# Patient Record
Sex: FEMALE | Race: WHITE | NOT HISPANIC OR LATINO | Employment: OTHER | URBAN - METROPOLITAN AREA
[De-identification: names, ages, dates, MRNs, and addresses within clinical notes are randomized per-mention and may not be internally consistent; named-entity substitution may affect disease eponyms.]

---

## 2017-01-02 ENCOUNTER — TELEPHONE (OUTPATIENT)
Dept: UROLOGY | Facility: CLINIC | Age: 67
End: 2017-01-02

## 2017-01-02 ENCOUNTER — PRE VISIT (OUTPATIENT)
Dept: CARDIOLOGY | Facility: CLINIC | Age: 67
End: 2017-01-02

## 2017-01-02 DIAGNOSIS — I50.33 ACUTE ON CHRONIC DIASTOLIC HEART FAILURE (H): Primary | ICD-10-CM

## 2017-01-02 PROBLEM — I43 CARDIAC AMYLOIDOSIS (H): Status: ACTIVE | Noted: 2017-01-02

## 2017-01-02 PROBLEM — E85.4 CARDIAC AMYLOIDOSIS (H): Status: ACTIVE | Noted: 2017-01-02

## 2017-01-02 PROBLEM — J90 RECURRENT RIGHT PLEURAL EFFUSION: Status: ACTIVE | Noted: 2017-01-02

## 2017-01-02 ASSESSMENT — ENCOUNTER SYMPTOMS
WHEEZING: 0
HYPERTENSION: 0
LIGHT-HEADEDNESS: 1
ABDOMINAL PAIN: 0
SLEEP DISTURBANCES DUE TO BREATHING: 1
POOR WOUND HEALING: 0
SPUTUM PRODUCTION: 0
POSTURAL DYSPNEA: 1
LEG PAIN: 0
BOWEL INCONTINENCE: 0
HEMOPTYSIS: 0
RESPIRATORY PAIN: 0
DYSPNEA ON EXERTION: 1
BLOOD IN STOOL: 0
PALPITATIONS: 1
VOMITING: 0
TACHYCARDIA: 0
COUGH: 0
SWOLLEN GLANDS: 0
LEG SWELLING: 1
HEARTBURN: 0
CONSTIPATION: 1
DIARRHEA: 1
NAUSEA: 1
HYPOTENSION: 0
BLOATING: 1
RECTAL PAIN: 0
RECTAL BLEEDING: 0
SYNCOPE: 0
SHORTNESS OF BREATH: 1
CLAUDICATION: 0
SNORES LOUDLY: 0
COUGH DISTURBING SLEEP: 0
ORTHOPNEA: 1
BRUISES/BLEEDS EASILY: 1
NAIL CHANGES: 1
SKIN CHANGES: 0
EXERCISE INTOLERANCE: 0
JAUNDICE: 0

## 2017-01-02 NOTE — TELEPHONE ENCOUNTER
Left message with request for patient to return call back to clinic to discuss results.    Patient returned call shortly after and was informed of 12/21/16 bladder biopsy results. Patient verbalized understanding and will follow up with planned cystoscopy on 3/22/17.    Irene Zambrano  General Surgery - Urology RN

## 2017-01-03 ENCOUNTER — TRANSFERRED RECORDS (OUTPATIENT)
Dept: HEALTH INFORMATION MANAGEMENT | Facility: CLINIC | Age: 67
End: 2017-01-03

## 2017-01-04 ENCOUNTER — TRANSFERRED RECORDS (OUTPATIENT)
Dept: HEALTH INFORMATION MANAGEMENT | Facility: CLINIC | Age: 67
End: 2017-01-04

## 2017-01-05 ENCOUNTER — OFFICE VISIT (OUTPATIENT)
Dept: CARDIOLOGY | Facility: CLINIC | Age: 67
End: 2017-01-05
Attending: INTERNAL MEDICINE
Payer: COMMERCIAL

## 2017-01-05 ENCOUNTER — RADIANT APPOINTMENT (OUTPATIENT)
Dept: CARDIOLOGY | Facility: CLINIC | Age: 67
End: 2017-01-05

## 2017-01-05 VITALS
WEIGHT: 163.1 LBS | OXYGEN SATURATION: 98 % | SYSTOLIC BLOOD PRESSURE: 117 MMHG | HEART RATE: 98 BPM | BODY MASS INDEX: 28.9 KG/M2 | HEIGHT: 63 IN | DIASTOLIC BLOOD PRESSURE: 77 MMHG

## 2017-01-05 DIAGNOSIS — I43 AMYLOID HEART DISEASE (H): ICD-10-CM

## 2017-01-05 DIAGNOSIS — E85.4 AMYLOID HEART DISEASE (H): ICD-10-CM

## 2017-01-05 DIAGNOSIS — E87.6 HYPOKALEMIA: Primary | ICD-10-CM

## 2017-01-05 DIAGNOSIS — I50.33 ACUTE ON CHRONIC DIASTOLIC HEART FAILURE (H): ICD-10-CM

## 2017-01-05 DIAGNOSIS — R60.0 BILATERAL EDEMA OF LOWER EXTREMITY: ICD-10-CM

## 2017-01-05 LAB
ANION GAP SERPL CALCULATED.3IONS-SCNC: 7 MMOL/L (ref 3–14)
BUN SERPL-MCNC: 11 MG/DL (ref 7–30)
CALCIUM SERPL-MCNC: 9 MG/DL (ref 8.5–10.1)
CHLORIDE SERPL-SCNC: 100 MMOL/L (ref 94–109)
CO2 SERPL-SCNC: 32 MMOL/L (ref 20–32)
CREAT SERPL-MCNC: 0.87 MG/DL (ref 0.52–1.04)
GFR SERPL CREATININE-BSD FRML MDRD: 65 ML/MIN/1.7M2
GLUCOSE SERPL-MCNC: 92 MG/DL (ref 70–99)
NT-PROBNP SERPL-MCNC: 2883 PG/ML (ref 0–125)
POTASSIUM SERPL-SCNC: 3.8 MMOL/L (ref 3.4–5.3)
SODIUM SERPL-SCNC: 140 MMOL/L (ref 133–144)
TROPONIN I SERPL-MCNC: 0.04 UG/L (ref 0–0.04)

## 2017-01-05 PROCEDURE — 36415 COLL VENOUS BLD VENIPUNCTURE: CPT | Performed by: INTERNAL MEDICINE

## 2017-01-05 PROCEDURE — 84484 ASSAY OF TROPONIN QUANT: CPT | Performed by: INTERNAL MEDICINE

## 2017-01-05 PROCEDURE — 99213 OFFICE O/P EST LOW 20 MIN: CPT

## 2017-01-05 PROCEDURE — 83880 ASSAY OF NATRIURETIC PEPTIDE: CPT | Performed by: INTERNAL MEDICINE

## 2017-01-05 PROCEDURE — 99215 OFFICE O/P EST HI 40 MIN: CPT | Mod: 25 | Performed by: INTERNAL MEDICINE

## 2017-01-05 PROCEDURE — 80048 BASIC METABOLIC PNL TOTAL CA: CPT | Performed by: INTERNAL MEDICINE

## 2017-01-05 PROCEDURE — 99213 OFFICE O/P EST LOW 20 MIN: CPT | Mod: 25,ZF

## 2017-01-05 RX ORDER — TORSEMIDE 20 MG/1
40 TABLET ORAL 2 TIMES DAILY
Qty: 120 TABLET | Refills: 3 | Status: ON HOLD | OUTPATIENT
Start: 2017-01-05 | End: 2017-01-25

## 2017-01-05 RX ORDER — POTASSIUM CHLORIDE 750 MG/1
TABLET, EXTENDED RELEASE ORAL
Qty: 180 TABLET | Refills: 11 | Status: ON HOLD | OUTPATIENT
Start: 2017-01-05 | End: 2017-01-25

## 2017-01-05 RX ORDER — POTASSIUM CHLORIDE 1500 MG/1
TABLET, EXTENDED RELEASE ORAL
Qty: 90 TABLET | Refills: 3 | Status: SHIPPED | OUTPATIENT
Start: 2017-01-05 | End: 2017-01-05

## 2017-01-05 RX ORDER — SPIRONOLACTONE 25 MG/1
25 TABLET ORAL DAILY
Qty: 30 TABLET | Refills: 3 | Status: ON HOLD | OUTPATIENT
Start: 2017-01-05 | End: 2017-01-25

## 2017-01-05 ASSESSMENT — PAIN SCALES - GENERAL: PAINLEVEL: NO PAIN (0)

## 2017-01-05 NOTE — PATIENT INSTRUCTIONS
You were seen today in the Cardiovascular Clinic at the [facility].     Cardiology Providers you saw during your visit:  Dr. Cohen    Recommendations:  Change Lasix to Torsemide 40 mg (2 pills) twice per day  Increase Potassium to 40 meq in the AM and 20 meq in the PM, we've prescribed new 20 meq tablets  Increase Aldactone to 25 mg once daily (1 pill)  Have labs repeated next week  Weigh daily and write these down. On the day you have labs drawn send Dr. Cohen a MobileAdst message with what your weights have been and how you're feeling and we'll determine you're further plan of care.  Eat a heart healthy, low sodium diet.  Get 20 to 30 minutes of aerobic exercise 4 to 5 times per week as tolerated. (Examples of aerobic exercise include: walking, bicycling, swimming, running).    Follow-up:  With Dr. Cohen in 4 weeks and CORE NP in 2 weeks    Results:      Orders Only on 01/05/2017   Component Date Value Ref Range Status     Sodium 01/05/2017 140  133 - 144 mmol/L Final     Potassium 01/05/2017 3.8  3.4 - 5.3 mmol/L Final     Chloride 01/05/2017 100  94 - 109 mmol/L Final     Carbon Dioxide 01/05/2017 32  20 - 32 mmol/L Final     Anion Gap 01/05/2017 7  3 - 14 mmol/L Final     Glucose 01/05/2017 92  70 - 99 mg/dL Final     Urea Nitrogen 01/05/2017 11  7 - 30 mg/dL Final     Creatinine 01/05/2017 0.87  0.52 - 1.04 mg/dL Final     GFR Estimate 01/05/2017 65  >60 mL/min/1.7m2 Final    Non  GFR Calc     GFR Estimate If Black 01/05/2017 79  >60 mL/min/1.7m2 Final    African American GFR Calc     Calcium 01/05/2017 9.0  8.5 - 10.1 mg/dL Final     Troponin I ES 01/05/2017 0.035  0.000 - 0.045 ug/L Final    Comment: The 99th percentile for upper reference range is 0.045 ug/L.  Troponin values   in   the range of 0.045 - 0.120 ug/L may be associated with risks of adverse   clinical events.       N-Terminal Pro Bnp 01/05/2017 2883* 0 - 125 pg/mL Final    Comment: Reference range shown and  results flagged as abnormal are for the outpatient,   non acute settings. Establishing a baseline value for each individual patient   is useful for follow-up.  Suggested inpatient cut points for confirming diagnosis of CHF in an acute   setting are:   >450 pg/mL (age 18 to less than 50)   >900 pg/mL (age 50 to less than 75)   >1800 pg/mL (75 yrs and older)  An inpatient or emergency department NT-proPBNP <300 pg/mL effectively rules   out   acute CHF, with 99% negative predictive value.         For emergencies call 911.    For any scheduling needs, please call [phone], option #1 then option # 1.    Thank you for entrusting us with your care!     Please call if you have any questions or concerns.    Faby Dorsey RN  Cardiology Care Coordinator  [phone], press option # 1 to be routed to the [place] then option # 3 for medical questions to speak with a nurse    If you have an urgent need after business hours or over the weekend please call [phone] and ask for the cardiology fellow on call.     If you have a hard time paying for your medications visit http://www.needymeds.org/ to see where you might be able to get your medications the cheapest along with patient assistance programs available.    Heart failure patients and family members are welcome you to come to one of our heart failure support group meetings on the following dates from 1-2 pm :12/5/16. These all take place on the 8th floor of the [facility] hospital building in the [facility] Cafeteria Conference Room. Let us know if you have any questions.

## 2017-01-05 NOTE — NURSING NOTE
"Chief Complaint   Patient presents with     Follow Up For     66 yr old female with h/o cardiac amyloidosis presenting for follow up with labs and echo   NOTE- Patient here for 3 month return visit; Patient has concerns of \"fluid build-up\" she would like to discuss with  Dr. Cohen today.  "

## 2017-01-05 NOTE — NURSING NOTE
Labs: Patient was given results of the laboratory testing obtained today. Patient was instructed to return for the next laboratory testing in 1 week. Patient demonstrated understanding of this information and agreed to call with further questions or concerns.   Return Appointment: Patient given instructions regarding scheduling next clinic visit. Patient demonstrated understanding of this information and agreed to call with further questions or concerns.  Medication Change: Patient was educated regarding prescribed medication change, including discussion of the indication, administration, side effects, and when to report to MD or RN. Patient demonstrated understanding of this information and agreed to call with further questions or concerns.  Patient stated she understood all health information given and agreed to call with further questions or concerns.

## 2017-01-05 NOTE — MR AVS SNAPSHOT
After Visit Summary   1/5/2017    Leandra Guerrero    MRN: [id number]           Patient Information     Date Of Birth          1950        Visit Information        Provider Department      1/5/2017 12:00 PM Domenica Cohen MD [facility]        Today's Diagnoses     Hypokalemia    -  1     Bilateral edema of lower extremity         Amyloid heart disease (H)         Acute on chronic diastolic heart failure (H)           Care Instructions    You were seen today in the Cardiovascular Clinic at the [facility].     Cardiology Providers you saw during your visit:  Dr. Cohen    Recommendations:  Change Lasix to Torsemide 40 mg (2 pills) twice per day  Increase Potassium to 40 meq in the AM and 20 meq in the PM, we've prescribed new 20 meq tablets  Increase Aldactone to 25 mg once daily (1 pill)  Have labs repeated next week  Weigh daily and write these down. On the day you have labs drawn send Dr. Cohen a Quofore message with what your weights have been and how you're feeling and we'll determine you're further plan of care.  Eat a heart healthy, low sodium diet.  Get 20 to 30 minutes of aerobic exercise 4 to 5 times per week as tolerated. (Examples of aerobic exercise include: walking, bicycling, swimming, running).    Follow-up:  With Dr. Cohen in 4 weeks and CORE NP in 2 weeks    Results:      Orders Only on 01/05/2017   Component Date Value Ref Range Status     Sodium 01/05/2017 140  133 - 144 mmol/L Final     Potassium 01/05/2017 3.8  3.4 - 5.3 mmol/L Final     Chloride 01/05/2017 100  94 - 109 mmol/L Final     Carbon Dioxide 01/05/2017 32  20 - 32 mmol/L Final     Anion Gap 01/05/2017 7  3 - 14 mmol/L Final     Glucose 01/05/2017 92  70 - 99 mg/dL Final     Urea Nitrogen 01/05/2017 11  7 - 30 mg/dL Final     Creatinine 01/05/2017 0.87  0.52 - 1.04 mg/dL Final     GFR Estimate 01/05/2017 65  >60 mL/min/1.7m2 Final    Non  GFR Calc     GFR  Estimate If Black 01/05/2017 79  >60 mL/min/1.7m2 Final    African American GFR Calc     Calcium 01/05/2017 9.0  8.5 - 10.1 mg/dL Final     Troponin I ES 01/05/2017 0.035  0.000 - 0.045 ug/L Final    Comment: The 99th percentile for upper reference range is 0.045 ug/L.  Troponin values   in   the range of 0.045 - 0.120 ug/L may be associated with risks of adverse   clinical events.       N-Terminal Pro Bnp 01/05/2017 2883* 0 - 125 pg/mL Final    Comment: Reference range shown and results flagged as abnormal are for the outpatient,   non acute settings. Establishing a baseline value for each individual patient   is useful for follow-up.  Suggested inpatient cut points for confirming diagnosis of CHF in an acute   setting are:   >450 pg/mL (age 18 to less than 50)   >900 pg/mL (age 50 to less than 75)   >1800 pg/mL (75 yrs and older)  An inpatient or emergency department NT-proPBNP <300 pg/mL effectively rules   out   acute CHF, with 99% negative predictive value.         For emergencies call 911.    For any scheduling needs, please call [phone], option #1 then option # 1.    Thank you for entrusting us with your care!     Please call if you have any questions or concerns.    Faby Dorsey RN  Cardiology Care Coordinator  [phone], press option # 1 to be routed to the [place] then option # 3 for medical questions to speak with a nurse    If you have an urgent need after business hours or over the weekend please call [phone] and ask for the cardiology fellow on call.     If you have a hard time paying for your medications visit http://www.needymeds.org/ to see where you might be able to get your medications the cheapest along with patient assistance programs available.    Heart failure patients and family members are welcome you to come to one of our heart failure support group meetings on the following dates from 1-2 pm :12/5/16. These all take place on the 8th floor of the [facility] building  in the [facility] Cafeteria Conference Room. Let us know if you have any questions.          Follow-ups after your visit        Your next 10 appointments already scheduled     Jan 12, 2017  9:15 AM   LAB with LAB ONC [facility] ([facility])    [address] [place] [id number] [phone]           Patient must bring picture ID.  Patient should be prepared to give a urine specimen  Please do not eat 10-12 hours before your appointment if you are coming in fasting for labs on lipids, cholesterol, or glucose (sugar).  Pregnant women should follow their Care Team instructions. Water with medications is okay. Do not drink coffee or other fluids.   If you have concerns about taking  your medications, please ask at office or if scheduling via Spottly, send a message by clicking on Secure Messaging, Message Your Care Team.            Jan 12, 2017 10:00 AM   Return Visit with Mirela Moore MD   [facility])    [id number] [place] [address] [phone]            Jan 12, 2017 11:00 AM   Level 1 with BAY 2 INFUSION   [facility]    [address] [place] [address] [phone]            Jan 31, 2017  4:00 PM   Lab with UC LAB   [facility])    [address] [place] [address] [phone]            Jan 31, 2017  4:30 PM   (Arrive by 4:15 PM)   CORE NEW with SE Tinsley CNP   [facility] ([facility])    [address] [place] [address]  3rd Floor  [facility] [address] [phone]            Feb 02, 2017  7:30 AM   Lab with UC LAB   [facility] ([facility])    [address] [place] [address]  [address] [facility] [address]   [phone]            Feb 02, 2017  8:00 AM   (Arrive by 7:45 AM)   RETURN HEART FAILURE  "with Domenica Cohen MD   [facility] ([facility])    [address] [facility]  3rd Floor  [facility] [address] [phone]            Mar 22, 2017  9:30 AM   CYSTO with Magen Kowalski MD   [facility] ([facility])    [address] [place] [address] [phone]              Future tests that were ordered for you today     Open Future Orders        Priority Expected Expires Ordered    Basic metabolic panel STAT 1/7/2017 1/19/2017 1/5/2017            Who to contact     If you have questions or need follow up information about today's clinic visit or your schedule please contact [facility] directly at [phone].  Normal or non-critical lab and imaging results will be communicated to you by eduPadhart, letter or phone within 4 business days after the clinic has received the results. If you do not hear from us within 7 days, please contact the clinic through eduPadhart or phone. If you have a critical or abnormal lab result, we will notify you by phone as soon as possible.  Submit refill requests through Heap or call your pharmacy and they will forward the refill request to us. Please allow 3 business days for your refill to be completed.          Additional Information About Your Visit        Heap Information     Heap gives you secure access to your electronic health record. If you see a primary care provider, you can also send messages to your care team and make appointments. If you have questions, please call your primary care clinic.  If you do not have a primary care provider, please call [phone] and they will assist you.        Care EveryWhere ID     This is your Care EveryWhere ID. This could be used by other organizations to access your [facility] medical records  [id number]        Your Vitals Were     Pulse Height BMI (Body Mass Index) Pulse Oximetry          98 1.6 m (5' 3\") 28.90 kg/m2 98%   "       Blood Pressure from Last 3 Encounters:   01/05/17 117/77   12/28/16 124/67   12/27/16 118/65    Weight from Last 3 Encounters:   01/05/17 73.982 kg (163 lb 1.6 oz)   12/27/16 76.204 kg (168 lb)   12/23/16 74.39 kg (164 lb)                 Today's Medication Changes          These changes are accurate as of: 1/5/17  1:11 PM.  If you have any questions, ask your nurse or doctor.               Start taking these medicines.        Dose/Directions    potassium chloride SA 10 MEQ CR tablet   Commonly known as:  K-DUR/KLOR-CON M   Used for:  Hypokalemia   Started by:  Domenica Cohen MD        Take 40meq (4 pills) in the morning, and 20meq (2 pills) in the afternoon   Quantity:  180 tablet   Refills:  11       torsemide 20 MG tablet   Commonly known as:  DEMADEX   Used for:  Acute on chronic diastolic heart failure (H), Bilateral edema of lower extremity   Started by:  Domenica Cohen MD        Dose:  40 mg   Take 2 tablets (40 mg) by mouth 2 times daily   Quantity:  120 tablet   Refills:  3         These medicines have changed or have updated prescriptions.        Dose/Directions    spironolactone 25 MG tablet   Commonly known as:  ALDACTONE   This may have changed:  how much to take   Used for:  Amyloid heart disease (H)   Changed by:  Domenica Cohen MD        Dose:  25 mg   Take 1 tablet (25 mg) by mouth daily   Quantity:  30 tablet   Refills:  3         Stop taking these medicines if you haven't already. Please contact your care team if you have questions.     furosemide 40 MG tablet   Commonly known as:  LASIX   Stopped by:  Domenica Cohen MD           potassium chloride 10 MEQ tablet   Commonly known as:  K-TAB,KLOR-CON   Stopped by:  Domenica Cohen MD                Where to get your medicines      These medications were sent to [place] [address] [place] [address] [facility] [address]     Phone:  [phone] - potassium  chloride SA 10 MEQ CR tablet  - spironolactone 25 MG tablet  - torsemide 20 MG tablet             Primary Care Provider Office Phone # Fax #    Magy Obrien [phone]       [facility] [address]  [facility] [address]        Thank you!     Thank you for choosing [facility]  for your care. Our goal is always to provide you with excellent care. Hearing back from our patients is one way we can continue to improve our services. Please take a few minutes to complete the written survey that you may receive in the mail after your visit with us. Thank you!             Your Updated Medication List - Protect others around you: Learn how to safely use, store and throw away your medicines at www.disposemymeds.org.          This list is accurate as of: 1/5/17  1:11 PM.  Always use your most recent med list.                   Brand Name Dispense Instructions for use    acyclovir 400 MG tablet    ZOVIRAX    60 tablet    Take 1 tablet (400 mg) by mouth 2 times daily Viral Prophylaxis.       dexamethasone 4 MG tablet    DECADRON    40 tablet    Take 10 tablets (40 mg) by mouth every 7 days for 4 doses Take daily on Days 1, 8, 15, and 22. Cycles 3 and 4 ONLY.       HYDROcodone-acetaminophen 5-325 MG per tablet    NORCO         LORazepam 0.5 MG tablet    ATIVAN     TAKE ONE-HALF TO ONE TABLET BY MOUTH ONCE DAILY AT BEDTIME AS NEEDED       ondansetron 4 MG ODT tab    ZOFRAN-ODT         potassium chloride SA 10 MEQ CR tablet    K-DUR/KLOR-CON M    180 tablet    Take 40meq (4 pills) in the morning, and 20meq (2 pills) in the afternoon       spironolactone 25 MG tablet    ALDACTONE    30 tablet    Take 1 tablet (25 mg) by mouth daily       torsemide 20 MG tablet    DEMADEX    120 tablet    Take 2 tablets (40 mg) by mouth 2 times daily       TYLENOL PO      Take 325 mg by mouth

## 2017-01-05 NOTE — PROGRESS NOTES
Quick Note:    Results discussed directly with patient while patient was present. Any further details documented in the note.     Vidhi Dorsey RN  ______

## 2017-01-05 NOTE — Clinical Note
1/5/2017      RE: Leandra Guerrero  [address] [place] [address]       Dear Colleague,    Thank you for the opportunity to participate in the care of your patient, Leandra Guerrero, at the [facility] at [facility]. Please see a copy of my visit note below.    January 5, 2017    HPI:  Mrs. Guerrero is a 66 year old female with no past medical history until the Spring of 2016 when she was diagnosed with stage IV AL amyloid; she presents to clinic for follow up of cardiac amyloid.     Her cardiac and oncologic history are as follows:   Fatigue started in January 2016. She eventually had a coronary angiogram which was reportedly normal and was diagnosed with CHF and started on a bblocker and diuretics. Her symptoms progressed to the point that she was evaluated at [facility] in August/September (Dr. Barron in oncology/hematology, Dr. Nettles is cardiology). She was diagnosed with metastatic stage IV AL amyloid (+GI, +bone marrow involvement, not thought to have involvement of kidney or liver):    Her work up is detailed in my past note however she has lambda AL amyloidosis and has been undergoing CyBorD chemotherapy and excellent light chain response by serum. She has been turned down at [facility] for a stem cell transplant due to her cardiac involvement.    When I first met her about 2 months ago, she had definite evidence of cardiac amyloid, although she did not have volume overload at that time, her shortness of breath was minimal and she had several reassuring cardiac features including normal filling pressures on echo and again no elevation in her neck veins.  Things have unfortunately changed dramatically in the last 2 months. She has now had several more taps of pleural effusions on the right,  several within the last month that have been negative for infection.  She has also had worsening leg edema and is actually having weeping of her lower extremities.  She  has increased abdominal girth.  She has had significantly more shortness of breath (sometimes at rest) and presently just walking even half a block.  She endorses PND and orthopnea.  She also endorses some chest heaviness when her pleural effusion increases.      She denies syncope or lightheadedness.       PAST MEDICAL HISTORY:  - Metastatic AL amyloid   - GI (stomach and duodenal) involvement  - Cardiac amyloidosis      FAMILY HISTORY:  Family History   Problem Relation Age of Onset     Other - See Comments Sister      Amyloidosis   - Sister passed from multiple myeloma, patient was also told her sister had amyloid as well  - Mom with CAD      SOCIAL HISTORY:  Social History     Social History     Marital Status:      Spouse Name: N/A     Number of Children: N/A     Years of Education: N/A     Social History Main Topics     Smoking status: Never Smoker      Smokeless tobacco: None     Alcohol Use: No     Drug Use: No     Sexual Activity: Not Asked     Other Topics Concern     None     Social History Narrative   Lives in [place] with her , has 2 kids  Previously did office work, hasn't worked for several months now  Never smoker  Social etoh    CURRENT MEDICATIONS:  Current Outpatient Prescriptions   Medication Sig Dispense Refill     furosemide (LASIX) 40 MG tablet Take 1 tablet (40 mg) by mouth 2 times daily Please take until you follow-up with the [facility] ([facility]) clinic. They will change the dose, if needed, at your follow-up. 60 tablet 0     acyclovir (ZOVIRAX) 400 MG tablet Take 1 tablet (400 mg) by mouth 2 times daily Viral Prophylaxis. 60 tablet 0     potassium chloride (K-TAB,KLOR-CON) 10 MEQ tablet Take 2 tablets (20 mEq) by mouth 2 times daily 90 tablet 0     spironolactone (ALDACTONE) 25 MG tablet Take 0.5 tablets (12.5 mg) by mouth daily 45 tablet 3     Acetaminophen (TYLENOL PO) Take 325 mg by mouth       LORazepam (ATIVAN) 0.5 MG tablet TAKE ONE-HALF TO ONE TABLET BY MOUTH ONCE  "DAILY AT BEDTIME AS NEEDED  0     ondansetron (ZOFRAN-ODT) 4 MG disintegrating tablet   0     dexamethasone (DECADRON) 4 MG tablet Take 10 tablets (40 mg) by mouth every 7 days for 4 doses Take daily on Days 1, 8, 15, and 22. Cycles 3 and 4 ONLY. 40 tablet 0     HYDROcodone-acetaminophen (NORCO) 5-325 MG per tablet   0       ROS:   Constitutional: No fever, chills, or sweats. 35lb weight loss over 6 months  ENT: No visual disturbance, ear ache, epistaxis, sore throat.   Allergies/Immunologic: Negative.   Respiratory: No cough, hemoptysis.   Cardiovascular: As per HPI.   GI: + Nausea and severe constipation. Early satiety   : No urinary frequency, dysuria, or hematuria.   Integument: Negative.   Psychiatric: feeling down and anxious since her diagnosis  Neuro: + tingling in legs bilaterally  Endocrinology: Negative.   Musculoskeletal: Negative.    EXAM:  /77 mmHg  Pulse 98  Ht 1.6 m (5' 3\")  Wt 73.982 kg (163 lb 1.6 oz)  BMI 28.90 kg/m2  SpO2 98%  General: appears comfortable, alert and articulate  Head: normocephalic, atraumatic  Eyes: anicteric sclera, EOMI  Neck: no adenopathy  Orophyarynx: moist mucosa, no lesions, dentition intact  Heart: tachycardic, regular. Unable to appreciate murmurs. +S4. estimated JVP 18 cm   Lungs: diminished at R base with dullness to percussion, otherwise clear.  Abdomen: soft, non-tender, bowel sounds present, no hepatosplenomegaly + distension   Extremities: tense, 3 + LE edema up to the level of the mid thigh bilaterally (anasarca)   Neurological: normal speech and affect, no gross motor deficits    Labs:  CBC RESULTS:  Lab Results   Component Value Date    WBC 7.4 12/28/2016    RBC 3.94 12/28/2016    HGB 12.9 12/28/2016    HCT 39.9 12/28/2016    * 12/28/2016    MCH 32.7 12/28/2016    MCHC 32.3 12/28/2016    RDW 18.3* 12/28/2016    * 12/28/2016       CMP RESULTS:  Lab Results   Component Value Date     01/05/2017    POTASSIUM 3.8 01/05/2017    " CHLORIDE 100 01/05/2017    CO2 32 01/05/2017    ANIONGAP 7 01/05/2017    GLC 92 01/05/2017    BUN 11 01/05/2017    CR 0.87 01/05/2017    GFRESTIMATED 65 01/05/2017    GFRESTBLACK 79 01/05/2017    JERROD 9.0 01/05/2017    BILITOTAL 2.0* 12/28/2016    ALBUMIN 3.1* 12/28/2016    ALKPHOS 108 12/28/2016    ALT 15 12/28/2016    AST 16 12/28/2016        INR RESULTS:  Lab Results   Component Value Date    INR 1.12 12/28/2016       Lab Results   Component Value Date    MAG 2.4* 12/06/2016     Lab Results   Component Value Date    NTBNPI 2929* 12/28/2016     Lab Results   Component Value Date    NTBNP 2883* 01/05/2017       Echo from today:   Left Ventricle  Biplane LVEF 55%. Global and regional left ventricular function is normal  with an EF of 55-60%. Left ventricular size is normal. Moderate to severe  concentric wall thickening consistent with left ventricular hypertrophy is  present. Consistent with amyloid cardiomyopathy. Grade III or advanced  diastolic dysfunction. Global peak LV longitudinal strain is averaged at -  12.5%. This suggests abnormal strain (normal <-18%). No regional wall motion  abnormalities are seen.     Right Ventricle  The right ventricle is normal size. Global right ventricular function is  mildly reduced.  Atria  The right atria appears normal. Mild left atrial enlargement is present.     Mitral Valve  The mitral valve is normal. Mild to moderate mitral insufficiency is present.     Aortic Valve  Aortic valve is normal in structure and function. Mild aortic insufficiency  is present.     Tricuspid Valve  The tricuspid valve is normal. Mild to moderate tricuspid insufficiency is  present. The right ventricular systolic pressure is approximated at 23.7 mmHg  plus the right atrial pressure.     Pulmonic Valve  The pulmonic valve is normal. Mild pulmonic insufficiency is present.     Vessels  The inferior vena cava is normal. The aorta root is normal. The pulmonary  artery cannot be  assessed.  Pericardium  Small circumferential pericardial effusion is present without any hemodynamic  significance.     Compared to Previous Study  This study was compared with the study from 10/6/2016 .     ______________________________________________________________________________  MMode/2D Measurements & Calculations  IVSd: 1.6 cm  LVIDd: 3.6 cm  LVIDs: 2.6 cm  LVPWd: 1.4 cm  FS: 28.0 %  EDV(Teich): 56.1 ml  ESV(Teich): 25.2 ml  LV mass(C)d: 202.4 grams       Doppler Measurements & Calculations  MV E max ella: 82.3 cm/sec  MV A max ella: 40.1 cm/sec  MV E/A: 2.1  MV dec time: 0.14 sec  TR max ella: 243.4 cm/sec  TR max P.7 mmHg  Lateral E/e': 17.2  Medial E/e': 18.3     ______________________________________________________________________________    Previous cardiac monitor; no a fib, no VT, no pauses       Assessment and Plan:   Mrs. 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     Patient has cardiac amyloidosis as evidenced by her echocardiogram (severe concentric hypertrophy and biatrial enlargement) and abnormal EKG (near-low voltage, poor R wave progression, biatrial enlargement and no evidence of LVH despite very thickened LV on echo) in the setting of biopsy proven (BMB, EGD) AL amyloid. Her positive biomarkers are also suggestive of cardiac involvement; angiogram without obstructive CAD. She has been undergoing chemotherapy with CyBorD.     While her serum light chains have shown a response to treatment, she is much more symptomatic from her underlying cardiac involvement than when I last saw her.  She has elevated neck veins on exam today, she has tense lower extremity edema, increased abdominal girth and severe dyspnea on exertion.  All of these are new.  She is also having recurrent pleural taps, which I have to believe are in some part due to her  underlying disease but also due to cardiac dysfunction and high biventricular filling pressures.  Her natriuretic peptides are elevated today, and while her troponin elevation is only minimal, her overall functional status has declined.  She is presently New [place] Heart Association class IIIB with severe volume overload on exam today.      I am increasing her diuretics substantially today.  We are starting torsemide 40 b.i.d., with an increase in potassium as listed below.  We will have her see our nurse practitioner next Tuesday, and I have rechecked Oncology to tell them about her progressive cardiac symptoms. I have had many patient's whose cardiac disease has actually improve dramatically with control of serum light chains and she is not falling into that category at present. I am very worried about her trajectory. She is not, at present, a candidate for SCT and she is not a candidate for a combined SCT with heart due to her extracardiac involvement. She is really running out of options. I think that her restrictive physiology is going to continue to be an issue.     We still recommend holding any/all AV thor agents given the risk of progressive AV block/conduction disease in these patients.     We are going to see her in 4- 5 days and I hope I an stabilize her with diuretics, otherwise she is going to need an admission for diuresis.     1. Cardiac Amyloidosis with preserved EF  Stage D  NYHA Class IIIB   ACEi/ARB None  BB None, relatively contraindicated due to risk of progressive conduction disease/AV block in these patients  Aldosterone antagonist: aldactone 12.5  SCD prophylaxis: not indicated  % BiV pacing: NA  Fluid status: hypervolemic as above       Other:  Arrhythmia monitoring -- will discuss next visit about more extensive event monitoring (benign holter monitor reassuring). If afib is found, she will need to be anticoagulated for thromboembolism prophylaxis        Domenica Cohen MD  Assistant  Professor of Medicine   [facility] Division of Cardiology       CC  Patient Care Team:  Magy Obrien as PCP - General (Family Practice)  Mirela Moore MD as MD (Hematology & Oncology)  Vidhi Dorsey RN as Nurse Coordinator (Cardiology)

## 2017-01-05 NOTE — NURSING NOTE
Chief Complaint   Patient presents with     Follow Up For     66 yr old female with h/o cardiac amyloidosis presenting for follow up with labs and echo

## 2017-01-05 NOTE — PROGRESS NOTES
January 5, 2017    HPI:  Mrs. Guerrero is a 66 year old female with no past medical history until the Spring of 2016 when she was diagnosed with stage IV AL amyloid; she presents to clinic for follow up of cardiac amyloid.     Her cardiac and oncologic history are as follows:   Fatigue started in January 2016. She eventually had a coronary angiogram which was reportedly normal and was diagnosed with CHF and started on a bblocker and diuretics. Her symptoms progressed to the point that she was evaluated at [facility] in August/September (Dr. Barron in oncology/hematology, Dr. Nettles is cardiology). She was diagnosed with metastatic stage IV AL amyloid (+GI, +bone marrow involvement, not thought to have involvement of kidney or liver):    Her work up is detailed in my past note however she has lambda AL amyloidosis and has been undergoing CyBorD chemotherapy and excellent light chain response by serum. She has been turned down at [facility] for [facility] stem cell transplant due to her cardiac involvement.    When I first met her about 2 months ago, she had definite evidence of cardiac amyloid, although she did not have volume overload at that time, her shortness of breath was minimal and she had several reassuring cardiac features including normal filling pressures on echo and again no elevation in her neck veins.  Things have unfortunately changed dramatically in the last 2 months. She has now had several more taps of pleural effusions on the right,  several within the last month that have been negative for infection.  She has also had worsening leg edema and is actually having weeping of her lower extremities.  She has increased abdominal girth.  She has had significantly more shortness of breath (sometimes at rest) and presently just walking even half a block.  She endorses PND and orthopnea.  She also endorses some chest heaviness when her pleural effusion increases.      She denies syncope or lightheadedness.       PAST MEDICAL  HISTORY:  - Metastatic AL amyloid   - GI (stomach and duodenal) involvement  - Cardiac amyloidosis      FAMILY HISTORY:  Family History   Problem Relation Age of Onset     Other - See Comments Sister      Amyloidosis   - Sister passed from multiple myeloma, patient was also told her sister had amyloid as well  - Mom with CAD      SOCIAL HISTORY:  Social History     Social History     Marital Status:      Spouse Name: N/A     Number of Children: N/A     Years of Education: N/A     Social History Main Topics     Smoking status: Never Smoker      Smokeless tobacco: None     Alcohol Use: No     Drug Use: No     Sexual Activity: Not Asked     Other Topics Concern     None     Social History Narrative   Lives in [place] with her , has 2 kids  Previously did office work, hasn't worked for several months now  Never smoker  Social etoh    CURRENT MEDICATIONS:  Current Outpatient Prescriptions   Medication Sig Dispense Refill     furosemide (LASIX) 40 MG tablet Take 1 tablet (40 mg) by mouth 2 times daily Please take until you follow-up with the [facility] ([facility]) clinic. They will change the dose, if needed, at your follow-up. 60 tablet 0     acyclovir (ZOVIRAX) 400 MG tablet Take 1 tablet (400 mg) by mouth 2 times daily Viral Prophylaxis. 60 tablet 0     potassium chloride (K-TAB,KLOR-CON) 10 MEQ tablet Take 2 tablets (20 mEq) by mouth 2 times daily 90 tablet 0     spironolactone (ALDACTONE) 25 MG tablet Take 0.5 tablets (12.5 mg) by mouth daily 45 tablet 3     Acetaminophen (TYLENOL PO) Take 325 mg by mouth       LORazepam (ATIVAN) 0.5 MG tablet TAKE ONE-HALF TO ONE TABLET BY MOUTH ONCE DAILY AT BEDTIME AS NEEDED  0     ondansetron (ZOFRAN-ODT) 4 MG disintegrating tablet   0     dexamethasone (DECADRON) 4 MG tablet Take 10 tablets (40 mg) by mouth every 7 days for 4 doses Take daily on Days 1, 8, 15, and 22. Cycles 3 and 4 ONLY. 40 tablet 0     HYDROcodone-acetaminophen (NORCO) 5-325 MG per tablet   0  "      ROS:   Constitutional: No fever, chills, or sweats. 35lb weight loss over 6 months  ENT: No visual disturbance, ear ache, epistaxis, sore throat.   Allergies/Immunologic: Negative.   Respiratory: No cough, hemoptysis.   Cardiovascular: As per HPI.   GI: + Nausea and severe constipation. Early satiety   : No urinary frequency, dysuria, or hematuria.   Integument: Negative.   Psychiatric: feeling down and anxious since her diagnosis  Neuro: + tingling in legs bilaterally  Endocrinology: Negative.   Musculoskeletal: Negative.    EXAM:  /77 mmHg  Pulse 98  Ht 1.6 m (5' 3\")  Wt 73.982 kg (163 lb 1.6 oz)  BMI 28.90 kg/m2  SpO2 98%  General: appears comfortable, alert and articulate  Head: normocephalic, atraumatic  Eyes: anicteric sclera, EOMI  Neck: no adenopathy  Orophyarynx: moist mucosa, no lesions, dentition intact  Heart: tachycardic, regular. Unable to appreciate murmurs. +S4. estimated JVP 18 cm   Lungs: diminished at R base with dullness to percussion, otherwise clear.  Abdomen: soft, non-tender, bowel sounds present, no hepatosplenomegaly + distension   Extremities: tense, 3 + LE edema up to the level of the mid thigh bilaterally (anasarca)   Neurological: normal speech and affect, no gross motor deficits    Labs:  CBC RESULTS:  Lab Results   Component Value Date    WBC 7.4 12/28/2016    RBC 3.94 12/28/2016    HGB 12.9 12/28/2016    HCT 39.9 12/28/2016    * 12/28/2016    MCH 32.7 12/28/2016    MCHC 32.3 12/28/2016    RDW 18.3* 12/28/2016    * 12/28/2016       CMP RESULTS:  Lab Results   Component Value Date     01/05/2017    POTASSIUM 3.8 01/05/2017    CHLORIDE 100 01/05/2017    CO2 32 01/05/2017    ANIONGAP 7 01/05/2017    GLC 92 01/05/2017    BUN 11 01/05/2017    CR 0.87 01/05/2017    GFRESTIMATED 65 01/05/2017    GFRESTBLACK 79 01/05/2017    JERROD 9.0 01/05/2017    BILITOTAL 2.0* 12/28/2016    ALBUMIN 3.1* 12/28/2016    ALKPHOS 108 12/28/2016    ALT 15 12/28/2016    AST 16 " 12/28/2016        INR RESULTS:  Lab Results   Component Value Date    INR 1.12 12/28/2016       Lab Results   Component Value Date    MAG 2.4* 12/06/2016     Lab Results   Component Value Date    NTBNPI 2929* 12/28/2016     Lab Results   Component Value Date    NTBNP 2883* 01/05/2017       Echo from today:   Left Ventricle  Biplane LVEF 55%. Global and regional left ventricular function is normal  with an EF of 55-60%. Left ventricular size is normal. Moderate to severe  concentric wall thickening consistent with left ventricular hypertrophy is  present. Consistent with amyloid cardiomyopathy. Grade III or advanced  diastolic dysfunction. Global peak LV longitudinal strain is averaged at -  12.5%. This suggests abnormal strain (normal <-18%). No regional wall motion  abnormalities are seen.     Right Ventricle  The right ventricle is normal size. Global right ventricular function is  mildly reduced.  Atria  The right atria appears normal. Mild left atrial enlargement is present.     Mitral Valve  The mitral valve is normal. Mild to moderate mitral insufficiency is present.     Aortic Valve  Aortic valve is normal in structure and function. Mild aortic insufficiency  is present.     Tricuspid Valve  The tricuspid valve is normal. Mild to moderate tricuspid insufficiency is  present. The right ventricular systolic pressure is approximated at 23.7 mmHg  plus the right atrial pressure.     Pulmonic Valve  The pulmonic valve is normal. Mild pulmonic insufficiency is present.     Vessels  The inferior vena cava is normal. The aorta root is normal. The pulmonary  artery cannot be assessed.  Pericardium  Small circumferential pericardial effusion is present without any hemodynamic  significance.     Compared to Previous Study  This study was compared with the study from 10/6/2016 .     ______________________________________________________________________________  MMode/2D Measurements & Calculations  IVSd: 1.6 cm  LVIDd:  3.6 cm  LVIDs: 2.6 cm  LVPWd: 1.4 cm  FS: 28.0 %  EDV(Teich): 56.1 ml  ESV(Teich): 25.2 ml  LV mass(C)d: 202.4 grams       Doppler Measurements & Calculations  MV E max ella: 82.3 cm/sec  MV A max ella: 40.1 cm/sec  MV E/A: 2.1  MV dec time: 0.14 sec  TR max ella: 243.4 cm/sec  TR max P.7 mmHg  Lateral E/e': 17.2  Medial E/e': 18.3     ______________________________________________________________________________    Previous cardiac monitor; no a fib, no VT, no pauses       Assessment and Plan:   Mrs. 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     Patient has cardiac amyloidosis as evidenced by her echocardiogram (severe concentric hypertrophy and biatrial enlargement) and abnormal EKG (near-low voltage, poor R wave progression, biatrial enlargement and no evidence of LVH despite very thickened LV on echo) in the setting of biopsy proven (BMB, EGD) AL amyloid. Her positive biomarkers are also suggestive of cardiac involvement; angiogram without obstructive CAD. She has been undergoing chemotherapy with CyBorD.     While her serum light chains have shown a response to treatment, she is much more symptomatic from her underlying cardiac involvement than when I last saw her.  She has elevated neck veins on exam today, she has tense lower extremity edema, increased abdominal girth and severe dyspnea on exertion.  All of these are new.  She is also having recurrent pleural taps, which I have to believe are in some part due to her underlying disease but also due to cardiac dysfunction and high biventricular filling pressures.  Her natriuretic peptides are elevated today, and while her troponin elevation is only minimal, her overall functional status has declined.  She is presently New [place] Heart Association class IIIB with severe volume overload on exam today.      I am  increasing her diuretics substantially today.  We are starting torsemide 40 b.i.d., with an increase in potassium as listed below.  We will have her see our nurse practitioner next Tuesday, and I have rechecked Oncology to tell them about her progressive cardiac symptoms. I have had many patient's whose cardiac disease has actually improve dramatically with control of serum light chains and she is not falling into that category at present. I am very worried about her trajectory. She is not, at present, a candidate for SCT and she is not a candidate for a combined SCT with heart due to her extracardiac involvement. She is really running out of options. I think that her restrictive physiology is going to continue to be an issue.     We still recommend holding any/all AV thor agents given the risk of progressive AV block/conduction disease in these patients.     We are going to see her in 4- 5 days and I hope I an stabilize her with diuretics, otherwise she is going to need an admission for diuresis.     1. Cardiac Amyloidosis with preserved EF  Stage D  NYHA Class IIIB   ACEi/ARB None  BB None, relatively contraindicated due to risk of progressive conduction disease/AV block in these patients  Aldosterone antagonist: aldactone 12.5  SCD prophylaxis: not indicated  % BiV pacing: NA  Fluid status: hypervolemic as above       Other:  Arrhythmia monitoring -- will discuss next visit about more extensive event monitoring (benign holter monitor reassuring). If afib is found, she will need to be anticoagulated for thromboembolism prophylaxis        Domenica Cohen MD   of Medicine   [facility] Division of Cardiology       CC  Patient Care Team:  Magy Obrien as PCP - General (Family Practice)  Mirela Daley MD as MD (Hematology & Oncology)  Domenica Cohen MD as MD (Cardiology)  Vidhi Dorsey RN as Nurse Coordinator (Cardiology)  MIRELA DALEY  Answers for  HPI/ROS submitted by the patient on 1/2/2017   General Symptoms: No  Skin Symptoms: Yes  HENT Symptoms: No  EYE SYMPTOMS: No  HEART SYMPTOMS: Yes  LUNG SYMPTOMS: Yes  INTESTINAL SYMPTOMS: Yes  URINARY SYMPTOMS: No  GYNECOLOGIC SYMPTOMS: No  BREAST SYMPTOMS: No  SKELETAL SYMPTOMS: No  BLOOD SYMPTOMS: Yes  NERVOUS SYSTEM SYMPTOMS: No  MENTAL HEALTH SYMPTOMS: No  Changes in hair: No  Changes in moles/birth marks: No  Itching: Yes  Rashes: No  Changes in nails: Yes  Acne: No  Hair in places you don't want it: No  Change in facial hair: No  Warts: No  Non-healing sores: No  Scarring: No  Flaking of skin: Yes  Color changes of hands/feet in cold : No  Sun sensitivity: No  Skin thickening: No  Cough: No  Sputum or phlegm: No  Coughing up blood: No  Difficulty breating or shortness of breath: Yes  Snoring: No  Wheezing: No  Difficulty breathing on exertion: Yes  Respiratory pain: No  Nighttime Cough: No  Difficulty breathing when lying flat: Yes  Chest pain or pressure: Yes  Fast or irregular heartbeat: Yes  Pain in legs with walking: No  Swelling in feet or ankles: Yes  Trouble breathing while lying down: Yes  Fingers or Toes appear blue: No  High blood pressure: No  Low blood pressure: No  Fainting: No  Murmurs: No  Chest pain on exertion: No  Chest pain at rest: No  Cramping pain in leg during exercise: No  Pacemaker: No  Varicose veins: No  Edema or swelling: Yes  Fast heart beat: No  Wake up at night with shortness of breath: Yes  Heart flutters: Yes  Light-headedness: Yes  Exercise intolerance: No  Heart burn or indigestion: No  Nausea: Yes  Vomiting: No  Abdominal pain: No  Bloating: Yes  Constipation: Yes  Diarrhea: Yes  Blood in stool: No  Black stools: No  Rectal or Anal pain: No  Fecal incontinence: No  Rectal bleeding: No  Yellowing of skin or eyes: No  Vomit with blood: No  Change in stools: No  Hemorrhoids: No  Anemia: No  Swollen glands: No  Easy bleeding or bruising: Yes

## 2017-01-05 NOTE — PROGRESS NOTES
October 7th, 2016    HPI:  Mrs. Guerrero is a 66 year old female with no past medical history until the Spring of 2016 when she was diagnosed with metastatic stage IV AL amyloid; she presents to clinic for evaluation due to concern for cardiac amyloid.     Patient reports her symptoms of fatigue and shortness of breath started in January 2016.  By April 2016 she was evaluated by her PCP after having 5 visits to the ER due to acute shortness of breath. She had a stress test (no report) and was only able to walk 4 minutes. She eventually had a coronary angiogram which was reportedly normal, again we do not have these records. She was diagnosed with CHF and started on a bblocker and diuretics. Her symptoms progressed to the point that she was evaluated at [facility] in August/September (Dr. Barron in oncology/hematology, Dr. Nettles is cardiology). She was diagnosed with metastatic stage IV AL amyloid (+GI, +bone marrow involvement, not thought to have involvement of kidney or liver) with the following work up:    [facility] [place] work up:  Her TSH was normal. Serum protein ELP showed a small abnormality in the gamma fraction.  Serum immunofixation showed small monoclonal IgA lambda in the gamma fraction and a small lambda in the beta fraction. Serum IgA level was normal at 329. Serum IgG level was mildly low at 755 (normal range 767-1590) and IgM level was normal. B2 microglobulin was mildly elevated at 2.94 (ULN 2.7).  Total bilirubin was elevated at 3.9 and direct at 0.8. Uric acid was mildly elevated at 6.9 (ULN 6.1) Creat was 1.0. Troponin was 0.1 (ULN <0.01) and NT-pro BNP was 4747 pg/ml (ULN <=183). Holter monitor was done that did not show any tachyarrhythmias. Echo was done which reported an EF of 59%. She also had a R thoracentesis for a large pleural effusion 8/16, repeat CXR 9/16 with near resolution of effusion per report. Total cholesterol was 190 and LDL was 139. Random urine protein was mildly elevated at 33 mg/dl (normal  range <22). INR was 1.2 and PTT 27 (within normal limits). Fibrinogen was elevated at 441 and factor X was mildly decreased at 64% (normal range %). D-dimer was up at 1700 (). Free lambda light chains were 69.5 and free kappa light chains were 1.11 with FLC kappa/lambda ratio of 0.016. Flow cytometry on bone marrow biopsy showed detectable monotypic plasma cells. There were 12% of monotypic plasma cells on bone marrow biopsy. Bone marrow biopsy (performed on 9/14/2016) showed normocellular BM, 30-40% cellularity, with involvement by 10-20% of lambda light chain restricted plasma cells.  Congo red stain was positive on the bone marrow biopsy. Liquid chromatography tandem mass spectroscopy showed a peptide profile c/w AL (lambda type) amyloid. BM karyotype was normal 46 XX. FISH on BM showed plasma cell clone with 1q duplication, and monosomy of 13 and 14. EGD 9/14/2016 -- gastric mucosal atrophy and multiple mucosal papules (nodules) seen in the stomach. The duodenum appeared normal. The biopsies of stomach mucosa, of stomach (antral) nodules and of duodenum, all showed the presence of amyloid in submucosal blood vessel walls in the stomach and duodenum and in deep mucosa in the stomach antrum and body, confirmed by Congo Red stain.     Since her work up at [facility], she has returned to home to [place]. She has established care with Dr. Moore in heme/onc who quoted a median survival of 5 months in patients with stage IV metastatic AL amyloid not undergoing BMT, 5 year survival of 15%. Patients undergoing BMT, medial survival is 22 months and 5 year survival is 46%. She was not thought to be a candidate for high dose therapy with autologous HSCT due to her cardiac involvement and elevated troponin. She was started on CyBorD and has had no side effects as of yet. Additional work up done here includes: Serum immunofixation ELP: Monoclonal IgA immunoglobulin of lambda light chain type. Monoclonal free  immunoglobulin light chain of lambda chain type. EKG in heme/onc clinic 2 days ago shows sinus tachycardia with rate of 106, biatrial enlargement, poor R wave progression, no LVH, ST depression in V4-V6.    Clinically, patient is overall doing fair. Since her work up at [facility] she is now off bblocker and CCB, she is aldactone 12.5 and lasix 40 daily. Her shortness of breath and orthopnea have improved significantly since having her thoracentesis. She still feels profoundly fatigued and is very limited functionally. She can walk down the driveway of her house but gets fatigued and needs to stop much after that. She gets very winded with stairs. 1 year ago she would several miles without issues. She denies syncope/pre-syncope, chest pain/pressure. She has palpitations without associated symptoms several times per week, unchanged. Her leg swelling has improved with diuretics. She still has significant GI symptoms include nausea, early satiety and severe constipation. She occasionally has tingling in her feet. Her mood is down; she feels overwhelmed and deflated with her new diagnosis. Her weight is 150lb today, she was in the 180-190 range in January of this year, giving her a 30-40lb weight loss over the last 6-9 months.      PAST MEDICAL HISTORY:  - Metastatic AL amyloid   - GI (stomach and duodenal) involvement  - Cardiac amyloidosis      FAMILY HISTORY:  Family History   Problem Relation Age of Onset     Other - See Comments Sister      Amyloidosis   - Sister passed from multiple myeloma, patient was also told her sister had amyloid as well  - Mom with CAD      SOCIAL HISTORY:  Social History     Social History     Marital Status:      Spouse Name: N/A     Number of Children: N/A     Years of Education: N/A     Social History Main Topics     Smoking status: Never Smoker      Smokeless tobacco: None     Alcohol Use: No     Drug Use: No     Sexual Activity: Not Asked     Other Topics Concern     None     Social  History Narrative   Lives in [place] with her , has 2 kids  Previously did office work, hasn't worked for several months now  Never smoker  Social etoh    CURRENT MEDICATIONS:  Current Outpatient Prescriptions   Medication Sig Dispense Refill     furosemide (LASIX) 40 MG tablet Take 1 tablet (40 mg) by mouth 2 times daily Please take until you follow-up with the [facility] (cardiology) clinic. They will change the dose, if needed, at your follow-up. 60 tablet 0     dexamethasone (DECADRON) 4 MG tablet Take 10 tablets (40 mg) by mouth every 7 days for 4 doses Take daily on Days 1, 8, 15, and 22. Cycles 3 and 4 ONLY. 40 tablet 0     acyclovir (ZOVIRAX) 400 MG tablet Take 1 tablet (400 mg) by mouth 2 times daily Viral Prophylaxis. 60 tablet 0     potassium chloride (K-TAB,KLOR-CON) 10 MEQ tablet Take 2 tablets (20 mEq) by mouth 2 times daily 90 tablet 0     mesna (MESNEX) 400 MG TABS Take 1 tab 2 hours before cytoxan and 1 tab 4 hours after cytoxan on days 1,8,15 and 22 of cycle. 8 tablet 0     spironolactone (ALDACTONE) 25 MG tablet Take 0.5 tablets (12.5 mg) by mouth daily 45 tablet 3     Acetaminophen (TYLENOL PO) Take 325 mg by mouth       HYDROcodone-acetaminophen (NORCO) 5-325 MG per tablet   0     LORazepam (ATIVAN) 0.5 MG tablet TAKE ONE-HALF TO ONE TABLET BY MOUTH ONCE DAILY AT BEDTIME AS NEEDED  0     ondansetron (ZOFRAN-ODT) 4 MG disintegrating tablet   0       ROS:   Constitutional: No fever, chills, or sweats. 35lb weight loss over 6 months  ENT: No visual disturbance, ear ache, epistaxis, sore throat.   Allergies/Immunologic: Negative.   Respiratory: No cough, hemoptysis.   Cardiovascular: As per HPI.   GI: + Nausea and severe constipation. Early satiety   : No urinary frequency, dysuria, or hematuria.   Integument: Negative.   Psychiatric: feeling down and anxious since her diagnosis  Neuro: + tingling in legs bilaterally  Endocrinology: Negative.   Musculoskeletal: Negative.    EXAM:  BP   SpO2    General: appears comfortable, alert and articulate  Head: normocephalic, atraumatic  Eyes: anicteric sclera, EOMI  Neck: no adenopathy  Orophyarynx: moist mucosa, no lesions, dentition intact  Heart: tachycardic, regular. Unable to appreciate murmurs. +S4. estimated JVP 10  Lungs: diminished at bases, otherwise clear.  Abdomen: soft, non-tender, bowel sounds present, no hepatosplenomegaly  Extremities: soft non pitting lower extremity edema. 2+ DP pulses bilaterally  Neurological: normal speech and affect, no gross motor deficits    Labs:  CBC RESULTS:  Lab Results   Component Value Date    WBC 7.4 12/28/2016    RBC 3.94 12/28/2016    HGB 12.9 12/28/2016    HCT 39.9 12/28/2016    * 12/28/2016    MCH 32.7 12/28/2016    MCHC 32.3 12/28/2016    RDW 18.3* 12/28/2016    * 12/28/2016       CMP RESULTS:  Lab Results   Component Value Date     01/05/2017    POTASSIUM 3.8 01/05/2017    CHLORIDE 100 01/05/2017    CO2 32 01/05/2017    ANIONGAP 7 01/05/2017    GLC 92 01/05/2017    BUN 11 01/05/2017    CR 0.87 01/05/2017    GFRESTIMATED 65 01/05/2017    GFRESTBLACK 79 01/05/2017    JERROD 9.0 01/05/2017    BILITOTAL 2.0* 12/28/2016    ALBUMIN 3.1* 12/28/2016    ALKPHOS 108 12/28/2016    ALT 15 12/28/2016    AST 16 12/28/2016        INR RESULTS:  Lab Results   Component Value Date    INR 1.12 12/28/2016       Lab Results   Component Value Date    MAG 2.4* 12/06/2016     Lab Results   Component Value Date    NTBNPI 2929* 12/28/2016     Lab Results   Component Value Date    NTBNP 2883* 01/05/2017       Troponin 0.095, NT-BNP 4K today  Echocardiogram 10/6  Interpretation Summary  Global and regional left ventricular function is normal with an EF of 60-65%.  The LV mass index is 131 g/m2 (severely increased.) The LV geometry is c/w  concentric hypertrophy measured by relative wall thickness. Consider an  infiltrative cardiomyopathy such as amyloid.  Global right ventricular function is normal.  Severe biatrial  enlargement is present.  Right ventricular systolic pressure is 26mmHg above the right atrial  pressure.  The inferior vena cava is dilated at 2.1 cm without respiratory variability,  consistent with increased right atrial pressure. The estimated mean right  atrial pressure is 3-8 mmHg.  Trivial pericardial effusion is present.  Previous study not available for comparison.     Assessment and Plan:   Mrs. Guerrero is a 66 year old female with recent diagnosis of stage IV metastatic AL amyloid with GI, bone marrow and cardiac involvement who presented today to establish care in HF clinic.     Patient has cardiac amyloidosis as evidenced by her echocardiogram (severe concentric hypertrophy and biatrial enlargement) and abnormal EKG (near-low voltage, poor R wave progression, biatrial enlargement and no evidence of LVH despite very thickened LV on echo) in the setting of biopsy proven (BMB, EGD) AL amyloid. Her positive biomarkers are also suggestive of cardiac involvement; angiogram without obstructive CAD. She is currently undergoing chemotherapy with CyBorD as she was not thought to be a candidate for stem cell transplant and aggressive chemo given her cardiac involvement with positive troponin and NT-BNP. Although her shortness of breath and overall fatigue is likely in part due to her cardiac involvement, all things considered, the cardiac component of her disease appears stable at this time. Her filling pressure by echocardiogram are not elevated, her EF is preserved, she is euvolemic on exam today and has no evidence of tachyarrhythmias or heart block on prior holter monitor, all of which are reassuring. Her non-pitting lower extremity edema does not seem to be cardiac in etiology, may be lymphedema vs chronic venous stasis vs direct effect of amyloid deposition. No evidence of nephrotic syndrome or cirrhosis on prior work up.    We would recommend in the short term to continue her current medications of aldactone  12.5 daily, lasix 40 daily, Kcl 20 BID and hold any/all AV thor agents given the risk of progressive AV block/conduction disease in these patients. The CyBorD will also help to prevent further deposition. Will consider more extensive event monitoring for tachyarrhythmias down the line and discuss role of anticoagulation (AF) as well should that become an issue. Will plan to see patient back in 2 months with repeat NT-BNP, troponin and echocardiogram to monitor for potential improvement on chemotherapy.     At present, oncology notes state that she is not a candidate for stem cell transplant or aggressive chemotherapy now, although there is varied practice in this patient population around the country. Her degree of cardiac involvement would not be prohibitive for stem cell transplant at [facility] in [place], for example. This may be an option in the future and will need careful consideration and multidisciplinary collaboration/discussion.      1. Cardiac Amyloidosis with preserved EF  Stage C  NYHA Class III  ACEi/ARB None  BB None, relatively contraindicated due to risk of progressive conduction disease/AV block in these patients  Aldosterone antagonist: aldactone 12.5  SCD prophylaxis: not indicated  % BiV pacing: NA  Fluid status: euvolemic, continue lasix 40 daily with Kcl 20 BID  Sleep Apnea Evaluation: will defer until next visit    Other:  Arrhythmia monitoring -- will discuss next visit about more extensive event monitoring (benign holter monitor reassuring). If afib is found, she will need to be anticoagulated for thromboembolism prophylaxis    GI symptoms -- will defer to oncology regarding systemic amyloid symptoms. Continue with daily senna/docusate with miralax prn as needed for now    Follow-up: 2 months with Dr. Cohen with repeat NT-BNP, troponin and echocardiogram.    Patient was seen and staffed with Dr. Cohen who agrees with the plan as outlined above.    Eleonora Guerrero MD  Cardiology  Fellow  Pager [phone]       I have seen and examined the patient with the house staff on October 6, 2016 and agree with the outlined assessment and plan.      Domenica Cohen MD   of Medicine   [facility] Division of Cardiology       CC  Patient Care Team:  Magy Obrien as PCP - General (Family Practice)  Mirela Daley MD as MD (Hematology & Oncology)  Domenica Cohen MD as MD (Cardiology)  Vidhi Dorsey, RN as Nurse Coordinator (Cardiology)  MIRELA DALEY

## 2017-01-06 ENCOUNTER — MYC MEDICAL ADVICE (OUTPATIENT)
Dept: CARDIOLOGY | Facility: CLINIC | Age: 67
End: 2017-01-06

## 2017-01-06 NOTE — TELEPHONE ENCOUNTER
Called patient in response to Stand Int message. She denies hitting her head but does state she hit her hand and her back hurts. Advised her to ice and use tylenol and to follow up with PCP or urgent care if remains painful after a few days. She denies feeling dizzy/lightheaded throughout the day today. She thinks she just got up too quickly as she was sound asleep and had urgency upon waking. She was not able to weigh this AM as her  needed to leave for work and did not want her on the stairs during the day so she's been downstairs and the scale is upstairs. She does feel the leg swelling has improved but definitely not gone. Encouraged her to elevate her legs when not standing. Reviewed with Dr. gustafson who recommends continuing on current dose of Torsemide but to decrease to 20 mg BID if she becomes dizzy or lightheaded. She should also let us know if she's having dizzy or lightheaded episodes as we'd want to get a rhythm monitor. Reviewed this with her, she verbalized understanding and agrees with plan. Instructed her to call in the future for urgent needs rather than sending a message.     Faby Dorsey RN BSN  Cardiology Care Coordinator  [phone]   (press option #1 for the [place], then option #3 to speak with a nurse)

## 2017-01-09 ENCOUNTER — PRE VISIT (OUTPATIENT)
Dept: CARDIOLOGY | Facility: CLINIC | Age: 67
End: 2017-01-09

## 2017-01-09 DIAGNOSIS — I50.31 ACUTE DIASTOLIC HEART FAILURE (H): Primary | ICD-10-CM

## 2017-01-11 ENCOUNTER — CARE COORDINATION (OUTPATIENT)
Dept: ONCOLOGY | Facility: CLINIC | Age: 67
End: 2017-01-11

## 2017-01-11 NOTE — PROGRESS NOTES
"Contacted and spoke with [place] who saw Dr. Barron recently at [facility].  She is doing fairly well and her breathing is better since she had thoracentesis while at [facility] and she states her cardiologist put her on a new \"sister\" medication to Lasix- Torsemide.  She states Dr. Barron recommended that since her labs are stable, he would recommend holding off on continuing chemotherapy.  Patient is not sure she will make her appointment here tomorrow due to the possible weather conditions.    "

## 2017-01-12 ENCOUNTER — RADIANT APPOINTMENT (OUTPATIENT)
Dept: CT IMAGING | Facility: CLINIC | Age: 67
End: 2017-01-12
Attending: INTERNAL MEDICINE
Payer: COMMERCIAL

## 2017-01-12 ENCOUNTER — ONCOLOGY VISIT (OUTPATIENT)
Dept: ONCOLOGY | Facility: CLINIC | Age: 67
End: 2017-01-12
Payer: COMMERCIAL

## 2017-01-12 ENCOUNTER — CARE COORDINATION (OUTPATIENT)
Dept: CARDIOLOGY | Facility: CLINIC | Age: 67
End: 2017-01-12

## 2017-01-12 VITALS
DIASTOLIC BLOOD PRESSURE: 70 MMHG | BODY MASS INDEX: 27.85 KG/M2 | RESPIRATION RATE: 15 BRPM | SYSTOLIC BLOOD PRESSURE: 131 MMHG | OXYGEN SATURATION: 93 % | HEIGHT: 63 IN | WEIGHT: 157.2 LBS | TEMPERATURE: 98.4 F | HEART RATE: 106 BPM

## 2017-01-12 DIAGNOSIS — I50.33 ACUTE ON CHRONIC DIASTOLIC HEART FAILURE (H): ICD-10-CM

## 2017-01-12 DIAGNOSIS — G44.89 OTHER HEADACHE SYNDROME: ICD-10-CM

## 2017-01-12 DIAGNOSIS — E85.9 AMYLOIDOSIS, UNSPECIFIED (H): ICD-10-CM

## 2017-01-12 DIAGNOSIS — G44.89 OTHER HEADACHE SYNDROME: Primary | ICD-10-CM

## 2017-01-12 DIAGNOSIS — I43 AMYLOID HEART DISEASE (H): ICD-10-CM

## 2017-01-12 DIAGNOSIS — N30.91 HEMORRHAGIC CYSTITIS: Primary | ICD-10-CM

## 2017-01-12 DIAGNOSIS — E85.4 AMYLOID HEART DISEASE (H): ICD-10-CM

## 2017-01-12 DIAGNOSIS — I50.31 ACUTE DIASTOLIC HEART FAILURE (H): ICD-10-CM

## 2017-01-12 DIAGNOSIS — E85.81 AL AMYLOIDOSIS (H): ICD-10-CM

## 2017-01-12 LAB
ALBUMIN SERPL-MCNC: 3.1 G/DL (ref 3.4–5)
ALP SERPL-CCNC: 137 U/L (ref 40–150)
ALT SERPL W P-5'-P-CCNC: 16 U/L (ref 0–50)
ANION GAP SERPL CALCULATED.3IONS-SCNC: 8 MMOL/L (ref 3–14)
AST SERPL W P-5'-P-CCNC: 15 U/L (ref 0–45)
BASOPHILS # BLD AUTO: 0 10E9/L (ref 0–0.2)
BASOPHILS NFR BLD AUTO: 0.4 %
BILIRUB DIRECT SERPL-MCNC: 0.7 MG/DL (ref 0–0.2)
BILIRUB SERPL-MCNC: 3.3 MG/DL (ref 0.2–1.3)
BUN SERPL-MCNC: 13 MG/DL (ref 7–30)
CALCIUM SERPL-MCNC: 8.7 MG/DL (ref 8.5–10.1)
CHLORIDE SERPL-SCNC: 93 MMOL/L (ref 94–109)
CO2 SERPL-SCNC: 35 MMOL/L (ref 20–32)
CREAT SERPL-MCNC: 0.94 MG/DL (ref 0.52–1.04)
DIFFERENTIAL METHOD BLD: ABNORMAL
EOSINOPHIL # BLD AUTO: 0 10E9/L (ref 0–0.7)
EOSINOPHIL NFR BLD AUTO: 0.3 %
ERYTHROCYTE [DISTWIDTH] IN BLOOD BY AUTOMATED COUNT: 16 % (ref 10–15)
GFR SERPL CREATININE-BSD FRML MDRD: 60 ML/MIN/1.7M2
GLUCOSE SERPL-MCNC: 99 MG/DL (ref 70–99)
HCT VFR BLD AUTO: 40.2 % (ref 35–47)
HGB BLD-MCNC: 13.3 G/DL (ref 11.7–15.7)
LYMPHOCYTES # BLD AUTO: 0.6 10E9/L (ref 0.8–5.3)
LYMPHOCYTES NFR BLD AUTO: 6.4 %
MCH RBC QN AUTO: 32.6 PG (ref 26.5–33)
MCHC RBC AUTO-ENTMCNC: 33.1 G/DL (ref 31.5–36.5)
MCV RBC AUTO: 99 FL (ref 78–100)
MONOCYTES # BLD AUTO: 0.3 10E9/L (ref 0–1.3)
MONOCYTES NFR BLD AUTO: 3.3 %
NEUTROPHILS # BLD AUTO: 8.4 10E9/L (ref 1.6–8.3)
NEUTROPHILS NFR BLD AUTO: 89.6 %
PLATELET # BLD AUTO: 257 10E9/L (ref 150–450)
POTASSIUM SERPL-SCNC: 3.2 MMOL/L (ref 3.4–5.3)
PROT SERPL-MCNC: 5.9 G/DL (ref 6.8–8.8)
RBC # BLD AUTO: 4.08 10E12/L (ref 3.8–5.2)
SODIUM SERPL-SCNC: 136 MMOL/L (ref 133–144)
WBC # BLD AUTO: 9.3 10E9/L (ref 4–11)

## 2017-01-12 PROCEDURE — 70450 CT HEAD/BRAIN W/O DYE: CPT | Performed by: RADIOLOGY

## 2017-01-12 PROCEDURE — 36415 COLL VENOUS BLD VENIPUNCTURE: CPT | Performed by: INTERNAL MEDICINE

## 2017-01-12 PROCEDURE — 85025 COMPLETE CBC W/AUTO DIFF WBC: CPT | Performed by: INTERNAL MEDICINE

## 2017-01-12 PROCEDURE — 99215 OFFICE O/P EST HI 40 MIN: CPT | Performed by: INTERNAL MEDICINE

## 2017-01-12 PROCEDURE — 80053 COMPREHEN METABOLIC PANEL: CPT | Performed by: FAMILY MEDICINE

## 2017-01-12 ASSESSMENT — PAIN SCALES - GENERAL: PAINLEVEL: NO PAIN (0)

## 2017-01-12 NOTE — PROGRESS NOTES
Quick Note:    I spoke with the Patient via telephone call and communicated these results. Plan for admission    Vidhi Dorsey  ______

## 2017-01-12 NOTE — PROGRESS NOTES
Called patient to check-in after the following changes were made in clinic last week:    Change Lasix to Torsemide 40 mg (2 pills) twice per day  Increase Potassium to 40 meq in the AM and 20 meq in the PM, we've prescribed new 20 meq tablets  Increase Aldactone to 25 mg once daily (1 pill)  Have labs repeated next week    She states she is feeling ok but when asked she's feeling dizzy/lightheaded, nauseated and has a flat affect over the phone. Also complains of having a headache pretty constantly after her recent fall for which she's had a CT head which was negative. Taking tylenol does relieve pain for a short time. States weight is 153.5 lbs today, was 157.5 lbs last week at home. Creatinine stable, hypokalemic and bilirubin is climbing. Dr. Cohen spoke to her about being worried about being able to diruese her at home given the way she's feeling. She also need a pleurex catheter placed for recurrent pleural effusion for which she doesn't believe she'd be able to wait until next week for. Dr. cohen recommended admission and patient is agreeable. Will arrange for admission and have admitting call patient when bed is available.     Report given to 6C RN, no additional questions at this time.

## 2017-01-12 NOTE — PROGRESS NOTES
[facility]  Transfer Triage Note    Date of call: January 12, 2017  Time of call: 5:56 PM    Reason for Transfer: Patient has established care here  Diagnosis: Stage IV AL amyloid c/b cardiac and GI involvement    Outside Records: Available  Additional records requested to be faxed to [phone].    Stability of Patient: Patient is vitally stable, with no critical labs, and will likely remain stable throughout the transfer process    Expected Time of Arrival for Transfer: 0-8 hours    Recommendations for Management and Stabilization: Not needed    Additional Comments:  Ms. Guerrero is a 66-year-old woman with a history of stage IV amyloid with cardiac, GI, and bone marrow involvement. She is being admitted from home in the setting of recurrent right-sided pleural effusion, failed outpatient diuresis. I discussed the case with Dr. Cohen of Cardiology, who saw the patient in clinic last week and has been assisting with the outpt mgmt of her cardiac amyloid. When she saw the patient in clinic last week, she was grossly volume overloaded with distended neck veins, tense LE edema, increased abd girth, and severe PELAEZ. Since that time, the patient's symptoms have reportedly not improved and the patient is feeling lightheaded. Dr. Cohen feels the patient will require IV diuresis, management of her recurrent right sided effusion (likely pleur-x catheter for palliative purposes), as well as a Palliative Care consult. Her recent clinic note underscores the importance of avoiding beta blockers given the progressive conduction disease in pts with cardiac amyloid. I also discussed the case with Dr. Cassidy of Hem/Onc, who indicated that the patient would not receive chemotherapy on this admission. Given the patient's medical complexity the decision was made to admit the patient to the Medicine service.     Dr. Cohen planned to alert the Cardiology Consult team of the patient's arrival --  they will assist in volume management/diuresis.     Zak Kerr MD

## 2017-01-12 NOTE — MR AVS SNAPSHOT
After Visit Summary   1/12/2017    Leandra Guerrero    MRN: [id number]           Patient Information     Date Of Birth          1950        Visit Information        Provider Department      1/12/2017 10:00 AM Mirela Moore MD [facility]        Today's Diagnoses     Other headache syndrome    -  1     AL amyloidosis (H)            Follow-ups after your visit        Your next 10 appointments already scheduled     Feb 01, 2017 10:00 AM   LAB with LAB ONC [facility] ([facility])    [address] [place] [id number] [phone]           Patient must bring picture ID.  Patient should be prepared to give a urine specimen  Please do not eat 10-12 hours before your appointment if you are coming in fasting for labs on lipids, cholesterol, or glucose (sugar).  Pregnant women should follow their Care Team instructions. Water with medications is okay. Do not drink coffee or other fluids.   If you have concerns about taking  your medications, please ask at office or if scheduling via [employer], send a message by clicking on Secure Messaging, Message Your Care Team.            Feb 02, 2017  7:30 AM   Lab with  LAB   [facility])    [address]  [address] [facility] [id number] [phone]            Feb 02, 2017  8:00 AM   (Arrive by 7:45 AM)   RETURN HEART FAILURE with Domenica Cohen MD   [facility] ([facility])    [address]  [address] [facility] [id number] [phone]            Feb 09, 2017 12:00 PM   Return Visit with Mirela Moore MD   [facility] ([facility])    [address] [place] [id number] [phone]            Mar 22, 2017  9:30 AM   CYSTO with Magen Kowalski MD   [facility] ([facility])    [address] [place] [address]  "ELISE  [facility] [id number] [phone]              Who to contact     If you have questions or need follow up information about today's clinic visit or your schedule please contact [facility] directly at [phone].  Normal or non-critical lab and imaging results will be communicated to you by [employer], letter or phone within 4 business days after the clinic has received the results. If you do not hear from us within 7 days, please contact the clinic through [employer] or phone. If you have a critical or abnormal lab result, we will notify you by phone as soon as possible.  Submit refill requests through [employer] or call your pharmacy and they will forward the refill request to us. Please allow 3 business days for your refill to be completed.          Additional Information About Your Visit        [employer] Information     [employer] gives you secure access to your electronic health record. If you see a primary care provider, you can also send messages to your care team and make appointments. If you have questions, please call your primary care clinic.  If you do not have a primary care provider, please call [phone] and they will assist you.      [employer] is an electronic gateway that provides easy, online access to your medical records. With [employer], you can request a clinic appointment, read your test results, renew a prescription or communicate with your care team.     To access your existing account, please contact your [facility] or call [phone] for assistance.        Care EveryWhere ID     This is your Care EveryWhere ID. This could be used by other organizations to access your [facility] medical records  [id number]        Your Vitals Were     Pulse Temperature Respirations Height BMI (Body Mass Index) Pulse Oximetry    106 98.4  F (36.9  C) 15 1.6 m (5' 2.99\") 27.85 kg/m2 93%       Blood Pressure from Last 3 Encounters:   01/17/17 95/57   01/12/17 131/70 "   01/05/17 117/77    Weight from Last 3 Encounters:   01/17/17 72.439 kg (159 lb 11.2 oz)   01/12/17 71.305 kg (157 lb 3.2 oz)   01/05/17 73.982 kg (163 lb 1.6 oz)               Primary Care Provider Office Phone # Fax #    Magy Obrien [phone]       [facility] [address]  [facility] [address]        Thank you!     Thank you for choosing [facility]  for your care. Our goal is always to provide you with excellent care. Hearing back from our patients is one way we can continue to improve our services. Please take a few minutes to complete the written survey that you may receive in the mail after your visit with us. Thank you!             Your Updated Medication List - Protect others around you: Learn how to safely use, store and throw away your medicines at www.disposemymeds.org.          This list is accurate as of: 1/12/17 11:59 PM.  Always use your most recent med list.                   Brand Name Dispense Instructions for use    acyclovir 400 MG tablet    ZOVIRAX    60 tablet    Take 1 tablet (400 mg) by mouth 2 times daily Viral Prophylaxis.       dexamethasone 4 MG tablet    DECADRON    40 tablet    Take 10 tablets (40 mg) by mouth every 7 days for 4 doses Take daily on Days 1, 8, 15, and 22. Cycles 3 and 4 ONLY.       HYDROcodone-acetaminophen 5-325 MG per tablet    NORCO         LORazepam 0.5 MG tablet    ATIVAN     TAKE ONE-HALF TO ONE TABLET BY MOUTH ONCE DAILY AT BEDTIME AS NEEDED       ondansetron 4 MG ODT tab    ZOFRAN-ODT         potassium chloride SA 10 MEQ CR tablet    K-DUR/KLOR-CON M    180 tablet    Take 40meq (4 pills) in the morning, and 20meq (2 pills) in the afternoon       spironolactone 25 MG tablet    ALDACTONE    30 tablet    Take 1 tablet (25 mg) by mouth daily       torsemide 20 MG tablet    DEMADEX    120 tablet    Take 2 tablets (40 mg) by mouth 2 times daily       TYLENOL PO      Take 325 mg by mouth

## 2017-01-12 NOTE — NURSING NOTE
"Leandra Guerrero is a 66 year old female who presents for:  Chief Complaint   Patient presents with     Oncology Clinic Visit     follow up before chemo        Initial Vitals:  /70 mmHg  Pulse 106  Temp(Src) 98.4  F (36.9  C)  Resp 15  Ht 1.6 m (5' 2.99\")  Wt 71.305 kg (157 lb 3.2 oz)  BMI 27.85 kg/m2  SpO2 93% Estimated body mass index is 27.85 kg/(m^2) as calculated from the following:    Height as of this encounter: 1.6 m (5' 2.99\").    Weight as of this encounter: 71.305 kg (157 lb 3.2 oz).. Body surface area is 1.78 meters squared. BP completed using cuff size: regular  No Pain (0) No LMP recorded. Patient is postmenopausal. Allergies and medications reviewed.     Medications: Medication refills not needed today.  Pharmacy name entered into [employer]: [id number] [place] [address]    Comments:     5 minutes for nursing intake (face to face time)   Silke Leblanc LPN          "

## 2017-01-12 NOTE — PROGRESS NOTES
Hematology/Oncology Follow-up visit:  Date on this visit: 1/12/2017        Referring Physician: Dr. Braydon Barron, [facility].  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 She was diagnosed with CHF at a local clinic in [facility] and was placed on a b-blocker and a diuretic. She has also developed progressive worsening lower extremity edema by May 2016 which in retrospect started in January. Her cardiac angiogram was reportedly negative at that time. She has lost about 35 lbs in the last 6 months. She has significant nausea somewhat controlled with Zofran but she is reluctant to use it because of constipation too. She is on Senna-S one tablet PO BID for constipation and that is improved but still significant. She has very poor PO intake due to nausea and lack of appetite. She requested to be seen at [facility] and was seen by Dr. Braydon Barron on 9/13/2016 with f/up on 9/20/2016. She was also seen by cardiology team there Sara Severson CNP and Dr. Nettles from CHF service.  There was no e/o renal or hepatic amyloidosis.  EKG on 9/7/2016 showed normal sinus rhythm, prolonged QT interval (QTc 521 sec), left atrial enlargement and left anterior fascicular block. There was ST and T wave abnormality.  Apparently, her echocardiogram showed preserved LVEF at 59%.  She reports having R sided thoracentesis on 8/25/2016 after which her breathing has improved. She then had a CXR on 9/7/2016 which showed a small R pleural effusion with right basilar atelectasis. Cardiac silhouette was at the upper level of normal.  I have reviewed extensive outside medical records but we are still in the process of obtaining additional records.  The patient also underwent a minor salivary gland biopsy of the lower lip. The pathology from that procedure (9/20/2016) showed normal salivary gland tissue with  nonspecific chronic sialadenitis.  She had extensive lab workup on 9/7/2016.  Her TSH was normal. Serum protein ELP showed a small abnormality in the gamma fraction.  Serum immunofixation showed small monoclonal IgA lambda in the gamma fraction and a small lambda in the beta fraction. Serum IgA level was normal at 329. Serum IgG level was mildly low at 755 (normal range 767-1590) and IgM level was normal. B2 microglobulin was mildly elevated at 2.94 (ULN 2.7). Mg was normal. Total bilirubin was elevated at 3.9 and direct at 0.8. Uric acid was mildly elevated at 6.9 (ULN 6.1) Creat was 1.0. Troponin was 0.1 (ULN <0.01) and NT-pro BNP was 4747 pg/ml (ULN <=183). Total cholesterol was 190 and LDL was 139. Random urine protein was mildly elevated at 33 mg/dl (normal range <22). INR was 1.2 and PTT 27 (within normal limits). Fibrinogen was elevated at 441 and factor X was mildly decreased at 64% (normal range %). D-dimer was up at 1700 (). Free lambda light chains were 69.5 and free kappa light chains were 1.11 with FLC kappa/lambda ratio of 0.016.   CBC showed normal WBC at 6.0, slightly elevated Hb at 16. Platelet count was normal.  Flow cytometry on bone marrow biopsy showed detectable monotypic plasma cells. There were 12% of monotypic plasma cells on bone marrow biopsy.    The bone marrow biopsy (performed on 9/14/2016) showed normocellular BM, 30-40% cellularity, with involvement by 10-20% of lambda light chain restricted plasma cells.  Congo red stain was positive on the bone marrow biopsy. Liquid chromatography tandem mass spectroscopy showed a peptide profile c/w AL (lambda type) amyloid. BM karyotype was normal 46 XX. FISH on BM showed plasma cell clone with 1q duplication, and monosomy of 13 and 14.  She had an EGD by Dr. Matthew Wadsworth on 9/14/2016. It showed gastric mucosal atrophy and multiple mucosal papules (nodules) seen in the stomach. The duodenum appeared normal. The biopsies of stomach  mucosa, of stomach (antral) nodules and of duodenum, all showed the presence of amyloid in submucosal blood vessel walls in the stomach and duodenum and in deep mucosa in the stomach antrum and body, confirmed by Congo Red stain.   She also had additional labs done at our last visit, on 9/30/2016. Total bilirubin was elevated as below, primarily indirect. K was 3.3 and she was stared on K-dur 20 mEq PO bid. She was noted to have elevated serum IgA level on 9/30/2016 at 392 (). Serum free lambda chains were elevated at 37.75. Serum M spike was 0.1 g/dl and serum FLC ratio was low at 0.01. Serum immunofixation ELP showed monoclonal IgA immunoglobulin of lambda light chain type as well as monoclonal free immunoglobulin light chain of lambda chain type.  She was seen by Dr. Cohen too and had an echocardiogram on 10/6/2016 which showed:  Global and regional left ventricular function was normal with an EF of 60-65%.  The LV mass index was 131 g/m2 (severely increased.) The LV geometry is c/w  concentric hypertrophy measured by relative wall thickness. Global right ventricular function was normal. Severe biatrial enlargement was present.  Right ventricular systolic pressure was 26mmHg above the right atrial pressure. The inferior vena cava was dilated at 2.1 cm without respiratory variability, consistent with increased right atrial pressure. Trivial pericardial effusion was present.    She proceeded to start CyBorD as recommended by Dr. Barron with dose reduced in subq Bortezomib to 0.7 mg/m2, on 10/4/2016.     History Of Present Illness:  Ms. Guerrero is a 66 year old female, non-smoker, who presents for f/up of AL amyloidosis.  She proceeded to start CyBorD as recommended by Dr. Barron with dose reduced in subq Bortezomib to 0.7 mg/m2, on 10/4/2016. After one cycle, her M spike, IgA level and free lambda light chains have all improved, with M spike of 0 g/dl, IgA down into the normal range and free serum lambda  light chains improving from 37.75 down to 2.09.  She returns today in cycle 3 day 11. She had amyloid labs again on day 1 of cycle 3 (12/8/2016) which showed continued response to therapy and her IgA level was now low and serum FLC ratio was normal and serum free lambda chains were normal too.   She has developed hematuria with cycle 2 and Cytoxan is on hold from day 15 cycle 2 since hematuria persisted despite Mesnex. She had a CT abdomen/pelvis on 11/29/2016 which showed no abnormalities in the kidneys. There was a moderate right and a trace left sided pleural effusions. Small volume ascites and anasarca was noted as well. The liver had heterogeneous appearance, which can be seen in hepatic venous congestion. Cytoxan remains on hold since 11/22 and since and she has had no s/o gross hematuria since. She did have a cystoscopy with  findings of mild petechia and erythema in her bladder and no specific lesions or tumor to account for hematuria. Dr. Kowalski did feel that the findings would be c/w hemorrhagic cystitis.  Pinch biopsies were taken from the bladder and showed no e/o amyloid or malignancy. She underwent thoracentesis of R pleural effusion on 12/5/2016 when she was hospitalized at [facility] for evaluation of chills and SOB after undergoing CT scan, . At that time about 900mL of transudative fluid was removed. Flow cytometry on the pleural fluid showed  no immunophenotypic evidence of a clonal B-cell or plasma cell process. Gram stain and cultures were negative. She also had home O2 evaluation done while in the hospital, for which she qualified for home oxygen. She felt much better after thoracentesis. Unfortunately, since then she has required essentially weekly thoracentesis for recurrent pleural effusions. She has been using home O2 at night at home but is not sure if that is helping. She has continued on Velcade and dexamethasone until 12/27/2016.  She was doing well with improved LFts and improved nausea,  however, by the end of December 2016 her LE started to worsen and as above, she has required approximately weekly thoracentesis. On 1/3/2017 she was seen at [facility] by Dr. reyes and I have discussed patient's situation with him. I have also reviewe [facility] records which will be scanned in. Hb was 12.9 g/dl on 1/3/2017. Platelet count was 194, WBC was 6.0 with normal anc. Creat was 0.9 and total bilirubin was 1.8. Troponin was 0.02 and NT-pro BNP was 2637. She did not hve proteinuria on 24 hour urine collection. Serum free light chains were normal at 0.74 and FLC ratio was normal as well at 0.72. She was recommended to be on observation because free lambda chains normalized.  She did undergo thoracentesis on 1/4/2017 at [facility] where 1000 c of fluid was removed.  This was her last thoracentesis session.  She was then seen by Dr. Cohen on 1/5/2017, and Dr. Cohen discussed her situation with me. Unfortunately, she was felt to have progressive cardiac amyloid, with progressive volume overload, with recurrent pleural effusions, worsening LE edema, weeping edema, neck vein elevation, symptomatic orthopnea and PND, increased abdominal girth.   Her echocardiogram on 1/5/2017 was abnormal (severe concentric hypertrophy and biatrial enlargement) and she also had abnormal EKG (near-low voltage, poor R wave progression, biatrial enlargement and no evidence of LVH despite very thickened LV on echo). She was felt to have  New York Heart Association class IIIB heart failure with severe volume overload on exam today.   Her aldactone dose was increased to 25 mg PO daily and she was started on Demadex 40 mg PO BID. She has been lightheaded on this regimen. She feels that she has significant swelling in her abdomen. She has some SOB but does not think she needs to have thoracentesis today. She has occasional tingling in her feet,mild, unchanged.  She is pain free.  Lightheaded, hit her head one week ago when she fell  "2016. Lightheaded and nauseated since. She has been taking 1/2 tabelt of Zofran prn for nausea. She is constipated and is on stool softener. We did obtain CT head today stat to r/o subdural hematoma, and I have discussed the findings with Dr. Khan and there is no e/o intracranial bleed.    In addition, a complete 12 point  review of systems is negative.      Past Medical/Surgical History:  AL Amyloid  Amyloid cardiomyopathy/CHF    Family History:  Sister  of multiple myeloma    Social History:  Lives in [place], with . Nonsmoker        Allergies:     Allergies   Allergen Reactions     Contrast Dye Shortness Of Breath     Shaking,chills and dypsnea     Cytoxan [Cyclophosphamide]      Hemorrhagic cystitis     Levofloxacin      Severe shaking and abdominal pain     Amoxicillin Nausea and Vomiting and Cramps     Current Medications:  Current Outpatient Prescriptions   Medication     spironolactone (ALDACTONE) 25 MG tablet     torsemide (DEMADEX) 20 MG tablet     potassium chloride SA (K-DUR/KLOR-CON M) 10 MEQ CR tablet     Acetaminophen (TYLENOL PO)     HYDROcodone-acetaminophen (NORCO) 5-325 MG per tablet     LORazepam (ATIVAN) 0.5 MG tablet     ondansetron (ZOFRAN-ODT) 4 MG disintegrating tablet     dexamethasone (DECADRON) 4 MG tablet     acyclovir (ZOVIRAX) 400 MG tablet     No current facility-administered medications for this visit.      Physical Exam:  /70 mmHg  Pulse 106  Temp(Src) 98.4  F (36.9  C)  Resp 15  Ht 1.6 m (5' 2.99\")  Wt 71.305 kg (157 lb 3.2 oz)  BMI 27.85 kg/m2  SpO2 93%      GENERAL APPEARANCE: middle aged, alert and no distress     HENT: Mouth without ulcers or lesions     NECK: no adenopathy, no asymmetry or masses     LYMPHATICS: No cervical, supraclavicular, axillary or inguinal lymphadenopathy     RESP: LL- CTA. RL - decreased BS on the right 1/2 way up - no rales, rhonchi or wheezes     CARDIOVASCULAR: regular rates and rhythm, normal S1 S2, no S3 or S4 and no " murmur.     ABDOMEN:  soft, nontender, no HSM or masses and bowel sounds normal. + mild to moderate ascites.     MUSCULOSKELETAL: extremities normal- no gross deformities noted, no evidence of inflammation in joints, FROM in all extremities. +3 edema b/l LE, increased compared to last visit.  Wearing compression stockings to b/l LE.     SKIN: no suspicious lesions or rashes     PSYCHIATRIC: mentation appears normal and affect normal    Laboratory/Imaging Studies  Component      Latest Ref Rng 1/12/2017   WBC      4.0 - 11.0 10e9/L 9.3   RBC Count      3.8 - 5.2 10e12/L 4.08   Hemoglobin      11.7 - 15.7 g/dL 13.3   Hematocrit      35.0 - 47.0 % 40.2   MCV      78 - 100 fl 99   MCH      26.5 - 33.0 pg 32.6   MCHC      31.5 - 36.5 g/dL 33.1   RDW      10.0 - 15.0 % 16.0 (H)   Platelet Count      150 - 450 10e9/L 257   Diff Method       Automated Method   % Neutrophils       89.6   % Lymphocytes       6.4   % Monocytes       3.3   % Eosinophils       0.3   % Basophils       0.4   Absolute Neutrophil      1.6 - 8.3 10e9/L 8.4 (H)   Absolute Lymphocytes      0.8 - 5.3 10e9/L 0.6 (L)   Absolute Monocytes      0.0 - 1.3 10e9/L 0.3   Absolute Eosinophils      0.0 - 0.7 10e9/L 0.0   Absolute Basophils      0.0 - 0.2 10e9/L 0.0   Sodium      133 - 144 mmol/L 136   Potassium      3.4 - 5.3 mmol/L 3.2 (L)   Chloride      94 - 109 mmol/L 93 (L)   Carbon Dioxide      20 - 32 mmol/L 35 (H)   Anion Gap      3 - 14 mmol/L 8   Glucose      70 - 99 mg/dL 99   Urea Nitrogen      7 - 30 mg/dL 13   Creatinine      0.52 - 1.04 mg/dL 0.94   GFR Estimate      >60 mL/min/1.7m2 60 (L)   GFR Estimate If Black      >60 mL/min/1.7m2 73   Calcium      8.5 - 10.1 mg/dL 8.7   Bilirubin Direct      0.0 - 0.2 mg/dL 0.7 (H)   Bilirubin Total      0.2 - 1.3 mg/dL 3.3 (H)   Albumin      3.4 - 5.0 g/dL 3.1 (L)   Protein Total      6.8 - 8.8 g/dL 5.9 (L)   Alkaline Phosphatase      40 - 150 U/L 137   ALT      0 - 50 U/L 16   AST      0 - 45 U/L 15     CT  head 1/12/2017:  1. No intracranial hemorrhage.  2. Focal hypodensity adjacent to left caudate head, perhaps suggestive  of prior postischemic sequelae.    ASSESSMENT/PLAN:  Leandra Guerrero is a 66 year old woman with new diagnosis of AL amyloidosis involving the heart and GI tract. I would like to review ALL records from [facility] as there are a few pieces of information still missing like mass spectroscopy and I was also not able to review biopsies from EGD, either. Unfortunately, she has stage IV AL amyloidosis according to revised Rios Criteria (NT-proBNP >1800 ng/l, troponin >0.025 and difference between free lambda and kappa light chain >18). Stage IV amyloidosis is associated with median survival of 5 months in patients not undergoing BMT, and 5 year survival of 15%, and in patients who are able to undergo BMT, median survival is 22 months and 5 year survival of 46% (Dejan A, Anderson BAR, Toñito MCARTHUR, et al. Prognostication of survival using cardiac troponins and N-terminal pro-brain natriuretic peptide in patients with primary systemic amyloidosis undergoing peripheral blood stem cell transplantation. Blood 2004; 104:1881). She was felt not to be a candidate for high dose therapy.        1.  AL amyloidosis - The patient was on Cybor-D from 10/4/2016-12/27/2016, with Cytoxan omitted after cycle 2 day 1 due to her developing hemorrhagic cystitis. Her disease has responded  biochemically, with serum M spike of zero, and serum IgA level that is low now and free lambda light chains which are normal. However, clinically, unfortunately her disease has been progressing, specifically cardiac amyloid, with increased SOB, LE edema, anasarca, recurrent need for pleural effusions, and her prognosis is poor. I have discussed her situation with Dr. Barron today, and let him know of progressive cardiac amyoidosis. He still felt that with normalization of serum free lambda light chains, no further chemotherapy is indicated.  She maybe eligible for an antibody clinical trial in the future open at [facility] but she would need to remain off chemotherapy for 6 months to qualify, and that is not until May or June 2017. He agreed with me that patient's prognosis is very poor, an average survival of 5 months, as above, and she has outlived that already. I did talk to the patient about a possibility of enrolling in home hospice today but she was not ready at all to consider that. For now, I will plan to observe her closely and focus on symptom palliation as below. I will plan to see her back in 4 weeks, with amyloid labs including cbcd, cmp, Serum FLC, serum protein electrophoresis and serum immunofixation. We'll arrange for PleurX catheter placement as below    2. Recurrent pleural effusions- I have discussed the placement of PleurX catheter with the patient and her  today since after my discussion with Dr. Barron they have had failures at [facility] with using talc pleurodesis trying to control pleural effusions in patients with amyloidosis but PleurX catheters do seem to work. I have communicated with Luis Jean and Dr. León and we'll try to arrange that to be placed for next week. Patient does not feel she needs thoracentesis at this time or will need it in the next couple of days. She was instructed to call us right away with worsening SOB.   3. Amyloid cardiomyopathy-   This is progressic=ve, as above, and her aldactone dose was increased to 25 mg PO daily and she was started on Demadex 40 mg PO BID on 1/5/2017. The plan is to f/up with Dr. Cohen on 1/17/2017. If there is any worsening in her fluid status, she will need to be admitted for IV diuresis.   4. Nausea- continue Zofran prn.  The patient and her  had multiple questions during today's visit all of which were answered.  I again reiterated poor prognosis of Virginia's disease, its terminal nature, her progressive amyloid cardiomyopathy, and the need to  focus on symptom palliation. [place] would like to think things over.  At the end of our visit patient and  verbalized understanding and concurred with the plan.    Addendum:  She was admitted from cardiology clinic on 1/13/2017 for IV diuresis. Her situation was discussed with me by Dr. Maria E Rodriguez and Dr. Rosa Cárdenas on 1/17/2019. She has been responding well to inpatient IV diuresis but apparently did not recall our discussion about PleurX catheter placement and was not ready to have the procedure done. I have related the poor prognosis and terminal nature of Virginia's diease to her inpatient treatment team. She was seen by Khai Almaraz NP from palliative care inpatient and I will cc my note to him. We'll continue to follow patient's inpatient progress closely.

## 2017-01-13 ENCOUNTER — HOSPITAL ENCOUNTER (INPATIENT)
Facility: CLINIC | Age: 67
LOS: 12 days | Discharge: HOME HEALTH | DRG: 291 | End: 2017-01-25
Attending: HOSPITALIST | Admitting: INTERNAL MEDICINE
Payer: COMMERCIAL

## 2017-01-13 ENCOUNTER — APPOINTMENT (OUTPATIENT)
Dept: GENERAL RADIOLOGY | Facility: CLINIC | Age: 67
DRG: 291 | End: 2017-01-13
Attending: HOSPITALIST
Payer: COMMERCIAL

## 2017-01-13 DIAGNOSIS — B00.9 HSV (HERPES SIMPLEX VIRUS) INFECTION: ICD-10-CM

## 2017-01-13 DIAGNOSIS — R60.0 BILATERAL EDEMA OF LOWER EXTREMITY: ICD-10-CM

## 2017-01-13 DIAGNOSIS — E85.4 AMYLOID HEART DISEASE (H): Primary | ICD-10-CM

## 2017-01-13 DIAGNOSIS — R63.4 WEIGHT LOSS: ICD-10-CM

## 2017-01-13 DIAGNOSIS — I43 AMYLOID HEART DISEASE (H): Primary | ICD-10-CM

## 2017-01-13 DIAGNOSIS — I50.33 ACUTE ON CHRONIC DIASTOLIC HEART FAILURE (H): ICD-10-CM

## 2017-01-13 DIAGNOSIS — E87.6 HYPOKALEMIA: ICD-10-CM

## 2017-01-13 PROBLEM — I50.9 CHF (CONGESTIVE HEART FAILURE) (H): Status: ACTIVE | Noted: 2017-01-13

## 2017-01-13 LAB
ALBUMIN SERPL-MCNC: 3 G/DL (ref 3.4–5)
ALBUMIN UR-MCNC: 10 MG/DL
ALP SERPL-CCNC: 162 U/L (ref 40–150)
ALT SERPL W P-5'-P-CCNC: 15 U/L (ref 0–50)
ANION GAP SERPL CALCULATED.3IONS-SCNC: 7 MMOL/L (ref 3–14)
APPEARANCE UR: ABNORMAL
AST SERPL W P-5'-P-CCNC: 17 U/L (ref 0–45)
BACTERIA #/AREA URNS HPF: ABNORMAL /HPF
BASOPHILS # BLD AUTO: 0 10E9/L (ref 0–0.2)
BASOPHILS # BLD AUTO: 0 10E9/L (ref 0–0.2)
BASOPHILS NFR BLD AUTO: 0.1 %
BASOPHILS NFR BLD AUTO: 0.3 %
BILIRUB SERPL-MCNC: 5.7 MG/DL (ref 0.2–1.3)
BILIRUB UR QL STRIP: NEGATIVE
BUN SERPL-MCNC: 14 MG/DL (ref 7–30)
CALCIUM SERPL-MCNC: 9.2 MG/DL (ref 8.5–10.1)
CHLORIDE SERPL-SCNC: 91 MMOL/L (ref 94–109)
CO2 SERPL-SCNC: 32 MMOL/L (ref 20–32)
COLOR UR AUTO: YELLOW
CREAT SERPL-MCNC: 0.94 MG/DL (ref 0.52–1.04)
DIFFERENTIAL METHOD BLD: ABNORMAL
DIFFERENTIAL METHOD BLD: ABNORMAL
EOSINOPHIL # BLD AUTO: 0 10E9/L (ref 0–0.7)
EOSINOPHIL # BLD AUTO: 0 10E9/L (ref 0–0.7)
EOSINOPHIL NFR BLD AUTO: 0 %
EOSINOPHIL NFR BLD AUTO: 0 %
ERYTHROCYTE [DISTWIDTH] IN BLOOD BY AUTOMATED COUNT: 15.6 % (ref 10–15)
ERYTHROCYTE [DISTWIDTH] IN BLOOD BY AUTOMATED COUNT: 15.9 % (ref 10–15)
GFR SERPL CREATININE-BSD FRML MDRD: 60 ML/MIN/1.7M2
GLUCOSE SERPL-MCNC: 109 MG/DL (ref 70–99)
GLUCOSE UR STRIP-MCNC: NEGATIVE MG/DL
HCT VFR BLD AUTO: 39.7 % (ref 35–47)
HCT VFR BLD AUTO: 42.4 % (ref 35–47)
HGB BLD-MCNC: 13.1 G/DL (ref 11.7–15.7)
HGB BLD-MCNC: 13.8 G/DL (ref 11.7–15.7)
HGB UR QL STRIP: ABNORMAL
IMM GRANULOCYTES # BLD: 0 10E9/L (ref 0–0.4)
IMM GRANULOCYTES # BLD: 0 10E9/L (ref 0–0.4)
IMM GRANULOCYTES NFR BLD: 0.2 %
IMM GRANULOCYTES NFR BLD: 0.3 %
KETONES UR STRIP-MCNC: NEGATIVE MG/DL
LACTATE BLD-SCNC: 1.1 MMOL/L (ref 0.7–2.1)
LEUKOCYTE ESTERASE UR QL STRIP: ABNORMAL
LYMPHOCYTES # BLD AUTO: 0.3 10E9/L (ref 0.8–5.3)
LYMPHOCYTES # BLD AUTO: 0.5 10E9/L (ref 0.8–5.3)
LYMPHOCYTES NFR BLD AUTO: 2.3 %
LYMPHOCYTES NFR BLD AUTO: 4.9 %
MAGNESIUM SERPL-MCNC: 2.4 MG/DL (ref 1.6–2.3)
MCH RBC QN AUTO: 32.3 PG (ref 26.5–33)
MCH RBC QN AUTO: 32.4 PG (ref 26.5–33)
MCHC RBC AUTO-ENTMCNC: 32.5 G/DL (ref 31.5–36.5)
MCHC RBC AUTO-ENTMCNC: 33 G/DL (ref 31.5–36.5)
MCV RBC AUTO: 98 FL (ref 78–100)
MCV RBC AUTO: 99 FL (ref 78–100)
MONOCYTES # BLD AUTO: 0.1 10E9/L (ref 0–1.3)
MONOCYTES # BLD AUTO: 0.5 10E9/L (ref 0–1.3)
MONOCYTES NFR BLD AUTO: 1.4 %
MONOCYTES NFR BLD AUTO: 4.5 %
MUCOUS THREADS #/AREA URNS LPF: PRESENT /LPF
NEUTROPHILS # BLD AUTO: 11 10E9/L (ref 1.6–8.3)
NEUTROPHILS # BLD AUTO: 9.1 10E9/L (ref 1.6–8.3)
NEUTROPHILS NFR BLD AUTO: 92.6 %
NEUTROPHILS NFR BLD AUTO: 93.4 %
NITRATE UR QL: POSITIVE
NRBC # BLD AUTO: 0 10*3/UL
NRBC # BLD AUTO: 0 10*3/UL
NRBC BLD AUTO-RTO: 0 /100
NRBC BLD AUTO-RTO: 0 /100
NT-PROBNP SERPL-MCNC: 9009 PG/ML (ref 0–900)
PH UR STRIP: 5.5 PH (ref 5–7)
PLATELET # BLD AUTO: 165 10E9/L (ref 150–450)
PLATELET # BLD AUTO: 176 10E9/L (ref 150–450)
POTASSIUM SERPL-SCNC: 3 MMOL/L (ref 3.4–5.3)
POTASSIUM SERPL-SCNC: 3.1 MMOL/L (ref 3.4–5.3)
PROT SERPL-MCNC: 6.2 G/DL (ref 6.8–8.8)
RBC # BLD AUTO: 4.04 10E12/L (ref 3.8–5.2)
RBC # BLD AUTO: 4.27 10E12/L (ref 3.8–5.2)
RBC #/AREA URNS AUTO: 14 /HPF (ref 0–2)
SODIUM SERPL-SCNC: 130 MMOL/L (ref 133–144)
SP GR UR STRIP: 1.01 (ref 1–1.03)
SQUAMOUS #/AREA URNS AUTO: <1 /HPF (ref 0–1)
TROPONIN I SERPL-MCNC: 0.29 UG/L (ref 0–0.04)
TROPONIN I SERPL-MCNC: 0.31 UG/L (ref 0–0.04)
TROPONIN I SERPL-MCNC: 0.4 UG/L (ref 0–0.04)
TROPONIN I SERPL-MCNC: 0.41 UG/L (ref 0–0.04)
URN SPEC COLLECT METH UR: ABNORMAL
UROBILINOGEN UR STRIP-MCNC: 2 MG/DL (ref 0–2)
WBC # BLD AUTO: 11.9 10E9/L (ref 4–11)
WBC # BLD AUTO: 9.7 10E9/L (ref 4–11)
WBC #/AREA URNS AUTO: >182 /HPF (ref 0–2)
WBC CLUMPS #/AREA URNS HPF: PRESENT /HPF

## 2017-01-13 PROCEDURE — 25000132 ZZH RX MED GY IP 250 OP 250 PS 637: Performed by: INTERNAL MEDICINE

## 2017-01-13 PROCEDURE — 25000125 ZZHC RX 250

## 2017-01-13 PROCEDURE — 71010 XR CHEST PORT 1 VW: CPT

## 2017-01-13 PROCEDURE — 25000128 H RX IP 250 OP 636

## 2017-01-13 PROCEDURE — 80053 COMPREHEN METABOLIC PANEL: CPT | Performed by: INTERNAL MEDICINE

## 2017-01-13 PROCEDURE — 81001 URINALYSIS AUTO W/SCOPE: CPT | Performed by: HOSPITALIST

## 2017-01-13 PROCEDURE — 87077 CULTURE AEROBIC IDENTIFY: CPT | Performed by: HOSPITALIST

## 2017-01-13 PROCEDURE — 87086 URINE CULTURE/COLONY COUNT: CPT | Performed by: INTERNAL MEDICINE

## 2017-01-13 PROCEDURE — 87040 BLOOD CULTURE FOR BACTERIA: CPT | Performed by: HOSPITALIST

## 2017-01-13 PROCEDURE — 36415 COLL VENOUS BLD VENIPUNCTURE: CPT | Performed by: INTERNAL MEDICINE

## 2017-01-13 PROCEDURE — 87186 SC STD MICRODIL/AGAR DIL: CPT | Performed by: INTERNAL MEDICINE

## 2017-01-13 PROCEDURE — 93005 ELECTROCARDIOGRAM TRACING: CPT

## 2017-01-13 PROCEDURE — 85025 COMPLETE CBC W/AUTO DIFF WBC: CPT | Performed by: INTERNAL MEDICINE

## 2017-01-13 PROCEDURE — 25000132 ZZH RX MED GY IP 250 OP 250 PS 637: Performed by: HOSPITALIST

## 2017-01-13 PROCEDURE — 99223 1ST HOSP IP/OBS HIGH 75: CPT | Mod: AI | Performed by: INTERNAL MEDICINE

## 2017-01-13 PROCEDURE — 21400006 ZZH R&B CCU INTERMEDIATE UMMC

## 2017-01-13 PROCEDURE — 83880 ASSAY OF NATRIURETIC PEPTIDE: CPT | Performed by: INTERNAL MEDICINE

## 2017-01-13 PROCEDURE — 85025 COMPLETE CBC W/AUTO DIFF WBC: CPT | Performed by: HOSPITALIST

## 2017-01-13 PROCEDURE — 87800 DETECT AGNT MULT DNA DIREC: CPT | Performed by: HOSPITALIST

## 2017-01-13 PROCEDURE — 84484 ASSAY OF TROPONIN QUANT: CPT | Performed by: HOSPITALIST

## 2017-01-13 PROCEDURE — 25000128 H RX IP 250 OP 636: Performed by: HOSPITALIST

## 2017-01-13 PROCEDURE — 87088 URINE BACTERIA CULTURE: CPT | Performed by: INTERNAL MEDICINE

## 2017-01-13 PROCEDURE — 83735 ASSAY OF MAGNESIUM: CPT | Performed by: INTERNAL MEDICINE

## 2017-01-13 PROCEDURE — 87186 SC STD MICRODIL/AGAR DIL: CPT | Performed by: HOSPITALIST

## 2017-01-13 PROCEDURE — 84484 ASSAY OF TROPONIN QUANT: CPT | Performed by: INTERNAL MEDICINE

## 2017-01-13 PROCEDURE — 25000125 ZZHC RX 250: Performed by: HOSPITALIST

## 2017-01-13 PROCEDURE — 93010 ELECTROCARDIOGRAM REPORT: CPT | Performed by: INTERNAL MEDICINE

## 2017-01-13 PROCEDURE — 84132 ASSAY OF SERUM POTASSIUM: CPT | Performed by: INTERNAL MEDICINE

## 2017-01-13 PROCEDURE — 40000802 ZZH SITE CHECK

## 2017-01-13 PROCEDURE — 40000141 ZZH STATISTIC PERIPHERAL IV START W/O US GUIDANCE

## 2017-01-13 PROCEDURE — 83605 ASSAY OF LACTIC ACID: CPT | Performed by: INTERNAL MEDICINE

## 2017-01-13 RX ORDER — ONDANSETRON 4 MG/1
4 TABLET, ORALLY DISINTEGRATING ORAL EVERY 8 HOURS PRN
Status: DISCONTINUED | OUTPATIENT
Start: 2017-01-13 | End: 2017-01-25 | Stop reason: HOSPADM

## 2017-01-13 RX ORDER — SENNOSIDES 8.6 MG
8.6 TABLET ORAL 2 TIMES DAILY PRN
Status: DISCONTINUED | OUTPATIENT
Start: 2017-01-13 | End: 2017-01-25 | Stop reason: HOSPADM

## 2017-01-13 RX ORDER — LORAZEPAM 0.5 MG/1
0.5 TABLET ORAL
Status: DISCONTINUED | OUTPATIENT
Start: 2017-01-13 | End: 2017-01-25 | Stop reason: HOSPADM

## 2017-01-13 RX ORDER — POTASSIUM CHLORIDE 7.45 MG/ML
10 INJECTION INTRAVENOUS
Status: DISCONTINUED | OUTPATIENT
Start: 2017-01-13 | End: 2017-01-25 | Stop reason: HOSPADM

## 2017-01-13 RX ORDER — POTASSIUM CHLORIDE 1.5 G/1.58G
20-40 POWDER, FOR SOLUTION ORAL
Status: DISCONTINUED | OUTPATIENT
Start: 2017-01-13 | End: 2017-01-25 | Stop reason: HOSPADM

## 2017-01-13 RX ORDER — POTASSIUM CHLORIDE 29.8 MG/ML
20 INJECTION INTRAVENOUS
Status: DISCONTINUED | OUTPATIENT
Start: 2017-01-13 | End: 2017-01-25 | Stop reason: HOSPADM

## 2017-01-13 RX ORDER — ACETAMINOPHEN 325 MG/1
325 TABLET ORAL EVERY 4 HOURS PRN
Status: DISCONTINUED | OUTPATIENT
Start: 2017-01-13 | End: 2017-01-25 | Stop reason: HOSPADM

## 2017-01-13 RX ORDER — POTASSIUM CHLORIDE 750 MG/1
40 TABLET, EXTENDED RELEASE ORAL ONCE
Status: COMPLETED | OUTPATIENT
Start: 2017-01-13 | End: 2017-01-13

## 2017-01-13 RX ORDER — ACYCLOVIR 400 MG/1
400 TABLET ORAL 2 TIMES DAILY
Status: DISCONTINUED | OUTPATIENT
Start: 2017-01-13 | End: 2017-01-25 | Stop reason: HOSPADM

## 2017-01-13 RX ORDER — PROCHLORPERAZINE MALEATE 5 MG
5-10 TABLET ORAL EVERY 6 HOURS PRN
Status: DISCONTINUED | OUTPATIENT
Start: 2017-01-13 | End: 2017-01-25 | Stop reason: HOSPADM

## 2017-01-13 RX ORDER — POTASSIUM CHLORIDE 750 MG/1
20-40 TABLET, EXTENDED RELEASE ORAL
Status: DISCONTINUED | OUTPATIENT
Start: 2017-01-13 | End: 2017-01-25 | Stop reason: HOSPADM

## 2017-01-13 RX ORDER — PIPERACILLIN SODIUM, TAZOBACTAM SODIUM 4; .5 G/20ML; G/20ML
4.5 INJECTION, POWDER, LYOPHILIZED, FOR SOLUTION INTRAVENOUS EVERY 6 HOURS
Status: DISCONTINUED | OUTPATIENT
Start: 2017-01-13 | End: 2017-01-15

## 2017-01-13 RX ORDER — FUROSEMIDE 10 MG/ML
40 INJECTION INTRAMUSCULAR; INTRAVENOUS ONCE
Status: COMPLETED | OUTPATIENT
Start: 2017-01-13 | End: 2017-01-13

## 2017-01-13 RX ORDER — POLYETHYLENE GLYCOL 3350 17 G/17G
17 POWDER, FOR SOLUTION ORAL DAILY
Status: DISCONTINUED | OUTPATIENT
Start: 2017-01-13 | End: 2017-01-25 | Stop reason: HOSPADM

## 2017-01-13 RX ORDER — ACETAMINOPHEN 325 MG/1
325 TABLET ORAL EVERY 6 HOURS PRN
Status: DISCONTINUED | OUTPATIENT
Start: 2017-01-13 | End: 2017-01-13

## 2017-01-13 RX ADMIN — ACYCLOVIR 400 MG: 400 TABLET ORAL at 22:08

## 2017-01-13 RX ADMIN — POTASSIUM CHLORIDE 40 MEQ: 750 TABLET, EXTENDED RELEASE ORAL at 12:08

## 2017-01-13 RX ADMIN — FUROSEMIDE 40 MG: 10 INJECTION, SOLUTION INTRAVENOUS at 13:38

## 2017-01-13 RX ADMIN — ACETAMINOPHEN 325 MG: 325 TABLET, FILM COATED ORAL at 20:57

## 2017-01-13 RX ADMIN — PIPERACILLIN AND TAZOBACTAM 4.5 G: 4; .5 INJECTION, POWDER, FOR SOLUTION INTRAVENOUS at 21:38

## 2017-01-13 RX ADMIN — POTASSIUM CHLORIDE 40 MEQ: 750 TABLET, EXTENDED RELEASE ORAL at 19:02

## 2017-01-13 RX ADMIN — ACETAMINOPHEN 325 MG: 325 TABLET, FILM COATED ORAL at 11:19

## 2017-01-13 RX ADMIN — VANCOMYCIN HYDROCHLORIDE 1250 MG: 10 INJECTION, POWDER, LYOPHILIZED, FOR SOLUTION INTRAVENOUS at 23:08

## 2017-01-13 RX ADMIN — POLYETHYLENE GLYCOL 3350 17 G: 17 POWDER, FOR SOLUTION ORAL at 13:48

## 2017-01-13 RX ADMIN — FUROSEMIDE 5 MG/HR: 10 INJECTION, SOLUTION INTRAVENOUS at 14:49

## 2017-01-13 RX ADMIN — ACETAMINOPHEN 325 MG: 325 TABLET, FILM COATED ORAL at 16:35

## 2017-01-13 ASSESSMENT — PAIN DESCRIPTION - DESCRIPTORS
DESCRIPTORS: ACHING

## 2017-01-13 ASSESSMENT — ACTIVITIES OF DAILY LIVING (ADL)
COGNITION: 0 - NO COGNITION ISSUES REPORTED
AMBULATION: 0-->INDEPENDENT
FALL_HISTORY_WITHIN_LAST_SIX_MONTHS: YES
TRANSFERRING: 0-->INDEPENDENT
BATHING: 0-->INDEPENDENT
RETIRED_COMMUNICATION: 0-->UNDERSTANDS/COMMUNICATES WITHOUT DIFFICULTY
TOILETING: 0-->INDEPENDENT
NUMBER_OF_TIMES_PATIENT_HAS_FALLEN_WITHIN_LAST_SIX_MONTHS: 1
RETIRED_EATING: 0-->INDEPENDENT
SWALLOWING: 0-->SWALLOWS FOODS/LIQUIDS WITHOUT DIFFICULTY
DRESS: 0-->INDEPENDENT

## 2017-01-13 NOTE — PLAN OF CARE
"Problem: Goal Outcome Summary  Goal: Goal Outcome Summary  Pt admitted directly from home for further management of fluid volume overload and recurrent R pleural effusions. Pt had a fall a week ago where she passed out on her way to the bathroom in the night. CT scan done w/no head injury noted. Bruising and scabs on L hand. Hx/o AL amyloidosis with cardiac and GI tract involvement. Upon arrival, pt reports headache pain and generalized discomfort (\"pressure\") especially in R chest area. Sats 91% on RA but requested O2 and now 97% on 2L NC. Tylenol for pain. +3/+4 LE edema up into abdomen as well. Trop 0.291. EKG & CXR done. Plan for more in-depth management of fluid overload. Possible palliative consult w/possible pleurex catheter placement for effusions. K 3.1-awaiting replacement orders. WBC up to 11.9.     Addendum: Pt given 40mg IV lasix x1. Plan to start lasix gtt @5mg/hr when med available. Replaced K with 40mEq KCl. Tolerated light lunch meal. Voided twice before lasix given-urine very concentrated.   "

## 2017-01-13 NOTE — H&P
History and Physical Examination         Leandra Guerrero MRN: [id number]  1950  Date of Admission:1/13/2017  Primary care provider: Magy Obrien  ___________________________________  CC:  edema, SOB, chest pressure    HPI:  Leandra Guerrero is a 66 year old female with PMHx of stage IV lambda AK amyloidosis diagnosed in september 2016 with cardiac, GI and bone marrow inolvement.  She has been undergoing CyBorD complicated by hematuria and BorD chemotherapy (started 10/4/16) with good response as measured by light chains in serum.  She has been struggling for the last two months with volume overload secondary to her cardiac involvement and has a chronic right sided pleural effusion that has required repeated thoracentesis.  Patient was admitted from clinic per her cardiologist Dr. Cohen for IV diuresis.      Patient notes increased chest pressure and SOB.  Last thoracentesis was 1/4.  Prior to that she has been getting thoracentesis weekly.  She denies any chest pain or palpitations.  She notes at headache predominately on the top of her head associated with some nausea.  She reports some intermittent blurry vision that has been ongoing for her.  She denies any abdominal pain or diarrhea.  She does note that she has been constipated for the last several days.  No urinary symptoms, joint pain, or skin changes.      Patient reports that she fell on 1/6 after standing up quickly to go to the bathroom.  CT head completed 1/12 was negative.      Patient reports that her lowest weight in the last several months is ~ 153.  Per review of recorded weights in our system, her lowest weight was 148.  Patient is on 2L NC at night at baseline.  This was started after a recent discharge on 12/6.  Diuretic regime was recently increased to torsemide 40 mg BID.  She admits that she has missed a few doses of torsemide to avoid having to go to the bathroom all the time when she is out.      PMH/PSH:  Past Medical History    Diagnosis Date     AL amyloidosis (H)      Cardiac amyloidosis (H)      Lymphedema      Recurrent right pleural effusion 1/2/2017     No past surgical history on file.    SHx:  Social History     Social History     Marital Status:      Spouse Name: N/A     Number of Children: N/A     Years of Education: N/A     Occupational History     Not on file.     Social History Main Topics     Smoking status: Never Smoker      Smokeless tobacco: Not on file     Alcohol Use: No     Drug Use: No     Sexual Activity: Not on file     Other Topics Concern     Not on file     Social History Narrative       FHx:  Family History   Problem Relation Age of Onset     Other - See Comments Sister      Amyloidosis   Sister - Multiple Myeloma  Mother - CAD    Allergies:  Allergies   Allergen Reactions     Contrast Dye Shortness Of Breath     Shaking,chills and dypsnea     Cytoxan [Cyclophosphamide]      Hemorrhagic cystitis     Levofloxacin      Severe shaking and abdominal pain     Amoxicillin Nausea and Vomiting and Cramps       Medications:  Prescriptions prior to admission   Medication Sig Dispense Refill Last Dose     spironolactone (ALDACTONE) 25 MG tablet Take 1 tablet (25 mg) by mouth daily 30 tablet 3 Taking     torsemide (DEMADEX) 20 MG tablet Take 2 tablets (40 mg) by mouth 2 times daily 120 tablet 3 Taking     potassium chloride SA (K-DUR/KLOR-CON M) 10 MEQ CR tablet Take 40meq (4 pills) in the morning, and 20meq (2 pills) in the afternoon 180 tablet 11 Taking     dexamethasone (DECADRON) 4 MG tablet Take 10 tablets (40 mg) by mouth every 7 days for 4 doses Take daily on Days 1, 8, 15, and 22. Cycles 3 and 4 ONLY. 40 tablet 0 Unknown at Unknown time     acyclovir (ZOVIRAX) 400 MG tablet Take 1 tablet (400 mg) by mouth 2 times daily Viral Prophylaxis. 60 tablet 0 Taking     Acetaminophen (TYLENOL PO) Take 325 mg by mouth   Taking     HYDROcodone-acetaminophen (NORCO) 5-325 MG per tablet   0 Taking     LORazepam (ATIVAN)  0.5 MG tablet TAKE ONE-HALF TO ONE TABLET BY MOUTH ONCE DAILY AT BEDTIME AS NEEDED  0 Taking     ondansetron (ZOFRAN-ODT) 4 MG disintegrating tablet   0 Taking       No current facility-administered medications on file prior to encounter.  Current Outpatient Prescriptions on File Prior to Encounter:  spironolactone (ALDACTONE) 25 MG tablet Take 1 tablet (25 mg) by mouth daily   torsemide (DEMADEX) 20 MG tablet Take 2 tablets (40 mg) by mouth 2 times daily   potassium chloride SA (K-DUR/KLOR-CON M) 10 MEQ CR tablet Take 40meq (4 pills) in the morning, and 20meq (2 pills) in the afternoon   dexamethasone (DECADRON) 4 MG tablet Take 10 tablets (40 mg) by mouth every 7 days for 4 doses Take daily on Days 1, 8, 15, and 22. Cycles 3 and 4 ONLY.   acyclovir (ZOVIRAX) 400 MG tablet Take 1 tablet (400 mg) by mouth 2 times daily Viral Prophylaxis.   Acetaminophen (TYLENOL PO) Take 325 mg by mouth   HYDROcodone-acetaminophen (NORCO) 5-325 MG per tablet    LORazepam (ATIVAN) 0.5 MG tablet TAKE ONE-HALF TO ONE TABLET BY MOUTH ONCE DAILY AT BEDTIME AS NEEDED   ondansetron (ZOFRAN-ODT) 4 MG disintegrating tablet        ROS:   CONSTITUTIONAL:  negative for  fevers, chills and weight loss  EYES: blurred vision   HEENT:  negative for difficulty swallowing  RESPIRATORY:  negative for dyspnea, cough, increased sputum  CARDIOVASCULAR:  negative for chest pain, palpitations  GASTROINTESTINAL: constipation, nausea, negative for vomiting, diarrhea,  abdominal pain  GENITOURINARY:  negative for frequency and dysuria  HEMATOLOGIC/LYMPHATIC:  negative for bleeding  MUSCULOSKELETAL:  negative for  muscle weakness  NEUROLOGICAL:  headaches, negative for dizziness, weakness and numbness  BEHAVIOR/PSYCH:  negative for depressed mood     PHYSICAL EXAM:  Vitals:  Filed Vitals:    01/13/17 0938   BP: 108/69   Temp: 98.3  F (36.8  C)   TempSrc: Oral   Resp: 18   Weight: 70.262 kg (154 lb 14.4 oz)   SpO2: 92%     No intake or output data in the 24  hours ending 01/13/17 1042  Filed Vitals:    01/13/17 0938   Weight: 70.262 kg (154 lb 14.4 oz)     Constitutional: lying in bed, alert, oriented x 3, NAD  HEENT: EOMI, oral mucous moist, no scleral icterus, JVD ~ 12 cm H2O  Cardiovascular: RRR, normal S1 and S2, no murmurs, rubs or gallops  Respiratory: non-labored breathing on 2L NC, absent breath sounds at right base and crackles at left base  Abdominal: distended, no tenderness, no guarding, no rigidity, no rebound tenderness  Extremities: 3+ lower extremity edema to mid thigh bilaterally  Neurologic: cranial nerve II-XII grossly intact, no focal deficits  Skin: no visible rashes or lesions.    Labs and Imaging:    CMP    Recent Labs  Lab 01/13/17  1027 01/12/17  0903   * 136   POTASSIUM 3.1* 3.2*   CHLORIDE 91* 93*   CO2 32 35*   ANIONGAP 7 8   * 99   BUN 14 13   CR 0.94 0.94   GFRESTIMATED 60* 60*   GFRESTBLACK 73 73   JERROD 9.2 8.7   MAG 2.4*  --    PROTTOTAL 6.2* 5.9*   ALBUMIN 3.0* 3.1*   BILITOTAL 5.7* 3.3*   ALKPHOS 162* 137   AST 17 15   ALT 15 16     CBC    Recent Labs  Lab 01/13/17  1027 01/12/17  0903   WBC 11.9* 9.3   RBC 4.27 4.08   HGB 13.8 13.3   HCT 42.4 40.2   MCV 99 99   MCH 32.3 32.6   MCHC 32.5 33.1   RDW 15.9* 16.0*    257     BNP - 9009  Troponin - 0.291    EKG - unchanged from prior    Assessment and Plan:  Leandra Guerrero is a 66 year old female with PMHx of stage IV lambda AK amyloidosis diagnosed in september 2016 with cardiac, GI and bone marrow involvement admitted from clinic per her cardiologist Dr. Cohen for IV diuresis due to decompensated heart failure.       # decompensated congestive heart failure, NYHA class IIIB  # cardiac amyloidosis  Most recent ECHO on 1/5 with LFEF of 55% and grade III (advanced) diastolic dysfunction with concentric hypertorphy.  Previous coronary angiogram not suggestive of obstructive CAD.  Evidence of fluid overload on exam with elevated JVD and significant lower extremity  edema.  Reason for the decompensation is unclear.  No symptoms to suggest infectious process, except a leukocytosis that could be attributed to stress response.  Troponin elevation, but no EKG changes to suggest myocardial infarction.  BNP elevated at 9009, which is a increased from previous.  Home diuretics were recently increased to torsemide 40 mg BID.  Patient has missed a few doses when she has been at appointments.  Will start lasix drip for more gradual diuresis as patient has had difficulty in the past with a fall that may be related to orthostatics and a rapid fluid shift.  Will also keep patient on telemetry given that conduction abnormalities can occur in amyloid (no beta blockers due to this).    - CXR  - lasix 40 mg IV x 1 -> lasix IV 5-10 mg/hr for goal UOP of 100 cc/hr  - electrolyte replacement protocol  - telemetry  - cardiac diet, 2 L fluid restriction  - strict I/O's  - daily weights    # right sided pleural effusion  Attributed to CHF.  Pleural fluid has been evaluated in the past and was transudate.  Patient received therapeutic thoracentesis every week.  Last thoracentesis was on 1/4.   Will obtain CXR for evaluation of the effusion and will consider therapeutic thoracentesis based on this.  Will also discuss pleurodesis with patient.  - CXR  - diuresis as above  - possible thoracentesis    # troponin elevation  Elevated to 0.291.  No EKG changes on admission.   Likely demand ischemia in the setting of fluid overload.    - trend troponin Q3H    # stage IV lambda AK amyloidosis   Diagnosed in 9/2016 with cardiac, GI and bone marrow involvement.  Patient has received CyBorD complication by hematuria and BorD.  Case has been discussed with Dr. Cassidy with hematology/oncology on admission and the patient will not receive chemotherapy at this time.  Goal is to stabilize her cardiac involvement to allow for further consideration of possible stem cell transplant in the future.      # constipation  -  polyethylene glycol daily  - sennosides BID PRN    FEN: cardiac diet, 2L fluid restriction, electrolyte replacement protocol  PPX:  - DVT: SCDs and ambulation  - PPI: not needed  Code status: DNR/DNI  Dispo: Pending adequate diuresis.      Patient seen and discussed with Dr. Cárdenas who agrees with this plan.     Maria E Rodriguez MD, MS  Internal Medicine, PGY-1  [phone]

## 2017-01-13 NOTE — LETTER
Transition Communication Hand-off for Care Transitions to Next Level of Care Provider    Name: Leandra Guerrero  MRN #: [id number]  Primary Care Provider: CARLYN ALVA     Primary Clinic: [facility] [address]  [facility] [id number]     Reason for Hospitalization:  plural effusion  CHF (congestive heart failure) (H)  End Stage Lung Cancer, Recurrent Pleural Effusion   Right Pleural Effusion   Admit Date/Time: 1/13/2017  8:59 AM  Discharge Date: 1/25/17  Payor Source: Payor: BCBS / Plan: [employer]BS [place] / Product Type: Indemnity /                Reason for Communication Hand-off Referral: Difficulty understanding plan of care    Discharge Plan: home with [facility] (RN, PT, OT) to have pleurex cath placed outpatient       Concern for non-adherence with plan of care:   Y/N n  Discharge Needs Assessment:  Needs       Most Recent Value    Equipment Currently Used at Home none    Home Care [facility] [phone], Fax: [phone]          Already enrolled in Tele-monitoring program and name of program:    Follow-up specialty is recommended: Yes    Follow-up plan:  Future Appointments  Date Time Provider Department Center   2/1/2017 10:00 AM LAB ONC [facility]   2/2/2017 7:30 AM  LAB [facility]   2/2/2017 8:00 AM Domenica Cohen MD [facility]   2/9/2017 12:00 PM Mirlea Moore MD [facility]   3/22/2017 9:30 AM Magen Kowalski MD [facility]       Any outstanding tests or procedures:    Procedures     Future Labs/Procedures    Oxygen Adult     Comments:    Renew Home Oxygen Order  Renew previous prescription.  Expected treatment length is indefinite (99 months).    [facility]   [phone]    Attending Provider: Dr. Rosa Cárdenas  Physician signature: See electronic signature associated with these discharge orders  Date of Order: January 17, 2017          Referrals     Future Labs/Procedures    Home care nursing referral      Comments:    [facility] [phone]  Fax [phone]    RN skilled nursing visit. RN to assess vital signs and weight, respiratory and cardiac status, pain level and activity tolerance, hydration, nutrition and bowel status and home safety.  RN to teach medication management.    Your provider has ordered home care nursing services. If you have not been contacted within 2 days of your discharge please call the inpatient department phone number at [phone] .    Home Care OT Referral for Hospital Discharge     Comments:    [facility] [phone]  Fax [phone]    OT to eval and treat    Your provider has ordered home care - occupational therapy. If you have not been contacted within 2 days of your discharge please call the department phone number listed on the top of this document.    Home Care PT Referral for Hospital Discharge     Comments:    [facility] [phone]  Fax [phone]    PT to eval and treat    Your provider has ordered home care - physical therapy. If you have not been contacted within 2 days of your discharge please call the department phone number listed on the top of this document.    Medication Therapy Management Referral     Comments:    Reason for referral:  on more than 5 medications and managing chronic disease    This service is designed to help you get the most from your medications.  A specially trained pharmacist will work closely with you and your doctors  to solve any problems related to your medications and to help you get the   best results from taking them.      The Medication Therapy Management staff will call you to schedule an appointment.            Key Recommendations:      Brittany Hager    AVS/Discharge Summary is the source of truth; this is a helpful guide for improved communication of patient story

## 2017-01-13 NOTE — IP AVS SNAPSHOT
MRN:[id number]                      After Visit Summary   1/13/2017    Leandra Guerrero    MRN: [id number]           Thank you!     Thank you for choosing [facility] for your care. Our goal is always to provide you with excellent care. Hearing back from our patients is one way we can continue to improve our services. Please take a few minutes to complete the written survey that you may receive in the mail after you visit with us. Thank you!        Patient Information     Date Of Birth          1950        About your hospital stay     You were admitted on:  January 13, 2017 You last received care in the:  [address] [facility]    You were discharged on:  January 25, 2017        Reason for your hospital stay       You were admitted for diuresis due to volume overload from heart failure.                  Who to Call     For medical emergencies, please call 911.  For non-urgent questions about your medical care, please call your primary care provider or clinic, [phone]  For questions related to your surgery, please call your surgery clinic        Attending Provider     Provider    Zak Kerr MD Khoury, Loren, DO       Primary Care Provider Office Phone # Fax #    Magy Obrien [phone]       [facility] [address]  [facility] [address]        After Care Instructions     Activity       Your activity upon discharge: activity as tolerated            Diet       Follow this diet upon discharge: Orders Placed This Encounter  Fluid restriction 2000 ML FLUID  2 Gram Sodium Diet            Monitor and record       weight every day            Oxygen Adult       Renew Home Oxygen Order  Renew previous prescription.  Expected treatment length is indefinite (99 months).    [facility]   [phone]    Attending Provider: Dr. Rosa Cárdenas  Physician signature: See electronic signature associated with these discharge orders  Date of Order: January 17, 2017                   Follow-up Appointments     Adult [facility] Follow-up and recommended labs and tests       1. Follow up with Dr. Domenica Cohen on Thursday 2/2/17. You will need to get labs drawn on Friday 1/27/17 (basic metabolic panel). After discharge, please check your weights daily. Dr. Cohen's nurse will be calling you to check up on how things are going.     2. Follow up with your PCP within 7 days for a hospital follow-up.    Appointments on [place] and/or [facility] (with [facility] or [facility] provider or service). Call [phone] if you haven't heard regarding these appointments within 7 days of discharge.                  Your next 10 appointments already scheduled     Feb 01, 2017 10:00 AM   LAB with LAB ONC [facility] ([facility])    [address] [place] [address] [phone]           Patient must bring picture ID.  Patient should be prepared to give a urine specimen  Please do not eat 10-12 hours before your appointment if you are coming in fasting for labs on lipids, cholesterol, or glucose (sugar).  Pregnant women should follow their Care Team instructions. Water with medications is okay. Do not drink coffee or other fluids.   If you have concerns about taking  your medications, please ask at office or if scheduling via [facility], send a message by clicking on Secure Messaging, Message Your Care Team.            Feb 02, 2017  7:30 AM   Lab with  LAB   [facility])    [address]  [address]st [facility] [id number] [phone]            Feb 02, 2017  8:00 AM   (Arrive by 7:45 AM)   RETURN HEART FAILURE with Domenica Cohen MD   [facility])    [address]  3rd Floor  [facility] [id number] [phone]            Feb 09, 2017 12:00 PM   Return Visit with Mirela Moore MD   [facility] ([facility])    [address] [facility] [id number] [phone]            Mar 22, 2017  9:30 AM   CYSTO with Magen Kowalski MD   [facility])    [address] [facility] [id number] [phone]              Additional Services     Home Care OT Referral for Hospital Discharge       [facility] [phone]  Fax [phone]    OT to eval and treat    Your provider has ordered home care - occupational therapy. If you have not been contacted within 2 days of your discharge please call the department phone number listed on the top of this document.            Home Care PT Referral for Hospital Discharge       [facility] [phone]  Fax [phone]    PT to eval and treat    Your provider has ordered home care - physical therapy. If you have not been contacted within 2 days of your discharge please call the department phone number listed on the top of this document.            Home care nursing referral       [facility] [phone]  Fax [phone]    RN skilled nursing visit. RN to assess vital signs and weight, respiratory and cardiac status, pain level and activity tolerance, hydration, nutrition and bowel status and home safety.  RN to teach medication management.    Your provider has ordered home care nursing services. If you have not been contacted within 2 days of your discharge please call the inpatient department phone number at [phone] .            Medication Therapy Management Referral       Reason for referral:  on more than 5 medications and managing chronic disease    This service is designed to help you get the most from your medications.  A specially trained pharmacist will work closely with you and your doctors  to solve any problems related to your medications and to help you get the   best results from taking them.      The Medication Therapy Management staff will call you to  "schedule an appointment.                  General Recommendations To Control Heart Failure When You Get Home     Instructions To Patients and Families: Please read and check off each of these important instructions as you do them when you get home.           Weight and symptoms      ___ Put a scale in your bathroom  ___ Post a weight chart or calendar next to the scale  ___Weigh yourself every day as soon as you you get up in the morning. You should only be wearing your pajamas. Write your weight on the chart/calendar.  ___ Bring your weight chart/calendar with you to all appointments    ___Call your doctor if you gain 2 pounds in 1 day or 5 pounds in 1 week from your \"dry\" weight (baseline weight). Also call your doctor if you have shortness of breath that gets worse over time, leg swelling or fatigue.         Medicines and diet     ___ Make sure to take your medicines as prescribed.    ___Bring a current list of your medicines and all of your medicine bottles with you to all appointments.    ___ Limit fluids if you still have swelling or shortness of breath, or if your doctor tells you to do so.  ___ Eat less than 2000 mg of sodium (salt) every day. Read food labels, and do not add salt to meals.   ___ Heart healthy diet with low fat and low cholesterol          Activity and suggested lifestyle changes    ___ Stay active. Talk to your doctor about an exercise program that is safe for your heart.    ___ Stop smoking. Reduce alcohol use.      ___ Lose weight if you are overweight. Extra weight puts a lot of stress on the heart.          Control for Leg Swelling   ___ Keep your legs elevated (raised) as needed for swelling. If swelling is uncomfortable or elevation doesn t help, ask your doctor about using ACE wrap or Jobst stockings.          Follow-up appointments   ___ Make a [facility] Clinic appointment with a basic metabolic panel lab draw 3 to 5 days after you leave the hospital. Call one of the following " locations:   [facility]  [phone],  [facility] [phone],  [facility]  [phone].     ___ Make sure to take your medications as prescribed and bring an accurate list of your medications and your weight chart/calendar to your follow up appointment at the [facility] Clinic for continued education and adjustments          What is the CORE clinic?    The C.O.R.E (Cardiomyopathy, Optimization, Rehabilitation, Education) Clinic is a heart failure specialty clinic within the [facility]. At C.O.R.E., you will work with nurse practitioners to carefully adjust medicines, get education and learn who and when to call if symptoms appear. C.O.R.E nurses specialize in helping you:    better understand your disease.    slow the progress of your disease.    improve the length and quality of your life.    detect future heart problems before they become life threatening.    avoid hospital stays.            Pending Results     No orders found from 1/12/2017 to 1/14/2017.            Statement of Approval     Ordered          01/25/17 1207  I have reviewed and agree with all the recommendations and orders detailed in this document.   EFFECTIVE NOW     Approved and electronically signed by:  Rodrigo Ritchie MD             Admission Information        Provider Department Dept Phone    1/13/2017 Rosa Cárdenas DO [facility] [phone]      Your Vitals Were     Blood Pressure Pulse Temperature Respirations Weight Pulse Oximetry    108/61 mmHg 87 97.7  F (36.5  C) (Oral) 18 60.192 kg (132 lb 11.2 oz) 98%      MyChart Information     [employer] gives you secure access to your electronic health record. If you see a primary care provider, you can also send messages to your care team and make appointments. If you have questions, please call your primary care clinic.  If you do not have a primary care provider, please  call [phone] and they will assist you.        Care EveryWhere ID     This is your Care EveryWhere ID. This could be used by other organizations to access your [facility] medical records  [id number]           Review of your medicines      START taking        Dose / Directions    thiamine 100 MG tablet   Used for:  Weight loss        Dose:  100 mg   Take 1 tablet (100 mg) by mouth daily   Quantity:  30 tablet   Refills:  3         CONTINUE these medicines which may have CHANGED, or have new prescriptions. If we are uncertain of the size of tablets/capsules you have at home, strength may be listed as something that might have changed.        Dose / Directions    * acyclovir 400 MG tablet   Commonly known as:  ZOVIRAX   This may have changed:  Another medication with the same name was added. Make sure you understand how and when to take each.   Used for:  Amyloid heart disease (H), Hypokalemia, Bilateral edema of lower extremity        Dose:  400 mg   Take 1 tablet (400 mg) by mouth 2 times daily Viral Prophylaxis.   Quantity:  60 tablet   Refills:  0       * acyclovir 400 MG tablet   Commonly known as:  ZOVIRAX   Indication:  Prophylaxis of Herpes Simplex   This may have changed:  You were already taking a medication with the same name, and this prescription was added. Make sure you understand how and when to take each.   Used for:  HSV (herpes simplex virus) infection        Dose:  400 mg   Take 1 tablet (400 mg) by mouth 2 times daily   Quantity:  30 tablet   Refills:  3       potassium chloride SA 10 MEQ CR tablet   Commonly known as:  K-DUR/KLOR-CON M   This may have changed:  additional instructions   Used for:  Hypokalemia        Take 40meq (4 pills) in the morning, and 20meq (2 pills) in the afternoon.   Quantity:  180 tablet   Refills:  11       spironolactone 25 MG tablet   Commonly known as:  ALDACTONE   This may have changed:  when to take this   Used for:  Acute on chronic diastolic heart failure (H)         Dose:  25 mg   Take 1 tablet (25 mg) by mouth 2 times daily   Quantity:  60 tablet   Refills:  1       torsemide 20 MG tablet   Commonly known as:  DEMADEX   This may have changed:  how much to take   Used for:  Acute on chronic diastolic heart failure (H)        Dose:  80 mg   Take 4 tablets (80 mg) by mouth 2 times daily   Quantity:  240 tablet   Refills:  1       * Notice:  This list has 2 medication(s) that are the same as other medications prescribed for you. Read the directions carefully, and ask your doctor or other care provider to review them with you.      CONTINUE these medicines which have NOT CHANGED        Dose / Directions    HYDROcodone-acetaminophen 5-325 MG per tablet   Commonly known as:  NORCO   Used for:  Amyloid heart disease (H), Weight loss, Hypokalemia        Refills:  0       LORazepam 0.5 MG tablet   Commonly known as:  ATIVAN   Used for:  Amyloid heart disease (H), Weight loss, Hypokalemia        TAKE ONE-HALF TO ONE TABLET BY MOUTH ONCE DAILY AT BEDTIME AS NEEDED   Refills:  0       ondansetron 4 MG ODT tab   Commonly known as:  ZOFRAN-ODT   Used for:  Amyloid heart disease (H), Weight loss, Hypokalemia        Refills:  0       TYLENOL PO   Used for:  Amyloid heart disease (H)        Dose:  325 mg   Take 325 mg by mouth   Refills:  0         STOP taking     dexamethasone 4 MG tablet   Commonly known as:  DECADRON                Where to get your medicines      These medications were sent to [facility] - [place], [place] - [address] [address]     Phone:  [phone]    - acyclovir 400 MG tablet  - potassium chloride SA 10 MEQ CR tablet  - spironolactone 25 MG tablet  - thiamine 100 MG tablet  - torsemide 20 MG tablet             Protect others around you: Learn how to safely use, store and throw away your medicines at www.disposemymeds.org.             Medication List: This is a list of all your medications and when to  take them. Check marks below indicate your daily home schedule. Keep this list as a reference.      Medications           Morning Afternoon Evening Bedtime As Needed    * acyclovir 400 MG tablet   Commonly known as:  ZOVIRAX   Take 1 tablet (400 mg) by mouth 2 times daily Viral Prophylaxis.   Last time this was given:  400 mg on 1/25/2017  8:34 AM                                      * acyclovir 400 MG tablet   Commonly known as:  ZOVIRAX   Take 1 tablet (400 mg) by mouth 2 times daily   Last time this was given:  400 mg on 1/25/2017  8:34 AM                                HYDROcodone-acetaminophen 5-325 MG per tablet   Commonly known as:  NORCO                                LORazepam 0.5 MG tablet   Commonly known as:  ATIVAN   TAKE ONE-HALF TO ONE TABLET BY MOUTH ONCE DAILY AT BEDTIME AS NEEDED   Last time this was given:  0.5 mg on 1/16/2017  3:05 AM                                   ondansetron 4 MG ODT tab   Commonly known as:  ZOFRAN-ODT   Last time this was given:  4 mg on 1/22/2017  4:45 PM                                potassium chloride SA 10 MEQ CR tablet   Commonly known as:  K-DUR/KLOR-CON M   Take 40meq (4 pills) in the morning, and 20meq (2 pills) in the afternoon.   Last time this was given:  20 mEq on 1/25/2017 10:46 AM                                      spironolactone 25 MG tablet   Commonly known as:  ALDACTONE   Take 1 tablet (25 mg) by mouth 2 times daily   Last time this was given:  50 mg on 1/25/2017  8:34 AM                                      thiamine 100 MG tablet   Take 1 tablet (100 mg) by mouth daily   Last time this was given:  100 mg on 1/25/2017  8:34 AM                                   torsemide 20 MG tablet   Commonly known as:  DEMADEX   Take 4 tablets (80 mg) by mouth 2 times daily   Last time this was given:  80 mg on 1/25/2017  8:34 AM                                      TYLENOL PO   Take 325 mg by mouth   Last time this was given:  325 mg on 1/24/2017 11:39 AM                                 * Notice:  This list has 2 medication(s) that are the same as other medications prescribed for you. Read the directions carefully, and ask your doctor or other care provider to review them with you.

## 2017-01-13 NOTE — IP AVS SNAPSHOT
[address] [address]    Phone:  [phone]                                       After Visit Summary   1/13/2017    Leandra Guerrero    MRN: [id number]           After Visit Summary Signature Page     I have received my discharge instructions, and my questions have been answered. I have discussed any challenges I see with this plan with the nurse or doctor.    ..........................................................................................................................................  Patient/Patient Representative Signature      ..........................................................................................................................................  Patient Representative Print Name and Relationship to Patient    ..................................................               ................................................  Date                                            Time    ..........................................................................................................................................  Reviewed by Signature/Title    ...................................................              ..............................................  Date                                                            Time

## 2017-01-13 NOTE — PROGRESS NOTES
CLINICAL NUTRITION SERVICES - ASSESSMENT NOTE     Nutrition Prescription    RECOMMENDATIONS FOR MDs/PROVIDERS TO ORDER:  1. Recommend order regular diet if appropriate given poor appetite to liberalize meal options  2. Recommend order K+/Mg++/Phos replacement protocols    Malnutrition Status:    Patient does not meet two of the above criteria necessary for diagnosing malnutrition but is at risk for malnutrition    Recommendations already ordered by Registered Dietitian (RD):  PRN supplement order    Future/Additional Recommendations:  If patient consuming <75% of meals TID, consider ordering calorie counts to assess PO intake adequacy     REASON FOR ASSESSMENT  Leandra Guerrero is a/an 66 year old female assessed by the dietitian for Admission Nutrition Risk Screen for reduced oral intake over the last month and Provider Order - Congestive Heart Failure (CHF) - Dietitian to instruct patient on 2 gram sodium diet    NUTRITION HISTORY  - Patient reports following a low sodium diet for a long time and denies any need for further education.  Reports eating 3 meals per day and cooks for her and her .  Reports she has people who are able to grocery shop for her and is able to find low sodium options at [employer] and [employer].  Occasionally drinks homemade shakes at home made with Ensure, bananas, and frozen peaches.  Pt does not like Ensure mixed with ice cream.  Reports she has been nauseous for a few days which has limited PO intakes.  Reports eating is going better than it was when she met with outpatient RD in September (see details from that visit below).  Pt reports RD at that gave her a goal of eating 1700 kcals/day and pt reports today she estimates that she intermittently meets this goal.   - Seen by outpatient RD on 9/30/16, at this time pt reported poor intake for 3 months 2/2 nausea, decreased appetite, early satiety, taste aversions, and texture aversions.  Pt reported losing 30# in that time.   "Pt reported main barrier to eating at that time was nausea.  Reported gagging on most meats and vegetables w/ skins.  Pt reported doing well with soups and smoothies and that she focuses on a low sodium fluid restricted diet (1500 mg Na+ and 1.5 L fluid restriction) due to her heart condition. Pt estimated at that time that she was eating <1000 kcals per day and seldom drank 1.5 L fluids per day.  RD educated patient on increasing protein/kcal intake and suggested soft meats such as low sodium tuna or chicken, soft baked fish, eggs, egg salad, etc.  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  Pt undergoing CyBorD and BorD chemotherapy (started 10/4/16).  Has been struggling w/ volume overload x 2 months.    CURRENT NUTRITION ORDERS  Diet: 2 g Na+ and  2 L fluid restriction  Intake/Tolerance: ate 75% of 1 meal documented so far today.    LABS  Labs reviewed  - Na+ 130 (L)  - K+ 3.1 (L); Mg++ 2.4 (H)  - GFR 60 (L)    MEDICATIONS  Medications reviewed  - No K+/Mg++/Phos replacement protocols ordered    ANTHROPOMETRICS  Height: 160 cm (5' 3\")  Most Recent Weight: 70.262 kg (154 lb 14.4 oz)    IBW: 52.3 kg   BMI: Overweight BMI 25-29.9  Weight History: Wt fluctuating over the past 3+ months.  Pt reports weight difficult to estimated 2/2 fluid status  Wt Readings from Last 30 Encounters:   01/13/17 70.262 kg (154 lb 14.4 oz)   01/12/17 71.305 kg (157 lb 3.2 oz)   01/05/17 73.982 kg (163 lb 1.6 oz)   12/27/16 76.204 kg (168 lb)   12/23/16 74.39 kg (164 lb)   12/20/16 74.844 kg (165 lb)   12/08/16 72.349 kg (159 lb 8 oz)   12/06/16 71.351 kg (157 lb 4.8 oz)   11/29/16 72.848 kg (160 lb 9.6 oz)   11/22/16 68.947 kg (152 lb)   11/15/16 69.31 kg (152 lb 12.8 oz)   11/15/16 69.31 kg (152 lb 12.8 oz)   11/08/16 68.947 kg (152 lb)   11/01/16 69.31 kg (152 lb 12.8 oz)   10/25/16 67.586 kg (149 lb)   10/18/16 67.132 kg (148 lb)   10/11/16 68.493 kg (151 lb)   10/06/16 68.357 " kg (150 lb 11.2 oz)   10/04/16 68.04 kg (150 lb)   09/30/16 68.13 kg (150 lb 3.2 oz)     Dosing Weight: 57 kg (adjusted based on lowest recent wt of 70.2 kg and IBW 52.3 kg)    ASSESSED NUTRITION NEEDS  Estimated Energy Needs: 9894-6199 kcals/day (25 - 30 kcals/kg)  Justification: Maintenance  Estimated Protein Needs: 68-86 grams protein/day (1.2 - 1.5 grams of pro/kg)  Justification: Increased needs w/ acute illness  Estimated Fluid Needs: Pt on 1.5 L fluid restriction per provider    PHYSICAL FINDINGS  See malnutrition section below.    MALNUTRITION  % Intake: Decreased intake does not meet criteria  % Weight Loss: Difficult to assess given fluid status  Subcutaneous Fat Loss: None observed per brief visual, pt covered in blankets  Muscle Loss: None observed per brief visual, pt covered in blankets  Fluid Accumulation/Edema: None noted per chart review  Malnutrition Diagnosis: Patient does not meet two of the above criteria necessary for diagnosing malnutrition but is at risk for malnutrition    NUTRITION DIAGNOSIS  Inadequate oral intake related to variable appetite as evidenced by pt reports of variable appetite (eating meals TID or various amounts) for the past few months    INTERVENTIONS  Implementation  Nutrition Education: Provided education on role of RD and nutrition POC.  Went over supplement options; pt would not like a scheduled supplement order at this time but is fine with a PRN supplement order.  Offered low sodium diet education, pt declined as she is very familiar with low sodium diet.  Provided sodium content list of menu items.    Medical food supplement therapy    Goals  Patient to consume % of nutritionally adequate meal trays TID, or the equivalent with supplements/snacks.    Monitoring/Evaluation  Progress toward goals will be monitored and evaluated per protocol.     Stephie Hercules, RD, LD  6C RD Pager: [phone]

## 2017-01-14 ENCOUNTER — APPOINTMENT (OUTPATIENT)
Dept: OCCUPATIONAL THERAPY | Facility: CLINIC | Age: 67
DRG: 291 | End: 2017-01-14
Attending: HOSPITALIST
Payer: COMMERCIAL

## 2017-01-14 LAB
ANION GAP SERPL CALCULATED.3IONS-SCNC: 9 MMOL/L (ref 3–14)
BUN SERPL-MCNC: 18 MG/DL (ref 7–30)
CALCIUM SERPL-MCNC: 8.6 MG/DL (ref 8.5–10.1)
CHLORIDE SERPL-SCNC: 93 MMOL/L (ref 94–109)
CHOLEST SERPL-MCNC: 143 MG/DL
CO2 SERPL-SCNC: 30 MMOL/L (ref 20–32)
CREAT SERPL-MCNC: 0.96 MG/DL (ref 0.52–1.04)
ERYTHROCYTE [DISTWIDTH] IN BLOOD BY AUTOMATED COUNT: 15.8 % (ref 10–15)
GFR SERPL CREATININE-BSD FRML MDRD: 58 ML/MIN/1.7M2
GLUCOSE SERPL-MCNC: 115 MG/DL (ref 70–99)
HCT VFR BLD AUTO: 37.5 % (ref 35–47)
HDLC SERPL-MCNC: 24 MG/DL
HGB BLD-MCNC: 12.1 G/DL (ref 11.7–15.7)
INR PPP: 1.3 (ref 0.86–1.14)
LDLC SERPL CALC-MCNC: 103 MG/DL
MAGNESIUM SERPL-MCNC: 2.3 MG/DL (ref 1.6–2.3)
MCH RBC QN AUTO: 31.6 PG (ref 26.5–33)
MCHC RBC AUTO-ENTMCNC: 32.3 G/DL (ref 31.5–36.5)
MCV RBC AUTO: 98 FL (ref 78–100)
NONHDLC SERPL-MCNC: 119 MG/DL
PLATELET # BLD AUTO: 134 10E9/L (ref 150–450)
POTASSIUM SERPL-SCNC: 3.4 MMOL/L (ref 3.4–5.3)
POTASSIUM SERPL-SCNC: 3.8 MMOL/L (ref 3.4–5.3)
PROCALCITONIN SERPL-MCNC: 2.05 NG/ML
PROCALCITONIN SERPL-MCNC: 2.11 NG/ML
RBC # BLD AUTO: 3.83 10E12/L (ref 3.8–5.2)
SODIUM SERPL-SCNC: 131 MMOL/L (ref 133–144)
TRIGL SERPL-MCNC: 79 MG/DL
WBC # BLD AUTO: 7.7 10E9/L (ref 4–11)

## 2017-01-14 PROCEDURE — 84145 PROCALCITONIN (PCT): CPT | Performed by: INTERNAL MEDICINE

## 2017-01-14 PROCEDURE — 21400006 ZZH R&B CCU INTERMEDIATE UMMC

## 2017-01-14 PROCEDURE — 36415 COLL VENOUS BLD VENIPUNCTURE: CPT | Performed by: INTERNAL MEDICINE

## 2017-01-14 PROCEDURE — 40000133 ZZH STATISTIC OT WARD VISIT: Performed by: OCCUPATIONAL THERAPIST

## 2017-01-14 PROCEDURE — 25000125 ZZHC RX 250: Performed by: HOSPITALIST

## 2017-01-14 PROCEDURE — 40000141 ZZH STATISTIC PERIPHERAL IV START W/O US GUIDANCE

## 2017-01-14 PROCEDURE — 84132 ASSAY OF SERUM POTASSIUM: CPT | Performed by: INTERNAL MEDICINE

## 2017-01-14 PROCEDURE — 25000132 ZZH RX MED GY IP 250 OP 250 PS 637: Performed by: HOSPITALIST

## 2017-01-14 PROCEDURE — 25000128 H RX IP 250 OP 636: Performed by: INTERNAL MEDICINE

## 2017-01-14 PROCEDURE — 25000132 ZZH RX MED GY IP 250 OP 250 PS 637: Performed by: INTERNAL MEDICINE

## 2017-01-14 PROCEDURE — 85027 COMPLETE CBC AUTOMATED: CPT | Performed by: INTERNAL MEDICINE

## 2017-01-14 PROCEDURE — 80048 BASIC METABOLIC PNL TOTAL CA: CPT | Performed by: INTERNAL MEDICINE

## 2017-01-14 PROCEDURE — 83735 ASSAY OF MAGNESIUM: CPT | Performed by: INTERNAL MEDICINE

## 2017-01-14 PROCEDURE — 80061 LIPID PANEL: CPT | Performed by: INTERNAL MEDICINE

## 2017-01-14 PROCEDURE — 25000128 H RX IP 250 OP 636

## 2017-01-14 PROCEDURE — 25000125 ZZHC RX 250

## 2017-01-14 PROCEDURE — 97165 OT EVAL LOW COMPLEX 30 MIN: CPT | Mod: GO | Performed by: OCCUPATIONAL THERAPIST

## 2017-01-14 PROCEDURE — 85610 PROTHROMBIN TIME: CPT | Performed by: INTERNAL MEDICINE

## 2017-01-14 PROCEDURE — 99233 SBSQ HOSP IP/OBS HIGH 50: CPT | Mod: GC | Performed by: INTERNAL MEDICINE

## 2017-01-14 PROCEDURE — 25000125 ZZHC RX 250: Performed by: INTERNAL MEDICINE

## 2017-01-14 PROCEDURE — 97535 SELF CARE MNGMENT TRAINING: CPT | Mod: GO | Performed by: OCCUPATIONAL THERAPIST

## 2017-01-14 RX ORDER — SPIRONOLACTONE 25 MG/1
25 TABLET ORAL DAILY
Status: DISCONTINUED | OUTPATIENT
Start: 2017-01-14 | End: 2017-01-14

## 2017-01-14 RX ADMIN — PIPERACILLIN AND TAZOBACTAM 4.5 G: 4; .5 INJECTION, POWDER, FOR SOLUTION INTRAVENOUS at 03:01

## 2017-01-14 RX ADMIN — PIPERACILLIN AND TAZOBACTAM 4.5 G: 4; .5 INJECTION, POWDER, FOR SOLUTION INTRAVENOUS at 08:17

## 2017-01-14 RX ADMIN — FUROSEMIDE 5 MG/HR: 10 INJECTION, SOLUTION INTRAMUSCULAR; INTRAVENOUS at 10:24

## 2017-01-14 RX ADMIN — ACETAMINOPHEN 325 MG: 325 TABLET, FILM COATED ORAL at 23:19

## 2017-01-14 RX ADMIN — SPIRONOLACTONE 25 MG: 25 TABLET ORAL at 10:30

## 2017-01-14 RX ADMIN — ONDANSETRON 4 MG: 4 TABLET, ORALLY DISINTEGRATING ORAL at 03:00

## 2017-01-14 RX ADMIN — PIPERACILLIN AND TAZOBACTAM 4.5 G: 4; .5 INJECTION, POWDER, FOR SOLUTION INTRAVENOUS at 15:51

## 2017-01-14 RX ADMIN — ACYCLOVIR 400 MG: 400 TABLET ORAL at 08:18

## 2017-01-14 RX ADMIN — ACYCLOVIR 400 MG: 400 TABLET ORAL at 21:03

## 2017-01-14 RX ADMIN — ENOXAPARIN SODIUM 40 MG: 40 INJECTION SUBCUTANEOUS at 11:44

## 2017-01-14 RX ADMIN — PIPERACILLIN AND TAZOBACTAM 4.5 G: 4; .5 INJECTION, POWDER, FOR SOLUTION INTRAVENOUS at 21:03

## 2017-01-14 RX ADMIN — ACETAMINOPHEN 325 MG: 325 TABLET, FILM COATED ORAL at 08:26

## 2017-01-14 RX ADMIN — POTASSIUM CHLORIDE 20 MEQ: 750 TABLET, EXTENDED RELEASE ORAL at 10:30

## 2017-01-14 RX ADMIN — POLYETHYLENE GLYCOL 3350 17 G: 17 POWDER, FOR SOLUTION ORAL at 08:17

## 2017-01-14 RX ADMIN — VANCOMYCIN HYDROCHLORIDE 1250 MG: 10 INJECTION, POWDER, LYOPHILIZED, FOR SOLUTION INTRAVENOUS at 09:41

## 2017-01-14 RX ADMIN — VANCOMYCIN HYDROCHLORIDE 1250 MG: 10 INJECTION, POWDER, LYOPHILIZED, FOR SOLUTION INTRAVENOUS at 22:20

## 2017-01-14 ASSESSMENT — ACTIVITIES OF DAILY LIVING (ADL)
PREVIOUS_RESPONSIBILITIES: MEAL PREP
IADL_COMMENTS: LAUNDRY

## 2017-01-14 ASSESSMENT — PAIN DESCRIPTION - DESCRIPTORS: DESCRIPTORS: ACHING

## 2017-01-14 NOTE — PLAN OF CARE
Problem: Goal Outcome Summary  Goal: Goal Outcome Summary  6C OT: Pt completing functional mobility with SBA; Veronique-supervision to complete ADLs. Pt limited by fatigue, generalized weakness, and poor activity tolerance. VSS during functional activity; will continue to progress within Pt tolerance.      D/C: Anticipate with medical intervention Pt will be able to return home with Home Health Care PT/OT. Will continue to monitor.

## 2017-01-14 NOTE — PROGRESS NOTES
KATHIA: Pt with h/y of IV lambda AK amyloidosis, GI, cardiac involvement. R PE, here due to volume overload, SOB, CHF EF 55%.  T 102 per nursing assistant at  at approximately at 8 p.m, MD cross cover notified, Lab ordered, UA sent to the lab, Lasix gtt stopped per MD, Vancomycin and zosyn IV initiated. T went down 99.6 . Reported headache,Tylenol given with effect.     Potassium  (3.0) replaced per replacement protocol. Troponin 0.406 at 10:45 , MD notified. LA 1.1. Reported nausea, Zofran prn given.  Plan: Continue to monitor,notify MD as needed.

## 2017-01-14 NOTE — PHARMACY-VANCOMYCIN DOSING SERVICE
Pharmacy Vancomycin Initial Note  Date of Service 2017  Patient's  1950  66 year old, female    Indication: Sepsis    Current estimated CrCl = Estimated Creatinine Clearance: 56.9 mL/min (based on Cr of 0.94).    Creatinine for last 3 days  2017:  9:03 AM Creatinine 0.94 mg/dL  2017: 10:27 AM Creatinine 0.94 mg/dL    Recent Vancomycin Level(s) for last 3 days  No results found for requested labs within last 3 days.      Vancomycin IV Administrations (past 72 hours)      No vancomycin orders with administrations in past 72 hours.                Nephrotoxins and other renal medications (Future)    Start     Dose/Rate Route Frequency Ordered Stop    17  vancomycin (VANCOCIN) 1,250 mg in NaCl 0.9 % 250 mL intermittent infusion      1,250 mg Intravenous EVERY 12 HOURS 17 2100  piperacillin-tazobactam (ZOSYN) 4.5 g vial to attach to  mL bag      4.5 g  over 1 Hours Intravenous EVERY 6 HOURS 17            Contrast Orders - past 72 hours     None                Plan:  1.  Start vancomycin 1250 mg IV q12h.   2.  Goal Trough Level: 15-20 mg/L   3.  Pharmacy will check trough levels as appropriate in 1-3 Days.      Anahi Pina, PharmD, pager [phone]

## 2017-01-14 NOTE — PROGRESS NOTES
Internal Medicine Progress Note - [facility] Service    S:  Feels fatigued. No appetite.  Breathing is ok and patient would like to defer therapeutic thoracentesis at this time. Headache improved.  No abdominal pain or flank tenderness.    O:  Vitals:  Temp: 98.2  F (36.8  C) Temp src: Oral BP: 98/60 mmHg Pulse: 110 Heart Rate: 93 Resp: 18 SpO2: 94 % O2 Device: Nasal cannula Oxygen Delivery: 2 LPM    Physical Exam:  Constitutional: lying in bed, alert, oriented x 3, NAD  HEENT: EOMI, oral mucous moist, no scleral icterus, JVD ~ 12 cm H2O  Cardiovascular: RRR, normal S1 and S2, no murmurs, rubs or gallops  Respiratory: non-labored breathing on 2L NC, absent breath sounds at right base and crackles at left base  Abdominal: distended, no tenderness, no guarding, no rigidity, no rebound tenderness  Extremities: 3+ lower extremity edema to mid thigh bilaterally  Neurologic: cranial nerve II-XII grossly intact, no focal deficits  Skin: no visible rashes or lesions    Labs/Imaging:  Reviewed in EPIC.  Pertinent results below.     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 Cohen for IV diuresis due to decompensated heart failure.      # sepsis  # gram negative bacteremia  # UTI  Patient noted to be febrile to 102 on 1/13.  Blood cultures growing gram negative rods.  Lactate within normal limits.  Procal 2.05.  UA positive for nitrite and large leukocyte esterase.  UC pending.  Currently on vanco and zosyn.   - follow urine culture  - continue vancomycin, pharmacy to dose  - continue zosyn 4.5 g Q6H  - MRSA nasal swab    # decompensated congestive heart failure, NYHA class IIIB  # cardiac amyloidosis  Most recent ECHO on 1/5 with LFEF of 55% and grade III (advanced) diastolic dysfunction with concentric  hypertorphy.  Previous coronary angiogram not suggestive of obstructive CAD.  Evidence of fluid overload on exam with elevated JVD and significant lower extremity edema.  Reason for the decompensation is unclear.  No symptoms to suggest infectious process, except a leukocytosis that could be attributed to stress response.  Troponin elevation, but no EKG changes to suggest myocardial infarction.  BNP elevated at 9009, which is a increased from previous.  Home diuretics were recently increased to torsemide 40 mg BID.  Patient has missed a few doses when she has been at appointments.  Will start lasix drip for more gradual diuresis as patient has had difficulty in the past with a fall that may be related to orthostatics and a rapid fluid shift (currently holding in the setting of sepsis above).  Will also keep patient on telemetry given that conduction abnormalities can occur in amyloid (no beta blockers due to this).     - holding lasix  - electrolyte replacement protocol  - telemetry  - cardiac diet, 2 L fluid restriction  - strict I/O's  - daily weights    # right sided pleural effusion  Attributed to CHF.  Pleural fluid has been evaluated in the past and was transudate.  Patient received therapeutic thoracentesis every week.  Last thoracentesis was on 1/4.   Patient is not interested in pleurodesis at this time.   - monitor respiratory status (baseline on 2L NC)  - assess for thoracentesis daily    # troponin elevation  Elevated to 0.291 on admission.  Trended upwards but stabilized aroud ~ 0.4.  No EKG changes on admission.   Likely demand ischemia in the setting of fluid overload.       # stage IV lambda AK amyloidosis   Diagnosed in 9/2016 with cardiac, GI and bone marrow involvement.  Patient has received CyBorD complication by clare and Carissa.  Case has been discussed with Dr. Cassidy with hematology/oncology on admission and the patient will not receive chemotherapy at this time.  Goal is to stabilize her  cardiac involvement to allow for further consideration of possible stem cell transplant in the future.    - acyclovir 400 mg BID for prophylaxis    # constipation  - polyethylene glycol daily  - sennosides BID PRN    FEN: cardiac diet, 2L fluid restriction, electrolyte replacement protocol  PPX:  - DVT: lovenox 40 mg daily  - PPI: not needed  Code status: DNR/DNI  Dispo: pending treatment of bacteremia and adequate diuresis     Patient seen and discussed with Dr. Cárdenas who agrees with this plan.      Maria E Rodriguez MD, MS  Internal Medicine, PGY-1  [phone]

## 2017-01-14 NOTE — PROGRESS NOTES
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 Now meeting SIRs criteria with suspected source of sepsis is Ecoli bacteremia from UTI.   Assessment:   -ID: Ecoli Bacteremia w/ suspected source of UTI (vs undrained effusion). She developed a fever (Tmax 102), mild leukocytosis of 11.9, tachycardia (-113), elevate procal of 2.11 (repeat at 8hr still 2.05) all concerning for sepsis.  She now has a positive blood culture identified by [facility] as Ecoli with no resistance markers.  The suspected source of her bacteremia is UTI.  She had no presenting urinary s/s or complaints, her Scr appears to be at baseline and spontaneously voiding (no urinary catheter in place.  Her UA/UCx and blood cx were obtained given the SIRs criteria and antibiotics were administered (first dose pip/tazo at 2138 after blood and UA/UCX obtained during infusion of pip/tazo).    The Ecoli (1 of 2) positive is considered pathogenic and will require treatment, however, no resistance markers were detected by [facility].  The only other suspected source could be her undrained effusion, however she has historically had transudative effusions and in the  midst of a significant HF exacerbation an empyema seems less likely.  She is currently day 2 of pip/tazo and vancomycin for sepsis.  Given the new cultures findings her empiric therapy can be narrowed  with the lack of resistance markers detected and based on [facility] antibiogram and Vergiene treatment algorithm we can target the positive blood culture.     Recommendations:  1. Recommend to d/c vancomycin as we have a source of sepsis identified with a  Positive blood culture and no other sites of infection (SOI) concerning for gram positive pagthones and she has no MRSA history  2. REcommend to de-escalate pip/tazo to ceftriaxone 1 gm IV q24hr for the Ecoli (see treatment algorithm below) and [facility] antibiogram demonstrates 98% Ecoli sensitivity to ceftriaxone.  If the urinary tract is considered the source of bacteremia and that appears to be controlled (no indwelling sanchez catheter) she may be able to be narrowed to fluoroquinolone oral therapy but that would be dependent on sensitivities of the Ecoli.    3. Given the bacteremia, she will require a 14d course of antibiotics.      Patient will be followed by Rebeca Romo PharmD., BCPS AQ-ID ([id number]) (please contact if further questions)  AMT on-call pager: [phone]          Current antibiotics  Anti-infectives (Future)    Start     Dose/Rate Route Frequency Ordered Stop    01/13/17 2115  vancomycin (VANCOCIN) 1,250 mg in NaCl 0.9 % 250 mL intermittent infusion      1,250 mg Intravenous EVERY 12 HOURS 01/13/17 2103 01/13/17 2115  acyclovir (ZOVIRAX) tablet 400 mg      400 mg Oral 2 TIMES DAILY 01/13/17 2112 01/13/17 2100  piperacillin-tazobactam (ZOSYN) 4.5 g vial to attach to  mL bag      4.5 g  over 1 Hours Intravenous EVERY 6 HOURS 01/13/17 2058

## 2017-01-14 NOTE — CONSULTS
Elie Alejandro M.D.  Cardiovascular Medicine    I personally saw and examined this patient, discussed care with housestaff and other consultants, reviewed current laboratories and imaging studies, and conveyed impression and diagnostic/therapeutic plan to patient.    Problem List  1. AL amyloid with cardiac, GI, bone marrow  2. Recurrent pleural effusions requiring thoracentesis  3. Dependent edema  4. UTI  5. Depression  6. + blood culture, ecoli    History    Unfortunate, 66 year old, white female with rapidly progressive AL amyloid admitted with increasing shortness of breath, dependent edema, modest orthopnea, recurrent thoracentesis presented for augmented volume management strategy,       Objective  /54 mmHg  Pulse 110  Temp(Src) 98.1  F (36.7  C) (Oral)  Resp 18  Wt 70.67 kg (155 lb 12.8 oz)  SpO2 94%   Constitutional: alert, oriented, normal gait and station, normal mentation.  Oral: moist mucous membranes  Lymph: without pathologic adenopathy  Chest: dullness bothbases rales above  Cor: No evidence of left or right ventricular activity.  Rhythm is regular.  S1 normal, S2 split physiologically. Murmurs are not present, S4, increased JVP  Abdomen: without tenderness, rebound, guarding, masses, ascites  Extremities: Edema not present  Neuro: no focal defects, normal mentation  Skin: without open lesions  Psych: oriented, verbal, mental status in tact    Intake/Output Summary (Last 24 hours) at 01/14/17 1545  Last data filed at 01/14/17 1531   Gross per 24 hour   Intake    380 ml   Output   1150 ml   Net   -770 ml     Wt Readings from Last 5 Encounters:   01/14/17 70.67 kg (155 lb 12.8 oz)   01/12/17 71.305 kg (157 lb 3.2 oz)   01/05/17 73.982 kg (163 lb 1.6 oz)   12/27/16 76.204 kg (168 lb)   12/23/16 74.39 kg (164 lb)       Meds    enoxaparin  40 mg Subcutaneous Q24H     polyethylene glycol  17 g Oral Daily     piperacillin-tazobactam  4.5 g Intravenous Q6H     vancomycin (VANCOCIN) IV  1,250 mg  Intravenous Q12H     acyclovir  400 mg Oral BID       Labs  CMP  Recent Labs  Lab 01/14/17  0909 01/14/17  0102 01/13/17  1752 01/13/17  1027 01/12/17  0903   *  --   --  130* 136   POTASSIUM 3.8 3.4 3.0* 3.1* 3.2*   CHLORIDE 93*  --   --  91* 93*   CO2 30  --   --  32 35*   ANIONGAP 9  --   --  7 8   *  --   --  109* 99   BUN 18  --   --  14 13   CR 0.96  --   --  0.94 0.94   GFRESTIMATED 58*  --   --  60* 60*   GFRESTBLACK 71  --   --  73 73   JERROD 8.6  --   --  9.2 8.7   MAG 2.3  --   --  2.4*  --    PROTTOTAL  --   --   --  6.2* 5.9*   ALBUMIN  --   --   --  3.0* 3.1*   BILITOTAL  --   --   --  5.7* 3.3*   ALKPHOS  --   --   --  162* 137   AST  --   --   --  17 15   ALT  --   --   --  15 16     CBC  Recent Labs  Lab 01/14/17  0909 01/13/17  2100 01/13/17  1027 01/12/17  0903   WBC 7.7 9.7 11.9* 9.3   RBC 3.83 4.04 4.27 4.08   HGB 12.1 13.1 13.8 13.3   HCT 37.5 39.7 42.4 40.2   MCV 98 98 99 99   MCH 31.6 32.4 32.3 32.6   MCHC 32.3 33.0 32.5 33.1   RDW 15.8* 15.6* 15.9* 16.0*   * 165 176 257     INR  Recent Labs  Lab 01/14/17  0909   INR 1.30*     Arterial Blood GasNo lab results found in last 7 days.    Imaging   Exam:  XR CHEST PORT 1 VW, 1/13/2017 3:44 PM     History: SOB     Comparison:  Chest radiograph 12/28/2016     Findings:  Single portable AP view of the chest was obtained. The  cardiomediastinal silhouette is partially obscured on the right.  Trachea is midline. Pulmonary vasculature is indistinct. Bilateral  pleural effusions, right greater than left, which have increased since  prior. Bibasilar hazy and interstitial opacities greater on the right.  No appreciable pneumothorax.     The visualized upper abdomen is unremarkable. No acute osseous  abnormality.                                                                       Impression:     1. Increasing bilateral pleural effusions, greater on the right, with  overlying atelectasis/consolidation with underlying infection unable  to  be excluded, particularly on the right.  2. Mild pulmonary edema.    Name: JESSICA GARCIA  MRN: [id number]  : 1950  Study Date: 2017 11:10 AM  Age: 66 yrs  Gender: Female  [place] [facility] [place]  Reason For Study: Cardiomyopathy  Ordering Physician: ALEJANDRO KEYES  Referring Physician: ALEJANDRO KEYES  Performed By: Codie De Jesus RDCS     BSA: 1.8 m2  Height: 63 in  Weight: 163 lb  BP: 107/61 mmHg  ______________________________________________________________________________        Procedure  Echocardiogram with two-dimensional, color and spectral Doppler performed.  ______________________________________________________________________________     Interpretation Summary  Global and regional left ventricular function is normal with moderate  concentric LVH and biplane LVEF 55%.  Moderate to severe concentric wall thickening consistent with left  ventricular hypertrophy is present.  Grade III or advanced diastolic dysfunction is present.  Global peak LV longitudinal strain is abnormal when averaged at -12.5%  (normal <-18%).  Global right ventricular function is mildly reduced.  The inferior vena cava is normal.  Small circumferential pericardial effusion is present without any hemodynamic  significance.     ______________________________________________________________________________           Left Ventricle  Biplane LVEF 55%. Global and regional left ventricular function is normal  with an EF of 55-60%. Left ventricular size is normal. Moderate to severe  concentric wall thickening consistent with left ventricular hypertrophy is  present. Consistent with amyloid cardiomyopathy. Grade III or advanced  diastolic dysfunction. Global peak LV longitudinal strain is averaged at -  12.5%. This suggests abnormal strain (normal <-18%). No regional wall motion  abnormalities are seen.     Right Ventricle  The right ventricle is normal size. Global right ventricular function is  mildly  reduced.  Atria  The right atria appears normal. Mild left atrial enlargement is present.     Mitral Valve  The mitral valve is normal. Mild to moderate mitral insufficiency is present.     Aortic Valve  Aortic valve is normal in structure and function. Mild aortic insufficiency  is present.     Tricuspid Valve  The tricuspid valve is normal. Mild to moderate tricuspid insufficiency is  present. The right ventricular systolic pressure is approximated at 23.7 mmHg  plus the right atrial pressure.     Pulmonic Valve  The pulmonic valve is normal. Mild pulmonic insufficiency is present.     Vessels  The inferior vena cava is normal. The aorta root is normal. The pulmonary  artery cannot be assessed.  Pericardium  Small circumferential pericardial effusion is present without any hemodynamic  significance.     Compared to Previous Study  This study was compared with the study from 10/6/2016 .     ______________________________________________________________________________  MMode/2D Measurements & Calculations  IVSd: 1.6 cm  LVIDd: 3.6 cm  LVIDs: 2.6 cm  LVPWd: 1.4 cm  FS: 28.0 %  EDV(Teich): 56.1 ml  ESV(Teich): 25.2 ml  LV mass(C)d: 202.4 grams           Doppler Measurements & Calculations  MV E max ella: 82.3 cm/sec  MV A max ella: 40.1 cm/sec  MV E/A: 2.1  MV dec time: 0.14 sec  TR max ella: 243.4 cm/sec  TR max P.7 mmHg  Lateral E/e': 17.2  Medial E/e': 18.3     Assessment/Plan     1. Continue cautious diuresis  2. She will undoubtedly need fluid removal this admission and consideration for pleurodesis or spigot.  3, As previously noted there are no good treatment for HFpEF

## 2017-01-15 ENCOUNTER — APPOINTMENT (OUTPATIENT)
Dept: OCCUPATIONAL THERAPY | Facility: CLINIC | Age: 67
DRG: 291 | End: 2017-01-15
Attending: HOSPITALIST
Payer: COMMERCIAL

## 2017-01-15 LAB
ANION GAP SERPL CALCULATED.3IONS-SCNC: 5 MMOL/L (ref 3–14)
BUN SERPL-MCNC: 18 MG/DL (ref 7–30)
CALCIUM SERPL-MCNC: 9 MG/DL (ref 8.5–10.1)
CHLORIDE SERPL-SCNC: 94 MMOL/L (ref 94–109)
CO2 SERPL-SCNC: 33 MMOL/L (ref 20–32)
CREAT SERPL-MCNC: 0.95 MG/DL (ref 0.52–1.04)
ERYTHROCYTE [DISTWIDTH] IN BLOOD BY AUTOMATED COUNT: 15.7 % (ref 10–15)
GFR SERPL CREATININE-BSD FRML MDRD: 59 ML/MIN/1.7M2
GLUCOSE SERPL-MCNC: 118 MG/DL (ref 70–99)
HCT VFR BLD AUTO: 38.4 % (ref 35–47)
HGB BLD-MCNC: 12.3 G/DL (ref 11.7–15.7)
MAGNESIUM SERPL-MCNC: 2.4 MG/DL (ref 1.6–2.3)
MCH RBC QN AUTO: 31.8 PG (ref 26.5–33)
MCHC RBC AUTO-ENTMCNC: 32 G/DL (ref 31.5–36.5)
MCV RBC AUTO: 99 FL (ref 78–100)
MRSA DNA SPEC QL NAA+PROBE: NORMAL
PLATELET # BLD AUTO: 122 10E9/L (ref 150–450)
POTASSIUM SERPL-SCNC: 3.8 MMOL/L (ref 3.4–5.3)
RBC # BLD AUTO: 3.87 10E12/L (ref 3.8–5.2)
SODIUM SERPL-SCNC: 133 MMOL/L (ref 133–144)
SPECIMEN SOURCE: NORMAL
WBC # BLD AUTO: 6.4 10E9/L (ref 4–11)

## 2017-01-15 PROCEDURE — 87640 STAPH A DNA AMP PROBE: CPT | Performed by: INTERNAL MEDICINE

## 2017-01-15 PROCEDURE — 80048 BASIC METABOLIC PNL TOTAL CA: CPT | Performed by: INTERNAL MEDICINE

## 2017-01-15 PROCEDURE — 83735 ASSAY OF MAGNESIUM: CPT | Performed by: INTERNAL MEDICINE

## 2017-01-15 PROCEDURE — 87641 MR-STAPH DNA AMP PROBE: CPT | Performed by: INTERNAL MEDICINE

## 2017-01-15 PROCEDURE — 40000133 ZZH STATISTIC OT WARD VISIT

## 2017-01-15 PROCEDURE — 97535 SELF CARE MNGMENT TRAINING: CPT | Mod: GO

## 2017-01-15 PROCEDURE — 25000125 ZZHC RX 250: Performed by: INTERNAL MEDICINE

## 2017-01-15 PROCEDURE — 21400006 ZZH R&B CCU INTERMEDIATE UMMC

## 2017-01-15 PROCEDURE — 99233 SBSQ HOSP IP/OBS HIGH 50: CPT | Mod: GC | Performed by: INTERNAL MEDICINE

## 2017-01-15 PROCEDURE — 25000132 ZZH RX MED GY IP 250 OP 250 PS 637: Performed by: HOSPITALIST

## 2017-01-15 PROCEDURE — 85027 COMPLETE CBC AUTOMATED: CPT | Performed by: INTERNAL MEDICINE

## 2017-01-15 PROCEDURE — 87040 BLOOD CULTURE FOR BACTERIA: CPT | Performed by: HOSPITALIST

## 2017-01-15 PROCEDURE — 97140 MANUAL THERAPY 1/> REGIONS: CPT | Mod: GO

## 2017-01-15 PROCEDURE — 25000125 ZZHC RX 250: Performed by: HOSPITALIST

## 2017-01-15 PROCEDURE — 36415 COLL VENOUS BLD VENIPUNCTURE: CPT | Performed by: INTERNAL MEDICINE

## 2017-01-15 PROCEDURE — 97110 THERAPEUTIC EXERCISES: CPT | Mod: GO

## 2017-01-15 RX ORDER — CEFTRIAXONE 2 G/1
2 INJECTION, POWDER, FOR SOLUTION INTRAMUSCULAR; INTRAVENOUS EVERY 24 HOURS
Status: DISCONTINUED | OUTPATIENT
Start: 2017-01-16 | End: 2017-01-24

## 2017-01-15 RX ORDER — CEFTRIAXONE 1 G/1
1 INJECTION, POWDER, FOR SOLUTION INTRAMUSCULAR; INTRAVENOUS EVERY 24 HOURS
Status: DISCONTINUED | OUTPATIENT
Start: 2017-01-15 | End: 2017-01-15

## 2017-01-15 RX ADMIN — ENOXAPARIN SODIUM 40 MG: 40 INJECTION SUBCUTANEOUS at 12:21

## 2017-01-15 RX ADMIN — ACETAMINOPHEN 325 MG: 325 TABLET, FILM COATED ORAL at 19:23

## 2017-01-15 RX ADMIN — ACYCLOVIR 400 MG: 400 TABLET ORAL at 08:11

## 2017-01-15 RX ADMIN — POTASSIUM CHLORIDE 20 MEQ: 750 TABLET, EXTENDED RELEASE ORAL at 12:21

## 2017-01-15 RX ADMIN — ACYCLOVIR 400 MG: 400 TABLET ORAL at 20:10

## 2017-01-15 RX ADMIN — PIPERACILLIN AND TAZOBACTAM 4.5 G: 4; .5 INJECTION, POWDER, FOR SOLUTION INTRAVENOUS at 02:38

## 2017-01-15 RX ADMIN — CEFTRIAXONE 1 G: 1 INJECTION, POWDER, FOR SOLUTION INTRAMUSCULAR; INTRAVENOUS at 08:49

## 2017-01-15 ASSESSMENT — PAIN DESCRIPTION - DESCRIPTORS: DESCRIPTORS: ACHING

## 2017-01-15 NOTE — PROGRESS NOTES
"   01/15/17 1150   General Information   Discipline OT   Onset of Edema (acute on chronic)   Affected Body Part(s) Left LE;Right LE  (abdomen)   Etiology Comments rapidly progressive AL amyloid,fluid overload, hypoalbuminemia (3.0)   Pertinent history of current problem (PT: include personal factors and/or comorbidities that impact the POC; OT: include additional occupational profile info) \"66 year old, white female with rapidly progressive AL amyloid admitted with increasing shortness of breath, dependent edema, modest orthopnea, recurrent thoracentesis presented for augmented volume management strategy,\" Pt had previously gone to OP lymphedema therapy where she was wrapped and d/c'd when she was fit into thigh high Jobst compression garments. She states that she wore the garments 24/7 and that she was unable to take them off. Since seein OP lymphedema, her swelling has gotten worse and she has been unable to wear any compression.    Edema Precautions Cardiac Edema/CHF   Pain   Patient currently in pain No   Edema Examination / Assessment   Skin Condition Pitting;Dryness   Skin Condition Comments Skin pink and shiny. Columnar swelling visible to groin, edema soft but appearing more protein based. Pt states she has had \"big legs and ankles\" since she was young, possibly indicative of primary lymphedema. Fluid accumulation noted at ankle creases, hanging over feet. 1+ pitting noted in feet and ankles. Pt with varicosities visible in ankles and below knee crease. Abdomen distended. Pt reports she is having a hard time bending over and moving with fluid accumulation.    Capillary Refill Symmetrical   Dorsal Pedal Pulse Symmetrical   Stemmer Sign Positive   Girth Measurements   Girth Measurements Refer to separate Girth Measurement flowsheet   Sensation   Sensation (WFL) no deficits were identified   Assessment/Plan   Patient presents with Stage 2 Lymphedema   Assessment Pt to benefit from skilled OT to manage B LE " lymphedema to improve comfort with LB dressing, transfers, and for skin integrity. Refer to daily flowsheet for details regarding treatment.   Assessment of Occupational Performance (refer to initial OT eval.)   Planned Edema Interventions Gradient compression bandaging;Fit for compression garment;Exercises;Precautions to prevent infection/exacerbation;Education;ADL training   Treatment Frequency 5 times/wk  (BID with OT)   Treatment Duration 1/22   Patient, Family and/or Staff in agreement with plan of care. Yes   Risks and benefits of treatment have been explained. Yes   Total Evaluation Time   Total Evaluation Time (Minutes) 4

## 2017-01-15 NOTE — PLAN OF CARE
Problem: Goal Outcome Summary  Goal: Goal Outcome Summary  ST, 2L O2 via NC PRN. VSS. Febrile this am, tmax 99.6. + blood cultures for gram negative rods and ecoli. + UTI. On IV Vanco and Zosyn. Tylenol for headache. + void, boarderline output averaging 43 mls/hr this shift. K+ 3.8, replaced per protocol. Independent to commode.   P- Continue to monitor and notify team of changes.

## 2017-01-15 NOTE — PLAN OF CARE
Problem: Goal Outcome Summary  Goal: Goal Outcome Summary  Outcome: No Change  Pt A/Ox4, VSS on 2L O2. Pt voiding adequately, Tylenol given for HA. Pt up independently to commode. +4 edema to bilateral lower extremities. Pt's legs elevated overnight, Lymph consult. Antibiotics given. Continue to monitor and notify MD of questions or concerns.

## 2017-01-15 NOTE — PLAN OF CARE
Problem: Goal Outcome Summary  Goal: Goal Outcome Summary  OT 6C: Patient seen for endurance and ADLs this day. Patient ambulates ~100 ft x 2 trials with one standing break, SPO2 97% on 2LNC. Patient performs ADLs standing at sink. Patient demos fatigue and SOB throughout session. Recommend: home with HHPT/OT for increased endurance and ADLs.

## 2017-01-15 NOTE — PROGRESS NOTES
Internal Medicine Progress Note - [facility] Service    S:  Feels better today.  Breathing may be slightly heavier, but tolerable.  Nausea resolved.  Nursing notes reviewed.      O:  Vitals:  Temp: 98.2  F (36.8  C) Temp src: Oral BP: 107/62 mmHg   Heart Rate: 108 Resp: 18 SpO2: 96 % O2 Device: None (Room air) Oxygen Delivery: 2 LPM    Physical Exam:  Constitutional: lying in bed, alert, oriented x 3, NAD  HEENT: EOMI, oral mucous moist, no scleral icterus, JVD ~ 12 cm H2O  Cardiovascular: RRR, normal S1 and S2, no murmurs, rubs or gallops  Respiratory: non-labored breathing on 2L NC, absent breath sounds at right base and crackles at left base  Abdominal: distended, no tenderness, no guarding, no rigidity, no rebound tenderness  Extremities: 3+ lower extremity edema to mid thigh bilaterally  Neurologic: cranial nerve II-XII grossly intact, no focal deficits  Skin: no visible rashes or lesions    Labs/Imaging:  Reviewed in EPIC.      Blood Cx - E. Coli (pansensitive)  Urine Cx - E. 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 Cohen for IV diuresis due to decompensated heart failure.      # sepsis  # gram negative bacteremia  # UTI  Patient noted to be febrile to 102 on 1/13.  Blood cultures and urine cultures with E. Coli (pansensitive).  Lactate within normal limits.  Procal 2.05.   - follow urine culture sensitivities  - d/c vancomycin, pharmacy to dose (1/13 - 1/15)  - d/c zosyn 4.5 g Q6H (1/13 - 1/15)   - ceftriaxone 2 g Q24H (1/15 - present)    # decompensated congestive heart failure, NYHA class IIIB  # cardiac amyloidosis  Most recent ECHO on 1/5 with LFEF of 55% and grade III (advanced) diastolic dysfunction with concentric hypertorphy.  Previous coronary angiogram not suggestive of obstructive CAD.  Evidence of fluid overload on exam  with elevated JVD and significant lower extremity edema.  Reason for the decompensation is unclear.  No symptoms to suggest infectious process, except a leukocytosis that could be attributed to stress response.  Troponin elevation, but no EKG changes to suggest myocardial infarction.  BNP elevated at 9009, which is a increased from previous.  Home diuretics were recently increased to torsemide 40 mg BID.  Patient has missed a few doses when she has been at appointments.  Will start lasix drip for more gradual diuresis as patient has had difficulty in the past with a fall that may be related to orthostatics and a rapid fluid shift (currently holding in the setting of sepsis above).  Will also keep patient on telemetry given that conduction abnormalities can occur in amyloid (no beta blockers due to this).     - holding lasix  - electrolyte replacement protocol  - telemetry  - cardiac diet, 2 L fluid restriction  - strict I/O's  - daily weights    # right sided pleural effusion  Attributed to CHF.  Pleural fluid has been evaluated in the past and was transudate.  Patient received therapeutic thoracentesis every week.  Last thoracentesis was on 1/4.   Patient is not interested in pleurodesis at this time.   - monitor respiratory status (baseline on 2L NC)  - assess for thoracentesis daily    # troponin elevation  Elevated to 0.291 on admission.  Trended upwards but stabilized aroud ~ 0.4.  No EKG changes on admission.   Likely demand ischemia in the setting of fluid overload.       # stage IV lambda AK amyloidosis   Diagnosed in 9/2016 with cardiac, GI and bone marrow involvement.  Patient has received CyBorD complication by hematuria and BorD.  Case has been discussed with Dr. Cassidy with hematology/oncology on admission and the patient will not receive chemotherapy at this time.  Goal is to stabilize her cardiac involvement to allow for further consideration of possible stem cell transplant in the future.    -  acyclovir 400 mg BID for prophylaxis    # constipation  - polyethylene glycol daily  - sennosides BID PRN    FEN: cardiac diet, 2L fluid restriction, electrolyte replacement protocol  PPX:  - DVT: lovenox 40 mg daily  - PPI: not needed  Code status: DNR/DNI  Dispo: pending treatment of bacteremia and adequate diuresis     Patient seen and discussed with Dr. Cárdenas who agrees with this plan.      Maria E Rodriguez MD, MS  Internal Medicine, PGY-1  [phone]

## 2017-01-15 NOTE — PLAN OF CARE
Problem: Goal Outcome Summary  Goal: Goal Outcome Summary  Edema 6C: Pt with 1+ pitting in B LEs, skin quite distended with bulbous accumulation near ankles. Columnar swelling present to groin and into abdomen. GCB initiated from MTPs to knee creases with light stretch 2/2 pt's tolerance. Pt to wear x 24 hours, but please remove if they cause numbness, tingling, pain, or become soiled. Anticipate that pt will benefit from custom compression garment after following up in OP lymphedema therapy. Went to [facility] in [place] previously.

## 2017-01-16 ENCOUNTER — APPOINTMENT (OUTPATIENT)
Dept: OCCUPATIONAL THERAPY | Facility: CLINIC | Age: 67
DRG: 291 | End: 2017-01-16
Attending: HOSPITALIST
Payer: COMMERCIAL

## 2017-01-16 ENCOUNTER — ANESTHESIA EVENT (OUTPATIENT)
Dept: SURGERY | Facility: CLINIC | Age: 67
End: 2017-01-16

## 2017-01-16 LAB
ANION GAP SERPL CALCULATED.3IONS-SCNC: 6 MMOL/L (ref 3–14)
BACTERIA SPEC CULT: ABNORMAL
BACTERIA SPEC CULT: ABNORMAL
BUN SERPL-MCNC: 14 MG/DL (ref 7–30)
CALCIUM SERPL-MCNC: 9 MG/DL (ref 8.5–10.1)
CHLORIDE SERPL-SCNC: 96 MMOL/L (ref 94–109)
CO2 SERPL-SCNC: 32 MMOL/L (ref 20–32)
CREAT SERPL-MCNC: 0.79 MG/DL (ref 0.52–1.04)
GFR SERPL CREATININE-BSD FRML MDRD: 73 ML/MIN/1.7M2
GLUCOSE SERPL-MCNC: 96 MG/DL (ref 70–99)
MAGNESIUM SERPL-MCNC: 2.6 MG/DL (ref 1.6–2.3)
MICRO REPORT STATUS: ABNORMAL
MICRO REPORT STATUS: ABNORMAL
MICROORGANISM SPEC CULT: ABNORMAL
MICROORGANISM SPEC CULT: ABNORMAL
POTASSIUM SERPL-SCNC: 3.3 MMOL/L (ref 3.4–5.3)
POTASSIUM SERPL-SCNC: 3.7 MMOL/L (ref 3.4–5.3)
SODIUM SERPL-SCNC: 134 MMOL/L (ref 133–144)
SPECIMEN SOURCE: ABNORMAL
SPECIMEN SOURCE: ABNORMAL

## 2017-01-16 PROCEDURE — 25000132 ZZH RX MED GY IP 250 OP 250 PS 637: Performed by: HOSPITALIST

## 2017-01-16 PROCEDURE — 84132 ASSAY OF SERUM POTASSIUM: CPT | Performed by: INTERNAL MEDICINE

## 2017-01-16 PROCEDURE — 36415 COLL VENOUS BLD VENIPUNCTURE: CPT | Performed by: INTERNAL MEDICINE

## 2017-01-16 PROCEDURE — 25000132 ZZH RX MED GY IP 250 OP 250 PS 637: Performed by: INTERNAL MEDICINE

## 2017-01-16 PROCEDURE — 40000133 ZZH STATISTIC OT WARD VISIT

## 2017-01-16 PROCEDURE — 21400006 ZZH R&B CCU INTERMEDIATE UMMC

## 2017-01-16 PROCEDURE — 83735 ASSAY OF MAGNESIUM: CPT | Performed by: INTERNAL MEDICINE

## 2017-01-16 PROCEDURE — 99233 SBSQ HOSP IP/OBS HIGH 50: CPT | Mod: GC | Performed by: INTERNAL MEDICINE

## 2017-01-16 PROCEDURE — 97110 THERAPEUTIC EXERCISES: CPT | Mod: GO

## 2017-01-16 PROCEDURE — 40000133 ZZH STATISTIC OT WARD VISIT: Performed by: OCCUPATIONAL THERAPIST

## 2017-01-16 PROCEDURE — 97535 SELF CARE MNGMENT TRAINING: CPT | Mod: GO

## 2017-01-16 PROCEDURE — 25000125 ZZHC RX 250: Performed by: INTERNAL MEDICINE

## 2017-01-16 PROCEDURE — 97140 MANUAL THERAPY 1/> REGIONS: CPT | Mod: GO | Performed by: OCCUPATIONAL THERAPIST

## 2017-01-16 PROCEDURE — 25000125 ZZHC RX 250: Performed by: HOSPITALIST

## 2017-01-16 PROCEDURE — 25000128 H RX IP 250 OP 636: Performed by: INTERNAL MEDICINE

## 2017-01-16 PROCEDURE — 80048 BASIC METABOLIC PNL TOTAL CA: CPT | Performed by: INTERNAL MEDICINE

## 2017-01-16 PROCEDURE — 97530 THERAPEUTIC ACTIVITIES: CPT | Mod: GO

## 2017-01-16 RX ORDER — SPIRONOLACTONE 25 MG/1
25 TABLET ORAL DAILY
Status: DISCONTINUED | OUTPATIENT
Start: 2017-01-16 | End: 2017-01-24

## 2017-01-16 RX ADMIN — CEFTRIAXONE 2 G: 2 INJECTION, POWDER, FOR SOLUTION INTRAMUSCULAR; INTRAVENOUS at 08:42

## 2017-01-16 RX ADMIN — FUROSEMIDE 5 MG/HR: 10 INJECTION, SOLUTION INTRAVENOUS at 11:40

## 2017-01-16 RX ADMIN — POTASSIUM CHLORIDE 20 MEQ: 750 TABLET, EXTENDED RELEASE ORAL at 18:51

## 2017-01-16 RX ADMIN — LORAZEPAM 0.5 MG: 0.5 TABLET ORAL at 03:05

## 2017-01-16 RX ADMIN — SPIRONOLACTONE 25 MG: 25 TABLET ORAL at 13:23

## 2017-01-16 RX ADMIN — ONDANSETRON 4 MG: 4 TABLET, ORALLY DISINTEGRATING ORAL at 03:05

## 2017-01-16 RX ADMIN — ENOXAPARIN SODIUM 40 MG: 40 INJECTION SUBCUTANEOUS at 11:40

## 2017-01-16 RX ADMIN — ACYCLOVIR 400 MG: 400 TABLET ORAL at 19:51

## 2017-01-16 RX ADMIN — ACYCLOVIR 400 MG: 400 TABLET ORAL at 08:42

## 2017-01-16 RX ADMIN — POTASSIUM CHLORIDE 20 MEQ: 750 TABLET, EXTENDED RELEASE ORAL at 11:41

## 2017-01-16 NOTE — PLAN OF CARE
Problem: Goal Outcome Summary  Goal: Goal Outcome Summary  OT/6C:  Pt ambulates ~130' X2 pushing w/c for stability.  Participates in 15 continuous minutes on NuStep, level 2-4.  VSS on 2L O2, tolerates well.      REC: Home with assist PRN and HH OT/PT to address deficits in strength and endurance.

## 2017-01-16 NOTE — PROGRESS NOTES
Care Coordinator Progress Note     Admission Date/Time:  1/13/2017  Attending MD:  Rosa Cárdenas DO     Data  Chart reviewed, discussed with interdisciplinary team.   Patient was admitted for: Data Unavailable.    Concerns with insurance coverage for discharge needs: None.  Current Living Situation: Patient lives with spouse.  Support System: Supportive and Involved  Services Involved: DME- [facility]  Transportation: Family or Friend will provide  Barriers to Discharge: Lasix Gtt started, medical clearance pending, Possible HHC RN PT OT needed     Coordination of Care and Referrals: Provided patient/family with options for DME.        Assessment  Per chart review pt currently has O2 with [facility].  Per PT OT notes pt would benefit from HHC Pt OT.  Will discuss with pt tomorrow.   RNCC to continue to follow for needs.    [facility]  [facility] [address]   [address]  Phone # [phone]     _______________________  [facility]  Phone  [phone]  Fax  [phone]  ______________________       Plan  Anticipated Discharge Date:  1/16/2017   Anticipated Discharge Plan:  Home with HHC vs TCU      Marly Barros, DILIPCC, BSN    [facility]    Medicine Group  [address] [place] [address]    [email or web address]    Office: [phone] Pager: [phone]

## 2017-01-16 NOTE — PROGRESS NOTES
Internal Medicine Progress Note - [facility] Service    S:  Afebrile.  Breathing stable.    Denies abdominal pain.  Difficulty sleeping overnight.  Trailed lymphedema wraps, but taken off overnight due to being too hot.  Nursing notes reviewed.    O:  Vitals:  Temp: 97.5  F (36.4  C) Temp src: Oral BP: 111/66 mmHg   Heart Rate: 87 Resp: 16 SpO2: 100 % O2 Device: Nasal cannula Oxygen Delivery: 2 LPM    Physical Exam:  Constitutional: lying in bed, alert, oriented x 3, NAD  HEENT: EOMI, oral mucous moist, no scleral icterus  Cardiovascular: RRR, normal S1 and S2, no murmurs, rubs or gallops  Respiratory: non-labored breathing on 2L NC, absent breath sounds at right base and crackles at left base  Abdominal: distended, no tenderness, no guarding, no rigidity, no rebound tenderness  Extremities: 3+ lower extremity edema to mid thigh bilaterally  Neurologic: cranial nerve II-XII grossly intact, no focal deficits  Skin: no visible rashes or lesions    Labs/Imaging:  Reviewed in EPIC.      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 Cohen for IV diuresis due to decompensated heart failure.      # sepsis  # gram negative bacteremia  # UTI  Patient noted to be febrile to 102 on 1/13.  Blood cultures and urine cultures with E. Coli (pansensitive).  Lactate within normal limits.  Procal 2.05.   - d/c vancomycin, pharmacy to dose (1/13 - 1/15)  - d/c zosyn 4.5 g Q6H (1/13 - 1/15)   - ceftriaxone 2 g Q24H (1/15 - present)    # decompensated congestive heart failure, NYHA class IIIB  # cardiac amyloidosis  Most recent ECHO on 1/5 with LFEF of 55% and grade III (advanced) diastolic dysfunction with concentric hypertorphy.  Previous coronary angiogram not suggestive of obstructive CAD.  Evidence of fluid overload on exam with elevated JVD and significant lower  extremity edema.  Reason for the decompensation is unclear.  No symptoms to suggest infectious process, except a leukocytosis that could be attributed to stress response.  Troponin elevation, but no EKG changes to suggest myocardial infarction.  BNP elevated at 9009, which is a increased from previous.  Home diuretics were recently increased to torsemide 40 mg BID.  Patient has missed a few doses when she has been at appointments.  Will start lasix drip for more gradual diuresis as patient has had difficulty in the past with a fall that may be related to orthostatics and a rapid fluid shift.  Will also keep patient on telemetry given that conduction abnormalities can occur in amyloid (no beta blockers due to this).     - lasix 5 mg/hr  - spironolactone 25 mg daily  - electrolyte replacement protocol  - telemetry  - cardiac diet, 2 L fluid restriction  - strict I/O's  - daily weights    # right sided pleural effusion  Attributed to CHF.  Pleural fluid has been evaluated in the past and was transudate.  Patient received therapeutic thoracentesis every week.  Last thoracentesis was on 1/4.   Patient is not interested in pleurodesis at this time.   - monitor respiratory status (baseline on 2L NC)  - assess for thoracentesis daily    # troponin elevation  Elevated to 0.291 on admission.  Trended upwards but stabilized aroud ~ 0.4.  No EKG changes on admission.   Likely demand ischemia in the setting of fluid overload.       # stage IV lambda AK amyloidosis   Diagnosed in 9/2016 with cardiac, GI and bone marrow involvement.  Patient has received CyBorD complication by hematuria and BorD.  Case has been discussed with Dr. Cassidy with hematology/oncology on admission and the patient will not receive chemotherapy at this time.  Goal is to stabilize her cardiac involvement to allow for further consideration of possible stem cell transplant in the future.    - acyclovir 400 mg BID for prophylaxis    # constipation  -  polyethylene glycol daily  - sennosides BID PRN    FEN: cardiac diet, 2L fluid restriction, electrolyte replacement protocol  PPX:  - DVT: lovenox 40 mg daily  - PPI: not needed  Code status: DNR/DNI  Dispo: pending treatment of bacteremia and diuresis     Patient seen and discussed with Dr. Cárdenas who agrees with this plan.      Maria E Rodriguez MD, MS  Internal Medicine, PGY-1  [phone]

## 2017-01-16 NOTE — PLAN OF CARE
Problem: Goal Outcome Summary  Goal: Goal Outcome Summary  Edema 6C: Wraps removed upon arrival - reports removing over the night. Skin intact with 2+ pitting distally. Skin cares performed prior to reapplication of GCB from MTPs to knee creases with foam at ankles for continued edema management.   Anticipate patient will benefit from OP lymphedema services for continued use of compression wraps and fitting of compression garment for long term management of chronic LE edema.

## 2017-01-16 NOTE — PLAN OF CARE
Problem: Goal Outcome Summary  Goal: Goal Outcome Summary  AVSS, O2 sats upper 90s-100% on 2L NC. Denies pain, no nausea today. K+ level 3.3 this morning, replaced with KCl 20mEq. K+ recheck ordered for 1530. Urine output adequate. BM x2, small/loose/brown. Lasix gtt started at 1140, 5mg/hr. Appetite good.

## 2017-01-16 NOTE — ANESTHESIA PREPROCEDURE EVALUATION
Anesthesia Evaluation     . Pt has had prior anesthetic.     No history of anesthetic complications     ROS/MED HX    ENT/Pulmonary:     (+)COPD, , . Other pulmonary disease persistent pulmonary effusion.    Neurologic:  - neg neurologic ROS     Cardiovascular:     (+) hypertension----. : . CHF etiology: amyloidosis, decompensated . . :. .       METS/Exercise Tolerance:     Hematologic:  - neg hematologic  ROS       Musculoskeletal:  - neg musculoskeletal ROS       GI/Hepatic:     (+) GERD       Renal/Genitourinary:  - ROS Renal section negative       Endo:  - neg endo ROS       Psychiatric:  - neg psychiatric ROS       Infectious Disease:  - neg infectious disease ROS       Malignancy:      - no malignancy   Other:    - neg other ROS                          ANESTHESIA PREOP EVALUATION    Procedure: Procedure(s):  Pleurx Catheter Placement  - Wound Class:     HPI: Leandra Guerrero is a 66 year old female with decompensated heart failure with persistent pleural effusions who is presenting for above stated procedure.    PMHx/PSHx/ROS:  Past Medical History   Diagnosis Date     AL amyloidosis (H)      Cardiac amyloidosis (H)      Lymphedema      Recurrent right pleural effusion 1/2/2017       No past surgical history on file.    ROS as stated above    Soc Hx:   Social History   Substance Use Topics     Smoking status: Never Smoker      Smokeless tobacco: Not on file     Alcohol Use: No       Allergies:   Allergies   Allergen Reactions     Contrast Dye Shortness Of Breath     Shaking,chills and dypsnea     Cytoxan [Cyclophosphamide]      Hemorrhagic cystitis     Levofloxacin      Severe shaking and abdominal pain     Amoxicillin Nausea and Vomiting and Cramps       Meds:   Prescriptions prior to admission   Medication Sig Dispense Refill Last Dose     spironolactone (ALDACTONE) 25 MG tablet Take 1 tablet (25 mg) by mouth daily 30 tablet 3 Taking     torsemide (DEMADEX) 20 MG tablet Take 2 tablets (40 mg) by mouth 2  times daily 120 tablet 3 Taking     potassium chloride SA (K-DUR/KLOR-CON M) 10 MEQ CR tablet Take 40meq (4 pills) in the morning, and 20meq (2 pills) in the afternoon 180 tablet 11 Taking     dexamethasone (DECADRON) 4 MG tablet Take 10 tablets (40 mg) by mouth every 7 days for 4 doses Take daily on Days 1, 8, 15, and 22. Cycles 3 and 4 ONLY. 40 tablet 0 Unknown at Unknown time     acyclovir (ZOVIRAX) 400 MG tablet Take 1 tablet (400 mg) by mouth 2 times daily Viral Prophylaxis. 60 tablet 0 Taking     Acetaminophen (TYLENOL PO) Take 325 mg by mouth   Taking     HYDROcodone-acetaminophen (NORCO) 5-325 MG per tablet   0 Taking     LORazepam (ATIVAN) 0.5 MG tablet TAKE ONE-HALF TO ONE TABLET BY MOUTH ONCE DAILY AT BEDTIME AS NEEDED  0 Taking     ondansetron (ZOFRAN-ODT) 4 MG disintegrating tablet   0 Taking       No current outpatient prescriptions on file.       Physical Exam:  VS: Temp:  [36.3  C (97.4  F)-36.6  C (97.9  F)] 36.5  C (97.7  F)  Pulse:  [94] 94  Heart Rate:  [] 94  Resp:  [18] 18  BP: ()/(59-68) 107/60 mmHg  SpO2:  [94 %-96 %] 95 %   95%, Weight   Wt Readings from Last 2 Encounters:   01/22/17 61.508 kg (135 lb 9.6 oz)   01/12/17 71.305 kg (157 lb 3.2 oz)       Labs:    BMP:  Recent Labs   Lab Test  01/22/17   0609   NA  134   POTASSIUM  3.6   CHLORIDE  91*   CO2  36*   BUN  15   CR  0.86   GLC  88   JERROD  9.9     LFTs:   Recent Labs   Lab Test  01/13/17   1027   PROTTOTAL  6.2*   ALBUMIN  3.0*   BILITOTAL  5.7*   ALKPHOS  162*   AST  17   ALT  15     CBC:   Recent Labs   Lab Test  01/21/17   0617   01/15/17   0758   WBC   --    --   6.4   RBC   --    --   3.87   HGB   --    --   12.3   HCT   --    --   38.4   MCV   --    --   99   MCH   --    --   31.8   MCHC   --    --   32.0   RDW   --    --   15.7*   PLT  342   < >  122*    < > = values in this interval not displayed.     Coags:  Recent Labs   Lab Test  01/14/17   0909   12/05/16   0550  11/29/16   1109   INR  1.30*   < >  1.25*  1.01    PTT   --    --    --   23   FIBR   --    --   583*   --     < > = values in this interval not displayed.           Patient discussed with Staff Anesthesiologist.    Hank Gomez  Anesthesia Resident CA-1  Pager [phone]  January 22, 2017, 4:44 PM    Anesthesia Plan      History & Physical Review      ASA Status:  4 .        Plan for General and ETT with Intravenous induction. Maintenance will be Balanced.    PONV prophylaxis:  Ondansetron (or other 5HT-3) and Dexamethasone or Solumedrol  Additional equipment: 2nd IV   Leandra Guerrero is a 66 year old female with end stage lung cancer scheduled for chest tube placement with Dr. Smith on 1/23/2017. PMH significant for stage IV AK amyloidosis and admitted in hospital due to IV diuresis.               Postoperative Care      Consents

## 2017-01-16 NOTE — PLAN OF CARE
Problem: Goal Outcome Summary  Goal: Goal Outcome Summary  Outcome: No Change  Pt A/Ox4, VSS on 2L O2. Pt voiding adequately, Tylenol given for HA. Zofran given with relief of nausea. Ativan given, pt couldn't fall asleep. Pt up independently to commode. +4 edema to bilateral lower extremities. Pt's legs elevated overnight, Lymph wraps on overnight (pt couldn't tolerate). Started Rocephin yesterday. Continue to monitor and notify MD of questions or concerns.

## 2017-01-16 NOTE — PLAN OF CARE
Problem: Goal Outcome Summary  Goal: Goal Outcome Summary  ST, 2L O2 via NC PRN. VSS. Afebrile this shift. + blood cultures for gram negative rods and ecoli. + UTI. PRN Tylenol for headache. + void. K+ 3.8, replaced per protocol. Independent to commode.    P- Continue to monitor and notify team of changes.

## 2017-01-17 ENCOUNTER — ANESTHESIA (OUTPATIENT)
Dept: SURGERY | Facility: CLINIC | Age: 67
End: 2017-01-17

## 2017-01-17 LAB
ANION GAP SERPL CALCULATED.3IONS-SCNC: 10 MMOL/L (ref 3–14)
ANION GAP SERPL CALCULATED.3IONS-SCNC: 6 MMOL/L (ref 3–14)
BUN SERPL-MCNC: 13 MG/DL (ref 7–30)
BUN SERPL-MCNC: 14 MG/DL (ref 7–30)
CALCIUM SERPL-MCNC: 8.6 MG/DL (ref 8.5–10.1)
CALCIUM SERPL-MCNC: 8.6 MG/DL (ref 8.5–10.1)
CHLORIDE SERPL-SCNC: 94 MMOL/L (ref 94–109)
CHLORIDE SERPL-SCNC: 96 MMOL/L (ref 94–109)
CO2 SERPL-SCNC: 33 MMOL/L (ref 20–32)
CO2 SERPL-SCNC: 34 MMOL/L (ref 20–32)
CREAT SERPL-MCNC: 0.76 MG/DL (ref 0.52–1.04)
CREAT SERPL-MCNC: 0.83 MG/DL (ref 0.52–1.04)
GFR SERPL CREATININE-BSD FRML MDRD: 70 ML/MIN/1.7M2
GFR SERPL CREATININE-BSD FRML MDRD: 77 ML/MIN/1.7M2
GLUCOSE SERPL-MCNC: 107 MG/DL (ref 70–99)
GLUCOSE SERPL-MCNC: 95 MG/DL (ref 70–99)
INTERPRETATION ECG - MUSE: NORMAL
MAGNESIUM SERPL-MCNC: 2.6 MG/DL (ref 1.6–2.3)
POTASSIUM SERPL-SCNC: 3.1 MMOL/L (ref 3.4–5.3)
POTASSIUM SERPL-SCNC: 3.7 MMOL/L (ref 3.4–5.3)
POTASSIUM SERPL-SCNC: 4 MMOL/L (ref 3.4–5.3)
SODIUM SERPL-SCNC: 136 MMOL/L (ref 133–144)
SODIUM SERPL-SCNC: 136 MMOL/L (ref 133–144)

## 2017-01-17 PROCEDURE — 25000128 H RX IP 250 OP 636: Performed by: INTERNAL MEDICINE

## 2017-01-17 PROCEDURE — 36415 COLL VENOUS BLD VENIPUNCTURE: CPT | Performed by: INTERNAL MEDICINE

## 2017-01-17 PROCEDURE — 80048 BASIC METABOLIC PNL TOTAL CA: CPT | Performed by: INTERNAL MEDICINE

## 2017-01-17 PROCEDURE — 83735 ASSAY OF MAGNESIUM: CPT | Performed by: INTERNAL MEDICINE

## 2017-01-17 PROCEDURE — 25000125 ZZHC RX 250: Performed by: INTERNAL MEDICINE

## 2017-01-17 PROCEDURE — 99233 SBSQ HOSP IP/OBS HIGH 50: CPT | Mod: GC | Performed by: INTERNAL MEDICINE

## 2017-01-17 PROCEDURE — 84132 ASSAY OF SERUM POTASSIUM: CPT | Performed by: INTERNAL MEDICINE

## 2017-01-17 PROCEDURE — 99222 1ST HOSP IP/OBS MODERATE 55: CPT | Performed by: NURSE PRACTITIONER

## 2017-01-17 PROCEDURE — 25000132 ZZH RX MED GY IP 250 OP 250 PS 637: Performed by: HOSPITALIST

## 2017-01-17 PROCEDURE — 21400006 ZZH R&B CCU INTERMEDIATE UMMC

## 2017-01-17 PROCEDURE — 40000879 ZZH CANCELLED SURGERY UP TO 16-30 MINS: Performed by: INTERNAL MEDICINE

## 2017-01-17 PROCEDURE — 99207 ZZC CDG-CORRECTLY CODED, REVIEWED AND AGREE: CPT | Performed by: NURSE PRACTITIONER

## 2017-01-17 PROCEDURE — 25000125 ZZHC RX 250: Performed by: HOSPITALIST

## 2017-01-17 PROCEDURE — 40000115 ZZH STATISTIC NURSE TLC VISIT: Performed by: NURSE PRACTITIONER

## 2017-01-17 RX ORDER — FUROSEMIDE 10 MG/ML
80 INJECTION INTRAMUSCULAR; INTRAVENOUS ONCE
Status: COMPLETED | OUTPATIENT
Start: 2017-01-17 | End: 2017-01-17

## 2017-01-17 RX ADMIN — CEFTRIAXONE 2 G: 2 INJECTION, POWDER, FOR SOLUTION INTRAMUSCULAR; INTRAVENOUS at 08:56

## 2017-01-17 RX ADMIN — POTASSIUM CHLORIDE 20 MEQ: 750 TABLET, EXTENDED RELEASE ORAL at 22:54

## 2017-01-17 RX ADMIN — ACETAMINOPHEN 325 MG: 325 TABLET, FILM COATED ORAL at 08:24

## 2017-01-17 RX ADMIN — POTASSIUM CHLORIDE 20 MEQ: 750 TABLET, EXTENDED RELEASE ORAL at 12:14

## 2017-01-17 RX ADMIN — ACYCLOVIR 400 MG: 400 TABLET ORAL at 19:28

## 2017-01-17 RX ADMIN — ACYCLOVIR 400 MG: 400 TABLET ORAL at 08:24

## 2017-01-17 RX ADMIN — POTASSIUM CHLORIDE 40 MEQ: 750 TABLET, EXTENDED RELEASE ORAL at 10:18

## 2017-01-17 RX ADMIN — SPIRONOLACTONE 25 MG: 25 TABLET ORAL at 08:24

## 2017-01-17 RX ADMIN — Medication 1 MG: at 00:01

## 2017-01-17 RX ADMIN — POTASSIUM CHLORIDE 20 MEQ: 750 TABLET, EXTENDED RELEASE ORAL at 18:55

## 2017-01-17 RX ADMIN — FUROSEMIDE 80 MG: 10 INJECTION, SOLUTION INTRAVENOUS at 19:28

## 2017-01-17 RX ADMIN — ENOXAPARIN SODIUM 40 MG: 40 INJECTION SUBCUTANEOUS at 10:21

## 2017-01-17 RX ADMIN — FUROSEMIDE 15 MG/HR: 10 INJECTION, SOLUTION INTRAVENOUS at 21:44

## 2017-01-17 ASSESSMENT — PAIN DESCRIPTION - DESCRIPTORS: DESCRIPTORS: ACHING

## 2017-01-17 NOTE — OR NURSING
Patient states she was just seen by a doctor overseeing her care up on 6C.  Patient states that the doctor told her that the surgical procedure was not going to be done today.  Patient's assigned nurse from -[facility] Villegas RN at bedside preparing patient to be transported back to her room on 6C.  Khai BORGES RN gave further explanation to the patient in regards to what he was told by the [facility] team and that he was here to transport patient back to [address] [place] [address] on 6C.  Patient in agreement and is accepting of the decision.  Patient discharged back to  per stretcher with unit nurse Khai Villegas RN in stable condition.  DESIREE Torres RN

## 2017-01-17 NOTE — CONSULTS
"[facility]  Palliative Care Consultation         Leandra Guerrero MRN# [id number]   Age: 66 year old YOB: 1950   Date of Admission: 1/13/2017    Reason for consult: Goals of care  Patient and family support       Requesting physician/service: Hospital Medicine service       Recommendations        Pt was seen and examined with spouse Osacr present for interview and exam. Provided overview of Palliative Care.  Goals of Care:  Virginia \"Amy\" verbalizes hearing mixed messages about her current status- noting recent meeting with [facility] oncology and hearing \"her counts are fine- she doesn't need chemotherapy,\" to hearing from Oncologist at [facility] that she should consider hospice care going forward. She indicates confidence in Dr Cohen as her cardiologist but is not sure what her opinion is about Virginia's prognosis or treatment future. At this time she has restorative goals,wants to have PT and OT and return home.   - Reviewed OP follow up options- Palliative home care vs traditional home care vs hospice.   - Will ask [facility] Palliative counselor to see for HCD review and completion  - Palliative OP Clinic follow up   - Reinforced dnr/dni status  - Would recommend Dr Cohen to see patient given her trusting relationship with her to clarify prognosis information and possible treatment options  - Palliative care would be happy to participate in IDT care conference if primary team desires.     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  Cardiac Amyloidosis (ECHO on 1/5 with LFEF of 55% and grade III (advanced) diastolic dysfunction with concentric hypertrophy)  Sepsis, UTI  Deconditioning, debility.         Support/Coping   (We typically ask each of our patients the following questions:)    How do you make sense of this? N/A  Are you Restoration? " "Spiritual? Not so much? - Sikhism, active member of [facility] [employer]  Are you at peace? \" conflicting messages from \"counts are fine\", to \"consider hospice.\"  What are your concerns, medical and non-medical, that are not being addressed? Discharge plan and prognosis concerns  Who are your \"go-to\" people when you need support? Family    Plan for psychosocial/spiritual support/follow-up from palliative team: Will have LICSW/ Palliative  see for support.      Decision-Making & Goals of Care     Discussion/counseling today about prognosis/goals of care/decisions:   See above  Patient has a completed health care directive available in the chart (N): is interested in review.  Physician orders for life-sustaining treatment (POLST) form is not completed  Code Status: Do not resusitate / Do not intubate   Patient has decision-making capacity (Y)    Coordination of Care     Findings & plan of care discussed with: pt., family, primary team  Follow-up plan from palliative team: Yes  Thank you for involving us in the patient's care.   60 minutes spent in F2F time with patient and spouse, > 50 % of time in care coordination and counseling.     Khai Almaraz APRN NP  Nurse Practitioner- Lead Advanced Practice Provider  [facility] Palliative Medicine Consult Service   [phone]          Chief Complaint     Weakness and recent sepsis event         History of Present Illness     History obtained from: Family and EPIC review.  66 year old female with PMHx of stage IV lambda AK amyloidosis- (diagnosed  09/2016) with cardiac, GI and bone marrow involvement who was admitted from clinic per her cardiologist Dr. Cohen for IV diuresis.     Was undergoing chemotherapy (started 10/4/16) with good response as measured by light chains in serum. Ongoing struggle of late with volume overload secondary to cardiac involvement, developed chronic right sided pleural effusion that has required repeated thoracentesis (total of 5 " "procedures thus far- 3-4 of them in 12/2016.)   Patient noted increased chest pressure and SOB at the time of admission. Last thoracentesis was 1/4/17. Notes overal weight loss of ~ 40 lbs from pre diagnosis and ongoing problems with constipation, recent falls and progressive activity limitations.   Enjoyed family time and cooking and baking around the holidays but worsening functional status since.             Past Medical History:   Past Medical History   Diagnosis Date     AL amyloidosis (H)      Cardiac amyloidosis (H)      Lymphedema      Recurrent right pleural effusion 1/2/2017              Past Surgical History:   No past surgical history on file.            Social/Spiritual History:     Living situation: Lives with spouse Rich in split level home in [place] [facility] [place] [facility].   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.\"   Use of community resources: none   Activities/interests: used to like entertaining, enjoys cooking and baking- time with family  History of substance use/abuse: NA            Family History:   Family History   Problem Relation Age of Onset     Other - See Comments Sister      Amyloidosis     Family history- reviewed in EPIC           Allergies:   Allergies   Allergen Reactions     Contrast Dye Shortness Of Breath     Shaking,chills and dypsnea     Cytoxan [Cyclophosphamide]      Hemorrhagic cystitis     Levofloxacin      Severe shaking and abdominal pain     Amoxicillin Nausea and Vomiting and Cramps              Medications:   I have reviewed this patient's medication profile and medications during this hospitalization             Review of Systems:   The comprehensive review of systems is negative other than noted here and in the HPI.    Palliative Symptom Review (0=no symptom/no concern, 1=mild, " 2=moderate, 3=severe):      Pain: 0-1      Fatigue: 0-1      Nausea: 1      Constipation: 1-2      Diarrhea: 0      Depressive Symptoms: 1      Anxiety: 0-1      Drowsiness: 0      Poor Appetite: 0      Shortness of Breath: 0-1      Insomnia: 0-1           Physical exam: BP 95/57 mmHg  Pulse 110  Temp(Src) 97.5  F (36.4  C) (Oral)  Resp 18  Wt 72.439 kg (159 lb 11.2 oz)  SpO2 98%   Constitutional: Pleasant woman,  present- she is lying in bed, alert, oriented x 3, NAD, good historian  HEENT: EOMI, oral mucous moist, no scleral icterus.  Cardiovascular: RRR, sinus rhythm on monitor, good peripheral perfusion. No murmurs, rubs or gallops  Respiratory: non-labored breathing on room air, using 02 prn via NC. No cyanosis.   Abdominal: distended, no reported tenderness.  Extremities: 2-3+ lower extremity edema to mid thigh bilaterally  Neurologic: no tremor, no focal deficits. Not observed walking.   Skin: no visible rashes or lesions. Scattered ecchymosis.          Data Reviewed:     ROUTINE LABS (Last four results)  BMP  Recent Labs  Lab 01/17/17  0822 01/16/17  1553 01/16/17  0756 01/15/17  0758 01/14/17  0909     --  134 133 131*   POTASSIUM 3.1* 3.7 3.3* 3.8 3.8   CHLORIDE 94  --  96 94 93*   JERROD 8.6  --  9.0 9.0 8.6   CO2 33*  --  32 33* 30   BUN 14  --  14 18 18   CR 0.76  --  0.79 0.95 0.96   GLC 95  --  96 118* 115*     CBC  Recent Labs  Lab 01/15/17  0758 01/14/17  0909 01/13/17  2100 01/13/17  1027   WBC 6.4 7.7 9.7 11.9*   RBC 3.87 3.83 4.04 4.27   HGB 12.3 12.1 13.1 13.8   HCT 38.4 37.5 39.7 42.4   MCV 99 98 98 99   MCH 31.8 31.6 32.4 32.3   MCHC 32.0 32.3 33.0 32.5   RDW 15.7* 15.8* 15.6* 15.9*   * 134* 165 176     INR  Recent Labs  Lab 01/14/17  0909   INR 1.30*

## 2017-01-17 NOTE — PLAN OF CARE
Problem: Goal Outcome Summary  Goal: Goal Outcome Summary  Outcome: No Change  VSS overnight, no CO pain, SR 80's. BLE are still +3/4 edema with lasix drip @ 5mg/hr. Patient voiding well throughout shift. Spoke with patient about  time for chest tube which patient was not aware of. Spoke with overnight kori Cristina float. Pt ok with heading down to 3C for prep but needs more education before procedure. Will continue to monitor and address as needed.

## 2017-01-17 NOTE — PLAN OF CARE
Problem: Goal Outcome Summary  Goal: Goal Outcome Summary    Pt alert and oriented.  VSS on 2L NC.  No complaints of pain, nausea or SOB.  K+ 3.1, replaced per protocol, recheck at 1600.  Good UOP.  Lasix gtt continues at 5 mg/hr.  Drain to be discussed w/ Cards and Maroon this afternoon.  Continue with plan of care and notify team of any changes/concerns.

## 2017-01-17 NOTE — PROGRESS NOTES
Internal Medicine Progress Note - [facility] Service    S:  Confusion over pleurx placement this morning.  Patient's breathing stable.  Denies abdominal pain.  Working with PT and biked yesterday.  Nursing notes reviewed.      O:  Vitals:  Temp: 97.5  F (36.4  C) Temp src: Oral BP: 95/57 mmHg   Heart Rate: 79 Resp: 18 SpO2: 98 % O2 Device: None (Room air) Oxygen Delivery: 2 LPM    Physical Exam:  Constitutional: lying in bed, alert, oriented x 3, NAD  HEENT: EOMI, oral mucous moist, no scleral icterus  Cardiovascular: RRR, normal S1 and S2, no murmurs, rubs or gallops  Respiratory: non-labored breathing on 2L NC, absent breath sounds at right base and crackles at left base  Abdominal: distended, no tenderness, no guarding, no rigidity, no rebound tenderness  Extremities: 3+ lower extremity edema to mid thigh bilaterally  Neurologic: cranial nerve II-XII grossly intact, no focal deficits  Skin: no visible rashes or lesions    Labs/Imaging:  Reviewed in EPIC.      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 Cohen for IV diuresis due to decompensated heart failure.      # sepsis  # gram negative bacteremia  # UTI  Patient noted to be febrile to 102 on 1/13.  Blood cultures and urine cultures with E. Coli (pansensitive).  Lactate within normal limits.  Procal 2.05.   - d/c vancomycin, pharmacy to dose (1/13 - 1/15)  - d/c zosyn 4.5 g Q6H (1/13 - 1/15)   - ceftriaxone 2 g Q24H (1/15 - present)     # decompensated congestive heart failure, NYHA class IIIB  # cardiac amyloidosis  Most recent ECHO on 1/5 with LFEF of 55% and grade III (advanced) diastolic dysfunction with concentric hypertorphy.  Previous coronary angiogram not suggestive of obstructive CAD.  Evidence of fluid overload on exam with elevated JVD and significant lower extremity edema.  Reason  for the decompensation is unclear.  No symptoms to suggest infectious process, except a leukocytosis that could be attributed to stress response.  Troponin elevation, but no EKG changes to suggest myocardial infarction.  BNP elevated at 9009, which is a increased from previous.  Home diuretics were recently increased to torsemide 40 mg BID.  Patient has missed a few doses when she has been at appointments.  Will start lasix drip for more gradual diuresis as patient has had difficulty in the past with a fall that may be related to orthostatics and a rapid fluid shift.  Will also keep patient on telemetry given that conduction abnormalities can occur in amyloid (no beta blockers due to this).     - lasix 5 mg/hr  - spironolactone 25 mg daily  - electrolyte replacement protocol  - telemetry  - cardiac diet, 2 L fluid restriction  - strict I/O's  - daily weights    # right sided pleural effusion  Attributed to CHF.  Pleural fluid has been evaluated in the past and was transudate.  Patient received therapeutic thoracentesis every week.  Last thoracentesis was on 1/4.   Discussing with cardiology, oncology and patient about timing of pleurx placement.    - monitor respiratory status (baseline on 2L NC)    # troponin elevation  Elevated to 0.291 on admission.  Trended upwards but stabilized aroud ~ 0.4.  No EKG changes on admission.   Likely demand ischemia in the setting of fluid overload.       # stage IV lambda AK amyloidosis   Diagnosed in 9/2016 with cardiac, GI and bone marrow involvement.  Patient has received CyBorD complication by clare and Carissa.  Case has been discussed with Dr. Cassidy with hematology/oncology on admission and the patient will not receive chemotherapy at this time.   - acyclovir 400 mg BID for prophylaxis    # constipation  - polyethylene glycol daily  - sennosides BID PRN    FEN: cardiac diet, 2L fluid restriction, electrolyte replacement protocol  PPX:  - DVT: lovenox 40 mg daily  - PPI:  not needed  Code status: DNR/DNI  Dispo: pending treatment of bacteremia and diuresis     Patient seen and discussed with Dr. Cárdenas who agrees with this plan.      Maria E Rodriguez MD, MS  Internal Medicine, PGY-1  [phone]

## 2017-01-17 NOTE — PROGRESS NOTES
Care Coordinator- Discharge Planning     Admission Date/Time:  1/13/2017  Attending MD:  Rosa Cárdenas DO     Data  Date of initial CC assessment:  Chart reviewed, discussed with interdisciplinary team.   Patient was admitted for:   1. Amyloid heart disease (H)           Assessment  Per therapy recommendations, patient would benefit from having home PT and OT. Met with patient to discuss discharge planning. Patient agreeable to home care. After given choice, referral placed to [facility] for RN, PT, and OT services. Discharge orders updated. No further needs identified. Has portable oxygen tank for ride home.    Coordination of Care and Referrals: Provided patient/family with options for home care     Plan  Anticipated Discharge Date: 2-3 days  Anticipated Discharge Plan: home with home care    Brittany Hager

## 2017-01-18 ENCOUNTER — APPOINTMENT (OUTPATIENT)
Dept: OCCUPATIONAL THERAPY | Facility: CLINIC | Age: 67
DRG: 291 | End: 2017-01-18
Attending: HOSPITALIST
Payer: COMMERCIAL

## 2017-01-18 LAB
ANION GAP SERPL CALCULATED.3IONS-SCNC: 9 MMOL/L (ref 3–14)
BUN SERPL-MCNC: 11 MG/DL (ref 7–30)
CALCIUM SERPL-MCNC: 8.8 MG/DL (ref 8.5–10.1)
CHLORIDE SERPL-SCNC: 96 MMOL/L (ref 94–109)
CO2 SERPL-SCNC: 33 MMOL/L (ref 20–32)
CREAT SERPL-MCNC: 0.74 MG/DL (ref 0.52–1.04)
GFR SERPL CREATININE-BSD FRML MDRD: 79 ML/MIN/1.7M2
GLUCOSE SERPL-MCNC: 91 MG/DL (ref 70–99)
MAGNESIUM SERPL-MCNC: 2.6 MG/DL (ref 1.6–2.3)
PLATELET # BLD AUTO: 208 10E9/L (ref 150–450)
POTASSIUM SERPL-SCNC: 3.2 MMOL/L (ref 3.4–5.3)
POTASSIUM SERPL-SCNC: 3.6 MMOL/L (ref 3.4–5.3)
SODIUM SERPL-SCNC: 138 MMOL/L (ref 133–144)

## 2017-01-18 PROCEDURE — 99207 ZZC CDG-CORRECTLY CODED, REVIEWED AND AGREE: CPT | Performed by: NURSE PRACTITIONER

## 2017-01-18 PROCEDURE — 25000125 ZZHC RX 250: Performed by: INTERNAL MEDICINE

## 2017-01-18 PROCEDURE — 21400006 ZZH R&B CCU INTERMEDIATE UMMC

## 2017-01-18 PROCEDURE — 36415 COLL VENOUS BLD VENIPUNCTURE: CPT | Performed by: INTERNAL MEDICINE

## 2017-01-18 PROCEDURE — 25000128 H RX IP 250 OP 636: Performed by: INTERNAL MEDICINE

## 2017-01-18 PROCEDURE — 80048 BASIC METABOLIC PNL TOTAL CA: CPT | Performed by: INTERNAL MEDICINE

## 2017-01-18 PROCEDURE — 40000133 ZZH STATISTIC OT WARD VISIT: Performed by: OCCUPATIONAL THERAPIST

## 2017-01-18 PROCEDURE — 25000132 ZZH RX MED GY IP 250 OP 250 PS 637: Performed by: HOSPITALIST

## 2017-01-18 PROCEDURE — 85049 AUTOMATED PLATELET COUNT: CPT | Performed by: INTERNAL MEDICINE

## 2017-01-18 PROCEDURE — 25000125 ZZHC RX 250: Performed by: HOSPITALIST

## 2017-01-18 PROCEDURE — 99233 SBSQ HOSP IP/OBS HIGH 50: CPT | Mod: GC | Performed by: INTERNAL MEDICINE

## 2017-01-18 PROCEDURE — 84132 ASSAY OF SERUM POTASSIUM: CPT | Performed by: INTERNAL MEDICINE

## 2017-01-18 PROCEDURE — 97140 MANUAL THERAPY 1/> REGIONS: CPT | Mod: GO | Performed by: OCCUPATIONAL THERAPIST

## 2017-01-18 PROCEDURE — 99232 SBSQ HOSP IP/OBS MODERATE 35: CPT | Performed by: NURSE PRACTITIONER

## 2017-01-18 PROCEDURE — 83735 ASSAY OF MAGNESIUM: CPT | Performed by: INTERNAL MEDICINE

## 2017-01-18 RX ADMIN — POTASSIUM CHLORIDE 40 MEQ: 750 TABLET, EXTENDED RELEASE ORAL at 08:45

## 2017-01-18 RX ADMIN — CEFTRIAXONE 2 G: 2 INJECTION, POWDER, FOR SOLUTION INTRAMUSCULAR; INTRAVENOUS at 09:43

## 2017-01-18 RX ADMIN — POTASSIUM CHLORIDE 20 MEQ: 750 TABLET, EXTENDED RELEASE ORAL at 20:36

## 2017-01-18 RX ADMIN — ONDANSETRON 4 MG: 4 TABLET, ORALLY DISINTEGRATING ORAL at 23:39

## 2017-01-18 RX ADMIN — ENOXAPARIN SODIUM 40 MG: 40 INJECTION SUBCUTANEOUS at 13:24

## 2017-01-18 RX ADMIN — FUROSEMIDE 15 MG/HR: 10 INJECTION, SOLUTION INTRAVENOUS at 03:57

## 2017-01-18 RX ADMIN — ACYCLOVIR 400 MG: 400 TABLET ORAL at 08:43

## 2017-01-18 RX ADMIN — SPIRONOLACTONE 25 MG: 25 TABLET ORAL at 08:43

## 2017-01-18 RX ADMIN — FUROSEMIDE 10 MG/HR: 10 INJECTION, SOLUTION INTRAVENOUS at 21:13

## 2017-01-18 RX ADMIN — FUROSEMIDE 10 MG/HR: 10 INJECTION, SOLUTION INTRAVENOUS at 11:19

## 2017-01-18 RX ADMIN — ACYCLOVIR 400 MG: 400 TABLET ORAL at 19:42

## 2017-01-18 RX ADMIN — ACETAMINOPHEN 325 MG: 325 TABLET, FILM COATED ORAL at 23:38

## 2017-01-18 RX ADMIN — POTASSIUM CHLORIDE 20 MEQ: 750 TABLET, EXTENDED RELEASE ORAL at 10:11

## 2017-01-18 NOTE — PROGRESS NOTES
[facility], [place]   Palliative Care Daily Progress Note          Recommendations, Patient/Family Counseling & Coordination     Reviewed OP follow up options- Palliative home care vs traditional home care vs hospice.    - [facility] Palliative counselor to see for HCD review and completion  - Palliative OP Clinic follow up as desired    - Reinforced dnr/dni status  -  Dr Cohen has seen patient to assist with plans for aggressive diuresis and further HF therapies and possible treatment options  - Palliative care would be happy to participate in IDT care conference if primary team desires.     POLST: Needs to be completed     Thank you for the opportunity to continue to participate in the care of this patient and family.  Please feel free to contact on-call palliative provider with any emergent needs.  We can be reached via team pager [phone] (answered 8-4:30 Monday-Friday); after-hours answering service ([phone]); Main Palliative Clinic - [facility] 1[facility]: [phone].       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  Cardiac Amyloidosis (ECHO on 1/5 with LFEF of 55% and grade III (advanced) diastolic dysfunction with concentric hypertrophy)  Sepsis, UTI  Deconditioning, debility     2)  Psychosocial/Spiritual Needs:   Ongoing: Palliative Counselor to see for HCD planning  New: No       Is new assessment/intervention required by palliative team?:  No         Interval History:   Overall feeling better. Up with PT today. Wondering about home vs OP Pt and OT.   Met with Dr Cohen to clarify status. Was on IV lasix at increased dosing last night with significant diuresis per her report.            Review of Systems:   Palliative Symptom Review (0=no symptom/no concern, 1=mild, 2=moderate, 3=severe):      Pain: 0-1      Fatigue: 0-1      Nausea: 0-1       Constipation: 0-1      Diarrhea: 0      Depressive Symptoms: 0-1      Anxiety: 0-1      Drowsiness: 0      Poor Appetite: 0-1      Shortness of Breath: 1-2      Insomnia: 0-1                Medications:   I have reviewed this patient's medication profile and medications given in past 24 hours.        {   Physical exam: Vitals: /63 mmHg  Pulse 110  Temp(Src) 96.9  F (36.1  C) (Oral)  Resp 18  Wt 69.718 kg (153 lb 11.2 oz)  SpO2 99%  BMI= Body mass index is 27.23 kg/(m^2).  Constitutional: Pleasant woman, conversant- lying in bed, alert, oriented x 3, NAD, good historian. Off supplemental 02 at present  HEENT: EOMI, oral mucous moist, no scleral icterus.  Cardiovascular: RRR, sinus rhythm on monitor, good peripheral perfusion. No murmurs, rubs or gallops  Respiratory: L side clear, R with markedly decreased bs R base. Non-labored breathing on room air, using 02 prn via NC. No cyanosis.    Abdominal: mildly distended, no reported tenderness.  Extremities: 2+ lower extremity edema to mid thigh bilaterally- ace wraps in place, distal CMS intact  Neurologic: no tremor, no focal deficits. Not observed walking.    Skin: no visible rashes or lesions. Scattered ecchymosis.            Data Reviewed:   ROUTINE LABS (Last four results)  BMP  Recent Labs  Lab 01/18/17  0710 01/17/17  2203 01/17/17  1555 01/17/17  0822  01/16/17  0756    136  --  136  --  134   POTASSIUM 3.2* 4.0 3.7 3.1*  < > 3.3*   CHLORIDE 96 96  --  94  --  96   JERROD 8.8 8.6  --  8.6  --  9.0   CO2 33* 34*  --  33*  --  32   BUN 11 13  --  14  --  14   CR 0.74 0.83  --  0.76  --  0.79   GLC 91 107*  --  95  --  96   < > = values in this interval not displayed.  CBC  Recent Labs  Lab 01/18/17  0710 01/15/17  0758 01/14/17  0909 01/13/17  2100 01/13/17  1027   WBC  --  6.4 7.7 9.7 11.9*   RBC  --  3.87 3.83 4.04 4.27   HGB  --  12.3 12.1 13.1 13.8   HCT  --  38.4 37.5 39.7 42.4   MCV  --  99 98 98 99   MCH  --  31.8 31.6 32.4 32.3   MCHC  --   32.0 32.3 33.0 32.5   RDW  --  15.7* 15.8* 15.6* 15.9*    122* 134* 165 176     INR  Recent Labs  Lab 01/14/17  0909   INR 1.30*

## 2017-01-18 NOTE — PLAN OF CARE
Problem: Goal Outcome Summary  Goal: Goal Outcome Summary  Edema 6C: Patient with wraps removed upon arrival. Measurements taken with slight reductions in 7/12 locations - greatest reductions at feet. Skin remains intact with 2+ pitting below knees. Reapplication of GCB from MTPs to knee creases using foam around the ankles to provide consistent compression. Remove if causing pain/numbness.

## 2017-01-18 NOTE — PROGRESS NOTES
Patient seen and I had a family meeting with she and her  last night .    She has cardiac amyloidosis (AL) and has had progressive heart failure with restrictive physiology. I would like to diurese her pretty aggressively to see if we can improve her functional status. I will also talk to my colleagues at [facility] in [place] to see if she may be a candidate for any therapies there as well.     We will continue to see her daily to assist with diuresis.     Lasix drip was increased last night and my goal is at least 2 L negative per day.     Domenica Cohen

## 2017-01-18 NOTE — PLAN OF CARE
Problem: Goal Outcome Summary  Goal: Goal Outcome Summary  Outcome: No Change  Pt A/Ox4, VSS on 2L O2. Pt up with SBA, independent to commode. Pt denies pain. Lasix increased to 15mg/hr from 5mg/hr with excellent urine output. Possible pleurx catheter placement in future. K replaced. Continue to monitor and notify MD of questions or concerns.

## 2017-01-18 NOTE — PLAN OF CARE
Problem: Fluid Volume Excess (Adult,Obstetrics,Pediatric)  Goal: Identify Related Risk Factors and Signs and Symptoms  Related risk factors and signs and symptoms are identified upon initiation of Human Response Clinical Practice Guideline (CPG)   Outcome: Improving  D/I: Monitor shows SR 90s. Lasix drip decreased from 15 mg/hour to 10mg/hour. Good UO. Weight down 6lb from yesterday. Wears O2 at 2L/NC, but RA sats 97%. O2 on for activity. Decreased lungs RLL with faint crax bases. Up to sink with assist for IV pumps. Tolerated well. Lymphedema wraps reapplied by therapist. LE edema +4 feet and LEs with +2 at ankles. +2 to thighs. K+ replaced per protocol. See flowsheets for assessments and additional data.  A: Improving weight, activity level. Continued LE edema.   P: Continue diuresis per orders. Monitor K+ and replace per protocol. Encourage increased activity and continued participation in cares. Continue current cares and notify providers with issues and concerns.     01/18/17 1600   Fluid Volume Excess   Fluid Volume Excess: Related Risk Factors cardiac changes;disease process   Signs and Symptoms (Fluid Volume Excess) acute weight gain;activity intolerance;edema;lab value changes;respiratory pattern changes

## 2017-01-18 NOTE — PROGRESS NOTES
Brief Interventional Pulmonary Note    Discussed indwelling pleural catheter (pleur-x) with patient. She will cont to think about this and will discuss with her  today.   Please call us if she would want this scheduled during this hospitalization.    Tono Darling MD  Pulmonary, Allergy and Critical Care Medicine  [facility]  [phone]

## 2017-01-18 NOTE — PLAN OF CARE
DI: Monitored vitals and assessed pt status.   Running:Lasix 5mg (inreased to 15mg/hr)    A: A0x4. VSS, on 2L NC. NSR. Afebrile. Denies any pain. K=3.7 (+20mEq)  BM, small tan. Adequate UO. Team dicussed with patient about staying longer than originally anticipated.     I/O this shift:  In: 556.92 [P.O.:360; I.V.:196.92]  Out: 500 [Urine:500]    Temp:  [97.5  F (36.4  C)-98.3  F (36.8  C)] 97.5  F (36.4  C)  Heart Rate:  [79-91] 87  Resp:  [16-18] 16  BP: ()/(57-73) 109/62 mmHg  SpO2:  [94 %-99 %] 95 %      P: Continue to monitor Pt status and report changes to treatment team.

## 2017-01-18 NOTE — PROGRESS NOTES
[facility] Internal Medicine Progress Note    Leandra Guerrero   MRN: [id number]    :1950  Date of Admission:2017  DOS : 2017     Interval History:      No acute events overnight, visited by Dr. Cohen and had a good visit with discussions regarding placement of a pleurX catheter in the next week or two but before discharge. Tolerating diet, minimal change in LE edema despite lots of UOP.     Medications: reviewed in Epic    Objective:  Vitals : /63 mmHg  Pulse 110  Temp(Src) 96.9  F (36.1  C) (Oral)  Resp 18  Wt 69.718 kg (153 lb 11.2 oz)  SpO2 99%  Gen: alert, standing at bedside, pleasant and in NAD   CV: tachycardic but regular, no murmurs noted,   Resp: easy WOB on 2 L via NC, absent breath sounds anteriorly at right base and mild crackles at left   Abd: distended, soft, non-tender   Neuro: AAOx4, MAEE, non-focal   Ext: significant lower extremity edema, wrapped today      Personally reviewed labs and imaging in [facility]     Assessment:  Leandra Guerrero is a 66 year old female with PMHx of 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 Cohen for IV diuresis due to decompensated heart failure.  Have been managing sepsis secondary to UTI and have been able to resume diuresis, which she is tolerating well.     # sepsis  # gram negative bacteremia  # UTI  Patient noted to be febrile to 102 on .  Blood cultures and urine cultures with E. Coli (pansensitive).  Lactate within normal limits.  Procal 2.05.    - d/c vancomycin, pharmacy to dose ( - 1/15)  - d/c zosyn 4.5 g Q6H ( - 1/15)    - ceftriaxone 2 g Q24H (1/15 - present)      # decompensated congestive heart failure, NYHA class IIIB  # cardiac amyloidosis  Most recent ECHO on  with LFEF of 55% and grade III (advanced) diastolic dysfunction with concentric hypertorphy.  Previous coronary angiogram not suggestive of  obstructive CAD.  Evidence of fluid overload on exam with elevated JVD and significant lower extremity edema.  Reason for the decompensation is unclear.  No symptoms to suggest infectious process, except a leukocytosis that could be attributed to stress response.  Troponin elevation, but no EKG changes to suggest myocardial infarction.  BNP elevated at 9009, which is a increased from previous.  Home diuretics were recently increased to torsemide 40 mg BID.  Patient has missed a few doses when she has been at appointments.  Will start lasix drip for more gradual diuresis as patient has had difficulty in the past with a fall that may be related to orthostatics and a rapid fluid shift.  Will also keep patient on telemetry given that conduction abnormalities can occur in amyloid (no beta blockers due to this).     - appreciate Dr. Cohen/cardiology involvement   - lasix decreased to 10 mg/hr; goal of -2L per day net negative  - spironolactone 25 mg daily  - electrolyte replacement protocol  - telemetry  - cardiac diet, 2 L fluid restriction  - strict I/O's  - daily weights    # right sided pleural effusion  Attributed to CHF.  Pleural fluid has been evaluated in the past and was transudate.  Patient received therapeutic thoracentesis every week.  Last thoracentesis was on 1/4.   Discussing with cardiology, oncology and patient about timing of pleurx placement.    - monitor respiratory status (baseline on 2L NC)  - at present, would aim for aggressive diuresis before tentatively planning for pleurX placement on Monday (1/23)     # troponin elevation  Elevated to 0.291 on admission.  Trended upwards but stabilized aroud ~ 0.4.  No EKG changes on admission.   Likely demand ischemia in the setting of fluid overload.       # stage IV lambda AK amyloidosis   Diagnosed in 9/2016 with cardiac, GI and bone marrow involvement.  Patient has received CyBorD complication by clare and Carissa.  Case has been discussed with   Khanh with hematology/oncology on admission and the patient will not receive chemotherapy at this time.   - Palliative care team involved, appreciate their input   - acyclovir 400 mg BID for prophylaxis    # constipation  - polyethylene glycol daily  - sennosides BID PRN    FEN: cardiac diet, 2L fluid restriction, electrolyte replacement protocol  PPX:  - DVT: lovenox 40 mg daily  - PPI: not needed  Code status: DNR/DNI  Dispo: pending treatment of bacteremia and diuresis, >2 days      Patient seen and discussed with Dr. Cárdenas.  Mary Casey MD   Med-Peds PGY-3

## 2017-01-19 ENCOUNTER — APPOINTMENT (OUTPATIENT)
Dept: OCCUPATIONAL THERAPY | Facility: CLINIC | Age: 67
DRG: 291 | End: 2017-01-19
Attending: HOSPITALIST
Payer: COMMERCIAL

## 2017-01-19 LAB
ANION GAP SERPL CALCULATED.3IONS-SCNC: 8 MMOL/L (ref 3–14)
ANION GAP SERPL CALCULATED.3IONS-SCNC: 8 MMOL/L (ref 3–14)
BACTERIA SPEC CULT: NO GROWTH
BUN SERPL-MCNC: 11 MG/DL (ref 7–30)
BUN SERPL-MCNC: 12 MG/DL (ref 7–30)
CALCIUM SERPL-MCNC: 8.2 MG/DL (ref 8.5–10.1)
CALCIUM SERPL-MCNC: 9 MG/DL (ref 8.5–10.1)
CHLORIDE SERPL-SCNC: 95 MMOL/L (ref 94–109)
CHLORIDE SERPL-SCNC: 97 MMOL/L (ref 94–109)
CO2 SERPL-SCNC: 33 MMOL/L (ref 20–32)
CO2 SERPL-SCNC: 34 MMOL/L (ref 20–32)
CREAT SERPL-MCNC: 0.76 MG/DL (ref 0.52–1.04)
CREAT SERPL-MCNC: 0.83 MG/DL (ref 0.52–1.04)
GFR SERPL CREATININE-BSD FRML MDRD: 70 ML/MIN/1.7M2
GFR SERPL CREATININE-BSD FRML MDRD: 76 ML/MIN/1.7M2
GLUCOSE SERPL-MCNC: 102 MG/DL (ref 70–99)
GLUCOSE SERPL-MCNC: 98 MG/DL (ref 70–99)
MAGNESIUM SERPL-MCNC: 2.3 MG/DL (ref 1.6–2.3)
MAGNESIUM SERPL-MCNC: 2.4 MG/DL (ref 1.6–2.3)
MICRO REPORT STATUS: NORMAL
POTASSIUM SERPL-SCNC: 3 MMOL/L (ref 3.4–5.3)
POTASSIUM SERPL-SCNC: 3.3 MMOL/L (ref 3.4–5.3)
SODIUM SERPL-SCNC: 136 MMOL/L (ref 133–144)
SODIUM SERPL-SCNC: 139 MMOL/L (ref 133–144)
SPECIMEN SOURCE: NORMAL

## 2017-01-19 PROCEDURE — 99207 ZZC CDG-CORRECTLY CODED, REVIEWED AND AGREE: CPT | Performed by: NURSE PRACTITIONER

## 2017-01-19 PROCEDURE — 80048 BASIC METABOLIC PNL TOTAL CA: CPT | Performed by: INTERNAL MEDICINE

## 2017-01-19 PROCEDURE — 25000132 ZZH RX MED GY IP 250 OP 250 PS 637: Performed by: HOSPITALIST

## 2017-01-19 PROCEDURE — 25000125 ZZHC RX 250: Performed by: INTERNAL MEDICINE

## 2017-01-19 PROCEDURE — 99233 SBSQ HOSP IP/OBS HIGH 50: CPT | Mod: GC | Performed by: INTERNAL MEDICINE

## 2017-01-19 PROCEDURE — 83735 ASSAY OF MAGNESIUM: CPT | Performed by: INTERNAL MEDICINE

## 2017-01-19 PROCEDURE — 97140 MANUAL THERAPY 1/> REGIONS: CPT | Mod: GO | Performed by: OCCUPATIONAL THERAPIST

## 2017-01-19 PROCEDURE — 80048 BASIC METABOLIC PNL TOTAL CA: CPT | Performed by: HOSPITALIST

## 2017-01-19 PROCEDURE — 40000133 ZZH STATISTIC OT WARD VISIT: Performed by: OCCUPATIONAL THERAPIST

## 2017-01-19 PROCEDURE — 21400006 ZZH R&B CCU INTERMEDIATE UMMC

## 2017-01-19 PROCEDURE — 83735 ASSAY OF MAGNESIUM: CPT | Performed by: HOSPITALIST

## 2017-01-19 PROCEDURE — 25000128 H RX IP 250 OP 636: Performed by: HOSPITALIST

## 2017-01-19 PROCEDURE — 36415 COLL VENOUS BLD VENIPUNCTURE: CPT | Performed by: INTERNAL MEDICINE

## 2017-01-19 PROCEDURE — 25000125 ZZHC RX 250: Performed by: HOSPITALIST

## 2017-01-19 PROCEDURE — 25000128 H RX IP 250 OP 636: Performed by: INTERNAL MEDICINE

## 2017-01-19 PROCEDURE — 36415 COLL VENOUS BLD VENIPUNCTURE: CPT | Performed by: HOSPITALIST

## 2017-01-19 PROCEDURE — 99232 SBSQ HOSP IP/OBS MODERATE 35: CPT | Performed by: NURSE PRACTITIONER

## 2017-01-19 RX ORDER — FUROSEMIDE 10 MG/ML
40 INJECTION INTRAMUSCULAR; INTRAVENOUS ONCE
Status: COMPLETED | OUTPATIENT
Start: 2017-01-19 | End: 2017-01-19

## 2017-01-19 RX ADMIN — FUROSEMIDE 10 MG/HR: 10 INJECTION, SOLUTION INTRAVENOUS at 06:52

## 2017-01-19 RX ADMIN — ENOXAPARIN SODIUM 40 MG: 40 INJECTION SUBCUTANEOUS at 11:39

## 2017-01-19 RX ADMIN — POTASSIUM CHLORIDE 20 MEQ: 750 TABLET, EXTENDED RELEASE ORAL at 23:02

## 2017-01-19 RX ADMIN — POTASSIUM CHLORIDE 40 MEQ: 750 TABLET, EXTENDED RELEASE ORAL at 10:40

## 2017-01-19 RX ADMIN — ACYCLOVIR 400 MG: 400 TABLET ORAL at 19:54

## 2017-01-19 RX ADMIN — CEFTRIAXONE 2 G: 2 INJECTION, POWDER, FOR SOLUTION INTRAMUSCULAR; INTRAVENOUS at 07:42

## 2017-01-19 RX ADMIN — ACETAMINOPHEN 325 MG: 325 TABLET, FILM COATED ORAL at 19:54

## 2017-01-19 RX ADMIN — ACYCLOVIR 400 MG: 400 TABLET ORAL at 07:41

## 2017-01-19 RX ADMIN — POTASSIUM CHLORIDE 20 MEQ: 750 TABLET, EXTENDED RELEASE ORAL at 13:23

## 2017-01-19 RX ADMIN — FUROSEMIDE 40 MG: 10 INJECTION, SOLUTION INTRAVENOUS at 16:54

## 2017-01-19 RX ADMIN — POTASSIUM CHLORIDE 40 MEQ: 750 TABLET, EXTENDED RELEASE ORAL at 20:49

## 2017-01-19 RX ADMIN — SPIRONOLACTONE 25 MG: 25 TABLET ORAL at 07:41

## 2017-01-19 RX ADMIN — FUROSEMIDE 15 MG/HR: 10 INJECTION, SOLUTION INTRAVENOUS at 17:49

## 2017-01-19 NOTE — PROGRESS NOTES
Internal Medicine Progress Note - [facility]    Leandra Guerrero   MRN: [id number]   : 1950  Date of Admission:2017  DOS : 2017     S:  No acute events overnight.  Nausea and headache this AM that was relieved with PRNs.  Breathing stable. Minimal change in lower extremity edema and abdominal distension.  Denies abdominal pain.  Discussed with patient about scheduling pleurx catherter placement on Monday.       Medications reviewed in Epic    O:  Vitals:   BP 93/52 mmHg  Pulse 110  Temp(Src) 97.5  F (36.4  C) (Oral)  Resp 16  Wt 68.72 kg (151 lb 8 oz)  SpO2 97%     Physical Exam:  General: alert, sitting up in bed, pleasant and in NAD   Cardiovascular: RRR, gallop present  Pulmonary: easy work of breathing on room air, absent breath sounds anteriorly at right base and mild crackles at left   Abdomen: distended, soft, non-tender   Neuro: alert and oriented, moving all extremities  Extremity: significant lower extremity edema 3+ bilaterally to level of thigh    Labs/Imaging:  Personally reviewed labs and imaging in [facility]     A/P:  Leandra Guerrero is a 66 year old female with PMHx of 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 Cohen for IV diuresis due to decompensated heart failure.  Have been managing sepsis secondary to UTI and have been able to resume diuresis, which she is tolerating well.     # sepsis  # gram negative bacteremia  # UTI  Patient noted to be febrile to 102 on .  Blood cultures and urine cultures with E. Coli (pansensitive).  Lactate within normal limits.  Procal 2.05.    - d/c vancomycin, pharmacy to dose ( - 1/15)  - d/c zosyn 4.5 g Q6H ( - 1/15)    - ceftriaxone 2 g Q24H (1/15 - ) to complete 10 day course    # decompensated congestive heart failure, NYHA class IIIB  # cardiac amyloidosis  Most recent ECHO on  with LFEF of 55% and grade III  (advanced) diastolic dysfunction with concentric hypertorphy.  Previous coronary angiogram not suggestive of obstructive CAD.  Evidence of fluid overload on exam with elevated JVD and significant lower extremity edema.  Reason for the decompensation is unclear.  No symptoms to suggest infectious process, except a leukocytosis that could be attributed to stress response.  Troponin elevation, but no EKG changes to suggest myocardial infarction.  BNP elevated at 9009, which is a increased from previous.  Home diuretics were recently increased to torsemide 40 mg BID.  Patient has missed a few doses when she has been at appointments.  Will start lasix drip for more gradual diuresis as patient has had difficulty in the past with a fall that may be related to orthostatics and a rapid fluid shift.  Will also keep patient on telemetry given that conduction abnormalities can occur in amyloid (no beta blockers due to this).     - appreciate Dr. Cohen/cardiology involvement   - lasix 10 mg/hr  - spironolactone 25 mg daily  - electrolyte replacement protocol  - BID BMP   - telemetry  - regular diet, 2 L fluid restriction  - strict I/O's  - daily weights  - goal of 3L net negative    # right sided pleural effusion  Attributed to CHF.  Pleural fluid has been evaluated in the past and was transudate.  Patient received therapeutic thoracentesis every week.  Last thoracentesis was on 1/4.   Discussing with cardiology, oncology and patient about timing of pleurx placement.    - monitor respiratory status (baseline on 2L NC)  - planning for pleurX placement on Monday (1/23)     # troponin elevation  Elevated to 0.291 on admission.  Trended upwards but stabilized aroud ~ 0.4.  No EKG changes on admission.   Likely demand ischemia in the setting of fluid overload.       # stage IV lambda AK amyloidosis   Diagnosed in 9/2016 with cardiac, GI and bone marrow involvement.  Patient has received CyBorD complication by hematuria and BorD.   Case has been discussed with Dr. Cassidy with hematology/oncology on admission and the patient will not receive chemotherapy at this time.   - palliative care consulted, appreciate recommendations  - acyclovir 400 mg BID for prophylaxis    # constipation  - polyethylene glycol daily  - sennosides BID PRN    FEN: regular diet, 2L fluid restriction, electrolyte replacement protocol  PPX:  - DVT: lovenox 40 mg daily  - PPI: not needed  Code status: DNR/DNI  Dispo: pending treatment of bacteremia and diuresis, >2 days      Patient seen and discussed with Dr. Cárdenas.    Maria E Rodriguez MD, MS  Internal Medicine, PGY-1  [phone]

## 2017-01-19 NOTE — PROGRESS NOTES
[facility], [place]   Palliative Care Daily Progress Note          Recommendations, Patient/Family Counseling & Coordination   Was seen and examined and discussed with primary team.   - Pt aware of plan for Pleural catheter now sched for Monday 1/23. Wants Pt education before discharge.   OP follow up options- Palliative home care vs traditional home care vs hospice.    - [facility] Palliative counselor to see for HCD review and completion  - Palliative OP Clinic follow up as desired    - Reinforced dnr/dni status  -  Dr Cohen following patient to assist with plans for aggressive diuresis, further HF therapies and possible treatment options  - Palliative care would be happy to participate in IDT care conference if primary team desires.     POLST: Needs to be completed     Thank you for the opportunity to continue to participate in the care of this patient and family.  Please feel free to contact on-call palliative provider with any emergent needs.  We can be reached via team pager [phone] (answered 8-4:30 Monday-Friday); after-hours answering service ([phone]); Main Palliative Clinic - [facility]: [phone].       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  Cardiac Amyloidosis (ECHO on 1/5 with LFEF of 55% and grade III (advanced) diastolic dysfunction with concentric hypertrophy)  Sepsis, UTI- completing abx  Deconditioning, debility     2)  Psychosocial/Spiritual Needs:   Ongoing: Palliative Counselor to see for HCD planning  New: No       Is new assessment/intervention required by palliative team?:  No         Interval History:   Overall feeling better. Up independently in room. Wants to increase activity to help with LE edema mgt.   Verbalized understanding of ongoing diuresis efforts by Dr Cohen and guidance by primary medicine team.  Continues to have restorative goals and continues on IV lasix, plan to have increased dose rates again tonight as long as renal function permits to facilitate ongoing diuresis. Monitoring bid labs            Review of Systems:   Palliative Symptom Review (0=no symptom/no concern, 1=mild, 2=moderate, 3=severe):      Pain: 0-1      Fatigue: 0-1      Nausea: 0-1      Constipation: 0-1      Diarrhea: 0      Depressive Symptoms: 0-1      Anxiety: 0-1      Drowsiness: 0      Poor Appetite: 0-1      Shortness of Breath: 1-2      Insomnia: 0-1                Medications:   I have reviewed this patient's medication profile and medications given in past 24 hours.        {   Physical exam: Vitals: BP 99/58 mmHg  Pulse 110  Temp(Src) 98  F (36.7  C) (Oral)  Resp 16  Wt 68.72 kg (151 lb 8 oz)  SpO2 93%  BMI= Body mass index is 26.84 kg/(m^2).  Constitutional: Pleasant woman, conversant- up in room independently with IV pole as assistive device. No LOB. Alert, oriented x 3, NAD, conversant- remains off supplemental 02 at present  HEENT: EOMI, oral mucous moist, no scleral icterus.  Cardiovascular: RRR, sinus rhythm, good peripheral perfusion. No murmurs, rubs or gallops  Respiratory: L side clear, R again with decreased bs R base. Non-labored breathing on room air, using 02 prn via NC. No cyanosis.    Abdominal: less distended, no reported tenderness.  Extremities: 2+ lower extremity edema to mid thigh bilaterally- ace/;ymphedema  wraps in place, distal CMS intact  Neurologic: no tremor, no focal deficits.    Skin: no visible rashes or lesions. Scattered ecchymosis across upper chest            Data Reviewed:   ROUTINE LABS (Last four results)  BMP  Recent Labs  Lab 01/19/17  0839 01/18/17  1959 01/18/17  0710 01/17/17  2203  01/17/17  0822     --  138 136  --  136   POTASSIUM 3.0* 3.6 3.2* 4.0  < > 3.1*   CHLORIDE 95  --  96 96  --  94   JERROD 9.0  --  8.8 8.6  --  8.6   CO2 33*  --  33* 34*  --  33*   BUN 11  --  11 13   --  14   CR 0.76  --  0.74 0.83  --  0.76   *  --  91 107*  --  95   < > = values in this interval not displayed.  CBC    Recent Labs  Lab 01/18/17  0710 01/15/17  0758 01/14/17  0909 01/13/17  2100 01/13/17  1027   WBC  --  6.4 7.7 9.7 11.9*   RBC  --  3.87 3.83 4.04 4.27   HGB  --  12.3 12.1 13.1 13.8   HCT  --  38.4 37.5 39.7 42.4   MCV  --  99 98 98 99   MCH  --  31.8 31.6 32.4 32.3   MCHC  --  32.0 32.3 33.0 32.5   RDW  --  15.7* 15.8* 15.6* 15.9*    122* 134* 165 176     INR    Recent Labs  Lab 01/14/17  0909   INR 1.30*

## 2017-01-19 NOTE — PROGRESS NOTES
Patient seen and examined today.     She is making excellent progress with diuresis but she has a long ways to go. As long as her renal function remains stable we can push the diuretics to a goal of 3 + L per day. She needs BID labs to ensure her renal function and potassium are monitored with this level of diuresis.     I would recommend that we place a PICC for lasix drip administration and for frequent labs to improve her comfort as she is a difficult stick.     Recommend:   likey increase lasix drip to 15 again tomorrow if renal function remains stable, would bolus lasix 80 mg if we increase the rate   PICC placement   Continue PT/OT   I will make an effort to corby her a private room     Please call me with any questions,     Domenica Cohen

## 2017-01-19 NOTE — PLAN OF CARE
Problem: Goal Outcome Summary  Goal: Goal Outcome Summary  Edema 6C: Patient with compression wraps removed upon arrival - measurements taken with 7/12 reductions bilaterally. Skin intact with 2+ pitting distally. Skin cares performed prior to reapplication of GCB from MTPs to knee creases for continued edema management.     Recommend patient follow up with outpatient lymphedema therapy for fitting of custom compression garments for chronic LE edema following hospital discharge.

## 2017-01-19 NOTE — PLAN OF CARE
Problem: Goal Outcome Summary  Goal: Goal Outcome Summary  Outcome: No Change  Pt A/Ox4, VSS on 2L O2. Pt up with SBA, independent to commode. Pt denies pain. Lasix gtt running at 10 mg/hr with good urine output. Possible pleurx catheter placement next week. K replaced. Lymph wraps off overnight. Continue to monitor and notify MD of questions or concerns.

## 2017-01-19 NOTE — PLAN OF CARE
Problem: Goal Outcome Summary  Goal: Goal Outcome Summary  Outcome: No Change  D/I/A: Patient's VSS. SR. Denies pain. Lasix gtt infusing at 10 mg/hr, good UO. +3/4 edema to LEs, lymph wraps placed back on patient this morning per edema. Potassium replaced per protocol, recheck due at 1730, okay to check with 1800 BMP per MD. Regular diet with 2L FR.      P: Continue to monitor and notify MDs as needed. BMP and Mg checks ordered Q12H. Notify MDs if frequent lab draws become more difficult, could place midline. Plan to place pleurx catheter on Monday.

## 2017-01-20 ENCOUNTER — APPOINTMENT (OUTPATIENT)
Dept: OCCUPATIONAL THERAPY | Facility: CLINIC | Age: 67
DRG: 291 | End: 2017-01-20
Attending: HOSPITALIST
Payer: COMMERCIAL

## 2017-01-20 DIAGNOSIS — J90 RECURRENT RIGHT PLEURAL EFFUSION: Primary | ICD-10-CM

## 2017-01-20 PROBLEM — Z71.89 ADVANCE CARE PLANNING: Status: ACTIVE | Noted: 2017-01-20

## 2017-01-20 LAB
ANION GAP SERPL CALCULATED.3IONS-SCNC: 8 MMOL/L (ref 3–14)
ANION GAP SERPL CALCULATED.3IONS-SCNC: 9 MMOL/L (ref 3–14)
BUN SERPL-MCNC: 11 MG/DL (ref 7–30)
BUN SERPL-MCNC: 14 MG/DL (ref 7–30)
CALCIUM SERPL-MCNC: 8.8 MG/DL (ref 8.5–10.1)
CALCIUM SERPL-MCNC: 9.3 MG/DL (ref 8.5–10.1)
CHLORIDE SERPL-SCNC: 92 MMOL/L (ref 94–109)
CHLORIDE SERPL-SCNC: 97 MMOL/L (ref 94–109)
CO2 SERPL-SCNC: 32 MMOL/L (ref 20–32)
CO2 SERPL-SCNC: 34 MMOL/L (ref 20–32)
CREAT SERPL-MCNC: 0.71 MG/DL (ref 0.52–1.04)
CREAT SERPL-MCNC: 0.82 MG/DL (ref 0.52–1.04)
GFR SERPL CREATININE-BSD FRML MDRD: 70 ML/MIN/1.7M2
GFR SERPL CREATININE-BSD FRML MDRD: 83 ML/MIN/1.7M2
GLUCOSE SERPL-MCNC: 109 MG/DL (ref 70–99)
GLUCOSE SERPL-MCNC: 92 MG/DL (ref 70–99)
MAGNESIUM SERPL-MCNC: 2.5 MG/DL (ref 1.6–2.3)
MAGNESIUM SERPL-MCNC: 2.6 MG/DL (ref 1.6–2.3)
POTASSIUM SERPL-SCNC: 3.3 MMOL/L (ref 3.4–5.3)
POTASSIUM SERPL-SCNC: 3.4 MMOL/L (ref 3.4–5.3)
POTASSIUM SERPL-SCNC: 3.4 MMOL/L (ref 3.4–5.3)
POTASSIUM SERPL-SCNC: 3.8 MMOL/L (ref 3.4–5.3)
SODIUM SERPL-SCNC: 134 MMOL/L (ref 133–144)
SODIUM SERPL-SCNC: 138 MMOL/L (ref 133–144)

## 2017-01-20 PROCEDURE — 99233 SBSQ HOSP IP/OBS HIGH 50: CPT | Mod: GC | Performed by: INTERNAL MEDICINE

## 2017-01-20 PROCEDURE — 80048 BASIC METABOLIC PNL TOTAL CA: CPT | Performed by: HOSPITALIST

## 2017-01-20 PROCEDURE — 36415 COLL VENOUS BLD VENIPUNCTURE: CPT | Performed by: HOSPITALIST

## 2017-01-20 PROCEDURE — 25000132 ZZH RX MED GY IP 250 OP 250 PS 637: Performed by: HOSPITALIST

## 2017-01-20 PROCEDURE — 25000128 H RX IP 250 OP 636: Performed by: HOSPITALIST

## 2017-01-20 PROCEDURE — 97140 MANUAL THERAPY 1/> REGIONS: CPT | Mod: GO | Performed by: OCCUPATIONAL THERAPIST

## 2017-01-20 PROCEDURE — 83735 ASSAY OF MAGNESIUM: CPT | Performed by: HOSPITALIST

## 2017-01-20 PROCEDURE — 36415 COLL VENOUS BLD VENIPUNCTURE: CPT | Performed by: INTERNAL MEDICINE

## 2017-01-20 PROCEDURE — 21400006 ZZH R&B CCU INTERMEDIATE UMMC

## 2017-01-20 PROCEDURE — 25000125 ZZHC RX 250: Performed by: HOSPITALIST

## 2017-01-20 PROCEDURE — 25000125 ZZHC RX 250: Performed by: INTERNAL MEDICINE

## 2017-01-20 PROCEDURE — 84132 ASSAY OF SERUM POTASSIUM: CPT | Performed by: INTERNAL MEDICINE

## 2017-01-20 PROCEDURE — 40000133 ZZH STATISTIC OT WARD VISIT: Performed by: OCCUPATIONAL THERAPIST

## 2017-01-20 RX ORDER — LANOLIN ALCOHOL/MO/W.PET/CERES
100 CREAM (GRAM) TOPICAL DAILY
Status: DISCONTINUED | OUTPATIENT
Start: 2017-01-21 | End: 2017-01-25 | Stop reason: HOSPADM

## 2017-01-20 RX ORDER — POTASSIUM CHLORIDE 750 MG/1
40 TABLET, EXTENDED RELEASE ORAL DAILY
Status: DISCONTINUED | OUTPATIENT
Start: 2017-01-20 | End: 2017-01-25 | Stop reason: HOSPADM

## 2017-01-20 RX ORDER — METOLAZONE 5 MG/1
5 TABLET ORAL ONCE
Status: COMPLETED | OUTPATIENT
Start: 2017-01-20 | End: 2017-01-20

## 2017-01-20 RX ADMIN — FUROSEMIDE 20 MG/HR: 10 INJECTION, SOLUTION INTRAVENOUS at 12:38

## 2017-01-20 RX ADMIN — METOLAZONE 5 MG: 5 TABLET ORAL at 11:02

## 2017-01-20 RX ADMIN — FUROSEMIDE 20 MG/HR: 10 INJECTION, SOLUTION INTRAVENOUS at 18:11

## 2017-01-20 RX ADMIN — SPIRONOLACTONE 25 MG: 25 TABLET ORAL at 09:09

## 2017-01-20 RX ADMIN — FUROSEMIDE 15 MG/HR: 10 INJECTION, SOLUTION INTRAVENOUS at 00:21

## 2017-01-20 RX ADMIN — CEFTRIAXONE 2 G: 2 INJECTION, POWDER, FOR SOLUTION INTRAMUSCULAR; INTRAVENOUS at 09:10

## 2017-01-20 RX ADMIN — POTASSIUM CHLORIDE 20 MEQ: 750 TABLET, EXTENDED RELEASE ORAL at 09:09

## 2017-01-20 RX ADMIN — POTASSIUM CHLORIDE 40 MEQ: 750 TABLET, EXTENDED RELEASE ORAL at 11:01

## 2017-01-20 RX ADMIN — ENOXAPARIN SODIUM 40 MG: 40 INJECTION SUBCUTANEOUS at 12:59

## 2017-01-20 RX ADMIN — ACETAMINOPHEN 325 MG: 325 TABLET, FILM COATED ORAL at 19:59

## 2017-01-20 RX ADMIN — ACYCLOVIR 400 MG: 400 TABLET ORAL at 09:09

## 2017-01-20 RX ADMIN — ACYCLOVIR 400 MG: 400 TABLET ORAL at 19:49

## 2017-01-20 RX ADMIN — POTASSIUM CHLORIDE 40 MEQ: 750 TABLET, EXTENDED RELEASE ORAL at 06:36

## 2017-01-20 RX ADMIN — POTASSIUM CHLORIDE 20 MEQ: 750 TABLET, EXTENDED RELEASE ORAL at 16:29

## 2017-01-20 RX ADMIN — FUROSEMIDE 15 MG/HR: 10 INJECTION, SOLUTION INTRAVENOUS at 08:00

## 2017-01-20 RX ADMIN — FUROSEMIDE 15 MG/HR: 10 INJECTION, SOLUTION INTRAVENOUS at 06:46

## 2017-01-20 NOTE — PROGRESS NOTES
Internal Medicine Progress Note - [facility]    Leandra Guerrero   MRN: [id number]   : 1950  Date of Admission:2017  DOS : 2017     S:  No acute events overnight.  Breathing stable.  Improvement in lower extremity swelling.  Pleurx placement scheduled for .  Nursing notes reviewed.       Medications reviewed in Epic    O:  Vitals:   /70 mmHg  Pulse 110  Temp(Src) 97.4  F (36.3  C) (Oral)  Resp 16  Wt 68.085 kg (150 lb 1.6 oz)  SpO2 96%     Physical Exam:  General: alert, sitting by window, pleasant and in NAD   Cardiovascular: RRR, gallop present  Pulmonary: easy work of breathing on room air, absent breath sounds anteriorly at right base and few crackles at left   Abdomen: distended, soft, non-tender   Neuro: alert and oriented, moving all extremities  Extremity: significant lower extremity edema 2+ bilaterally     Labs/Imaging:  Personally reviewed labs and imaging in [facility]     A/P:  Leandra Guerrero is a 66 year old female with PMHx of 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 Cohen for IV diuresis due to decompensated heart failure.  Have been managing sepsis secondary to UTI and have been able to resume diuresis, which she is tolerating well.     # sepsis  # gram negative bacteremia  # UTI  Patient noted to be febrile to 102 on .  Blood cultures and urine cultures with E. Coli (pansensitive).  Lactate within normal limits.  Procal 2.05.    - d/c vancomycin, pharmacy to dose ( - 1/15)  - d/c zosyn 4.5 g Q6H ( - 1/15)    - ceftriaxone 2 g Q24H (1/15 - ) to complete 10 day course    # decompensated congestive heart failure, NYHA class IIIB  # cardiac amyloidosis  Most recent ECHO on  with LFEF of 55% and grade III (advanced) diastolic dysfunction with concentric hypertorphy.  Previous coronary angiogram not suggestive of obstructive CAD.   Evidence of fluid overload on exam with elevated JVD and significant lower extremity edema.  Reason for the decompensation is unclear.  No symptoms to suggest infectious process, except a leukocytosis that could be attributed to stress response.  Troponin elevation, but no EKG changes to suggest myocardial infarction.  BNP elevated at 9009, which is a increased from previous.  Home diuretics were recently increased to torsemide 40 mg BID.  Patient has missed a few doses when she has been at appointments.  Will start lasix drip for more gradual diuresis as patient has had difficulty in the past with a fall that may be related to orthostatics and a rapid fluid shift.  Will also keep patient on telemetry given that conduction abnormalities can occur in amyloid (no beta blockers due to this).     - appreciate Dr. Cohen/cardiology involvement   - lasix 20 mg/hr  - metolazone 5 mg x 1  - spironolactone 25 mg daily  - potassium chloride 40 mEq daily   - electrolyte replacement protocol  - BID BMP   - telemetry  - regular diet, 2 L fluid restriction  - strict I/O's  - daily weights  - goal of 3L net negative    # right sided pleural effusion  Attributed to CHF.  Pleural fluid has been evaluated in the past and was transudate.  Patient received therapeutic thoracentesis every week.  Last thoracentesis was on 1/4.   Discussing with cardiology, oncology and patient about timing of pleurx placement.    - monitor respiratory status (baseline on 2L NC)  - pleurX placement on Monday (1/23)     # troponin elevation  Elevated to 0.291 on admission.  Trended upwards but stabilized aroud ~ 0.4.  No EKG changes on admission.   Likely demand ischemia in the setting of fluid overload.       # stage IV lambda AK amyloidosis   Diagnosed in 9/2016 with cardiac, GI and bone marrow involvement.  Patient has received CyBorD complication by hematuria and BorD.  Case has been discussed with Dr. Cassidy with hematology/oncology on admission  and the patient will not receive chemotherapy at this time.   - palliative care consulted, appreciate recommendations  - acyclovir 400 mg BID for prophylaxis    # constipation  - polyethylene glycol daily  - sennosides BID PRN    FEN: regular diet, 2L fluid restriction, electrolyte replacement protocol  PPX:  - DVT: lovenox 40 mg daily (hold after dose 1/21 for pleurx placement)  - PPI: not needed  Code status: DNR/DNI  Dispo: pending treatment of bacteremia and diuresis, >2 days      Patient seen and discussed with Dr. Cárdenas.    Maria E Rodriguez MD, MS  Internal Medicine, PGY-1  [phone]

## 2017-01-20 NOTE — PLAN OF CARE
Problem: Individualization  Goal: Patient Preferences  A&Ox4. AVSS. Denied pain. Lasix drip dose increased to 15 mg/hr from 10 mg/hr. Up to bed side commode to void( pls see I/O). K was 3.0 this morning, replaced and recheck is done, result is pending. Oncoming RN is aware to watch for K level.

## 2017-01-20 NOTE — PLAN OF CARE
Problem: Goal Outcome Summary  Goal: Goal Outcome Summary  Outcome: No Change  Pt A/Ox4, VSS on 2L O2. Pt up with SBA, independent to commode. Pt denies pain. Lasix gtt running at 15 mg/hr with good urine output. Possible pleurx catheter placement Monday. K replaced. Lymph wraps off overnight. Continue to monitor and notify MD of questions or concerns.    0300 K lost in lab for 3 hours, resulted around 0630,K replaced at 0636 for K 3.3, 20 Meq due at 0836, recheck K 4 hours after.

## 2017-01-20 NOTE — PROGRESS NOTES
CLINICAL NUTRITION SERVICES - REASSESSMENT NOTE     Nutrition Prescription    RECOMMENDATIONS FOR MDs/PROVIDERS TO ORDER:  Consider thiamine (100 mg/day) with her NYHA Class III heart failure.       Malnutrition Status:    Non-severe malnutrition in the context of chronic illness    Recommendations already ordered by Registered Dietitian (RD):  Changed diet to 2 g sodium    One-time Magic Cup this evening    Future/Additional Recommendations:  1. Continue fluid restriction as ordered.  2. Monitor BG control. Hgb A1c of 5.8 on 9/7/16 (although suspect may be influenced by the dexamethasone she received).  3. If oral intake decreases, consider ordering a multivitamin with minerals.      EVALUATION OF THE PROGRESS TOWARD GOALS   Diet: Cardiac diet order since 1/20 (previously on 2 g sodium, cardiac, and regular diet orders). On a 2 L fluid restriction. Has a prn supplement order.   Intake: Flowsheets indicate pt with good diet tolerance. Flowsheets indicate pt she consumed 75% of meals with a fair to good appetite 1/13, % of meals with a good appetite 1/14, 50-75% of meals with a good appetite 1/15, 75% of meals with a good appetite 1/16, 100% of meals with a  Good appetite 1/17-1/19, and % of meals with a good appetite 1/20. Per discussion with pt, appetite is fair. Has nausea at times. Finds that zofran and small amount of crackers help her nausea improve. Did state that she avoids some foods because of texture such as kidney beans and pineapple. Last chemo was in December 2016 per pt. No abdominal pain. Other factors affecting nutrition intake are food choices on the hospital menu. 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 Diligently follows her low-sodium diet to a fault, per pt.      NEW FINDINGS   Wt hx: Per pt report, her weight was in the 180's about a year ago (~81.8-85.9 kg).    ASSESSED NUTRITION NEEDS (updated):  Dosing Weight: 56 kg (adjusted  based on lowest wt this admission of 68.1 kg on 1/20)  Estimated Energy Needs: 0373-8122 kcals/day (25 - 30 kcals/kg)  Justification: Maintenance needs  Estimated Protein Needs: 67-84 grams protein/day (1.2 - 1.5 grams of pro/kg)  Justification: Increased needs with cardiac status, goal for repletion  Estimated Fluid Needs: Pt on 1.5 L fluid restriction per provider    MALNUTRITION  % Intake: Decreased intake does not meet criteria  % Weight Loss: Up to 20% in 1 year (non-severe), per pt report    Subcutaneous Fat Loss: Upper arm:  mild, Lower arm:  mild and Thoracic/intercostal:  mild  Muscle Loss: Generalized per pt. Mild minimally  Fluid Accumulation/Edema: Moderate  Malnutrition Diagnosis: Non-severe malnutrition in the context of chronic illness    Previous Goals   Patient to consume % of nutritionally adequate meal trays TID, or the equivalent with supplements/snacks.  Evaluation: Not meeting consistently.    Previous Nutrition Diagnosis  Inadequate oral intake related to variable appetite as evidenced by pt reports of variable appetite (eating meals TID or various amounts) for the past few months  Evaluation: Unresolved. Updated below.    CURRENT NUTRITION DIAGNOSIS  Inadequate oral intake related to nausea at times, decreased intake with hospital food and choices as evidenced by pt consuming 50% of meals at times.      INTERVENTIONS  Implementation  1. Collaboration with other providers: Paged team. Changed diet order from cardiac to 2 g sodium diet.    2. Medical food supplement therapy: Discussed oral supplements. Would not like to receive Ensure Plus while in hospital as she usually adds fruit and makes her supplement into a smoothie at home. Agreed to try Magic Cup, berry or orange. Ordered orange Magic Cup for this evening once to trial. Provided her with an oral supplement menu. She may request oral supplements as desired.    Goals  Patient to consume 75% of nutritionally adequate meal trays TID,  or the equivalent with supplements/snacks.    Monitoring/Evaluation  Progress toward goals will be monitored and evaluated per protocol.     Nutrition will continue to follow.    Aurelia Mcduffie MS, RD, LD, [facility]   6C Pgr:  [phone]

## 2017-01-20 NOTE — PLAN OF CARE
Problem: Fluid Volume Excess (Adult,Obstetrics,Pediatric)  Goal: Identify Related Risk Factors and Signs and Symptoms  Related risk factors and signs and symptoms are identified upon initiation of Human Response Clinical Practice Guideline (CPG)   Outcome: No Change  Monitor shows SR 70-95 with rare PAC/PVC. Continues on lasix drip, increased from 15 mg to 20 mg/hour. Weight decreased by 0.5 pounds from yesterday. Received 1 dose metolazone with good UO. Patient compliant with fluid intake. Continues with LE edema +3-4 thighs to ankles. Lymphedema wraps applied after shower this AM. Swelling decreased per lymphedema therapist. K+ replaced per protocol plus extra dose. Continues with diminished RML and RLL. Denies pain or shortness of breath. See flowsheets for assessments and additional data.  A: Continued LE edema with no apparent weight loss.   P: Continue diuretics as ordered. Replace K+ per protocol. Encourage increased activity. Continue current cares and notify providers with issues or concerns.     01/20/17 1505   Fluid Volume Excess   Fluid Volume Excess: Related Risk Factors cardiac changes;disease process;sodium intake excessive;venous return impaired   Signs and Symptoms (Fluid Volume Excess) acute weight gain;edema;heart sound changes;lab value changes;pulmonary congestion/pleural effusion
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
## 2017-01-21 ENCOUNTER — APPOINTMENT (OUTPATIENT)
Dept: OCCUPATIONAL THERAPY | Facility: CLINIC | Age: 67
DRG: 291 | End: 2017-01-21
Attending: HOSPITALIST
Payer: COMMERCIAL

## 2017-01-21 ENCOUNTER — APPOINTMENT (OUTPATIENT)
Dept: GENERAL RADIOLOGY | Facility: CLINIC | Age: 67
DRG: 291 | End: 2017-01-21
Attending: HOSPITALIST
Payer: COMMERCIAL

## 2017-01-21 LAB
ANION GAP SERPL CALCULATED.3IONS-SCNC: 11 MMOL/L (ref 3–14)
ANION GAP SERPL CALCULATED.3IONS-SCNC: 7 MMOL/L (ref 3–14)
BACTERIA SPEC CULT: NO GROWTH
BACTERIA SPEC CULT: NO GROWTH
BUN SERPL-MCNC: 14 MG/DL (ref 7–30)
BUN SERPL-MCNC: 17 MG/DL (ref 7–30)
CALCIUM SERPL-MCNC: 9.4 MG/DL (ref 8.5–10.1)
CALCIUM SERPL-MCNC: 9.7 MG/DL (ref 8.5–10.1)
CHLORIDE SERPL-SCNC: 88 MMOL/L (ref 94–109)
CHLORIDE SERPL-SCNC: 89 MMOL/L (ref 94–109)
CO2 SERPL-SCNC: 35 MMOL/L (ref 20–32)
CO2 SERPL-SCNC: 38 MMOL/L (ref 20–32)
CREAT SERPL-MCNC: 0.74 MG/DL (ref 0.52–1.04)
CREAT SERPL-MCNC: 0.84 MG/DL (ref 0.52–1.04)
GFR SERPL CREATININE-BSD FRML MDRD: 69 ML/MIN/1.7M2
GFR SERPL CREATININE-BSD FRML MDRD: 78 ML/MIN/1.7M2
GLUCOSE SERPL-MCNC: 151 MG/DL (ref 70–99)
GLUCOSE SERPL-MCNC: 95 MG/DL (ref 70–99)
MAGNESIUM SERPL-MCNC: 2.5 MG/DL (ref 1.6–2.3)
MAGNESIUM SERPL-MCNC: 2.6 MG/DL (ref 1.6–2.3)
MICRO REPORT STATUS: NORMAL
MICRO REPORT STATUS: NORMAL
PLATELET # BLD AUTO: 342 10E9/L (ref 150–450)
POTASSIUM SERPL-SCNC: 3 MMOL/L (ref 3.4–5.3)
POTASSIUM SERPL-SCNC: 3 MMOL/L (ref 3.4–5.3)
POTASSIUM SERPL-SCNC: 3.1 MMOL/L (ref 3.4–5.3)
SODIUM SERPL-SCNC: 133 MMOL/L (ref 133–144)
SODIUM SERPL-SCNC: 135 MMOL/L (ref 133–144)
SPECIMEN SOURCE: NORMAL
SPECIMEN SOURCE: NORMAL
TROPONIN I SERPL-MCNC: 0.06 UG/L (ref 0–0.04)

## 2017-01-21 PROCEDURE — 25000125 ZZHC RX 250: Performed by: HOSPITALIST

## 2017-01-21 PROCEDURE — 99233 SBSQ HOSP IP/OBS HIGH 50: CPT | Mod: GC | Performed by: INTERNAL MEDICINE

## 2017-01-21 PROCEDURE — 93010 ELECTROCARDIOGRAM REPORT: CPT | Performed by: INTERNAL MEDICINE

## 2017-01-21 PROCEDURE — 25000125 ZZHC RX 250: Performed by: INTERNAL MEDICINE

## 2017-01-21 PROCEDURE — 84484 ASSAY OF TROPONIN QUANT: CPT | Performed by: HOSPITALIST

## 2017-01-21 PROCEDURE — 40000133 ZZH STATISTIC OT WARD VISIT: Performed by: OCCUPATIONAL THERAPIST

## 2017-01-21 PROCEDURE — 36415 COLL VENOUS BLD VENIPUNCTURE: CPT | Performed by: HOSPITALIST

## 2017-01-21 PROCEDURE — 25000132 ZZH RX MED GY IP 250 OP 250 PS 637: Performed by: HOSPITALIST

## 2017-01-21 PROCEDURE — 83735 ASSAY OF MAGNESIUM: CPT | Performed by: HOSPITALIST

## 2017-01-21 PROCEDURE — 71010 XR CHEST PORT 1 VW: CPT

## 2017-01-21 PROCEDURE — 80048 BASIC METABOLIC PNL TOTAL CA: CPT | Performed by: HOSPITALIST

## 2017-01-21 PROCEDURE — 85049 AUTOMATED PLATELET COUNT: CPT | Performed by: HOSPITALIST

## 2017-01-21 PROCEDURE — 97140 MANUAL THERAPY 1/> REGIONS: CPT | Mod: GO | Performed by: OCCUPATIONAL THERAPIST

## 2017-01-21 PROCEDURE — 25000128 H RX IP 250 OP 636: Performed by: INTERNAL MEDICINE

## 2017-01-21 PROCEDURE — 84132 ASSAY OF SERUM POTASSIUM: CPT | Performed by: INTERNAL MEDICINE

## 2017-01-21 PROCEDURE — 21400006 ZZH R&B CCU INTERMEDIATE UMMC

## 2017-01-21 PROCEDURE — 93005 ELECTROCARDIOGRAM TRACING: CPT

## 2017-01-21 RX ADMIN — SPIRONOLACTONE 25 MG: 25 TABLET ORAL at 09:15

## 2017-01-21 RX ADMIN — ACETAMINOPHEN 325 MG: 325 TABLET, FILM COATED ORAL at 20:38

## 2017-01-21 RX ADMIN — POTASSIUM CHLORIDE 40 MEQ: 750 TABLET, EXTENDED RELEASE ORAL at 21:33

## 2017-01-21 RX ADMIN — ACETAMINOPHEN 325 MG: 325 TABLET, FILM COATED ORAL at 13:21

## 2017-01-21 RX ADMIN — ACETAMINOPHEN 325 MG: 325 TABLET, FILM COATED ORAL at 09:19

## 2017-01-21 RX ADMIN — ACYCLOVIR 400 MG: 400 TABLET ORAL at 09:15

## 2017-01-21 RX ADMIN — ENOXAPARIN SODIUM 40 MG: 40 INJECTION SUBCUTANEOUS at 13:21

## 2017-01-21 RX ADMIN — Medication 100 MG: at 09:15

## 2017-01-21 RX ADMIN — POTASSIUM CHLORIDE 20 MEQ: 750 TABLET, EXTENDED RELEASE ORAL at 09:42

## 2017-01-21 RX ADMIN — POTASSIUM CHLORIDE 40 MEQ: 750 TABLET, EXTENDED RELEASE ORAL at 13:21

## 2017-01-21 RX ADMIN — FUROSEMIDE 10 MG/HR: 10 INJECTION, SOLUTION INTRAVENOUS at 18:34

## 2017-01-21 RX ADMIN — CEFTRIAXONE 2 G: 2 INJECTION, POWDER, FOR SOLUTION INTRAMUSCULAR; INTRAVENOUS at 07:55

## 2017-01-21 RX ADMIN — POTASSIUM CHLORIDE 40 MEQ: 750 TABLET, EXTENDED RELEASE ORAL at 09:15

## 2017-01-21 RX ADMIN — FUROSEMIDE 10 MG/HR: 10 INJECTION, SOLUTION INTRAVENOUS at 08:25

## 2017-01-21 RX ADMIN — POTASSIUM CHLORIDE 20 MEQ: 750 TABLET, EXTENDED RELEASE ORAL at 00:40

## 2017-01-21 NOTE — PROGRESS NOTES
Internal Medicine Progress Note - [facility]    Leandra Guerrero   MRN: [id number]   : 1950  Date of Admission: 2017  DOS : 2017     S:  No acute events overnight.  Breathing stable.  Lower extremity swelling continues to improve.  Patient notes some chest discomfort/pressure this morning that is new.  Weight down ~ 10 lbs from yesterday.      Medications reviewed in Epic    O:  Vitals:   BP 95/68 mmHg  Pulse 92  Temp(Src) 97.7  F (36.5  C) (Oral)  Resp 20  Wt 63.776 kg (140 lb 9.6 oz)  SpO2 94%     Physical Exam:  General: alert, sitting in bed, pleasant and in NAD   Cardiovascular: RRR, gallop present  Pulmonary: easy work of breathing on room air, absent breath sounds anteriorly at right base and few crackles at left   Abdomen: distended, soft, non-tender   Neuro: alert and oriented, moving all extremities  Extremity: lower extremity edema 2+ bilaterally     Labs/Imaging:  Personally reviewed labs and imaging in [facility]     A/P:  Leandra Guerrero is a 66 year old female with PMHx of 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 Cohen for IV diuresis due to decompensated heart failure.  Have been managing sepsis secondary to UTI and have been able to resume diuresis, which she is tolerating well.     # sepsis  # gram negative bacteremia  # UTI  Patient noted to be febrile to 102 on .  Blood cultures and urine cultures with E. Coli (pansensitive).  Lactate within normal limits.  Procal 2.05.    - d/c vancomycin, pharmacy to dose ( - 1/15)  - d/c zosyn 4.5 g Q6H ( - 1/15)    - ceftriaxone 2 g Q24H (1/15 - ) to complete 10 day course    # decompensated congestive heart failure, NYHA class IIIB  # cardiac amyloidosis  Most recent ECHO on  with LFEF of 55% and grade III (advanced) diastolic dysfunction with concentric hypertorphy.  Previous coronary angiogram not  suggestive of obstructive CAD.  Evidence of fluid overload on exam with elevated JVD and significant lower extremity edema.  Reason for the decompensation is unclear.  No symptoms to suggest infectious process, except a leukocytosis that could be attributed to stress response.  Troponin elevation, but no EKG changes to suggest myocardial infarction.  BNP elevated at 9009, which is a increased from previous.  Home diuretics were recently increased to torsemide 40 mg BID.  Patient has missed a few doses when she has been at appointments.  Will start lasix drip for more gradual diuresis as patient has had difficulty in the past with a fall that may be related to orthostatics and a rapid fluid shift.  Will also keep patient on telemetry given that conduction abnormalities can occur in amyloid (no beta blockers due to this).     - appreciate Dr. Cohen/cardiology involvement   - lasix 10 mg/hr  - spironolactone 25 mg daily  - potassium chloride 40 mEq daily   - electrolyte replacement protocol  - BID BMP   - telemetry  - regular diet, 2 L fluid restriction  - strict I/O's  - daily weights  - goal of 3L net negative    # right sided pleural effusion  Attributed to CHF.  Pleural fluid has been evaluated in the past and was transudate.  Patient received therapeutic thoracentesis every week.  Last thoracentesis was on 1/4.   Discussing with cardiology, oncology and patient about timing of pleurx placement.    - monitor respiratory status (baseline on 2L NC)  - pleurX placement on Monday (1/23)     # troponin elevation  Elevated to 0.291 on admission.  Trended upwards but stabilized aroud ~ 0.4.  No EKG changes on admission.   Likely demand ischemia in the setting of fluid overload.       # stage IV lambda AK amyloidosis   Diagnosed in 9/2016 with cardiac, GI and bone marrow involvement.  Patient has received CyBorD complication by hematuria and Carissa.  Case has been discussed with Dr. Cassidy with hematology/oncology on  admission and the patient will not receive chemotherapy at this time.   - palliative care consulted, appreciate recommendations  - acyclovir 400 mg BID for prophylaxis    # constipation  - polyethylene glycol daily  - sennosides BID PRN    FEN: regular diet, 2L fluid restriction, electrolyte replacement protocol  PPX:  - DVT: lovenox 40 mg daily (hold after dose 1/21 for pleurx placement)  - PPI: not needed  Code status: DNR/DNI  Dispo: pending treatment of bacteremia, diuresis and pleurx placement 1/23      Patient seen and discussed with Dr. Cárdenas.    Maria E Rodriguez MD, MS  Internal Medicine, PGY-1  [phone]

## 2017-01-21 NOTE — PLAN OF CARE
Problem: Goal Outcome Summary  Goal: Goal Outcome Summary  Edema 6C: Wraps removed by patient over the night. Measurements taken with 5/12 noted reductions bilaterally. Skin remains intact with 2+ pitting below the knees. Redness reducing on distal portion of legs. Skin cares performed prior to reapplication of GCB from MTPs to knee creases for continued edema management. Remove if causing pain/numbness.     Recommend patient follow up with OP lymphedema clinic for fitting of custom compression garments for long term management of chronic LE edema.

## 2017-01-21 NOTE — PLAN OF CARE
Problem: Goal Outcome Summary  Goal: Goal Outcome Summary  Outcome: No Change  D: Admitted 1/13 from clinic for IV diuresis d/t decompensated HF. Additionally hospitalization has required management of sepsis secondary to a UTI.   I/A: A&Ox4. VSS on 2L NC overnight. SR 80s-90s. Denies pain. Lasix gtt decreased to 10 mg/hr. K+ replaced. Excellent uop. Up to commode independently. +3 BLE edema. Lymph wraps off overnight.    P: Continues to diurese. Strict I&O. Replace electrolytes. Possible pleural drain placement 1/23. Continue to monitor and notify [facility] with questions/concerns.

## 2017-01-21 NOTE — PROGRESS NOTES
Patient seen and examined.     Would continue to diurese her as her creatinine has not elevated, she continues to be negative every day,  and her neck veins remain quite elevated.     She may not need the the plural catheter if she continues to put out at this rate, but would recommend that we get a chest x ray to see how much fluid she has at this point.     I will plan to see her in my clinic shortly after discharge in my clinic to see how she is doing clinically and to weigh her next options.     Do not discharge until she is sufficiently diuresed and I can help you determine when that is.     Domenica Cohen

## 2017-01-21 NOTE — PLAN OF CARE
Problem: Goal Outcome Summary  Goal: Goal Outcome Summary  Edema 6C: Wraps removed by patient over the night. Measurements taken with 10/12 noted reductions bilaterally. Skin remains intact with soft 2+ pitting distally. Skin cares performed prior to reapplication of GCB from MTPs to knee creases for continued edema management. Remove if causing pain/numbness.

## 2017-01-21 NOTE — PLAN OF CARE
Problem: Goal Outcome Summary  Goal: Goal Outcome Summary  Outcome: Improving  Continues on lasix drip at 20 mg/hr. Has had >3500cc urine out in 24 hrs; observing fluid intake restriction. Lungs clear, but right mid and lower lobes diminished. Pt has removed lymphedema wraps for the night, for comfort. Ate well at supper. Up in room.

## 2017-01-22 ENCOUNTER — APPOINTMENT (OUTPATIENT)
Dept: OCCUPATIONAL THERAPY | Facility: CLINIC | Age: 67
DRG: 291 | End: 2017-01-22
Attending: HOSPITALIST
Payer: COMMERCIAL

## 2017-01-22 LAB
ANION GAP SERPL CALCULATED.3IONS-SCNC: 5 MMOL/L (ref 3–14)
ANION GAP SERPL CALCULATED.3IONS-SCNC: 8 MMOL/L (ref 3–14)
BUN SERPL-MCNC: 15 MG/DL (ref 7–30)
BUN SERPL-MCNC: 18 MG/DL (ref 7–30)
CALCIUM SERPL-MCNC: 8.9 MG/DL (ref 8.5–10.1)
CALCIUM SERPL-MCNC: 9.9 MG/DL (ref 8.5–10.1)
CHLORIDE SERPL-SCNC: 91 MMOL/L (ref 94–109)
CHLORIDE SERPL-SCNC: 93 MMOL/L (ref 94–109)
CO2 SERPL-SCNC: 36 MMOL/L (ref 20–32)
CO2 SERPL-SCNC: 38 MMOL/L (ref 20–32)
CREAT SERPL-MCNC: 0.86 MG/DL (ref 0.52–1.04)
CREAT SERPL-MCNC: 1.04 MG/DL (ref 0.52–1.04)
GFR SERPL CREATININE-BSD FRML MDRD: 53 ML/MIN/1.7M2
GFR SERPL CREATININE-BSD FRML MDRD: 67 ML/MIN/1.7M2
GLUCOSE SERPL-MCNC: 120 MG/DL (ref 70–99)
GLUCOSE SERPL-MCNC: 88 MG/DL (ref 70–99)
MAGNESIUM SERPL-MCNC: 2.5 MG/DL (ref 1.6–2.3)
MAGNESIUM SERPL-MCNC: 2.6 MG/DL (ref 1.6–2.3)
POTASSIUM SERPL-SCNC: 3.6 MMOL/L (ref 3.4–5.3)
POTASSIUM SERPL-SCNC: 4 MMOL/L (ref 3.4–5.3)
SODIUM SERPL-SCNC: 134 MMOL/L (ref 133–144)
SODIUM SERPL-SCNC: 136 MMOL/L (ref 133–144)

## 2017-01-22 PROCEDURE — 99233 SBSQ HOSP IP/OBS HIGH 50: CPT | Mod: GC | Performed by: INTERNAL MEDICINE

## 2017-01-22 PROCEDURE — 25000125 ZZHC RX 250: Performed by: INTERNAL MEDICINE

## 2017-01-22 PROCEDURE — 97140 MANUAL THERAPY 1/> REGIONS: CPT | Mod: GO | Performed by: OCCUPATIONAL THERAPIST

## 2017-01-22 PROCEDURE — 40000133 ZZH STATISTIC OT WARD VISIT: Performed by: OCCUPATIONAL THERAPIST

## 2017-01-22 PROCEDURE — 25000128 H RX IP 250 OP 636: Performed by: INTERNAL MEDICINE

## 2017-01-22 PROCEDURE — 25000132 ZZH RX MED GY IP 250 OP 250 PS 637: Performed by: HOSPITALIST

## 2017-01-22 PROCEDURE — 80048 BASIC METABOLIC PNL TOTAL CA: CPT | Performed by: HOSPITALIST

## 2017-01-22 PROCEDURE — 83735 ASSAY OF MAGNESIUM: CPT | Performed by: HOSPITALIST

## 2017-01-22 PROCEDURE — 36415 COLL VENOUS BLD VENIPUNCTURE: CPT | Performed by: HOSPITALIST

## 2017-01-22 PROCEDURE — 21400006 ZZH R&B CCU INTERMEDIATE UMMC

## 2017-01-22 RX ADMIN — FUROSEMIDE 10 MG/HR: 10 INJECTION, SOLUTION INTRAVENOUS at 05:28

## 2017-01-22 RX ADMIN — PROCHLORPERAZINE MALEATE 5 MG: 5 TABLET, FILM COATED ORAL at 18:47

## 2017-01-22 RX ADMIN — ACYCLOVIR 400 MG: 400 TABLET ORAL at 21:07

## 2017-01-22 RX ADMIN — ACYCLOVIR 400 MG: 400 TABLET ORAL at 09:04

## 2017-01-22 RX ADMIN — ACETAMINOPHEN 325 MG: 325 TABLET, FILM COATED ORAL at 13:48

## 2017-01-22 RX ADMIN — ACYCLOVIR 400 MG: 400 TABLET ORAL at 00:00

## 2017-01-22 RX ADMIN — POTASSIUM CHLORIDE 20 MEQ: 750 TABLET, EXTENDED RELEASE ORAL at 09:09

## 2017-01-22 RX ADMIN — SPIRONOLACTONE 25 MG: 25 TABLET ORAL at 09:04

## 2017-01-22 RX ADMIN — POTASSIUM CHLORIDE 20 MEQ: 750 TABLET, EXTENDED RELEASE ORAL at 21:08

## 2017-01-22 RX ADMIN — POTASSIUM CHLORIDE 40 MEQ: 750 TABLET, EXTENDED RELEASE ORAL at 09:04

## 2017-01-22 RX ADMIN — POTASSIUM CHLORIDE 20 MEQ: 750 TABLET, EXTENDED RELEASE ORAL at 00:00

## 2017-01-22 RX ADMIN — ONDANSETRON 4 MG: 4 TABLET, ORALLY DISINTEGRATING ORAL at 16:45

## 2017-01-22 RX ADMIN — FUROSEMIDE 10 MG/HR: 10 INJECTION, SOLUTION INTRAVENOUS at 16:46

## 2017-01-22 RX ADMIN — CEFTRIAXONE 2 G: 2 INJECTION, POWDER, FOR SOLUTION INTRAMUSCULAR; INTRAVENOUS at 10:30

## 2017-01-22 RX ADMIN — Medication 100 MG: at 09:04

## 2017-01-22 ASSESSMENT — COPD QUESTIONNAIRES: COPD: 1

## 2017-01-22 NOTE — PROGRESS NOTES
Internal Medicine Progress Note - [facility]    Leandra Guerrero   MRN: [id number]   : 1950  Date of Admission: 2017  DOS : 2017     S:  No acute events overnight.  Off oxygen all day yesterday and overnight.  Lower extremity swelling continues to improve.  Weight down approximately 5 lbs.  Discussed postponing pleurx placement tomorrow with Dr. Cohen.  Nursing notes reviewed      Medications reviewed in Epic    O:  Vitals:   BP 95/63 mmHg  Pulse 92  Temp(Src) 97.4  F (36.3  C) (Oral)  Resp 18  Wt 61.508 kg (135 lb 9.6 oz)  SpO2 96%     Physical Exam:  General: alert, sitting in chair at bedside, pleasant and in NAD   Cardiovascular: RRR, gallop present  Pulmonary: easy work of breathing on room air, left base is clear, increased breath sounds appreciated at right base   Abdomen: distended, soft, non-tender   Neuro: alert and oriented, moving all extremities  Extremity: lower extremity edema 1-2+ bilaterally (improving)    Labs/Imaging:  Reviewed in [facility].     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 Cohen for IV diuresis due to decompensated heart failure.  Have been managing sepsis secondary to UTI and have been able to resume diuresis, which she is tolerating well.     # sepsis  # gram negative bacteremia  # UTI  Patient noted to be febrile to 102 on .  Blood cultures and urine cultures with E. Coli (pansensitive).  Lactate within normal limits.  Procal 2.05.    - d/c vancomycin, pharmacy to dose ( - 1/15)  - d/c zosyn 4.5 g Q6H ( - 1/15)    - ceftriaxone 2 g Q24H (1/15 - ) to complete 10 day course    # decompensated congestive heart failure, NYHA class IIIB  # cardiac amyloidosis  Most recent ECHO on  with LFEF of 55% and grade III (advanced) diastolic dysfunction with concentric  hypertorphy.  Previous coronary angiogram not suggestive of obstructive CAD.  Evidence of fluid overload on exam with elevated JVD and significant lower extremity edema.  Reason for the decompensation is unclear.  No symptoms to suggest infectious process, except a leukocytosis that could be attributed to stress response.  Troponin elevation, but no EKG changes to suggest myocardial infarction.  BNP elevated at 9009, which is a increased from previous.  Home diuretics were recently increased to torsemide 40 mg BID.  Patient has missed a few doses when she has been at appointments.  Will start lasix drip for more gradual diuresis as patient has had difficulty in the past with a fall that may be related to orthostatics and a rapid fluid shift.  Will also keep patient on telemetry given that conduction abnormalities can occur in amyloid (no beta blockers due to this).     - appreciate Dr. Cohen/cardiology involvement   - lasix 10 mg/hr  - spironolactone 25 mg daily  - potassium chloride 40 mEq daily   - electrolyte replacement protocol  - BID BMP   - telemetry  - regular diet, 2 L fluid restriction  - strict I/O's  - daily weights  - goal of 3L net negative    # right sided pleural effusion  Attributed to CHF.  Pleural fluid has been evaluated in the past and was transudate.  Patient received therapeutic thoracentesis every week.  Last thoracentesis was on 1/4.   Discussing with cardiology, oncology and patient about timing of pleurx placement.    - monitor respiratory status (baseline on 2L NC)  - possible pleurX placement on Monday (1/23)     # troponin elevation  Elevated to 0.291 on admission.  Trended upwards but stabilized aroud ~ 0.4.  No EKG changes on admission.   Likely demand ischemia in the setting of fluid overload.       # stage IV lambda AK amyloidosis   Diagnosed in 9/2016 with cardiac, GI and bone marrow involvement.  Patient has received CyBorD complication by hematuria and Carissa.  Case has been  discussed with Dr. Cassidy with hematology/oncology on admission and the patient will not receive chemotherapy at this time.   - palliative care consulted, appreciate recommendations  - acyclovir 400 mg BID for prophylaxis    # constipation  - polyethylene glycol daily  - sennosides BID PRN    FEN: regular diet, 2L fluid restriction, electrolyte replacement protocol  PPX:  - DVT: lovenox 40 mg daily (hold after dose 1/21 for possible pleurx placement)  - PPI: not needed  Code status: DNR/DNI  Dispo: pending treatment of bacteremia and diuresis    Patient seen and discussed with Dr. Cárdenas.    Maria E Rodriguez MD, MS  Internal Medicine, PGY-1  [phone]

## 2017-01-22 NOTE — PLAN OF CARE
Problem: Fluid Volume Excess (Adult,Obstetrics,Pediatric)  Goal: Identify Related Risk Factors and Signs and Symptoms  Related risk factors and signs and symptoms are identified upon initiation of Human Response Clinical Practice Guideline (CPG)   Outcome: Improving  D/I: Monitor shows SR/ST 90-110s with rare ectopy. Continues on lasix drip at 10mg/hour. UO consistent with yesterday's. Potassium replaced per protocol.  RA sats 97%; diminished lung sounds in right middle and lower lobes. Weight down 6 pounds from yesterday. Patient states down 35 pounds since admit. LEs continue with +2-3 edema. Wraps applied this AM. States she feels better than yesterday. Family and friends present this afternoon. See flowsheets for assessments and additional data.  A: Continued fluid and weight loss on lasix drip. Requiring potassium replacement.  P: Monitor UO and edema. Replace potassium per protocol. Allow verbalization of feelings about health issues and prognosis. Continue current cares and notify providers with issues and concerns.     01/22/17 1549   Fluid Volume Excess   Fluid Volume Excess: Related Risk Factors cardiac changes;disease process;venous return impaired   Signs and Symptoms (Fluid Volume Excess) acute weight gain;ascites;edema;lab value changes

## 2017-01-22 NOTE — PLAN OF CARE
Problem: Fluid Volume Excess (Adult,Obstetrics,Pediatric)  Goal: Identify Related Risk Factors and Signs and Symptoms  Related risk factors and signs and symptoms are identified upon initiation of Human Response Clinical Practice Guideline (CPG)   Outcome: Improving  D/I: Monitor shows SR 80s. Continues on lasix drip at 10mg/hour. UO consistent with yesterday's. Potassium replaced per protocol. Waiting for F/U recheck. RA sats 97%; diminished lung sounds in right middle and lower lobes. Weight down 10 pounds from yesterday. LEs continue with +2-3 edema. Wraps applied this AM. C/O slight chest pressure this AM during provider rounds. EKG and troponin results negative. C/O lightheadedness after that. BPs 110/70 and no orthostatic changes. Patient stated was more tired today than yesterday although was looking forward to visits from family and friends this afternoon. See flowsheets for assessments and additional data.  A: Continued fluid and weight loss on lasix drip. Requiring potassium replacement.  P: Monitor UO and edema. Replace potassium per protocol. Allow verbalization of feelings about health issues and prognosis. Continue current cares and notify providers with issues and concerns.     01/21/17 1930   Fluid Volume Excess   Fluid Volume Excess: Related Risk Factors cardiac changes;disease process;venous return impaired   Signs and Symptoms (Fluid Volume Excess) acute weight gain;edema;heart sound changes;pulmonary congestion/pleural effusion

## 2017-01-22 NOTE — PLAN OF CARE
Problem: Goal Outcome Summary  Goal: Goal Outcome Summary  Outcome: No Change    VSSA, SR with rates in 90s, RA. A&Ox4. No c/o pain/sob overnight. Lasix gtt continues at 10mg/hr. Up to commode/bathroom independently with good UO. Potassium replaced per protocol, recheck with AM labs. BLE ace-wrapped overnight. Plan is for possible R pleural CT for recurring pleural effusions. Continue to monitor and notify [facility] 2 with pertinent changes.

## 2017-01-22 NOTE — PLAN OF CARE
Problem: Goal Outcome Summary  Goal: Goal Outcome Summary  Edema 6C: Wraps removed and measurements taken with 9/12 significant reductions noted bilaterally. Skin remains intact with 2+ pitting and venous discoloration distally. Skin cares performed prior to reapplication of GCB from MTPs to knee creases for continued edema management. Remove if causing pain/numbness.     Recommend patient follow up with OP lymphedema clinic for sizing of custom compression stockings for long term management of chronic LE edema.

## 2017-01-23 ENCOUNTER — APPOINTMENT (OUTPATIENT)
Dept: OCCUPATIONAL THERAPY | Facility: CLINIC | Age: 67
DRG: 291 | End: 2017-01-23
Attending: HOSPITALIST
Payer: COMMERCIAL

## 2017-01-23 LAB
ANION GAP SERPL CALCULATED.3IONS-SCNC: 10 MMOL/L (ref 3–14)
ANION GAP SERPL CALCULATED.3IONS-SCNC: 9 MMOL/L (ref 3–14)
BUN SERPL-MCNC: 15 MG/DL (ref 7–30)
BUN SERPL-MCNC: 20 MG/DL (ref 7–30)
CALCIUM SERPL-MCNC: 9.4 MG/DL (ref 8.5–10.1)
CALCIUM SERPL-MCNC: 9.8 MG/DL (ref 8.5–10.1)
CHLORIDE SERPL-SCNC: 93 MMOL/L (ref 94–109)
CHLORIDE SERPL-SCNC: 93 MMOL/L (ref 94–109)
CO2 SERPL-SCNC: 32 MMOL/L (ref 20–32)
CO2 SERPL-SCNC: 35 MMOL/L (ref 20–32)
CREAT SERPL-MCNC: 0.98 MG/DL (ref 0.52–1.04)
CREAT SERPL-MCNC: 1.12 MG/DL (ref 0.52–1.04)
GFR SERPL CREATININE-BSD FRML MDRD: 49 ML/MIN/1.7M2
GFR SERPL CREATININE-BSD FRML MDRD: 57 ML/MIN/1.7M2
GLUCOSE SERPL-MCNC: 109 MG/DL (ref 70–99)
GLUCOSE SERPL-MCNC: 125 MG/DL (ref 70–99)
INTERPRETATION ECG - MUSE: NORMAL
MAGNESIUM SERPL-MCNC: 2.4 MG/DL (ref 1.6–2.3)
MAGNESIUM SERPL-MCNC: 2.5 MG/DL (ref 1.6–2.3)
POTASSIUM SERPL-SCNC: 3.1 MMOL/L (ref 3.4–5.3)
POTASSIUM SERPL-SCNC: 3.6 MMOL/L (ref 3.4–5.3)
POTASSIUM SERPL-SCNC: 3.6 MMOL/L (ref 3.4–5.3)
SODIUM SERPL-SCNC: 135 MMOL/L (ref 133–144)
SODIUM SERPL-SCNC: 138 MMOL/L (ref 133–144)

## 2017-01-23 PROCEDURE — 25000132 ZZH RX MED GY IP 250 OP 250 PS 637: Performed by: HOSPITALIST

## 2017-01-23 PROCEDURE — 99233 SBSQ HOSP IP/OBS HIGH 50: CPT | Mod: GC | Performed by: PEDIATRICS

## 2017-01-23 PROCEDURE — 36415 COLL VENOUS BLD VENIPUNCTURE: CPT | Performed by: INTERNAL MEDICINE

## 2017-01-23 PROCEDURE — 83735 ASSAY OF MAGNESIUM: CPT | Performed by: HOSPITALIST

## 2017-01-23 PROCEDURE — 36415 COLL VENOUS BLD VENIPUNCTURE: CPT | Performed by: HOSPITALIST

## 2017-01-23 PROCEDURE — 21400006 ZZH R&B CCU INTERMEDIATE UMMC

## 2017-01-23 PROCEDURE — 80048 BASIC METABOLIC PNL TOTAL CA: CPT | Performed by: HOSPITALIST

## 2017-01-23 PROCEDURE — 25000125 ZZHC RX 250: Performed by: INTERNAL MEDICINE

## 2017-01-23 PROCEDURE — 25000125 ZZHC RX 250: Performed by: HOSPITALIST

## 2017-01-23 PROCEDURE — 84132 ASSAY OF SERUM POTASSIUM: CPT | Performed by: INTERNAL MEDICINE

## 2017-01-23 PROCEDURE — 97535 SELF CARE MNGMENT TRAINING: CPT | Mod: GO | Performed by: OCCUPATIONAL THERAPIST

## 2017-01-23 PROCEDURE — 83735 ASSAY OF MAGNESIUM: CPT | Performed by: INTERNAL MEDICINE

## 2017-01-23 PROCEDURE — 40000133 ZZH STATISTIC OT WARD VISIT: Performed by: OCCUPATIONAL THERAPIST

## 2017-01-23 PROCEDURE — 25000128 H RX IP 250 OP 636: Performed by: INTERNAL MEDICINE

## 2017-01-23 RX ADMIN — POTASSIUM CHLORIDE 40 MEQ: 750 TABLET, EXTENDED RELEASE ORAL at 09:12

## 2017-01-23 RX ADMIN — FUROSEMIDE 10 MG/HR: 10 INJECTION, SOLUTION INTRAVENOUS at 13:30

## 2017-01-23 RX ADMIN — ACYCLOVIR 400 MG: 400 TABLET ORAL at 19:46

## 2017-01-23 RX ADMIN — FUROSEMIDE 10 MG/HR: 10 INJECTION, SOLUTION INTRAVENOUS at 02:57

## 2017-01-23 RX ADMIN — ACYCLOVIR 400 MG: 400 TABLET ORAL at 08:36

## 2017-01-23 RX ADMIN — ENOXAPARIN SODIUM 40 MG: 40 INJECTION SUBCUTANEOUS at 11:13

## 2017-01-23 RX ADMIN — SPIRONOLACTONE 25 MG: 25 TABLET ORAL at 08:36

## 2017-01-23 RX ADMIN — POTASSIUM CHLORIDE 20 MEQ: 750 TABLET, EXTENDED RELEASE ORAL at 11:13

## 2017-01-23 RX ADMIN — POTASSIUM CHLORIDE 20 MEQ: 750 TABLET, EXTENDED RELEASE ORAL at 18:07

## 2017-01-23 RX ADMIN — POTASSIUM CHLORIDE 40 MEQ: 750 TABLET, EXTENDED RELEASE ORAL at 08:35

## 2017-01-23 RX ADMIN — CEFTRIAXONE 2 G: 2 INJECTION, POWDER, FOR SOLUTION INTRAMUSCULAR; INTRAVENOUS at 08:37

## 2017-01-23 RX ADMIN — Medication 100 MG: at 08:36

## 2017-01-23 RX ADMIN — FUROSEMIDE 10 MG/HR: 10 INJECTION, SOLUTION INTRAVENOUS at 23:06

## 2017-01-23 NOTE — PROGRESS NOTES
Brief Social Work Note    SW was asked by palliative SW to make copies of pt's HCD.  SW made 5 copies and put a copy in pt's chart to be scanned in.  SW will follow as needed.    MARY Cuellar, APSW  6C Unit   Pager: [phone]  Phone: [phone]

## 2017-01-23 NOTE — PLAN OF CARE
Problem: Goal Outcome Summary  Goal: Goal Outcome Summary  Edema 6C: Wraps removed and measurements taken with 6/12 noted reductions bilaterally. Skin intact with 2+ pitting distally. Patient fit with size G comperm compression stockings from MTPs to proximal knees. Stockings can be worn 23/24 hours per day and should be removed daily for skin cares - remove if causing pain/numbness.

## 2017-01-23 NOTE — PROGRESS NOTES
Internal Medicine Progress Note - [facility]    Leandra Guerrero   MRN: [id number]   : 1950  Date of Admission: 2017  DOS : 2017     S:  No acute events overnight.  Continues to remain off oxygen.  Pleurx canceled for today due to improvement in breathing and diuresis.    Ambulating in halls, but feels a little unstable.  Nursing notes reviewed.      Medications reviewed in Epic    O:  Vitals:   BP 95/55 mmHg  Pulse 90  Temp(Src) 96.3  F (35.7  C) (Axillary)  Resp 16  Wt 60.283 kg (132 lb 14.4 oz)  SpO2 94%     Physical Exam:  General: alert, sitting up in bed, pleasant and in NAD   Cardiovascular: RRR, gallop present  Pulmonary: easy work of breathing on room air, left base is clear, increased breath sounds appreciated at right base   Abdomen: mildly distended, soft, non-tender   Neuro: alert and oriented, moving all extremities  Extremity: lower extremity edema 1-2+ bilaterally (improving)    Labs/Imaging:  Reviewed in [facility].     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 Cohen for IV diuresis due to decompensated heart failure.  Have been managing sepsis secondary to UTI and have been able to resume diuresis, which she is tolerating well.     # sepsis  # gram negative bacteremia  # UTI  Patient noted to be febrile to 102 on .  Blood cultures and urine cultures with E. Coli (pansensitive).  Lactate within normal limits.  Procal 2.05.  Completed treatment course.   - d/c vancomycin, pharmacy to dose ( - 1/15)  - d/c zosyn 4.5 g Q6H ( - 1/15)    - ceftriaxone 2 g Q24H (1/15 - )     # decompensated congestive heart failure, NYHA class IIIB  # cardiac amyloidosis  Most recent ECHO on  with LFEF of 55% and grade III (advanced) diastolic dysfunction with concentric hypertorphy.  Previous coronary  angiogram not suggestive of obstructive CAD.  Evidence of fluid overload on exam with elevated JVD and significant lower extremity edema.  Reason for the decompensation is unclear.  No symptoms to suggest infectious process, except a leukocytosis that could be attributed to stress response.  Troponin elevation, but no EKG changes to suggest myocardial infarction.  BNP elevated at 9009, which is a increased from previous.  Home diuretics were recently increased to torsemide 40 mg BID.  Patient has missed a few doses when she has been at appointments.  Will start lasix drip for more gradual diuresis as patient has had difficulty in the past with a fall that may be related to orthostatics and a rapid fluid shift.  Will also keep patient on telemetry given that conduction abnormalities can occur in amyloid (no beta blockers due to this).     - appreciate Dr. Cohen/cardiology involvement   - lasix 10 mg/hr  - spironolactone 25 mg daily  - potassium chloride 40 mEq daily   - electrolyte replacement protocol  - BID BMP   - telemetry  - cardiac diet, 2 L fluid restriction  - strict I/O's  - daily weights  - goal of 3L net negative    # right sided pleural effusion  Attributed to CHF.  Pleural fluid has been evaluated in the past and was transudate.  Patient received therapeutic thoracentesis every week.  Last thoracentesis was on 1/4.   Discussing with cardiology, oncology and patient about timing of pleurx placement.  At this time deferring placement to outpatient.      - monitor respiratory status (baseline on 2L NC)    # troponin elevation  Elevated to 0.291 on admission.  Trended upwards but stabilized aroud ~ 0.4.  No EKG changes on admission.   Likely demand ischemia in the setting of fluid overload.       # stage IV lambda AK amyloidosis   Diagnosed in 9/2016 with cardiac, GI and bone marrow involvement.  Patient has received CyBorD complication by clare and Carissa.  Case has been discussed with Dr. Cassidy with  hematology/oncology on admission and the patient will not receive chemotherapy at this time.   - palliative care consulted, appreciate recommendations  - acyclovir 400 mg BID for prophylaxis    # constipation  - polyethylene glycol daily  - sennosides BID PRN    FEN: regular diet, 2L fluid restriction, electrolyte replacement protocol  PPX:  - DVT: lovenox 40 mg daily   - PPI: not needed  Code status: DNR/DNI  Dispo: pending treatment of bacteremia and diuresis    Patient seen and discussed with Dr. Magallanes.    Maria E Rodriguez MD, MS  Internal Medicine, PGY-1  [phone]    I have seen this patient with the resident teaching team,  examined patient independently, and agree with above note and exam.    Zak Magallanes MD  Med-Peds Hospitalist, pager [phone]

## 2017-01-23 NOTE — PROGRESS NOTES
After discussions with patient and her primary cardiologist elected to postpone pleurx placement originally scheduled for tomorrow 1/23.  Patient's primary cardiologist will arrange for placement as an outpatient if needed in the future.      MD Radha Ayala 2     I have seen this patient with the resident teaching team,  examined patient independently, and agree with above note and exam.    Zak Magallanes MD  [facility] Hospitalist, pager [phone]

## 2017-01-23 NOTE — PLAN OF CARE
Problem: Goal Outcome Summary  Goal: Goal Outcome Summary  Outcome: No Change  /60 mmHg  Pulse 94  Temp(Src) 97.7  F (36.5  C) (Oral)  Resp 18  Wt 61.508 kg (135 lb 9.6 oz)  SpO2 95%    7232-1628. AVSS. SR. RA. Denies pain. C/O nausea, medicated with Zofran. Had 2 loose brown bm's. Lasix drip at 10mg/hr with good uop. Up to bathroom independently. BLE in lymph wraps.

## 2017-01-23 NOTE — PLAN OF CARE
Problem: Goal Outcome Summary  Goal: Goal Outcome Summary  Outcome: No Change  D/I/A: Patient's VSS. SR/ST. Denies pain. Lasix gtt infusing at 10 mg/hr, good UO. Switched from lymph wraps to compression stockings. Potassium replaced per protocol in addition to scheduled dose, recheck ordered for 1500. Up ad philly, taking several walks around this shift.    P: Continue to monitor and notify MDs as needed.

## 2017-01-23 NOTE — PLAN OF CARE
Problem: Goal Outcome Summary  Goal: Goal Outcome Summary  Outcome: No Change  D: Admitted 1/13 for IV diuresis. Found to have sepsis due to UTI.     I: Lasix gtt at 10mg/hr. K+ 4.0 @2000, replaced with 20mEq per protocol, recheck with AM labs. 2L FR. 2g Na diet. Compression wraps on overnight.    A: A/Ox4. Sinus rhythm 90s. Lungs diminished on RUL/RLL otherwise CTA on RA. Denies pain and nausea. Active bowel sounds. Good urine output. Up independently.   Temp:  [97.4  F (36.3  C)-98.3  F (36.8  C)] 97.8  F (36.6  C)  Pulse:  [90-94] 90  Heart Rate:  [] 92  Resp:  [16-18] 18  BP: ()/(57-68) 108/60 mmHg  SpO2:  [92 %-96 %] 95 %    P: Monitor urine output and edema. Replace K+ as needed per protocol. Continue to assess and monitor with reports of concerns to [facility] 2 team.     Dinora Choudhury RN 01/23/2017 6:04 AM    Hours of Care [phone]

## 2017-01-24 LAB
ANION GAP SERPL CALCULATED.3IONS-SCNC: 10 MMOL/L (ref 3–14)
ANION GAP SERPL CALCULATED.3IONS-SCNC: 8 MMOL/L (ref 3–14)
BUN SERPL-MCNC: 16 MG/DL (ref 7–30)
BUN SERPL-MCNC: 22 MG/DL (ref 7–30)
CALCIUM SERPL-MCNC: 9.2 MG/DL (ref 8.5–10.1)
CALCIUM SERPL-MCNC: 9.8 MG/DL (ref 8.5–10.1)
CHLORIDE SERPL-SCNC: 93 MMOL/L (ref 94–109)
CHLORIDE SERPL-SCNC: 93 MMOL/L (ref 94–109)
CO2 SERPL-SCNC: 32 MMOL/L (ref 20–32)
CO2 SERPL-SCNC: 33 MMOL/L (ref 20–32)
CREAT SERPL-MCNC: 0.87 MG/DL (ref 0.52–1.04)
CREAT SERPL-MCNC: 1.06 MG/DL (ref 0.52–1.04)
ERYTHROCYTE [DISTWIDTH] IN BLOOD BY AUTOMATED COUNT: 15.2 % (ref 10–15)
GFR SERPL CREATININE-BSD FRML MDRD: 52 ML/MIN/1.7M2
GFR SERPL CREATININE-BSD FRML MDRD: 65 ML/MIN/1.7M2
GLUCOSE SERPL-MCNC: 119 MG/DL (ref 70–99)
GLUCOSE SERPL-MCNC: 93 MG/DL (ref 70–99)
HCT VFR BLD AUTO: 44 % (ref 35–47)
HGB BLD-MCNC: 14.3 G/DL (ref 11.7–15.7)
MAGNESIUM SERPL-MCNC: 2.4 MG/DL (ref 1.6–2.3)
MAGNESIUM SERPL-MCNC: 2.6 MG/DL (ref 1.6–2.3)
MCH RBC QN AUTO: 32.1 PG (ref 26.5–33)
MCHC RBC AUTO-ENTMCNC: 32.5 G/DL (ref 31.5–36.5)
MCV RBC AUTO: 99 FL (ref 78–100)
PLATELET # BLD AUTO: 324 10E9/L (ref 150–450)
POTASSIUM SERPL-SCNC: 2.8 MMOL/L (ref 3.4–5.3)
POTASSIUM SERPL-SCNC: 3.8 MMOL/L (ref 3.4–5.3)
RBC # BLD AUTO: 4.45 10E12/L (ref 3.8–5.2)
SODIUM SERPL-SCNC: 133 MMOL/L (ref 133–144)
SODIUM SERPL-SCNC: 136 MMOL/L (ref 133–144)
WBC # BLD AUTO: 5.3 10E9/L (ref 4–11)

## 2017-01-24 PROCEDURE — 25000132 ZZH RX MED GY IP 250 OP 250 PS 637: Performed by: INTERNAL MEDICINE

## 2017-01-24 PROCEDURE — 25000125 ZZHC RX 250: Performed by: INTERNAL MEDICINE

## 2017-01-24 PROCEDURE — 99233 SBSQ HOSP IP/OBS HIGH 50: CPT | Mod: GC | Performed by: PEDIATRICS

## 2017-01-24 PROCEDURE — 85027 COMPLETE CBC AUTOMATED: CPT | Performed by: HOSPITALIST

## 2017-01-24 PROCEDURE — 36415 COLL VENOUS BLD VENIPUNCTURE: CPT | Performed by: HOSPITALIST

## 2017-01-24 PROCEDURE — 99207 ZZC CDG-CORRECTLY CODED, REVIEWED AND AGREE: CPT | Performed by: NURSE PRACTITIONER

## 2017-01-24 PROCEDURE — 80048 BASIC METABOLIC PNL TOTAL CA: CPT | Performed by: HOSPITALIST

## 2017-01-24 PROCEDURE — 99232 SBSQ HOSP IP/OBS MODERATE 35: CPT | Performed by: NURSE PRACTITIONER

## 2017-01-24 PROCEDURE — 83735 ASSAY OF MAGNESIUM: CPT | Performed by: HOSPITALIST

## 2017-01-24 PROCEDURE — 25000132 ZZH RX MED GY IP 250 OP 250 PS 637: Performed by: HOSPITALIST

## 2017-01-24 PROCEDURE — 25000128 H RX IP 250 OP 636: Performed by: INTERNAL MEDICINE

## 2017-01-24 PROCEDURE — 21400006 ZZH R&B CCU INTERMEDIATE UMMC

## 2017-01-24 PROCEDURE — 25000125 ZZHC RX 250: Performed by: HOSPITALIST

## 2017-01-24 RX ORDER — TORSEMIDE 20 MG/1
80 TABLET ORAL 2 TIMES DAILY
Status: DISCONTINUED | OUTPATIENT
Start: 2017-01-24 | End: 2017-01-25 | Stop reason: HOSPADM

## 2017-01-24 RX ORDER — SPIRONOLACTONE 25 MG/1
50 TABLET ORAL DAILY
Status: DISCONTINUED | OUTPATIENT
Start: 2017-01-25 | End: 2017-01-25 | Stop reason: HOSPADM

## 2017-01-24 RX ORDER — SPIRONOLACTONE 25 MG/1
25 TABLET ORAL ONCE
Status: COMPLETED | OUTPATIENT
Start: 2017-01-24 | End: 2017-01-24

## 2017-01-24 RX ADMIN — TORSEMIDE 80 MG: 20 TABLET ORAL at 20:24

## 2017-01-24 RX ADMIN — POTASSIUM CHLORIDE 40 MEQ: 750 TABLET, EXTENDED RELEASE ORAL at 13:45

## 2017-01-24 RX ADMIN — Medication 100 MG: at 10:26

## 2017-01-24 RX ADMIN — POTASSIUM CHLORIDE 20 MEQ: 750 TABLET, EXTENDED RELEASE ORAL at 20:31

## 2017-01-24 RX ADMIN — ACETAMINOPHEN 325 MG: 325 TABLET, FILM COATED ORAL at 11:39

## 2017-01-24 RX ADMIN — TORSEMIDE 80 MG: 20 TABLET ORAL at 12:50

## 2017-01-24 RX ADMIN — SPIRONOLACTONE 25 MG: 25 TABLET ORAL at 10:27

## 2017-01-24 RX ADMIN — ACYCLOVIR 400 MG: 400 TABLET ORAL at 20:24

## 2017-01-24 RX ADMIN — ACYCLOVIR 400 MG: 400 TABLET ORAL at 10:27

## 2017-01-24 RX ADMIN — SPIRONOLACTONE 25 MG: 25 TABLET ORAL at 12:34

## 2017-01-24 RX ADMIN — FUROSEMIDE 10 MG/HR: 10 INJECTION, SOLUTION INTRAVENOUS at 09:36

## 2017-01-24 RX ADMIN — ENOXAPARIN SODIUM 40 MG: 40 INJECTION SUBCUTANEOUS at 11:39

## 2017-01-24 RX ADMIN — POTASSIUM CHLORIDE 40 MEQ: 750 TABLET, EXTENDED RELEASE ORAL at 10:27

## 2017-01-24 RX ADMIN — POTASSIUM CHLORIDE 40 MEQ: 750 TABLET, EXTENDED RELEASE ORAL at 11:39

## 2017-01-24 ASSESSMENT — PAIN DESCRIPTION - DESCRIPTORS: DESCRIPTORS: ACHING

## 2017-01-24 NOTE — PROGRESS NOTES
[facility], [place]   Palliative Care Daily Progress Note          Recommendations, Patient/Family Counseling & Coordination   Was seen and examined and discussed with primary team. She continues to diurese with good success- now down over 40 lbs since admission. Improved functional status/ endurance.   Remains on Lasix infusion at 10 mg per hour.   - Previous plan for Pleural catheter now on hold given diuresis success.   OP follow up options- Palliative home care vs traditional home care.    - [facility] Palliative counselor has seen for HCD completion  - Palliative OP Clinic follow up as desired    - Reinforced dnr/dni status  -  Dr Cohen will continue to follow closely, monitoring aggressive diuresis, further HF therapies  - Palliative care would be happy to participate in IDT care conference if primary team desires.     POLST: Needs to be completed with discharge     Thank you for the opportunity to continue to participate in the care of this patient and family.  Please feel free to contact on-call palliative provider with any emergent needs.  We can be reached via team pager [phone] (answered 8-4:30 Monday-Friday); after-hours answering service ([phone]); Main Palliative Clinic - [facility] 1C: [phone].       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  Cardiac Amyloidosis (ECHO on 1/5 with LFEF of 55% and grade III (advanced) diastolic dysfunction with concentric hypertrophy)  Sepsis, UTI- completing abx  Deconditioning, debility- improved with diuresis     2)  Psychosocial/Spiritual Needs:   Ongoing: Palliative Counselor to see for HCD planning  New: No       Is new assessment/intervention required by palliative team?:  No         Interval History:   Overall feeling better. Up independently in room. Increased activity seems to help with LE  edema mgt.   Verbalized understanding of ongoing diuresis efforts by Cardiology/ Dr Cohen and guidance by primary medicine team. Continues to have restorative goals and continues on IV lasix, - renal function remains stable to facilitate ongoing diuresis. Monitoring bid labs           Review of Systems:   Palliative Symptom Review (0=no symptom/no concern, 1=mild, 2=moderate, 3=severe):      Pain: 0-1      Fatigue: 0      Nausea: 0-1      Constipation: 0-1      Diarrhea: 0      Depressive Symptoms: 0      Anxiety: 0-1      Drowsiness: 0      Poor Appetite: 0-1      Shortness of Breath: 1-2      Insomnia: 0-1                Medications:   I have reviewed this patient's medication profile and medications given in past 24 hours.        {   Physical exam: Vitals: BP 97/63 mmHg  Pulse 90  Temp(Src) 98  F (36.7  C) (Oral)  Resp 18  Wt 60.147 kg (132 lb 9.6 oz)  SpO2 97%  BMI= Body mass index is 23.49 kg/(m^2).  Constitutional: Pleasant woman, conversant- sitting in bed.  Alert, oriented x 3, NAD, conversant- remains off supplemental 02  HEENT: EOMI, oral mucous moist, no scleral icterus.  Cardiovascular: RRR, sinus rhythm, good peripheral perfusion. No murmurs, rubs or gallops  Respiratory: L side clear, R again with decreased bs R mid lung to base- some air movement now whereas there was little to none before. Non-labored breathing on room air. No cyanosis.    Abdominal: less distended, no reported tenderness.  Extremities: 2+ lower extremity edema to mid thigh bilaterally- ace/;ymphedema  wraps off. Distal CMS intact  Neurologic: no tremor, no focal deficits.    Skin: no visible rashes or lesions. Scattered ecchymosis across upper chest            Data Reviewed:   ROUTINE LABS (Last four results)  BMP    Recent Labs  Lab 01/24/17  0650 01/23/17  1657 01/23/17  0700 01/22/17 2009    135 138 136   POTASSIUM 2.8* 3.6  3.6 3.1* 4.0   CHLORIDE 93* 93* 93* 93*   JERROD 9.8 9.4 9.8 8.9   CO2 33* 32 35* 38*   BUN  16 20 15 18   CR 0.87 1.12* 0.98 1.04   * 109* 125* 120*     CBC    Recent Labs  Lab 01/24/17  0650 01/21/17  0617 01/18/17  0710   WBC 5.3  --   --    RBC 4.45  --   --    HGB 14.3  --   --    HCT 44.0  --   --    MCV 99  --   --    MCH 32.1  --   --    MCHC 32.5  --   --    RDW 15.2*  --   --     342 208     INR  No lab results found in last 7 days.

## 2017-01-24 NOTE — PLAN OF CARE
Problem: Goal Outcome Summary  Goal: Goal Outcome Summary  Outcome: No Change  D: Admitted 1/13 for IV diuresis. Found to have sepsis due to UTI.     I: Lasix gtt at 10mg/hr. 2L FR. 2g Na diet. Compression stockings on overnight, pt took stockings off at 0500.     A: A/Ox4. Sinus rhythm 80s-90s. Lungs diminished on RLL otherwise CTA on RA. Denies pain and nausea. Active bowel sounds. Good urine output. Up independently.   Temp:  [96.3  F (35.7  C)-98  F (36.7  C)] 97.6  F (36.4  C)  Heart Rate:  [89-93] 89  Resp:  [16-18] 16  BP: ()/(54-64) 105/64 mmHg  SpO2:  [92 %-95 %] 92 %    P: Monitor urine output and edema. Replace K+ as needed per protocol. Continue to assess and monitor with reports of concerns to [facility] 2 team.     Dinora Choudhury RN 01/24/2017 6:54 AM    Hours of Care [phone]

## 2017-01-25 ENCOUNTER — CARE COORDINATION (OUTPATIENT)
Dept: CARDIOLOGY | Facility: CLINIC | Age: 67
End: 2017-01-25

## 2017-01-25 VITALS
OXYGEN SATURATION: 98 % | WEIGHT: 132.7 LBS | RESPIRATION RATE: 18 BRPM | HEART RATE: 87 BPM | DIASTOLIC BLOOD PRESSURE: 61 MMHG | BODY MASS INDEX: 23.51 KG/M2 | SYSTOLIC BLOOD PRESSURE: 108 MMHG | TEMPERATURE: 97.7 F

## 2017-01-25 LAB
ANION GAP SERPL CALCULATED.3IONS-SCNC: 8 MMOL/L (ref 3–14)
BUN SERPL-MCNC: 20 MG/DL (ref 7–30)
CALCIUM SERPL-MCNC: 9.5 MG/DL (ref 8.5–10.1)
CHLORIDE SERPL-SCNC: 93 MMOL/L (ref 94–109)
CO2 SERPL-SCNC: 34 MMOL/L (ref 20–32)
CREAT SERPL-MCNC: 0.86 MG/DL (ref 0.52–1.04)
GFR SERPL CREATININE-BSD FRML MDRD: 66 ML/MIN/1.7M2
GLUCOSE SERPL-MCNC: 105 MG/DL (ref 70–99)
MAGNESIUM SERPL-MCNC: 2.5 MG/DL (ref 1.6–2.3)
POTASSIUM SERPL-SCNC: 3.1 MMOL/L (ref 3.4–5.3)
SODIUM SERPL-SCNC: 135 MMOL/L (ref 133–144)

## 2017-01-25 PROCEDURE — 25000132 ZZH RX MED GY IP 250 OP 250 PS 637: Performed by: INTERNAL MEDICINE

## 2017-01-25 PROCEDURE — 97535 SELF CARE MNGMENT TRAINING: CPT | Mod: GO | Performed by: OCCUPATIONAL THERAPIST

## 2017-01-25 PROCEDURE — 80048 BASIC METABOLIC PNL TOTAL CA: CPT | Performed by: HOSPITALIST

## 2017-01-25 PROCEDURE — 36415 COLL VENOUS BLD VENIPUNCTURE: CPT | Performed by: HOSPITALIST

## 2017-01-25 PROCEDURE — 40000133 ZZH STATISTIC OT WARD VISIT: Performed by: OCCUPATIONAL THERAPIST

## 2017-01-25 PROCEDURE — 25000132 ZZH RX MED GY IP 250 OP 250 PS 637: Performed by: HOSPITALIST

## 2017-01-25 PROCEDURE — 99239 HOSP IP/OBS DSCHRG MGMT >30: CPT | Mod: GC | Performed by: PEDIATRICS

## 2017-01-25 PROCEDURE — 83735 ASSAY OF MAGNESIUM: CPT | Performed by: HOSPITALIST

## 2017-01-25 PROCEDURE — 25000125 ZZHC RX 250: Performed by: HOSPITALIST

## 2017-01-25 RX ORDER — ACYCLOVIR 400 MG/1
400 TABLET ORAL 2 TIMES DAILY
Qty: 30 TABLET | Refills: 3 | Status: SHIPPED
Start: 2017-01-25 | End: 2017-03-13

## 2017-01-25 RX ORDER — LANOLIN ALCOHOL/MO/W.PET/CERES
100 CREAM (GRAM) TOPICAL DAILY
Qty: 30 TABLET | Refills: 3 | Status: SHIPPED
Start: 2017-01-25 | End: 2017-02-24

## 2017-01-25 RX ORDER — SPIRONOLACTONE 25 MG/1
25 TABLET ORAL 2 TIMES DAILY
Qty: 60 TABLET | Refills: 1 | Status: SHIPPED
Start: 2017-01-25 | End: 2017-03-13

## 2017-01-25 RX ORDER — SPIRONOLACTONE 25 MG/1
25 TABLET ORAL 2 TIMES DAILY
Qty: 60 TABLET | Refills: 1 | Status: SHIPPED
Start: 2017-01-25 | End: 2017-01-25

## 2017-01-25 RX ORDER — TORSEMIDE 20 MG/1
80 TABLET ORAL 2 TIMES DAILY
Qty: 240 TABLET | Refills: 1 | Status: SHIPPED
Start: 2017-01-25 | End: 2017-02-02

## 2017-01-25 RX ORDER — POTASSIUM CHLORIDE 750 MG/1
TABLET, EXTENDED RELEASE ORAL
Qty: 180 TABLET | Refills: 11 | Status: SHIPPED
Start: 2017-01-25 | End: 2017-01-25

## 2017-01-25 RX ORDER — TORSEMIDE 20 MG/1
80 TABLET ORAL 2 TIMES DAILY
Qty: 240 TABLET | Refills: 1 | Status: SHIPPED
Start: 2017-01-25 | End: 2017-01-25

## 2017-01-25 RX ORDER — POTASSIUM CHLORIDE 750 MG/1
TABLET, EXTENDED RELEASE ORAL
Qty: 180 TABLET | Refills: 11 | Status: SHIPPED
Start: 2017-01-25 | End: 2017-02-02

## 2017-01-25 RX ADMIN — TORSEMIDE 80 MG: 20 TABLET ORAL at 08:34

## 2017-01-25 RX ADMIN — Medication 100 MG: at 08:34

## 2017-01-25 RX ADMIN — ACYCLOVIR 400 MG: 400 TABLET ORAL at 08:34

## 2017-01-25 RX ADMIN — ENOXAPARIN SODIUM 40 MG: 40 INJECTION SUBCUTANEOUS at 13:20

## 2017-01-25 RX ADMIN — POTASSIUM CHLORIDE 40 MEQ: 750 TABLET, EXTENDED RELEASE ORAL at 08:34

## 2017-01-25 RX ADMIN — POTASSIUM CHLORIDE 20 MEQ: 750 TABLET, EXTENDED RELEASE ORAL at 10:46

## 2017-01-25 RX ADMIN — POTASSIUM CHLORIDE 40 MEQ: 750 TABLET, EXTENDED RELEASE ORAL at 08:41

## 2017-01-25 RX ADMIN — SPIRONOLACTONE 50 MG: 25 TABLET ORAL at 08:34

## 2017-01-25 NOTE — PLAN OF CARE
Problem: Goal Outcome Summary  Goal: Goal Outcome Summary  Edema 6C: Stockings removed with continued reductions noted. Skin remains intact with 2+ pitting distally. Recommend continued use of size G comperm compression stockings from proximal knee to MTPs for continued edema management. Stockings can be worn 23/24 hours per day however should be removed if causing pain/numbness. Second pair of stockings issued to allow for washing schedule.

## 2017-01-25 NOTE — PROGRESS NOTES
[facility] Internal Medicine Progress Note    eLandra Guerrero   MRN: [id number]    :1950  Date of Admission:2017  DOS : 2017     Interval History:      No acute events overnight, ambulating several times around the marie and noting significant improvement in edema. Tolerating diet, off oxygen, no new concerns.     Medications: reviewed in Epic    Objective:  Vitals : /61 mmHg  Pulse 90  Temp(Src) 97.4  F (36.3  C) (Oral)  Resp 18  Wt 60.147 kg (132 lb 9.6 oz)  SpO2 96%  General: alert, sitting up in bed, pleasant and in NAD    Cardiovascular: RRR, gallop present  Pulmonary: easy work of breathing on room air, left base is clear, increased breath sounds appreciated at right base compared to prior   Abdomen: mildly distended, soft, non-tender    Neuro: alert and oriented, moving all extremities  Extremity: lower extremity edema 1+ bilaterally (primarily in feet, much improved)    Personally reviewed labs and imaging in Epic     Assessment:  Leandra Guerrero is a 66 year old female with PMHx of 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 Cohen for IV diuresis due to decompensated heart failure.  Have been managing sepsis secondary to UTI and have been able to resume diuresis, which she is tolerating well.     # sepsis  # gram negative bacteremia  # UTI  Patient noted to be febrile to 102 on .  Blood cultures and urine cultures with E. Coli (pansensitive).  Lactate within normal limits.  Procal 2.05.  Completed treatment course with ceftriaxone .      # decompensated congestive heart failure, NYHA class IIIB  # cardiac amyloidosis  Most recent ECHO on  with LFEF of 55% and grade III (advanced) diastolic dysfunction with concentric hypertorphy.  Previous coronary angiogram not suggestive of obstructive CAD.  Evidence of fluid overload on exam with elevated JVD and significant  lower extremity edema.  Reason for the decompensation is unclear.  No symptoms to suggest infectious process, except a leukocytosis that could be attributed to stress response.  Troponin elevation, but no EKG changes to suggest myocardial infarction.  BNP elevated at 9009, which is a increased from previous.  Home diuretics were recently increased to torsemide 40 mg BID.  Patient has missed a few doses when she has been at appointments.  Started on lasix drip for more gradual diuresis as patient has had difficulty in the past with a fall that may be related to orthostatics and a rapid fluid shift.  Will also keep patient on telemetry given that conduction abnormalities can occur in amyloid (no beta blockers due to this).     - appreciate Dr. Cohen/cardiology involvement    - discontinue lasix 10 mg/hr  - start torsemide 80 md BID  - spironolactone increased to 50 mg daily  - potassium chloride 40 mEq daily    - electrolyte replacement protocol  - BID BMP    - telemetry  - cardiac diet, 2 L fluid restriction  - strict I/O's  - daily weights  - goal of 3L net negative    # right sided pleural effusion  Attributed to CHF.  Pleural fluid has been evaluated in the past and was transudate.  Patient received therapeutic thoracentesis every week.  Last thoracentesis was on 1/4.   Discussing with cardiology, oncology and patient about timing of pleurx placement.  At this time deferring placement to outpatient.       - monitor respiratory status (baseline on 2L NC at home, now on RA)    # troponin elevation  Elevated to 0.291 on admission.  Trended upwards but stabilized aroud ~ 0.4.  No EKG changes on admission.   Likely demand ischemia in the setting of fluid overload.       # stage IV lambda AK amyloidosis   Diagnosed in 9/2016 with cardiac, GI and bone marrow involvement.  Patient has received CyBorD complication by Ramy.  Case has been discussed with Dr. Cassidy with hematology/oncology on admission and  the patient will not receive chemotherapy at this time.   - palliative care consulted, appreciate recommendations  - acyclovir 400 mg BID for prophylaxis    # constipation  - polyethylene glycol daily  - sennosides BID PRN    FEN: regular diet, 2L fluid restriction, electrolyte replacement protocol  PPX:  - DVT: lovenox 40 mg daily    - PPI: not needed  Code status: DNR/DNI  Dispo: pending treatment of bacteremia and diuresis    Patient seen and discussed with Dr. Magallanes.  Mary Casey MD   Med-Peds PGY-3    I have seen this patient with the resident teaching team,  examined patient independently, and agree with above note and exam.    Zak Magallanes MD  Med-Peds Hospitalist, pager [phone]

## 2017-01-25 NOTE — PROGRESS NOTES
KATHIA: lambda amylodosis stage IV, (09/16) with cardiac ,GI and bone marrow involvement, (LFEF 55%), admited due to decompensated HF per MD note.  VSS, afebrile,SR with HR 90, Sat 96% on RA. BLE +2, stocking on. Lungs diminished on R side on mid to base. Pt noticed that BLE edema increased, compression stockings applied, please ask OT to apply LW today.Potassium (3.8) replaced.Denies any pain,nausea,palpitation,  Plan : Continue to monitor,notify MD as needed.

## 2017-01-25 NOTE — PROGRESS NOTES
DISCHARGE                         1/25/2017  2:56 PM  ----------------------------------------------------------------------------  Discharged to: Home  Via: Automobile  Accompanied by:   Discharge Instructions: diet, activity, medications, follow up appointments, when to call the MD, aftercare instructions, and what to watchout for (i.e. s/s of infection, increasing SOB, palpitations, chest pain,)  Prescriptions: To be filled by [place] discharge pharmacy per pt's request; medication list reviewed & sent with pt  Follow Up Appointments: arranged; information given  Belongings: All sent with pt  IV: out  Telemetry: off  Pt exhibits understanding of above discharge instructions; all questions answered.    Discharge Paperwork: Signed, copied, and sent home with patient.

## 2017-01-25 NOTE — PLAN OF CARE
Problem: Goal Outcome Summary  Goal: Goal Outcome Summary  Outcome: No Change  VSS, SR.  Pt complained of a headache made better by tylenol.  Lasix gtt stopped at 1100.  Potassium (2.8) replaced.  Pending recheck potassium.  Pt up independent, ambulating in hallway.  Plan to possibly discharge tomorrow.  Will continue to monitor and notify physician with any concerns or questions.

## 2017-01-25 NOTE — PROGRESS NOTES
"[facility]  \"Hello, my name is Sommer Hanley , and I am calling from the [facility].  I want to check in and see how you are doing, after leaving the hospital.  You may also receive a call from your Care Coordinator (care team), but I want to make sure you don t have any urgent needs.  I have a couple questions to review with you:     Post-Discharge Outreach                                                    Leandra Guerrero is a 66 year old female     Follow-up Appointments      [facility]ult [facility] Follow-up and recommended labs and tests        1. Follow up with Dr. Domenica Cohen on Thursday 2/2/17. You will need to get labs drawn on Friday 1/27/17 (basic metabolic panel). After discharge, please check your weights daily. Dr. Cohen's nurse will be calling you to check up on how things are going.       2. Follow up with your PCP within 7 days for a hospital follow-up.     Appointments on [place] and/or [facility] (with [facility] provider or service). Call [phone] if you haven't heard regarding these appointments within 7 days of discharge.                       Your next 10 appointments already scheduled      Feb 01, 2017 10:00 AM   LAB with LAB ONC [facility]      [address] [place] [id number] [phone]               Patient must bring picture ID.  Patient should be prepared to give a urine specimen  Please do not eat 10-12 hours before your appointment if you are coming in fasting for labs on lipids, cholesterol, or glucose (sugar).  Pregnant women should follow their Care Team instructions. Water with medications is okay. Do not drink coffee or other fluids.   If you have concerns about taking  your medications, please ask at office or if scheduling via [employer], send a message by clicking on Secure Messaging, Message Your Care Team.               Feb 02, 2017  7:30 AM   Lab " with [facility]   [facility] ([facility])      [address]  1st Floor  [facility] [id number] [phone]               Feb 02, 2017  8:00 AM   (Arrive by 7:45 AM)   RETURN HEART FAILURE with Domenica Cohen MD   [facility] ([facility])      [address]  3rd Floor  [facility] [id number] [phone]               Feb 09, 2017 12:00 PM   Return Visit with Mirela Moore MD   [facility]      [address]  [facility] [id number] [phone]               Mar 22, 2017  9:30 AM   CYSTO with Magen Kowalski MD   [facility])                  Care Team:    Patient Care Team       Relationship Specialty Notifications Start End    Magy Obrien PCP - General Family Practice  9/30/16     Phone: [phone] Fax: [phone]         [facility] [address] [facility] [address]    Mirela Moore MD MD Hematology & Oncology Admissions 9/30/16     Phone: [phone] Fax: [phone]         [facility] [address] [facility] [address]    Domenica Cohen MD MD Cardiology  10/3/16     Phone: [phone] Fax: [phone]         [address] [facility] [address]    Vidhi Dorsey, RN Nurse Coordinator Cardiology Admissions 10/3/16     Phone: [phone] Fax: [phone]        Magy Gar, RN Nurse Coordinator Cardiology Admissions 1/9/17     Phone: [phone] Pager: [phone] Fax: [phone]               Transition of Care Review                                                      Did you have a surgery or procedure during your hospital visit? No   If yes, do you have any of the following:     Signs of infection:  No    Pain:  No     Pain Scale (0-10) 0/10     Location: NA    Wound/incision concerns? NA    Do you have all of your  medications/refills?  Yes    Are you having any side effects or questions about your medication(s)? No    Do you have any new or worsening symptoms?  Yes- patient notes she lost 2 1/2 pounds overnight    Do you have any future appointments scheduled?   Yes       Next 5 appointments (look out 90 days)     Feb 09, 2017 12:00 PM   Return Visit with Mirela Moore MD   [facility])    [address] [place] [id number] [phone]                      Plan                                                      Thanks for your time.  Your Care Coordinator will follow-up within the next couple days.  In the meantime if you have questions, concerns or problems call your care team.        Sommer Hanley

## 2017-01-26 ENCOUNTER — TELEPHONE (OUTPATIENT)
Dept: PHARMACY | Facility: OTHER | Age: 67
End: 2017-01-26

## 2017-01-26 NOTE — TELEPHONE ENCOUNTER
MTM referral from: Transitions of Care (recent hospital discharge or ED visit)    MTM referral outreach attempt #1 on January 26, 2017 at 11:21 AM      Outcome: Patient is not interested at this time because she states her medications have not changed too much and feels it is unnecessary at this time, will route to MTM Pharmacist/Provider as an FYI. Thank you for the referral.     Mini Belle, MTM Coordinator Intern

## 2017-01-27 PROCEDURE — 80048 BASIC METABOLIC PNL TOTAL CA: CPT | Performed by: INTERNAL MEDICINE

## 2017-01-27 NOTE — DISCHARGE SUMMARY
[facility] Discharge Summary    Leandra Guerrero MRN# [id number]   Age: 66 year old YOB: 1950     Date of Admission:  1/13/2017  Date of Discharge::  1/25/2017  2:56 PM  Admitting Physician:  Rosa Cárdenas DO  Discharge Physician:  Bhaskar Magallanes MD, Briseida Ritchie MD, Mary Casey MD     PCP: Magy Obrien          Admission Diagnoses:   CHF (congestive heart failure) (H)  Right Pleural Effusion           Discharge Diagnosis:   Decompensated congestive heart failure, NYHA class IIIB  Acute on chronic diastolic heart failure   Cardiac amyloidosis  Right Pleural Effusion   stage IV lambda AK amyloidosis  Troponin Elevation   Sepsis secondary to urinary tract infection   E. Coli bacteremia           Procedures:   None           Medications Prior to Admission:        Prescriptions prior to admission    Medication  Sig  Dispense  Refill  Last Dose       spironolactone (ALDACTONE) 25 MG tablet  Take 1 tablet (25 mg) by mouth daily  30 tablet  3  Taking       torsemide (DEMADEX) 20 MG tablet  Take 2 tablets (40 mg) by mouth 2 times daily  120 tablet  3  Taking       potassium chloride SA (K-DUR/KLOR-CON M) 10 MEQ CR tablet  Take 40meq (4 pills) in the morning, and 20meq (2 pills) in the afternoon  180 tablet  11  Taking       dexamethasone (DECADRON) 4 MG tablet  Take 10 tablets (40 mg) by mouth every 7 days for 4 doses Take daily on Days 1, 8, 15, and 22. Cycles 3 and 4 ONLY.  40 tablet  0  Unknown at Unknown time       acyclovir (ZOVIRAX) 400 MG tablet  Take 1 tablet (400 mg) by mouth 2 times daily Viral Prophylaxis.  60 tablet  0  Taking       Acetaminophen (TYLENOL PO)  Take 325 mg by mouth      Taking       HYDROcodone-acetaminophen (NORCO) 5-325 MG per tablet      0  Taking       LORazepam (ATIVAN) 0.5 MG tablet  TAKE ONE-HALF TO ONE TABLET BY MOUTH ONCE DAILY AT BEDTIME AS NEEDED    0  Taking       ondansetron (ZOFRAN-ODT) 4 MG disintegrating tablet      0  Taking                          Discharge Medications:     Discharge Medication List as of 1/25/2017  2:08 PM      START taking these medications    Details   thiamine 100 MG tablet Take 1 tablet (100 mg) by mouth daily, Disp-30 tablet, R-3, Fax         CONTINUE these medications which have CHANGED    Details   acyclovir (ZOVIRAX) 400 MG tablet Take 1 tablet (400 mg) by mouth 2 times daily, Disp-30 tablet, R-3, Fax      spironolactone (ALDACTONE) 25 MG tablet Take 1 tablet (25 mg) by mouth 2 times daily, Disp-60 tablet, R-1, Fax      potassium chloride SA (K-DUR/KLOR-CON M) 10 MEQ CR tablet Take 40meq (4 pills) in the morning, and 20meq (2 pills) in the afternoon., Disp-180 tablet, R-11, Fax      torsemide (DEMADEX) 20 MG tablet Take 4 tablets (80 mg) by mouth 2 times daily, Disp-240 tablet, R-1, Fax         CONTINUE these medications which have NOT CHANGED    Details   Acetaminophen (TYLENOL PO) Take 325 mg by mouth, Historical      HYDROcodone-acetaminophen (NORCO) 5-325 MG per tablet R-0, Historical      LORazepam (ATIVAN) 0.5 MG tablet TAKE ONE-HALF TO ONE TABLET BY MOUTH ONCE DAILY AT BEDTIME AS NEEDED, R-0, Historical      ondansetron (ZOFRAN-ODT) 4 MG disintegrating tablet R-0, Historical         STOP taking these medications       dexamethasone (DECADRON) 4 MG tablet Comments:   Reason for Stopping:                     Consultations:   Consultation during this admission received from cardiology, palliative care, and pulmonary medicine          Brief History of Illness:   Leandra Guerrero is a 66 year old female with PMHx of stage IV lambda AK amyloidosis diagnosed in september 2016 with cardiac, GI and bone marrow inolvement.  She has been undergoing CyBorD complicated by hematuria and BorD chemotherapy (started 10/4/16) with good response as measured by light chains in serum.  She has been struggling for the last two months with volume overload secondary to her cardiac involvement and has a chronic right sided pleural effusion that has  required repeated thoracentesis.  Patient notes increased chest pressure and SOB.  Last thoracentesis was 1/4.  Prior to that she has been getting thoracentesis weekly.  She denies any chest pain or palpitations.  She notes at headache predominately on the top of her head associated with some nausea. Patient was admitted from clinic per her cardiologist Dr. Cohen for IV diuresis.            Hospital Course:   # sepsis  # gram negative bacteremia  # UTI  After admission, Ms. Guerrero was noted to be febrile to 102 on 1/13.  Blood cultures and urine cultures grew E. Coli (pansensitive), and she completed a 10-day treatment course of ceftriaxone 1/23.      # decompensated congestive heart failure, NYHA class IIIB  # cardiac amyloidosis  Most recent ECHO on 1/5 with LFEF of 55% and grade III (advanced) diastolic dysfunction with concentric hypertorphy.  Previous coronary angiogram not suggestive of obstructive CAD.  Evidence of fluid overload on exam with elevated JVD, significant lower extremity edema, and elevated BNP.  Reason for the decompensation was most likely related to underlying UTI vs missing a few doses of diuretics. She did have a troponin elevation, but no EKG changes to suggest myocardial infarction. She was started on a lasix drip for more gradual diuresis ( as she had had trouble with dizziness/orthostasis with bolus dosing). Cardiology (her primary cardiologist Dr. Cohen was consulted during the admission to aid in diuresis). Ms. Guerrero spent 10 days on IV diuretics (lasix drip) in addition to spironolactone, and lost a total of 10 kg this hospitalization. She was transitioned to Torsemide and spironolactone at discharge (please see d/c meds for details). No  conduction abnormalities were noted while on tele (monitored closely due to their incidence in cardiac amyloid disease). She is not to take beta blockers due to this reason.     # right sided pleural effusion  Ms Guerrero has a recurrent  right-sided pleural effusion attributed to CHF, which has been drained on several occasions. Last thoracentesis was on 1/4 before admission, and she did not have a thoracentesis during this hospitalization. Several discussions were had with cardiology, Ms. Fawad primary oncologist, and the family about timing of pleurX catheter placement.  At this time deferring placement to outpatient, and she is discharging on room air.         # troponin elevation  Elevated to 0.291 on admission.  Trended upwards but stabilized aroud ~ 0.4.  No EKG changes on admission, and attributed to likely demand ischemia in the setting of fluid overload.       # stage IV lambda AK amyloidosis   Diagnosed in 9/2016 with cardiac, GI and bone marrow involvement.  Patient has received CyBorD complication by clare and Carissa.  Case has been discussed with Dr. Cassidy with hematology/oncology on admission and the patient will not receive chemotherapy at this time. Palliative care was consulted and met with the patient to discuss her goals of care; at this time she is DNR/DNI .    Exam on the day of discharge:   /61 mmHg  Pulse 87  Temp(Src) 97.7  F (36.5  C) (Oral)  Resp 18  Wt 60.192 kg (132 lb 11.2 oz)  SpO2 98%  General: alert, sitting up in bed, pleasant and in NAD    Cardiovascular: RRR, gallop present  Pulmonary: easy work of breathing on room air, left base is clear, increased breath sounds appreciated at right base compared to prior    Abdomen: mildly distended, soft, non-tender    Neuro: alert and oriented, moving all extremities  Extremity: lower extremity edema 1+ bilaterally (primarily in feet, much improved)         Discharge Instructions and Follow-Up:   Discharge diet: Fluid restriction 2000 ML FLUID   2 Gram Sodium Diet    Discharge activity: Activity as tolerated   Discharge follow-up: 1. Follow up with Dr. Domenica Cohen on Thursday 2/2/17. You will need to get labs drawn on Friday 1/27/17 (basic metabolic  panel). After discharge, please check your weights daily. Dr. Cohen's nurse will be calling you to check up on how things are going.     2. Follow up with your PCP within 7 days for a hospital follow-up.              Discharge Disposition:   Discharged to home with home care       Mary Casey MD     I have seen this patient with the resident teaching team,  examined patient independently, and agree with above note and exam.  > 35 minutes spent on care of patient day of discharge, including 15 face to face and 15 in coordination of care    Zak Magallanes MD  Med-Peds Hospitalist, pager [phone]

## 2017-01-29 LAB
ANION GAP SERPL CALCULATED.3IONS-SCNC: 6 MMOL/L (ref 3–14)
ANION GAP SERPL CALCULATED.3IONS-SCNC: 9 MMOL/L (ref 3–14)
BUN SERPL-MCNC: 24 MG/DL (ref 7–30)
BUN SERPL-MCNC: 29 MG/DL (ref 7–30)
CALCIUM SERPL-MCNC: 10.2 MG/DL (ref 8.5–10.1)
CALCIUM SERPL-MCNC: 9.8 MG/DL (ref 8.5–10.1)
CHLORIDE SERPL-SCNC: 95 MMOL/L (ref 94–109)
CHLORIDE SERPL-SCNC: 95 MMOL/L (ref 94–109)
CO2 SERPL-SCNC: 32 MMOL/L (ref 20–32)
CO2 SERPL-SCNC: 36 MMOL/L (ref 20–32)
CREAT SERPL-MCNC: 1.13 MG/DL (ref 0.52–1.04)
CREAT SERPL-MCNC: 1.33 MG/DL (ref 0.52–1.04)
GFR SERPL CREATININE-BSD FRML MDRD: 40 ML/MIN/1.7M2
GFR SERPL CREATININE-BSD FRML MDRD: 48 ML/MIN/1.7M2
GLUCOSE SERPL-MCNC: 89 MG/DL (ref 70–99)
GLUCOSE SERPL-MCNC: 90 MG/DL (ref 70–99)
POTASSIUM SERPL-SCNC: 3.7 MMOL/L (ref 3.4–5.3)
POTASSIUM SERPL-SCNC: 4.2 MMOL/L (ref 3.4–5.3)
SODIUM SERPL-SCNC: 136 MMOL/L (ref 133–144)
SODIUM SERPL-SCNC: 137 MMOL/L (ref 133–144)

## 2017-01-29 PROCEDURE — 80048 BASIC METABOLIC PNL TOTAL CA: CPT | Performed by: INTERNAL MEDICINE

## 2017-01-30 ENCOUNTER — CARE COORDINATION (OUTPATIENT)
Dept: CARDIOLOGY | Facility: CLINIC | Age: 67
End: 2017-01-30

## 2017-01-30 DIAGNOSIS — E85.4 CARDIAC AMYLOIDOSIS (H): Primary | ICD-10-CM

## 2017-01-30 DIAGNOSIS — I50.33 ACUTE ON CHRONIC DIASTOLIC HEART FAILURE (H): ICD-10-CM

## 2017-01-30 DIAGNOSIS — I43 CARDIAC AMYLOIDOSIS (H): Primary | ICD-10-CM

## 2017-01-30 NOTE — PROGRESS NOTES
Patient called to give weights and symptoms update. Patient states her weight today was 128.5 (1/30).  She is feeling ok, no swelling in her ankles.  She states she is having some lightheadedness when standing so she has not really done anything today.  Her BP check yesterday was 105/60.  She has a nurse coming to her house Wednesday and wants her Noel labs drawn that day. Told patient she needed a CMP that day and she could get it drawn Wednesday.  Message routed to Faby DOMINGO.    Norah Llamas, RN, BSN  Cardiology Care Coordinator  [facility]  [email or web address]  [phone]

## 2017-01-30 NOTE — PROGRESS NOTES
Called patient to check-in after recent discharge. Labs on Friday showed increased creatinine. Will want to review weights and symptoms.    Notes for return call-discharged 1/25 with wt 132, wt on admission 157 on torsemide 80 BID and aldactone 25 BID (want to confirm she's taking this in the afternoon to avoid having to get up frequently during the night to urinate). Labs are scheduled 2/1 at [facility], she can have Dr Cohen labs drawn then as well rather than prior to clinic on 2/2.

## 2017-01-31 NOTE — PROGRESS NOTES
See previous note. Patient is down about 4 lbs since discharge. Reviewed labs and weights with Dr. Cohen. Plan to continue with current plan at this time. Repeat labs tomorrow as planned with serum and urine immunofixation, urine will likely have to be collected at the [facility] on Thursday.     Called patient to inform her of plan. She has been more lightheaded today which is preventing her from getting out of the house to run errands. Encouraged her to drink more fluids today to see if this helps but would like her to call back tomorrow if there is no improvement. Left message for homecare RN informing of need for labs tomorrow.

## 2017-01-31 NOTE — PROGRESS NOTES
Quick Note:    I spoke with the Patient via telephone call and communicated these results.     Vidhi Dorsey  ______

## 2017-02-01 ENCOUNTER — PRE VISIT (OUTPATIENT)
Dept: CARDIOLOGY | Facility: CLINIC | Age: 67
End: 2017-02-01

## 2017-02-01 LAB
ALBUMIN SERPL-MCNC: 3.6 G/DL (ref 3.4–5)
ALP SERPL-CCNC: 163 U/L (ref 40–150)
ALT SERPL W P-5'-P-CCNC: 24 U/L (ref 0–50)
ANION GAP SERPL CALCULATED.3IONS-SCNC: 10 MMOL/L (ref 3–14)
AST SERPL W P-5'-P-CCNC: 27 U/L (ref 0–45)
BILIRUB SERPL-MCNC: 1.6 MG/DL (ref 0.2–1.3)
BUN SERPL-MCNC: 28 MG/DL (ref 7–30)
CALCIUM SERPL-MCNC: 9.3 MG/DL (ref 8.5–10.1)
CHLORIDE SERPL-SCNC: 93 MMOL/L (ref 94–109)
CO2 SERPL-SCNC: 32 MMOL/L (ref 20–32)
CREAT SERPL-MCNC: 1.1 MG/DL (ref 0.52–1.04)
GFR SERPL CREATININE-BSD FRML MDRD: 50 ML/MIN/1.7M2
GLUCOSE SERPL-MCNC: 103 MG/DL (ref 70–99)
POTASSIUM SERPL-SCNC: 3.7 MMOL/L (ref 3.4–5.3)
PROT SERPL-MCNC: 6.8 G/DL (ref 6.8–8.8)
SODIUM SERPL-SCNC: 135 MMOL/L (ref 133–144)

## 2017-02-01 PROCEDURE — 82784 ASSAY IGA/IGD/IGG/IGM EACH: CPT | Performed by: INTERNAL MEDICINE

## 2017-02-01 PROCEDURE — 80053 COMPREHEN METABOLIC PANEL: CPT | Performed by: INTERNAL MEDICINE

## 2017-02-01 PROCEDURE — 86334 IMMUNOFIX E-PHORESIS SERUM: CPT | Performed by: INTERNAL MEDICINE

## 2017-02-01 PROCEDURE — 86335 IMMUNFIX E-PHORSIS/URINE/CSF: CPT | Performed by: INTERNAL MEDICINE

## 2017-02-02 ENCOUNTER — OFFICE VISIT (OUTPATIENT)
Dept: CARDIOLOGY | Facility: CLINIC | Age: 67
End: 2017-02-02
Attending: INTERNAL MEDICINE
Payer: COMMERCIAL

## 2017-02-02 VITALS
WEIGHT: 132.8 LBS | SYSTOLIC BLOOD PRESSURE: 108 MMHG | BODY MASS INDEX: 23.53 KG/M2 | OXYGEN SATURATION: 97 % | HEART RATE: 83 BPM | DIASTOLIC BLOOD PRESSURE: 67 MMHG | HEIGHT: 63 IN

## 2017-02-02 DIAGNOSIS — I50.33 ACUTE ON CHRONIC DIASTOLIC HEART FAILURE (H): Primary | ICD-10-CM

## 2017-02-02 DIAGNOSIS — I43 CARDIAC AMYLOIDOSIS (H): ICD-10-CM

## 2017-02-02 DIAGNOSIS — E85.4 CARDIAC AMYLOIDOSIS (H): ICD-10-CM

## 2017-02-02 DIAGNOSIS — E87.6 HYPOKALEMIA: ICD-10-CM

## 2017-02-02 LAB
IGA SERPL-MCNC: 65 MG/DL (ref 70–380)
IGG SERPL-MCNC: 685 MG/DL (ref 695–1620)
IGM SERPL-MCNC: 91 MG/DL (ref 60–265)
IMMUNOFIXATION ELP: ABNORMAL
PROT ELPH PNL UR ELPH: NORMAL

## 2017-02-02 PROCEDURE — 99213 OFFICE O/P EST LOW 20 MIN: CPT | Mod: 25,ZF

## 2017-02-02 PROCEDURE — 99214 OFFICE O/P EST MOD 30 MIN: CPT | Mod: GC | Performed by: INTERNAL MEDICINE

## 2017-02-02 PROCEDURE — 99213 OFFICE O/P EST LOW 20 MIN: CPT

## 2017-02-02 RX ORDER — POTASSIUM CHLORIDE 750 MG/1
20 TABLET, EXTENDED RELEASE ORAL 2 TIMES DAILY
Qty: 120 TABLET | Refills: 11 | Status: SHIPPED | OUTPATIENT
Start: 2017-02-02 | End: 2018-02-25

## 2017-02-02 RX ORDER — TORSEMIDE 20 MG/1
60 TABLET ORAL 2 TIMES DAILY
Qty: 180 TABLET | Refills: 3 | Status: SHIPPED | OUTPATIENT
Start: 2017-02-02 | End: 2017-02-20

## 2017-02-02 ASSESSMENT — PAIN SCALES - GENERAL: PAINLEVEL: NO PAIN (0)

## 2017-02-02 NOTE — MR AVS SNAPSHOT
After Visit Summary   2/2/2017    Leandra Guerrero    MRN: [id number]           Patient Information     Date Of Birth          1950        Visit Information        Provider Department      2/2/2017 8:00 AM Domenica Cohen MD [facility]        Today's Diagnoses     Acute on chronic diastolic heart failure (H)    -  1     Hypokalemia           Care Instructions    You were seen today in the Cardiovascular Clinic at the [facility].     Cardiology Providers you saw during your visit:  Dr. Cohen    Recommendations:  Do not take Torsemide tonight  Starting tomorrow decrease Torsemide to 60 mg twice per day  Decrease Potassium to 20 meq (2 pills) in the AM and PM  If your dizziness does not improve in the next couple days or your weight increases more than 2 lbs please call or write [employer] message  We will start the referral process to [facility] to see Dr. Fischer  Eat a heart healthy, low sodium diet.  Get 20 to 30 minutes of aerobic exercise 4 to 5 times per week as tolerated. (Examples of aerobic exercise include: walking, bicycling, swimming, running).    Follow-up:  With Dr. Cohen in 4-5 weeks with labs    With GREY Mackay in 2 weeks with labs    Results:      Orders Only on 02/01/2017   Component Date Value Ref Range Status     Sodium 02/01/2017 135  133 - 144 mmol/L Final     Potassium 02/01/2017 3.7  3.4 - 5.3 mmol/L Final     Chloride 02/01/2017 93* 94 - 109 mmol/L Final     Carbon Dioxide 02/01/2017 32  20 - 32 mmol/L Final     Anion Gap 02/01/2017 10  3 - 14 mmol/L Final     Glucose 02/01/2017 103* 70 - 99 mg/dL Final     Urea Nitrogen 02/01/2017 28  7 - 30 mg/dL Final     Creatinine 02/01/2017 1.10* 0.52 - 1.04 mg/dL Final     GFR Estimate 02/01/2017 50* >60 mL/min/1.7m2 Final    Non  GFR Calc     GFR Estimate If Black 02/01/2017 60* >60 mL/min/1.7m2 Final    African American GFR Calc     Calcium 02/01/2017 9.3  8.5 - 10.1 mg/dL Final      Bilirubin Total 02/01/2017 1.6* 0.2 - 1.3 mg/dL Final     Albumin 02/01/2017 3.6  3.4 - 5.0 g/dL Final     Protein Total 02/01/2017 6.8  6.8 - 8.8 g/dL Final     Alkaline Phosphatase 02/01/2017 163* 40 - 150 U/L Final     ALT 02/01/2017 24  0 - 50 U/L Final     AST 02/01/2017 27  0 - 45 U/L Final       For emergencies call 911.    For any scheduling needs, please call [phone], option #1 then option # 1.    Thank you for entrusting us with your care!     Please call if you have any questions or concerns.    Faby Dorsey RN  Cardiology Care Coordinator  [phone], press option # 1 to be routed to the [place] then option # 3 for medical questions to speak with a nurse    If you have an urgent need after business hours or over the weekend please call [phone] and ask for the cardiology fellow on call.     If you have a hard time paying for your medications visit http://www.needymeds.org/ to see where you might be able to get your medications the cheapest along with patient assistance programs available.    Heart failure patients and family members are welcome you to come to one of our heart failure support group meetings on the following dates from 1-2 pm :12/5/16. These all take place on the 8th floor of the [facility] hospital building in the [facility] Conference Room. Let us know if you have any questions.          Follow-ups after your visit        Your next 10 appointments already scheduled     Feb 09, 2017 12:00 PM   Return Visit with Mirela Moore MD   [facility] ([facility])    [address] [address] [place] [address] [phone]            Feb 20, 2017  8:30 AM   LAB with  LAB   [facility] ([facility])    [address] [address] [phone]           Patient must bring picture ID.  Patient should be prepared to give a urine specimen  Please do not eat 10-12 hours before your  appointment if you are coming in fasting for labs on lipids, cholesterol, or glucose (sugar).  Pregnant women should follow their Care Team instructions. Water with medications is okay. Do not drink coffee or other fluids.   If you have concerns about taking  your medications, please ask at office or if scheduling via [employer], send a message by clicking on Secure Messaging, Message Your Care Team.            Feb 20, 2017  9:00 AM   (Arrive by 8:45 AM)   Return Visit with Codie Nelson NP   [facility] ([facility])    [address] [place] [id number] [phone]            Mar 02, 2017 11:30 AM   LAB with  LAB   [facility] ([facility])    [address] [place] [id number] [phone]           Patient must bring picture ID.  Patient should be prepared to give a urine specimen  Please do not eat 10-12 hours before your appointment if you are coming in fasting for labs on lipids, cholesterol, or glucose (sugar).  Pregnant women should follow their Care Team instructions. Water with medications is okay. Do not drink coffee or other fluids.   If you have concerns about taking  your medications, please ask at office or if scheduling via [employer], send a message by clicking on Secure Messaging, Message Your Care Team.            Mar 02, 2017 12:00 PM   (Arrive by 11:45 AM)   RETURN HEART FAILURE with Domenica Cohen MD   [facility] ([facility])    [address] [place] [id number] [phone]            Mar 22, 2017  9:30 AM   CYSTO with Magen Kowalski MD   [facility] ([facility])    [id number] [place] [address] [phone]              Who to contact     If you have questions or need follow up information about today's clinic visit or your schedule please contact [facility] directly at [phone].  Normal or non-critical lab and imaging results will be communicated to you by [employer], letter or phone within 4 business days after the clinic has received the results. If you do not hear from us within 7 days, please contact the clinic through [employer] or phone. If you have a critical or abnormal lab result, we will notify you by phone as soon as possible.  Submit refill requests through [employer] or call your pharmacy and they will forward the refill request to us. Please allow 3 business days for your refill to be completed.          Additional Information About Your Visit        [employer] Information     [employer] gives you secure access to your electronic health record. If you see a primary care provider, you can also send messages to your care team and make appointments. If you have questions, please call your primary care clinic.  If you do not have a primary care provider, please call [phone] and they will assist you.        Care EveryWhere ID     This is your Care EveryWhere ID. This could be used by other organizations to access your [facility] medical records  [id number]        Your Vitals Were     Pulse Height BMI (Body Mass Index) Pulse Oximetry          83 1.6 m (5' 3\") 23.53 kg/m2 97%         Blood Pressure from Last 3 Encounters:   02/02/17 108/67   01/25/17 108/61   01/12/17 131/70    Weight from Last 3 Encounters:   02/02/17 60.238 kg (132 lb 12.8 oz)   01/25/17 60.192 kg (132 lb 11.2 oz)   01/12/17 71.305 kg (157 lb 3.2 oz)              Today, you had the following     No orders found for display         Today's Medication Changes          These changes are accurate as of: 2/2/17  8:53 AM.  If you have any questions, ask your nurse or doctor.               These medicines have changed or have updated prescriptions.        Dose/Directions    potassium chloride SA 10 MEQ CR tablet   Commonly known as:  K-DUR/KLOR-CON M   This may have changed:    - how much to " take  - how to take this  - when to take this  - additional instructions   Used for:  Hypokalemia   Changed by:  Domenica Cohen MD        Dose:  20 mEq   Take 2 tablets (20 mEq) by mouth 2 times daily Do not fill until requested   Quantity:  120 tablet   Refills:  11       torsemide 20 MG tablet   Commonly known as:  DEMADEX   This may have changed:  how much to take   Used for:  Acute on chronic diastolic heart failure (H)   Changed by:  Domenica Cohen MD        Dose:  60 mg   Take 3 tablets (60 mg) by mouth 2 times daily   Quantity:  180 tablet   Refills:  3            Where to get your medicines      These medications were sent to [place] [address] [place] [facility] [address]   [address] [facility] [address]     Phone:  [phone]    - potassium chloride SA 10 MEQ CR tablet  - torsemide 20 MG tablet             Primary Care Provider Office Phone # Fax #    Magy MclaughlinCleanifyze [phone]       [facility] [address]  [facility] [address]        Thank you!     Thank you for choosing [facility]  for your care. Our goal is always to provide you with excellent care. Hearing back from our patients is one way we can continue to improve our services. Please take a few minutes to complete the written survey that you may receive in the mail after your visit with us. Thank you!             Your Updated Medication List - Protect others around you: Learn how to safely use, store and throw away your medicines at www.disposemymeds.org.          This list is accurate as of: 2/2/17  8:53 AM.  Always use your most recent med list.                   Brand Name Dispense Instructions for use    acyclovir 400 MG tablet    ZOVIRAX    30 tablet    Take 1 tablet (400 mg) by mouth 2 times daily       HYDROcodone-acetaminophen 5-325 MG per tablet    NORCO         LORazepam 0.5 MG tablet    ATIVAN     TAKE ONE-HALF TO ONE TABLET BY MOUTH ONCE DAILY AT BEDTIME AS NEEDED        ondansetron 4 MG ODT tab    ZOFRAN-ODT         potassium chloride SA 10 MEQ CR tablet    K-DUR/KLOR-CON M    120 tablet    Take 2 tablets (20 mEq) by mouth 2 times daily Do not fill until requested       spironolactone 25 MG tablet    ALDACTONE    60 tablet    Take 1 tablet (25 mg) by mouth 2 times daily       thiamine 100 MG tablet     30 tablet    Take 1 tablet (100 mg) by mouth daily       torsemide 20 MG tablet    DEMADEX    180 tablet    Take 3 tablets (60 mg) by mouth 2 times daily       TYLENOL PO      Take 325 mg by mouth

## 2017-02-02 NOTE — PATIENT INSTRUCTIONS
You were seen today in the Cardiovascular Clinic at the [facility].     Cardiology Providers you saw during your visit:  Dr. Cohen    Recommendations:  Do not take Torsemide tonight  Starting tomorrow decrease Torsemide to 60 mg twice per day  Decrease Potassium to 20 meq (2 pills) in the AM and PM  If your dizziness does not improve in the next couple days or your weight increases more than 2 lbs please call or write [employer] message  We will start the referral process to [facility] to see Dr. Fischer  Eat a heart healthy, low sodium diet.  Get 20 to 30 minutes of aerobic exercise 4 to 5 times per week as tolerated. (Examples of aerobic exercise include: walking, bicycling, swimming, running).    Follow-up:  With Dr. Cohen in 4-5 weeks with labs    With GREY Mackay in 2 weeks with labs    Results:      Orders Only on 02/01/2017   Component Date Value Ref Range Status     Sodium 02/01/2017 135  133 - 144 mmol/L Final     Potassium 02/01/2017 3.7  3.4 - 5.3 mmol/L Final     Chloride 02/01/2017 93* 94 - 109 mmol/L Final     Carbon Dioxide 02/01/2017 32  20 - 32 mmol/L Final     Anion Gap 02/01/2017 10  3 - 14 mmol/L Final     Glucose 02/01/2017 103* 70 - 99 mg/dL Final     Urea Nitrogen 02/01/2017 28  7 - 30 mg/dL Final     Creatinine 02/01/2017 1.10* 0.52 - 1.04 mg/dL Final     GFR Estimate 02/01/2017 50* >60 mL/min/1.7m2 Final    Non  GFR Calc     GFR Estimate If Black 02/01/2017 60* >60 mL/min/1.7m2 Final    African American GFR Calc     Calcium 02/01/2017 9.3  8.5 - 10.1 mg/dL Final     Bilirubin Total 02/01/2017 1.6* 0.2 - 1.3 mg/dL Final     Albumin 02/01/2017 3.6  3.4 - 5.0 g/dL Final     Protein Total 02/01/2017 6.8  6.8 - 8.8 g/dL Final     Alkaline Phosphatase 02/01/2017 163* 40 - 150 U/L Final     ALT 02/01/2017 24  0 - 50 U/L Final     AST 02/01/2017 27  0 - 45 U/L Final       For emergencies call 911.    For any scheduling needs, please call [phone], option #1 then  option # 1.    Thank you for entrusting us with your care!     Please call if you have any questions or concerns.    Faby Dorsey RN  Cardiology Care Coordinator  [phone], press option # 1 to be routed to the [place] then option # 3 for medical questions to speak with a nurse    If you have an urgent need after business hours or over the weekend please call [phone] and ask for the cardiology fellow on call.     If you have a hard time paying for your medications visit http://www.needymeds.org/ to see where you might be able to get your medications the cheapest along with patient assistance programs available.    Heart failure patients and family members are welcome you to come to one of our heart failure support group meetings on the following dates from 1-2 pm :12/5/16. These all take place on the 8th floor of the our hospital building in the [facility] Cafeteria Conference Room. Let us know if you have any questions.

## 2017-02-02 NOTE — NURSING NOTE
Chief Complaint   Patient presents with     Follow Up For     66 yr old female with h/o cardiac amyloidosis presenting for follow up

## 2017-02-02 NOTE — PROGRESS NOTES
February 2, 2017    HPI:  Mrs. Guerrero is a 66 year old female with no past medical history until the Spring of 2016 when she was diagnosed with stage IV AL amyloid; she presents to clinic for follow up of cardiac amyloid.     Her cardiac and oncologic history are as follows:   Fatigue started in January 2016. She eventually had a coronary angiogram which was reportedly normal and was diagnosed with CHF and started on a bblocker and diuretics. Her symptoms progressed to the point that she was evaluated at [place] in August/September (Dr. Barron in oncology/hematology, Dr. Nettles is cardiology). She was diagnosed with metastatic stage IV AL amyloid (+GI, +bone marrow involvement, not thought to have involvement of kidney or liver):    Her work up is detailed in my past note however she has lambda AL amyloidosis and has been undergoing CyBorD chemotherapy and excellent light chain response by serum. She has been turned down at [facility] for [facility] stem cell transplant due to her cardiac involvement.    When I first met her about 2 months ago, she had definite evidence of cardiac amyloid, although she did not have volume overload at that time, her shortness of breath was minimal and she had several reassuring cardiac features including normal filling pressures on echo and again no elevation in her neck veins. She was seen earlier in January and was grossly volume overloaded, was admitted from 1/13 to 1/25 for IV diuresis and lost ~10-12 kg of fluid, she had been discharged on a stable dose of 80 BID torsemide and has been doing well maintaining her weight as an outpatient. There was brief discussion of pleurx on discharge given her pleural effusion, but this seems to have improved with diuresis. She is working with PT, and is able to negotiate a flight of 8 steps without dyspnea. Her only complaint is lightheadedness which has persisted since her discharge. No syncope.      PAST MEDICAL HISTORY:  - Metastatic AL amyloid   - GI  (stomach and duodenal) involvement  - Cardiac amyloidosis      FAMILY HISTORY:  Family History   Problem Relation Age of Onset     Other - See Comments Sister      Amyloidosis   - Sister passed from multiple myeloma, patient was also told her sister had amyloid as well  - Mom with CAD      SOCIAL HISTORY:  Social History     Social History     Marital Status:      Spouse Name: N/A     Number of Children: N/A     Years of Education: N/A     Social History Main Topics     Smoking status: Never Smoker      Smokeless tobacco: None     Alcohol Use: No     Drug Use: No     Sexual Activity: Not Asked     Other Topics Concern     None     Social History Narrative   Lives in [place] with her , has 2 kids  Previously did office work, hasn't worked for several months now  Never smoker  Social etoh    CURRENT MEDICATIONS:  Current Outpatient Prescriptions   Medication Sig Dispense Refill     torsemide (DEMADEX) 20 MG tablet Take 3 tablets (60 mg) by mouth 2 times daily 180 tablet 3     acyclovir (ZOVIRAX) 400 MG tablet Take 1 tablet (400 mg) by mouth 2 times daily 30 tablet 3     spironolactone (ALDACTONE) 25 MG tablet Take 1 tablet (25 mg) by mouth 2 times daily 60 tablet 1     potassium chloride SA (K-DUR/KLOR-CON M) 10 MEQ CR tablet Take 40meq (4 pills) in the morning, and 20meq (2 pills) in the afternoon. 180 tablet 11     thiamine 100 MG tablet Take 1 tablet (100 mg) by mouth daily 30 tablet 3     Acetaminophen (TYLENOL PO) Take 325 mg by mouth       HYDROcodone-acetaminophen (NORCO) 5-325 MG per tablet   0     LORazepam (ATIVAN) 0.5 MG tablet TAKE ONE-HALF TO ONE TABLET BY MOUTH ONCE DAILY AT BEDTIME AS NEEDED  0     ondansetron (ZOFRAN-ODT) 4 MG disintegrating tablet   0     [DISCONTINUED] torsemide (DEMADEX) 20 MG tablet Take 4 tablets (80 mg) by mouth 2 times daily 240 tablet 1       ROS:   Constitutional: No fever, chills, or sweats. 35lb weight loss over 6 months  ENT: No visual disturbance, ear  "ache, epistaxis, sore throat.   Allergies/Immunologic: Negative.   Respiratory: No cough, hemoptysis.   Cardiovascular: As per HPI.   GI: + Nausea and severe constipation. Early satiety   : No urinary frequency, dysuria, or hematuria.   Integument: Negative.   Psychiatric: feeling down and anxious since her diagnosis  Neuro: + tingling in legs bilaterally  Endocrinology: Negative.   Musculoskeletal: Negative.    EXAM:  /67 mmHg  Pulse 83  Ht 1.6 m (5' 3\")  Wt 60.238 kg (132 lb 12.8 oz)  BMI 23.53 kg/m2  SpO2 97%  General: appears comfortable, alert and articulate  Head: normocephalic, atraumatic  Eyes: anicteric sclera, EOMI  Neck: no adenopathy  Orophyarynx: moist mucosa, no lesions, dentition intact  Heart: regular rate and rhythm. III/VI holosystolic murmur at the apex, no visible JVD  Lungs: clear bilaterally  Abdomen: soft, non-tender, bowel sounds present, no hepatosplenomegaly  Extremities: mild edema non pitting at the ankles   Neurological: normal speech and affect, no gross motor deficits    Labs:  CBC RESULTS:  Lab Results   Component Value Date    WBC 5.3 01/24/2017    RBC 4.45 01/24/2017    HGB 14.3 01/24/2017    HCT 44.0 01/24/2017    MCV 99 01/24/2017    MCH 32.1 01/24/2017    MCHC 32.5 01/24/2017    RDW 15.2* 01/24/2017     01/24/2017       CMP RESULTS:  Lab Results   Component Value Date     02/01/2017    POTASSIUM 3.7 02/01/2017    CHLORIDE 93* 02/01/2017    CO2 32 02/01/2017    ANIONGAP 10 02/01/2017    * 02/01/2017    BUN 28 02/01/2017    CR 1.10* 02/01/2017    GFRESTIMATED 50* 02/01/2017    GFRESTBLACK 60* 02/01/2017    JERROD 9.3 02/01/2017    BILITOTAL 1.6* 02/01/2017    ALBUMIN 3.6 02/01/2017    ALKPHOS 163* 02/01/2017    ALT 24 02/01/2017    AST 27 02/01/2017        INR RESULTS:  Lab Results   Component Value Date    INR 1.30* 01/14/2017       Lab Results   Component Value Date    MAG 2.5* 01/25/2017     Lab Results   Component Value Date    NTBNPI 9009* " 01/13/2017     Lab Results   Component Value Date    NTBNP 2883* 01/05/2017       Echo from today:   Left Ventricle  Biplane LVEF 55%. Global and regional left ventricular function is normal  with an EF of 55-60%. Left ventricular size is normal. Moderate to severe  concentric wall thickening consistent with left ventricular hypertrophy is  present. Consistent with amyloid cardiomyopathy. Grade III or advanced  diastolic dysfunction. Global peak LV longitudinal strain is averaged at -  12.5%. This suggests abnormal strain (normal <-18%). No regional wall motion  abnormalities are seen.     Right Ventricle  The right ventricle is normal size. Global right ventricular function is  mildly reduced.  Atria  The right atria appears normal. Mild left atrial enlargement is present.     Mitral Valve  The mitral valve is normal. Mild to moderate mitral insufficiency is present.     Aortic Valve  Aortic valve is normal in structure and function. Mild aortic insufficiency  is present.     Tricuspid Valve  The tricuspid valve is normal. Mild to moderate tricuspid insufficiency is  present. The right ventricular systolic pressure is approximated at 23.7 mmHg  plus the right atrial pressure.     Pulmonic Valve  The pulmonic valve is normal. Mild pulmonic insufficiency is present.     Vessels  The inferior vena cava is normal. The aorta root is normal. The pulmonary  artery cannot be assessed.  Pericardium  Small circumferential pericardial effusion is present without any hemodynamic  significance.     Compared to Previous Study  This study was compared with the study from 10/6/2016 .     ______________________________________________________________________________  MMode/2D Measurements & Calculations  IVSd: 1.6 cm  LVIDd: 3.6 cm  LVIDs: 2.6 cm  LVPWd: 1.4 cm  FS: 28.0 %  EDV(Teich): 56.1 ml  ESV(Teich): 25.2 ml  LV mass(C)d: 202.4 grams       Doppler Measurements & Calculations  MV E max ella: 82.3 cm/sec  MV A max ella: 40.1  cm/sec  MV E/A: 2.1  MV dec time: 0.14 sec  TR max ella: 243.4 cm/sec  TR max P.7 mmHg  Lateral E/e': 17.2  Medial E/e': 18.3     ______________________________________________________________________________    Previous cardiac monitor; no a fib, no VT, no pauses       Assessment and Plan:   Mrs. 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     Patient has cardiac amyloidosis as evidenced by her echocardiogram (severe concentric hypertrophy and biatrial enlargement) and abnormal EKG (near-low voltage, poor R wave progression, biatrial enlargement and no evidence of LVH despite very thickened LV on echo) in the setting of biopsy proven (BMB, EGD) AL amyloid. Her positive biomarkers are also suggestive of cardiac involvement; angiogram without obstructive CAD. She had been undergoing chemotherapy with CyBorD with control of her light chains.     Overall I am somewhat encouraged by how good she looks with all of the fluid removed in her and she has  been successful at keeping the weight off since her discharge on 80 mg BID of torsemide. In fact based on her exam today she appears dry. Her NYHA class is III today, Stage C, she is dry. We will skip her dose of diuretic tonight and decrease to 60 mg BID tomorrow, if her LH does not improve or she regains weight, we will resume prior dose and add midodrine for orthostasis.     We still recommend holding any/all AV thor agents given the risk of progressive AV block/conduction disease in these patients.     We are still awaiting her serum and urine immunofixation results today to see if she could potentially benefit from more amyloid treatment. We are also going to refer her to my colleague Harjit Fischer at [facility] in [place] for another opinion.     1. Cardiac Amyloidosis with preserved EF  Stage C  NYHA Class  III  ACEi/ARB None  BB None, relatively contraindicated due to risk of progressive conduction disease/AV block in these patients  Aldosterone antagonist: aldactone 25  SCD prophylaxis: not indicated  % BiV pacing: NA  Fluid status: somewhat dry  -- decrease torsemide to 60 BID tomorrow, skip dose tonight    Other:  Arrhythmia monitoring -- no arrhythmia noted on ~2 weeks of telemetry during her last hospitalization.    Jaspreet Cr  Cardiovascular fellow  [phone]    I have seen and examined the patient with the house staff on February 2, 2017 and agree with the outlined assessment and plan.      Domenica Cohen MD   of Medicine   [facility] Division of Cardiology         CC  Patient Care Team:  Magy Obrien as PCP - General (Family Practice)  Mirela Daley MD as MD (Hematology & Oncology)  Domenica Cohen MD as MD (Cardiology)  Vidhi Dorsey, RN as Nurse Coordinator (Cardiology)  MIRELA DALEY  Answers for HPI/ROS subm

## 2017-02-02 NOTE — NURSING NOTE
Labs: Patient was given results of the laboratory testing obtained today.  Patient demonstrated understanding of this information and agreed to call with further questions or concerns.   Return Appointment: Patient given instructions regarding scheduling next clinic visit. Patient demonstrated understanding of this information and agreed to call with further questions or concerns.  Medication Change: Patient was educated regarding prescribed medication change, including discussion of the indication, administration, side effects, and when to report to MD or RN. Patient demonstrated understanding of this information and agreed to call with further questions or concerns.  Patient stated she understood all health information given and agreed to call with further questions or concerns.

## 2017-02-09 ENCOUNTER — RADIANT APPOINTMENT (OUTPATIENT)
Dept: GENERAL RADIOLOGY | Facility: CLINIC | Age: 67
End: 2017-02-09
Attending: INTERNAL MEDICINE
Payer: COMMERCIAL

## 2017-02-09 ENCOUNTER — ONCOLOGY VISIT (OUTPATIENT)
Dept: ONCOLOGY | Facility: CLINIC | Age: 67
End: 2017-02-09
Payer: COMMERCIAL

## 2017-02-09 VITALS
DIASTOLIC BLOOD PRESSURE: 62 MMHG | HEART RATE: 92 BPM | RESPIRATION RATE: 18 BRPM | SYSTOLIC BLOOD PRESSURE: 115 MMHG | WEIGHT: 137 LBS | TEMPERATURE: 97 F | OXYGEN SATURATION: 98 % | BODY MASS INDEX: 24.27 KG/M2 | HEIGHT: 63 IN

## 2017-02-09 DIAGNOSIS — E85.4 CARDIAC AMYLOIDOSIS (H): ICD-10-CM

## 2017-02-09 DIAGNOSIS — J90 PLEURAL EFFUSION: Primary | ICD-10-CM

## 2017-02-09 DIAGNOSIS — I43 CARDIAC AMYLOIDOSIS (H): ICD-10-CM

## 2017-02-09 DIAGNOSIS — J90 PLEURAL EFFUSION: ICD-10-CM

## 2017-02-09 PROCEDURE — 36415 COLL VENOUS BLD VENIPUNCTURE: CPT | Performed by: INTERNAL MEDICINE

## 2017-02-09 PROCEDURE — 86335 IMMUNFIX E-PHORSIS/URINE/CSF: CPT | Performed by: INTERNAL MEDICINE

## 2017-02-09 PROCEDURE — 99215 OFFICE O/P EST HI 40 MIN: CPT | Performed by: INTERNAL MEDICINE

## 2017-02-09 PROCEDURE — 71020 XR CHEST 2 VW: CPT | Performed by: RADIOLOGY

## 2017-02-09 PROCEDURE — 82784 ASSAY IGA/IGD/IGG/IGM EACH: CPT | Performed by: INTERNAL MEDICINE

## 2017-02-09 PROCEDURE — 86334 IMMUNOFIX E-PHORESIS SERUM: CPT | Performed by: INTERNAL MEDICINE

## 2017-02-09 ASSESSMENT — PAIN SCALES - GENERAL: PAINLEVEL: NO PAIN (0)

## 2017-02-09 NOTE — NURSING NOTE
"Leandra Guerrero is a 66 year old female who presents for:  Chief Complaint   Patient presents with     Oncology Clinic Visit     1 mo f/u, labs done on 2/1        Initial Vitals:  /62 mmHg  Pulse 92  Temp(Src) 97  F (36.1  C) (Oral)  Resp 18  Ht 1.6 m (5' 2.99\")  Wt 62.143 kg (137 lb)  BMI 24.27 kg/m2  SpO2 98% Estimated body mass index is 24.27 kg/(m^2) as calculated from the following:    Height as of this encounter: 1.6 m (5' 2.99\").    Weight as of this encounter: 62.143 kg (137 lb).. Body surface area is 1.66 meters squared. BP completed using cuff size: regular  No Pain (0) No LMP recorded. Patient is postmenopausal. Allergies and medications reviewed.     Medications: Medication refills not needed today.  Pharmacy name entered into [facility]: [id number] [place] [id number]    Comments:     8 minutes for nursing intake (face to face time)   SANJU ORNELAS LPN          "

## 2017-02-09 NOTE — PROGRESS NOTES
Hematology/Oncology Follow-up visit:  Date on this visit: 2/9/2017      Referring Physician: Dr. Braydon Barron, [facility].  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 She was diagnosed with CHF at a local clinic in [facility] and was placed on a b-blocker and a diuretic. She has also developed progressive worsening lower extremity edema by May 2016 which in retrospect started in January. Her cardiac angiogram was reportedly negative at that time. She has lost about 35 lbs in the last 6 months. She has significant nausea somewhat controlled with Zofran but she is reluctant to use it because of constipation too. She is on Senna-S one tablet PO BID for constipation and that is improved but still significant. She has very poor PO intake due to nausea and lack of appetite. She requested to be seen at [facility] and was seen by Dr. Braydon Barron on 9/13/2016 with f/up on 9/20/2016. She was also seen by cardiology team there Sara Severson CNP and Dr. Nettles from CHF service.  There was no e/o renal or hepatic amyloidosis.  EKG on 9/7/2016 showed normal sinus rhythm, prolonged QT interval (QTc 521 sec), left atrial enlargement and left anterior fascicular block. There was ST and T wave abnormality.  Apparently, her echocardiogram showed preserved LVEF at 59%.  She reports having R sided thoracentesis on 8/25/2016 after which her breathing has improved. She then had a CXR on 9/7/2016 which showed a small R pleural effusion with right basilar atelectasis. Cardiac silhouette was at the upper level of normal.  I have reviewed extensive outside medical records but we are still in the process of obtaining additional records.  The patient also underwent a minor salivary gland biopsy of the lower lip. The pathology from that procedure (9/20/2016) showed normal salivary gland tissue with nonspecific  chronic sialadenitis.  She had extensive lab workup on 9/7/2016.  Her TSH was normal. Serum protein ELP showed a small abnormality in the gamma fraction.  Serum immunofixation showed small monoclonal IgA lambda in the gamma fraction and a small lambda in the beta fraction. Serum IgA level was normal at 329. Serum IgG level was mildly low at 755 (normal range 767-1590) and IgM level was normal. B2 microglobulin was mildly elevated at 2.94 (ULN 2.7). Mg was normal. Total bilirubin was elevated at 3.9 and direct at 0.8. Uric acid was mildly elevated at 6.9 (ULN 6.1) Creat was 1.0. Troponin was 0.1 (ULN <0.01) and NT-pro BNP was 4747 pg/ml (ULN <=183). Total cholesterol was 190 and LDL was 139. Random urine protein was mildly elevated at 33 mg/dl (normal range <22). INR was 1.2 and PTT 27 (within normal limits). Fibrinogen was elevated at 441 and factor X was mildly decreased at 64% (normal range %). D-dimer was up at 1700 (). Free lambda light chains were 69.5 and free kappa light chains were 1.11 with FLC kappa/lambda ratio of 0.016.   CBC showed normal WBC at 6.0, slightly elevated Hb at 16. Platelet count was normal.  Flow cytometry on bone marrow biopsy showed detectable monotypic plasma cells. There were 12% of monotypic plasma cells on bone marrow biopsy.    The bone marrow biopsy (performed on 9/14/2016) showed normocellular BM, 30-40% cellularity, with involvement by 10-20% of lambda light chain restricted plasma cells.  Congo red stain was positive on the bone marrow biopsy. Liquid chromatography tandem mass spectroscopy showed a peptide profile c/w AL (lambda type) amyloid. BM karyotype was normal 46 XX. FISH on BM showed plasma cell clone with 1q duplication, and monosomy of 13 and 14.  She had an EGD by Dr. Matthew Wadsworth on 9/14/2016. It showed gastric mucosal atrophy and multiple mucosal papules (nodules) seen in the stomach. The duodenum appeared normal. The biopsies of stomach mucosa, of  stomach (antral) nodules and of duodenum, all showed the presence of amyloid in submucosal blood vessel walls in the stomach and duodenum and in deep mucosa in the stomach antrum and body, confirmed by Congo Red stain.   She also had additional labs done at our last visit, on 9/30/2016. Total bilirubin was elevated as below, primarily indirect. K was 3.3 and she was stared on K-dur 20 mEq PO bid. She was noted to have elevated serum IgA level on 9/30/2016 at 392 (). Serum free lambda chains were elevated at 37.75. Serum M spike was 0.1 g/dl and serum FLC ratio was low at 0.01. Serum immunofixation ELP showed monoclonal IgA immunoglobulin of lambda light chain type as well as monoclonal free immunoglobulin light chain of lambda chain type.  She was seen by Dr. Cohen too and had an echocardiogram on 10/6/2016 which showed:  Global and regional left ventricular function was normal with an EF of 60-65%.  The LV mass index was 131 g/m2 (severely increased.) The LV geometry is c/w  concentric hypertrophy measured by relative wall thickness. Global right ventricular function was normal. Severe biatrial enlargement was present.  Right ventricular systolic pressure was 26mmHg above the right atrial pressure. The inferior vena cava was dilated at 2.1 cm without respiratory variability, consistent with increased right atrial pressure. Trivial pericardial effusion was present.    She proceeded to start CyBorD as recommended by Dr. Barron with dose reduced in subq Bortezomib to 0.7 mg/m2, on 10/4/2016.  After one cycle, her M spike, IgA level and free lambda light chains have all improved, with M spike of 0 g/dl, IgA down into the normal range and free serum lambda light chains improving from 37.75 down to 2.09.  She returns today in cycle 3 day 11. She had amyloid labs again on day 1 of cycle 3 (12/8/2016) which showed continued response to therapy and her IgA level was now low and serum FLC ratio was normal and serum  free lambda chains were normal too.   She has developed hematuria with cycle 2 and Cytoxan is on hold from day 15 cycle 2 since hematuria persisted despite Mesnex. She had a CT abdomen/pelvis on 11/29/2016 which showed no abnormalities in the kidneys. There was a moderate right and a trace left sided pleural effusions. Small volume ascites and anasarca was noted as well. The liver had heterogeneous appearance, which can be seen in hepatic venous congestion.   Cytoxan was d/cd  on  11/22/2016 since her cystoscopy showed findings of mild petechia and erythema in her bladder that would be c/w hemorrhagic cystitis.  Pinch biopsies were taken from the bladder and showed no e/o amyloid or malignancy. She has continued on Velcade and dexamethasone until 12/27/2016.  On 1/3/2017 she was seen at [facility] by Dr. Barron and at that time serum free light chains were normal at 0.74 and FLC ratio was normal as well at 0.72. She was recommended to be on observation because free lambda chains normalized. However, her cardiac amyloidosis has progressed by 1/5/2017 and she has required frequent thoracentesis, and had weeping LE edema and elevated neck veins.  Her echocardiogram on 1/5/2017 was abnormal (severe concentric hypertrophy and biatrial enlargement) and she also had abnormal EKG (near-low voltage, poor R wave progression, biatrial enlargement and no evidence of LVH despite very thickened LV on echo). She was felt to have  New York Heart Association class IIIB heart failure with clinically severe volume overload.     History Of Present Illness:  Ms. Guerrero is a 66 year old female, non-smoker, who presents for f/up of AL amyloidosis.  Since our last visit on 1/12/2017, she had a prolonged hospitalization at [facility] from 1/13/2017-1/25/2017 and I have been in communication with her inpatient team on multiple occasions.  On admission she had a positive E.Coli UTI and bacteremia and was treated with Ceftriaxone for 10 days.  She was diuresed aggressively during the hospital admission and was on IV Lasix drip for 10 days in addition to aldactone. She lost about 10 kg. She was d/cd on torsemide and aldactone.  She did not require thoracentesis during her admission. Last thoracentesis for recurrent R pleural effusion was on 2017. Palliative care was consulted while she was inpatient. Per d/c summary she agreed to be DNR/DNI but she would like to be observed closely and was not and is not ready to sign up for home hospice. Her CXR was on 2017 and showed unchanged moderate right sided pleural effusion on the right. She is home and has been doing PT exercises. She was seen by Dr. Cohen on 2017 and was on 80 mg of Torsemide BID at discharge which was decreased to 60 mg PO BID on 2017. She is also on aldactone 25 mg PO BID. She is still feeling somewhat lightheaded. Nausea has resolved. She is pain free. She has gained a couple lbs since discharge.   She had an echocardiogram in 2017 which showed LVEF of 55-60%. LVH and moderate to severe concentric wall thickening was present, c/w amyloid cardiomyopathy. Grade III diastolic dysfunction was present. Right ventricular function was mildly reduced.   She also had home O2 evaluation done while in the hospital during prior admission, for which she qualified for home oxygen. She tried to use supplemental O2 at night but it did not help. Serum immunofixation on 2017 showed free light chain lambda. Urine immunofixation was negative. Serum IgA level was low.   [place] is feeling overall much better since diuresis.   In addition, a complete 12 point  review of systems is negative.      Past Medical/Surgical History:  AL Amyloid  Amyloid cardiomyopathy/CHF    Family History:  Sister  of multiple myeloma    Social History:  Lives in [place], with . Nonsmoker        Allergies:     Allergies   Allergen Reactions     Contrast Dye Shortness Of Breath     Shaking,chills  "and dypsnea     Cytoxan [Cyclophosphamide]      Hemorrhagic cystitis     Levofloxacin      Severe shaking and abdominal pain     Amoxicillin Nausea and Vomiting and Cramps     Current Medications:  Current Outpatient Prescriptions   Medication     potassium chloride SA (K-DUR/KLOR-CON M) 10 MEQ CR tablet     acyclovir (ZOVIRAX) 400 MG tablet     spironolactone (ALDACTONE) 25 MG tablet     thiamine 100 MG tablet     Acetaminophen (TYLENOL PO)     HYDROcodone-acetaminophen (NORCO) 5-325 MG per tablet     LORazepam (ATIVAN) 0.5 MG tablet     ondansetron (ZOFRAN-ODT) 4 MG disintegrating tablet     torsemide (DEMADEX) 20 MG tablet     No current facility-administered medications for this visit.      Physical Exam:  /62 mmHg  Pulse 92  Temp(Src) 97  F (36.1  C) (Oral)  Resp 18  Ht 1.6 m (5' 2.99\")  Wt 62.143 kg (137 lb)  BMI 24.27 kg/m2  SpO2 98%      GENERAL APPEARANCE: middle aged, alert and no distress     HENT: Mouth without ulcers or lesions     NECK: no adenopathy, no asymmetry or masses     LYMPHATICS: No cervical, supraclavicular, axillary or inguinal lymphadenopathy     RESP: LL- CTA. RL - decreased BS on the right 1/3 way up - no rales, rhonchi or wheezes     CARDIOVASCULAR: regular rates and rhythm, normal S1 S2, no S3 or S4 and no murmur.     ABDOMEN:  soft, nontender, no HSM or masses and bowel sounds normal. + mild to moderate ascites.     MUSCULOSKELETAL: extremities normal- no gross deformities noted, no evidence of inflammation in joints, FROM in all extremities. +3 edema b/l LE,improved  compared to last visit.  Wearing compression stockings to b/l LE.     SKIN: no suspicious lesions or rashes     PSYCHIATRIC: mentation appears normal and affect normal    Laboratory/Imaging Studies  Labs reviewed and documented in the EMR.      ASSESSMENT/PLAN:  Leandra Guerrero is a 66 year old woman with  of AL amyloidosis involving the heart and GI tract. Unfortunately, she had stage IV AL amyloidosis at " diagnosis in September 2016, according to revised Rios Criteria (NT-proBNP >1800 ng/l, troponin >0.025 and difference between free lambda and kappa light chain >18). Stage IV amyloidosis is associated with median survival of 5 months in patients not undergoing BMT, and 5 year survival of 15%, and in patients who are able to undergo BMT, median survival is 22 months and 5 year survival of 46% (Dejan APODACA, Anderson BAR, Toñito RA, et al. Prognostication of survival using cardiac troponins and N-terminal pro-brain natriuretic peptide in patients with primary systemic amyloidosis undergoing peripheral blood stem cell transplantation. Blood 2004; 104:1881). She was felt not to be a candidate for high dose therapy.   She was on Cybor-D from 10/4/2016-12/27/2016, with Cytoxan omitted after cycle 2 day 1 due to her developing hemorrhagic cystitis. Her disease has responded  biochemically, with serum M spike of zero, and serum IgA level was down to low range and  free lambda light chains have normalized quantitatively.     I have discussed her situation with Dr. Barron in January, and he felt at that time that with normalization of serum free lambda light chains, no further chemotherapy is indicated. She maybe eligible for an anti-amyloid antibody clinical trial in the future open at [facility] but she would need to remain off chemotherapy for 6 months to qualify, and that is not until May or June 2017.        1.  AL amyloidosis - Lambda light chain detectable on serum immunofixation on 2/2/2017 but not in the urine. Serum IgA level is still low. Obtain  Serum FLC, serum protein electrophoresis today. If stable, f/up in 2 months with repeat amyloid labs. No cytoreductive chemotherapy for now, unless there is significant rise in serum free lambda light chains and IgA level, since she may become eligible for  Phase 2b, randomized, double-blind, placebo-controlled, two-arm, parallel-group efficacy and safety study of [id number] (a  monoclonal antibody that targets the circulating soluble and deposited aggregated amyloid) as a single agent administered intravenously in adults with AL amyloidosis who had a hematologic response to previous treatment for their amyloidosis.  She is under close followup with cardiology team as below.  2. Recurrent pleural effusions- She has not needed a thoracentesis since 1/4/2017 and she has had no SOB since her aggressive inpatient diuresis. Obtain CXR today to f/up on right pleural effusion. PleurX catheter is an option in the future.  3. Amyloid cardiomyopathy-   Continue current diuretic regimen with Torsemide 60 mg PO BID and aldactone 25 mg PO BID. She will be followed by cardiology team every 2 weeks.      At the end of our visit patient and  verbalized understanding and concurred with the plan.

## 2017-02-10 ENCOUNTER — CARE COORDINATION (OUTPATIENT)
Dept: ONCOLOGY | Facility: CLINIC | Age: 67
End: 2017-02-10

## 2017-02-10 LAB
IGA SERPL-MCNC: 60 MG/DL (ref 70–380)
IGG SERPL-MCNC: 721 MG/DL (ref 695–1620)
IGM SERPL-MCNC: 79 MG/DL (ref 60–265)
IMMUNOFIXATION ELP: ABNORMAL
PROT ELPH PNL UR ELPH: NORMAL

## 2017-02-10 NOTE — PROGRESS NOTES
Communicated  to patient that chest x-ray showed that fluid on her right lung has decreased and she should follow-up in a couple of months as discussed.  Patient has ? About whether she should follow-up with Dr. Kowalski in March since she is not on chemotherapy any more and she states testing has been negative. Advised that writer will discuss with provider and let her know plan.  She states she is feeling fairly well.

## 2017-02-15 ENCOUNTER — PRE VISIT (OUTPATIENT)
Dept: CARDIOLOGY | Facility: CLINIC | Age: 67
End: 2017-02-15

## 2017-02-15 DIAGNOSIS — E85.4 CARDIAC AMYLOIDOSIS (H): Primary | ICD-10-CM

## 2017-02-15 DIAGNOSIS — I43 CARDIAC AMYLOIDOSIS (H): Primary | ICD-10-CM

## 2017-02-20 ENCOUNTER — OFFICE VISIT (OUTPATIENT)
Dept: CARDIOLOGY | Facility: CLINIC | Age: 67
End: 2017-02-20
Attending: NURSE PRACTITIONER
Payer: COMMERCIAL

## 2017-02-20 VITALS
SYSTOLIC BLOOD PRESSURE: 100 MMHG | OXYGEN SATURATION: 96 % | HEIGHT: 63 IN | HEART RATE: 83 BPM | BODY MASS INDEX: 24.31 KG/M2 | DIASTOLIC BLOOD PRESSURE: 62 MMHG | WEIGHT: 137.2 LBS

## 2017-02-20 DIAGNOSIS — E85.4 AMYLOID HEART DISEASE (H): Primary | ICD-10-CM

## 2017-02-20 DIAGNOSIS — E85.4 CARDIAC AMYLOIDOSIS (H): ICD-10-CM

## 2017-02-20 DIAGNOSIS — I50.33 ACUTE ON CHRONIC DIASTOLIC HEART FAILURE (H): ICD-10-CM

## 2017-02-20 DIAGNOSIS — I43 AMYLOID HEART DISEASE (H): Primary | ICD-10-CM

## 2017-02-20 DIAGNOSIS — I43 CARDIAC AMYLOIDOSIS (H): ICD-10-CM

## 2017-02-20 LAB
ANION GAP SERPL CALCULATED.3IONS-SCNC: 8 MMOL/L (ref 3–14)
BUN SERPL-MCNC: 19 MG/DL (ref 7–30)
CALCIUM SERPL-MCNC: 9.4 MG/DL (ref 8.5–10.1)
CHLORIDE SERPL-SCNC: 93 MMOL/L (ref 94–109)
CO2 SERPL-SCNC: 36 MMOL/L (ref 20–32)
CREAT SERPL-MCNC: 1 MG/DL (ref 0.52–1.04)
GFR SERPL CREATININE-BSD FRML MDRD: 56 ML/MIN/1.7M2
GLUCOSE SERPL-MCNC: 105 MG/DL (ref 70–99)
POTASSIUM SERPL-SCNC: 3.5 MMOL/L (ref 3.4–5.3)
SODIUM SERPL-SCNC: 136 MMOL/L (ref 133–144)

## 2017-02-20 PROCEDURE — 80048 BASIC METABOLIC PNL TOTAL CA: CPT | Performed by: NURSE PRACTITIONER

## 2017-02-20 PROCEDURE — 0296T ZIO PATCH HOLTER: CPT | Mod: ZF | Performed by: NURSE PRACTITIONER

## 2017-02-20 PROCEDURE — 99213 OFFICE O/P EST LOW 20 MIN: CPT | Mod: ZF

## 2017-02-20 PROCEDURE — 93005 ELECTROCARDIOGRAM TRACING: CPT | Mod: ZF

## 2017-02-20 PROCEDURE — 99214 OFFICE O/P EST MOD 30 MIN: CPT | Mod: 25 | Performed by: NURSE PRACTITIONER

## 2017-02-20 PROCEDURE — 0296T ZZHC  EXT ECG > 48HR TO 21 DAY RCRD W/CONECT INTL RCRD: CPT | Mod: ZF

## 2017-02-20 PROCEDURE — 0298T ZZC EXT ECG > 48HR TO 21 DAY REVIEW AND INTERPRETATN: CPT | Performed by: INTERNAL MEDICINE

## 2017-02-20 PROCEDURE — 99212 OFFICE O/P EST SF 10 MIN: CPT | Mod: ZF

## 2017-02-20 PROCEDURE — 36415 COLL VENOUS BLD VENIPUNCTURE: CPT | Performed by: NURSE PRACTITIONER

## 2017-02-20 PROCEDURE — 93010 ELECTROCARDIOGRAM REPORT: CPT | Mod: ZP | Performed by: INTERNAL MEDICINE

## 2017-02-20 RX ORDER — TORSEMIDE 20 MG/1
TABLET ORAL
Qty: 180 TABLET | Refills: 3 | Status: SHIPPED | OUTPATIENT
Start: 2017-02-20 | End: 2017-04-14

## 2017-02-20 ASSESSMENT — PAIN SCALES - GENERAL: PAINLEVEL: NO PAIN (0)

## 2017-02-20 NOTE — LETTER
2/20/2017      RE: Leandra Guerrero  [address] [place] [address]       Dear Colleague,    Thank you for the opportunity to participate in the care of your patient, Leandra Guerrero, at the [facility]. Please see a copy of my visit note below.    HPI:  Mrs. Guerrero is a 66 year old female with no past medical history until the Spring of 2016 when she was diagnosed with stage IV AL amyloid; she presents to clinic for follow up of cardiac amyloid.     Ms. Guerrero reports generally feeling better since her last visit in cardiology with no shortness of breath, chest pain, palpitations, or edema. However, she developed a vague uneasy sensation in the chest yesterday that seems to have coincided with more lightheadedness. She has also noted more nausea, particularly in the afternoons.    PAST MEDICAL HISTORY:  - Metastatic AL amyloid   - GI (stomach and duodenal) involvement  - Cardiac amyloidosis      FAMILY HISTORY:  Family History   Problem Relation Age of Onset     Other - See Comments Sister      Amyloidosis   - Sister passed from multiple myeloma, patient was also told her sister had amyloid as well  - Mom with CAD      SOCIAL HISTORY:  Social History     Social History     Marital Status:      Spouse Name: N/A     Number of Children: N/A     Years of Education: N/A     Social History Main Topics     Smoking status: Never Smoker      Smokeless tobacco: None     Alcohol Use: No     Drug Use: No     Sexual Activity: Not Asked     Other Topics Concern     None     Social History Narrative   Lives in [place] with her , has 2 kids  Previously did office work, hasn't worked for several months now  Never smoker  Social etoh    CURRENT MEDICATIONS:  Current Outpatient Prescriptions   Medication Sig Dispense Refill     torsemide (DEMADEX) 20 MG tablet Take 60mg (3 tabs) in the AM and 40mg (2 tabs) in the PM. 180 tablet 3      "potassium chloride SA (K-DUR/KLOR-CON M) 10 MEQ CR tablet Take 2 tablets (20 mEq) by mouth 2 times daily Do not fill until requested 120 tablet 11     Acetaminophen (TYLENOL PO) Take 325 mg by mouth       HYDROcodone-acetaminophen (NORCO) 5-325 MG per tablet   0     LORazepam (ATIVAN) 0.5 MG tablet TAKE ONE-HALF TO ONE TABLET BY MOUTH ONCE DAILY AT BEDTIME AS NEEDED  0     ondansetron (ZOFRAN-ODT) 4 MG disintegrating tablet   0     spironolactone (ALDACTONE) 25 MG tablet Take 1 tablet (25 mg) by mouth 2 times daily 180 tablet 3     thiamine 100 MG tablet Take 1 tablet (100 mg) by mouth daily 60 tablet 3     acyclovir (ZOVIRAX) 400 MG tablet Take 1 tablet (400 mg) by mouth 2 times daily 60 tablet 3       ROS:   Constitutional: No fever, chills, or sweats. 35lb weight loss over 6 months  ENT: No visual disturbance, ear ache, epistaxis, sore throat.   Allergies/Immunologic: Negative.   Respiratory: No cough, hemoptysis.   Cardiovascular: As per HPI.   GI: + Nausea per HPI. Early satiety   : No urinary frequency, dysuria, or hematuria.   Integument: Negative.   Psychiatric: negative  Neuro: + tingling in legs bilaterally  Endocrinology: Negative.   Musculoskeletal: Negative.    EXAM:  /62 (BP Location: Right arm, Patient Position: Chair, Cuff Size: Adult Regular)  Pulse 83  Ht 1.6 m (5' 3\")  Wt 62.2 kg (137 lb 3.2 oz)  SpO2 96%  BMI 24.3 kg/m2  General: appears comfortable, alert and articulate  Head: normocephalic, atraumatic  Eyes: anicteric sclera, EOMI  Neck: no adenopathy  Orophyarynx: moist mucosa, no lesions, dentition intact  Heart: regular rate and rhythm with occasional premature beats. II/VI holosystolic murmur at the apex, no visible JVD  Lungs: clear bilaterally  Abdomen: soft, non-tender, bowel sounds present, no hepatomegaly  Extremities: mild edema non pitting at the ankles   Neurological: normal speech and affect, no gross motor deficits    Labs:  Last Basic Metabolic Panel:  Lab Results "   Component Value Date     02/20/2017      Lab Results   Component Value Date    POTASSIUM 3.5 02/20/2017     Lab Results   Component Value Date    CHLORIDE 93 02/20/2017     Lab Results   Component Value Date    JERROD 9.4 02/20/2017     Lab Results   Component Value Date    CO2 36 02/20/2017     Lab Results   Component Value Date    BUN 19 02/20/2017     Lab Results   Component Value Date    CR 1.00 02/20/2017     Lab Results   Component Value Date     02/20/2017       EKG, personally reviewed: sinus with PACs    Previous cardiac monitor; no a fib, no VT, no pauses       Assessment and Plan:   Mrs. 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 She appears mildly dry which could be contributing to her recent lightheadedness and nausea. She will reduce her torsemide to 60 mg in the mornings and 40 mg in the afternoons. She'll continue to monitor daily weights and may increase to 60 mg BID if she picks up weight or dyspnea precipitously. Still, her uneasy sensation, together with the lightheadedness and nausea with underlying amyloid makes me nervous she's developing arrhythmias, though EKG was fine today. We'll place a 7 day Zio monitor and she'll return to see Dr. Cohen in 10 days and to [facility] in 1 month or as needed.    1. 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  Stage C  NYHA Class III  ACEi/ARB None  BB None, relatively contraindicated due to risk of progressive conduction disease/AV block in these patients  Aldosterone antagonist: aldactone 25  SCD prophylaxis: not indicated  % BiV pacing: NA  Fluid status: dry-reducing diuretics today    30 minutes spent in direct care, >50% in counseling      Please do not hesitate to contact me if you have any questions/concerns.     Sincerely,     Codie Nelson NP    CC  Patient Care Team:  Magy Obrien  as PCP - General (Family Practice)  Mirela Moore MD as MD (Hematology & Oncology)  Domenica Cohen MD as MD (Cardiology)  Vidhi Dorsey RN as Nurse Coordinator (Cardiology)  Eileen Arevalo, RN as Nurse Coordinator (Cardiology)

## 2017-02-20 NOTE — PROGRESS NOTES
HPI:  Mrs. Guerrero is a 66 year old female with no past medical history until the Spring of 2016 when she was diagnosed with stage IV AL amyloid; she presents to clinic for follow up of cardiac amyloid.     Ms. Guerrero reports generally feeling better since her last visit in cardiology with no shortness of breath, chest pain, palpitations, or edema. However, she developed a vague uneasy sensation in the chest yesterday that seems to have coincided with more lightheadedness. She has also noted more nausea, particularly in the afternoons.    PAST MEDICAL HISTORY:  - Metastatic AL amyloid   - GI (stomach and duodenal) involvement  - Cardiac amyloidosis      FAMILY HISTORY:  Family History   Problem Relation Age of Onset     Other - See Comments Sister      Amyloidosis   - Sister passed from multiple myeloma, patient was also told her sister had amyloid as well  - Mom with CAD      SOCIAL HISTORY:  Social History     Social History     Marital Status:      Spouse Name: N/A     Number of Children: N/A     Years of Education: N/A     Social History Main Topics     Smoking status: Never Smoker      Smokeless tobacco: None     Alcohol Use: No     Drug Use: No     Sexual Activity: Not Asked     Other Topics Concern     None     Social History Narrative   Lives in [place] with her , has 2 kids  Previously did office work, hasn't worked for several months now  Never smoker  Social etoh    CURRENT MEDICATIONS:  Current Outpatient Prescriptions   Medication Sig Dispense Refill     torsemide (DEMADEX) 20 MG tablet Take 60mg (3 tabs) in the AM and 40mg (2 tabs) in the PM. 180 tablet 3     potassium chloride SA (K-DUR/KLOR-CON M) 10 MEQ CR tablet Take 2 tablets (20 mEq) by mouth 2 times daily Do not fill until requested 120 tablet 11     Acetaminophen (TYLENOL PO) Take 325 mg by mouth       HYDROcodone-acetaminophen (NORCO) 5-325 MG per tablet   0     LORazepam (ATIVAN) 0.5 MG tablet TAKE ONE-HALF TO ONE TABLET BY  "MOUTH ONCE DAILY AT BEDTIME AS NEEDED  0     ondansetron (ZOFRAN-ODT) 4 MG disintegrating tablet   0     spironolactone (ALDACTONE) 25 MG tablet Take 1 tablet (25 mg) by mouth 2 times daily 180 tablet 3     thiamine 100 MG tablet Take 1 tablet (100 mg) by mouth daily 60 tablet 3     acyclovir (ZOVIRAX) 400 MG tablet Take 1 tablet (400 mg) by mouth 2 times daily 60 tablet 3       ROS:   Constitutional: No fever, chills, or sweats. 35lb weight loss over 6 months  ENT: No visual disturbance, ear ache, epistaxis, sore throat.   Allergies/Immunologic: Negative.   Respiratory: No cough, hemoptysis.   Cardiovascular: As per HPI.   GI: + Nausea per HPI. Early satiety   : No urinary frequency, dysuria, or hematuria.   Integument: Negative.   Psychiatric: negative  Neuro: + tingling in legs bilaterally  Endocrinology: Negative.   Musculoskeletal: Negative.    EXAM:  /62 (BP Location: Right arm, Patient Position: Chair, Cuff Size: Adult Regular)  Pulse 83  Ht 1.6 m (5' 3\")  Wt 62.2 kg (137 lb 3.2 oz)  SpO2 96%  BMI 24.3 kg/m2  General: appears comfortable, alert and articulate  Head: normocephalic, atraumatic  Eyes: anicteric sclera, EOMI  Neck: no adenopathy  Orophyarynx: moist mucosa, no lesions, dentition intact  Heart: regular rate and rhythm with occasional premature beats. II/VI holosystolic murmur at the apex, no visible JVD  Lungs: clear bilaterally  Abdomen: soft, non-tender, bowel sounds present, no hepatomegaly  Extremities: mild edema non pitting at the ankles   Neurological: normal speech and affect, no gross motor deficits    Labs:  Last Basic Metabolic Panel:  Lab Results   Component Value Date     02/20/2017      Lab Results   Component Value Date    POTASSIUM 3.5 02/20/2017     Lab Results   Component Value Date    CHLORIDE 93 02/20/2017     Lab Results   Component Value Date    JERROD 9.4 02/20/2017     Lab Results   Component Value Date    CO2 36 02/20/2017     Lab Results   Component Value Date "    BUN 19 02/20/2017     Lab Results   Component Value Date    CR 1.00 02/20/2017     Lab Results   Component Value Date     02/20/2017       EKG, personally reviewed: sinus with PACs    Previous cardiac monitor; no a fib, no VT, no pauses       Assessment and Plan:   Mrs. 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 She appears mildly dry which could be contributing to her recent lightheadedness and nausea. She will reduce her torsemide to 60 mg in the mornings and 40 mg in the afternoons. She'll continue to monitor daily weights and may increase to 60 mg BID if she picks up weight or dyspnea precipitously. Still, her uneasy sensation, together with the lightheadedness and nausea with underlying amyloid makes me nervous she's developing arrhythmias, though EKG was fine today. We'll place a 7 day Zio monitor and she'll return to see Dr. Cohen in 10 days and to CORE in 1 month or as needed.    1. 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  Stage C  NYHA Class III  ACEi/ARB None  BB None, relatively contraindicated due to risk of progressive conduction disease/AV block in these patients  Aldosterone antagonist: aldactone 25  SCD prophylaxis: not indicated  % BiV pacing: NA  Fluid status: dry-reducing diuretics today    30 minutes spent in direct care, >50% in counseling        CC  Patient Care Team:  Magy Obrien as PCP - General (Family Practice)  Mirela Daley MD as MD (Hematology & Oncology)  Domenica Coehn MD as MD (Cardiology)  Vidhi Dorsey RN as Nurse Coordinator (Cardiology)  Eileen Arevalo RN as Nurse Coordinator (Cardiology)  Codie Nelson NP as Nurse Practitioner (Cardiology)  MIRELA DALEY

## 2017-02-20 NOTE — PATIENT INSTRUCTIONS
"You were seen today in the Cardiovascular Clinic at the [facility].     Cardiology Providers you saw during your visit: Codie Nelson NP       1.  Please reduce your torsemide to 60mg in the morning and 40mg in the afternoon.  If you begin gaining weight, you can return to 60mg twice daily, but please let us know.  2.  Zio patch for 7 days  3. Follow-up CORE/heart failure appt with Codie on .     Results for JESSICA GARCIA (MRN [id number]) as of 2017 09:19   Ref. Range 2017 08:25   Sodium Latest Ref Range: 133 - 144 mmol/L 136   Potassium Latest Ref Range: 3.4 - 5.3 mmol/L 3.5   Chloride Latest Ref Range: 94 - 109 mmol/L 93 (L)   Carbon Dioxide Latest Ref Range: 20 - 32 mmol/L 36 (H)   Urea Nitrogen Latest Ref Range: 7 - 30 mg/dL 19   Creatinine Latest Ref Range: 0.52 - 1.04 mg/dL 1.00   GFR Estimate Latest Ref Range: >60 mL/min/1.7m2 56 (L)   GFR Estimate If Black Latest Ref Range: >60 mL/min/1.7m2 68   Calcium Latest Ref Range: 8.5 - 10.1 mg/dL 9.4   Anion Gap Latest Ref Range: 3 - 14 mmol/L 8   Glucose Latest Ref Range: 70 - 99 mg/dL 105 (H)       Please limit your fluid intake to 2 L (64 ounces) daily.  2 Liters a day = 8.5 cups, or 72 ounces.  Please limit your salt intake to 2 grams a day or less.    If you gain 2# in 24 hours or 5# in one week call Eileen Arevalo RN so we can adjust your medications as needed over the phone.    Please feel free to call me with any questions or concerns.      Eileen Arevalo RN BSN CHFN  [facility]  Cardiology Care Coordinator-Heart Failure Clinic    Questions and [phone].   First press #1 for the University and then press #3 for \"Medical Questions\" to reach us Cardiology Nurses.     On Call Cardiologist for after hours or on weekends: [phone]   option #4 and ask to speak to the on-call Cardiologist. Inform them you are a CORE/heart failure patient at the [facility].        If you need a " medication refill please contact your pharmacy.  Please allow 3 business days for your refill to be completed.  _______________________________________________________  C.O.R.E. CLINIC Cardiomyopathy, Optimization, Rehabilitation, Education   The C.O.R.E. CLINIC is a heart failure specialty clinic within the [facility] where you will work with specialized nurse practioners dedicated to helping patients with heart failure carefully adjust medications, receive education, and learn who and when to call if symptoms develop. They specialize in helping you better understand your condition, slow the progression of your disease, improve the length and quality of your life, help you detect future heart problems before they become life threatening, and avoid hospitalizations.  As always, thank you for trusting us with your health care needs!

## 2017-02-20 NOTE — NURSING NOTE
Diet: Patient instructed regarding a heart failure healthy diet, including discussion of reduced fat and 2000 mg daily sodium restriction, daily weights, medication purpose and compliance, fluid restrictions and resources for patient and family to access for assistance with heart failure management.       Labs: Patient was given results of the laboratory testing obtained today and patient was instructed about when to return for the next laboratory testing.    Med Reconcile: Reviewed and verified all current medications with the patient. The updated medication list was printed and given to the patient.    Return Appointment: Patient given instructions regarding scheduling next clinic visit.     Patient stated she understood all health information given and agreed to call with further questions or concerns.

## 2017-02-20 NOTE — NURSING NOTE
Per Codie Nelson NP, patient to have 7 day Ziopatch monitor placed.  Diagnosis: Amyloid Heart disease  Monitor placed: Yes  Patient Instructed: Yes  Patient verbalized understanding: Yes  Holter # [id number]    Placed by Ines Bonilla CMA

## 2017-02-20 NOTE — NURSING NOTE
Chief Complaint   Patient presents with     Follow Up For     Return CORE appt; 66 yr old female with h/o cardiac amyloidosis with preserved EF presenting for follow up with labs prior

## 2017-02-20 NOTE — MR AVS SNAPSHOT
After Visit Summary   2/20/2017    Jessica Garcia    MRN: [id number]           Patient Information     Date Of Birth          1950        Visit Information        Provider Department      2/20/2017 9:00 AM Codie Nelson NP  Health Heart Care        Today's Diagnoses     Amyloid heart disease (H)    -  1    Acute on chronic diastolic heart failure (H)          Care Instructions    You were seen today in the Cardiovascular Clinic at the [facility].     Cardiology Providers you saw during your visit: Codie Nelson NP       1.  Please reduce your torsemide to 60mg in the morning and 40mg in the afternoon.  If you begin gaining weight, you can return to 60mg twice daily, but please let us know.  2.  Zio patch for 7 days  3. Follow-up CORE/heart failure appt with Codie on March 16th.     Results for JESSICA GARCIA (MRN [id number]) as of 2/20/2017 09:19   Ref. Range 2/20/2017 08:25   Sodium Latest Ref Range: 133 - 144 mmol/L 136   Potassium Latest Ref Range: 3.4 - 5.3 mmol/L 3.5   Chloride Latest Ref Range: 94 - 109 mmol/L 93 (L)   Carbon Dioxide Latest Ref Range: 20 - 32 mmol/L 36 (H)   Urea Nitrogen Latest Ref Range: 7 - 30 mg/dL 19   Creatinine Latest Ref Range: 0.52 - 1.04 mg/dL 1.00   GFR Estimate Latest Ref Range: >60 mL/min/1.7m2 56 (L)   GFR Estimate If Black Latest Ref Range: >60 mL/min/1.7m2 68   Calcium Latest Ref Range: 8.5 - 10.1 mg/dL 9.4   Anion Gap Latest Ref Range: 3 - 14 mmol/L 8   Glucose Latest Ref Range: 70 - 99 mg/dL 105 (H)       Please limit your fluid intake to 2 L (64 ounces) daily.  2 Liters a day = 8.5 cups, or 72 ounces.  Please limit your salt intake to 2 grams a day or less.    If you gain 2# in 24 hours or 5# in one week call Eileen Arevalo RN so we can adjust your medications as needed over the phone.    Please feel free to call me with any questions or concerns.      Eileen Arevalo RN BSN CHFN  [facility]  "[facility]  Cardiology Care Coordinator-Heart Failure Clinic    Questions and [phone].   First press #1 for the University and then press #3 for \"Medical Questions\" to reach us Cardiology Nurses.     On Call Cardiologist for after hours or on weekends: [phone]   option #4 and ask to speak to the on-call Cardiologist. Inform them you are a CORE/heart failure patient at the [facility].        If you need a medication refill please contact your pharmacy.  Please allow 3 business days for your refill to be completed.  _______________________________________________________  C.O.R.E. CLINIC Cardiomyopathy, Optimization, Rehabilitation, Education   The C.O.R.E. CLINIC is a heart failure specialty clinic within the [facility] where you will work with specialized nurse practioners dedicated to helping patients with heart failure carefully adjust medications, receive education, and learn who and when to call if symptoms develop. They specialize in helping you better understand your condition, slow the progression of your disease, improve the length and quality of your life, help you detect future heart problems before they become life threatening, and avoid hospitalizations.  As always, thank you for trusting us with your health care needs!             Follow-ups after your visit        Your next 10 appointments already scheduled     Mar 16, 2017  9:30 AM CDT   LAB with [facility] ([facility])    [address] [place] [id number] [phone]           Patient must bring picture ID.  Patient should be prepared to give a urine specimen  Please do not eat 10-12 hours before your appointment if you are coming in fasting for labs on lipids, cholesterol, or glucose (sugar).  Pregnant women should follow their Care Team instructions. Water with medications is okay. Do not drink coffee or other fluids.   If you have " concerns about taking  your medications, please ask at office or if scheduling via [employer], send a message by clicking on Secure Messaging, Message Your Care Team.            Mar 16, 2017 10:00 AM CDT   (Arrive by 9:45 AM)   Return Visit with Codie Nelson NP   [facility] ([facility])    [address]  3rd Floor  [facility] [id number] [phone]            Mar 24, 2017 11:30 AM CDT   Return Visit with Mirela Moore MD   [facility]    [address] [place] [id number] [phone]              Who to contact     If you have questions or need follow up information about today's clinic visit or your schedule please contact [facility] directly at [phone].  Normal or non-critical lab and imaging results will be communicated to you by [employer], letter or phone within 4 business days after the clinic has received the results. If you do not hear from us within 7 days, please contact the clinic through [employer] or phone. If you have a critical or abnormal lab result, we will notify you by phone as soon as possible.  Submit refill requests through [employer] or call your pharmacy and they will forward the refill request to us. Please allow 3 business days for your refill to be completed.          Additional Information About Your Visit        [employer] Information     [employer] gives you secure access to your electronic health record. If you see a primary care provider, you can also send messages to your care team and make appointments. If you have questions, please call your primary care clinic.  If you do not have a primary care provider, please call [phone] and they will assist you.        Care EveryWhere ID     This is your Care EveryWhere ID. This could be used by other organizations to access your [facility] medical records  [id number]        Your Vitals Were     Pulse Height Pulse  "Oximetry BMI (Body Mass Index)          83 1.6 m (5' 3\") 96% 24.3 kg/m2         Blood Pressure from Last 3 Encounters:   03/02/17 115/69   02/20/17 100/62   02/09/17 115/62    Weight from Last 3 Encounters:   02/20/17 62.2 kg (137 lb 3.2 oz)   02/09/17 62.1 kg (137 lb)   02/02/17 60.2 kg (132 lb 12.8 oz)              We Performed the Following     EKG 12-lead, tracing only (Future)     Ziopatch Holter Monitor - Adult          Today's Medication Changes          These changes are accurate as of: 2/20/17 11:59 PM.  If you have any questions, ask your nurse or doctor.               These medicines have changed or have updated prescriptions.        Dose/Directions    torsemide 20 MG tablet   Commonly known as:  DEMADEX   This may have changed:    - how much to take  - how to take this  - when to take this  - additional instructions   Used for:  Acute on chronic diastolic heart failure (H)   Changed by:  Codie Nelson NP        Take 60mg (3 tabs) in the AM and 40mg (2 tabs) in the PM.   Quantity:  180 tablet   Refills:  3            Where to get your medicines      These medications were sent to [facility] [address] [place] [address] [address] [place] [address] [address] [place] [id number]     Phone:  [phone]     torsemide 20 MG tablet                Primary Care Provider Office Phone # Fax #    Magy Obrien [phone]       [facility] [address]  [facility] [address]        Thank you!     Thank you for choosing [facility]  for your care. Our goal is always to provide you with excellent care. Hearing back from our patients is one way we can continue to improve our services. Please take a few minutes to complete the written survey that you may receive in the mail after your visit with us. Thank you!             Your Updated Medication List - Protect others around you: Learn how to safely use, store and throw away your medicines at www.disposemymeds.org.          This list is " accurate as of: 2/20/17 11:59 PM.  Always use your most recent med list.                   Brand Name Dispense Instructions for use    HYDROcodone-acetaminophen 5-325 MG per tablet    NORCO         LORazepam 0.5 MG tablet    ATIVAN     TAKE ONE-HALF TO ONE TABLET BY MOUTH ONCE DAILY AT BEDTIME AS NEEDED       ondansetron 4 MG ODT tab    ZOFRAN-ODT         potassium chloride SA 10 MEQ CR tablet    K-DUR/KLOR-CON M    120 tablet    Take 2 tablets (20 mEq) by mouth 2 times daily Do not fill until requested       thiamine 100 MG tablet     30 tablet    Take 1 tablet (100 mg) by mouth daily       torsemide 20 MG tablet    DEMADEX    180 tablet    Take 60mg (3 tabs) in the AM and 40mg (2 tabs) in the PM.       TYLENOL PO      Take 325 mg by mouth

## 2017-02-21 LAB — INTERPRETATION ECG - MUSE: NORMAL

## 2017-02-28 ENCOUNTER — PRE VISIT (OUTPATIENT)
Dept: CARDIOLOGY | Facility: CLINIC | Age: 67
End: 2017-02-28

## 2017-03-02 ENCOUNTER — OFFICE VISIT (OUTPATIENT)
Dept: CARDIOLOGY | Facility: CLINIC | Age: 67
End: 2017-03-02
Attending: INTERNAL MEDICINE
Payer: COMMERCIAL

## 2017-03-02 VITALS
HEART RATE: 90 BPM | DIASTOLIC BLOOD PRESSURE: 69 MMHG | SYSTOLIC BLOOD PRESSURE: 115 MMHG | OXYGEN SATURATION: 98 % | HEIGHT: 63 IN

## 2017-03-02 DIAGNOSIS — I43 CARDIAC AMYLOIDOSIS (H): ICD-10-CM

## 2017-03-02 DIAGNOSIS — E85.4 CARDIAC AMYLOIDOSIS (H): Primary | ICD-10-CM

## 2017-03-02 DIAGNOSIS — I43 CARDIAC AMYLOIDOSIS (H): Primary | ICD-10-CM

## 2017-03-02 DIAGNOSIS — E85.4 CARDIAC AMYLOIDOSIS (H): ICD-10-CM

## 2017-03-02 LAB
ANION GAP SERPL CALCULATED.3IONS-SCNC: 8 MMOL/L (ref 3–14)
BUN SERPL-MCNC: 19 MG/DL (ref 7–30)
CALCIUM SERPL-MCNC: 9.3 MG/DL (ref 8.5–10.1)
CHLORIDE SERPL-SCNC: 94 MMOL/L (ref 94–109)
CO2 SERPL-SCNC: 33 MMOL/L (ref 20–32)
CREAT SERPL-MCNC: 1.01 MG/DL (ref 0.52–1.04)
GFR SERPL CREATININE-BSD FRML MDRD: 55 ML/MIN/1.7M2
GLUCOSE SERPL-MCNC: 105 MG/DL (ref 70–99)
POTASSIUM SERPL-SCNC: 4.6 MMOL/L (ref 3.4–5.3)
SODIUM SERPL-SCNC: 136 MMOL/L (ref 133–144)

## 2017-03-02 PROCEDURE — 36415 COLL VENOUS BLD VENIPUNCTURE: CPT | Performed by: INTERNAL MEDICINE

## 2017-03-02 PROCEDURE — 99215 OFFICE O/P EST HI 40 MIN: CPT | Mod: ZP | Performed by: INTERNAL MEDICINE

## 2017-03-02 PROCEDURE — 80048 BASIC METABOLIC PNL TOTAL CA: CPT | Performed by: INTERNAL MEDICINE

## 2017-03-02 PROCEDURE — 99212 OFFICE O/P EST SF 10 MIN: CPT | Mod: ZF

## 2017-03-02 ASSESSMENT — PAIN SCALES - GENERAL: PAINLEVEL: NO PAIN (0)

## 2017-03-02 NOTE — NURSING NOTE
Chief Complaint   Patient presents with     Follow Up For     One mo. follow up for Metastatic AL amyloid with cardiac involvment

## 2017-03-02 NOTE — PROGRESS NOTES
March 2, 2017    HPI:  Mrs. Guerrero is a 66 year old female with no past medical history until the Spring of 2016 when she was diagnosed with stage IV AL amyloid; she presents to clinic for follow up of cardiac amyloid.     Her cardiac and oncologic history are as follows:   Fatigue started in January 2016. She eventually had a coronary angiogram which was reportedly normal and was diagnosed with CHF and started on a bblocker and diuretics. Her symptoms progressed to the point that she was evaluated at [facility] in August/September (Dr. Barron in oncology/hematology, Dr. Nettles is cardiology). She was diagnosed with stage IV AL amyloid (+GI, +bone marrow involvement, not thought to have involvement of kidney or liver):    Her work up is detailed in my past note however she has lambda AL amyloidosis and has been undergoing CyBorD chemotherapy and excellent light chain response by serum. She has been turned down at [facility] for [facility] cell transplant due to her cardiac involvement.    When I first met her about 5 months ago, she had definite evidence of cardiac amyloid, although she did not have volume overload at that time, her shortness of breath was minimal and she had several reassuring cardiac features including normal filling pressures on echo and again no elevation in her neck veins.     Unfortunately over the next 3 months, she had 2-3 more pleural taps on the R and came back to my clinic with gross anasarca.  I admitted her to the hospital where close to 20-30 pounds of fluid overload of volume was removed.  She has been out of the hospital for now 5 weeks on just  60 mg/40 mg of torsemide with very stable symptoms.  She did have to increase her diuretics once about a week and a half ago and now is back to her baseline weight.  She feels fatigued but she is able to walk long distances on flat ground.  She can walk 2 flights of stairs.  She denies PND or orthopnea or nighttime cough.  She denies any chest heaviness or  pressure.  She has not had syncope.      I have referred her to the [facility] program for another opinion about her candidacy for stem cell transplant or other clinical trials.  She is starting to have detectable lambda light chains in serum once again, although at a low level.       PAST MEDICAL HISTORY:  - Stage IV AL amyloid   - GI (stomach and duodenal) involvement  - Cardiac amyloidosis      FAMILY HISTORY:  Family History   Problem Relation Age of Onset     Other - See Comments Sister      Amyloidosis   - Sister passed from multiple myeloma, patient was also told her sister had amyloid as well  - Mom with CAD      SOCIAL HISTORY:  Social History     Social History     Marital Status:      Spouse Name: N/A     Number of Children: N/A     Years of Education: N/A     Social History Main Topics     Smoking status: Never Smoker      Smokeless tobacco: None     Alcohol Use: No     Drug Use: No     Sexual Activity: Not Asked     Social History Narrative   Lives in [place] with her , has 2 kids  Previously did office work, hasn't worked for several months now  Never smoker  Social ETOH    CURRENT MEDICATIONS:  Current Outpatient Prescriptions   Medication Sig Dispense Refill     torsemide (DEMADEX) 20 MG tablet Take 60mg (3 tabs) in the AM and 40mg (2 tabs) in the PM. 180 tablet 3     potassium chloride SA (K-DUR/KLOR-CON M) 10 MEQ CR tablet Take 2 tablets (20 mEq) by mouth 2 times daily Do not fill until requested 120 tablet 11     acyclovir (ZOVIRAX) 400 MG tablet Take 1 tablet (400 mg) by mouth 2 times daily 30 tablet 3     spironolactone (ALDACTONE) 25 MG tablet Take 1 tablet (25 mg) by mouth 2 times daily 60 tablet 1     Acetaminophen (TYLENOL PO) Take 325 mg by mouth       HYDROcodone-acetaminophen (NORCO) 5-325 MG per tablet   0     LORazepam (ATIVAN) 0.5 MG tablet TAKE ONE-HALF TO ONE TABLET BY MOUTH ONCE DAILY AT BEDTIME AS NEEDED  0     ondansetron (ZOFRAN-ODT) 4 MG disintegrating tablet   " 0       ROS:   Constitutional: No fever, chills, or sweats. Weight is stable from our last visit   ENT: No visual disturbance, ear ache, epistaxis, sore throat.   Allergies/Immunologic: Negative.   Respiratory: No cough, hemoptysis.   Cardiovascular: As per HPI.   GI: no nausea and severe constipation. Early satiety   : No urinary frequency, dysuria, or hematuria.   Integument: Negative.   Psychiatric: feeling down and anxious since her diagnosis  Neuro: + tingling in legs bilaterally  Endocrinology: Negative.   Musculoskeletal: Negative.    EXAM:  /69 (BP Location: Right arm, Patient Position: Chair, Cuff Size: Adult Small)  Pulse 90  Ht 1.6 m (5' 3\")  SpO2 98%  General: appears comfortable, alert and articulate  Head: normocephalic, atraumatic  Eyes: anicteric sclera, EOMI  Neck: no adenopathy  Orophyarynx: moist mucosa, no lesions, dentition intact  Heart: regular rate and rhythm. III/VI holosystolic murmur at the apex, no visible JVD  Lungs: clear bilaterally  Abdomen: soft, non-tender, bowel sounds present, no hepatosplenomegaly  Extremities: mild edema non pitting at the ankles   Neurological: normal speech and affect, no gross motor deficits    Labs:  CBC RESULTS:  Lab Results   Component Value Date    WBC 5.3 01/24/2017    RBC 4.45 01/24/2017    HGB 14.3 01/24/2017    HCT 44.0 01/24/2017    MCV 99 01/24/2017    MCH 32.1 01/24/2017    MCHC 32.5 01/24/2017    RDW 15.2 (H) 01/24/2017     01/24/2017       CMP RESULTS:  Lab Results   Component Value Date     02/20/2017    POTASSIUM 3.5 02/20/2017    CHLORIDE 93 (L) 02/20/2017    CO2 36 (H) 02/20/2017    ANIONGAP 8 02/20/2017     (H) 02/20/2017    BUN 19 02/20/2017    CR 1.00 02/20/2017    GFRESTIMATED 56 (L) 02/20/2017    GFRESTBLACK 68 02/20/2017    JERROD 9.4 02/20/2017    BILITOTAL 1.6 (H) 02/01/2017    ALBUMIN 3.6 02/01/2017    ALKPHOS 163 (H) 02/01/2017    ALT 24 02/01/2017    AST 27 02/01/2017        INR RESULTS:  Lab Results "   Component Value Date    INR 1.30 (H) 01/14/2017       Lab Results   Component Value Date    MAG 2.5 (H) 01/25/2017     Lab Results   Component Value Date    NTBNPI 9009 (H) 01/13/2017     Lab Results   Component Value Date    NTBNP 2883 (H) 01/05/2017       Echo from today:   Left Ventricle  Biplane LVEF 55%. Global and regional left ventricular function is normal  with an EF of 55-60%. Left ventricular size is normal. Moderate to severe  concentric wall thickening consistent with left ventricular hypertrophy is  present. Consistent with amyloid cardiomyopathy. Grade III or advanced  diastolic dysfunction. Global peak LV longitudinal strain is averaged at -  12.5%. This suggests abnormal strain (normal <-18%). No regional wall motion  abnormalities are seen.     Right Ventricle  The right ventricle is normal size. Global right ventricular function is  mildly reduced.  Atria  The right atria appears normal. Mild left atrial enlargement is present.     Mitral Valve  The mitral valve is normal. Mild to moderate mitral insufficiency is present.     Aortic Valve  Aortic valve is normal in structure and function. Mild aortic insufficiency  is present.     Tricuspid Valve  The tricuspid valve is normal. Mild to moderate tricuspid insufficiency is  present. The right ventricular systolic pressure is approximated at 23.7 mmHg  plus the right atrial pressure.     Pulmonic Valve  The pulmonic valve is normal. Mild pulmonic insufficiency is present.     Vessels  The inferior vena cava is normal. The aorta root is normal. The pulmonary  artery cannot be assessed.  Pericardium  Small circumferential pericardial effusion is present without any hemodynamic  significance.     Compared to Previous Study  This study was compared with the study from 10/6/2016 .       MMode/2D Measurements & Calculations  IVSd: 1.6 cm  LVIDd: 3.6 cm  LVIDs: 2.6 cm  LVPWd: 1.4 cm  FS: 28.0 %  EDV(Teich): 56.1 ml  ESV(Teich): 25.2 ml  LV mass(C)d: 202.4  grams       Doppler Measurements & Calculations  MV E max ella: 82.3 cm/sec  MV A max ella: 40.1 cm/sec  MV E/A: 2.1  MV dec time: 0.14 sec  TR max ella: 243.4 cm/sec  TR max P.7 mmHg  Lateral E/e': 17.2  Medial E/e': 18.3     Previous cardiac monitor; no a fib, no VT, no pauses     Last CXR: R pleural effusion is now small           Assessment and Plan:   Mrs. 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     Patient has cardiac amyloidosis as evidenced by her echocardiogram (severe concentric hypertrophy and biatrial enlargement) and abnormal EKG (near-low voltage, poor R wave progression, biatrial enlargement and no evidence of LVH despite very thickened LV on echo) in the setting of biopsy proven (BMB, EGD) AL amyloid. Her positive biomarkers are also suggestive of cardiac involvement; angiogram without obstructive CAD. She is now s/p several cycles of chemotherapy with CyBorD with control of her light chains in the serum and urine although they are now once again detectable by serum.     Overall I am somewhat encouraged by how good she looks with all of the fluid removed in her and she has  been successful at keeping the weight off since her discharge on 60/40 of torsemide. Her NYHA class is II today, Stage C, she is euvolemic on exam today and her serum troponin has down trended to near normal with treatment.     I am referring her to my colleague Harjit Fischer at [facility] in [place] for another opinion about whether not not she may be a candidate for stem cell transplant or whether or not they have any other open trials for her. She is not a candidate for combined stem cell and heart transplant based on diffuse amyloid involvement.     I still recommend holding any/all AV thor agents given the risk of progressive AV block/conduction disease in these patients.      Cardiac Amyloidosis with preserved EF  Stage C  NYHA Class II  ACEi/ARB None  BB None, relatively contraindicated due to risk of progressive conduction disease/AV block in these patients  Aldosterone antagonist: aldactone 25  SCD prophylaxis: not indicated  % BiV pacing: NA  Fluid status: euvolemic       Follow up: 2 weeks with our NP and then to [place]. I will get her in sooner if I need to.     Other:  Arrhythmia monitoring -- no arrhythmia noted on 2 weeks of telemetry during her last hospitalization.    Domenica Cohen MD   of Medicine   [facility] Division of Cardiology       CC  Patient Care Team:  Magy Obrien as PCP - General (Family Practice)  Mirela Daley MD as MD (Hematology & Oncology)  oDmenica Cohen MD as MD (Cardiology)  Vidhi Dorsey, RN as Nurse Coordinator (Cardiology)  Eileen Arevalo, RN as Nurse Coordinator (Cardiology)  Codie Nelson NP as Nurse Practitioner (Cardiology)  MIRELA DALEY

## 2017-03-02 NOTE — LETTER
3/2/2017      RE: Leandra Guerrero  [address] [place] [address]       Dear Colleague,    Thank you for the opportunity to participate in the care of your patient, Leandra Guerrero, at the [facility]. Please see a copy of my visit note below.    March 2, 2017    HPI:  Mrs. Guerrero is a 66 year old female with no past medical history until the Spring of 2016 when she was diagnosed with stage IV AL amyloid; she presents to clinic for follow up of cardiac amyloid.     Her cardiac and oncologic history are as follows:   Fatigue started in January 2016. She eventually had a coronary angiogram which was reportedly normal and was diagnosed with CHF and started on a bblocker and diuretics. Her symptoms progressed to the point that she was evaluated at [facility] in August/September (Dr. Barron in oncology/hematology, Dr. Nettles is cardiology). She was diagnosed with stage IV AL amyloid (+GI, +bone marrow involvement, not thought to have involvement of kidney or liver):    Her work up is detailed in my past note however she has lambda AL amyloidosis and has been undergoing CyBorD chemotherapy and excellent light chain response by serum. She [facility] been turned down [facility] for [facility] cell transplant due to her cardiac involvement.    When I first met her about 5 months ago, she had definite evidence of cardiac amyloid, although she did not have volume overload at that time, her shortness of breath was minimal and she had several reassuring cardiac features including normal filling pressures on echo and again no elevation in her neck veins.     Unfortunately over the next 3 months, she had 2-3 more pleural taps on the R and came back to my clinic with gross anasarca.  I admitted her to the hospital where close to 20-30 pounds of fluid overload of volume was removed.  She has been out of the hospital for now 5 weeks on just  60 mg/40 mg of torsemide with  very stable symptoms.  She did have to increase her diuretics once about a week and a half ago and now is back to her baseline weight.  She feels fatigued but she is able to walk long distances on flat ground.  She can walk 2 flights of stairs.  She denies PND or orthopnea or nighttime cough.  She denies any chest heaviness or pressure.  She has not had syncope.      I have referred her to the [facility] program for another opinion about her candidacy for stem cell transplant or other clinical trials.  She is starting to have detectable lambda light chains in serum once again, although at a low level.       PAST MEDICAL HISTORY:  - Stage IV AL amyloid   - GI (stomach and duodenal) involvement  - Cardiac amyloidosis      FAMILY HISTORY:  Family History   Problem Relation Age of Onset     Other - See Comments Sister      Amyloidosis   - Sister passed from multiple myeloma, patient was also told her sister had amyloid as well  - Mom with CAD      SOCIAL HISTORY:  Social History     Social History     Marital Status:      Spouse Name: N/A     Number of Children: N/A     Years of Education: N/A     Social History Main Topics     Smoking status: Never Smoker      Smokeless tobacco: None     Alcohol Use: No     Drug Use: No     Sexual Activity: Not Asked     Social History Narrative   Lives in [place] with her , has 2 kids  Previously did office work, hasn't worked for several months now  Never smoker  Social ETOH    CURRENT MEDICATIONS:  Current Outpatient Prescriptions   Medication Sig Dispense Refill     torsemide (DEMADEX) 20 MG tablet Take 60mg (3 tabs) in the AM and 40mg (2 tabs) in the PM. 180 tablet 3     potassium chloride SA (K-DUR/KLOR-CON M) 10 MEQ CR tablet Take 2 tablets (20 mEq) by mouth 2 times daily Do not fill until requested 120 tablet 11     acyclovir (ZOVIRAX) 400 MG tablet Take 1 tablet (400 mg) by mouth 2 times daily 30 tablet 3     spironolactone (ALDACTONE) 25 MG tablet Take 1  "tablet (25 mg) by mouth 2 times daily 60 tablet 1     Acetaminophen (TYLENOL PO) Take 325 mg by mouth       HYDROcodone-acetaminophen (NORCO) 5-325 MG per tablet   0     LORazepam (ATIVAN) 0.5 MG tablet TAKE ONE-HALF TO ONE TABLET BY MOUTH ONCE DAILY AT BEDTIME AS NEEDED  0     ondansetron (ZOFRAN-ODT) 4 MG disintegrating tablet   0       ROS:   Constitutional: No fever, chills, or sweats. Weight is stable from our last visit   ENT: No visual disturbance, ear ache, epistaxis, sore throat.   Allergies/Immunologic: Negative.   Respiratory: No cough, hemoptysis.   Cardiovascular: As per HPI.   GI: no nausea and severe constipation. Early satiety   : No urinary frequency, dysuria, or hematuria.   Integument: Negative.   Psychiatric: feeling down and anxious since her diagnosis  Neuro: + tingling in legs bilaterally  Endocrinology: Negative.   Musculoskeletal: Negative.    EXAM:  /69 (BP Location: Right arm, Patient Position: Chair, Cuff Size: Adult Small)  Pulse 90  Ht 1.6 m (5' 3\")  SpO2 98%  General: appears comfortable, alert and articulate  Head: normocephalic, atraumatic  Eyes: anicteric sclera, EOMI  Neck: no adenopathy  Orophyarynx: moist mucosa, no lesions, dentition intact  Heart: regular rate and rhythm. III/VI holosystolic murmur at the apex, no visible JVD  Lungs: clear bilaterally  Abdomen: soft, non-tender, bowel sounds present, no hepatosplenomegaly  Extremities: mild edema non pitting at the ankles   Neurological: normal speech and affect, no gross motor deficits    Labs:  CBC RESULTS:  Lab Results   Component Value Date    WBC 5.3 01/24/2017    RBC 4.45 01/24/2017    HGB 14.3 01/24/2017    HCT 44.0 01/24/2017    MCV 99 01/24/2017    MCH 32.1 01/24/2017    MCHC 32.5 01/24/2017    RDW 15.2 (H) 01/24/2017     01/24/2017       CMP RESULTS:  Lab Results   Component Value Date     02/20/2017    POTASSIUM 3.5 02/20/2017    CHLORIDE 93 (L) 02/20/2017    CO2 36 (H) 02/20/2017    ANIONGAP 8 " 02/20/2017     (H) 02/20/2017    BUN 19 02/20/2017    CR 1.00 02/20/2017    GFRESTIMATED 56 (L) 02/20/2017    GFRESTBLACK 68 02/20/2017    JERROD 9.4 02/20/2017    BILITOTAL 1.6 (H) 02/01/2017    ALBUMIN 3.6 02/01/2017    ALKPHOS 163 (H) 02/01/2017    ALT 24 02/01/2017    AST 27 02/01/2017        INR RESULTS:  Lab Results   Component Value Date    INR 1.30 (H) 01/14/2017       Lab Results   Component Value Date    MAG 2.5 (H) 01/25/2017     Lab Results   Component Value Date    NTBNPI 9009 (H) 01/13/2017     Lab Results   Component Value Date    NTBNP 2883 (H) 01/05/2017       Echo from today:   Left Ventricle  Biplane LVEF 55%. Global and regional left ventricular function is normal  with an EF of 55-60%. Left ventricular size is normal. Moderate to severe  concentric wall thickening consistent with left ventricular hypertrophy is  present. Consistent with amyloid cardiomyopathy. Grade III or advanced  diastolic dysfunction. Global peak LV longitudinal strain is averaged at -  12.5%. This suggests abnormal strain (normal <-18%). No regional wall motion  abnormalities are seen.     Right Ventricle  The right ventricle is normal size. Global right ventricular function is  mildly reduced.  Atria  The right atria appears normal. Mild left atrial enlargement is present.     Mitral Valve  The mitral valve is normal. Mild to moderate mitral insufficiency is present.     Aortic Valve  Aortic valve is normal in structure and function. Mild aortic insufficiency  is present.     Tricuspid Valve  The tricuspid valve is normal. Mild to moderate tricuspid insufficiency is  present. The right ventricular systolic pressure is approximated at 23.7 mmHg  plus the right atrial pressure.     Pulmonic Valve  The pulmonic valve is normal. Mild pulmonic insufficiency is present.     Vessels  The inferior vena cava is normal. The aorta root is normal. The pulmonary  artery cannot be assessed.  Pericardium  Small circumferential  pericardial effusion is present without any hemodynamic  significance.     Compared to Previous Study  This study was compared with the study from 10/6/2016 .       MMode/2D Measurements & Calculations  IVSd: 1.6 cm  LVIDd: 3.6 cm  LVIDs: 2.6 cm  LVPWd: 1.4 cm  FS: 28.0 %  EDV(Teich): 56.1 ml  ESV(Teich): 25.2 ml  LV mass(C)d: 202.4 grams       Doppler Measurements & Calculations  MV E max ella: 82.3 cm/sec  MV A max ella: 40.1 cm/sec  MV E/A: 2.1  MV dec time: 0.14 sec  TR max ella: 243.4 cm/sec  TR max P.7 mmHg  Lateral E/e': 17.2  Medial E/e': 18.3     Previous cardiac monitor; no a fib, no VT, no pauses     Last CXR: R pleural effusion is now small           Assessment and Plan:   Mrs. 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     Patient has cardiac amyloidosis as evidenced by her echocardiogram (severe concentric hypertrophy and biatrial enlargement) and abnormal EKG (near-low voltage, poor R wave progression, biatrial enlargement and no evidence of LVH despite very thickened LV on echo) in the setting of biopsy proven (BMB, EGD) AL amyloid. Her positive biomarkers are also suggestive of cardiac involvement; angiogram without obstructive CAD. She is now s/p several cycles of chemotherapy with CyBorD with control of her light chains in the serum and urine although they are now once again detectable by serum.     Overall I am somewhat encouraged by how good she looks with all of the fluid removed in her and she has  been successful at keeping the weight off since her discharge on 60/40 of torsemide. Her NYHA class is II today, Stage C, she is euvolemic on exam today and her serum troponin has down trended to near normal with treatment.     I am referring her to my colleague Harjit Fischer at [facility] in [place] for another opinion about whether not not she may be  a candidate for stem cell transplant or whether or not they have any other open trials for her. She is not a candidate for combined stem cell and heart transplant based on diffuse amyloid involvement.     I still recommend holding any/all AV thor agents given the risk of progressive AV block/conduction disease in these patients.     Cardiac Amyloidosis with preserved EF  Stage C  NYHA Class II  ACEi/ARB None  BB None, relatively contraindicated due to risk of progressive conduction disease/AV block in these patients  Aldosterone antagonist: aldactone 25  SCD prophylaxis: not indicated  % BiV pacing: NA  Fluid status: euvolemic       Follow up: 2 weeks with our NP and then to [place]. I will get her in sooner if I need to.     Other:  Arrhythmia monitoring -- no arrhythmia noted on 2 weeks of telemetry during her last hospitalization.    Domenica Cohen MD   of Medicine   [facility] Division of Cardiology       CC  Patient Care Team:  Magy Obrien as PCP - General (Family Practice)  Mirela Moore MD as MD (Hematology & Oncology)  Vidhi Dorsey RN as Nurse Coordinator (Cardiology)  Eileen Arevalo RN as Nurse Coordinator (Cardiology)  Codie Nelson NP as Nurse Practitioner (Cardiology)

## 2017-03-02 NOTE — PATIENT INSTRUCTIONS
You were seen today in the Cardiovascular Clinic at the [facility].     Cardiology Providers you saw during your visit:  Dr. Cohen    Recommendations:  Continue Torsemide 60 mg /40 mg daily, may increase to 60/60 if needed for wt. Gain.    Eat a heart healthy, low sodium diet.  Get 20 to 30 minutes of aerobic exercise 4 to 5 times per week as tolerated. (Examples of aerobic exercise include: walking, bicycling, swimming, running).    Follow-up:  With Dr. Cohen     Orders Only on 03/02/2017   Component Date Value Ref Range Status     Sodium 03/02/2017 136  133 - 144 mmol/L Final     Potassium 03/02/2017 4.6  3.4 - 5.3 mmol/L Final     Chloride 03/02/2017 94  94 - 109 mmol/L Final     Carbon Dioxide 03/02/2017 33* 20 - 32 mmol/L Final     Anion Gap 03/02/2017 8  3 - 14 mmol/L Final     Glucose 03/02/2017 105* 70 - 99 mg/dL Final     Urea Nitrogen 03/02/2017 19  7 - 30 mg/dL Final     Creatinine 03/02/2017 1.01  0.52 - 1.04 mg/dL Final     GFR Estimate 03/02/2017 55* >60 mL/min/1.7m2 Final    Non  GFR Calc     GFR Estimate If Black 03/02/2017 67  >60 mL/min/1.7m2 Final    African American GFR Calc     Calcium 03/02/2017 9.3  8.5 - 10.1 mg/dL Final       For emergencies call 911.    For any scheduling needs, please call [phone], option #1 then option # 1.    Thank you for entrusting us with your care!     Please call if you have any questions or concerns.    Riya Streeter RN or Dayana Francois RN   Cardiology Care Coordinator  [phone], press option # 1 to be routed to the [place] then option # 3 for medical questions to speak with a nurse    If you have an urgent need after business hours or over the weekend please call [phone] and ask for the cardiology fellow on call.     If you have a hard time paying for your medications visit http://www.needymeds.org/ to see where you might be able to get your medications the cheapest along with patient assistance programs  available.

## 2017-03-09 ENCOUNTER — PRE VISIT (OUTPATIENT)
Dept: CARDIOLOGY | Facility: CLINIC | Age: 67
End: 2017-03-09

## 2017-03-09 DIAGNOSIS — I43 CARDIAC AMYLOIDOSIS (H): Primary | ICD-10-CM

## 2017-03-09 DIAGNOSIS — E85.4 CARDIAC AMYLOIDOSIS (H): Primary | ICD-10-CM

## 2017-03-13 DIAGNOSIS — E85.4 CARDIAC AMYLOIDOSIS (H): Primary | ICD-10-CM

## 2017-03-13 DIAGNOSIS — I50.33 ACUTE ON CHRONIC DIASTOLIC HEART FAILURE (H): ICD-10-CM

## 2017-03-13 DIAGNOSIS — B00.9 HSV (HERPES SIMPLEX VIRUS) INFECTION: ICD-10-CM

## 2017-03-13 DIAGNOSIS — I43 CARDIAC AMYLOIDOSIS (H): Primary | ICD-10-CM

## 2017-03-13 RX ORDER — LANOLIN ALCOHOL/MO/W.PET/CERES
100 CREAM (GRAM) TOPICAL DAILY
Qty: 60 TABLET | Refills: 3 | Status: SHIPPED | OUTPATIENT
Start: 2017-03-13 | End: 2017-05-26

## 2017-03-13 RX ORDER — SPIRONOLACTONE 25 MG/1
25 TABLET ORAL 2 TIMES DAILY
Qty: 180 TABLET | Refills: 3 | Status: SHIPPED | OUTPATIENT
Start: 2017-03-13 | End: 2018-05-09

## 2017-03-13 RX ORDER — ACYCLOVIR 400 MG/1
400 TABLET ORAL 2 TIMES DAILY
Qty: 60 TABLET | Refills: 3 | Status: SHIPPED | OUTPATIENT
Start: 2017-03-13 | End: 2017-06-01

## 2017-03-16 ENCOUNTER — OFFICE VISIT (OUTPATIENT)
Dept: CARDIOLOGY | Facility: CLINIC | Age: 67
End: 2017-03-16
Attending: NURSE PRACTITIONER
Payer: COMMERCIAL

## 2017-03-16 VITALS
HEIGHT: 63 IN | HEART RATE: 87 BPM | SYSTOLIC BLOOD PRESSURE: 123 MMHG | BODY MASS INDEX: 23.83 KG/M2 | OXYGEN SATURATION: 97 % | WEIGHT: 134.5 LBS | DIASTOLIC BLOOD PRESSURE: 70 MMHG

## 2017-03-16 DIAGNOSIS — E85.4 CARDIAC AMYLOIDOSIS (H): Primary | ICD-10-CM

## 2017-03-16 DIAGNOSIS — J90 PLEURAL EFFUSION: ICD-10-CM

## 2017-03-16 DIAGNOSIS — I43 CARDIAC AMYLOIDOSIS (H): Primary | ICD-10-CM

## 2017-03-16 DIAGNOSIS — I43 CARDIAC AMYLOIDOSIS (H): ICD-10-CM

## 2017-03-16 DIAGNOSIS — G44.89 OTHER HEADACHE SYNDROME: ICD-10-CM

## 2017-03-16 DIAGNOSIS — E85.4 CARDIAC AMYLOIDOSIS (H): ICD-10-CM

## 2017-03-16 DIAGNOSIS — E85.81 AL AMYLOIDOSIS (H): ICD-10-CM

## 2017-03-16 LAB
ALBUMIN SERPL-MCNC: 3.6 G/DL (ref 3.4–5)
ALP SERPL-CCNC: 202 U/L (ref 40–150)
ALT SERPL W P-5'-P-CCNC: 17 U/L (ref 0–50)
ANION GAP SERPL CALCULATED.3IONS-SCNC: 10 MMOL/L (ref 3–14)
AST SERPL W P-5'-P-CCNC: 17 U/L (ref 0–45)
BASOPHILS # BLD AUTO: 0.1 10E9/L (ref 0–0.2)
BASOPHILS NFR BLD AUTO: 1.1 %
BILIRUB SERPL-MCNC: 1.6 MG/DL (ref 0.2–1.3)
BUN SERPL-MCNC: 24 MG/DL (ref 7–30)
CALCIUM SERPL-MCNC: 9.4 MG/DL (ref 8.5–10.1)
CHLORIDE SERPL-SCNC: 95 MMOL/L (ref 94–109)
CO2 SERPL-SCNC: 33 MMOL/L (ref 20–32)
CREAT SERPL-MCNC: 0.96 MG/DL (ref 0.52–1.04)
DIFFERENTIAL METHOD BLD: ABNORMAL
EOSINOPHIL # BLD AUTO: 0.2 10E9/L (ref 0–0.7)
EOSINOPHIL NFR BLD AUTO: 3.3 %
ERYTHROCYTE [DISTWIDTH] IN BLOOD BY AUTOMATED COUNT: 13.6 % (ref 10–15)
GFR SERPL CREATININE-BSD FRML MDRD: 58 ML/MIN/1.7M2
GLUCOSE SERPL-MCNC: 111 MG/DL (ref 70–99)
HCT VFR BLD AUTO: 45.7 % (ref 35–47)
HGB BLD-MCNC: 15.2 G/DL (ref 11.7–15.7)
IMM GRANULOCYTES # BLD: 0 10E9/L (ref 0–0.4)
IMM GRANULOCYTES NFR BLD: 0.2 %
KAPPA LC UR-MCNC: 0.84 MG/DL (ref 0.33–1.94)
KAPPA LC/LAMBDA SER: 0.33 {RATIO} (ref 0.26–1.65)
LAMBDA LC SERPL-MCNC: 2.53 MG/DL (ref 0.57–2.63)
LYMPHOCYTES # BLD AUTO: 0.4 10E9/L (ref 0.8–5.3)
LYMPHOCYTES NFR BLD AUTO: 9.5 %
MCH RBC QN AUTO: 31.7 PG (ref 26.5–33)
MCHC RBC AUTO-ENTMCNC: 33.3 G/DL (ref 31.5–36.5)
MCV RBC AUTO: 95 FL (ref 78–100)
MONOCYTES # BLD AUTO: 0.3 10E9/L (ref 0–1.3)
MONOCYTES NFR BLD AUTO: 7.3 %
NEUTROPHILS # BLD AUTO: 3.6 10E9/L (ref 1.6–8.3)
NEUTROPHILS NFR BLD AUTO: 78.6 %
NRBC # BLD AUTO: 0 10*3/UL
NRBC BLD AUTO-RTO: 0 /100
PLATELET # BLD AUTO: 232 10E9/L (ref 150–450)
POTASSIUM SERPL-SCNC: 3.8 MMOL/L (ref 3.4–5.3)
PROT SERPL-MCNC: 7.2 G/DL (ref 6.8–8.8)
RBC # BLD AUTO: 4.79 10E12/L (ref 3.8–5.2)
SODIUM SERPL-SCNC: 138 MMOL/L (ref 133–144)
WBC # BLD AUTO: 4.5 10E9/L (ref 4–11)

## 2017-03-16 PROCEDURE — 99213 OFFICE O/P EST LOW 20 MIN: CPT | Mod: ZF

## 2017-03-16 PROCEDURE — 84165 PROTEIN E-PHORESIS SERUM: CPT | Performed by: INTERNAL MEDICINE

## 2017-03-16 PROCEDURE — 82784 ASSAY IGA/IGD/IGG/IGM EACH: CPT | Performed by: INTERNAL MEDICINE

## 2017-03-16 PROCEDURE — 83883 ASSAY NEPHELOMETRY NOT SPEC: CPT | Performed by: INTERNAL MEDICINE

## 2017-03-16 PROCEDURE — 86334 IMMUNOFIX E-PHORESIS SERUM: CPT | Performed by: INTERNAL MEDICINE

## 2017-03-16 PROCEDURE — 80053 COMPREHEN METABOLIC PANEL: CPT | Performed by: INTERNAL MEDICINE

## 2017-03-16 PROCEDURE — 99213 OFFICE O/P EST LOW 20 MIN: CPT | Performed by: NURSE PRACTITIONER

## 2017-03-16 PROCEDURE — 36415 COLL VENOUS BLD VENIPUNCTURE: CPT | Performed by: INTERNAL MEDICINE

## 2017-03-16 PROCEDURE — 85025 COMPLETE CBC W/AUTO DIFF WBC: CPT | Performed by: INTERNAL MEDICINE

## 2017-03-16 PROCEDURE — 00000402 ZZHCL STATISTIC TOTAL PROTEIN: Performed by: INTERNAL MEDICINE

## 2017-03-16 ASSESSMENT — PAIN SCALES - GENERAL: PAINLEVEL: NO PAIN (0)

## 2017-03-16 NOTE — PATIENT INSTRUCTIONS
You were seen today in the Cardiovascular Clinic at the [facility].     Cardiology Providers you saw during your visit: Codie Nelson NP       1. No medication changes today.  2. Dr. Cohen has communicated to Dr. Fischer at [facility].  3. Please be really cautious with position changes  4. Please make a follow-up CORE/heart failure appt with Codie in 2-3 weeks with labs prior.     Results for JESSICA GARCIA (MRN [id number]) as of 3/16/2017 10:24   Ref. Range 3/16/2017 09:46   Sodium Latest Ref Range: 133 - 144 mmol/L 138   Potassium Latest Ref Range: 3.4 - 5.3 mmol/L 3.8   Chloride Latest Ref Range: 94 - 109 mmol/L 95   Carbon Dioxide Latest Ref Range: 20 - 32 mmol/L 33 (H)   Urea Nitrogen Latest Ref Range: 7 - 30 mg/dL 24   Creatinine Latest Ref Range: 0.52 - 1.04 mg/dL 0.96   GFR Estimate Latest Ref Range: >60 mL/min/1.7m2 58 (L)   GFR Estimate If Black Latest Ref Range: >60 mL/min/1.7m2 71   Calcium Latest Ref Range: 8.5 - 10.1 mg/dL 9.4   Anion Gap Latest Ref Range: 3 - 14 mmol/L 10   Albumin Latest Ref Range: 3.4 - 5.0 g/dL 3.6   Protein Total Latest Ref Range: 6.8 - 8.8 g/dL 7.2   Bilirubin Total Latest Ref Range: 0.2 - 1.3 mg/dL 1.6 (H)   Alkaline Phosphatase Latest Ref Range: 40 - 150 U/L 202 (H)   ALT Latest Ref Range: 0 - 50 U/L 17   AST Latest Ref Range: 0 - 45 U/L 17   Glucose Latest Ref Range: 70 - 99 mg/dL 111 (H)   WBC Latest Ref Range: 4.0 - 11.0 10e9/L 4.5   Hemoglobin Latest Ref Range: 11.7 - 15.7 g/dL 15.2   Hematocrit Latest Ref Range: 35.0 - 47.0 % 45.7   Platelet Count Latest Ref Range: 150 - 450 10e9/L 232   RBC Count Latest Ref Range: 3.8 - 5.2 10e12/L 4.79   MCV Latest Ref Range: 78 - 100 fl 95   MCH Latest Ref Range: 26.5 - 33.0 pg 31.7   MCHC Latest Ref Range: 31.5 - 36.5 g/dL 33.3   RDW Latest Ref Range: 10.0 - 15.0 % 13.6   Diff Method Unknown Automated Method   % Neutrophils Latest Units: % 78.6   % Lymphocytes Latest Units: % 9.5   % Monocytes Latest Units: % 7.3  "  % Eosinophils Latest Units: % 3.3   % Basophils Latest Units: % 1.1   % Immature Granulocytes Latest Units: % 0.2   Nucleated RBCs Latest Ref Range: 0 /100 0   Absolute Neutrophil Latest Ref Range: 1.6 - 8.3 10e9/L 3.6   Absolute Lymphocytes Latest Ref Range: 0.8 - 5.3 10e9/L 0.4 (L)   Absolute Monocytes Latest Ref Range: 0.0 - 1.3 10e9/L 0.3   Absolute Eosinophils Latest Ref Range: 0.0 - 0.7 10e9/L 0.2   Absolute Basophils Latest Ref Range: 0.0 - 0.2 10e9/L 0.1   Abs Immature Granulocytes Latest Ref Range: 0 - 0.4 10e9/L 0.0   Absolute Nucleated RBC Unknown 0.0       Please limit your fluid intake to 2 L (64 ounces) daily.  2 Liters a day = 8.5 cups, or 72 ounces.  Please limit your salt intake to 2 grams a day or less.    If you gain 2# in 24 hours or 5# in one week call Eileen Arevalo RN so we can adjust your medications as needed over the phone.    Please feel free to call me with any questions or concerns.      Eileen Arevalo RN BSN CHFN  [facility]  Cardiology Care Coordinator-Heart Failure Clinic    Questions and [phone].   First press #1 for the [place] and then press #3 for \"Medical Questions\" to reach us Cardiology Nurses.     On Call Cardiologist for after hours or on weekends: [phone]   option #4 and ask to speak to the on-call Cardiologist. Inform them you are a CORE/heart failure patient at the [place].        If you need a medication refill please contact your pharmacy.  Please allow 3 business days for your refill to be completed.  _______________________________________________________  C.O.R.E. CLINIC Cardiomyopathy, Optimization, Rehabilitation, Education   The C.O.R.E. CLINIC is a heart failure specialty clinic within the [facility] where you will work with specialized nurse practitioners dedicated to helping patients with heart failure carefully adjust medications, receive education, and learn who and " when to call if symptoms develop. They specialize in helping you better understand your condition, slow the progression of your disease, improve the length and quality of your life, help you detect future heart problems before they become life threatening, and avoid hospitalizations.  As always, thank you for trusting us with your health care needs!

## 2017-03-16 NOTE — MR AVS SNAPSHOT
After Visit Summary   3/16/2017    Jessica Guerrero    MRN: [id number]           Patient Information     Date Of Birth          1950        Visit Information        Provider Department      3/16/2017 10:00 AM Codie Nelson NP  Health Heart Care        Care Instructions    You were seen today in the Cardiovascular Clinic at the [facility].     Cardiology Providers you saw during your visit: Codie Nelson NP       1. No medication changes today.  2. Dr. Cohen has communicated to Dr. Fischer at [facility].  3. Please be really cautious with position changes  4. Please make a follow-up CORE/heart failure appt with Codie in 2-3 weeks with labs prior.     Results for JESSICA GUERRERO (MRN [id number]) as of 3/16/2017 10:24   Ref. Range 3/16/2017 09:46   Sodium Latest Ref Range: 133 - 144 mmol/L 138   Potassium Latest Ref Range: 3.4 - 5.3 mmol/L 3.8   Chloride Latest Ref Range: 94 - 109 mmol/L 95   Carbon Dioxide Latest Ref Range: 20 - 32 mmol/L 33 (H)   Urea Nitrogen Latest Ref Range: 7 - 30 mg/dL 24   Creatinine Latest Ref Range: 0.52 - 1.04 mg/dL 0.96   GFR Estimate Latest Ref Range: >60 mL/min/1.7m2 58 (L)   GFR Estimate If Black Latest Ref Range: >60 mL/min/1.7m2 71   Calcium Latest Ref Range: 8.5 - 10.1 mg/dL 9.4   Anion Gap Latest Ref Range: 3 - 14 mmol/L 10   Albumin Latest Ref Range: 3.4 - 5.0 g/dL 3.6   Protein Total Latest Ref Range: 6.8 - 8.8 g/dL 7.2   Bilirubin Total Latest Ref Range: 0.2 - 1.3 mg/dL 1.6 (H)   Alkaline Phosphatase Latest Ref Range: 40 - 150 U/L 202 (H)   ALT Latest Ref Range: 0 - 50 U/L 17   AST Latest Ref Range: 0 - 45 U/L 17   Glucose Latest Ref Range: 70 - 99 mg/dL 111 (H)   WBC Latest Ref Range: 4.0 - 11.0 10e9/L 4.5   Hemoglobin Latest Ref Range: 11.7 - 15.7 g/dL 15.2   Hematocrit Latest Ref Range: 35.0 - 47.0 % 45.7   Platelet Count Latest Ref Range: 150 - 450 10e9/L 232   RBC Count Latest Ref Range: 3.8 - 5.2 10e12/L 4.79   MCV Latest Ref  "Range: 78 - 100 fl 95   MCH Latest Ref Range: 26.5 - 33.0 pg 31.7   MCHC Latest Ref Range: 31.5 - 36.5 g/dL 33.3   RDW Latest Ref Range: 10.0 - 15.0 % 13.6   Diff Method Unknown Automated Method   % Neutrophils Latest Units: % 78.6   % Lymphocytes Latest Units: % 9.5   % Monocytes Latest Units: % 7.3   % Eosinophils Latest Units: % 3.3   % Basophils Latest Units: % 1.1   % Immature Granulocytes Latest Units: % 0.2   Nucleated RBCs Latest Ref Range: 0 /100 0   Absolute Neutrophil Latest Ref Range: 1.6 - 8.3 10e9/L 3.6   Absolute Lymphocytes Latest Ref Range: 0.8 - 5.3 10e9/L 0.4 (L)   Absolute Monocytes Latest Ref Range: 0.0 - 1.3 10e9/L 0.3   Absolute Eosinophils Latest Ref Range: 0.0 - 0.7 10e9/L 0.2   Absolute Basophils Latest Ref Range: 0.0 - 0.2 10e9/L 0.1   Abs Immature Granulocytes Latest Ref Range: 0 - 0.4 10e9/L 0.0   Absolute Nucleated RBC Unknown 0.0       Please limit your fluid intake to 2 L (64 ounces) daily.  2 Liters a day = 8.5 cups, or 72 ounces.  Please limit your salt intake to 2 grams a day or less.    If you gain 2# in 24 hours or 5# in one week call Eileen Arevalo RN so we can adjust your medications as needed over the phone.    Please feel free to call me with any questions or concerns.      Eileen Arevalo RN BSN CHFN  [facility]  Cardiology Care Coordinator-Heart Failure Clinic    Questions and [phone].   First press #1 for the [facility] and then press #3 for \"Medical Questions\" to reach us Cardiology Nurses.     On Call Cardiologist for after hours or on weekends: [phone]   option #4 and ask to speak to the on-call Cardiologist. Inform them you are a CORE/heart failure patient at the [place].        If you need a medication refill please contact your pharmacy.  Please allow 3 business days for your refill to be completed.  _______________________________________________________  C.O.R.E. CLINIC Cardiomyopathy, Optimization, " Rehabilitation, Education   The C.O.R.E. CLINIC is a heart failure specialty clinic within the [facility] where you will work with specialized nurse practitioners dedicated to helping patients with heart failure carefully adjust medications, receive education, and learn who and when to call if symptoms develop. They specialize in helping you better understand your condition, slow the progression of your disease, improve the length and quality of your life, help you detect future heart problems before they become life threatening, and avoid hospitalizations.  As always, thank you for trusting us with your health care needs!             Follow-ups after your visit        Your next 10 appointments already scheduled     Mar 24, 2017 11:30 AM CDT   [facility]la [facility] MD   [facility]    [address] [place] [address] [phone]            Apr 10, 2017  9:00 AM CDT   Lab with [facility]   [facility] ([facility] [place])    [address]  [address] [facility] [address] [phone]            Apr 10, 2017  9:30 AM CDT   (Arrive by 9:15 AM)   Return Visit with Codie Nelson NP   [facility] ([facility] [place])    [address]  3rd Floor  [facility] [facility] [address] [phone]              Who to contact     If you have questions or need follow up information about today's clinic visit or your schedule please contact [facility] directly at [phone].  Normal or non-critical lab and imaging results will be communicated to you by MyChart, letter or phone within 4 business days after the clinic has received the results. If you do not hear from us within 7 days, please contact the clinic through MyChart or phone. If you have a critical or abnormal lab result, we will notify you by phone as soon as possible.  Submit refill  "requests through [employer] or call your pharmacy and they will forward the refill request to us. Please allow 3 business days for your refill to be completed.          Additional Information About Your Visit        [facility] Information     [employer] gives you secure access to your electronic health record. If you see a primary care provider, you can also send messages to your care team and make appointments. If you have questions, please call your primary care clinic.  If you do not have a primary care provider, please call [phone] and they will assist you.        Care EveryWhere ID     This is your Care EveryWhere ID. This could be used by other organizations to access your [place] medical records  [id number]        Your Vitals Were     Pulse Height Pulse Oximetry BMI (Body Mass Index)          87 1.6 m (5' 3\") 97% 23.83 kg/m2         Blood Pressure from Last 3 Encounters:   03/16/17 123/70   03/02/17 115/69   02/20/17 100/62    Weight from Last 3 Encounters:   03/16/17 61 kg (134 lb 8 oz)   02/20/17 62.2 kg (137 lb 3.2 oz)   02/09/17 62.1 kg (137 lb)              Today, you had the following     No orders found for display       Primary Care Provider Office Phone # Fax #    Magy Obrien [phone]       [facility] [address]  [facility] [address]        Thank you!     Thank you for choosing [facility]  for your care. Our goal is always to provide you with excellent care. Hearing back from our patients is one way we can continue to improve our services. Please take a few minutes to complete the written survey that you may receive in the mail after your visit with us. Thank you!             Your Updated Medication List - Protect others around you: Learn how to safely use, store and throw away your medicines at www.disposemymeds.org.          This list is accurate as of: 3/16/17 10:46 AM.  Always use your most recent med list.                   Brand Name Dispense Instructions for " use    acyclovir 400 MG tablet    ZOVIRAX    60 tablet    Take 1 tablet (400 mg) by mouth 2 times daily       HYDROcodone-acetaminophen 5-325 MG per tablet    NORCO         LORazepam 0.5 MG tablet    ATIVAN     TAKE ONE-HALF TO ONE TABLET BY MOUTH ONCE DAILY AT BEDTIME AS NEEDED       ondansetron 4 MG ODT tab    ZOFRAN-ODT         potassium chloride SA 10 MEQ CR tablet    K-DUR/KLOR-CON M    120 tablet    Take 2 tablets (20 mEq) by mouth 2 times daily Do not fill until requested       spironolactone 25 MG tablet    ALDACTONE    180 tablet    Take 1 tablet (25 mg) by mouth 2 times daily       thiamine 100 MG tablet     60 tablet    Take 1 tablet (100 mg) by mouth daily       torsemide 20 MG tablet    DEMADEX    180 tablet    Take 60mg (3 tabs) in the AM and 40mg (2 tabs) in the PM.       TYLENOL PO      Take 325 mg by mouth

## 2017-03-16 NOTE — PROGRESS NOTES
HPI:  Mrs. Guerrero is a 66 year old female with no past medical history until the Spring of 2016 when she was diagnosed with stage IV AL amyloid; she presents to clinic for follow up of cardiac amyloid. Since her last visit, she's completed a 2 week Zio monitor showing some SVT, rare bigeminy and no ventricular arrhythmias.    Ms. Guerrero notes some nausea last night and feels a little more lightheaded today, particularly with position changes. No shortness of breath, chest pain, or edema. Rare palpitations. Appetite good. Sleeping on 2 pillows. No PND. No falls or syncope.    More notable foot burning and tingling pain. Also some distal fingertip pain.     PAST MEDICAL HISTORY:  - Metastatic AL amyloid   - GI (stomach and duodenal) involvement  - Cardiac amyloidosis      FAMILY HISTORY:  Family History   Problem Relation Age of Onset     Other - See Comments Sister      Amyloidosis   - Sister passed from multiple myeloma, patient was also told her sister had amyloid as well  - Mom with CAD      SOCIAL HISTORY:  Social History     Social History     Marital Status:      Spouse Name: N/A     Number of Children: N/A     Years of Education: N/A     Social History Main Topics     Smoking status: Never Smoker      Smokeless tobacco: None     Alcohol Use: No     Drug Use: No     Sexual Activity: Not Asked     Other Topics Concern     None     Social History Narrative   Lives in [place] with her , has 2 kids  Previously did office work, hasn't worked for several months now  Never smoker  Social etoh    CURRENT MEDICATIONS:    Current Outpatient Prescriptions on File Prior to Visit:  spironolactone (ALDACTONE) 25 MG tablet Take 1 tablet (25 mg) by mouth 2 times daily   thiamine 100 MG tablet Take 1 tablet (100 mg) by mouth daily   acyclovir (ZOVIRAX) 400 MG tablet Take 1 tablet (400 mg) by mouth 2 times daily   torsemide (DEMADEX) 20 MG tablet Take 60mg (3 tabs) in the AM and 40mg (2 tabs) in the PM.  "  potassium chloride SA (K-DUR/KLOR-CON M) 10 MEQ CR tablet Take 2 tablets (20 mEq) by mouth 2 times daily Do not fill until requested   Acetaminophen (TYLENOL PO) Take 325 mg by mouth   HYDROcodone-acetaminophen (NORCO) 5-325 MG per tablet    LORazepam (ATIVAN) 0.5 MG tablet TAKE ONE-HALF TO ONE TABLET BY MOUTH ONCE DAILY AT BEDTIME AS NEEDED   ondansetron (ZOFRAN-ODT) 4 MG disintegrating tablet      No current facility-administered medications on file prior to visit.       ROS:   Constitutional: No fever, chills, or sweats. 35 lb weight loss over 6 months, stable since last visit  ENT: No visual disturbance, ear ache, epistaxis, sore throat.   Allergies/Immunologic: Negative.   Respiratory: No cough, hemoptysis.   Cardiovascular: As per HPI.   GI: + Nausea per HPI. Early satiety   : No urinary frequency, dysuria, or hematuria.   Integument: Negative.   Psychiatric: negative  Neuro: + tingling in distal hands and legs  Endocrinology: Negative.   Musculoskeletal: Negative.    EXAM:  /70  Pulse 87  Ht 1.6 m (5' 3\")  Wt 61 kg (134 lb 8 oz)  SpO2 97%  BMI 23.83 kg/m2  General: appears comfortable, alert and articulate  Head: normocephalic, atraumatic  Eyes: anicteric sclera, EOMI  Neck: no adenopathy  Orophyarynx: moist mucosa, no lesions, dentition intact  Heart: regular rate and rhythm with occasional premature beats. II/VI holosystolic murmur at the apex, no visible JVD  Lungs: clear bilaterally  Abdomen: soft, non-tender, bowel sounds present, no hepatomegaly  Extremities: mild edema non pitting at the ankles   Neurological: normal speech and affect, no gross motor deficits    Labs:  Last Basic Metabolic Panel:  Lab Results   Component Value Date     03/16/2017      Lab Results   Component Value Date    POTASSIUM 3.8 03/16/2017     Lab Results   Component Value Date    CHLORIDE 95 03/16/2017     Lab Results   Component Value Date    JERROD 9.4 03/16/2017     Lab Results   Component Value Date    CO2 33 " 03/16/2017     Lab Results   Component Value Date    BUN 24 03/16/2017     Lab Results   Component Value Date    CR 0.96 03/16/2017     Lab Results   Component Value Date     03/16/2017       Assessment and Plan:   Mrs. 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 Encouraged to be more cautious with position changes. Otherwise, no changes today. Plans to follow up at [facility] in April 26 and will return here in 2-3 weeks or as needed.    1. 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  Stage C  NYHA Class II  ACEi/ARB None  BB None, relatively contraindicated due to risk of progressive conduction disease/AV block in these patients  Aldosterone antagonist: aldactone 25 mg daily  SCD prophylaxis: not indicated  % BiV pacing: NA  Fluid status: euvolemic    20 minutes spent in direct care, >50% in counseling        CC  Patient Care Team:  Magy Obrien as PCP - General (Family Practice)  Mirela Daley MD as MD (Hematology & Oncology)  Domenica Cohen MD as MD (Cardiology)  Vidhi Dorsey RN as Nurse Coordinator (Cardiology)  Eileen Arevalo, RN as Nurse Coordinator (Cardiology)  Codie Nelson NP as Nurse Practitioner (Cardiology)  MIRELA DALEY

## 2017-03-16 NOTE — LETTER
3/16/2017      RE: Leandra Guerrero  [address] [place] [address]       Dear Colleague,    Thank you for the opportunity to participate in the care of your patient, Leandra Guerrero, at the [facility] HEART [facility]. Please see a copy of my visit note below.    HPI:  Mrs. Guerrero is a 66 year old female with no past medical history until the Spring of 2016 when she was diagnosed with stage IV AL amyloid; she presents to clinic for follow up of cardiac amyloid. Since her last visit, she's completed a 2 week Zio monitor showing some SVT, rare bigeminy and no ventricular arrhythmias.    Ms. Guerrero notes some nausea last night and feels a little more lightheaded today, particularly with position changes. No shortness of breath, chest pain, or edema. Rare palpitations. Appetite good. Sleeping on 2 pillows. No PND. No falls or syncope.    More notable foot burning and tingling pain. Also some distal fingertip pain.     PAST MEDICAL HISTORY:  - Metastatic AL amyloid   - GI (stomach and duodenal) involvement  - Cardiac amyloidosis      FAMILY HISTORY:  Family History   Problem Relation Age of Onset     Other - See Comments Sister      Amyloidosis   - Sister passed from multiple myeloma, patient was also told her sister had amyloid as well  - Mom with CAD      SOCIAL HISTORY:  Social History     Social History     Marital Status:      Spouse Name: N/A     Number of Children: N/A     Years of Education: N/A     Social History Main Topics     Smoking status: Never Smoker      Smokeless tobacco: None     Alcohol Use: No     Drug Use: No     Sexual Activity: Not Asked     Other Topics Concern     None     Social History Narrative   Lives in [place] with her , has 2 kids  Previously did office work, hasn't worked for several months now  Never smoker  Social etoh    CURRENT MEDICATIONS:    Current Outpatient Prescriptions on File Prior to  "Visit:  spironolactone (ALDACTONE) 25 MG tablet Take 1 tablet (25 mg) by mouth 2 times daily   thiamine 100 MG tablet Take 1 tablet (100 mg) by mouth daily   acyclovir (ZOVIRAX) 400 MG tablet Take 1 tablet (400 mg) by mouth 2 times daily   torsemide (DEMADEX) 20 MG tablet Take 60mg (3 tabs) in the AM and 40mg (2 tabs) in the PM.   potassium chloride SA (K-DUR/KLOR-CON M) 10 MEQ CR tablet Take 2 tablets (20 mEq) by mouth 2 times daily Do not fill until requested   Acetaminophen (TYLENOL PO) Take 325 mg by mouth   HYDROcodone-acetaminophen (NORCO) 5-325 MG per tablet    LORazepam (ATIVAN) 0.5 MG tablet TAKE ONE-HALF TO ONE TABLET BY MOUTH ONCE DAILY AT BEDTIME AS NEEDED   ondansetron (ZOFRAN-ODT) 4 MG disintegrating tablet      No current facility-administered medications on file prior to visit.       ROS:   Constitutional: No fever, chills, or sweats. 35 lb weight loss over 6 months, stable since last visit  ENT: No visual disturbance, ear ache, epistaxis, sore throat.   Allergies/Immunologic: Negative.   Respiratory: No cough, hemoptysis.   Cardiovascular: As per HPI.   GI: + Nausea per HPI. Early satiety   : No urinary frequency, dysuria, or hematuria.   Integument: Negative.   Psychiatric: negative  Neuro: + tingling in distal hands and legs  Endocrinology: Negative.   Musculoskeletal: Negative.    EXAM:  /70  Pulse 87  Ht 1.6 m (5' 3\")  Wt 61 kg (134 lb 8 oz)  SpO2 97%  BMI 23.83 kg/m2  General: appears comfortable, alert and articulate  Head: normocephalic, atraumatic  Eyes: anicteric sclera, EOMI  Neck: no adenopathy  Orophyarynx: moist mucosa, no lesions, dentition intact  Heart: regular rate and rhythm with occasional premature beats. II/VI holosystolic murmur at the apex, no visible JVD  Lungs: clear bilaterally  Abdomen: soft, non-tender, bowel sounds present, no hepatomegaly  Extremities: mild edema non pitting at the ankles   Neurological: normal speech and affect, no gross motor " deficits    Labs:  Last Basic Metabolic Panel:  Lab Results   Component Value Date     03/16/2017      Lab Results   Component Value Date    POTASSIUM 3.8 03/16/2017     Lab Results   Component Value Date    CHLORIDE 95 03/16/2017     Lab Results   Component Value Date    JERROD 9.4 03/16/2017     Lab Results   Component Value Date    CO2 33 03/16/2017     Lab Results   Component Value Date    BUN 24 03/16/2017     Lab Results   Component Value Date    CR 0.96 03/16/2017     Lab Results   Component Value Date     03/16/2017       Assessment and Plan:   Mrs. 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 Encouraged to be more cautious with position changes. Otherwise, no changes today. Plans to follow up at [facility] in April 26 and will return here in 2-3 weeks or as needed.    1. 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  Stage C  NYHA Class II  ACEi/ARB None  BB None, relatively contraindicated due to risk of progressive conduction disease/AV block in these patients  Aldosterone antagonist: aldactone 25 mg daily  SCD prophylaxis: not indicated  % BiV pacing: NA  Fluid status: euvolemic    20 minutes spent in direct care, >50% in counseling      CC  Patient Care Team:  Magy Obrien as PCP - General (Family Practice)  Mirela Moore MD as MD (Hematology & Oncology)  Domenica Cohen MD as MD (Cardiology)  Vidhi Dorsey RN as Nurse Coordinator (Cardiology)  Eileen Arevalo, RN as Nurse Coordinator (Cardiology)                  Please do not hesitate to contact me if you have any questions/concerns.     Sincerely,     Codie Nelson NP

## 2017-03-17 LAB
ALBUMIN SERPL ELPH-MCNC: 4 G/DL (ref 3.7–5.1)
ALPHA1 GLOB SERPL ELPH-MCNC: 0.4 G/DL (ref 0.2–0.4)
ALPHA2 GLOB SERPL ELPH-MCNC: 0.8 G/DL (ref 0.5–0.9)
B-GLOBULIN SERPL ELPH-MCNC: 0.7 G/DL (ref 0.6–1)
GAMMA GLOB SERPL ELPH-MCNC: 0.8 G/DL (ref 0.7–1.6)
IGA SERPL-MCNC: 67 MG/DL (ref 70–380)
IGG SERPL-MCNC: 812 MG/DL (ref 695–1620)
IGM SERPL-MCNC: 71 MG/DL (ref 60–265)
IMMUNOFIXATION ELP: ABNORMAL
M PROTEIN SERPL ELPH-MCNC: 0 G/DL
PROT PATTERN SERPL ELPH-IMP: NORMAL

## 2017-03-24 ENCOUNTER — ONCOLOGY VISIT (OUTPATIENT)
Dept: ONCOLOGY | Facility: CLINIC | Age: 67
End: 2017-03-24
Payer: COMMERCIAL

## 2017-03-24 VITALS
BODY MASS INDEX: 24.1 KG/M2 | SYSTOLIC BLOOD PRESSURE: 106 MMHG | RESPIRATION RATE: 15 BRPM | HEART RATE: 84 BPM | DIASTOLIC BLOOD PRESSURE: 60 MMHG | TEMPERATURE: 97.2 F | OXYGEN SATURATION: 99 % | HEIGHT: 63 IN | WEIGHT: 136 LBS

## 2017-03-24 DIAGNOSIS — E85.81 AL AMYLOIDOSIS (H): Primary | ICD-10-CM

## 2017-03-24 PROCEDURE — 99214 OFFICE O/P EST MOD 30 MIN: CPT | Performed by: INTERNAL MEDICINE

## 2017-03-24 ASSESSMENT — PAIN SCALES - GENERAL: PAINLEVEL: NO PAIN (0)

## 2017-03-24 NOTE — NURSING NOTE
"Leandra Guerrero is a 66 year old female who presents for:  Chief Complaint   Patient presents with     Oncology Clinic Visit     follow up        Initial Vitals:  /60  Pulse 84  Temp 97.2  F (36.2  C)  Resp 15  Ht 1.6 m (5' 2.99\")  Wt 61.7 kg (136 lb)  SpO2 99%  BMI 24.1 kg/m2 Estimated body mass index is 24.1 kg/(m^2) as calculated from the following:    Height as of this encounter: 1.6 m (5' 2.99\").    Weight as of this encounter: 61.7 kg (136 lb).. Body surface area is 1.66 meters squared. BP completed using cuff size: regular  No Pain (0) No LMP recorded. Patient is postmenopausal. Allergies and medications reviewed.     Medications: Medication refills not needed today.  Pharmacy name entered into [facility]: [id number] [place] [id number]    Comments:     5 minutes for nursing intake (face to face time)   Silke Leblanc LPN        "

## 2017-03-24 NOTE — PROGRESS NOTES
Hematology/Oncology Follow-up visit:  Date on this visit: 3/24/2017        Referring Physician: Dr. Braydon Barron, [facility].  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 She was diagnosed with CHF at a local clinic in [facility] and was placed on a b-blocker and a diuretic. She has also developed progressive worsening lower extremity edema by May 2016 which in retrospect started in January. Her cardiac angiogram was reportedly negative at that time. She has lost about 35 lbs in the last 6 months. She has significant nausea somewhat controlled with Zofran but she is reluctant to use it because of constipation too. She is on Senna-S one tablet PO BID for constipation and that is improved but still significant. She has very poor PO intake due to nausea and lack of appetite. She requested to be seen at [facility] and was seen by Dr. Braydon Barron on 9/13/2016 with f/up on 9/20/2016. She was also seen by cardiology team there Sara Severson CNP and Dr. Nettles from CHF service.  There was no e/o renal or hepatic amyloidosis.  EKG on 9/7/2016 showed normal sinus rhythm, prolonged QT interval (QTc 521 sec), left atrial enlargement and left anterior fascicular block. There was ST and T wave abnormality.  Apparently, her echocardiogram showed preserved LVEF at 59%.  She reports having R sided thoracentesis on 8/25/2016 after which her breathing has improved. She then had a CXR on 9/7/2016 which showed a small R pleural effusion with right basilar atelectasis. Cardiac silhouette was at the upper level of normal.  I have reviewed extensive outside medical records but we are still in the process of obtaining additional records.  The patient also underwent a minor salivary gland biopsy of the lower lip. The pathology from that procedure (9/20/2016) showed normal salivary gland tissue with  nonspecific chronic sialadenitis.  She had extensive lab workup on 9/7/2016.  Her TSH was normal. Serum protein ELP showed a small abnormality in the gamma fraction.  Serum immunofixation showed small monoclonal IgA lambda in the gamma fraction and a small lambda in the beta fraction. Serum IgA level was normal at 329. Serum IgG level was mildly low at 755 (normal range 767-1590) and IgM level was normal. B2 microglobulin was mildly elevated at 2.94 (ULN 2.7). Mg was normal. Total bilirubin was elevated at 3.9 and direct at 0.8. Uric acid was mildly elevated at 6.9 (ULN 6.1) Creat was 1.0. Troponin was 0.1 (ULN <0.01) and NT-pro BNP was 4747 pg/ml (ULN <=183). Total cholesterol was 190 and LDL was 139. Random urine protein was mildly elevated at 33 mg/dl (normal range <22). INR was 1.2 and PTT 27 (within normal limits). Fibrinogen was elevated at 441 and factor X was mildly decreased at 64% (normal range %). D-dimer was up at 1700 (). Free lambda light chains were 69.5 and free kappa light chains were 1.11 with FLC kappa/lambda ratio of 0.016.   CBC showed normal WBC at 6.0, slightly elevated Hb at 16. Platelet count was normal.  Flow cytometry on bone marrow biopsy showed detectable monotypic plasma cells. There were 12% of monotypic plasma cells on bone marrow biopsy.    The bone marrow biopsy (performed on 9/14/2016) showed normocellular BM, 30-40% cellularity, with involvement by 10-20% of lambda light chain restricted plasma cells.  Congo red stain was positive on the bone marrow biopsy. Liquid chromatography tandem mass spectroscopy showed a peptide profile c/w AL (lambda type) amyloid. BM karyotype was normal 46 XX. FISH on BM showed plasma cell clone with 1q duplication, and monosomy of 13 and 14.  She had an EGD by Dr. Matthew Wadsworth on 9/14/2016. It showed gastric mucosal atrophy and multiple mucosal papules (nodules) seen in the stomach. The duodenum appeared normal. The biopsies of stomach  mucosa, of stomach (antral) nodules and of duodenum, all showed the presence of amyloid in submucosal blood vessel walls in the stomach and duodenum and in deep mucosa in the stomach antrum and body, confirmed by Congo Red stain.   She also had additional labs done at our last visit, on 9/30/2016. Total bilirubin was elevated as below, primarily indirect. K was 3.3 and she was stared on K-dur 20 mEq PO bid. She was noted to have elevated serum IgA level on 9/30/2016 at 392 (). Serum free lambda chains were elevated at 37.75. Serum M spike was 0.1 g/dl and serum FLC ratio was low at 0.01. Serum immunofixation ELP showed monoclonal IgA immunoglobulin of lambda light chain type as well as monoclonal free immunoglobulin light chain of lambda chain type.  She was seen by Dr. Cohen too and had an echocardiogram on 10/6/2016 which showed:  Global and regional left ventricular function was normal with an EF of 60-65%.  The LV mass index was 131 g/m2 (severely increased.) The LV geometry is c/w  concentric hypertrophy measured by relative wall thickness. Global right ventricular function was normal. Severe biatrial enlargement was present.  Right ventricular systolic pressure was 26mmHg above the right atrial pressure. The inferior vena cava was dilated at 2.1 cm without respiratory variability, consistent with increased right atrial pressure. Trivial pericardial effusion was present.    She proceeded to start CyBorD as recommended by Dr. Barron with dose reduced in subq Bortezomib to 0.7 mg/m2, on 10/4/2016.  After one cycle, her M spike, IgA level and free lambda light chains have all improved, with M spike of 0 g/dl, IgA down into the normal range and free serum lambda light chains improving from 37.75 down to 2.09.  She returns today in cycle 3 day 11. She had amyloid labs again on day 1 of cycle 3 (12/8/2016) which showed continued response to therapy and her IgA level was now low and serum FLC ratio was normal  and serum free lambda chains were normal too.   She has developed hematuria with cycle 2 and Cytoxan is on hold from day 15 cycle 2 since hematuria persisted despite Mesnex. She had a CT abdomen/pelvis on 11/29/2016 which showed no abnormalities in the kidneys. There was a moderate right and a trace left sided pleural effusions. Small volume ascites and anasarca was noted as well. The liver had heterogeneous appearance, which can be seen in hepatic venous congestion.   Cytoxan was d/cd  on  11/22/2016 since her cystoscopy showed findings of mild petechia and erythema in her bladder that would be c/w hemorrhagic cystitis.  Pinch biopsies were taken from the bladder and showed no e/o amyloid or malignancy. She has continued on Velcade and dexamethasone until 12/27/2016.  On 1/3/2017 she was seen at [facility] by Dr. Barron and at that time serum free light chains were normal at 0.74 and FLC ratio was normal as well at 0.72. She was recommended to be on observation because free lambda chains normalized. However, her cardiac amyloidosis has progressed by 1/5/2017 and she has required frequent thoracentesis, and had weeping LE edema and elevated neck veins.  Her echocardiogram on 1/5/2017 was abnormal (severe concentric hypertrophy and biatrial enlargement) and she also had abnormal EKG (near-low voltage, poor R wave progression, biatrial enlargement and no evidence of LVH despite very thickened LV on echo). She was felt to have  New York Heart Association class IIIB heart failure with clinically severe volume overload.     History Of Present Illness:  Ms. Guerrero is a 66 year old female, non-smoker, who presents for f/up of AL amyloidosis. She has been off treatment since December 2016, and afterwards was hospitalized for CHF due to cardiac amyloid and was aggressively diuresed. She has been following up closely with Dr. Cohen and cardiology team. Nausea has resolved. She had her last  echocardiogram in 01/2017 which  showed LVEF of 55-60%. LVH and moderate to severe concentric wall thickening was present, c/w amyloid cardiomyopathy. Grade III diastolic dysfunction was present. Right ventricular function was mildly reduced.   She also had home O2 evaluation done while in the hospital during prior admission, for which she qualified for home oxygen but she has not been using it. She tried to use supplemental O2 at night but it did not help. Her weight has been stable for 2 months at 132 lbs. She does have some burning in her feet. Edema in LE is significantly improved. She is pain free today. She was last seen by Dr. Cohen on 3/2/107. She was referred to cardiac oncology at  in [place]. She is on Torsemide 60 mg PO qam and 40 mg PO qHs and is on aldactone.  [place] is feeling overall much better since diuresis. She denies SOB.  CXR on 2017 showed:  Interval improvement in a now small right pleural  effusion and adjacent basilar atelectasis and/or consolidation   In addition, a complete 12 point  review of systems is negative.      Past Medical/Surgical History:  AL Amyloid  Amyloid cardiomyopathy/CHF    Family History:  Sister  of multiple myeloma    Social History:  Lives in [place], with . Nonsmoker        Allergies:     Allergies   Allergen Reactions     Contrast Dye Shortness Of Breath     Shaking,chills and dypsnea     Cytoxan [Cyclophosphamide]      Hemorrhagic cystitis     Levofloxacin      Severe shaking and abdominal pain     Amoxicillin Nausea and Vomiting and Cramps     Current Medications:  Current Outpatient Prescriptions   Medication     spironolactone (ALDACTONE) 25 MG tablet     thiamine 100 MG tablet     torsemide (DEMADEX) 20 MG tablet     potassium chloride SA (K-DUR/KLOR-CON M) 10 MEQ CR tablet     Acetaminophen (TYLENOL PO)     ondansetron (ZOFRAN-ODT) 4 MG disintegrating tablet     acyclovir (ZOVIRAX) 400 MG tablet     HYDROcodone-acetaminophen (NORCO) 5-325 MG per tablet     LORazepam  "(ATIVAN) 0.5 MG tablet     No current facility-administered medications for this visit.       Physical Exam:  /60  Pulse 84  Temp 97.2  F (36.2  C)  Resp 15  Ht 1.6 m (5' 2.99\")  Wt 61.7 kg (136 lb)  SpO2 99%  BMI 24.1 kg/m2      GENERAL APPEARANCE: middle aged, alert and no distress     HENT: Mouth without ulcers or lesions     NECK: no adenopathy, no asymmetry or masses     LYMPHATICS: No cervical, supraclavicular, axillary or inguinal lymphadenopathy     RESP: LL- CTA. RL - decreased BS on the right 1/3 way up - no rales, rhonchi or wheezes     CARDIOVASCULAR: regular rates and rhythm, normal S1 S2, no S3 or S4 and no murmur.     ABDOMEN:  soft, nontender, no HSM or masses and bowel sounds normal. + mild to moderate ascites.     MUSCULOSKELETAL: extremities normal- no gross deformities noted, no evidence of inflammation in joints, FROM in all extremities. +2 edema b/l LE,improved  compared to last visit.       SKIN: no suspicious lesions or rashes     PSYCHIATRIC: mentation appears normal and affect normal    Laboratory/Imaging Studies  Results for JESSICA GACRIA (MRN [id number]) as of 3/24/2017 11:47   Ref. Range 2/1/2017 10:30 2/20/2017 08:25 3/2/2017 11:39 3/16/2017 09:46   Sodium Latest Ref Range: 133 - 144 mmol/L 135 136 136 138   Potassium Latest Ref Range: 3.4 - 5.3 mmol/L 3.7 3.5 4.6 3.8   Chloride Latest Ref Range: 94 - 109 mmol/L 93 (L) 93 (L) 94 95   Carbon Dioxide Latest Ref Range: 20 - 32 mmol/L 32 36 (H) 33 (H) 33 (H)   Urea Nitrogen Latest Ref Range: 7 - 30 mg/dL 28 19 19 24   Creatinine Latest Ref Range: 0.52 - 1.04 mg/dL 1.10 (H) 1.00 1.01 0.96   GFR Estimate Latest Ref Range: >60 mL/min/1.7m2 50 (L) 56 (L) 55 (L) 58 (L)   GFR Estimate If Black Latest Ref Range: >60 mL/min/1.7m2 60 (L) 68 67 71   Calcium Latest Ref Range: 8.5 - 10.1 mg/dL 9.3 9.4 9.3 9.4   Anion Gap Latest Ref Range: 3 - 14 mmol/L 10 8 8 10   Albumin Latest Ref Range: 3.4 - 5.0 g/dL 3.6   3.6   Protein Total " Latest Ref Range: 6.8 - 8.8 g/dL 6.8   7.2   Bilirubin Total Latest Ref Range: 0.2 - 1.3 mg/dL 1.6 (H)   1.6 (H)   Alkaline Phosphatase Latest Ref Range: 40 - 150 U/L 163 (H)   202 (H)   ALT Latest Ref Range: 0 - 50 U/L 24   17   AST Latest Ref Range: 0 - 45 U/L 27   17     Results for JESSICA GARCIA (MRN [id number]) as of 3/24/2017 11:47   Ref. Range 1/24/2017 06:50 3/16/2017 09:46   WBC Latest Ref Range: 4.0 - 11.0 10e9/L 5.3 4.5   Hemoglobin Latest Ref Range: 11.7 - 15.7 g/dL 14.3 15.2   Hematocrit Latest Ref Range: 35.0 - 47.0 % 44.0 45.7   Platelet Count Latest Ref Range: 150 - 450 10e9/L 324 232   RBC Count Latest Ref Range: 3.8 - 5.2 10e12/L 4.45 4.79   MCV Latest Ref Range: 78 - 100 fl 99 95   MCH Latest Ref Range: 26.5 - 33.0 pg 32.1 31.7   MCHC Latest Ref Range: 31.5 - 36.5 g/dL 32.5 33.3   RDW Latest Ref Range: 10.0 - 15.0 % 15.2 (H) 13.6   Diff Method Unknown  Automated Method   % Neutrophils Latest Units: %  78.6   % Lymphocytes Latest Units: %  9.5   % Monocytes Latest Units: %  7.3   % Eosinophils Latest Units: %  3.3   % Basophils Latest Units: %  1.1   % Immature Granulocytes Latest Units: %  0.2   Nucleated RBCs Latest Ref Range: 0 /100  0   Absolute Neutrophil Latest Ref Range: 1.6 - 8.3 10e9/L  3.6   Absolute Lymphocytes Latest Ref Range: 0.8 - 5.3 10e9/L  0.4 (L)   Absolute Monocytes Latest Ref Range: 0.0 - 1.3 10e9/L  0.3   Absolute Eosinophils Latest Ref Range: 0.0 - 0.7 10e9/L  0.2   Absolute Basophils Latest Ref Range: 0.0 - 0.2 10e9/L  0.1   Abs Immature Granulocytes Latest Ref Range: 0 - 0.4 10e9/L  0.0   Absolute Nucleated RBC Unknown  0.0     Results for JESSICA GARCIA (MRN [id number]) as of 3/24/2017 11:47   Ref. Range 2/9/2017 13:03 2/9/2017 13:12 3/16/2017 09:46   Albumin Fraction Latest Ref Range: 3.7 - 5.1 g/dL   4.0   Alpha 1 Fraction Latest Ref Range: 0.2 - 0.4 g/dL   0.4   Alpha 2 Fraction Latest Ref Range: 0.5 - 0.9 g/dL   0.8   Beta Fraction Latest Ref Range: 0.6 -  1.0 g/dL   0.7   ELP Interpretation: Unknown   No monoclonal pro...   Gamma Fraction Latest Ref Range: 0.7 - 1.6 g/dL   0.8   IGA Latest Ref Range: 70 - 380 mg/dL 60 (L)  67 (L)   IGG Latest Ref Range: 695 - 1620 mg/dL 721  812   IGM Latest Ref Range: 60 - 265 mg/dL 79  71   Immunofix ELP Urine Unknown  No monoclonal pro...    Immunofixation ELP Unknown (Note)...  (Note)...   Kappa Free Lt Chain Latest Ref Range: 0.33 - 1.94 mg/dL   0.84   Kappa Lambda Ratio Latest Ref Range: 0.26 - 1.65    0.33   Lambda Free Lt Chain Latest Ref Range: 0.57 - 2.63 mg/dL   2.53   Monoclonal Peak Latest Ref Range: 0.0 g/dL   0.0   serum protein electrophoresis  No monoclonal protein seen by capillary electrophoresis, however a possible very    small monoclonal IgG immunoglobulin of lambda light chain type was seen in this    sample by immunofixation which is a more sensitive method for monoclonal    dectection.   urine protein electrophoresis  Possible very small monoclonal IgG immunoglobulin of lambda light   chain type. Pathological significance requires clinical correlation.     ASSESSMENT/PLAN:  Leandra Guerrero is a 66 year old woman with  of AL amyloidosis involving the heart and GI tract. Unfortunately, she had stage IV AL amyloidosis at diagnosis in September 2016, according to revised Rios Criteria (NT-proBNP >1800 ng/l, troponin >0.025 and difference between free lambda and kappa light chain >18). Stage IV amyloidosis is associated with median survival of 5 months in patients not undergoing BMT, and 5 year survival of 15%, and in patients who are able to undergo BMT, median survival is 22 months and 5 year survival of 46% (Keithi A, Anderson MA, Toñito RA, et al. Prognostication of survival using cardiac troponins and N-terminal pro-brain natriuretic peptide in patients with primary systemic amyloidosis undergoing peripheral blood stem cell transplantation. Blood 2004; 104:1881). She was felt not to be a candidate for high  dose therapy.   She was on Cybor-D from 10/4/2016-12/27/2016, with Cytoxan omitted after cycle 2 day 1 due to her developing hemorrhagic cystitis. Her disease has responded  biochemically, with serum M spike of zero, and serum IgA level was down to low range and  free lambda light chains have normalized quantitatively.     I have discussed her situation with Dr. Barron in January, and he felt at that time that with normalization of serum free lambda light chains, no further chemotherapy is indicated. She maybe eligible for an anti-amyloid antibody clinical trial in the future open at [facility] but she would need to remain off chemotherapy for 6 months to qualify, and that is not until May or June 2017.        1.  AL amyloidosis - IgG of lambda light chain type detectable on serum immunofixation and in the urine. Serum IgA level is still low.  She is doing much better clinically, and has had no nausea or weight loss and anassarca/edema is controlled with present diuretic regimen as below. She will be seeing Dr. Harjit Fischer from cardiooncology at  in [place] per Dr. Cohen's recommendations and tells me she will also be seeing a medical oncologist there. I would like her to participate in the Phase 2b, randomized, double-blind, placebo-controlled, two-arm, parallel-group efficacy and safety study of [id number] (a monoclonal antibody that targets the circulating soluble and deposited aggregated amyloid) as a single agent administered intravenously in adults with AL amyloidosis who had a hematologic response to previous treatment for their amyloidosis.  I do not see that this study is open at  but it is open at [facility] and [facility]. I have asked her to reconnect with Dr. Barron and his team since by May she will become eligible for enrollment and Dr. Barron is a PI on this study in [facility] in [place]. We'll follow-up on  medical oncology and cardiooncology recommendations. She will continue  close followup with cardiology team as below.  2. Recurrent pleural effusions- She has not needed a thoracentesis since 1/4/2017 but she denies SOB. Last CXR on 2/9/2017 shoed a small pleural effusion. PleurX catheter is an option in the future.  3. Amyloid cardiomyopathy-   Continue current diuretic regimen with Torsemide 60 mg PO QAM and 40 mg PO QHS and aldactone 25 mg PO BID. She will be followed by cardiology team closely.     She will call us for f/up after she is seen at  in [place] and also after she touches base with [facility] for clinical trial enrollment.  At the end of our visit patient and  verbalized understanding and concurred with the plan.

## 2017-03-24 NOTE — MR AVS SNAPSHOT
After Visit Summary   3/24/2017    Leandra Guerrero    MRN: [id number]           Patient Information     Date Of Birth          1950        Visit Information        Provider Department      3/24/2017 11:30 AM Mirela Moore MD [facility]         Follow-ups after your visit        Your next 10 appointments already scheduled     Apr 10, 2017  9:00 AM CDT   Lab with  LAB   [facility] ([facility])    [address]  1st Floor  [facility] [address] [phone]            Apr 10, 2017  9:30 AM CDT   (Arrive by 9:15 AM)   Return Visit with Codie Nelson NP   [facility] ([facility])    [address]  3rd Floor  [facility] [address] [phone]            Jun 01, 2017  3:15 PM CDT   LAB with LAB ONC [facility] ([facility])    [id number] [place] [address] [phone]           Patient must bring picture ID.  Patient should be prepared to give a urine specimen  Please do not eat 10-12 hours before your appointment if you are coming in fasting for labs on lipids, cholesterol, or glucose (sugar).  Pregnant women should follow their Care Team instructions. Water with medications is okay. Do not drink coffee or other fluids.   If you have concerns about taking  your medications, please ask at office or if scheduling via [employer], send a message by clicking on Secure Messaging, Message Your Care Team.            Jun 01, 2017  4:00 PM CDT   Return Visit with Mirela Moore MD   [facility] ([facility])    [address] [facility] [address] [phone]              Who to contact     If you have questions or need follow up information about today's clinic visit or your schedule please contact [facility] directly at [phone].  Normal or non-critical lab and  "imaging results will be communicated to you by MyChart, letter or phone within 4 business days after the clinic has received the results. If you do not hear from us within 7 days, please contact the clinic through [employer] or phone. If you have a critical or abnormal lab result, we will notify you by phone as soon as possible.  Submit refill requests through [employer] or call your pharmacy and they will forward the refill request to us. Please allow 3 business days for your refill to be completed.          Additional Information About Your Visit        [employer] Information     [employer] gives you secure access to your electronic health record. If you see a primary care provider, you can also send messages to your care team and make appointments. If you have questions, please call your primary care clinic.  If you do not have a primary care provider, please call [phone] and they will assist you.      [employer] is an electronic gateway that provides easy, online access to your medical records. With [employer], you can request a clinic appointment, read your test results, renew a prescription or communicate with your care team.     To access your existing account, please contact your [facility] or call [phone] for assistance.        Care EveryWhere ID     This is your Care EveryWhere ID. This could be used by other organizations to access your [place] medical records  [id number]        Your Vitals Were     Pulse Temperature Respirations Height Pulse Oximetry BMI (Body Mass Index)    84 97.2  F (36.2  C) 15 1.6 m (5' 2.99\") 99% 24.1 kg/m2       Blood Pressure from Last 3 Encounters:   03/24/17 106/60   03/16/17 123/70   03/02/17 115/69    Weight from Last 3 Encounters:   03/24/17 61.7 kg (136 lb)   03/16/17 61 kg (134 lb 8 oz)   02/20/17 62.2 kg (137 lb 3.2 oz)              Today, you had the following     No orders found for display       Primary Care Provider Office Phone # Fax #    " Magy Micah [phone]       [facility] [address]  [facility] [address]        Thank you!     Thank you for choosing [facility] [[email or web address]]  for your care. Our goal is always to provide you with excellent care. Hearing back from our patients is one way we can continue to improve our services. Please take a few minutes to complete the written survey that you may receive in the mail after your visit with us. Thank you!             Your Updated Medication List - Protect others around you: Learn how to safely use, store and throw away your medicines at www.disposemymeds.org.          This list is accurate as of: 3/24/17 11:59 PM.  Always use your most recent med list.                   Brand Name Dispense Instructions for use    acyclovir 400 MG tablet    ZOVIRAX    60 tablet    Take 1 tablet (400 mg) by mouth 2 times daily       HYDROcodone-acetaminophen 5-325 MG per tablet    NORCO     Reported on 3/24/2017       LORazepam 0.5 MG tablet    ATIVAN     Reported on 3/24/2017       ondansetron 4 MG ODT tab    ZOFRAN-ODT         potassium chloride SA 10 MEQ CR tablet    K-DUR/KLOR-CON M    120 tablet    Take 2 tablets (20 mEq) by mouth 2 times daily Do not fill until requested       spironolactone 25 MG tablet    ALDACTONE    180 tablet    Take 1 tablet (25 mg) by mouth 2 times daily       thiamine 100 MG tablet     60 tablet    Take 1 tablet (100 mg) by mouth daily       torsemide 20 MG tablet    DEMADEX    180 tablet    Take 60mg (3 tabs) in the AM and 40mg (2 tabs) in the PM.       TYLENOL PO      Take 325 mg by mouth

## 2017-04-01 ENCOUNTER — PRE VISIT (OUTPATIENT)
Dept: CARDIOLOGY | Facility: CLINIC | Age: 67
End: 2017-04-01

## 2017-04-01 DIAGNOSIS — I43 CARDIAC AMYLOIDOSIS (H): Primary | ICD-10-CM

## 2017-04-01 DIAGNOSIS — E85.4 CARDIAC AMYLOIDOSIS (H): Primary | ICD-10-CM

## 2017-04-04 ENCOUNTER — TELEPHONE (OUTPATIENT)
Dept: ONCOLOGY | Facility: CLINIC | Age: 67
End: 2017-04-04

## 2017-04-04 NOTE — TELEPHONE ENCOUNTER
"Patient calling to report she has had a \"horrible cough\" for a week. She thinks she has the flu. Cough is productive with green/yellow phlegm. Dyspnea with coughing. Cough keeps patient awake at night. Denies fevers. Unable to eat due to metallic taste and nausea. Patient reports feeling \"really knocked down\".  Advised per Dr Moore to come to clinic for labs,chest xray and provider visit.  Patient has already made an appointment with her PCP today, as advised by cardiology. Instructed per Dr MADDOX to be sure to have an antibiotic prescribed. Patient expressed understanding and will call if she has further questions or concerns.  Janine Steve  RN, BSN, OCN    "

## 2017-04-05 ENCOUNTER — CARE COORDINATION (OUTPATIENT)
Dept: CARDIOLOGY | Facility: CLINIC | Age: 67
End: 2017-04-05

## 2017-04-05 NOTE — PROGRESS NOTES
"Amy sent [employer] message yesterday stating that she thought she had the flu with c/o headache, nausea, achiness, and productive cough. She had no appetite and due to nausea, could not take some of her meds without food.  She reported that she had these symptoms and felt bad since last Wednesday. Instructed her to see her primary or go to urgent care as her cough was concerning for pneumonia following the flu.  She went to the [facility] yesterday and an xray and labs were done.  She reports the xray showed \"fluid build up\" and the bloodwork was \"elevated for CHF\" (BNP?).  She was given a z-deena.  It is unknown whether he felt she had pneumonia or not, although she feels she had a temp yesterday as well.  Instructed her to continue her meds, especially the diuretic,  if she can keep them down.  She has a f/u appt in CORE on Monday, but I will check on her tomorrow to see if she is improving on the antibiotic.   "

## 2017-04-10 ENCOUNTER — OFFICE VISIT (OUTPATIENT)
Dept: CARDIOLOGY | Facility: CLINIC | Age: 67
End: 2017-04-10
Attending: NURSE PRACTITIONER
Payer: COMMERCIAL

## 2017-04-10 VITALS
HEIGHT: 63 IN | BODY MASS INDEX: 23.05 KG/M2 | HEART RATE: 85 BPM | SYSTOLIC BLOOD PRESSURE: 118 MMHG | OXYGEN SATURATION: 98 % | DIASTOLIC BLOOD PRESSURE: 73 MMHG | WEIGHT: 130.1 LBS

## 2017-04-10 DIAGNOSIS — R05.9 COUGH: Primary | ICD-10-CM

## 2017-04-10 DIAGNOSIS — I43 CARDIAC AMYLOIDOSIS (H): ICD-10-CM

## 2017-04-10 DIAGNOSIS — E85.4 CARDIAC AMYLOIDOSIS (H): ICD-10-CM

## 2017-04-10 LAB
ANION GAP SERPL CALCULATED.3IONS-SCNC: 6 MMOL/L (ref 3–14)
BUN SERPL-MCNC: 20 MG/DL (ref 7–30)
CALCIUM SERPL-MCNC: 9.3 MG/DL (ref 8.5–10.1)
CHLORIDE SERPL-SCNC: 96 MMOL/L (ref 94–109)
CO2 SERPL-SCNC: 34 MMOL/L (ref 20–32)
CREAT SERPL-MCNC: 1.18 MG/DL (ref 0.52–1.04)
GFR SERPL CREATININE-BSD FRML MDRD: 46 ML/MIN/1.7M2
GLUCOSE SERPL-MCNC: 98 MG/DL (ref 70–99)
NT-PROBNP SERPL-MCNC: 2631 PG/ML (ref 0–125)
POTASSIUM SERPL-SCNC: 4.1 MMOL/L (ref 3.4–5.3)
SODIUM SERPL-SCNC: 136 MMOL/L (ref 133–144)
TROPONIN I SERPL-MCNC: 0.03 UG/L (ref 0–0.04)

## 2017-04-10 PROCEDURE — 99214 OFFICE O/P EST MOD 30 MIN: CPT | Mod: ZP | Performed by: NURSE PRACTITIONER

## 2017-04-10 PROCEDURE — 80048 BASIC METABOLIC PNL TOTAL CA: CPT | Performed by: NURSE PRACTITIONER

## 2017-04-10 PROCEDURE — 83880 ASSAY OF NATRIURETIC PEPTIDE: CPT | Performed by: NURSE PRACTITIONER

## 2017-04-10 PROCEDURE — 99212 OFFICE O/P EST SF 10 MIN: CPT | Mod: ZF

## 2017-04-10 PROCEDURE — 84484 ASSAY OF TROPONIN QUANT: CPT | Performed by: NURSE PRACTITIONER

## 2017-04-10 PROCEDURE — 36415 COLL VENOUS BLD VENIPUNCTURE: CPT | Performed by: NURSE PRACTITIONER

## 2017-04-10 ASSESSMENT — PAIN SCALES - GENERAL: PAINLEVEL: NO PAIN (0)

## 2017-04-10 NOTE — PATIENT INSTRUCTIONS
"You were seen today in the Cardiovascular Clinic at the [facility].     Cardiology Providers you saw during your visit: Codie Nelson NP       1. We will call with instructions after seeing your CXR results.     Results for JESSICA GARCIA (MRN [id number]) as of 4/10/2017 10:09   Ref. Range 4/10/2017 09:01   Sodium Latest Ref Range: 133 - 144 mmol/L 136   Potassium Latest Ref Range: 3.4 - 5.3 mmol/L 4.1   Chloride Latest Ref Range: 94 - 109 mmol/L 96   Carbon Dioxide Latest Ref Range: 20 - 32 mmol/L 34 (H)   Urea Nitrogen Latest Ref Range: 7 - 30 mg/dL 20   Creatinine Latest Ref Range: 0.52 - 1.04 mg/dL 1.18 (H)   GFR Estimate Latest Ref Range: >60 mL/min/1.7m2 46 (L)   GFR Estimate If Black Latest Ref Range: >60 mL/min/1.7m2 56 (L)   Calcium Latest Ref Range: 8.5 - 10.1 mg/dL 9.3   Anion Gap Latest Ref Range: 3 - 14 mmol/L 6   N-Terminal Pro Bnp Latest Ref Range: 0 - 125 pg/mL 2631 (H)   Troponin I ES Latest Ref Range: 0.000 - 0.045 ug/L 0.026   Glucose Latest Ref Range: 70 - 99 mg/dL 98       Please limit your fluid intake to 2 L (64 ounces) daily.  2 Liters a day = 8.5 cups, or 72 ounces.  Please limit your salt intake to 2 grams a day or less.    If you gain 2# in 24 hours or 5# in one week call Eileen Arevalo RN so we can adjust your medications as needed over the phone.    Please feel free to call me with any questions or concerns.      Eileen Arevalo RN BSN CHFN  [facility]  Cardiology Care Coordinator-Heart Failure Clinic    Questions and [phone].   First press #1 for the [employer] and then press #3 for \"Medical Questions\" to reach us Cardiology Nurses.     On Call Cardiologist for after hours or on weekends: [phone]   option #4 and ask to speak to the on-call Cardiologist. Inform them you are a CORE/heart failure patient at the [facility].        If you need a medication refill please contact your pharmacy.  Please allow 3 business days " for your refill to be completed.  _______________________________________________________  C.O.R.E. CLINIC Cardiomyopathy, Optimization, Rehabilitation, Education   The C.O.R.E. CLINIC is a heart failure specialty clinic within the [facility] where you will work with specialized nurse practitioners dedicated to helping patients with heart failure carefully adjust medications, receive education, and learn who and when to call if symptoms develop. They specialize in helping you better understand your condition, slow the progression of your disease, improve the length and quality of your life, help you detect future heart problems before they become life threatening, and avoid hospitalizations.  As always, thank you for trusting us with your health care needs!

## 2017-04-10 NOTE — MR AVS SNAPSHOT
After Visit Summary   4/10/2017    Jessica Garcia    MRN: [id number]           Patient Information     Date Of Birth          1950        Visit Information        Provider Department      4/10/2017 9:30 AM Codie Nelson NP [facility]        Today's Diagnoses     Cough    -  1      Care Instructions    You were seen today in the Cardiovascular Clinic at the [facility].     Cardiology Providers you saw during your visit: Codie Nelson NP       1.  Please make a follow-up CORE/heart failure appt with Codie in ___.     Results for JESSICA GARCIA (MRN [id number]) as of 4/10/2017 10:09   Ref. Range 4/10/2017 09:01   Sodium Latest Ref Range: 133 - 144 mmol/L 136   Potassium Latest Ref Range: 3.4 - 5.3 mmol/L 4.1   Chloride Latest Ref Range: 94 - 109 mmol/L 96   Carbon Dioxide Latest Ref Range: 20 - 32 mmol/L 34 (H)   Urea Nitrogen Latest Ref Range: 7 - 30 mg/dL 20   Creatinine Latest Ref Range: 0.52 - 1.04 mg/dL 1.18 (H)   GFR Estimate Latest Ref Range: >60 mL/min/1.7m2 46 (L)   GFR Estimate If Black Latest Ref Range: >60 mL/min/1.7m2 56 (L)   Calcium Latest Ref Range: 8.5 - 10.1 mg/dL 9.3   Anion Gap Latest Ref Range: 3 - 14 mmol/L 6   N-Terminal Pro Bnp Latest Ref Range: 0 - 125 pg/mL 2631 (H)   Troponin I ES Latest Ref Range: 0.000 - 0.045 ug/L 0.026   Glucose Latest Ref Range: 70 - 99 mg/dL 98       Please limit your fluid intake to 2 L (64 ounces) daily.  2 Liters a day = 8.5 cups, or 72 ounces.  Please limit your salt intake to 2 grams a day or less.    If you gain 2# in 24 hours or 5# in one week call Eileen Arevalo RN so we can adjust your medications as needed over the phone.    Please feel free to call me with any questions or concerns.      Eileen Arevalo RN BSN CHFN  [facility]  Cardiology Care Coordinator-Heart Failure Clinic    Questions and [phone].   First press #1 for the [employer] and then press #3 for  "\"Medical Questions\" to reach us Cardiology Nurses.     On Call Cardiologist for after hours or on weekends: [phone]   option #4 and ask to speak to the on-call Cardiologist. Inform them you are a CORE/heart failure patient at the [facility].        If you need a medication refill please contact your pharmacy.  Please allow 3 business days for your refill to be completed.  _______________________________________________________  C.O.R.E. CLINIC Cardiomyopathy, Optimization, Rehabilitation, Education   The C.O.R.E. CLINIC is a heart failure specialty clinic within the [facility] where you will work with specialized nurse practitioners dedicated to helping patients with heart failure carefully adjust medications, receive education, and learn who and when to call if symptoms develop. They specialize in helping you better understand your condition, slow the progression of your disease, improve the length and quality of your life, help you detect future heart problems before they become life threatening, and avoid hospitalizations.  As always, thank you for trusting us with your health care needs!             Follow-ups after your visit        Your next 10 appointments already scheduled     Apr 10, 2017 12:30 PM CDT   (Arrive by 12:15 PM)   XR CHEST 2 VIEWS with UCXR1   [facility] ([facility])    [address] [place] [address] [phone]           Please bring a list of your current medicines to your exam. (Include vitamins, minerals and over-thecounter medicines.) Leave your valuables at home.  Tell your doctor if there is a chance you may be pregnant.  You do not need to do anything special for this exam.            Apr 17, 2017 10:30 AM CDT   Lab with Q-go LAB   [facility] ([facility])    [address] [address] [phone]            Apr 17, " 2017 11:00 AM CDT   (Arrive by 10:45 AM)   CORE RETURN with SE Contreras CNP   [facility] ([facility])    [address] [facility] [address] [phone]            Jun 01, 2017  3:15 PM CDT   LAB with LAB ONC [facility] ([facility])    [address] [place] [address] [phone]           Patient must bring picture ID.  Patient should be prepared to give a urine specimen  Please do not eat 10-12 hours before your appointment if you are coming in fasting for labs on lipids, cholesterol, or glucose (sugar).  Pregnant women should follow their Care Team instructions. Water with medications is okay. Do not drink coffee or other fluids.   If you have concerns about taking  your medications, please ask at office or if scheduling via [employer], send a message by clicking on Secure Messaging, Message Your Care Team.            Jun 01, 2017  4:00 PM CDT   Return Visit with Mirela Moore MD   [facility] ([facility])    [address] [place] [address] [phone]              Future tests that were ordered for you today     Open Future Orders        Priority Expected Expires Ordered    X-ray Chest 2 vws* Routine 4/10/2017 4/10/2018 4/10/2017            Who to contact     If you have questions or need follow up information about today's clinic visit or your schedule please contact [facility] directly at [phone].  Normal or non-critical lab and imaging results will be communicated to you by MyChart, letter or phone within 4 business days after the clinic has received the results. If you do not hear from us within 7 days, please contact the clinic through MyChart or phone. If you have a critical or abnormal lab result, we will notify you by phone as soon as possible.  Submit refill requests through [employer] or call your pharmacy  "and they will forward the refill request to us. Please allow 3 business days for your refill to be completed.          Additional Information About Your Visit        [employer] Information     [employer] gives you secure access to your electronic health record. If you see a primary care provider, you can also send messages to your care team and make appointments. If you have questions, please call your primary care clinic.  If you do not have a primary care provider, please call [phone] and they will assist you.        Care EveryWhere ID     This is your Care EveryWhere ID. This could be used by other organizations to access your [place] medical records  [id number]        Your Vitals Were     Pulse Height Pulse Oximetry BMI (Body Mass Index)          85 1.6 m (5' 3\") 98% 23.05 kg/m2         Blood Pressure from Last 3 Encounters:   04/10/17 118/73   03/24/17 106/60   03/16/17 123/70    Weight from Last 3 Encounters:   04/10/17 59 kg (130 lb 1.6 oz)   03/24/17 61.7 kg (136 lb)   03/16/17 61 kg (134 lb 8 oz)               Primary Care Provider Office Phone # Fax #    Magy Obrien [phone]       [facility] [address]  [facility] [address]        Thank you!     Thank you for choosing [facility]  for your care. Our goal is always to provide you with excellent care. Hearing back from our patients is one way we can continue to improve our services. Please take a few minutes to complete the written survey that you may receive in the mail after your visit with us. Thank you!             Your Updated Medication List - Protect others around you: Learn how to safely use, store and throw away your medicines at www.disposemymeds.org.          This list is accurate as of: 4/10/17 10:32 AM.  Always use your most recent med list.                   Brand Name Dispense Instructions for use    acyclovir 400 MG tablet    ZOVIRAX    60 tablet    Take 1 tablet (400 mg) by mouth 2 times daily       " HYDROcodone-acetaminophen 5-325 MG per tablet    NORCO     Reported on 3/24/2017       LORazepam 0.5 MG tablet    ATIVAN     Reported on 3/24/2017       ondansetron 4 MG ODT tab    ZOFRAN-ODT         potassium chloride SA 10 MEQ CR tablet    K-DUR/KLOR-CON M    120 tablet    Take 2 tablets (20 mEq) by mouth 2 times daily Do not fill until requested       spironolactone 25 MG tablet    ALDACTONE    180 tablet    Take 1 tablet (25 mg) by mouth 2 times daily       thiamine 100 MG tablet     60 tablet    Take 1 tablet (100 mg) by mouth daily       torsemide 20 MG tablet    DEMADEX    180 tablet    Take 60mg (3 tabs) in the AM and 40mg (2 tabs) in the PM.       TYLENOL PO      Take 325 mg by mouth

## 2017-04-10 NOTE — LETTER
4/10/2017      RE: Leandra Guerrero  [address] [place] [address]       Dear Colleague,    Thank you for the opportunity to participate in the care of your patient, Leandra Guerrero, at the [facility]. Please see a copy of my visit note below.    duplicate    HPI  Ms. Guerrero is a 66 year old woman with cardiac amyloid who returns to clinic for follow up. She was last seen less than a month ago when she appeared well. Since then, she's called following a urgent care visit for nausea and cough. She was given a Z-pack but a chest Xray was suggestive of fluid. I suggested her diuretics be increased though the message was not transmitted to Amy. She presents to clinic for follow up.    Amy is struggling with ongoing nonproductive cough (x3 weeks). No fevers. She completed Zpack yesterday and continues cough suppressant with codeine though the codeine upsets her stomach. She is fairly nauseated continuously though has been able to keep down foods and medications. She has lost several pounds since her last visit. She denies shortness of breath, chest pain, or edema. She is sleeping on 3 pillows and denies PND. She has noted more palpitations and is occasionally lightheaded.     Past Medical History:   Diagnosis Date     AL amyloidosis (H)      Cardiac amyloidosis (H)      Lymphedema      Recurrent right pleural effusion 1/2/2017     Social History     Social History     Marital status:      Spouse name: N/A     Number of children: N/A     Years of education: N/A     Occupational History     Not on file.     Social History Main Topics     Smoking status: Never Smoker     Smokeless tobacco: Not on file     Alcohol use No     Drug use: No     Sexual activity: Not on file     Other Topics Concern     Not on file     Social History Narrative     Family History   Problem Relation Age of Onset     Other - See Comments Sister       "Amyloidosis       Current Outpatient Prescriptions on File Prior to Visit:  spironolactone (ALDACTONE) 25 MG tablet Take 1 tablet (25 mg) by mouth 2 times daily   thiamine 100 MG tablet Take 1 tablet (100 mg) by mouth daily   acyclovir (ZOVIRAX) 400 MG tablet Take 1 tablet (400 mg) by mouth 2 times daily   torsemide (DEMADEX) 20 MG tablet Take 60mg (3 tabs) in the AM and 40mg (2 tabs) in the PM.   potassium chloride SA (K-DUR/KLOR-CON M) 10 MEQ CR tablet Take 2 tablets (20 mEq) by mouth 2 times daily Do not fill until requested   Acetaminophen (TYLENOL PO) Take 325 mg by mouth   HYDROcodone-acetaminophen (NORCO) 5-325 MG per tablet Reported on 3/24/2017   LORazepam (ATIVAN) 0.5 MG tablet Reported on 3/24/2017   ondansetron (ZOFRAN-ODT) 4 MG disintegrating tablet      No current facility-administered medications on file prior to visit.     Physical examination:  /73 (BP Location: Right arm, Patient Position: Chair, Cuff Size: Adult Regular)  Pulse 85  Ht 1.6 m (5' 3\")  Wt 59 kg (130 lb 1.6 oz)  SpO2 98%  BMI 23.05 kg/m2  GENERAL APPEARANCE: healthy, alert and no distress  HEENT: no icterus, no xanthelasmas, normal pupil size and reaction, normal palate, mucosa moist, no cyanosis.  NECK: no adenopathy, no asymmetry, masses, or scars  CHEST: Crackles in left lower lobe, diminished breath sounds over right lower and middle lobes  CARDIOVASCULAR: regular rhythm, distant heart tones. Cannot appreciate JVP today  ABDOMEN: soft, non tender, without hepatomegaly  NEURO: alert and oriented to person/place/time, normal speech, gait and affect  SKIN: no ecchymoses, no rashes    Last Basic Metabolic Panel:  Lab Results   Component Value Date     04/10/2017      Lab Results   Component Value Date    POTASSIUM 4.1 04/10/2017     Lab Results   Component Value Date    CHLORIDE 96 04/10/2017     Lab Results   Component Value Date    JERROD 9.3 04/10/2017     Lab Results   Component Value Date    CO2 34 04/10/2017 "     Lab Results   Component Value Date    BUN 20 04/10/2017     Lab Results   Component Value Date    CR 1.18 04/10/2017     Lab Results   Component Value Date    GLC 98 04/10/2017         Assessment and Plan  Ms. Guerrero is a delightful 66 year old woman with cardiac amyloid who has not felt well for several weeks. I suspect she has a viral URI, but worry that she is also congested. We'll get a chest XRay today and tentatively plan to increase her diuretics to 60 mg BID. Once we have the chest Xray results, we'll coordinate with Heme/Onc re any antibiotics, steroids, or simply increasing diuretics. Amy will return to clinic in 1 week with my colleague Di Sanchez and call with any concerns in the meantime.     She can be reached at [phone]    25 minutes spent in direct care, >50% in counseling    Please do not hesitate to contact me if you have any questions/concerns.     Sincerely,     Codie Nelson NP

## 2017-04-10 NOTE — PROGRESS NOTES
HPI  Ms. Guerrero is a 66 year old woman with cardiac amyloid who returns to clinic for follow up. She was last seen less than a month ago when she appeared well. Since then, she's called following a urgent care visit for nausea and cough. She was given a Z-pack but a chest Xray was suggestive of fluid. I suggested her diuretics be increased though the message was not transmitted to Amy. She presents to clinic for follow up.    Amy is struggling with ongoing nonproductive cough (x3 weeks). No fevers. She completed Zpack yesterday and continues cough suppressant with codeine though the codeine upsets her stomach. She is fairly nauseated continuously though has been able to keep down foods and medications. She has lost several pounds since her last visit. She denies shortness of breath, chest pain, or edema. She is sleeping on 3 pillows and denies PND. She has noted more palpitations and is occasionally lightheaded.     Past Medical History:   Diagnosis Date     AL amyloidosis (H)      Cardiac amyloidosis (H)      Lymphedema      Recurrent right pleural effusion 1/2/2017     Social History     Social History     Marital status:      Spouse name: N/A     Number of children: N/A     Years of education: N/A     Occupational History     Not on file.     Social History Main Topics     Smoking status: Never Smoker     Smokeless tobacco: Not on file     Alcohol use No     Drug use: No     Sexual activity: Not on file     Other Topics Concern     Not on file     Social History Narrative     Family History   Problem Relation Age of Onset     Other - See Comments Sister      Amyloidosis       Current Outpatient Prescriptions on File Prior to Visit:  spironolactone (ALDACTONE) 25 MG tablet Take 1 tablet (25 mg) by mouth 2 times daily   thiamine 100 MG tablet Take 1 tablet (100 mg) by mouth daily   acyclovir (ZOVIRAX) 400 MG tablet Take 1 tablet (400 mg) by mouth 2 times daily   torsemide (DEMADEX) 20 MG tablet Take  "60mg (3 tabs) in the AM and 40mg (2 tabs) in the PM.   potassium chloride SA (K-DUR/KLOR-CON M) 10 MEQ CR tablet Take 2 tablets (20 mEq) by mouth 2 times daily Do not fill until requested   Acetaminophen (TYLENOL PO) Take 325 mg by mouth   HYDROcodone-acetaminophen (NORCO) 5-325 MG per tablet Reported on 3/24/2017   LORazepam (ATIVAN) 0.5 MG tablet Reported on 3/24/2017   ondansetron (ZOFRAN-ODT) 4 MG disintegrating tablet      No current facility-administered medications on file prior to visit.     Physical examination:  /73 (BP Location: Right arm, Patient Position: Chair, Cuff Size: Adult Regular)  Pulse 85  Ht 1.6 m (5' 3\")  Wt 59 kg (130 lb 1.6 oz)  SpO2 98%  BMI 23.05 kg/m2  GENERAL APPEARANCE: healthy, alert and no distress  HEENT: no icterus, no xanthelasmas, normal pupil size and reaction, normal palate, mucosa moist, no cyanosis.  NECK: no adenopathy, no asymmetry, masses, or scars  CHEST: Crackles in left lower lobe, diminished breath sounds over right lower and middle lobes  CARDIOVASCULAR: regular rhythm, distant heart tones. Cannot appreciate JVP today  ABDOMEN: soft, non tender, without hepatomegaly  NEURO: alert and oriented to person/place/time, normal speech, gait and affect  SKIN: no ecchymoses, no rashes    Last Basic Metabolic Panel:  Lab Results   Component Value Date     04/10/2017      Lab Results   Component Value Date    POTASSIUM 4.1 04/10/2017     Lab Results   Component Value Date    CHLORIDE 96 04/10/2017     Lab Results   Component Value Date    JERROD 9.3 04/10/2017     Lab Results   Component Value Date    CO2 34 04/10/2017     Lab Results   Component Value Date    BUN 20 04/10/2017     Lab Results   Component Value Date    CR 1.18 04/10/2017     Lab Results   Component Value Date    GLC 98 04/10/2017         Assessment and Plan  Ms. Guerrero is a delightful 66 year old woman with cardiac amyloid who has not felt well for several weeks. I suspect she has a viral URI, but " worry that she is also congested. We'll get a chest XRay today and tentatively plan to increase her diuretics to 60 mg BID. Once we have the chest Xray results, we'll coordinate with Heme/Onc re any antibiotics, steroids, or simply increasing diuretics. Amy will return to clinic in 1 week with my colleague Di Sanchez and call with any concerns in the meantime.     She can be reached at [phone]    25 minutes spent in direct care, >50% in counseling

## 2017-04-13 ENCOUNTER — PRE VISIT (OUTPATIENT)
Dept: CARDIOLOGY | Facility: CLINIC | Age: 67
End: 2017-04-13

## 2017-04-13 DIAGNOSIS — E85.4 CARDIAC AMYLOIDOSIS (H): Primary | ICD-10-CM

## 2017-04-13 DIAGNOSIS — I43 CARDIAC AMYLOIDOSIS (H): Primary | ICD-10-CM

## 2017-04-14 ENCOUNTER — CARE COORDINATION (OUTPATIENT)
Dept: CARDIOLOGY | Facility: CLINIC | Age: 67
End: 2017-04-14

## 2017-04-14 DIAGNOSIS — I50.33 ACUTE ON CHRONIC DIASTOLIC HEART FAILURE (H): ICD-10-CM

## 2017-04-14 RX ORDER — TORSEMIDE 20 MG/1
60 TABLET ORAL 2 TIMES DAILY
Qty: 180 TABLET | Refills: 3 | Status: SHIPPED | OUTPATIENT
Start: 2017-04-14 | End: 2017-04-17

## 2017-04-14 NOTE — PROGRESS NOTES
Codie Nelson NP and Dr. Cohen discussed Amy's CXR results and today I called Amy with their recommendations. Amy states she thinks the cough syrup has been helping, she's feeling a little better, chest still sore from coughing.  States she's been taking torsemide 60/40 until she heard back from us.  Weight is don slightly, 127lbs.  We confirmed her Return CORE appt with Di this Monday 4/17/17. Discussed medication increase. Amy verbalizes understanding and agrees with plan of care. Eileen Arevalo RN      Date: 4/14/2017  Time of Call: 10:46 AM  Diagnosis:  Heart failure/ amyloid  VORB - Ordering provider: Codie Nelson NP   Order: Increase torsemide to 60 BID  Order received by: Eileen Arevalo RN   Follow-up/additional notes:

## 2017-04-17 ENCOUNTER — OFFICE VISIT (OUTPATIENT)
Dept: CARDIOLOGY | Facility: CLINIC | Age: 67
End: 2017-04-17
Attending: NURSE PRACTITIONER
Payer: COMMERCIAL

## 2017-04-17 VITALS
HEIGHT: 63 IN | WEIGHT: 128.6 LBS | SYSTOLIC BLOOD PRESSURE: 116 MMHG | BODY MASS INDEX: 22.79 KG/M2 | OXYGEN SATURATION: 98 % | DIASTOLIC BLOOD PRESSURE: 66 MMHG | HEART RATE: 77 BPM

## 2017-04-17 DIAGNOSIS — E85.4 CARDIAC AMYLOIDOSIS (H): ICD-10-CM

## 2017-04-17 DIAGNOSIS — I50.33 ACUTE ON CHRONIC DIASTOLIC HEART FAILURE (H): ICD-10-CM

## 2017-04-17 DIAGNOSIS — I43 CARDIAC AMYLOIDOSIS (H): ICD-10-CM

## 2017-04-17 LAB
ANION GAP SERPL CALCULATED.3IONS-SCNC: 6 MMOL/L (ref 3–14)
BUN SERPL-MCNC: 27 MG/DL (ref 7–30)
CALCIUM SERPL-MCNC: 9 MG/DL (ref 8.5–10.1)
CHLORIDE SERPL-SCNC: 98 MMOL/L (ref 94–109)
CO2 SERPL-SCNC: 34 MMOL/L (ref 20–32)
CREAT SERPL-MCNC: 1.22 MG/DL (ref 0.52–1.04)
GFR SERPL CREATININE-BSD FRML MDRD: 44 ML/MIN/1.7M2
GLUCOSE SERPL-MCNC: 95 MG/DL (ref 70–99)
POTASSIUM SERPL-SCNC: 4 MMOL/L (ref 3.4–5.3)
SODIUM SERPL-SCNC: 138 MMOL/L (ref 133–144)

## 2017-04-17 PROCEDURE — 80048 BASIC METABOLIC PNL TOTAL CA: CPT | Performed by: NURSE PRACTITIONER

## 2017-04-17 PROCEDURE — 99212 OFFICE O/P EST SF 10 MIN: CPT | Mod: ZF

## 2017-04-17 PROCEDURE — 36415 COLL VENOUS BLD VENIPUNCTURE: CPT | Performed by: NURSE PRACTITIONER

## 2017-04-17 PROCEDURE — 99214 OFFICE O/P EST MOD 30 MIN: CPT | Mod: ZP | Performed by: NURSE PRACTITIONER

## 2017-04-17 RX ORDER — TORSEMIDE 20 MG/1
TABLET ORAL
Qty: 180 TABLET | Refills: 3 | COMMUNITY
Start: 2017-04-17 | End: 2017-05-26

## 2017-04-17 ASSESSMENT — PAIN SCALES - GENERAL: PAINLEVEL: NO PAIN (0)

## 2017-04-17 NOTE — LETTER
4/17/2017      RE: Leandra Guerrero  [address] [place] [address]       Dear Colleague,    Thank you for the opportunity to participate in the care of your patient, Leandra Guerrero, at the [facility]. Please see a copy of my visit note below.    HPI:   Ms. 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 She presents to CORE clinic for routine follow up.     She notes mild PELAEZ and persistent LE edema, but feels these are improving. She was able to walk up from her car today without needing rest. She denies fever, chills, lightheadedness, dizziness, chest pain, palpitations, SOB, PND, nausea, vomiting, diarrhea, hematochezia, melena, or LE edema. His weight remains stable at 128 lbs She continues to follow a low sodium diet.      PAST MEDICAL HISTORY:  Past Medical History:   Diagnosis Date     AL amyloidosis (H)      Cardiac amyloidosis (H)      Lymphedema      Recurrent right pleural effusion 1/2/2017       FAMILY HISTORY:  Family History   Problem Relation Age of Onset     Other - See Comments Sister      Amyloidosis       SOCIAL HISTORY:  Social History     Social History     Marital status:      Spouse name: N/A     Number of children: N/A     Years of education: N/A     Social History Main Topics     Smoking status: Never Smoker     Smokeless tobacco: Not on file     Alcohol use No     Drug use: No     Sexual activity: Not on file     Other Topics Concern     Not on file     Social History Narrative       CURRENT MEDICATIONS:  Outpatient Medications Prior to Visit   Medication Sig Dispense Refill     torsemide (DEMADEX) 20 MG tablet Take 3 tablets (60 mg) by mouth 2 times daily 180 tablet 3     spironolactone (ALDACTONE) 25 MG tablet Take 1 tablet (25 mg) by  "mouth 2 times daily 180 tablet 3     thiamine 100 MG tablet Take 1 tablet (100 mg) by mouth daily 60 tablet 3     acyclovir (ZOVIRAX) 400 MG tablet Take 1 tablet (400 mg) by mouth 2 times daily 60 tablet 3     potassium chloride SA (K-DUR/KLOR-CON M) 10 MEQ CR tablet Take 2 tablets (20 mEq) by mouth 2 times daily Do not fill until requested 120 tablet 11     Acetaminophen (TYLENOL PO) Take 325 mg by mouth       HYDROcodone-acetaminophen (NORCO) 5-325 MG per tablet Reported on 3/24/2017  0     LORazepam (ATIVAN) 0.5 MG tablet Reported on 3/24/2017  0     ondansetron (ZOFRAN-ODT) 4 MG disintegrating tablet   0     No facility-administered medications prior to visit.        ROS:   CONSTITUTIONAL: Denies fever, chills, fatigue, or weight fluctuations.   HEENT: Denies headache, vision changes, and changes in speech.   CV: Refer to HPI.   PULMONARY: Refer to HPI.   GI:Denies nausea, vomiting, diarrhea, and abdominal pain. Bowel movements are regular.   :Denies urinary alterations, dysuria, urinary frequency, hematuria, and abnormal drainage.   EXT: Complains of LE edema.   SKIN:Denies abnormal rashes or lesions.   MUSCULOSKELETAL:Denies upper or lower extremity weakness and pain.   NEUROLOGIC:Denies lightheadedness, dizziness, seizures, or upper or lower extremity paresthesia.     EXAM:  Ht 1.6 m (5' 3\")  Wt 58.3 kg (128 lb 9.6 oz)  BMI 22.78 kg/m2  GENERAL: Appears alert and oriented times three.   HEENT: Eye symmetrical and free of discharge bilaterally. Mucous membranes moist and without lesions.  NECK: Supple and without lymphadenopathy. JVD absent.   CV: RRR, S1S2 present without murmur, rub, or gallop.   RESPIRATORY: Respirations regular, even, and unlabored. Faint crackles to left base. Diminished right base.   GI: Soft and non distended with normoactive bowel sounds present in all quadrants. No tenderness, rebound, guarding. No organomegaly.   EXTREMITIES: +2-3 bilateral LE edema. 2+ bilateral pedal pulses. "   NEUROLOGIC: Alert and orientated x 3. CN II-XII grossly intact. No focal deficits.   MUSCULOSKELETAL: No joint swelling or tenderness.   SKIN: No jaundice. No rashes or lesions.     Labs:  CBC RESULTS:  Lab Results   Component Value Date    WBC 4.5 03/16/2017    RBC 4.79 03/16/2017    HGB 15.2 03/16/2017    HCT 45.7 03/16/2017    MCV 95 03/16/2017    MCH 31.7 03/16/2017    MCHC 33.3 03/16/2017    RDW 13.6 03/16/2017     03/16/2017       CMP RESULTS:  Lab Results   Component Value Date     04/10/2017    POTASSIUM 4.1 04/10/2017    CHLORIDE 96 04/10/2017    CO2 34 (H) 04/10/2017    ANIONGAP 6 04/10/2017    GLC 98 04/10/2017    BUN 20 04/10/2017    CR 1.18 (H) 04/10/2017    GFRESTIMATED 46 (L) 04/10/2017    GFRESTBLACK 56 (L) 04/10/2017    JERROD 9.3 04/10/2017    BILITOTAL 1.6 (H) 03/16/2017    ALBUMIN 3.6 03/16/2017    ALKPHOS 202 (H) 03/16/2017    ALT 17 03/16/2017    AST 17 03/16/2017        INR RESULTS:  Lab Results   Component Value Date    INR 1.30 (H) 01/14/2017       Lab Results   Component Value Date    MAG 2.5 (H) 01/25/2017     Lab Results   Component Value Date    NTBNPI 9009 (H) 01/13/2017     Lab Results   Component Value Date    NTBNP 2631 (H) 04/10/2017     Echocardiogram 1/5/17:  Interpretation Summary  Global and regional left ventricular function is normal with moderate  concentric LVH and biplane LVEF 55%.  Moderate to severe concentric wall thickening consistent with left  ventricular hypertrophy is present.  Grade III or advanced diastolic dysfunction is present.  Global peak LV longitudinal strain is abnormal when averaged at -12.5%  (normal <-18%).  Global right ventricular function is mildly reduced.  The inferior vena cava is normal.  Small circumferential pericardial effusion is present without any hemodynamic  Significance.    Assessment and Plan:   Ms. 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 She presents to CORE clinic for routine follow up.     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 Not currently a candidate for bone marrow transplant at [place]. Currently undergoing CyBorD per oncology. She is planning to undergo evaluation at [facility] in 1 week.   ACE/ARB: None  BB: None  Aldosterone antagonist: Aldactone 25 mg po daily   SCD prophylaxis: Not indicated.   Fluid status: Euvolemic.   Stage C, NYHA Class II    Follow up pending evaluation at [facility].     Di Sanchez  4/17/2017    CC  ALEJANDRO KEYES

## 2017-04-17 NOTE — NURSING NOTE
Chief Complaint   Patient presents with     Follow Up For     Return CORE -- 66 yr old female with PMH significant for cardiac amyloidosis with preserved EF presenting for follow up with labs prior.

## 2017-04-17 NOTE — MR AVS SNAPSHOT
"              After Visit Summary   2017    Leandra Guerrero    MRN: [id number]           Patient Information     Date Of Birth          1950        Visit Information        Provider Department      2017 11:00 AM Di Sanchez APRN [facility] Heart Care        Today's Diagnoses     Acute on chronic diastolic heart failure (H)          Care Instructions    You were seen today in the Cardiovascular Clinic at the [facility].     Cardiology Providers you saw during your visit: Di Sanchez NP   1.    Please make a follow-up CORE/heart failure appt with    Please limit your fluid intake to 2 L (64 ounces) daily.  2 Liters a day = 8.5 cups, or 72 ounces.  Please limit your salt intake to 2 grams a day or less.    If you gain 2# in 24 hours or 5# in one week call Eileen Arevalo RN so we can adjust your medications as needed over the phone.    Please feel free to call me with any questions or concerns.      Eileen Arevalo RN BSN [facility]  [facility]  Cardiology Care Coordinator-Heart Failure Clinic    Questions and [phone].   First press #1 for the [employer] and then press #3 for \"Medical Questions\" to reach us Cardiology Nurses.     On Call Cardiologist for after hours or on weekends: [phone]   option #4 and ask to speak to the on-call Cardiologist. Inform them you are a CORE/heart failure patient at the [place].        If you need a medication refill please contact your pharmacy.  Please allow 3 business days for your refill to be completed.  _______________________________________________________  C.O.R.E. CLINIC Cardiomyopathy, Optimization, Rehabilitation, Education   The C.O.R.E. CLINIC is a heart failure specialty clinic within the [facility] where you will work with specialized nurse practitioners dedicated to helping patients with heart failure carefully adjust medications, receive " education, and learn who and when to call if symptoms develop. They specialize in helping you better understand your condition, slow the progression of your disease, improve the length and quality of your life, help you detect future heart problems before they become life threatening, and avoid hospitalizations.  As always, thank you for trusting us with your health care needs!              Follow-ups after your visit        Follow-up notes from your care team     Return for Heart failure as needed. .      Your next 10 appointments already scheduled     Jun 01, 2017  3:15 PM CDT   LAB with LAB ONC [facility] ([facility])    [address] [place] [id number] [phone]           Patient must bring picture ID.  Patient should be prepared to give a urine specimen  Please do not eat 10-12 hours before your appointment if you are coming in fasting for labs on lipids, cholesterol, or glucose (sugar).  Pregnant women should follow their Care Team instructions. Water with medications is okay. Do not drink coffee or other fluids.   If you have concerns about taking  your medications, please ask at office or if scheduling via [employer], send a message by clicking on Secure Messaging, Message Your Care Team.            Jun 01, 2017  4:00 PM CDT   Return Visit with Mirela Moore MD   [facility]    [address] [place] [id number] [phone]              Who to contact     If you have questions or need follow up information about today's clinic visit or your schedule please contact [facility] directly at [phone].  Normal or non-critical lab and imaging results will be communicated to you by MyChart, letter or phone within 4 business days after the clinic has received the results. If you do not hear from us within 7 days, please contact the clinic through MyChart or phone. If you have a  "critical or abnormal lab result, we will notify you by phone as soon as possible.  Submit refill requests through [employer] or call your pharmacy and they will forward the refill request to us. Please allow 3 business days for your refill to be completed.          Additional Information About Your Visit        [employer] Information     [employer] gives you secure access to your electronic health record. If you see a primary care provider, you can also send messages to your care team and make appointments. If you have questions, please call your primary care clinic.  If you do not have a primary care provider, please call [phone] and they will assist you.        Care EveryWhere ID     This is your Care EveryWhere ID. This could be used by other organizations to access your [place] medical records  [id number]        Your Vitals Were     Pulse Height Pulse Oximetry BMI (Body Mass Index)          77 1.6 m (5' 3\") 98% 22.78 kg/m2         Blood Pressure from Last 3 Encounters:   04/17/17 116/66   04/10/17 118/73   03/24/17 106/60    Weight from Last 3 Encounters:   04/17/17 58.3 kg (128 lb 9.6 oz)   04/10/17 59 kg (130 lb 1.6 oz)   03/24/17 61.7 kg (136 lb)              Today, you had the following     No orders found for display         Today's Medication Changes          These changes are accurate as of: 4/17/17 11:32 AM.  If you have any questions, ask your nurse or doctor.               These medicines have changed or have updated prescriptions.        Dose/Directions    torsemide 20 MG tablet   Commonly known as:  DEMADEX   This may have changed:    - how much to take  - how to take this  - when to take this  - additional instructions   Used for:  Acute on chronic diastolic heart failure (H)   Changed by:  Di Sanchez APRN CNP        60 mg in AM and 40 mg in the afternoon.   Quantity:  180 tablet   Refills:  3                Primary Care Provider Office Phone # Fax #    Magy Obrien [phone] " [phone]       [facility] [address]  [facility] [address]        Thank you!     Thank you for choosing [facility]  for your care. Our goal is always to provide you with excellent care. Hearing back from our patients is one way we can continue to improve our services. Please take a few minutes to complete the written survey that you may receive in the mail after your visit with us. Thank you!             Your Updated Medication List - Protect others around you: Learn how to safely use, store and throw away your medicines at www.disposemymeds.org.          This list is accurate as of: 4/17/17 11:32 AM.  Always use your most recent med list.                   Brand Name Dispense Instructions for use    acyclovir 400 MG tablet    ZOVIRAX    60 tablet    Take 1 tablet (400 mg) by mouth 2 times daily       HYDROcodone-acetaminophen 5-325 MG per tablet    NORCO     Reported on 3/24/2017       LORazepam 0.5 MG tablet    ATIVAN     Reported on 3/24/2017       ondansetron 4 MG ODT tab    ZOFRAN-ODT         potassium chloride SA 10 MEQ CR tablet    K-DUR/KLOR-CON M    120 tablet    Take 2 tablets (20 mEq) by mouth 2 times daily Do not fill until requested       spironolactone 25 MG tablet    ALDACTONE    180 tablet    Take 1 tablet (25 mg) by mouth 2 times daily       thiamine 100 MG tablet     60 tablet    Take 1 tablet (100 mg) by mouth daily       torsemide 20 MG tablet    DEMADEX    180 tablet    60 mg in AM and 40 mg in the afternoon.       TYLENOL PO      Take 325 mg by mouth

## 2017-04-17 NOTE — PATIENT INSTRUCTIONS
"You were seen today in the Cardiovascular Clinic at the [facility].     Cardiology Providers you saw during your visit: Di Sanchez NP   1.    Please make a follow-up CORE/heart failure appt with    Please limit your fluid intake to 2 L (64 ounces) daily.  2 Liters a day = 8.5 cups, or 72 ounces.  Please limit your salt intake to 2 grams a day or less.    If you gain 2# in 24 hours or 5# in one week call Eileen Arevalo RN so we can adjust your medications as needed over the phone.    Please feel free to call me with any questions or concerns.      Eileen Arevalo RN BSN [place]  [facility]  Cardiology Care Coordinator-Heart Failure Clinic    Questions and [phone].   First press #1 for the University and then press #3 for \"Medical Questions\" to reach us Cardiology Nurses.     On Call Cardiologist for after hours or on weekends: [phone]   option #4 and ask to speak to the on-call Cardiologist. Inform them you are a CORE/heart failure patient at the [place].        If you need a medication refill please contact your pharmacy.  Please allow 3 business days for your refill to be completed.  _______________________________________________________  C.O.R.E. CLINIC Cardiomyopathy, Optimization, Rehabilitation, Education   The C.O.R.E. CLINIC is a heart failure specialty clinic within the [facility] where you will work with specialized nurse practitioners dedicated to helping patients with heart failure carefully adjust medications, receive education, and learn who and when to call if symptoms develop. They specialize in helping you better understand your condition, slow the progression of your disease, improve the length and quality of your life, help you detect future heart problems before they become life threatening, and avoid hospitalizations.  As always, thank you for trusting us with your health care needs!        "

## 2017-04-17 NOTE — PROGRESS NOTES
HPI:   Ms. Guerrero is a 66 year old female with a past medical history including *** who was admitted to [facility] with ***. Hospital course was notable for ***. She Presents to clinic for follow up. Since her discharge she reports doing { :1023319}.    2 pillow orthopnea  Weight 127 lbs.     Ms. denies fever, chills, lightheadedness, dizziness, chest pain, palpitations, SOB, PELAEZ, PND, orthopnea, nausea, vomiting, diarrhea, hematochezia, melena, or LE edema. His weight remains stable at ***. He continues to follow a low sodium diet.        PAST MEDICAL HISTORY:  Past Medical History:   Diagnosis Date     AL amyloidosis (H)      Cardiac amyloidosis (H)      Lymphedema      Recurrent right pleural effusion 1/2/2017       FAMILY HISTORY:  Family History   Problem Relation Age of Onset     Other - See Comments Sister      Amyloidosis       SOCIAL HISTORY:  Social History     Social History     Marital status:      Spouse name: N/A     Number of children: N/A     Years of education: N/A     Social History Main Topics     Smoking status: Never Smoker     Smokeless tobacco: None     Alcohol use No     Drug use: No     Sexual activity: Not Asked     Other Topics Concern     None     Social History Narrative       CURRENT MEDICATIONS:  Outpatient Medications Prior to Visit   Medication Sig Dispense Refill     torsemide (DEMADEX) 20 MG tablet Take 3 tablets (60 mg) by mouth 2 times daily 180 tablet 3     spironolactone (ALDACTONE) 25 MG tablet Take 1 tablet (25 mg) by mouth 2 times daily 180 tablet 3     thiamine 100 MG tablet Take 1 tablet (100 mg) by mouth daily 60 tablet 3     acyclovir (ZOVIRAX) 400 MG tablet Take 1 tablet (400 mg) by mouth 2 times daily 60 tablet 3     potassium chloride SA (K-DUR/KLOR-CON M) 10 MEQ CR tablet Take 2 tablets (20 mEq) by mouth 2 times daily Do not fill until requested 120 tablet 11     Acetaminophen (TYLENOL PO) Take 325 mg by mouth       HYDROcodone-acetaminophen (NORCO) 5-325 MG per  "tablet Reported on 3/24/2017  0     LORazepam (ATIVAN) 0.5 MG tablet Reported on 3/24/2017  0     ondansetron (ZOFRAN-ODT) 4 MG disintegrating tablet   0     No facility-administered medications prior to visit.        ROS:   CONSTITUTIONAL: Denies fever, chills, fatigue, or weight fluctuations.   HEENT: Denies headache, vision changes, and changes in speech.   CV: Refer to HPI.   PULMONARY:Denies shortness of breath, cough, or previous TB exposure.   GI:Denies nausea, vomiting, diarrhea, and abdominal pain. Bowel movements are regular.   :Denies urinary alterations, dysuria, urinary frequency, hematuria, and abnormal drainage.   EXT:Denies lower extremity edema.   SKIN:Denies abnormal rashes or lesions.   MUSCULOSKELETAL:Denies upper or lower extremity weakness and pain.   NEUROLOGIC:Denies lightheadedness, dizziness, seizures, or upper or lower extremity paresthesia.     EXAM:  /66  Pulse 77  Ht 1.6 m (5' 3\")  Wt 58.3 kg (128 lb 9.6 oz)  SpO2 98%  BMI 22.78 kg/m2  GENERAL: Appears alert and oriented times three.   HEENT: Eye symmetrical and free of discharge bilaterally. Mucous membranes moist and without lesions.  NECK: Supple and without lymphadenopathy. JVD ***.   CV: RRR, S1S2 present without murmur, rub, or gallop.   RESPIRATORY: Respirations regular, even, and unlabored. Lungs CTA throughout.   GI: Soft and non distended with normoactive bowel sounds present in all quadrants. No tenderness, rebound, guarding. No organomegaly.   EXTREMITIES: No peripheral edema. 2+ bilateral pedal pulses.   NEUROLOGIC: Alert and orientated x 3. CN II-XII grossly intact. No focal deficits.   MUSCULOSKELETAL: No joint swelling or tenderness.   SKIN: No jaundice. No rashes or lesions.     Labs:  CBC RESULTS:  Lab Results   Component Value Date    WBC 4.5 03/16/2017    RBC 4.79 03/16/2017    HGB 15.2 03/16/2017    HCT 45.7 03/16/2017    MCV 95 03/16/2017    MCH 31.7 03/16/2017    MCHC 33.3 03/16/2017    RDW 13.6 " "03/16/2017     03/16/2017       CMP RESULTS:  Lab Results   Component Value Date     04/17/2017    POTASSIUM 4.0 04/17/2017    CHLORIDE 98 04/17/2017    CO2 34 (H) 04/17/2017    ANIONGAP 6 04/17/2017    GLC 95 04/17/2017    BUN 27 04/17/2017    CR 1.22 (H) 04/17/2017    GFRESTIMATED 44 (L) 04/17/2017    GFRESTBLACK 54 (L) 04/17/2017    JERROD 9.0 04/17/2017    BILITOTAL 1.6 (H) 03/16/2017    ALBUMIN 3.6 03/16/2017    ALKPHOS 202 (H) 03/16/2017    ALT 17 03/16/2017    AST 17 03/16/2017        INR RESULTS:  Lab Results   Component Value Date    INR 1.30 (H) 01/14/2017       Lab Results   Component Value Date    MAG 2.5 (H) 01/25/2017     Lab Results   Component Value Date    NTBNPI 9009 (H) 01/13/2017     Lab Results   Component Value Date    NTBNP 2631 (H) 04/10/2017       Assessment and Plan:   Ms. Guerrero is a pleasant 66 year old {SEXES:915417} with a past medical history including *** who was recently admitted with ***. She {does:144325::\"does not\"} appear well today. ***     1. Chronic systolic heart failure secondary to ***.    Stage {UMPCARDSTAGE:918206521}  NYHA Class {UMPCARDSYMP:667335597}  ACEi/ARB {UMPCARDACEI/ARB:125465571}  BB {UMPCARDACEI/ARB:063983823}  Aldosterone antagonist {UMPCARDBB:655833538}  SCD prophylaxis {UMPCARDSCD:146946956}  % BiV pacing: {Not Applicable or free text:571036::\"N/A\"}  Fluid status {UMPCARDFLUID:592706301}  NSAID use: ***  Sleep Apnea Evaluation: {SLEEP APNEA SIGN AND SYMPTOMS:240665::\"snoring\",\"excessive daytime somnolence\",\"fatigue\",\"periodic leg movement\"}  Follow-up ***    1. Heart failure with preserved ejection fraction.   NYHA Class   AHA/ACC Stage  Rate control:   volume status:   Blood pressure control:   Aldosterone antagonist:   Evaluation of coronary arteries:  Sleep apnea screening:    The mainstays of therapy for HFpEF include volume management, blood pressure control, treating underlying sleep apnea if present, treatment of underlying CAD if within " the goals of care, weight management, exercise training, rate control for atrial fibrillation as well as consideration for a rhythm control strategy, and consideration for clinical trials. Consideration of spironolactone in low risk patients should be taken given the positive CHF results in the TOPCAT trial.  Follow up ***.       Di Sanchez  4/17/2017          ALEJANDRO FORD

## 2017-04-25 ENCOUNTER — CARE COORDINATION (OUTPATIENT)
Dept: CARDIOLOGY | Facility: CLINIC | Age: 67
End: 2017-04-25

## 2017-04-25 NOTE — PROGRESS NOTES
Pt has an appt tomorrow in [place] with Dr. Jim.  Per her previous request, the last 2 EKG's and echos were fax'd today to [phone].  Confirmation that the fax went through was received.  Pt notified.

## 2017-05-03 DIAGNOSIS — E85.4 CARDIAC AMYLOIDOSIS (H): Primary | ICD-10-CM

## 2017-05-03 DIAGNOSIS — I50.33 ACUTE ON CHRONIC DIASTOLIC HEART FAILURE (H): ICD-10-CM

## 2017-05-03 DIAGNOSIS — I43 CARDIAC AMYLOIDOSIS (H): Primary | ICD-10-CM

## 2017-05-04 ENCOUNTER — DOCUMENTATION ONLY (OUTPATIENT)
Dept: OTHER | Facility: CLINIC | Age: 67
End: 2017-05-04

## 2017-05-04 DIAGNOSIS — Z71.89 ADVANCE CARE PLANNING: Chronic | ICD-10-CM

## 2017-05-09 ENCOUNTER — CARE COORDINATION (OUTPATIENT)
Dept: CARDIOLOGY | Facility: CLINIC | Age: 67
End: 2017-05-09

## 2017-05-09 NOTE — PROGRESS NOTES
Received [place] message below from patient today:    Good morning.   Just a note to let you know I'm up three pounds.  I have noticed some pressure in my chest the last couple days and last night when I climbed the stairs for bed. Tomorrow we leave for [facility] with appointments early Thursday.   I'm wondering if I should increase the Toresemide.  I will be collecting urine for a lab all day Wednesday until six Thursday morning.   Please advise.  Brook.   Amy    Per previous instructions, if pt's wt went up by 2 lbs 24 hrs/5 lbs in week, she should increase to torsemide 60 mg bid, but notify us first.  Instructed pt to increase torsemide to 60 bid for next 2 days until wt back down, then return to torsemide 60 am/40 pm.

## 2017-05-11 ENCOUNTER — TRANSFERRED RECORDS (OUTPATIENT)
Dept: HEALTH INFORMATION MANAGEMENT | Facility: CLINIC | Age: 67
End: 2017-05-11

## 2017-05-23 ASSESSMENT — ENCOUNTER SYMPTOMS
STIFFNESS: 0
LIGHT-HEADEDNESS: 1
HEMOPTYSIS: 0
SNORES LOUDLY: 0
SYNCOPE: 0
ORTHOPNEA: 1
SHORTNESS OF BREATH: 1
MUSCLE CRAMPS: 1
COUGH DISTURBING SLEEP: 0
CLAUDICATION: 1
TACHYCARDIA: 0
LEG PAIN: 1
COUGH: 1
JOINT SWELLING: 0
DYSPNEA ON EXERTION: 1
HYPERTENSION: 0
ARTHRALGIAS: 0
MUSCLE WEAKNESS: 0
LEG SWELLING: 1
EXERCISE INTOLERANCE: 0
HYPOTENSION: 0
SPUTUM PRODUCTION: 0
NECK PAIN: 0
MYALGIAS: 1
RESPIRATORY PAIN: 0
POSTURAL DYSPNEA: 1
BACK PAIN: 0
PALPITATIONS: 0
WHEEZING: 0
SLEEP DISTURBANCES DUE TO BREATHING: 0

## 2017-05-24 ENCOUNTER — PRE VISIT (OUTPATIENT)
Dept: CARDIOLOGY | Facility: CLINIC | Age: 67
End: 2017-05-24

## 2017-05-26 ENCOUNTER — OFFICE VISIT (OUTPATIENT)
Dept: CARDIOLOGY | Facility: CLINIC | Age: 67
End: 2017-05-26
Attending: INTERNAL MEDICINE
Payer: COMMERCIAL

## 2017-05-26 VITALS
OXYGEN SATURATION: 98 % | WEIGHT: 134.8 LBS | BODY MASS INDEX: 23.88 KG/M2 | HEIGHT: 63 IN | SYSTOLIC BLOOD PRESSURE: 111 MMHG | HEART RATE: 83 BPM | DIASTOLIC BLOOD PRESSURE: 67 MMHG

## 2017-05-26 DIAGNOSIS — I50.33 ACUTE ON CHRONIC DIASTOLIC HEART FAILURE (H): ICD-10-CM

## 2017-05-26 DIAGNOSIS — E85.4 CARDIAC AMYLOIDOSIS (H): ICD-10-CM

## 2017-05-26 DIAGNOSIS — I43 CARDIAC AMYLOIDOSIS (H): ICD-10-CM

## 2017-05-26 DIAGNOSIS — I50.30 (HFPEF) HEART FAILURE WITH PRESERVED EJECTION FRACTION (H): Primary | ICD-10-CM

## 2017-05-26 LAB
ANION GAP SERPL CALCULATED.3IONS-SCNC: 10 MMOL/L (ref 3–14)
BUN SERPL-MCNC: 25 MG/DL (ref 7–30)
CALCIUM SERPL-MCNC: 9 MG/DL (ref 8.5–10.1)
CHLORIDE SERPL-SCNC: 96 MMOL/L (ref 94–109)
CO2 SERPL-SCNC: 30 MMOL/L (ref 20–32)
CREAT SERPL-MCNC: 1.06 MG/DL (ref 0.52–1.04)
GFR SERPL CREATININE-BSD FRML MDRD: 52 ML/MIN/1.7M2
GLUCOSE SERPL-MCNC: 98 MG/DL (ref 70–99)
POTASSIUM SERPL-SCNC: 3.6 MMOL/L (ref 3.4–5.3)
SODIUM SERPL-SCNC: 136 MMOL/L (ref 133–144)

## 2017-05-26 PROCEDURE — 36415 COLL VENOUS BLD VENIPUNCTURE: CPT | Performed by: INTERNAL MEDICINE

## 2017-05-26 PROCEDURE — 99214 OFFICE O/P EST MOD 30 MIN: CPT | Mod: ZP | Performed by: INTERNAL MEDICINE

## 2017-05-26 PROCEDURE — 99212 OFFICE O/P EST SF 10 MIN: CPT

## 2017-05-26 PROCEDURE — 80048 BASIC METABOLIC PNL TOTAL CA: CPT | Performed by: INTERNAL MEDICINE

## 2017-05-26 RX ORDER — DOXYCYCLINE HYCLATE 100 MG
100 TABLET ORAL DAILY
COMMUNITY
End: 2018-02-22

## 2017-05-26 RX ORDER — TORSEMIDE 20 MG/1
TABLET ORAL
Qty: 180 TABLET | Refills: 3 | Status: SHIPPED | OUTPATIENT
Start: 2017-05-26 | End: 2017-10-31

## 2017-05-26 ASSESSMENT — PAIN SCALES - GENERAL: PAINLEVEL: NO PAIN (0)

## 2017-05-26 NOTE — MR AVS SNAPSHOT
After Visit Summary   5/26/2017    Leandra Guerrero    MRN: [id number]           Patient Information     Date Of Birth          1950        Visit Information        Provider Department      5/26/2017 2:30 PM Domenica Cohen MD [facility]        Today's Diagnoses     (HFpEF) heart failure with preserved ejection fraction (H)    -  1    Acute on chronic diastolic heart failure (H)          Care Instructions    Increase torsemide to 60 mg twice a day.     You can take one dose at 8 am and one dose 2:00 pm.     Okay to stop thiamine.     I will see you back in 3 months.     You are okay to travel.                       Follow-ups after your visit        Follow-up notes from your care team     Return in about 3 months (around 8/26/2017).      Your next 10 appointments already scheduled     Jun 01, 2017  4:00 PM CDT   Return Visit with Mirela Moore MD   [facility] ([facility])    [address] [place] [address] [phone]              Who to contact     If you have questions or need follow up information about today's clinic visit or your schedule please contact [facility] directly at [phone].  Normal or non-critical lab and imaging results will be communicated to you by MyChart, letter or phone within 4 business days after the clinic has received the results. If you do not hear from us within 7 days, please contact the clinic through MyChart or phone. If you have a critical or abnormal lab result, we will notify you by phone as soon as possible.  Submit refill requests through [employer] or call your pharmacy and they will forward the refill request to us. Please allow 3 business days for your refill to be completed.          Additional Information About Your Visit        MyChart Information     [employer] gives you secure access to your electronic health record. If you see a primary care provider, you can also send  "messages to your care team and make appointments. If you have questions, please call your primary care clinic.  If you do not have a primary care provider, please call [phone] and they will assist you.        Care EveryWhere ID     This is your Care EveryWhere ID. This could be used by other organizations to access your [place] medical records  [id number]        Your Vitals Were     Pulse Height Pulse Oximetry BMI (Body Mass Index)          83 1.6 m (5' 3\") 98% 23.88 kg/m2         Blood Pressure from Last 3 Encounters:   05/26/17 111/67   04/17/17 116/66   04/10/17 118/73    Weight from Last 3 Encounters:   05/26/17 61.1 kg (134 lb 12.8 oz)   04/17/17 58.3 kg (128 lb 9.6 oz)   04/10/17 59 kg (130 lb 1.6 oz)              Today, you had the following     No orders found for display         Today's Medication Changes          These changes are accurate as of: 5/26/17  3:27 PM.  If you have any questions, ask your nurse or doctor.               These medicines have changed or have updated prescriptions.        Dose/Directions    torsemide 20 MG tablet   Commonly known as:  DEMADEX   This may have changed:  additional instructions   Used for:  Acute on chronic diastolic heart failure (H)   Changed by:  Domenica Cohen MD        60 mg in AM and 60 mg in the afternoon.   Quantity:  180 tablet   Refills:  3         Stop taking these medicines if you haven't already. Please contact your care team if you have questions.     thiamine 100 MG tablet   Stopped by:  Domenica Cohen MD                Where to get your medicines      These medications were sent to [place] [address] [place] [facility] [address]  [address] [facility] [address]     Phone:  [phone]     torsemide 20 MG tablet                Primary Care Provider Office Phone # Fax #    Magy Obrien [phone]       [facility] [address]  [facility] [address]        Thank you!     Thank you for choosing " [facility]  for your care. Our goal is always to provide you with excellent care. Hearing back from our patients is one way we can continue to improve our services. Please take a few minutes to complete the written survey that you may receive in the mail after your visit with us. Thank you!             Your Updated Medication List - Protect others around you: Learn how to safely use, store and throw away your medicines at www.disposemymeds.org.          This list is accurate as of: 5/26/17  3:27 PM.  Always use your most recent med list.                   Brand Name Dispense Instructions for use    acyclovir 400 MG tablet    ZOVIRAX    60 tablet    Take 1 tablet (400 mg) by mouth 2 times daily       doxycycline 100 MG tablet    VIBRA-TABS     Take 100 mg by mouth daily       HYDROcodone-acetaminophen 5-325 MG per tablet    NORCO     Reported on 3/24/2017       LORazepam 0.5 MG tablet    ATIVAN     Reported on 3/24/2017       ondansetron 4 MG ODT tab    ZOFRAN-ODT         potassium chloride SA 10 MEQ CR tablet    K-DUR/KLOR-CON M    120 tablet    Take 2 tablets (20 mEq) by mouth 2 times daily Do not fill until requested       spironolactone 25 MG tablet    ALDACTONE    180 tablet    Take 1 tablet (25 mg) by mouth 2 times daily       torsemide 20 MG tablet    DEMADEX    180 tablet    60 mg in AM and 60 mg in the afternoon.       TYLENOL PO      Take 325 mg by mouth

## 2017-05-26 NOTE — PROGRESS NOTES
May 26, 2017    HPI:  Mrs. Guerrero is a 66 year old female with no past medical history until the Spring of 2016 when she was diagnosed with stage IV AL amyloid; she presents to clinic for follow up of cardiac amyloid.     Her cardiac and oncologic history are as follows:     Fatigue started in January 2016. She eventually had a coronary angiogram which was reportedly normal and was diagnosed with CHF and started on a bblocker and diuretics. Her symptoms progressed to the point that she was evaluated at [place] in August/September (Dr. Barron in oncology/hematology, Dr. Nettles is cardiology). She was diagnosed with stage IV AL amyloid (+GI, +bone marrow involvement, not thought to have involvement of kidney or liver):    Her work up is detailed in my past note however she has lambda AL amyloidosis and has been undergoing CyBorD chemotherapy and excellent light chain response by serum. She has been turned down at [facility] for a stem cell transplant due to her cardiac involvement.    When I first met her about 7 months ago, she had definite evidence of cardiac amyloid, although she did not have volume overload at that time, her shortness of breath was minimal and she had several reassuring cardiac features including normal filling pressures on echo and again no elevation in her neck veins.     Unfortunately over the next 3 months, she had 2-3 more pleural taps on the R and came back to my clinic with gross anasarca.  I admitted her to the hospital where close to 20-30 pounds of fluid overload of volume was removed.  She has been on 60 mg/40 mg of torsemide with relatively stable symptoms.  She feels fatigued but she is able to walk long distances on flat ground at her own pace though.  She denies PND or orthopnea or nighttime cough.  She denies any chest heaviness or pressure.  She has not had syncope. She reported some short lived palpitation but infrequent.     She saw Dr. Fischer and Dr. Lei at [facility] in [place] on  27th of April per my referral.  No documents were available on [facility] care everywhere.  She indicated that she was given prescriptions for doxycycline which she is taking and acyclovir which she is not taking.        PAST MEDICAL HISTORY:  - Stage IV AL amyloid   - GI (stomach and duodenal) involvement  - Cardiac amyloidosis    FAMILY HISTORY:  Family History   Problem Relation Age of Onset     Other - See Comments Sister      Amyloidosis   - Sister passed from multiple myeloma, patient was also told her sister had amyloid as well  - Mom with CAD    SOCIAL HISTORY:  Social History     Social History     Marital Status:      Spouse Name: N/A     Number of Children: N/A     Years of Education: N/A     Social History Main Topics     Smoking status: Never Smoker      Smokeless tobacco: None     Alcohol Use: No     Drug Use: No     Sexual Activity: Not Asked     Social History Narrative   Lives in [place] with her , has 2 kids  Previously did office work, hasn't worked for several months now  Never smoker  Social ETOH    CURRENT MEDICATIONS:  Current Outpatient Prescriptions   Medication Sig Dispense Refill     doxycycline (VIBRA-TABS) 100 MG tablet Take 100 mg by mouth daily       torsemide (DEMADEX) 20 MG tablet 60 mg in AM and 40 mg in the afternoon. 180 tablet 3     spironolactone (ALDACTONE) 25 MG tablet Take 1 tablet (25 mg) by mouth 2 times daily 180 tablet 3     thiamine 100 MG tablet Take 1 tablet (100 mg) by mouth daily 60 tablet 3     acyclovir (ZOVIRAX) 400 MG tablet Take 1 tablet (400 mg) by mouth 2 times daily 60 tablet 3     potassium chloride SA (K-DUR/KLOR-CON M) 10 MEQ CR tablet Take 2 tablets (20 mEq) by mouth 2 times daily Do not fill until requested 120 tablet 11     Acetaminophen (TYLENOL PO) Take 325 mg by mouth       HYDROcodone-acetaminophen (NORCO) 5-325 MG per tablet Reported on 3/24/2017  0     LORazepam (ATIVAN) 0.5 MG tablet Reported on 3/24/2017  0     ondansetron  "(ZOFRAN-ODT) 4 MG disintegrating tablet   0       ROS:   Constitutional: No fever, chills, or sweats. Weight is stable from our last visit   ENT: No visual disturbance, ear ache, epistaxis, sore throat.   Allergies/Immunologic: Negative.   Respiratory: No cough, hemoptysis.   Cardiovascular: As per HPI.   GI: no nausea and severe constipation. Early satiety   : No urinary frequency, dysuria, or hematuria.   Integument: Negative.   Psychiatric: feeling down and anxious since her diagnosis  Neuro: + tingling in legs bilaterally  Endocrinology: Negative.   Musculoskeletal: Negative.    EXAM:  /67 (BP Location: Left arm, Patient Position: Chair, Cuff Size: Adult Regular)  Pulse 83  Ht 1.6 m (5' 3\")  Wt 61.1 kg (134 lb 12.8 oz)  SpO2 98%  BMI 23.88 kg/m2  General: appears comfortable, alert and articulate  Head: normocephalic, atraumatic  Eyes: anicteric sclera, EOMI  Neck: no adenopathy  Orophyarynx: moist mucosa, no lesions, dentition intact  Heart: regular rate and rhythm. III/VI holosystolic murmur at the apex, JVD around 9-10 cm  Lungs: clear bilaterally  Abdomen: soft, non-tender, bowel sounds present, no hepatosplenomegaly  Extremities: mild edema non pitting at the ankles   Neurological: normal speech and affect, no gross motor deficits    Labs:  CBC RESULTS:  Lab Results   Component Value Date    WBC 4.5 03/16/2017    RBC 4.79 03/16/2017    HGB 15.2 03/16/2017    HCT 45.7 03/16/2017    MCV 95 03/16/2017    MCH 31.7 03/16/2017    MCHC 33.3 03/16/2017    RDW 13.6 03/16/2017     03/16/2017       CMP RESULTS:  Lab Results   Component Value Date     04/17/2017    POTASSIUM 4.0 04/17/2017    CHLORIDE 98 04/17/2017    CO2 34 (H) 04/17/2017    ANIONGAP 6 04/17/2017    GLC 95 04/17/2017    BUN 27 04/17/2017    CR 1.22 (H) 04/17/2017    GFRESTIMATED 44 (L) 04/17/2017    GFRESTBLACK 54 (L) 04/17/2017    JERROD 9.0 04/17/2017    BILITOTAL 1.6 (H) 03/16/2017    ALBUMIN 3.6 03/16/2017    ALKPHOS 202 (H) " 03/16/2017    ALT 17 03/16/2017    AST 17 03/16/2017     INR RESULTS:  Lab Results   Component Value Date    INR 1.30 (H) 01/14/2017       Lab Results   Component Value Date    MAG 2.5 (H) 01/25/2017     Lab Results   Component Value Date    NTBNPI 9009 (H) 01/13/2017     Lab Results   Component Value Date    NTBNP 2631 (H) 04/10/2017             Assessment and Plan:   Mrs. 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     Patient has cardiac amyloidosis as evidenced by her echocardiogram (severe concentric hypertrophy and biatrial enlargement) and abnormal EKG (near-low voltage, poor R wave progression, biatrial enlargement and no evidence of LVH despite very thickened LV on echo) in the setting of biopsy proven (BMB, EGD) AL amyloid. Her positive biomarkers are also suggestive of cardiac involvement; angiogram without obstructive CAD. She has completed chemotherapy with CyBorD .    Her NYHA class is II today, Stage C, she is mildly hypervolemic on exam today, so will increase her torsemide to 60 mg BID .     She saw Dr. Harjit Fischer at [facility] in [place] as well as Dr. Lei but unfortunately I could not obtain any documents from Care Everywhere to know the details of the encounter.  I will also contact Dr. Braydon Barron in [facility] with her progress.    Cardiac Amyloidosis with preserved EF  Stage C  NYHA Class II  ACEi/ARB None  BB None, relatively contraindicated due to risk of progressive conduction disease/AV block in these patients  Aldosterone antagonist: aldactone 25  SCD prophylaxis: not indicated  % BiV pacing: NA  Fluid status: mildly hypervolemic     Follow up: 3 months with labs.    Other:  Arrhythmia monitoring -- Her Zio patch in 2/17 showed sinus rhythm with non sustained SVT.    Seen and discussed with Dr. Noel Beckett, DO  Heart  failure fellow  [id number]    I have seen and examined the patient with the house staff on May 26, 2017 and agree with the outlined assessment and plan.      Domenica Cohen MD   of Medicine   [facility] Division of Cardiology             CC      Dr. Braydon Barron MD  [place] [address] [place] [address]      Patient Care Team:  Magy Obrien as PCP - General (Family Practice)  Mirela Daley MD as MD (Hematology & Oncology)  Domenica Cohen MD as MD (Cardiology)  Vidhi Dorsey, RN as Nurse Coordinator (Cardiology)  Eileen Arevalo, RN as Nurse Coordinator (Cardiology)  Codie Nelson, GREY as Nurse Practitioner (Cardiology)  MIRELA DALEY MD    [facility]

## 2017-05-26 NOTE — PATIENT INSTRUCTIONS
Increase torsemide to 60 mg twice a day.     You can take one dose at 8 am and one dose 2:00 pm.     Okay to stop thiamine.     I will see you back in 3 months.     You are okay to travel.

## 2017-05-26 NOTE — NURSING NOTE
Chief Complaint   Patient presents with     Follow Up For     follow up for cardiac amyloid, recent visit to [facility] for opinion/ labs

## 2017-05-26 NOTE — LETTER
5/26/2017      RE: Leandra Guerrero  [address] [place] [address]       Dear Colleague,    Thank you for the opportunity to participate in the care of your patient, Leandra Guerrero, at the [facility]. Please see a copy of my visit note below.    May 26, 2017    HPI:  Mrs. Guerrero is a 66 year old female with no past medical history until the Spring of 2016 when she was diagnosed with stage IV AL amyloid; she presents to clinic for follow up of cardiac amyloid.     Her cardiac and oncologic history are as follows:     Fatigue started in January 2016. She eventually had a coronary angiogram which was reportedly normal and was diagnosed with CHF and started on a bblocker and diuretics. Her symptoms progressed to the point that she was evaluated at [facility] in August/September (Dr. Barron in oncology/hematology, Dr. Nettles is cardiology). She was diagnosed with stage IV AL amyloid (+GI, +bone marrow involvement, not thought to have involvement of kidney or liver):    Her work up is detailed in my past note however she has lambda AL amyloidosis and has been undergoing CyBorD chemotherapy and excellent light chain response by serum. She has been turned down at [facility] for a stem cell transplant due to her cardiac involvement.    When I first met her about 7 months ago, she had definite evidence of cardiac amyloid, although she did not have volume overload at that time, her shortness of breath was minimal and she had several reassuring cardiac features including normal filling pressures on echo and again no elevation in her neck veins.     Unfortunately over the next 3 months, she had 2-3 more pleural taps on the R and came back to my clinic with gross anasarca.  I admitted her to the hospital where close to 20-30 pounds of fluid overload of volume was removed.  She has been on 60 mg/40 mg of torsemide with relatively stable symptoms.  She feels  fatigued but she is able to walk long distances on flat ground at her own pace though.  She denies PND or orthopnea or nighttime cough.  She denies any chest heaviness or pressure.  She has not had syncope. She reported some short lived palpitation but infrequent.     She saw Dr. Fischer and Dr. Lei at [facility] in [place] on 27th of April per my referral.  No documents were available on [facility]'s care everywhere.  She indicated that she was given prescriptions for doxycycline which she is taking and acyclovir which she is not taking.        PAST MEDICAL HISTORY:  - Stage IV AL amyloid   - GI (stomach and duodenal) involvement  - Cardiac amyloidosis    FAMILY HISTORY:  Family History   Problem Relation Age of Onset     Other - See Comments Sister      Amyloidosis   - Sister passed from multiple myeloma, patient was also told her sister had amyloid as well  - Mom with CAD    SOCIAL HISTORY:  Social History     Social History     Marital Status:      Spouse Name: N/A     Number of Children: N/A     Years of Education: N/A     Social History Main Topics     Smoking status: Never Smoker      Smokeless tobacco: None     Alcohol Use: No     Drug Use: No     Sexual Activity: Not Asked     Social History Narrative   Lives in [place] with her , has 2 kids  Previously did office work, hasn't worked for several months now  Never smoker  Social ETOH    CURRENT MEDICATIONS:  Current Outpatient Prescriptions   Medication Sig Dispense Refill     doxycycline (VIBRA-TABS) 100 MG tablet Take 100 mg by mouth daily       torsemide (DEMADEX) 20 MG tablet 60 mg in AM and 40 mg in the afternoon. 180 tablet 3     spironolactone (ALDACTONE) 25 MG tablet Take 1 tablet (25 mg) by mouth 2 times daily 180 tablet 3     thiamine 100 MG tablet Take 1 tablet (100 mg) by mouth daily 60 tablet 3     acyclovir (ZOVIRAX) 400 MG tablet Take 1 tablet (400 mg) by mouth 2 times daily 60 tablet 3     potassium chloride SA (K-DUR/KLOR-CON M)  "10 MEQ CR tablet Take 2 tablets (20 mEq) by mouth 2 times daily Do not fill until requested 120 tablet 11     Acetaminophen (TYLENOL PO) Take 325 mg by mouth       HYDROcodone-acetaminophen (NORCO) 5-325 MG per tablet Reported on 3/24/2017  0     LORazepam (ATIVAN) 0.5 MG tablet Reported on 3/24/2017  0     ondansetron (ZOFRAN-ODT) 4 MG disintegrating tablet   0       ROS:   Constitutional: No fever, chills, or sweats. Weight is stable from our last visit   ENT: No visual disturbance, ear ache, epistaxis, sore throat.   Allergies/Immunologic: Negative.   Respiratory: No cough, hemoptysis.   Cardiovascular: As per HPI.   GI: no nausea and severe constipation. Early satiety   : No urinary frequency, dysuria, or hematuria.   Integument: Negative.   Psychiatric: feeling down and anxious since her diagnosis  Neuro: + tingling in legs bilaterally  Endocrinology: Negative.   Musculoskeletal: Negative.    EXAM:  /67 (BP Location: Left arm, Patient Position: Chair, Cuff Size: Adult Regular)  Pulse 83  Ht 1.6 m (5' 3\")  Wt 61.1 kg (134 lb 12.8 oz)  SpO2 98%  BMI 23.88 kg/m2  General: appears comfortable, alert and articulate  Head: normocephalic, atraumatic  Eyes: anicteric sclera, EOMI  Neck: no adenopathy  Orophyarynx: moist mucosa, no lesions, dentition intact  Heart: regular rate and rhythm. III/VI holosystolic murmur at the apex, JVD around 9-10 cm  Lungs: clear bilaterally  Abdomen: soft, non-tender, bowel sounds present, no hepatosplenomegaly  Extremities: mild edema non pitting at the ankles   Neurological: normal speech and affect, no gross motor deficits    Labs:  CBC RESULTS:  Lab Results   Component Value Date    WBC 4.5 03/16/2017    RBC 4.79 03/16/2017    HGB 15.2 03/16/2017    HCT 45.7 03/16/2017    MCV 95 03/16/2017    MCH 31.7 03/16/2017    MCHC 33.3 03/16/2017    RDW 13.6 03/16/2017     03/16/2017       CMP RESULTS:  Lab Results   Component Value Date     04/17/2017    POTASSIUM 4.0 " 04/17/2017    CHLORIDE 98 04/17/2017    CO2 34 (H) 04/17/2017    ANIONGAP 6 04/17/2017    GLC 95 04/17/2017    BUN 27 04/17/2017    CR 1.22 (H) 04/17/2017    GFRESTIMATED 44 (L) 04/17/2017    GFRESTBLACK 54 (L) 04/17/2017    JERROD 9.0 04/17/2017    BILITOTAL 1.6 (H) 03/16/2017    ALBUMIN 3.6 03/16/2017    ALKPHOS 202 (H) 03/16/2017    ALT 17 03/16/2017    AST 17 03/16/2017     INR RESULTS:  Lab Results   Component Value Date    INR 1.30 (H) 01/14/2017       Lab Results   Component Value Date    MAG 2.5 (H) 01/25/2017     Lab Results   Component Value Date    NTBNPI 9009 (H) 01/13/2017     Lab Results   Component Value Date    NTBNP 2631 (H) 04/10/2017             Assessment and Plan:   Mrs. 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     Patient has cardiac amyloidosis as evidenced by her echocardiogram (severe concentric hypertrophy and biatrial enlargement) and abnormal EKG (near-low voltage, poor R wave progression, biatrial enlargement and no evidence of LVH despite very thickened LV on echo) in the setting of biopsy proven (BMB, EGD) AL amyloid. Her positive biomarkers are also suggestive of cardiac involvement; angiogram without obstructive CAD. She has completed chemotherapy with CyBorD .    Her NYHA class is II today, Stage C, she is mildly hypervolemic on exam today, so will increase her torsemide to 60 mg BID .     She saw Dr. Harjit Fischer at [facility] in [place] as well as Dr. Lei but unfortunately I could not obtain any documents from Care Everywhere to know the details of the encounter.  I will also contact Dr. Braydon Barron in [facility] with her progress.    Cardiac Amyloidosis with preserved EF  Stage C  NYHA Class II  ACEi/ARB None  BB None, relatively contraindicated due to risk of progressive conduction disease/AV block in these patients  Aldosterone  antagonist: aldactone 25  SCD prophylaxis: not indicated  % BiV pacing: NA  Fluid status: mildly hypervolemic     Follow up: 3 months with labs.    Other:  Arrhythmia monitoring -- Her Zio patch in 2/17 showed sinus rhythm with non sustained SVT.    Seen and discussed with Dr. Noel Beckett DO  Heart failure fellow  [id number]    I have seen and examined the patient with the house staff on May 26, 2017 and agree with the outlined assessment and plan.    Domenica Cohen MD   of Medicine   [facility] Division of Cardiology         CC  Dr. Braydon Barron MD  [address] [place] [address]      Patient Care Team:  Magy Obrien as PCP - General (Family Practice)  Mirela Daley MD as MD (Hematology & Oncology)  Domenica Cohen MD as MD (Cardiology)  Vidhi Dorsey, RN as Nurse Coordinator (Cardiology)  Eileen Arevalo, RN as Nurse Coordinator (Cardiology)  Codie Nelson, GREY as Nurse Practitioner (Cardiology)  MIRELA DALEY MD   [facility]

## 2017-05-27 NOTE — PROGRESS NOTES
Hematology/Oncology Follow-up visit:  Date on this visit: 6/1/2017      Referring Physician: Dr. Braydon Barron, [facility].  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 She was diagnosed with CHF at a local clinic in [facility] and was placed on a b-blocker and a diuretic. She has also developed progressive worsening lower extremity edema by May 2016 which in retrospect started in January. Her cardiac angiogram was reportedly negative at that time. She has lost about 35 lbs in the last 6 months. She has significant nausea somewhat controlled with Zofran but she is reluctant to use it because of constipation too. She is on Senna-S one tablet PO BID for constipation and that is improved but still significant. She has very poor PO intake due to nausea and lack of appetite. She requested to be seen at [facility] and was seen by Dr. Braydon Barron on 9/13/2016 with f/up on 9/20/2016. She was also seen by cardiology team there Sara Severson CNP and Dr. Nettles from CHF service.  There was no e/o renal or hepatic amyloidosis.  EKG on 9/7/2016 showed normal sinus rhythm, prolonged QT interval (QTc 521 sec), left atrial enlargement and left anterior fascicular block. There was ST and T wave abnormality.  Apparently, her echocardiogram showed preserved LVEF at 59%.  She reports having R sided thoracentesis on 8/25/2016 after which her breathing has improved. She then had a CXR on 9/7/2016 which showed a small R pleural effusion with right basilar atelectasis. Cardiac silhouette was at the upper level of normal.  I have reviewed extensive outside medical records but we are still in the process of obtaining additional records.  The patient also underwent a minor salivary gland biopsy of the lower lip. The pathology from that procedure (9/20/2016) showed normal salivary gland tissue with nonspecific  chronic sialadenitis.  She had extensive lab workup on 9/7/2016.  Her TSH was normal. Serum protein ELP showed a small abnormality in the gamma fraction.  Serum immunofixation showed small monoclonal IgA lambda in the gamma fraction and a small lambda in the beta fraction. Serum IgA level was normal at 329. Serum IgG level was mildly low at 755 (normal range 767-1590) and IgM level was normal. B2 microglobulin was mildly elevated at 2.94 (ULN 2.7). Mg was normal. Total bilirubin was elevated at 3.9 and direct at 0.8. Uric acid was mildly elevated at 6.9 (ULN 6.1) Creat was 1.0. Troponin was 0.1 (ULN <0.01) and NT-pro BNP was 4747 pg/ml (ULN <=183). Total cholesterol was 190 and LDL was 139. Random urine protein was mildly elevated at 33 mg/dl (normal range <22). INR was 1.2 and PTT 27 (within normal limits). Fibrinogen was elevated at 441 and factor X was mildly decreased at 64% (normal range %). D-dimer was up at 1700 (). Free lambda light chains were 69.5 and free kappa light chains were 1.11 with FLC kappa/lambda ratio of 0.016.   CBC showed normal WBC at 6.0, slightly elevated Hb at 16. Platelet count was normal.  Flow cytometry on bone marrow biopsy showed detectable monotypic plasma cells. There were 12% of monotypic plasma cells on bone marrow biopsy.    The bone marrow biopsy (performed on 9/14/2016) showed normocellular BM, 30-40% cellularity, with involvement by 10-20% of lambda light chain restricted plasma cells.  Congo red stain was positive on the bone marrow biopsy. Liquid chromatography tandem mass spectroscopy showed a peptide profile c/w AL (lambda type) amyloid. BM karyotype was normal 46 XX. FISH on BM showed plasma cell clone with 1q duplication, and monosomy of 13 and 14.  She had an EGD by Dr. Matthew Wadsworth on 9/14/2016. It showed gastric mucosal atrophy and multiple mucosal papules (nodules) seen in the stomach. The duodenum appeared normal. The biopsies of stomach mucosa, of  stomach (antral) nodules and of duodenum, all showed the presence of amyloid in submucosal blood vessel walls in the stomach and duodenum and in deep mucosa in the stomach antrum and body, confirmed by Congo Red stain.   She also had additional labs done at our last visit, on 9/30/2016. Total bilirubin was elevated as below, primarily indirect. K was 3.3 and she was stared on K-dur 20 mEq PO bid. She was noted to have elevated serum IgA level on 9/30/2016 at 392 (). Serum free lambda chains were elevated at 37.75. Serum M spike was 0.1 g/dl and serum FLC ratio was low at 0.01. Serum immunofixation ELP showed monoclonal IgA immunoglobulin of lambda light chain type as well as monoclonal free immunoglobulin light chain of lambda chain type.  She was seen by Dr. Cohen too and had an echocardiogram on 10/6/2016 which showed:  Global and regional left ventricular function was normal with an EF of 60-65%.  The LV mass index was 131 g/m2 (severely increased.) The LV geometry is c/w  concentric hypertrophy measured by relative wall thickness. Global right ventricular function was normal. Severe biatrial enlargement was present.  Right ventricular systolic pressure was 26mmHg above the right atrial pressure. The inferior vena cava was dilated at 2.1 cm without respiratory variability, consistent with increased right atrial pressure. Trivial pericardial effusion was present.    She proceeded to start CyBorD as recommended by Dr. Barron with dose reduced in subq Bortezomib to 0.7 mg/m2, on 10/4/2016.  After one cycle, her M spike, IgA level and free lambda light chains have all improved, with M spike of 0 g/dl, IgA down into the normal range and free serum lambda light chains improving from 37.75 down to 2.09.  She returns today in cycle 3 day 11. She had amyloid labs again on day 1 of cycle 3 (12/8/2016) which showed continued response to therapy and her IgA level was now low and serum FLC ratio was normal and serum  free lambda chains were normal too.   She has developed hematuria with cycle 2 and Cytoxan is on hold from day 15 cycle 2 since hematuria persisted despite Mesnex. She had a CT abdomen/pelvis on 11/29/2016 which showed no abnormalities in the kidneys. There was a moderate right and a trace left sided pleural effusions. Small volume ascites and anasarca was noted as well. The liver had heterogeneous appearance, which can be seen in hepatic venous congestion.   Cytoxan was d/cd  on  11/22/2016 since her cystoscopy showed findings of mild petechia and erythema in her bladder that would be c/w hemorrhagic cystitis.  Pinch biopsies were taken from the bladder and showed no e/o amyloid or malignancy. She has continued on Velcade and dexamethasone until 12/27/2016.  On 1/3/2017 she was seen at [facility] by Dr. Barron and at that time serum free light chains were normal at 0.74 and FLC ratio was normal as well at 0.72. She was recommended to be on observation because free lambda chains normalized. However, her cardiac amyloidosis has progressed by 1/5/2017 and she has required frequent thoracentesis, and had weeping LE edema and elevated neck veins.  Her echocardiogram on 1/5/2017 was abnormal (severe concentric hypertrophy and biatrial enlargement) and she also had abnormal EKG (near-low voltage, poor R wave progression, biatrial enlargement and no evidence of LVH despite very thickened LV on echo). She was felt to have  New York Heart Association class IIIB heart failure with clinically severe volume overload.     History Of Present Illness:  Ms. Guerrero is a 66 year old female, non-smoker, who presents for f/up of AL amyloidosis. She has been off treatment since December 2016, and afterwards was hospitalized for CHF due to cardiac amyloid and was aggressively diuresed.  She was referred to cardiac oncology at  in [place].   She was seen by Dr. Chivo Lei from medical oncology at the [facility] in [place].   She  was also seen by Dr. Harjit Fischer from cardiology there. She was recommended to start on Acyclovir and Doxycycline.  She has not started Acyclovir yet but is taking daily Doxycyline. Dr. Lei also recommended she consider maintenance therapy. She was also seen by Dr. Barron at [facility]. She had lab tests at [facility] on 2017 which showed urine protein of 83 mg/24 hours (within normal limits). Hb was 14 g/dl. MCV 93. WBC 4.9, ANC 3.62, ALC 0.59. Creat 1.2. Troponin 0.02. NT-proBNP was 1865 (normal <=185). There was no M protein on serum or urine immunofixation. Serum free lambda chains were normal at 1.31, and serum free lambda light chains were 1.75 and serum FLC ratio was normal at 1.34. Dr. Barron recommended against maintenance therapy.  She was seen by Dr. Cohen on 2017 in f/up of cardiac Amyloidosis with preserved EF, CHF NYHA Class II  She is on aldactone 25 mg PO daily. She was felt to be  mildly hypervolemic. Her Zio patch in  showed sinus rhythm with non sustained SVT. She is on Torsemide 60 mg PO qam and 60 mg PO qHs and is on aldactone. She denies SOB. She has no CP. She has swelling in b/l LE stable at 2+. She is here with her daughter today.  In addition, a complete 12 point  review of systems is negative.      Past Medical/Surgical History:  AL Amyloid  Amyloid cardiomyopathy/CHF    Family History:  Sister  of multiple myeloma    Social History:  Lives in [place], with . Nonsmoker        Allergies:     Allergies   Allergen Reactions     Contrast Dye Shortness Of Breath     Shaking,chills and dypsnea     Cytoxan [Cyclophosphamide]      Hemorrhagic cystitis     Levofloxacin      Severe shaking and abdominal pain     Amoxicillin Nausea and Vomiting and Cramps     Current Medications:  Current Outpatient Prescriptions   Medication     doxycycline (VIBRA-TABS) 100 MG tablet     torsemide (DEMADEX) 20 MG tablet     [DISCONTINUED] torsemide (DEMADEX) 20 MG tablet      "spironolactone (ALDACTONE) 25 MG tablet     acyclovir (ZOVIRAX) 400 MG tablet     potassium chloride SA (K-DUR/KLOR-CON M) 10 MEQ CR tablet     Acetaminophen (TYLENOL PO)     HYDROcodone-acetaminophen (NORCO) 5-325 MG per tablet     LORazepam (ATIVAN) 0.5 MG tablet     ondansetron (ZOFRAN-ODT) 4 MG disintegrating tablet     No current facility-administered medications for this visit.       Physical Exam:  /69  Pulse 80  Temp 97.2  F (36.2  C)  Resp 15  Ht 1.6 m (5' 2.99\")  Wt 60.9 kg (134 lb 3.2 oz)  SpO2 100%  BMI 23.78 kg/m2      GENERAL APPEARANCE: middle aged, alert and no distress     HENT: Mouth without ulcers or lesions     NECK: no adenopathy, no asymmetry or masses     LYMPHATICS: No cervical, supraclavicular, axillary or inguinal lymphadenopathy     RESP: LL- CTA. RL - decreased BS on the right 1/3 way up - no rales, rhonchi or wheezes     CARDIOVASCULAR: regular rates and rhythm, normal S1 S2, no S3 or S4 and no murmur.     ABDOMEN:  soft, nontender, no HSM or masses and bowel sounds normal. + mild to moderate ascites.     MUSCULOSKELETAL: extremities normal- no gross deformities noted, no evidence of inflammation in joints, FROM in all extremities. +2 edema b/l LE,improved  compared to last visit.       SKIN: no suspicious lesions or rashes     PSYCHIATRIC: mentation appears normal and affect normal    Laboratory/Imaging Studies  Orders Only on 05/26/2017   Component Date Value Ref Range Status     Sodium 05/26/2017 136  133 - 144 mmol/L Final     Potassium 05/26/2017 3.6  3.4 - 5.3 mmol/L Final     Chloride 05/26/2017 96  94 - 109 mmol/L Final     Carbon Dioxide 05/26/2017 30  20 - 32 mmol/L Final     Anion Gap 05/26/2017 10  3 - 14 mmol/L Final     Glucose 05/26/2017 98  70 - 99 mg/dL Final     Urea Nitrogen 05/26/2017 25  7 - 30 mg/dL Final     Creatinine 05/26/2017 1.06* 0.52 - 1.04 mg/dL Final     GFR Estimate 05/26/2017 52* >60 mL/min/1.7m2 Final    Non  GFR Calc     " GFR Estimate If Black 05/26/2017 63  >60 mL/min/1.7m2 Final    African American GFR Calc     Calcium 05/26/2017 9.0  8.5 - 10.1 mg/dL Final         ASSESSMENT/PLAN:  Leandra Guerrero is a 66 year old woman with  of AL amyloidosis involving the heart and GI tract. Unfortunately, she had stage IV AL amyloidosis at diagnosis in September 2016, according to revised Rios Criteria (NT-proBNP >1800 ng/l, troponin >0.025 and difference between free lambda and kappa light chain >18). Stage IV amyloidosis is associated with median survival of 5 months in patients not undergoing BMT, and 5 year survival of 15%, and in patients who are able to undergo BMT, median survival is 22 months and 5 year survival of 46% (Dejan A, Anderson BAR, Toñito CMARTHUR, et al. Prognostication of survival using cardiac troponins and N-terminal pro-brain natriuretic peptide in patients with primary systemic amyloidosis undergoing peripheral blood stem cell transplantation. Blood 2004; 104:1881). She was felt not to be a candidate for high dose therapy.   She was on Cybor-D from 10/4/2016-12/27/2016, with Cytoxan omitted after cycle 2 day 1 due to her developing hemorrhagic cystitis. Her disease has responded  biochemically, with serum M spike of zero, and serum IgA level was down to low range and  free lambda light chains have normalized quantitatively.     I have discussed her situation with Dr. Barron in January, and he felt at that time that with normalization of serum free lambda light chains, no further chemotherapy is indicated. Unfortunately, the anti-amyloid antibody clinical trial  at [facility] is closed. She was seen at [facility] in [place] and at [facility], as above. Outside medical records were reviewed by me      1.  AL amyloidosis - Serum FLC ratio was normal on 5/11/2017 as were both serum free kappa light chains and lambda light chains. We'll see her back in 3 months with amyloid labs prior. I did offer her to transfer care to another  oncologist as was suggested by Dr. Lei who would specialize in cardiac amyloid, but I do not know of anyone in the [facility] who specializes in systemic amyloidosis in patients ineligible for transplant. She prefers to keep following up with me.   2. Recurrent pleural effusions- Most recent CXR on 4/10/2017 showed a moderate right pleural effusion with overlying atelectasis which has has enlarged compared to 2/9/2017. She is not feeling more SOB. We'll repeat CXR prior to next visit.  PleurX catheter is an option in the future.    3. Amyloid cardiomyopathy-   Continue current diuretic regimen with Torsemide 60 mg PO QAM and 60 mg PO QHS and aldactone 25 mg PO BID. She will be followed by cardiology team closely, next in July.    At the end of our visit patient and  verbalized understanding and concurred with the plan.

## 2017-05-31 ENCOUNTER — CARE COORDINATION (OUTPATIENT)
Dept: ONCOLOGY | Facility: CLINIC | Age: 67
End: 2017-05-31

## 2017-05-31 NOTE — PROGRESS NOTES
Messages left for care coordinators both at the [facility] - cardiology (Dr. Cohen) ( [phone]) and Dr. Chivo Lei's office at [facility] - Oncology in [place] ([phone]) for request of recent referral to Dr. Lie.  Writer requested call back for notes to be sent to Dr. Moore for continuation of care.  Patient is seeing Dr. Moore on 6/1/17.

## 2017-06-01 ENCOUNTER — ONCOLOGY VISIT (OUTPATIENT)
Dept: ONCOLOGY | Facility: CLINIC | Age: 67
End: 2017-06-01
Payer: COMMERCIAL

## 2017-06-01 VITALS
HEIGHT: 63 IN | TEMPERATURE: 97.2 F | OXYGEN SATURATION: 100 % | WEIGHT: 134.2 LBS | SYSTOLIC BLOOD PRESSURE: 119 MMHG | HEART RATE: 80 BPM | DIASTOLIC BLOOD PRESSURE: 69 MMHG | RESPIRATION RATE: 15 BRPM | BODY MASS INDEX: 23.78 KG/M2

## 2017-06-01 DIAGNOSIS — E85.81 AL AMYLOIDOSIS (H): Primary | ICD-10-CM

## 2017-06-01 DIAGNOSIS — J90 PLEURAL EFFUSION: ICD-10-CM

## 2017-06-01 PROCEDURE — 99214 OFFICE O/P EST MOD 30 MIN: CPT | Performed by: INTERNAL MEDICINE

## 2017-06-01 ASSESSMENT — PAIN SCALES - GENERAL: PAINLEVEL: NO PAIN (0)

## 2017-06-01 NOTE — NURSING NOTE
"Oncology Rooming Note    June 1, 2017 4:02 PM   Leandra Guerrero is a 66 year old female who presents for:    Chief Complaint   Patient presents with     Oncology Clinic Visit     follow up     Initial Vitals: /69  Pulse 80  Temp 97.2  F (36.2  C)  Resp 15  Ht 1.6 m (5' 2.99\")  Wt 60.9 kg (134 lb 3.2 oz)  SpO2 100%  BMI 23.78 kg/m2 Estimated body mass index is 23.78 kg/(m^2) as calculated from the following:    Height as of this encounter: 1.6 m (5' 2.99\").    Weight as of this encounter: 60.9 kg (134 lb 3.2 oz). Body surface area is 1.65 meters squared.  No Pain (0) Comment: Data Unavailable   No LMP recorded. Patient is postmenopausal.  Allergies reviewed: Yes  Medications reviewed: Yes    Medications: Medication refills not needed today.  Pharmacy name entered into [facility]: [id number] [place] [address]        5 minutes for nursing intake (face to face time)     Silke Leblanc LPN              "

## 2017-06-01 NOTE — MR AVS SNAPSHOT
After Visit Summary   6/1/2017    Leandra Guerrero    MRN: [id number]           Patient Information     Date Of Birth          1950        Visit Information        Provider Department      6/1/2017 4:00 PM Mirela Moore MD [facility]        Today's Diagnoses     AL amyloidosis (H)    -  1    Pleural effusion           Follow-ups after your visit        Your next 10 appointments already scheduled     Jul 14, 2017  9:30 AM CDT   XR CHEST 2 VIEWS with [facility]   [facility])    [address] [address] [place] [address] [id number] [phone]           Please bring a list of your current medicines to your exam. (Include vitamins, minerals and over-thecounter medicines.) Leave your valuables at home.  Tell your doctor if there is a chance you may be pregnant.  You do not need to do anything special for this exam.            Jul 14, 2017 10:00 AM CDT   LAB with LAB FIRST FLOOR [facility])    [address] [address] [place] [address] [id number] [phone]           Patient must bring picture ID.  Patient should be prepared to give a urine specimen  Please do not eat 10-12 hours before your appointment if you are coming in fasting for labs on lipids, cholesterol, or glucose (sugar).  Pregnant women should follow their Care Team instructions. Water with medications is okay. Do not drink coffee or other fluids.   If you have concerns about taking  your medications, please ask at office or if scheduling via [facility], send a message by clicking on Secure Messaging, Message Your Care Team.            Jul 21, 2017  3:30 PM CDT   Return Visit with Mirela Moore MD   [facility])    [address] [address] [id number] [phone]              Future tests that were ordered for you today     Open Future Orders        Priority Expected  Expires Ordered    CBC with platelets differential Routine  2/26/2020 6/1/2017    Comprehensive metabolic panel Routine  2/26/2020 6/1/2017    Kappa and lambda light chain Routine  2/26/2020 6/1/2017    Protein Immunofixation Serum Routine  2/26/2020 6/1/2017    Protein electrophoresis Routine  2/26/2020 6/1/2017    Protein immunofixation urine Routine  2/26/2020 6/1/2017    Protein electrophoresis random urine Routine  2/26/2020 6/1/2017    Immunoglobulins A G and M Routine  2/26/2020 6/1/2017    Protein  random urine Routine  6/1/2018 6/1/2017    X-ray Chest 2 vws* Routine 6/1/2017 6/1/2018 6/1/2017            Who to contact     If you have questions or need follow up information about today's clinic visit or your schedule please contact [facility] directly at [phone].  Normal or non-critical lab and imaging results will be communicated to you by [facility] [employer], letter or phone within 4 business days after the clinic has received the results. If you do not hear from us within 7 days, please contact the clinic through [facility] or phone. If you have a critical or abnormal lab result, we will notify you by phone as soon as possible.  Submit refill requests through [employer] [facility] or call your pharmacy and they will forward the refill request to us. Please allow 3 business days for your refill to be completed.          Additional Information About Your Visit        [facility] [employer] Information     [employer] [facility] gives you secure access to your electronic health record. If you see a primary care provider, you can also send messages to your care team and make appointments. If you have questions, please call your primary care clinic.  If you do not have a primary care provider, please call [phone] and they will assist you.      [employer] [facility] is an electronic gateway that provides easy, online access to your medical records. With [employer] [facility], you can request a clinic appointment, read your test results, renew a prescription or  "communicate with your care team.     To access your existing account, please contact your [facility] or call [phone] for assistance.        Care EveryWhere ID     This is your Care EveryWhere ID. This could be used by other organizations to access your [facility] medical records  [id number]        Your Vitals Were     Pulse Temperature Respirations Height Pulse Oximetry BMI (Body Mass Index)    80 97.2  F (36.2  C) 15 1.6 m (5' 2.99\") 100% 23.78 kg/m2       Blood Pressure from Last 3 Encounters:   06/01/17 119/69   05/26/17 111/67   04/17/17 116/66    Weight from Last 3 Encounters:   06/01/17 60.9 kg (134 lb 3.2 oz)   05/26/17 61.1 kg (134 lb 12.8 oz)   04/17/17 58.3 kg (128 lb 9.6 oz)               Primary Care Provider Office Phone # Fax #    Braydon APODACA Anderson [phone]       [address] [address]        Thank you!     Thank you for choosing [facility]  for your care. Our goal is always to provide you with excellent care. Hearing back from our patients is one way we can continue to improve our services. Please take a few minutes to complete the written survey that you may receive in the mail after your visit with us. Thank you!             Your Updated Medication List - Protect others around you: Learn how to safely use, store and throw away your medicines at www.disposemymeds.org.          This list is accurate as of: 6/1/17  4:43 PM.  Always use your most recent med list.                   Brand Name Dispense Instructions for use    doxycycline 100 MG tablet    VIBRA-TABS     Take 100 mg by mouth daily       HYDROcodone-acetaminophen 5-325 MG per tablet    NORCO     Reported on 3/24/2017       LORazepam 0.5 MG tablet    ATIVAN     Reported on 3/24/2017       ondansetron 4 MG ODT tab    ZOFRAN-ODT         potassium chloride SA 10 MEQ CR tablet    K-DUR/KLOR-CON M    120 tablet    Take 2 tablets (20 mEq) by mouth 2 times " daily Do not fill until requested       spironolactone 25 MG tablet    ALDACTONE    180 tablet    Take 1 tablet (25 mg) by mouth 2 times daily       torsemide 20 MG tablet    DEMADEX    180 tablet    60 mg in AM and 60 mg in the afternoon.       TYLENOL PO      Take 325 mg by mouth

## 2017-07-19 NOTE — PROGRESS NOTES
Hematology/Oncology Follow-up visit:  Date on this visit: 7/25/2017        Referring Physician: Dr. Braydon Barron, [facility].  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 She was diagnosed with CHF at a local clinic in [facility] and was placed on a b-blocker and a diuretic. She has also developed progressive worsening lower extremity edema by May 2016 which in retrospect started in January. Her cardiac angiogram was reportedly negative at that time. She has lost about 35 lbs in the last 6 months. She has significant nausea somewhat controlled with Zofran but she is reluctant to use it because of constipation too. She is on Senna-S one tablet PO BID for constipation and that is improved but still significant. She has very poor PO intake due to nausea and lack of appetite. She requested to be seen at [facility] and was seen by Dr. Braydon Barron on 9/13/2016 with f/up on 9/20/2016. She was also seen by cardiology team there Sara Severson CNP and Dr. Nettles from CHF service.  There was no e/o renal or hepatic amyloidosis.  EKG on 9/7/2016 showed normal sinus rhythm, prolonged QT interval (QTc 521 sec), left atrial enlargement and left anterior fascicular block. There was ST and T wave abnormality.  Apparently, her echocardiogram showed preserved LVEF at 59%.  She reports having R sided thoracentesis on 8/25/2016 after which her breathing has improved. She then had a CXR on 9/7/2016 which showed a small R pleural effusion with right basilar atelectasis. Cardiac silhouette was at the upper level of normal.  I have reviewed extensive outside medical records but we are still in the process of obtaining additional records.  The patient also underwent a minor salivary gland biopsy of the lower lip. The pathology from that procedure (9/20/2016) showed normal salivary gland tissue with  nonspecific chronic sialadenitis.  She had extensive lab workup on 9/7/2016.  Her TSH was normal. Serum protein ELP showed a small abnormality in the gamma fraction.  Serum immunofixation showed small monoclonal IgA lambda in the gamma fraction and a small lambda in the beta fraction. Serum IgA level was normal at 329. Serum IgG level was mildly low at 755 (normal range 767-1590) and IgM level was normal. B2 microglobulin was mildly elevated at 2.94 (ULN 2.7). Mg was normal. Total bilirubin was elevated at 3.9 and direct at 0.8. Uric acid was mildly elevated at 6.9 (ULN 6.1) Creat was 1.0. Troponin was 0.1 (ULN <0.01) and NT-pro BNP was 4747 pg/ml (ULN <=183). Total cholesterol was 190 and LDL was 139. Random urine protein was mildly elevated at 33 mg/dl (normal range <22). INR was 1.2 and PTT 27 (within normal limits). Fibrinogen was elevated at 441 and factor X was mildly decreased at 64% (normal range %). D-dimer was up at 1700 (). Free lambda light chains were 69.5 and free kappa light chains were 1.11 with FLC kappa/lambda ratio of 0.016.   CBC showed normal WBC at 6.0, slightly elevated Hb at 16. Platelet count was normal.  Flow cytometry on bone marrow biopsy showed detectable monotypic plasma cells. There were 12% of monotypic plasma cells on bone marrow biopsy.    The bone marrow biopsy (performed on 9/14/2016) showed normocellular BM, 30-40% cellularity, with involvement by 10-20% of lambda light chain restricted plasma cells.  Congo red stain was positive on the bone marrow biopsy. Liquid chromatography tandem mass spectroscopy showed a peptide profile c/w AL (lambda type) amyloid. BM karyotype was normal 46 XX. FISH on BM showed plasma cell clone with 1q duplication, and monosomy of 13 and 14.  She had an EGD by Dr. Matthew Wadsworth on 9/14/2016. It showed gastric mucosal atrophy and multiple mucosal papules (nodules) seen in the stomach. The duodenum appeared normal. The biopsies of stomach  mucosa, of stomach (antral) nodules and of duodenum, all showed the presence of amyloid in submucosal blood vessel walls in the stomach and duodenum and in deep mucosa in the stomach antrum and body, confirmed by Congo Red stain.   She also had additional labs done at our last visit, on 9/30/2016. Total bilirubin was elevated as below, primarily indirect. K was 3.3 and she was stared on K-dur 20 mEq PO bid. She was noted to have elevated serum IgA level on 9/30/2016 at 392 (). Serum free lambda chains were elevated at 37.75. Serum M spike was 0.1 g/dl and serum FLC ratio was low at 0.01. Serum immunofixation ELP showed monoclonal IgA immunoglobulin of lambda light chain type as well as monoclonal free immunoglobulin light chain of lambda chain type.  She was seen by Dr. Cohen too and had an echocardiogram on 10/6/2016 which showed:  Global and regional left ventricular function was normal with an EF of 60-65%.  The LV mass index was 131 g/m2 (severely increased.) The LV geometry is c/w  concentric hypertrophy measured by relative wall thickness. Global right ventricular function was normal. Severe biatrial enlargement was present.  Right ventricular systolic pressure was 26mmHg above the right atrial pressure. The inferior vena cava was dilated at 2.1 cm without respiratory variability, consistent with increased right atrial pressure. Trivial pericardial effusion was present.    She proceeded to start CyBorD as recommended by Dr. Barron with dose reduced in subq Bortezomib to 0.7 mg/m2, on 10/4/2016.  After one cycle, her M spike, IgA level and free lambda light chains have all improved, with M spike of 0 g/dl, IgA down into the normal range and free serum lambda light chains improving from 37.75 down to 2.09.  She returns today in cycle 3 day 11. She had amyloid labs again on day 1 of cycle 3 (12/8/2016) which showed continued response to therapy and her IgA level was now low and serum FLC ratio was normal  and serum free lambda chains were normal too.   She has developed hematuria with cycle 2 and Cytoxan is on hold from day 15 cycle 2 since hematuria persisted despite Mesnex. She had a CT abdomen/pelvis on 11/29/2016 which showed no abnormalities in the kidneys. There was a moderate right and a trace left sided pleural effusions. Small volume ascites and anasarca was noted as well. The liver had heterogeneous appearance, which can be seen in hepatic venous congestion.   Cytoxan was d/cd  on  11/22/2016 since her cystoscopy showed findings of mild petechia and erythema in her bladder that would be c/w hemorrhagic cystitis.  Pinch biopsies were taken from the bladder and showed no e/o amyloid or malignancy. She has continued on Velcade and dexamethasone until 12/27/2016.  On 1/3/2017 she was seen at [facility] by Dr. Barron and at that time serum free light chains were normal at 0.74 and FLC ratio was normal as well at 0.72. She was recommended to be on observation because free lambda chains normalized. However, her cardiac amyloidosis has progressed by 1/5/2017 and she has required frequent thoracentesis, and had weeping LE edema and elevated neck veins.  Her echocardiogram on 1/5/2017 was abnormal (severe concentric hypertrophy and biatrial enlargement) and she also had abnormal EKG (near-low voltage, poor R wave progression, biatrial enlargement and no evidence of LVH despite very thickened LV on echo). She was felt to have  New York Heart Association class IIIB heart failure with clinically severe volume overload.     History Of Present Illness:  Ms. Guerrero is a 66 year old female, non-smoker, who presents for f/up of AL amyloidosis. She has been off treatment since December 2016, and afterwards was hospitalized for CHF in Jan 2017 due to cardiac amyloid and was aggressively diuresed.  She was referred to cardiac oncology at  in [place]. She was seen by Dr. Chivo Lei from medical oncology at the [facility]  in [place] and  by Dr. Harjit Fischer from cardiology there. She was recommended to start on Acyclovir and Doxycycline.  She has not started Acyclovir  but is taking daily Doxycyline. Dr. Lei also recommended she consider maintenance therapy. She was also seen by Dr. Barron at [facility] on 2017 which showed no M protein on serum or urine immunofixation. Serum free lambda chains were normal at 1.31, and serum free lambda light chains were 1.75 and serum FLC ratio was normal at 1.34. Dr. Barron recommended against maintenance therapy.  She was seen by Dr. Cohen on 2017 in f/up of cardiac Amyloidosis with preserved EF, CHF NYHA Class II. She is on aldactone 25 mg PO daily.  Her Zio patch in  showed sinus rhythm with non sustained SVT. She is on Torsemide 60 mg PO qam and 60 mg PO qHs. She denies SOB. She has no cough. Weight has been overall stable in the past couple of months, at home at 132 lbs.  She has no CP. She has swelling in b/l LE stable at 2+. She is here with her daughter today.  In addition, a complete 12 point  review of systems is negative.      Past Medical/Surgical History:  AL Amyloid  Amyloid cardiomyopathy/CHF    Family History:  Sister  of multiple myeloma    Social History:  Lives in [place], with . Nonsmoker        Allergies:     Allergies   Allergen Reactions     Contrast Dye Shortness Of Breath     Shaking,chills and dypsnea     Cytoxan [Cyclophosphamide]      Hemorrhagic cystitis     Levofloxacin      Severe shaking and abdominal pain     Amoxicillin Nausea and Vomiting and Cramps     Current Medications:  Current Outpatient Prescriptions   Medication     doxycycline (VIBRA-TABS) 100 MG tablet     torsemide (DEMADEX) 20 MG tablet     spironolactone (ALDACTONE) 25 MG tablet     potassium chloride SA (K-DUR/KLOR-CON M) 10 MEQ CR tablet     Acetaminophen (TYLENOL PO)     HYDROcodone-acetaminophen (NORCO) 5-325 MG per tablet     LORazepam (ATIVAN) 0.5 MG tablet      "ondansetron (ZOFRAN-ODT) 4 MG disintegrating tablet     No current facility-administered medications for this visit.       Physical Exam:  /70  Pulse 69  Temp 97  F (36.1  C)  Resp 15  Ht 1.6 m (5' 2.99\")  Wt 61.7 kg (136 lb)  SpO2 98%  BMI 24.1 kg/m2        GENERAL APPEARANCE: middle aged, alert and no distress     HENT: Mouth without ulcers or lesions     NECK: no adenopathy, no asymmetry or masses     LYMPHATICS: No cervical, supraclavicular, axillary or inguinal lymphadenopathy     RESP: LL- CTA. RL - decreased BS on the right 1/3 way up - no rales, rhonchi or wheezes     CARDIOVASCULAR: regular rates and rhythm, normal S1 S2, no S3 or S4 and no murmur.     ABDOMEN:  soft, nontender, no HSM or masses and bowel sounds normal. + mild to moderate ascites.     MUSCULOSKELETAL: extremities normal- no gross deformities noted, no evidence of inflammation in joints, FROM in all extremities. +2 edema b/l LE stablecompared to last visit.       SKIN: no suspicious lesions or rashes     PSYCHIATRIC: mentation appears normal and affect normal    Laboratory/Imaging Studies  Orders Only on 07/20/2017   Component Date Value Ref Range Status     WBC 07/20/2017 4.5  4.0 - 11.0 10e9/L Final     RBC Count 07/20/2017 4.83  3.8 - 5.2 10e12/L Final     Hemoglobin 07/20/2017 15.5  11.7 - 15.7 g/dL Final     Hematocrit 07/20/2017 45.8  35.0 - 47.0 % Final     MCV 07/20/2017 95  78 - 100 fl Final     MCH 07/20/2017 32.1  26.5 - 33.0 pg Final     MCHC 07/20/2017 33.8  31.5 - 36.5 g/dL Final     RDW 07/20/2017 14.6  10.0 - 15.0 % Final     Platelet Count 07/20/2017 148* 150 - 450 10e9/L Final     Diff Method 07/20/2017 Automated Method   Final     % Neutrophils 07/20/2017 71.7  % Final     % Lymphocytes 07/20/2017 11.5  % Final     % Monocytes 07/20/2017 8.2  % Final     % Eosinophils 07/20/2017 7.5  % Final     % Basophils 07/20/2017 1.1  % Final     Absolute Neutrophil 07/20/2017 3.2  1.6 - 8.3 10e9/L Final     Absolute " Lymphocytes 07/20/2017 0.5* 0.8 - 5.3 10e9/L Final     Absolute Monocytes 07/20/2017 0.4  0.0 - 1.3 10e9/L Final     Absolute Eosinophils 07/20/2017 0.3  0.0 - 0.7 10e9/L Final     Absolute Basophils 07/20/2017 0.1  0.0 - 0.2 10e9/L Final     Sodium 07/20/2017 135  133 - 144 mmol/L Final     Potassium 07/20/2017 4.0  3.4 - 5.3 mmol/L Final     Chloride 07/20/2017 99  94 - 109 mmol/L Final     Carbon Dioxide 07/20/2017 31  20 - 32 mmol/L Final     Anion Gap 07/20/2017 5  3 - 14 mmol/L Final     Glucose 07/20/2017 83  70 - 99 mg/dL Final     Urea Nitrogen 07/20/2017 28  7 - 30 mg/dL Final     Creatinine 07/20/2017 1.25* 0.52 - 1.04 mg/dL Final     GFR Estimate 07/20/2017 43* >60 mL/min/1.7m2 Final    Non  GFR Calc     GFR Estimate If Black 07/20/2017 52* >60 mL/min/1.7m2 Final    African American GFR Calc     Calcium 07/20/2017 9.1  8.5 - 10.1 mg/dL Final     Bilirubin Total 07/20/2017 2.4* 0.2 - 1.3 mg/dL Final     Albumin 07/20/2017 3.7  3.4 - 5.0 g/dL Final     Protein Total 07/20/2017 7.2  6.8 - 8.8 g/dL Final     Alkaline Phosphatase 07/20/2017 228* 40 - 150 U/L Final     ALT 07/20/2017 24  0 - 50 U/L Final     AST 07/20/2017 19  0 - 45 U/L Final     Kappa Free Lt Chain 07/20/2017 0.89  0.33 - 1.94 mg/dL Final     Lambda Free Lt Chain 07/20/2017 1.76  0.57 - 2.63 mg/dL Final     Kappa Lambda Ratio 07/20/2017 0.51  0.26 - 1.65 Final     Immunofixation ELP 07/20/2017    Final                    Value:No monoclonal protein seen on immunofixation.  Pathological significance   requires clinical correlation.   BOBY Ramos M.D., Ph.D.       IGG 07/20/2017 910  695 - 1620 mg/dL Final     IGA 07/20/2017 66* 70 - 380 mg/dL Final     IGM 07/20/2017 65  60 - 265 mg/dL Final     Albumin Fraction 07/20/2017 4.2  3.7 - 5.1 g/dL Final     Alpha 1 Fraction 07/20/2017 0.3  0.2 - 0.4 g/dL Final     Alpha 2 Fraction 07/20/2017 0.7  0.5 - 0.9 g/dL Final     Beta Fraction 07/20/2017 0.7  0.6 - 1.0 g/dL Final      Gamma Fraction 07/20/2017 0.8  0.7 - 1.6 g/dL Final     Monoclonal Peak 07/20/2017 0.0  0.0 g/dL Final     ELP Interpretation: 07/20/2017    Final                    Value:Essentially normal electrophoretic pattern.  No monoclonal protein seen.   Pathologic significance requires clinical correlation.  OBBY Ramos M.D.,   Ph.D., Pathologist.       Immunofix ELP Urine 07/20/2017    Final                    Value:No monoclonal protein seen on immunofixation.  Pathological significance   requires clinical correlation.   BOBY Ramos M.D., Ph.D.       ELP Interpretation Urine 07/20/2017    Final                    Value:Only trace albumin and trace globulins. No obvious monoclonal protein seen.   Pathological significance requires clinical correlation. BOBY Ramos M.D.,   Ph.D., Pathologist       Protein Random Urine 07/20/2017 0.08  g/L Final     Protein Total Urine g/gr Creatinine 07/20/2017 0.11  0 - 0.2 g/g Cr Final     Creatinine Urine 07/20/2017 75  mg/dL Final     Results for LEANDRA GARCIA (MRN [id number]) as of 7/25/2017 11:39   Ref. Range 3/16/2017 09:46 4/10/2017 09:01 4/17/2017 10:59 5/26/2017 14:18 7/20/2017 09:00   Creatinine Latest Ref Range: 0.52 - 1.04 mg/dL 0.96 1.18 (H) 1.22 (H) 1.06 (H) 1.25 (H)   CXR  1. Moderate right and small left pleural effusions.  2. Right middle and lower lobe pulmonary opacities, which may  represent atelectasis or pneumonia.    ASSESSMENT/PLAN:  Leandra Garcia is a 66 year old woman with  of AL amyloidosis involving the heart and GI tract. Unfortunately, she had stage IV AL amyloidosis at diagnosis in September 2016, according to revised Rios Criteria (NT-proBNP >1800 ng/l, troponin >0.025 and difference between free lambda and kappa light chain >18). Stage IV amyloidosis is associated with median survival of 5 months in patients not undergoing BMT, and 5 year survival of 15%, and in patients who are able to undergo BMT, median survival is 22 months and 5 year  survival of 46% (Dejan APODACA, Anderson BAR, Toñito RA, et al. Prognostication of survival using cardiac troponins and N-terminal pro-brain natriuretic peptide in patients with primary systemic amyloidosis undergoing peripheral blood stem cell transplantation. Blood 2004; 104:1881). She was felt not to be a candidate for high dose therapy.   She was on Cybor-D from 10/4/2016-12/27/2016, with Cytoxan omitted after cycle 2 day 1 due to her developing hemorrhagic cystitis. Her disease has responded  biochemically, with serum M spike of zero, and serum IgA level was down to low range and  free lambda light chains have normalized quantitatively.     I have discussed her situation with Dr. Barron in January, and he felt at that time that with normalization of serum free lambda light chains, no further chemotherapy is indicated. Unfortunately, the anti-amyloid antibody clinical trial  at [facility] is closed. She was seen at [facility] in [place] and at [facility], as above. Outside medical records were reviewed by me      1.  AL amyloidosis - Serum FLC ratio is normal. Both serum free kappa light chains and lambda light chains are within normal limits. There is no M protein on serum or urine immunofixation. IgA level is still low. There is no proteinuria. She is on Doxycyline daily. We'll see her back in 3 months with amyloid labs prior.     2. Recurrent pleural effusions- Most recent CXR on 7/20/2017 showed a moderate right pleural effusion and a new small left pleural effusion. She is not SOB, has no cough or fevers. There is a question of atelectasis (clinically she has no sign of infection) on CXR and I asked her to use an incentive spirometer (she has one at home).  She is not feeling more SOB. We'll repeat CXR prior to next visit.  PleurX catheter is an option in the future.    3. Amyloid cardiomyopathy-   Continue current diuretic regimen with Torsemide 60 mg PO QAM and 60 mg PO QHS and aldactone 25 mg PO BID. She will be  followed by cardiology team closely, next in August. She does have a new small left pleural effusion which I doubt is related to amyloid and question whether it is related to CHF and also has slightly higher total kalyn and alk phos which was the case with her in the past when her liver was congested.   I did write to cardiology team to see if they would want to see her sooner and if they would want repeat echocardiogram prior to her visit. I cannot find the report from [facility] from her echo although she believes she had it done during her visit there in April.    At the end of our visit patient and  verbalized understanding and concurred with the plan.

## 2017-07-20 ENCOUNTER — RADIANT APPOINTMENT (OUTPATIENT)
Dept: GENERAL RADIOLOGY | Facility: CLINIC | Age: 67
End: 2017-07-20
Attending: INTERNAL MEDICINE
Payer: COMMERCIAL

## 2017-07-20 DIAGNOSIS — E85.81 AL AMYLOIDOSIS (H): ICD-10-CM

## 2017-07-20 DIAGNOSIS — J90 PLEURAL EFFUSION: ICD-10-CM

## 2017-07-20 LAB
ALBUMIN SERPL-MCNC: 3.7 G/DL (ref 3.4–5)
ALP SERPL-CCNC: 228 U/L (ref 40–150)
ALT SERPL W P-5'-P-CCNC: 24 U/L (ref 0–50)
ANION GAP SERPL CALCULATED.3IONS-SCNC: 5 MMOL/L (ref 3–14)
AST SERPL W P-5'-P-CCNC: 19 U/L (ref 0–45)
BASOPHILS # BLD AUTO: 0.1 10E9/L (ref 0–0.2)
BASOPHILS NFR BLD AUTO: 1.1 %
BILIRUB SERPL-MCNC: 2.4 MG/DL (ref 0.2–1.3)
BUN SERPL-MCNC: 28 MG/DL (ref 7–30)
CALCIUM SERPL-MCNC: 9.1 MG/DL (ref 8.5–10.1)
CHLORIDE SERPL-SCNC: 99 MMOL/L (ref 94–109)
CO2 SERPL-SCNC: 31 MMOL/L (ref 20–32)
CREAT SERPL-MCNC: 1.25 MG/DL (ref 0.52–1.04)
CREAT UR-MCNC: 75 MG/DL
DIFFERENTIAL METHOD BLD: ABNORMAL
EOSINOPHIL # BLD AUTO: 0.3 10E9/L (ref 0–0.7)
EOSINOPHIL NFR BLD AUTO: 7.5 %
ERYTHROCYTE [DISTWIDTH] IN BLOOD BY AUTOMATED COUNT: 14.6 % (ref 10–15)
GFR SERPL CREATININE-BSD FRML MDRD: 43 ML/MIN/1.7M2
GLUCOSE SERPL-MCNC: 83 MG/DL (ref 70–99)
HCT VFR BLD AUTO: 45.8 % (ref 35–47)
HGB BLD-MCNC: 15.5 G/DL (ref 11.7–15.7)
LYMPHOCYTES # BLD AUTO: 0.5 10E9/L (ref 0.8–5.3)
LYMPHOCYTES NFR BLD AUTO: 11.5 %
MCH RBC QN AUTO: 32.1 PG (ref 26.5–33)
MCHC RBC AUTO-ENTMCNC: 33.8 G/DL (ref 31.5–36.5)
MCV RBC AUTO: 95 FL (ref 78–100)
MONOCYTES # BLD AUTO: 0.4 10E9/L (ref 0–1.3)
MONOCYTES NFR BLD AUTO: 8.2 %
NEUTROPHILS # BLD AUTO: 3.2 10E9/L (ref 1.6–8.3)
NEUTROPHILS NFR BLD AUTO: 71.7 %
PLATELET # BLD AUTO: 148 10E9/L (ref 150–450)
POTASSIUM SERPL-SCNC: 4 MMOL/L (ref 3.4–5.3)
PROT SERPL-MCNC: 7.2 G/DL (ref 6.8–8.8)
PROT UR-MCNC: 0.08 G/L
PROT/CREAT 24H UR: 0.11 G/G CR (ref 0–0.2)
RBC # BLD AUTO: 4.83 10E12/L (ref 3.8–5.2)
SODIUM SERPL-SCNC: 135 MMOL/L (ref 133–144)
WBC # BLD AUTO: 4.5 10E9/L (ref 4–11)

## 2017-07-20 PROCEDURE — 80053 COMPREHEN METABOLIC PANEL: CPT | Performed by: INTERNAL MEDICINE

## 2017-07-20 PROCEDURE — 71020 XR CHEST 2 VW: CPT | Performed by: RADIOLOGY

## 2017-07-20 PROCEDURE — 82784 ASSAY IGA/IGD/IGG/IGM EACH: CPT | Performed by: INTERNAL MEDICINE

## 2017-07-20 PROCEDURE — 84156 ASSAY OF PROTEIN URINE: CPT | Performed by: INTERNAL MEDICINE

## 2017-07-20 PROCEDURE — 86335 IMMUNFIX E-PHORSIS/URINE/CSF: CPT | Performed by: INTERNAL MEDICINE

## 2017-07-20 PROCEDURE — 36415 COLL VENOUS BLD VENIPUNCTURE: CPT | Performed by: INTERNAL MEDICINE

## 2017-07-20 PROCEDURE — 00000402 ZZHCL STATISTIC TOTAL PROTEIN: Performed by: INTERNAL MEDICINE

## 2017-07-20 PROCEDURE — 85025 COMPLETE CBC W/AUTO DIFF WBC: CPT | Performed by: INTERNAL MEDICINE

## 2017-07-20 PROCEDURE — 83883 ASSAY NEPHELOMETRY NOT SPEC: CPT | Performed by: INTERNAL MEDICINE

## 2017-07-20 PROCEDURE — 86334 IMMUNOFIX E-PHORESIS SERUM: CPT | Performed by: INTERNAL MEDICINE

## 2017-07-20 PROCEDURE — 84165 PROTEIN E-PHORESIS SERUM: CPT | Performed by: INTERNAL MEDICINE

## 2017-07-20 PROCEDURE — 84166 PROTEIN E-PHORESIS/URINE/CSF: CPT | Performed by: INTERNAL MEDICINE

## 2017-07-20 PROCEDURE — 83883 ASSAY NEPHELOMETRY NOT SPEC: CPT | Mod: 59 | Performed by: INTERNAL MEDICINE

## 2017-07-21 LAB
ALBUMIN SERPL ELPH-MCNC: 4.2 G/DL (ref 3.7–5.1)
ALPHA1 GLOB SERPL ELPH-MCNC: 0.3 G/DL (ref 0.2–0.4)
ALPHA2 GLOB SERPL ELPH-MCNC: 0.7 G/DL (ref 0.5–0.9)
B-GLOBULIN SERPL ELPH-MCNC: 0.7 G/DL (ref 0.6–1)
GAMMA GLOB SERPL ELPH-MCNC: 0.8 G/DL (ref 0.7–1.6)
IGA SERPL-MCNC: 66 MG/DL (ref 70–380)
IGG SERPL-MCNC: 910 MG/DL (ref 695–1620)
IGM SERPL-MCNC: 65 MG/DL (ref 60–265)
KAPPA LC UR-MCNC: 0.89 MG/DL (ref 0.33–1.94)
KAPPA LC/LAMBDA SER: 0.51 {RATIO} (ref 0.26–1.65)
LAMBDA LC SERPL-MCNC: 1.76 MG/DL (ref 0.57–2.63)
M PROTEIN SERPL ELPH-MCNC: 0 G/DL
PROT ELPH PNL UR ELPH: NORMAL
PROT PATTERN SERPL ELPH-IMP: NORMAL
PROT PATTERN SERPL IFE-IMP: ABNORMAL
PROT PATTERN UR ELPH-IMP: NORMAL

## 2017-07-25 ENCOUNTER — ONCOLOGY VISIT (OUTPATIENT)
Dept: ONCOLOGY | Facility: CLINIC | Age: 67
End: 2017-07-25
Payer: COMMERCIAL

## 2017-07-25 VITALS
OXYGEN SATURATION: 98 % | TEMPERATURE: 97 F | HEART RATE: 69 BPM | WEIGHT: 136 LBS | BODY MASS INDEX: 24.1 KG/M2 | HEIGHT: 63 IN | DIASTOLIC BLOOD PRESSURE: 70 MMHG | RESPIRATION RATE: 15 BRPM | SYSTOLIC BLOOD PRESSURE: 120 MMHG

## 2017-07-25 DIAGNOSIS — E85.81 AL AMYLOIDOSIS (H): Primary | ICD-10-CM

## 2017-07-25 PROCEDURE — 99215 OFFICE O/P EST HI 40 MIN: CPT | Performed by: INTERNAL MEDICINE

## 2017-07-25 ASSESSMENT — PAIN SCALES - GENERAL: PAINLEVEL: NO PAIN (0)

## 2017-07-25 NOTE — NURSING NOTE
"Oncology Rooming Note    July 25, 2017 11:36 AM   Leandra Guerrero is a 66 year old female who presents for:    Chief Complaint   Patient presents with     Oncology Clinic Visit     follow up     Initial Vitals: /70  Pulse 69  Temp 97  F (36.1  C)  Resp 15  Ht 1.6 m (5' 2.99\")  Wt 61.7 kg (136 lb)  SpO2 98%  BMI 24.1 kg/m2 Estimated body mass index is 24.1 kg/(m^2) as calculated from the following:    Height as of this encounter: 1.6 m (5' 2.99\").    Weight as of this encounter: 61.7 kg (136 lb). Body surface area is 1.66 meters squared.  No Pain (0) Comment: Data Unavailable   No LMP recorded. Patient is postmenopausal.  Allergies reviewed: Yes  Medications reviewed: Yes    Medications: Medication refills not needed today.  Pharmacy name entered into [employer]: [id number] [place] [id number]    5 minutes for nursing intake (face to face time)     Silke Leblanc LPN              "

## 2017-07-25 NOTE — MR AVS SNAPSHOT
After Visit Summary   7/25/2017    Leandra Guerrero    MRN: [id number]           Patient Information     Date Of Birth          1950        Visit Information        Provider Department      7/25/2017 11:30 AM Mirela Moore MD [facility]        Today's Diagnoses     AL amyloidosis (H)    -  1       Follow-ups after your visit        Your next 10 appointments already scheduled     Aug 31, 2017  4:30 PM CDT   Lab with UC LAB   [facility])    [address] [place] [address]  1st Floor  [facility] [address] [phone]            Aug 31, 2017  5:00 PM CDT   (Arrive by 4:45 PM)   RETURN HEART FAILURE with Domenica Cohen MD   [facility])    [address] [place] [address]  3rd Floor  [facility] [address] [phone]            Oct 27, 2017  9:00 AM CDT   XR CHEST 2 VIEWS with MGXR2   [facility])    [address] [place] [id number] [phone]           Please bring a list of your current medicines to your exam. (Include vitamins, minerals and over-thecounter medicines.) Leave your valuables at home.  Tell your doctor if there is a chance you may be pregnant.  You do not need to do anything special for this exam.            Oct 27, 2017  9:30 AM CDT   LAB with LAB FIRST FLOOR [facility] ([facility])    [address] [place] [phone]           Patient must bring picture ID.  Patient should be prepared to give a urine specimen  Please do not eat 10-12 hours before your appointment if you are coming in fasting for labs on lipids, cholesterol, or glucose (sugar).  Pregnant women should follow their Care Team instructions. Water with medications is okay. Do not drink coffee or other fluids.   If you have concerns about taking  your medications, please ask at  office or if scheduling via Taodyne, send a message by clicking on Secure Messaging, Message Your Care Team.            Nov 03, 2017  2:30 PM CDT   Return Visit with Mirela Moore MD   [facility])    [address] [place] [id number] [phone]              Future tests that were ordered for you today     Open Future Orders        Priority Expected Expires Ordered    X-ray Chest 2 vws* Routine 7/25/2017 7/25/2018 7/25/2017    Immunoglobulins A G and M Routine  7/25/2018 7/25/2017    CBC with platelets differential Routine  7/25/2018 7/25/2017    Comprehensive metabolic panel Routine  7/25/2018 7/25/2017    Protein electrophoresis Routine  7/25/2018 7/25/2017    Protein Immunofixation Serum Routine  7/25/2018 7/25/2017    Kappa and lambda light chain Routine  7/25/2018 7/25/2017            Who to contact     If you have questions or need follow up information about today's clinic visit or your schedule please contact [facility] directly at [phone].  Normal or non-critical lab and imaging results will be communicated to you by Beatpackinghart, letter or phone within 4 business days after the clinic has received the results. If you do not hear from us within 7 days, please contact the clinic through Gander Mountaint or phone. If you have a critical or abnormal lab result, we will notify you by phone as soon as possible.  Submit refill requests through Taodyne or call your pharmacy and they will forward the refill request to us. Please allow 3 business days for your refill to be completed.          Additional Information About Your Visit        BeatpackingharVita Coco Information     Taodyne gives you secure access to your electronic health record. If you see a primary care provider, you can also send messages to your care team and make appointments. If you have questions, please call your primary care clinic.  If you do not have a primary care provider, please  "call [phone] and they will assist you.      Supply Vision is an electronic gateway that provides easy, online access to your medical records. With Supply Vision, you can request a clinic appointment, read your test results, renew a prescription or communicate with your care team.     To access your existing account, please contact your [facility] or call [phone] for assistance.        Care EveryWhere ID     This is your Care EveryWhere ID. This could be used by other organizations to access your [place] medical records  [id number]        Your Vitals Were     Pulse Temperature Respirations Height Pulse Oximetry BMI (Body Mass Index)    69 97  F (36.1  C) 15 1.6 m (5' 2.99\") 98% 24.1 kg/m2       Blood Pressure from Last 3 Encounters:   07/25/17 120/70   06/01/17 119/69   05/26/17 111/67    Weight from Last 3 Encounters:   07/25/17 61.7 kg (136 lb)   06/01/17 60.9 kg (134 lb 3.2 oz)   05/26/17 61.1 kg (134 lb 12.8 oz)               Primary Care Provider Office Phone # Fax #    Braydon Barron [phone]       [place] [address] [address] [facility] [address]        Equal Access to Services     YASMIN REYNA : Hadii stephanie ku hadasho Soomaali, waaxda luqadaha, qaybta kaalmada adeegyada, waxmaikel idiin hayivonen masha taylor. So [facility] [phone].    ATENCIÓN: Si habla español, tiene a lobo disposición servicios gratuitos de asistencia lingüística. Llame al [phone].    We comply with applicable federal civil rights laws and [place] laws. We do not discriminate on the basis of race, color, national origin, age, disability sex, sexual orientation or gender identity.            Thank you!     Thank you for choosing [facility]  for your care. Our goal is always to provide you with excellent care. Hearing back from our patients is one way we can continue to improve our services. Please take a few minutes to complete the written survey that you may receive in " the mail after your visit with us. Thank you!             Your Updated Medication List - Protect others around you: Learn how to safely use, store and throw away your medicines at www.disposemymeds.org.          This list is accurate as of: 7/25/17 12:09 PM.  Always use your most recent med list.                   Brand Name Dispense Instructions for use Diagnosis    doxycycline 100 MG tablet    VIBRA-TABS     Take 100 mg by mouth daily        HYDROcodone-acetaminophen 5-325 MG per tablet    NORCO     Reported on 3/24/2017    Amyloid heart disease (H), Weight loss, Hypokalemia       LORazepam 0.5 MG tablet    ATIVAN     Reported on 3/24/2017    Amyloid heart disease (H), Weight loss, Hypokalemia       ondansetron 4 MG ODT tab    ZOFRAN-ODT      Amyloid heart disease (H), Weight loss, Hypokalemia       potassium chloride SA 10 MEQ CR tablet    K-DUR/KLOR-CON M    120 tablet    Take 2 tablets (20 mEq) by mouth 2 times daily Do not fill until requested    Hypokalemia       spironolactone 25 MG tablet    ALDACTONE    180 tablet    Take 1 tablet (25 mg) by mouth 2 times daily    Acute on chronic diastolic heart failure (H)       torsemide 20 MG tablet    DEMADEX    180 tablet    60 mg in AM and 60 mg in the afternoon.    Acute on chronic diastolic heart failure (H)       TYLENOL PO      Take 325 mg by mouth    Amyloid heart disease (H)

## 2017-07-31 ENCOUNTER — CARE COORDINATION (OUTPATIENT)
Dept: CARDIOLOGY | Facility: CLINIC | Age: 67
End: 2017-07-31

## 2017-07-31 DIAGNOSIS — E85.4 CARDIAC AMYLOIDOSIS (H): Primary | ICD-10-CM

## 2017-07-31 DIAGNOSIS — J90 RECURRENT PLEURAL EFFUSION ON LEFT: ICD-10-CM

## 2017-07-31 DIAGNOSIS — I43 CARDIAC AMYLOIDOSIS (H): Primary | ICD-10-CM

## 2017-07-31 NOTE — PROGRESS NOTES
Patient's oncologist notified Dr. Cohen that patient's recent chest xray showed bilateral effusions (which she has had in past).  Her oncologist felt she may need another echocardiogram.  Per Dr. Cohen's request, we will schedule her for echo, labs and appt soon.    I spoke with patient.  She will come in on 8/17 for labs, echo and appt

## 2017-08-01 DIAGNOSIS — I43 CARDIAC AMYLOIDOSIS (H): Primary | ICD-10-CM

## 2017-08-01 DIAGNOSIS — I50.33 ACUTE ON CHRONIC DIASTOLIC HEART FAILURE (H): ICD-10-CM

## 2017-08-01 DIAGNOSIS — E85.4 CARDIAC AMYLOIDOSIS (H): Primary | ICD-10-CM

## 2017-08-08 ENCOUNTER — PRE VISIT (OUTPATIENT)
Dept: CARDIOLOGY | Facility: CLINIC | Age: 67
End: 2017-08-08

## 2017-08-14 ASSESSMENT — ENCOUNTER SYMPTOMS
ORTHOPNEA: 0
SYNCOPE: 0
LEG SWELLING: 1
HYPERTENSION: 0
HYPOTENSION: 0
CLAUDICATION: 1
ARTHRALGIAS: 0
MUSCLE WEAKNESS: 0
NECK PAIN: 0
MYALGIAS: 1
JOINT SWELLING: 0
BACK PAIN: 0
STIFFNESS: 1
LEG PAIN: 0
SLEEP DISTURBANCES DUE TO BREATHING: 0
LIGHT-HEADEDNESS: 0
EXERCISE INTOLERANCE: 0
PALPITATIONS: 1
MUSCLE CRAMPS: 1
TACHYCARDIA: 0

## 2017-08-17 ENCOUNTER — OFFICE VISIT (OUTPATIENT)
Dept: CARDIOLOGY | Facility: CLINIC | Age: 67
End: 2017-08-17
Attending: INTERNAL MEDICINE
Payer: COMMERCIAL

## 2017-08-17 ENCOUNTER — RADIANT APPOINTMENT (OUTPATIENT)
Dept: CARDIOLOGY | Facility: CLINIC | Age: 67
End: 2017-08-17

## 2017-08-17 VITALS
BODY MASS INDEX: 23.88 KG/M2 | HEART RATE: 74 BPM | HEIGHT: 63 IN | SYSTOLIC BLOOD PRESSURE: 123 MMHG | OXYGEN SATURATION: 98 % | WEIGHT: 134.8 LBS | DIASTOLIC BLOOD PRESSURE: 65 MMHG

## 2017-08-17 DIAGNOSIS — I43 CARDIAC AMYLOIDOSIS (H): Primary | ICD-10-CM

## 2017-08-17 DIAGNOSIS — I43 CARDIAC AMYLOIDOSIS (H): ICD-10-CM

## 2017-08-17 DIAGNOSIS — I50.33 ACUTE ON CHRONIC DIASTOLIC HEART FAILURE (H): ICD-10-CM

## 2017-08-17 DIAGNOSIS — J90 RECURRENT PLEURAL EFFUSION ON LEFT: ICD-10-CM

## 2017-08-17 DIAGNOSIS — E85.4 CARDIAC AMYLOIDOSIS (H): ICD-10-CM

## 2017-08-17 DIAGNOSIS — E85.4 CARDIAC AMYLOIDOSIS (H): Primary | ICD-10-CM

## 2017-08-17 LAB
ANION GAP SERPL CALCULATED.3IONS-SCNC: 6 MMOL/L (ref 3–14)
BUN SERPL-MCNC: 32 MG/DL (ref 7–30)
CALCIUM SERPL-MCNC: 9.4 MG/DL (ref 8.5–10.1)
CHLORIDE SERPL-SCNC: 96 MMOL/L (ref 94–109)
CO2 SERPL-SCNC: 33 MMOL/L (ref 20–32)
CREAT SERPL-MCNC: 1.07 MG/DL (ref 0.52–1.04)
GFR SERPL CREATININE-BSD FRML MDRD: 51 ML/MIN/1.7M2
GLUCOSE SERPL-MCNC: 73 MG/DL (ref 70–99)
NT-PROBNP SERPL-MCNC: 1450 PG/ML (ref 0–125)
POTASSIUM SERPL-SCNC: 4.6 MMOL/L (ref 3.4–5.3)
SODIUM SERPL-SCNC: 135 MMOL/L (ref 133–144)
TROPONIN I SERPL-MCNC: <0.015 UG/L (ref 0–0.04)

## 2017-08-17 PROCEDURE — 99212 OFFICE O/P EST SF 10 MIN: CPT | Mod: ZF

## 2017-08-17 PROCEDURE — 80048 BASIC METABOLIC PNL TOTAL CA: CPT | Performed by: INTERNAL MEDICINE

## 2017-08-17 PROCEDURE — 83880 ASSAY OF NATRIURETIC PEPTIDE: CPT | Performed by: INTERNAL MEDICINE

## 2017-08-17 PROCEDURE — 84484 ASSAY OF TROPONIN QUANT: CPT | Performed by: INTERNAL MEDICINE

## 2017-08-17 PROCEDURE — 99214 OFFICE O/P EST MOD 30 MIN: CPT | Mod: ZP | Performed by: INTERNAL MEDICINE

## 2017-08-17 PROCEDURE — 36415 COLL VENOUS BLD VENIPUNCTURE: CPT | Performed by: INTERNAL MEDICINE

## 2017-08-17 ASSESSMENT — PAIN SCALES - GENERAL: PAINLEVEL: NO PAIN (0)

## 2017-08-17 NOTE — NURSING NOTE
Chief Complaint   Patient presents with     Follow Up For     follow up for cardiac amyloid/ labs, echo     Vitals were taken and medications were reconciled.  LUIS Durham  10:41 AM

## 2017-08-17 NOTE — PROGRESS NOTES
August 18, 2017      HPI:  Mrs. Guerrero is a 66 year old female with no past medical history until the Spring of 2016 when she was diagnosed with stage IV AL amyloid; she presents to clinic for follow up of cardiac amyloid.     Her cardiac and oncologic history are as follows:     Fatigue started in January 2016. She eventually had a coronary angiogram which was reportedly normal and was diagnosed with CHF and started on a bblocker and diuretics. Her symptoms progressed to the point that she was evaluated at [place] in August/September (Dr. Barron in oncology/hematology, Dr. Nettles is cardiology). She was diagnosed with stage IV AL amyloid (+GI, +bone marrow involvement, no involvement of kidney or liver):    Her work up is detailed in my past note however she has lambda AL amyloidosis and she underwent CyBorD chemotherapy and excellent light chain response by serum. She has been turned down at [facility] for a stem cell transplant due to her cardiac involvement.    Unfortunately last year, she had 2-3 more pleural taps on the R and came back to my clinic with gross anasarca.  I admitted her to the hospital where close to 20-30 pounds of fluid overload of volume was removed.  Since that time her AL has been in remission by labs without further chemo and her cardiac symptoms have been stable also. She has had no further hospitalizations. Her weight has been stable. She is able to walk long distances on flat ground at her own pace though.  She denies PND or orthopnea or nighttime cough.  She denies any chest heaviness or pressure.  She has not had syncope. She reported some short lived palpitation but infrequent. Overall her quality of life is acceptable at present.        PAST MEDICAL HISTORY:  - Stage IV AL amyloid   - GI (stomach and duodenal) involvement  - Cardiac amyloidosis    FAMILY HISTORY:  Family History   Problem Relation Age of Onset     Other - See Comments Sister      Amyloidosis   - Sister passed from multiple  myeloma, patient was also told her sister had amyloid as well  - Mom with CAD    SOCIAL HISTORY:  Social History     Social History     Marital Status:      Spouse Name: N/A     Number of Children: N/A     Years of Education: N/A     Social History Main Topics     Smoking status: Never Smoker      Smokeless tobacco: None     Alcohol Use: No     Drug Use: No     Sexual Activity: Not Asked     Social History Narrative   Lives in [place] with her , has 2 kids  Previously did office work, hasn't worked for several months now  Never smoker  Social ETOH    CURRENT MEDICATIONS:  Current Outpatient Prescriptions   Medication Sig Dispense Refill     doxycycline (VIBRA-TABS) 100 MG tablet Take 100 mg by mouth daily       torsemide (DEMADEX) 20 MG tablet 60 mg in AM and 60 mg in the afternoon. 180 tablet 3     spironolactone (ALDACTONE) 25 MG tablet Take 1 tablet (25 mg) by mouth 2 times daily 180 tablet 3     potassium chloride SA (K-DUR/KLOR-CON M) 10 MEQ CR tablet Take 2 tablets (20 mEq) by mouth 2 times daily Do not fill until requested 120 tablet 11     Acetaminophen (TYLENOL PO) Take 325 mg by mouth       HYDROcodone-acetaminophen (NORCO) 5-325 MG per tablet Reported on 3/24/2017  0     LORazepam (ATIVAN) 0.5 MG tablet Reported on 3/24/2017  0     ondansetron (ZOFRAN-ODT) 4 MG disintegrating tablet   0       ROS:   Constitutional: No fever, chills, or sweats. Weight is stable from our last visit   ENT: No visual disturbance, ear ache, epistaxis, sore throat.   Allergies/Immunologic: Negative.   Respiratory: No cough, hemoptysis.   Cardiovascular: As per HPI.   GI: no nausea and + constipation. no Early satiety   : No urinary frequency, dysuria, or hematuria.   Integument: Negative.   Psychiatric: feeling down and anxious since her diagnosis  Neuro: + tingling in legs bilaterally  Endocrinology: Negative.   Musculoskeletal: Negative.    EXAM:  /65 (BP Location: Left arm, Patient Position: Chair,  "Cuff Size: Adult Small)  Pulse 74  Ht 1.6 m (5' 3\")  Wt 61.1 kg (134 lb 12.8 oz)  SpO2 98%  BMI 23.88 kg/m2   General: appears comfortable, alert and articulate  Head: normocephalic, atraumatic  Eyes: anicteric sclera, EOMI  Neck: no adenopathy  Orophyarynx: moist mucosa, no lesions, dentition intact  Heart: regular rate and rhythm. III/VI holosystolic murmur at the apex, JVD around 9-10 cm  Lungs: decreased breath sounds at the R base with dullness to percussion, lungs otherwise clear   Abdomen: soft, non-tender, bowel sounds present, no hepatosplenomegaly  Extremities: mild edema non pitting at the ankles   Neurological: normal speech and affect, no gross motor deficits    Labs:  CBC RESULTS:  Lab Results   Component Value Date    WBC 4.5 07/20/2017    RBC 4.83 07/20/2017    HGB 15.5 07/20/2017    HCT 45.8 07/20/2017    MCV 95 07/20/2017    MCH 32.1 07/20/2017    MCHC 33.8 07/20/2017    RDW 14.6 07/20/2017     (L) 07/20/2017       CMP RESULTS:  Lab Results   Component Value Date     08/17/2017    POTASSIUM 4.6 08/17/2017    CHLORIDE 96 08/17/2017    CO2 33 (H) 08/17/2017    ANIONGAP 6 08/17/2017    GLC 73 08/17/2017    BUN 32 (H) 08/17/2017    CR 1.07 (H) 08/17/2017    GFRESTIMATED 51 (L) 08/17/2017    GFRESTBLACK 62 08/17/2017    JERROD 9.4 08/17/2017    BILITOTAL 2.4 (H) 07/20/2017    ALBUMIN 3.7 07/20/2017    ALKPHOS 228 (H) 07/20/2017    ALT 24 07/20/2017    AST 19 07/20/2017     INR RESULTS:  Lab Results   Component Value Date    INR 1.30 (H) 01/14/2017       Lab Results   Component Value Date    MAG 2.5 (H) 01/25/2017     Lab Results   Component Value Date    NTBNPI 9009 (H) 01/13/2017     Lab Results   Component Value Date    NTBNP 1450 (H) 08/17/2017 7/20/2017   No serum or urine monoclonal antibody detected       Echocardiogram: personally reviewed     Interpretation Summary  Global and regional left ventricular function is normal with an EF of 55-60%.  Moderate concentric wall " thickening consistent with left ventricular  hypertrophy is present. Grade III or advanced diastolic dysfunction.  Global peak LV longitudinal strain is averaged at -15.7%. This is below  reported normal limits (normal <-18%).  The right ventricle is normal size. Global right ventricular function is  mildly reduced.  Mild aortic insufficiency is present.  The inferior vena cava was dilated at 2.2 cm without respiratory variability,  consistent with increased right atrial pressure. Estimated mean right atrial  pressure is 15 mmHg.  Right ventricular systolic pressure is 25mmHg above the right atrial pressure.  No pericardial effusion is present.Right pleural effusion behind right atrium.  This study was compared with the study from 1/5/2017.  Pericardial effuison is not present on this study. Otherwise no significant  changes.    Most recent CXR :small R pleural effusion     Assessment and Plan:   Mrs. 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     Patient has cardiac amyloidosis as evidenced by her echocardiogram (severe concentric hypertrophy and biatrial enlargement) and abnormal EKG (near-low voltage, poor R wave progression, biatrial enlargement and no evidence of LVH despite very thickened LV on echo) in the setting of biopsy proven (BMB, EGD) AL amyloid.She has completed chemotherapy with CyBorD with an excellent serologic response and her heart failure (both clinically and by labs - ie troponin now negative, nt-bnp has down trended)  has stabilized significantly. She is now on doxycycline also for stabilization of her cardiac amyloid.     Her NYHA class is II today, Stage C, she is relatively euvolemic today and so I will increase her torsemide to 60 mg BID .     Cardiac Amyloidosis with preserved EF  Stage C  NYHA Class II  ACEi/ARB None- no indication   BB None,  no indication and relatively contraindicated due to risk of progressive conduction disease/AV block in these patients  Aldosterone antagonist: aldactone   SCD prophylaxis: not indicated  % BiV pacing: NA  Fluid status: keeping her at her present doses of diuretics     Other:  Arrhythmia monitoring -- Her Zio patch in 2/17 showed sinus rhythm with non sustained SVT. Will repeat again in about 6 months or sooner prn     R sided pleural effusion: onc does not suspect amyloid involvement - this could be due to her elevated filling pressure and has responded to increase diuretics in the past. She is not presently symptomatic and I do not want to over diurese also as these patients are sensitive to underfilling. Will watch for now. Plan to increase diuretics is she develops symptoms    Follow up: 3 months with [facility], 6 months with me     Domenica Cohen MD   of Medicine   [facility] Division of Cardiology       CC      [place] [address]      Patient Care Team:  Braydon Barron as PCP - General (Hematology)  Mirela Daley MD as MD (Hematology & Oncology)  Domenica Cohen MD as MD (Cardiology)  Vidhi Dorsey RN as Nurse Coordinator (Cardiology)  Eileen Arevalo, RN as Nurse Coordinator (Cardiology)  Codie Nelson, GRYE as Nurse Practitioner (Cardiology)  MIRELA DALEY MD

## 2017-08-17 NOTE — MR AVS SNAPSHOT
After Visit Summary   8/17/2017    Leandra Guerrero    MRN: [id number]           Patient Information     Date Of Birth          1950        Visit Information        Provider Department      8/17/2017 11:00 AM Domenica Cohen MD [facility]        Care Instructions    Return to see [facility] clinic in 3 months.     Dr. Cohen in 6 months.                Follow-ups after your visit        Follow-up notes from your care team     Return in about 6 months (around 2/17/2018).      Your next 10 appointments already scheduled     Oct 27, 2017  9:00 AM CDT   XR CHEST 2 VIEWS with MGXR2   [facility])    [address] [place] [id number] [phone]           Please bring a list of your current medicines to your exam. (Include vitamins, minerals and over-thecounter medicines.) Leave your valuables at home.  Tell your doctor if there is a chance you may be pregnant.  You do not need to do anything special for this exam.            Oct 27, 2017  9:30 AM CDT   LAB with LAB FIRST FLOOR [facility])    [address] [place] [phone] [phone]           Patient must bring picture ID. Patient should be prepared to give a urine specimen  Please do not eat 10-12 hours before your appointment if you are coming in fasting for labs on lipids, cholesterol, or glucose (sugar). Pregnant women should follow their Care Team instructions. Water with medications is okay. Do not drink coffee or other fluids. If you have concerns about taking  your medications, please ask at office or if scheduling via Mensajeros Urbanost, send a message by clicking on Secure Messaging, Message Your Care Team.            Nov 03, 2017  2:30 PM CDT   Return Visit with Mirela Moore MD   [facility])    [address] [place] [id number]    [phone]            Nov 16, 2017 11:30 AM CST   Lab with UC LAB   [facility] ([facility])    [address]  [address] [facility] [address] [phone]            Nov 16, 2017 12:00 PM CST   (Arrive by 11:45 AM)   CORE RETURN with Codie Nelson NP   [facility] ([facility])    [address]  [address] [facility] [id number] [phone]            Feb 22, 2018 11:30 AM CST   (Arrive by 11:15 AM)   RETURN HEART FAILURE with Domenica Cohen MD   [facility] ([facility])    [address] [place] [id number] [phone]              Who to contact     If you have questions or need follow up information about today's clinic visit or your schedule please contact [facility] directly at [phone].  Normal or non-critical lab and imaging results will be communicated to you by Chegghart, letter or phone within 4 business days after the clinic has received the results. If you do not hear from us within 7 days, please contact the clinic through Quarterlyt or phone. If you have a critical or abnormal lab result, we will notify you by phone as soon as possible.  Submit refill requests through DueProps or call your pharmacy and they will forward the refill request to us. Please allow 3 business days for your refill to be completed.          Additional Information About Your Visit        DueProps Information     DueProps gives you secure access to your electronic health record. If you see a primary care provider, you can also send messages to your care team and make appointments. If you have questions, please call your primary care clinic.  If you do not have a primary care provider, please call [phone] and they will assist you.        Care EveryWhere ID     This is your Care EveryWhere ID. This could be used by other organizations to access your [place]  "medical records  [id number]        Your Vitals Were     Pulse Height Pulse Oximetry BMI (Body Mass Index)          74 1.6 m (5' 3\") 98% 23.88 kg/m2         Blood Pressure from Last 3 Encounters:   08/17/17 123/65   07/25/17 120/70   06/01/17 119/69    Weight from Last 3 Encounters:   08/17/17 61.1 kg (134 lb 12.8 oz)   07/25/17 61.7 kg (136 lb)   06/01/17 60.9 kg (134 lb 3.2 oz)              Today, you had the following     No orders found for display       Primary Care Provider Office Phone # Fax #    Braydon Barron [phone]       [address] [address]        Equal Access to Services     YASMIN REYNA : Janey clemens Sokatelyn, waaxda luqadaha, qaybta kaalmada adeegyaamelia, ab lee . So [facility] [phone].    ATENCIÓN: Si habla español, tiene a lobo disposición servicios gratuitos de asistencia lingüística. Llame al [phone].    We comply with applicable federal civil rights laws and [place] laws. We do not discriminate on the basis of race, color, national origin, age, disability sex, sexual orientation or gender identity.            Thank you!     Thank you for choosing [facility]  for your care. Our goal is always to provide you with excellent care. Hearing back from our patients is one way we can continue to improve our services. Please take a few minutes to complete the written survey that you may receive in the mail after your visit with us. Thank you!             Your Updated Medication List - Protect others around you: Learn how to safely use, store and throw away your medicines at www.disposemymeds.org.          This list is accurate as of: 8/17/17 11:43 AM.  Always use your most recent med list.                   Brand Name Dispense Instructions for use Diagnosis    doxycycline 100 MG tablet    VIBRA-TABS     Take 100 mg by mouth daily        HYDROcodone-acetaminophen 5-325 MG per tablet    DARIN     Reported on " 3/24/2017    Amyloid heart disease (H), Weight loss, Hypokalemia       LORazepam 0.5 MG tablet    ATIVAN     Reported on 3/24/2017    Amyloid heart disease (H), Weight loss, Hypokalemia       ondansetron 4 MG ODT tab    ZOFRAN-ODT      Amyloid heart disease (H), Weight loss, Hypokalemia       potassium chloride SA 10 MEQ CR tablet    K-DUR/KLOR-CON M    120 tablet    Take 2 tablets (20 mEq) by mouth 2 times daily Do not fill until requested    Hypokalemia       spironolactone 25 MG tablet    ALDACTONE    180 tablet    Take 1 tablet (25 mg) by mouth 2 times daily    Acute on chronic diastolic heart failure (H)       torsemide 20 MG tablet    DEMADEX    180 tablet    60 mg in AM and 60 mg in the afternoon.    Acute on chronic diastolic heart failure (H)       TYLENOL PO      Take 325 mg by mouth    Amyloid heart disease (H)

## 2017-08-17 NOTE — LETTER
8/17/2017      RE: Leandra Guerrero  [address] [place] [address]       Dear Colleague,    Thank you for the opportunity to participate in the care of your patient, Leandra Guerrero, at the [facility] at [facility]. Please see a copy of my visit note below.    August 18, 2017      HPI:  Mrs. Guerrero is a 66 year old female with no past medical history until the Spring of 2016 when she was diagnosed with stage IV AL amyloid; she presents to clinic for follow up of cardiac amyloid.     Her cardiac and oncologic history are as follows:     Fatigue started in January 2016. She eventually had a coronary angiogram which was reportedly normal and was diagnosed with CHF and started on a bblocker and diuretics. Her symptoms progressed to the point that she was evaluated at [place] in August/September (Dr. Barron in oncology/hematology, Dr. Nettles is cardiology). She was diagnosed with stage IV AL amyloid (+GI, +bone marrow involvement, no involvement of kidney or liver):    Her work up is detailed in my past note however she has lambda AL amyloidosis and she underwent CyBorD chemotherapy and excellent light chain response by serum. She has been turned down at [facility] for a stem cell transplant due to her cardiac involvement.    Unfortunately last year, she had 2-3 more pleural taps on the R and came back to my clinic with gross anasarca.  I admitted her to the hospital where close to 20-30 pounds of fluid overload of volume was removed.  Since that time her AL has been in remission by labs without further chemo and her cardiac symptoms have been stable also. She has had no further hospitalizations. Her weight has been stable. She is able to walk long distances on flat ground at her own pace though.  She denies PND or orthopnea or nighttime cough.  She denies any chest heaviness or pressure.  She has not had syncope. She reported some short lived palpitation but  infrequent. Overall her quality of life is acceptable at present.        PAST MEDICAL HISTORY:  - Stage IV AL amyloid   - GI (stomach and duodenal) involvement  - Cardiac amyloidosis    FAMILY HISTORY:  Family History   Problem Relation Age of Onset     Other - See Comments Sister      Amyloidosis   - Sister passed from multiple myeloma, patient was also told her sister had amyloid as well  - Mom with CAD    SOCIAL HISTORY:  Social History     Social History     Marital Status:      Spouse Name: N/A     Number of Children: N/A     Years of Education: N/A     Social History Main Topics     Smoking status: Never Smoker      Smokeless tobacco: None     Alcohol Use: No     Drug Use: No     Sexual Activity: Not Asked     Social History Narrative   Lives in [place] with her , has 2 kids  Previously did office work, hasn't worked for several months now  Never smoker  Social ETOH    CURRENT MEDICATIONS:  Current Outpatient Prescriptions   Medication Sig Dispense Refill     doxycycline (VIBRA-TABS) 100 MG tablet Take 100 mg by mouth daily       torsemide (DEMADEX) 20 MG tablet 60 mg in AM and 60 mg in the afternoon. 180 tablet 3     spironolactone (ALDACTONE) 25 MG tablet Take 1 tablet (25 mg) by mouth 2 times daily 180 tablet 3     potassium chloride SA (K-DUR/KLOR-CON M) 10 MEQ CR tablet Take 2 tablets (20 mEq) by mouth 2 times daily Do not fill until requested 120 tablet 11     Acetaminophen (TYLENOL PO) Take 325 mg by mouth       HYDROcodone-acetaminophen (NORCO) 5-325 MG per tablet Reported on 3/24/2017  0     LORazepam (ATIVAN) 0.5 MG tablet Reported on 3/24/2017  0     ondansetron (ZOFRAN-ODT) 4 MG disintegrating tablet   0       ROS:   Constitutional: No fever, chills, or sweats. Weight is stable from our last visit   ENT: No visual disturbance, ear ache, epistaxis, sore throat.   Allergies/Immunologic: Negative.   Respiratory: No cough, hemoptysis.   Cardiovascular: As per HPI.   GI: no nausea and +  "constipation. no Early satiety   : No urinary frequency, dysuria, or hematuria.   Integument: Negative.   Psychiatric: feeling down and anxious since her diagnosis  Neuro: + tingling in legs bilaterally  Endocrinology: Negative.   Musculoskeletal: Negative.    EXAM:  /65 (BP Location: Left arm, Patient Position: Chair, Cuff Size: Adult Small)  Pulse 74  Ht 1.6 m (5' 3\")  Wt 61.1 kg (134 lb 12.8 oz)  SpO2 98%  BMI 23.88 kg/m2   General: appears comfortable, alert and articulate  Head: normocephalic, atraumatic  Eyes: anicteric sclera, EOMI  Neck: no adenopathy  Orophyarynx: moist mucosa, no lesions, dentition intact  Heart: regular rate and rhythm. III/VI holosystolic murmur at the apex, JVD around 9-10 cm  Lungs: decreased breath sounds at the R base with dullness to percussion, lungs otherwise clear   Abdomen: soft, non-tender, bowel sounds present, no hepatosplenomegaly  Extremities: mild edema non pitting at the ankles   Neurological: normal speech and affect, no gross motor deficits    Labs:  CBC RESULTS:  Lab Results   Component Value Date    WBC 4.5 07/20/2017    RBC 4.83 07/20/2017    HGB 15.5 07/20/2017    HCT 45.8 07/20/2017    MCV 95 07/20/2017    MCH 32.1 07/20/2017    MCHC 33.8 07/20/2017    RDW 14.6 07/20/2017     (L) 07/20/2017       CMP RESULTS:  Lab Results   Component Value Date     08/17/2017    POTASSIUM 4.6 08/17/2017    CHLORIDE 96 08/17/2017    CO2 33 (H) 08/17/2017    ANIONGAP 6 08/17/2017    GLC 73 08/17/2017    BUN 32 (H) 08/17/2017    CR 1.07 (H) 08/17/2017    GFRESTIMATED 51 (L) 08/17/2017    GFRESTBLACK 62 08/17/2017    JERROD 9.4 08/17/2017    BILITOTAL 2.4 (H) 07/20/2017    ALBUMIN 3.7 07/20/2017    ALKPHOS 228 (H) 07/20/2017    ALT 24 07/20/2017    AST 19 07/20/2017     INR RESULTS:  Lab Results   Component Value Date    INR 1.30 (H) 01/14/2017       Lab Results   Component Value Date    MAG 2.5 (H) 01/25/2017     Lab Results   Component Value Date    NTBNPI 9009 " (H) 01/13/2017     Lab Results   Component Value Date    NTBNP 1450 (H) 08/17/2017 7/20/2017   No serum or urine monoclonal antibody detected       Echocardiogram: personally reviewed     Interpretation Summary  Global and regional left ventricular function is normal with an EF of 55-60%.  Moderate concentric wall thickening consistent with left ventricular  hypertrophy is present. Grade III or advanced diastolic dysfunction.  Global peak LV longitudinal strain is averaged at -15.7%. This is below  reported normal limits (normal <-18%).  The right ventricle is normal size. Global right ventricular function is  mildly reduced.  Mild aortic insufficiency is present.  The inferior vena cava was dilated at 2.2 cm without respiratory variability,  consistent with increased right atrial pressure. Estimated mean right atrial  pressure is 15 mmHg.  Right ventricular systolic pressure is 25mmHg above the right atrial pressure.  No pericardial effusion is present.Right pleural effusion behind right atrium.  This study was compared with the study from 1/5/2017.  Pericardial effuison is not present on this study. Otherwise no significant  changes.    Most recent CXR :small R pleural effusion     Assessment and Plan:   Mrs. 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     Patient has cardiac amyloidosis as evidenced by her echocardiogram (severe concentric hypertrophy and biatrial enlargement) and abnormal EKG (near-low voltage, poor R wave progression, biatrial enlargement and no evidence of LVH despite very thickened LV on echo) in the setting of biopsy proven (BMB, EGD) AL amyloid.She has completed chemotherapy with CyBorD with an excellent serologic response and her heart failure (both clinically and by labs - ie troponin now negative, nt-bnp has down trended)  has stabilized  significantly. She is now on doxycycline also for stabilization of her cardiac amyloid.     Her NYHA class is II today, Stage C, she is relatively euvolemic today and so I will increase her torsemide to 60 mg BID .     Cardiac Amyloidosis with preserved EF  Stage C  NYHA Class II  ACEi/ARB None- no indication   BB None, no indication and relatively contraindicated due to risk of progressive conduction disease/AV block in these patients  Aldosterone antagonist: aldactone   SCD prophylaxis: not indicated  % BiV pacing: NA  Fluid status: keeping her at her present doses of diuretics     Other:  Arrhythmia monitoring -- Her Zio patch in 2/17 showed sinus rhythm with non sustained SVT. Will repeat again in about 6 months or sooner prn     R sided pleural effusion: onc does not suspect amyloid involvement - this could be due to her elevated filling pressure and has responded to increase diuretics in the past. She is not presently symptomatic and I do not want to over diurese also as these patients are sensitive to underfilling. Will watch for now. Plan to increase diuretics is she develops symptoms    Follow up: 3 months with [facility], 6 months with me     Domenica Cohen MD   of Medicine   [facility] Division of Cardiology       CC      [place] [address]      Patient Care Team:  Braydon Barron as PCP - General (Hematology)  Mirela Daley MD as MD (Hematology & Oncology)  Domenica Cohen MD as MD (Cardiology)  Vidhi Dorsey RN as Nurse Coordinator (Cardiology)  Eileen Arevalo, RN as Nurse Coordinator (Cardiology)  Codie Nelson, GREY as Nurse Practitioner (Cardiology)  MIRELA DALEY MD

## 2017-10-27 ENCOUNTER — RADIANT APPOINTMENT (OUTPATIENT)
Dept: GENERAL RADIOLOGY | Facility: CLINIC | Age: 67
End: 2017-10-27
Attending: INTERNAL MEDICINE
Payer: COMMERCIAL

## 2017-10-27 DIAGNOSIS — E85.81 AL AMYLOIDOSIS (H): ICD-10-CM

## 2017-10-27 LAB
ALBUMIN SERPL-MCNC: 4.1 G/DL (ref 3.4–5)
ALP SERPL-CCNC: 249 U/L (ref 40–150)
ALT SERPL W P-5'-P-CCNC: 33 U/L (ref 0–50)
ANION GAP SERPL CALCULATED.3IONS-SCNC: 8 MMOL/L (ref 3–14)
AST SERPL W P-5'-P-CCNC: 25 U/L (ref 0–45)
BASOPHILS # BLD AUTO: 0.1 10E9/L (ref 0–0.2)
BASOPHILS NFR BLD AUTO: 1 %
BILIRUB SERPL-MCNC: 2.9 MG/DL (ref 0.2–1.3)
BUN SERPL-MCNC: 36 MG/DL (ref 7–30)
CALCIUM SERPL-MCNC: 9.3 MG/DL (ref 8.5–10.1)
CHLORIDE SERPL-SCNC: 95 MMOL/L (ref 94–109)
CO2 SERPL-SCNC: 32 MMOL/L (ref 20–32)
CREAT SERPL-MCNC: 1.38 MG/DL (ref 0.52–1.04)
DIFFERENTIAL METHOD BLD: ABNORMAL
EOSINOPHIL # BLD AUTO: 0.3 10E9/L (ref 0–0.7)
EOSINOPHIL NFR BLD AUTO: 6.5 %
ERYTHROCYTE [DISTWIDTH] IN BLOOD BY AUTOMATED COUNT: 14.2 % (ref 10–15)
GFR SERPL CREATININE-BSD FRML MDRD: 38 ML/MIN/1.7M2
GLUCOSE SERPL-MCNC: 90 MG/DL (ref 70–99)
HCT VFR BLD AUTO: 47 % (ref 35–47)
HGB BLD-MCNC: 15.9 G/DL (ref 11.7–15.7)
LYMPHOCYTES # BLD AUTO: 0.5 10E9/L (ref 0.8–5.3)
LYMPHOCYTES NFR BLD AUTO: 9.9 %
MCH RBC QN AUTO: 32 PG (ref 26.5–33)
MCHC RBC AUTO-ENTMCNC: 33.8 G/DL (ref 31.5–36.5)
MCV RBC AUTO: 95 FL (ref 78–100)
MONOCYTES # BLD AUTO: 0.4 10E9/L (ref 0–1.3)
MONOCYTES NFR BLD AUTO: 8.3 %
NEUTROPHILS # BLD AUTO: 3.7 10E9/L (ref 1.6–8.3)
NEUTROPHILS NFR BLD AUTO: 74.3 %
PLATELET # BLD AUTO: 173 10E9/L (ref 150–450)
POTASSIUM SERPL-SCNC: 4.1 MMOL/L (ref 3.4–5.3)
PROT SERPL-MCNC: 7.4 G/DL (ref 6.8–8.8)
RBC # BLD AUTO: 4.97 10E12/L (ref 3.8–5.2)
SODIUM SERPL-SCNC: 135 MMOL/L (ref 133–144)
WBC # BLD AUTO: 4.9 10E9/L (ref 4–11)

## 2017-10-27 PROCEDURE — 84165 PROTEIN E-PHORESIS SERUM: CPT | Performed by: INTERNAL MEDICINE

## 2017-10-27 PROCEDURE — 83883 ASSAY NEPHELOMETRY NOT SPEC: CPT | Performed by: INTERNAL MEDICINE

## 2017-10-27 PROCEDURE — 36415 COLL VENOUS BLD VENIPUNCTURE: CPT | Performed by: INTERNAL MEDICINE

## 2017-10-27 PROCEDURE — 71020 XR CHEST 2 VW: CPT | Performed by: RADIOLOGY

## 2017-10-27 PROCEDURE — 00000402 ZZHCL STATISTIC TOTAL PROTEIN: Performed by: INTERNAL MEDICINE

## 2017-10-27 PROCEDURE — 80053 COMPREHEN METABOLIC PANEL: CPT | Performed by: INTERNAL MEDICINE

## 2017-10-27 PROCEDURE — 86334 IMMUNOFIX E-PHORESIS SERUM: CPT | Performed by: INTERNAL MEDICINE

## 2017-10-27 PROCEDURE — 82784 ASSAY IGA/IGD/IGG/IGM EACH: CPT | Performed by: INTERNAL MEDICINE

## 2017-10-27 PROCEDURE — 85025 COMPLETE CBC W/AUTO DIFF WBC: CPT | Performed by: INTERNAL MEDICINE

## 2017-10-30 LAB
ALBUMIN SERPL ELPH-MCNC: 4.5 G/DL (ref 3.7–5.1)
ALPHA1 GLOB SERPL ELPH-MCNC: 0.3 G/DL (ref 0.2–0.4)
ALPHA2 GLOB SERPL ELPH-MCNC: 0.7 G/DL (ref 0.5–0.9)
B-GLOBULIN SERPL ELPH-MCNC: 0.7 G/DL (ref 0.6–1)
GAMMA GLOB SERPL ELPH-MCNC: 0.7 G/DL (ref 0.7–1.6)
IGA SERPL-MCNC: 55 MG/DL (ref 70–380)
IGG SERPL-MCNC: 770 MG/DL (ref 695–1620)
IGM SERPL-MCNC: 49 MG/DL (ref 60–265)
KAPPA LC UR-MCNC: 0.83 MG/DL (ref 0.33–1.94)
KAPPA LC/LAMBDA SER: 0.61 {RATIO} (ref 0.26–1.65)
LAMBDA LC SERPL-MCNC: 1.37 MG/DL (ref 0.57–2.63)
M PROTEIN SERPL ELPH-MCNC: 0 G/DL
PROT PATTERN SERPL ELPH-IMP: NORMAL
PROT PATTERN SERPL IFE-IMP: ABNORMAL

## 2017-10-31 DIAGNOSIS — I50.33 ACUTE ON CHRONIC DIASTOLIC HEART FAILURE (H): ICD-10-CM

## 2017-10-31 RX ORDER — TORSEMIDE 20 MG/1
60 TABLET ORAL 2 TIMES DAILY
Qty: 540 TABLET | Refills: 3 | Status: SHIPPED | OUTPATIENT
Start: 2017-10-31 | End: 2018-06-07

## 2017-11-01 ENCOUNTER — PRE VISIT (OUTPATIENT)
Dept: CARDIOLOGY | Facility: CLINIC | Age: 67
End: 2017-11-01

## 2017-11-01 DIAGNOSIS — I50.32 CHRONIC DIASTOLIC HEART FAILURE (H): Primary | ICD-10-CM

## 2017-11-03 ENCOUNTER — ONCOLOGY VISIT (OUTPATIENT)
Dept: ONCOLOGY | Facility: CLINIC | Age: 67
End: 2017-11-03
Payer: COMMERCIAL

## 2017-11-03 VITALS
WEIGHT: 140 LBS | HEIGHT: 63 IN | HEART RATE: 76 BPM | DIASTOLIC BLOOD PRESSURE: 67 MMHG | OXYGEN SATURATION: 98 % | BODY MASS INDEX: 24.8 KG/M2 | RESPIRATION RATE: 15 BRPM | SYSTOLIC BLOOD PRESSURE: 120 MMHG | TEMPERATURE: 97.8 F

## 2017-11-03 DIAGNOSIS — E85.81 AL AMYLOIDOSIS (H): Primary | ICD-10-CM

## 2017-11-03 PROCEDURE — 99214 OFFICE O/P EST MOD 30 MIN: CPT | Performed by: INTERNAL MEDICINE

## 2017-11-03 ASSESSMENT — PAIN SCALES - GENERAL: PAINLEVEL: NO PAIN (0)

## 2017-11-03 NOTE — LETTER
11/3/2017         RE: Leandra Guerrero  [address] [place] [address]        Dear Colleague,    Thank you for referring your patient, Leandra Guerrero, to the [facility]. Please see a copy of my visit note below.    Hematology/Oncology Follow-up visit:  Date on this visit: 11/3/2017    Referring Physician: Dr. Braydon Barron, [facility], [place].  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 She was diagnosed with CHF at a local clinic in [facility] and was placed on a b-blocker and a diuretic. She has also developed progressive worsening lower extremity edema by May 2016 which in retrospect started in January. Her cardiac angiogram was reportedly negative at that time. She has lost about 35 lbs in the last 6 months. She has significant nausea somewhat controlled with Zofran but she is reluctant to use it because of constipation too. She is on Senna-S one tablet PO BID for constipation and that is improved but still significant. She has very poor PO intake due to nausea and lack of appetite. She requested to be seen at [facility] and was seen by Dr. Braydon Barron on 9/13/2016 with f/up on 9/20/2016. She was also seen by cardiology team there Sara Severson CNP and Dr. Nettles from CHF service.  There was no e/o renal or hepatic amyloidosis.  EKG on 9/7/2016 showed normal sinus rhythm, prolonged QT interval (QTc 521 sec), left atrial enlargement and left anterior fascicular block. There was ST and T wave abnormality.  Apparently, her echocardiogram showed preserved LVEF at 59%.  She reports having R sided thoracentesis on 8/25/2016 after which her breathing has improved. She then had a CXR on 9/7/2016 which showed a small R pleural effusion with right basilar atelectasis. Cardiac silhouette was at the upper level of normal.  I have reviewed extensive  outside medical records but we are still in the process of obtaining additional records.  The patient also underwent a minor salivary gland biopsy of the lower lip. The pathology from that procedure (9/20/2016) showed normal salivary gland tissue with nonspecific chronic sialadenitis.  She had extensive lab workup on 9/7/2016.  Her TSH was normal. Serum protein ELP showed a small abnormality in the gamma fraction.  Serum immunofixation showed small monoclonal IgA lambda in the gamma fraction and a small lambda in the beta fraction. Serum IgA level was normal at 329. Serum IgG level was mildly low at 755 (normal range 767-1590) and IgM level was normal. B2 microglobulin was mildly elevated at 2.94 (ULN 2.7). Mg was normal. Total bilirubin was elevated at 3.9 and direct at 0.8. Uric acid was mildly elevated at 6.9 (ULN 6.1) Creat was 1.0. Troponin was 0.1 (ULN <0.01) and NT-pro BNP was 4747 pg/ml (ULN <=183). Total cholesterol was 190 and LDL was 139. Random urine protein was mildly elevated at 33 mg/dl (normal range <22). INR was 1.2 and PTT 27 (within normal limits). Fibrinogen was elevated at 441 and factor X was mildly decreased at 64% (normal range %). D-dimer was up at 1700 (). Free lambda light chains were 69.5 and free kappa light chains were 1.11 with FLC kappa/lambda ratio of 0.016.   CBC showed normal WBC at 6.0, slightly elevated Hb at 16. Platelet count was normal.  Flow cytometry on bone marrow biopsy showed detectable monotypic plasma cells. There were 12% of monotypic plasma cells on bone marrow biopsy.    The bone marrow biopsy (performed on 9/14/2016) showed normocellular BM, 30-40% cellularity, with involvement by 10-20% of lambda light chain restricted plasma cells.  Congo red stain was positive on the bone marrow biopsy. Liquid chromatography tandem mass spectroscopy showed a peptide profile c/w AL (lambda type) amyloid. BM karyotype was normal 46 XX. FISH on BM showed plasma cell  clone with 1q duplication, and monosomy of 13 and 14.  She had an EGD by Dr. Matthew Wadsworth on 9/14/2016. It showed gastric mucosal atrophy and multiple mucosal papules (nodules) seen in the stomach. The duodenum appeared normal. The biopsies of stomach mucosa, of stomach (antral) nodules and of duodenum, all showed the presence of amyloid in submucosal blood vessel walls in the stomach and duodenum and in deep mucosa in the stomach antrum and body, confirmed by Congo Red stain.   She also had additional labs done at our last visit, on 9/30/2016. Total bilirubin was elevated as below, primarily indirect. K was 3.3 and she was stared on K-dur 20 mEq PO bid. She was noted to have elevated serum IgA level on 9/30/2016 at 392 (). Serum free lambda chains were elevated at 37.75. Serum M spike was 0.1 g/dl and serum FLC ratio was low at 0.01. Serum immunofixation ELP showed monoclonal IgA immunoglobulin of lambda light chain type as well as monoclonal free immunoglobulin light chain of lambda chain type.  She was seen by Dr. Cohen too and had an echocardiogram on 10/6/2016 which showed:  Global and regional left ventricular function was normal with an EF of 60-65%.  The LV mass index was 131 g/m2 (severely increased.) The LV geometry is c/w  concentric hypertrophy measured by relative wall thickness. Global right ventricular function was normal. Severe biatrial enlargement was present.  Right ventricular systolic pressure was 26mmHg above the right atrial pressure. The inferior vena cava was dilated at 2.1 cm without respiratory variability, consistent with increased right atrial pressure. Trivial pericardial effusion was present.    She proceeded to start CyBorD as recommended by Dr. Barron with dose reduced in subq Bortezomib to 0.7 mg/m2, on 10/4/2016.  After one cycle, her M spike, IgA level and free lambda light chains have all improved, with M spike of 0 g/dl, IgA down into the normal range and free serum  lambda light chains improving from 37.75 down to 2.09.  She returns today in cycle 3 day 11. She had amyloid labs again on day 1 of cycle 3 (12/8/2016) which showed continued response to therapy and her IgA level was now low and serum FLC ratio was normal and serum free lambda chains were normal too.   She has developed hematuria with cycle 2 and Cytoxan is on hold from day 15 cycle 2 since hematuria persisted despite Mesnex. She had a CT abdomen/pelvis on 11/29/2016 which showed no abnormalities in the kidneys. There was a moderate right and a trace left sided pleural effusions. Small volume ascites and anasarca was noted as well. The liver had heterogeneous appearance, which can be seen in hepatic venous congestion.   Cytoxan was d/cd  on  11/22/2016 since her cystoscopy showed findings of mild petechia and erythema in her bladder that would be c/w hemorrhagic cystitis.  Pinch biopsies were taken from the bladder and showed no e/o amyloid or malignancy. She has continued on Velcade and dexamethasone until 12/27/2016.  On 1/3/2017 she was seen at [facility] by Dr. Barron and at that time serum free light chains were normal at 0.74 and FLC ratio was normal as well at 0.72. She was recommended to be on observation because free lambda chains normalized. However, her cardiac amyloidosis has progressed by 1/5/2017 and she has required frequent thoracentesis, and had weeping LE edema and elevated neck veins.  Her echocardiogram on 1/5/2017 was abnormal (severe concentric hypertrophy and biatrial enlargement) and she also had abnormal EKG (near-low voltage, poor R wave progression, biatrial enlargement and no evidence of LVH despite very thickened LV on echo). She was felt to have  New York Heart Association class IIIB heart failure with clinically severe volume overload.     History Of Present Illness:  Ms. Guerrero is a 67 year old female, non-smoker, who presents for f/up of AL amyloidosis. She has been off treatment  since 2016, and afterwards was hospitalized for CHF in 2017 due to cardiac amyloid and was aggressively diuresed.  She was seen by cardiologist Dr. Harjit Fischer at  in [place]. She was seen by Dr. Chivo Lei from medical oncology at the [facility] in [place], and was recommended to start on Acyclovir and Doxycycline.  She has not started Acyclovir  but is taking daily Doxycyline. She is feeling nauseated from Doxycyline and takes Zofran prn and when she does that, she feels constipated but is going to try to remain on Doxycycline.  Dr. Lei also recommended she consider maintenance therapy. She was also seen by Dr. Barron at [facility] on 2017, and he recommended against maintenance therapy.  She has been seeing  Dr. Cohen in f/up of cardiac Amyloidosis with preserved EF, CHF NYHA Class II. She last saw her in August and was felt to be Euvolemic at that time. Torsemide was increased to 60 mg PO BID. She is on aldactone 25 mg PO daily.  Her Zio patch in  showed sinus rhythm with non sustained SVT.  She denies SOB. She has no cough. She has been eating more and has gained a few pounds in weight, up to 140 lbs, from 132 three months ago.  She has no CP. She has swelling in b/l LE stable at 2+.  CXR 10/27/17 showed:  Persistent small right pleural effusion with overlying atelectasis.  Tiny left pleural effusion is also seen. Heart size is normal. Central  airways midline. No pneumothorax. No new airspace consolidation.  Hypoplastic right cervical rib.   She is here with her  today.  In addition, a complete 12 point  review of systems is negative.      Past Medical/Surgical History:  AL Amyloid  Amyloid cardiomyopathy/CHF    Family History:  Sister  of multiple myeloma    Social History:  Lives in [place], with . Nonsmoker        Allergies:     Allergies   Allergen Reactions     Contrast Dye Shortness Of Breath     Shaking,chills and dypsnea     Cytoxan  "[Cyclophosphamide]      Hemorrhagic cystitis     Levofloxacin      Severe shaking and abdominal pain     Amoxicillin Nausea and Vomiting and Cramps     Current Medications:  Current Outpatient Prescriptions   Medication     torsemide (DEMADEX) 20 MG tablet     doxycycline (VIBRA-TABS) 100 MG tablet     spironolactone (ALDACTONE) 25 MG tablet     potassium chloride SA (K-DUR/KLOR-CON M) 10 MEQ CR tablet     Acetaminophen (TYLENOL PO)     HYDROcodone-acetaminophen (NORCO) 5-325 MG per tablet     LORazepam (ATIVAN) 0.5 MG tablet     ondansetron (ZOFRAN-ODT) 4 MG disintegrating tablet     No current facility-administered medications for this visit.       Physical Exam:  /67  Pulse 76  Temp 97.8  F (36.6  C)  Resp 15  Ht 1.6 m (5' 2.99\")  Wt 63.5 kg (140 lb)  SpO2 98%  BMI 24.81 kg/m2    GENERAL APPEARANCE: middle aged, alert and no distress     HENT: Mouth without ulcers or lesions     NECK: no adenopathy, no asymmetry or masses     LYMPHATICS: No cervical, supraclavicular, axillary or inguinal lymphadenopathy     RESP: LL- CTA. RL - decreased BS on the right 1/3 way up - no rales, rhonchi or wheezes     CARDIOVASCULAR: regular rates and rhythm, normal S1 S2, no S3 or S4 and no murmur.     ABDOMEN:  soft, nontender, no HSM or masses and bowel sounds normal. + mild to moderate ascites.     MUSCULOSKELETAL: extremities normal- no gross deformities noted, no evidence of inflammation in joints, FROM in all extremities. +2 edema b/l LE stablecompared to last visit.       SKIN: no suspicious lesions or rashes     PSYCHIATRIC: mentation appears normal and affect normal    Laboratory/Imaging Studies  Results for JESSICA GARCIA (MRN [id number]) as of 11/3/2017 14:54   Ref. Range 8/17/2017 09:45 8/17/2017 10:26 10/27/2017 09:16 10/27/2017 09:22   Sodium Latest Ref Range: 133 - 144 mmol/L 135   135   Potassium Latest Ref Range: 3.4 - 5.3 mmol/L 4.6   4.1   Chloride Latest Ref Range: 94 - 109 mmol/L 96   95 "   Carbon Dioxide Latest Ref Range: 20 - 32 mmol/L 33 (H)   32   Urea Nitrogen Latest Ref Range: 7 - 30 mg/dL 32 (H)   36 (H)   Creatinine Latest Ref Range: 0.52 - 1.04 mg/dL 1.07 (H)   1.38 (H)   GFR Estimate Latest Ref Range: >60 mL/min/1.7m2 51 (L)   38 (L)   GFR Estimate If Black Latest Ref Range: >60 mL/min/1.7m2 62   46 (L)   Calcium Latest Ref Range: 8.5 - 10.1 mg/dL 9.4   9.3   Anion Gap Latest Ref Range: 3 - 14 mmol/L 6   8   Albumin Latest Ref Range: 3.4 - 5.0 g/dL    4.1   Protein Total Latest Ref Range: 6.8 - 8.8 g/dL    7.4   Bilirubin Total Latest Ref Range: 0.2 - 1.3 mg/dL    2.9 (H)   Alkaline Phosphatase Latest Ref Range: 40 - 150 U/L    249 (H)   ALT Latest Ref Range: 0 - 50 U/L    33   AST Latest Ref Range: 0 - 45 U/L    25   N-Terminal Pro Bnp Latest Ref Range: 0 - 125 pg/mL 1450 (H)      Troponin I ES Latest Ref Range: 0.000 - 0.045 ug/L <0.015      Glucose Latest Ref Range: 70 - 99 mg/dL 73   90   WBC Latest Ref Range: 4.0 - 11.0 10e9/L    4.9   Hemoglobin Latest Ref Range: 11.7 - 15.7 g/dL    15.9 (H)   Hematocrit Latest Ref Range: 35.0 - 47.0 %    47.0   Platelet Count Latest Ref Range: 150 - 450 10e9/L    173   RBC Count Latest Ref Range: 3.8 - 5.2 10e12/L    4.97   MCV Latest Ref Range: 78 - 100 fl    95   MCH Latest Ref Range: 26.5 - 33.0 pg    32.0   MCHC Latest Ref Range: 31.5 - 36.5 g/dL    33.8   RDW Latest Ref Range: 10.0 - 15.0 %    14.2   Diff Method Unknown    Automated Method   % Neutrophils Latest Units: %    74.3   % Lymphocytes Latest Units: %    9.9   % Monocytes Latest Units: %    8.3   % Eosinophils Latest Units: %    6.5   % Basophils Latest Units: %    1.0   Absolute Neutrophil Latest Ref Range: 1.6 - 8.3 10e9/L    3.7   Absolute Lymphocytes Latest Ref Range: 0.8 - 5.3 10e9/L    0.5 (L)   Absolute Monocytes Latest Ref Range: 0.0 - 1.3 10e9/L    0.4   Absolute Eosinophils Latest Ref Range: 0.0 - 0.7 10e9/L    0.3   Absolute Basophils Latest Ref Range: 0.0 - 0.2 10e9/L    0.1    Albumin Fraction Latest Ref Range: 3.7 - 5.1 g/dL    4.5   Alpha 1 Fraction Latest Ref Range: 0.2 - 0.4 g/dL    0.3   Alpha 2 Fraction Latest Ref Range: 0.5 - 0.9 g/dL    0.7   Beta Fraction Latest Ref Range: 0.6 - 1.0 g/dL    0.7   ELP Interpretation: Unknown    Essentially luis...   Gamma Fraction Latest Ref Range: 0.7 - 1.6 g/dL    0.7   IGA Latest Ref Range: 70 - 380 mg/dL    55 (L)   IGG Latest Ref Range: 695 - 1620 mg/dL    770   IGM Latest Ref Range: 60 - 265 mg/dL    49 (L)   Immunofixation ELP Unknown    No monoclonal pro...   Kappa Free Lt Chain Latest Ref Range: 0.33 - 1.94 mg/dL    0.83   Kappa Lambda Ratio Latest Ref Range: 0.26 - 1.65     0.61   Lambda Free Lt Chain Latest Ref Range: 0.57 - 2.63 mg/dL    1.37   Monoclonal Peak Latest Ref Range: 0.0 g/dL    0.0     Results for LEANDRA GARCIA (MRN [id number]) as of 11/4/2017 17:26   Ref. Range 3/16/2017 09:46 7/20/2017 09:00 10/27/2017 09:22   Alkaline Phosphatase Latest Ref Range: 40 - 150 U/L 202 (H) 228 (H) 249 (H)   Results for LEANDRA GARCIA (MRN [id number]) as of 11/4/2017 17:26   Ref. Range 3/16/2017 09:46 7/20/2017 09:00 10/27/2017 09:22   Bilirubin Total Latest Ref Range: 0.2 - 1.3 mg/dL 1.6 (H) 2.4 (H) 2.9 (H)   Results for LEANDRA GARCIA (MRN [id number]) as of 11/4/2017 17:26   Ref. Range 3/16/2017 09:46 7/20/2017 09:00 10/27/2017 09:22   Albumin Latest Ref Range: 3.4 - 5.0 g/dL 3.6 3.7 4.1     ASSESSMENT/PLAN:  Leandra Garcia is a 67 year old woman with  of AL amyloidosis involving the heart and GI tract. Unfortunately, she had stage IV AL amyloidosis at diagnosis in September 2016, according to revised Rios Criteria (NT-proBNP >1800 ng/l, troponin >0.025 and difference between free lambda and kappa light chain >18). Stage IV amyloidosis is associated with median survival of 5 months in patients not undergoing BMT, and 5 year survival of 15%, and in patients who are able to undergo BMT, median survival is 22 months and 5  year survival of 46% (Dejan APODACA, Anderson BAR, Toñito RA, et al. Prognostication of survival using cardiac troponins and N-terminal pro-brain natriuretic peptide in patients with primary systemic amyloidosis undergoing peripheral blood stem cell transplantation. Blood 2004; 104:1881). She was felt not to be a candidate for high dose therapy.   She was on Cybor-D from 10/4/2016-12/27/2016, with Cytoxan omitted after cycle 2 day 1 due to her developing hemorrhagic cystitis. Her disease has responded  biochemically, with serum M spike of zero, and serum IgA level was down to low range and  free lambda light chains have normalized quantitatively.     I have discussed her situation with Dr. Barron in January, and he felt at that time that with normalization of serum free lambda light chains, no further chemotherapy is indicated. Unfortunately, the anti-amyloid antibody clinical trial  at [facility] is closed. She was seen at [facility] in [place] and was started on oral Doxycycline.    1.  AL amyloidosis - Serum FLC ratio is normal. Both serum free kappa light chains and lambda light chains are within normal limits. There is no M protein on serum or urine immunofixation. IgA level is still low.  She is on Doxycyline daily. We'll see her back in 3 months with amyloid labs prior.     2. Recurrent pleural effusions- Most recent CXR on 10/27/2017 showed improved pleural effusions compared to July 2017, with a small right pleural effusion and a tiny left pleural effusion.  She is not feeling more SOB. We'll consider repeating CXR in 6 months.  PleurX catheter is an option in the future.    3. Amyloid cardiomyopathy-   Continue current diuretic regimen with Torsemide 60 mg PO QAM and 60 mg PO QHS and aldactone 25 mg PO BID. She will be followed by cardiology team closely, next visit with [facility] in 2 weeks, and with Dr. Cohen in February. I have communicated with Codie Nelson from [facility] team in regard to patient needing repeat  BMP since she just had a CMP but her creat was slightly up likely due to increased Torsemide dose. They may repeat a BMP following the appt.  4. Creat slightly elevated, on higher dose of torsemide. Urine protein in the normal range in July 2017.  5. Slightly higher total bilirubin and alk phos- in the past felt to be secondary to liver congestion. Cont to follow.    At the end of our visit patient and  verbalized understanding and concurred with the plan.              Again, thank you for allowing me to participate in the care of your patient.        Sincerely,        Mirela Moore MD, MD

## 2017-11-03 NOTE — MR AVS SNAPSHOT
After Visit Summary   11/3/2017    Leandra Guerrero    MRN: [id number]           Patient Information     Date Of Birth          1950        Visit Information        Provider Department      11/3/2017 2:30 PM Mirela Moore MD [facility]        Today's Diagnoses     [place] amyloidosis    -  1       Follow-ups after your visit        Your next 10 appointments already scheduled     Nov 16, 2017 11:30 AM CST   Lab with  LAB   [facility] ([facility])    [address] [place] [address]  [address] [facility] [id number] [phone]            Nov 16, 2017 12:00 PM CST   (Arrive by 11:45 AM)   CORE RETURN with Codie Nelson NP   [facility] ([facility])    [address] [place] [address]  [address] [facility] [address] [phone]            Feb 09, 2018  9:30 AM CST   LAB with LAB FIRST FLOOR [facility] ([facility])    [address] [address] [place] [address] [phone]           Please do not eat 10-12 hours before your appointment if you are coming in fasting for labs on lipids, cholesterol, or glucose (sugar). This does not apply to pregnant women. Water, hot tea and black coffee (with nothing added) are okay. Do not drink other fluids, diet soda or chew gum.            Feb 16, 2018  2:30 PM CST   Return Visit with Mirela Moore MD   [facility] ([facility])    [address] [address] [place] [id number] [phone]            Feb 22, 2018 11:30 AM CST   (Arrive by 11:15 AM)   RETURN HEART FAILURE with Domenica Cohen MD   [facility] ([facility])    [address] [place] [id number] [phone]              Future tests that were ordered for you today     Open Future Orders        Priority Expected Expires Ordered    Immunoglobulins A G and M Routine  2/6/2018 11/3/2018 11/3/2017    Dora and lambda light chain Routine 2/6/2018 11/3/2018 11/3/2017    Protein electrophoresis Routine 2/6/2018 11/3/2018 11/3/2017    CBC with platelets differential Routine 2/6/2018 11/3/2018 11/3/2017    Basic metabolic panel Routine 2/6/2018 11/3/2018 11/3/2017    Magnesium Routine 2/6/2018 11/3/2018 11/3/2017    Hepatic panel Routine 2/6/2018 11/3/2018 11/3/2017            Who to contact     If you have questions or need follow up information about today's clinic visit or your schedule please contact [facility] directly at [phone].  Normal or non-critical lab and imaging results will be communicated to you by MyChart, letter or phone within 4 business days after the clinic has received the results. If you do not hear from us within 7 days, please contact the clinic through [employer] or phone. If you have a critical or abnormal lab result, we will notify you by phone as soon as possible.  Submit refill requests through [employer] or call your pharmacy and they will forward the refill request to us. Please allow 3 business days for your refill to be completed.          Additional Information About Your Visit        [employer] Information     [employer] gives you secure access to your electronic health record. If you see a primary care provider, you can also send messages to your care team and make appointments. If you have questions, please call your primary care clinic.  If you do not have a primary care provider, please call [phone] and they will assist you.      [employer] is an electronic gateway that provides easy, online access to your medical records. With [employer], you can request a clinic appointment, read your test results, renew a prescription or communicate with your care team.     To access your existing account, please contact your [facility] or call [phone] for assistance.        Care EveryWhere ID     This is your Care  "EveryWhere ID. This could be used by other organizations to access your [facility] medical records  [id number]        Your Vitals Were     Pulse Temperature Respirations Height Pulse Oximetry BMI (Body Mass Index)    76 97.8  F (36.6  C) 15 1.6 m (5' 2.99\") 98% 24.81 kg/m2       Blood Pressure from Last 3 Encounters:   11/03/17 120/67   08/17/17 123/65   07/25/17 120/70    Weight from Last 3 Encounters:   11/03/17 63.5 kg (140 lb)   08/17/17 61.1 kg (134 lb 12.8 oz)   07/25/17 61.7 kg (136 lb)               Primary Care Provider Office Phone # Fax #    Braydon Barron [phone]       [place] [address] [address] [facility] [address]        Equal Access to Services     YASMIN REYNA : Hadii stephanie maddeno Sokatelyn, waaxda luqadaha, qaybta kaalmada adetieshayada, ab lee . So [facility] [phone].    ATENCIÓN: Si habla español, tiene a lobo disposición servicios gratuitos de asistencia lingüística. Machelle al [phone].    We comply with applicable federal civil rights laws and [place] laws. We do not discriminate on the basis of race, color, national origin, age, disability, sex, sexual orientation, or gender identity.            Thank you!     Thank you for choosing [facility]  for your care. Our goal is always to provide you with excellent care. Hearing back from our patients is one way we can continue to improve our services. Please take a few minutes to complete the written survey that you may receive in the mail after your visit with us. Thank you!             Your Updated Medication List - Protect others around you: Learn how to safely use, store and throw away your medicines at www.disposemymeds.org.          This list is accurate as of: 11/3/17  3:37 PM.  Always use your most recent med list.                   Brand Name Dispense Instructions for use Diagnosis    doxycycline 100 MG tablet    VIBRA-TABS     Take 100 mg by mouth daily        " HYDROcodone-acetaminophen 5-325 MG per tablet    NORCO     Reported on 3/24/2017    Amyloid heart disease (H), Weight loss, Hypokalemia       LORazepam 0.5 MG tablet    ATIVAN     Reported on 3/24/2017    Amyloid heart disease (H), Weight loss, Hypokalemia       ondansetron 4 MG ODT tab    ZOFRAN-ODT      Amyloid heart disease (H), Weight loss, Hypokalemia       potassium chloride SA 10 MEQ CR tablet    K-DUR/KLOR-CON M    120 tablet    Take 2 tablets (20 mEq) by mouth 2 times daily Do not fill until requested    Hypokalemia       spironolactone 25 MG tablet    ALDACTONE    180 tablet    Take 1 tablet (25 mg) by mouth 2 times daily    Acute on chronic diastolic heart failure (H)       torsemide 20 MG tablet    DEMADEX    540 tablet    Take 3 tablets (60 mg) by mouth 2 times daily    Acute on chronic diastolic heart failure (H)       TYLENOL PO      Take 325 mg by mouth    Amyloid heart disease (H)

## 2017-11-03 NOTE — PROGRESS NOTES
Hematology/Oncology Follow-up visit:  Date on this visit: 11/3/2017    Referring Physician: Dr. Braydon Barron, [facility].  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 She was diagnosed with CHF at a local clinic in [facility] and was placed on a b-blocker and a diuretic. She has also developed progressive worsening lower extremity edema by May 2016 which in retrospect started in January. Her cardiac angiogram was reportedly negative at that time. She has lost about 35 lbs in the last 6 months. She has significant nausea somewhat controlled with Zofran but she is reluctant to use it because of constipation too. She is on Senna-S one tablet PO BID for constipation and that is improved but still significant. She has very poor PO intake due to nausea and lack of appetite. She requested to be seen at [facility] and was seen by Dr. Braydon Barron on 9/13/2016 with f/up on 9/20/2016. She was also seen by cardiology team there Sara Severson CNP and Dr. Nettles from CHF service.  There was no e/o renal or hepatic amyloidosis.  EKG on 9/7/2016 showed normal sinus rhythm, prolonged QT interval (QTc 521 sec), left atrial enlargement and left anterior fascicular block. There was ST and T wave abnormality.  Apparently, her echocardiogram showed preserved LVEF at 59%.  She reports having R sided thoracentesis on 8/25/2016 after which her breathing has improved. She then had a CXR on 9/7/2016 which showed a small R pleural effusion with right basilar atelectasis. Cardiac silhouette was at the upper level of normal.  I have reviewed extensive outside medical records but we are still in the process of obtaining additional records.  The patient also underwent a minor salivary gland biopsy of the lower lip. The pathology from that procedure (9/20/2016) showed normal salivary gland tissue with nonspecific  chronic sialadenitis.  She had extensive lab workup on 9/7/2016.  Her TSH was normal. Serum protein ELP showed a small abnormality in the gamma fraction.  Serum immunofixation showed small monoclonal IgA lambda in the gamma fraction and a small lambda in the beta fraction. Serum IgA level was normal at 329. Serum IgG level was mildly low at 755 (normal range 767-1590) and IgM level was normal. B2 microglobulin was mildly elevated at 2.94 (ULN 2.7). Mg was normal. Total bilirubin was elevated at 3.9 and direct at 0.8. Uric acid was mildly elevated at 6.9 (ULN 6.1) Creat was 1.0. Troponin was 0.1 (ULN <0.01) and NT-pro BNP was 4747 pg/ml (ULN <=183). Total cholesterol was 190 and LDL was 139. Random urine protein was mildly elevated at 33 mg/dl (normal range <22). INR was 1.2 and PTT 27 (within normal limits). Fibrinogen was elevated at 441 and factor X was mildly decreased at 64% (normal range %). D-dimer was up at 1700 (). Free lambda light chains were 69.5 and free kappa light chains were 1.11 with FLC kappa/lambda ratio of 0.016.   CBC showed normal WBC at 6.0, slightly elevated Hb at 16. Platelet count was normal.  Flow cytometry on bone marrow biopsy showed detectable monotypic plasma cells. There were 12% of monotypic plasma cells on bone marrow biopsy.    The bone marrow biopsy (performed on 9/14/2016) showed normocellular BM, 30-40% cellularity, with involvement by 10-20% of lambda light chain restricted plasma cells.  Congo red stain was positive on the bone marrow biopsy. Liquid chromatography tandem mass spectroscopy showed a peptide profile c/w AL (lambda type) amyloid. BM karyotype was normal 46 XX. FISH on BM showed plasma cell clone with 1q duplication, and monosomy of 13 and 14.  She had an EGD by Dr. Matthew Wadsworth on 9/14/2016. It showed gastric mucosal atrophy and multiple mucosal papules (nodules) seen in the stomach. The duodenum appeared normal. The biopsies of stomach mucosa, of  stomach (antral) nodules and of duodenum, all showed the presence of amyloid in submucosal blood vessel walls in the stomach and duodenum and in deep mucosa in the stomach antrum and body, confirmed by Congo Red stain.   She also had additional labs done at our last visit, on 9/30/2016. Total bilirubin was elevated as below, primarily indirect. K was 3.3 and she was stared on K-dur 20 mEq PO bid. She was noted to have elevated serum IgA level on 9/30/2016 at 392 (). Serum free lambda chains were elevated at 37.75. Serum M spike was 0.1 g/dl and serum FLC ratio was low at 0.01. Serum immunofixation ELP showed monoclonal IgA immunoglobulin of lambda light chain type as well as monoclonal free immunoglobulin light chain of lambda chain type.  She was seen by Dr. Cohen too and had an echocardiogram on 10/6/2016 which showed:  Global and regional left ventricular function was normal with an EF of 60-65%.  The LV mass index was 131 g/m2 (severely increased.) The LV geometry is c/w  concentric hypertrophy measured by relative wall thickness. Global right ventricular function was normal. Severe biatrial enlargement was present.  Right ventricular systolic pressure was 26mmHg above the right atrial pressure. The inferior vena cava was dilated at 2.1 cm without respiratory variability, consistent with increased right atrial pressure. Trivial pericardial effusion was present.    She proceeded to start CyBorD as recommended by Dr. Barron with dose reduced in subq Bortezomib to 0.7 mg/m2, on 10/4/2016.  After one cycle, her M spike, IgA level and free lambda light chains have all improved, with M spike of 0 g/dl, IgA down into the normal range and free serum lambda light chains improving from 37.75 down to 2.09.  She returns today in cycle 3 day 11. She had amyloid labs again on day 1 of cycle 3 (12/8/2016) which showed continued response to therapy and her IgA level was now low and serum FLC ratio was normal and serum  free lambda chains were normal too.   She has developed hematuria with cycle 2 and Cytoxan is on hold from day 15 cycle 2 since hematuria persisted despite Mesnex. She had a CT abdomen/pelvis on 11/29/2016 which showed no abnormalities in the kidneys. There was a moderate right and a trace left sided pleural effusions. Small volume ascites and anasarca was noted as well. The liver had heterogeneous appearance, which can be seen in hepatic venous congestion.   Cytoxan was d/cd  on  11/22/2016 since her cystoscopy showed findings of mild petechia and erythema in her bladder that would be c/w hemorrhagic cystitis.  Pinch biopsies were taken from the bladder and showed no e/o amyloid or malignancy. She has continued on Velcade and dexamethasone until 12/27/2016.  On 1/3/2017 she was seen at [facility] by Dr. Barron and at that time serum free light chains were normal at 0.74 and FLC ratio was normal as well at 0.72. She was recommended to be on observation because free lambda chains normalized. However, her cardiac amyloidosis has progressed by 1/5/2017 and she has required frequent thoracentesis, and had weeping LE edema and elevated neck veins.  Her echocardiogram on 1/5/2017 was abnormal (severe concentric hypertrophy and biatrial enlargement) and she also had abnormal EKG (near-low voltage, poor R wave progression, biatrial enlargement and no evidence of LVH despite very thickened LV on echo). She was felt to have  New York Heart Association class IIIB heart failure with clinically severe volume overload.     History Of Present Illness:  Ms. Guerrero is a 67 year old female, non-smoker, who presents for f/up of AL amyloidosis. She has been off treatment since December 2016, and afterwards was hospitalized for CHF in Jan 2017 due to cardiac amyloid and was aggressively diuresed.  She was seen by cardiologist Dr. Harjit Fischer at  in [place]. She was seen by Dr. Chivo Lei from medical oncology at the [facility] in  [place], and was recommended to start on Acyclovir and Doxycycline.  She has not started Acyclovir  but is taking daily Doxycyline. She is feeling nauseated from Doxycyline and takes Zofran prn and when she does that, she feels constipated but is going to try to remain on Doxycycline.  Dr. Lei also recommended she consider maintenance therapy. She was also seen by Dr. Barron at [facility] on 2017, and he recommended against maintenance therapy.  She has been seeing  Dr. Cohen in f/up of cardiac Amyloidosis with preserved EF, CHF NYHA Class II. She last saw her in August and was felt to be Euvolemic at that time. Torsemide was increased to 60 mg PO BID. She is on aldactone 25 mg PO daily.  Her Zio patch in  showed sinus rhythm with non sustained SVT.  She denies SOB. She has no cough. She has been eating more and has gained a few pounds in weight, up to 140 lbs, from 132 three months ago.  She has no CP. She has swelling in b/l LE stable at 2+.  CXR 10/27/17 showed:  Persistent small right pleural effusion with overlying atelectasis.  Tiny left pleural effusion is also seen. Heart size is normal. Central  airways midline. No pneumothorax. No new airspace consolidation.  Hypoplastic right cervical rib.   She is here with her  today.  In addition, a complete 12 point  review of systems is negative.      Past Medical/Surgical History:  AL Amyloid  Amyloid cardiomyopathy/CHF    Family History:  Sister  of multiple myeloma    Social History:  Lives in [place], with . Nonsmoker        Allergies:     Allergies   Allergen Reactions     Contrast Dye Shortness Of Breath     Shaking,chills and dypsnea     Cytoxan [Cyclophosphamide]      Hemorrhagic cystitis     Levofloxacin      Severe shaking and abdominal pain     Amoxicillin Nausea and Vomiting and Cramps     Current Medications:  Current Outpatient Prescriptions   Medication     torsemide (DEMADEX) 20 MG tablet     doxycycline (VIBRA-TABS)  "100 MG tablet     spironolactone (ALDACTONE) 25 MG tablet     potassium chloride SA (K-DUR/KLOR-CON M) 10 MEQ CR tablet     Acetaminophen (TYLENOL PO)     HYDROcodone-acetaminophen (NORCO) 5-325 MG per tablet     LORazepam (ATIVAN) 0.5 MG tablet     ondansetron (ZOFRAN-ODT) 4 MG disintegrating tablet     No current facility-administered medications for this visit.       Physical Exam:  /67  Pulse 76  Temp 97.8  F (36.6  C)  Resp 15  Ht 1.6 m (5' 2.99\")  Wt 63.5 kg (140 lb)  SpO2 98%  BMI 24.81 kg/m2    GENERAL APPEARANCE: middle aged, alert and no distress     HENT: Mouth without ulcers or lesions     NECK: no adenopathy, no asymmetry or masses     LYMPHATICS: No cervical, supraclavicular, axillary or inguinal lymphadenopathy     RESP: LL- CTA. RL - decreased BS on the right 1/3 way up - no rales, rhonchi or wheezes     CARDIOVASCULAR: regular rates and rhythm, normal S1 S2, no S3 or S4 and no murmur.     ABDOMEN:  soft, nontender, no HSM or masses and bowel sounds normal. + mild to moderate ascites.     MUSCULOSKELETAL: extremities normal- no gross deformities noted, no evidence of inflammation in joints, FROM in all extremities. +2 edema b/l LE stablecompared to last visit.       SKIN: no suspicious lesions or rashes     PSYCHIATRIC: mentation appears normal and affect normal    Laboratory/Imaging Studies  Results for JESSICA GARCIA (MRN [id number]) as of 11/3/2017 14:54   Ref. Range 8/17/2017 09:45 8/17/2017 10:26 10/27/2017 09:16 10/27/2017 09:22   Sodium Latest Ref Range: 133 - 144 mmol/L 135   135   Potassium Latest Ref Range: 3.4 - 5.3 mmol/L 4.6   4.1   Chloride Latest Ref Range: 94 - 109 mmol/L 96   95   Carbon Dioxide Latest Ref Range: 20 - 32 mmol/L 33 (H)   32   Urea Nitrogen Latest Ref Range: 7 - 30 mg/dL 32 (H)   36 (H)   Creatinine Latest Ref Range: 0.52 - 1.04 mg/dL 1.07 (H)   1.38 (H)   GFR Estimate Latest Ref Range: >60 mL/min/1.7m2 51 (L)   38 (L)   GFR Estimate If Black Latest " Ref Range: >60 mL/min/1.7m2 62   46 (L)   Calcium Latest Ref Range: 8.5 - 10.1 mg/dL 9.4   9.3   Anion Gap Latest Ref Range: 3 - 14 mmol/L 6   8   Albumin Latest Ref Range: 3.4 - 5.0 g/dL    4.1   Protein Total Latest Ref Range: 6.8 - 8.8 g/dL    7.4   Bilirubin Total Latest Ref Range: 0.2 - 1.3 mg/dL    2.9 (H)   Alkaline Phosphatase Latest Ref Range: 40 - 150 U/L    249 (H)   ALT Latest Ref Range: 0 - 50 U/L    33   AST Latest Ref Range: 0 - 45 U/L    25   N-Terminal Pro Bnp Latest Ref Range: 0 - 125 pg/mL 1450 (H)      Troponin I ES Latest Ref Range: 0.000 - 0.045 ug/L <0.015      Glucose Latest Ref Range: 70 - 99 mg/dL 73   90   WBC Latest Ref Range: 4.0 - 11.0 10e9/L    4.9   Hemoglobin Latest Ref Range: 11.7 - 15.7 g/dL    15.9 (H)   Hematocrit Latest Ref Range: 35.0 - 47.0 %    47.0   Platelet Count Latest Ref Range: 150 - 450 10e9/L    173   RBC Count Latest Ref Range: 3.8 - 5.2 10e12/L    4.97   MCV Latest Ref Range: 78 - 100 fl    95   MCH Latest Ref Range: 26.5 - 33.0 pg    32.0   MCHC Latest Ref Range: 31.5 - 36.5 g/dL    33.8   RDW Latest Ref Range: 10.0 - 15.0 %    14.2   Diff Method Unknown    Automated Method   % Neutrophils Latest Units: %    74.3   % Lymphocytes Latest Units: %    9.9   % Monocytes Latest Units: %    8.3   % Eosinophils Latest Units: %    6.5   % Basophils Latest Units: %    1.0   Absolute Neutrophil Latest Ref Range: 1.6 - 8.3 10e9/L    3.7   Absolute Lymphocytes Latest Ref Range: 0.8 - 5.3 10e9/L    0.5 (L)   Absolute Monocytes Latest Ref Range: 0.0 - 1.3 10e9/L    0.4   Absolute Eosinophils Latest Ref Range: 0.0 - 0.7 10e9/L    0.3   Absolute Basophils Latest Ref Range: 0.0 - 0.2 10e9/L    0.1   Albumin Fraction Latest Ref Range: 3.7 - 5.1 g/dL    4.5   Alpha 1 Fraction Latest Ref Range: 0.2 - 0.4 g/dL    0.3   Alpha 2 Fraction Latest Ref Range: 0.5 - 0.9 g/dL    0.7   Beta Fraction Latest Ref Range: 0.6 - 1.0 g/dL    0.7   ELP Interpretation: Unknown    Essentially luis...    Gamma Fraction Latest Ref Range: 0.7 - 1.6 g/dL    0.7   IGA Latest Ref Range: 70 - 380 mg/dL    55 (L)   IGG Latest Ref Range: 695 - 1620 mg/dL    770   IGM Latest Ref Range: 60 - 265 mg/dL    49 (L)   Immunofixation ELP Unknown    No monoclonal pro...   Kappa Free Lt Chain Latest Ref Range: 0.33 - 1.94 mg/dL    0.83   Kappa Lambda Ratio Latest Ref Range: 0.26 - 1.65     0.61   Lambda Free Lt Chain Latest Ref Range: 0.57 - 2.63 mg/dL    1.37   Monoclonal Peak Latest Ref Range: 0.0 g/dL    0.0     Results for LEANDRA GARCIA (MRN [id number]) as of 11/4/2017 17:26   Ref. Range 3/16/2017 09:46 7/20/2017 09:00 10/27/2017 09:22   Alkaline Phosphatase Latest Ref Range: 40 - 150 U/L 202 (H) 228 (H) 249 (H)   Results for LEANDRA GARCIA (MRN [id number]) as of 11/4/2017 17:26   Ref. Range 3/16/2017 09:46 7/20/2017 09:00 10/27/2017 09:22   Bilirubin Total Latest Ref Range: 0.2 - 1.3 mg/dL 1.6 (H) 2.4 (H) 2.9 (H)   Results for LEANDRA GARCIA (MRN [id number]) as of 11/4/2017 17:26   Ref. Range 3/16/2017 09:46 7/20/2017 09:00 10/27/2017 09:22   Albumin Latest Ref Range: 3.4 - 5.0 g/dL 3.6 3.7 4.1     ASSESSMENT/PLAN:  Leandra Garcia is a 67 year old woman with  of AL amyloidosis involving the heart and GI tract. Unfortunately, she had stage IV AL amyloidosis at diagnosis in September 2016, according to revised Rios Criteria (NT-proBNP >1800 ng/l, troponin >0.025 and difference between free lambda and kappa light chain >18). Stage IV amyloidosis is associated with median survival of 5 months in patients not undergoing BMT, and 5 year survival of 15%, and in patients who are able to undergo BMT, median survival is 22 months and 5 year survival of 46% (Dejan A, Anderson MA, Toñito MCARTHUR, et al. Prognostication of survival using cardiac troponins and N-terminal pro-brain natriuretic peptide in patients with primary systemic amyloidosis undergoing peripheral blood stem cell transplantation. Blood 2004; 104:1881). She  was felt not to be a candidate for high dose therapy.   She was on Cybor-D from 10/4/2016-12/27/2016, with Cytoxan omitted after cycle 2 day 1 due to her developing hemorrhagic cystitis. Her disease has responded  biochemically, with serum M spike of zero, and serum IgA level was down to low range and  free lambda light chains have normalized quantitatively.     I have discussed her situation with Dr. Barron in January, and he felt at that time that with normalization of serum free lambda light chains, no further chemotherapy is indicated. Unfortunately, the anti-amyloid antibody clinical trial  at [facility] is closed. She was seen at [facility] in [place] and was started on oral Doxycycline.    1.  AL amyloidosis - Serum FLC ratio is normal. Both serum free kappa light chains and lambda light chains are within normal limits. There is no M protein on serum or urine immunofixation. IgA level is still low.  She is on Doxycyline daily. We'll see her back in 3 months with amyloid labs prior.     2. Recurrent pleural effusions- Most recent CXR on 10/27/2017 showed improved pleural effusions compared to July 2017, with a small right pleural effusion and a tiny left pleural effusion.  She is not feeling more SOB. We'll consider repeating CXR in 6 months.  PleurX catheter is an option in the future.    3. Amyloid cardiomyopathy-   Continue current diuretic regimen with Torsemide 60 mg PO QAM and 60 mg PO QHS and aldactone 25 mg PO BID. She will be followed by cardiology team closely, next visit with [facility] in 2 weeks, and with Dr. Cohen in February. I have communicated with Codie Nelson from [facility] team in regard to patient needing repeat BMP since she just had a CMP but her creat was slightly up likely due to increased Torsemide dose. They may repeat a BMP following the appt.  4. Creat slightly elevated, on higher dose of torsemide. Urine protein in the normal range in July 2017.  5. Slightly higher total bilirubin and  alk phos- in the past felt to be secondary to liver congestion. Cont to follow.    At the end of our visit patient and  verbalized understanding and concurred with the plan.

## 2017-11-11 ASSESSMENT — ENCOUNTER SYMPTOMS
BACK PAIN: 0
ARTHRALGIAS: 0
STIFFNESS: 0
MUSCLE CRAMPS: 1
NECK PAIN: 0
MUSCLE WEAKNESS: 0
MYALGIAS: 0
JOINT SWELLING: 0

## 2017-11-16 ENCOUNTER — OFFICE VISIT (OUTPATIENT)
Dept: CARDIOLOGY | Facility: CLINIC | Age: 67
End: 2017-11-16
Attending: NURSE PRACTITIONER
Payer: MEDICARE

## 2017-11-16 VITALS
HEIGHT: 63 IN | SYSTOLIC BLOOD PRESSURE: 133 MMHG | DIASTOLIC BLOOD PRESSURE: 76 MMHG | BODY MASS INDEX: 24.41 KG/M2 | HEART RATE: 67 BPM | OXYGEN SATURATION: 100 % | WEIGHT: 137.8 LBS

## 2017-11-16 DIAGNOSIS — E85.4 CARDIAC AMYLOIDOSIS (H): ICD-10-CM

## 2017-11-16 DIAGNOSIS — I43 CARDIAC AMYLOIDOSIS (H): Primary | ICD-10-CM

## 2017-11-16 DIAGNOSIS — E85.4 CARDIAC AMYLOIDOSIS (H): Primary | ICD-10-CM

## 2017-11-16 DIAGNOSIS — I43 CARDIAC AMYLOIDOSIS (H): ICD-10-CM

## 2017-11-16 LAB
MAGNESIUM SERPL-MCNC: 2.8 MG/DL (ref 1.6–2.3)
NT-PROBNP SERPL-MCNC: 1571 PG/ML (ref 0–125)
TROPONIN I SERPL-MCNC: <0.015 UG/L (ref 0–0.04)

## 2017-11-16 PROCEDURE — 83880 ASSAY OF NATRIURETIC PEPTIDE: CPT | Performed by: NURSE PRACTITIONER

## 2017-11-16 PROCEDURE — 99212 OFFICE O/P EST SF 10 MIN: CPT | Mod: ZF

## 2017-11-16 PROCEDURE — 84484 ASSAY OF TROPONIN QUANT: CPT | Performed by: NURSE PRACTITIONER

## 2017-11-16 PROCEDURE — 36415 COLL VENOUS BLD VENIPUNCTURE: CPT | Performed by: NURSE PRACTITIONER

## 2017-11-16 PROCEDURE — 83735 ASSAY OF MAGNESIUM: CPT | Performed by: NURSE PRACTITIONER

## 2017-11-16 PROCEDURE — 99213 OFFICE O/P EST LOW 20 MIN: CPT | Mod: ZP | Performed by: NURSE PRACTITIONER

## 2017-11-16 PROCEDURE — 0296T ZIO PATCH HOLTER: CPT | Mod: ZF | Performed by: NURSE PRACTITIONER

## 2017-11-16 PROCEDURE — 0298T ZZC EXT ECG > 48HR TO 21 DAY REVIEW AND INTERPRETATN: CPT | Performed by: INTERNAL MEDICINE

## 2017-11-16 ASSESSMENT — PAIN SCALES - GENERAL: PAINLEVEL: NO PAIN (0)

## 2017-11-16 NOTE — PROGRESS NOTES
HPI  Ms. 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 She was last seen by Dr. Cohen 3 months ago and has completed chemotherapy with CyBorD. She has been managed with doxycycline and returns to clinic for follow up.     She is doing well. Her only limitations are some nausea and constipation which she relates to doxycycline. NO SOB, CP, occ palpitations, occ lightheadedness-positional, no falls or syncope. Intermittent LE edema with pretty good control. 2 pillows, no PND.     PMH  Past Medical History:   Diagnosis Date     AL amyloidosis      Cardiac amyloidosis (H)      Lymphedema      Recurrent right pleural effusion 1/2/2017     Social History     Social History     Marital status:      Spouse name: N/A     Number of children: N/A     Years of education: N/A     Occupational History     Not on file.     Social History Main Topics     Smoking status: Never Smoker     Smokeless tobacco: Not on file     Alcohol use No     Drug use: No     Sexual activity: Not on file     Other Topics Concern     Not on file     Social History Narrative     Family History   Problem Relation Age of Onset     Other - See Comments Sister      Amyloidosis       Current Outpatient Prescriptions on File Prior to Visit:  torsemide (DEMADEX) 20 MG tablet Take 3 tablets (60 mg) by mouth 2 times daily   doxycycline (VIBRA-TABS) 100 MG tablet Take 100 mg by mouth daily   spironolactone (ALDACTONE) 25 MG tablet Take 1 tablet (25 mg) by mouth 2 times daily   potassium chloride SA (K-DUR/KLOR-CON M) 10 MEQ CR tablet Take 2 tablets (20 mEq) by mouth 2 times daily Do not fill until requested   Acetaminophen (TYLENOL PO) Take 325 mg by mouth   ondansetron (ZOFRAN-ODT) 4 MG disintegrating tablet    HYDROcodone-acetaminophen (NORCO) 5-325 MG per tablet every 6 hours as needed Reported on 3/24/2017   LORazepam (ATIVAN) 0.5 MG tablet  "Reported on 3/24/2017     No current facility-administered medications on file prior to visit.     Physical examination:  /76 (BP Location: Left arm, Cuff Size: Adult Small)  Pulse 67  Ht 1.6 m (5' 3\")  Wt 62.5 kg (137 lb 12.8 oz)  SpO2 100%  BMI 24.41 kg/m2  GENERAL APPEARANCE: healthy, alert and no distress  HEENT: no icterus, no xanthelasmas, normal pupil size and reaction, normal palate, mucosa moist, no cyanosis.  NECK: no adenopathy, no asymmetry, masses, or scars  CHEST: Crackles in left lower lobe, diminished breath sounds over right lower and middle lobes  CARDIOVASCULAR: regular rhythm, distant heart tones. Cannot appreciate JVP today  ABDOMEN: soft, non tender, without hepatomegaly  NEURO: alert and oriented to person/place/time, normal speech, gait and affect  SKIN: no ecchymoses, no rashes    LABS  Last Basic Metabolic Panel:  Lab Results   Component Value Date     10/27/2017      Lab Results   Component Value Date    POTASSIUM 4.1 10/27/2017     Lab Results   Component Value Date    CHLORIDE 95 10/27/2017     Lab Results   Component Value Date    JERROD 9.3 10/27/2017     Lab Results   Component Value Date    CO2 32 10/27/2017     Lab Results   Component Value Date    BUN 36 10/27/2017     Lab Results   Component Value Date    CR 1.38 10/27/2017     Lab Results   Component Value Date    GLC 90 10/27/2017         Assessment and Plan  Ms. Guerrero is a delightful 66 year old woman with cardiac amyloid who appears very well. Troponin and BNP trends are still on track. We'll get a 7 day ZIO monitor to check for any ventricular arrhythmias. Otherwise, no changes today.    1. Cardiac Amyloidosis with preserved EF  Stage C  NYHA Class II  ACEi/ARB None- no indication   BB None, no indication and relatively contraindicated due to risk of progressive conduction disease/AV block in these patients  Aldosterone antagonist: aldactone   SCD prophylaxis: not indicated, Zio monitor in 2/17 showed sinus " and NSVT. We'll place a 7-day zio today   % BiV pacing: NA  Fluid status: keeping her at her present doses of diuretics          20 minutes spent in direct care, >50% in counseling

## 2017-11-16 NOTE — NURSING NOTE
Per Dr. Codie Nelson, patient to have 7 day zio patch monitor placed.  Diagnosis: Cardiac Amyloidosis  Monitor placed: Yes  Patient Instructed: Yes  Patient verbalized understanding: Yes  Holter # [id number]

## 2017-11-16 NOTE — MR AVS SNAPSHOT
After Visit Summary   11/16/2017    Jessica Guerrero    MRN: [id number]           Patient Information     Date Of Birth          1950        Visit Information        Provider Department      11/16/2017 12:00 PM Codie Nelson NP [facility]        Today's Diagnoses     Cardiac amyloidosis (H)    -  1      Care Instructions    You were seen today in the Cardiovascular Clinic at the [facility].     Cardiology Providers you saw during your visit: Codie Nelson NP       1. No changes today.     2. Please have labs drawn today as you leave. We will call you with any unusual results     3. We will place a 7 -day heart monitor today     4. Please return to see Dr. Cohen in February, as scheduled    Results for JESSICA GUERRERO (MRN [id number]) as of 11/16/2017 11:47   Ref. Range 10/27/2017 09:22   Sodium Latest Ref Range: 133 - 144 mmol/L 135   Potassium Latest Ref Range: 3.4 - 5.3 mmol/L 4.1   Chloride Latest Ref Range: 94 - 109 mmol/L 95   Carbon Dioxide Latest Ref Range: 20 - 32 mmol/L 32   Urea Nitrogen Latest Ref Range: 7 - 30 mg/dL 36 (H)   Creatinine Latest Ref Range: 0.52 - 1.04 mg/dL 1.38 (H)   GFR Estimate Latest Ref Range: >60 mL/min/1.7m2 38 (L)   GFR Estimate If Black Latest Ref Range: >60 mL/min/1.7m2 46 (L)   Calcium Latest Ref Range: 8.5 - 10.1 mg/dL 9.3   Anion Gap Latest Ref Range: 3 - 14 mmol/L 8   Albumin Latest Ref Range: 3.4 - 5.0 g/dL 4.1   Protein Total Latest Ref Range: 6.8 - 8.8 g/dL 7.4   Bilirubin Total Latest Ref Range: 0.2 - 1.3 mg/dL 2.9 (H)   Alkaline Phosphatase Latest Ref Range: 40 - 150 U/L 249 (H)   ALT Latest Ref Range: 0 - 50 U/L 33   AST Latest Ref Range: 0 - 45 U/L 25       Please limit your fluid intake to 2 L (64 ounces) daily.  2 Liters a day = 8.5 cups, or 72 ounces.  Please limit your salt intake to 2 grams a day or less.    If you gain 2# in 24 hours or 5# in one week call Kathryn Posey RN so we can adjust your medications  "as needed over the phone.    Please feel free to call me with any questions or concerns.      Kathryn Posey RN BSN [facility]  Cardiology Care Coordinator-Heart Failure Clinic    Questions and [phone].   First press #1 for the University and then press #3 for \"Medical Questions\" to reach the Cardiology Nurses.     On Call Cardiologist for after hours or on weekends: [phone]   option #4 and ask to speak to the on-call Cardiologist. Inform them you are a CORE/heart failure patient at the [facility].        If you need a medication refill please contact your pharmacy.  Please allow 3 business days for your refill to be completed.  _______________________________________________________  C.O.R.E. CLINIC Cardiomyopathy, Optimization, Rehabilitation, Education   The C.O.R.E. CLINIC is a heart failure specialty clinic within the [facility] where you will work with specialized nurse practitioners dedicated to helping patients with heart failure carefully adjust medications, receive education, and learn who and when to call if symptoms develop. They specialize in helping you better understand your condition, slow the progression of your disease, improve the length and quality of your life, help you detect future heart problems before they become life threatening, and avoid hospitalizations.  As always, thank you for trusting us with your health care needs!                      Follow-ups after your visit        Your next 10 appointments already scheduled     2018  9:30 AM CST   LAB with LAB FIRST FLOOR [facility] ([facility])    [address] [place] [address] [phone]           Please do not eat 10-12 hours before your appointment if you are coming in fasting for labs on lipids, cholesterol, or glucose (sugar). This does not apply to pregnant women. Water, hot tea and " black coffee (with nothing added) are okay. Do not drink other fluids, diet soda or chew gum.            Feb 16, 2018  2:30 PM CST   Return Visit with Mirela Moore MD   [facility]    [address] [place] [id number] [phone]            Feb 22, 2018 11:30 AM CST   (Arrive by 11:15 AM)   RETURN HEART FAILURE with Domenica Cohen MD   [facility] ([facility])    [address] [address] [phone]              Future tests that were ordered for you today     Open Future Orders        Priority Expected Expires Ordered    Magnesium Routine 11/16/2017 11/16/2018 11/16/2017    Ziopatch Holter Monitor - Adult Routine 11/16/2017 1/15/2018 11/16/2017            Who to contact     If you have questions or need follow up information about today's clinic visit or your schedule please contact [facility] directly at [phone].  Normal or non-critical lab and imaging results will be communicated to you by [employer], letter or phone within 4 business days after the clinic has received the results. If you do not hear from us within 7 days, please contact the clinic through [employer] or phone. If you have a critical or abnormal lab result, we will notify you by phone as soon as possible.  Submit refill requests through [employer] or call your pharmacy and they will forward the refill request to us. Please allow 3 business days for your refill to be completed.          Additional Information About Your Visit        Overwatch Information     Overwatch gives you secure access to your electronic health record. If you see a primary care provider, you can also send messages to your care team and make appointments. If you have questions, please call your primary care clinic.  If you do not have a primary care provider, please call [phone] and they will assist you.        Care EveryWhere  "ID     This is your Care EveryWhere ID. This could be used by other organizations to access your [facility] medical records  [id number]        Your Vitals Were     Pulse Height Pulse Oximetry BMI (Body Mass Index)          67 1.6 m (5' 3\") 100% 24.41 kg/m2         Blood Pressure from Last 3 Encounters:   11/16/17 133/76   11/03/17 120/67   08/17/17 123/65    Weight from Last 3 Encounters:   11/16/17 62.5 kg (137 lb 12.8 oz)   11/03/17 63.5 kg (140 lb)   08/17/17 61.1 kg (134 lb 12.8 oz)              We Performed the Following     N terminal pro BNP outpatient     Troponin I        Primary Care Provider Office Phone # Fax #    Braydon Barron [phone]       [address] [address]        Equal Access to Services     YASMIN REYNA : Hadii stephanie clemens Sokatelyn, waaxda luqadaha, qaybta kaalmada gokul, ab lee . [facility] [phone].    ATENCIÓN: Si habla español, tiene a lobo disposición servicios gratuitos de asistencia lingüística. Michelleeloisa al [phone].    We comply with applicable federal civil rights laws and [place] laws. We do not discriminate on the basis of race, color, national origin, age, disability, sex, sexual orientation, or gender identity.            Thank you!     Thank you for choosing [facility]  for your care. Our goal is always to provide you with excellent care. Hearing back from our patients is one way we can continue to improve our services. Please take a few minutes to complete the written survey that you may receive in the mail after your visit with us. Thank you!             Your Updated Medication List - Protect others around you: Learn how to safely use, store and throw away your medicines at www.disposemymeds.org.          This list is accurate as of: 11/16/17 12:19 PM.  Always use your most recent med list.                   Brand Name Dispense Instructions for use Diagnosis    doxycycline 100 MG tablet "    VIBRA-TABS     Take 100 mg by mouth daily        HYDROcodone-acetaminophen 5-325 MG per tablet    NORCO     every 6 hours as needed Reported on 3/24/2017    Amyloid heart disease (H), Weight loss, Hypokalemia       LORazepam 0.5 MG tablet    ATIVAN     Reported on 3/24/2017    Amyloid heart disease (H), Weight loss, Hypokalemia       ondansetron 4 MG ODT tab    ZOFRAN-ODT      Amyloid heart disease (H), Weight loss, Hypokalemia       potassium chloride SA 10 MEQ CR tablet    K-DUR/KLOR-CON M    120 tablet    Take 2 tablets (20 mEq) by mouth 2 times daily Do not fill until requested    Hypokalemia       spironolactone 25 MG tablet    ALDACTONE    180 tablet    Take 1 tablet (25 mg) by mouth 2 times daily    Acute on chronic diastolic heart failure (H)       torsemide 20 MG tablet    DEMADEX    540 tablet    Take 3 tablets (60 mg) by mouth 2 times daily    Acute on chronic diastolic heart failure (H)       TYLENOL PO      Take 325 mg by mouth    Amyloid heart disease (H)

## 2017-11-16 NOTE — LETTER
11/16/2017      RE: Leandra Guerrero  [address] [place] [address]       Dear Colleague,    Thank you for the opportunity to participate in the care of your patient, Leandra Guerrero, at the [facility]. Please see a copy of my visit note below.    HPI  Ms. 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 She was last seen by Dr. Cohen 3 months ago and has completed chemotherapy with CyBorD. She has been managed with doxycycline and returns to clinic for follow up.     She is doing well. Her only limitations are some nausea and constipation which she relates to doxycycline. NO SOB, CP, occ palpitations, occ lightheadedness-positional, no falls or syncope. Intermittent LE edema with pretty good control. 2 pillows, no PND.     PMH  Past Medical History:   Diagnosis Date     AL amyloidosis      Cardiac amyloidosis (H)      Lymphedema      Recurrent right pleural effusion 1/2/2017     Social History     Social History     Marital status:      Spouse name: N/A     Number of children: N/A     Years of education: N/A     Occupational History     Not on file.     Social History Main Topics     Smoking status: Never Smoker     Smokeless tobacco: Not on file     Alcohol use No     Drug use: No     Sexual activity: Not on file     Other Topics Concern     Not on file     Social History Narrative     Family History   Problem Relation Age of Onset     Other - See Comments Sister      Amyloidosis       Current Outpatient Prescriptions on File Prior to Visit:  torsemide (DEMADEX) 20 MG tablet Take 3 tablets (60 mg) by mouth 2 times daily   doxycycline (VIBRA-TABS) 100 MG tablet Take 100 mg by mouth daily   spironolactone (ALDACTONE) 25 MG tablet Take 1 tablet (25 mg) by mouth 2 times daily   potassium chloride SA (K-DUR/KLOR-CON M) 10 MEQ CR  "tablet Take 2 tablets (20 mEq) by mouth 2 times daily Do not fill until requested   Acetaminophen (TYLENOL PO) Take 325 mg by mouth   ondansetron (ZOFRAN-ODT) 4 MG disintegrating tablet    HYDROcodone-acetaminophen (NORCO) 5-325 MG per tablet every 6 hours as needed Reported on 3/24/2017   LORazepam (ATIVAN) 0.5 MG tablet Reported on 3/24/2017     No current facility-administered medications on file prior to visit.     Physical examination:  /76 (BP Location: Left arm, Cuff Size: Adult Small)  Pulse 67  Ht 1.6 m (5' 3\")  Wt 62.5 kg (137 lb 12.8 oz)  SpO2 100%  BMI 24.41 kg/m2  GENERAL APPEARANCE: healthy, alert and no distress  HEENT: no icterus, no xanthelasmas, normal pupil size and reaction, normal palate, mucosa moist, no cyanosis.  NECK: no adenopathy, no asymmetry, masses, or scars  CHEST: Crackles in left lower lobe, diminished breath sounds over right lower and middle lobes  CARDIOVASCULAR: regular rhythm, distant heart tones. Cannot appreciate JVP today  ABDOMEN: soft, non tender, without hepatomegaly  NEURO: alert and oriented to person/place/time, normal speech, gait and affect  SKIN: no ecchymoses, no rashes    LABS  Last Basic Metabolic Panel:  Lab Results   Component Value Date     10/27/2017      Lab Results   Component Value Date    POTASSIUM 4.1 10/27/2017     Lab Results   Component Value Date    CHLORIDE 95 10/27/2017     Lab Results   Component Value Date    JERROD 9.3 10/27/2017     Lab Results   Component Value Date    CO2 32 10/27/2017     Lab Results   Component Value Date    BUN 36 10/27/2017     Lab Results   Component Value Date    CR 1.38 10/27/2017     Lab Results   Component Value Date    GLC 90 10/27/2017         Assessment and Plan  Ms. Guerrero is a delightful 66 year old woman with cardiac amyloid who appears very well. Troponin and BNP trends are still on track. We'll get a 7 day ZIO monitor to check for any ventricular arrhythmias. Otherwise, no changes today.    1. " Cardiac Amyloidosis with preserved EF  Stage C  NYHA Class  II  ACEi/ARB None- no indication   BB None, no indication and relatively contraindicated due to risk of progressive conduction disease/AV block in these patients  Aldosterone antagonist: aldactone   SCD prophylaxis: not indicated, Zio monitor in 2/17 showed sinus and NSVT. We'll place a 7-day zio today   % BiV pacing: NA  Fluid status: keeping her at her present doses of diuretics          20 minutes spent in direct care, >50% in counseling    Sincerely,     Codie Nelson, GREY

## 2017-11-16 NOTE — PATIENT INSTRUCTIONS
"You were seen today in the Cardiovascular Clinic at the [facility].     Cardiology Providers you saw during your visit: Codie Nelson NP     1. No changes today.     2. Please have labs drawn today as you leave. We will call you with any unusual results     3. We will place a 7 -day heart monitor today     4. Please return to see Dr. Cohen in February, as scheduled    Results for JESSICA GARCIA (MRN [id number]) as of 2017 11:47   Ref. Range 10/27/2017 09:22   Sodium Latest Ref Range: 133 - 144 mmol/L 135   Potassium Latest Ref Range: 3.4 - 5.3 mmol/L 4.1   Chloride Latest Ref Range: 94 - 109 mmol/L 95   Carbon Dioxide Latest Ref Range: 20 - 32 mmol/L 32   Urea Nitrogen Latest Ref Range: 7 - 30 mg/dL 36 (H)   Creatinine Latest Ref Range: 0.52 - 1.04 mg/dL 1.38 (H)   GFR Estimate Latest Ref Range: >60 mL/min/1.7m2 38 (L)   GFR Estimate If Black Latest Ref Range: >60 mL/min/1.7m2 46 (L)   Calcium Latest Ref Range: 8.5 - 10.1 mg/dL 9.3   Anion Gap Latest Ref Range: 3 - 14 mmol/L 8   Albumin Latest Ref Range: 3.4 - 5.0 g/dL 4.1   Protein Total Latest Ref Range: 6.8 - 8.8 g/dL 7.4   Bilirubin Total Latest Ref Range: 0.2 - 1.3 mg/dL 2.9 (H)   Alkaline Phosphatase Latest Ref Range: 40 - 150 U/L 249 (H)   ALT Latest Ref Range: 0 - 50 U/L 33   AST Latest Ref Range: 0 - 45 U/L 25       Please limit your fluid intake to 2 L (64 ounces) daily.  2 Liters a day = 8.5 cups, or 72 ounces.  Please limit your salt intake to 2 grams a day or less.    If you gain 2# in 24 hours or 5# in one week call Kathryn Posey RN so we can adjust your medications as needed over the phone.    Please feel free to call me with any questions or concerns.      Kathryn Posey RN BSN [facility]  Cardiology Care Coordinator-Heart Failure Clinic    Questions and [phone].   First press #1 for the University and then press #3 for \"Medical Questions\" to reach the Cardiology Nurses.     On Call " Cardiologist for after hours or on weekends: [phone]   option #4 and ask to speak to the on-call Cardiologist. Inform them you are a CORE/heart failure patient at the [facility].        If you need a medication refill please contact your pharmacy.  Please allow 3 business days for your refill to be completed.  _______________________________________________________  C.O.R.E. CLINIC Cardiomyopathy, Optimization, Rehabilitation, Education   The C.O.R.E. CLINIC is a heart failure specialty clinic within the [facility] where you will work with specialized nurse practitioners dedicated to helping patients with heart failure carefully adjust medications, receive education, and learn who and when to call if symptoms develop. They specialize in helping you better understand your condition, slow the progression of your disease, improve the length and quality of your life, help you detect future heart problems before they become life threatening, and avoid hospitalizations.  As always, thank you for trusting us with your health care needs!

## 2017-11-16 NOTE — NURSING NOTE
Chief Complaint   Patient presents with     Follow Up For     67-year old return CORE; Heart failure with preserved ejection fraction secondary to cardiac amyloid presenting for labs and f/u     Vitals were taken and medications were reconciled.     Nel Box MA  11:39 AM

## 2017-11-21 ENCOUNTER — DOCUMENTATION ONLY (OUTPATIENT)
Dept: CARDIOLOGY | Facility: CLINIC | Age: 67
End: 2017-11-21

## 2017-11-21 NOTE — PROGRESS NOTES
Date: 11/21/2017    Time of Call: 1:24 PM     Diagnosis:  Cardia amyloid   [ VORB ] Ordering provider:Hector Cohen MD  Order: patient may discontinue oxygen     Order received by: Riya Streeter RN     Follow-up/additional notes: Not sent to Medical supply to  home oxygen

## 2017-12-04 ENCOUNTER — TELEPHONE (OUTPATIENT)
Dept: ONCOLOGY | Facility: CLINIC | Age: 67
End: 2017-12-04

## 2017-12-13 ENCOUNTER — TRANSFERRED RECORDS (OUTPATIENT)
Dept: HEALTH INFORMATION MANAGEMENT | Facility: CLINIC | Age: 67
End: 2017-12-13

## 2017-12-13 LAB — HEMOCCULT STL QL IA: NEGATIVE

## 2018-02-09 DIAGNOSIS — E85.81 AL AMYLOIDOSIS (H): ICD-10-CM

## 2018-02-09 LAB
ALBUMIN SERPL-MCNC: 4.1 G/DL (ref 3.4–5)
ALP SERPL-CCNC: 240 U/L (ref 40–150)
ALT SERPL W P-5'-P-CCNC: 29 U/L (ref 0–50)
ANION GAP SERPL CALCULATED.3IONS-SCNC: 8 MMOL/L (ref 3–14)
AST SERPL W P-5'-P-CCNC: 23 U/L (ref 0–45)
BASOPHILS # BLD AUTO: 0.1 10E9/L (ref 0–0.2)
BASOPHILS NFR BLD AUTO: 1 %
BILIRUB DIRECT SERPL-MCNC: 0.7 MG/DL (ref 0–0.2)
BILIRUB SERPL-MCNC: 2.7 MG/DL (ref 0.2–1.3)
BUN SERPL-MCNC: 34 MG/DL (ref 7–30)
CALCIUM SERPL-MCNC: 9 MG/DL (ref 8.5–10.1)
CHLORIDE SERPL-SCNC: 94 MMOL/L (ref 94–109)
CO2 SERPL-SCNC: 30 MMOL/L (ref 20–32)
CREAT SERPL-MCNC: 1.1 MG/DL (ref 0.52–1.04)
DIFFERENTIAL METHOD BLD: ABNORMAL
EOSINOPHIL # BLD AUTO: 0.4 10E9/L (ref 0–0.7)
EOSINOPHIL NFR BLD AUTO: 8.2 %
ERYTHROCYTE [DISTWIDTH] IN BLOOD BY AUTOMATED COUNT: 13.6 % (ref 10–15)
GFR SERPL CREATININE-BSD FRML MDRD: 50 ML/MIN/1.7M2
GLUCOSE SERPL-MCNC: 81 MG/DL (ref 70–99)
HCT VFR BLD AUTO: 45.5 % (ref 35–47)
HGB BLD-MCNC: 15 G/DL (ref 11.7–15.7)
IMM GRANULOCYTES # BLD: 0 10E9/L (ref 0–0.4)
IMM GRANULOCYTES NFR BLD: 0.4 %
KAPPA LC UR-MCNC: 1.3 MG/DL (ref 0.33–1.94)
KAPPA LC/LAMBDA SER: 0.73 {RATIO} (ref 0.26–1.65)
LAMBDA LC SERPL-MCNC: 1.77 MG/DL (ref 0.57–2.63)
LYMPHOCYTES # BLD AUTO: 0.5 10E9/L (ref 0.8–5.3)
LYMPHOCYTES NFR BLD AUTO: 9.6 %
MAGNESIUM SERPL-MCNC: 2.8 MG/DL (ref 1.6–2.3)
MCH RBC QN AUTO: 31.6 PG (ref 26.5–33)
MCHC RBC AUTO-ENTMCNC: 33 G/DL (ref 31.5–36.5)
MCV RBC AUTO: 96 FL (ref 78–100)
MONOCYTES # BLD AUTO: 0.4 10E9/L (ref 0–1.3)
MONOCYTES NFR BLD AUTO: 8.2 %
NEUTROPHILS # BLD AUTO: 3.7 10E9/L (ref 1.6–8.3)
NEUTROPHILS NFR BLD AUTO: 72.6 %
PLATELET # BLD AUTO: 192 10E9/L (ref 150–450)
POTASSIUM SERPL-SCNC: 4.5 MMOL/L (ref 3.4–5.3)
PROT SERPL-MCNC: 7.6 G/DL (ref 6.8–8.8)
RBC # BLD AUTO: 4.74 10E12/L (ref 3.8–5.2)
SODIUM SERPL-SCNC: 132 MMOL/L (ref 133–144)
WBC # BLD AUTO: 5.1 10E9/L (ref 4–11)

## 2018-02-09 PROCEDURE — 84165 PROTEIN E-PHORESIS SERUM: CPT | Performed by: INTERNAL MEDICINE

## 2018-02-09 PROCEDURE — 83735 ASSAY OF MAGNESIUM: CPT | Performed by: INTERNAL MEDICINE

## 2018-02-09 PROCEDURE — 85025 COMPLETE CBC W/AUTO DIFF WBC: CPT | Performed by: INTERNAL MEDICINE

## 2018-02-09 PROCEDURE — 80048 BASIC METABOLIC PNL TOTAL CA: CPT | Performed by: INTERNAL MEDICINE

## 2018-02-09 PROCEDURE — 80076 HEPATIC FUNCTION PANEL: CPT | Performed by: INTERNAL MEDICINE

## 2018-02-09 PROCEDURE — 36415 COLL VENOUS BLD VENIPUNCTURE: CPT | Performed by: INTERNAL MEDICINE

## 2018-02-09 PROCEDURE — 83883 ASSAY NEPHELOMETRY NOT SPEC: CPT | Performed by: INTERNAL MEDICINE

## 2018-02-09 PROCEDURE — 00000402 ZZHCL STATISTIC TOTAL PROTEIN: Performed by: INTERNAL MEDICINE

## 2018-02-09 PROCEDURE — 82784 ASSAY IGA/IGD/IGG/IGM EACH: CPT | Performed by: INTERNAL MEDICINE

## 2018-02-12 LAB
ALBUMIN SERPL ELPH-MCNC: 4.6 G/DL (ref 3.7–5.1)
ALPHA1 GLOB SERPL ELPH-MCNC: 0.4 G/DL (ref 0.2–0.4)
ALPHA2 GLOB SERPL ELPH-MCNC: 0.7 G/DL (ref 0.5–0.9)
B-GLOBULIN SERPL ELPH-MCNC: 0.8 G/DL (ref 0.6–1)
GAMMA GLOB SERPL ELPH-MCNC: 0.8 G/DL (ref 0.7–1.6)
IGA SERPL-MCNC: 52 MG/DL (ref 70–380)
IGG SERPL-MCNC: 782 MG/DL (ref 695–1620)
IGM SERPL-MCNC: 63 MG/DL (ref 60–265)
M PROTEIN SERPL ELPH-MCNC: 0 G/DL
PROT PATTERN SERPL ELPH-IMP: NORMAL

## 2018-02-13 ENCOUNTER — MYC MEDICAL ADVICE (OUTPATIENT)
Dept: CARDIOLOGY | Facility: CLINIC | Age: 68
End: 2018-02-13

## 2018-02-13 ENCOUNTER — PRE VISIT (OUTPATIENT)
Dept: CARDIOLOGY | Facility: CLINIC | Age: 68
End: 2018-02-13

## 2018-02-13 DIAGNOSIS — I43 CARDIAC AMYLOIDOSIS (H): Primary | ICD-10-CM

## 2018-02-13 DIAGNOSIS — I50.33 ACUTE ON CHRONIC DIASTOLIC HEART FAILURE (H): ICD-10-CM

## 2018-02-13 DIAGNOSIS — E85.4 CARDIAC AMYLOIDOSIS (H): Primary | ICD-10-CM

## 2018-02-16 ENCOUNTER — ONCOLOGY VISIT (OUTPATIENT)
Dept: ONCOLOGY | Facility: CLINIC | Age: 68
End: 2018-02-16
Payer: COMMERCIAL

## 2018-02-16 VITALS
TEMPERATURE: 97.5 F | SYSTOLIC BLOOD PRESSURE: 119 MMHG | DIASTOLIC BLOOD PRESSURE: 63 MMHG | OXYGEN SATURATION: 97 % | HEART RATE: 71 BPM | WEIGHT: 142.1 LBS | BODY MASS INDEX: 25.17 KG/M2

## 2018-02-16 DIAGNOSIS — E85.4 AMYLOID HEART DISEASE (H): ICD-10-CM

## 2018-02-16 DIAGNOSIS — R63.4 WEIGHT LOSS: ICD-10-CM

## 2018-02-16 DIAGNOSIS — I43 AMYLOID HEART DISEASE (H): ICD-10-CM

## 2018-02-16 DIAGNOSIS — E87.6 HYPOKALEMIA: ICD-10-CM

## 2018-02-16 DIAGNOSIS — E85.81 AL AMYLOIDOSIS (H): Primary | ICD-10-CM

## 2018-02-16 PROCEDURE — 99214 OFFICE O/P EST MOD 30 MIN: CPT | Performed by: INTERNAL MEDICINE

## 2018-02-16 RX ORDER — LORAZEPAM 0.5 MG/1
0.5 TABLET ORAL EVERY 6 HOURS PRN
Qty: 30 TABLET | Refills: 0 | Status: SHIPPED | OUTPATIENT
Start: 2018-02-16 | End: 2019-04-25

## 2018-02-16 ASSESSMENT — PAIN SCALES - GENERAL: PAINLEVEL: NO PAIN (0)

## 2018-02-16 NOTE — PROGRESS NOTES
Hematology/Oncology Follow-up visit:  Date on this visit: 2/15/2018      Referring Physician: Dr. Braydon Barron, [facility].  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 She was diagnosed with CHF at a local clinic in [facility] and was placed on a b-blocker and a diuretic. She has also developed progressive worsening lower extremity edema by May 2016 which in retrospect started in January. Her cardiac angiogram was reportedly negative at that time. She has lost about 35 lbs in the last 6 months. She has significant nausea somewhat controlled with Zofran but she is reluctant to use it because of constipation too. She is on Senna-S one tablet PO BID for constipation and that is improved but still significant. She has very poor PO intake due to nausea and lack of appetite. She requested to be seen at [facility] and was seen by Dr. Braydon Barron on 9/13/2016 with f/up on 9/20/2016. She was also seen by cardiology team there Sara Severson CNP and Dr. Nettles from CHF service.  There was no e/o renal or hepatic amyloidosis.  EKG on 9/7/2016 showed normal sinus rhythm, prolonged QT interval (QTc 521 sec), left atrial enlargement and left anterior fascicular block. There was ST and T wave abnormality.  Apparently, her echocardiogram showed preserved LVEF at 59%.  She reports having R sided thoracentesis on 8/25/2016 after which her breathing has improved. She then had a CXR on 9/7/2016 which showed a small R pleural effusion with right basilar atelectasis. Cardiac silhouette was at the upper level of normal.  I have reviewed extensive outside medical records but we are still in the process of obtaining additional records.  The patient also underwent a minor salivary gland biopsy of the lower lip. The pathology from that procedure (9/20/2016) showed normal salivary gland tissue with  nonspecific chronic sialadenitis.  She had extensive lab workup on 9/7/2016.  Her TSH was normal. Serum protein ELP showed a small abnormality in the gamma fraction.  Serum immunofixation showed small monoclonal IgA lambda in the gamma fraction and a small lambda in the beta fraction. Serum IgA level was normal at 329. Serum IgG level was mildly low at 755 (normal range 767-1590) and IgM level was normal. B2 microglobulin was mildly elevated at 2.94 (ULN 2.7). Mg was normal. Total bilirubin was elevated at 3.9 and direct at 0.8. Uric acid was mildly elevated at 6.9 (ULN 6.1) Creat was 1.0. Troponin was 0.1 (ULN <0.01) and NT-pro BNP was 4747 pg/ml (ULN <=183). Total cholesterol was 190 and LDL was 139. Random urine protein was mildly elevated at 33 mg/dl (normal range <22). INR was 1.2 and PTT 27 (within normal limits). Fibrinogen was elevated at 441 and factor X was mildly decreased at 64% (normal range %). D-dimer was up at 1700 (). Free lambda light chains were 69.5 and free kappa light chains were 1.11 with FLC kappa/lambda ratio of 0.016.   CBC showed normal WBC at 6.0, slightly elevated Hb at 16. Platelet count was normal.  Flow cytometry on bone marrow biopsy showed detectable monotypic plasma cells. There were 12% of monotypic plasma cells on bone marrow biopsy.    The bone marrow biopsy (performed on 9/14/2016) showed normocellular BM, 30-40% cellularity, with involvement by 10-20% of lambda light chain restricted plasma cells.  Congo red stain was positive on the bone marrow biopsy. Liquid chromatography tandem mass spectroscopy showed a peptide profile c/w AL (lambda type) amyloid. BM karyotype was normal 46 XX. FISH on BM showed plasma cell clone with 1q duplication, and monosomy of 13 and 14.  She had an EGD by Dr. Matthew Wadsworth on 9/14/2016. It showed gastric mucosal atrophy and multiple mucosal papules (nodules) seen in the stomach. The duodenum appeared normal. The biopsies of stomach  mucosa, of stomach (antral) nodules and of duodenum, all showed the presence of amyloid in submucosal blood vessel walls in the stomach and duodenum and in deep mucosa in the stomach antrum and body, confirmed by Congo Red stain.   She also had additional labs done at our last visit, on 9/30/2016. Total bilirubin was elevated as below, primarily indirect. K was 3.3 and she was stared on K-dur 20 mEq PO bid. She was noted to have elevated serum IgA level on 9/30/2016 at 392 (). Serum free lambda chains were elevated at 37.75. Serum M spike was 0.1 g/dl and serum FLC ratio was low at 0.01. Serum immunofixation ELP showed monoclonal IgA immunoglobulin of lambda light chain type as well as monoclonal free immunoglobulin light chain of lambda chain type.  She was seen by Dr. Cohen too and had an echocardiogram on 10/6/2016 which showed:  Global and regional left ventricular function was normal with an EF of 60-65%.  The LV mass index was 131 g/m2 (severely increased.) The LV geometry is c/w  concentric hypertrophy measured by relative wall thickness. Global right ventricular function was normal. Severe biatrial enlargement was present.  Right ventricular systolic pressure was 26mmHg above the right atrial pressure. The inferior vena cava was dilated at 2.1 cm without respiratory variability, consistent with increased right atrial pressure. Trivial pericardial effusion was present.    She proceeded to start CyBorD as recommended by Dr. Barron with dose reduced in subq Bortezomib to 0.7 mg/m2, on 10/4/2016.  After one cycle, her M spike, IgA level and free lambda light chains have all improved, with M spike of 0 g/dl, IgA down into the normal range and free serum lambda light chains improving from 37.75 down to 2.09.  She returns today in cycle 3 day 11. She had amyloid labs again on day 1 of cycle 3 (12/8/2016) which showed continued response to therapy and her IgA level was now low and serum FLC ratio was normal  and serum free lambda chains were normal too.   She has developed hematuria with cycle 2 and Cytoxan is on hold from day 15 cycle 2 since hematuria persisted despite Mesnex. She had a CT abdomen/pelvis on 11/29/2016 which showed no abnormalities in the kidneys. There was a moderate right and a trace left sided pleural effusions. Small volume ascites and anasarca was noted as well. The liver had heterogeneous appearance, which can be seen in hepatic venous congestion.   Cytoxan was d/cd  on  11/22/2016 since her cystoscopy showed findings of mild petechia and erythema in her bladder that would be c/w hemorrhagic cystitis.  Pinch biopsies were taken from the bladder and showed no e/o amyloid or malignancy. She has continued on Velcade and dexamethasone until 12/27/2016.  On 1/3/2017 she was seen at [facility] by Dr. Barron and at that time serum free light chains were normal at 0.74 and FLC ratio was normal as well at 0.72. She was recommended to be on observation because free lambda chains normalized. However, her cardiac amyloidosis has progressed by 1/5/2017 and she has required frequent thoracentesis, and had weeping LE edema and elevated neck veins.  She was hospitalized for CHF in Jan 2017 due to cardiac amyloid and was aggressively diuresed.  Her echocardiogram on 1/5/2017 was abnormal (severe concentric hypertrophy and biatrial enlargement) and she also had abnormal EKG (near-low voltage, poor R wave progression, biatrial enlargement and no evidence of LVH despite very thickened LV on echo). She was felt to have  New York Heart Association class IIIB heart failure with clinically severe volume overload. She was seen by cardiologist Dr. Harjit Fischer at  in [place]. She was seen by Dr. Chivo Lei from medical oncology at the [facility] in [place], and was recommended to start on Acyclovir and Doxycycline.  She has not started Acyclovir  but is taking daily Doxycyline. She is feeling nauseated from  Doxycyline and takes Zofran prn and when she does that, she feels constipated but is going to try to remain on Doxycycline.  Dr. Lei also recommended she consider maintenance therapy. She was also seen by Dr. Barron at [facility] on 2017, and he recommended against maintenance therapy.     History Of Present Illness:  Ms. Guerrero is a 67 year old female, non-smoker, who presents for f/up of AL amyloidosis. She has been off treatment since 2016.  She has been seeing  Dr. Cohen in f/up of cardiac Amyloidosis with preserved EF, CHF NYHA Class II. They monitor troponin and BNP. She was last seen in cardiology in 2017  She continued onTorsemide was increased to 60 mg PO BID. She is on aldactone 25 mg PO daily.  Her Zio patch in 2017  showed sinus rhythm with non sustained SVT.  She denies SOB. She has no cough.   She has no CP. She has swelling in b/l LE stable at 2+. She dropped a heavy object on her left anterior calf and was evaluated by PCP and treated for L calf cellulitis. Her swelling in left calf has improved but redness is till there.  She has a closed wound (eschar) at the site of where the object dropped.   She is here with her  today.  In addition, a complete 12 point  review of systems is negative.      Past Medical/Surgical History:  AL Amyloid  Amyloid cardiomyopathy/CHF    Family History:  Sister  of multiple myeloma    Social History:  Lives in [place], with . Nonsmoker        Allergies:     Allergies   Allergen Reactions     Contrast Dye Shortness Of Breath     Shaking,chills and dypsnea     Cytoxan [Cyclophosphamide]      Hemorrhagic cystitis     Levofloxacin      Severe shaking and abdominal pain     Amoxicillin Nausea and Vomiting and Cramps     Current Medications:  Current Outpatient Prescriptions   Medication     torsemide (DEMADEX) 20 MG tablet     doxycycline (VIBRA-TABS) 100 MG tablet     spironolactone (ALDACTONE) 25 MG tablet     potassium  chloride SA (K-DUR/KLOR-CON M) 10 MEQ CR tablet     Acetaminophen (TYLENOL PO)     HYDROcodone-acetaminophen (NORCO) 5-325 MG per tablet     LORazepam (ATIVAN) 0.5 MG tablet     ondansetron (ZOFRAN-ODT) 4 MG disintegrating tablet     No current facility-administered medications for this visit.       Physical Exam:  /63 (BP Location: Right arm, Cuff Size: Adult Regular)  Pulse 71  Temp 97.5  F (36.4  C) (Oral)  Wt 64.5 kg (142 lb 1.6 oz)  SpO2 97%  BMI 25.17 kg/m2      GENERAL APPEARANCE: middle aged, alert and no distress     HENT: Mouth without ulcers or lesions     NECK: no adenopathy, no asymmetry or masses     LYMPHATICS: No cervical, supraclavicular, axillary or inguinal lymphadenopathy     RESP: LL- CTA. RL - decreased BS on the right 1/3 way up - no rales, rhonchi or wheezes     CARDIOVASCULAR: regular rates and rhythm, normal S1 S2, no S3 or S4 and no murmur.     ABDOMEN:  soft, nontender, no HSM or masses and bowel sounds normal. + mild to moderate ascites.     MUSCULOSKELETAL: extremities normal- no gross deformities noted, no evidence of inflammation in joints, FROM in all extremities. There is a 1 cm size eschar on the left shin with surrounding erythema over lower left leg but no s/o of infection.  +1-2 edema b/l LE improved compared to last visit.       SKIN: no suspicious lesions or rashes     PSYCHIATRIC: mentation appears normal and affect normal    Laboratory/Imaging Studies    Orders Only on 02/09/2018   Component Date Value Ref Range Status     IGG 02/09/2018 782  695 - 1620 mg/dL Final     IGA 02/09/2018 52* 70 - 380 mg/dL Final     IGM 02/09/2018 63  60 - 265 mg/dL Final     Kappa Free Lt Chain 02/09/2018 1.30  0.33 - 1.94 mg/dL Final     Lambda Free Lt Chain 02/09/2018 1.77  0.57 - 2.63 mg/dL Final     Kappa Lambda Ratio 02/09/2018 0.73  0.26 - 1.65 Final     Albumin Fraction 02/09/2018 4.6  3.7 - 5.1 g/dL Final     Alpha 1 Fraction 02/09/2018 0.4  0.2 - 0.4 g/dL Final     Alpha 2  Fraction 02/09/2018 0.7  0.5 - 0.9 g/dL Final     Beta Fraction 02/09/2018 0.8  0.6 - 1.0 g/dL Final     Gamma Fraction 02/09/2018 0.8  0.7 - 1.6 g/dL Final     Monoclonal Peak 02/09/2018 0.0  0.0 g/dL Final     ELP Interpretation: 02/09/2018    Final                    Value:Essentially normal electrophoretic pattern.  No monoclonal protein seen.  Pathologic   significance requires clinical correlation.  BOBY Ramos M.D., Ph.D., Pathologist.        WBC 02/09/2018 5.1  4.0 - 11.0 10e9/L Final     RBC Count 02/09/2018 4.74  3.8 - 5.2 10e12/L Final     Hemoglobin 02/09/2018 15.0  11.7 - 15.7 g/dL Final     Hematocrit 02/09/2018 45.5  35.0 - 47.0 % Final     MCV 02/09/2018 96  78 - 100 fl Final     MCH 02/09/2018 31.6  26.5 - 33.0 pg Final     MCHC 02/09/2018 33.0  31.5 - 36.5 g/dL Final     RDW 02/09/2018 13.6  10.0 - 15.0 % Final     Platelet Count 02/09/2018 192  150 - 450 10e9/L Final     Diff Method 02/09/2018 Automated Method   Final     % Neutrophils 02/09/2018 72.6  % Final     % Lymphocytes 02/09/2018 9.6  % Final     % Monocytes 02/09/2018 8.2  % Final     % Eosinophils 02/09/2018 8.2  % Final     % Basophils 02/09/2018 1.0  % Final     % Immature Granulocytes 02/09/2018 0.4  % Final     Absolute Neutrophil 02/09/2018 3.7  1.6 - 8.3 10e9/L Final     Absolute Lymphocytes 02/09/2018 0.5* 0.8 - 5.3 10e9/L Final     Absolute Monocytes 02/09/2018 0.4  0.0 - 1.3 10e9/L Final     Absolute Eosinophils 02/09/2018 0.4  0.0 - 0.7 10e9/L Final     Absolute Basophils 02/09/2018 0.1  0.0 - 0.2 10e9/L Final     Abs Immature Granulocytes 02/09/2018 0.0  0 - 0.4 10e9/L Final     Sodium 02/09/2018 132* 133 - 144 mmol/L Final     Potassium 02/09/2018 4.5  3.4 - 5.3 mmol/L Final     Chloride 02/09/2018 94  94 - 109 mmol/L Final     Carbon Dioxide 02/09/2018 30  20 - 32 mmol/L Final     Anion Gap 02/09/2018 8  3 - 14 mmol/L Final     Glucose 02/09/2018 81  70 - 99 mg/dL Final    Non Fasting     Urea Nitrogen 02/09/2018 34* 7 -  30 mg/dL Final     Creatinine 02/09/2018 1.10* 0.52 - 1.04 mg/dL Final     GFR Estimate 02/09/2018 50* >60 mL/min/1.7m2 Final    Non  GFR Calc     GFR Estimate If Black 02/09/2018 60* >60 mL/min/1.7m2 Final    African American GFR Calc     Calcium 02/09/2018 9.0  8.5 - 10.1 mg/dL Final     Magnesium 02/09/2018 2.8* 1.6 - 2.3 mg/dL Final     Bilirubin Direct 02/09/2018 0.7* 0.0 - 0.2 mg/dL Final     Bilirubin Total 02/09/2018 2.7* 0.2 - 1.3 mg/dL Final     Albumin 02/09/2018 4.1  3.4 - 5.0 g/dL Final     Protein Total 02/09/2018 7.6  6.8 - 8.8 g/dL Final     Alkaline Phosphatase 02/09/2018 240* 40 - 150 U/L Final     ALT 02/09/2018 29  0 - 50 U/L Final     AST 02/09/2018 23  0 - 45 U/L Final       ASSESSMENT/PLAN:  Leandra Guerrero is a 67 year old woman with  of AL amyloidosis involving the heart and GI tract. Unfortunately, she had stage IV AL amyloidosis at diagnosis in September 2016, according to revised Rios Criteria (NT-proBNP >1800 ng/l, troponin >0.025 and difference between free lambda and kappa light chain >18). Stage IV amyloidosis is associated with median survival of 5 months in patients not undergoing BMT, and 5 year survival of 15%, and in patients who are able to undergo BMT, median survival is 22 months and 5 year survival of 46% (Dejan A, Anderson MA, Toñito RA, et al. Prognostication of survival using cardiac troponins and N-terminal pro-brain natriuretic peptide in patients with primary systemic amyloidosis undergoing peripheral blood stem cell transplantation. Blood 2004; 104:1881). She was felt not to be a candidate for high dose therapy.   She was on Cybor-D from 10/4/2016-12/27/2016, with Cytoxan omitted after cycle 2 day 1 due to her developing hemorrhagic cystitis. Her disease has responded  biochemically, with serum M spike of zero, and serum IgA level was down to low range and  free lambda light chains have normalized quantitatively.     I have discussed her situation  with Dr. Barron in January, and he felt at that time that with normalization of serum free lambda light chains, no further chemotherapy is indicated. Unfortunately, the anti-amyloid antibody clinical trial  at [facility] is closed. She was seen at [facility] in [place] and was started on oral Doxycycline.    1.  AL amyloidosis - Serum FLC ratio is normal. Both serum free kappa light chains and lambda light chains are within normal limits. There is no M protein on serum ELP.  She is on Doxycyline daily. We'll see her back in 4 months with amyloid labs prior.     2. Recurrent pleural effusions- Most recent CXR on 10/27/2017 showed improved pleural effusions compared to July 2017, with a small right pleural effusion and a tiny left pleural effusion.  She is not feeling more SOB. We'll consider repeating CXR if she has progressive SOB.  PleurX catheter is an option in the future.    3. Amyloid cardiomyopathy-  She is being followed every 3 months.  She will be seeing  Dr. Cohen next week. Continue current diuretic regimen with Torsemide 60 mg PO QAM and 60 mg PO QHS and aldactone 25 mg PO BID.   4. Creat stable at 1.10  5. Slightly higher total bilirubin and alk phos, stable- in the past felt to be secondary to liver congestion. Cont to follow.  6. Nausea- she has had occasional nausea, stable, ? Secondary to GI tract involvement by amyloid. She is on Zofran 4 mg ODT prn but that is associated with constipation. We'll add lorazepam 0.5 mg PO q 6 hours prn nausea. Rx provided to the patient.   At the end of our visit patient and  verbalized understanding and concurred with the plan.

## 2018-02-16 NOTE — MR AVS SNAPSHOT
After Visit Summary   2/16/2018    Leandra Guerrero    MRN: [id number]           Patient Information     Date Of Birth          1950        Visit Information        Provider Department      2/16/2018 2:30 PM Mirela Moore MD [facility]        Today's Diagnoses     AL amyloidosis    -  1    Amyloid heart disease (H)        Weight loss        Hypokalemia          Care Instructions    Preventive Care:    Breast Cancer Screening: During our visit today, we discussed that it is recommended you receive breast cancer screening. Please call or make an appointment with your primary care provider to discuss this with them. You may also call the [facility] scheduling line ([phone]) to set up a mammography appointment at the Breast Center within the [facility].    Colorectal Cancer Screening: During our visit today, we discussed that it is recommended you receive colorectal cancer screening. Please call or make an appointment with your primary care provider to discuss this. You may also call the [facility] scheduling line ([phone]) to set up a colonoscopy appointment.              Follow-ups after your visit        Your next 10 appointments already scheduled     Feb 22, 2018 11:00 AM CST   Lab with UC LAB   [facility] ([facility])    [address]  1st Floor  [facility] [id number] [phone]            Feb 22, 2018 11:30 AM CST   (Arrive by 11:15 AM)   RETURN HEART FAILURE with Domenica Cohen MD   [facility] ([facility])    [address] [place] [address]  [address] [place] [id number] [phone]            Jun 08, 2018  9:30 AM CDT   LAB with LAB ONC [facility] ([facility])    [id number] [place] [id number] [phone]           Please do not eat 10-12 hours before your appointment if you are coming in fasting  for labs on lipids, cholesterol, or glucose (sugar). This does not apply to pregnant women. Water, hot tea and black coffee (with nothing added) are okay. Do not drink other fluids, diet soda or chew gum.            Jun 14, 2018  3:00 PM CDT   Return Visit with Mirela Moore MD   [facility]    [address] [place] [id number] [phone]              Future tests that were ordered for you today     Open Future Orders        Priority Expected Expires Ordered    CBC with platelets differential Routine 6/16/2018 2/16/2019 2/16/2018    Hepatic panel Routine 6/16/2018 2/16/2019 2/16/2018    Basic metabolic panel Routine 6/16/2018 2/16/2019 2/16/2018    Kappa and lambda light chain Routine 6/16/2018 2/16/2019 2/16/2018    Protein electrophoresis Routine 6/16/2018 2/16/2019 2/16/2018    Immunoglobulins A G and M Routine 6/16/2018 2/16/2019 2/16/2018            Who to contact     If you have questions or need follow up information about today's clinic visit or your schedule please contact [facility] directly at [phone].  Normal or non-critical lab and imaging results will be communicated to you by [employer], letter or phone within 4 business days after the clinic has received the results. If you do not hear from us within 7 days, please contact the clinic through Room 21 Mediat or phone. If you have a critical or abnormal lab result, we will notify you by phone as soon as possible.  Submit refill requests through [employer] or call your pharmacy and they will forward the refill request to us. Please allow 3 business days for your refill to be completed.          Additional Information About Your Visit        Intelen Information     [employer] gives you secure access to your electronic health record. If you see a primary care provider, you can also send messages to your care team and make appointments. If you have questions, please call your  primary care clinic.  If you do not have a primary care provider, please call [phone] and they will assist you.      [employer] is an electronic gateway that provides easy, online access to your medical records. With [employer], you can request a clinic appointment, read your test results, renew a prescription or communicate with your care team.     To access your existing account, please contact your [facility] or call [phone] for assistance.        Care EveryWhere ID     This is your Care EveryWhere ID. This could be used by other organizations to access your [place] medical records  [id number]        Your Vitals Were     Pulse Temperature Pulse Oximetry BMI (Body Mass Index)          71 97.5  F (36.4  C) (Oral) 97% 25.17 kg/m2         Blood Pressure from Last 3 Encounters:   02/16/18 119/63   11/16/17 133/76   11/03/17 120/67    Weight from Last 3 Encounters:   02/16/18 64.5 kg (142 lb 1.6 oz)   11/16/17 62.5 kg (137 lb 12.8 oz)   11/03/17 63.5 kg (140 lb)                 Today's Medication Changes          These changes are accurate as of 2/16/18  2:56 PM.  If you have any questions, ask your nurse or doctor.               These medicines have changed or have updated prescriptions.        Dose/Directions    LORazepam 0.5 MG tablet   Commonly known as:  ATIVAN   This may have changed:  See the new instructions.   Used for:  Amyloid heart disease (H), Weight loss, Hypokalemia, AL amyloidosis   Changed by:  Mirela Moore MD        Dose:  0.5 mg   Take 1 tablet (0.5 mg) by mouth every 6 hours as needed for other (nausea)   Quantity:  30 tablet   Refills:  0            Where to get your medicines      Some of these will need a paper prescription and others can be bought over the counter.  Ask your nurse if you have questions.     Bring a paper prescription for each of these medications     LORazepam 0.5 MG tablet                Primary Care Provider Office Phone # Fax #     Braydon Barron [phone]       [facility] [address] [facility] [address]        Equal Access to Services     YASMIN REYNA : Hadii aad ku hadjacobmoises Sokatelyn, wazeinada zakijvha, cruzta kaamadorda gokul, ab bustamante nadiyatiesha sanz laMadisonsean taylor. So [facility] [phone].    ATENCIÓN: Si habla español, tiene a lobo disposición servicios gratuitos de asistencia lingüística. Llame al [phone].    We comply with applicable federal civil rights laws and [place] laws. We do not discriminate on the basis of race, color, national origin, age, disability, sex, sexual orientation, or gender identity.            Thank you!     Thank you for choosing [facility]  for your care. Our goal is always to provide you with excellent care. Hearing back from our patients is one way we can continue to improve our services. Please take a few minutes to complete the written survey that you may receive in the mail after your visit with us. Thank you!             Your Updated Medication List - Protect others around you: Learn how to safely use, store and throw away your medicines at www.disposemymeds.org.          This list is accurate as of 2/16/18  2:56 PM.  Always use your most recent med list.                   Brand Name Dispense Instructions for use Diagnosis    doxycycline 100 MG tablet    VIBRA-TABS     Take 100 mg by mouth daily        LORazepam 0.5 MG tablet    ATIVAN    30 tablet    Take 1 tablet (0.5 mg) by mouth every 6 hours as needed for other (nausea)    Amyloid heart disease (H), Weight loss, Hypokalemia, AL amyloidosis       ondansetron 4 MG ODT tab    ZOFRAN-ODT      Amyloid heart disease (H), Weight loss, Hypokalemia       potassium chloride SA 10 MEQ CR tablet    K-DUR/KLOR-CON M    120 tablet    Take 2 tablets (20 mEq) by mouth 2 times daily Do not fill until requested    Hypokalemia       spironolactone 25 MG tablet    ALDACTONE    180 tablet    Take 1 tablet (25 mg) by mouth 2 times  daily    Acute on chronic diastolic heart failure (H)       torsemide 20 MG tablet    DEMADEX    540 tablet    Take 3 tablets (60 mg) by mouth 2 times daily    Acute on chronic diastolic heart failure (H)       TYLENOL PO      Take 325 mg by mouth    Amyloid heart disease (H)

## 2018-02-16 NOTE — NURSING NOTE
"Oncology Rooming Note    February 16, 2018 2:29 PM   Leandra Guerrero is a 67 year old female who presents for:    Chief Complaint   Patient presents with     Oncology Clinic Visit     Return F/U     Initial Vitals: /63 (BP Location: Right arm, Cuff Size: Adult Regular)  Pulse 71  Temp 97.5  F (36.4  C) (Oral)  Wt 64.5 kg (142 lb 1.6 oz)  SpO2 97%  BMI 25.17 kg/m2 Estimated body mass index is 25.17 kg/(m^2) as calculated from the following:    Height as of 11/16/17: 1.6 m (5' 3\").    Weight as of this encounter: 64.5 kg (142 lb 1.6 oz). Body surface area is 1.69 meters squared.  No Pain (0) Comment: Data Unavailable   No LMP recorded. Patient is postmenopausal.  Allergies reviewed: Yes  Medications reviewed: Yes    Medications: Medication refills not needed today.  Pharmacy name entered into [employer]: [facility] [address] [place] [address] [address] [facility] AT [facility] [address]    Clinical concerns: none Dr. Moore was notified.    8 minutes for nursing intake (face to face time)     Rodríguez Guzmán RN              "

## 2018-02-16 NOTE — PATIENT INSTRUCTIONS
Preventive Care:    Breast Cancer Screening: During our visit today, we discussed that it is recommended you receive breast cancer screening. Please call or make an appointment with your primary care provider to discuss this with them. You may also call the  [facility] scheduling line ([phone]) to set up a mammography appointment at the [facility] within the [facility].    Colorectal Cancer Screening: During our visit today, we discussed that it is recommended you receive colorectal cancer screening. Please call or make an appointment with your primary care provider to discuss this. You may also call the  [facility] scheduling line ([phone]) to set up a colonoscopy appointment.

## 2018-02-16 NOTE — LETTER
2/16/2018         RE: Leandra Guerrero  [address] [place] [address]        Dear Colleague,    Thank you for referring your patient, Leandra Guerrero, to the [facility]. Please see a copy of my visit note below.    Hematology/Oncology Follow-up visit:  Date on this visit: 2/15/2018      Referring Physician: Dr. Braydon Barron, [facility], [place].  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 She was diagnosed with CHF at a local clinic in [facility] and was placed on a b-blocker and a diuretic. She has also developed progressive worsening lower extremity edema by May 2016 which in retrospect started in January. Her cardiac angiogram was reportedly negative at that time. She has lost about 35 lbs in the last 6 months. She has significant nausea somewhat controlled with Zofran but she is reluctant to use it because of constipation too. She is on Senna-S one tablet PO BID for constipation and that is improved but still significant. She has very poor PO intake due to nausea and lack of appetite. She requested to be seen at [facility] and was seen by Dr. Braydon Barron on 9/13/2016 with f/up on 9/20/2016. She was also seen by cardiology team there Sara Severson CNP and Dr. Nettles from CHF service.  There was no e/o renal or hepatic amyloidosis.  EKG on 9/7/2016 showed normal sinus rhythm, prolonged QT interval (QTc 521 sec), left atrial enlargement and left anterior fascicular block. There was ST and T wave abnormality.  Apparently, her echocardiogram showed preserved LVEF at 59%.  She reports having R sided thoracentesis on 8/25/2016 after which her breathing has improved. She then had a CXR on 9/7/2016 which showed a small R pleural effusion with right basilar atelectasis. Cardiac silhouette was at the upper level of normal.  I have reviewed extensive  outside medical records but we are still in the process of obtaining additional records.  The patient also underwent a minor salivary gland biopsy of the lower lip. The pathology from that procedure (9/20/2016) showed normal salivary gland tissue with nonspecific chronic sialadenitis.  She had extensive lab workup on 9/7/2016.  Her TSH was normal. Serum protein ELP showed a small abnormality in the gamma fraction.  Serum immunofixation showed small monoclonal IgA lambda in the gamma fraction and a small lambda in the beta fraction. Serum IgA level was normal at 329. Serum IgG level was mildly low at 755 (normal range 767-1590) and IgM level was normal. B2 microglobulin was mildly elevated at 2.94 (ULN 2.7). Mg was normal. Total bilirubin was elevated at 3.9 and direct at 0.8. Uric acid was mildly elevated at 6.9 (ULN 6.1) Creat was 1.0. Troponin was 0.1 (ULN <0.01) and NT-pro BNP was 4747 pg/ml (ULN <=183). Total cholesterol was 190 and LDL was 139. Random urine protein was mildly elevated at 33 mg/dl (normal range <22). INR was 1.2 and PTT 27 (within normal limits). Fibrinogen was elevated at 441 and factor X was mildly decreased at 64% (normal range %). D-dimer was up at 1700 (). Free lambda light chains were 69.5 and free kappa light chains were 1.11 with FLC kappa/lambda ratio of 0.016.   CBC showed normal WBC at 6.0, slightly elevated Hb at 16. Platelet count was normal.  Flow cytometry on bone marrow biopsy showed detectable monotypic plasma cells. There were 12% of monotypic plasma cells on bone marrow biopsy.    The bone marrow biopsy (performed on 9/14/2016) showed normocellular BM, 30-40% cellularity, with involvement by 10-20% of lambda light chain restricted plasma cells.  Congo red stain was positive on the bone marrow biopsy. Liquid chromatography tandem mass spectroscopy showed a peptide profile c/w AL (lambda type) amyloid. BM karyotype was normal 46 XX. FISH on BM showed plasma cell  clone with 1q duplication, and monosomy of 13 and 14.  She had an EGD by Dr. Matthew Wadsworth on 9/14/2016. It showed gastric mucosal atrophy and multiple mucosal papules (nodules) seen in the stomach. The duodenum appeared normal. The biopsies of stomach mucosa, of stomach (antral) nodules and of duodenum, all showed the presence of amyloid in submucosal blood vessel walls in the stomach and duodenum and in deep mucosa in the stomach antrum and body, confirmed by Congo Red stain.   She also had additional labs done at our last visit, on 9/30/2016. Total bilirubin was elevated as below, primarily indirect. K was 3.3 and she was stared on K-dur 20 mEq PO bid. She was noted to have elevated serum IgA level on 9/30/2016 at 392 (). Serum free lambda chains were elevated at 37.75. Serum M spike was 0.1 g/dl and serum FLC ratio was low at 0.01. Serum immunofixation ELP showed monoclonal IgA immunoglobulin of lambda light chain type as well as monoclonal free immunoglobulin light chain of lambda chain type.  She was seen by Dr. Cohen too and had an echocardiogram on 10/6/2016 which showed:  Global and regional left ventricular function was normal with an EF of 60-65%.  The LV mass index was 131 g/m2 (severely increased.) The LV geometry is c/w  concentric hypertrophy measured by relative wall thickness. Global right ventricular function was normal. Severe biatrial enlargement was present.  Right ventricular systolic pressure was 26mmHg above the right atrial pressure. The inferior vena cava was dilated at 2.1 cm without respiratory variability, consistent with increased right atrial pressure. Trivial pericardial effusion was present.    She proceeded to start CyBorD as recommended by Dr. Barron with dose reduced in subq Bortezomib to 0.7 mg/m2, on 10/4/2016.  After one cycle, her M spike, IgA level and free lambda light chains have all improved, with M spike of 0 g/dl, IgA down into the normal range and free serum  lambda light chains improving from 37.75 down to 2.09.  She returns today in cycle 3 day 11. She had amyloid labs again on day 1 of cycle 3 (12/8/2016) which showed continued response to therapy and her IgA level was now low and serum FLC ratio was normal and serum free lambda chains were normal too.   She has developed hematuria with cycle 2 and Cytoxan is on hold from day 15 cycle 2 since hematuria persisted despite Mesnex. She had a CT abdomen/pelvis on 11/29/2016 which showed no abnormalities in the kidneys. There was a moderate right and a trace left sided pleural effusions. Small volume ascites and anasarca was noted as well. The liver had heterogeneous appearance, which can be seen in hepatic venous congestion.   Cytoxan was d/cd  on  11/22/2016 since her cystoscopy showed findings of mild petechia and erythema in her bladder that would be c/w hemorrhagic cystitis.  Pinch biopsies were taken from the bladder and showed no e/o amyloid or malignancy. She has continued on Velcade and dexamethasone until 12/27/2016.  On 1/3/2017 she was seen at [facility] by Dr. Barron and at that time serum free light chains were normal at 0.74 and FLC ratio was normal as well at 0.72. She was recommended to be on observation because free lambda chains normalized. However, her cardiac amyloidosis has progressed by 1/5/2017 and she has required frequent thoracentesis, and had weeping LE edema and elevated neck veins.  She was hospitalized for CHF in Jan 2017 due to cardiac amyloid and was aggressively diuresed.  Her echocardiogram on 1/5/2017 was abnormal (severe concentric hypertrophy and biatrial enlargement) and she also had abnormal EKG (near-low voltage, poor R wave progression, biatrial enlargement and no evidence of LVH despite very thickened LV on echo). She was felt to have  New York Heart Association class IIIB heart failure with clinically severe volume overload. She was seen by cardiologist Dr. Harjit Fischer at  in  [place]. She was seen by Dr. Chivo Lei from medical oncology at the [facility] in [place], and was recommended to start on Acyclovir and Doxycycline.  She has not started Acyclovir  but is taking daily Doxycyline. She is feeling nauseated from Doxycyline and takes Zofran prn and when she does that, she feels constipated but is going to try to remain on Doxycycline.  Dr. Lei also recommended she consider maintenance therapy. She was also seen by Dr. Barron at [facility] on 2017, and he recommended against maintenance therapy.     History Of Present Illness:  Ms. Guerrero is a 67 year old female, non-smoker, who presents for f/up of AL amyloidosis. She has been off treatment since 2016.  She has been seeing  Dr. Cohen in f/up of cardiac Amyloidosis with preserved EF, CHF NYHA Class II. They monitor troponin and BNP. She was last seen in cardiology in 2017  She continued onTorsemide was increased to 60 mg PO BID. She is on aldactone 25 mg PO daily.  Her Zio patch in 2017  showed sinus rhythm with non sustained SVT.  She denies SOB. She has no cough.   She has no CP. She has swelling in b/l LE stable at 2+. She dropped a heavy object on her left anterior calf and was evaluated by PCP and treated for L calf cellulitis. Her swelling in left calf has improved but redness is till there.  She has a closed wound (eschar) at the site of where the object dropped.   She is here with her  today.  In addition, a complete 12 point  review of systems is negative.      Past Medical/Surgical History:  AL Amyloid  Amyloid cardiomyopathy/CHF    Family History:  Sister  of multiple myeloma    Social History:  Lives in [place], with . Nonsmoker        Allergies:     Allergies   Allergen Reactions     Contrast Dye Shortness Of Breath     Shaking,chills and dypsnea     Cytoxan [Cyclophosphamide]      Hemorrhagic cystitis     Levofloxacin      Severe shaking and abdominal pain      Amoxicillin Nausea and Vomiting and Cramps     Current Medications:  Current Outpatient Prescriptions   Medication     torsemide (DEMADEX) 20 MG tablet     doxycycline (VIBRA-TABS) 100 MG tablet     spironolactone (ALDACTONE) 25 MG tablet     potassium chloride SA (K-DUR/KLOR-CON M) 10 MEQ CR tablet     Acetaminophen (TYLENOL PO)     HYDROcodone-acetaminophen (NORCO) 5-325 MG per tablet     LORazepam (ATIVAN) 0.5 MG tablet     ondansetron (ZOFRAN-ODT) 4 MG disintegrating tablet     No current facility-administered medications for this visit.       Physical Exam:  /63 (BP Location: Right arm, Cuff Size: Adult Regular)  Pulse 71  Temp 97.5  F (36.4  C) (Oral)  Wt 64.5 kg (142 lb 1.6 oz)  SpO2 97%  BMI 25.17 kg/m2      GENERAL APPEARANCE: middle aged, alert and no distress     HENT: Mouth without ulcers or lesions     NECK: no adenopathy, no asymmetry or masses     LYMPHATICS: No cervical, supraclavicular, axillary or inguinal lymphadenopathy     RESP: LL- CTA. RL - decreased BS on the right 1/3 way up - no rales, rhonchi or wheezes     CARDIOVASCULAR: regular rates and rhythm, normal S1 S2, no S3 or S4 and no murmur.     ABDOMEN:  soft, nontender, no HSM or masses and bowel sounds normal. + mild to moderate ascites.     MUSCULOSKELETAL: extremities normal- no gross deformities noted, no evidence of inflammation in joints, FROM in all extremities. There is a 1 cm size eschar on the left shin with surrounding erythema over lower left leg but no s/o of infection.  +1-2 edema b/l LE improved compared to last visit.       SKIN: no suspicious lesions or rashes     PSYCHIATRIC: mentation appears normal and affect normal    Laboratory/Imaging Studies    Orders Only on 02/09/2018   Component Date Value Ref Range Status     IGG 02/09/2018 782  695 - 1620 mg/dL Final     IGA 02/09/2018 52* 70 - 380 mg/dL Final     IGM 02/09/2018 63  60 - 265 mg/dL Final     Kappa Free Lt Chain 02/09/2018 1.30  0.33 - 1.94 mg/dL Final      Lambda Free Lt Chain 02/09/2018 1.77  0.57 - 2.63 mg/dL Final     Kappa Lambda Ratio 02/09/2018 0.73  0.26 - 1.65 Final     Albumin Fraction 02/09/2018 4.6  3.7 - 5.1 g/dL Final     Alpha 1 Fraction 02/09/2018 0.4  0.2 - 0.4 g/dL Final     Alpha 2 Fraction 02/09/2018 0.7  0.5 - 0.9 g/dL Final     Beta Fraction 02/09/2018 0.8  0.6 - 1.0 g/dL Final     Gamma Fraction 02/09/2018 0.8  0.7 - 1.6 g/dL Final     Monoclonal Peak 02/09/2018 0.0  0.0 g/dL Final     ELP Interpretation: 02/09/2018    Final                    Value:Essentially normal electrophoretic pattern.  No monoclonal protein seen.  Pathologic   significance requires clinical correlation.  BOBY Ramos M.D., Ph.D., Pathologist.        WBC 02/09/2018 5.1  4.0 - 11.0 10e9/L Final     RBC Count 02/09/2018 4.74  3.8 - 5.2 10e12/L Final     Hemoglobin 02/09/2018 15.0  11.7 - 15.7 g/dL Final     Hematocrit 02/09/2018 45.5  35.0 - 47.0 % Final     MCV 02/09/2018 96  78 - 100 fl Final     MCH 02/09/2018 31.6  26.5 - 33.0 pg Final     MCHC 02/09/2018 33.0  31.5 - 36.5 g/dL Final     RDW 02/09/2018 13.6  10.0 - 15.0 % Final     Platelet Count 02/09/2018 192  150 - 450 10e9/L Final     Diff Method 02/09/2018 Automated Method   Final     % Neutrophils 02/09/2018 72.6  % Final     % Lymphocytes 02/09/2018 9.6  % Final     % Monocytes 02/09/2018 8.2  % Final     % Eosinophils 02/09/2018 8.2  % Final     % Basophils 02/09/2018 1.0  % Final     % Immature Granulocytes 02/09/2018 0.4  % Final     Absolute Neutrophil 02/09/2018 3.7  1.6 - 8.3 10e9/L Final     Absolute Lymphocytes 02/09/2018 0.5* 0.8 - 5.3 10e9/L Final     Absolute Monocytes 02/09/2018 0.4  0.0 - 1.3 10e9/L Final     Absolute Eosinophils 02/09/2018 0.4  0.0 - 0.7 10e9/L Final     Absolute Basophils 02/09/2018 0.1  0.0 - 0.2 10e9/L Final     Abs Immature Granulocytes 02/09/2018 0.0  0 - 0.4 10e9/L Final     Sodium 02/09/2018 132* 133 - 144 mmol/L Final     Potassium 02/09/2018 4.5  3.4 - 5.3 mmol/L Final      Chloride 02/09/2018 94  94 - 109 mmol/L Final     Carbon Dioxide 02/09/2018 30  20 - 32 mmol/L Final     Anion Gap 02/09/2018 8  3 - 14 mmol/L Final     Glucose 02/09/2018 81  70 - 99 mg/dL Final    Non Fasting     Urea Nitrogen 02/09/2018 34* 7 - 30 mg/dL Final     Creatinine 02/09/2018 1.10* 0.52 - 1.04 mg/dL Final     GFR Estimate 02/09/2018 50* >60 mL/min/1.7m2 Final    Non  GFR Calc     GFR Estimate If Black 02/09/2018 60* >60 mL/min/1.7m2 Final    African American GFR Calc     Calcium 02/09/2018 9.0  8.5 - 10.1 mg/dL Final     Magnesium 02/09/2018 2.8* 1.6 - 2.3 mg/dL Final     Bilirubin Direct 02/09/2018 0.7* 0.0 - 0.2 mg/dL Final     Bilirubin Total 02/09/2018 2.7* 0.2 - 1.3 mg/dL Final     Albumin 02/09/2018 4.1  3.4 - 5.0 g/dL Final     Protein Total 02/09/2018 7.6  6.8 - 8.8 g/dL Final     Alkaline Phosphatase 02/09/2018 240* 40 - 150 U/L Final     ALT 02/09/2018 29  0 - 50 U/L Final     AST 02/09/2018 23  0 - 45 U/L Final       ASSESSMENT/PLAN:  Leandra Guerrero is a 67 year old woman with  of AL amyloidosis involving the heart and GI tract. Unfortunately, she had stage IV AL amyloidosis at diagnosis in September 2016, according to revised Rios Criteria (NT-proBNP >1800 ng/l, troponin >0.025 and difference between free lambda and kappa light chain >18). Stage IV amyloidosis is associated with median survival of 5 months in patients not undergoing BMT, and 5 year survival of 15%, and in patients who are able to undergo BMT, median survival is 22 months and 5 year survival of 46% (Dejan A, Anderson MA, Toñito MCARTHUR, et al. Prognostication of survival using cardiac troponins and N-terminal pro-brain natriuretic peptide in patients with primary systemic amyloidosis undergoing peripheral blood stem cell transplantation. Blood 2004; 104:1881). She was felt not to be a candidate for high dose therapy.   She was on Cybor-D from 10/4/2016-12/27/2016, with Cytoxan omitted after cycle 2 day 1 due  to her developing hemorrhagic cystitis. Her disease has responded  biochemically, with serum M spike of zero, and serum IgA level was down to low range and  free lambda light chains have normalized quantitatively.     I have discussed her situation with Dr. Barron in January, and he felt at that time that with normalization of serum free lambda light chains, no further chemotherapy is indicated. Unfortunately, the anti-amyloid antibody clinical trial  at [facility] is closed. She was seen at [facility] in [place] and was started on oral Doxycycline.    1.  AL amyloidosis - Serum FLC ratio is normal. Both serum free kappa light chains and lambda light chains are within normal limits. There is no M protein on serum ELP.  She is on Doxycyline daily. We'll see her back in 4 months with amyloid labs prior.     2. Recurrent pleural effusions- Most recent CXR on 10/27/2017 showed improved pleural effusions compared to July 2017, with a small right pleural effusion and a tiny left pleural effusion.  She is not feeling more SOB. We'll consider repeating CXR if she has progressive SOB.  PleurX catheter is an option in the future.    3. Amyloid cardiomyopathy-  She is being followed every 3 months.  She will be seeing  Dr. Cohen next week. Continue current diuretic regimen with Torsemide 60 mg PO QAM and 60 mg PO QHS and aldactone 25 mg PO BID.   4. Creat stable at 1.10  5. Slightly higher total bilirubin and alk phos, stable- in the past felt to be secondary to liver congestion. Cont to follow.  6. Nausea- she has had occasional nausea, stable, ? Secondary to GI tract involvement by amyloid. She is on Zofran 4 mg ODT prn but that is associated with constipation. We'll add lorazepam 0.5 mg PO q 6 hours prn nausea. Rx provided to the patient.   At the end of our visit patient and  verbalized understanding and concurred with the plan.          Again, thank you for allowing me to participate in the care of your patient.         Sincerely,        Mirela Moore MD, MD

## 2018-02-19 ASSESSMENT — ENCOUNTER SYMPTOMS
HEARTBURN: 0
DIARRHEA: 0
CONSTIPATION: 1
NECK PAIN: 0
VOMITING: 0
JAUNDICE: 0
BLOOD IN STOOL: 0
ABDOMINAL PAIN: 0
JOINT SWELLING: 0
ARTHRALGIAS: 0
BACK PAIN: 0
RECTAL PAIN: 0
BOWEL INCONTINENCE: 0
STIFFNESS: 0
MYALGIAS: 0
MUSCLE WEAKNESS: 0
BLOATING: 1
NAUSEA: 1
MUSCLE CRAMPS: 1

## 2018-02-22 ENCOUNTER — OFFICE VISIT (OUTPATIENT)
Dept: CARDIOLOGY | Facility: CLINIC | Age: 68
End: 2018-02-22
Attending: INTERNAL MEDICINE
Payer: MEDICARE

## 2018-02-22 VITALS
HEIGHT: 63 IN | BODY MASS INDEX: 25.14 KG/M2 | DIASTOLIC BLOOD PRESSURE: 68 MMHG | OXYGEN SATURATION: 98 % | HEART RATE: 65 BPM | SYSTOLIC BLOOD PRESSURE: 122 MMHG | WEIGHT: 141.9 LBS

## 2018-02-22 DIAGNOSIS — I43 CARDIAC AMYLOIDOSIS (H): ICD-10-CM

## 2018-02-22 DIAGNOSIS — I50.33 ACUTE ON CHRONIC DIASTOLIC HEART FAILURE (H): ICD-10-CM

## 2018-02-22 DIAGNOSIS — E85.4 AMYLOID HEART DISEASE (H): Primary | ICD-10-CM

## 2018-02-22 DIAGNOSIS — I43 AMYLOID HEART DISEASE (H): Primary | ICD-10-CM

## 2018-02-22 DIAGNOSIS — E85.4 CARDIAC AMYLOIDOSIS (H): ICD-10-CM

## 2018-02-22 LAB
ANION GAP SERPL CALCULATED.3IONS-SCNC: 6 MMOL/L (ref 3–14)
BUN SERPL-MCNC: 35 MG/DL (ref 7–30)
CALCIUM SERPL-MCNC: 8.9 MG/DL (ref 8.5–10.1)
CHLORIDE SERPL-SCNC: 95 MMOL/L (ref 94–109)
CO2 SERPL-SCNC: 31 MMOL/L (ref 20–32)
CREAT SERPL-MCNC: 1.2 MG/DL (ref 0.52–1.04)
GFR SERPL CREATININE-BSD FRML MDRD: 45 ML/MIN/1.7M2
GLUCOSE SERPL-MCNC: 86 MG/DL (ref 70–99)
NT-PROBNP SERPL-MCNC: 1839 PG/ML (ref 0–125)
POTASSIUM SERPL-SCNC: 4.9 MMOL/L (ref 3.4–5.3)
SODIUM SERPL-SCNC: 132 MMOL/L (ref 133–144)
TROPONIN I SERPL-MCNC: <0.015 UG/L (ref 0–0.04)

## 2018-02-22 PROCEDURE — 84484 ASSAY OF TROPONIN QUANT: CPT | Performed by: INTERNAL MEDICINE

## 2018-02-22 PROCEDURE — G0463 HOSPITAL OUTPT CLINIC VISIT: HCPCS | Mod: ZF

## 2018-02-22 PROCEDURE — 36415 COLL VENOUS BLD VENIPUNCTURE: CPT | Performed by: INTERNAL MEDICINE

## 2018-02-22 PROCEDURE — 99214 OFFICE O/P EST MOD 30 MIN: CPT | Mod: GC | Performed by: INTERNAL MEDICINE

## 2018-02-22 PROCEDURE — 83880 ASSAY OF NATRIURETIC PEPTIDE: CPT | Performed by: INTERNAL MEDICINE

## 2018-02-22 PROCEDURE — 80048 BASIC METABOLIC PNL TOTAL CA: CPT | Performed by: INTERNAL MEDICINE

## 2018-02-22 ASSESSMENT — PAIN SCALES - GENERAL: PAINLEVEL: NO PAIN (0)

## 2018-02-22 NOTE — NURSING NOTE
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    Lilly Kim RMA  11:18 AM

## 2018-02-22 NOTE — PROGRESS NOTES
[facility]  February 22, 2018    HPI:  Mrs. Guerrero is a 67 year old female with no past medical history until the Spring of 2016 when she was diagnosed with stage IV AL amyloid; she presents to clinic for follow up of cardiac amyloid.     Her cardiac and oncologic history are as follows:     Fatigue started in January 2016. She eventually had a coronary angiogram which was reportedly normal and was diagnosed with CHF and started on a bblocker and diuretics. Her symptoms progressed to the point that she was evaluated at [facility] in August/September (Dr. Barron in oncology/hematology, Dr. Nettles is cardiology). She was diagnosed with stage IV AL amyloid (+GI, +bone marrow involvement, no involvement of kidney or liver):  her work up is detailed in our previous notes, however she has lambda AL amyloidosis and she underwent CyBorD chemotherapy and excellent light chain response by serum. [facility] down [facility] due to her cardiac involvement.    Unfortunately in 2016, she had 2-3 more pleural taps on the R and came back to my clinic with gross anasarca.  I admitted her to the hospital in 1/2017 where close to 20-30 pounds of fluid overload of volume was removed.  Since that time her AL has been in remission by labs without further chemo; her oncologist recently increase the interval of their meetings which is encouraging from that standpoint.  She has had no further hospitalizations, however she returns to clinic today stating that she feels worse for the past 2 weeks.  She feels abdominal bloating, nausea, constipation which is not improving with stool softeners, bilateral leg swelling; her weight is 8-9 pounds above baseline according to her home scale.  She has intermittently taken an extra dose of torsemide without any improvement, as recently as last night.  She denies worsening orthopnea or exertional dyspnea; she denies any chest pain or palpitations.  She  recently had a 7 day Ziopatch which revealed short runs of paroxysmal SVT; she did push the alert button once for an episode which was mildly symptomatic but otherwise she is not bothered by palpitations.    Of note, she dropped a saw on her left shin approximately 7 weeks ago which resulted in immediate bruising and swelling of the shin.  There is an area of skin opening which was exquisitely tender and warm to the touch; about 3 weeks ago she was treated with a course of antibiotics for cellulitis. This is still not resolved.      PAST MEDICAL HISTORY:  - Stage IV AL amyloid   - GI (stomach and duodenal) involvement  - Cardiac amyloidosis    FAMILY HISTORY:  Family History   Problem Relation Age of Onset     Other - See Comments Sister      Amyloidosis   - Sister passed from multiple myeloma, patient was also told her sister had amyloid as well  - Mom with CAD    SOCIAL HISTORY:  Social History     Social History     Marital Status:      Spouse Name: N/A     Number of Children: N/A     Years of Education: N/A     Social History Main Topics     Smoking status: Never Smoker      Smokeless tobacco: None     Alcohol Use: No     Drug Use: No     Sexual Activity: Not Asked     Social History Narrative   Lives in [place] with her , has 2 kids  Previously did office work, hasn't worked for several months now  Never smoker  Social ETOH    CURRENT MEDICATIONS:  Current Outpatient Prescriptions   Medication Sig Dispense Refill     LORazepam (ATIVAN) 0.5 MG tablet Take 1 tablet (0.5 mg) by mouth every 6 hours as needed for other (nausea) 30 tablet 0     torsemide (DEMADEX) 20 MG tablet Take 3 tablets (60 mg) by mouth 2 times daily 540 tablet 3     doxycycline (VIBRA-TABS) 100 MG tablet Take 100 mg by mouth daily       spironolactone (ALDACTONE) 25 MG tablet Take 1 tablet (25 mg) by mouth 2 times daily 180 tablet 3     potassium chloride SA (K-DUR/KLOR-CON M) 10 MEQ CR tablet Take 2 tablets (20 mEq) by mouth 2  "times daily Do not fill until requested 120 tablet 11     Acetaminophen (TYLENOL PO) Take 325 mg by mouth       ondansetron (ZOFRAN-ODT) 4 MG disintegrating tablet   0       ROS:   Constitutional: No fever, chills, or sweats.  Weight gain 8-9 pounds.  ENT: No visual disturbance, ear ache, epistaxis, sore throat.   Allergies/Immunologic: Negative.   Respiratory: No cough, hemoptysis.   Cardiovascular: As per HPI.   GI: +Nausea, constipation.  : No urinary frequency, dysuria, or hematuria.   Integument: Negative.   Psychiatric: feeling down and anxious since her diagnosis  Neuro: + tingling in legs bilaterally  Endocrinology: Negative.   Musculoskeletal: Negative.    EXAM:  /68 (BP Location: Left arm, Patient Position: Chair, Cuff Size: Adult Regular)  Pulse 65  Ht 1.6 m (5' 3\")  Wt 64.4 kg (141 lb 14.4 oz)  SpO2 98%  BMI 25.14 kg/m2  General: appears comfortable, alert and articulate, NAD, pleasant  Head: normocephalic, atraumatic  Eyes: anicteric sclera, EOMI  Neck: no adenopathy, JVP ~8 cm H2O with +HJR  Orophyarynx: moist mucosa, no lesions, dentition intact  Heart: regular rate and rhythm. III/VI holosystolic murmur at the apex   Lungs: decreased breath sounds at the L base, lungs otherwise clear   Abdomen: soft, non-tender, bowel sounds present, no hepatosplenomegaly  Extremities: mild-moderate mostly nonpitting edema of bilateral extremities. Band of erythema (not warm to touch) surrounding left shin, with 1.5 cm skin opening and mild serosanguinous drainage.   Neurological: normal speech and affect, no gross motor deficits    Labs:  CBC RESULTS:  Lab Results   Component Value Date    WBC 5.1 02/09/2018    RBC 4.74 02/09/2018    HGB 15.0 02/09/2018    HCT 45.5 02/09/2018    MCV 96 02/09/2018    MCH 31.6 02/09/2018    MCHC 33.0 02/09/2018    RDW 13.6 02/09/2018     02/09/2018       CMP RESULTS:  Lab Results   Component Value Date     (L) 02/22/2018    POTASSIUM 4.9 02/22/2018    " CHLORIDE 95 02/22/2018    CO2 31 02/22/2018    ANIONGAP 6 02/22/2018    GLC 86 02/22/2018    BUN 35 (H) 02/22/2018    CR 1.20 (H) 02/22/2018    GFRESTIMATED 45 (L) 02/22/2018    GFRESTBLACK 54 (L) 02/22/2018    JERROD 8.9 02/22/2018    BILITOTAL 2.7 (H) 02/09/2018    ALBUMIN 4.1 02/09/2018    ALKPHOS 240 (H) 02/09/2018    ALT 29 02/09/2018    AST 23 02/09/2018     INR RESULTS:  Lab Results   Component Value Date    INR 1.30 (H) 01/14/2017       Lab Results   Component Value Date    MAG 2.8 (H) 02/09/2018     Lab Results   Component Value Date    NTBNPI 9009 (H) 01/13/2017     Lab Results   Component Value Date    NTBNP 1839 (H) 02/22/2018       TTE 8/2017:  Global and regional left ventricular function is normal with an EF of 55-60%.  Moderate concentric wall thickening consistent with left ventricular hypertrophy is present. Grade III or advanced diastolic dysfunction.  Global peak LV longitudinal strain is averaged at -15.7%. This is below reported normal limits (normal <-18%).  The right ventricle is normal size. Global right ventricular function is mildly reduced.  Mild aortic insufficiency is present.  The inferior vena cava was dilated at 2.2 cm without respiratory variability, consistent with increased right atrial pressure. Estimated mean right atrial pressure is 15 mmHg.   Right ventricular systolic pressure is 25mmHg above the right atrial pressure. No pericardial effusion is present. Right pleural effusion behind right atrium. This study was compared with the study from 1/5/2017.  Pericardial effuison is not present on this study. Otherwise no significant changes.     CXR 10/27/17: Persistent small, right greater than left pleural effusions and overlying atelectasis.    ==========================================================    Assessment and Plan:   Mrs. 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     Patient has cardiac amyloidosis as evidenced by her echocardiogram (severe concentric hypertrophy and biatrial enlargement) and abnormal EKG (near-low voltage, poor R wave progression, biatrial enlargement and no evidence of LVH despite very thickened LV on echo) in the setting of biopsy proven (BMB, EGD) AL amyloid.She has completed chemotherapy with CyBorD with an excellent serologic response and her heart failure (both clinically and by labs - i.e. troponin now negative, serum light chains minimal and urine light chains undetectable) has stabilized significantly. Her NTproBNP is within range from prior and clinically she appears modestly hypervolemic. Her biggest ongoing concern is GI upset, including severe nausea, bloating, and constipation. This may be related to amyloid involvement in the gut resulting in gastroparesis. She believes Doxycyline may be contributing to her nausea and would like to stop this medication. We offered her a referral to see GI regarding possible symptoms related to amyloid involvement in the GI tract; she will continue to manage this on her own with OTC stool softeners and nausea medications but will keep this in mind. Appetite remains good and her weight is stable (actually slightly increased).     Her NYHA class is II today, Stage C, she is relatively very mildly hypervolemic today and so I we will increase her torsemide to 80 mg BID.  We again addressed the importance of planning for end-of-life cares (despite her impressive response to therapy, long-term prognosis for stage IV - AL amyloidosis is very poor); she is somewhat hesitant to discus this today. Her  is also present during this encounter.     Cardiac Amyloidosis with preserved EF  Stage C  NYHA Class II  ACEi/ARB - none- no indication   BB - none, no indication and relatively contraindicated due to risk of progressive conduction disease/AV block in these patients  Aldosterone antagonist:  aldactone   SCD prophylaxis: not indicated  % BiV pacing: NA  Fluid status: Increase Torsemide to 80mg BID; no change in KCl replacement    Other:  Arrhythmia monitoring -- Her Zio patch in 2/17 showed sinus rhythm with non sustained SVT. A repeat 7-day Ziopatch recently reveals short (>45 second) runs of SVT, for which she is generally not symptomatic. No further eval.      Systemic Amyloid -- she would like to stop taking Doxycycline due to abdominal bloating/nausea. Seems reasonable to do given overall lack of strong supporting evidence for light chain amyloidosis    Follow up: 3-4 months      Patient seen and discussed with Dr. Cohen in clinic.    Karen Springer MD  Cardiology Fellow  [phone]    I have seen and examined the patient with the house staff on February 22, 2018 and agree with the outlined assessment and plan.    Domenica Cohen MD   of Medicine   [facility] Division of Cardiology         CC   [place] [address]      Patient Care Team:  Braydon Barron as PCP - General (Hematology)  Mirela Daley MD as MD (Hematology & Oncology)  Domenica Cohen MD as MD (Cardiology)  Eileen Arevalo, RN as Nurse Coordinator (Cardiology)  Codie Nelson, GREY as Nurse Practitioner (Cardiology)  Kathryn Posey, RN as Nurse Coordinator (Cardiology)  Bear Wu, DILIP as Nurse Coordinator (Cardiology)  MIRELA DALEY MD

## 2018-02-22 NOTE — PATIENT INSTRUCTIONS
Stop Doxycycline  Increase Torsemide to 80 mg twice daily for a few days, then return to 60 mg twice daily  Follow up 3-4 months  Try Prune juice of Senakot on a regular basis for constipation    Follow up Dr. Cohen in 3-4 months with labs      Riya Streeter, RN  Cardiology Care Coordinator  Please be aware that I work part-time but I will be checking messages several times per week.   For urgent needs, please call the number below.    [phone], press 1 for [employer], press 3 to speak to a nurse    .

## 2018-02-22 NOTE — LETTER
2/22/2018      RE: Leandra Guerrero  [address] [place] [address]       Dear Colleague,    Thank you for the opportunity to participate in the care of your patient, Leandra Guerrero, at the [facility]. Please see a copy of my visit note below.         Advanced [facility] Failure Clinic  February 22, 2018    HPI:  Mrs. Guerrero is a 67 year old female with no past medical history until the Spring of 2016 when she was diagnosed with stage IV AL amyloid; she presents to clinic for follow up of cardiac amyloid.     Her cardiac and oncologic history are as follows:     Fatigue started in January 2016. She eventually had a coronary angiogram which was reportedly normal and was diagnosed with CHF and started on a bblocker and diuretics. Her symptoms progressed to the point that she was evaluated at [facility] in August/September (Dr. Barron in oncology/hematology, Dr. Nettles is cardiology). She was diagnosed with stage IV AL amyloid (+GI, +bone marrow involvement, no involvement of kidney or liver):  her work up is detailed in our previous notes, however she has lambda AL amyloidosis and she underwent CyBorD chemotherapy and excellent light chain response by serum. [facility] due to her cardiac involvement.    Unfortunately in 2016, she had 2-3 more pleural taps on the R and came back to my clinic with gross anasarca.  I admitted her to the hospital in 1/2017 where close to 20-30 pounds of fluid overload of volume was removed.  Since that time her AL has been in remission by labs without further chemo; her oncologist recently increase the interval of their meetings which is encouraging from that standpoint.  She has had no further hospitalizations, however she returns to clinic today stating that she feels worse for the past 2 weeks.  She feels abdominal bloating, nausea, constipation which is not improving  with stool softeners, bilateral leg swelling; her weight is 8-9 pounds above baseline according to her home scale.  She has intermittently taken an extra dose of torsemide without any improvement, as recently as last night.  She denies worsening orthopnea or exertional dyspnea; she denies any chest pain or palpitations.  She recently had a 7 day Ziopatch which revealed short runs of paroxysmal SVT; she did push the alert button once for an episode which was mildly symptomatic but otherwise she is not bothered by palpitations.    Of note, she dropped a saw on her left shin approximately 7 weeks ago which resulted in immediate bruising and swelling of the shin.  There is an area of skin opening which was exquisitely tender and warm to the touch; about 3 weeks ago she was treated with a course of antibiotics for cellulitis. This is still not resolved.      PAST MEDICAL HISTORY:  - Stage IV AL amyloid   - GI (stomach and duodenal) involvement  - Cardiac amyloidosis    FAMILY HISTORY:  Family History   Problem Relation Age of Onset     Other - See Comments Sister      Amyloidosis   - Sister passed from multiple myeloma, patient was also told her sister had amyloid as well  - Mom with CAD    SOCIAL HISTORY:  Social History     Social History     Marital Status:      Spouse Name: N/A     Number of Children: N/A     Years of Education: N/A     Social History Main Topics     Smoking status: Never Smoker      Smokeless tobacco: None     Alcohol Use: No     Drug Use: No     Sexual Activity: Not Asked     Social History Narrative   Lives in [place] with her , has 2 kids  Previously did office work, hasn't worked for several months now  Never smoker  Social ETOH    CURRENT MEDICATIONS:  Current Outpatient Prescriptions   Medication Sig Dispense Refill     LORazepam (ATIVAN) 0.5 MG tablet Take 1 tablet (0.5 mg) by mouth every 6 hours as needed for other (nausea) 30 tablet 0     torsemide (DEMADEX) 20 MG tablet  "Take 3 tablets (60 mg) by mouth 2 times daily 540 tablet 3     doxycycline (VIBRA-TABS) 100 MG tablet Take 100 mg by mouth daily       spironolactone (ALDACTONE) 25 MG tablet Take 1 tablet (25 mg) by mouth 2 times daily 180 tablet 3     potassium chloride SA (K-DUR/KLOR-CON M) 10 MEQ CR tablet Take 2 tablets (20 mEq) by mouth 2 times daily Do not fill until requested 120 tablet 11     Acetaminophen (TYLENOL PO) Take 325 mg by mouth       ondansetron (ZOFRAN-ODT) 4 MG disintegrating tablet   0       ROS:   Constitutional: No fever, chills, or sweats.  Weight gain 8-9 pounds.  ENT: No visual disturbance, ear ache, epistaxis, sore throat.   Allergies/Immunologic: Negative.   Respiratory: No cough, hemoptysis.   Cardiovascular: As per HPI.   GI: +Nausea, constipation.  : No urinary frequency, dysuria, or hematuria.   Integument: Negative.   Psychiatric: feeling down and anxious since her diagnosis  Neuro: + tingling in legs bilaterally  Endocrinology: Negative.   Musculoskeletal: Negative.    EXAM:  /68 (BP Location: Left arm, Patient Position: Chair, Cuff Size: Adult Regular)  Pulse 65  Ht 1.6 m (5' 3\")  Wt 64.4 kg (141 lb 14.4 oz)  SpO2 98%  BMI 25.14 kg/m2  General: appears comfortable, alert and articulate, NAD, pleasant  Head: normocephalic, atraumatic  Eyes: anicteric sclera, EOMI  Neck: no adenopathy, JVP ~8 cm H2O with +HJR  Orophyarynx: moist mucosa, no lesions, dentition intact  Heart: regular rate and rhythm. III/VI holosystolic murmur at the apex   Lungs: decreased breath sounds at the L base, lungs otherwise clear   Abdomen: soft, non-tender, bowel sounds present, no hepatosplenomegaly  Extremities: mild-moderate mostly nonpitting edema of bilateral extremities. Band of erythema (not warm to touch) surrounding left shin, with 1.5 cm skin opening and mild serosanguinous drainage.   Neurological: normal speech and affect, no gross motor deficits    Labs:  CBC RESULTS:  Lab Results   Component " Value Date    WBC 5.1 02/09/2018    RBC 4.74 02/09/2018    HGB 15.0 02/09/2018    HCT 45.5 02/09/2018    MCV 96 02/09/2018    MCH 31.6 02/09/2018    MCHC 33.0 02/09/2018    RDW 13.6 02/09/2018     02/09/2018       CMP RESULTS:  Lab Results   Component Value Date     (L) 02/22/2018    POTASSIUM 4.9 02/22/2018    CHLORIDE 95 02/22/2018    CO2 31 02/22/2018    ANIONGAP 6 02/22/2018    GLC 86 02/22/2018    BUN 35 (H) 02/22/2018    CR 1.20 (H) 02/22/2018    GFRESTIMATED 45 (L) 02/22/2018    GFRESTBLACK 54 (L) 02/22/2018    JERROD 8.9 02/22/2018    BILITOTAL 2.7 (H) 02/09/2018    ALBUMIN 4.1 02/09/2018    ALKPHOS 240 (H) 02/09/2018    ALT 29 02/09/2018    AST 23 02/09/2018     INR RESULTS:  Lab Results   Component Value Date    INR 1.30 (H) 01/14/2017       Lab Results   Component Value Date    MAG 2.8 (H) 02/09/2018     Lab Results   Component Value Date    NTBNPI 9009 (H) 01/13/2017     Lab Results   Component Value Date    NTBNP 1839 (H) 02/22/2018       TTE 8/2017:  Global and regional left ventricular function is normal with an EF of 55-60%.  Moderate concentric wall thickening consistent with left ventricular hypertrophy is present. Grade III or advanced diastolic dysfunction.  Global peak LV longitudinal strain is averaged at -15.7%. This is below reported normal limits (normal <-18%).  The right ventricle is normal size. Global right ventricular function is mildly reduced.  Mild aortic insufficiency is present.  The inferior vena cava was dilated at 2.2 cm without respiratory variability, consistent with increased right atrial pressure. Estimated mean right atrial pressure is 15 mmHg.   Right ventricular systolic pressure is 25mmHg above the right atrial pressure. No pericardial effusion is present. Right pleural effusion behind right atrium. This study was compared with the study from 1/5/2017.  Pericardial effuison is not present on this study. Otherwise no significant changes.     CXR 10/27/17:  Persistent small, right greater than left pleural effusions and overlying atelectasis.    ==========================================================    Assessment and Plan:   Mrs. 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     Patient has cardiac amyloidosis as evidenced by her echocardiogram (severe concentric hypertrophy and biatrial enlargement) and abnormal EKG (near-low voltage, poor R wave progression, biatrial enlargement and no evidence of LVH despite very thickened LV on echo) in the setting of biopsy proven (BMB, EGD) AL amyloid.She has completed chemotherapy with CyBorD with an excellent serologic response and her heart failure (both clinically and by labs - i.e. troponin now negative, serum light chains minimal and urine light chains undetectable) has stabilized significantly. Her NTproBNP is within range from prior and clinically she appears modestly hypervolemic. Her biggest ongoing concern is GI upset, including severe nausea, bloating, and constipation. This may be related to amyloid involvement in the gut resulting in gastroparesis. She believes Doxycyline may be contributing to her nausea and would like to stop this medication. We offered her a referral to see GI regarding possible symptoms related to amyloid involvement in the GI tract; she will continue to manage this on her own with OTC stool softeners and nausea medications but will keep this in mind. Appetite remains good and her weight is stable (actually slightly increased).     Her NYHA class is II today, Stage C, she is relatively very mildly hypervolemic today and so I we will increase her torsemide to 80 mg BID.  We again addressed the importance of planning for end-of-life cares (despite her impressive response to therapy, long-term prognosis for stage IV - AL amyloidosis is very poor); she is somewhat hesitant  to discus this today. Her  is also present during this encounter.     Cardiac Amyloidosis with preserved EF  Stage C  NYHA Class II  ACEi/ARB - none- no indication   BB - none, no indication and relatively contraindicated due to risk of progressive conduction disease/AV block in these patients  Aldosterone antagonist: aldactone   SCD prophylaxis: not indicated  % BiV pacing: NA  Fluid status: Increase Torsemide to 80mg BID; no change in KCl replacement    Other:  Arrhythmia monitoring -- Her Zio patch in 2/17 showed sinus rhythm with non sustained SVT. A repeat 7-day Ziopatch recently reveals short (>45 second) runs of SVT, for which she is generally not symptomatic. No further eval.      Systemic Amyloid -- she would like to stop taking Doxycycline due to abdominal bloating/nausea. Seems reasonable to do given overall lack of strong supporting evidence for light chain amyloidosis    Follow up: 3-4 months      Patient seen and discussed with Dr. Cohen in clinic.    Karen Springer MD  Cardiology Fellow  [phone]    I have seen and examined the patient with the house staff on February 22, 2018 and agree with the outlined assessment and plan.    Domenica Cohen MD   of Medicine   [facility] Division of Cardiology     CC   [place] [address]    Patient Care Team:  Braydon Barron as PCP - General (Hematology)  Mirela Moore MD as MD (Hematology & Oncology)  Eileen Arevalo, RN as Nurse Coordinator (Cardiology)  Codie Nelson NP as Nurse Practitioner (Cardiology)  Kathryn Posey, DILIP as Nurse Coordinator (Cardiology)  Bear Wu RN as Nurse Coordinator (Cardiology)

## 2018-02-22 NOTE — MR AVS SNAPSHOT
After Visit Summary   2/22/2018    Leandra Guerrero    MRN: [id number]           Patient Information     Date Of Birth          1950        Visit Information        Provider Department      2/22/2018 11:30 AM Domenica Cohen MD [facility]        Today's Diagnoses     Amyloid heart disease (H)    -  1      Care Instructions    Stop Doxycycline  Increase Torsemide to 80 mg twice daily for a few days, then return to 60 mg twice daily  Follow up 3-4 months  Try Prune juice of Senakot on a regular basis for constipation    Follow up Dr. Cohen in 3-4 months with labs      Riya Streeter, RN  Cardiology Care Coordinator  Please be aware that I work part-time but I will be checking messages several times per week.   For urgent needs, please call the number below.    [phone], press 1 for [email or web address], press 3 to speak to a nurse    .          Follow-ups after your visit        Additional Services     Follow-Up with Advanced Heart Failure Cardiologist                 Your next 10 appointments already scheduled     Jun 07, 2018  7:00 AM CDT   Lab with  LAB   [facility])    [address] [place] [address] [phone]            Jun 07, 2018  7:30 AM CDT   (Arrive by 7:15 AM)   RETURN HEART FAILURE with Domenica Cohen MD   [facility] ([facility])    [address] [place] [address] [phone]            Jun 08, 2018  9:30 AM CDT   LAB with [facility] ([facility])    [address] [place] [address] [phone]           Please do not eat 10-12 hours before your appointment if you are coming in fasting for labs on lipids, cholesterol, or glucose (sugar). This does not apply to pregnant women. Water, hot tea and black coffee (with nothing added) are okay. Do not drink other fluids,  "diet soda or chew gum.            Jun 14, 2018  3:00 PM CDT   Return Visit with Mirela Moore MD   [facility] ([facility])    [address] [place] [address] [phone]              Future tests that were ordered for you today     Open Future Orders        Priority Expected Expires Ordered    Follow-Up with Advanced Heart Failure Cardiologist Routine 5/22/2018 8/30/2018 2/22/2018            Who to contact     If you have questions or need follow up information about today's clinic visit or your schedule please contact [facility] directly at [phone].  Normal or non-critical lab and imaging results will be communicated to you by MyChart, letter or phone within 4 business days after the clinic has received the results. If you do not hear from us within 7 days, please contact the clinic through [employer] or phone. If you have a critical or abnormal lab result, we will notify you by phone as soon as possible.  Submit refill requests through [employer] or call your pharmacy and they will forward the refill request to us. Please allow 3 business days for your refill to be completed.          Additional Information About Your Visit        MyChart Information     [employer] gives you secure access to your electronic health record. If you see a primary care provider, you can also send messages to your care team and make appointments. If you have questions, please call your primary care clinic.  If you do not have a primary care provider, please call [phone] and they will assist you.        Care EveryWhere ID     This is your Care EveryWhere ID. This could be used by other organizations to access your [place] medical records  [id number]        Your Vitals Were     Pulse Height Pulse Oximetry BMI (Body Mass Index)          65 1.6 m (5' 3\") 98% 25.14 kg/m2         Blood Pressure from Last 3 Encounters:   02/22/18 122/68   02/16/18 119/63   11/16/17 133/76 "    Weight from Last 3 Encounters:   02/22/18 64.4 kg (141 lb 14.4 oz)   02/16/18 64.5 kg (142 lb 1.6 oz)   11/16/17 62.5 kg (137 lb 12.8 oz)               Primary Care Provider Office Phone # Fax Lyubov Barron [phone]       [place] [address] [address] [facility] [address]        Equal Access to Services     YASMIN REYNA : Hadii aad ku hadasho Soomaali, waaxda luqadaha, qaybta kaalmada adeegyada, waxay idiin hayaan adeeg kharash rosa . So [facility] [phone].    ATENCIÓN: Si habla espmelissa, tiene a lobo disposición servicios gratuitos de asistencia lingüística. Machelle al [phone].    We comply with applicable federal civil rights laws and [place] laws. We do not discriminate on the basis of race, color, national origin, age, disability, sex, sexual orientation, or gender identity.            Thank you!     Thank you for choosing [facility]  for your care. Our goal is always to provide you with excellent care. Hearing back from our patients is one way we can continue to improve our services. Please take a few minutes to complete the written survey that you may receive in the mail after your visit with us. Thank you!             Your Updated Medication List - Protect others around you: Learn how to safely use, store and throw away your medicines at www.disposemymeds.org.          This list is accurate as of 2/22/18  1:02 PM.  Always use your most recent med list.                   Brand Name Dispense Instructions for use Diagnosis    LORazepam 0.5 MG tablet    ATIVAN    30 tablet    Take 1 tablet (0.5 mg) by mouth every 6 hours as needed for other (nausea)    Amyloid heart disease (H), Weight loss, Hypokalemia, AL amyloidosis       ondansetron 4 MG ODT tab    ZOFRAN-ODT      Amyloid heart disease (H), Weight loss, Hypokalemia       potassium chloride SA 10 MEQ CR tablet    K-DUR/KLOR-CON M    120 tablet    Take 2 tablets (20 mEq) by mouth 2 times daily Do not fill until requested     Hypokalemia       spironolactone 25 MG tablet    ALDACTONE    180 tablet    Take 1 tablet (25 mg) by mouth 2 times daily    Acute on chronic diastolic heart failure (H)       torsemide 20 MG tablet    DEMADEX    540 tablet    Take 3 tablets (60 mg) by mouth 2 times daily    Acute on chronic diastolic heart failure (H)       TYLENOL PO      Take 325 mg by mouth    Amyloid heart disease (H)

## 2018-02-25 DIAGNOSIS — E87.6 HYPOKALEMIA: ICD-10-CM

## 2018-02-25 RX ORDER — POTASSIUM CHLORIDE 750 MG/1
20 TABLET, EXTENDED RELEASE ORAL 2 TIMES DAILY
Qty: 360 TABLET | Refills: 3 | Status: SHIPPED | OUTPATIENT
Start: 2018-02-25 | End: 2018-11-15

## 2018-05-09 ENCOUNTER — MYC MEDICAL ADVICE (OUTPATIENT)
Dept: CARDIOLOGY | Facility: CLINIC | Age: 68
End: 2018-05-09

## 2018-05-09 DIAGNOSIS — I50.33 ACUTE ON CHRONIC DIASTOLIC HEART FAILURE (H): ICD-10-CM

## 2018-05-09 RX ORDER — SPIRONOLACTONE 25 MG/1
25 TABLET ORAL 2 TIMES DAILY
Qty: 180 TABLET | Refills: 3 | Status: SHIPPED | OUTPATIENT
Start: 2018-05-09 | End: 2019-03-29

## 2018-05-31 ASSESSMENT — ENCOUNTER SYMPTOMS
LEG PAIN: 0
SLEEP DISTURBANCES DUE TO BREATHING: 0
ORTHOPNEA: 0
HYPERTENSION: 0
EXERCISE INTOLERANCE: 0
PALPITATIONS: 0
SYNCOPE: 0
LIGHT-HEADEDNESS: 0
HYPOTENSION: 0

## 2018-06-01 DIAGNOSIS — I50.33 ACUTE ON CHRONIC DIASTOLIC HEART FAILURE (H): Primary | ICD-10-CM

## 2018-06-07 ENCOUNTER — OFFICE VISIT (OUTPATIENT)
Dept: CARDIOLOGY | Facility: CLINIC | Age: 68
End: 2018-06-07
Attending: INTERNAL MEDICINE
Payer: MEDICARE

## 2018-06-07 VITALS
SYSTOLIC BLOOD PRESSURE: 119 MMHG | HEIGHT: 63 IN | BODY MASS INDEX: 25.89 KG/M2 | DIASTOLIC BLOOD PRESSURE: 68 MMHG | HEART RATE: 61 BPM | OXYGEN SATURATION: 99 % | WEIGHT: 146.1 LBS

## 2018-06-07 DIAGNOSIS — I50.33 ACUTE ON CHRONIC DIASTOLIC HEART FAILURE (H): ICD-10-CM

## 2018-06-07 DIAGNOSIS — I43 AMYLOID HEART DISEASE (H): Primary | ICD-10-CM

## 2018-06-07 DIAGNOSIS — E85.81 AL AMYLOIDOSIS (H): ICD-10-CM

## 2018-06-07 DIAGNOSIS — E85.4 AMYLOID HEART DISEASE (H): Primary | ICD-10-CM

## 2018-06-07 DIAGNOSIS — E87.6 HYPOKALEMIA: ICD-10-CM

## 2018-06-07 DIAGNOSIS — E85.4 AMYLOID HEART DISEASE (H): ICD-10-CM

## 2018-06-07 DIAGNOSIS — I43 AMYLOID HEART DISEASE (H): ICD-10-CM

## 2018-06-07 DIAGNOSIS — R63.4 WEIGHT LOSS: ICD-10-CM

## 2018-06-07 LAB
ALBUMIN SERPL-MCNC: 4.1 G/DL (ref 3.4–5)
ALP SERPL-CCNC: 204 U/L (ref 40–150)
ALT SERPL W P-5'-P-CCNC: 28 U/L (ref 0–50)
ANION GAP SERPL CALCULATED.3IONS-SCNC: 8 MMOL/L (ref 3–14)
AST SERPL W P-5'-P-CCNC: 21 U/L (ref 0–45)
BASOPHILS # BLD AUTO: 0.1 10E9/L (ref 0–0.2)
BASOPHILS NFR BLD AUTO: 1 %
BILIRUB DIRECT SERPL-MCNC: 0.7 MG/DL (ref 0–0.2)
BILIRUB SERPL-MCNC: 2.7 MG/DL (ref 0.2–1.3)
BUN SERPL-MCNC: 33 MG/DL (ref 7–30)
CALCIUM SERPL-MCNC: 8.8 MG/DL (ref 8.5–10.1)
CHLORIDE SERPL-SCNC: 96 MMOL/L (ref 94–109)
CO2 SERPL-SCNC: 30 MMOL/L (ref 20–32)
CREAT SERPL-MCNC: 1.33 MG/DL (ref 0.52–1.04)
DIFFERENTIAL METHOD BLD: ABNORMAL
EOSINOPHIL # BLD AUTO: 0.5 10E9/L (ref 0–0.7)
EOSINOPHIL NFR BLD AUTO: 10.5 %
ERYTHROCYTE [DISTWIDTH] IN BLOOD BY AUTOMATED COUNT: 13.5 % (ref 10–15)
GFR SERPL CREATININE-BSD FRML MDRD: 40 ML/MIN/1.7M2
GLUCOSE SERPL-MCNC: 77 MG/DL (ref 70–99)
HCT VFR BLD AUTO: 45.9 % (ref 35–47)
HGB BLD-MCNC: 15.4 G/DL (ref 11.7–15.7)
IGA SERPL-MCNC: 47 MG/DL (ref 70–380)
IGG SERPL-MCNC: 757 MG/DL (ref 695–1620)
IGM SERPL-MCNC: 46 MG/DL (ref 60–265)
IMM GRANULOCYTES # BLD: 0 10E9/L (ref 0–0.4)
IMM GRANULOCYTES NFR BLD: 0.2 %
KAPPA LC UR-MCNC: 0.86 MG/DL (ref 0.33–1.94)
KAPPA LC/LAMBDA SER: 0.58 {RATIO} (ref 0.26–1.65)
LAMBDA LC SERPL-MCNC: 1.49 MG/DL (ref 0.57–2.63)
LYMPHOCYTES # BLD AUTO: 0.4 10E9/L (ref 0.8–5.3)
LYMPHOCYTES NFR BLD AUTO: 8.5 %
MCH RBC QN AUTO: 32.4 PG (ref 26.5–33)
MCHC RBC AUTO-ENTMCNC: 33.6 G/DL (ref 31.5–36.5)
MCV RBC AUTO: 96 FL (ref 78–100)
MONOCYTES # BLD AUTO: 0.5 10E9/L (ref 0–1.3)
MONOCYTES NFR BLD AUTO: 9.3 %
NEUTROPHILS # BLD AUTO: 3.5 10E9/L (ref 1.6–8.3)
NEUTROPHILS NFR BLD AUTO: 70.5 %
NRBC # BLD AUTO: 0 10*3/UL
NRBC BLD AUTO-RTO: 0 /100
NT-PROBNP SERPL-MCNC: 1898 PG/ML (ref 0–125)
PLATELET # BLD AUTO: 154 10E9/L (ref 150–450)
POTASSIUM SERPL-SCNC: 4.5 MMOL/L (ref 3.4–5.3)
PROT SERPL-MCNC: 7.2 G/DL (ref 6.8–8.8)
RBC # BLD AUTO: 4.76 10E12/L (ref 3.8–5.2)
SODIUM SERPL-SCNC: 134 MMOL/L (ref 133–144)
TROPONIN I SERPL-MCNC: <0.015 UG/L (ref 0–0.04)
WBC # BLD AUTO: 4.9 10E9/L (ref 4–11)

## 2018-06-07 PROCEDURE — 83883 ASSAY NEPHELOMETRY NOT SPEC: CPT | Performed by: INTERNAL MEDICINE

## 2018-06-07 PROCEDURE — 36415 COLL VENOUS BLD VENIPUNCTURE: CPT | Performed by: INTERNAL MEDICINE

## 2018-06-07 PROCEDURE — 84165 PROTEIN E-PHORESIS SERUM: CPT | Performed by: INTERNAL MEDICINE

## 2018-06-07 PROCEDURE — 80048 BASIC METABOLIC PNL TOTAL CA: CPT | Performed by: INTERNAL MEDICINE

## 2018-06-07 PROCEDURE — 82784 ASSAY IGA/IGD/IGG/IGM EACH: CPT | Performed by: INTERNAL MEDICINE

## 2018-06-07 PROCEDURE — 80076 HEPATIC FUNCTION PANEL: CPT | Performed by: INTERNAL MEDICINE

## 2018-06-07 PROCEDURE — 84484 ASSAY OF TROPONIN QUANT: CPT | Performed by: INTERNAL MEDICINE

## 2018-06-07 PROCEDURE — 99215 OFFICE O/P EST HI 40 MIN: CPT | Mod: ZP | Performed by: INTERNAL MEDICINE

## 2018-06-07 PROCEDURE — 83880 ASSAY OF NATRIURETIC PEPTIDE: CPT | Performed by: INTERNAL MEDICINE

## 2018-06-07 PROCEDURE — 00000402 ZZHCL STATISTIC TOTAL PROTEIN: Performed by: INTERNAL MEDICINE

## 2018-06-07 PROCEDURE — G0463 HOSPITAL OUTPT CLINIC VISIT: HCPCS | Mod: ZF

## 2018-06-07 PROCEDURE — 85025 COMPLETE CBC W/AUTO DIFF WBC: CPT | Performed by: INTERNAL MEDICINE

## 2018-06-07 RX ORDER — TORSEMIDE 20 MG/1
TABLET ORAL
Qty: 554 TABLET | Refills: 3 | Status: SHIPPED | OUTPATIENT
Start: 2018-06-07 | End: 2018-10-11

## 2018-06-07 ASSESSMENT — PAIN SCALES - GENERAL: PAINLEVEL: NO PAIN (0)

## 2018-06-07 NOTE — MR AVS SNAPSHOT
After Visit Summary   6/7/2018    Leandra Guerrero    MRN: [id number]           Patient Information     Date Of Birth          1950        Visit Information        Provider Department      6/7/2018 7:30 AM Domenica Cohen MD [facility]        Today's Diagnoses     Amyloid heart disease (H)    -  1    Acute on chronic diastolic heart failure (H)          Care Instructions    Cardiology Providers you saw during your visit:  Dr. Cohen    Medication changes:  Please Increase Torsemide to 80 mg by mouth twice daily for one week, then go back to your previous dose of 60 mg by mouth twice daily.    Follow up:    Please return to clinic for follow-up:  With Dr. Cohen in 4 months with labs and ECHO prior      Please call if you have :  1. Weight gain of more than 2 pounds in a day or 5 pounds in a week  2. Increased shortness of breath, swelling or bloating  3. Dizziness, lightheadedness   4. Any questions or concerns.       Follow the American Heart Association Diet and Lifestyle recommendations:  Limit saturated fat, trans fat, sodium, red meat, sweets and sugar-sweetened beverages. If you choose to eat red meat, compare labels and select the leanest cuts available.  Aim for at least 150 minutes of moderate physical activity or 75 minutes of vigorous physical activity - or an equal combination of both - each week.      During business hours: [phone], press option # 1 to be routed to the [facility] then option # 3 for medical questions to speak with a nurse      After hours, weekends or holidays: On Call Cardiologist- [phone]   option #4 and ask to speak to the on-call Cardiologist. Inform them you are a CORE/heart failure patient at the [facility].      Bear Wu, RN  Cardiology Nurse Care Coordinator    Keep up the good work!    Take Care!                Follow-ups after your visit        Additional Services     Follow-Up with Advanced Heart Failure Clinic                  Your next 10 appointments already scheduled     Jun 14, 2018  3:00 PM CDT   Return Visit with Mirela Moore MD   [facility] ([facility])    [address] [place] [address] [phone]            Oct 11, 2018  7:00 AM CDT   Ech Complete with UCECHCR2   [facility] ([facility])    [address] [facility] [address]  3rd Floor  [facility] [id number] [phone]           1. Please bring or wear a comfortable two-piece outfit. 2. You may eat, drink and take your normal medicines. 3. For any questions that cannot be answered, please contact the ordering physician            Oct 11, 2018  8:00 AM CDT   Lab with  LAB   [facility] ([facility])    [address] [place] [id number] [phone]            Oct 11, 2018  8:30 AM CDT   (Arrive by 8:15 AM)   RETURN HEART FAILURE with Domenica Cohen MD   [facility] ([facility])    [address] [place] [address] [phone]              Future tests that were ordered for you today     Open Future Orders        Priority Expected Expires Ordered    Basic metabolic panel Routine 10/7/2018 6/7/2019 6/7/2018    Echocardiogram Routine 10/7/2018 6/7/2019 6/7/2018    Follow-Up with Advanced Heart Failure Clinic Routine 10/7/2018 6/7/2019 6/7/2018    N terminal pro BNP outpatient Routine 10/7/2018 6/7/2019 6/7/2018            Who to contact     If you have questions or need follow up information about today's clinic visit or your schedule please contact [facility] directly at [phone].  Normal or non-critical lab and imaging results will be communicated to you by MyChart, letter or phone within 4 business days after the clinic has received the results. If you do not hear from us within 7 days, please contact the clinic through MyChart or phone. If you have a critical or  "abnormal lab result, we will notify you by phone as soon as possible.  Submit refill requests through [employer] or call your pharmacy and they will forward the refill request to us. Please allow 3 business days for your refill to be completed.          Additional Information About Your Visit        MEDEMhart Information     [employer] gives you secure access to your electronic health record. If you see a primary care provider, you can also send messages to your care team and make appointments. If you have questions, please call your primary care clinic.  If you do not have a primary care provider, please call [phone] and they will assist you.        Care EveryWhere ID     This is your Care EveryWhere ID. This could be used by other organizations to access your [facility] medical records  [id number]        Your Vitals Were     Pulse Height Pulse Oximetry BMI (Body Mass Index)          61 1.6 m (5' 3\") 99% 25.88 kg/m2         Blood Pressure from Last 3 Encounters:   06/07/18 119/68   02/22/18 122/68   02/16/18 119/63    Weight from Last 3 Encounters:   06/07/18 66.3 kg (146 lb 1.6 oz)   02/22/18 64.4 kg (141 lb 14.4 oz)   02/16/18 64.5 kg (142 lb 1.6 oz)              We Performed the Following     Follow-Up with Advanced Heart Failure Cardiologist          Today's Medication Changes          These changes are accurate as of 6/7/18  8:10 AM.  If you have any questions, ask your nurse or doctor.               These medicines have changed or have updated prescriptions.        Dose/Directions    torsemide 20 MG tablet   Commonly known as:  DEMADEX   This may have changed:    - how much to take  - how to take this  - when to take this  - additional instructions   Used for:  Acute on chronic diastolic heart failure (H)   Changed by:  Domenica Cohen MD        80 mg by mouth twice daily for one week then 60 mg twice daily   Quantity:  554 tablet   Refills:  3            Where to get your medicines      These " medications were sent to [facility] [address] [place] [address] [address] [id number] [facility] [address]     Phone:  [phone]     torsemide 20 MG tablet                Primary Care Provider Office Phone # Fax #    Braydon Barron [phone]       [facility] [address] [facility] [address]        Equal Access to Services     YASMIN REYNA : Hadii aad ku hadasho Soomaali, waaxda luqadaha, qaybta kaalmada adeegyada, waxay idiin hayaan adeeg kharash la'ivonen ah. So [facility] [phone].    ATENCIÓN: Si anselmo littlejohn, tiene a lobo disposición servicios gratuitos de asistencia lingüística. Machelle al [phone].    We comply with applicable federal civil rights laws and [place] laws. We do not discriminate on the basis of race, color, national origin, age, disability, sex, sexual orientation, or gender identity.            Thank you!     Thank you for choosing [facility]  for your care. Our goal is always to provide you with excellent care. Hearing back from our patients is one way we can continue to improve our services. Please take a few minutes to complete the written survey that you may receive in the mail after your visit with us. Thank you!             Your Updated Medication List - Protect others around you: Learn how to safely use, store and throw away your medicines at www.disposemymeds.org.          This list is accurate as of 6/7/18  8:10 AM.  Always use your most recent med list.                   Brand Name Dispense Instructions for use Diagnosis    LORazepam 0.5 MG tablet    ATIVAN    30 tablet    Take 1 tablet (0.5 mg) by mouth every 6 hours as needed for other (nausea)    Amyloid heart disease (H), Weight loss, Hypokalemia, AL amyloidosis       ondansetron 4 MG ODT tab    ZOFRAN-ODT      Amyloid heart disease (H), Weight loss, Hypokalemia       potassium chloride SA 10 MEQ CR tablet    K-DUR/KLOR-CON M    360 tablet    Take 2 tablets (20 mEq) by mouth 2 times  daily    Hypokalemia       spironolactone 25 MG tablet    ALDACTONE    180 tablet    Take 1 tablet (25 mg) by mouth 2 times daily    Acute on chronic diastolic heart failure (H)       torsemide 20 MG tablet    DEMADEX    554 tablet    80 mg by mouth twice daily for one week then 60 mg twice daily    Acute on chronic diastolic heart failure (H)       TYLENOL PO      Take 325 mg by mouth    Amyloid heart disease (H)

## 2018-06-07 NOTE — PATIENT INSTRUCTIONS
Cardiology Providers you saw during your visit:  Dr. Cohen    Medication changes:  Please Increase Torsemide to 80 mg by mouth twice daily for one week, then go back to your previous dose of 60 mg by mouth twice daily.    Follow up:    Please return to clinic for follow-up:  With Dr. Cohen in 4 months with labs and ECHO prior      Please call if you have :  1. Weight gain of more than 2 pounds in a day or 5 pounds in a week  2. Increased shortness of breath, swelling or bloating  3. Dizziness, lightheadedness   4. Any questions or concerns.       Follow the American Heart Association Diet and Lifestyle recommendations:  Limit saturated fat, trans fat, sodium, red meat, sweets and sugar-sweetened beverages. If you choose to eat red meat, compare labels and select the leanest cuts available.  Aim for at least 150 minutes of moderate physical activity or 75 minutes of vigorous physical activity - or an equal combination of both - each week.      During business hours: [phone], press option # 1 to be routed to the [facility] then option # 3 for medical questions to speak with a nurse      After hours, weekends or holidays: On Call Cardiologist- [phone]   option #4 and ask to speak to the on-call Cardiologist. Inform them you are a CORE/heart failure patient at the [facility].      Bear Wu RN  Cardiology Nurse Care Coordinator    Keep up the good work!    Take Care!

## 2018-06-07 NOTE — LETTER
6/7/2018      RE: Leandra Guerrero  [address] [place] [address]       Dear Colleague,    Thank you for the opportunity to participate in the care of your patient, Leandra Guerrero, at the [facility] acute hospital. Please see a copy of my visit note below.         June 7, 2018    HPI:  Mrs. Guerrero is a 67 year old female with no past medical history until the Spring of 2016 when she was diagnosed with stage IV AL amyloid; she presents to clinic for follow up of cardiac amyloid (AL type).     Her cardiac and oncologic history are as follows:     Fatigue started in January 2016. She eventually had a coronary angiogram which was reportedly normal and was diagnosed with CHF and started on a bblocker and diuretics. Her symptoms progressed to the point that she was evaluated at [facility] in August/September (Dr. Barron in oncology/hematology, Dr. Nettles is cardiology). She was diagnosed with stage IV AL amyloid (+GI, +bone marrow involvement, no involvement of kidney or liver):  her work up is detailed in our previous notes, however she has lambda AL amyloidosis and she underwent CyBorD chemotherapy and excellent light chain response by serum. [facility] been [facility] down [facility] for [facility] cell [facility] due to her cardiac involvement.    Unfortunately in 2016, she had 2-3 more pleural taps on the R and came back to my clinic with gross anasarca.  I admitted her to the hospital in 1/2017 where close to 20-30 pounds of fluid overload of volume was removed.  Since that time her AL has been in remission by labs without further chemo; her oncologist recently increase the interval of their meetings which is encouraging from that standpoint.      At this point we have had stability from a heart failure perspective for some time.  She has not been readmitted for fluid removal.  Her pleural effusion has been minimal and has not required further drainage.  She has  remained on 60 twice daily of torsemide for some time.  She denies PND orthopnea.  She denies abdominal swelling.  She is fatigued most days but this is been stable.  She can walk a few blocks at a slow pace and this is been stable for some time.  She does have some increasing lower extremity edema over the last few weeks.     She and her  are taking it one month at a time and overall she has a good quality of life presently.        PAST MEDICAL HISTORY:  - Stage IV AL amyloid   - GI (stomach and duodenal) involvement  - Cardiac amyloidosis    FAMILY HISTORY:  Family History   Problem Relation Age of Onset     Other - See Comments Sister      Amyloidosis   - Sister passed from multiple myeloma, patient was also told her sister had amyloid as well  - Mom with CAD    SOCIAL HISTORY:  Social History     Social History     Marital Status:      Spouse Name: N/A     Number of Children: N/A     Years of Education: N/A     Social History Main Topics     Smoking status: Never Smoker      Smokeless tobacco: None     Alcohol Use: No     Drug Use: No     Sexual Activity: Not Asked     Social History Narrative   Lives in [place] with her , has 2 kids  Previously did office work, hasn't worked for several months now  Never smoker  Social ETOH    CURRENT MEDICATIONS:  Current Outpatient Prescriptions   Medication Sig Dispense Refill     Acetaminophen (TYLENOL PO) Take 325 mg by mouth       LORazepam (ATIVAN) 0.5 MG tablet Take 1 tablet (0.5 mg) by mouth every 6 hours as needed for other (nausea) 30 tablet 0     ondansetron (ZOFRAN-ODT) 4 MG disintegrating tablet   0     potassium chloride SA (K-DUR/KLOR-CON M) 10 MEQ CR tablet Take 2 tablets (20 mEq) by mouth 2 times daily 360 tablet 3     spironolactone (ALDACTONE) 25 MG tablet Take 1 tablet (25 mg) by mouth 2 times daily 180 tablet 3     torsemide (DEMADEX) 20 MG tablet Take 3 tablets (60 mg) by mouth 2 times daily 540 tablet 3       ROS:  "  Constitutional: No fever, chills, or sweats.  Weight is relatively stable  ENT: No visual disturbance, ear ache, epistaxis, sore throat.   Allergies/Immunologic: Negative.   Respiratory: No cough, hemoptysis.   Cardiovascular: As per HPI.   GI: +Nausea, constipation.  : No urinary frequency, dysuria, or hematuria.   Integument: Negative.   Psychiatric: feeling down and anxious since her diagnosis  Neuro: + tingling in legs bilaterally  Endocrinology: Negative.   Musculoskeletal: Negative.    EXAM:  /68 (BP Location: Left arm, Patient Position: Chair, Cuff Size: Adult Regular)  Pulse 61  Ht 1.6 m (5' 3\")  Wt 66.3 kg (146 lb 1.6 oz)  SpO2 99%  BMI 25.88 kg/m2  General: appears comfortable, alert and articulate, NAD, pleasant  Head: normocephalic, atraumatic  Eyes: anicteric sclera, EOMI  Neck: no adenopathy, JVP ~12 cm H2O with +HJR  Orophyarynx: moist mucosa, no lesions, dentition intact  Heart: regular rate and rhythm. III/VI holosystolic murmur at the apex   Lungs: decreased breath sounds at the L base, lungs otherwise clear   Abdomen: soft, non-tender, bowel sounds present, no hepatosplenomegaly  Extremities: mild-moderate mostly nonpitting edema of bilateral extremities.   Neurological: normal speech and affect, no gross motor deficits    Labs:  CBC RESULTS:  Lab Results   Component Value Date    WBC 4.9 06/07/2018    RBC 4.76 06/07/2018    HGB 15.4 06/07/2018    HCT 45.9 06/07/2018    MCV 96 06/07/2018    MCH 32.4 06/07/2018    MCHC 33.6 06/07/2018    RDW 13.5 06/07/2018     06/07/2018       CMP RESULTS:  Lab Results   Component Value Date     06/07/2018    POTASSIUM 4.5 06/07/2018    CHLORIDE 96 06/07/2018    CO2 30 06/07/2018    ANIONGAP 8 06/07/2018    GLC 77 06/07/2018    BUN 33 (H) 06/07/2018    CR 1.33 (H) 06/07/2018    GFRESTIMATED 40 (L) 06/07/2018    GFRESTBLACK 48 (L) 06/07/2018    JERROD 8.8 06/07/2018    BILITOTAL 2.7 (H) 06/07/2018    ALBUMIN 4.1 06/07/2018    ALKPHOS 204 " (H) 06/07/2018    ALT 28 06/07/2018    AST 21 06/07/2018     INR RESULTS:  Lab Results   Component Value Date    INR 1.30 (H) 01/14/2017       Lab Results   Component Value Date    MAG 2.8 (H) 02/09/2018     Lab Results   Component Value Date    NTBNPI 9009 (H) 01/13/2017     Lab Results   Component Value Date    NTBNP 1898 (H) 06/07/2018       TTE 8/2017:  Global and regional left ventricular function is normal with an EF of 55-60%.  Moderate concentric wall thickening consistent with left ventricular hypertrophy is present. Grade III or advanced diastolic dysfunction.  Global peak LV longitudinal strain is averaged at -15.7%. This is below reported normal limits (normal <-18%).  The right ventricle is normal size. Global right ventricular function is mildly reduced.  Mild aortic insufficiency is present.  The inferior vena cava was dilated at 2.2 cm without respiratory variability, consistent with increased right atrial pressure. Estimated mean right atrial pressure is 15 mmHg.   Right ventricular systolic pressure is 25mmHg above the right atrial pressure. No pericardial effusion is present. Right pleural effusion behind right atrium. This study was compared with the study from 1/5/2017.  Pericardial effuison is not present on this study. Otherwise no significant changes.     CXR 10/27/17: Persistent small, right greater than left pleural effusions and overlying atelectasis    Troponins, NT proBNP reviewed    Assessment and Plan:   Mrs. Guerrero is a 67 year old female with stage IV  AL amyloid with GI, bone marrow and cardiac involvement who presents for follow up in the cardiac amyloid clinic.     Patient has cardiac amyloidosis as evidenced by her echocardiogram (severe concentric hypertrophy and biatrial enlargement) and abnormal EKG (near-low voltage, poor R wave progression, biatrial enlargement and no evidence of LVH despite very thickened LV on echo) in the setting of biopsy proven (BMB, EGD) AL amyloid.   she has completed chemotherapy with CyBorD with an excellent serologic response and her heart failure (both clinically and by labs - i.e. troponin now negative, serum light chains minimal and urine light chains undetectable) has stabilized significantly. Her NTproBNP is within range from prior and clinically she appears modestly hypervolemic.    Her NYHA class is III today, Stage C, she is relatively very mildly hypervolemic today and so I we will increase her torsemide to 80 mg BID.      We again addressed the importance of planning for end-of-life cares (despite her impressive response to therapy, long-term prognosis for stage IV - AL amyloidosis is very poor) and for now we will continue to take this one month at a time, they are very realistic about her prognosis and for now her quality of life is good and she has been stable for some time.     Cardiac Amyloidosis (a restrictive cardiomyopathy)   Stage C  NYHA Class III  ACEi/ARB - none- no indication   BB - none, no indication and relatively contraindicated due to risk of progressive conduction disease/AV block in these patients  Aldosterone antagonist: aldactone   SCD prophylaxis: not indicated  % BiV pacing: NA  Fluid status: Increase Torsemide to 80mg BID; no change in KCl replacement    Other:    Arrhythmia monitoring -- Her Zio patch in 2/17 showed sinus rhythm with non sustained SVT. A repeat 7-day Ziopatch recently reveals short (>45 second) runs of SVT, for which she is generally not symptomatic. No further eval.      Systemic amyloid-heme is presently monitoring her light chains which have been negative consistent with remission - therefore we are not contemplating further palliative chemo.     Domenica Cohen MD   of Medicine   [facility] Division of Cardiology     CC   Dr. Braydon Barron MD  [address] [place] [address]    Patient Care Team:  Braydon Barron as PCP - General  (Hematology)  Mirela Daley MD as MD (Hematology & Oncology)  Domenica Cohen MD as MD (Cardiology)  Eileen Arevalo, RN as Nurse Coordinator (Cardiology)  Codie Nelson, GREY as Nurse Practitioner (Cardiology)  Kathryn Posey, DILIP as Nurse Coordinator (Cardiology)  Bear Wu, DILIP as Nurse Coordinator (Cardiology)  MIRELA DALEY MD

## 2018-06-07 NOTE — NURSING NOTE

## 2018-06-07 NOTE — PROGRESS NOTES
June 7, 2018    HPI:  Mrs. Guerrero is a 67 year old female with no past medical history until the Spring of 2016 when she was diagnosed with stage IV AL amyloid; she presents to clinic for follow up of cardiac amyloid (AL type).     Her cardiac and oncologic history are as follows:     Fatigue started in January 2016. She eventually had a coronary angiogram which was reportedly normal and was diagnosed with CHF and started on a bblocker and diuretics. Her symptoms progressed to the point that she was evaluated at [facility] in August/September (Dr. Barron in oncology/hematology, Dr. Nettles is cardiology). She was diagnosed with stage IV AL amyloid (+GI, +bone marrow involvement, no involvement of kidney or liver):  her work up is detailed in our previous notes, however she has lambda AL amyloidosis and she underwent CyBorD chemotherapy and excellent light chain response by serum. [facility] been [facility] down [facility] for [facility] due to her cardiac involvement.    Unfortunately in 2016, she had 2-3 more pleural taps on the R and came back to my clinic with gross anasarca.  I admitted her to the hospital in 1/2017 where close to 20-30 pounds of fluid overload of volume was removed.  Since that time her AL has been in remission by labs without further chemo; her oncologist recently increase the interval of their meetings which is encouraging from that standpoint.      At this point we have had stability from a heart failure perspective for some time.  She has not been readmitted for fluid removal.  Her pleural effusion has been minimal and has not required further drainage.  She has remained on 60 twice daily of torsemide for some time.  She denies PND orthopnea.  She denies abdominal swelling.  She is fatigued most days but this is been stable.  She can walk a few blocks at a slow pace and this is been stable for some time.  She does have some increasing lower extremity edema over the last few weeks.     She  and her  are taking it one month at a time and overall she has a good quality of life presently.        PAST MEDICAL HISTORY:  - Stage IV AL amyloid   - GI (stomach and duodenal) involvement  - Cardiac amyloidosis    FAMILY HISTORY:  Family History   Problem Relation Age of Onset     Other - See Comments Sister      Amyloidosis   - Sister passed from multiple myeloma, patient was also told her sister had amyloid as well  - Mom with CAD    SOCIAL HISTORY:  Social History     Social History     Marital Status:      Spouse Name: N/A     Number of Children: N/A     Years of Education: N/A     Social History Main Topics     Smoking status: Never Smoker      Smokeless tobacco: None     Alcohol Use: No     Drug Use: No     Sexual Activity: Not Asked     Social History Narrative   Lives in [place] with her , has 2 kids  Previously did office work, hasn't worked for several months now  Never smoker  Social ETOH    CURRENT MEDICATIONS:  Current Outpatient Prescriptions   Medication Sig Dispense Refill     Acetaminophen (TYLENOL PO) Take 325 mg by mouth       LORazepam (ATIVAN) 0.5 MG tablet Take 1 tablet (0.5 mg) by mouth every 6 hours as needed for other (nausea) 30 tablet 0     ondansetron (ZOFRAN-ODT) 4 MG disintegrating tablet   0     potassium chloride SA (K-DUR/KLOR-CON M) 10 MEQ CR tablet Take 2 tablets (20 mEq) by mouth 2 times daily 360 tablet 3     spironolactone (ALDACTONE) 25 MG tablet Take 1 tablet (25 mg) by mouth 2 times daily 180 tablet 3     torsemide (DEMADEX) 20 MG tablet Take 3 tablets (60 mg) by mouth 2 times daily 540 tablet 3       ROS:   Constitutional: No fever, chills, or sweats.  Weight is relatively stable  ENT: No visual disturbance, ear ache, epistaxis, sore throat.   Allergies/Immunologic: Negative.   Respiratory: No cough, hemoptysis.   Cardiovascular: As per HPI.   GI: +Nausea, constipation.  : No urinary frequency, dysuria, or hematuria.   Integument: Negative.  "  Psychiatric: feeling down and anxious since her diagnosis  Neuro: + tingling in legs bilaterally  Endocrinology: Negative.   Musculoskeletal: Negative.    EXAM:  /68 (BP Location: Left arm, Patient Position: Chair, Cuff Size: Adult Regular)  Pulse 61  Ht 1.6 m (5' 3\")  Wt 66.3 kg (146 lb 1.6 oz)  SpO2 99%  BMI 25.88 kg/m2  General: appears comfortable, alert and articulate, NAD, pleasant  Head: normocephalic, atraumatic  Eyes: anicteric sclera, EOMI  Neck: no adenopathy, JVP ~12 cm H2O with +HJR  Orophyarynx: moist mucosa, no lesions, dentition intact  Heart: regular rate and rhythm. III/VI holosystolic murmur at the apex   Lungs: decreased breath sounds at the L base, lungs otherwise clear   Abdomen: soft, non-tender, bowel sounds present, no hepatosplenomegaly  Extremities: mild-moderate mostly nonpitting edema of bilateral extremities.   Neurological: normal speech and affect, no gross motor deficits    Labs:  CBC RESULTS:  Lab Results   Component Value Date    WBC 4.9 06/07/2018    RBC 4.76 06/07/2018    HGB 15.4 06/07/2018    HCT 45.9 06/07/2018    MCV 96 06/07/2018    MCH 32.4 06/07/2018    MCHC 33.6 06/07/2018    RDW 13.5 06/07/2018     06/07/2018       CMP RESULTS:  Lab Results   Component Value Date     06/07/2018    POTASSIUM 4.5 06/07/2018    CHLORIDE 96 06/07/2018    CO2 30 06/07/2018    ANIONGAP 8 06/07/2018    GLC 77 06/07/2018    BUN 33 (H) 06/07/2018    CR 1.33 (H) 06/07/2018    GFRESTIMATED 40 (L) 06/07/2018    GFRESTBLACK 48 (L) 06/07/2018    JERROD 8.8 06/07/2018    BILITOTAL 2.7 (H) 06/07/2018    ALBUMIN 4.1 06/07/2018    ALKPHOS 204 (H) 06/07/2018    ALT 28 06/07/2018    AST 21 06/07/2018     INR RESULTS:  Lab Results   Component Value Date    INR 1.30 (H) 01/14/2017       Lab Results   Component Value Date    MAG 2.8 (H) 02/09/2018     Lab Results   Component Value Date    NTBNPI 9009 (H) 01/13/2017     Lab Results   Component Value Date    NTBNP 1898 (H) 06/07/2018 "       TTE 8/2017:  Global and regional left ventricular function is normal with an EF of 55-60%.  Moderate concentric wall thickening consistent with left ventricular hypertrophy is present. Grade III or advanced diastolic dysfunction.  Global peak LV longitudinal strain is averaged at -15.7%. This is below reported normal limits (normal <-18%).  The right ventricle is normal size. Global right ventricular function is mildly reduced.  Mild aortic insufficiency is present.  The inferior vena cava was dilated at 2.2 cm without respiratory variability, consistent with increased right atrial pressure. Estimated mean right atrial pressure is 15 mmHg.   Right ventricular systolic pressure is 25mmHg above the right atrial pressure. No pericardial effusion is present. Right pleural effusion behind right atrium. This study was compared with the study from 1/5/2017.  Pericardial effuison is not present on this study. Otherwise no significant changes.     CXR 10/27/17: Persistent small, right greater than left pleural effusions and overlying atelectasis    Troponins, NT proBNP reviewed    Assessment and Plan:   Mrs. Guerrero is a 67 year old female with stage IV  AL amyloid with GI, bone marrow and cardiac involvement who presents for follow up in the cardiac amyloid clinic.     Patient has cardiac amyloidosis as evidenced by her echocardiogram (severe concentric hypertrophy and biatrial enlargement) and abnormal EKG (near-low voltage, poor R wave progression, biatrial enlargement and no evidence of LVH despite very thickened LV on echo) in the setting of biopsy proven (BMB, EGD) AL amyloid.  she has completed chemotherapy with CyBorD with an excellent serologic response and her heart failure (both clinically and by labs - i.e. troponin now negative, serum light chains minimal and urine light chains undetectable) has stabilized significantly. Her NTproBNP is within range from prior and clinically she appears modestly  hypervolemic.    Her NYHA class is III today, Stage C, she is relatively very mildly hypervolemic today and so I we will increase her torsemide to 80 mg BID.      We again addressed the importance of planning for end-of-life cares (despite her impressive response to therapy, long-term prognosis for stage IV - AL amyloidosis is very poor) and for now we will continue to take this one month at a time, they are very realistic about her prognosis and for now her quality of life is good and she has been stable for some time.     Cardiac Amyloidosis (a restrictive cardiomyopathy)   Stage C  NYHA Class III  ACEi/ARB - none- no indication   BB - none, no indication and relatively contraindicated due to risk of progressive conduction disease/AV block in these patients  Aldosterone antagonist: aldactone   SCD prophylaxis: not indicated  % BiV pacing: NA  Fluid status: Increase Torsemide to 80mg BID; no change in KCl replacement    Other:    Arrhythmia monitoring -- Her Zio patch in 2/17 showed sinus rhythm with non sustained SVT. A repeat 7-day Ziopatch recently reveals short (>45 second) runs of SVT, for which she is generally not symptomatic. No further eval.      Systemic amyloid-heme is presently monitoring her light chains which have been negative consistent with remission - therefore we are not contemplating further palliative chemo.     Domenica Cohen MD   of Medicine   [facility] Division of Cardiology         CC   [place] [address]      Patient Care Team:  Braydon Barron as PCP - General (Hematology)  Mirela Moore MD as MD (Hematology & Oncology)  Domenica Cohen MD as MD (Cardiology)  Eileen Arevalo, RN as Nurse Coordinator (Cardiology)  Codie Nelson NP as Nurse Practitioner (Cardiology)  Kathryn Posey, DILIP as Nurse Coordinator (Cardiology)  Bear Wu, DILIP as Nurse Coordinator  (Cardiology)  GARRISON DALEY MD

## 2018-06-07 NOTE — NURSING NOTE
Vitals completed successfully and medication reconciled. 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

## 2018-06-08 DIAGNOSIS — E85.4 AMYLOID HEART DISEASE (H): ICD-10-CM

## 2018-06-08 DIAGNOSIS — I43 AMYLOID HEART DISEASE (H): ICD-10-CM

## 2018-06-08 DIAGNOSIS — R63.4 WEIGHT LOSS: ICD-10-CM

## 2018-06-08 DIAGNOSIS — E87.6 HYPOKALEMIA: ICD-10-CM

## 2018-06-08 LAB
ALBUMIN SERPL ELPH-MCNC: 4.4 G/DL (ref 3.7–5.1)
ALPHA1 GLOB SERPL ELPH-MCNC: 0.3 G/DL (ref 0.2–0.4)
ALPHA2 GLOB SERPL ELPH-MCNC: 0.6 G/DL (ref 0.5–0.9)
B-GLOBULIN SERPL ELPH-MCNC: 0.7 G/DL (ref 0.6–1)
GAMMA GLOB SERPL ELPH-MCNC: 0.7 G/DL (ref 0.7–1.6)
M PROTEIN SERPL ELPH-MCNC: 0 G/DL
PROT PATTERN SERPL ELPH-IMP: NORMAL

## 2018-06-08 RX ORDER — ONDANSETRON 4 MG/1
4 TABLET, ORALLY DISINTEGRATING ORAL EVERY 6 HOURS PRN
Qty: 15 TABLET | Refills: 1 | Status: SHIPPED | OUTPATIENT
Start: 2018-06-08 | End: 2018-09-26

## 2018-06-14 ENCOUNTER — RADIANT APPOINTMENT (OUTPATIENT)
Dept: GENERAL RADIOLOGY | Facility: CLINIC | Age: 68
End: 2018-06-14
Attending: INTERNAL MEDICINE
Payer: COMMERCIAL

## 2018-06-14 ENCOUNTER — ONCOLOGY VISIT (OUTPATIENT)
Dept: ONCOLOGY | Facility: CLINIC | Age: 68
End: 2018-06-14
Payer: COMMERCIAL

## 2018-06-14 VITALS
DIASTOLIC BLOOD PRESSURE: 70 MMHG | TEMPERATURE: 97.5 F | HEIGHT: 63 IN | HEART RATE: 65 BPM | WEIGHT: 147 LBS | SYSTOLIC BLOOD PRESSURE: 130 MMHG | OXYGEN SATURATION: 98 % | RESPIRATION RATE: 16 BRPM | BODY MASS INDEX: 26.05 KG/M2

## 2018-06-14 DIAGNOSIS — R06.02 SOB (SHORTNESS OF BREATH): ICD-10-CM

## 2018-06-14 DIAGNOSIS — E85.81 AL AMYLOIDOSIS (H): Primary | ICD-10-CM

## 2018-06-14 DIAGNOSIS — E85.81 AL AMYLOIDOSIS (H): ICD-10-CM

## 2018-06-14 PROCEDURE — 71046 X-RAY EXAM CHEST 2 VIEWS: CPT | Performed by: RADIOLOGY

## 2018-06-14 PROCEDURE — 99214 OFFICE O/P EST MOD 30 MIN: CPT | Performed by: INTERNAL MEDICINE

## 2018-06-14 ASSESSMENT — PAIN SCALES - GENERAL: PAINLEVEL: NO PAIN (0)

## 2018-06-14 NOTE — PROGRESS NOTES
Hematology/Oncology Follow-up visit:  Date on this visit: Jun 14, 2018      Referring Physician: Dr. Braydon Barron, [facility], [place].  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 She was diagnosed with CHF at a local clinic in [facility] and was placed on a b-blocker and a diuretic. She has also developed progressive worsening lower extremity edema by May 2016 which in retrospect started in January. Her cardiac angiogram was reportedly negative at that time. She has lost about 35 lbs in the last 6 months. She has significant nausea somewhat controlled with Zofran but she is reluctant to use it because of constipation too. She is on Senna-S one tablet PO BID for constipation and that is improved but still significant. She has very poor PO intake due to nausea and lack of appetite. She requested to be seen at [facility] and was seen by Dr. Braydon Barron on 9/13/2016 with f/up on 9/20/2016. She was also seen by cardiology team there Sara Severson CNP and Dr. Nettles from CHF service.  There was no e/o renal or hepatic amyloidosis.  EKG on 9/7/2016 showed normal sinus rhythm, prolonged QT interval (QTc 521 sec), left atrial enlargement and left anterior fascicular block. There was ST and T wave abnormality.  Apparently, her echocardiogram showed preserved LVEF at 59%.  She reports having R sided thoracentesis on 8/25/2016 after which her breathing has improved. She then had a CXR on 9/7/2016 which showed a small R pleural effusion with right basilar atelectasis. Cardiac silhouette was at the upper level of normal.  I have reviewed extensive outside medical records but we are still in the process of obtaining additional records.  The patient also underwent a minor salivary gland biopsy of the lower lip. The pathology from that procedure (9/20/2016) showed normal salivary gland tissue with  nonspecific chronic sialadenitis.  She had extensive lab workup on 9/7/2016.  Her TSH was normal. Serum protein ELP showed a small abnormality in the gamma fraction.  Serum immunofixation showed small monoclonal IgA lambda in the gamma fraction and a small lambda in the beta fraction. Serum IgA level was normal at 329. Serum IgG level was mildly low at 755 (normal range 767-1590) and IgM level was normal. B2 microglobulin was mildly elevated at 2.94 (ULN 2.7). Mg was normal. Total bilirubin was elevated at 3.9 and direct at 0.8. Uric acid was mildly elevated at 6.9 (ULN 6.1) Creat was 1.0. Troponin was 0.1 (ULN <0.01) and NT-pro BNP was 4747 pg/ml (ULN <=183). Total cholesterol was 190 and LDL was 139. Random urine protein was mildly elevated at 33 mg/dl (normal range <22). INR was 1.2 and PTT 27 (within normal limits). Fibrinogen was elevated at 441 and factor X was mildly decreased at 64% (normal range %). D-dimer was up at 1700 (). Free lambda light chains were 69.5 and free kappa light chains were 1.11 with FLC kappa/lambda ratio of 0.016.   CBC showed normal WBC at 6.0, slightly elevated Hb at 16. Platelet count was normal.  Flow cytometry on bone marrow biopsy showed detectable monotypic plasma cells. There were 12% of monotypic plasma cells on bone marrow biopsy.    The bone marrow biopsy (performed on 9/14/2016) showed normocellular BM, 30-40% cellularity, with involvement by 10-20% of lambda light chain restricted plasma cells.  Congo red stain was positive on the bone marrow biopsy. Liquid chromatography tandem mass spectroscopy showed a peptide profile c/w AL (lambda type) amyloid. BM karyotype was normal 46 XX. FISH on BM showed plasma cell clone with 1q duplication, and monosomy of 13 and 14.  She had an EGD by Dr. Matthew Wadsworth on 9/14/2016. It showed gastric mucosal atrophy and multiple mucosal papules (nodules) seen in the stomach. The duodenum appeared normal. The biopsies of stomach  mucosa, of stomach (antral) nodules and of duodenum, all showed the presence of amyloid in submucosal blood vessel walls in the stomach and duodenum and in deep mucosa in the stomach antrum and body, confirmed by Congo Red stain.   She also had additional labs done at our last visit, on 9/30/2016. Total bilirubin was elevated as below, primarily indirect. K was 3.3 and she was stared on K-dur 20 mEq PO bid. She was noted to have elevated serum IgA level on 9/30/2016 at 392 (). Serum free lambda chains were elevated at 37.75. Serum M spike was 0.1 g/dl and serum FLC ratio was low at 0.01. Serum immunofixation ELP showed monoclonal IgA immunoglobulin of lambda light chain type as well as monoclonal free immunoglobulin light chain of lambda chain type.  She was seen by Dr. Cohen too and had an echocardiogram on 10/6/2016 which showed:  Global and regional left ventricular function was normal with an EF of 60-65%.  The LV mass index was 131 g/m2 (severely increased.) The LV geometry is c/w  concentric hypertrophy measured by relative wall thickness. Global right ventricular function was normal. Severe biatrial enlargement was present.  Right ventricular systolic pressure was 26mmHg above the right atrial pressure. The inferior vena cava was dilated at 2.1 cm without respiratory variability, consistent with increased right atrial pressure. Trivial pericardial effusion was present.    She proceeded to start CyBorD as recommended by Dr. Barron with dose reduced in subq Bortezomib to 0.7 mg/m2, on 10/4/2016.  After one cycle, her M spike, IgA level and free lambda light chains have all improved, with M spike of 0 g/dl, IgA down into the normal range and free serum lambda light chains improving from 37.75 down to 2.09.  She returns today in cycle 3 day 11. She had amyloid labs again on day 1 of cycle 3 (12/8/2016) which showed continued response to therapy and her IgA level was now low and serum FLC ratio was normal  and serum free lambda chains were normal too.   She has developed hematuria with cycle 2 and Cytoxan is on hold from day 15 cycle 2 since hematuria persisted despite Mesnex. She had a CT abdomen/pelvis on 11/29/2016 which showed no abnormalities in the kidneys. There was a moderate right and a trace left sided pleural effusions. Small volume ascites and anasarca was noted as well. The liver had heterogeneous appearance, which can be seen in hepatic venous congestion.   Cytoxan was d/cd  on  11/22/2016 since her cystoscopy showed findings of mild petechia and erythema in her bladder that would be c/w hemorrhagic cystitis.  Pinch biopsies were taken from the bladder and showed no e/o amyloid or malignancy. She has continued on Velcade and dexamethasone until 12/27/2016.  On 1/3/2017 she was seen at [facility] by Dr. Barron and at that time serum free light chains were normal at 0.74 and FLC ratio was normal as well at 0.72. She was recommended to be on observation because free lambda chains normalized. However, her cardiac amyloidosis has progressed by 1/5/2017 and she has required frequent thoracentesis, and had weeping LE edema and elevated neck veins.  She was hospitalized for CHF in Jan 2017 due to cardiac amyloid and was aggressively diuresed.  Her echocardiogram on 1/5/2017 was abnormal (severe concentric hypertrophy and biatrial enlargement) and she also had abnormal EKG (near-low voltage, poor R wave progression, biatrial enlargement and no evidence of LVH despite very thickened LV on echo). She was felt to have  New York Heart Association class IIIB heart failure with clinically severe volume overload. She was seen by cardiologist Dr. Harjit Fischer at  in [place]. She was seen by Dr. Chivo Lei from medical oncology at the [facility] in [place], and was recommended to start on Acyclovir and Doxycycline.  She has not started Acyclovir  but did start daily Doxycyline.   Dr. Lei also recommended she  consider maintenance therapy. She was also seen by Dr. Barron at [facility] on 2017, and he recommended against maintenance therapy.     History Of Present Illness:  Ms. Guerrero is a 67 year old female, non-smoker, who presents for f/up of AL amyloidosis. She has been off treatment since 2016.  She has been seeing  Dr. Cohen in f/up of cardiac Amyloidosis with preserved EF, CHF NYHA Class II. They monitor troponin and BNP. She was last seen by cardiology in 2018. She has severe concentric hypertrophy and biatrial enlargement and abnormal EKG (near-low voltage, poor R wave progression, biatrial enlargement and no evidence of LVH despite very thickened LV on echo) in the setting of  AL amyloid.  Her NTproBNP is within range from prior. At her last visit in 2018 she was felt to have  NYHA class  III t, Stage C,and  her torsemide was increased  to 80 mg BID.  She is on aldactone 25 mg PO daily.  Her Zio patch in 2017  showed sinus rhythm with non sustained SVT. A repeat 7-day Ziopatch recently revealed short (>45 second) runs of SVT.  Overall, Virginia's weight continues to climb. She has gained about 1 lbs since last week even since she was changed to higher dose of Torsemide. She has gained about 4-5 lbs since February. She is SOB on exertion. She also has had orthopnea on at least one occasion where she felt that she needed to sit up in bed.   She denies SOB. She has no cough.   She has no CP. She has swelling in b/l LE which has increased to  2+ to 3+. She also feels more fluid in her abdomen. She is pain free today.   She is here with her  today.  In addition, a complete 12 point  review of systems is negative.      Past Medical/Surgical History:  AL Amyloid  Amyloid cardiomyopathy/CHF    Family History:  Sister  of multiple myeloma    Social History:  Lives in [place], with . Nonsmoker        Allergies:     Allergies   Allergen Reactions     Contrast Dye  "Shortness Of Breath     Shaking,chills and dypsnea     Cytoxan [Cyclophosphamide]      Hemorrhagic cystitis     Levofloxacin      Severe shaking and abdominal pain     Amoxicillin Nausea and Vomiting and Cramps     Current Medications:  Current Outpatient Prescriptions   Medication     Acetaminophen (TYLENOL PO)     LORazepam (ATIVAN) 0.5 MG tablet     ondansetron (ZOFRAN-ODT) 4 MG ODT tab     potassium chloride SA (K-DUR/KLOR-CON M) 10 MEQ CR tablet     spironolactone (ALDACTONE) 25 MG tablet     torsemide (DEMADEX) 20 MG tablet     No current facility-administered medications for this visit.       Physical Exam:    /70  Pulse 65  Temp 97.5  F (36.4  C)  Resp 16  Ht 1.6 m (5' 3\")  Wt 66.7 kg (147 lb)  SpO2 98%  BMI 26.04 kg/m2        GENERAL APPEARANCE: middle aged, alert and no distress     HENT: Mouth without ulcers or lesions     NECK: no adenopathy, no asymmetry or masses     RESP: LL- CTA. RL - decreased BS on the right 1/3 way up - no rales, rhonchi or wheezes. Also decreased L base BS at the base.     CARDIOVASCULAR: regular rates and rhythm, normal S1 S2, no S3 or S4 and no murmur.     ABDOMEN:  soft, nontender, no HSM or masses and bowel sounds normal. +  moderate ascites.     MUSCULOSKELETAL: extremities normal- no gross deformities noted, no evidence of inflammation in joints, FROM in all extremities.  +2-3 edema b/l LE worsened compared to last visit.       SKIN: no suspicious lesions or rashes     PSYCHIATRIC: mentation appears normal and affect normal    Laboratory/Imaging Studies    Orders Only on 06/07/2018   Component Date Value Ref Range Status     WBC 06/07/2018 4.9  4.0 - 11.0 10e9/L Final     RBC Count 06/07/2018 4.76  3.8 - 5.2 10e12/L Final     Hemoglobin 06/07/2018 15.4  11.7 - 15.7 g/dL Final     Hematocrit 06/07/2018 45.9  35.0 - 47.0 % Final     MCV 06/07/2018 96  78 - 100 fl Final     MCH 06/07/2018 32.4  26.5 - 33.0 pg Final     MCHC 06/07/2018 33.6  31.5 - 36.5 g/dL Final "     RDW 06/07/2018 13.5  10.0 - 15.0 % Final     Platelet Count 06/07/2018 154  150 - 450 10e9/L Final     Diff Method 06/07/2018 Automated Method   Final     % Neutrophils 06/07/2018 70.5  % Final     % Lymphocytes 06/07/2018 8.5  % Final     % Monocytes 06/07/2018 9.3  % Final     % Eosinophils 06/07/2018 10.5  % Final     % Basophils 06/07/2018 1.0  % Final     % Immature Granulocytes 06/07/2018 0.2  % Final     Nucleated RBCs 06/07/2018 0  0 /100 Final     Absolute Neutrophil 06/07/2018 3.5  1.6 - 8.3 10e9/L Final     Absolute Lymphocytes 06/07/2018 0.4* 0.8 - 5.3 10e9/L Final     Absolute Monocytes 06/07/2018 0.5  0.0 - 1.3 10e9/L Final     Absolute Eosinophils 06/07/2018 0.5  0.0 - 0.7 10e9/L Final     Absolute Basophils 06/07/2018 0.1  0.0 - 0.2 10e9/L Final     Abs Immature Granulocytes 06/07/2018 0.0  0 - 0.4 10e9/L Final     Absolute Nucleated RBC 06/07/2018 0.0   Final     Bilirubin Direct 06/07/2018 0.7* 0.0 - 0.2 mg/dL Final     Bilirubin Total 06/07/2018 2.7* 0.2 - 1.3 mg/dL Final     Albumin 06/07/2018 4.1  3.4 - 5.0 g/dL Final     Protein Total 06/07/2018 7.2  6.8 - 8.8 g/dL Final     Alkaline Phosphatase 06/07/2018 204* 40 - 150 U/L Final     ALT 06/07/2018 28  0 - 50 U/L Final     AST 06/07/2018 21  0 - 45 U/L Final     Sodium 06/07/2018 134  133 - 144 mmol/L Final     Potassium 06/07/2018 4.5  3.4 - 5.3 mmol/L Final     Chloride 06/07/2018 96  94 - 109 mmol/L Final     Carbon Dioxide 06/07/2018 30  20 - 32 mmol/L Final     Anion Gap 06/07/2018 8  3 - 14 mmol/L Final     Glucose 06/07/2018 77  70 - 99 mg/dL Final     Urea Nitrogen 06/07/2018 33* 7 - 30 mg/dL Final     Creatinine 06/07/2018 1.33* 0.52 - 1.04 mg/dL Final     GFR Estimate 06/07/2018 40* >60 mL/min/1.7m2 Final    Non  GFR Calc     GFR Estimate If Black 06/07/2018 48* >60 mL/min/1.7m2 Final    African American GFR Calc     Calcium 06/07/2018 8.8  8.5 - 10.1 mg/dL Final     Kappa Free Lt Chain 06/07/2018 0.86  0.33 - 1.94  mg/dL Final     Lambda Free Lt Chain 06/07/2018 1.49  0.57 - 2.63 mg/dL Final     Kappa Lambda Ratio 06/07/2018 0.58  0.26 - 1.65 Final     Albumin Fraction 06/07/2018 4.4  3.7 - 5.1 g/dL Final     Alpha 1 Fraction 06/07/2018 0.3  0.2 - 0.4 g/dL Final     Alpha 2 Fraction 06/07/2018 0.6  0.5 - 0.9 g/dL Final     Beta Fraction 06/07/2018 0.7  0.6 - 1.0 g/dL Final     Gamma Fraction 06/07/2018 0.7  0.7 - 1.6 g/dL Final     Monoclonal Peak 06/07/2018 0.0  0.0 g/dL Final     ELP Interpretation: 06/07/2018    Final                    Value:Essentially normal electrophoretic pattern. No monoclonal protein seen. Pathologic   significance requires clinical correlation.  KATERINA Gonzales M.D., Pathologist.        IGG 06/07/2018 757  695 - 1620 mg/dL Final     IGA 06/07/2018 47* 70 - 380 mg/dL Final     IGM 06/07/2018 46* 60 - 265 mg/dL Final     N-Terminal Pro Bnp 06/07/2018 1898* 0 - 125 pg/mL Final    Comment:    Reference range shown and results flagged as abnormal are for the outpatient,   non acute settings. Establishing a baseline value for each individual patient   is useful for follow-up.  Suggested inpatient cut points for confirming diagnosis of CHF in an acute   setting are:   >450 pg/mL (age 18 to less than 50)   >900 pg/mL (age 50 to less than 75)   >1800 pg/mL (75 yrs and older)  An inpatient or emergency department NT-proPBNP <300 pg/mL effectively rules   out acute CHF, with 99% negative predictive value.        Troponin I ES 06/07/2018 <0.015  0.000 - 0.045 ug/L Final    Comment: The 99th percentile for upper reference range is 0.045 ug/L.  Troponin values   in the range of 0.045 - 0.120 ug/L may be associated with risks of adverse   clinical events.       Results for GARCIAJESSICA (MRN [id number]) as of 6/14/2018 14:57   Ref. Range 4/17/2017 10:59 5/26/2017 14:18 7/20/2017 09:00 8/17/2017 09:45 10/27/2017 09:22 2/9/2018 09:19 2/22/2018 10:43 6/7/2018 06:23   Creatinine Latest Ref Range: 0.52 - 1.04 mg/dL  1.22 (H) 1.06 (H) 1.25 (H) 1.07 (H) 1.38 (H) 1.10 (H) 1.20 (H) 1.33 (H)   Results for LEANDRA GARCIA (MRN [id number]) as of 6/14/2018 14:57   Ref. Range 2/1/2017 10:30 3/16/2017 09:46 7/20/2017 09:00 10/27/2017 09:22 2/9/2018 09:19 6/7/2018 06:23   Alkaline Phosphatase Latest Ref Range: 40 - 150 U/L 163 (H) 202 (H) 228 (H) 249 (H) 240 (H) 204 (H)     Results for LEANDRA GARCIA (MRN [id number]) as of 6/14/2018 14:57   Ref. Range 3/16/2017 09:46 7/20/2017 09:00 10/27/2017 09:22 2/9/2018 09:19 6/7/2018 06:23   Bilirubin Total Latest Ref Range: 0.2 - 1.3 mg/dL 1.6 (H) 2.4 (H) 2.9 (H) 2.7 (H) 2.7 (H)   Results for LEANDRA GARCIA (MRN [id number]) as of 6/14/2018 14:57   Ref. Range 7/20/2017 09:00 7/20/2017 09:33 10/27/2017 09:22 2/9/2018 09:19 6/7/2018 06:23   IGA Latest Ref Range: 70 - 380 mg/dL 66 (L)  55 (L) 52 (L) 47 (L)   IGG Latest Ref Range: 695 - 1620 mg/dL 910  770 782 757   IGM Latest Ref Range: 60 - 265 mg/dL 65  49 (L) 63 46 (L)   Immunofix ELP Urine Unknown  No monoclonal pro...      Immunofixation ELP Unknown No monoclonal pro...  No monoclonal pro...     Kappa Free Lt Chain Latest Ref Range: 0.33 - 1.94 mg/dL 0.89  0.83 1.30 0.86   Kappa Lambda Ratio Latest Ref Range: 0.26 - 1.65  0.51  0.61 0.73 0.58   Lambda Free Lt Chain Latest Ref Range: 0.57 - 2.63 mg/dL 1.76  1.37 1.77 1.49   Monoclonal Peak Latest Ref Range: 0.0 g/dL 0.0  0.0 0.0 0.0       ASSESSMENT/PLAN:  Leandra Garcia is a 67 year old woman with  of AL amyloidosis involving the heart and GI tract. Unfortunately, she had stage IV AL amyloidosis at diagnosis in September 2016, according to revised Rios Criteria (NT-proBNP >1800 ng/l, troponin >0.025 and difference between free lambda and kappa light chain >18). Stage IV amyloidosis is associated with median survival of 5 months in patients not undergoing BMT, and 5 year survival of 15%, and in patients who are able to undergo BMT, median survival is 22 months and 5 year survival of  46% (Dejan APODACA, Anderson BAR, Toñito RA, et al. Prognostication of survival using cardiac troponins and N-terminal pro-brain natriuretic peptide in patients with primary systemic amyloidosis undergoing peripheral blood stem cell transplantation. Blood 2004; 104:1881). She was felt not to be a candidate for high dose therapy.   She was on Cybor-D from 10/4/2016-12/27/2016, with Cytoxan omitted after cycle 2 day 1 due to her developing hemorrhagic cystitis. Her disease has responded  biochemically, with serum M spike of zero, and serum IgA level was down to low range and  free lambda light chains have normalized quantitatively.     I have discussed her situation with Dr. Barron in January, and he felt at that time that with normalization of serum free lambda light chains, no further chemotherapy is indicated. Unfortunately, the anti-amyloid antibody clinical trial  at [facility] is closed.     1.  AL amyloidosis - Serum FLC ratio is normal. Both serum free kappa light chains and lambda light chains are within normal limits. There is no M protein on serum ELP.   We'll see her back in 4 months with amyloid labs prior.     2. Recurrent pleural effusions- We obtained CXR to evaluate occasional SOB, orthopnea.  CXR  Today (reviewed) showed a stable small right pleural effusion and a trace left pleural effusion.  She is feeling more SOB on occasion.   PleurX catheter is an option in the future.    3. Amyloid cardiomyopathy-  She is being followed every 3-4 months.  She will be seeing  Dr. Cohen in October 2018 with echocardiogram and labs. Continue current diuretic regimen with Torsemide 80 mg PO QAM and 80 mg PO QHS and aldactone 25 mg PO BID. Cardiac Amyloidosis (a restrictive cardiomyopathy) , Stage C, NYHA Class III  Torsemide dose increased a week ago to 80 mg PO BID but weight has not gone down and patient feeling no improvement in LE edema or ascites. She will be contacting Dr. Cohen's office for future  recommendations.  4. CKD- Creat stable as above.  5.Stable elevated total bilirubin and alk phos, stable- in the past felt to be secondary to liver congestion. Cont to follow.    At the end of our visit patient and  verbalized understanding and concurred with the plan.      Addendum: Patient has not been on Doxycycline for a couple of months. She was experiencing some nausea and constipation on it. I am not sure it was making a difference in keeping her cardiac amyloid clinically stable. However, her weight and fluid status is up since D/c Doxycyline, although probably unrelated, but she is willing to try it again. Will put Rx in.  D/w patient and she is agreeable.  Dr. Moore

## 2018-06-14 NOTE — LETTER
6/14/2018         RE: Leandra Guerrero  [address] [place] [address]        Dear Colleague,    Thank you for referring your patient, Leandra Guerrero, to the [facility]. Please see a copy of my visit note below.    Hematology/Oncology Follow-up visit:  Date on this visit: Jun 14, 2018      Referring Physician: Dr. Braydon Barron, [facility].  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 She was diagnosed with CHF at a local clinic in [facility] and was placed on a b-blocker and a diuretic. She has also developed progressive worsening lower extremity edema by May 2016 which in retrospect started in January. Her cardiac angiogram was reportedly negative at that time. She has lost about 35 lbs in the last 6 months. She has significant nausea somewhat controlled with Zofran but she is reluctant to use it because of constipation too. She is on Senna-S one tablet PO BID for constipation and that is improved but still significant. She has very poor PO intake due to nausea and lack of appetite. She requested to be seen at [facility] and was seen by Dr. Braydon Barron on 9/13/2016 with f/up on 9/20/2016. She was also seen by cardiology team there Sara Severson CNP and Dr. Nettles from CHF service.  There was no e/o renal or hepatic amyloidosis.  EKG on 9/7/2016 showed normal sinus rhythm, prolonged QT interval (QTc 521 sec), left atrial enlargement and left anterior fascicular block. There was ST and T wave abnormality.  Apparently, her echocardiogram showed preserved LVEF at 59%.  She reports having R sided thoracentesis on 8/25/2016 after which her breathing has improved. She then had a CXR on 9/7/2016 which showed a small R pleural effusion with right basilar atelectasis. Cardiac silhouette was at the upper level of normal.  I have reviewed extensive  outside medical records but we are still in the process of obtaining additional records.  The patient also underwent a minor salivary gland biopsy of the lower lip. The pathology from that procedure (9/20/2016) showed normal salivary gland tissue with nonspecific chronic sialadenitis.  She had extensive lab workup on 9/7/2016.  Her TSH was normal. Serum protein ELP showed a small abnormality in the gamma fraction.  Serum immunofixation showed small monoclonal IgA lambda in the gamma fraction and a small lambda in the beta fraction. Serum IgA level was normal at 329. Serum IgG level was mildly low at 755 (normal range 767-1590) and IgM level was normal. B2 microglobulin was mildly elevated at 2.94 (ULN 2.7). Mg was normal. Total bilirubin was elevated at 3.9 and direct at 0.8. Uric acid was mildly elevated at 6.9 (ULN 6.1) Creat was 1.0. Troponin was 0.1 (ULN <0.01) and NT-pro BNP was 4747 pg/ml (ULN <=183). Total cholesterol was 190 and LDL was 139. Random urine protein was mildly elevated at 33 mg/dl (normal range <22). INR was 1.2 and PTT 27 (within normal limits). Fibrinogen was elevated at 441 and factor X was mildly decreased at 64% (normal range %). D-dimer was up at 1700 (). Free lambda light chains were 69.5 and free kappa light chains were 1.11 with FLC kappa/lambda ratio of 0.016.   CBC showed normal WBC at 6.0, slightly elevated Hb at 16. Platelet count was normal.  Flow cytometry on bone marrow biopsy showed detectable monotypic plasma cells. There were 12% of monotypic plasma cells on bone marrow biopsy.    The bone marrow biopsy (performed on 9/14/2016) showed normocellular BM, 30-40% cellularity, with involvement by 10-20% of lambda light chain restricted plasma cells.  Congo red stain was positive on the bone marrow biopsy. Liquid chromatography tandem mass spectroscopy showed a peptide profile c/w AL (lambda type) amyloid. BM karyotype was normal 46 XX. FISH on BM showed plasma cell  clone with 1q duplication, and monosomy of 13 and 14.  She had an EGD by Dr. Matthew Wadsworth on 9/14/2016. It showed gastric mucosal atrophy and multiple mucosal papules (nodules) seen in the stomach. The duodenum appeared normal. The biopsies of stomach mucosa, of stomach (antral) nodules and of duodenum, all showed the presence of amyloid in submucosal blood vessel walls in the stomach and duodenum and in deep mucosa in the stomach antrum and body, confirmed by Congo Red stain.   She also had additional labs done at our last visit, on 9/30/2016. Total bilirubin was elevated as below, primarily indirect. K was 3.3 and she was stared on K-dur 20 mEq PO bid. She was noted to have elevated serum IgA level on 9/30/2016 at 392 (). Serum free lambda chains were elevated at 37.75. Serum M spike was 0.1 g/dl and serum FLC ratio was low at 0.01. Serum immunofixation ELP showed monoclonal IgA immunoglobulin of lambda light chain type as well as monoclonal free immunoglobulin light chain of lambda chain type.  She was seen by Dr. Cohen too and had an echocardiogram on 10/6/2016 which showed:  Global and regional left ventricular function was normal with an EF of 60-65%.  The LV mass index was 131 g/m2 (severely increased.) The LV geometry is c/w  concentric hypertrophy measured by relative wall thickness. Global right ventricular function was normal. Severe biatrial enlargement was present.  Right ventricular systolic pressure was 26mmHg above the right atrial pressure. The inferior vena cava was dilated at 2.1 cm without respiratory variability, consistent with increased right atrial pressure. Trivial pericardial effusion was present.    She proceeded to start CyBorD as recommended by Dr. Barron with dose reduced in subq Bortezomib to 0.7 mg/m2, on 10/4/2016.  After one cycle, her M spike, IgA level and free lambda light chains have all improved, with M spike of 0 g/dl, IgA down into the normal range and free serum  lambda light chains improving from 37.75 down to 2.09.  She returns today in cycle 3 day 11. She had amyloid labs again on day 1 of cycle 3 (12/8/2016) which showed continued response to therapy and her IgA level was now low and serum FLC ratio was normal and serum free lambda chains were normal too.   She has developed hematuria with cycle 2 and Cytoxan is on hold from day 15 cycle 2 since hematuria persisted despite Mesnex. She had a CT abdomen/pelvis on 11/29/2016 which showed no abnormalities in the kidneys. There was a moderate right and a trace left sided pleural effusions. Small volume ascites and anasarca was noted as well. The liver had heterogeneous appearance, which can be seen in hepatic venous congestion.   Cytoxan was d/cd  on  11/22/2016 since her cystoscopy showed findings of mild petechia and erythema in her bladder that would be c/w hemorrhagic cystitis.  Pinch biopsies were taken from the bladder and showed no e/o amyloid or malignancy. She has continued on Velcade and dexamethasone until 12/27/2016.  On 1/3/2017 she was seen at [facility] by Dr. Barron and at that time serum free light chains were normal at 0.74 and FLC ratio was normal as well at 0.72. She was recommended to be on observation because free lambda chains normalized. However, her cardiac amyloidosis has progressed by 1/5/2017 and she has required frequent thoracentesis, and had weeping LE edema and elevated neck veins.  She was hospitalized for CHF in Jan 2017 due to cardiac amyloid and was aggressively diuresed.  Her echocardiogram on 1/5/2017 was abnormal (severe concentric hypertrophy and biatrial enlargement) and she also had abnormal EKG (near-low voltage, poor R wave progression, biatrial enlargement and no evidence of LVH despite very thickened LV on echo). She was felt to have  New York Heart Association class IIIB heart failure with clinically severe volume overload. She was seen by cardiologist Dr. Harjit Fischer at  in  [place]. She was seen by Dr. Chivo Lei from medical oncology at the [facility] in [place], and was recommended to start on Acyclovir and Doxycycline.  She has not started Acyclovir  but did start daily Doxycyline.   Dr. Lei also recommended she consider maintenance therapy. She was also seen by Dr. Barron at [facility] on 5/11/2017, and he recommended against maintenance therapy.     History Of Present Illness:  Ms. Guerrero is a 67 year old female, non-smoker, who presents for f/up of AL amyloidosis. She has been off treatment since December 2016.  She has been seeing  Dr. Cohen in f/up of cardiac Amyloidosis with preserved EF, CHF NYHA Class II. They monitor troponin and BNP. She was last seen by cardiology in June 2018. She has severe concentric hypertrophy and biatrial enlargement and abnormal EKG (near-low voltage, poor R wave progression, biatrial enlargement and no evidence of LVH despite very thickened LV on echo) in the setting of  AL amyloid.  Her NTproBNP is within range from prior. At her last visit in June 2018 she was felt to have  NYHA class  III t, Stage C,and  her torsemide was increased  to 80 mg BID.  She is on aldactone 25 mg PO daily.  Her Zio patch in Nov 2017  showed sinus rhythm with non sustained SVT. A repeat 7-day Ziopatch recently revealed short (>45 second) runs of SVT.  Overall, Virginia's weight continues to climb. She has gained about 1 lbs since last week even since she was changed to higher dose of Torsemide. She has gained about 4-5 lbs since February. She is SOB on exertion. She also has had orthopnea on at least one occasion where she felt that she needed to sit up in bed.   She denies SOB. She has no cough.   She has no CP. She has swelling in b/l LE which has increased to  2+ to 3+. She also feels more fluid in her abdomen. She is pain free today.   She is here with her  today.  In addition, a complete 12 point  review of systems is negative.      Past  "Medical/Surgical History:  AL Amyloid  Amyloid cardiomyopathy/CHF    Family History:  Sister  of multiple myeloma    Social History:  Lives in [place], with . Nonsmoker        Allergies:     Allergies   Allergen Reactions     Contrast Dye Shortness Of Breath     Shaking,chills and dypsnea     Cytoxan [Cyclophosphamide]      Hemorrhagic cystitis     Levofloxacin      Severe shaking and abdominal pain     Amoxicillin Nausea and Vomiting and Cramps     Current Medications:  Current Outpatient Prescriptions   Medication     Acetaminophen (TYLENOL PO)     LORazepam (ATIVAN) 0.5 MG tablet     ondansetron (ZOFRAN-ODT) 4 MG ODT tab     potassium chloride SA (K-DUR/KLOR-CON M) 10 MEQ CR tablet     spironolactone (ALDACTONE) 25 MG tablet     torsemide (DEMADEX) 20 MG tablet     No current facility-administered medications for this visit.       Physical Exam:    /70  Pulse 65  Temp 97.5  F (36.4  C)  Resp 16  Ht 1.6 m (5' 3\")  Wt 66.7 kg (147 lb)  SpO2 98%  BMI 26.04 kg/m2        GENERAL APPEARANCE: middle aged, alert and no distress     HENT: Mouth without ulcers or lesions     NECK: no adenopathy, no asymmetry or masses     RESP: LL- CTA. RL - decreased BS on the right 1/3 way up - no rales, rhonchi or wheezes. Also decreased L base BS at the base.     CARDIOVASCULAR: regular rates and rhythm, normal S1 S2, no S3 or S4 and no murmur.     ABDOMEN:  soft, nontender, no HSM or masses and bowel sounds normal. +  moderate ascites.     MUSCULOSKELETAL: extremities normal- no gross deformities noted, no evidence of inflammation in joints, FROM in all extremities.  +2-3 edema b/l LE worsened compared to last visit.       SKIN: no suspicious lesions or rashes     PSYCHIATRIC: mentation appears normal and affect normal    Laboratory/Imaging Studies    Orders Only on 2018   Component Date Value Ref Range Status     WBC 2018 4.9  4.0 - 11.0 10e9/L Final     RBC Count 2018 4.76  3.8 - 5.2 " 10e12/L Final     Hemoglobin 06/07/2018 15.4  11.7 - 15.7 g/dL Final     Hematocrit 06/07/2018 45.9  35.0 - 47.0 % Final     MCV 06/07/2018 96  78 - 100 fl Final     MCH 06/07/2018 32.4  26.5 - 33.0 pg Final     MCHC 06/07/2018 33.6  31.5 - 36.5 g/dL Final     RDW 06/07/2018 13.5  10.0 - 15.0 % Final     Platelet Count 06/07/2018 154  150 - 450 10e9/L Final     Diff Method 06/07/2018 Automated Method   Final     % Neutrophils 06/07/2018 70.5  % Final     % Lymphocytes 06/07/2018 8.5  % Final     % Monocytes 06/07/2018 9.3  % Final     % Eosinophils 06/07/2018 10.5  % Final     % Basophils 06/07/2018 1.0  % Final     % Immature Granulocytes 06/07/2018 0.2  % Final     Nucleated RBCs 06/07/2018 0  0 /100 Final     Absolute Neutrophil 06/07/2018 3.5  1.6 - 8.3 10e9/L Final     Absolute Lymphocytes 06/07/2018 0.4* 0.8 - 5.3 10e9/L Final     Absolute Monocytes 06/07/2018 0.5  0.0 - 1.3 10e9/L Final     Absolute Eosinophils 06/07/2018 0.5  0.0 - 0.7 10e9/L Final     Absolute Basophils 06/07/2018 0.1  0.0 - 0.2 10e9/L Final     Abs Immature Granulocytes 06/07/2018 0.0  0 - 0.4 10e9/L Final     Absolute Nucleated RBC 06/07/2018 0.0   Final     Bilirubin Direct 06/07/2018 0.7* 0.0 - 0.2 mg/dL Final     Bilirubin Total 06/07/2018 2.7* 0.2 - 1.3 mg/dL Final     Albumin 06/07/2018 4.1  3.4 - 5.0 g/dL Final     Protein Total 06/07/2018 7.2  6.8 - 8.8 g/dL Final     Alkaline Phosphatase 06/07/2018 204* 40 - 150 U/L Final     ALT 06/07/2018 28  0 - 50 U/L Final     AST 06/07/2018 21  0 - 45 U/L Final     Sodium 06/07/2018 134  133 - 144 mmol/L Final     Potassium 06/07/2018 4.5  3.4 - 5.3 mmol/L Final     Chloride 06/07/2018 96  94 - 109 mmol/L Final     Carbon Dioxide 06/07/2018 30  20 - 32 mmol/L Final     Anion Gap 06/07/2018 8  3 - 14 mmol/L Final     Glucose 06/07/2018 77  70 - 99 mg/dL Final     Urea Nitrogen 06/07/2018 33* 7 - 30 mg/dL Final     Creatinine 06/07/2018 1.33* 0.52 - 1.04 mg/dL Final     GFR Estimate 06/07/2018  40* >60 mL/min/1.7m2 Final    Non  GFR Calc     GFR Estimate If Black 06/07/2018 48* >60 mL/min/1.7m2 Final    African American GFR Calc     Calcium 06/07/2018 8.8  8.5 - 10.1 mg/dL Final     Kappa Free Lt Chain 06/07/2018 0.86  0.33 - 1.94 mg/dL Final     Lambda Free Lt Chain 06/07/2018 1.49  0.57 - 2.63 mg/dL Final     Kappa Lambda Ratio 06/07/2018 0.58  0.26 - 1.65 Final     Albumin Fraction 06/07/2018 4.4  3.7 - 5.1 g/dL Final     Alpha 1 Fraction 06/07/2018 0.3  0.2 - 0.4 g/dL Final     Alpha 2 Fraction 06/07/2018 0.6  0.5 - 0.9 g/dL Final     Beta Fraction 06/07/2018 0.7  0.6 - 1.0 g/dL Final     Gamma Fraction 06/07/2018 0.7  0.7 - 1.6 g/dL Final     Monoclonal Peak 06/07/2018 0.0  0.0 g/dL Final     ELP Interpretation: 06/07/2018    Final                    Value:Essentially normal electrophoretic pattern. No monoclonal protein seen. Pathologic   significance requires clinical correlation.  KATERINA Gonzales M.D., Pathologist.        IGG 06/07/2018 757  695 - 1620 mg/dL Final     IGA 06/07/2018 47* 70 - 380 mg/dL Final     IGM 06/07/2018 46* 60 - 265 mg/dL Final     N-Terminal Pro Bnp 06/07/2018 1898* 0 - 125 pg/mL Final    Comment:    Reference range shown and results flagged as abnormal are for the outpatient,   non acute settings. Establishing a baseline value for each individual patient   is useful for follow-up.  Suggested inpatient cut points for confirming diagnosis of CHF in an acute   setting are:   >450 pg/mL (age 18 to less than 50)   >900 pg/mL (age 50 to less than 75)   >1800 pg/mL (75 yrs and older)  An inpatient or emergency department NT-proPBNP <300 pg/mL effectively rules   out acute CHF, with 99% negative predictive value.        Troponin I ES 06/07/2018 <0.015  0.000 - 0.045 ug/L Final    Comment: The 99th percentile for upper reference range is 0.045 ug/L.  Troponin values   in the range of 0.045 - 0.120 ug/L may be associated with risks of adverse   clinical events.        Results for LEANDRA GARCIA (MRN [id number]) as of 6/14/2018 14:57   Ref. Range 4/17/2017 10:59 5/26/2017 14:18 7/20/2017 09:00 8/17/2017 09:45 10/27/2017 09:22 2/9/2018 09:19 2/22/2018 10:43 6/7/2018 06:23   Creatinine Latest Ref Range: 0.52 - 1.04 mg/dL 1.22 (H) 1.06 (H) 1.25 (H) 1.07 (H) 1.38 (H) 1.10 (H) 1.20 (H) 1.33 (H)   Results for LEANDRA GARCIA (MRN [id number]) as of 6/14/2018 14:57   Ref. Range 2/1/2017 10:30 3/16/2017 09:46 7/20/2017 09:00 10/27/2017 09:22 2/9/2018 09:19 6/7/2018 06:23   Alkaline Phosphatase Latest Ref Range: 40 - 150 U/L 163 (H) 202 (H) 228 (H) 249 (H) 240 (H) 204 (H)     Results for LEANDRA GARCIA (MRN [id number]) as of 6/14/2018 14:57   Ref. Range 3/16/2017 09:46 7/20/2017 09:00 10/27/2017 09:22 2/9/2018 09:19 6/7/2018 06:23   Bilirubin Total Latest Ref Range: 0.2 - 1.3 mg/dL 1.6 (H) 2.4 (H) 2.9 (H) 2.7 (H) 2.7 (H)   Results for LEANDRA GARCIA (MRN [id number]) as of 6/14/2018 14:57   Ref. Range 7/20/2017 09:00 7/20/2017 09:33 10/27/2017 09:22 2/9/2018 09:19 6/7/2018 06:23   IGA Latest Ref Range: 70 - 380 mg/dL 66 (L)  55 (L) 52 (L) 47 (L)   IGG Latest Ref Range: 695 - 1620 mg/dL 910  770 782 757   IGM Latest Ref Range: 60 - 265 mg/dL 65  49 (L) 63 46 (L)   Immunofix ELP Urine Unknown  No monoclonal pro...      Immunofixation ELP Unknown No monoclonal pro...  No monoclonal pro...     Kappa Free Lt Chain Latest Ref Range: 0.33 - 1.94 mg/dL 0.89  0.83 1.30 0.86   Kappa Lambda Ratio Latest Ref Range: 0.26 - 1.65  0.51  0.61 0.73 0.58   Lambda Free Lt Chain Latest Ref Range: 0.57 - 2.63 mg/dL 1.76  1.37 1.77 1.49   Monoclonal Peak Latest Ref Range: 0.0 g/dL 0.0  0.0 0.0 0.0       ASSESSMENT/PLAN:  Leandra Garcia is a 67 year old woman with  of AL amyloidosis involving the heart and GI tract. Unfortunately, she had stage IV AL amyloidosis at diagnosis in September 2016, according to revised Rios Criteria (NT-proBNP >1800 ng/l, troponin >0.025 and difference between  free lambda and kappa light chain >18). Stage IV amyloidosis is associated with median survival of 5 months in patients not undergoing BMT, and 5 year survival of 15%, and in patients who are able to undergo BMT, median survival is 22 months and 5 year survival of 46% (Dejan A, Anderson BAR, Toñito RA, et al. Prognostication of survival using cardiac troponins and N-terminal pro-brain natriuretic peptide in patients with primary systemic amyloidosis undergoing peripheral blood stem cell transplantation. Blood 2004; 104:1881). She was felt not to be a candidate for high dose therapy.   She was on Cybor-D from 10/4/2016-12/27/2016, with Cytoxan omitted after cycle 2 day 1 due to her developing hemorrhagic cystitis. Her disease has responded  biochemically, with serum M spike of zero, and serum IgA level was down to low range and  free lambda light chains have normalized quantitatively.     I have discussed her situation with Dr. Barron in January, and he felt at that time that with normalization of serum free lambda light chains, no further chemotherapy is indicated. Unfortunately, the anti-amyloid antibody clinical trial  at [facility] is closed.     1.  AL amyloidosis - Serum FLC ratio is normal. Both serum free kappa light chains and lambda light chains are within normal limits. There is no M protein on serum ELP.   We'll see her back in 4 months with amyloid labs prior.     2. Recurrent pleural effusions- We obtained CXR to evaluate occasional SOB, orthopnea.  CXR  Today (reviewed) showed a stable small right pleural effusion and a trace left pleural effusion.  She is feeling more SOB on occasion.   PleurX catheter is an option in the future.    3. Amyloid cardiomyopathy-  She is being followed every 3-4 months.  She will be seeing  Dr. Cohen in October 2018 with echocardiogram and labs. Continue current diuretic regimen with Torsemide 80 mg PO QAM and 80 mg PO QHS and aldactone 25 mg PO BID. Cardiac  Amyloidosis (a restrictive cardiomyopathy) , Stage C, NYHA Class III  Torsemide dose increased a week ago to 80 mg PO BID but weight has not gone down and patient feeling no improvement in LE edema or ascites. She will be contacting Dr. Cohen's office for future recommendations.  4. CKD- Creat stable as above.  5.Stable elevated total bilirubin and alk phos, stable- in the past felt to be secondary to liver congestion. Cont to follow.    At the end of our visit patient and  verbalized understanding and concurred with the plan.          Again, thank you for allowing me to participate in the care of your patient.        Sincerely,        Mirela Moore MD, MD

## 2018-06-14 NOTE — NURSING NOTE
"Oncology Rooming Note    June 14, 2018 3:04 PM   Leandra Guerrero is a 67 year old female who presents for:    Chief Complaint   Patient presents with     Oncology Clinic Visit     follow up     Initial Vitals: /70  Pulse 65  Temp 97.5  F (36.4  C)  Resp 16  Ht 1.6 m (5' 3\")  Wt 66.7 kg (147 lb)  SpO2 98%  BMI 26.04 kg/m2 Estimated body mass index is 26.04 kg/(m^2) as calculated from the following:    Height as of this encounter: 1.6 m (5' 3\").    Weight as of this encounter: 66.7 kg (147 lb). Body surface area is 1.72 meters squared.  No Pain (0) Comment: Data Unavailable   No LMP recorded. Patient is postmenopausal.  Allergies reviewed: Yes  Medications reviewed: Yes    Medications: Medication refills not needed today.  Pharmacy name entered into [employer]:    [facility] [address] [place], [place] - [address]  [facility] [address] [facility] [address]        5 minutes for nursing intake (face to face time)     Yue Rogers LPN              "

## 2018-06-14 NOTE — PATIENT INSTRUCTIONS
Preventive Care:    Breast Cancer Screening: During our visit today, we discussed that it is recommended you receive breast cancer screening. Please call or make an appointment with your primary care provider to discuss this with them. You may also call the  [facility] scheduling line ([phone]) to set up a mammography appointment at the [facility] within the [facility].    Colorectal Cancer Screening: During our visit today, we discussed that it is recommended you receive colorectal cancer screening. Please call or make an appointment with your primary care provider to discuss this. You may also call the  [facility] scheduling line ([phone]) to set up a colonoscopy appointment.

## 2018-06-14 NOTE — MR AVS SNAPSHOT
After Visit Summary   6/14/2018    Leandra Guerrero    MRN: [id number]           Patient Information     Date Of Birth          1950        Visit Information        Provider Department      6/14/2018 3:00 PM Mirela Moore MD [facility]        Today's Diagnoses     AL amyloidosis    -  1    SOB (shortness of breath)          Care Instructions    Preventive Care:    Breast Cancer Screening: During our visit today, we discussed that it is recommended you receive breast cancer screening. Please call or make an appointment with your primary care provider to discuss this with them. You may also call the [facility] scheduling line ([phone]) to set up a mammography appointment at the [facility] within the [facility].    Colorectal Cancer Screening: During our visit today, we discussed that it is recommended you receive colorectal cancer screening. Please call or make an appointment with your primary care provider to discuss this. You may also call the [facility] scheduling line ([phone]) to set up a colonoscopy appointment.              Follow-ups after your visit        Your next 10 appointments already scheduled     Oct 11, 2018  7:00 AM CDT   Ech Complete with [id number]   [facility] Echo ([facility])    [address] [place] [address] [phone]           1. Please bring or wear a comfortable two-piece outfit. 2. You may eat, drink and take your normal medicines. 3. For any questions that cannot be answered, please contact the ordering physician            Oct 11, 2018  8:00 AM CDT   Lab with  LAB   [facility] Lab ([facility])    [address] [place] [address] [phone]            Oct 11, 2018  8:30 AM CDT   (Arrive by 8:15 AM)   RETURN HEART FAILURE with Domenica Cohen MD   [facility] ([facility]  [place])    [address] [facility]  [address] [place] [id number] [phone]            Oct 16, 2018 12:00 PM CDT   LAB with LAB ONC [facility] ([facility])    [address] [place] [id number] [phone]           Please do not eat 10-12 hours before your appointment if you are coming in fasting for labs on lipids, cholesterol, or glucose (sugar). This does not apply to pregnant women. Water, hot tea and black coffee (with nothing added) are okay. Do not drink other fluids, diet soda or chew gum.            Oct 23, 2018  2:30 PM CDT   Return Visit with Mirela Moore MD   [facility] ([facility])    [address] [facility] [address] [phone]              Who to contact     If you have questions or need follow up information about today's clinic visit or your schedule please contact [facility] directly at [phone].  Normal or non-critical lab and imaging results will be communicated to you by Sc[employer] Preventionhart, letter or phone within 4 business days after the clinic has received the results. If you do not hear from us within 7 days, please contact the clinic through Freedom2t or phone. If you have a critical or abnormal lab result, we will notify you by phone as soon as possible.  Submit refill requests through [employer] or call your pharmacy and they will forward the refill request to us. Please allow 3 business days for your refill to be completed.          Additional Information About Your Visit        [employer] Information     [employer] gives you secure access to your electronic health record. If you see a primary care provider, you can also send messages to your care team and make appointments. If you have questions, please call your primary care clinic.  If you do not have a primary care provider, please call [phone] and they will assist you.      [employer] is an electronic  "gateway that provides easy, online access to your medical records. With [employer], you can request a clinic appointment, read your test results, renew a prescription or communicate with your care team.     To access your existing account, please contact your [facility] or call [phone] for assistance.        Care EveryWhere ID     This is your Care EveryWhere ID. This could be used by other organizations to access your [place] medical records  [id number]        Your Vitals Were     Pulse Temperature Respirations Height Pulse Oximetry BMI (Body Mass Index)    65 97.5  F (36.4  C) 16 1.6 m (5' 3\") 98% 26.04 kg/m2       Blood Pressure from Last 3 Encounters:   06/14/18 130/70   06/07/18 119/68   02/22/18 122/68    Weight from Last 3 Encounters:   06/14/18 66.7 kg (147 lb)   06/07/18 66.3 kg (146 lb 1.6 oz)   02/22/18 64.4 kg (141 lb 14.4 oz)                 Today's Medication Changes          These changes are accurate as of 6/14/18 11:59 PM.  If you have any questions, ask your nurse or doctor.               Start taking these medicines.        Dose/Directions    doxycycline 100 MG tablet   Commonly known as:  VIBRA-TABS   Used for:  AL amyloidosis   Started by:  Mirela Moore MD        Dose:  100 mg   Take 1 tablet (100 mg) by mouth daily   Quantity:  30 tablet   Refills:  3            Where to get your medicines      These medications were sent to [address] [address]     Phone:  [phone]     doxycycline 100 MG tablet                Primary Care Provider Office Phone # Fax #    Braydon APODACA Anderson [phone]       [facility] [address] [facility] [address]        Equal Access to Services     YASMIN REYNA AH: Janey maddeno Sokatelyn, waaxda luqadaha, qaybta kaalmada adeegyada, ab taylor. So [facility] [phone].    ATENCIÓN: Si tavon devries " lobo disposición servicios gratuitos de asistencia lingüística. Machelle williamson [phone].    We comply with applicable federal civil rights laws and [place] laws. We do not discriminate on the basis of race, color, national origin, age, disability, sex, sexual orientation, or gender identity.            Thank you!     Thank you for choosing [facility]  for your care. Our goal is always to provide you with excellent care. Hearing back from our patients is one way we can continue to improve our services. Please take a few minutes to complete the written survey that you may receive in the mail after your visit with us. Thank you!             Your Updated Medication List - Protect others around you: Learn how to safely use, store and throw away your medicines at www.disposemymeds.org.          This list is accurate as of 6/14/18 11:59 PM.  Always use your most recent med list.                   Brand Name Dispense Instructions for use Diagnosis    doxycycline 100 MG tablet    VIBRA-TABS    30 tablet    Take 1 tablet (100 mg) by mouth daily    AL amyloidosis       LORazepam 0.5 MG tablet    ATIVAN    30 tablet    Take 1 tablet (0.5 mg) by mouth every 6 hours as needed for other (nausea)    Amyloid heart disease (H), Weight loss, Hypokalemia, AL amyloidosis       ondansetron 4 MG ODT tab    ZOFRAN-ODT    15 tablet    Take 1 tablet (4 mg) by mouth every 6 hours as needed for nausea    Amyloid heart disease (H), Weight loss, Hypokalemia       potassium chloride SA 10 MEQ CR tablet    K-DUR/KLOR-CON M    360 tablet    Take 2 tablets (20 mEq) by mouth 2 times daily    Hypokalemia       spironolactone 25 MG tablet    ALDACTONE    180 tablet    Take 1 tablet (25 mg) by mouth 2 times daily    Acute on chronic diastolic heart failure (H)       torsemide 20 MG tablet    DEMADEX    554 tablet    80 mg by mouth twice daily for one week then 60 mg twice daily    Acute on chronic diastolic heart failure (H)        TYLENOL PO      Take 325 mg by mouth    Amyloid heart disease (H)

## 2018-06-15 DIAGNOSIS — I43 CARDIAC AMYLOIDOSIS (H): ICD-10-CM

## 2018-06-15 DIAGNOSIS — I50.33 ACUTE ON CHRONIC DIASTOLIC HEART FAILURE (H): Primary | ICD-10-CM

## 2018-06-15 DIAGNOSIS — E85.4 CARDIAC AMYLOIDOSIS (H): ICD-10-CM

## 2018-06-15 RX ORDER — DOXYCYCLINE HYCLATE 100 MG
100 TABLET ORAL DAILY
Qty: 30 TABLET | Refills: 3 | Status: SHIPPED | OUTPATIENT
Start: 2018-06-15 | End: 2018-10-26

## 2018-06-20 DIAGNOSIS — I43 CARDIAC AMYLOIDOSIS (H): ICD-10-CM

## 2018-06-20 DIAGNOSIS — I50.33 ACUTE ON CHRONIC DIASTOLIC HEART FAILURE (H): ICD-10-CM

## 2018-06-20 DIAGNOSIS — E85.4 CARDIAC AMYLOIDOSIS (H): ICD-10-CM

## 2018-06-20 LAB
ANION GAP SERPL CALCULATED.3IONS-SCNC: 7 MMOL/L (ref 3–14)
BUN SERPL-MCNC: 39 MG/DL (ref 7–30)
CALCIUM SERPL-MCNC: 9.1 MG/DL (ref 8.5–10.1)
CHLORIDE SERPL-SCNC: 96 MMOL/L (ref 94–109)
CO2 SERPL-SCNC: 33 MMOL/L (ref 20–32)
CREAT SERPL-MCNC: 1.45 MG/DL (ref 0.52–1.04)
GFR SERPL CREATININE-BSD FRML MDRD: 36 ML/MIN/1.7M2
GLUCOSE SERPL-MCNC: 95 MG/DL (ref 70–99)
POTASSIUM SERPL-SCNC: 4.8 MMOL/L (ref 3.4–5.3)
SODIUM SERPL-SCNC: 136 MMOL/L (ref 133–144)

## 2018-06-20 PROCEDURE — 80048 BASIC METABOLIC PNL TOTAL CA: CPT | Performed by: INTERNAL MEDICINE

## 2018-06-20 PROCEDURE — 36415 COLL VENOUS BLD VENIPUNCTURE: CPT | Performed by: INTERNAL MEDICINE

## 2018-06-21 ENCOUNTER — CARE COORDINATION (OUTPATIENT)
Dept: CARDIOLOGY | Facility: CLINIC | Age: 68
End: 2018-06-21

## 2018-06-21 NOTE — PROGRESS NOTES
Amy was taking torsemide 60 mg bid and instructed to increase to 80 mg bid for one week.  After one week, her wt was not down to baseline so after review with Dr. Cohen, we instructed her to continue higher dose for one more week and have labs done.  Labs yesterday showed her GFR is down to 36.  Although Amy reports her wt is down, but not where she feels the best, I sent My Chart message to go back on the torsemide 60 mg bid for several days and if wt started to increase, she could try torsemide 80 in am and 60 in pm.  Will notify Dr. Cohen for further recommendations.

## 2018-06-26 ENCOUNTER — MYC MEDICAL ADVICE (OUTPATIENT)
Dept: CARDIOLOGY | Facility: CLINIC | Age: 68
End: 2018-06-26

## 2018-07-15 ENCOUNTER — MYC MEDICAL ADVICE (OUTPATIENT)
Dept: ONCOLOGY | Facility: CLINIC | Age: 68
End: 2018-07-15

## 2018-09-25 DIAGNOSIS — I43 AMYLOID HEART DISEASE (H): ICD-10-CM

## 2018-09-25 DIAGNOSIS — E85.4 AMYLOID HEART DISEASE (H): ICD-10-CM

## 2018-09-25 DIAGNOSIS — R63.4 WEIGHT LOSS: ICD-10-CM

## 2018-09-25 DIAGNOSIS — E87.6 HYPOKALEMIA: ICD-10-CM

## 2018-09-26 RX ORDER — ONDANSETRON 4 MG/1
4 TABLET, ORALLY DISINTEGRATING ORAL EVERY 6 HOURS PRN
Qty: 15 TABLET | Refills: 1 | Status: SHIPPED | OUTPATIENT
Start: 2018-09-26 | End: 2020-04-30 | Stop reason: DRUGHIGH

## 2018-10-04 ASSESSMENT — ENCOUNTER SYMPTOMS
RECTAL PAIN: 0
SYNCOPE: 0
EXERCISE INTOLERANCE: 1
SLEEP DISTURBANCES DUE TO BREATHING: 0
VOMITING: 0
NAUSEA: 1
HYPERTENSION: 0
HYPOTENSION: 0
ORTHOPNEA: 0
BLOOD IN STOOL: 1
HEARTBURN: 0
JAUNDICE: 0
LEG PAIN: 0
DIARRHEA: 0
ABDOMINAL PAIN: 0
PALPITATIONS: 1
BOWEL INCONTINENCE: 0
CONSTIPATION: 1
LIGHT-HEADEDNESS: 1
BLOATING: 1

## 2018-10-11 ENCOUNTER — OFFICE VISIT (OUTPATIENT)
Dept: CARDIOLOGY | Facility: CLINIC | Age: 68
End: 2018-10-11
Attending: INTERNAL MEDICINE
Payer: MEDICARE

## 2018-10-11 ENCOUNTER — RADIANT APPOINTMENT (OUTPATIENT)
Dept: CARDIOLOGY | Facility: CLINIC | Age: 68
End: 2018-10-11
Payer: COMMERCIAL

## 2018-10-11 VITALS
HEART RATE: 64 BPM | DIASTOLIC BLOOD PRESSURE: 67 MMHG | HEIGHT: 63 IN | WEIGHT: 147.3 LBS | SYSTOLIC BLOOD PRESSURE: 122 MMHG | OXYGEN SATURATION: 98 % | BODY MASS INDEX: 26.1 KG/M2

## 2018-10-11 DIAGNOSIS — I43 AMYLOID HEART DISEASE (H): ICD-10-CM

## 2018-10-11 DIAGNOSIS — R06.02 SOB (SHORTNESS OF BREATH): ICD-10-CM

## 2018-10-11 DIAGNOSIS — E85.81 AL AMYLOIDOSIS (H): ICD-10-CM

## 2018-10-11 DIAGNOSIS — E85.4 AMYLOID HEART DISEASE (H): ICD-10-CM

## 2018-10-11 DIAGNOSIS — I50.33 ACUTE ON CHRONIC DIASTOLIC HEART FAILURE (H): ICD-10-CM

## 2018-10-11 LAB
ALBUMIN SERPL-MCNC: 4.1 G/DL (ref 3.4–5)
ALP SERPL-CCNC: 252 U/L (ref 40–150)
ALT SERPL W P-5'-P-CCNC: 28 U/L (ref 0–50)
ANION GAP SERPL CALCULATED.3IONS-SCNC: 7 MMOL/L (ref 3–14)
AST SERPL W P-5'-P-CCNC: 23 U/L (ref 0–45)
BASOPHILS # BLD AUTO: 0.1 10E9/L (ref 0–0.2)
BASOPHILS NFR BLD AUTO: 1.2 %
BILIRUB SERPL-MCNC: 3.1 MG/DL (ref 0.2–1.3)
BUN SERPL-MCNC: 36 MG/DL (ref 7–30)
CALCIUM SERPL-MCNC: 8.8 MG/DL (ref 8.5–10.1)
CHLORIDE SERPL-SCNC: 94 MMOL/L (ref 94–109)
CO2 SERPL-SCNC: 30 MMOL/L (ref 20–32)
CREAT SERPL-MCNC: 1.32 MG/DL (ref 0.52–1.04)
DIFFERENTIAL METHOD BLD: ABNORMAL
EOSINOPHIL # BLD AUTO: 0.4 10E9/L (ref 0–0.7)
EOSINOPHIL NFR BLD AUTO: 7 %
ERYTHROCYTE [DISTWIDTH] IN BLOOD BY AUTOMATED COUNT: 13.9 % (ref 10–15)
GFR SERPL CREATININE-BSD FRML MDRD: 40 ML/MIN/1.7M2
GLUCOSE SERPL-MCNC: 93 MG/DL (ref 70–99)
HCT VFR BLD AUTO: 45.6 % (ref 35–47)
HGB BLD-MCNC: 15.1 G/DL (ref 11.7–15.7)
IGA SERPL-MCNC: 47 MG/DL (ref 70–380)
IGG SERPL-MCNC: 695 MG/DL (ref 695–1620)
IGM SERPL-MCNC: 43 MG/DL (ref 60–265)
IMM GRANULOCYTES # BLD: 0 10E9/L (ref 0–0.4)
IMM GRANULOCYTES NFR BLD: 0.3 %
KAPPA LC UR-MCNC: 1.06 MG/DL (ref 0.33–1.94)
KAPPA LC/LAMBDA SER: 0.74 {RATIO} (ref 0.26–1.65)
LAMBDA LC SERPL-MCNC: 1.43 MG/DL (ref 0.57–2.63)
LYMPHOCYTES # BLD AUTO: 0.4 10E9/L (ref 0.8–5.3)
LYMPHOCYTES NFR BLD AUTO: 6.4 %
MCH RBC QN AUTO: 32.2 PG (ref 26.5–33)
MCHC RBC AUTO-ENTMCNC: 33.1 G/DL (ref 31.5–36.5)
MCV RBC AUTO: 97 FL (ref 78–100)
MONOCYTES # BLD AUTO: 0.4 10E9/L (ref 0–1.3)
MONOCYTES NFR BLD AUTO: 6.8 %
NEUTROPHILS # BLD AUTO: 4.5 10E9/L (ref 1.6–8.3)
NEUTROPHILS NFR BLD AUTO: 78.3 %
NRBC # BLD AUTO: 0 10*3/UL
NRBC BLD AUTO-RTO: 0 /100
NT-PROBNP SERPL-MCNC: 1553 PG/ML (ref 0–125)
PLATELET # BLD AUTO: 178 10E9/L (ref 150–450)
POTASSIUM SERPL-SCNC: 4.3 MMOL/L (ref 3.4–5.3)
PROT SERPL-MCNC: 7.3 G/DL (ref 6.8–8.8)
RBC # BLD AUTO: 4.69 10E12/L (ref 3.8–5.2)
SODIUM SERPL-SCNC: 131 MMOL/L (ref 133–144)
WBC # BLD AUTO: 5.8 10E9/L (ref 4–11)

## 2018-10-11 PROCEDURE — 82784 ASSAY IGA/IGD/IGG/IGM EACH: CPT | Performed by: INTERNAL MEDICINE

## 2018-10-11 PROCEDURE — 84165 PROTEIN E-PHORESIS SERUM: CPT | Performed by: INTERNAL MEDICINE

## 2018-10-11 PROCEDURE — 85025 COMPLETE CBC W/AUTO DIFF WBC: CPT | Performed by: INTERNAL MEDICINE

## 2018-10-11 PROCEDURE — 36415 COLL VENOUS BLD VENIPUNCTURE: CPT | Performed by: INTERNAL MEDICINE

## 2018-10-11 PROCEDURE — 83883 ASSAY NEPHELOMETRY NOT SPEC: CPT | Performed by: INTERNAL MEDICINE

## 2018-10-11 PROCEDURE — 83880 ASSAY OF NATRIURETIC PEPTIDE: CPT | Performed by: INTERNAL MEDICINE

## 2018-10-11 PROCEDURE — G0463 HOSPITAL OUTPT CLINIC VISIT: HCPCS | Mod: ZF

## 2018-10-11 PROCEDURE — 99214 OFFICE O/P EST MOD 30 MIN: CPT | Mod: ZP | Performed by: INTERNAL MEDICINE

## 2018-10-11 PROCEDURE — 80053 COMPREHEN METABOLIC PANEL: CPT | Performed by: INTERNAL MEDICINE

## 2018-10-11 PROCEDURE — 00000402 ZZHCL STATISTIC TOTAL PROTEIN: Performed by: INTERNAL MEDICINE

## 2018-10-11 RX ORDER — TORSEMIDE 20 MG/1
TABLET ORAL
Qty: 554 TABLET | Refills: 3 | Status: SHIPPED | OUTPATIENT
Start: 2018-10-11 | End: 2019-05-30

## 2018-10-11 ASSESSMENT — PAIN SCALES - GENERAL: PAINLEVEL: NO PAIN (0)

## 2018-10-11 NOTE — NURSING NOTE
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     LUIS Britton  8:28 AM

## 2018-10-11 NOTE — MR AVS SNAPSHOT
After Visit Summary   10/11/2018    Jessica Garcia    MRN: [id number]           Patient Information     Date Of Birth          1950        Visit Information        Provider Department      10/11/2018 8:30 AM Domenica Cohen MD [facility]        Today's Diagnoses     Amyloid heart disease (H)        Acute on chronic diastolic heart failure (H)          Care Instructions    Cardiology Providers you saw during your visit:  Dr. Cohen    Medication changes:  Can take Torsemide 80/60 mg one day, 60/60 mg second day and alternate.       Follow up:  Repeat Basic Metabolic Panel lab in one month.   With Dr Cohen in 6 months with labs prior      Results for JESSICA GARCIA (MRN [id number]) as of 10/11/2018 08:53   Ref. Range 10/11/2018 08:12   Sodium Latest Ref Range: 133 - 144 mmol/L 131 (L)   Potassium Latest Ref Range: 3.4 - 5.3 mmol/L 4.3   Chloride Latest Ref Range: 94 - 109 mmol/L 94   Carbon Dioxide Latest Ref Range: 20 - 32 mmol/L 30   Urea Nitrogen Latest Ref Range: 7 - 30 mg/dL 36 (H)   Creatinine Latest Ref Range: 0.52 - 1.04 mg/dL 1.32 (H)   GFR Estimate Latest Ref Range: >60 mL/min/1.7m2 40 (L)   GFR Estimate If Black Latest Ref Range: >60 mL/min/1.7m2 49 (L)   Calcium Latest Ref Range: 8.5 - 10.1 mg/dL 8.8   Anion Gap Latest Ref Range: 3 - 14 mmol/L 7   Albumin Latest Ref Range: 3.4 - 5.0 g/dL 4.1   Protein Total Latest Ref Range: 6.8 - 8.8 g/dL 7.3   Bilirubin Total Latest Ref Range: 0.2 - 1.3 mg/dL 3.1 (H)   Alkaline Phosphatase Latest Ref Range: 40 - 150 U/L 252 (H)   ALT Latest Ref Range: 0 - 50 U/L 28   AST Latest Ref Range: 0 - 45 U/L 23   N-Terminal Pro Bnp Latest Ref Range: 0 - 125 pg/mL 1553 (H)       Please call if you have :  1. Weight gain of more than 2 pounds in a day or 5 pounds in a week  2. Increased shortness of breath, swelling or bloating  3. Dizziness, lightheadedness   4. Any questions or concerns.       Follow the American Heart Association  Diet and Lifestyle recommendations:  Limit saturated fat, trans fat, sodium, red meat, sweets and sugar-sweetened beverages. If you choose to eat red meat, compare labels and select the leanest cuts available.  Aim for at least 150 minutes of moderate physical activity or 75 minutes of vigorous physical activity - or an equal combination of both - each week.      During business hours: [phone], press option # 1 to be routed to the [facility] then option # 3 for medical questions to speak with a nurse      After hours, weekends or holidays: On Call Cardiologist- [phone]   option #4 and ask to speak to the on-call Cardiologist. Inform them you are a CORE/heart failure patient at the [facility].      Vidhi Montgomery, RN CHFN  Cardiology Nurse Care Coordinator    Keep up the good work!    Take Care!             Follow-ups after your visit        Additional Services     Follow-Up with Cardiologist                 Your next 10 appointments already scheduled     Oct 26, 2018  2:30 PM CDT   Return Visit with Mirela Moore MD   [facility])    [address] [place] [id number] [phone]            Nov 12, 2018  9:20 AM CST   LAB with LAB FIRST FLOOR [facility])    [address] [place] [id number] [phone]           Please do not eat 10-12 hours before your appointment if you are coming in fasting for labs on lipids, cholesterol, or glucose (sugar). This does not apply to pregnant women. Water, hot tea and black coffee (with nothing added) are okay. Do not drink other fluids, diet soda or chew gum.            Apr 11, 2019  7:30 AM CDT   Lab with  LAB    [facility])    [address] [facility]  1st Floor  [facility] [address] [phone]            Apr 11, 2019  8:00 AM CDT   (Arrive by 7:45 AM)   RETURN HEART FAILURE with Domenica  "Nancy Cohen MD   [facility] ([facility])    [address] [facility]  [address] [place] [address] [phone]              Future tests that were ordered for you today     Open Future Orders        Priority Expected Expires Ordered    Basic metabolic panel Routine 4/11/2019 10/11/2019 10/11/2018    Basic metabolic panel Routine 11/11/2018 10/11/2019 10/11/2018    Follow-Up with Cardiologist Routine 4/9/2019 10/11/2019 10/11/2018            Who to contact     If you have questions or need follow up information about today's clinic visit or your schedule please contact [facility] directly at [phone].  Normal or non-critical lab and imaging results will be communicated to you by [employer] Championshiphart, letter or phone within 4 business days after the clinic has received the results. If you do not hear from us within 7 days, please contact the clinic through Needt or phone. If you have a critical or abnormal lab result, we will notify you by phone as soon as possible.  Submit refill requests through Faves or call your pharmacy and they will forward the refill request to us. Please allow 3 business days for your refill to be completed.          Additional Information About Your Visit        Faves Information     [employer] gives you secure access to your electronic health record. If you see a primary care provider, you can also send messages to your care team and make appointments. If you have questions, please call your primary care clinic.  If you do not have a primary care provider, please call [phone] and they will assist you.        Care EveryWhere ID     This is your Care EveryWhere ID. This could be used by other organizations to access your [place] medical records  [id number]        Your Vitals Were     Pulse Height Pulse Oximetry BMI (Body Mass Index)          64 1.6 m (5' 3\") 98% 26.09 kg/m2         Blood Pressure from Last 3 Encounters:   10/11/18 122/67 "   06/14/18 130/70   06/07/18 119/68    Weight from Last 3 Encounters:   10/11/18 66.8 kg (147 lb 4.8 oz)   06/14/18 66.7 kg (147 lb)   06/07/18 66.3 kg (146 lb 1.6 oz)              We Performed the Following     Follow-Up with [facility]          Today's Medication Changes          These changes are accurate as of 10/11/18  9:11 AM.  If you have any questions, ask your nurse or doctor.               These medicines have changed or have updated prescriptions.        Dose/Directions    torsemide 20 MG tablet   Commonly known as:  DEMADEX   This may have changed:  additional instructions   Used for:  Acute on chronic diastolic heart failure (H)   Changed by:  Domenica Cohen MD        Take 80 mg in the morning, 60 in the afternoon one day, then 60 mg twice a day next day. Continue to alternate every other day.   Quantity:  554 tablet   Refills:  3            Where to get your medicines      These medications were sent to [facility] [address] [place], [place] [address]     Phone:  [phone]     torsemide 20 MG tablet                Primary Care Provider Office Phone # Fax #    Braydon APODACA Anderson [phone]       [address] [address]        Equal Access to Services     YASMIN REYNA AH: Hadii stephanie finney hadasho Sokatelyn, waaxda luqadaha, qaybta kaalmada adeegyada, ab taylor. So [facility] [phone].    ATENCIÓN: Si habla español, tiene a lobo disposición servicios gratuitos de asistencia lingüística. Llame al [phone].    We comply with applicable federal civil rights laws and [place] laws. We do not discriminate on the basis of race, color, national origin, age, disability, sex, sexual orientation, or gender identity.            Thank you!     Thank you for choosing [facility]  for your care. Our goal is always to provide you with excellent care. Hearing back from our  patients is one way we can continue to improve our services. Please take a few minutes to complete the written survey that you may receive in the mail after your visit with us. Thank you!             Your Updated Medication List - Protect others around you: Learn how to safely use, store and throw away your medicines at www.disposemymeds.org.          This list is accurate as of 10/11/18  9:11 AM.  Always use your most recent med list.                   Brand Name Dispense Instructions for use Diagnosis    doxycycline 100 MG tablet    VIBRA-TABS    30 tablet    Take 1 tablet (100 mg) by mouth daily    AL amyloidosis (H)       LORazepam 0.5 MG tablet    ATIVAN    30 tablet    Take 1 tablet (0.5 mg) by mouth every 6 hours as needed for other (nausea)    Amyloid heart disease (H), Weight loss, Hypokalemia, AL amyloidosis (H)       ondansetron 4 MG ODT tab    ZOFRAN-ODT    15 tablet    Take 1 tablet (4 mg) by mouth every 6 hours as needed for nausea    Amyloid heart disease (H), Weight loss, Hypokalemia       potassium chloride SA 10 MEQ CR tablet    K-DUR/KLOR-CON M    360 tablet    Take 2 tablets (20 mEq) by mouth 2 times daily    Hypokalemia       spironolactone 25 MG tablet    ALDACTONE    180 tablet    Take 1 tablet (25 mg) by mouth 2 times daily    Acute on chronic diastolic heart failure (H)       torsemide 20 MG tablet    DEMADEX    554 tablet    Take 80 mg in the morning, 60 in the afternoon one day, then 60 mg twice a day next day. Continue to alternate every other day.    Acute on chronic diastolic heart failure (H)       TYLENOL PO      Take 325 mg by mouth    Amyloid heart disease (H)

## 2018-10-11 NOTE — PATIENT INSTRUCTIONS
Cardiology Providers you saw during your visit:  Dr. Cohen    Medication changes:  Can take Torsemide 80/60 mg one day, 60/60 mg second day and alternate.       Follow up:  Repeat Basic Metabolic Panel lab in one month.   With Dr Cohen in 6 months with labs prior      Results for JESSICA GARCIA (MRN [id number]) as of 10/11/2018 08:53   Ref. Range 10/11/2018 08:12   Sodium Latest Ref Range: 133 - 144 mmol/L 131 (L)   Potassium Latest Ref Range: 3.4 - 5.3 mmol/L 4.3   Chloride Latest Ref Range: 94 - 109 mmol/L 94   Carbon Dioxide Latest Ref Range: 20 - 32 mmol/L 30   Urea Nitrogen Latest Ref Range: 7 - 30 mg/dL 36 (H)   Creatinine Latest Ref Range: 0.52 - 1.04 mg/dL 1.32 (H)   GFR Estimate Latest Ref Range: >60 mL/min/1.7m2 40 (L)   GFR Estimate If Black Latest Ref Range: >60 mL/min/1.7m2 49 (L)   Calcium Latest Ref Range: 8.5 - 10.1 mg/dL 8.8   Anion Gap Latest Ref Range: 3 - 14 mmol/L 7   Albumin Latest Ref Range: 3.4 - 5.0 g/dL 4.1   Protein Total Latest Ref Range: 6.8 - 8.8 g/dL 7.3   Bilirubin Total Latest Ref Range: 0.2 - 1.3 mg/dL 3.1 (H)   Alkaline Phosphatase Latest Ref Range: 40 - 150 U/L 252 (H)   ALT Latest Ref Range: 0 - 50 U/L 28   AST Latest Ref Range: 0 - 45 U/L 23   N-Terminal Pro Bnp Latest Ref Range: 0 - 125 pg/mL 1553 (H)       Please call if you have :  1. Weight gain of more than 2 pounds in a day or 5 pounds in a week  2. Increased shortness of breath, swelling or bloating  3. Dizziness, lightheadedness   4. Any questions or concerns.       Follow the American Heart Association Diet and Lifestyle recommendations:  Limit saturated fat, trans fat, sodium, red meat, sweets and sugar-sweetened beverages. If you choose to eat red meat, compare labels and select the leanest cuts available.  Aim for at least 150 minutes of moderate physical activity or 75 minutes of vigorous physical activity - or an equal combination of both - each week.      During business hours: [phone], press  option # 1 to be routed to the [facility] then option # 3 for medical questions to speak with a nurse      After hours, weekends or holidays: On Call Cardiologist- [phone]   option #4 and ask to speak to the on-call Cardiologist. Inform them you are a CORE/heart failure patient at the [facility].      Vidhi Montgomery, RN CHFN  Cardiology Nurse Care Coordinator    Keep up the good work!    Take Care!

## 2018-10-11 NOTE — PROGRESS NOTES
October 11, 2018      HPI:  Mrs. Guerrero is a 67 year old female with no past medical history until the Spring of 2016 when she was diagnosed with stage IV AL amyloid; she presents to clinic for follow up of cardiac amyloid (AL type).     Her cardiac and oncologic history are as follows:     Fatigue started in January 2016. She eventually had a coronary angiogram which was reportedly normal and was diagnosed with CHF and started on a bblocker and diuretics. Her symptoms progressed to the point that she was evaluated at [facility] in August/September (Dr. Barron in oncology/hematology, Dr. Nettles is cardiology). She was diagnosed with stage IV AL amyloid (+GI, +bone marrow involvement, no involvement of kidney or liver):  her work up is detailed in our previous notes, however she has lambda AL amyloidosis and she underwent CyBorD chemotherapy and excellent light chain response by serum. She wasthen  turned down at [facility] for a stem cell transplant due to her cardiac involvement.    Unfortunately in 2016, she had 2-3 more pleural taps on the R and came back to my clinic with gross anasarca.  I admitted her to the hospital in 1/2017 where close to 20-30 pounds of fluid overload of volume was removed.  Since that time her AL has been in remission by labs without further chemo; her oncologist recently increase the interval of their meetings which is encouraging from that standpoint.      At this point we have had stability from a heart failure perspective for some time.  She has not been readmitted for fluid removal.  Her pleural effusion has been minimal and has not required further drainage.  She has remained on 60 twice daily of torsemide for some time.  She denies PND orthopnea.  She denies abdominal swelling.  She is fatigued most days but this is been stable.  She can walk a few blocks at a slow pace and this is been stable for some time.  She does have some increasing lower extremity edema over the last few weeks.      She and her  are taking it one month at a time and overall she has a good quality of life presently.     PAST MEDICAL HISTORY:  - Stage IV AL amyloid   - GI (stomach and duodenal) involvement  - Cardiac amyloidosis    FAMILY HISTORY:  Family History   Problem Relation Age of Onset     Other - See Comments Sister      Amyloidosis   - Sister passed from multiple myeloma, patient was also told her sister had amyloid as well  - Mom with CAD    SOCIAL HISTORY:  Social History     Social History     Marital Status:      Spouse Name: N/A     Number of Children: N/A     Years of Education: N/A     Social History Main Topics     Smoking status: Never Smoker      Smokeless tobacco: None     Alcohol Use: No     Drug Use: No     Sexual Activity: Not Asked     Social History Narrative   Lives in [place] with her , has 2 kids  Previously did office work, hasn't worked for several months now  Never smoker  Social ETOH    CURRENT MEDICATIONS:  Current Outpatient Prescriptions   Medication Sig Dispense Refill     Acetaminophen (TYLENOL PO) Take 325 mg by mouth       doxycycline (VIBRA-TABS) 100 MG tablet Take 1 tablet (100 mg) by mouth daily 30 tablet 3     LORazepam (ATIVAN) 0.5 MG tablet Take 1 tablet (0.5 mg) by mouth every 6 hours as needed for other (nausea) 30 tablet 0     ondansetron (ZOFRAN-ODT) 4 MG ODT tab Take 1 tablet (4 mg) by mouth every 6 hours as needed for nausea 15 tablet 1     potassium chloride SA (K-DUR/KLOR-CON M) 10 MEQ CR tablet Take 2 tablets (20 mEq) by mouth 2 times daily 360 tablet 3     spironolactone (ALDACTONE) 25 MG tablet Take 1 tablet (25 mg) by mouth 2 times daily 180 tablet 3     torsemide (DEMADEX) 20 MG tablet 80 mg by mouth twice daily for one week then 60 mg twice daily 554 tablet 3       ROS:   Constitutional: No fever, chills, or sweats.  Weight is relatively stable  ENT: No visual disturbance, ear ache, epistaxis, sore throat.   Allergies/Immunologic: Negative.  "  Respiratory: No cough, hemoptysis.   Cardiovascular: As per HPI.   GI: +Nausea, constipation.  : No urinary frequency, dysuria, or hematuria.   Integument: Negative.   Psychiatric: feeling down and anxious since her diagnosis  Neuro: + tingling in legs bilaterally  Endocrinology: Negative.   Musculoskeletal: Negative.    EXAM:  /67 (BP Location: Left arm, Patient Position: Chair, Cuff Size: Adult Regular)  Pulse 64  Ht 1.6 m (5' 3\")  Wt 66.8 kg (147 lb 4.8 oz)  SpO2 98%  BMI 26.09 kg/m2  General: appears comfortable, alert and articulate, NAD, pleasant  Head: normocephalic, atraumatic  Eyes: anicteric sclera, EOMI  Neck: no adenopathy, JVP ~10 cm H2O with +HJR  Orophyarynx: moist mucosa, no lesions, dentition intact  Heart: regular rate and rhythm. III/VI holosystolic murmur at the apex   Lungs: decreased breath sounds at the L base, lungs otherwise clear   Abdomen: soft, non-tender, bowel sounds present, no hepatosplenomegaly  Extremities: mild-moderate mostly nonpitting edema of bilateral extremities.   Neurological: normal speech and affect, no gross motor deficits    Labs:  CBC RESULTS:  Lab Results   Component Value Date    WBC 5.8 10/11/2018    RBC 4.69 10/11/2018    HGB 15.1 10/11/2018    HCT 45.6 10/11/2018    MCV 97 10/11/2018    MCH 32.2 10/11/2018    MCHC 33.1 10/11/2018    RDW 13.9 10/11/2018     10/11/2018       CMP RESULTS:  Lab Results   Component Value Date     06/20/2018    POTASSIUM 4.8 06/20/2018    CHLORIDE 96 06/20/2018    CO2 33 (H) 06/20/2018    ANIONGAP 7 06/20/2018    GLC 95 06/20/2018    BUN 39 (H) 06/20/2018    CR 1.45 (H) 06/20/2018    GFRESTIMATED 36 (L) 06/20/2018    GFRESTBLACK 44 (L) 06/20/2018    JERROD 9.1 06/20/2018    BILITOTAL 2.7 (H) 06/07/2018    ALBUMIN 4.1 06/07/2018    ALKPHOS 204 (H) 06/07/2018    ALT 28 06/07/2018    AST 21 06/07/2018     INR RESULTS:  Lab Results   Component Value Date    INR 1.30 (H) 01/14/2017       Lab Results   Component Value " Date    MAG 2.8 (H) 02/09/2018     Lab Results   Component Value Date    NTBNPI 9009 (H) 01/13/2017     Lab Results   Component Value Date    NTBNP 1898 (H) 06/07/2018       TTE 10/2018     Interpretation Summary  Global and regional left ventricular function is normal with an EF of 55-60%.  Moderate concentric wall thickening consistent with left ventricular  hypertrophy is present - known amyloid.  Grade III or advanced diastolic dysfunction.  Normal right ventricular size, wall thickness, and function.  Estimated pulmonary artery pressure 28 mmHg.  IVC with normal diameter but does not collapse (>50%) with inspiration.  Trace pericardial effusion.       CXR 10/27/17: Persistent small, right greater than left pleural effusions and overlying atelectasis    Troponin now neg,  NT proBNP stable     Assessment and Plan:   Mrs. Guerrero is a 68 year old female with stage IV  AL amyloid with GI, bone marrow and cardiac involvement who presents for follow up in the cardiac amyloid clinic.     Patient has cardiac amyloidosis as evidenced by her echocardiogram (severe concentric hypertrophy and biatrial enlargement) and abnormal EKG (near-low voltage, poor R wave progression, biatrial enlargement and no evidence of LVH despite very thickened LV on echo) in the setting of biopsy proven (BMB, EGD) AL amyloid.  she has completed chemotherapy with CyBorD with an excellent serologic response and her heart failure (both clinically and by labs - i.e. troponin now negative, serum light chains minimal and urine light chains undetectable) has stabilized significantly. Her NTproBNP is within range from prior and clinically she appears modestly hypervolemic.    Her NYHA class is III today, Stage C, she is relatively very mildly hypervolemic today and so I am increasing her torsemide slightly     We again addressed the importance of planning for end-of-life cares (despite her impressive response to therapy, long-term prognosis for stage  IV - AL amyloidosis is very poor) and for now we will continue to take this one month at a time, they are very realistic about her prognosis and for now her quality of life is good and she has been stable for some time.     Cardiac Amyloidosis (a restrictive cardiomyopathy)   Stage C  NYHA Class III  ACEi/ARB - none- no indication   BB - none, no indication and relatively contraindicated due to risk of progressive conduction disease/AV block in these patients  Aldosterone antagonist: aldactone   SCD prophylaxis: not indicated  % BiV pacing: NA  Fluid status: Increase Torsemide to alternating 60/80mg one day 60/60 the next, no change in KCl replacement    Other:    Arrhythmia monitoring -- Her Zio patch in 2/17 showed sinus rhythm with non sustained SVT. A repeat 7-day Ziopatch recently reveals short (>45 second) runs of SVT, for which she is generally not symptomatic. No further eval unless clinical change.     Systemic amyloid-heme is presently monitoring her light chains which have been negative consistent with remission - therefore we are not contemplating further palliative chemo.     Plan to return to clinic in 6 months.     Domenica Cohen MD   of Medicine   [facility] Division of Cardiology       CC   [place] [address]      Patient Care Team:  Braydon Barron as PCP - General (Hematology)  Mirela Daley MD as MD (Hematology & Oncology)  Domenica Cohen MD as MD (Cardiology)  Eileen Arevalo, RN as Nurse Coordinator (Cardiology)  Codie Nelson NP as Nurse Practitioner (Cardiology)  Kathryn Posey, DILIP as Nurse Coordinator (Cardiology)  Bear Wu RN as Nurse Coordinator (Cardiology)  MIRELA DALEY MD

## 2018-10-11 NOTE — NURSING NOTE
Diet: Patient instructed regarding a heart healthy diet, including discussion of reduced fat and sodium intake. Patient demonstrated understanding of this information and agreed to call with further questions or concerns.  Labs: Patient was given results of the laboratory testing obtained today. Patient was instructed to return for the next laboratory testing in 1 month. Patient demonstrated understanding of this information and agreed to call with further questions or concerns.   Return Appointment: Patient given instructions regarding scheduling next clinic visit. Patient demonstrated understanding of this information and agreed to call with further questions or concerns.  Medication Change: Patient was educated regarding prescribed medication change, including discussion of the indication, administration, side effects, and when to report to MD or RN. Patient demonstrated understanding of this information and agreed to call with further questions or concerns.  Patient stated she understood all health information given and agreed to call with further questions or concerns.   Vidhi Motngomery RN

## 2018-10-11 NOTE — LETTER
10/11/2018      RE: Leandra Guerrero  [address] [place] [address]       Dear Colleague,    Thank you for the opportunity to participate in the care of your patient, Leandra Guerrero, at the [facility]. Please see a copy of my visit note below.         October 11, 2018      HPI:  Mrs. Guerrero is a 67 year old female with no past medical history until the Spring of 2016 when she was diagnosed with stage IV AL amyloid; she presents to clinic for follow up of cardiac amyloid (AL type).     Her cardiac and oncologic history are as follows:     Fatigue started in January 2016. She eventually had a coronary angiogram which was reportedly normal and was diagnosed with CHF and started on a bblocker and diuretics. Her symptoms progressed to the point that she was evaluated at [facility] in August/September (Dr. Barron in oncology/hematology, Dr. Nettles is cardiology). She was diagnosed with stage IV AL amyloid (+GI, +bone marrow involvement, no involvement of kidney or liver):  her work up is detailed in our previous notes, however she has lambda AL amyloidosis and she underwent CyBorD chemotherapy and excellent light chain response by serum. She wasthen  turned down at [facility] for a stem cell transplant due to her cardiac involvement.    Unfortunately in 2016, she had 2-3 more pleural taps on the R and came back to my clinic with gross anasarca.  I admitted her to the hospital in 1/2017 where close to 20-30 pounds of fluid overload of volume was removed.  Since that time her AL has been in remission by labs without further chemo; her oncologist recently increase the interval of their meetings which is encouraging from that standpoint.      At this point we have had stability from a heart failure perspective for some time.  She has not been readmitted for fluid removal.  Her pleural effusion has been minimal and has not required further drainage.  She  has remained on 60 twice daily of torsemide for some time.  She denies PND orthopnea.  She denies abdominal swelling.  She is fatigued most days but this is been stable.  She can walk a few blocks at a slow pace and this is been stable for some time.  She does have some increasing lower extremity edema over the last few weeks.     She and her  are taking it one month at a time and overall she has a good quality of life presently.     PAST MEDICAL HISTORY:  - Stage IV AL amyloid   - GI (stomach and duodenal) involvement  - Cardiac amyloidosis    FAMILY HISTORY:  Family History   Problem Relation Age of Onset     Other - See Comments Sister      Amyloidosis   - Sister passed from multiple myeloma, patient was also told her sister had amyloid as well  - Mom with CAD    SOCIAL HISTORY:  Social History     Social History     Marital Status:      Spouse Name: N/A     Number of Children: N/A     Years of Education: N/A     Social History Main Topics     Smoking status: Never Smoker      Smokeless tobacco: None     Alcohol Use: No     Drug Use: No     Sexual Activity: Not Asked     Social History Narrative   Lives in [place] with her , has 2 kids  Previously did office work, hasn't worked for several months now  Never smoker  Social ETOH    CURRENT MEDICATIONS:  Current Outpatient Prescriptions   Medication Sig Dispense Refill     Acetaminophen (TYLENOL PO) Take 325 mg by mouth       doxycycline (VIBRA-TABS) 100 MG tablet Take 1 tablet (100 mg) by mouth daily 30 tablet 3     LORazepam (ATIVAN) 0.5 MG tablet Take 1 tablet (0.5 mg) by mouth every 6 hours as needed for other (nausea) 30 tablet 0     ondansetron (ZOFRAN-ODT) 4 MG ODT tab Take 1 tablet (4 mg) by mouth every 6 hours as needed for nausea 15 tablet 1     potassium chloride SA (K-DUR/KLOR-CON M) 10 MEQ CR tablet Take 2 tablets (20 mEq) by mouth 2 times daily 360 tablet 3     spironolactone (ALDACTONE) 25 MG tablet Take 1 tablet (25 mg) by  "mouth 2 times daily 180 tablet 3     torsemide (DEMADEX) 20 MG tablet 80 mg by mouth twice daily for one week then 60 mg twice daily 554 tablet 3       ROS:   Constitutional: No fever, chills, or sweats.  Weight is relatively stable  ENT: No visual disturbance, ear ache, epistaxis, sore throat.   Allergies/Immunologic: Negative.   Respiratory: No cough, hemoptysis.   Cardiovascular: As per HPI.   GI: +Nausea, constipation.  : No urinary frequency, dysuria, or hematuria.   Integument: Negative.   Psychiatric: feeling down and anxious since her diagnosis  Neuro: + tingling in legs bilaterally  Endocrinology: Negative.   Musculoskeletal: Negative.    EXAM:  /67 (BP Location: Left arm, Patient Position: Chair, Cuff Size: Adult Regular)  Pulse 64  Ht 1.6 m (5' 3\")  Wt 66.8 kg (147 lb 4.8 oz)  SpO2 98%  BMI 26.09 kg/m2  General: appears comfortable, alert and articulate, NAD, pleasant  Head: normocephalic, atraumatic  Eyes: anicteric sclera, EOMI  Neck: no adenopathy, JVP ~10 cm H2O with +HJR  Orophyarynx: moist mucosa, no lesions, dentition intact  Heart: regular rate and rhythm. III/VI holosystolic murmur at the apex   Lungs: decreased breath sounds at the L base, lungs otherwise clear   Abdomen: soft, non-tender, bowel sounds present, no hepatosplenomegaly  Extremities: mild-moderate mostly nonpitting edema of bilateral extremities.   Neurological: normal speech and affect, no gross motor deficits    Labs:  CBC RESULTS:  Lab Results   Component Value Date    WBC 5.8 10/11/2018    RBC 4.69 10/11/2018    HGB 15.1 10/11/2018    HCT 45.6 10/11/2018    MCV 97 10/11/2018    MCH 32.2 10/11/2018    MCHC 33.1 10/11/2018    RDW 13.9 10/11/2018     10/11/2018       CMP RESULTS:  Lab Results   Component Value Date     06/20/2018    POTASSIUM 4.8 06/20/2018    CHLORIDE 96 06/20/2018    CO2 33 (H) 06/20/2018    ANIONGAP 7 06/20/2018    GLC 95 06/20/2018    BUN 39 (H) 06/20/2018    CR 1.45 (H) 06/20/2018    " GFRESTIMATED 36 (L) 06/20/2018    GFRESTBLACK 44 (L) 06/20/2018    JERROD 9.1 06/20/2018    BILITOTAL 2.7 (H) 06/07/2018    ALBUMIN 4.1 06/07/2018    ALKPHOS 204 (H) 06/07/2018    ALT 28 06/07/2018    AST 21 06/07/2018     INR RESULTS:  Lab Results   Component Value Date    INR 1.30 (H) 01/14/2017       Lab Results   Component Value Date    MAG 2.8 (H) 02/09/2018     Lab Results   Component Value Date    NTBNPI 9009 (H) 01/13/2017     Lab Results   Component Value Date    NTBNP 1898 (H) 06/07/2018       TTE 10/2018     Interpretation Summary  Global and regional left ventricular function is normal with an EF of 55-60%.  Moderate concentric wall thickening consistent with left ventricular  hypertrophy is present - known amyloid.  Grade III or advanced diastolic dysfunction.  Normal right ventricular size, wall thickness, and function.  Estimated pulmonary artery pressure 28 mmHg.  IVC with normal diameter but does not collapse (>50%) with inspiration.  Trace pericardial effusion.       CXR 10/27/17: Persistent small, right greater than left pleural effusions and overlying atelectasis    Troponin now neg,  NT proBNP stable     Assessment and Plan:   Mrs. Guerrero is a 68 year old female with stage IV  AL amyloid with GI, bone marrow and cardiac involvement who presents for follow up in the cardiac amyloid clinic.     Patient has cardiac amyloidosis as evidenced by her echocardiogram (severe concentric hypertrophy and biatrial enlargement) and abnormal EKG (near-low voltage, poor R wave progression, biatrial enlargement and no evidence of LVH despite very thickened LV on echo) in the setting of biopsy proven (BMB, EGD) AL amyloid.  she has completed chemotherapy with CyBorD with an excellent serologic response and her heart failure (both clinically and by labs - i.e. troponin now negative, serum light chains minimal and urine light chains undetectable) has stabilized significantly. Her NTproBNP is within range from prior  and clinically she appears modestly hypervolemic.    Her NYHA class is III today, Stage C, she is relatively very mildly hypervolemic today and so I am increasing her torsemide slightly     We again addressed the importance of planning for end-of-life cares (despite her impressive response to therapy, long-term prognosis for stage IV - AL amyloidosis is very poor) and for now we will continue to take this one month at a time, they are very realistic about her prognosis and for now her quality of life is good and she has been stable for some time.     Cardiac Amyloidosis (a restrictive cardiomyopathy)   Stage C  NYHA Class III  ACEi/ARB - none- no indication   BB - none, no indication and relatively contraindicated due to risk of progressive conduction disease/AV block in these patients  Aldosterone antagonist: aldactone   SCD prophylaxis: not indicated  % BiV pacing: NA  Fluid status: Increase Torsemide to alternating 60/80mg one day 60/60 the next, no change in KCl replacement    Other:    Arrhythmia monitoring -- Her Zio patch in 2/17 showed sinus rhythm with non sustained SVT. A repeat 7-day Ziopatch recently reveals short (>45 second) runs of SVT, for which she is generally not symptomatic. No further eval unless clinical change.     Systemic amyloid-heme is presently monitoring her light chains which have been negative consistent with remission - therefore we are not contemplating further palliative chemo.     Plan to return to clinic in 6 months.     Domenica Cohen MD   of Medicine   [facility] Division of Cardiology       CC   [place] [address]      Patient Care Team:  Braydon Barron as PCP - General (Hematology)  Mirela Moore MD as MD (Hematology & Oncology)  Domenica Cohen MD as MD (Cardiology)  Eileen Arevalo RN as Nurse Coordinator (Cardiology)  Codie Nelson NP as Nurse Practitioner  (Cardiology)  Kathryn Posey, RN as Nurse Coordinator (Cardiology)  Bear Wu, DILIP as Nurse Coordinator (Cardiology)  GARRISON DALEY MD

## 2018-10-12 LAB
ALBUMIN SERPL ELPH-MCNC: 4.3 G/DL (ref 3.7–5.1)
ALPHA1 GLOB SERPL ELPH-MCNC: 0.4 G/DL (ref 0.2–0.4)
ALPHA2 GLOB SERPL ELPH-MCNC: 0.7 G/DL (ref 0.5–0.9)
B-GLOBULIN SERPL ELPH-MCNC: 0.7 G/DL (ref 0.6–1)
GAMMA GLOB SERPL ELPH-MCNC: 0.7 G/DL (ref 0.7–1.6)
M PROTEIN SERPL ELPH-MCNC: 0 G/DL
PROT PATTERN SERPL ELPH-IMP: NORMAL

## 2018-10-26 ENCOUNTER — ONCOLOGY VISIT (OUTPATIENT)
Dept: ONCOLOGY | Facility: CLINIC | Age: 68
End: 2018-10-26
Payer: COMMERCIAL

## 2018-10-26 VITALS
TEMPERATURE: 98.2 F | SYSTOLIC BLOOD PRESSURE: 105 MMHG | HEART RATE: 69 BPM | BODY MASS INDEX: 26.71 KG/M2 | OXYGEN SATURATION: 97 % | WEIGHT: 150.8 LBS | DIASTOLIC BLOOD PRESSURE: 60 MMHG

## 2018-10-26 DIAGNOSIS — R11.0 NAUSEA: Primary | ICD-10-CM

## 2018-10-26 DIAGNOSIS — E85.81 AL AMYLOIDOSIS (H): ICD-10-CM

## 2018-10-26 PROCEDURE — 99214 OFFICE O/P EST MOD 30 MIN: CPT | Performed by: INTERNAL MEDICINE

## 2018-10-26 RX ORDER — DOXYCYCLINE HYCLATE 100 MG
100 TABLET ORAL DAILY
Qty: 90 TABLET | Refills: 1 | Status: SHIPPED | OUTPATIENT
Start: 2018-10-26 | End: 2019-04-10

## 2018-10-26 RX ORDER — PROCHLORPERAZINE MALEATE 5 MG
TABLET ORAL
Qty: 90 TABLET | Refills: 1 | Status: SHIPPED | OUTPATIENT
Start: 2018-10-26 | End: 2019-03-26

## 2018-10-26 ASSESSMENT — PAIN SCALES - GENERAL: PAINLEVEL: NO PAIN (0)

## 2018-10-26 NOTE — PROGRESS NOTES
Hematology/Oncology Follow-up visit:  Date on this visit: Oct 26, 2018      Referring Physician: Dr. Braydon Barron, [facility], [place].  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 She was diagnosed with CHF at a local clinic in [facility] and was placed on a b-blocker and a diuretic. She has also developed progressive worsening lower extremity edema by May 2016 which in retrospect started in January. Her cardiac angiogram was reportedly negative at that time. She has lost about 35 lbs in the last 6 months. She has significant nausea somewhat controlled with Zofran but she is reluctant to use it because of constipation too. She is on Senna-S one tablet PO BID for constipation and that is improved but still significant. She has very poor PO intake due to nausea and lack of appetite. She requested to be seen at [facility] and was seen by Dr. Braydon Barron on 9/13/2016 with f/up on 9/20/2016. She was also seen by cardiology team there Sara Severson CNP and Dr. Nettles from CHF service.  There was no e/o renal or hepatic amyloidosis.  EKG on 9/7/2016 showed normal sinus rhythm, prolonged QT interval (QTc 521 sec), left atrial enlargement and left anterior fascicular block. There was ST and T wave abnormality.  Apparently, her echocardiogram showed preserved LVEF at 59%.  She reports having R sided thoracentesis on 8/25/2016 after which her breathing has improved. She then had a CXR on 9/7/2016 which showed a small R pleural effusion with right basilar atelectasis. Cardiac silhouette was at the upper level of normal.  I have reviewed extensive outside medical records but we are still in the process of obtaining additional records.  The patient also underwent a minor salivary gland biopsy of the lower lip. The pathology from that procedure (9/20/2016) showed normal salivary gland tissue with  nonspecific chronic sialadenitis.  She had extensive lab workup on 9/7/2016.  Her TSH was normal. Serum protein ELP showed a small abnormality in the gamma fraction.  Serum immunofixation showed small monoclonal IgA lambda in the gamma fraction and a small lambda in the beta fraction. Serum IgA level was normal at 329. Serum IgG level was mildly low at 755 (normal range 767-1590) and IgM level was normal. B2 microglobulin was mildly elevated at 2.94 (ULN 2.7). Mg was normal. Total bilirubin was elevated at 3.9 and direct at 0.8. Uric acid was mildly elevated at 6.9 (ULN 6.1) Creat was 1.0. Troponin was 0.1 (ULN <0.01) and NT-pro BNP was 4747 pg/ml (ULN <=183). Total cholesterol was 190 and LDL was 139. Random urine protein was mildly elevated at 33 mg/dl (normal range <22). INR was 1.2 and PTT 27 (within normal limits). Fibrinogen was elevated at 441 and factor X was mildly decreased at 64% (normal range %). D-dimer was up at 1700 (). Free lambda light chains were 69.5 and free kappa light chains were 1.11 with FLC kappa/lambda ratio of 0.016.   CBC showed normal WBC at 6.0, slightly elevated Hb at 16. Platelet count was normal.  Flow cytometry on bone marrow biopsy showed detectable monotypic plasma cells. There were 12% of monotypic plasma cells on bone marrow biopsy.    The bone marrow biopsy (performed on 9/14/2016) showed normocellular BM, 30-40% cellularity, with involvement by 10-20% of lambda light chain restricted plasma cells.  Congo red stain was positive on the bone marrow biopsy. Liquid chromatography tandem mass spectroscopy showed a peptide profile c/w AL (lambda type) amyloid. BM karyotype was normal 46 XX. FISH on BM showed plasma cell clone with 1q duplication, and monosomy of 13 and 14.  She had an EGD by Dr. Matthew Wadsworth on 9/14/2016. It showed gastric mucosal atrophy and multiple mucosal papules (nodules) seen in the stomach. The duodenum appeared normal. The biopsies of stomach  mucosa, of stomach (antral) nodules and of duodenum, all showed the presence of amyloid in submucosal blood vessel walls in the stomach and duodenum and in deep mucosa in the stomach antrum and body, confirmed by Congo Red stain.   She also had additional labs done at our last visit, on 9/30/2016. Total bilirubin was elevated as below, primarily indirect. K was 3.3 and she was stared on K-dur 20 mEq PO bid. She was noted to have elevated serum IgA level on 9/30/2016 at 392 (). Serum free lambda chains were elevated at 37.75. Serum M spike was 0.1 g/dl and serum FLC ratio was low at 0.01. Serum immunofixation ELP showed monoclonal IgA immunoglobulin of lambda light chain type as well as monoclonal free immunoglobulin light chain of lambda chain type.  She was seen by Dr. Cohen too and had an echocardiogram on 10/6/2016 which showed:  Global and regional left ventricular function was normal with an EF of 60-65%.  The LV mass index was 131 g/m2 (severely increased.) The LV geometry is c/w  concentric hypertrophy measured by relative wall thickness. Global right ventricular function was normal. Severe biatrial enlargement was present.  Right ventricular systolic pressure was 26mmHg above the right atrial pressure. The inferior vena cava was dilated at 2.1 cm without respiratory variability, consistent with increased right atrial pressure. Trivial pericardial effusion was present.    She proceeded to start CyBorD as recommended by Dr. Barron with dose reduced in subq Bortezomib to 0.7 mg/m2, on 10/4/2016.  After one cycle, her M spike, IgA level and free lambda light chains have all improved, with M spike of 0 g/dl, IgA down into the normal range and free serum lambda light chains improving from 37.75 down to 2.09.  She returns today in cycle 3 day 11. She had amyloid labs again on day 1 of cycle 3 (12/8/2016) which showed continued response to therapy and her IgA level was now low and serum FLC ratio was normal  and serum free lambda chains were normal too.   She has developed hematuria with cycle 2 and Cytoxan is on hold from day 15 cycle 2 since hematuria persisted despite Mesnex. She had a CT abdomen/pelvis on 11/29/2016 which showed no abnormalities in the kidneys. There was a moderate right and a trace left sided pleural effusions. Small volume ascites and anasarca was noted as well. The liver had heterogeneous appearance, which can be seen in hepatic venous congestion.   Cytoxan was d/cd  on  11/22/2016 since her cystoscopy showed findings of mild petechia and erythema in her bladder that would be c/w hemorrhagic cystitis.  Pinch biopsies were taken from the bladder and showed no e/o amyloid or malignancy. She has continued on Velcade and dexamethasone until 12/27/2016.  On 1/3/2017 she was seen at [facility] by Dr. Barron and at that time serum free light chains were normal at 0.74 and FLC ratio was normal as well at 0.72. She was recommended to be on observation because free lambda chains normalized. However, her cardiac amyloidosis has progressed by 1/5/2017 and she has required frequent thoracentesis, and had weeping LE edema and elevated neck veins.  She was hospitalized for CHF in Jan 2017 due to cardiac amyloid and was aggressively diuresed.  Her echocardiogram on 1/5/2017 was abnormal (severe concentric hypertrophy and biatrial enlargement) and she also had abnormal EKG (near-low voltage, poor R wave progression, biatrial enlargement and no evidence of LVH despite very thickened LV on echo). She was felt to have  New York Heart Association class IIIB heart failure with clinically severe volume overload. She was seen by cardiologist Dr. Harjit Fischer at  in [place]. She was seen by Dr. Chivo Lei from medical oncology at the [facility] in [place], and was recommended to start on Acyclovir and Doxycycline.  She has not started Acyclovir  but did start daily Doxycyline.   Dr. Lei also recommended she  consider maintenance therapy. She was also seen by Dr. Barron at [facility] on 5/11/2017, and he recommended against maintenance therapy.     History Of Present Illness:  Ms. Guerrero is a 68 year old female, non-smoker, who presents for f/up of AL amyloidosis. She has been off treatment since December 2016.  She has been seeing  Dr. Cohen in f/up of cardiac Amyloidosis with preserved EF, now CHF NYHA Class III. They monitor troponin and BNP. She was last seen by cardiology in October 2018. She had an echocardiogram at that time which showed normal LVEF of 55-60%.  There was moderate concentric wall thickening consistent with left ventricular hypertrophy is present - known amyloid.  Grade III or advanced diastolic dysfunction.  Normal right ventricular size, wall thickness, and function.  She has severe concentric hypertrophy and biatrial enlargement and abnormal EKG (near-low voltage, poor R wave progression, biatrial enlargement and no evidence of LVH despite very thickened LV on echo) in the setting of  AL amyloid.  Her NTproBNP is was stable. She appeared hypervolemic at that time and  her torsemide was dose was  increased further.  She is on aldactone 25 mg PO daily.  Her Zio patch in Nov 2017  showed sinus rhythm with non sustained SVT. A repeat 7-day Ziopatch recently revealed short (>45 second) runs of SVT.   IgA level and free lambda light chains are normal. She is on Doxycyline. She has had some nausea relieved by Zofran but develops constipation on Zofran. She would like an alternative antiemetic.  Overall, [place]'s weight is stable at around 150 lbs in the last 4 months. She is SOB on exertion.She has no cough.   She has no CP. She has swelling in b/l LE   2+ to 3+. She has some numbness and tingling in b/l feet. She is pain free today.   In addition, a complete 12 point  review of systems is negative.      Past Medical/Surgical History:  AL Amyloid  Amyloid cardiomyopathy/CHF    Family  History:  Sister  of multiple myeloma    Social History:  Lives in [place], with . Nonsmoker        Allergies:     Allergies   Allergen Reactions     Contrast Dye Shortness Of Breath     Shaking,chills and dypsnea     Cytoxan [Cyclophosphamide]      Hemorrhagic cystitis     Levofloxacin      Severe shaking and abdominal pain     Amoxicillin Nausea and Vomiting and Cramps     Current Medications:  Current Outpatient Prescriptions   Medication     Acetaminophen (TYLENOL PO)     doxycycline (VIBRA-TABS) 100 MG tablet     LORazepam (ATIVAN) 0.5 MG tablet     ondansetron (ZOFRAN-ODT) 4 MG ODT tab     potassium chloride SA (K-DUR/KLOR-CON M) 10 MEQ CR tablet     spironolactone (ALDACTONE) 25 MG tablet     torsemide (DEMADEX) 20 MG tablet     No current facility-administered medications for this visit.       Physical Exam:    /60  Pulse 69  Temp 98.2  F (36.8  C) (Oral)  Wt 68.4 kg (150 lb 12.8 oz)  SpO2 97%  BMI 26.71 kg/m2        GENERAL APPEARANCE: middle aged, alert and no distress     HENT: Mouth without ulcers or lesions     NECK: no adenopathy, no asymmetry or masses     RESP: LL- CTA. RL - decreased BS at base     CARDIOVASCULAR: regular rates and rhythm, normal S1 S2, no S3 or S4 and no murmur.     ABDOMEN:  soft, nontender, no HSM or masses and bowel sounds normal. +  moderate ascites.     MUSCULOSKELETAL: extremities normal- no gross deformities noted, no evidence of inflammation in joints, FROM in all extremities.  +2-3 edema b/l LE stable compared to last visit.       SKIN: no suspicious lesions or rashes     PSYCHIATRIC: mentation appears normal and affect normal    Laboratory/Imaging Studies  Results for JESSICA GARCIA (MRN [id number]) as of 10/26/2018 14:52   Ref. Range 2018 09:19 2018 06:23 10/11/2018 08:12   IGA Latest Ref Range: 70 - 380 mg/dL 52 (L) 47 (L) 47 (L)   IGG Latest Ref Range: 695 - 1620 mg/dL 782 757 695   IGM Latest Ref Range: 60 - 265 mg/dL 63 46 (L) 43  (L)   Kappa Free Lt Chain Latest Ref Range: 0.33 - 1.94 mg/dL 1.30 0.86 1.06   Kappa Lambda Ratio Latest Ref Range: 0.26 - 1.65  0.73 0.58 0.74   Lambda Free Lt Chain Latest Ref Range: 0.57 - 2.63 mg/dL 1.77 1.49 1.43   Monoclonal Peak Latest Ref Range: 0.0 g/dL 0.0 0.0 0.0     Component      Latest Ref Rng & Units 10/11/2018   WBC      4.0 - 11.0 10e9/L 5.8   RBC Count      3.8 - 5.2 10e12/L 4.69   Hemoglobin      11.7 - 15.7 g/dL 15.1   Hematocrit      35.0 - 47.0 % 45.6   MCV      78 - 100 fl 97   MCH      26.5 - 33.0 pg 32.2   MCHC      31.5 - 36.5 g/dL 33.1   RDW      10.0 - 15.0 % 13.9   Platelet Count      150 - 450 10e9/L 178   Diff Method       Automated Method   % Neutrophils      % 78.3   % Lymphocytes      % 6.4   % Monocytes      % 6.8   % Eosinophils      % 7.0   % Basophils      % 1.2   % Immature Granulocytes      % 0.3   Nucleated RBCs      0 /100 0   Absolute Neutrophil      1.6 - 8.3 10e9/L 4.5   Absolute Lymphocytes      0.8 - 5.3 10e9/L 0.4 (L)   Absolute Monocytes      0.0 - 1.3 10e9/L 0.4   Absolute Eosinophils      0.0 - 0.7 10e9/L 0.4   Absolute Basophils      0.0 - 0.2 10e9/L 0.1   Abs Immature Granulocytes      0 - 0.4 10e9/L 0.0   Absolute Nucleated RBC       0.0   Sodium      133 - 144 mmol/L 131 (L)   Potassium      3.4 - 5.3 mmol/L 4.3   Chloride      94 - 109 mmol/L 94   Carbon Dioxide      20 - 32 mmol/L 30   Anion Gap      3 - 14 mmol/L 7   Glucose      70 - 99 mg/dL 93   Urea Nitrogen      7 - 30 mg/dL 36 (H)   Creatinine      0.52 - 1.04 mg/dL 1.32 (H)   GFR Estimate      >60 mL/min/1.7m2 40 (L)   GFR Estimate If Black      >60 mL/min/1.7m2 49 (L)   Calcium      8.5 - 10.1 mg/dL 8.8   Bilirubin Total      0.2 - 1.3 mg/dL 3.1 (H)   Albumin      3.4 - 5.0 g/dL 4.1   Protein Total      6.8 - 8.8 g/dL 7.3   Alkaline Phosphatase      40 - 150 U/L 252 (H)   ALT      0 - 50 U/L 28   AST      0 - 45 U/L 23   Results for JESSICA GARCIA (MRN [id number]) as of 10/26/2018 14:52   Ref.  Range 10/27/2017 09:22 2/9/2018 09:19 2/22/2018 10:43 6/7/2018 06:23 6/20/2018 09:15 10/11/2018 08:12   Creatinine Latest Ref Range: 0.52 - 1.04 mg/dL 1.38 (H) 1.10 (H) 1.20 (H) 1.33 (H) 1.45 (H) 1.32 (H)   Results for LEANDRA GUERRERO (MRN [id number]) as of 10/26/2018 14:52   Ref. Range 2/1/2017 10:30 3/16/2017 09:46 7/20/2017 09:00 10/27/2017 09:22 2/9/2018 09:19 6/7/2018 06:23 10/11/2018 08:12   Alkaline Phosphatase Latest Ref Range: 40 - 150 U/L 163 (H) 202 (H) 228 (H) 249 (H) 240 (H) 204 (H) 252 (H)   Results for LEANDRA GUERRERO (MRN [id number]) as of 11/6/2018 00:22   Ref. Range 10/27/2017 09:22 2/9/2018 09:19 6/7/2018 06:23 10/11/2018 08:12   Bilirubin Total Latest Ref Range: 0.2 - 1.3 mg/dL 2.9 (H) 2.7 (H) 2.7 (H) 3.1 (H)       ASSESSMENT/PLAN:  Leandra Guerrero is a 68 year old woman with  of AL amyloidosis involving the heart and GI tract. 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 Stage IV amyloidosis is associated with median survival of 5 months in patients not undergoing BMT, and 5 year survival of 15%, and in patients who are able to undergo BMT, median survival is 22 months and 5 year survival of 46% (Dejan A, Anderson BAR, Toñiot MCARTHUR, et al. Prognostication of survival using cardiac troponins and N-terminal pro-brain natriuretic peptide in patients with primary systemic amyloidosis undergoing peripheral blood stem cell transplantation. Blood 2004; 104:1881). She was felt not to be a candidate for high dose therapy.   She was on Cybor-D from 10/4/2016-12/27/2016, with Cytoxan omitted after cycle 2 day 1 due to her developing hemorrhagic cystitis. Her disease has responded  biochemically, with serum M spike of zero, and serum IgA level was down to low range and  free lambda light chains have normalized  quantitatively.     I have discussed her situation with Dr. Barron, and he felt at that time that with normalization of serum free lambda light chains, no further chemotherapy is indicated. Unfortunately, the anti-amyloid antibody clinical trial  at [facility] is closed.     1.  AL amyloidosis - Serum FLC ratio is normal. Both serum free kappa light chains and lambda light chains are within normal limits. There is no M protein on serum ELP.   There is a phase II trial open of isatuximab in treating patients with primary amyloidosis via [employer]. However, in addition to organ involvement, she would need to have measurable disease (i.e increased free lambda light chains, IgA level elevated, M spike, etc).  and demonstrate a difference in the involved serum free light chains versus the uninvolved serum free light chain of >= 4.5 mg/dL within 14 days prior to registration. [place] would not qualify.  Continue Doxycycline though questionable clinical benefit.   We'll see her back in 6 months with amyloid labs prior.     2. Recurrent pleural effusions- Last CXR in 06/2018 showed a stable small right pleural effusion and a trace left pleural effusion.  She is feeling more SOB on occasion.   PleurX catheter is an option in the future.    3. Amyloid cardiomyopathy-  She is being followed every by  Dr. Cohen and will be seeing her in 6 months. Continue current diuretic regimen with Torsemide 80 mg PO QAM and 60 mg PO QHS one day and 60mg twice a day in the afternoon the next day. Continue  aldactone 25 mg PO BID. Cardiac Amyloidosis (a restrictive cardiomyopathy) , Stage C, NYHA Class III  4. Nausea- has constipation with Zofran. We'll switch to Compazine prn.  4. CKD- Creat stable as above.  5.Stable elevated total bilirubin and alk phos, stable- in the past felt to be secondary to liver congestion. Cont to follow.    At the end of our visit patient and  verbalized understanding and concurred with the plan.

## 2018-10-26 NOTE — PATIENT INSTRUCTIONS
Preventive Care:    Breast Cancer Screening: During our visit today, we discussed that it is recommended you receive breast cancer screening. Please call or make an appointment with your primary care provider to discuss this with them. You may also call the  [facility] scheduling line ([phone]) to set up a mammography appointment at the [facility] within the [facility].    Colorectal Cancer Screening: During our visit today, we discussed that it is recommended you receive colorectal cancer screening. Please call or make an appointment with your primary care provider to discuss this. You may also call the  [facility] scheduling line ([phone]) to set up a colonoscopy appointment.

## 2018-10-26 NOTE — LETTER
10/26/2018         RE: Leandra Guerrero  [address] [place] [address]        Dear Colleague,    Thank you for referring your patient, Leandra Guerrero, to the [facility]. Please see a copy of my visit note below.    Hematology/Oncology Follow-up visit:  Date on this visit: Oct 26, 2018      Referring Physician: Dr. Braydon Barron, [facility].  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 She was diagnosed with CHF at a local clinic in [place] and was placed on a b-blocker and a diuretic. She has also developed progressive worsening lower extremity edema by May 2016 which in retrospect started in January. Her cardiac angiogram was reportedly negative at that time. She has lost about 35 lbs in the last 6 months. She has significant nausea somewhat controlled with Zofran but she is reluctant to use it because of constipation too. She is on Senna-S one tablet PO BID for constipation and that is improved but still significant. She has very poor PO intake due to nausea and lack of appetite. She requested to be seen at [facility] and was seen by Dr. Braydon Barron on 9/13/2016 with f/up on 9/20/2016. She was also seen by cardiology team there Sara Severson CNP and Dr. Nettles from CHF service.  There was no e/o renal or hepatic amyloidosis.  EKG on 9/7/2016 showed normal sinus rhythm, prolonged QT interval (QTc 521 sec), left atrial enlargement and left anterior fascicular block. There was ST and T wave abnormality.  Apparently, her echocardiogram showed preserved LVEF at 59%.  She reports having R sided thoracentesis on 8/25/2016 after which her breathing has improved. She then had a CXR on 9/7/2016 which showed a small R pleural effusion with right basilar atelectasis. Cardiac silhouette was at the upper level of normal.  I have reviewed  extensive outside medical records but we are still in the process of obtaining additional records.  The patient also underwent a minor salivary gland biopsy of the lower lip. The pathology from that procedure (9/20/2016) showed normal salivary gland tissue with nonspecific chronic sialadenitis.  She had extensive lab workup on 9/7/2016.  Her TSH was normal. Serum protein ELP showed a small abnormality in the gamma fraction.  Serum immunofixation showed small monoclonal IgA lambda in the gamma fraction and a small lambda in the beta fraction. Serum IgA level was normal at 329. Serum IgG level was mildly low at 755 (normal range 767-1590) and IgM level was normal. B2 microglobulin was mildly elevated at 2.94 (ULN 2.7). Mg was normal. Total bilirubin was elevated at 3.9 and direct at 0.8. Uric acid was mildly elevated at 6.9 (ULN 6.1) Creat was 1.0. Troponin was 0.1 (ULN <0.01) and NT-pro BNP was 4747 pg/ml (ULN <=183). Total cholesterol was 190 and LDL was 139. Random urine protein was mildly elevated at 33 mg/dl (normal range <22). INR was 1.2 and PTT 27 (within normal limits). Fibrinogen was elevated at 441 and factor X was mildly decreased at 64% (normal range %). D-dimer was up at 1700 (). Free lambda light chains were 69.5 and free kappa light chains were 1.11 with FLC kappa/lambda ratio of 0.016.   CBC showed normal WBC at 6.0, slightly elevated Hb at 16. Platelet count was normal.  Flow cytometry on bone marrow biopsy showed detectable monotypic plasma cells. There were 12% of monotypic plasma cells on bone marrow biopsy.    The bone marrow biopsy (performed on 9/14/2016) showed normocellular BM, 30-40% cellularity, with involvement by 10-20% of lambda light chain restricted plasma cells.  Congo red stain was positive on the bone marrow biopsy. Liquid chromatography tandem mass spectroscopy showed a peptide profile c/w AL (lambda type) amyloid. BM karyotype was normal 46 XX. 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  She had an EGD by Dr. Matthew Wadsworth on 9/14/2016. It showed gastric mucosal atrophy and multiple mucosal papules (nodules) seen in the stomach. The duodenum appeared normal. The biopsies of stomach mucosa, of stomach (antral) nodules and of duodenum, all showed the presence of amyloid in submucosal blood vessel walls in the stomach and duodenum and in deep mucosa in the stomach antrum and body, confirmed by Congo Red stain.   She also had additional labs done at our last visit, on 9/30/2016. Total bilirubin was elevated as below, primarily indirect. K was 3.3 and she was stared on K-dur 20 mEq PO bid. She was noted to have elevated serum IgA level on 9/30/2016 at 392 (). Serum free lambda chains were elevated at 37.75. Serum M spike was 0.1 g/dl and serum FLC ratio was low at 0.01. Serum immunofixation ELP showed monoclonal IgA immunoglobulin of lambda light chain type as well as monoclonal free immunoglobulin light chain of lambda chain type.  She was seen by Dr. Cohen too and had an echocardiogram on 10/6/2016 which showed:  Global and regional left ventricular function was normal with an EF of 60-65%.  The LV mass index was 131 g/m2 (severely increased.) The LV geometry is c/w  concentric hypertrophy measured by relative wall thickness. Global right ventricular function was normal. Severe biatrial enlargement was present.  Right ventricular systolic pressure was 26mmHg above the right atrial pressure. The inferior vena cava was dilated at 2.1 cm without respiratory variability, consistent with increased right atrial pressure. Trivial pericardial effusion was present.    She proceeded to start CyBorD as recommended by Dr. Barron with dose reduced in subq Bortezomib to 0.7 mg/m2, on 10/4/2016.  After one cycle, her M spike, IgA level and free lambda light chains have all improved, with M spike of 0 g/dl, IgA down into the normal range and  free serum lambda light chains improving from 37.75 down to 2.09.  She returns today in cycle 3 day 11. She had amyloid labs again on day 1 of cycle 3 (12/8/2016) which showed continued response to therapy and her IgA level was now low and serum FLC ratio was normal and serum free lambda chains were normal too.   She has developed hematuria with cycle 2 and Cytoxan is on hold from day 15 cycle 2 since hematuria persisted despite Mesnex. She had a CT abdomen/pelvis on 11/29/2016 which showed no abnormalities in the kidneys. There was a moderate right and a trace left sided pleural effusions. Small volume ascites and anasarca was noted as well. The liver had heterogeneous appearance, which can be seen in hepatic venous congestion.   Cytoxan was d/cd  on  11/22/2016 since her cystoscopy showed findings of mild petechia and erythema in her bladder that would be c/w hemorrhagic cystitis.  Pinch biopsies were taken from the bladder and showed no e/o amyloid or malignancy. She has continued on Velcade and dexamethasone until 12/27/2016.  On 1/3/2017 she was seen at [facility] by Dr. Barron and at that time serum free light chains were normal at 0.74 and FLC ratio was normal as well at 0.72. She was recommended to be on observation because free lambda chains normalized. However, her cardiac amyloidosis has progressed by 1/5/2017 and she has required frequent thoracentesis, and had weeping LE edema and elevated neck veins.  She was hospitalized for CHF in Jan 2017 due to cardiac amyloid and was aggressively diuresed.  Her echocardiogram on 1/5/2017 was abnormal (severe concentric hypertrophy and biatrial enlargement) and she also had abnormal EKG (near-low voltage, poor R wave progression, biatrial enlargement and no evidence of LVH despite very thickened LV on echo). She was felt to have  New York Heart Association class IIIB heart failure with clinically severe volume overload. She was seen by cardiologist Dr. Harjit Fischer  at  in [place]. She was seen by Dr. Chivo Lei from medical oncology at the [facility] in [place], and was recommended to start on Acyclovir and Doxycycline.  She has not started Acyclovir  but did start daily Doxycyline.   Dr. Lei also recommended she consider maintenance therapy. She was also seen by Dr. Barron at [facility] on 5/11/2017, and he recommended against maintenance therapy.     History Of Present Illness:  Ms. Guerrero is a 68 year old female, non-smoker, who presents for f/up of AL amyloidosis. She has been off treatment since December 2016.  She has been seeing  Dr. Cohen in f/up of cardiac Amyloidosis with preserved EF, now CHF NYHA Class III. They monitor troponin and BNP. She was last seen by cardiology in October 2018. She had an echocardiogram at that time which showed normal LVEF of 55-60%.  There was moderate concentric wall thickening consistent with left ventricular hypertrophy is present - known amyloid.  Grade III or advanced diastolic dysfunction.  Normal right ventricular size, wall thickness, and function.  She has severe concentric hypertrophy and biatrial enlargement and abnormal EKG (near-low voltage, poor R wave progression, biatrial enlargement and no evidence of LVH despite very thickened LV on echo) in the setting of  AL amyloid.  Her NTproBNP is was stable. She appeared hypervolemic at that time and  her torsemide was dose was  increased further.  She is on aldactone 25 mg PO daily.  Her Zio patch in Nov 2017  showed sinus rhythm with non sustained SVT. A repeat 7-day Ziopatch recently revealed short (>45 second) runs of SVT.   IgA level and free lambda light chains are normal. She is on Doxycyline. She has had some nausea relieved by Zofran but develops constipation on Zofran. She would like an alternative antiemetic.  Overall, [place]'s weight is stable at around 150 lbs in the last 4 months. She is SOB on exertion.She has no cough.   She has no CP. She has swelling  in b/l LE   2+ to 3+. She has some numbness and tingling in b/l feet. She is pain free today.   In addition, a complete 12 point  review of systems is negative.      Past Medical/Surgical History:  AL Amyloid  Amyloid cardiomyopathy/CHF    Family History:  Sister  of multiple myeloma    Social History:  Lives in [place], with . Nonsmoker        Allergies:     Allergies   Allergen Reactions     Contrast Dye Shortness Of Breath     Shaking,chills and dypsnea     Cytoxan [Cyclophosphamide]      Hemorrhagic cystitis     Levofloxacin      Severe shaking and abdominal pain     Amoxicillin Nausea and Vomiting and Cramps     Current Medications:  Current Outpatient Prescriptions   Medication     Acetaminophen (TYLENOL PO)     doxycycline (VIBRA-TABS) 100 MG tablet     LORazepam (ATIVAN) 0.5 MG tablet     ondansetron (ZOFRAN-ODT) 4 MG ODT tab     potassium chloride SA (K-DUR/KLOR-CON M) 10 MEQ CR tablet     spironolactone (ALDACTONE) 25 MG tablet     torsemide (DEMADEX) 20 MG tablet     No current facility-administered medications for this visit.       Physical Exam:    /60  Pulse 69  Temp 98.2  F (36.8  C) (Oral)  Wt 68.4 kg (150 lb 12.8 oz)  SpO2 97%  BMI 26.71 kg/m2        GENERAL APPEARANCE: middle aged, alert and no distress     HENT: Mouth without ulcers or lesions     NECK: no adenopathy, no asymmetry or masses     RESP: LL- CTA. RL - decreased BS at base     CARDIOVASCULAR: regular rates and rhythm, normal S1 S2, no S3 or S4 and no murmur.     ABDOMEN:  soft, nontender, no HSM or masses and bowel sounds normal. +  moderate ascites.     MUSCULOSKELETAL: extremities normal- no gross deformities noted, no evidence of inflammation in joints, FROM in all extremities.  +2-3 edema b/l LE stable compared to last visit.       SKIN: no suspicious lesions or rashes     PSYCHIATRIC: mentation appears normal and affect normal    Laboratory/Imaging Studies  Results for JESSICA GARCIA (MRN [id number])  as of 10/26/2018 14:52   Ref. Range 2/9/2018 09:19 6/7/2018 06:23 10/11/2018 08:12   IGA Latest Ref Range: 70 - 380 mg/dL 52 (L) 47 (L) 47 (L)   IGG Latest Ref Range: 695 - 1620 mg/dL 782 757 695   IGM Latest Ref Range: 60 - 265 mg/dL 63 46 (L) 43 (L)   Kappa Free Lt Chain Latest Ref Range: 0.33 - 1.94 mg/dL 1.30 0.86 1.06   Kappa Lambda Ratio Latest Ref Range: 0.26 - 1.65  0.73 0.58 0.74   Lambda Free Lt Chain Latest Ref Range: 0.57 - 2.63 mg/dL 1.77 1.49 1.43   Monoclonal Peak Latest Ref Range: 0.0 g/dL 0.0 0.0 0.0     Component      Latest Ref Rng & Units 10/11/2018   WBC      4.0 - 11.0 10e9/L 5.8   RBC Count      3.8 - 5.2 10e12/L 4.69   Hemoglobin      11.7 - 15.7 g/dL 15.1   Hematocrit      35.0 - 47.0 % 45.6   MCV      78 - 100 fl 97   MCH      26.5 - 33.0 pg 32.2   MCHC      31.5 - 36.5 g/dL 33.1   RDW      10.0 - 15.0 % 13.9   Platelet Count      150 - 450 10e9/L 178   Diff Method       Automated Method   % Neutrophils      % 78.3   % Lymphocytes      % 6.4   % Monocytes      % 6.8   % Eosinophils      % 7.0   % Basophils      % 1.2   % Immature Granulocytes      % 0.3   Nucleated RBCs      0 /100 0   Absolute Neutrophil      1.6 - 8.3 10e9/L 4.5   Absolute Lymphocytes      0.8 - 5.3 10e9/L 0.4 (L)   Absolute Monocytes      0.0 - 1.3 10e9/L 0.4   Absolute Eosinophils      0.0 - 0.7 10e9/L 0.4   Absolute Basophils      0.0 - 0.2 10e9/L 0.1   Abs Immature Granulocytes      0 - 0.4 10e9/L 0.0   Absolute Nucleated RBC       0.0   Sodium      133 - 144 mmol/L 131 (L)   Potassium      3.4 - 5.3 mmol/L 4.3   Chloride      94 - 109 mmol/L 94   Carbon Dioxide      20 - 32 mmol/L 30   Anion Gap      3 - 14 mmol/L 7   Glucose      70 - 99 mg/dL 93   Urea Nitrogen      7 - 30 mg/dL 36 (H)   Creatinine      0.52 - 1.04 mg/dL 1.32 (H)   GFR Estimate      >60 mL/min/1.7m2 40 (L)   GFR Estimate If Black      >60 mL/min/1.7m2 49 (L)   Calcium      8.5 - 10.1 mg/dL 8.8   Bilirubin Total      0.2 - 1.3 mg/dL 3.1 (H)    Albumin      3.4 - 5.0 g/dL 4.1   Protein Total      6.8 - 8.8 g/dL 7.3   Alkaline Phosphatase      40 - 150 U/L 252 (H)   ALT      0 - 50 U/L 28   AST      0 - 45 U/L 23   Results for LEANDRA GARCIA (MRN [id number]) as of 10/26/2018 14:52   Ref. Range 10/27/2017 09:22 2/9/2018 09:19 2/22/2018 10:43 6/7/2018 06:23 6/20/2018 09:15 10/11/2018 08:12   Creatinine Latest Ref Range: 0.52 - 1.04 mg/dL 1.38 (H) 1.10 (H) 1.20 (H) 1.33 (H) 1.45 (H) 1.32 (H)   Results for LEANDRA GARCIA (MRN [id number]) as of 10/26/2018 14:52   Ref. Range 2/1/2017 10:30 3/16/2017 09:46 7/20/2017 09:00 10/27/2017 09:22 2/9/2018 09:19 6/7/2018 06:23 10/11/2018 08:12   Alkaline Phosphatase Latest Ref Range: 40 - 150 U/L 163 (H) 202 (H) 228 (H) 249 (H) 240 (H) 204 (H) 252 (H)   Results for LEANDRA GARCIA (MRN [id number]) as of 11/6/2018 00:22   Ref. Range 10/27/2017 09:22 2/9/2018 09:19 6/7/2018 06:23 10/11/2018 08:12   Bilirubin Total Latest Ref Range: 0.2 - 1.3 mg/dL 2.9 (H) 2.7 (H) 2.7 (H) 3.1 (H)       ASSESSMENT/PLAN:  Leandra Garcia is a 68 year old woman with  of AL amyloidosis involving the heart and GI tract. 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 Stage IV amyloidosis is associated with median survival of 5 months in patients not undergoing BMT, and 5 year survival of 15%, and in patients who are able to undergo BMT, median survival is 22 months and 5 year survival of 46% (Dejan A, Anderson MA, Toñito MCARTHUR, et al. Prognostication of survival using cardiac troponins and N-terminal pro-brain natriuretic peptide in patients with primary systemic amyloidosis undergoing peripheral blood stem cell transplantation. Blood 2004; 104:1881). She was felt not to be a candidate for high dose therapy.   She was on Cybor-D from  10/4/2016-12/27/2016, with Cytoxan omitted after cycle 2 day 1 due to her developing hemorrhagic cystitis. Her disease has responded  biochemically, with serum M spike of zero, and serum IgA level was down to low range and  free lambda light chains have normalized quantitatively.     I have discussed her situation with Dr. Barron, and he felt at that time that with normalization of serum free lambda light chains, no further chemotherapy is indicated. Unfortunately, the anti-amyloid antibody clinical trial  at [facility] is closed.     1.  AL amyloidosis - Serum FLC ratio is normal. Both serum free kappa light chains and lambda light chains are within normal limits. There is no M protein on serum ELP.   There is a phase II trial open of isatuximab in treating patients with primary amyloidosis via [facility]. However, in addition to organ involvement, she would need to have measurable disease (i.e increased free lambda light chains, IgA level elevated, M spike, etc).  and demonstrate a difference in the involved serum free light chains versus the uninvolved serum free light chain of >= 4.5 mg/dL within 14 days prior to registration. [place] would not qualify.  Continue Doxycycline though questionable clinical benefit.   We'll see her back in 6 months with amyloid labs prior.     2. Recurrent pleural effusions- Last CXR in 06/2018 showed a stable small right pleural effusion and a trace left pleural effusion.  She is feeling more SOB on occasion.   PleurX catheter is an option in the future.    3. Amyloid cardiomyopathy-  She is being followed every by  Dr. Cohen and will be seeing her in 6 months. Continue current diuretic regimen with Torsemide 80 mg PO QAM and 60 mg PO QHS one day and 60mg twice a day in the afternoon the next day. Continue  aldactone 25 mg PO BID. Cardiac Amyloidosis (a restrictive cardiomyopathy) , Stage C, NYHA Class III  4. Nausea- has constipation with Zofran. We'll switch to Compazine prn.  4.  CKD- Creat stable as above.  5.Stable elevated total bilirubin and alk phos, stable- in the past felt to be secondary to liver congestion. Cont to follow.    At the end of our visit patient and  verbalized understanding and concurred with the plan.        Again, thank you for allowing me to participate in the care of your patient.        Sincerely,        Mirela Moore MD, MD

## 2018-10-26 NOTE — NURSING NOTE
"Oncology Rooming Note    October 26, 2018 2:35 PM   Leandra Guerrero is a 68 year old female who presents for:    Chief Complaint   Patient presents with     Oncology Clinic Visit     Return for 4 month f/u     Initial Vitals: /60  Pulse 69  Temp 98.2  F (36.8  C) (Oral)  Wt 68.4 kg (150 lb 12.8 oz)  SpO2 97%  BMI 26.71 kg/m2 Estimated body mass index is 26.71 kg/(m^2) as calculated from the following:    Height as of 10/11/18: 1.6 m (5' 3\").    Weight as of this encounter: 68.4 kg (150 lb 12.8 oz). Body surface area is 1.74 meters squared.  No Pain (0) Comment: Data Unavailable   No LMP recorded. Patient is postmenopausal.  Allergies reviewed: Yes  Medications reviewed: Yes    Medications: Medication refill needed.  (Doxycycline)  Pharmacy name entered into [employer]:    [facility] [address] [place], [place] - [address] [facility] [address]  [facility] [place], [place] - [address] [facility]    Clinical concerns: none Dr. Moore was notified.    5 minutes for nursing intake (face to face time)     Rodríguez Guzmán RN              "

## 2018-10-26 NOTE — MR AVS SNAPSHOT
After Visit Summary   10/26/2018    Leandra Guerrero    MRN: [id number]           Patient Information     Date Of Birth          1950        Visit Information        Provider Department      10/26/2018 2:30 PM Mirela Moore MD [facility]        Today's Diagnoses     Nausea    -  1    AL amyloidosis (H)          Care Instructions    Preventive Care:    Breast Cancer Screening: During our visit today, we discussed that it is recommended you receive breast cancer screening. Please call or make an appointment with your primary care provider to discuss this with them. You may also call the [facility] scheduling line ([phone]) to set up a mammography appointment at the Breast Center within the [facility].    Colorectal Cancer Screening: During our visit today, we discussed that it is recommended you receive colorectal cancer screening. Please call or make an appointment with your primary care provider to discuss this. You may also call the [facility] scheduling line ([phone]) to set up a colonoscopy appointment.              Follow-ups after your visit        Your next 10 appointments already scheduled     Nov 12, 2018  9:20 AM CST   LAB with LAB FIRST FLOOR [facility] ([facility])    [address] [place] [address] [phone]           Please do not eat 10-12 hours before your appointment if you are coming in fasting for labs on lipids, cholesterol, or glucose (sugar). This does not apply to pregnant women. Water, hot tea and black coffee (with nothing added) are okay. Do not drink other fluids, diet soda or chew gum.            Apr 11, 2019  7:30 AM CDT   Lab with  LAB   [facility] ([facility])    [address] [facility]  [address] [facility] [address] [phone]            Apr 11, 2019  8:00 AM CDT   (Arrive by 7:45 AM)   RETURN HEART FAILURE with  Domenica Cohen MD   [facility] ([facility])    [address] [facility]  [address]  [facility] [id number] [phone]            Apr 18, 2019  9:30 AM CDT   LAB with LAB ONC [facility] ([facility])    [address] [place] [id number] [phone]           Please do not eat 10-12 hours before your appointment if you are coming in fasting for labs on lipids, cholesterol, or glucose (sugar). This does not apply to pregnant women. Water, hot tea and black coffee (with nothing added) are okay. Do not drink other fluids, diet soda or chew gum.            Apr 25, 2019  2:30 PM CDT   Return Visit with Mirela Moore MD   [facility] ([facility])    [address] [place] [address] [phone]              Future tests that were ordered for you today     Open Future Orders        Priority Expected Expires Ordered    Immunoglobulins A G and M Routine  10/25/2019 10/26/2018    CBC with platelets differential Routine  10/25/2019 10/26/2018    Comprehensive metabolic panel Routine  10/25/2019 10/26/2018    Kappa and lambda light chain Routine  10/25/2019 10/26/2018    Protein electrophoresis Routine  10/25/2019 10/26/2018            Who to contact     If you have questions or need follow up information about today's clinic visit or your schedule please contact [facility] directly at [phone].  Normal or non-critical lab and imaging results will be communicated to you by MyChart, letter or phone within 4 business days after the clinic has received the results. If you do not hear from us within 7 days, please contact the clinic through MyChart or phone. If you have a critical or abnormal lab result, we will notify you by phone as soon as possible.  Submit refill requests through [employer] or call your pharmacy and they will forward the refill request to  us. Please allow 3 business days for your refill to be completed.          Additional Information About Your Visit        Soseihart Information     Sosei gives you secure access to your electronic health record. If you see a primary care provider, you can also send messages to your care team and make appointments. If you have questions, please call your primary care clinic.  If you do not have a primary care provider, please call [phone] and they will assist you.      Sosei is an electronic gateway that provides easy, online access to your medical records. With [employer]osei, you can request a clinic appointment, read your test results, renew a prescription or communicate with your care team.     To access your existing account, please contact your [facility] or call [phone] for assistance.        Care EveryWhere ID     This is your Care EveryWhere ID. This could be used by other organizations to access your [place] medical records  [id number]        Your Vitals Were     Pulse Temperature Pulse Oximetry BMI (Body Mass Index)          69 98.2  F (36.8  C) (Oral) 97% 26.71 kg/m2         Blood Pressure from Last 3 Encounters:   10/26/18 105/60   10/11/18 122/67   06/14/18 130/70    Weight from Last 3 Encounters:   10/26/18 68.4 kg (150 lb 12.8 oz)   10/11/18 66.8 kg (147 lb 4.8 oz)   06/14/18 66.7 kg (147 lb)                 Today's Medication Changes          These changes are accurate as of 10/26/18  3:17 PM.  If you have any questions, ask your nurse or doctor.               Start taking these medicines.        Dose/Directions    prochlorperazine 5 MG tablet   Commonly known as:  COMPAZINE   Used for:  Nausea, AL amyloidosis (H)   Started by:  Mirela Moore MD        One to two tablets PO q 6 hours prn nausea   Quantity:  90 tablet   Refills:  1            Where to get your medicines      These medications were sent to [facility] [address] [place] [address] [address]  [address] [facility] [address]     Phone:  [phone]     doxycycline 100 MG tablet    prochlorperazine 5 MG tablet                Primary Care Provider Office Phone # Fax #    Braydon Barron [phone]       [place] [address] [address] [facility] [address]        Equal Access to Services     YASMIN REYNA : Hadii aad ku hadasho Soomaali, waaxda luqadaha, qaybta kaalmada adeegyada, waxay idiin hayaan adeeg khzahramarli laskye . So [facility] [phone].    ATENCIÓN: Si habla español, tiene a lobo disposición servicios gratuitos de asistencia lingüística. [facility] [phone].    We comply with applicable federal civil rights laws and [place] laws. We do not discriminate on the basis of race, color, national origin, age, disability, sex, sexual orientation, or gender identity.            Thank you!     Thank you for choosing [facility]  for your care. Our goal is always to provide you with excellent care. Hearing back from our patients is one way we can continue to improve our services. Please take a few minutes to complete the written survey that you may receive in the mail after your visit with us. Thank you!             Your Updated Medication List - Protect others around you: Learn how to safely use, store and throw away your medicines at www.disposemymeds.org.          This list is accurate as of 10/26/18  3:17 PM.  Always use your most recent med list.                   Brand Name Dispense Instructions for use Diagnosis    doxycycline 100 MG tablet    VIBRA-TABS    90 tablet    Take 1 tablet (100 mg) by mouth daily    AL amyloidosis (H), Nausea       LORazepam 0.5 MG tablet    ATIVAN    30 tablet    Take 1 tablet (0.5 mg) by mouth every 6 hours as needed for other (nausea)    Amyloid heart disease (H), Weight loss, Hypokalemia, AL amyloidosis (H)       ondansetron 4 MG ODT tab    ZOFRAN-ODT    15 tablet    Take 1 tablet (4 mg) by mouth every 6 hours as needed for nausea     Amyloid heart disease (H), Weight loss, Hypokalemia       potassium chloride SA 10 MEQ CR tablet    K-DUR/KLOR-CON M    360 tablet    Take 2 tablets (20 mEq) by mouth 2 times daily    Hypokalemia       prochlorperazine 5 MG tablet    COMPAZINE    90 tablet    One to two tablets PO q 6 hours prn nausea    Nausea, AL amyloidosis (H)       spironolactone 25 MG tablet    ALDACTONE    180 tablet    Take 1 tablet (25 mg) by mouth 2 times daily    Acute on chronic diastolic heart failure (H)       torsemide 20 MG tablet    DEMADEX    554 tablet    Take 80 mg in the morning, 60 in the afternoon one day, then 60 mg twice a day next day. Continue to alternate every other day.    Acute on chronic diastolic heart failure (H)       TYLENOL PO      Take 325 mg by mouth    Amyloid heart disease (H)

## 2018-11-12 DIAGNOSIS — E85.4 AMYLOID HEART DISEASE (H): ICD-10-CM

## 2018-11-12 DIAGNOSIS — I43 AMYLOID HEART DISEASE (H): ICD-10-CM

## 2018-11-12 LAB
ANION GAP SERPL CALCULATED.3IONS-SCNC: 5 MMOL/L (ref 3–14)
BUN SERPL-MCNC: 40 MG/DL (ref 7–30)
CALCIUM SERPL-MCNC: 9.2 MG/DL (ref 8.5–10.1)
CHLORIDE SERPL-SCNC: 94 MMOL/L (ref 94–109)
CO2 SERPL-SCNC: 32 MMOL/L (ref 20–32)
CREAT SERPL-MCNC: 1.43 MG/DL (ref 0.52–1.04)
GFR SERPL CREATININE-BSD FRML MDRD: 37 ML/MIN/1.7M2
GLUCOSE SERPL-MCNC: 100 MG/DL (ref 70–99)
POTASSIUM SERPL-SCNC: 5.2 MMOL/L (ref 3.4–5.3)
SODIUM SERPL-SCNC: 131 MMOL/L (ref 133–144)

## 2018-11-12 PROCEDURE — 36415 COLL VENOUS BLD VENIPUNCTURE: CPT | Performed by: INTERNAL MEDICINE

## 2018-11-12 PROCEDURE — 80048 BASIC METABOLIC PNL TOTAL CA: CPT | Performed by: INTERNAL MEDICINE

## 2018-11-13 DIAGNOSIS — I50.33 ACUTE ON CHRONIC DIASTOLIC HEART FAILURE (H): ICD-10-CM

## 2018-11-15 ENCOUNTER — MYC MEDICAL ADVICE (OUTPATIENT)
Dept: CARDIOLOGY | Facility: CLINIC | Age: 68
End: 2018-11-15

## 2018-11-15 ENCOUNTER — CARE COORDINATION (OUTPATIENT)
Dept: CARDIOLOGY | Facility: CLINIC | Age: 68
End: 2018-11-15

## 2018-11-15 DIAGNOSIS — E87.6 HYPOKALEMIA: ICD-10-CM

## 2018-11-15 RX ORDER — POTASSIUM CHLORIDE 750 MG/1
20 TABLET, EXTENDED RELEASE ORAL DAILY
Qty: 180 TABLET | Refills: 3 | Status: SHIPPED | OUTPATIENT
Start: 2018-11-15 | End: 2019-04-22

## 2018-11-15 NOTE — PROGRESS NOTES
Last Comprehensive Metabolic Panel:  Sodium   Date Value Ref Range Status   11/12/2018 131 (L) 133 - 144 mmol/L Final     Potassium   Date Value Ref Range Status   11/12/2018 5.2 3.4 - 5.3 mmol/L Final     Chloride   Date Value Ref Range Status   11/12/2018 94 94 - 109 mmol/L Final     Carbon Dioxide   Date Value Ref Range Status   11/12/2018 32 20 - 32 mmol/L Final     Anion Gap   Date Value Ref Range Status   11/12/2018 5 3 - 14 mmol/L Final     Glucose   Date Value Ref Range Status   11/12/2018 100 (H) 70 - 99 mg/dL Final     Comment:     Non Fasting     Urea Nitrogen   Date Value Ref Range Status   11/12/2018 40 (H) 7 - 30 mg/dL Final     Creatinine   Date Value Ref Range Status   11/12/2018 1.43 (H) 0.52 - 1.04 mg/dL Final     GFR Estimate   Date Value Ref Range Status   11/12/2018 37 (L) >60 mL/min/1.7m2 Final     Comment:     Non  GFR Calc     Calcium   Date Value Ref Range Status   11/12/2018 9.2 8.5 - 10.1 mg/dL Final     Per Dr. Cohen Decrease Potassium Chloride to 20 meq once daily.

## 2018-11-19 RX ORDER — TORSEMIDE 20 MG/1
TABLET ORAL
Qty: 540 TABLET | Refills: 2 | OUTPATIENT
Start: 2018-11-19

## 2018-11-26 ENCOUNTER — MYC MEDICAL ADVICE (OUTPATIENT)
Dept: CARDIOLOGY | Facility: CLINIC | Age: 68
End: 2018-11-26

## 2018-11-29 ENCOUNTER — TRANSFERRED RECORDS (OUTPATIENT)
Dept: HEALTH INFORMATION MANAGEMENT | Facility: CLINIC | Age: 68
End: 2018-11-29

## 2019-03-26 DIAGNOSIS — E85.81 AL AMYLOIDOSIS (H): ICD-10-CM

## 2019-03-26 DIAGNOSIS — R11.0 NAUSEA: ICD-10-CM

## 2019-03-26 RX ORDER — PROCHLORPERAZINE MALEATE 5 MG
TABLET ORAL
Qty: 90 TABLET | Refills: 1 | Status: ON HOLD | OUTPATIENT
Start: 2019-03-26 | End: 2019-04-09

## 2019-03-27 DIAGNOSIS — I50.33 ACUTE ON CHRONIC DIASTOLIC HEART FAILURE (H): ICD-10-CM

## 2019-03-29 NOTE — TELEPHONE ENCOUNTER
spironolactone (ALDACTONE) 25 MG tablet  Last Written Prescription Date:  5/9/18  Last Fill Quantity: 180,   # refills: 3  Last Office Visit : 10/11/18  Future Office visit: 5/30/19    SAADIA Velez abnormal    Reviewed By Domenica Car MD on 11/15/2018  7:33 AM     No change on med list.       Routed because: abnormal labs. High med alert

## 2019-04-01 ENCOUNTER — TRANSFERRED RECORDS (OUTPATIENT)
Dept: HEALTH INFORMATION MANAGEMENT | Facility: CLINIC | Age: 69
End: 2019-04-01

## 2019-04-02 ENCOUNTER — APPOINTMENT (OUTPATIENT)
Dept: GENERAL RADIOLOGY | Facility: CLINIC | Age: 69
DRG: 292 | End: 2019-04-02
Attending: INTERNAL MEDICINE
Payer: COMMERCIAL

## 2019-04-02 ENCOUNTER — HOSPITAL ENCOUNTER (INPATIENT)
Facility: CLINIC | Age: 69
LOS: 6 days | Discharge: HOME | DRG: 292 | End: 2019-04-09
Attending: INTERNAL MEDICINE | Admitting: INTERNAL MEDICINE
Payer: COMMERCIAL

## 2019-04-02 ENCOUNTER — CARE COORDINATION (OUTPATIENT)
Dept: CARDIOLOGY | Facility: CLINIC | Age: 69
End: 2019-04-02

## 2019-04-02 LAB
ALBUMIN SERPL-MCNC: 4 G/DL (ref 3.4–5)
ALP SERPL-CCNC: 290 U/L (ref 40–150)
ALT SERPL W P-5'-P-CCNC: 28 U/L (ref 0–50)
ANION GAP SERPL CALCULATED.3IONS-SCNC: 10 MMOL/L (ref 3–14)
AST SERPL W P-5'-P-CCNC: 26 U/L (ref 0–45)
BILIRUB SERPL-MCNC: 3.1 MG/DL (ref 0.2–1.3)
BUN SERPL-MCNC: 42 MG/DL (ref 7–30)
CALCIUM SERPL-MCNC: 8.7 MG/DL (ref 8.5–10.1)
CHLORIDE SERPL-SCNC: 94 MMOL/L (ref 94–109)
CO2 SERPL-SCNC: 26 MMOL/L (ref 20–32)
CREAT SERPL-MCNC: 1.4 MG/DL (ref 0.52–1.04)
ERYTHROCYTE [DISTWIDTH] IN BLOOD BY AUTOMATED COUNT: 14 % (ref 10–15)
GFR SERPL CREATININE-BSD FRML MDRD: 39 ML/MIN/{1.73_M2}
GLUCOSE SERPL-MCNC: 98 MG/DL (ref 70–99)
HCT VFR BLD AUTO: 46.9 % (ref 35–47)
HGB BLD-MCNC: 15.2 G/DL (ref 11.7–15.7)
MCH RBC QN AUTO: 32.1 PG (ref 26.5–33)
MCHC RBC AUTO-ENTMCNC: 32.4 G/DL (ref 31.5–36.5)
MCV RBC AUTO: 99 FL (ref 78–100)
PLATELET # BLD AUTO: 182 10E9/L (ref 150–450)
POTASSIUM SERPL-SCNC: 4.4 MMOL/L (ref 3.4–5.3)
PROT SERPL-MCNC: 6.9 G/DL (ref 6.8–8.8)
RBC # BLD AUTO: 4.73 10E12/L (ref 3.8–5.2)
SODIUM SERPL-SCNC: 130 MMOL/L (ref 133–144)
WBC # BLD AUTO: 5.7 10E9/L (ref 4–11)

## 2019-04-02 PROCEDURE — 36415 COLL VENOUS BLD VENIPUNCTURE: CPT | Performed by: INTERNAL MEDICINE

## 2019-04-02 PROCEDURE — 71045 X-RAY EXAM CHEST 1 VIEW: CPT

## 2019-04-02 PROCEDURE — 85027 COMPLETE CBC AUTOMATED: CPT | Performed by: INTERNAL MEDICINE

## 2019-04-02 PROCEDURE — 93010 ELECTROCARDIOGRAM REPORT: CPT | Performed by: INTERNAL MEDICINE

## 2019-04-02 PROCEDURE — 93005 ELECTROCARDIOGRAM TRACING: CPT

## 2019-04-02 PROCEDURE — 40000556 ZZH STATISTIC PERIPHERAL IV START W US GUIDANCE

## 2019-04-02 PROCEDURE — 25000128 H RX IP 250 OP 636: Performed by: STUDENT IN AN ORGANIZED HEALTH CARE EDUCATION/TRAINING PROGRAM

## 2019-04-02 PROCEDURE — 80053 COMPREHEN METABOLIC PANEL: CPT | Performed by: INTERNAL MEDICINE

## 2019-04-02 PROCEDURE — 25000132 ZZH RX MED GY IP 250 OP 250 PS 637: Performed by: STUDENT IN AN ORGANIZED HEALTH CARE EDUCATION/TRAINING PROGRAM

## 2019-04-02 RX ORDER — LIDOCAINE 40 MG/G
CREAM TOPICAL
Status: DISCONTINUED | OUTPATIENT
Start: 2019-04-02 | End: 2019-04-09 | Stop reason: HOSPADM

## 2019-04-02 RX ORDER — LORAZEPAM 0.5 MG/1
0.5 TABLET ORAL EVERY 6 HOURS PRN
Status: DISCONTINUED | OUTPATIENT
Start: 2019-04-02 | End: 2019-04-09 | Stop reason: HOSPADM

## 2019-04-02 RX ORDER — AMOXICILLIN 250 MG
2 CAPSULE ORAL 2 TIMES DAILY
Status: DISCONTINUED | OUTPATIENT
Start: 2019-04-02 | End: 2019-04-08

## 2019-04-02 RX ORDER — BUMETANIDE 0.25 MG/ML
4 INJECTION INTRAMUSCULAR; INTRAVENOUS EVERY 12 HOURS
Status: DISCONTINUED | OUTPATIENT
Start: 2019-04-02 | End: 2019-04-03

## 2019-04-02 RX ORDER — ONDANSETRON 4 MG/1
4 TABLET, ORALLY DISINTEGRATING ORAL EVERY 6 HOURS PRN
Status: DISCONTINUED | OUTPATIENT
Start: 2019-04-02 | End: 2019-04-07

## 2019-04-02 RX ORDER — ALUMINA, MAGNESIA, AND SIMETHICONE 2400; 2400; 240 MG/30ML; MG/30ML; MG/30ML
30 SUSPENSION ORAL EVERY 4 HOURS PRN
Status: DISCONTINUED | OUTPATIENT
Start: 2019-04-02 | End: 2019-04-09 | Stop reason: HOSPADM

## 2019-04-02 RX ORDER — ACETAMINOPHEN 650 MG/1
650 SUPPOSITORY RECTAL EVERY 4 HOURS PRN
Status: DISCONTINUED | OUTPATIENT
Start: 2019-04-02 | End: 2019-04-09 | Stop reason: HOSPADM

## 2019-04-02 RX ORDER — AMOXICILLIN 250 MG
1 CAPSULE ORAL 2 TIMES DAILY
Status: DISCONTINUED | OUTPATIENT
Start: 2019-04-02 | End: 2019-04-08

## 2019-04-02 RX ORDER — DOXYCYCLINE 100 MG/1
100 CAPSULE ORAL DAILY
Status: DISCONTINUED | OUTPATIENT
Start: 2019-04-03 | End: 2019-04-03

## 2019-04-02 RX ORDER — POLYETHYLENE GLYCOL 3350 17 G/17G
17 POWDER, FOR SOLUTION ORAL DAILY PRN
Status: DISCONTINUED | OUTPATIENT
Start: 2019-04-02 | End: 2019-04-09 | Stop reason: HOSPADM

## 2019-04-02 RX ORDER — HEPARIN SODIUM 5000 [USP'U]/.5ML
5000 INJECTION, SOLUTION INTRAVENOUS; SUBCUTANEOUS EVERY 12 HOURS
Status: DISCONTINUED | OUTPATIENT
Start: 2019-04-02 | End: 2019-04-08

## 2019-04-02 RX ORDER — SPIRONOLACTONE 25 MG/1
25 TABLET ORAL 2 TIMES DAILY
Qty: 180 TABLET | Refills: 3 | Status: SHIPPED | OUTPATIENT
Start: 2019-04-02 | End: 2019-04-17

## 2019-04-02 RX ORDER — ACETAMINOPHEN 325 MG/1
650 TABLET ORAL EVERY 4 HOURS PRN
Status: DISCONTINUED | OUTPATIENT
Start: 2019-04-02 | End: 2019-04-09 | Stop reason: HOSPADM

## 2019-04-02 RX ORDER — BUMETANIDE 0.25 MG/ML
4 INJECTION INTRAMUSCULAR; INTRAVENOUS EVERY 12 HOURS
Status: DISCONTINUED | OUTPATIENT
Start: 2019-04-02 | End: 2019-04-02

## 2019-04-02 RX ORDER — NITROGLYCERIN 0.4 MG/1
0.4 TABLET SUBLINGUAL EVERY 5 MIN PRN
Status: DISCONTINUED | OUTPATIENT
Start: 2019-04-02 | End: 2019-04-09 | Stop reason: HOSPADM

## 2019-04-02 RX ADMIN — SENNOSIDES AND DOCUSATE SODIUM 2 TABLET: 8.6; 5 TABLET ORAL at 21:13

## 2019-04-02 RX ADMIN — BUMETANIDE 4 MG: 0.25 INJECTION INTRAMUSCULAR; INTRAVENOUS at 21:22

## 2019-04-02 ASSESSMENT — ACTIVITIES OF DAILY LIVING (ADL)
AMBULATION: 0-->INDEPENDENT
RETIRED_COMMUNICATION: 0-->UNDERSTANDS/COMMUNICATES WITHOUT DIFFICULTY
RETIRED_EATING: 0-->INDEPENDENT
BATHING: 0-->INDEPENDENT
TRANSFERRING: 0-->INDEPENDENT
SWALLOWING: 0-->SWALLOWS FOODS/LIQUIDS WITHOUT DIFFICULTY
COGNITION: 0 - NO COGNITION ISSUES REPORTED
DRESS: 0-->INDEPENDENT
TOILETING: 0-->INDEPENDENT
FALL_HISTORY_WITHIN_LAST_SIX_MONTHS: NO

## 2019-04-02 NOTE — PROGRESS NOTES
Patient is followed by Dr. Domenica Cohen for cardiac amyloid. I have been communicating with patient yesterday and today via My Chart. Her weight is up 15 lbs over the last several months. She reports her wt today is 163 and she feels best around 140 lbs. She c/o abdominal distension and peripheral edema, increased shortness of breath. Patient saw her primary physician yesterday and labs and chest xray were obtained (results in care everywhere). Chest xray showed increased right pleural effusion and labs showed BUN/creat 40/1.74, GFR 29; alk phos 328, TB 2.9.  Reviewed with Dr. Cohen and she recommended patient be admitted for IV diuresis.  Spoke with patient Iand she is agreeable to come in later today. Contacted and spoke with Dr. West who is attending on Cards 1 service and he has accepted the patient for admission. Patient [place] about [place] hour from the  and her  is able to bring  her in later today, possibly not until 6 pm.

## 2019-04-02 NOTE — LETTER
Transition Communication Hand-off for Care Transitions to Next Level of Care Provider    Name: Leandra Guerrero  : 1950  MRN #: [id number]  Primary Care Provider: CARLYN LAVA     Primary Clinic: [facility] [address]  [facility] [address]     Reason for Hospitalization:  Heart Failure   Heart failure (H)  Admit Date/Time: 2019  6:06 PM  Discharge Date: 19  Payor Source: Payor: HUMANA / Plan: HUMANA MEDICARE ADVANTAGE / Product Type: Medicare   Readmission Assessment Measure (BRIAN) Risk Score/category: elevated.   Reason for Communication Hand-off Referral: Multiple providers/specialties  Discharge Plan:  Discharge Needs Assessment:  Needs      Most Recent Value   Equipment Currently Used at Home  none      Follow-up specialty is recommended: Yes    Follow-up plan:    Future Appointments   Date Time Provider Department Center          2019  9:30 AM  LAB [facility]   2019 10:00 AM Mulu Krishnan APRN CNP [facility]   2019  9:30 AM [facility]   2019  2:30 PM Mirela Moore MD [facility]   2019  7:30 AM  LAB [facility]   2019  8:00 AM Domenica Cohen MD [facility]       Any outstanding tests or procedures:        Referrals     Future Labs/Procedures    Medication Therapy Management Referral     Comments:    MTM referral reason            Patient has 5 PTA or Discharge Medications AND one of the following   diagnoses: DM,HF,COPD,AMI DX,PULM HTN       This service is designed to help you get the most from your medications.  A specially trained pharmacist will work closely with you and your doctors  to solve any problems related to your medications and to help you get the   best results from taking them.      The Medication Therapy Management staff will call you to schedule an appointment.            Key Recommendations:  Post hospitalization follow up.     YONATHAN CHUNG RN BSN  Care Coordinator Unit  3H  [facility]  Phone: [phone]

## 2019-04-03 ENCOUNTER — APPOINTMENT (OUTPATIENT)
Dept: CARDIOLOGY | Facility: CLINIC | Age: 69
DRG: 292 | End: 2019-04-03
Attending: INTERNAL MEDICINE
Payer: COMMERCIAL

## 2019-04-03 PROBLEM — I50.9 HEART FAILURE (H): Status: ACTIVE | Noted: 2019-04-03

## 2019-04-03 LAB
ANION GAP SERPL CALCULATED.3IONS-SCNC: 11 MMOL/L (ref 3–14)
ANION GAP SERPL CALCULATED.3IONS-SCNC: 7 MMOL/L (ref 3–14)
BUN SERPL-MCNC: 40 MG/DL (ref 7–30)
BUN SERPL-MCNC: 43 MG/DL (ref 7–30)
CALCIUM SERPL-MCNC: 8.4 MG/DL (ref 8.5–10.1)
CALCIUM SERPL-MCNC: 8.8 MG/DL (ref 8.5–10.1)
CHLORIDE SERPL-SCNC: 94 MMOL/L (ref 94–109)
CHLORIDE SERPL-SCNC: 95 MMOL/L (ref 94–109)
CO2 SERPL-SCNC: 26 MMOL/L (ref 20–32)
CO2 SERPL-SCNC: 30 MMOL/L (ref 20–32)
CREAT SERPL-MCNC: 1.34 MG/DL (ref 0.52–1.04)
CREAT SERPL-MCNC: 1.42 MG/DL (ref 0.52–1.04)
ERYTHROCYTE [DISTWIDTH] IN BLOOD BY AUTOMATED COUNT: 14.1 % (ref 10–15)
GFR SERPL CREATININE-BSD FRML MDRD: 38 ML/MIN/{1.73_M2}
GFR SERPL CREATININE-BSD FRML MDRD: 41 ML/MIN/{1.73_M2}
GLUCOSE SERPL-MCNC: 102 MG/DL (ref 70–99)
GLUCOSE SERPL-MCNC: 95 MG/DL (ref 70–99)
HCT VFR BLD AUTO: 42.4 % (ref 35–47)
HGB BLD-MCNC: 14.2 G/DL (ref 11.7–15.7)
INTERPRETATION ECG - MUSE: NORMAL
MAGNESIUM SERPL-MCNC: 2.8 MG/DL (ref 1.6–2.3)
MAGNESIUM SERPL-MCNC: 2.8 MG/DL (ref 1.6–2.3)
MCH RBC QN AUTO: 32.9 PG (ref 26.5–33)
MCHC RBC AUTO-ENTMCNC: 33.5 G/DL (ref 31.5–36.5)
MCV RBC AUTO: 98 FL (ref 78–100)
PLATELET # BLD AUTO: 167 10E9/L (ref 150–450)
POTASSIUM SERPL-SCNC: 3.8 MMOL/L (ref 3.4–5.3)
POTASSIUM SERPL-SCNC: 4.6 MMOL/L (ref 3.4–5.3)
RBC # BLD AUTO: 4.31 10E12/L (ref 3.8–5.2)
SODIUM SERPL-SCNC: 130 MMOL/L (ref 133–144)
SODIUM SERPL-SCNC: 132 MMOL/L (ref 133–144)
WBC # BLD AUTO: 4.7 10E9/L (ref 4–11)

## 2019-04-03 PROCEDURE — 36415 COLL VENOUS BLD VENIPUNCTURE: CPT | Performed by: INTERNAL MEDICINE

## 2019-04-03 PROCEDURE — 21400000 ZZH R&B CCU UMMC

## 2019-04-03 PROCEDURE — 83735 ASSAY OF MAGNESIUM: CPT | Performed by: INTERNAL MEDICINE

## 2019-04-03 PROCEDURE — 25000128 H RX IP 250 OP 636: Performed by: STUDENT IN AN ORGANIZED HEALTH CARE EDUCATION/TRAINING PROGRAM

## 2019-04-03 PROCEDURE — 93306 TTE W/DOPPLER COMPLETE: CPT

## 2019-04-03 PROCEDURE — 80048 BASIC METABOLIC PNL TOTAL CA: CPT | Performed by: STUDENT IN AN ORGANIZED HEALTH CARE EDUCATION/TRAINING PROGRAM

## 2019-04-03 PROCEDURE — 36415 COLL VENOUS BLD VENIPUNCTURE: CPT | Performed by: STUDENT IN AN ORGANIZED HEALTH CARE EDUCATION/TRAINING PROGRAM

## 2019-04-03 PROCEDURE — 99223 1ST HOSP IP/OBS HIGH 75: CPT | Mod: 25 | Performed by: INTERNAL MEDICINE

## 2019-04-03 PROCEDURE — 25000132 ZZH RX MED GY IP 250 OP 250 PS 637: Performed by: INTERNAL MEDICINE

## 2019-04-03 PROCEDURE — 25000128 H RX IP 250 OP 636: Performed by: INTERNAL MEDICINE

## 2019-04-03 PROCEDURE — 25000125 ZZHC RX 250: Performed by: INTERNAL MEDICINE

## 2019-04-03 PROCEDURE — 83735 ASSAY OF MAGNESIUM: CPT | Performed by: STUDENT IN AN ORGANIZED HEALTH CARE EDUCATION/TRAINING PROGRAM

## 2019-04-03 PROCEDURE — 93306 TTE W/DOPPLER COMPLETE: CPT | Mod: 26 | Performed by: INTERNAL MEDICINE

## 2019-04-03 PROCEDURE — 80048 BASIC METABOLIC PNL TOTAL CA: CPT | Performed by: INTERNAL MEDICINE

## 2019-04-03 PROCEDURE — 25000132 ZZH RX MED GY IP 250 OP 250 PS 637: Performed by: STUDENT IN AN ORGANIZED HEALTH CARE EDUCATION/TRAINING PROGRAM

## 2019-04-03 PROCEDURE — 85027 COMPLETE CBC AUTOMATED: CPT | Performed by: STUDENT IN AN ORGANIZED HEALTH CARE EDUCATION/TRAINING PROGRAM

## 2019-04-03 RX ORDER — POTASSIUM CHLORIDE 1.5 G/1.58G
20-40 POWDER, FOR SOLUTION ORAL
Status: DISCONTINUED | OUTPATIENT
Start: 2019-04-03 | End: 2019-04-09 | Stop reason: HOSPADM

## 2019-04-03 RX ORDER — MAGNESIUM SULFATE HEPTAHYDRATE 40 MG/ML
4 INJECTION, SOLUTION INTRAVENOUS EVERY 4 HOURS PRN
Status: DISCONTINUED | OUTPATIENT
Start: 2019-04-03 | End: 2019-04-09 | Stop reason: HOSPADM

## 2019-04-03 RX ORDER — POTASSIUM CHLORIDE 750 MG/1
40 TABLET, EXTENDED RELEASE ORAL ONCE
Status: COMPLETED | OUTPATIENT
Start: 2019-04-03 | End: 2019-04-03

## 2019-04-03 RX ORDER — BUMETANIDE 0.25 MG/ML
4 INJECTION INTRAMUSCULAR; INTRAVENOUS ONCE
Status: COMPLETED | OUTPATIENT
Start: 2019-04-03 | End: 2019-04-03

## 2019-04-03 RX ORDER — POTASSIUM CHLORIDE 750 MG/1
20-40 TABLET, EXTENDED RELEASE ORAL
Status: DISCONTINUED | OUTPATIENT
Start: 2019-04-03 | End: 2019-04-09 | Stop reason: HOSPADM

## 2019-04-03 RX ORDER — POTASSIUM CHLORIDE 29.8 MG/ML
20 INJECTION INTRAVENOUS
Status: DISCONTINUED | OUTPATIENT
Start: 2019-04-03 | End: 2019-04-09 | Stop reason: HOSPADM

## 2019-04-03 RX ORDER — POTASSIUM CL/LIDO/0.9 % NACL 10MEQ/0.1L
10 INTRAVENOUS SOLUTION, PIGGYBACK (ML) INTRAVENOUS
Status: DISCONTINUED | OUTPATIENT
Start: 2019-04-03 | End: 2019-04-09 | Stop reason: HOSPADM

## 2019-04-03 RX ORDER — POTASSIUM CHLORIDE 7.45 MG/ML
10 INJECTION INTRAVENOUS
Status: DISCONTINUED | OUTPATIENT
Start: 2019-04-03 | End: 2019-04-09 | Stop reason: HOSPADM

## 2019-04-03 RX ADMIN — POTASSIUM CHLORIDE 40 MEQ: 750 TABLET, EXTENDED RELEASE ORAL at 08:31

## 2019-04-03 RX ADMIN — BUMETANIDE 4 MG: 0.25 INJECTION INTRAMUSCULAR; INTRAVENOUS at 08:31

## 2019-04-03 RX ADMIN — Medication 1 MG/HR: at 11:55

## 2019-04-03 RX ADMIN — ACETAMINOPHEN 650 MG: 325 TABLET, FILM COATED ORAL at 19:40

## 2019-04-03 RX ADMIN — BUMETANIDE 4 MG: 0.25 INJECTION INTRAMUSCULAR; INTRAVENOUS at 12:00

## 2019-04-03 RX ADMIN — SENNOSIDES AND DOCUSATE SODIUM 2 TABLET: 8.6; 5 TABLET ORAL at 08:32

## 2019-04-03 RX ADMIN — HEPARIN SODIUM 5000 UNITS: 5000 INJECTION, SOLUTION INTRAVENOUS; SUBCUTANEOUS at 19:40

## 2019-04-03 RX ADMIN — POTASSIUM CHLORIDE 20 MEQ: 750 TABLET, EXTENDED RELEASE ORAL at 08:46

## 2019-04-03 ASSESSMENT — ACTIVITIES OF DAILY LIVING (ADL)
ADLS_ACUITY_SCORE: 11
ADLS_ACUITY_SCORE: 11

## 2019-04-03 NOTE — H&P
Cardiology History and Physical   Leandra Guerrero MRN: [id number]  Age: 68 year old, : 19    Chief Complaint: Weight gain     HPI:   The patient follows with Dr. Cohen for her cardiac amylodosis. TTE in  suggested concentric hypertrophy with elevated RV pressures and dilated IVC. In early  it appears that her heart failure had worsened, she was requiring frequent thoracentesis. She was hospitalized at the [facility] and aggressively diuresed with a furosemide drip, ultimately losing 20-30 lbs. Since then her heart failure has been relatively stable. She last saw Dr. Cohen 10/11/2018 at which her torsemide was increased to 80/60 mg torsemide then 60/60 mg torsemide every other day. Then was subsequently increased to 80 mg twice daily. She subsequently saw her PCP in November due to some weight gain and shortness of breath and she added Bumex 1 mg daily.    She returned to her PCP yesterday with lower extremity edema and shortness of breath. The plan was to add Bumex 1 mg daily x10 days, increase spironolactone from 25 mg to 50 mg twice daily and discuss with Dr. Cohen. She ultimately advised coming to the [facility] for more aggressive diuresis. Patient does endorse worsening fatigue and PELAEZ, but denies any fevers, chills, headache, syncopal events changes in bowel or bladder habits or palpitations.      With respect to the patient's amyloidosis, it was diagnosed in 2016 around the time she was diagnosed with heart failure. She was found to have abnormal free lambda/kappa light chain ratio and flow cytometry showed detectable monotypic plasma cells. Bone marrow biopsy subsequently showed involvement of lambda light chain restricted plasma cells with positive congo red stain. Liquid chromatography confirmed lambda type amyloid. EGD obtained  showed gastric mucosal atrophy and papules, with biopsy showing amyloid in the submucosal blood vessel walls of the stomach  and duodenum. She was started on CyBorD, ultimately holding cytoxan due to hemorrhagic cystitis. By early 2017, her serum free lambda chains normalized. She was considered for a stem cell transplant but ulimately turned down due to her cardiac involvement. Her chemotherapy was completed in later 2016 and was started on doxycycline.      History is obtained from the patient and electronic health record    Review of Systems:    Complete 10 point review of systems was performed and negative except per HPI.      Past Medical History    Reviewed and edited as appropriate  Past Medical History:   Diagnosis Date     AL amyloidosis (H)      Cardiac amyloidosis (H)      Lymphedema      Recurrent right pleural effusion 1/2/2017       Past Surgical History   Reviewed and edited as appropriate   No past surgical history on file.    Social History   Reviewed and edited as appropriate  Social History     Socioeconomic History     Marital status:      Spouse name: Not on file     Number of children: Not on file     Years of education: Not on file     Highest education level: Not on file   Occupational History     Not on file   Social Needs     Financial resource strain: Not on file     Food insecurity:     Worry: Not on file     Inability: Not on file     Transportation needs:     Medical: Not on file     Non-medical: Not on file   Tobacco Use     Smoking status: Never Smoker     Smokeless tobacco: Never Used   Substance and Sexual Activity     Alcohol use: No     Drug use: No     Sexual activity: Not on file   Lifestyle     Physical activity:     Days per week: Not on file     Minutes per session: Not on file     Stress: Not on file   Relationships     Social connections:     Talks on phone: Not on file     Gets together: Not on file     Attends Confucianist service: Not on file     Active member of club or organization: Not on file     Attends meetings of clubs or organizations: Not on file     Relationship status: Not on  file     Intimate partner violence:     Fear of current or ex partner: Not on file     Emotionally abused: Not on file     Physically abused: Not on file     Forced sexual activity: Not on file   Other Topics Concern     Parent/sibling w/ CABG, MI or angioplasty before 65F 55M? Not Asked   Social History Narrative     Not on file       Family History     Reviewed and edited as appropriate  Family History   Problem Relation Age of Onset     Other - See Comments Sister         Amyloidosis       Allergies   Reviewed and edited as appropriate     Allergies   Allergen Reactions     Contrast Dye Shortness Of Breath     Shaking,chills and dypsnea     Cytoxan [Cyclophosphamide]      Hemorrhagic cystitis     Levofloxacin      Severe shaking and abdominal pain     Amoxicillin Nausea and Vomiting and Cramps        Prior to Admission Medications    Medications Prior to Admission   Medication Sig Dispense Refill Last Dose     Acetaminophen (TYLENOL PO) Take 325 mg by mouth   Past Month at Unknown time     ondansetron (ZOFRAN-ODT) 4 MG ODT tab Take 1 tablet (4 mg) by mouth every 6 hours as needed for nausea 15 tablet 1 Past Week at Unknown time     potassium chloride SA (K-DUR/KLOR-CON M) 10 MEQ CR tablet Take 2 tablets (20 mEq) by mouth daily 180 tablet 3 4/2/2019 at 0730     spironolactone (ALDACTONE) 25 MG tablet Take 1 tablet (25 mg) by mouth 2 times daily 180 tablet 3 4/2/2019 at 0730     torsemide (DEMADEX) 20 MG tablet Take 80 mg in the morning, 60 in the afternoon one day, then 60 mg twice a day next day. Continue to alternate every other day. 554 tablet 3 4/2/2019 at 0730     doxycycline (VIBRA-TABS) 100 MG tablet Take 1 tablet (100 mg) by mouth daily 90 tablet 1 More than a month at Unknown time     LORazepam (ATIVAN) 0.5 MG tablet Take 1 tablet (0.5 mg) by mouth every 6 hours as needed for other (nausea) 30 tablet 0 More than a month at Unknown time     prochlorperazine (COMPAZINE) 5 MG tablet One to two tablets PO q 6  hours prn nausea 90 tablet 1 More than a month at Unknown time          Physical Exam   /60 (BP Location: Left arm)   Pulse 57   Temp 98.2  F (36.8  C) (Oral)   Resp 18   SpO2 95%   No intake or output data in the 24 hours ending 04/02/19 2134  Wt:   Wt Readings from Last 2 Encounters:   10/26/18 68.4 kg (150 lb 12.8 oz)   10/11/18 66.8 kg (147 lb 4.8 oz)      GEN: pleasant, no acute distress  HEENT: no icterus, MMM  CV: RRR, normal s1,s2, no murmurs/rubs no heave. JVP to angle of mandible   CHEST: Non-labored, diminished over right base, no wheezes  ABD: soft, non-tender, normal active bowel sounds  EXTR: DP 2+ bilaterally. 2+ BLE edema, wwp, no cyanosis   NEURO: alert oriented, speech fluent/appropriate, motor grossly nonfocal    Assessment and Recommendation:   Leandra Guerrero is a 68 year old female with history of stage IV AL amyloid involving GI, bone marrow, and heart resulting in HFpEF who presents with decompensated heart failure.     # Acute on chronic HFpEF in setting of cardiac amyloid  Last TTE showed LVEF 55-60% with moderate concentrate wall thickening and grade III diastolic dysfunction. Recent worsening dyspnea, lower extremity edema, and weight gain. During last admission the patient responded well to a furosemide drip. Referred for diuresis by patient's primary cardiologist, Dr. Cohen.  - TTE  - NT-PRO-BNP  - Chest x-ray  - start Bumex 4 mg BID, consider up titration to TID if able  - hold pta torsemide 80 mg twice daily, pta PO bumex  - hold pta spironolactone 50 mg twice daily (recent uptitrated by PCP from 25 mg bid)     # Stage IV AL amyloid  Diagnosed 2016, involves bone marrow, GI tract, and heart. S/p chemotherapy with improvement in free light chain ratio. Not a transplant candidate due to cardiac involvement.  - continue pta doxycycline 100 mg daily       ACCESS: PIV  FEN: 2gm Na, 2L fluid restriction  PPX: ANGELICA  CODE: Full code, no shocks but agreeable to CPR and  intubation    Jovany Bolton DO  Internal Medicine PGY2  Pager [phone] (Text Page)      Data   Labs and imaging below were independently reviewed and interpreted    CBC  Recent Labs   Lab 04/02/19 2047   WBC 5.7   RBC 4.73   HGB 15.2   HCT 46.9   MCV 99   MCH 32.1   MCHC 32.4   RDW 14.0         CMP  Recent Labs   Lab 04/02/19 2047   *   POTASSIUM 4.4   CHLORIDE 94   JERROD 8.7   CO2 26   BUN 42*   CR 1.40*   GLC 98   ALKPHOS 290*   AST 26   ALT 28   BILITOTAL 3.1*   PROTTOTAL 6.9   ALBUMIN 4.0     Imaging:  CXR 4/2/19:  IMPRESSION: Small right pleural effusion and overlying  atelectasis/consolidation.     EKG:      Transthoracic echocardiogram 10/18/18:  Interpretation Summary  Global and regional left ventricular function is normal with an EF of 55-60%.  Moderate concentric wall thickening consistent with left ventricular  hypertrophy is present - known amyloid.  Grade III or advanced diastolic dysfunction.  Normal right ventricular size, wall thickness, and function.  Estimated pulmonary artery pressure 28 mmHg.  IVC with normal diameter but does not collapse (>50%) with inspiration.  Trace pericardial effusion.     Compared to prior study from 8/17/17, no significant change.    Angiography 5/2016  LEFT MAIN CORONARY ARTERY     The LMCA is free of significant disease.  LEFT ANTERIOR DESCENDING     The LAD is free of significant disease.  CIRCUMFLEX     The Circumflex is free of significant disease.  RIGHT CORONARY ARTERY     The RCA is free of significant disease.

## 2019-04-03 NOTE — PLAN OF CARE
Shift:   VS: Temp: 97.8  F (36.6  C) Temp src: Oral BP: 113/63 Pulse: 57 Heart Rate: 66 Resp: 18 SpO2: 97 % O2 Device: None (Room air)    Pain: Denies pain  Neuro: A&Ox4, calls appropriately  Cardiac:   Tele SR. Denies chest pain  Respiratory: Tolerating RA. Denies SOB.  GI/Diet/Appetite: Adequate oral intake. No nausea reported. Pt had small BM x1 post bowel meds.  :  Urinating frequently d/t bumex gtt. Using commode at bedside.  LDA's: PIV with bumex gtt infusing.   Skin: No new deficit noted. BLE edema.   Activity: Up ad philly  Tests/Procedures:   Pertinent Labs/Lab Collection: Echo completed.      Plan: Continue with cares and update MD with any changes.

## 2019-04-03 NOTE — PLAN OF CARE
Admission  D-Admitted to 6C from home for volume overload. PMH of cardiac amyloidosis. Per pt weight up 15 lbs. Bilateral LE edema, L > R. Spouse at bedside.  I-Oriented to bedside controls and unit routines. Pt instructed to call for assistance to get OOB. Obtained IV access. RN skin check completed by myself and Aniceto Castorena RN.  P-Initiate diuresis when orders available. Pt and spouse are aware of the plan.

## 2019-04-03 NOTE — PROGRESS NOTES
Cardiology Progress Note  04/03/19         Changes today   -TTE  -Stop bumex 4 mg PO BID  -Start bumex gtt 1 mg/hr  -Afternoon BMP  -Stop doxycycline 100 mg daily     Assessment and Plan       Leandra Guerrero is a 68 year old with a PMH significant for stage IV AL amyloid involving GI, bone marrow, and heart resulting in HFpEF who presents with decompensated heart failure.    #Acute on chronic HFpEF exacerbation in the setting of cardiac amyloidosis  Diagnosed 4/2016. Last TTE (10/2018) showed LVEF of 55-60% with moderate concentric wall thickening and grade III diastolic dysfunction. Weight gain of approximately 15 lbs over the past 2 months with worsening PELAEZ and lower extremity edema despite compliance with home torsemide 80/60, 60/60 mg and spironolactone 25 mg daily. Patient estimates dry weight to be approximately 140 lbs, 157 lb on admission. Referred for diuresis by patient's primary cardiologist, Dr. Cohen.  -TTE pending  -Start bumex gtt 1mg/hr + 4 mg IV x1 w/ late afternoon electrolytes  -Hold pta torsemide, PO bumex, and spironolactone  -Strict I/O  -Daily standing weights    #Stage IV AL amyloid  Diagnosed in 2016, involved the bone marrow, GI tract, and heart. S/p CyBorD chemotherapy w/ improvement in free light chain ratio. Not a transplant candidate due to cardiac involvement. Ms. Guerrero had been on doxycycline until 6-9 months ago when it was discontinued.  -Stop pta doxycycline 100 mg daily     #Chronic right sided pleural effusion  Patient with history of right sided pleural effusion. CXR shows small pleural effusion with overlying atelectasis/consolidation on admission, appears similar in size to CXR in June.  -Bumex gtt as above    #Chronic nausea  -Continue pta ativan 0.5 mg PRN  -Continue pta zofran 4 mg PRN    FEN: 2 gm Na, 2L fluid restriction  Code status: Full code, no shocks but agreeable to CPR and intubation  DVT Prophylaxis:  SubQ  heparin  Disposition: Pending diuresis     Patient seen and discussed with Dr. West, who agrees with above plan.     Khai Brito, MS4  [facility] Cardiology Team    Resident/Fellow Attestation   I, Chip Stephens, was present with the medical student who participated in the service and in the documentation of the note.  I have verified the history and personally performed the physical exam and medical decision making.  I agree with the assessment and plan of care as documented in the note.      Chip Stephens MD  PGY3  Date of Service (when I saw the patient): 04/03/19        Subjective     No acute overnight events. No chest pain or palpitations. No shortness of breath at rest. Symptoms are unchanged.       Objective     /63 (BP Location: Left arm)   Pulse 57   Temp 97.8  F (36.6  C) (Oral)   Resp 18   Wt 71.4 kg (157 lb 4.8 oz)   SpO2 97%   BMI 27.86 kg/m      Vitals:    04/03/19 0200   Weight: 71.4 kg (157 lb 4.8 oz)         Intake/Output Summary (Last 24 hours) at 4/3/2019 1307  Last data filed at 4/3/2019 1200  Gross per 24 hour   Intake 400 ml   Output 3350 ml   Net -2950 ml       General: NA, alert, pleasant and conversational on exam.   HEENT:  EOMI. Sclera anicteric. Oral mucosa moist.  CV: RRR, S1/S2. No murmur appreciated. No rubs or gallops. JVP at the angle of the mandible.  Pulm: No increased work of breathing or use of accessory muscles, on room air.  Lung sounds decreased over right base. No wheezes, rales, or rhonchi.   Abdomen: Normal active bowel sounds. Abdomen is soft. Non-tender to palpation. No rebound tenderness or guarding.  MSK:  No large joint swelling or erythema.    Extremities: Warm, dry. 2+ bilateral pitting edema. No cyanosis.  Skin:  Warm and dry. No erythema or rashes on exposed areas.   Neuro: Alert and oriented x3.  Moving all extremities equally.    Diagnostics:  Results for orders placed or performed during the hospital encounter of 04/02/19 (from the past 24 hour(s))    EKG 12-lead, tracing only   Result Value Ref Range    Interpretation ECG Click View Image link to view waveform and result    XR Chest 1 View    Narrative    EXAM: XR CHEST 1 VW  4/2/2019 8:07 PM      HISTORY: Dyspnea    COMPARISON: Chest radiograph 6/14/2018    FINDINGS:     Single view of the chest. The cardiomediastinal silhouette is within  normal limits. Small small right pleural effusion and overlying  atelectasis/consolidation. No pneumothorax.       Impression    IMPRESSION: Small right pleural effusion and overlying  atelectasis/consolidation.     I have personally reviewed the examination and initial interpretation  and I agree with the findings.    RICHIE SHEETS MD   Comprehensive metabolic panel   Result Value Ref Range    Sodium 130 (L) 133 - 144 mmol/L    Potassium 4.4 3.4 - 5.3 mmol/L    Chloride 94 94 - 109 mmol/L    Carbon Dioxide 26 20 - 32 mmol/L    Anion Gap 10 3 - 14 mmol/L    Glucose 98 70 - 99 mg/dL    Urea Nitrogen 42 (H) 7 - 30 mg/dL    Creatinine 1.40 (H) 0.52 - 1.04 mg/dL    GFR Estimate 39 (L) >60 mL/min/[1.73_m2]    GFR Estimate If Black 45 (L) >60 mL/min/[1.73_m2]    Calcium 8.7 8.5 - 10.1 mg/dL    Bilirubin Total 3.1 (H) 0.2 - 1.3 mg/dL    Albumin 4.0 3.4 - 5.0 g/dL    Protein Total 6.9 6.8 - 8.8 g/dL    Alkaline Phosphatase 290 (H) 40 - 150 U/L    ALT 28 0 - 50 U/L    AST 26 0 - 45 U/L   CBC with platelets   Result Value Ref Range    WBC 5.7 4.0 - 11.0 10e9/L    RBC Count 4.73 3.8 - 5.2 10e12/L    Hemoglobin 15.2 11.7 - 15.7 g/dL    Hematocrit 46.9 35.0 - 47.0 %    MCV 99 78 - 100 fl    MCH 32.1 26.5 - 33.0 pg    MCHC 32.4 31.5 - 36.5 g/dL    RDW 14.0 10.0 - 15.0 %    Platelet Count 182 150 - 450 10e9/L   Basic metabolic panel   Result Value Ref Range    Sodium 132 (L) 133 - 144 mmol/L    Potassium 3.8 3.4 - 5.3 mmol/L    Chloride 95 94 - 109 mmol/L    Carbon Dioxide 26 20 - 32 mmol/L    Anion Gap 11 3 - 14 mmol/L    Glucose 95 70 - 99 mg/dL    Urea Nitrogen 40 (H) 7 - 30 mg/dL     Creatinine 1.34 (H) 0.52 - 1.04 mg/dL    GFR Estimate 41 (L) >60 mL/min/[1.73_m2]    GFR Estimate If Black 48 (L) >60 mL/min/[1.73_m2]    Calcium 8.4 (L) 8.5 - 10.1 mg/dL   CBC with platelets   Result Value Ref Range    WBC 4.7 4.0 - 11.0 10e9/L    RBC Count 4.31 3.8 - 5.2 10e12/L    Hemoglobin 14.2 11.7 - 15.7 g/dL    Hematocrit 42.4 35.0 - 47.0 %    MCV 98 78 - 100 fl    MCH 32.9 26.5 - 33.0 pg    MCHC 33.5 31.5 - 36.5 g/dL    RDW 14.1 10.0 - 15.0 %    Platelet Count 167 150 - 450 10e9/L   Magnesium   Result Value Ref Range    Magnesium 2.8 (H) 1.6 - 2.3 mg/dL     Echo:  TTE pending

## 2019-04-03 NOTE — PLAN OF CARE
Pt alert and oriented x4, VSS on RA. Admitted for volume overload and BLE edema. Previous shift started diuretics, no additional orders overnight. Bedside commode in room per pt request. Up independently, able to make needs known. Continue plan of care.

## 2019-04-04 ENCOUNTER — APPOINTMENT (OUTPATIENT)
Dept: OCCUPATIONAL THERAPY | Facility: CLINIC | Age: 69
DRG: 292 | End: 2019-04-04
Attending: PHYSICIAN ASSISTANT
Payer: COMMERCIAL

## 2019-04-04 LAB
ALBUMIN SERPL ELPH-MCNC: 4.2 G/DL (ref 3.7–5.1)
ALPHA1 GLOB SERPL ELPH-MCNC: 0.3 G/DL (ref 0.2–0.4)
ALPHA2 GLOB SERPL ELPH-MCNC: 0.6 G/DL (ref 0.5–0.9)
ANION GAP SERPL CALCULATED.3IONS-SCNC: 9 MMOL/L (ref 3–14)
B-GLOBULIN SERPL ELPH-MCNC: 0.7 G/DL (ref 0.6–1)
BUN SERPL-MCNC: 39 MG/DL (ref 7–30)
CALCIUM SERPL-MCNC: 8.7 MG/DL (ref 8.5–10.1)
CHLORIDE SERPL-SCNC: 94 MMOL/L (ref 94–109)
CO2 SERPL-SCNC: 28 MMOL/L (ref 20–32)
CREAT SERPL-MCNC: 1.32 MG/DL (ref 0.52–1.04)
ERYTHROCYTE [DISTWIDTH] IN BLOOD BY AUTOMATED COUNT: 14.3 % (ref 10–15)
GAMMA GLOB SERPL ELPH-MCNC: 0.7 G/DL (ref 0.7–1.6)
GFR SERPL CREATININE-BSD FRML MDRD: 42 ML/MIN/{1.73_M2}
GLUCOSE SERPL-MCNC: 95 MG/DL (ref 70–99)
HCT VFR BLD AUTO: 46.9 % (ref 35–47)
HGB BLD-MCNC: 15.1 G/DL (ref 11.7–15.7)
IGA SERPL-MCNC: 50 MG/DL (ref 70–380)
IGG SERPL-MCNC: 746 MG/DL (ref 695–1620)
IGM SERPL-MCNC: 47 MG/DL (ref 60–265)
KAPPA LC UR-MCNC: 1.09 MG/DL (ref 0.33–1.94)
KAPPA LC/LAMBDA SER: 0.75 {RATIO} (ref 0.26–1.65)
LAMBDA LC SERPL-MCNC: 1.46 MG/DL (ref 0.57–2.63)
M PROTEIN SERPL ELPH-MCNC: 0 G/DL
MAGNESIUM SERPL-MCNC: 2.9 MG/DL (ref 1.6–2.3)
MCH RBC QN AUTO: 31.9 PG (ref 26.5–33)
MCHC RBC AUTO-ENTMCNC: 32.2 G/DL (ref 31.5–36.5)
MCV RBC AUTO: 99 FL (ref 78–100)
PLATELET # BLD AUTO: 180 10E9/L (ref 150–450)
POTASSIUM SERPL-SCNC: 3.7 MMOL/L (ref 3.4–5.3)
PROT PATTERN SERPL ELPH-IMP: NORMAL
RBC # BLD AUTO: 4.73 10E12/L (ref 3.8–5.2)
SODIUM SERPL-SCNC: 131 MMOL/L (ref 133–144)
WBC # BLD AUTO: 4.5 10E9/L (ref 4–11)

## 2019-04-04 PROCEDURE — 21400000 ZZH R&B CCU UMMC

## 2019-04-04 PROCEDURE — 97165 OT EVAL LOW COMPLEX 30 MIN: CPT | Mod: GO | Performed by: OCCUPATIONAL THERAPIST

## 2019-04-04 PROCEDURE — 40000893 ZZH STATISTIC PT IP EVAL DEFER

## 2019-04-04 PROCEDURE — 36415 COLL VENOUS BLD VENIPUNCTURE: CPT | Performed by: STUDENT IN AN ORGANIZED HEALTH CARE EDUCATION/TRAINING PROGRAM

## 2019-04-04 PROCEDURE — 83735 ASSAY OF MAGNESIUM: CPT | Performed by: PHYSICIAN ASSISTANT

## 2019-04-04 PROCEDURE — 99233 SBSQ HOSP IP/OBS HIGH 50: CPT | Performed by: INTERNAL MEDICINE

## 2019-04-04 PROCEDURE — 80048 BASIC METABOLIC PNL TOTAL CA: CPT | Performed by: STUDENT IN AN ORGANIZED HEALTH CARE EDUCATION/TRAINING PROGRAM

## 2019-04-04 PROCEDURE — 97530 THERAPEUTIC ACTIVITIES: CPT | Mod: GO | Performed by: OCCUPATIONAL THERAPIST

## 2019-04-04 PROCEDURE — 83883 ASSAY NEPHELOMETRY NOT SPEC: CPT | Performed by: STUDENT IN AN ORGANIZED HEALTH CARE EDUCATION/TRAINING PROGRAM

## 2019-04-04 PROCEDURE — 00000402 ZZHCL STATISTIC TOTAL PROTEIN: Performed by: STUDENT IN AN ORGANIZED HEALTH CARE EDUCATION/TRAINING PROGRAM

## 2019-04-04 PROCEDURE — 25000132 ZZH RX MED GY IP 250 OP 250 PS 637: Performed by: STUDENT IN AN ORGANIZED HEALTH CARE EDUCATION/TRAINING PROGRAM

## 2019-04-04 PROCEDURE — 25000128 H RX IP 250 OP 636: Performed by: PHYSICIAN ASSISTANT

## 2019-04-04 PROCEDURE — 25000132 ZZH RX MED GY IP 250 OP 250 PS 637: Performed by: INTERNAL MEDICINE

## 2019-04-04 PROCEDURE — 25000132 ZZH RX MED GY IP 250 OP 250 PS 637: Performed by: PHYSICIAN ASSISTANT

## 2019-04-04 PROCEDURE — 82784 ASSAY IGA/IGD/IGG/IGM EACH: CPT | Performed by: STUDENT IN AN ORGANIZED HEALTH CARE EDUCATION/TRAINING PROGRAM

## 2019-04-04 PROCEDURE — 85027 COMPLETE CBC AUTOMATED: CPT | Performed by: STUDENT IN AN ORGANIZED HEALTH CARE EDUCATION/TRAINING PROGRAM

## 2019-04-04 PROCEDURE — 83735 ASSAY OF MAGNESIUM: CPT | Performed by: STUDENT IN AN ORGANIZED HEALTH CARE EDUCATION/TRAINING PROGRAM

## 2019-04-04 PROCEDURE — 25000128 H RX IP 250 OP 636: Performed by: INTERNAL MEDICINE

## 2019-04-04 PROCEDURE — 25000125 ZZHC RX 250: Performed by: PHYSICIAN ASSISTANT

## 2019-04-04 PROCEDURE — 84165 PROTEIN E-PHORESIS SERUM: CPT | Performed by: STUDENT IN AN ORGANIZED HEALTH CARE EDUCATION/TRAINING PROGRAM

## 2019-04-04 PROCEDURE — 97140 MANUAL THERAPY 1/> REGIONS: CPT | Mod: GO | Performed by: OCCUPATIONAL THERAPIST

## 2019-04-04 RX ORDER — POTASSIUM CHLORIDE 750 MG/1
20 TABLET, EXTENDED RELEASE ORAL ONCE
Status: COMPLETED | OUTPATIENT
Start: 2019-04-04 | End: 2019-04-04

## 2019-04-04 RX ORDER — POTASSIUM CHLORIDE 750 MG/1
40 TABLET, EXTENDED RELEASE ORAL DAILY
Status: DISCONTINUED | OUTPATIENT
Start: 2019-04-05 | End: 2019-04-05

## 2019-04-04 RX ORDER — BUMETANIDE 0.25 MG/ML
4 INJECTION INTRAMUSCULAR; INTRAVENOUS ONCE
Status: COMPLETED | OUTPATIENT
Start: 2019-04-04 | End: 2019-04-04

## 2019-04-04 RX ADMIN — HEPARIN SODIUM 5000 UNITS: 5000 INJECTION, SOLUTION INTRAVENOUS; SUBCUTANEOUS at 07:58

## 2019-04-04 RX ADMIN — ACETAMINOPHEN 650 MG: 325 TABLET, FILM COATED ORAL at 23:48

## 2019-04-04 RX ADMIN — Medication 2 MG/HR: at 21:04

## 2019-04-04 RX ADMIN — HEPARIN SODIUM 5000 UNITS: 5000 INJECTION, SOLUTION INTRAVENOUS; SUBCUTANEOUS at 19:17

## 2019-04-04 RX ADMIN — ACETAMINOPHEN 650 MG: 325 TABLET, FILM COATED ORAL at 13:13

## 2019-04-04 RX ADMIN — POTASSIUM CHLORIDE 20 MEQ: 750 TABLET, EXTENDED RELEASE ORAL at 10:03

## 2019-04-04 RX ADMIN — BUMETANIDE 4 MG: 0.25 INJECTION INTRAMUSCULAR; INTRAVENOUS at 10:02

## 2019-04-04 RX ADMIN — POTASSIUM CHLORIDE 20 MEQ: 750 TABLET, EXTENDED RELEASE ORAL at 08:10

## 2019-04-04 RX ADMIN — Medication 2 MG/HR: at 11:32

## 2019-04-04 RX ADMIN — SENNOSIDES AND DOCUSATE SODIUM 1 TABLET: 8.6; 5 TABLET ORAL at 19:17

## 2019-04-04 ASSESSMENT — ACTIVITIES OF DAILY LIVING (ADL)
ADLS_ACUITY_SCORE: 11

## 2019-04-04 NOTE — PROGRESS NOTES
04/04/19 1550   Quick Adds   Type of Visit Initial Occupational Therapy Evaluation   Living Environment   Lives With spouse   Living Arrangements house   Home Accessibility stairs within home;stairs to enter home  (4 story)   Number of Stairs, Main Entrance 2   Number of Stairs, Within Home, Primary (4 story)   Transportation Anticipated car, drives self   Living Environment Comment Pt's  home to A prn   Self-Care   Usual Activity Tolerance good   Current Activity Tolerance good   Regular Exercise No   Equipment Currently Used at Home none   Functional Level   Ambulation 0-->independent   Transferring 0-->independent   Toileting 0-->independent   Bathing 0-->independent   Dressing 0-->independent   Eating 0-->independent   Communication 0-->understands/communicates without difficulty   Swallowing 0-->swallows foods/liquids without difficulty   Cognition 0 - no cognition issues reported   Fall history within last six months no   Which of the above functional risks had a recent onset or change? none   General Information   Onset of Illness/Injury or Date of Surgery - Date 04/02/19   Referring Physician Queta Wang PA-C   Patient/Family Goals Statement To get back home and go on vacation   Additional Occupational Profile Info/Pertinent History of Current Problem Leandra Guerrero is a 68 year old with a PMH significant for stage IV AL amyloid involving GI, bone marrow, and heart resulting in HFpEF who presents with decompensated heart failure.   Precautions/Limitations no known precautions/limitations   Edema General Information   Onset of Edema (acute on chronic)   Affected Body Part(s) Left LE;Right LE   Edema Etiology Chronic Venous Insfficiency  (low GFR)   Edema Precautions Cardiac Edema/CHF   Edema Examination/Assessment   Skin Condition Pitting;Hemosiderin deposits   Skin Condition Comments Pt with large soft 1+ pitting edema, skin with chronic venus discoloration   Scar No   Ulcerations No    Radial Pulse Symmetrical   Dorsal Pedal Pulse Symmetrical   Stemmer Sign Negative   Pitting Assessment 1+   Cognitive Status Examination   Orientation orientation to person, place and time   Level of Consciousness alert   Visual Perception   Visual Perception Wears glasses;No deficits were identified   Sensory Examination   Sensory Comments WNL   Pain Assessment   Patient Currently in Pain No   Integumentary/Edema   Integumentary/Edema Comments Pt with large 1+ pitting edema   Range of Motion (ROM)   ROM Comment WNL   Strength   Strength Comments WNL   Muscle Tone Assessment   Muscle Tone Comments WNL   Coordination   Upper Extremity Coordination No deficits were identified   Transfer Skill: Sit to Stand   Level of [place]: Sit/Stand stand-by assist   Upper Body Dressing   Level of [place]: Dress Upper Body stand-by assist   Lower Body Dressing   Level of [place]: Dress Lower Body stand-by assist   Grooming   Level of [place]: Grooming independent   Instrumental Activities of Daily Living (IADL)   Previous Responsibilities (Pt was Ind,  can A)   Activities of Daily Living Analysis   Impairments Contributing to Impaired Activities of Daily Living (deconditioning, edema)   General Therapy Interventions   Planned Therapy Interventions ADL retraining;home program guidelines;progressive activity/exercise   Edema: Planned Interventions Gradient compression bandaging;Fit for compression garment;Edema exercises;Precautions to prevent infection/exacerbation;Education   Clinical Impression   Criteria for Skilled Therapeutic Interventions Met yes, treatment indicated   OT Diagnosis decreased ADL I and tolerance   Edema: Patient Presentation Edema   Influenced by the following impairments edema, deconditioning   Assessment of Occupational Performance 1-3 Performance Deficits   Identified Performance Deficits home mgmt, leisure   Clinical Decision Making (Complexity) Low complexity   Therapy  "Frequency (5xweek BID)   Predicted Duration of Therapy Intervention (days/wks) 4/10/19   Anticipated Equipment Needs at Discharge (none)   Anticipated Discharge Disposition Home with Assist  (A from family prn)   Risks and Benefits of Treatment have been explained. Yes   Patient, Family & other staff in agreement with plan of care Yes   Clinical Impression Comments Pt may benefit from skilled OT to help increase IADL I and tolerance   [facility] AM-[facility] TM \"6 Clicks\"   2016, Trustees of [facility], under license to [employer].  All rights reserved.   6 Clicks Short Forms Daily Activity Inpatient Short Form   [facility] AM-[facility]  \"6 Clicks\" Daily Activity Inpatient Short Form   1. Putting on and taking off regular lower body clothing? 4 - None   2. Bathing (including washing, rinsing, drying)? 4 - None   3. Toileting, which includes using toilet, bedpan or urinal? 4 - None   4. Putting on and taking off regular upper body clothing? 4 - None   5. Taking care of personal grooming such as brushing teeth? 4 - None   6. Eating meals? 4 - None   Daily Activity Raw Score (Score out of 24.Lower scores equate to lower levels of function) 24   Total Evaluation Time   Total Evaluation Time (Minutes) 10     "

## 2019-04-04 NOTE — PROGRESS NOTES
[facility]   Cardiology Progress      Interval History: Denies fever, chills, nausea, vomiting, CP, SOB, cough, abdominal pain. Had some abdominal cramping overnight. Feels that her knees are skinnier than yesterday    Daily Changes:  - continue IV Bumex, will start 2mg IV/hr and give additional 4mg IV bolus    Physical Exam:  Temp:  [96.6  F (35.9  C)-98  F (36.7  C)] 97.6  F (36.4  C)  Pulse:  [64-66] 66  Heart Rate:  [57-64] 60  Resp:  [16-18] 16  BP: (109-130)/(60-76) 130/65  SpO2:  [96 %-99 %] 97 %    GEN: NAD  Pulm: inspiratory bibasilar rales  Cardiac: Normal S1 and S2. JVP not elevated, no murmurs.  2+ edema to thighs.  GI: soft, non distended  Neuro:  Alert and oriented x4.    Medications:    heparin  5,000 Units Subcutaneous Q12H     [START ON 4/5/2019] potassium chloride ER  40 mEq Oral Daily     senna-docusate  1 tablet Oral BID    Or     senna-docusate  2 tablet Oral BID     sodium chloride (PF)  3 mL Intracatheter Q8H       bumetanide 2 mg/hr (04/04/19 1132)     - MEDICATION INSTRUCTIONS -         Labs:   CMP  Recent Labs   Lab 04/04/19  0540 04/03/19  1701 04/03/19  0526 04/02/19  2047   * 130* 132* 130*   POTASSIUM 3.7 4.6 3.8 4.4   CHLORIDE 94 94 95 94   CO2 28 30 26 26   ANIONGAP 9 7 11 10   GLC 95 102* 95 98   BUN 39* 43* 40* 42*   CR 1.32* 1.42* 1.34* 1.40*   GFRESTIMATED 42* 38* 41* 39*   GFRESTBLACK 48* 44* 48* 45*   JERROD 8.7 8.8 8.4* 8.7   MAG 2.9* 2.8* 2.8*  --    PROTTOTAL  --   --   --  6.9   ALBUMIN  --   --   --  4.0   BILITOTAL  --   --   --  3.1*   ALKPHOS  --   --   --  290*   AST  --   --   --  26   ALT  --   --   --  28     CBC  Recent Labs   Lab 04/04/19  0540 04/03/19  0526 04/02/19 2047   WBC 4.5 4.7 5.7   RBC 4.73 4.31 4.73   HGB 15.1 14.2 15.2   HCT 46.9 42.4 46.9   MCV 99 98 99   MCH 31.9 32.9 32.1   MCHC 32.2 33.5 32.4   RDW 14.3 14.1 14.0    167 182     Diagnostics:  Echo 04/04/2019:  Global and regional left ventricular function  is normal with an EF of 60-65%.  Global right ventricular function is normal.  Moderate aortic valve insufficiency.  Mild pulmonary hypertension with dilated IVC.      Assessment & Plan   Leandra Guerrero is a 68 year old with a PMH significant for stage IV AL amyloid involving GI, bone marrow, and heart resulting in HFpEF who presents with decompensated heart failure.     #Acute on chronic HFpEF exacerbation in the setting of cardiac amyloidosis  Diagnosed 4/2016. Last TTE (10/2018) showed LVEF of 55-60% with moderate concentric wall thickening and grade III diastolic dysfunction. Weight gain of approximately 15 lbs over the past 2 months with worsening PELAEZ and lower extremity edema despite compliance with home torsemide 80/60, 60/60 mg and spironolactone 25 mg daily. Patient estimates dry weight to be approximately 140 lbs, 157 lb on admission. Referred for diuresis by patient's primary cardiologist, Dr. Cohen. ECHo showed EF of 60-65% with moderate LVH. Mild pulmonary HTN with dilated IVC. Made minimal progress with 1mg/hr IV Bumex yesterday  -Bumex gtt 2mg/hr + 4 mg IV x1 w/ late afternoon electrolytes  -Hold PTA torsemide, PO Bumex, and spironolactone  -Strict I/O  -Daily standing weights     #Stage IV AL amyloid  Diagnosed in 2016, involved the bone marrow, GI tract, and heart. S/p CyBorD chemotherapy w/ improvement in free light chain ratio. Not a transplant candidate due to cardiac involvement. Ms. Guerrero had been on doxycycline until 6-9 months ago when it was discontinued.     #Chronic right sided pleural effusion  Patient with history of right sided pleural effusion. CXR shows small pleural effusion with overlying atelectasis/consolidation on admission, appears similar in size to CXR in June.  -Bumex gtt as above     #Chronic nausea  -Continue pta ativan 0.5 mg PRN  -Continue pta zofran 4 mg PRN         Anticipated Disposition: unclear. Like 3-5 days.    Patient seen and discussed with Dr. Merchant  Brett, who agrees with above plan.    Queta Wang PA-C  Cardiology - [facility]

## 2019-04-04 NOTE — PLAN OF CARE
Discharge Planner OT   Patient plan for discharge: home  Current status: Pt was able to demonstrate mobility and simple ADLs with SBA or better, VSS while on RA. Pt with large, soft 1+ pitting edema in feet, ankles and legs. Skin w/o ulcerations, but chronic skin color changes- venus. After skin cares GCBs placed from MTPs to knee creases. Please remove garment if it gets soiled or if pt c/o LE pain or numbness. Will return 4/5 for skin check.   Barriers to return to prior living situation: deconditioning  Recommendations for discharge: home with A from family prn  Rationale for recommendations: Anticipate pt will be able to complete ADLs safely with A from family prn at time of discharge.        Entered by: Neal Brown 04/04/2019 4:12 PM

## 2019-04-04 NOTE — PLAN OF CARE
PT - 6C - PT orders received and acknowledged. Per discussion with OT pt is up and ambulating IND and has no acute PT needs. Pt will only require 1 discipline at this time. OT will continue to follow, PT to complete orders.

## 2019-04-04 NOTE — PLAN OF CARE
Pt here for diuresis, bumex drip continues at 1mg/hr.  Pt is independent with all  mobility.  Bilateral lower extremity edema is +2.

## 2019-04-04 NOTE — PLAN OF CARE
D:   Acute on chronic HFpEF exacerbation in the setting of cardiac amyloidosis  I: Monitored vitals and assessed pt status.   Running: Bumex drip at 1 mg/hr.    A: A0x4. VSS, on room air.  NSR. Afebrile. Continues on bumex drip.Pt with 1100 urine out overnight with new leg cramping. MD texted regarding this. No new orders. Continue with plan of care. Notify MDs with concerns.    I/O this shift:  In: 212.07 [P.O.:200; I.V.:12.07]  Out: 1100 [Urine:1100]    Temp:  [96.6  F (35.9  C)-98  F (36.7  C)] 97.6  F (36.4  C)  Pulse:  [64-66] 66  Heart Rate:  [59-64] 60  Resp:  [16-18] 16  BP: (113-127)/(60-76) 125/76  SpO2:  [93 %-99 %] 99 %      P: Continue to monitor Pt status and report changes to treatment team.

## 2019-04-05 ENCOUNTER — APPOINTMENT (OUTPATIENT)
Dept: OCCUPATIONAL THERAPY | Facility: CLINIC | Age: 69
DRG: 292 | End: 2019-04-05
Attending: INTERNAL MEDICINE
Payer: COMMERCIAL

## 2019-04-05 ENCOUNTER — APPOINTMENT (OUTPATIENT)
Dept: ULTRASOUND IMAGING | Facility: CLINIC | Age: 69
DRG: 292 | End: 2019-04-05
Attending: INTERNAL MEDICINE
Payer: COMMERCIAL

## 2019-04-05 LAB
ANION GAP SERPL CALCULATED.3IONS-SCNC: 10 MMOL/L (ref 3–14)
ANION GAP SERPL CALCULATED.3IONS-SCNC: 10 MMOL/L (ref 3–14)
BUN SERPL-MCNC: 39 MG/DL (ref 7–30)
BUN SERPL-MCNC: 43 MG/DL (ref 7–30)
CALCIUM SERPL-MCNC: 8.7 MG/DL (ref 8.5–10.1)
CALCIUM SERPL-MCNC: 9.2 MG/DL (ref 8.5–10.1)
CHLORIDE SERPL-SCNC: 94 MMOL/L (ref 94–109)
CHLORIDE SERPL-SCNC: 95 MMOL/L (ref 94–109)
CO2 SERPL-SCNC: 25 MMOL/L (ref 20–32)
CO2 SERPL-SCNC: 29 MMOL/L (ref 20–32)
CREAT SERPL-MCNC: 1.27 MG/DL (ref 0.52–1.04)
CREAT SERPL-MCNC: 1.28 MG/DL (ref 0.52–1.04)
ERYTHROCYTE [DISTWIDTH] IN BLOOD BY AUTOMATED COUNT: 14 % (ref 10–15)
GFR SERPL CREATININE-BSD FRML MDRD: 43 ML/MIN/{1.73_M2}
GFR SERPL CREATININE-BSD FRML MDRD: 44 ML/MIN/{1.73_M2}
GLUCOSE SERPL-MCNC: 103 MG/DL (ref 70–99)
GLUCOSE SERPL-MCNC: 108 MG/DL (ref 70–99)
HCT VFR BLD AUTO: 46.7 % (ref 35–47)
HGB BLD-MCNC: 15.2 G/DL (ref 11.7–15.7)
MAGNESIUM SERPL-MCNC: 2.8 MG/DL (ref 1.6–2.3)
MAGNESIUM SERPL-MCNC: 2.8 MG/DL (ref 1.6–2.3)
MCH RBC QN AUTO: 32.3 PG (ref 26.5–33)
MCHC RBC AUTO-ENTMCNC: 32.5 G/DL (ref 31.5–36.5)
MCV RBC AUTO: 99 FL (ref 78–100)
PLATELET # BLD AUTO: 200 10E9/L (ref 150–450)
POTASSIUM SERPL-SCNC: 3.6 MMOL/L (ref 3.4–5.3)
POTASSIUM SERPL-SCNC: 4.1 MMOL/L (ref 3.4–5.3)
RBC # BLD AUTO: 4.7 10E12/L (ref 3.8–5.2)
SODIUM SERPL-SCNC: 130 MMOL/L (ref 133–144)
SODIUM SERPL-SCNC: 133 MMOL/L (ref 133–144)
WBC # BLD AUTO: 4.7 10E9/L (ref 4–11)

## 2019-04-05 PROCEDURE — 25000125 ZZHC RX 250: Performed by: PHYSICIAN ASSISTANT

## 2019-04-05 PROCEDURE — 25000132 ZZH RX MED GY IP 250 OP 250 PS 637: Performed by: STUDENT IN AN ORGANIZED HEALTH CARE EDUCATION/TRAINING PROGRAM

## 2019-04-05 PROCEDURE — 36415 COLL VENOUS BLD VENIPUNCTURE: CPT | Performed by: STUDENT IN AN ORGANIZED HEALTH CARE EDUCATION/TRAINING PROGRAM

## 2019-04-05 PROCEDURE — 85049 AUTOMATED PLATELET COUNT: CPT | Performed by: INTERNAL MEDICINE

## 2019-04-05 PROCEDURE — 83735 ASSAY OF MAGNESIUM: CPT | Performed by: INTERNAL MEDICINE

## 2019-04-05 PROCEDURE — 25000128 H RX IP 250 OP 636: Performed by: INTERNAL MEDICINE

## 2019-04-05 PROCEDURE — 36415 COLL VENOUS BLD VENIPUNCTURE: CPT | Performed by: INTERNAL MEDICINE

## 2019-04-05 PROCEDURE — 25000132 ZZH RX MED GY IP 250 OP 250 PS 637: Performed by: PHYSICIAN ASSISTANT

## 2019-04-05 PROCEDURE — 21400000 ZZH R&B CCU UMMC

## 2019-04-05 PROCEDURE — 80048 BASIC METABOLIC PNL TOTAL CA: CPT | Performed by: STUDENT IN AN ORGANIZED HEALTH CARE EDUCATION/TRAINING PROGRAM

## 2019-04-05 PROCEDURE — 97140 MANUAL THERAPY 1/> REGIONS: CPT | Mod: GO | Performed by: OCCUPATIONAL THERAPIST

## 2019-04-05 PROCEDURE — 83735 ASSAY OF MAGNESIUM: CPT | Performed by: STUDENT IN AN ORGANIZED HEALTH CARE EDUCATION/TRAINING PROGRAM

## 2019-04-05 PROCEDURE — 99233 SBSQ HOSP IP/OBS HIGH 50: CPT | Performed by: INTERNAL MEDICINE

## 2019-04-05 PROCEDURE — 25000132 ZZH RX MED GY IP 250 OP 250 PS 637: Performed by: INTERNAL MEDICINE

## 2019-04-05 PROCEDURE — 80048 BASIC METABOLIC PNL TOTAL CA: CPT | Performed by: INTERNAL MEDICINE

## 2019-04-05 PROCEDURE — 85027 COMPLETE CBC AUTOMATED: CPT | Performed by: STUDENT IN AN ORGANIZED HEALTH CARE EDUCATION/TRAINING PROGRAM

## 2019-04-05 PROCEDURE — 93970 EXTREMITY STUDY: CPT

## 2019-04-05 RX ORDER — SPIRONOLACTONE 25 MG/1
25 TABLET ORAL 2 TIMES DAILY
Status: DISCONTINUED | OUTPATIENT
Start: 2019-04-05 | End: 2019-04-09 | Stop reason: HOSPADM

## 2019-04-05 RX ORDER — POTASSIUM CHLORIDE 750 MG/1
40 TABLET, EXTENDED RELEASE ORAL 2 TIMES DAILY
Status: DISCONTINUED | OUTPATIENT
Start: 2019-04-05 | End: 2019-04-05

## 2019-04-05 RX ORDER — METOLAZONE 2.5 MG/1
2.5 TABLET ORAL ONCE
Status: COMPLETED | OUTPATIENT
Start: 2019-04-05 | End: 2019-04-05

## 2019-04-05 RX ORDER — POTASSIUM CHLORIDE 750 MG/1
30 TABLET, EXTENDED RELEASE ORAL 2 TIMES DAILY
Status: DISCONTINUED | OUTPATIENT
Start: 2019-04-06 | End: 2019-04-06

## 2019-04-05 RX ADMIN — SPIRONOLACTONE 25 MG: 25 TABLET ORAL at 20:58

## 2019-04-05 RX ADMIN — HEPARIN SODIUM 5000 UNITS: 5000 INJECTION, SOLUTION INTRAVENOUS; SUBCUTANEOUS at 08:30

## 2019-04-05 RX ADMIN — SENNOSIDES AND DOCUSATE SODIUM 1 TABLET: 8.6; 5 TABLET ORAL at 20:58

## 2019-04-05 RX ADMIN — SPIRONOLACTONE 25 MG: 25 TABLET ORAL at 11:15

## 2019-04-05 RX ADMIN — POTASSIUM CHLORIDE 20 MEQ: 750 TABLET, EXTENDED RELEASE ORAL at 08:40

## 2019-04-05 RX ADMIN — POTASSIUM CHLORIDE 40 MEQ: 750 TABLET, EXTENDED RELEASE ORAL at 08:30

## 2019-04-05 RX ADMIN — METOLAZONE 2.5 MG: 2.5 TABLET ORAL at 11:42

## 2019-04-05 RX ADMIN — HEPARIN SODIUM 5000 UNITS: 5000 INJECTION, SOLUTION INTRAVENOUS; SUBCUTANEOUS at 20:58

## 2019-04-05 RX ADMIN — SENNOSIDES AND DOCUSATE SODIUM 2 TABLET: 8.6; 5 TABLET ORAL at 08:32

## 2019-04-05 RX ADMIN — LORAZEPAM 0.5 MG: 0.5 TABLET ORAL at 18:54

## 2019-04-05 RX ADMIN — Medication 2 MG/HR: at 21:03

## 2019-04-05 ASSESSMENT — ACTIVITIES OF DAILY LIVING (ADL)
ADLS_ACUITY_SCORE: 11

## 2019-04-05 NOTE — PLAN OF CARE
Edema 6C: Recommend continued use of compression wraps to B LEs for further management of chronic edema and protection of skin integrity. Reapplication of GCB from MTPs to knee creases. Remove wraps if causing pain/numbness or become soiled.

## 2019-04-05 NOTE — PLAN OF CARE
Pt is a/o x 4 overnight. Appeared to sleep at start of shift but states most of the night she did not sleep well. Tele SR. Bumex gtt continues at 2mg/hr. Had tylenol for HA. LE wraps on. PT c/o leg cramps this am. No electrolytes ordered with am labs. MD notified and labs added. Gets up to commode independently. Good urine output. Will continue to monitor.

## 2019-04-05 NOTE — PROGRESS NOTES
Neuro: A&Ox4.   Cardiac: SB upper 50's-SR 60's. VSS. Bumex infusing 2 mg/hour, started Spironolactone 25 mg twice a day, received one-time dose Metolazone 2.5 mg po. K=3.6, received Kdur 40 meq scheduled and 20 meq po extra, recheck BMP tonight.Mag 2.8, Cr 1.27 trending down.   Respiratory: Sating WDL on RA.  GI/: Good urine output. BM X1, small formed, received Senokot.  Diet/appetite:  Eating well.  Activity:  Up w/ SBA, up to BSC/chair, up to BR, garrett once.  Pain: c/o bilat lower leg discomfort/cramping, resolved with AquaK pad on medium setting under calves. Lymph wraps done by PT. Jaison LE US done, neg.for DVT.  Skin: No new deficits noted.  Refer to flow sheets for full assessments, VS, labs etc.  Plan: Continue with POC. Notify primary team with changes.

## 2019-04-05 NOTE — PLAN OF CARE
OT/6C: Cancel. Pt motivated for activity but reporting significant fatigue from not sleeping and earlier US, requesting check back tomorrow. Becoming tearful due to being overwhelmed with diagnosis, therapeutic listening provided. Will reschedule.

## 2019-04-05 NOTE — PROGRESS NOTES
[facility], [place]    Progress Note - Cards 1 Service        Date of Admission:  4/2/2019    Assessment & Plan   Leandra Guerrero is a 68 year old with a PMH significant for stage IV AL amyloid involving GI, bone marrow, and heart resulting in HFpEF who presents with decompensated heart failure.     # Acute on chronic HFpEF exacerbation in the setting of cardiac amyloidosis  Diagnosed 4/2016. Weight gain of approximately 15 lbs over the past 2 months with worsening PELAEZ and lower extremity edema despite compliance with home torsemide 80/60, 60/60 mg and spironolactone 25 mg daily. Patient estimates dry weight to be approximately 140 lbs, 157 lb on admission. Referred for diuresis by patient's primary cardiologist, Dr. Cohen. ECHo showed EF of 60-65% with moderate LVH. Mild pulmonary HTN with dilated IVC.  - continue Bumex gtt 2mg/hr + metolazone 2.5 mg once  - restart PTA spironolactone 25 mg twice daily  - Hold PTA torsemide  - Strict I/O  - Daily standing weights  - L>R calf pain -> US + schedule potassium     # Stage IV AL amyloid  Diagnosed in 2016, involved the bone marrow, GI tract, and heart. S/p CyBorD chemotherapy w/ improvement in free light chain ratio. Not a transplant candidate due to cardiac involvement. Ms. Guerrero had been on doxycycline until 6-9 months ago when it was discontinued.     # Chronic right sided pleural effusion  Patient with history of right sided pleural effusion. CXR shows small pleural effusion with overlying atelectasis/consolidation on admission, appears similar in size to CXR in June.  -Bumex gtt as above     # Chronic nausea  -Continue pta ativan 0.5 mg PRN  -Continue pta zofran 4 mg PRN         Diet: Low Saturated Fat Na <2400 mg    Fluids: None  Lines: PIV  DVT Prophylaxis: Heparin SQ  Marie Catheter: not present  Code Status: Full Code      Disposition Plan   Expected discharge: > 7 days, recommended to prior living arrangement once IV  diuresis complete.  Entered: Chip Stephens MD 04/05/2019, 1:00 PM       The patient's care was discussed with the Attending Physician, Dr. West, Bedside Nurse and Patient.    Chip Stephens MD  [facility] 1 Service  [facility], [place]  Pager: [phone]  Please see sticky note for cross cover information  ______________________________________________________________________    Interval History   No acute events overnight. Has lower extremity pain at baseline, overnight worsened left calf. Otherwise breathing is stable, no chest pain, nausea, vomiting.    Data reviewed today: I reviewed all medications, new labs and imaging results over the last 24 hours.    Physical Exam   Vital Signs: Temp: 97.7  F (36.5  C) Temp src: Oral BP: 123/57 Pulse: 70 Heart Rate: 57 Resp: 16 SpO2: 93 % O2 Device: None (Room air)    Weight: 151 lbs 4.8 oz  Gen: nonlabored, laying in bed  Pulm: CTAB, nonlabored  CV: RRR, no extra sounds, +JVD  Abd: non-distended, non-tender  Ext: 2+ PHOEBE improved form days prior, mild left calf pain with deep palpation    Data   Reviewed

## 2019-04-06 ENCOUNTER — APPOINTMENT (OUTPATIENT)
Dept: OCCUPATIONAL THERAPY | Facility: CLINIC | Age: 69
DRG: 292 | End: 2019-04-06
Attending: INTERNAL MEDICINE
Payer: COMMERCIAL

## 2019-04-06 LAB
ANION GAP SERPL CALCULATED.3IONS-SCNC: 10 MMOL/L (ref 3–14)
ANION GAP SERPL CALCULATED.3IONS-SCNC: 10 MMOL/L (ref 3–14)
BUN SERPL-MCNC: 48 MG/DL (ref 7–30)
BUN SERPL-MCNC: 49 MG/DL (ref 7–30)
CALCIUM SERPL-MCNC: 9.6 MG/DL (ref 8.5–10.1)
CALCIUM SERPL-MCNC: 9.7 MG/DL (ref 8.5–10.1)
CHLORIDE SERPL-SCNC: 86 MMOL/L (ref 94–109)
CHLORIDE SERPL-SCNC: 90 MMOL/L (ref 94–109)
CO2 SERPL-SCNC: 29 MMOL/L (ref 20–32)
CO2 SERPL-SCNC: 30 MMOL/L (ref 20–32)
CREAT SERPL-MCNC: 1.35 MG/DL (ref 0.52–1.04)
CREAT SERPL-MCNC: 1.45 MG/DL (ref 0.52–1.04)
ERYTHROCYTE [DISTWIDTH] IN BLOOD BY AUTOMATED COUNT: 13.7 % (ref 10–15)
GFR SERPL CREATININE-BSD FRML MDRD: 37 ML/MIN/{1.73_M2}
GFR SERPL CREATININE-BSD FRML MDRD: 41 ML/MIN/{1.73_M2}
GLUCOSE SERPL-MCNC: 105 MG/DL (ref 70–99)
GLUCOSE SERPL-MCNC: 90 MG/DL (ref 70–99)
HCT VFR BLD AUTO: 50.1 % (ref 35–47)
HGB BLD-MCNC: 16.6 G/DL (ref 11.7–15.7)
MAGNESIUM SERPL-MCNC: 3 MG/DL (ref 1.6–2.3)
MAGNESIUM SERPL-MCNC: 3 MG/DL (ref 1.6–2.3)
MCH RBC QN AUTO: 32.3 PG (ref 26.5–33)
MCHC RBC AUTO-ENTMCNC: 33.1 G/DL (ref 31.5–36.5)
MCV RBC AUTO: 98 FL (ref 78–100)
PLATELET # BLD AUTO: 213 10E9/L (ref 150–450)
POTASSIUM SERPL-SCNC: 3.6 MMOL/L (ref 3.4–5.3)
POTASSIUM SERPL-SCNC: 4.3 MMOL/L (ref 3.4–5.3)
RBC # BLD AUTO: 5.14 10E12/L (ref 3.8–5.2)
SODIUM SERPL-SCNC: 126 MMOL/L (ref 133–144)
SODIUM SERPL-SCNC: 129 MMOL/L (ref 133–144)
WBC # BLD AUTO: 5.6 10E9/L (ref 4–11)

## 2019-04-06 PROCEDURE — 25000132 ZZH RX MED GY IP 250 OP 250 PS 637: Performed by: STUDENT IN AN ORGANIZED HEALTH CARE EDUCATION/TRAINING PROGRAM

## 2019-04-06 PROCEDURE — 93010 ELECTROCARDIOGRAM REPORT: CPT | Performed by: INTERNAL MEDICINE

## 2019-04-06 PROCEDURE — 40000141 ZZH STATISTIC PERIPHERAL IV START W/O US GUIDANCE

## 2019-04-06 PROCEDURE — 80048 BASIC METABOLIC PNL TOTAL CA: CPT | Performed by: STUDENT IN AN ORGANIZED HEALTH CARE EDUCATION/TRAINING PROGRAM

## 2019-04-06 PROCEDURE — 80048 BASIC METABOLIC PNL TOTAL CA: CPT | Performed by: INTERNAL MEDICINE

## 2019-04-06 PROCEDURE — 25800030 ZZH RX IP 258 OP 636: Performed by: INTERNAL MEDICINE

## 2019-04-06 PROCEDURE — 85027 COMPLETE CBC AUTOMATED: CPT | Performed by: STUDENT IN AN ORGANIZED HEALTH CARE EDUCATION/TRAINING PROGRAM

## 2019-04-06 PROCEDURE — 36415 COLL VENOUS BLD VENIPUNCTURE: CPT | Performed by: STUDENT IN AN ORGANIZED HEALTH CARE EDUCATION/TRAINING PROGRAM

## 2019-04-06 PROCEDURE — 21400000 ZZH R&B CCU UMMC

## 2019-04-06 PROCEDURE — 99233 SBSQ HOSP IP/OBS HIGH 50: CPT | Performed by: INTERNAL MEDICINE

## 2019-04-06 PROCEDURE — 25000132 ZZH RX MED GY IP 250 OP 250 PS 637: Performed by: INTERNAL MEDICINE

## 2019-04-06 PROCEDURE — 83735 ASSAY OF MAGNESIUM: CPT | Performed by: STUDENT IN AN ORGANIZED HEALTH CARE EDUCATION/TRAINING PROGRAM

## 2019-04-06 PROCEDURE — 25000131 ZZH RX MED GY IP 250 OP 636 PS 637: Performed by: INTERNAL MEDICINE

## 2019-04-06 PROCEDURE — 83735 ASSAY OF MAGNESIUM: CPT | Performed by: INTERNAL MEDICINE

## 2019-04-06 PROCEDURE — 93005 ELECTROCARDIOGRAM TRACING: CPT

## 2019-04-06 PROCEDURE — 36415 COLL VENOUS BLD VENIPUNCTURE: CPT | Performed by: INTERNAL MEDICINE

## 2019-04-06 PROCEDURE — 97535 SELF CARE MNGMENT TRAINING: CPT | Mod: GO | Performed by: OCCUPATIONAL THERAPIST

## 2019-04-06 PROCEDURE — 25000128 H RX IP 250 OP 636: Performed by: INTERNAL MEDICINE

## 2019-04-06 RX ORDER — FUROSEMIDE 10 MG/ML
80 INJECTION INTRAMUSCULAR; INTRAVENOUS ONCE
Status: COMPLETED | OUTPATIENT
Start: 2019-04-06 | End: 2019-04-06

## 2019-04-06 RX ORDER — OXYCODONE HYDROCHLORIDE 5 MG/1
5 TABLET ORAL ONCE
Status: COMPLETED | OUTPATIENT
Start: 2019-04-06 | End: 2019-04-06

## 2019-04-06 RX ADMIN — SENNOSIDES AND DOCUSATE SODIUM 1 TABLET: 8.6; 5 TABLET ORAL at 09:55

## 2019-04-06 RX ADMIN — POTASSIUM CHLORIDE 20 MEQ: 750 TABLET, EXTENDED RELEASE ORAL at 19:03

## 2019-04-06 RX ADMIN — SENNOSIDES AND DOCUSATE SODIUM 1 TABLET: 8.6; 5 TABLET ORAL at 19:41

## 2019-04-06 RX ADMIN — OXYCODONE HYDROCHLORIDE 5 MG: 5 TABLET ORAL at 09:32

## 2019-04-06 RX ADMIN — FUROSEMIDE 20 MG/HR: 10 INJECTION, SOLUTION INTRAVENOUS at 18:11

## 2019-04-06 RX ADMIN — LORAZEPAM 0.5 MG: 0.5 TABLET ORAL at 05:54

## 2019-04-06 RX ADMIN — SPIRONOLACTONE 25 MG: 25 TABLET ORAL at 09:55

## 2019-04-06 RX ADMIN — FUROSEMIDE 20 MG/HR: 10 INJECTION, SOLUTION INTRAVENOUS at 13:32

## 2019-04-06 RX ADMIN — LORAZEPAM 0.5 MG: 0.5 TABLET ORAL at 12:42

## 2019-04-06 RX ADMIN — FUROSEMIDE 20 MG/HR: 10 INJECTION, SOLUTION INTRAVENOUS at 23:04

## 2019-04-06 RX ADMIN — HEPARIN SODIUM 5000 UNITS: 5000 INJECTION, SOLUTION INTRAVENOUS; SUBCUTANEOUS at 19:41

## 2019-04-06 RX ADMIN — ONDANSETRON 4 MG: 4 TABLET, ORALLY DISINTEGRATING ORAL at 16:34

## 2019-04-06 RX ADMIN — FUROSEMIDE 80 MG: 10 INJECTION, SOLUTION INTRAVENOUS at 12:53

## 2019-04-06 RX ADMIN — SPIRONOLACTONE 25 MG: 25 TABLET ORAL at 19:41

## 2019-04-06 RX ADMIN — HEPARIN SODIUM 5000 UNITS: 5000 INJECTION, SOLUTION INTRAVENOUS; SUBCUTANEOUS at 09:55

## 2019-04-06 ASSESSMENT — ACTIVITIES OF DAILY LIVING (ADL)
ADLS_ACUITY_SCORE: 11

## 2019-04-06 NOTE — PLAN OF CARE
Edema 6C: Patient with poor tolerance for LE compression wraps. Chronic 2+ pitting remains in distal legs, skin intact. Patient fit with size G comperm compression stockings from MTPs to knee creases for further edema management. Compression stockings to be worn 23/24 hours per day and removed daily for skin cares - see patient care order for details.

## 2019-04-06 NOTE — PROGRESS NOTES
[facility] [place]    Progress Note - Cards 1 Service        Date of Admission:  4/2/2019    Assessment & Plan   Leandra Guerrero is a 68 year old with a PMH significant for stage IV AL amyloid involving GI, bone marrow, and heart resulting in HFpEF who presents with decompensated heart failure.     # Acute on chronic HFpEF exacerbation in the setting of cardiac amyloidosis  Diagnosed 4/2016. Weight gain of approximately 15 lbs over the past 2 months with worsening PELAEZ and lower extremity edema despite compliance with home torsemide 80/60, 60/60 mg and spironolactone 25 mg daily. Patient estimates dry weight to be approximately 140 lbs, 157 lb on admission.  ECHO showed EF of 60-65% with moderate LVH, mild pulmonary HTN, dilated IVC.  - hold Bumex gtt 2mg/hr with body aches, restart furosemide 20 mg/hr w/ 80 IV furosemide x1  - continue PTA spironolactone 25 mg twice daily  - Hold PTA torsemide  - Strict I/O  - Daily standing weights    # Hyponatremia  Likely 2/2 metolazone yesterday. As above, will offer diuretic holiday for the morning.      # Stage IV AL amyloid  Diagnosed in 2016, involved the bone marrow, GI tract, and heart. S/p CyBorD chemotherapy w/ improvement in free light chain ratio. Not a transplant candidate due to cardiac involvement. Ms. Guerrero had been on doxycycline until 6-9 months ago when it was discontinued.     # Chronic right sided pleural effusion  Patient with history of right sided pleural effusion. CXR shows small pleural effusion with overlying atelectasis/consolidation on admission, appears similar in size to CXR in June.  - Diuretics as above     # Chronic nausea  -Continue pta ativan 0.5 mg PRN  -Hold pta zofran 4 mg PRN (w/  ms)       Diet: Low Saturated Fat Na <2400 mg    Fluids: None  Lines: PIV  DVT Prophylaxis: Heparin SQ  Marie Catheter: not present  Code Status: Full Code      Disposition Plan   Expected discharge: > 7 days,  "recommended to prior living arrangement once IV diuresis complete.  Entered: Chip Stephens MD 04/06/2019, 9:37 AM       The patient's care was discussed with the Attending Physician, Dr. West, Bedside Nurse and Patient.    Chip Stephens MD  [facility] 1 Service  [facility], [place]  Pager: [phone]  Please see sticky note for cross cover information  ______________________________________________________________________    Interval History   Overnight worsening muscle aches -- \"I feel like I did I just worked out for an hour\". Altogether she thinks that the lower extremity swelling is improving as is her breathing. No fevers, chills, nausea, vomiting, diarrhea.    Data reviewed today: I reviewed all medications, new labs and imaging results over the last 24 hours.    Physical Exam   Vital Signs: Temp: 97.4  F (36.3  C) Temp src: Oral BP: 118/60   Heart Rate: 64 Resp: 16 SpO2: 95 % O2 Device: None (Room air)    Weight: 147 lbs 8 oz  Gen: nonlabored, laying in bed  Pulm: CTAB, nonlabored  CV: RRR, no extra sounds, +JVD  Abd: non-distended, non-tender  Ext: 1+ PHOEBE improved form days prior, bilateral calfs tender with deep palpation    Data   Reviewed  "

## 2019-04-06 NOTE — PLAN OF CARE
D:Intake/Output Summary (Last 24 hours) at 4/5/2019 2111  Last data filed at 4/5/2019 2100  Gross per 24 hour   Intake 1643.4 ml   Output 5950 ml   Net -4306.6 ml   A:Bumex infusing at piv site, patent  P:continue to monitor output

## 2019-04-06 NOTE — PLAN OF CARE
D: Admitted 4/2 for heart failure exacerbation. Hx includes Stage IV amyloid involving GI, bone marrow, and heart resulting in HFpEF.     I: Bumex gtt running at 2mg/hr. PRN lorazepam given x1 for nausea.    A: AOx4. Afebrile. VSS on RA. BPs WNL. Denies pain overnight. Complains of some cramping in BLEs which is relieved by aqua-k heating pad. Denies SOB. Complains of intermittent nausea. On Low saturated fat, 2400 mg Na diet. Voided 1000mL overnight, BM+. Ruddiness and +3 edema noted on BLEs. Up with stand-by assist.     P: Continue to monitor and update cards 2 with changes/concerns.

## 2019-04-06 NOTE — PLAN OF CARE
"Pt with worsening generalized pain and cramping this morning, worsening by 0900.  Pt reports \"I ache all over, my face is tender, my stomach feels like I did 100 sit ups, by legs and feet are cramping terribly.\" She also reports feeling more lightheaded. Na of 129 today. Cards notified, oxycodone 5mg PO given with some relief.  Bumex gtt stopped.  Pt started on lasix gtt at 20mg/hr following IV bolus of 80mg.  Pt continues to c/o nausea mid day that is unresolved by lorazepam; zofran not given r/t prolonged QTc on 12 lead.  She was noted to be in sinus rhythm with bigemic PACs; MDs aware, just monitor, no interventions needed. She was able to eat some bites of jello and soup.  More tired/lethargic this afternoon, arousable, oriented.  Monitoring resp status, continue on room air.   at bedside.  Legs continue to be very edematous, she was unable to tolerate edema stockings placed by lymphedema, \"I'm so itchy everywhere\".     Assess for changes, control pain, edema.  Per MDs, will recheck electrolytes this evening to monitor Na/K/Mg.  Continue plan of care.     "

## 2019-04-07 ENCOUNTER — APPOINTMENT (OUTPATIENT)
Dept: OCCUPATIONAL THERAPY | Facility: CLINIC | Age: 69
DRG: 292 | End: 2019-04-07
Attending: INTERNAL MEDICINE
Payer: COMMERCIAL

## 2019-04-07 LAB
ANION GAP SERPL CALCULATED.3IONS-SCNC: 10 MMOL/L (ref 3–14)
ANION GAP SERPL CALCULATED.3IONS-SCNC: 10 MMOL/L (ref 3–14)
BUN SERPL-MCNC: 52 MG/DL (ref 7–30)
BUN SERPL-MCNC: 59 MG/DL (ref 7–30)
CALCIUM SERPL-MCNC: 9 MG/DL (ref 8.5–10.1)
CALCIUM SERPL-MCNC: 9.9 MG/DL (ref 8.5–10.1)
CHLORIDE SERPL-SCNC: 87 MMOL/L (ref 94–109)
CHLORIDE SERPL-SCNC: 87 MMOL/L (ref 94–109)
CO2 SERPL-SCNC: 28 MMOL/L (ref 20–32)
CO2 SERPL-SCNC: 29 MMOL/L (ref 20–32)
CREAT SERPL-MCNC: 1.65 MG/DL (ref 0.52–1.04)
CREAT SERPL-MCNC: 1.73 MG/DL (ref 0.52–1.04)
GFR SERPL CREATININE-BSD FRML MDRD: 30 ML/MIN/{1.73_M2}
GFR SERPL CREATININE-BSD FRML MDRD: 32 ML/MIN/{1.73_M2}
GLUCOSE SERPL-MCNC: 104 MG/DL (ref 70–99)
GLUCOSE SERPL-MCNC: 125 MG/DL (ref 70–99)
MAGNESIUM SERPL-MCNC: 2.8 MG/DL (ref 1.6–2.3)
MAGNESIUM SERPL-MCNC: 3.1 MG/DL (ref 1.6–2.3)
POTASSIUM SERPL-SCNC: 3.8 MMOL/L (ref 3.4–5.3)
POTASSIUM SERPL-SCNC: 4.6 MMOL/L (ref 3.4–5.3)
SODIUM SERPL-SCNC: 125 MMOL/L (ref 133–144)
SODIUM SERPL-SCNC: 126 MMOL/L (ref 133–144)

## 2019-04-07 PROCEDURE — 25800030 ZZH RX IP 258 OP 636: Performed by: INTERNAL MEDICINE

## 2019-04-07 PROCEDURE — 25000131 ZZH RX MED GY IP 250 OP 636 PS 637: Performed by: INTERNAL MEDICINE

## 2019-04-07 PROCEDURE — 97530 THERAPEUTIC ACTIVITIES: CPT | Mod: GO | Performed by: OCCUPATIONAL THERAPIST

## 2019-04-07 PROCEDURE — 25000128 H RX IP 250 OP 636: Performed by: INTERNAL MEDICINE

## 2019-04-07 PROCEDURE — 25000132 ZZH RX MED GY IP 250 OP 250 PS 637: Performed by: STUDENT IN AN ORGANIZED HEALTH CARE EDUCATION/TRAINING PROGRAM

## 2019-04-07 PROCEDURE — 36415 COLL VENOUS BLD VENIPUNCTURE: CPT | Performed by: INTERNAL MEDICINE

## 2019-04-07 PROCEDURE — 80048 BASIC METABOLIC PNL TOTAL CA: CPT | Performed by: INTERNAL MEDICINE

## 2019-04-07 PROCEDURE — 97535 SELF CARE MNGMENT TRAINING: CPT | Mod: GO | Performed by: OCCUPATIONAL THERAPIST

## 2019-04-07 PROCEDURE — 97110 THERAPEUTIC EXERCISES: CPT | Mod: GO | Performed by: OCCUPATIONAL THERAPIST

## 2019-04-07 PROCEDURE — 99232 SBSQ HOSP IP/OBS MODERATE 35: CPT | Performed by: INTERNAL MEDICINE

## 2019-04-07 PROCEDURE — 21400000 ZZH R&B CCU UMMC

## 2019-04-07 PROCEDURE — 83735 ASSAY OF MAGNESIUM: CPT | Performed by: INTERNAL MEDICINE

## 2019-04-07 PROCEDURE — 25000132 ZZH RX MED GY IP 250 OP 250 PS 637: Performed by: INTERNAL MEDICINE

## 2019-04-07 RX ORDER — SENNOSIDES 8.6 MG
2 TABLET ORAL DAILY
Status: DISCONTINUED | OUTPATIENT
Start: 2019-04-07 | End: 2019-04-08

## 2019-04-07 RX ORDER — SENNOSIDES 8.6 MG
1 TABLET ORAL 2 TIMES DAILY
Status: DISCONTINUED | OUTPATIENT
Start: 2019-04-07 | End: 2019-04-07

## 2019-04-07 RX ADMIN — SPIRONOLACTONE 25 MG: 25 TABLET ORAL at 08:40

## 2019-04-07 RX ADMIN — POTASSIUM CHLORIDE 20 MEQ: 750 TABLET, EXTENDED RELEASE ORAL at 18:46

## 2019-04-07 RX ADMIN — FUROSEMIDE 20 MG/HR: 10 INJECTION, SOLUTION INTRAVENOUS at 09:12

## 2019-04-07 RX ADMIN — ACETAMINOPHEN 650 MG: 325 TABLET, FILM COATED ORAL at 08:40

## 2019-04-07 RX ADMIN — ONDANSETRON 4 MG: 4 TABLET, ORALLY DISINTEGRATING ORAL at 00:16

## 2019-04-07 RX ADMIN — SENNOSIDES AND DOCUSATE SODIUM 2 TABLET: 8.6; 5 TABLET ORAL at 08:40

## 2019-04-07 RX ADMIN — SPIRONOLACTONE 25 MG: 25 TABLET ORAL at 20:16

## 2019-04-07 RX ADMIN — HEPARIN SODIUM 5000 UNITS: 5000 INJECTION, SOLUTION INTRAVENOUS; SUBCUTANEOUS at 20:14

## 2019-04-07 RX ADMIN — FUROSEMIDE 20 MG/HR: 10 INJECTION, SOLUTION INTRAVENOUS at 03:30

## 2019-04-07 RX ADMIN — HEPARIN SODIUM 5000 UNITS: 5000 INJECTION, SOLUTION INTRAVENOUS; SUBCUTANEOUS at 08:36

## 2019-04-07 RX ADMIN — LORAZEPAM 0.5 MG: 0.5 TABLET ORAL at 18:42

## 2019-04-07 RX ADMIN — SENNOSIDES AND DOCUSATE SODIUM 2 TABLET: 8.6; 5 TABLET ORAL at 20:14

## 2019-04-07 ASSESSMENT — ACTIVITIES OF DAILY LIVING (ADL)
ADLS_ACUITY_SCORE: 11

## 2019-04-07 NOTE — PROGRESS NOTES
[facility], [place]    Progress Note - Cards 1 Service        Date of Admission:  4/2/2019    Assessment & Plan   Leandra Guerrero is a 68 year old with a PMH significant for stage IV AL amyloid involving GI, bone marrow, and heart resulting in HFpEF who presents with decompensated heart failure.     # Acute on chronic HFpEF in the setting of cardiac amyloidosis  Diagnosed 4/2016. Weight gain of approximately 15 lbs over the past 2 months with worsening PELAEZ and lower extremity edema despite compliance with home torsemide 80/60, 60/60 mg and spironolactone 25 mg daily. Patient estimates dry weight to be approximately 140 lbs, 157 lb on admission.  ECHO showed EF of 60-65% with moderate LVH, mild pulmonary HTN, dilated IVC.  - hold furosemide 20 mg/hr with Cr bump, worsening hyponatremia, and cramps  - continue PTA spironolactone 25 mg twice daily  - Hold PTA torsemide  - Strict I/O  - Daily standing weights    # Hyponatremia  Likely 2/2 metolazone two days ago and ongoing diuresis.  - hold diuretics as above     # Stage IV AL amyloid  Diagnosed in 2016, involved the bone marrow, GI tract, and heart. S/p CyBorD chemotherapy w/ improvement in free light chain ratio. Not a transplant candidate due to cardiac involvement. Ms. Guerrero had been on doxycycline until 6-9 months ago when it was discontinued.     # Chronic right sided pleural effusion  Patient with history of right sided pleural effusion. CXR shows small pleural effusion with overlying atelectasis/consolidation on admission, appears similar in size to CXR in June.  - Diuretics as above     # Chronic nausea  -Continue pta ativan 0.5 mg PRN  -Hold pta zofran 4 mg PRN (w/  ms)       Diet: Low Saturated Fat Na <2400 mg    Fluids: None  Lines: PIV  DVT Prophylaxis: Heparin SQ  Marie Catheter: not present  Code Status: Full Code      Disposition Plan   Expected discharge: 4 - 7 days, recommended to prior living arrangement  once IV diuresis complete.  Entered: Chip Stephens MD 04/07/2019, 8:04 AM       The patient's care was discussed with the Attending Physician, Dr. West, Bedside Nurse and Patient.    Chip Stephens MD  [place] [id number] Service  [facility], [place]  Pager: [phone]  Please see sticky note for cross cover information  ______________________________________________________________________    Interval History   Overnight nausea and muscle aches resolved, though now has jaw cramping. Also  complains of constipation. Lower extremity edema is improving. No chest pain, vomiting, abdominal pain.     Data reviewed today: I reviewed all medications, new labs and imaging results over the last 24 hours.    Physical Exam   Vital Signs: Temp: 97.4  F (36.3  C) Temp src: Oral BP: 108/50   Heart Rate: 61 Resp: 20 SpO2: 94 % O2 Device: None (Room air)    Weight: 146 lbs 1.6 oz  Gen: nonlabored, laying in bed  Pulm: CTAB, nonlabored  CV: RRR, no extra sounds, +JVD  Abd: non-distended, non-tender  Ext: 1+ PHOEBE improved form days prior    Data   Reviewed

## 2019-04-07 NOTE — PROGRESS NOTES
Vital signs stable, on room air. Lasix gtt at 20 ml/hr. Up independent to bathroom and bedside commode, voided 850 cc out.  Legs continue to be very edematous, she was unable to tolerate edema stockings. Slept most of night.     Assess for changes, control pain, edema.  Continue plan of care.

## 2019-04-07 NOTE — PLAN OF CARE
D: Intake/Output Summary (Last 24 hours) at 4/6/2019 2213  Last data filed at 4/6/2019 2200  Gross per 24 hour   Intake 949.8 ml   Output 3400 ml   Net -2450.2 ml   A:pt was switched to lasix, pt lethargic this shift watching TV  P:continue with lasix gtt, potassium replaced

## 2019-04-07 NOTE — PLAN OF CARE
Edema 6C: Recommend continued use of size G comperm compression stockings from MTPs to knee creases for further management of chronic LE edema - patient unable to tolerate compression wraps. Compression stockings can be worn 23/24 hours per day and removed daily for skin cares - see patient care order for details. Patient with no further inpatient edema needs therefore will sign off.

## 2019-04-07 NOTE — PLAN OF CARE
Neuro: A&Ox4  Cardiac: SR. VSS.   Respiratory: RA.  GI/: Adequate urine output. Constipated. LBM was on 4/5/2019  Diet/appetite: Tolerating diet. Appetite fair  Activity:  Independent to the bathroom and bedside commode  Pain: At acceptable level on current regimen.   Skin: No new deficits noted.  LDA's: PIV    Lasix drip on hold for now, stopped at 10am today b/c pt c/o jaw tightness, legs cramping, and her sodium decreased to 125, and Cr rise to 1.65

## 2019-04-08 ENCOUNTER — APPOINTMENT (OUTPATIENT)
Dept: OCCUPATIONAL THERAPY | Facility: CLINIC | Age: 69
DRG: 292 | End: 2019-04-08
Attending: INTERNAL MEDICINE
Payer: COMMERCIAL

## 2019-04-08 DIAGNOSIS — R11.0 NAUSEA: ICD-10-CM

## 2019-04-08 DIAGNOSIS — E85.81 AL AMYLOIDOSIS (H): ICD-10-CM

## 2019-04-08 LAB
ANION GAP SERPL CALCULATED.3IONS-SCNC: 12 MMOL/L (ref 3–14)
BUN SERPL-MCNC: 62 MG/DL (ref 7–30)
CALCIUM SERPL-MCNC: 9.2 MG/DL (ref 8.5–10.1)
CHLORIDE SERPL-SCNC: 87 MMOL/L (ref 94–109)
CO2 SERPL-SCNC: 27 MMOL/L (ref 20–32)
CREAT SERPL-MCNC: 1.86 MG/DL (ref 0.52–1.04)
GFR SERPL CREATININE-BSD FRML MDRD: 28 ML/MIN/{1.73_M2}
GLUCOSE SERPL-MCNC: 99 MG/DL (ref 70–99)
INTERPRETATION ECG - MUSE: NORMAL
MAGNESIUM SERPL-MCNC: 3.2 MG/DL (ref 1.6–2.3)
PLATELET # BLD AUTO: 204 10E9/L (ref 150–450)
POTASSIUM SERPL-SCNC: 4.3 MMOL/L (ref 3.4–5.3)
SODIUM SERPL-SCNC: 126 MMOL/L (ref 133–144)

## 2019-04-08 PROCEDURE — 83735 ASSAY OF MAGNESIUM: CPT | Performed by: INTERNAL MEDICINE

## 2019-04-08 PROCEDURE — 25000132 ZZH RX MED GY IP 250 OP 250 PS 637: Performed by: STUDENT IN AN ORGANIZED HEALTH CARE EDUCATION/TRAINING PROGRAM

## 2019-04-08 PROCEDURE — 97535 SELF CARE MNGMENT TRAINING: CPT | Mod: GO

## 2019-04-08 PROCEDURE — 25000132 ZZH RX MED GY IP 250 OP 250 PS 637: Performed by: INTERNAL MEDICINE

## 2019-04-08 PROCEDURE — 99232 SBSQ HOSP IP/OBS MODERATE 35: CPT | Performed by: INTERNAL MEDICINE

## 2019-04-08 PROCEDURE — 85049 AUTOMATED PLATELET COUNT: CPT | Performed by: INTERNAL MEDICINE

## 2019-04-08 PROCEDURE — 36415 COLL VENOUS BLD VENIPUNCTURE: CPT | Performed by: INTERNAL MEDICINE

## 2019-04-08 PROCEDURE — 97110 THERAPEUTIC EXERCISES: CPT | Mod: GO

## 2019-04-08 PROCEDURE — 21400000 ZZH R&B CCU UMMC

## 2019-04-08 PROCEDURE — 80048 BASIC METABOLIC PNL TOTAL CA: CPT | Performed by: INTERNAL MEDICINE

## 2019-04-08 RX ORDER — DOCUSATE SODIUM 100 MG/1
100 CAPSULE, LIQUID FILLED ORAL 2 TIMES DAILY
Status: DISCONTINUED | OUTPATIENT
Start: 2019-04-08 | End: 2019-04-09 | Stop reason: HOSPADM

## 2019-04-08 RX ORDER — SENNOSIDES 8.6 MG
2 TABLET ORAL 2 TIMES DAILY
Status: DISCONTINUED | OUTPATIENT
Start: 2019-04-08 | End: 2019-04-09 | Stop reason: HOSPADM

## 2019-04-08 RX ADMIN — DOCUSATE SODIUM 100 MG: 100 CAPSULE, LIQUID FILLED ORAL at 19:28

## 2019-04-08 RX ADMIN — SPIRONOLACTONE 25 MG: 25 TABLET ORAL at 08:16

## 2019-04-08 RX ADMIN — SENNOSIDES 2 TABLET: 8.6 TABLET, FILM COATED ORAL at 08:16

## 2019-04-08 RX ADMIN — LORAZEPAM 0.5 MG: 0.5 TABLET ORAL at 15:30

## 2019-04-08 RX ADMIN — SPIRONOLACTONE 25 MG: 25 TABLET ORAL at 19:27

## 2019-04-08 RX ADMIN — ACETAMINOPHEN 650 MG: 325 TABLET, FILM COATED ORAL at 00:11

## 2019-04-08 RX ADMIN — DOCUSATE SODIUM 100 MG: 100 CAPSULE, LIQUID FILLED ORAL at 12:43

## 2019-04-08 RX ADMIN — SENNOSIDES AND DOCUSATE SODIUM 2 TABLET: 8.6; 5 TABLET ORAL at 08:16

## 2019-04-08 ASSESSMENT — ACTIVITIES OF DAILY LIVING (ADL)
ADLS_ACUITY_SCORE: 11

## 2019-04-08 NOTE — PROGRESS NOTES
Care Coordinator Progress Note    Admission Date/Time:  4/2/2019  Attending MD:  Mayur West  Data  Chart reviewed, discussed with interdisciplinary team.   Patient with h/o AL amyloidosis with cardiac, GI, and bone marrow involvement (dx 2016),  restrictive cardiomyopathy with HFpEF, admitted with acute decompensated HF Restrictive cardiomyopathy with chronis HFpEF.     Concerns with insurance coverage for discharge needs: None stated by pt.   Current Living Situation: Patient lives with spouse.  Support System: Supportive and Involved .   Services Involved: none currently.   Transportation at Discharge: Family or friend will provide  Transportation to Medical Appointments:   - Name of caregiver: self or .   Barriers to Discharge: medical condition.     Coordination of Care and Referrals: No home care needs identified.    Assessment  OT recommending home with assist as needed and OP PT; pt is in agreement with this plan.   Plan  Anticipated Discharge Date:  TBD  Anticipated Discharge Plan:  Discharge to home with outpt f/u.     YONATHAN CHUNG RN BSN  Care Coordinator Unit   [phone]

## 2019-04-08 NOTE — PROGRESS NOTES
[facility], [place]    Progress Note - Cardiology 1 Service        Date of Admission:  4/2/2019    Assessment & Plan   67 y/o woman with AL amyloidosis with cardiac, GI, and bone marrow involvement (dx 2016),  restrictive cardiomyopathy with HFpEF, admitted on 4/2 due to SOB and lower extremity edema.    Acute decompensated heart failure  Restrictive cardiomyopathy with chronic HFpEF  Admitted due to difficulty managing SOB and increased lower extremity edema despite increasing outpatient diuretics. TTE (4/2) shows concentric hypertrophy, EF 60-65%, moderate aortic insufficiency, mild pulm HTN and dilated IVC. Wt on admission ~157 lb, now ~147 lb with rising Cr. Diuretics held on 4/7 due to rising Cr and muscle aches. Suspect current weight is near EDW. Await Cr to fall, and then will re-start home diuretic regimen.  - continue spironolactone 25 BID    Hyponatremia  Likely secondary to ongoing diuresis, continue to trend.    Constipation  Increase frequency of senna, add colace. Pt reports miralax makes her nauseated.    Other issues:  - Stage IV AL amyloidosis (dx 2016): involvement of bone marrow, GI tract, heart. S/p CyBorD (2016) with improvement in free light chain ratio. Not a transplant candidate due to cardiac involvement. Previously on doxycycline for 6-9 months.  - Chronic nausea: on lorazepam 0.5 mg PRN, zofran held due to .  - Chronc R pleural effusion: present since at least 2017, has had multiple pleural taps       Diet: Low Saturated Fat Na <2400 mg    Fluids: restrict to 2 L per day  Lines: peripheral IV  DVT Prophylaxis: Ambulate every shift  Marie Catheter: not present  Code Status: Full Code      Disposition Plan   Expected discharge: 2 - 3 days, recommended to prior living arrangement once renal function improved.  Entered: Dillon Scott MD 04/08/2019, 11:58 AM       The patient's care was discussed with the Attending Physician, Dr. Artis.    Dillon BORGES  MD [place]  Cardiology 1 Service  [facility], [place]  Pager: [phone]  Please see sticky note for cross cover information  ______________________________________________________________________    Interval History   No acute events overnight. Pt reports feeling well. Eating well, good urine output, but no BMs for a few days. No abd pain.     Data reviewed today: I reviewed all medications, new labs and imaging results over the last 24 hours. I personally reviewed no images or EKG's today.    Physical Exam   Vital Signs: Temp: 97.8  F (36.6  C) Temp src: Oral BP: 111/52 Pulse: 58 Heart Rate: 66 Resp: 17 SpO2: 95 % O2 Device: None (Room air)    Weight: 147 lbs 14.4 oz  General Appearance: NAD  Respiratory: CTAB  Cardiovascular: RRR, no JVD  GI: soft, NT/ND, active bowel sounds  Skin: no rash  Other: No pitting edema, lymphedema noted     Data   Recent Labs   Lab 04/08/19  0539 04/07/19  1759 04/07/19  0713  04/06/19  0602  04/05/19  0534 04/04/19  0540  04/02/19  2047   WBC  --   --   --   --  5.6  --  4.7 4.5   < > 5.7   HGB  --   --   --   --  16.6*  --  15.2 15.1   < > 15.2   MCV  --   --   --   --  98  --  99 99   < > 99     --   --   --  213  --  200 180   < > 182   * 126* 125*   < > 129*   < > 133 131*   < > 130*   POTASSIUM 4.3 3.8 4.6   < > 4.3   < > 3.6 3.7   < > 4.4   CHLORIDE 87* 87* 87*   < > 90*   < > 95 94   < > 94   CO2 27 29 28   < > 29   < > 29 28   < > 26   BUN 62* 59* 52*   < > 48*   < > 39* 39*   < > 42*   CR 1.86* 1.73* 1.65*   < > 1.45*   < > 1.27* 1.32*   < > 1.40*   ANIONGAP 12 10 10   < > 10   < > 10 9   < > 10   JERROD 9.2 9.0 9.9   < > 9.6   < > 8.7 8.7   < > 8.7   GLC 99 125* 104*   < > 90   < > 103* 95   < > 98   ALBUMIN  --   --   --   --   --   --   --   --   --  4.0   PROTTOTAL  --   --   --   --   --   --   --   --   --  6.9   BILITOTAL  --   --   --   --   --   --   --   --   --  3.1*   ALKPHOS  --   --   --   --   --   --   --   --   --  290*   ALT   --   --   --   --   --   --   --   --   --  28   AST  --   --   --   --   --   --   --   --   --  26    < > = values in this interval not displayed.     No results found for this or any previous visit (from the past 24 hour(s)).  Medications     - MEDICATION INSTRUCTIONS -         docusate sodium  100 mg Oral BID     sennosides  2 tablet Oral BID     sodium chloride (PF)  3 mL Intracatheter Q8H     spironolactone  25 mg Oral BID

## 2019-04-08 NOTE — PLAN OF CARE
OT 6C  Discharge Planner OT   Patient plan for discharge: Home  Current status: SBA sit<>Stand. Pt ambulated ~100ft x2 with no AD and SBA/CGA. Pt had two minor LOB while completing head turns to required min A to correct. Pt completed standing UE exercises.  Barriers to return to prior living situation: fatigue, deconditioning, acute medical needs  Recommendations for discharge: Home with assist as needed and OP PT  Rationale for recommendations: Pt is near baseline, however would benefit from continued skilled therapy to increase activity tolerance and exercise endurance       Entered by: Yojana Pickens 04/08/2019 3:07 PM

## 2019-04-08 NOTE — PLAN OF CARE
Discharge Planner OT   6C  Patient plan for discharge: home asap as she planned a trip to [place] for an event this weekend  Current status: Pt currently reporting cramping in LE and jaw, RN alerted.  Pt IND with toileting and in room transfers.Pt with 2 self corrected LOB with hallway ambulation with unilateral support on IV pole. Pt reports occasional LOB backwards when turning, denies falling.  Barriers to return to prior living situation: edema, medical needs  Recommendations for discharge: home w A and OP PT for balance assessment  Rationale for recommendations: Pt reports she is below baseline for balance and has had near falls.        Entered by: Brielle Mccoy 04/07/2019 8:46 PM

## 2019-04-08 NOTE — PLAN OF CARE
D: Intake/Output Summary (Last 24 hours) at 4/7/2019 8169  Last data filed at 4/7/2019 2200  Gross per 24 hour   Intake 940 ml   Output 1800 ml   Net -860 ml

## 2019-04-09 ENCOUNTER — APPOINTMENT (OUTPATIENT)
Dept: OCCUPATIONAL THERAPY | Facility: CLINIC | Age: 69
DRG: 292 | End: 2019-04-09
Attending: INTERNAL MEDICINE
Payer: COMMERCIAL

## 2019-04-09 ENCOUNTER — PATIENT OUTREACH (OUTPATIENT)
Dept: CARE COORDINATION | Facility: CLINIC | Age: 69
End: 2019-04-09

## 2019-04-09 VITALS
DIASTOLIC BLOOD PRESSURE: 53 MMHG | WEIGHT: 149.7 LBS | SYSTOLIC BLOOD PRESSURE: 106 MMHG | BODY MASS INDEX: 26.52 KG/M2 | HEART RATE: 54 BPM | OXYGEN SATURATION: 94 % | RESPIRATION RATE: 18 BRPM | TEMPERATURE: 98.1 F

## 2019-04-09 LAB
ANION GAP SERPL CALCULATED.3IONS-SCNC: 10 MMOL/L (ref 3–14)
BUN SERPL-MCNC: 53 MG/DL (ref 7–30)
CALCIUM SERPL-MCNC: 8.8 MG/DL (ref 8.5–10.1)
CHLORIDE SERPL-SCNC: 90 MMOL/L (ref 94–109)
CO2 SERPL-SCNC: 26 MMOL/L (ref 20–32)
CREAT SERPL-MCNC: 1.37 MG/DL (ref 0.52–1.04)
GFR SERPL CREATININE-BSD FRML MDRD: 40 ML/MIN/{1.73_M2}
GLUCOSE SERPL-MCNC: 95 MG/DL (ref 70–99)
MAGNESIUM SERPL-MCNC: 3.2 MG/DL (ref 1.6–2.3)
POTASSIUM SERPL-SCNC: 4.3 MMOL/L (ref 3.4–5.3)
SODIUM SERPL-SCNC: 126 MMOL/L (ref 133–144)

## 2019-04-09 PROCEDURE — 97110 THERAPEUTIC EXERCISES: CPT | Mod: GO

## 2019-04-09 PROCEDURE — 25000132 ZZH RX MED GY IP 250 OP 250 PS 637: Performed by: INTERNAL MEDICINE

## 2019-04-09 PROCEDURE — 99238 HOSP IP/OBS DSCHRG MGMT 30/<: CPT | Performed by: INTERNAL MEDICINE

## 2019-04-09 PROCEDURE — 80048 BASIC METABOLIC PNL TOTAL CA: CPT | Performed by: INTERNAL MEDICINE

## 2019-04-09 PROCEDURE — 83735 ASSAY OF MAGNESIUM: CPT | Performed by: INTERNAL MEDICINE

## 2019-04-09 PROCEDURE — 36415 COLL VENOUS BLD VENIPUNCTURE: CPT | Performed by: INTERNAL MEDICINE

## 2019-04-09 RX ORDER — TORSEMIDE 20 MG/1
60 TABLET ORAL
Status: DISCONTINUED | OUTPATIENT
Start: 2019-04-09 | End: 2019-04-09 | Stop reason: HOSPADM

## 2019-04-09 RX ORDER — PROCHLORPERAZINE MALEATE 5 MG
TABLET ORAL
Qty: 90 TABLET | Refills: 1 | Status: SHIPPED | OUTPATIENT
Start: 2019-04-09 | End: 2021-03-04

## 2019-04-09 RX ADMIN — DOCUSATE SODIUM 100 MG: 100 CAPSULE, LIQUID FILLED ORAL at 08:28

## 2019-04-09 RX ADMIN — LORAZEPAM 0.5 MG: 0.5 TABLET ORAL at 11:09

## 2019-04-09 RX ADMIN — SPIRONOLACTONE 25 MG: 25 TABLET ORAL at 08:28

## 2019-04-09 ASSESSMENT — ACTIVITIES OF DAILY LIVING (ADL)
ADLS_ACUITY_SCORE: 11

## 2019-04-09 NOTE — PLAN OF CARE
D: Patient admitted 4/2/19 with SOB, weight gain, lower extremity edema, and decompensated heart failure. PMHx of stage IV amyloidosis involving GI, bone marrow, and heart resulting in HFpEF (EF = 55-60%).    I/A: Monitored vitals and assessed patient status. A0x4. Tearful this AM regarding her weight. VSS. NSR 50-60's. Afebrile. Urinating adequately. Creatinine improving today (1.37). PO diuretics ordered to be restarted this afternoon.    P: Continue to monitor patient status and report changes to treatment team. Plan to discharge this afternoon once orders placed.

## 2019-04-09 NOTE — PLAN OF CARE
Pt alert and oriented x4, VSS on RA. Up independently to the bedside commode or bathroom. NSR on tele. Denies pain, nausea and SOB. Pending diuresis plan. Pt requested sleep promotion so interruptions were minimized. Continue plan of care.

## 2019-04-09 NOTE — PLAN OF CARE
D: Admitted 4/2 due to SOB and lower extremity edema. PMHx of AL amyloidosis with cardiac, GI, and bone marrow involvement (dx 2016), restrictive cardiomyopathy with HFpEF.    I/A: A0x4. VSS. NSR, HR 50-60's. RA. Afebrile. Urinating adequately in bedside commode. Ambulating independently. Good appetite. Denies pain, SOB. Very small BM x2 today, still c/o constipation, colace added to bowel regimen, encouraged activity and prune juice with dinner. Discontinued subQ heparin as pt is walking frequently during the day.     P: Continue to monitor Pt status and report changes to Cards 1. Diuretics held due to rising Cr and muscle aches. Await Cr to fall, and then will re-start home diuretic regimen.

## 2019-04-09 NOTE — DISCHARGE SUMMARY
[address] [address]  p: [phone]    Discharge Summary: Cardiology Service    Leandra Guerrero MRN# [id number]   YOB: 1950 Age: 68 year old       Admission Date: 4/2/2019  Discharge Date: 04/09/19      Discharge Diagnoses:  1. Acute on chronic diastolic heart failure  2. Hyponatremia  3. Stage IV AL Amyloidosis (since 2016)  4. Chronic right sided pleural effusion    Pertinent Procedures:  N/A    Consults:  [facility]  PT/OT    Imaging with results:  Echocardiogram:  Interpretation Summary  Moderate left ventricular hypertrophy.  Global and regional left ventricular function is normal with an EF of 60-65%.  Global right ventricular function is normal.  Moderate aortic valve insufficiency.  Mild pulmonary hypertension with dilated IVC.     This study was compared with the study from 10/11/18 the AR is worse and RA  pressure is now increased.    Other imaging studies:  EKG 12Lead 4/9/19:      Brief HPI:  69 y/o woman with AL amyloidosis with cardiac, GI, and bone marrow involvement (dx 2016),  restrictive cardiomyopathy with HFpEF, admitted on 4/2 due to SOB and lower extremity edema.    Hospital Course by Diagnosis:  #Acute decompensated heart failure  #Restrictive cardiomyopathy with chronic HFpEF  Admitted due to difficulty managing SOB and increased lower extremity edema despite increasing outpatient diuretics; pt taking Torsemide 60 mg BID. TTE (4/2) shows concentric hypertrophy, EF 60-65%, moderate aortic insufficiency, mild pulm HTN and dilated IVC. Wt on admission ~157 lb, started on Lasix gtt and metolazone; net negative 17.6L.   Diuretics held on 4/7 due to rising Cr, hyponatremia, and muscle aches. Suspect current weight is near EDW; today 149#. Creat today 1.36, restarted PTA Torsemide     - Continue spironolactone 25 BID  - Continue PTA Torsemide 60 mg BID  - Follow up in [facility] clinic next week with repeat BMP  - Continue  with lymphedema wraps at home  - Pt to continue to check daily weights and call [facility] with increase 2# in one day or 5# in one week     #Hyponatremia  Likely secondary to ongoing diuresis, Na stable over last several days at 126.   - Resume PTA Diuretics as listed above  - Repeat BMP in 1 week     #Stage IV AL amyloidosis (dx 2016): involvement of bone marrow, GI tract, heart. S/p CyBorD (2016) with improvement in free light chain ratio. Not a transplant candidate due to cardiac involvement. Previously on doxycycline for 6-9 months.  - Continue to follow up with Dr. Cohen and Dr. MADDOX (outpatient oncologist)    # Chronic right sided pleural effusion  Patient with history of right sided pleural effusion. CXR shows small pleural effusion with overlying atelectasis/consolidation on admission, appears similar in size to CXR in June.  - Diuretics as above      Condition on discharge  Temp:  [97.8  F (36.6  C)-98.1  F (36.7  C)] 98.1  F (36.7  C)  Pulse:  [54] 54  Heart Rate:  [57-64] 60  Resp:  [12-19] 18  BP: (106-127)/(53-64) 106/53  SpO2:  [93 %-95 %] 94 %  General: Alert, interactive, NAD  Eyes: sclera anicteric, EOMI  Neck: JVP flat, carotid 2+ bilaterally  Cardiovascular: regular rate and rhythm, normal S1 and S2, no murmurs, gallops, or rubs  Resp: clear to auscultation bilaterally, no rales, wheezes, or rhonchi  GI: Soft, nontender, nondistended. +BS.  No HSM or masses, no rebound or guarding.  Extremities: bilateral lymphedema present, currently wrapped, no cyanosis or clubbing, dorsalis pedis and posterior tibialis pulses 2+ bilaterally  Skin: Warm and dry, no jaundice or rash  Neuro: CN 2-12 intact, moves all extremities equally  Psych: Alert & oriented x 3      Medication Changes:  None    Discharge medications:   Current Discharge Medication List      CONTINUE these medications which have NOT CHANGED    Details   Acetaminophen (TYLENOL PO) Take 325 mg by mouth    Associated Diagnoses: Amyloid heart disease  (H)      ondansetron (ZOFRAN-ODT) 4 MG ODT tab Take 1 tablet (4 mg) by mouth every 6 hours as needed for nausea  Qty: 15 tablet, Refills: 1    Associated Diagnoses: Amyloid heart disease (H); Weight loss; Hypokalemia      potassium chloride SA (K-DUR/KLOR-CON M) 10 MEQ CR tablet Take 2 tablets (20 mEq) by mouth daily  Qty: 180 tablet, Refills: 3    Comments: Please note dose change, discontinue previous orders  Associated Diagnoses: Hypokalemia      spironolactone (ALDACTONE) 25 MG tablet Take 1 tablet (25 mg) by mouth 2 times daily  Qty: 180 tablet, Refills: 3    Comments: Please keep appt  5/30/19  Associated Diagnoses: Acute on chronic diastolic heart failure (H)      torsemide (DEMADEX) 20 MG tablet Take 80 mg in the morning, 60 in the afternoon one day, then 60 mg twice a day next day. Continue to alternate every other day.  Qty: 554 tablet, Refills: 3    Associated Diagnoses: Acute on chronic diastolic heart failure (H)      doxycycline (VIBRA-TABS) 100 MG tablet Take 1 tablet (100 mg) by mouth daily  Qty: 90 tablet, Refills: 1    Associated Diagnoses: AL amyloidosis (H); Nausea      LORazepam (ATIVAN) 0.5 MG tablet Take 1 tablet (0.5 mg) by mouth every 6 hours as needed for other (nausea)  Qty: 30 tablet, Refills: 0    Associated Diagnoses: Amyloid heart disease (H); Weight loss; Hypokalemia; AL amyloidosis (H)      prochlorperazine (COMPAZINE) 5 MG tablet One to two tablets PO q 6 hours prn nausea  Qty: 90 tablet, Refills: 1    Associated Diagnoses: Nausea; AL amyloidosis (H)             Labs or imaging requiring follow-up after discharge:  BMP in 1 week (Cr, Na, K)      Follow-up:  - Follow up with [facility] 4/17 as previously arranged  - Follow up with PCP in 7-10 days for post hosp    Code status:  Full    Patient Care Team:  Magy Obrien MD as PCP - General (Family Practice)  Mirela Moore MD as MD (Hematology & Oncology)  Domenica Cohen MD as MD (Cardiology)  Eileen Arevalo RN  as Nurse Coordinator (Cardiology)  Codie Nelson, GREY as Nurse Practitioner (Cardiology)  Kathryn Posey, RN as Nurse Coordinator (Cardiology)  Bear Wu, DILIP as Nurse Coordinator (Cardiology)    Lizette Hill, SE, CNP  [facility] Cardiology  [phone]

## 2019-04-09 NOTE — PLAN OF CARE
Discharge instructions reviewed with pt and . PIV removed. Pt and  stated understanding of all discharge instructions. Pt escorted to front door via wheelchair and accompanied by  and other family members. A copy of the discharge instructions was given to the patient.

## 2019-04-09 NOTE — PLAN OF CARE
OT/CR 6C  Discharge Planner OT   Patient plan for discharge: Home  Current status: Pt ambulated ~200ft x2 with no AD and SBA. Pt ambulated up and down 3 stairs x3 reps with one hand railing and SBA. Pt tolerated 5 minutes on the Nustep at a resistance of 1  Barriers to return to prior living situation: fatigue, deconditioning, acute medical needs  Recommendations for discharge: home with OP PT  Rationale for recommendations: Pt is near baseline for ADL completion, however would benefit from continued skilled therapy to increase activity tolerance and exercise endurance       Entered by: Yojana Pickens 04/09/2019 12:50 PM

## 2019-04-10 ENCOUNTER — ALLIED HEALTH/NURSE VISIT (OUTPATIENT)
Dept: PHARMACY | Facility: OTHER | Age: 69
End: 2019-04-10
Payer: COMMERCIAL

## 2019-04-10 DIAGNOSIS — R11.0 NAUSEA: ICD-10-CM

## 2019-04-10 DIAGNOSIS — I50.30 DIASTOLIC HEART FAILURE, UNSPECIFIED HF CHRONICITY (H): Primary | ICD-10-CM

## 2019-04-10 DIAGNOSIS — E85.81 AL AMYLOIDOSIS (H): ICD-10-CM

## 2019-04-10 PROCEDURE — 99605 MTMS BY PHARM NP 15 MIN: CPT | Performed by: PHARMACIST

## 2019-04-10 PROCEDURE — 99607 MTMS BY PHARM ADDL 15 MIN: CPT | Performed by: PHARMACIST

## 2019-04-10 NOTE — PLAN OF CARE
Occupational Therapy Discharge Summary    Reason for therapy discharge:    Discharged to home with outpatient therapy.    Progress towards therapy goal(s). See goals on Care Plan in [facility] electronic health record for goal details.  Goals partially met.  Barriers to achieving goals:   discharge from facility.    Therapy recommendation(s):    Continued therapy is recommended.  Rationale/Recommendations:  Recommend OP PT to address higher level balance deficits.

## 2019-04-10 NOTE — PROGRESS NOTES
SUBJECTIVE/OBJECTIVE:                Leandra Guerrero is a 68 year old female called for a transitions of care visit.  She was discharged from [facility] on 4/9/19 for acute on chronic diastolic heart failure.     Chief Complaint: Transitions of care medication review, no specific concerns today.    Allergies/ADRs: Reviewed in Epic  Tobacco: No tobacco use   Alcohol: not currently using  Caffeine: 1 cups/day of coffee  Activity: She is feeling a little unsteady, taking it easy since hospitalization.  PMH: Reviewed in Epic    Medication Adherence/Access:  no issues reported    HFpEF/Amyloidosis: Current medications include spironolactone 25mg daily, torsemide 80mg QAM and 60mg QPM alternating every other day with 60mg QAM and 60mg QPM, and KCl 20mEq daily.  Pt is no longer taking doxycycline, stopped ~6 months ago. Follows with cardiology and oncology, upcoming appts scheduled. Patient reports no current medication side effects.  ECHO:  Date 4/3/19, EF 60-65%.   Pt is not complaining of sx of HF.    Pt is measuring daily weights and reports 149 lbs this morning, was 160s prior to hospitalization.  Patient does not self-monitor BP.   Pt is following a low sodium diet, is avoiding EtOH.     Nausea: Pt is currently taking Ativan 0.5mg as needed (worked best in hospital, hasn't needed to use at home yet), Zofran ODT 4mg (took one dose this morning which was helpful), and Compazine 5mg as needed (hasn't needed to use at home yet). Also drinks ginger ale which is helpful. She feels that current regimen in effective and reports no side effects.    Today's Vitals: There were no vitals taken for this visit. (telemed)    ASSESSMENT:                 Current medications were reviewed today.      Medication Adherence: good, no issues identified    HFpEF/Amyloidosis: Stable. No indication for ACEi/ARB or beta-blocker per last cardiology note. Follow-up plan in place.     Nausea: Stable per pt.     PLAN:                Post Discharge  Medication Reconciliation Status: discharge medications reconciled, continue medications without change.    1. Continue current medications    I spent 20 minutes with this patient today. I offer these suggestions for consideration by patient's care team. A copy of the visit note was provided to the patient's primary care, cardiology, and oncology providers.    Will follow up as needed.    The patient declined a summary of these recommendations as an after visit summary.    Chart documentation was completed in part with Dragon voice-recognition software. Even though reviewed, some grammatical, spelling, and word errors may remain.    Macarena Galeana, PharmD  Medication Therapy Management Pharmacist  Pager: [phone]

## 2019-04-10 NOTE — PROGRESS NOTES
Patient was contacted by an RN for post DC follow up so no duplicate post DC follow up call will be made    Patient was contacted via [employer]

## 2019-04-15 DIAGNOSIS — I50.33 ACUTE ON CHRONIC DIASTOLIC HEART FAILURE (H): Primary | ICD-10-CM

## 2019-04-17 ENCOUNTER — OFFICE VISIT (OUTPATIENT)
Dept: CARDIOLOGY | Facility: CLINIC | Age: 69
End: 2019-04-17
Attending: NURSE PRACTITIONER
Payer: COMMERCIAL

## 2019-04-17 VITALS
HEIGHT: 63 IN | WEIGHT: 149.3 LBS | SYSTOLIC BLOOD PRESSURE: 128 MMHG | OXYGEN SATURATION: 98 % | DIASTOLIC BLOOD PRESSURE: 64 MMHG | HEART RATE: 61 BPM | BODY MASS INDEX: 26.45 KG/M2

## 2019-04-17 DIAGNOSIS — I50.33 ACUTE ON CHRONIC DIASTOLIC HEART FAILURE (H): ICD-10-CM

## 2019-04-17 DIAGNOSIS — I50.32 CHRONIC DIASTOLIC HEART FAILURE (H): ICD-10-CM

## 2019-04-17 DIAGNOSIS — R11.0 NAUSEA: ICD-10-CM

## 2019-04-17 DIAGNOSIS — E85.81 AL AMYLOIDOSIS (H): ICD-10-CM

## 2019-04-17 DIAGNOSIS — I43 CARDIAC AMYLOIDOSIS (H): Primary | ICD-10-CM

## 2019-04-17 DIAGNOSIS — E85.4 CARDIAC AMYLOIDOSIS (H): Primary | ICD-10-CM

## 2019-04-17 LAB
ALBUMIN SERPL-MCNC: 4.2 G/DL (ref 3.4–5)
ALP SERPL-CCNC: 281 U/L (ref 40–150)
ALT SERPL W P-5'-P-CCNC: 45 U/L (ref 0–50)
ANION GAP SERPL CALCULATED.3IONS-SCNC: 6 MMOL/L (ref 3–14)
AST SERPL W P-5'-P-CCNC: 32 U/L (ref 0–45)
BASOPHILS # BLD AUTO: 0.1 10E9/L (ref 0–0.2)
BASOPHILS NFR BLD AUTO: 1.3 %
BILIRUB SERPL-MCNC: 2.6 MG/DL (ref 0.2–1.3)
BUN SERPL-MCNC: 48 MG/DL (ref 7–30)
CALCIUM SERPL-MCNC: 9.4 MG/DL (ref 8.5–10.1)
CHLORIDE SERPL-SCNC: 91 MMOL/L (ref 94–109)
CO2 SERPL-SCNC: 29 MMOL/L (ref 20–32)
CREAT SERPL-MCNC: 1.34 MG/DL (ref 0.52–1.04)
DIFFERENTIAL METHOD BLD: ABNORMAL
EOSINOPHIL # BLD AUTO: 0.7 10E9/L (ref 0–0.7)
EOSINOPHIL NFR BLD AUTO: 11.9 %
ERYTHROCYTE [DISTWIDTH] IN BLOOD BY AUTOMATED COUNT: 13.2 % (ref 10–15)
GFR SERPL CREATININE-BSD FRML MDRD: 41 ML/MIN/{1.73_M2}
GLUCOSE SERPL-MCNC: 72 MG/DL (ref 70–99)
HCT VFR BLD AUTO: 47.6 % (ref 35–47)
HGB BLD-MCNC: 16.3 G/DL (ref 11.7–15.7)
IMM GRANULOCYTES # BLD: 0 10E9/L (ref 0–0.4)
IMM GRANULOCYTES NFR BLD: 0.5 %
LYMPHOCYTES # BLD AUTO: 0.3 10E9/L (ref 0.8–5.3)
LYMPHOCYTES NFR BLD AUTO: 4.7 %
MCH RBC QN AUTO: 32.8 PG (ref 26.5–33)
MCHC RBC AUTO-ENTMCNC: 34.2 G/DL (ref 31.5–36.5)
MCV RBC AUTO: 96 FL (ref 78–100)
MONOCYTES # BLD AUTO: 0.6 10E9/L (ref 0–1.3)
MONOCYTES NFR BLD AUTO: 10 %
NEUTROPHILS # BLD AUTO: 4.4 10E9/L (ref 1.6–8.3)
NEUTROPHILS NFR BLD AUTO: 71.6 %
NRBC # BLD AUTO: 0 10*3/UL
NRBC BLD AUTO-RTO: 0 /100
NT-PROBNP SERPL-MCNC: 1501 PG/ML (ref 0–125)
PLATELET # BLD AUTO: 192 10E9/L (ref 150–450)
POTASSIUM SERPL-SCNC: 4.8 MMOL/L (ref 3.4–5.3)
PROT SERPL-MCNC: 7.4 G/DL (ref 6.8–8.8)
RBC # BLD AUTO: 4.97 10E12/L (ref 3.8–5.2)
SODIUM SERPL-SCNC: 125 MMOL/L (ref 133–144)
TROPONIN I SERPL-MCNC: <0.015 UG/L (ref 0–0.04)
WBC # BLD AUTO: 6.1 10E9/L (ref 4–11)

## 2019-04-17 PROCEDURE — 80053 COMPREHEN METABOLIC PANEL: CPT | Performed by: INTERNAL MEDICINE

## 2019-04-17 PROCEDURE — 84484 ASSAY OF TROPONIN QUANT: CPT | Performed by: INTERNAL MEDICINE

## 2019-04-17 PROCEDURE — 99214 OFFICE O/P EST MOD 30 MIN: CPT | Mod: ZP | Performed by: NURSE PRACTITIONER

## 2019-04-17 PROCEDURE — 83880 ASSAY OF NATRIURETIC PEPTIDE: CPT | Performed by: INTERNAL MEDICINE

## 2019-04-17 PROCEDURE — 85025 COMPLETE CBC W/AUTO DIFF WBC: CPT | Performed by: INTERNAL MEDICINE

## 2019-04-17 PROCEDURE — G0463 HOSPITAL OUTPT CLINIC VISIT: HCPCS | Mod: ZF

## 2019-04-17 PROCEDURE — 36415 COLL VENOUS BLD VENIPUNCTURE: CPT | Performed by: INTERNAL MEDICINE

## 2019-04-17 RX ORDER — SPIRONOLACTONE 25 MG/1
50 TABLET ORAL DAILY
Qty: 60 TABLET | Refills: 3 | Status: SHIPPED | OUTPATIENT
Start: 2019-04-17 | End: 2019-04-26

## 2019-04-17 ASSESSMENT — PAIN SCALES - GENERAL: PAINLEVEL: NO PAIN (0)

## 2019-04-17 ASSESSMENT — MIFFLIN-ST. JEOR: SCORE: 1186.35

## 2019-04-17 NOTE — PROGRESS NOTES
HPI:   Ms. Guerrero is a 68 year old female with a past medical history including stage IV AL amyloid diagnosed in 2016. Presents to clinic for CORE follow-up. Her cardiac and oncologic history are as follows: fatigue starting in 1/2016. She had a coronary angiogram which was reportedly normal but was diagnosed with HF and started on a BBand diuretics. Her symptoms progressed to the point that she was evaluated at [facility] in August/September (Dr. Barron in oncology/hematology, Dr. Nettles is cardiology). She was diagnosed with stage IV AL amyloid (+GI, +bone marrow involvement, no involvement of kidney or liver). Hher work up is detailed in our previous notes, however she has lambda AL amyloidosis and she underwent CyBorD chemotherapy and excellent light chain response by serum. Patient has been turned down at [facility] for a stem cell transplant due to her cardiac involvement. Later in 2016, she had 2-3 more right sided pleural taps. She was hospitalized 1/2017 and diuresed 20-30 pounds at that time. Since that time her AL has been in remission by labs without further chemo. Patient was recently treated with doxycycline but this was stopped ~6-9 months ago due to thought it wasn't benefiting per patient. She was last admitted to [facility] 4/2 for shortness of breath and edema. Echocardiogram showed EF 60-65%, moderate LVH, normal RV, moderate AI, and mild PH. She was diuresed 8 lb this admission (Cr bumped so felt actually over diuresed) and discharged on 4/9 on torsemide 60 mg bid (pta dose).     Since hospital discharge patient reports feeling okay.  Her biggest complaint is nausea which is been present times 1 month.  Denies any vomiting or abdominal pain but did note dry heaves this morning.  Also endorsing constipation for which she takes senna.  States that when her amyloid was diagnosed she had nausea at this time as well.  Feels her fluid status is under control.  Her weight is actually down.  Stomach bloating has  improved and her leg swelling is chronic.  She does wear compression stockings.  She endorses lightheadedness sounds more of a balance issue.  Denies any presyncope, syncope, palpitations.  Denies orthopnea, PND, exertional shortness of breath.  She is wondering if she should restart doxycycline as she feels her symptoms were better with it.      PAST MEDICAL HISTORY:  Past Medical History:   Diagnosis Date     AL amyloidosis (H)      Cardiac amyloidosis (H)      Lymphedema      Recurrent right pleural effusion 1/2/2017       FAMILY HISTORY:  Family History   Problem Relation Age of Onset     Other - See Comments Sister         Amyloidosis       SOCIAL HISTORY:  Socioeconomic History     Marital status:    Tobacco Use     Smoking status: Never Smoker     Smokeless tobacco: Never Used   Substance and Sexual Activity     Alcohol use: No     Drug use: No   Other Topics Concern     CURRENT MEDICATIONS:    Current Outpatient Medications on File Prior to Visit:  Acetaminophen (TYLENOL PO) Take 325 mg by mouth   LORazepam (ATIVAN) 0.5 MG tablet Take 1 tablet (0.5 mg) by mouth every 6 hours as needed for other (nausea)   ondansetron (ZOFRAN-ODT) 4 MG ODT tab Take 1 tablet (4 mg) by mouth every 6 hours as needed for nausea   potassium chloride SA (K-DUR/KLOR-CON M) 10 MEQ CR tablet Take 2 tablets (20 mEq) by mouth daily   prochlorperazine (COMPAZINE) 5 MG tablet One to two tablets PO q 6 hours prn nausea   spironolactone (ALDACTONE) 25 MG tablet Take 1 tablet (25 mg) by mouth 2 times daily (Patient taking differently: Take 50 mg by mouth 2 times daily )   torsemide (DEMADEX) 20 MG tablet Take 80 mg in the morning, 60 in the afternoon one day, then 60 mg twice a day next day. Continue to alternate every other day.     No current facility-administered medications on file prior to visit.     ROS:   CONSTITUTIONAL: Denies fever, chills, fatigue, or weight fluctuations.   HEENT: Denies headache, vision changes, and changes  "in speech.   CV: Refer to HPI.   PULMONARY:Refer to HPI.   GI:Refer to HPI.   :Denies urinary alterations, dysuria, urinary frequency, hematuria, and abnormal drainage.   EXT:+Chronic lower extremity edema.   SKIN:Denies abnormal rashes or lesions.   MUSCULOSKELETAL:Denies upper or lower extremity weakness and pain.   NEUROLOGIC:Denies dizziness, seizures, or upper or lower extremity paresthesia.     EXAM:  /64 (BP Location: Right arm, Patient Position: Chair, Cuff Size: Adult Regular)   Pulse 61   Ht 1.6 m (5' 3\")   Wt 67.7 kg (149 lb 4.8 oz)   SpO2 98%   BMI 26.45 kg/m       GENERAL: Appears comfortable, in no acute distress.   HEENT: Eye symmetrical, no discharge or icterus bilaterally. Mucous membranes moist and without lesions.  CV: RRR, +S1S2, no murmur, rub, or gallop. JVP not visible at 75 degrees.   RESPIRATORY: Respirations regular, even, and unlabored. Lungs CTA throughout.   GI: Soft and mildly distended with normoactive bowel sounds present in all quadrants. No tenderness, rebound, guarding. No hepatomegaly.   EXTREMITIES: 1+ non pitting peripheral edema. 2+ bilateral pedal pulses.   NEUROLOGIC: Alert and oriented x 3. No focal deficits.   MUSCULOSKELETAL: No joint swelling or tenderness.   SKIN: No jaundice. No rashes or lesions.     Labs, reviewed with patient in clinic today:  CBC RESULTS:  Lab Results   Component Value Date    WBC 6.1 04/17/2019    RBC 4.97 04/17/2019    HGB 16.3 (H) 04/17/2019    HCT 47.6 (H) 04/17/2019    MCV 96 04/17/2019    MCH 32.8 04/17/2019    MCHC 34.2 04/17/2019    RDW 13.2 04/17/2019     04/17/2019       CMP RESULTS:  Lab Results   Component Value Date     (L) 04/17/2019    POTASSIUM 4.8 04/17/2019    CHLORIDE 91 (L) 04/17/2019    CO2 29 04/17/2019    ANIONGAP 6 04/17/2019    GLC 72 04/17/2019    BUN 48 (H) 04/17/2019    CR 1.34 (H) 04/17/2019    GFRESTIMATED 41 (L) 04/17/2019    GFRESTBLACK 48 (L) 04/17/2019    JERROD 9.4 04/17/2019    BILITOTAL 2.6 " (H) 04/17/2019    ALBUMIN 4.2 04/17/2019    ALKPHOS 281 (H) 04/17/2019    ALT 45 04/17/2019    AST 32 04/17/2019        INR RESULTS:  Lab Results   Component Value Date    INR 1.30 (H) 01/14/2017       Lab Results   Component Value Date    MAG 3.2 (H) 04/09/2019     Lab Results   Component Value Date    NTBNPI 9,009 (H) 01/13/2017     Lab Results   Component Value Date    NTBNP 1,501 (H) 04/17/2019       Diagnostics:  TTE 4/9/19  Moderate left ventricular hypertrophy.  Global and regional left ventricular function is normal with an EF of 60-65%.  Global right ventricular function is normal.  Moderate aortic valve insufficiency.  Mild pulmonary hypertension with dilated IVC.  This study was compared with the study from 10/11/18 the AR is worse and RA pressure is now increased.    TTE 10/11/18  Global and regional left ventricular function is normal with an EF of 55-60%.  Moderate concentric wall thickening consistent with left ventricular  hypertrophy is present - known amyloid.  Grade III or advanced diastolic dysfunction.  Normal right ventricular size, wall thickness, and function.  Estimated pulmonary artery pressure 28 mmHg.  IVC with normal diameter but does not collapse (>50%) with inspiration.  Trace pericardial effusion.  Compared to prior study from 8/17/17, no significant change.    Assessment and Plan:   Ms. Guerrero is a 68 year old female with AL amyloidosis with cardiac manifestation who presents to [facility] for hospital follow-up. Patient has cardiac amyloidosis as evidenced by her echocardiogram (severe concentric hypertrophy and biatrial enlargement) and abnormal EKG (near-low voltage, poor R wave progression, biatrial enlargement and no evidence of LVH despite thicken LV on echo) in the setting of biopsy proven (BMB, EGD) AL amyloid. She completed chemotherapy with CyBorD with an excellent serologic response and her heart failure (i.e. troponin negative, NT pro BNP was stable, serum light chains minimal  and urine light chains undetectable).     Today she appears compensated on current diuretics. Sodium has trended down which is a poor prognostic sign. We will check NT pro BNP and troponin. Her increased nausea concerning for recurrence of amyloid in her GI system. She is seeing oncology next week. Will also discuss with Dr Cohen the possibility of restarting doxy as patient felt she did better with this.     # Chronic diastolic heart failure/restrictive cardiomyopathy 2/2  # Cardiac amyloidosis    Stage C. NYHA Class III.    Fluid status: euvolemic on torsemide 660 mg bid  ACEi/ARB/ARNI: n/a   BB: no indication and relatively contraindicated due to risk of progressive conduction disease/AV block in these patients  Aldosterone antagonist: spironolactone 50 mg bid - decrease to daily and recheck BMP next week   SCD prophylaxis: does not meet criteria for implant  NSAID use: contraindicated  Sleep apnea evaluation: deferred today  Remote monitoring: deferred today, could be Cardiocom candidate   Goals of care: prior discussions with Dr Cohen re: planning for EoL care as long term prognosis for AL amyloid is poor.      # Hyponatremia  Worse in the last month. I do not suspect this is 2/2 hypovolemic or hypervolemic hyponatremia as she looks fairly compensated centrally. Possibly 2/2 spironolactone? We will start by halving this, recheck next week. If not improved, consider d/c'ing altogether.     # Arrhythmia monitoring   Her Zio patch in 2/17 showed sinus rhythm with non sustained SVT. A repeat 7-day Ziopatch recently reveals short (>45 second) runs of SVT, for which she is generally not symptomatic. No further eval unless clinical change.      # Systemic amyloid  Heme previously monitoring her light chains which have been negative consistent with remission - therefore further palliative chemo not being considered. Increasing GI symptoms concerning for recurrence. Thankfully she is seeing oncology next week to  discuss.         Follow up with Dr Cohen in May and in CORE prn in the meantime.       25 minutes spent face-to-face with patient, >50% in counseling and/or coordination of care as described above    Anupama Krishnan DNP, NP-C  4/17/2019          ALEJANDRO FORD

## 2019-04-17 NOTE — PATIENT INSTRUCTIONS
"You were seen today in the Cardiovascular Clinic at the [facility].     Cardiology Providers you saw during your visit: SE Pacheco, CNP     Follow up and medication changes:    1. Decrease Spironolactone to 50 mg daily.  2. Recheck labs in one week.   3. Follow up as scheduled with Dr Cohen at the end of May. Anupama will talk to Dr Cohen about possibly restarting Doxycycline and get back to you.        Results for HILARY GARCIA (MRN [id number]) as of 2019 09:45   Ref. Range 2019 09:17   Sodium Latest Ref Range: 133 - 144 mmol/L 125 (L)   Potassium Latest Ref Range: 3.4 - 5.3 mmol/L 4.8   Chloride Latest Ref Range: 94 - 109 mmol/L 91 (L)   Carbon Dioxide Latest Ref Range: 20 - 32 mmol/L 29   Urea Nitrogen Latest Ref Range: 7 - 30 mg/dL 48 (H)   Creatinine Latest Ref Range: 0.52 - 1.04 mg/dL 1.34 (H)   GFR Estimate Latest Ref Range: >60 mL/min/1.73_m2 41 (L)   GFR Estimate If Black Latest Ref Range: >60 mL/min/1.73_m2 48 (L)   Calcium Latest Ref Range: 8.5 - 10.1 mg/dL 9.4   Anion Gap Latest Ref Range: 3 - 14 mmol/L 6         Please limit your fluid intake to 2 L (68 ounces) daily.  2 Liters a day = 8.5 cups, or 68 ounces.  Please limit your salt intake to 2 grams a day or less.     If you gain 2# in 24 hours or 5# in one week call Vidhi Montgomery RN so we can adjust your medications as needed over the phone.     Please feel free to call me with any questions or concerns.       Vidhi Montgomery RN CHFN  [facility]  Cardiology Care Coordinator-Heart Failure Clinic     Questions and [phone].   First press #1 for the i[employer] and then press #3 for \"Medical Questions\" to reach us Cardiology Nurses.      On Call Cardiologist for after hours or on weekends: [phone]   option #4 and ask to speak to the on-call Cardiologist. Inform them you are a CORE/heart failure patient at the [place].           If you need a medication refill please " contact your pharmacy.  Please allow 3 business days for your refill to be completed.  _______________________________________________________  [facility]IN[facility] Cardiomyopathy, Optimization, Rehabilitation, Education   The [facility].R.E[facility] CLINIC is a heart failure specialty clinic within the [facility] where you will work with specialized nurse practitioners dedicated to helping patients with heart failure carefully adjust medications, receive education, and learn who and when to call if symptoms develop. They specialize in helping you better understand your condition, slow the progression of your disease, improve the length and quality of your life, help you detect future heart problems before they become life threatening, and avoid hospitalizations.  As always, thank you for trusting us with your health care needs!

## 2019-04-17 NOTE — NURSING NOTE
Chief Complaint   Patient presents with     Follow Up     CORE, 69 yo female, HFpEF, labs prior.      Vitals were taken and medications were reconciled.    Lilly Kim RMA  9:40 AM

## 2019-04-17 NOTE — LETTER
4/17/2019      RE: Leandra Guerrero  [address] [place] [address]       Dear Colleague,    Thank you for the opportunity to participate in the care of your patient, Leandra Guerrero, at the [facility] at [facility]. Please see a copy of my visit note below.    HPI:   Ms. Guerrero is a 68 year old female with a past medical history including stage IV AL amyloid diagnosed in 2016. Presents to clinic for CORE follow-up. Her cardiac and oncologic history are as follows: fatigue starting in 1/2016. She had a coronary angiogram which was reportedly normal but was diagnosed with HF and started on a BBand diuretics. Her symptoms progressed to the point that she was evaluated at [place] in August/September (Dr. Barron in oncology/hematology, Dr. Nettles is cardiology). She was diagnosed with stage IV AL amyloid (+GI, +bone marrow involvement, no involvement of kidney or liver). Hher work up is detailed in our previous notes, however she has lambda AL amyloidosis and she underwent CyBorD chemotherapy and excellent light chain response by serum. Patient has been turned down at [place] for a stem cell transplant due to her cardiac involvement. Later in 2016, she had 2-3 more right sided pleural taps. She was hospitalized 1/2017 and diuresed 20-30 pounds at that time. Since that time her AL has been in remission by labs without further chemo. Patient was recently treated with doxycycline but this was stopped ~6-9 months ago due to thought it wasn't benefiting per patient. She was last admitted to [facility] 4/2 for shortness of breath and edema. Echocardiogram showed EF 60-65%, moderate LVH, normal RV, moderate AI, and mild PH. She was diuresed 8 lb this admission (Cr bumped so felt actually over diuresed) and discharged on 4/9 on torsemide 60 mg bid (pta dose).     Since hospital discharge patient reports feeling okay.  Her biggest complaint is nausea which is been present  times 1 month.  Denies any vomiting or abdominal pain but did note dry heaves this morning.  Also endorsing constipation for which she takes senna.  States that when her amyloid was diagnosed she had nausea at this time as well.  Feels her fluid status is under control.  Her weight is actually down.  Stomach bloating has improved and her leg swelling is chronic.  She does wear compression stockings.  She endorses lightheadedness sounds more of a balance issue.  Denies any presyncope, syncope, palpitations.  Denies orthopnea, PND, exertional shortness of breath.  She is wondering if she should restart doxycycline as she feels her symptoms were better with it.      PAST MEDICAL HISTORY:  Past Medical History:   Diagnosis Date     AL amyloidosis (H)      Cardiac amyloidosis (H)      Lymphedema      Recurrent right pleural effusion 1/2/2017       FAMILY HISTORY:  Family History   Problem Relation Age of Onset     Other - See Comments Sister         Amyloidosis       SOCIAL HISTORY:  Socioeconomic History     Marital status:    Tobacco Use     Smoking status: Never Smoker     Smokeless tobacco: Never Used   Substance and Sexual Activity     Alcohol use: No     Drug use: No   Other Topics Concern     CURRENT MEDICATIONS:    Current Outpatient Medications on File Prior to Visit:  Acetaminophen (TYLENOL PO) Take 325 mg by mouth   LORazepam (ATIVAN) 0.5 MG tablet Take 1 tablet (0.5 mg) by mouth every 6 hours as needed for other (nausea)   ondansetron (ZOFRAN-ODT) 4 MG ODT tab Take 1 tablet (4 mg) by mouth every 6 hours as needed for nausea   potassium chloride SA (K-DUR/KLOR-CON M) 10 MEQ CR tablet Take 2 tablets (20 mEq) by mouth daily   prochlorperazine (COMPAZINE) 5 MG tablet One to two tablets PO q 6 hours prn nausea   spironolactone (ALDACTONE) 25 MG tablet Take 1 tablet (25 mg) by mouth 2 times daily (Patient taking differently: Take 50 mg by mouth 2 times daily )   torsemide (DEMADEX) 20 MG tablet Take 80 mg in  "the morning, 60 in the afternoon one day, then 60 mg twice a day next day. Continue to alternate every other day.     No current facility-administered medications on file prior to visit.     ROS:   CONSTITUTIONAL: Denies fever, chills, fatigue, or weight fluctuations.   HEENT: Denies headache, vision changes, and changes in speech.   CV: Refer to HPI.   PULMONARY:Refer to HPI.   GI:Refer to HPI.   :Denies urinary alterations, dysuria, urinary frequency, hematuria, and abnormal drainage.   EXT:+Chronic lower extremity edema.   SKIN:Denies abnormal rashes or lesions.   MUSCULOSKELETAL:Denies upper or lower extremity weakness and pain.   NEUROLOGIC:Denies dizziness, seizures, or upper or lower extremity paresthesia.     EXAM:  /64 (BP Location: Right arm, Patient Position: Chair, Cuff Size: Adult Regular)   Pulse 61   Ht 1.6 m (5' 3\")   Wt 67.7 kg (149 lb 4.8 oz)   SpO2 98%   BMI 26.45 kg/m        GENERAL: Appears comfortable, in no acute distress.   HEENT: Eye symmetrical, no discharge or icterus bilaterally. Mucous membranes moist and without lesions.  CV: RRR, +S1S2, no murmur, rub, or gallop. JVP not visible at 75 degrees.   RESPIRATORY: Respirations regular, even, and unlabored. Lungs CTA throughout.   GI: Soft and mildly distended with normoactive bowel sounds present in all quadrants. No tenderness, rebound, guarding. No hepatomegaly.   EXTREMITIES: 1+ non pitting peripheral edema. 2+ bilateral pedal pulses.   NEUROLOGIC: Alert and oriented x 3. No focal deficits.   MUSCULOSKELETAL: No joint swelling or tenderness.   SKIN: No jaundice. No rashes or lesions.     Labs, reviewed with patient in clinic today:  CBC RESULTS:  Lab Results   Component Value Date    WBC 6.1 04/17/2019    RBC 4.97 04/17/2019    HGB 16.3 (H) 04/17/2019    HCT 47.6 (H) 04/17/2019    MCV 96 04/17/2019    MCH 32.8 04/17/2019    MCHC 34.2 04/17/2019    RDW 13.2 04/17/2019     04/17/2019       CMP RESULTS:  Lab Results "   Component Value Date     (L) 04/17/2019    POTASSIUM 4.8 04/17/2019    CHLORIDE 91 (L) 04/17/2019    CO2 29 04/17/2019    ANIONGAP 6 04/17/2019    GLC 72 04/17/2019    BUN 48 (H) 04/17/2019    CR 1.34 (H) 04/17/2019    GFRESTIMATED 41 (L) 04/17/2019    GFRESTBLACK 48 (L) 04/17/2019    JERROD 9.4 04/17/2019    BILITOTAL 2.6 (H) 04/17/2019    ALBUMIN 4.2 04/17/2019    ALKPHOS 281 (H) 04/17/2019    ALT 45 04/17/2019    AST 32 04/17/2019        INR RESULTS:  Lab Results   Component Value Date    INR 1.30 (H) 01/14/2017       Lab Results   Component Value Date    MAG 3.2 (H) 04/09/2019     Lab Results   Component Value Date    NTBNPI 9,009 (H) 01/13/2017     Lab Results   Component Value Date    NTBNP 1,501 (H) 04/17/2019       Diagnostics:  TTE 4/9/19  Moderate left ventricular hypertrophy.  Global and regional left ventricular function is normal with an EF of 60-65%.  Global right ventricular function is normal.  Moderate aortic valve insufficiency.  Mild pulmonary hypertension with dilated IVC.  This study was compared with the study from 10/11/18 the AR is worse and RA pressure is now increased.    TTE 10/11/18  Global and regional left ventricular function is normal with an EF of 55-60%.  Moderate concentric wall thickening consistent with left ventricular  hypertrophy is present - known amyloid.  Grade III or advanced diastolic dysfunction.  Normal right ventricular size, wall thickness, and function.  Estimated pulmonary artery pressure 28 mmHg.  IVC with normal diameter but does not collapse (>50%) with inspiration.  Trace pericardial effusion.  Compared to prior study from 8/17/17, no significant change.    Assessment and Plan:   Ms. Guerrero is a 68 year old female with AL amyloidosis with cardiac manifestation who presents to [facility] for hospital follow-up. Patient has cardiac amyloidosis as evidenced by her echocardiogram (severe concentric hypertrophy and biatrial enlargement) and abnormal EKG (near-low  voltage, poor R wave progression, biatrial enlargement and no evidence of LVH despite thicken LV on echo) in the setting of biopsy proven (BMB, EGD) AL amyloid. She completed chemotherapy with CyBorD with an excellent serologic response and her heart failure (i.e. troponin negative, NT pro BNP was stable, serum light chains minimal and urine light chains undetectable).     Today she appears compensated on current diuretics. Sodium has trended down which is a poor prognostic sign. We will check NT pro BNP and troponin. Her increased nausea concerning for recurrence of amyloid in her GI system. She is seeing oncology next week. Will also discuss with Dr Cohen the possibility of restarting doxy as patient felt she did better with this.     # Chronic diastolic heart failure/restrictive cardiomyopathy 2/2  # Cardiac amyloidosis    Stage C. NYHA Class III.    Fluid status: euvolemic on torsemide 660 mg bid  ACEi/ARB/ARNI: n/a   BB: no indication and relatively contraindicated due to risk of progressive conduction disease/AV block in these patients  Aldosterone antagonist: spironolactone 50 mg bid - decrease to daily and recheck BMP next week   SCD prophylaxis: does not meet criteria for implant  NSAID use: contraindicated  Sleep apnea evaluation: deferred today  Remote monitoring: deferred today, could be Cardiocom candidate   Goals of care: prior discussions with Dr Cohen re: planning for EoL care as long term prognosis for AL amyloid is poor.      # Hyponatremia  Worse in the last month. I do not suspect this is 2/2 hypovolemic or hypervolemic hyponatremia as she looks fairly compensated centrally. Possibly 2/2 spironolactone? We will start by halving this, recheck next week. If not improved, consider d/c'ing altogether.     # Arrhythmia monitoring   Her Zio patch in 2/17 showed sinus rhythm with non sustained SVT. A repeat 7-day Ziopatch recently reveals short (>45 second) runs of SVT, for which she is generally  not symptomatic. No further eval unless clinical change.      # Systemic amyloid  Heme previously monitoring her light chains which have been negative consistent with remission - therefore further palliative chemo not being considered. Increasing GI symptoms concerning for recurrence. Thankfully she is seeing oncology next week to discuss.       Follow up with Dr Cohen in May and in CORE prn in the meantime.   25 minutes spent face-to-face with patient, >50% in counseling and/or coordination of care as described above      Please do not hesitate to contact me if you have any questions/concerns.     Sincerely,     SE Alberto CNP    CC  STEPHY, ALEJANDRO RAMÍREZ

## 2019-04-18 ENCOUNTER — PATIENT OUTREACH (OUTPATIENT)
Dept: CARDIOLOGY | Facility: CLINIC | Age: 69
End: 2019-04-18

## 2019-04-18 NOTE — PROGRESS NOTES
In clinic visit yesterday with Anupama Krishnan patient wondered whether she should restart Doxycycline. Anupama Krishnan discussed with Dr Cohen who does not think it would help but would defer to oncology for further discussion. Called patient and communicated this and she will discuss further with oncology.     Vidhi Montgomery RN

## 2019-04-22 DIAGNOSIS — E87.6 HYPOKALEMIA: ICD-10-CM

## 2019-04-22 RX ORDER — POTASSIUM CHLORIDE 750 MG/1
20 TABLET, EXTENDED RELEASE ORAL DAILY
Qty: 180 TABLET | Refills: 1 | Status: SHIPPED | OUTPATIENT
Start: 2019-04-22 | End: 2019-05-30

## 2019-04-22 NOTE — TELEPHONE ENCOUNTER
Re-transmitting signed order from 11/15/18 qty 180 with 3 refills as requested (refills adjusted).  *New mail order pharmacy.

## 2019-04-25 ENCOUNTER — CARE COORDINATION (OUTPATIENT)
Dept: CARDIOLOGY | Facility: CLINIC | Age: 69
End: 2019-04-25

## 2019-04-25 ENCOUNTER — ONCOLOGY VISIT (OUTPATIENT)
Dept: ONCOLOGY | Facility: CLINIC | Age: 69
End: 2019-04-25
Payer: COMMERCIAL

## 2019-04-25 VITALS
TEMPERATURE: 98.1 F | RESPIRATION RATE: 18 BRPM | BODY MASS INDEX: 27.11 KG/M2 | SYSTOLIC BLOOD PRESSURE: 110 MMHG | HEIGHT: 63 IN | OXYGEN SATURATION: 97 % | HEART RATE: 58 BPM | WEIGHT: 153 LBS | DIASTOLIC BLOOD PRESSURE: 65 MMHG

## 2019-04-25 DIAGNOSIS — I43 CARDIAC AMYLOIDOSIS (H): ICD-10-CM

## 2019-04-25 DIAGNOSIS — R63.4 WEIGHT LOSS: ICD-10-CM

## 2019-04-25 DIAGNOSIS — E87.6 HYPOKALEMIA: ICD-10-CM

## 2019-04-25 DIAGNOSIS — E85.4 AMYLOID HEART DISEASE (H): ICD-10-CM

## 2019-04-25 DIAGNOSIS — E85.81 AL AMYLOIDOSIS (H): ICD-10-CM

## 2019-04-25 DIAGNOSIS — I43 AMYLOID HEART DISEASE (H): ICD-10-CM

## 2019-04-25 DIAGNOSIS — E85.4 CARDIAC AMYLOIDOSIS (H): ICD-10-CM

## 2019-04-25 DIAGNOSIS — R11.0 NAUSEA: Primary | ICD-10-CM

## 2019-04-25 DIAGNOSIS — I50.33 ACUTE ON CHRONIC DIASTOLIC HEART FAILURE (H): Primary | ICD-10-CM

## 2019-04-25 LAB
ANION GAP SERPL CALCULATED.3IONS-SCNC: 6 MMOL/L (ref 3–14)
BUN SERPL-MCNC: 38 MG/DL (ref 7–30)
CALCIUM SERPL-MCNC: 9.1 MG/DL (ref 8.5–10.1)
CHLORIDE SERPL-SCNC: 93 MMOL/L (ref 94–109)
CO2 SERPL-SCNC: 30 MMOL/L (ref 20–32)
CREAT SERPL-MCNC: 1.25 MG/DL (ref 0.52–1.04)
GFR SERPL CREATININE-BSD FRML MDRD: 45 ML/MIN/{1.73_M2}
GLUCOSE SERPL-MCNC: 76 MG/DL (ref 70–99)
POTASSIUM SERPL-SCNC: 4.6 MMOL/L (ref 3.4–5.3)
SODIUM SERPL-SCNC: 129 MMOL/L (ref 133–144)

## 2019-04-25 PROCEDURE — 80048 BASIC METABOLIC PNL TOTAL CA: CPT | Performed by: NURSE PRACTITIONER

## 2019-04-25 PROCEDURE — 36415 COLL VENOUS BLD VENIPUNCTURE: CPT | Performed by: NURSE PRACTITIONER

## 2019-04-25 PROCEDURE — 99214 OFFICE O/P EST MOD 30 MIN: CPT | Performed by: INTERNAL MEDICINE

## 2019-04-25 RX ORDER — GRANISETRON HYDROCHLORIDE 1 MG/1
1 TABLET, FILM COATED ORAL EVERY 12 HOURS PRN
Qty: 10 TABLET | Refills: 0 | Status: SHIPPED | OUTPATIENT
Start: 2019-04-25 | End: 2020-04-30 | Stop reason: ALTCHOICE

## 2019-04-25 RX ORDER — DOXYCYCLINE HYCLATE 100 MG
100 TABLET ORAL DAILY
Qty: 90 TABLET | Refills: 3 | Status: SHIPPED | OUTPATIENT
Start: 2019-04-25 | End: 2019-04-29

## 2019-04-25 RX ORDER — LORAZEPAM 0.5 MG/1
0.5 TABLET ORAL EVERY 6 HOURS PRN
Qty: 60 TABLET | Refills: 0 | Status: SHIPPED | OUTPATIENT
Start: 2019-04-25 | End: 2021-04-13

## 2019-04-25 ASSESSMENT — MIFFLIN-ST. JEOR: SCORE: 1203.13

## 2019-04-25 ASSESSMENT — PAIN SCALES - GENERAL: PAINLEVEL: NO PAIN (0)

## 2019-04-25 NOTE — PROGRESS NOTES
Hematology/Oncology Follow-up visit:  Date on this visit: Apr 25, 2019      Referring Physician: Dr. Braydon Barron, [facility], [place].  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 She was diagnosed with CHF at a local clinic in [facility] and was placed on a b-blocker and a diuretic. She has also developed progressive worsening lower extremity edema by May 2016 which in retrospect started in January. Her cardiac angiogram was reportedly negative at that time. She has lost about 35 lbs in the last 6 months. She has significant nausea somewhat controlled with Zofran but she is reluctant to use it because of constipation too. She is on Senna-S one tablet PO BID for constipation and that is improved but still significant. She has very poor PO intake due to nausea and lack of appetite. She requested to be seen at [facility] and was seen by Dr. Braydon Barron on 9/13/2016 with f/up on 9/20/2016. She was also seen by cardiology team there Sara Severson CNP and Dr. Nettles from CHF service.  There was no e/o renal or hepatic amyloidosis.  EKG on 9/7/2016 showed normal sinus rhythm, prolonged QT interval (QTc 521 sec), left atrial enlargement and left anterior fascicular block. There was ST and T wave abnormality.  Apparently, her echocardiogram showed preserved LVEF at 59%.  She reports having R sided thoracentesis on 8/25/2016 after which her breathing has improved. She then had a CXR on 9/7/2016 which showed a small R pleural effusion with right basilar atelectasis. Cardiac silhouette was at the upper level of normal.  I have reviewed extensive outside medical records but we are still in the process of obtaining additional records.  The patient also underwent a minor salivary gland biopsy of the lower lip. The pathology from that procedure (9/20/2016) showed normal salivary gland tissue with  nonspecific chronic sialadenitis.  She had extensive lab workup on 9/7/2016.  Her TSH was normal. Serum protein ELP showed a small abnormality in the gamma fraction.  Serum immunofixation showed small monoclonal IgA lambda in the gamma fraction and a small lambda in the beta fraction. Serum IgA level was normal at 329. Serum IgG level was mildly low at 755 (normal range 767-1590) and IgM level was normal. B2 microglobulin was mildly elevated at 2.94 (ULN 2.7). Mg was normal. Total bilirubin was elevated at 3.9 and direct at 0.8. Uric acid was mildly elevated at 6.9 (ULN 6.1) Creat was 1.0. Troponin was 0.1 (ULN <0.01) and NT-pro BNP was 4747 pg/ml (ULN <=183). Total cholesterol was 190 and LDL was 139. Random urine protein was mildly elevated at 33 mg/dl (normal range <22). INR was 1.2 and PTT 27 (within normal limits). Fibrinogen was elevated at 441 and factor X was mildly decreased at 64% (normal range %). D-dimer was up at 1700 (). Free lambda light chains were 69.5 and free kappa light chains were 1.11 with FLC kappa/lambda ratio of 0.016.   CBC showed normal WBC at 6.0, slightly elevated Hb at 16. Platelet count was normal.  Flow cytometry on bone marrow biopsy showed detectable monotypic plasma cells. There were 12% of monotypic plasma cells on bone marrow biopsy.    The bone marrow biopsy (performed on 9/14/2016) showed normocellular BM, 30-40% cellularity, with involvement by 10-20% of lambda light chain restricted plasma cells.  Congo red stain was positive on the bone marrow biopsy. Liquid chromatography tandem mass spectroscopy showed a peptide profile c/w AL (lambda type) amyloid. BM karyotype was normal 46 XX. FISH on BM showed plasma cell clone with 1q duplication, and monosomy of 13 and 14.  She had an EGD by Dr. Matthew Wadsworth on 9/14/2016. It showed gastric mucosal atrophy and multiple mucosal papules (nodules) seen in the stomach. The duodenum appeared normal. The biopsies of stomach  mucosa, of stomach (antral) nodules and of duodenum, all showed the presence of amyloid in submucosal blood vessel walls in the stomach and duodenum and in deep mucosa in the stomach antrum and body, confirmed by Congo Red stain.   She also had additional labs done at our last visit, on 9/30/2016. Total bilirubin was elevated as below, primarily indirect. K was 3.3 and she was stared on K-dur 20 mEq PO bid. She was noted to have elevated serum IgA level on 9/30/2016 at 392 (). Serum free lambda chains were elevated at 37.75. Serum M spike was 0.1 g/dl and serum FLC ratio was low at 0.01. Serum immunofixation ELP showed monoclonal IgA immunoglobulin of lambda light chain type as well as monoclonal free immunoglobulin light chain of lambda chain type.  She was seen by Dr. Cohen too and had an echocardiogram on 10/6/2016 which showed:  Global and regional left ventricular function was normal with an EF of 60-65%.  The LV mass index was 131 g/m2 (severely increased.) The LV geometry is c/w  concentric hypertrophy measured by relative wall thickness. Global right ventricular function was normal. Severe biatrial enlargement was present.  Right ventricular systolic pressure was 26mmHg above the right atrial pressure. The inferior vena cava was dilated at 2.1 cm without respiratory variability, consistent with increased right atrial pressure. Trivial pericardial effusion was present.    She proceeded to start CyBorD as recommended by Dr. Barron with dose reduced in subq Bortezomib to 0.7 mg/m2, on 10/4/2016.  After one cycle, her M spike, IgA level and free lambda light chains have all improved, with M spike of 0 g/dl, IgA down into the normal range and free serum lambda light chains improving from 37.75 down to 2.09.  She returns today in cycle 3 day 11. She had amyloid labs again on day 1 of cycle 3 (12/8/2016) which showed continued response to therapy and her IgA level was now low and serum FLC ratio was normal  and serum free lambda chains were normal too.   She has developed hematuria with cycle 2 and Cytoxan is on hold from day 15 cycle 2 since hematuria persisted despite Mesnex. She had a CT abdomen/pelvis on 11/29/2016 which showed no abnormalities in the kidneys. There was a moderate right and a trace left sided pleural effusions. Small volume ascites and anasarca was noted as well. The liver had heterogeneous appearance, which can be seen in hepatic venous congestion.   Cytoxan was d/cd  on  11/22/2016 since her cystoscopy showed findings of mild petechia and erythema in her bladder that would be c/w hemorrhagic cystitis.  Pinch biopsies were taken from the bladder and showed no e/o amyloid or malignancy. She has continued on Velcade and dexamethasone until 12/27/2016.  On 1/3/2017 she was seen at [facility] by Dr. Barron and at that time serum free light chains were normal at 0.74 and FLC ratio was normal as well at 0.72. She was recommended to be on observation because free lambda chains normalized. However, her cardiac amyloidosis has progressed by 1/5/2017 and she has required frequent thoracentesis, and had weeping LE edema and elevated neck veins.  She was hospitalized for CHF in Jan 2017 due to cardiac amyloid and was aggressively diuresed.  Her echocardiogram on 1/5/2017 was abnormal (severe concentric hypertrophy and biatrial enlargement) and she also had abnormal EKG (near-low voltage, poor R wave progression, biatrial enlargement and no evidence of LVH despite very thickened LV on echo). She was felt to have  New York Heart Association class IIIB heart failure with clinically severe volume overload. She was seen by cardiologist Dr. Harjit Fischer at  in [place]. She was seen by Dr. Chivo Lei from medical oncology at the [facility] in [place], and was recommended to start on Acyclovir and Doxycycline.  She has not started Acyclovir  but did start daily Doxycyline.   Dr. Lei also recommended she  consider maintenance therapy. She was also seen by Dr. Barron at [facility] on 5/11/2017, and he recommended against maintenance therapy.     History Of Present Illness:  Ms. Guerrero is a 68 year old female, non-smoker, who presents for f/up of AL amyloidosis. She has been off treatment since December 2016.  She has been seeing  Dr. Cohen in f/up of cardiac Amyloidosis with preserved EF, now CHF NYHA Class III. They monitor troponin and BNP.  Unfortunately she was hospitalized In the first week of April, at [facility], with acute on chronic CHF and hyponatremia. She had an echocardiogram while inpatient which demonstrated moderate LVH, normal LVEF of 60 to 65%, moderate aortic valve insufficiency and mild pulmonary hypertension and dilated IVC.  Compared to October 2018, aortic regurgitation was worse and RA pressure was increased.She also had increased lower extremity edema on presentation despite being on torsemide 60 mg orally twice a day.She was aggressively diuresed with Lasix drip and metazalone.  Her fluid balance on discharge was net -17.6 L.  She was discharged on spironolactone 25 mg orally twice daily, torsemide 60 mg orally twice daily.  She followed up with cardiology as an outpatient following her discharge.  Her main complaint currently is constant nausea for the past month.  she was not able to tolerate Zofran due to constipation.  She is on Compazine as needed and lorazepam as needed. She has swelling in b/l LE   2+ to 3+ She wears compression stockings for her lower extremity edema.  She is not on doxycycline due to concerns of nausea.  She is also constipated and takes Senokot.  IgA level and free lambda light chains are normal.  Her weight is stable at 153 pounds.  She denies any shortness of breath or cough    She is pain free today.   In addition, a complete 12 point  review of systems is negative.      Past Medical/Surgical History:  AL Amyloid  Amyloid cardiomyopathy/CHF    Family  "History:  Sister  of multiple myeloma    Social History:  Lives in [place], with . Nonsmoker        Allergies:     Allergies   Allergen Reactions     Contrast Dye Shortness Of Breath     Shaking,chills and dypsnea     Cytoxan [Cyclophosphamide]      Hemorrhagic cystitis     Levofloxacin      Severe shaking and abdominal pain     Amoxicillin Nausea and Vomiting and Cramps     Current Medications:  Current Outpatient Medications   Medication     LORazepam (ATIVAN) 0.5 MG tablet     Acetaminophen (TYLENOL PO)     ondansetron (ZOFRAN-ODT) 4 MG ODT tab     potassium chloride ER (K-DUR/KLOR-CON M) 10 MEQ CR tablet     prochlorperazine (COMPAZINE) 5 MG tablet     spironolactone (ALDACTONE) 25 MG tablet     torsemide (DEMADEX) 20 MG tablet     No current facility-administered medications for this visit.       Physical Exam:    /65 (BP Location: Right arm)   Pulse 58   Temp 98.1  F (36.7  C) (Oral)   Resp 18   Ht 1.6 m (5' 3\")   Wt 69.4 kg (153 lb)   SpO2 97%   BMI 27.10 kg/m           GENERAL APPEARANCE: middle aged, alert and no distress     HENT: Mouth without ulcers or lesions     NECK: no adenopathy, no asymmetry or masses     RESP: LL- CTA. RL - decreased BS at base     CARDIOVASCULAR: regular rates and rhythm, normal S1 S2, no S3 or S4 and no murmur.     ABDOMEN:  soft, nontender, no HSM or masses and bowel sounds normal. +  moderate ascites.     MUSCULOSKELETAL: extremities normal- no gross deformities noted, no evidence of inflammation in joints, FROM in all extremities.  +2-3 edema b/l LE stable compared to last visit.       SKIN: no suspicious lesions or rashes     PSYCHIATRIC: mentation appears normal and affect normal    Laboratory/Imaging Studies  Results for HILARY GARCIA (MRN [id number]) as of 2019 14:35   Ref. Range 2018 06:23 10/11/2018 08:12 2019 05:40   IGA Latest Ref Range: 70 - 380 mg/dL 47 (L) 47 (L) 50 (L)   IGG Latest Ref Range: 695 - 1,620 mg/dL 757 695 " 746   IGM Latest Ref Range: 60 - 265 mg/dL 46 (L) 43 (L) 47 (L)   Kappa Free Lt Chain Latest Ref Range: 0.33 - 1.94 mg/dL 0.86 1.06 1.09   Kappa Lambda Ratio Latest Ref Range: 0.26 - 1.65  0.58 0.74 0.75   Lambda Free Lt Chain Latest Ref Range: 0.57 - 2.63 mg/dL 1.49 1.43 1.46   Monoclonal Peak Latest Ref Range: 0.0 g/dL 0.0 0.0 0.0     Lab Results   Component Value Date    WBC 6.1 04/17/2019     Lab Results   Component Value Date    RBC 4.97 04/17/2019     Lab Results   Component Value Date    HGB 16.3 04/17/2019     Lab Results   Component Value Date    HCT 47.6 04/17/2019     No components found for: MCT  Lab Results   Component Value Date    MCV 96 04/17/2019     Lab Results   Component Value Date    MCH 32.8 04/17/2019     Lab Results   Component Value Date    MCHC 34.2 04/17/2019     Lab Results   Component Value Date    RDW 13.2 04/17/2019     Lab Results   Component Value Date     04/17/2019     Results for HILARY GARCIA (MRN [id number]) as of 4/25/2019 14:35   Ref. Range 4/7/2019 07:13 4/7/2019 17:59 4/8/2019 05:39 4/9/2019 05:10 4/17/2019 09:17 4/25/2019 13:34   Creatinine Latest Ref Range: 0.52 - 1.04 mg/dL 1.65 (H) 1.73 (H) 1.86 (H) 1.37 (H) 1.34 (H) 1.25 (H)   Results for HILARY GARCIA (MRN [id number]) as of 4/25/2019 14:35   Ref. Range 2/9/2018 09:19 6/7/2018 06:23 10/11/2018 08:12 4/2/2019 20:47 4/17/2019 09:17   Bilirubin Total Latest Ref Range: 0.2 - 1.3 mg/dL 2.7 (H) 2.7 (H) 3.1 (H) 3.1 (H) 2.6 (H)     Results for HILARY GARCIA (MRN [id number]) as of 4/25/2019 14:35   Ref. Range 2/9/2018 09:19 6/7/2018 06:23 10/11/2018 08:12 4/2/2019 20:47 4/17/2019 09:17   Alkaline Phosphatase Latest Ref Range: 40 - 150 U/L 240 (H) 204 (H) 252 (H) 290 (H) 281 (H)     CXR 4/2/19: Small right pleural effusion and overlying  atelectasis/consolidation.   US b/l LE 4/2/19: no DVT in b/l LE.   ASSESSMENT/PLAN:  Leandra Garcia is a 68 year old woman with  of AL amyloidosis involving the heart and GI  tract. 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 Stage IV amyloidosis is associated with median survival of 5 months in patients not undergoing BMT, and 5 year survival of 15%, and in patients who are able to undergo BMT, median survival is 22 months and 5 year survival of 46% (Dejan APODACA, Anderson BAR, Toñito RA, et al. Prognostication of survival using cardiac troponins and N-terminal pro-brain natriuretic peptide in patients with primary systemic amyloidosis undergoing peripheral blood stem cell transplantation. Blood 2004; 104:1881). She was felt not to be a candidate for high dose therapy.   She was on Cybor-D from 10/4/2016-12/27/2016, with Cytoxan omitted after cycle 2 day 1 due to her developing hemorrhagic cystitis. Her disease has responded  biochemically, with serum M spike of zero, and serum IgA level was down to low range and  free lambda light chains have normalized quantitatively.     I have discussed her situation with Dr. Barron, and he felt at that time that with normalization of serum free lambda light chains, no further chemotherapy is indicated. Unfortunately, the anti-amyloid antibody clinical trial  at [facility] is closed.     1.  AL amyloidosis - Serum FLC ratio is normal. Both serum free kappa light chains and lambda light chains are within normal limits. There is no M protein on serum ELP.   She would be ready to resume Doxycycline if her nausea was under better control. She did feel better on it in the past as far as lower extremity edema although it has a questionable clinical benefit in the literature. We will try to step up on her antiemetic regimen as below.   We'll see her back in 6 months with amyloid labs prior.     2. Recurrent pleural effusions- Last CXR in 04/2019 showed a stable small right  pleural effusion.  She denies shortness of breath presently.   PleurX catheter is an option in the future if she were to become symptomatic.    3. Amyloid cardiomyopathy-  She is being followed by  Dr. Cohen. Continue current diuretic regimen with Torsemide 60 mg PO QAM and 60 mg PO QHS one day and 60mg twice a day in the afternoon the next day. Continue  aldactone 25 mg PO BID. Cardiac Amyloidosis (a restrictive cardiomyopathy) , Stage C, NYHA Class III  4. Nausea- The nausea has gotten worse. Conitnue comapzine prn and lorazepam prn. She had constipation with Zofran in the past but needs a longer lasting nausea relief since it has been almost constant.  At this time, we will add Kytril 1 mg orally twice a day.  Also, consider Zyprexa in the future. May need a palliative care consult if nausea progresses.  5.Constipation- using Senocot once a day only at night.  I recommended that she use Senokot 2 tablets twice a day and try to use MiraLAX daily.  In the past she has had trouble using MiraLAX due to her nausea but she will try again.  4. CKD- Creat stable as above.  5.Stable elevated total bilirubin and alk phos, stable- in the past felt to be secondary to liver congestion. Cont to follow.    At the end of our visit patient and  verbalized understanding and concurred with the plan.

## 2019-04-25 NOTE — PROGRESS NOTES
Results reviewed by Anupama Krishnan.  Spoke with patient.  She stated that she had not decreased the dose but had been taking 50 mg in the morning instead of taking 25 mg BID.  She was very concerned about stopping the dose altogether.  Also, she will be out of town from 5/4-5/15 and would be unable to get labs in the 10-14 day window.  Will discuss with Anupama Krishnan and get back to Amy.     Niki Fam RN

## 2019-04-25 NOTE — LETTER
4/25/2019         RE: Leandra Guerrero  [address] [place] [address]        Dear Colleague,    Thank you for referring your patient, Leandra Guerrero, to the [facility]. Please see a copy of my visit note below.    Hematology/Oncology Follow-up visit:  Date on this visit: Apr 25, 2019      Referring Physician: Dr. Braydon Barron, [facility].  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 She was diagnosed with CHF at a local clinic in [facility] and was placed on a b-blocker and a diuretic. She has also developed progressive worsening lower extremity edema by May 2016 which in retrospect started in January. Her cardiac angiogram was reportedly negative at that time. She has lost about 35 lbs in the last 6 months. She has significant nausea somewhat controlled with Zofran but she is reluctant to use it because of constipation too. She is on Senna-S one tablet PO BID for constipation and that is improved but still significant. She has very poor PO intake due to nausea and lack of appetite. She requested to be seen at [facility] and was seen by Dr. Braydon Barron on 9/13/2016 with f/up on 9/20/2016. She was also seen by cardiology team there Sara Severson CNP and Dr. Nettles from CHF service.  There was no e/o renal or hepatic amyloidosis.  EKG on 9/7/2016 showed normal sinus rhythm, prolonged QT interval (QTc 521 sec), left atrial enlargement and left anterior fascicular block. There was ST and T wave abnormality.  Apparently, her echocardiogram showed preserved LVEF at 59%.  She reports having R sided thoracentesis on 8/25/2016 after which her breathing has improved. She then had a CXR on 9/7/2016 which showed a small R pleural effusion with right basilar atelectasis. Cardiac silhouette was at the upper level of normal.  I have reviewed extensive  outside medical records but we are still in the process of obtaining additional records.  The patient also underwent a minor salivary gland biopsy of the lower lip. The pathology from that procedure (9/20/2016) showed normal salivary gland tissue with nonspecific chronic sialadenitis.  She had extensive lab workup on 9/7/2016.  Her TSH was normal. Serum protein ELP showed a small abnormality in the gamma fraction.  Serum immunofixation showed small monoclonal IgA lambda in the gamma fraction and a small lambda in the beta fraction. Serum IgA level was normal at 329. Serum IgG level was mildly low at 755 (normal range 767-1590) and IgM level was normal. B2 microglobulin was mildly elevated at 2.94 (ULN 2.7). Mg was normal. Total bilirubin was elevated at 3.9 and direct at 0.8. Uric acid was mildly elevated at 6.9 (ULN 6.1) Creat was 1.0. Troponin was 0.1 (ULN <0.01) and NT-pro BNP was 4747 pg/ml (ULN <=183). Total cholesterol was 190 and LDL was 139. Random urine protein was mildly elevated at 33 mg/dl (normal range <22). INR was 1.2 and PTT 27 (within normal limits). Fibrinogen was elevated at 441 and factor X was mildly decreased at 64% (normal range %). D-dimer was up at 1700 (). Free lambda light chains were 69.5 and free kappa light chains were 1.11 with FLC kappa/lambda ratio of 0.016.   CBC showed normal WBC at 6.0, slightly elevated Hb at 16. Platelet count was normal.  Flow cytometry on bone marrow biopsy showed detectable monotypic plasma cells. There were 12% of monotypic plasma cells on bone marrow biopsy.    The bone marrow biopsy (performed on 9/14/2016) showed normocellular BM, 30-40% cellularity, with involvement by 10-20% of lambda light chain restricted plasma cells.  Congo red stain was positive on the bone marrow biopsy. Liquid chromatography tandem mass spectroscopy showed a peptide profile c/w AL (lambda type) amyloid. BM karyotype was normal 46 XX. FISH on BM showed plasma cell  clone with 1q duplication, and monosomy of 13 and 14.  She had an EGD by Dr. Matthew Wadsworth on 9/14/2016. It showed gastric mucosal atrophy and multiple mucosal papules (nodules) seen in the stomach. The duodenum appeared normal. The biopsies of stomach mucosa, of stomach (antral) nodules and of duodenum, all showed the presence of amyloid in submucosal blood vessel walls in the stomach and duodenum and in deep mucosa in the stomach antrum and body, confirmed by Congo Red stain.   She also had additional labs done at our last visit, on 9/30/2016. Total bilirubin was elevated as below, primarily indirect. K was 3.3 and she was stared on K-dur 20 mEq PO bid. She was noted to have elevated serum IgA level on 9/30/2016 at 392 (). Serum free lambda chains were elevated at 37.75. Serum M spike was 0.1 g/dl and serum FLC ratio was low at 0.01. Serum immunofixation ELP showed monoclonal IgA immunoglobulin of lambda light chain type as well as monoclonal free immunoglobulin light chain of lambda chain type.  She was seen by Dr. Cohen too and had an echocardiogram on 10/6/2016 which showed:  Global and regional left ventricular function was normal with an EF of 60-65%.  The LV mass index was 131 g/m2 (severely increased.) The LV geometry is c/w  concentric hypertrophy measured by relative wall thickness. Global right ventricular function was normal. Severe biatrial enlargement was present.  Right ventricular systolic pressure was 26mmHg above the right atrial pressure. The inferior vena cava was dilated at 2.1 cm without respiratory variability, consistent with increased right atrial pressure. Trivial pericardial effusion was present.    She proceeded to start CyBorD as recommended by Dr. Barron with dose reduced in subq Bortezomib to 0.7 mg/m2, on 10/4/2016.  After one cycle, her M spike, IgA level and free lambda light chains have all improved, with M spike of 0 g/dl, IgA down into the normal range and free serum  lambda light chains improving from 37.75 down to 2.09.  She returns today in cycle 3 day 11. She had amyloid labs again on day 1 of cycle 3 (12/8/2016) which showed continued response to therapy and her IgA level was now low and serum FLC ratio was normal and serum free lambda chains were normal too.   She has developed hematuria with cycle 2 and Cytoxan is on hold from day 15 cycle 2 since hematuria persisted despite Mesnex. She had a CT abdomen/pelvis on 11/29/2016 which showed no abnormalities in the kidneys. There was a moderate right and a trace left sided pleural effusions. Small volume ascites and anasarca was noted as well. The liver had heterogeneous appearance, which can be seen in hepatic venous congestion.   Cytoxan was d/cd  on  11/22/2016 since her cystoscopy showed findings of mild petechia and erythema in her bladder that would be c/w hemorrhagic cystitis.  Pinch biopsies were taken from the bladder and showed no e/o amyloid or malignancy. She has continued on Velcade and dexamethasone until 12/27/2016.  On 1/3/2017 she was seen at [facility] by Dr. Barron and at that time serum free light chains were normal at 0.74 and FLC ratio was normal as well at 0.72. She was recommended to be on observation because free lambda chains normalized. However, her cardiac amyloidosis has progressed by 1/5/2017 and she has required frequent thoracentesis, and had weeping LE edema and elevated neck veins.  She was hospitalized for CHF in Jan 2017 due to cardiac amyloid and was aggressively diuresed.  Her echocardiogram on 1/5/2017 was abnormal (severe concentric hypertrophy and biatrial enlargement) and she also had abnormal EKG (near-low voltage, poor R wave progression, biatrial enlargement and no evidence of LVH despite very thickened LV on echo). She was felt to have  New York Heart Association class IIIB heart failure with clinically severe volume overload. She was seen by cardiologist Dr. Harjit Fischer at  in  [place]. She was seen by Dr. Chivo Lei from medical oncology at the [facility] in [place], and was recommended to start on Acyclovir and Doxycycline.  She has not started Acyclovir  but did start daily Doxycyline.   Dr. Lei also recommended she consider maintenance therapy. She was also seen by Dr. Barron at [facility] on 5/11/2017, and he recommended against maintenance therapy.     History Of Present Illness:  Ms. Guerrero is a 68 year old female, non-smoker, who presents for f/up of AL amyloidosis. She has been off treatment since December 2016.  She has been seeing  Dr. Cohen in f/up of cardiac Amyloidosis with preserved EF, now CHF NYHA Class III. They monitor troponin and BNP.  Unfortunately she was hospitalized In the first week of April, at [facility], with acute on chronic CHF and hyponatremia. She had an echocardiogram while inpatient which demonstrated moderate LVH, normal LVEF of 60 to 65%, moderate aortic valve insufficiency and mild pulmonary hypertension and dilated IVC.  Compared to October 2018, aortic regurgitation was worse and RA pressure was increased.She also had increased lower extremity edema on presentation despite being on torsemide 60 mg orally twice a day.She was aggressively diuresed with Lasix drip and metazalone.  Her fluid balance on discharge was net -17.6 L.  She was discharged on spironolactone 25 mg orally twice daily, torsemide 60 mg orally twice daily.  She followed up with cardiology as an outpatient following her discharge.  Her main complaint currently is constant nausea for the past month.  she was not able to tolerate Zofran due to constipation.  She is on Compazine as needed and lorazepam as needed. She has swelling in b/l LE   2+ to 3+ She wears compression stockings for her lower extremity edema.  She is not on doxycycline due to concerns of nausea.  She is also constipated and takes Senokot.  IgA level and free lambda light chains are normal.  Her weight is stable at  "153 pounds.  She denies any shortness of breath or cough    She is pain free today.   In addition, a complete 12 point  review of systems is negative.      Past Medical/Surgical History:  AL Amyloid  Amyloid cardiomyopathy/CHF    Family History:  Sister  of multiple myeloma    Social History:  Lives in [place], with . Nonsmoker        Allergies:     Allergies   Allergen Reactions     Contrast Dye Shortness Of Breath     Shaking,chills and dypsnea     Cytoxan [Cyclophosphamide]      Hemorrhagic cystitis     Levofloxacin      Severe shaking and abdominal pain     Amoxicillin Nausea and Vomiting and Cramps     Current Medications:  Current Outpatient Medications   Medication     LORazepam (ATIVAN) 0.5 MG tablet     Acetaminophen (TYLENOL PO)     ondansetron (ZOFRAN-ODT) 4 MG ODT tab     potassium chloride ER (K-DUR/KLOR-CON M) 10 MEQ CR tablet     prochlorperazine (COMPAZINE) 5 MG tablet     spironolactone (ALDACTONE) 25 MG tablet     torsemide (DEMADEX) 20 MG tablet     No current facility-administered medications for this visit.       Physical Exam:    /65 (BP Location: Right arm)   Pulse 58   Temp 98.1  F (36.7  C) (Oral)   Resp 18   Ht 1.6 m (5' 3\")   Wt 69.4 kg (153 lb)   SpO2 97%   BMI 27.10 kg/m           GENERAL APPEARANCE: middle aged, alert and no distress     HENT: Mouth without ulcers or lesions     NECK: no adenopathy, no asymmetry or masses     RESP: LL- CTA. RL - decreased BS at base     CARDIOVASCULAR: regular rates and rhythm, normal S1 S2, no S3 or S4 and no murmur.     ABDOMEN:  soft, nontender, no HSM or masses and bowel sounds normal. +  moderate ascites.     MUSCULOSKELETAL: extremities normal- no gross deformities noted, no evidence of inflammation in joints, FROM in all extremities.  +2-3 edema b/l LE stable compared to last visit.       SKIN: no suspicious lesions or rashes     PSYCHIATRIC: mentation appears normal and affect normal    Laboratory/Imaging " Studies  Results for HILARY GARCIA (MRN [id number]) as of 4/25/2019 14:35   Ref. Range 6/7/2018 06:23 10/11/2018 08:12 4/4/2019 05:40   IGA Latest Ref Range: 70 - 380 mg/dL 47 (L) 47 (L) 50 (L)   IGG Latest Ref Range: 695 - 1,620 mg/dL 757 695 746   IGM Latest Ref Range: 60 - 265 mg/dL 46 (L) 43 (L) 47 (L)   Kappa Free Lt Chain Latest Ref Range: 0.33 - 1.94 mg/dL 0.86 1.06 1.09   Kappa Lambda Ratio Latest Ref Range: 0.26 - 1.65  0.58 0.74 0.75   Lambda Free Lt Chain Latest Ref Range: 0.57 - 2.63 mg/dL 1.49 1.43 1.46   Monoclonal Peak Latest Ref Range: 0.0 g/dL 0.0 0.0 0.0     Lab Results   Component Value Date    WBC 6.1 04/17/2019     Lab Results   Component Value Date    RBC 4.97 04/17/2019     Lab Results   Component Value Date    HGB 16.3 04/17/2019     Lab Results   Component Value Date    HCT 47.6 04/17/2019     No components found for: MCT  Lab Results   Component Value Date    MCV 96 04/17/2019     Lab Results   Component Value Date    MCH 32.8 04/17/2019     Lab Results   Component Value Date    MCHC 34.2 04/17/2019     Lab Results   Component Value Date    RDW 13.2 04/17/2019     Lab Results   Component Value Date     04/17/2019     Results for HILARY GARCIA (MRN [id number]) as of 4/25/2019 14:35   Ref. Range 4/7/2019 07:13 4/7/2019 17:59 4/8/2019 05:39 4/9/2019 05:10 4/17/2019 09:17 4/25/2019 13:34   Creatinine Latest Ref Range: 0.52 - 1.04 mg/dL 1.65 (H) 1.73 (H) 1.86 (H) 1.37 (H) 1.34 (H) 1.25 (H)   Results for HILARY GARCIA (MRN [id number]) as of 4/25/2019 14:35   Ref. Range 2/9/2018 09:19 6/7/2018 06:23 10/11/2018 08:12 4/2/2019 20:47 4/17/2019 09:17   Bilirubin Total Latest Ref Range: 0.2 - 1.3 mg/dL 2.7 (H) 2.7 (H) 3.1 (H) 3.1 (H) 2.6 (H)     Results for HILARY GARCIA PRADIP (MRN [id number]) as of 4/25/2019 14:35   Ref. Range 2/9/2018 09:19 6/7/2018 06:23 10/11/2018 08:12 4/2/2019 20:47 4/17/2019 09:17   Alkaline Phosphatase Latest Ref Range: 40 - 150 U/L 240 (H) 204 (H) 252 (H) 290  (H) 281 (H)     CXR 4/2/19: Small right pleural effusion and overlying  atelectasis/consolidation.   US b/l LE 4/2/19: no DVT in b/l LE.   ASSESSMENT/PLAN:  Leandra Guerrero is a 68 year old woman with  of AL amyloidosis involving the heart and GI tract. 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 Stage IV amyloidosis is associated with median survival of 5 months in patients not undergoing BMT, and 5 year survival of 15%, and in patients who are able to undergo BMT, median survival is 22 months and 5 year survival of 46% (Dejan APODACA, Anderson ABR, Toñito MCATRHUR, et al. Prognostication of survival using cardiac troponins and N-terminal pro-brain natriuretic peptide in patients with primary systemic amyloidosis undergoing peripheral blood stem cell transplantation. Blood 2004; 104:1881). She was felt not to be a candidate for high dose therapy.   She was on Cybor-D from 10/4/2016-12/27/2016, with Cytoxan omitted after cycle 2 day 1 due to her developing hemorrhagic cystitis. Her disease has responded  biochemically, with serum M spike of zero, and serum IgA level was down to low range and  free lambda light chains have normalized quantitatively.     I have discussed her situation with Dr. Barron, and he felt at that time that with normalization of serum free lambda light chains, no further chemotherapy is indicated. Unfortunately, the anti-amyloid antibody clinical trial  at [facility] is closed.     1.  AL amyloidosis - Serum FLC ratio is normal. Both serum free kappa light chains and lambda light chains are within normal limits. There is no M protein on serum ELP.   She would be ready to resume Doxycycline if her nausea was under better control. She did feel better on it in the past as far as lower extremity edema although it has a  questionable clinical benefit in the literature. We will try to step up on her antiemetic regimen as below.   We'll see her back in 6 months with amyloid labs prior.     2. Recurrent pleural effusions- Last CXR in 04/2019 showed a stable small right pleural effusion.  She denies shortness of breath presently.   PleurX catheter is an option in the future if she were to become symptomatic.    3. Amyloid cardiomyopathy-  She is being followed by  Dr. Cohen. Continue current diuretic regimen with Torsemide 60 mg PO QAM and 60 mg PO QHS one day and 60mg twice a day in the afternoon the next day. Continue  aldactone 25 mg PO BID. Cardiac Amyloidosis (a restrictive cardiomyopathy) , Stage C, NYHA Class III  4. Nausea- The nausea has gotten worse. Conitnue comapzine prn and lorazepam prn. She had constipation with Zofran in the past but needs a longer lasting nausea relief since it has been almost constant.  At this time, we will add Kytril 1 mg orally twice a day.  Also, consider Zyprexa in the future. May need a palliative care consult if nausea progresses.  5.Constipation- using Senocot once a day only at night.  I recommended that she use Senokot 2 tablets twice a day and try to use MiraLAX daily.  In the past she has had trouble using MiraLAX due to her nausea but she will try again.  4. CKD- Creat stable as above.  5.Stable elevated total bilirubin and alk phos, stable- in the past felt to be secondary to liver congestion. Cont to follow.    At the end of our visit patient and  verbalized understanding and concurred with the plan.        Again, thank you for allowing me to participate in the care of your patient.        Sincerely,        Mirela Moore MD, MD

## 2019-04-25 NOTE — NURSING NOTE
"Oncology Rooming Note    April 25, 2019 2:29 PM   Leandra Guerrero is a 68 year old female who presents for:    Chief Complaint   Patient presents with     Oncology Clinic Visit     6 mon f/u     Initial Vitals: /65 (BP Location: Right arm)   Pulse 58   Temp 98.1  F (36.7  C) (Oral)   Resp 18   Ht 1.6 m (5' 3\")   Wt 69.4 kg (153 lb)   SpO2 97%   BMI 27.10 kg/m   Estimated body mass index is 27.1 kg/m  as calculated from the following:    Height as of this encounter: 1.6 m (5' 3\").    Weight as of this encounter: 69.4 kg (153 lb). Body surface area is 1.76 meters squared.  No Pain (0) Comment: Data Unavailable   No LMP recorded. Patient is postmenopausal.  Allergies reviewed: Yes  Medications reviewed: Yes    Medications: MEDICATION REFILLS NEEDED TODAY. Provider was notified.  Pharmacy name entered into [facility]:    [facility] [address] [place], [place] - [address]  [id number] - [place], [place] - [address]    Gabrielle Pacheco LPN              "

## 2019-04-26 RX ORDER — SPIRONOLACTONE 25 MG/1
25 TABLET ORAL DAILY
Qty: 60 TABLET | Refills: 3 | Status: SHIPPED | OUTPATIENT
Start: 2019-04-26 | End: 2019-06-26

## 2019-04-26 RX ORDER — SPIRONOLACTONE 25 MG/1
25 TABLET ORAL DAILY
Qty: 60 TABLET | Refills: 3 | Status: SHIPPED | OUTPATIENT
Start: 2019-04-26 | End: 2019-04-26

## 2019-04-26 NOTE — PROGRESS NOTES
Spoke with Amy again.  She is agreeable to decreasing her spirolactone dose to 25 mg daily as per Anupama's original plan.  She will also get labs checked on 5/17 in [place], when she gets back from vacation.      Date: 4/26/2019    Time of Call: 1:32 PM     Diagnosis:  Heart Failure     [ VORB ] Ordering provider: Anupama Krishnan NP  Order: Decrease Spirolactone to 25 mg daily   Check BMP in 10-14 days     Order received by: Niki Fam RN     Follow-up/additional notes: Amy is going on vacation and will not be back until late 5/16, will get her labs checked 5/17 in the morning

## 2019-04-29 ENCOUNTER — CARE COORDINATION (OUTPATIENT)
Dept: ONCOLOGY | Facility: CLINIC | Age: 69
End: 2019-04-29

## 2019-04-29 DIAGNOSIS — E85.81 AL AMYLOIDOSIS (H): ICD-10-CM

## 2019-04-29 RX ORDER — DOXYCYCLINE HYCLATE 100 MG
100 TABLET ORAL DAILY
Qty: 90 TABLET | Refills: 3 | Status: SHIPPED | OUTPATIENT
Start: 2019-04-29 | End: 2019-04-29

## 2019-04-29 RX ORDER — DOXYCYCLINE HYCLATE 100 MG
100 TABLET ORAL DAILY
Qty: 90 TABLET | Refills: 3 | Status: SHIPPED | OUTPATIENT
Start: 2019-04-29 | End: 2020-02-25

## 2019-05-17 DIAGNOSIS — E85.81 AL AMYLOIDOSIS (H): ICD-10-CM

## 2019-05-17 LAB
ALBUMIN SERPL-MCNC: 4.2 G/DL (ref 3.4–5)
ALP SERPL-CCNC: 263 U/L (ref 40–150)
ALT SERPL W P-5'-P-CCNC: 32 U/L (ref 0–50)
ANION GAP SERPL CALCULATED.3IONS-SCNC: 6 MMOL/L (ref 3–14)
AST SERPL W P-5'-P-CCNC: 25 U/L (ref 0–45)
BASOPHILS # BLD AUTO: 0.1 10E9/L (ref 0–0.2)
BASOPHILS NFR BLD AUTO: 1.7 %
BILIRUB SERPL-MCNC: 2.6 MG/DL (ref 0.2–1.3)
BUN SERPL-MCNC: 32 MG/DL (ref 7–30)
CALCIUM SERPL-MCNC: 9.3 MG/DL (ref 8.5–10.1)
CHLORIDE SERPL-SCNC: 95 MMOL/L (ref 94–109)
CO2 SERPL-SCNC: 31 MMOL/L (ref 20–32)
CREAT SERPL-MCNC: 1.28 MG/DL (ref 0.52–1.04)
DIFFERENTIAL METHOD BLD: ABNORMAL
EOSINOPHIL # BLD AUTO: 1 10E9/L (ref 0–0.7)
EOSINOPHIL NFR BLD AUTO: 19.6 %
ERYTHROCYTE [DISTWIDTH] IN BLOOD BY AUTOMATED COUNT: 14.1 % (ref 10–15)
GFR SERPL CREATININE-BSD FRML MDRD: 43 ML/MIN/{1.73_M2}
GLUCOSE SERPL-MCNC: 92 MG/DL (ref 70–99)
HCT VFR BLD AUTO: 44.4 % (ref 35–47)
HGB BLD-MCNC: 14.9 G/DL (ref 11.7–15.7)
IGA SERPL-MCNC: 50 MG/DL (ref 70–380)
IGG SERPL-MCNC: 750 MG/DL (ref 695–1620)
IGM SERPL-MCNC: 49 MG/DL (ref 60–265)
IMM GRANULOCYTES # BLD: 0 10E9/L (ref 0–0.4)
IMM GRANULOCYTES NFR BLD: 0.4 %
KAPPA LC UR-MCNC: NORMAL MG/DL (ref 0.33–1.94)
KAPPA LC/LAMBDA SER: NORMAL {RATIO} (ref 0.26–1.65)
LAMBDA LC SERPL-MCNC: NORMAL MG/DL (ref 0.57–2.63)
LYMPHOCYTES # BLD AUTO: 0.3 10E9/L (ref 0.8–5.3)
LYMPHOCYTES NFR BLD AUTO: 6 %
MCH RBC QN AUTO: 32.5 PG (ref 26.5–33)
MCHC RBC AUTO-ENTMCNC: 33.6 G/DL (ref 31.5–36.5)
MCV RBC AUTO: 97 FL (ref 78–100)
MONOCYTES # BLD AUTO: 0.4 10E9/L (ref 0–1.3)
MONOCYTES NFR BLD AUTO: 7.6 %
NEUTROPHILS # BLD AUTO: 3.1 10E9/L (ref 1.6–8.3)
NEUTROPHILS NFR BLD AUTO: 64.7 %
PLATELET # BLD AUTO: 190 10E9/L (ref 150–450)
POTASSIUM SERPL-SCNC: 4.6 MMOL/L (ref 3.4–5.3)
PROT SERPL-MCNC: 7.2 G/DL (ref 6.8–8.8)
RBC # BLD AUTO: 4.59 10E12/L (ref 3.8–5.2)
SODIUM SERPL-SCNC: 132 MMOL/L (ref 133–144)
WBC # BLD AUTO: 4.8 10E9/L (ref 4–11)

## 2019-05-17 PROCEDURE — 82784 ASSAY IGA/IGD/IGG/IGM EACH: CPT | Performed by: INTERNAL MEDICINE

## 2019-05-17 PROCEDURE — 84165 PROTEIN E-PHORESIS SERUM: CPT | Performed by: INTERNAL MEDICINE

## 2019-05-17 PROCEDURE — 00000402 ZZHCL STATISTIC TOTAL PROTEIN: Performed by: INTERNAL MEDICINE

## 2019-05-17 PROCEDURE — 80053 COMPREHEN METABOLIC PANEL: CPT | Performed by: INTERNAL MEDICINE

## 2019-05-17 PROCEDURE — 85025 COMPLETE CBC W/AUTO DIFF WBC: CPT | Performed by: INTERNAL MEDICINE

## 2019-05-17 PROCEDURE — 36415 COLL VENOUS BLD VENIPUNCTURE: CPT | Performed by: INTERNAL MEDICINE

## 2019-05-18 ENCOUNTER — MYC REFILL (OUTPATIENT)
Dept: CARDIOLOGY | Facility: CLINIC | Age: 69
End: 2019-05-18

## 2019-05-18 DIAGNOSIS — I50.33 ACUTE ON CHRONIC DIASTOLIC HEART FAILURE (H): ICD-10-CM

## 2019-05-18 RX ORDER — TORSEMIDE 20 MG/1
TABLET ORAL
Qty: 554 TABLET | Refills: 3 | Status: CANCELLED | OUTPATIENT
Start: 2019-05-18

## 2019-05-19 LAB
RESULT: NORMAL
SEND OUTS MISC TEST CODE: NORMAL
SEND OUTS MISC TEST SPECIMEN: NORMAL
TEST NAME: NORMAL

## 2019-05-20 LAB
ALBUMIN SERPL ELPH-MCNC: 4.4 G/DL (ref 3.7–5.1)
ALPHA1 GLOB SERPL ELPH-MCNC: 0.4 G/DL (ref 0.2–0.4)
ALPHA2 GLOB SERPL ELPH-MCNC: 0.6 G/DL (ref 0.5–0.9)
B-GLOBULIN SERPL ELPH-MCNC: 0.7 G/DL (ref 0.6–1)
GAMMA GLOB SERPL ELPH-MCNC: 0.7 G/DL (ref 0.7–1.6)
M PROTEIN SERPL ELPH-MCNC: 0 G/DL
PROT PATTERN SERPL ELPH-IMP: NORMAL

## 2019-05-20 NOTE — PROGRESS NOTES
Followed up with Amy regarding labs.  Per Mulu rKishnan NP laps looked better and should continue on her current dose of spirolactone.  Patient stated understanding of plan.      Niki Fam RN

## 2019-05-22 DIAGNOSIS — I50.33 ACUTE ON CHRONIC DIASTOLIC HEART FAILURE (H): Primary | ICD-10-CM

## 2019-05-28 ASSESSMENT — ENCOUNTER SYMPTOMS
ORTHOPNEA: 1
INSOMNIA: 1
SLEEP DISTURBANCES DUE TO BREATHING: 1
SKIN CHANGES: 0
LIGHT-HEADEDNESS: 1
NERVOUS/ANXIOUS: 1
POSTURAL DYSPNEA: 1
DYSPNEA ON EXERTION: 1
PANIC: 1
NAIL CHANGES: 0
NAUSEA: 1
POOR WOUND HEALING: 0
CONSTIPATION: 1
PALPITATIONS: 1
BLOATING: 1

## 2019-05-30 ENCOUNTER — OFFICE VISIT (OUTPATIENT)
Dept: CARDIOLOGY | Facility: CLINIC | Age: 69
End: 2019-05-30
Attending: INTERNAL MEDICINE
Payer: COMMERCIAL

## 2019-05-30 ENCOUNTER — TELEPHONE (OUTPATIENT)
Dept: CARDIOLOGY | Facility: CLINIC | Age: 69
End: 2019-05-30

## 2019-05-30 VITALS
SYSTOLIC BLOOD PRESSURE: 126 MMHG | OXYGEN SATURATION: 98 % | HEART RATE: 65 BPM | WEIGHT: 160 LBS | BODY MASS INDEX: 28.35 KG/M2 | HEIGHT: 63 IN | DIASTOLIC BLOOD PRESSURE: 73 MMHG

## 2019-05-30 DIAGNOSIS — I50.33 ACUTE ON CHRONIC DIASTOLIC HEART FAILURE (H): ICD-10-CM

## 2019-05-30 DIAGNOSIS — E87.6 HYPOKALEMIA: ICD-10-CM

## 2019-05-30 DIAGNOSIS — E85.4 AMYLOID HEART DISEASE (H): ICD-10-CM

## 2019-05-30 DIAGNOSIS — I43 AMYLOID HEART DISEASE (H): ICD-10-CM

## 2019-05-30 LAB
ANION GAP SERPL CALCULATED.3IONS-SCNC: 5 MMOL/L (ref 3–14)
BUN SERPL-MCNC: 29 MG/DL (ref 7–30)
CALCIUM SERPL-MCNC: 9.5 MG/DL (ref 8.5–10.1)
CHLORIDE SERPL-SCNC: 96 MMOL/L (ref 94–109)
CO2 SERPL-SCNC: 32 MMOL/L (ref 20–32)
CREAT SERPL-MCNC: 1.15 MG/DL (ref 0.52–1.04)
GFR SERPL CREATININE-BSD FRML MDRD: 49 ML/MIN/{1.73_M2}
GLUCOSE SERPL-MCNC: 95 MG/DL (ref 70–99)
NT-PROBNP SERPL-MCNC: 1378 PG/ML (ref 0–125)
POTASSIUM SERPL-SCNC: 4.7 MMOL/L (ref 3.4–5.3)
SODIUM SERPL-SCNC: 133 MMOL/L (ref 133–144)

## 2019-05-30 PROCEDURE — G0463 HOSPITAL OUTPT CLINIC VISIT: HCPCS | Mod: ZF

## 2019-05-30 PROCEDURE — 80048 BASIC METABOLIC PNL TOTAL CA: CPT | Performed by: INTERNAL MEDICINE

## 2019-05-30 PROCEDURE — 83880 ASSAY OF NATRIURETIC PEPTIDE: CPT | Performed by: INTERNAL MEDICINE

## 2019-05-30 PROCEDURE — 36415 COLL VENOUS BLD VENIPUNCTURE: CPT | Performed by: INTERNAL MEDICINE

## 2019-05-30 PROCEDURE — 99215 OFFICE O/P EST HI 40 MIN: CPT | Mod: GC | Performed by: INTERNAL MEDICINE

## 2019-05-30 RX ORDER — TORSEMIDE 100 MG/1
100 TABLET ORAL 2 TIMES DAILY
Qty: 180 TABLET | Refills: 3 | Status: SHIPPED | OUTPATIENT
Start: 2019-05-30 | End: 2019-08-06

## 2019-05-30 RX ORDER — POTASSIUM CHLORIDE 750 MG/1
TABLET, EXTENDED RELEASE ORAL
Qty: 180 TABLET | Refills: 1 | Status: SHIPPED | OUTPATIENT
Start: 2019-05-30 | End: 2019-06-26

## 2019-05-30 RX ORDER — METOLAZONE 2.5 MG/1
TABLET ORAL
Qty: 15 TABLET | Refills: 0 | Status: SHIPPED | OUTPATIENT
Start: 2019-05-30 | End: 2019-06-03

## 2019-05-30 RX ORDER — METOLAZONE 2.5 MG/1
TABLET ORAL
Qty: 15 TABLET | Refills: 0 | Status: SHIPPED | OUTPATIENT
Start: 2019-05-30 | End: 2019-05-30

## 2019-05-30 ASSESSMENT — MIFFLIN-ST. JEOR: SCORE: 1234.89

## 2019-05-30 ASSESSMENT — PAIN SCALES - GENERAL: PAINLEVEL: NO PAIN (0)

## 2019-05-30 NOTE — PROGRESS NOTES
May 30, 2019      HPI:  Mrs. Guerrero is a 68 year old female with no past medical history until the Spring of 2016 when she was diagnosed with stage IV AL amyloid; she presents to clinic for follow up of cardiac amyloid (AL type).     Her cardiac and oncologic history are as follows:     Fatigue started in January 2016. She eventually had a coronary angiogram which was reportedly normal and was diagnosed with CHF and started on a bblocker and diuretics. Her symptoms progressed to the point that she was evaluated at [facility] in August/September (Dr. Barron in oncology/hematology, Dr. Nettles is cardiology). She was diagnosed with stage IV AL amyloid (+GI, +bone marrow involvement, no involvement of kidney or liver):  her work up is detailed in our previous notes, however she has lambda AL amyloidosis and she underwent CyBorD chemotherapy and excellent light chain response by serum. She wasthen  turned down at [facility] for a stem cell transplant due to her cardiac involvement.    Unfortunately in 2016, she had 2-3 more pleural taps on the R and came back to my clinic with gross anasarca.  I admitted her to the hospital in 1/2017 where close to 20-30 pounds of fluid overload of volume was removed.  Since that time her AL has been in remission by labs without further chemo; her oncologist recently increase the interval of their meetings which is encouraging from that standpoint.      Since her last clinic visit she was readmitted to the hospital in April again with symptoms of volume overload.  At that time she was diuresed about 8 pounds but due to concern for rising creatinine her diuresis was limited.  Since discharge she is continued to feel generally more fatigued and short of breath than usual.  She feels she is still retaining fluid and is about 15 pounds up from her dry weight.  She primarily notes swelling in her legs and abdominal bloating.  She denies any dizziness or syncope.  No chest pain, or palpitations.  No  nausea, vomiting, or diarrhea.  She is able to do normal daily household activities but does become short of breath with any more moderate exertion.         PAST MEDICAL HISTORY:  - Stage IV AL amyloid   - GI (stomach and duodenal) involvement  - Cardiac amyloidosis    FAMILY HISTORY:  Family History   Problem Relation Age of Onset     Other - See Comments Sister         Amyloidosis   - Sister passed from multiple myeloma, patient was also told her sister had amyloid as well  - Mom with CAD    SOCIAL HISTORY:  Social History     Social History     Marital Status:      Spouse Name: N/A     Number of Children: N/A     Years of Education: N/A     Social History Main Topics     Smoking status: Never Smoker      Smokeless tobacco: None     Alcohol Use: No     Drug Use: No     Sexual Activity: Not Asked     Social History Narrative   Lives in [place] with her , has 2 kids  Previously did office work, hasn't worked for several months now  Never smoker  Social ETOH    CURRENT MEDICATIONS:  Current Outpatient Medications   Medication Sig Dispense Refill     Acetaminophen (TYLENOL PO) Take 325 mg by mouth       doxycycline hyclate (VIBRA-TABS) 100 MG tablet Take 1 tablet (100 mg) by mouth daily 90 tablet 3     granisetron (KYTRIL) 1 MG tablet Take 1 tablet (1 mg) by mouth every 12 hours as needed for nausea 10 tablet 0     LORazepam (ATIVAN) 0.5 MG tablet Take 1 tablet (0.5 mg) by mouth every 6 hours as needed for anxiety or nausea 60 tablet 0     ondansetron (ZOFRAN-ODT) 4 MG ODT tab Take 1 tablet (4 mg) by mouth every 6 hours as needed for nausea 15 tablet 1     potassium chloride ER (K-DUR/KLOR-CON M) 10 MEQ CR tablet Take 2 tablets (20 mEq) by mouth daily 180 tablet 1     prochlorperazine (COMPAZINE) 5 MG tablet One to two tablets PO q 6 hours prn nausea 90 tablet 1     spironolactone (ALDACTONE) 25 MG tablet Take 1 tablet (25 mg) by mouth daily 60 tablet 3     torsemide (DEMADEX) 20 MG tablet Take 80 mg  "in the morning, 60 in the afternoon one day, then 60 mg twice a day next day. Continue to alternate every other day. 554 tablet 3       ROS:   Constitutional: No fever, chills, or sweats.  Weight is relatively stable  ENT: No visual disturbance, ear ache, epistaxis, sore throat.   Allergies/Immunologic: Negative.   Respiratory: No cough, hemoptysis.   Cardiovascular: As per HPI.   GI: +Nausea, constipation.  : No urinary frequency, dysuria, or hematuria.   Integument: Negative.   Psychiatric: feeling down and anxious since her diagnosis  Neuro: + tingling in legs bilaterally  Endocrinology: Negative.   Musculoskeletal: Negative.    EXAM:  /73 (BP Location: Left arm, Patient Position: Chair, Cuff Size: Adult Regular)   Pulse 65   Ht 1.6 m (5' 3\")   Wt 72.6 kg (160 lb)   SpO2 98%   BMI 28.34 kg/m    General: appears comfortable, alert and articulate, NAD, pleasant  HEENT: PERRL, moist mucosa, no lesions, dentition intact  Heart: regular rate and rhythm. III/VI holosystolic murmur at the apex, JVD 12cm  Lungs: decreased breath sounds at the L base, lungs otherwise clear   Abdomen: soft, non-tender, bowel sounds present, no hepatosplenomegaly  Extremities: significant mostly nonpitting edema of bilateral extremities.   Neurological: normal speech and affect, no gross motor deficits    Labs:  CBC RESULTS:  Lab Results   Component Value Date    WBC 4.8 05/17/2019    RBC 4.59 05/17/2019    HGB 14.9 05/17/2019    HCT 44.4 05/17/2019    MCV 97 05/17/2019    MCH 32.5 05/17/2019    MCHC 33.6 05/17/2019    RDW 14.1 05/17/2019     05/17/2019       CMP RESULTS:  Lab Results   Component Value Date     05/30/2019    POTASSIUM 4.7 05/30/2019    CHLORIDE 96 05/30/2019    CO2 32 05/30/2019    ANIONGAP 5 05/30/2019    GLC 95 05/30/2019    BUN 29 05/30/2019    CR 1.15 (H) 05/30/2019    GFRESTIMATED 49 (L) 05/30/2019    GFRESTBLACK 57 (L) 05/30/2019    JERROD 9.5 05/30/2019    BILITOTAL 2.6 (H) 05/17/2019    " ALBUMIN 4.2 05/17/2019    ALKPHOS 263 (H) 05/17/2019    ALT 32 05/17/2019    AST 25 05/17/2019     INR RESULTS:  Lab Results   Component Value Date    INR 1.30 (H) 01/14/2017       Lab Results   Component Value Date    MAG 3.2 (H) 04/09/2019     Lab Results   Component Value Date    NTBNPI 9,009 (H) 01/13/2017     Lab Results   Component Value Date    NTBNP 1,378 (H) 05/30/2019       TTE 10/2018     Interpretation Summary  Global and regional left ventricular function is normal with an EF of 55-60%.  Moderate concentric wall thickening consistent with left ventricular  hypertrophy is present - known amyloid.  Grade III or advanced diastolic dysfunction.  Normal right ventricular size, wall thickness, and function.  Estimated pulmonary artery pressure 28 mmHg.  IVC with normal diameter but does not collapse (>50%) with inspiration.  Trace pericardial effusion.    TTE 4/3/2019  Moderate left ventricular hypertrophy.  Global and regional left ventricular function is normal with an EF of 60-65%.  Global right ventricular function is normal.  Moderate aortic valve insufficiency.  Mild pulmonary hypertension with dilated IVC.     This study was compared with the study from 10/11/18 the AR is worse and RA  pressure is now increased.       CXR 10/27/17: Persistent small, right greater than left pleural effusions and overlying atelectasis        Assessment and Plan:   Mrs. Guerrero is a 68 year old female with stage IV  AL amyloid with GI, bone marrow and cardiac involvement who presents for follow up in the cardiac amyloid clinic.     Patient has cardiac amyloidosis as evidenced by her echocardiogram (severe concentric hypertrophy and biatrial enlargement) and abnormal EKG (near-low voltage, poor R wave progression, biatrial enlargement and no evidence of LVH despite very thickened LV on echo) in the setting of biopsy proven (BMB, EGD) AL amyloid.  she has completed chemotherapy with CyBorD with an excellent serologic  response and her heart failure (both clinically and by labs - i.e. troponin now negative, serum light chains minimal and urine light chains undetectable) has stabilized significantly.     Her NYHA class is III today, Stage C.    Today she appears significantly volume overloaded, proximally 15 pounds up from her dry weight.  We will increase her diuresis to torsemide 100 mg twice daily in addition to metolazone 2.5 mg twice weekly.  We will continue to monitor her weights closely and she will follow-up in core clinic in the next 1 to 2 weeks.  If we are unable to make significant progress in her volume status over the next couple of weeks she may need to be readmitted for more aggressive diuresis as an inpatient.  She is hoping to maximize her quality of life for the time that she has left understanding that her overall prognosis remains poor given her stage IV amyloidosis.      Cardiac Amyloidosis (a restrictive cardiomyopathy)   Stage C  NYHA Class III  ACEi/ARB - none- no indication   BB - none, no indication and relatively contraindicated due to risk of progressive conduction disease/AV block in these patients  Aldosterone antagonist: aldactone 25mg daily  SCD prophylaxis: not indicated  % BiV pacing: NA  Fluid status: Increase Torsemide to 100mg BID and metolazone 2.5mg twice weekly, increase KCl to 40/20    Other:    Arrhythmia monitoring -- Her Zio patch in 2/17 showed sinus rhythm with non sustained SVT. A repeat 7-day Ziopatch recently reveals short (>45 second) runs of SVT, for which she is generally not symptomatic. No further eval unless clinical change.     Systemic amyloid-heme is presently monitoring her light chains which have been negative consistent with remission - therefore we are not contemplating further palliative chemo.       Follow-up in core clinic in 1 to 2 weeks with Dr. Cohen in 2 to 3 months.      This patient was seen and examined with the attending physician Dr. Metzger  Noel.    Bryant Santiago MD  Advanced Heart Failure Fellow        I have seen and examined the patient with the house staff on May 30, 2019  and agree with the outlined assessment and plan.      Domenica Cohen MD   of Medicine   [facility] Division of Cardiology       CC   Dr. Braydon Barron MD  [address] [place] [address]      Patient Care Team:  Magy Obrien MD as PCP - General (Family Practice)  Mirela Daley MD as MD (Hematology & Oncology)  Domenica Cohen MD as MD (Cardiology)  Eileen Arevalo, RN as Nurse Coordinator (Cardiology)  Codie Nelson NP as Nurse Practitioner (Cardiology)  Niki Fam, RN as Registered Nurse (Cardiology)  MIRELA DALEY MD

## 2019-05-30 NOTE — PATIENT INSTRUCTIONS
"Cardiology Providers you saw during your visit:  Dr. Cohen    Medication changes:  1. Increase Torsemide to 100 mg twice daily  2. Increase potassium to 40 mg in AM and 20 mg in the PM  3.Take metolazone twice weekly    Follow up:  1.  Follow up with CORE in 1-2 weeks (labs next week if [facility] doesn't have a spot for 2 weeks)  2.  Follow up with Dr Cohen in 2-3 months with labs prior    Labs:  Results for HILARY GARCIA (MRN [id number]) as of 5/30/2019 08:47   Ref. Range 5/30/2019 07:40   Sodium Latest Ref Range: 133 - 144 mmol/L 133   Potassium Latest Ref Range: 3.4 - 5.3 mmol/L 4.7   Chloride Latest Ref Range: 94 - 109 mmol/L 96   Carbon Dioxide Latest Ref Range: 20 - 32 mmol/L 32   Urea Nitrogen Latest Ref Range: 7 - 30 mg/dL 29   Creatinine Latest Ref Range: 0.52 - 1.04 mg/dL 1.15 (H)   GFR Estimate Latest Ref Range: >60 mL/min/1.73_m2 49 (L)   GFR Estimate If Black Latest Ref Range: >60 mL/min/1.73_m2 57 (L)   Calcium Latest Ref Range: 8.5 - 10.1 mg/dL 9.5   Anion Gap Latest Ref Range: 3 - 14 mmol/L 5   N-Terminal Pro Bnp Latest Ref Range: 0 - 125 pg/mL 1,378 (H)   Glucose Latest Ref Range: 70 - 99 mg/dL 95       Please call if you have :  1. Weight gain of more than 2 pounds in a day or 5 pounds in a week  2. Increased shortness of breath, swelling or bloating  3. Dizziness, lightheadedness   4. Any questions or concerns.       Follow the American Heart Association Diet and Lifestyle recommendations:  Limit saturated fat, trans fat, sodium, red meat, sweets and sugar-sweetened beverages. If you choose to eat red meat, compare labels and select the leanest cuts available.  Aim for at least 150 minutes of moderate physical activity or 75 minutes of vigorous physical activity - or an equal combination of both - each week.      During business hours: [phone], press option # 1 to be routed to the [employer] then option #  for \"To send a message to your care team\"      After hours, weekends or holidays: " "On Call Cardiologist- [phone]   option #4 and ask to speak to the on-call Cardiologist. Inform them you are a CORE/heart failure patient at the [facility].      Niki Fam RN BSN  Cardiology Care Coordinator - Heart Failure/ [facility] Clinic  [facility]   Questions and [phone]   First press #1 for the [facility] and then press #4 for \"To send a message to your care team\"    "

## 2019-05-30 NOTE — NURSING NOTE
Chief Complaint   Patient presents with     Follow Up     6 month return, labs prior     Vitals were taken and medications were reconciled.    Gina Welsh  8:02 AM

## 2019-05-30 NOTE — NURSING NOTE
Diet: Patient instructed regarding a heart failure healthy diet, including discussion of reduced fat and 2000 mg daily sodium restriction, daily weights, medication purpose and compliance, fluid restrictions and resources for patient and family to access for assistance with heart failure management.       Labs: Patient was given results of the laboratory testing obtained today and patient was instructed about when to return for the next laboratory testing.     Med Reconcile: Reviewed and verified all current medications with the patient. The updated medication list was printed and given to the patient.     Med changes: Increase Torsemide 100 mg BID, increase potassium to 40 mEq in AM and 20 mEq in PM, metolazone 2.5 mg twice weekly      Return Appointment: Patient given instructions regarding scheduling next clinic visit: Labs next week, CORE with [facility] in 2 weeks, Dr Cohen in 2-3 months    Patient stated she understood all health information given and agreed to call with further questions or concerns.     Niki Fam RN

## 2019-05-30 NOTE — TELEPHONE ENCOUNTER
Called in a short term prescription to [facility] pharmacy as requested for Toresmide, already has potassium script on file there.   Called patient to let her know.     Niki Fam RN

## 2019-05-30 NOTE — TELEPHONE ENCOUNTER
[facility] Call Center    Phone Message    May a detailed message be left on voicemail: yes    Reason for Call: Medication Question or concern regarding medication   Prescription Clarification  Name of Medication: torsemide (DEMADEX) 100 MG tablet and potassium chloride ER (K-DUR/KLOR-CON M) 10 MEQ CR tablet  Prescribing Provider: Dr Cohen   Pharmacy: [facility] [address] [place] [address]   What on the order needs clarification? The medication was sent to [facility] but pt will need to get refilled 1st fill at [facility]. Please send rx to [facility] and let her know when sent. Will not have enough meds for new dosage in time for [employer] to send via the mail.            Action Taken: Message routed to:  [facility]

## 2019-05-30 NOTE — LETTER
5/30/2019      RE: Leandra Guerrero  [address] [place] [address]       Dear Colleague,    Thank you for the opportunity to participate in the care of your patient, Leandra Guerrero, at the [facility]. Please see a copy of my visit note below.     May 30, 2019      HPI:  Mrs. Guerrero is a 68 year old female with no past medical history until the Spring of 2016 when she was diagnosed with stage IV AL amyloid; she presents to clinic for follow up of cardiac amyloid (AL type).     Her cardiac and oncologic history are as follows:     Fatigue started in January 2016. She eventually had a coronary angiogram which was reportedly normal and was diagnosed with CHF and started on a bblocker and diuretics. Her symptoms progressed to the point that she was evaluated at [facility] in August/September (Dr. Barron in oncology/hematology, Dr. Nettles is cardiology). She was diagnosed with stage IV AL amyloid (+GI, +bone marrow involvement, no involvement of kidney or liver):  her work up is detailed in our previous notes, however she has lambda AL amyloidosis and she underwent CyBorD chemotherapy and excellent light chain response by serum. She wasthen  turned down at [facility] for a stem cell transplant due to her cardiac involvement.    Unfortunately in 2016, she had 2-3 more pleural taps on the R and came back to my clinic with gross anasarca.  I admitted her to the hospital in 1/2017 where close to 20-30 pounds of fluid overload of volume was removed.  Since that time her AL has been in remission by labs without further chemo; her oncologist recently increase the interval of their meetings which is encouraging from that standpoint.      Since her last clinic visit she was readmitted to the hospital in April again with symptoms of volume overload.  At that time she was diuresed about 8 pounds but due to concern for rising creatinine her diuresis was limited.   Since discharge she is continued to feel generally more fatigued and short of breath than usual.  She feels she is still retaining fluid and is about 15 pounds up from her dry weight.  She primarily notes swelling in her legs and abdominal bloating.  She denies any dizziness or syncope.  No chest pain, or palpitations.  No nausea, vomiting, or diarrhea.  She is able to do normal daily household activities but does become short of breath with any more moderate exertion.         PAST MEDICAL HISTORY:  - Stage IV AL amyloid   - GI (stomach and duodenal) involvement  - Cardiac amyloidosis    FAMILY HISTORY:  Family History   Problem Relation Age of Onset     Other - See Comments Sister         Amyloidosis   - Sister passed from multiple myeloma, patient was also told her sister had amyloid as well  - Mom with CAD    SOCIAL HISTORY:  Social History     Social History     Marital Status:      Spouse Name: N/A     Number of Children: N/A     Years of Education: N/A     Social History Main Topics     Smoking status: Never Smoker      Smokeless tobacco: None     Alcohol Use: No     Drug Use: No     Sexual Activity: Not Asked     Social History Narrative   Lives in [place] with her , has 2 kids  Previously did office work, hasn't worked for several months now  Never smoker  Social ETOH    CURRENT MEDICATIONS:  Current Outpatient Medications   Medication Sig Dispense Refill     Acetaminophen (TYLENOL PO) Take 325 mg by mouth       doxycycline hyclate (VIBRA-TABS) 100 MG tablet Take 1 tablet (100 mg) by mouth daily 90 tablet 3     granisetron (KYTRIL) 1 MG tablet Take 1 tablet (1 mg) by mouth every 12 hours as needed for nausea 10 tablet 0     LORazepam (ATIVAN) 0.5 MG tablet Take 1 tablet (0.5 mg) by mouth every 6 hours as needed for anxiety or nausea 60 tablet 0     ondansetron (ZOFRAN-ODT) 4 MG ODT tab Take 1 tablet (4 mg) by mouth every 6 hours as needed for nausea 15 tablet 1     potassium chloride ER  "(K-DUR/KLOR-CON M) 10 MEQ CR tablet Take 2 tablets (20 mEq) by mouth daily 180 tablet 1     prochlorperazine (COMPAZINE) 5 MG tablet One to two tablets PO q 6 hours prn nausea 90 tablet 1     spironolactone (ALDACTONE) 25 MG tablet Take 1 tablet (25 mg) by mouth daily 60 tablet 3     torsemide (DEMADEX) 20 MG tablet Take 80 mg in the morning, 60 in the afternoon one day, then 60 mg twice a day next day. Continue to alternate every other day. 554 tablet 3       ROS:   Constitutional: No fever, chills, or sweats.  Weight is relatively stable  ENT: No visual disturbance, ear ache, epistaxis, sore throat.   Allergies/Immunologic: Negative.   Respiratory: No cough, hemoptysis.   Cardiovascular: As per HPI.   GI: +Nausea, constipation.  : No urinary frequency, dysuria, or hematuria.   Integument: Negative.   Psychiatric: feeling down and anxious since her diagnosis  Neuro: + tingling in legs bilaterally  Endocrinology: Negative.   Musculoskeletal: Negative.    EXAM:  /73 (BP Location: Left arm, Patient Position: Chair, Cuff Size: Adult Regular)   Pulse 65   Ht 1.6 m (5' 3\")   Wt 72.6 kg (160 lb)   SpO2 98%   BMI 28.34 kg/m     General: appears comfortable, alert and articulate, NAD, pleasant  HEENT: PERRL, moist mucosa, no lesions, dentition intact  Heart: regular rate and rhythm. III/VI holosystolic murmur at the apex, JVD 12cm  Lungs: decreased breath sounds at the L base, lungs otherwise clear   Abdomen: soft, non-tender, bowel sounds present, no hepatosplenomegaly  Extremities: significant mostly nonpitting edema of bilateral extremities.   Neurological: normal speech and affect, no gross motor deficits    Labs:  CBC RESULTS:  Lab Results   Component Value Date    WBC 4.8 05/17/2019    RBC 4.59 05/17/2019    HGB 14.9 05/17/2019    HCT 44.4 05/17/2019    MCV 97 05/17/2019    MCH 32.5 05/17/2019    MCHC 33.6 05/17/2019    RDW 14.1 05/17/2019     05/17/2019       CMP RESULTS:  Lab Results   Component " Value Date     05/30/2019    POTASSIUM 4.7 05/30/2019    CHLORIDE 96 05/30/2019    CO2 32 05/30/2019    ANIONGAP 5 05/30/2019    GLC 95 05/30/2019    BUN 29 05/30/2019    CR 1.15 (H) 05/30/2019    GFRESTIMATED 49 (L) 05/30/2019    GFRESTBLACK 57 (L) 05/30/2019    JERROD 9.5 05/30/2019    BILITOTAL 2.6 (H) 05/17/2019    ALBUMIN 4.2 05/17/2019    ALKPHOS 263 (H) 05/17/2019    ALT 32 05/17/2019    AST 25 05/17/2019     INR RESULTS:  Lab Results   Component Value Date    INR 1.30 (H) 01/14/2017       Lab Results   Component Value Date    MAG 3.2 (H) 04/09/2019     Lab Results   Component Value Date    NTBNPI 9,009 (H) 01/13/2017     Lab Results   Component Value Date    NTBNP 1,378 (H) 05/30/2019       TTE 10/2018     Interpretation Summary  Global and regional left ventricular function is normal with an EF of 55-60%.  Moderate concentric wall thickening consistent with left ventricular  hypertrophy is present - known amyloid.  Grade III or advanced diastolic dysfunction.  Normal right ventricular size, wall thickness, and function.  Estimated pulmonary artery pressure 28 mmHg.  IVC with normal diameter but does not collapse (>50%) with inspiration.  Trace pericardial effusion.    TTE 4/3/2019  Moderate left ventricular hypertrophy.  Global and regional left ventricular function is normal with an EF of 60-65%.  Global right ventricular function is normal.  Moderate aortic valve insufficiency.  Mild pulmonary hypertension with dilated IVC.     This study was compared with the study from 10/11/18 the AR is worse and RA  pressure is now increased.       CXR 10/27/17: Persistent small, right greater than left pleural effusions and overlying atelectasis        Assessment and Plan:   Mrs. Guerrero is a 68 year old female with stage IV  AL amyloid with GI, bone marrow and cardiac involvement who presents for follow up in the cardiac amyloid clinic.     Patient has cardiac amyloidosis as evidenced by her echocardiogram  (severe concentric hypertrophy and biatrial enlargement) and abnormal EKG (near-low voltage, poor R wave progression, biatrial enlargement and no evidence of LVH despite very thickened LV on echo) in the setting of biopsy proven (BMB, EGD) AL amyloid.  she has completed chemotherapy with CyBorD with an excellent serologic response and her heart failure (both clinically and by labs - i.e. troponin now negative, serum light chains minimal and urine light chains undetectable) has stabilized significantly.     Her NYHA class is III today, Stage C.    Today she appears significantly volume overloaded, proximally 15 pounds up from her dry weight.  We will increase her diuresis to torsemide 100 mg twice daily in addition to metolazone 2.5 mg twice weekly.  We will continue to monitor her weights closely and she will follow-up in core clinic in the next 1 to 2 weeks.  If we are unable to make significant progress in her volume status over the next couple of weeks she may need to be readmitted for more aggressive diuresis as an inpatient.  She is hoping to maximize her quality of life for the time that she has left understanding that her overall prognosis remains poor given her stage IV amyloidosis.      Cardiac Amyloidosis (a restrictive cardiomyopathy)   Stage C  NYHA Class III  ACEi/ARB - none- no indication   BB - none, no indication and relatively contraindicated due to risk of progressive conduction disease/AV block in these patients  Aldosterone antagonist: aldactone 25mg daily  SCD prophylaxis: not indicated  % BiV pacing: NA  Fluid status: Increase Torsemide to 100mg BID and metolazone 2.5mg twice weekly, increase KCl to 40/20    Other:    Arrhythmia monitoring -- Her Zio patch in 2/17 showed sinus rhythm with non sustained SVT. A repeat 7-day Ziopatch recently reveals short (>45 second) runs of SVT, for which she is generally not symptomatic. No further eval unless clinical change.     Systemic amyloid-heme is  presently monitoring her light chains which have been negative consistent with remission - therefore we are not contemplating further palliative chemo.       Follow-up in core clinic in 1 to 2 weeks with Dr. Cohen in 2 to 3 months.      This patient was seen and examined with the attending physician Dr. Domenica Cohen.    Bryant Santiago MD  Advanced Heart Failure Fellow        I have seen and examined the patient with the house staff on May 30, 2019  and agree with the outlined assessment and plan.      Domenica Cohen MD   of Medicine   [facility] Division of Cardiology       CC   [place] [address]      Patient Care Team:  Magy Obrien MD as PCP - General (Family Practice)  Mirela Daley MD as MD (Hematology & Oncology)  Domenica Cohen MD as MD (Cardiology)  Eileen Arevalo, RN as Nurse Coordinator (Cardiology)  Codie Nelson NP as Nurse Practitioner (Cardiology)  Niki Fam, RN as Registered Nurse (Cardiology)  MIRELA DALEY MD

## 2019-06-03 ENCOUNTER — MYC MEDICAL ADVICE (OUTPATIENT)
Dept: CARDIOLOGY | Facility: CLINIC | Age: 69
End: 2019-06-03

## 2019-06-03 DIAGNOSIS — E85.4 AMYLOID HEART DISEASE (H): ICD-10-CM

## 2019-06-03 DIAGNOSIS — I43 AMYLOID HEART DISEASE (H): ICD-10-CM

## 2019-06-03 RX ORDER — METOLAZONE 2.5 MG/1
TABLET ORAL
Qty: 15 TABLET | Refills: 0 | Status: SHIPPED | OUTPATIENT
Start: 2019-06-03 | End: 2019-06-12

## 2019-06-03 NOTE — TELEPHONE ENCOUNTER
Followed up with Dr Cohen regarding Ginger's questions about how much weight she should lose.  She advised taking metalazone once a week as needed and to follow up with labs on Thursday.  Sent [employer] message to Ginger but will follow to make sure she got the message and will watch for labs on Thursday.    Date: 6/3/2019    Time of Call: 2:45 PM     Diagnosis:  Amyloid     [ VORB ] Ordering provider: Dr Cohen    Order: decrease metalazone to once weekly as needed     Order received by: Niki Fam RN       Follow-up/additional notes: Follow up on labs on Thursday

## 2019-06-04 DIAGNOSIS — I50.33 ACUTE ON CHRONIC DIASTOLIC HEART FAILURE (H): Primary | ICD-10-CM

## 2019-06-06 DIAGNOSIS — E85.4 AMYLOID HEART DISEASE (H): ICD-10-CM

## 2019-06-06 DIAGNOSIS — I43 AMYLOID HEART DISEASE (H): ICD-10-CM

## 2019-06-06 LAB
ANION GAP SERPL CALCULATED.3IONS-SCNC: 9 MMOL/L (ref 3–14)
BUN SERPL-MCNC: 59 MG/DL (ref 7–30)
CALCIUM SERPL-MCNC: 9.8 MG/DL (ref 8.5–10.1)
CHLORIDE SERPL-SCNC: 87 MMOL/L (ref 94–109)
CO2 SERPL-SCNC: 32 MMOL/L (ref 20–32)
CREAT SERPL-MCNC: 1.3 MG/DL (ref 0.52–1.04)
GFR SERPL CREATININE-BSD FRML MDRD: 43 ML/MIN/{1.73_M2}
GLUCOSE SERPL-MCNC: 90 MG/DL (ref 70–99)
POTASSIUM SERPL-SCNC: 3.7 MMOL/L (ref 3.4–5.3)
SODIUM SERPL-SCNC: 128 MMOL/L (ref 133–144)

## 2019-06-06 PROCEDURE — 80048 BASIC METABOLIC PNL TOTAL CA: CPT | Performed by: INTERNAL MEDICINE

## 2019-06-06 PROCEDURE — 36415 COLL VENOUS BLD VENIPUNCTURE: CPT | Performed by: INTERNAL MEDICINE

## 2019-06-12 ENCOUNTER — OFFICE VISIT (OUTPATIENT)
Dept: CARDIOLOGY | Facility: CLINIC | Age: 69
End: 2019-06-12
Attending: NURSE PRACTITIONER
Payer: COMMERCIAL

## 2019-06-12 ENCOUNTER — ANCILLARY PROCEDURE (OUTPATIENT)
Dept: CARDIOLOGY | Facility: CLINIC | Age: 69
End: 2019-06-12
Attending: PHYSICIAN ASSISTANT
Payer: COMMERCIAL

## 2019-06-12 VITALS
DIASTOLIC BLOOD PRESSURE: 63 MMHG | SYSTOLIC BLOOD PRESSURE: 137 MMHG | HEIGHT: 63 IN | OXYGEN SATURATION: 96 % | BODY MASS INDEX: 25.73 KG/M2 | HEART RATE: 65 BPM | WEIGHT: 145.2 LBS

## 2019-06-12 DIAGNOSIS — E85.4 AMYLOID HEART DISEASE (H): ICD-10-CM

## 2019-06-12 DIAGNOSIS — I43 AMYLOID HEART DISEASE (H): ICD-10-CM

## 2019-06-12 DIAGNOSIS — I50.33 ACUTE ON CHRONIC DIASTOLIC HEART FAILURE (H): ICD-10-CM

## 2019-06-12 LAB
ANION GAP SERPL CALCULATED.3IONS-SCNC: 6 MMOL/L (ref 3–14)
BUN SERPL-MCNC: 53 MG/DL (ref 7–30)
CALCIUM SERPL-MCNC: 9.4 MG/DL (ref 8.5–10.1)
CHLORIDE SERPL-SCNC: 86 MMOL/L (ref 94–109)
CO2 SERPL-SCNC: 35 MMOL/L (ref 20–32)
CREAT SERPL-MCNC: 1.39 MG/DL (ref 0.52–1.04)
GFR SERPL CREATININE-BSD FRML MDRD: 39 ML/MIN/{1.73_M2}
GLUCOSE SERPL-MCNC: 53 MG/DL (ref 70–99)
POTASSIUM SERPL-SCNC: 3.9 MMOL/L (ref 3.4–5.3)
SODIUM SERPL-SCNC: 127 MMOL/L (ref 133–144)

## 2019-06-12 PROCEDURE — 80048 BASIC METABOLIC PNL TOTAL CA: CPT | Performed by: NURSE PRACTITIONER

## 2019-06-12 PROCEDURE — 93227 XTRNL ECG REC<48 HR R&I: CPT | Mod: ZP | Performed by: INTERNAL MEDICINE

## 2019-06-12 PROCEDURE — 93005 ELECTROCARDIOGRAM TRACING: CPT | Mod: XU

## 2019-06-12 PROCEDURE — G0463 HOSPITAL OUTPT CLINIC VISIT: HCPCS | Mod: ZF

## 2019-06-12 PROCEDURE — 99214 OFFICE O/P EST MOD 30 MIN: CPT | Mod: ZP | Performed by: NURSE PRACTITIONER

## 2019-06-12 PROCEDURE — 36415 COLL VENOUS BLD VENIPUNCTURE: CPT | Performed by: NURSE PRACTITIONER

## 2019-06-12 PROCEDURE — 93226 XTRNL ECG REC<48 HR SCAN A/R: CPT | Mod: ZF

## 2019-06-12 PROCEDURE — 93010 ELECTROCARDIOGRAM REPORT: CPT | Mod: ZP | Performed by: INTERNAL MEDICINE

## 2019-06-12 ASSESSMENT — ENCOUNTER SYMPTOMS
CONSTIPATION: 1
EXERCISE INTOLERANCE: 1
NERVOUS/ANXIOUS: 1
SINUS CONGESTION: 1
PALPITATIONS: 1
SHORTNESS OF BREATH: 1
HOARSE VOICE: 1
INSOMNIA: 1
DIZZINESS: 1
NAUSEA: 1
PANIC: 1
DYSPNEA ON EXERTION: 1
WEAKNESS: 1
LIGHT-HEADEDNESS: 1
HEADACHES: 1
COUGH DISTURBING SLEEP: 1

## 2019-06-12 ASSESSMENT — PAIN SCALES - GENERAL: PAINLEVEL: NO PAIN (0)

## 2019-06-12 ASSESSMENT — MIFFLIN-ST. JEOR: SCORE: 1167.75

## 2019-06-12 NOTE — PROGRESS NOTES
HPI:   Ms. Guerrero is a 68 year old female with a past medical history including stage IV AL amyloid diagnosed in 2016. Presents to clinic for CORE follow-up.     Her cardiac and oncologic history are as follows: fatigue starting in 1/2016. She had a coronary angiogram which was reportedly normal but was diagnosed with HF and started on a BBand diuretics. Her symptoms progressed to the point that she was evaluated at [facility] in August/September (Dr. Barron in oncology/hematology, Dr. Nettles is cardiology). She was diagnosed with stage IV AL amyloid (+GI, +bone marrow involvement, no involvement of kidney or liver). Her work up is detailed in our previous notes, however she has lambda AL amyloidosis and she underwent CyBorD chemotherapy and excellent light chain response by serum. Patient has been turned down at [facility] for a stem cell transplant due to her cardiac involvement. Later in 2016, she had 2-3 more right sided pleural taps. She was hospitalized 1/2017 and diuresed 20-30 pounds at that time. Since that time her AL has been in remission by labs without further chemo. Patient was recently treated with doxycycline but this was stopped ~6-9 months ago due to thought it wasn't benefiting per patient. She was last admitted [facility] 4[facility]2 for shortness of breath and edema. Echocardiogram showed EF 60-65%, moderate LVH, normal RV, moderate AI, and mild PH. She was diuresed 8 lb that admission (Cr bumped so felt actually over diuresed).    Last visit: Dr. Cohen 5/30/2019  Diuretics were increased to torsemide 100mg BID (from 60mg BID) and metolazone 2.5 mg twice weekly. Felt that she looked significantly volume up (~15 up from dry weight) and it was determined that if we were not able to make significant progress by her next visit she may need an inpatient admission.    This visit:  - She has lost 15 lbs since her last appointment, her abdominal swelling and LE swelling are significantly improved. She is now sleeping on  "just 2 pillows (which is as flat as she sleeps) and is not longer having PND. She still does have LE edema, she rates it a 5/10 (last week was a 10/10). Does not feel SOB walking from room to room. She does feel short of breath when she goes up one flight of stairs.  - Currently, she is feeling \"off\". Feels very low energy- has not improved at all with the diuresis. Feels overall fatigued. Nausea continues, she has not started the kytril yet.  - Now c/o palpitations. Started Saturday, lasted 20 minutes, since then has been happening a few times a day, last 1-2 minutes. Lays down. Feels somewhat dizzy. No chest pain with palpitations. Feels short of breath with the palpitations.  - last Ziopatch was 11/2017, was found to have short runs of SVT which matched the patient-initiated trigger. She says that the palpitations she is having now are similar, but more intense and lasting much longer. She does not have a history of atrial fibrillation.     Labs  - Sig for Cr 1.30 -> 1.39, although not far from baseline~1.2-1.3  - Hyponatremia- chronic 127    PAST MEDICAL HISTORY:  Past Medical History:   Diagnosis Date     AL amyloidosis (H)      Cardiac amyloidosis (H)      Lymphedema      Recurrent right pleural effusion 1/2/2017       FAMILY HISTORY:  Family History   Problem Relation Age of Onset     Other - See Comments Sister         Amyloidosis       SOCIAL HISTORY:  Socioeconomic History     Marital status:    Tobacco Use     Smoking status: Never Smoker     Smokeless tobacco: Never Used   Substance and Sexual Activity     Alcohol use: No     Drug use: No   Other Topics Concern     CURRENT MEDICATIONS:    Current Outpatient Medications on File Prior to Visit:  Acetaminophen (TYLENOL PO) Take 325 mg by mouth   doxycycline hyclate (VIBRA-TABS) 100 MG tablet Take 1 tablet (100 mg) by mouth daily   granisetron (KYTRIL) 1 MG tablet Take 1 tablet (1 mg) by mouth every 12 hours as needed for nausea   LORazepam (ATIVAN) " "0.5 MG tablet Take 1 tablet (0.5 mg) by mouth every 6 hours as needed for anxiety or nausea   ondansetron (ZOFRAN-ODT) 4 MG ODT tab Take 1 tablet (4 mg) by mouth every 6 hours as needed for nausea   potassium chloride ER (K-DUR/KLOR-CON M) 10 MEQ CR tablet Take 40 mEq in AM and 20 mEq in PM   prochlorperazine (COMPAZINE) 5 MG tablet One to two tablets PO q 6 hours prn nausea   spironolactone (ALDACTONE) 25 MG tablet Take 1 tablet (25 mg) by mouth daily   torsemide (DEMADEX) 100 MG tablet Take 1 tablet (100 mg) by mouth 2 times daily     No current facility-administered medications on file prior to visit.     ROS:   CONSTITUTIONAL: Denies fever, chills  HEENT: Denies headache, vision changes, and changes in speech.   CV: Refer to HPI.   PULMONARY:Refer to HPI.   GI:Refer to HPI.   :Denies urinary alterations, dysuria, urinary frequency, hematuria, and abnormal drainage.   EXT:+Chronic lower extremity edema.   SKIN:Denies abnormal rashes or lesions.   MUSCULOSKELETAL:Denies upper or lower extremity weakness and pain.   NEUROLOGIC:Denies dizziness, seizures, or upper or lower extremity paresthesia.     EXAM:  /63 (BP Location: Right arm, Patient Position: Chair, Cuff Size: Adult Regular)   Pulse 65   Ht 1.6 m (5' 3\")   Wt 65.9 kg (145 lb 3.2 oz)   SpO2 96%   BMI 25.72 kg/m       GENERAL: Appears comfortable, in no acute distress.   HEENT: Eye symmetrical, no discharge or icterus bilaterally. Mucous membranes moist and without lesions.  CV: RRR, +S1S2, no murmur, rub, or gallop. JVP not visible at 75 degrees.   RESPIRATORY: Respirations regular, even, and unlabored. Lungs CTA throughout.   GI: Soft and mildly distended with normoactive bowel sounds present in all quadrants. No tenderness, rebound, guarding. No hepatomegaly.   EXTREMITIES: 1-2+ non pitting peripheral edema. 2+ bilateral pedal pulses.   NEUROLOGIC: Alert and oriented x 3. No focal deficits.   MUSCULOSKELETAL: No joint swelling or tenderness. "   SKIN: No jaundice. No rashes or lesions.     Labs, reviewed with patient in clinic today:  CBC RESULTS:  Lab Results   Component Value Date    WBC 4.8 05/17/2019    RBC 4.59 05/17/2019    HGB 14.9 05/17/2019    HCT 44.4 05/17/2019    MCV 97 05/17/2019    MCH 32.5 05/17/2019    MCHC 33.6 05/17/2019    RDW 14.1 05/17/2019     05/17/2019       CMP RESULTS:  Lab Results   Component Value Date     (L) 06/12/2019    POTASSIUM 3.9 06/12/2019    CHLORIDE 86 (L) 06/12/2019    CO2 35 (H) 06/12/2019    ANIONGAP 6 06/12/2019    GLC 53 (L) 06/12/2019    BUN 53 (H) 06/12/2019    CR 1.39 (H) 06/12/2019    GFRESTIMATED 39 (L) 06/12/2019    GFRESTBLACK 45 (L) 06/12/2019    JERROD 9.4 06/12/2019    BILITOTAL 2.6 (H) 05/17/2019    ALBUMIN 4.2 05/17/2019    ALKPHOS 263 (H) 05/17/2019    ALT 32 05/17/2019    AST 25 05/17/2019        INR RESULTS:  Lab Results   Component Value Date    INR 1.30 (H) 01/14/2017       Lab Results   Component Value Date    MAG 3.2 (H) 04/09/2019     Lab Results   Component Value Date    NTBNPI 9,009 (H) 01/13/2017     Lab Results   Component Value Date    NTBNP 1,378 (H) 05/30/2019       Diagnostics:  TTE 4/9/19  Moderate left ventricular hypertrophy.  Global and regional left ventricular function is normal with an EF of 60-65%.  Global right ventricular function is normal.  Moderate aortic valve insufficiency.  Mild pulmonary hypertension with dilated IVC.  This study was compared with the study from 10/11/18 the AR is worse and RA pressure is now increased.    TTE 10/11/18  Global and regional left ventricular function is normal with an EF of 55-60%.  Moderate concentric wall thickening consistent with left ventricular  hypertrophy is present - known amyloid.  Grade III or advanced diastolic dysfunction.  Normal right ventricular size, wall thickness, and function.  Estimated pulmonary artery pressure 28 mmHg.  IVC with normal diameter but does not collapse (>50%) with inspiration.  Trace  pericardial effusion.  Compared to prior study from 8/17/17, no significant change.    Tucker 11/2017      Assessment and Plan:   Ms. Guerrero is a 68 year old female with AL amyloidosis with cardiac manifestation who presents to CORE for hospital follow-up. Patient has cardiac amyloidosis as evidenced by her echocardiogram (severe concentric hypertrophy and biatrial enlargement) and abnormal EKG (near-low voltage, poor R wave progression, biatrial enlargement and no evidence of LVH despite thicken LV on echo) in the setting of biopsy proven (BMB, EGD) AL amyloid. She completed chemotherapy with CyBorD with an excellent serologic response and her heart failure (i.e. troponin negative, NT pro BNP was stable, serum light chains minimal and urine light chains undetectable). However, we have continued to struggle wit volume overload.    EKG significant for NSR at a rate of 63, SD 0.182, QRS narrow 88, , inverted p-waves in V1, V2, no significant TWI, no significant ST elevations or depressions. Changes from 4/2: p-wave in V3 is now upright, likely lead placement. Otherwise overall unchanged.    # Chronic diastolic heart failure/restrictive cardiomyopathy 2/2  # Cardiac amyloidosis    Stage C. NYHA Class III.    Fluid status: Hypervolemic but improving, stop metolazone (had been taking twice weekly), continue torsemide 100 mg BID, will decrease K 40mg daily (from 60 daily)  ACEi/ARB/ARNI: n/a   BB: no indication and relatively contraindicated due to risk of progressive conduction disease/AV block in these patients  Aldosterone antagonist: spironolactone 25 mg daily   SCD prophylaxis: does not meet criteria for implant  NSAID use: contraindicated  Sleep apnea evaluation: deferred today  Remote monitoring: deferred today, could be Cardiocom candidate   Goals of care: prior discussions with Dr Cohen re: planning for EoL care as long term prognosis for AL amyloid is poor.     # Arrhythmia monitoring   Newly c/o  palpitations. Started Saturday, one episode lasted up to 20 minutes, since then she has been having 1-2 episodes a day which last <1-2 minutes. These are associated with SOB and dizziness. Her Zio patch in 2/17 showed sinus rhythm with non sustained SVT. A repeat 7-day Ziopatch recently reveals short (~45 second) runs of SVT.  - 48 hour Holter monitor  - I advised her to go to the ER for any dizziness, CP, SOB, or any other concerning symptoms     # Systemic amyloid  Heme previously monitoring her light chains which have been negative consistent with remission - therefore further palliative chemo not being considered. Nausea has been a large issue and is currently, Kytril was added at her last oncology appointment, but she has not started taking this. to her regimen of compazine and lorazepam.  - Consider palliative care referal if nausea not controlled on her current regimen (per onc)  - She is back on doxycycline     # Hyponatremia  Worse in the last month. I do not suspect this is 2/2 hypovolemic or hypervolemic hyponatremia as she looks fairly compensated centrally. Possibly 2/2 spironolactone? Dose decreased last visit, If not improved next week, consider d/c'ing altogether.   - labs in one week, consider discharge ashtyn      Follow up 1 week in CORE with labs.      25 minutes spent face-to-face with patient, >50% in counseling and/or coordination of care as described above    Ciara Araya PA-C  6/12/2019            ALEJANDRO FORD

## 2019-06-12 NOTE — LETTER
6/12/2019      RE: Leandra Guerrero  [address] [place] [address]       Dear Colleague,    Thank you for the opportunity to participate in the care of your patient, Leandra Guerrero, at the [facility] at [facility]. Please see a copy of my visit note below.    HPI:   Ms. Guerrero is a 68 year old female with a past medical history including stage IV AL amyloid diagnosed in 2016. Presents to clinic for CORE follow-up.     Her cardiac and oncologic history are as follows: fatigue starting in 1/2016. She had a coronary angiogram which was reportedly normal but was diagnosed with HF and started on a BBand diuretics. Her symptoms progressed to the point that she was evaluated at [place] in August/September (Dr. Barron in oncology/hematology, Dr. Nettles is cardiology). She was diagnosed with stage IV AL amyloid (+GI, +bone marrow involvement, no involvement of kidney or liver). Her work up is detailed in our previous notes, however she has lambda AL amyloidosis and she underwent CyBorD chemotherapy and excellent light chain response by serum. Patient has been turned down at [place] for a stem cell transplant due to her cardiac involvement. Later in 2016, she had 2-3 more right sided pleural taps. She was hospitalized 1/2017 and diuresed 20-30 pounds at that time. Since that time her AL has been in remission by labs without further chemo. Patient was recently treated with doxycycline but this was stopped ~6-9 months ago due to thought it wasn't benefiting per patient. She was last admitted to [facility] 4/2 for shortness of breath and edema. Echocardiogram showed EF 60-65%, moderate LVH, normal RV, moderate AI, and mild PH. She was diuresed 8 lb that admission (Cr bumped so felt actually over diuresed).    Last visit: Dr. Cohen 5/30/2019  Diuretics were increased to torsemide 100mg BID (from 60mg BID) and metolazone 2.5 mg twice weekly. Felt that she looked  "significantly volume up (~15 up from dry weight) and it was determined that if we were not able to make significant progress by her next visit she may need an inpatient admission.    This visit:  - She has lost 15 lbs since her last appointment, her abdominal swelling and LE swelling are significantly improved. She is now sleeping on just 2 pillows (which is as flat as she sleeps) and is not longer having PND. She still does have LE edema, she rates it a 5/10 (last week was a 10/10). Does not feel SOB walking from room to room. She does feel short of breath when she goes up one flight of stairs.  - Currently, she is feeling \"off\". Feels very low energy- has not improved at all with the diuresis. Feels overall fatigued. Nausea continues, she has not started the kytril yet.  - Now c/o palpitations. Started Saturday, lasted 20 minutes, since then has been happening a few times a day, last 1-2 minutes. Lays down. Feels somewhat dizzy. No chest pain with palpitations. Feels short of breath with the palpitations.  - last Ziopatch was 11/2017, was found to have short runs of SVT which matched the patient-initiated trigger. She says that the palpitations she is having now are similar, but more intense and lasting much longer. She does not have a history of atrial fibrillation.     Labs  - Sig for Cr 1.30 -> 1.39, although not far from baseline~1.2-1.3  - Hyponatremia- chronic 127    PAST MEDICAL HISTORY:  Past Medical History:   Diagnosis Date     AL amyloidosis (H)      Cardiac amyloidosis (H)      Lymphedema      Recurrent right pleural effusion 1/2/2017       FAMILY HISTORY:  Family History   Problem Relation Age of Onset     Other - See Comments Sister         Amyloidosis       SOCIAL HISTORY:  Socioeconomic History     Marital status:    Tobacco Use     Smoking status: Never Smoker     Smokeless tobacco: Never Used   Substance and Sexual Activity     Alcohol use: No     Drug use: No   Other Topics Concern " "    CURRENT MEDICATIONS:    Current Outpatient Medications on File Prior to Visit:  Acetaminophen (TYLENOL PO) Take 325 mg by mouth   doxycycline hyclate (VIBRA-TABS) 100 MG tablet Take 1 tablet (100 mg) by mouth daily   granisetron (KYTRIL) 1 MG tablet Take 1 tablet (1 mg) by mouth every 12 hours as needed for nausea   LORazepam (ATIVAN) 0.5 MG tablet Take 1 tablet (0.5 mg) by mouth every 6 hours as needed for anxiety or nausea   ondansetron (ZOFRAN-ODT) 4 MG ODT tab Take 1 tablet (4 mg) by mouth every 6 hours as needed for nausea   potassium chloride ER (K-DUR/KLOR-CON M) 10 MEQ CR tablet Take 40 mEq in AM and 20 mEq in PM   prochlorperazine (COMPAZINE) 5 MG tablet One to two tablets PO q 6 hours prn nausea   spironolactone (ALDACTONE) 25 MG tablet Take 1 tablet (25 mg) by mouth daily   torsemide (DEMADEX) 100 MG tablet Take 1 tablet (100 mg) by mouth 2 times daily     No current facility-administered medications on file prior to visit.     ROS:   CONSTITUTIONAL: Denies fever, chills  HEENT: Denies headache, vision changes, and changes in speech.   CV: Refer to HPI.   PULMONARY:Refer to HPI.   GI:Refer to HPI.   :Denies urinary alterations, dysuria, urinary frequency, hematuria, and abnormal drainage.   EXT:+Chronic lower extremity edema.   SKIN:Denies abnormal rashes or lesions.   MUSCULOSKELETAL:Denies upper or lower extremity weakness and pain.   NEUROLOGIC:Denies dizziness, seizures, or upper or lower extremity paresthesia.     EXAM:  /63 (BP Location: Right arm, Patient Position: Chair, Cuff Size: Adult Regular)   Pulse 65   Ht 1.6 m (5' 3\")   Wt 65.9 kg (145 lb 3.2 oz)   SpO2 96%   BMI 25.72 kg/m        GENERAL: Appears comfortable, in no acute distress.   HEENT: Eye symmetrical, no discharge or icterus bilaterally. Mucous membranes moist and without lesions.  CV: RRR, +S1S2, no murmur, rub, or gallop. JVP not visible at 75 degrees.   RESPIRATORY: Respirations regular, even, and unlabored. Lungs " CTA throughout.   GI: Soft and mildly distended with normoactive bowel sounds present in all quadrants. No tenderness, rebound, guarding. No hepatomegaly.   EXTREMITIES: 1-2+ non pitting peripheral edema. 2+ bilateral pedal pulses.   NEUROLOGIC: Alert and oriented x 3. No focal deficits.   MUSCULOSKELETAL: No joint swelling or tenderness.   SKIN: No jaundice. No rashes or lesions.     Labs, reviewed with patient in clinic today:  CBC RESULTS:  Lab Results   Component Value Date    WBC 4.8 05/17/2019    RBC 4.59 05/17/2019    HGB 14.9 05/17/2019    HCT 44.4 05/17/2019    MCV 97 05/17/2019    MCH 32.5 05/17/2019    MCHC 33.6 05/17/2019    RDW 14.1 05/17/2019     05/17/2019       CMP RESULTS:  Lab Results   Component Value Date     (L) 06/12/2019    POTASSIUM 3.9 06/12/2019    CHLORIDE 86 (L) 06/12/2019    CO2 35 (H) 06/12/2019    ANIONGAP 6 06/12/2019    GLC 53 (L) 06/12/2019    BUN 53 (H) 06/12/2019    CR 1.39 (H) 06/12/2019    GFRESTIMATED 39 (L) 06/12/2019    GFRESTBLACK 45 (L) 06/12/2019    JERROD 9.4 06/12/2019    BILITOTAL 2.6 (H) 05/17/2019    ALBUMIN 4.2 05/17/2019    ALKPHOS 263 (H) 05/17/2019    ALT 32 05/17/2019    AST 25 05/17/2019        INR RESULTS:  Lab Results   Component Value Date    INR 1.30 (H) 01/14/2017       Lab Results   Component Value Date    MAG 3.2 (H) 04/09/2019     Lab Results   Component Value Date    NTBNPI 9,009 (H) 01/13/2017     Lab Results   Component Value Date    NTBNP 1,378 (H) 05/30/2019       Diagnostics:  TTE 4/9/19  Moderate left ventricular hypertrophy.  Global and regional left ventricular function is normal with an EF of 60-65%.  Global right ventricular function is normal.  Moderate aortic valve insufficiency.  Mild pulmonary hypertension with dilated IVC.  This study was compared with the study from 10/11/18 the AR is worse and RA pressure is now increased.    TTE 10/11/18  Global and regional left ventricular function is normal with an EF of 55-60%.  Moderate  concentric wall thickening consistent with left ventricular  hypertrophy is present - known amyloid.  Grade III or advanced diastolic dysfunction.  Normal right ventricular size, wall thickness, and function.  Estimated pulmonary artery pressure 28 mmHg.  IVC with normal diameter but does not collapse (>50%) with inspiration.  Trace pericardial effusion.  Compared to prior study from 8/17/17, no significant change.    Tucker 11/2017      Assessment and Plan:   Ms. Guerrero is a 68 year old female with AL amyloidosis with cardiac manifestation who presents to CORE for hospital follow-up. Patient has cardiac amyloidosis as evidenced by her echocardiogram (severe concentric hypertrophy and biatrial enlargement) and abnormal EKG (near-low voltage, poor R wave progression, biatrial enlargement and no evidence of LVH despite thicken LV on echo) in the setting of biopsy proven (BMB, EGD) AL amyloid. She completed chemotherapy with CyBorD with an excellent serologic response and her heart failure (i.e. troponin negative, NT pro BNP was stable, serum light chains minimal and urine light chains undetectable). However, we have continued to struggle wit volume overload.    EKG significant for NSR at a rate of 63, NV 0.182, QRS narrow 88, , inverted p-waves in V1, V2, no significant TWI, no significant ST elevations or depressions. Changes from 4/2: p-wave in V3 is now upright, likely lead placement. Otherwise overall unchanged.    # Chronic diastolic heart failure/restrictive cardiomyopathy 2/2  # Cardiac amyloidosis    Stage C. NYHA Class III.    Fluid status: Hypervolemic but improving, stop metolazone (had been taking twice weekly), continue torsemide 100 mg BID, will decrease K 40mg daily (from 60 daily)  ACEi/ARB/ARNI: n/a   BB: no indication and relatively contraindicated due to risk of progressive conduction disease/AV block in these patients  Aldosterone antagonist: spironolactone 25 mg daily   SCD prophylaxis:  does not meet criteria for implant  NSAID use: contraindicated  Sleep apnea evaluation: deferred today  Remote monitoring: deferred today, could be Cardiocom candidate   Goals of care: prior discussions with Dr Cohen re: planning for EoL care as long term prognosis for AL amyloid is poor.     # Arrhythmia monitoring   Newly c/o palpitations. Started Saturday, one episode lasted up to 20 minutes, since then she has been having 1-2 episodes a day which last <1-2 minutes. These are associated with SOB and dizziness. Her Zio patch in 2/17 showed sinus rhythm with non sustained SVT. A repeat 7-day Ziopatch recently reveals short (~45 second) runs of SVT.  - 48 hour Holter monitor  - I advised her to go to the ER for any dizziness, CP, SOB, or any other concerning symptoms     # Systemic amyloid  Heme previously monitoring her light chains which have been negative consistent with remission - therefore further palliative chemo not being considered. Nausea has been a large issue and is currently, Kytril was added at her last oncology appointment, but she has not started taking this. to her regimen of compazine and lorazepam.  - Consider palliative care referal if nausea not controlled on her current regimen (per onc)  - She is back on doxycycline     # Hyponatremia  Worse in the last month. I do not suspect this is 2/2 hypovolemic or hypervolemic hyponatremia as she looks fairly compensated centrally. Possibly 2/2 spironolactone? Dose decreased last visit, If not improved next week, consider d/c'ing altogether.   - labs in one week, consider discharge ashtyn      Follow up 1 week in CORE with labs.      25 minutes spent face-to-face with patient, >50% in counseling and/or coordination of care as described above    Ciara Araya PA-C  6/12/2019    ALEJANDRO FORD    Please do not hesitate to contact me if you have any questions/concerns.     Sincerely,     SE Alberto CNP

## 2019-06-12 NOTE — NURSING NOTE
Chief Complaint   Patient presents with     Follow Up     Return CORE, 69 yo female, HFpEF, labs prior.      Vitals were taken and medications were reconciled.    Faby Nguyen MA    2:14 PM

## 2019-06-12 NOTE — PROGRESS NOTES
Per Dr. Krishnan, patient to have 48 hour Holter monitor placed.  Diagnosis: SOB   Monitor placed: Yes  Patient Instructed: Yes  Patient verbalized understanding: Yes  Holter # 14  Placed by Xavi Hartman

## 2019-06-12 NOTE — PATIENT INSTRUCTIONS
Take your medicines every day, as directed    Changes made today:  o Stop taking metolazone for now  o Continue torsemide 100 mg BID, let us know if you are loosing more than 2lbs overnight of 5 lbs this week  o We will do a 2 day holter monitor to see if we can catch those palpitations  o Please go to the emergency room if your palpitations are lasting >1 minute, you are feeling chest pain, dizziness, shortness of breath or anything else that is concerning to you     Monitor Your Weight and Symptoms    Contact us if you:      Gain 2 pounds in one day or 5 pounds in one week    Feel more short of breath    Notice more leg swelling    Feel lightheadeded   Change your lifestyle    Limit Salt or Sodium:    2000 mg  Limit Fluids:    2000 mL or approximately 64 ounces  Eat a Heart Healthy Diet    Low in saturated fats  Stay Active:    Aim to move at least 150 minutes every  week         To Contact us    During Business Hours:  [phone], option # 1 (University)  Then option # 4 (medical questions)     After hours, weekends or holidays:   [phone], Option #4  Ask to speak to the On-Call Cardiologist. Inform them you are a CORE/heart failure patient at the [facility].     Use [employer] allows you to communicate directly with your heart team through secure messaging.    [employer] can be accessed any time on your phone, computer, or tablet.    If you need assistance, we'd be happy to help!         Keep your Heart Appointments:    - Please get labs and follow up in CORE in 1 week

## 2019-06-13 LAB — INTERPRETATION ECG - MUSE: NORMAL

## 2019-06-14 ENCOUNTER — TELEPHONE (OUTPATIENT)
Dept: CARDIOLOGY | Facility: CLINIC | Age: 69
End: 2019-06-14

## 2019-06-14 DIAGNOSIS — I50.33 ACUTE ON CHRONIC DIASTOLIC HEART FAILURE (H): Primary | ICD-10-CM

## 2019-06-14 NOTE — TELEPHONE ENCOUNTER
Date: 6/14/2019    Time of Call: 1:26 PM     Diagnosis:  HF     [ TORB ] Ordering provider: Ciara Araya NP  Order: Potassium 40 mEq daily.      Order received by: Shirlene Francois RN CORE Care Coordinator        Follow-up/additional notes: called pt and reviewed change in medication. Amy was able to repeat back changes. Had no further questions. Shirlene Francois RN CORE Care Coordinator

## 2019-06-23 DIAGNOSIS — E87.6 HYPOKALEMIA: ICD-10-CM

## 2019-06-23 DIAGNOSIS — I43 AMYLOID HEART DISEASE (H): ICD-10-CM

## 2019-06-23 DIAGNOSIS — E85.4 AMYLOID HEART DISEASE (H): ICD-10-CM

## 2019-06-23 DIAGNOSIS — R63.4 WEIGHT LOSS: ICD-10-CM

## 2019-06-24 ASSESSMENT — ENCOUNTER SYMPTOMS
ABDOMINAL PAIN: 0
HEARTBURN: 0
ORTHOPNEA: 0
PALPITATIONS: 1
DIARRHEA: 0
SLEEP DISTURBANCES DUE TO BREATHING: 0
NAUSEA: 1
HYPERTENSION: 0
BLOOD IN STOOL: 0
LIGHT-HEADEDNESS: 1
BLOATING: 0
VOMITING: 0
JAUNDICE: 0
SYNCOPE: 0
HYPOTENSION: 0
CONSTIPATION: 1
BOWEL INCONTINENCE: 0
EXERCISE INTOLERANCE: 1
LEG PAIN: 0
RECTAL PAIN: 0

## 2019-06-25 NOTE — PROGRESS NOTES
HPI:   Ms. Guerrero is a 68 year old female with a past medical history including stage IV AL amyloid diagnosed in 2016. Presents to clinic for CORE follow-up.     Her cardiac and oncologic history are as follows: fatigue starting in 1/2016. She had a coronary angiogram which was reportedly normal but was diagnosed with HF and started on a BBand diuretics. Her symptoms progressed to the point that she was evaluated at [facility] in August/September (Dr. Barron in oncology/hematology, Dr. Nettles is cardiology). She was diagnosed with stage IV AL amyloid (+GI, +bone marrow involvement, no involvement of kidney or liver). Her work up is detailed in our previous notes, however she has lambda AL amyloidosis and she underwent CyBorD chemotherapy and excellent light chain response by serum. Patient has been turned down at [facility] for a stem cell transplant due to her cardiac involvement. Later in 2016, she had 2-3 more right sided pleural taps. She was hospitalized 1/2017 and diuresed 20-30 pounds at that time. Since that time her AL has been in remission by labs without further chemo. Patient was recently treated with doxycycline but this was stopped ~6-9 months ago due to thought it wasn't benefiting per patient. She was last admitted to [facility] 4/2 for shortness of breath and edema. Echocardiogram showed EF 60-65%, moderate LVH, normal RV, moderate AI, and mild PH. She was diuresed 8 lb that admission (Cr bumped so felt actually over diuresed).    Last visit: Ciara Araya 6/12/2018  Had lost 15 pounds over two weeks in the setting of increased diuretics. Abdominal swelling and LE swelling were significantly improved. Stable 2 pillow orthopnea. No SOB walking room to room. [place] SOB with one flight of stairs. However, she was feeling very low energy and she was having significant palpitations (usually about 1-2 minutes but one episode as long as 20 minutes), which she was feeling dizzy with. No chest pain or other  "symptoms.    This visit:  Today she is feeling much better than 2 weeks ago. She is still low energy, but slightly improved. No more palpitations or dizziness since she was wearing the Holter monitor. She did have a few episodes of palpitations when she was wearing the Holter..     She does feel that her fluid is starting to accumulate again. Since the last visit, she has been taking torsemide 100mg BID w/40 meq of KCl daily and she also took 2.5 of metolazone about a week ago, in the past 3-4 days her weight has increased about 4 lbs. Overall, her breathing is \"good\", does feel SOB with a flight of stairs, stable 2 pillow orthopnea, no PND. She reports increased LE edema compared to our last visit. Some abdominal bloating as well. Some positional dizziness if she stands up to fast otherwise no lightheadedness or dizziness. No chest pain.    She has been watching her salt very carefully at home. Also watching her fluid intake. She is leaving for vacation in the next few days and knows that eating out more will likely mean eating monika salt.    Does not take home blood pressures.      PAST MEDICAL HISTORY:  Past Medical History:   Diagnosis Date     AL amyloidosis (H)      Cardiac amyloidosis (H)      Lymphedema      Recurrent right pleural effusion 1/2/2017       FAMILY HISTORY:  Family History   Problem Relation Age of Onset     Other - See Comments Sister         Amyloidosis       SOCIAL HISTORY:  Socioeconomic History     Marital status:    Tobacco Use     Smoking status: Never Smoker     Smokeless tobacco: Never Used   Substance and Sexual Activity     Alcohol use: No     Drug use: No   Other Topics Concern     CURRENT MEDICATIONS:    Current Outpatient Medications on File Prior to Visit:  Acetaminophen (TYLENOL PO) Take 325 mg by mouth   doxycycline hyclate (VIBRA-TABS) 100 MG tablet Take 1 tablet (100 mg) by mouth daily   granisetron (KYTRIL) 1 MG tablet Take 1 tablet (1 mg) by mouth every 12 hours as " "needed for nausea   LORazepam (ATIVAN) 0.5 MG tablet Take 1 tablet (0.5 mg) by mouth every 6 hours as needed for anxiety or nausea   ondansetron (ZOFRAN-ODT) 4 MG ODT tab Take 1 tablet (4 mg) by mouth every 6 hours as needed for nausea   prochlorperazine (COMPAZINE) 5 MG tablet One to two tablets PO q 6 hours prn nausea   torsemide (DEMADEX) 100 MG tablet Take 1 tablet (100 mg) by mouth 2 times daily     No current facility-administered medications on file prior to visit.     ROS:   CONSTITUTIONAL: Denies fever, chills, +fatigue, chronic.  HEENT: Denies headache, vision changes, and changes in speech.   CV: Refer to HPI.   PULMONARY:Refer to HPI.   GI:Refer to HPI.   :Denies urinary alterations, dysuria, urinary frequency, hematuria, and abnormal drainage.   EXT:+Chronic lower extremity edema, currently worse   SKIN:Denies abnormal rashes or lesions.   MUSCULOSKELETAL:Denies upper or lower extremity weakness and pain.   NEUROLOGIC:Denies dizziness, seizures, or upper or lower extremity paresthesia.     EXAM:  /64 (BP Location: Right arm, Patient Position: Chair, Cuff Size: Adult Regular)   Pulse 63   Ht 1.6 m (5' 3\")   Wt 68.5 kg (151 lb)   SpO2 99%   BMI 26.75 kg/m       GENERAL: Appears comfortable, in no acute distress.   HEENT: Eye symmetrical, no discharge or icterus bilaterally. Mucous membranes moist and without lesions.  CV: RRR, +S1S2, no murmur, rub, or gallop. JVP at about 1 inch below chin at 45 degrees.  RESPIRATORY: Respirations regular, even, and unlabored. Lungs CTA throughout.   GI: Soft and mildly distended with normoactive bowel sounds present. No tenderness, rebound, guarding. No hepatomegaly.   EXTREMITIES: 2+ non pitting peripheral edema. 2+ bilateral pedal pulses.   NEUROLOGIC: Alert and oriented x 3. No focal deficits.   MUSCULOSKELETAL: No joint swelling or tenderness.   SKIN: No jaundice. No rashes or lesions.     Labs, reviewed with patient in clinic today:  CBC RESULTS:  Lab " Results   Component Value Date    WBC 4.8 05/17/2019    RBC 4.59 05/17/2019    HGB 14.9 05/17/2019    HCT 44.4 05/17/2019    MCV 97 05/17/2019    MCH 32.5 05/17/2019    MCHC 33.6 05/17/2019    RDW 14.1 05/17/2019     05/17/2019       CMP RESULTS:  Lab Results   Component Value Date     (L) 06/26/2019    POTASSIUM 4.4 06/26/2019    CHLORIDE 93 (L) 06/26/2019    CO2 30 06/26/2019    ANIONGAP 6 06/26/2019     (H) 06/26/2019    BUN 36 (H) 06/26/2019    CR 1.11 (H) 06/26/2019    GFRESTIMATED 51 (L) 06/26/2019    GFRESTBLACK 60 (L) 06/26/2019    JERROD 9.1 06/26/2019    BILITOTAL 2.6 (H) 05/17/2019    ALBUMIN 4.2 05/17/2019    ALKPHOS 263 (H) 05/17/2019    ALT 32 05/17/2019    AST 25 05/17/2019        INR RESULTS:  Lab Results   Component Value Date    INR 1.30 (H) 01/14/2017       Lab Results   Component Value Date    MAG 3.2 (H) 04/09/2019     Lab Results   Component Value Date    NTBNPI 9,009 (H) 01/13/2017     Lab Results   Component Value Date    NTBNP 1,378 (H) 05/30/2019       Diagnostics:  TTE 4/9/19  Moderate left ventricular hypertrophy.  Global and regional left ventricular function is normal with an EF of 60-65%.  Global right ventricular function is normal.  Moderate aortic valve insufficiency.  Mild pulmonary hypertension with dilated IVC.  This study was compared with the study from 10/11/18 the AR is worse and RA pressure is now increased.    TTE 10/11/18  Global and regional left ventricular function is normal with an EF of 55-60%.  Moderate concentric wall thickening consistent with left ventricular  hypertrophy is present - known amyloid.  Grade III or advanced diastolic dysfunction.  Normal right ventricular size, wall thickness, and function.  Estimated pulmonary artery pressure 28 mmHg.  IVC with normal diameter but does not collapse (>50%) with inspiration.  Trace pericardial effusion.  Compared to prior study from 8/17/17, no significant change.    [place] 11/2017      Holter  Monitor 06/2019  INTERPRETATION  1. The rhythm varied with sinus rhythm and atrial fibrillation/variable atrial flutter. Low voltage noted.  2. The heart rate varied with a minimum rate of 48 bpm, maximum rate of 164 bpm, average rate of 74 bpm.  3. Frequent supraventricular ectopy was seen ranging from 0-469 per hour. Occasional atrail pairs and fairly frequent bigeminal cycles were noted.  Wandering atrial pacemaker noted.  4. Infrequent multifocal ventricular ectopy was seen ranging from 0-36 beats per hour.  5. ST-T wave changes were present.  6. Three patient events were documented and correlated with atrial fibrillation/flutter    Assessment and Plan:   Ms. Guerrero is a 68 year old female with AL amyloidosis with cardiac manifestation who presents to [facility] for hospital follow-up. Patient has cardiac amyloidosis as evidenced by her echocardiogram (severe concentric hypertrophy and biatrial enlargement) and abnormal EKG (near-low voltage, poor R wave progression, biatrial enlargement and no evidence of LVH despite thicken LV on echo) in the setting of biopsy proven (BMB, EGD) AL amyloid. She completed chemotherapy with CyBorD with an excellent serologic response and her heart failure (i.e. troponin negative, NT pro BNP was stable, serum light chains minimal and urine light chains undetectable).     However, we have continued to struggle with volume overload. Today she is hypervolemic again, with weight up 6 lbs since last visit. Cr improved at 1.11. (Cr baseline~1.2-1.3). Normotensive. Exam is hypervolemic.     Additionally, we did a Holter monitor to evaluate the palpitations she was reporting at the last appointment. The Holter Monitor showed 9% a. Fib/a. Flutter, 3 of these events correlated with patient-triggered events. Given the stage of her disease, she is not a straight-foward decision to anticoagulate, additionally she is very cautious to initiate anticoagulation. Chads2-Vasc2 score is 4 (+1 age, +1  female, +CHF, +HTN) which puts her annual risk of stroke at 5%. HR today is 63.     # Chronic diastolic heart failure/restrictive cardiomyopathy 2/2  # Cardiac amyloidosis    Stage C. NYHA Class III.    Fluid status: Hypervolemic, continue torsemide 100 mg BID, start weekly metolazone 2.5m. Potassium 20meq BID with an extra 20meq on metolazone days  ACEi/ARB/ARNI: n/a   BB: no indication and relatively contraindicated due to risk of progressive conduction disease/AV block in these patients  Aldosterone antagonist: d/c'd given hyponatremia, can add back if no improvement in Na  SCD prophylaxis: does not meet criteria for implant  NSAID use: contraindicated  Sleep apnea evaluation: deferred today  Remote monitoring: deferred today, could be Cardiocom candidate   Goals of care: prior discussions with Dr Cohen re: planning for EoL care as long term prognosis for AL amyloid is poor.     # A. Fib/A. Flutter  At her last appointment, she was c/o palpitations. One episode lasted up to 20 minutes, and after that she had 1-2 episodes a day which last <1-2 minutes. These are associated with SOB and dizziness. We did a holter monitor which showed intermittent a. Fib and a. Flutter. [id number] of 4. We discussed stroke vs bleeding risk, at this point she is not sure if she wants to start anticoagulation. Rate appears overall rate controlled, no palpitations since Holter monitor.  - Deferred A/C today, will continue discussions   - Avoiding BB in the setting of amyloid  - Discussing digoxin with Dr. Cohen  - Will discuss EP intervention with Dr. Cohen as well    # Systemic amyloid  Heme previously monitoring her light chains which have been negative consistent with remission - therefore further palliative chemo not being considered. Nausea has been a large issue and is currently, Kytril was added at her last oncology appointment, but she has not started taking this. to her regimen of compazine and lorazepam.  - Consider  palliative care referal if nausea not controlled on her current regimen (per onc)  - She is back on doxycycline     # Hyponatremia  Worse in the last month. I do not suspect this is 2/2 hypovolemic or hypervolemic hyponatremia as it tends to be stable even with volume changes.  - discontinued spironolactone, monitor for improvement    Follow up 2 week in CORE with labs.    25 minutes spent face-to-face with patient, >50% in counseling and/or coordination of care as described above    Ciara Araya PA-C  6/12/2019            ALEJANDRO FORD

## 2019-06-26 ENCOUNTER — OFFICE VISIT (OUTPATIENT)
Dept: CARDIOLOGY | Facility: CLINIC | Age: 69
End: 2019-06-26
Attending: NURSE PRACTITIONER
Payer: COMMERCIAL

## 2019-06-26 VITALS
BODY MASS INDEX: 26.75 KG/M2 | HEIGHT: 63 IN | DIASTOLIC BLOOD PRESSURE: 64 MMHG | HEART RATE: 63 BPM | OXYGEN SATURATION: 99 % | SYSTOLIC BLOOD PRESSURE: 118 MMHG | WEIGHT: 151 LBS

## 2019-06-26 DIAGNOSIS — I48.0 PAROXYSMAL ATRIAL FIBRILLATION (H): Primary | ICD-10-CM

## 2019-06-26 DIAGNOSIS — I50.33 ACUTE ON CHRONIC DIASTOLIC HEART FAILURE (H): ICD-10-CM

## 2019-06-26 DIAGNOSIS — E85.4 AMYLOID HEART DISEASE (H): ICD-10-CM

## 2019-06-26 DIAGNOSIS — I43 AMYLOID HEART DISEASE (H): ICD-10-CM

## 2019-06-26 DIAGNOSIS — E87.1 HYPONATREMIA: ICD-10-CM

## 2019-06-26 LAB
ANION GAP SERPL CALCULATED.3IONS-SCNC: 6 MMOL/L (ref 3–14)
BUN SERPL-MCNC: 36 MG/DL (ref 7–30)
CALCIUM SERPL-MCNC: 9.1 MG/DL (ref 8.5–10.1)
CHLORIDE SERPL-SCNC: 93 MMOL/L (ref 94–109)
CO2 SERPL-SCNC: 30 MMOL/L (ref 20–32)
CREAT SERPL-MCNC: 1.11 MG/DL (ref 0.52–1.04)
GFR SERPL CREATININE-BSD FRML MDRD: 51 ML/MIN/{1.73_M2}
GLUCOSE SERPL-MCNC: 121 MG/DL (ref 70–99)
POTASSIUM SERPL-SCNC: 4.4 MMOL/L (ref 3.4–5.3)
SODIUM SERPL-SCNC: 129 MMOL/L (ref 133–144)

## 2019-06-26 PROCEDURE — 99214 OFFICE O/P EST MOD 30 MIN: CPT | Mod: ZP | Performed by: NURSE PRACTITIONER

## 2019-06-26 PROCEDURE — 80048 BASIC METABOLIC PNL TOTAL CA: CPT | Performed by: NURSE PRACTITIONER

## 2019-06-26 PROCEDURE — G0463 HOSPITAL OUTPT CLINIC VISIT: HCPCS | Mod: ZF

## 2019-06-26 PROCEDURE — 36415 COLL VENOUS BLD VENIPUNCTURE: CPT | Performed by: NURSE PRACTITIONER

## 2019-06-26 RX ORDER — POTASSIUM CHLORIDE 750 MG/1
TABLET, EXTENDED RELEASE ORAL
Qty: 180 TABLET | Refills: 1 | Status: SHIPPED | OUTPATIENT
Start: 2019-06-26 | End: 2019-08-02

## 2019-06-26 RX ORDER — METOLAZONE 2.5 MG/1
2.5 TABLET ORAL WEEKLY
Qty: 10 TABLET | Refills: 3 | Status: SHIPPED | OUTPATIENT
Start: 2019-06-26 | End: 2019-07-05

## 2019-06-26 ASSESSMENT — PAIN SCALES - GENERAL: PAINLEVEL: NO PAIN (0)

## 2019-06-26 ASSESSMENT — MIFFLIN-ST. JEOR: SCORE: 1194.06

## 2019-06-26 NOTE — NURSING NOTE
Chief Complaint   Patient presents with     Follow Up      Return CORE, 69 yo female, HFpEF, labs prior.      Vitals were taken and medications were reconciled.     Soco Kim CMA    11:24 AM

## 2019-06-26 NOTE — PATIENT INSTRUCTIONS
Take your medicines every day, as directed    Changes made today:  o Start taking metolazone 2.5 once a week  o Take an extra 20 meq of Potassium on days that you take metolazone  o If you weight is increasing on vacation give us a call, you might need some more metolazone  o Continue torsemide 100 mg twice a day  o Stop taking Spironolactone 25mg      Monitor Your Weight and Symptoms    Contact us if you:      Gain 2 pounds in one day or 5 pounds in one week    Feel more short of breath    Notice more leg swelling    Feel lightheadeded   Change your lifestyle    Limit Salt or Sodium:    2000 mg  Limit Fluids:    2000 mL or approximately 64 ounces  Eat a Heart Healthy Diet    Low in saturated fats  Stay Active:    Aim to move at least 150 minutes every  week         To Contact us    During Business Hours:  [phone], option # 1 (University)  Then option # 4 (medical questions)     After hours, weekends or holidays:   [phone], Option #4  Ask to speak to the On-Call Cardiologist. Inform them you are a CORE/heart failure patient at the [facility].     Use [employer] allows you to communicate directly with your heart team through secure messaging.    "[employer] can be accessed any time on your phone, computer, or tablet.    If you need assistance, we'd be happy to help!         Keep your Heart Appointments:    - Follow-up in CORE in 2 weeks with labs

## 2019-06-26 NOTE — LETTER
6/26/2019      RE: Leandra Guerrero  [address] [place] [address]       Dear Colleague,    Thank you for the opportunity to participate in the care of your patient, Leandra Guerrero, at the [facility]. Please see a copy of my visit note below.    HPI:   Ms. Guerrero is a 68 year old female with a past medical history including stage IV AL amyloid diagnosed in 2016. Presents to clinic for [facility] follow-up.     Her cardiac and oncologic history are as follows: fatigue starting in 1/2016. She had a coronary angiogram which was reportedly normal but was diagnosed with HF and started on a BBand diuretics. Her symptoms progressed to the point that she was evaluated at [place] in August/September (Dr. Barron in oncology/hematology, Dr. Nettles is cardiology). She was diagnosed with stage IV AL amyloid (+GI, +bone marrow involvement, no involvement of kidney or liver). Her work up is detailed in our previous notes, however she has lambda AL amyloidosis and she underwent CyBorD chemotherapy and excellent light chain response by serum. Patient has been turned down at [place] for a stem cell transplant due to her cardiac involvement. Later in 2016, she had 2-3 more right sided pleural taps. She was hospitalized 1/2017 and diuresed 20-30 pounds at that time. Since that time her AL has been in remission by labs without further chemo. Patient was recently treated with doxycycline but this was stopped ~6-9 months ago due to thought it wasn't benefiting per patient. She was last admitted to [facility] 4/2 for shortness of breath and edema. Echocardiogram showed EF 60-65%, moderate LVH, normal RV, moderate AI, and mild PH. She was diuresed 8 lb that admission (Cr bumped so felt actually over diuresed).    Last visit: Ciara Araya 6/12/2018  Had lost 15 pounds over two weeks in the setting of increased diuretics. Abdominal swelling and LE swelling were  "significantly improved. Stable 2 pillow orthopnea. No SOB walking room to room. [place] SOB with one flight of stairs. However, she was feeling very low energy and she was having significant palpitations (usually about 1-2 minutes but one episode as long as 20 minutes), which she was feeling dizzy with. No chest pain or other symptoms.    This visit:  Today she is feeling much better than 2 weeks ago. She is still low energy, but slightly improved. No more palpitations or dizziness since she was wearing the Holter monitor. She did have a few episodes of palpitations when she was wearing the Holter..     She does feel that her fluid is starting to accumulate again. Since the last visit, she has been taking torsemide 100mg BID w/40 meq of KCl daily and she also took 2.5 of metolazone about a week ago, in the past 3-4 days her weight has increased about 4 lbs. Overall, her breathing is \"good\", does feel SOB with a flight of stairs, stable 2 pillow orthopnea, no PND. She reports increased LE edema compared to our last visit. Some abdominal bloating as well. Some positional dizziness if she stands up to fast otherwise no lightheadedness or dizziness. No chest pain.    She has been watching her salt very carefully at home. Also watching her fluid intake. She is leaving for vacation in the next few days and knows that eating out more will likely mean eating monika salt.    Does not take home blood pressures.      PAST MEDICAL HISTORY:  Past Medical History:   Diagnosis Date     AL amyloidosis (H)      Cardiac amyloidosis (H)      Lymphedema      Recurrent right pleural effusion 1/2/2017       FAMILY HISTORY:  Family History   Problem Relation Age of Onset     Other - See Comments Sister         Amyloidosis       SOCIAL HISTORY:  Socioeconomic History     Marital status:    Tobacco Use     Smoking status: Never Smoker     Smokeless tobacco: Never Used   Substance and Sexual Activity     Alcohol use: No     Drug use: No " "  Other Topics Concern     CURRENT MEDICATIONS:    Current Outpatient Medications on File Prior to Visit:  Acetaminophen (TYLENOL PO) Take 325 mg by mouth   doxycycline hyclate (VIBRA-TABS) 100 MG tablet Take 1 tablet (100 mg) by mouth daily   granisetron (KYTRIL) 1 MG tablet Take 1 tablet (1 mg) by mouth every 12 hours as needed for nausea   LORazepam (ATIVAN) 0.5 MG tablet Take 1 tablet (0.5 mg) by mouth every 6 hours as needed for anxiety or nausea   ondansetron (ZOFRAN-ODT) 4 MG ODT tab Take 1 tablet (4 mg) by mouth every 6 hours as needed for nausea   prochlorperazine (COMPAZINE) 5 MG tablet One to two tablets PO q 6 hours prn nausea   torsemide (DEMADEX) 100 MG tablet Take 1 tablet (100 mg) by mouth 2 times daily     No current facility-administered medications on file prior to visit.     ROS:   CONSTITUTIONAL: Denies fever, chills, +fatigue, chronic.  HEENT: Denies headache, vision changes, and changes in speech.   CV: Refer to HPI.   PULMONARY:Refer to HPI.   GI:Refer to HPI.   :Denies urinary alterations, dysuria, urinary frequency, hematuria, and abnormal drainage.   EXT:+Chronic lower extremity edema, currently worse   SKIN:Denies abnormal rashes or lesions.   MUSCULOSKELETAL:Denies upper or lower extremity weakness and pain.   NEUROLOGIC:Denies dizziness, seizures, or upper or lower extremity paresthesia.     EXAM:  /64 (BP Location: Right arm, Patient Position: Chair, Cuff Size: Adult Regular)   Pulse 63   Ht 1.6 m (5' 3\")   Wt 68.5 kg (151 lb)   SpO2 99%   BMI 26.75 kg/m        GENERAL: Appears comfortable, in no acute distress.   HEENT: Eye symmetrical, no discharge or icterus bilaterally. Mucous membranes moist and without lesions.  CV: RRR, +S1S2, no murmur, rub, or gallop. JVP at about 1 inch below chin at 45 degrees.  RESPIRATORY: Respirations regular, even, and unlabored. Lungs CTA throughout.   GI: Soft and mildly distended with normoactive bowel sounds present. No tenderness, " rebound, guarding. No hepatomegaly.   EXTREMITIES: 2+ non pitting peripheral edema. 2+ bilateral pedal pulses.   NEUROLOGIC: Alert and oriented x 3. No focal deficits.   MUSCULOSKELETAL: No joint swelling or tenderness.   SKIN: No jaundice. No rashes or lesions.     Labs, reviewed with patient in clinic today:  CBC RESULTS:  Lab Results   Component Value Date    WBC 4.8 05/17/2019    RBC 4.59 05/17/2019    HGB 14.9 05/17/2019    HCT 44.4 05/17/2019    MCV 97 05/17/2019    MCH 32.5 05/17/2019    MCHC 33.6 05/17/2019    RDW 14.1 05/17/2019     05/17/2019       CMP RESULTS:  Lab Results   Component Value Date     (L) 06/26/2019    POTASSIUM 4.4 06/26/2019    CHLORIDE 93 (L) 06/26/2019    CO2 30 06/26/2019    ANIONGAP 6 06/26/2019     (H) 06/26/2019    BUN 36 (H) 06/26/2019    CR 1.11 (H) 06/26/2019    GFRESTIMATED 51 (L) 06/26/2019    GFRESTBLACK 60 (L) 06/26/2019    JERROD 9.1 06/26/2019    BILITOTAL 2.6 (H) 05/17/2019    ALBUMIN 4.2 05/17/2019    ALKPHOS 263 (H) 05/17/2019    ALT 32 05/17/2019    AST 25 05/17/2019        INR RESULTS:  Lab Results   Component Value Date    INR 1.30 (H) 01/14/2017       Lab Results   Component Value Date    MAG 3.2 (H) 04/09/2019     Lab Results   Component Value Date    NTBNPI 9,009 (H) 01/13/2017     Lab Results   Component Value Date    NTBNP 1,378 (H) 05/30/2019       Diagnostics:  TTE 4/9/19  Moderate left ventricular hypertrophy.  Global and regional left ventricular function is normal with an EF of 60-65%.  Global right ventricular function is normal.  Moderate aortic valve insufficiency.  Mild pulmonary hypertension with dilated IVC.  This study was compared with the study from 10/11/18 the AR is worse and RA pressure is now increased.    TTE 10/11/18  Global and regional left ventricular function is normal with an EF of 55-60%.  Moderate concentric wall thickening consistent with left ventricular  hypertrophy is present - known amyloid.  Grade III or advanced  diastolic dysfunction.  Normal right ventricular size, wall thickness, and function.  Estimated pulmonary artery pressure 28 mmHg.  IVC with normal diameter but does not collapse (>50%) with inspiration.  Trace pericardial effusion.  Compared to prior study from 8/17/17, no significant change.    [place] 11/2017      Holter Monitor 06/2019  INTERPRETATION  1. The rhythm varied with sinus rhythm and atrial fibrillation/variable atrial flutter. Low voltage noted.  2. The heart rate varied with a minimum rate of 48 bpm, maximum rate of 164 bpm, average rate of 74 bpm.  3. Frequent supraventricular ectopy was seen ranging from 0-469 per hour. Occasional atrail pairs and fairly frequent bigeminal cycles were noted.  Wandering atrial pacemaker noted.  4. Infrequent multifocal ventricular ectopy was seen ranging from 0-36 beats per hour.  5. ST-T wave changes were present.  6. Three patient events were documented and correlated with atrial fibrillation/flutter    Assessment and Plan:   Ms. Guerrero is a 68 year old female with AL amyloidosis with cardiac manifestation who presents to [facility] for hospital follow-up. Patient has cardiac amyloidosis as evidenced by her echocardiogram (severe concentric hypertrophy and biatrial enlargement) and abnormal EKG (near-low voltage, poor R wave progression, biatrial enlargement and no evidence of LVH despite thicken LV on echo) in the setting of biopsy proven (BMB, EGD) AL amyloid. She completed chemotherapy with CyBorD with an excellent serologic response and her heart failure (i.e. troponin negative, NT pro BNP was stable, serum light chains minimal and urine light chains undetectable).     However, we have continued to struggle with volume overload. Today she is hypervolemic again, with weight up 6 lbs since last visit. Cr improved at 1.11. (Cr baseline~1.2-1.3). Normotensive. Exam is hypervolemic.     Additionally, we did a Holter monitor to evaluate the palpitations she was  reporting at the last appointment. The Holter Monitor showed 9% a. Fib/a. Flutter, 3 of these events correlated with patient-triggered events. Given the stage of her disease, she is not a straight-foward decision to anticoagulate, additionally she is very cautious to initiate anticoagulation. Chads2-Vasc2 score is 4 (+1 age, +1 female, +CHF, +HTN) which puts her annual risk of stroke at 5%. HR today is 63.     # Chronic diastolic heart failure/restrictive cardiomyopathy 2/2  # Cardiac amyloidosis    Stage C. NYHA Class III.    Fluid status: Hypervolemic, continue torsemide 100 mg BID, start weekly metolazone 2.5m. Potassium 20meq BID with an extra 20meq on metolazone days  ACEi/ARB/ARNI: n/a   BB: no indication and relatively contraindicated due to risk of progressive conduction disease/AV block in these patients  Aldosterone antagonist: d/c'd given hyponatremia, can add back if no improvement in Na  SCD prophylaxis: does not meet criteria for implant  NSAID use: contraindicated  Sleep apnea evaluation: deferred today  Remote monitoring: deferred today, could be Cardiocom candidate   Goals of care: prior discussions with Dr Cohen re: planning for EoL care as long term prognosis for AL amyloid is poor.     # A. Fib/A. Flutter  At her last appointment, she was c/o palpitations. One episode lasted up to 20 minutes, and after that she had 1-2 episodes a day which last <1-2 minutes. These are associated with SOB and dizziness. We did a holter monitor which showed intermittent a. Fib and a. Flutter. [id number] of 4. We discussed stroke vs bleeding risk, at this point she is not sure if she wants to start anticoagulation. Rate appears overall rate controlled, no palpitations since Holter monitor.  - Deferred A/C today, will continue discussions   - Avoiding BB in the setting of amyloid  - Discussing digoxin with Dr. Cohen  - Will discuss EP intervention with Dr. Cohen as well    # Systemic amyloid  Heme  previously monitoring her light chains which have been negative consistent with remission - therefore further palliative chemo not being considered. Nausea has been a large issue and is currently, Kytril was added at her last oncology appointment, but she has not started taking this. to her regimen of compazine and lorazepam.  - Consider palliative care referal if nausea not controlled on her current regimen (per onc)  - She is back on doxycycline     # Hyponatremia  Worse in the last month. I do not suspect this is 2/2 hypovolemic or hypervolemic hyponatremia as it tends to be stable even with volume changes.  - discontinued spironolactone, monitor for improvement    Follow up 2 week in [facility] with labs.    25 minutes spent face-to-face with patient, >50% in counseling and/or coordination of care as described above    Ciara Araya PA-C  6/12/2019    ALEJANDRO FORD

## 2019-06-27 RX ORDER — ONDANSETRON 4 MG/1
TABLET, ORALLY DISINTEGRATING ORAL
Qty: 15 TABLET | Refills: 1 | OUTPATIENT
Start: 2019-06-27

## 2019-06-27 NOTE — TELEPHONE ENCOUNTER
ondansetron (ZOFRAN-ODT) 4 MG ODT tab    Last Written Prescription Date:  9/26/18  Last Fill Quantity: 15,   # refills: 1  Last Office Visit : 6/27/19  Future Office visit:  7/11/19    Routing refill request to provider for review/approval because: not on protocol

## 2019-07-02 ENCOUNTER — MYC MEDICAL ADVICE (OUTPATIENT)
Dept: CARDIOLOGY | Facility: CLINIC | Age: 69
End: 2019-07-02

## 2019-07-02 DIAGNOSIS — E85.4 CARDIAC AMYLOIDOSIS (H): ICD-10-CM

## 2019-07-02 DIAGNOSIS — I43 CARDIAC AMYLOIDOSIS (H): ICD-10-CM

## 2019-07-02 DIAGNOSIS — I50.33 ACUTE ON CHRONIC DIASTOLIC HEART FAILURE (H): Primary | ICD-10-CM

## 2019-07-02 NOTE — TELEPHONE ENCOUNTER
Discussed with CATHY Orozco.  Will hold metolazone and have Amy get some labs checked.  Communicated this to Amy via [employer]    Date: 7/2/2019    Time of Call: 10:29 AM     Diagnosis:  Heart Failure     [ VORB ] Ordering provider: CATHY Orozco    Order: hold metalazone, BMP     Order received by: Niki Fam RN       Follow-up/additional notes: watch for lab results

## 2019-07-03 ENCOUNTER — TELEPHONE (OUTPATIENT)
Dept: CARDIOLOGY | Facility: CLINIC | Age: 69
End: 2019-07-03

## 2019-07-03 NOTE — TELEPHONE ENCOUNTER
[facility] Call Center    Phone Message    May a detailed message be left on voicemail: yes    Reason for Call: Pt needs blood orders faxed to [phone], pt said Yolanda from Cardiology knows which blood orders need to be faxed, fax # is , please call pt with questions    Action Taken: Message routed to:  [facility] ([facility]): Cardiology

## 2019-07-03 NOTE — TELEPHONE ENCOUNTER
Called and talked to patient letting her know that we faxed over the labs to [facility] and they should receive the labs sometime today. I informed patient that if they do not receive them please call us back and we will resend them.    Judi Lucero MA  Cardiology [facility]

## 2019-07-05 DIAGNOSIS — I43 AMYLOID HEART DISEASE (H): ICD-10-CM

## 2019-07-05 DIAGNOSIS — E85.4 AMYLOID HEART DISEASE (H): ICD-10-CM

## 2019-07-05 LAB
ANION GAP SERPL CALCULATED.3IONS-SCNC: ABNORMAL MMOL/L
BUN SERPL-MCNC: 49 MG/DL
BUN/CREAT RATIO - HISTORICAL: 33
CALCIUM SERPL-MCNC: 9.4 MG/DL
CHLORIDE SERPLBLD-SCNC: 86 MMOL/L
CO2 SERPL-SCNC: 33 MMOL/L
CREAT SERPL-MCNC: 1.49 MG/DL
GFR SERPL CREATININE-BSD FRML MDRD: 36 ML/MIN/1.73M2
GLUCOSE SERPL-MCNC: 125 MG/DL (ref 70–99)
POTASSIUM SERPL-SCNC: 3.1 MMOL/L
SODIUM SERPL-SCNC: 131 MMOL/L

## 2019-07-05 RX ORDER — METOLAZONE 2.5 MG/1
2.5 TABLET ORAL PRN
Qty: 10 TABLET | Refills: 3
Start: 2019-07-05 | End: 2019-07-11

## 2019-07-05 NOTE — PROGRESS NOTES
Phone call 7/5/2019, 14:40    I called Amy to discuss the labs she had drawn on Wednesday in [place]. It is warm in [place] and she feels like she is slightly dehydrated. She appears dry on labs  and reports that on Wednesday she was 9 lbs since her last appointment 1 week ago. Today she is up about 2 lbs compared to the lab day. Last took metolazone on Friday. K low at 3.1.     We discussed anticoagulation again. I let her know that I have talked with Dr. Cohen and she recommends anticoagulation. Amy states that she still has some concerns about potential for bleeding, but will likely be agreeable. She does not want to start while she is on vacation right now. We discussed stroke risk at last appointment.    Plan  - stop weekly metolazone  - hold today's afternoon torsemide dose  - tomorrow restart torsemide 100mg BID  - Will take 40meq of KCl this evening, will take an extra 20 meq of Kcl tomorrow  - Will continue Kcl 20 meq daily otherwise  - We will defer starting A/C until she see's me in clinic last week per Amy's request.  - Follow-up: Labs and appointment next week    Ciara Araya PA-C  [facility] Cardiology

## 2019-07-09 ENCOUNTER — MYC MEDICAL ADVICE (OUTPATIENT)
Dept: CARDIOLOGY | Facility: CLINIC | Age: 69
End: 2019-07-09

## 2019-07-10 NOTE — TELEPHONE ENCOUNTER
"Received MyChart message from Amy concerned about not urinating and weight gain.  Called Amy to discuss.  She reports only urinating 2 times at night (usually goes 4-5 times a night) and that her weight has consistently been 151-155 since returning home from [place].  She noted her feet and ankles have been swollen since driving home as well \"so swollen they look like they are going to pop.\"  She feels slow and a little short of breath but attributes that to the heat.  She denies palpitations.  Amy has an appointment scheduled for tomorrow but feels like she will be fine from a symptom point of view until that time.  Will route this to provider Yolanda.    Niki Fam RN    "

## 2019-07-10 NOTE — PROGRESS NOTES
HPI:   Ms. Guerrero is a 68 year old female with a past medical history including stage IV AL amyloid diagnosed in 2016. Presents to clinic for CORE follow-up.     Her cardiac and oncologic history are as follows: fatigue starting in 1/2016. She had a coronary angiogram which was reportedly normal but was diagnosed with HF and started on a BBand diuretics. Her symptoms progressed to the point that she was evaluated at [facility] in August/September (Dr. Barron in oncology/hematology, Dr. Nettles is cardiology). She was diagnosed with stage IV AL amyloid (+GI, +bone marrow involvement, no involvement of kidney or liver). Her work up is detailed in our previous notes, however she has lambda AL amyloidosis and she underwent CyBorD chemotherapy and excellent light chain response by serum. Patient has been turned down at [facility] for a stem cell transplant due to her cardiac involvement. Later in 2016, she had 2-3 more right sided pleural taps. She was hospitalized 1/2017 and diuresed 20-30 pounds at that time. Since that time her AL has been in remission by labs without further chemo. Patient was then treated with doxycycline but this was stopped ~6-9 months ago due to thought it wasn't benefiting per patient, although now recently restarted again. She was last admitted to [facility] 4/2 for shortness of breath and edema. Echocardiogram showed EF 60-65%, moderate LVH, normal RV, moderate AI, and mild PH.    Last visit: Ciara Araya 6/26/2019  Was a few weeks after she was successfully diuresed 15 lbs as an outpatient, and now she was presenting with reports that she was starting to accumulate fluid again. Had taken on metolazone 2.5 mg x1 over the last 2 weeks. At this visit, we discussed the findings on her Holter Monitor and she had opted to hold off on starting anticoagulation at that time, wanted to rediscuss at next visit.     I also called Amy on 7/5 to follow-up labs. At this point we had her continue torsemide 100mg BID,  hold off on any more metolazone.    This visit:  Her weight is up today to 152. We think that her dry weight is closer to 147. She feels like she is accumulating a lot of fluid in her legs and in her abdomen. Her legs are so edematous that she cannot fit compression stockings on without a lot of pain and her lymphedema wraps cause her to trap an uncomfortable amount of fluid in her feet. She has a normal appetite, but thinks that she might be experiencing some minor early satiety. She denies orthopnea and PND. No chest pain. No palpitations. No dizziness.     She was recently able to walk about 1/4 of a mile without feeling SOB, but beyond that she experienced fatigue and dyspnea.    She has been taking Torsemide 100mg BID. No metolazone since prior to 7/5.     Does not take home blood pressures.      PAST MEDICAL HISTORY:  Past Medical History:   Diagnosis Date     AL amyloidosis (H)      Cardiac amyloidosis (H)      Lymphedema      Recurrent right pleural effusion 1/2/2017       FAMILY HISTORY:  Family History   Problem Relation Age of Onset     Other - See Comments Sister         Amyloidosis       SOCIAL HISTORY:  Socioeconomic History     Marital status:    Tobacco Use     Smoking status: Never Smoker     Smokeless tobacco: Never Used   Substance and Sexual Activity     Alcohol use: No     Drug use: No   Other Topics Concern     CURRENT MEDICATIONS:    Current Outpatient Medications on File Prior to Visit:  Acetaminophen (TYLENOL PO) Take 325 mg by mouth   doxycycline hyclate (VIBRA-TABS) 100 MG tablet Take 1 tablet (100 mg) by mouth daily   granisetron (KYTRIL) 1 MG tablet Take 1 tablet (1 mg) by mouth every 12 hours as needed for nausea   LORazepam (ATIVAN) 0.5 MG tablet Take 1 tablet (0.5 mg) by mouth every 6 hours as needed for anxiety or nausea   ondansetron (ZOFRAN-ODT) 4 MG ODT tab Take 1 tablet (4 mg) by mouth every 6 hours as needed for nausea   potassium chloride ER (K-DUR/KLOR-CON M) 10 MEQ CR  "tablet Take 20 meq in the morning and 20 meq in the afternoon. Take an extra 20 meq on days you take metolazone.   prochlorperazine (COMPAZINE) 5 MG tablet One to two tablets PO q 6 hours prn nausea   torsemide (DEMADEX) 100 MG tablet Take 1 tablet (100 mg) by mouth 2 times daily     No current facility-administered medications on file prior to visit.     ROS:   CONSTITUTIONAL: Denies fever, chills, +fatigue, chronic.  HEENT: Denies headache, vision changes, and changes in speech.   CV: Refer to HPI.   PULMONARY:Refer to HPI.   GI:Refer to HPI.   :Denies urinary alterations, dysuria, urinary frequency, hematuria, and abnormal drainage.   EXT: +Chronic lower extremity edema see hpi, currently worse   SKIN:Denies abnormal rashes or lesions.   MUSCULOSKELETAL:Denies upper or lower extremity weakness and pain.   NEUROLOGIC:Denies dizziness, seizures, or upper or lower extremity paresthesia.     EXAM:  /64 (BP Location: Left arm, Patient Position: Chair, Cuff Size: Adult Regular)   Pulse 68   Ht 1.6 m (5' 3\")   Wt 68.9 kg (152 lb)   SpO2 98%   BMI 26.93 kg/m       GENERAL: Appears comfortable, in no acute distress.   HEENT: Eye symmetrical, no discharge. Mucous membranes moist and without lesions.  CV: RRR, +S1S2, no murmur, rub, or gallop. JVP at about 1 inch below chin at 45 degrees.  RESPIRATORY: Respirations regular, even, and unlabored. Lungs CTA throughout.   GI: Soft and mildly distended with normoactive bowel sounds present. No tenderness, rebound, guarding. No hepatomegaly.   EXTREMITIES: 3+ non pitting peripheral edema. 2+ bilateral pedal pulses.   NEUROLOGIC: Alert and interacting appropiratly. No focal deficits.   MUSCULOSKELETAL: No joint swelling or tenderness.   SKIN: No jaundice. No rashes or lesions.     Labs, reviewed with patient in clinic today:  CBC RESULTS:  Lab Results   Component Value Date    WBC 4.8 05/17/2019    RBC 4.59 05/17/2019    HGB 14.9 05/17/2019    HCT 44.4 05/17/2019    " MCV 97 05/17/2019    MCH 32.5 05/17/2019    MCHC 33.6 05/17/2019    RDW 14.1 05/17/2019     05/17/2019       CMP RESULTS:  Lab Results   Component Value Date     (L) 07/11/2019    POTASSIUM 3.9 07/11/2019    CHLORIDE 92 (L) 07/11/2019    CO2 32 07/11/2019    ANIONGAP 6 07/11/2019    GLC 97 07/11/2019    BUN 24 07/11/2019    CR 0.94 07/11/2019    GFRESTIMATED 63 07/11/2019    GFRESTBLACK 72 07/11/2019    JERROD 8.9 07/11/2019    BILITOTAL 2.6 (H) 05/17/2019    ALBUMIN 4.2 05/17/2019    ALKPHOS 263 (H) 05/17/2019    ALT 32 05/17/2019    AST 25 05/17/2019        INR RESULTS:  Lab Results   Component Value Date    INR 1.30 (H) 01/14/2017       Lab Results   Component Value Date    MAG 3.2 (H) 04/09/2019     Lab Results   Component Value Date    NTBNPI 9,009 (H) 01/13/2017     Lab Results   Component Value Date    NTBNP 1,378 (H) 05/30/2019       Diagnostics:  TTE 4/9/19  Moderate left ventricular hypertrophy.  Global and regional left ventricular function is normal with an EF of 60-65%.  Global right ventricular function is normal.  Moderate aortic valve insufficiency.  Mild pulmonary hypertension with dilated IVC.  This study was compared with the study from 10/11/18 the AR is worse and RA pressure is now increased.    TTE 10/11/18  Global and regional left ventricular function is normal with an EF of 55-60%.  Moderate concentric wall thickening consistent with left ventricular  hypertrophy is present - known amyloid.  Grade III or advanced diastolic dysfunction.  Normal right ventricular size, wall thickness, and function.  Estimated pulmonary artery pressure 28 mmHg.  IVC with normal diameter but does not collapse (>50%) with inspiration.  Trace pericardial effusion.  Compared to prior study from 8/17/17, no significant change.    [place] 11/2017      Holter Monitor 06/2019  INTERPRETATION  1. The rhythm varied with sinus rhythm and atrial fibrillation/variable atrial flutter. Low voltage noted.  2. The  heart rate varied with a minimum rate of 48 bpm, maximum rate of 164 bpm, average rate of 74 bpm.  3. Frequent supraventricular ectopy was seen ranging from 0-469 per hour. Occasional atrail pairs and fairly frequent bigeminal cycles were noted.  Wandering atrial pacemaker noted.  4. Infrequent multifocal ventricular ectopy was seen ranging from 0-36 beats per hour.  5. ST-T wave changes were present.  6. Three patient events were documented and correlated with atrial fibrillation/flutter    Assessment and Plan:   Mrs. Guerrero is a 68 year old female with AL amyloidosis with cardiac manifestation who presents to [facility] [place] hospital follow-up. Patient has cardiac amyloidosis as evidenced by her echocardiogram (severe concentric hypertrophy and biatrial enlargement) and abnormal EKG (near-low voltage, poor R wave progression, biatrial enlargement and no evidence of LVH despite thicken LV on echo) in the setting of biopsy proven (BMB, EGD) AL amyloid. She completed chemotherapy with CyBorD with an excellent serologic response and her heart failure (i.e. troponin negative, NT pro BNP was stable, serum light chains minimal and urine light chains undetectable).     However, we have continued to struggle with volume overload. Today she is hypervolemic again, with weight up 1 lbs since last visit. 151 lbs today, EDW ??147, although seems to be a moving target. Cr improved at 0.94. (Cr baseline~1.2-1.3). Normotensive. Exam is hypervolemic.     # Chronic diastolic heart failure/restrictive cardiomyopathy 2/2  # Cardiac amyloidosis    Stage C. NYHA Class III.    Fluid status: Hypervolemic, continue torsemide 100 mg BID, take torsemide, Potassium 20meq BID with an extra 20meq on metolazone days. We will likely increase torsemide on Monday after labs given we have not been able to find a steady metolazone routine.  ACEi/ARB/ARNI: n/a   BB: no indication and relatively contraindicated due to risk of progressive conduction  disease/AV block in these patients  Aldosterone antagonist: d/c'd given hyponatremia, Na improving and she is feeling better   SCD prophylaxis: does not meet criteria for implant  NSAID use: contraindicated  Sleep apnea evaluation: deferred today  Remote monitoring: deferred today, could be Cardiocom candidate   Goals of care: prior discussions with Dr Cohen re: planning for EoL care as long term prognosis for AL amyloid is poor.     # A. Fib/A. Flutter  At a prior appointment, she was c/o palpitations. One episode lasted up to 20 minutes, and after that she had 1-2 episodes a day which last <1-2 minutes. These are associated with SOB and dizziness. We did a holter monitor which showed intermittent a. Fib and a. Flutter. [id number] of 4. We discussed stroke vs bleeding risk, and initially she wanted to hold off on anticouagulation, today, however, she is ready to start Eliquis. Rate appears overall rate controlled, no palpitations since Holter monitor.  - Started Eliquis 5mg BID  - Provided education regarding signs of serious bleeds (GIB, nosebleeds, internal bleeds, etc), and teaching regarding what to do in setting of head strike on anticoagulation   - Avoiding BB in the setting of amyloid  - Holding off on digoxin given good rate control, no further palpitations. Could discuss in the future if needed.    # Systemic amyloid  Heme previously monitoring her light chains which have been negative consistent with remission - therefore further palliative chemo not being considered. Nausea has been a large issue and is currently, Kytril was added at her last oncology appointment, but she has not started taking this. to her regimen of compazine and lorazepam.  - Consider palliative care referal if nausea not controlled on her current regimen (per onc)  - She is back on doxycycline     # Hyponatremia  Improving off spironolactone. Feeling better with the improved sodium  - Discontinued spironolactone    Follow: Up:  Labs Monday, Phone call Monday, CORE in 3 weeks or sooner if needed, Dr. Keyes in 6 weeks    25 minutes spent face-to-face with patient, >50% in counseling and/or coordination of care as described above    Ciara Araya PA-C  7/11/2019      KAIA KEYES, ALEJANDRO RAMÍREZ

## 2019-07-11 ENCOUNTER — TELEPHONE (OUTPATIENT)
Dept: CARDIOLOGY | Facility: CLINIC | Age: 69
End: 2019-07-11

## 2019-07-11 ENCOUNTER — OFFICE VISIT (OUTPATIENT)
Dept: CARDIOLOGY | Facility: CLINIC | Age: 69
End: 2019-07-11
Attending: PHYSICIAN ASSISTANT
Payer: COMMERCIAL

## 2019-07-11 VITALS
BODY MASS INDEX: 26.93 KG/M2 | OXYGEN SATURATION: 98 % | WEIGHT: 152 LBS | HEIGHT: 63 IN | HEART RATE: 68 BPM | DIASTOLIC BLOOD PRESSURE: 64 MMHG | SYSTOLIC BLOOD PRESSURE: 109 MMHG

## 2019-07-11 DIAGNOSIS — I43 AMYLOID HEART DISEASE (H): ICD-10-CM

## 2019-07-11 DIAGNOSIS — E85.4 AMYLOID HEART DISEASE (H): ICD-10-CM

## 2019-07-11 DIAGNOSIS — I50.33 ACUTE ON CHRONIC DIASTOLIC HEART FAILURE (H): ICD-10-CM

## 2019-07-11 DIAGNOSIS — I48.92 ATRIAL FLUTTER, UNSPECIFIED TYPE (H): Primary | ICD-10-CM

## 2019-07-11 LAB
ANION GAP SERPL CALCULATED.3IONS-SCNC: 6 MMOL/L (ref 3–14)
BUN SERPL-MCNC: 24 MG/DL (ref 7–30)
CALCIUM SERPL-MCNC: 8.9 MG/DL (ref 8.5–10.1)
CHLORIDE SERPL-SCNC: 92 MMOL/L (ref 94–109)
CO2 SERPL-SCNC: 32 MMOL/L (ref 20–32)
CREAT SERPL-MCNC: 0.94 MG/DL (ref 0.52–1.04)
GFR SERPL CREATININE-BSD FRML MDRD: 63 ML/MIN/{1.73_M2}
GLUCOSE SERPL-MCNC: 97 MG/DL (ref 70–99)
POTASSIUM SERPL-SCNC: 3.9 MMOL/L (ref 3.4–5.3)
SODIUM SERPL-SCNC: 130 MMOL/L (ref 133–144)

## 2019-07-11 PROCEDURE — 36415 COLL VENOUS BLD VENIPUNCTURE: CPT | Performed by: PHYSICIAN ASSISTANT

## 2019-07-11 PROCEDURE — G0463 HOSPITAL OUTPT CLINIC VISIT: HCPCS | Mod: ZF

## 2019-07-11 PROCEDURE — 99215 OFFICE O/P EST HI 40 MIN: CPT | Mod: ZP | Performed by: PHYSICIAN ASSISTANT

## 2019-07-11 PROCEDURE — 80048 BASIC METABOLIC PNL TOTAL CA: CPT | Performed by: PHYSICIAN ASSISTANT

## 2019-07-11 RX ORDER — METOLAZONE 2.5 MG/1
2.5 TABLET ORAL PRN
Qty: 10 TABLET | Refills: 3 | Status: SHIPPED | OUTPATIENT
Start: 2019-07-11 | End: 2019-08-29

## 2019-07-11 ASSESSMENT — MIFFLIN-ST. JEOR: SCORE: 1198.6

## 2019-07-11 ASSESSMENT — PAIN SCALES - GENERAL: PAINLEVEL: NO PAIN (0)

## 2019-07-11 NOTE — LETTER
7/11/2019      RE: Leandra Guerrero  [address] [place] [address]       Dear Colleague,    Thank you for the opportunity to participate in the care of your patient, Leandra Guerrero, at the [facility] at [facility]. Please see a copy of my visit note below.    HPI:   Ms. Guerrero is a 68 year old female with a past medical history including stage IV AL amyloid diagnosed in 2016. Presents to clinic for [facility] follow-up.     Her cardiac and oncologic history are as follows: fatigue starting in 1/2016. She had a coronary angiogram which was reportedly normal but was diagnosed with HF and started on a BBand diuretics. Her symptoms progressed to the point that she was evaluated at [facility] in August/September (Dr. Barron in oncology/hematology, Dr. Nettles is cardiology). She was diagnosed with stage IV AL amyloid (+GI, +bone marrow involvement, no involvement of kidney or liver). Her work up is detailed in our previous notes, however she has lambda AL amyloidosis and she underwent CyBorD chemotherapy and excellent light chain response by serum. Patient has been turned down at [facility] for a stem cell transplant due to her cardiac involvement. Later in 2016, she had 2-3 more right sided pleural taps. She was hospitalized 1/2017 and diuresed 20-30 pounds at that time. Since that time her AL has been in remission by labs without further chemo. Patient was then treated with doxycycline but this was stopped ~6-9 months ago due to thought it wasn't benefiting per patient, although now recently restarted again. She was last admitted to [facility] 4/2 for shortness of breath and edema. Echocardiogram showed EF 60-65%, moderate LVH, normal RV, moderate AI, and mild PH.    Last visit: Ciara Quiana 6/26/2019  Was a few weeks after she was successfully diuresed 15 lbs as an outpatient, and now she was presenting with reports that she was starting to accumulate fluid again. Had  taken on metolazone 2.5 mg x1 over the last 2 weeks. At this visit, we discussed the findings on her Holter Monitor and she had opted to hold off on starting anticoagulation at that time, wanted to rediscuss at next visit.     I also called Amy on 7/5 to follow-up labs. At this point we had her continue torsemide 100mg BID, hold off on any more metolazone.    This visit:  Her weight is up today to 152. We think that her dry weight is closer to 147. She feels like she is accumulating a lot of fluid in her legs and in her abdomen. Her legs are so edematous that she cannot fit compression stockings on without a lot of pain and her lymphedema wraps cause her to trap an uncomfortable amount of fluid in her feet. She has a normal appetite, but thinks that she might be experiencing some minor early satiety. She denies orthopnea and PND. No chest pain. No palpitations. No dizziness.     She was recently able to walk about 1/4 of a mile without feeling SOB, but beyond that she experienced fatigue and dyspnea.    She has been taking Torsemide 100mg BID. No metolazone since prior to 7/5.     Does not take home blood pressures.      PAST MEDICAL HISTORY:  Past Medical History:   Diagnosis Date     AL amyloidosis (H)      Cardiac amyloidosis (H)      Lymphedema      Recurrent right pleural effusion 1/2/2017       FAMILY HISTORY:  Family History   Problem Relation Age of Onset     Other - See Comments Sister         Amyloidosis       SOCIAL HISTORY:  Socioeconomic History     Marital status:    Tobacco Use     Smoking status: Never Smoker     Smokeless tobacco: Never Used   Substance and Sexual Activity     Alcohol use: No     Drug use: No   Other Topics Concern     CURRENT MEDICATIONS:    Current Outpatient Medications on File Prior to Visit:  Acetaminophen (TYLENOL PO) Take 325 mg by mouth   doxycycline hyclate (VIBRA-TABS) 100 MG tablet Take 1 tablet (100 mg) by mouth daily   granisetron (KYTRIL) 1 MG tablet Take 1  "tablet (1 mg) by mouth every 12 hours as needed for nausea   LORazepam (ATIVAN) 0.5 MG tablet Take 1 tablet (0.5 mg) by mouth every 6 hours as needed for anxiety or nausea   ondansetron (ZOFRAN-ODT) 4 MG ODT tab Take 1 tablet (4 mg) by mouth every 6 hours as needed for nausea   potassium chloride ER (K-DUR/KLOR-CON M) 10 MEQ CR tablet Take 20 meq in the morning and 20 meq in the afternoon. Take an extra 20 meq on days you take metolazone.   prochlorperazine (COMPAZINE) 5 MG tablet One to two tablets PO q 6 hours prn nausea   torsemide (DEMADEX) 100 MG tablet Take 1 tablet (100 mg) by mouth 2 times daily     No current facility-administered medications on file prior to visit.     ROS:   CONSTITUTIONAL: Denies fever, chills, +fatigue, chronic.  HEENT: Denies headache, vision changes, and changes in speech.   CV: Refer to HPI.   PULMONARY:Refer to HPI.   GI:Refer to HPI.   :Denies urinary alterations, dysuria, urinary frequency, hematuria, and abnormal drainage.   EXT: +Chronic lower extremity edema see hpi, currently worse   SKIN:Denies abnormal rashes or lesions.   MUSCULOSKELETAL:Denies upper or lower extremity weakness and pain.   NEUROLOGIC:Denies dizziness, seizures, or upper or lower extremity paresthesia.     EXAM:  /64 (BP Location: Left arm, Patient Position: Chair, Cuff Size: Adult Regular)   Pulse 68   Ht 1.6 m (5' 3\")   Wt 68.9 kg (152 lb)   SpO2 98%   BMI 26.93 kg/m        GENERAL: Appears comfortable, in no acute distress.   HEENT: Eye symmetrical, no discharge. Mucous membranes moist and without lesions.  CV: RRR, +S1S2, no murmur, rub, or gallop. JVP at about 1 inch below chin at 45 degrees.  RESPIRATORY: Respirations regular, even, and unlabored. Lungs CTA throughout.   GI: Soft and mildly distended with normoactive bowel sounds present. No tenderness, rebound, guarding. No hepatomegaly.   EXTREMITIES: 3+ non pitting peripheral edema. 2+ bilateral pedal pulses.   NEUROLOGIC: Alert and " interacting appropiratly. No focal deficits.   MUSCULOSKELETAL: No joint swelling or tenderness.   SKIN: No jaundice. No rashes or lesions.     Labs, reviewed with patient in clinic today:  CBC RESULTS:  Lab Results   Component Value Date    WBC 4.8 05/17/2019    RBC 4.59 05/17/2019    HGB 14.9 05/17/2019    HCT 44.4 05/17/2019    MCV 97 05/17/2019    MCH 32.5 05/17/2019    MCHC 33.6 05/17/2019    RDW 14.1 05/17/2019     05/17/2019       CMP RESULTS:  Lab Results   Component Value Date     (L) 07/11/2019    POTASSIUM 3.9 07/11/2019    CHLORIDE 92 (L) 07/11/2019    CO2 32 07/11/2019    ANIONGAP 6 07/11/2019    GLC 97 07/11/2019    BUN 24 07/11/2019    CR 0.94 07/11/2019    GFRESTIMATED 63 07/11/2019    GFRESTBLACK 72 07/11/2019    JERROD 8.9 07/11/2019    BILITOTAL 2.6 (H) 05/17/2019    ALBUMIN 4.2 05/17/2019    ALKPHOS 263 (H) 05/17/2019    ALT 32 05/17/2019    AST 25 05/17/2019        INR RESULTS:  Lab Results   Component Value Date    INR 1.30 (H) 01/14/2017       Lab Results   Component Value Date    MAG 3.2 (H) 04/09/2019     Lab Results   Component Value Date    NTBNPI 9,009 (H) 01/13/2017     Lab Results   Component Value Date    NTBNP 1,378 (H) 05/30/2019       Diagnostics:  TTE 4/9/19  Moderate left ventricular hypertrophy.  Global and regional left ventricular function is normal with an EF of 60-65%.  Global right ventricular function is normal.  Moderate aortic valve insufficiency.  Mild pulmonary hypertension with dilated IVC.  This study was compared with the study from 10/11/18 the AR is worse and RA pressure is now increased.    TTE 10/11/18  Global and regional left ventricular function is normal with an EF of 55-60%.  Moderate concentric wall thickening consistent with left ventricular  hypertrophy is present - known amyloid.  Grade III or advanced diastolic dysfunction.  Normal right ventricular size, wall thickness, and function.  Estimated pulmonary artery pressure 28 mmHg.  IVC with  normal diameter but does not collapse (>50%) with inspiration.  Trace pericardial effusion.  Compared to prior study from 8/17/17, no significant change.    Ziopatch 11/2017      Holter Monitor 06/2019  INTERPRETATION  1. The rhythm varied with sinus rhythm and atrial fibrillation/variable atrial flutter. Low voltage noted.  2. The heart rate varied with a minimum rate of 48 bpm, maximum rate of 164 bpm, average rate of 74 bpm.  3. Frequent supraventricular ectopy was seen ranging from 0-469 per hour. Occasional atrail pairs and fairly frequent bigeminal cycles were noted.  Wandering atrial pacemaker noted.  4. Infrequent multifocal ventricular ectopy was seen ranging from 0-36 beats per hour.  5. ST-T wave changes were present.  6. Three patient events were documented and correlated with atrial fibrillation/flutter    Assessment and Plan:   Mrs. Guerrero is a 68 year old female with AL amyloidosis with cardiac manifestation who presents to [facility] for hospital follow-up. Patient has cardiac amyloidosis as evidenced by her echocardiogram (severe concentric hypertrophy and biatrial enlargement) and abnormal EKG (near-low voltage, poor R wave progression, biatrial enlargement and no evidence of LVH despite thicken LV on echo) in the setting of biopsy proven (BMB, EGD) AL amyloid. She completed chemotherapy with CyBorD with an excellent serologic response and her heart failure (i.e. troponin negative, NT pro BNP was stable, serum light chains minimal and urine light chains undetectable).     However, we have continued to struggle with volume overload. Today she is hypervolemic again, with weight up 1 lbs since last visit. 151 lbs today, EDW ??147, although seems to be a moving target. Cr improved at 0.94. (Cr baseline~1.2-1.3). Normotensive. Exam is hypervolemic.     # Chronic diastolic heart failure/restrictive cardiomyopathy 2/2  # Cardiac amyloidosis    Stage C. NYHA Class III.    Fluid status: Hypervolemic, continue  torsemide 100 mg BID, take torsemide, Potassium 20meq BID with an extra 20meq on metolazone days. We will likely increase torsemide on Monday after labs given we have not been able to find a steady metolazone routine.  ACEi/ARB/ARNI: n/a   BB: no indication and relatively contraindicated due to risk of progressive conduction disease/AV block in these patients  Aldosterone antagonist: d/c'd given hyponatremia, Na improving and she is feeling better   SCD prophylaxis: does not meet criteria for implant  NSAID use: contraindicated  Sleep apnea evaluation: deferred today  Remote monitoring: deferred today, could be Cardiocom candidate   Goals of care: prior discussions with Dr Cohen re: planning for EoL care as long term prognosis for AL amyloid is poor.     # A. Fib/A. Flutter  At a prior appointment, she was c/o palpitations. One episode lasted up to 20 minutes, and after that she had 1-2 episodes a day which last <1-2 minutes. These are associated with SOB and dizziness. We did a holter monitor which showed intermittent a. Fib and a. Flutter. [id number] of 4. We discussed stroke vs bleeding risk, and initially she wanted to hold off on anticouagulation, today, however, she is ready to start Eliquis. Rate appears overall rate controlled, no palpitations since Holter monitor.  - Started Eliquis 5mg BID  - Provided education regarding signs of serious bleeds (GIB, nosebleeds, internal bleeds, etc), and teaching regarding what to do in setting of head strike on anticoagulation   - Avoiding BB in the setting of amyloid  - Holding off on digoxin given good rate control, no further palpitations. Could discuss in the future if needed.    # Systemic amyloid  Heme previously monitoring her light chains which have been negative consistent with remission - therefore further palliative chemo not being considered. Nausea has been a large issue and is currently, Kytril was added at her last oncology appointment, but she has not  started taking this. to her regimen of compazine and lorazepam.  - Consider palliative care referal if nausea not controlled on her current regimen (per onc)  - She is back on doxycycline     # Hyponatremia  Improving off spironolactone. Feeling better with the improved sodium  - Discontinued spironolactone    Follow: Up: Labs Monday, Phone call Monday, CORE in 3 weeks or sooner if needed, Dr. Cohen in 6 weeks    25 minutes spent face-to-face with patient, >50% in counseling and/or coordination of care as described above    Ciara Araya PA-C  7/11/2019      ALEJANDRO FORD

## 2019-07-11 NOTE — TELEPHONE ENCOUNTER
Health Call Center    Phone Message    May a detailed message be left on voicemail: yes    Reason for Call: Medication Question or concern regarding medication   Prescription Clarification  Name of Medication: metolazone (ZAROXOLYN) 2.5 MG tablet  Prescribing Provider: Ciara Araya PA-C   Pharmacy: [facility] [address] [place] [address] [id number] [facility]   What on the order needs clarification? Directions on the Rx needs to be listed. Cannot accept as a PRN. Please call or e-scribe rx with directions.           Action Taken: Message routed to:  [facility] ([facility]):  Cardiology

## 2019-07-11 NOTE — NURSING NOTE
Chief Complaint   Patient presents with     Follow Up     Return CORE, 69 yo female, HFpEF, labs prior.      Vitals were taken and medications were reconciled.     Soco Kim CMA    10:37 AM

## 2019-07-21 ENCOUNTER — TELEPHONE (OUTPATIENT)
Dept: CARDIOLOGY | Facility: CLINIC | Age: 69
End: 2019-07-21

## 2019-07-21 DIAGNOSIS — I50.33 ACUTE ON CHRONIC DIASTOLIC HEART FAILURE (H): Primary | ICD-10-CM

## 2019-07-22 ENCOUNTER — TELEPHONE (OUTPATIENT)
Dept: CARDIOLOGY | Facility: CLINIC | Age: 69
End: 2019-07-22

## 2019-07-22 ENCOUNTER — TRANSFERRED RECORDS (OUTPATIENT)
Dept: HEALTH INFORMATION MANAGEMENT | Facility: CLINIC | Age: 69
End: 2019-07-22

## 2019-07-22 DIAGNOSIS — I43 AMYLOID HEART DISEASE (H): Primary | ICD-10-CM

## 2019-07-22 DIAGNOSIS — I50.33 ACUTE ON CHRONIC DIASTOLIC HEART FAILURE (H): ICD-10-CM

## 2019-07-22 DIAGNOSIS — E85.4 AMYLOID HEART DISEASE (H): Primary | ICD-10-CM

## 2019-07-22 LAB
ANION GAP SERPL CALCULATED.3IONS-SCNC: 6 MMOL/L (ref 3–14)
BUN SERPL-MCNC: 33 MG/DL (ref 7–30)
CALCIUM SERPL-MCNC: 9.3 MG/DL (ref 8.5–10.1)
CHLORIDE SERPL-SCNC: 87 MMOL/L (ref 94–109)
CO2 SERPL-SCNC: 38 MMOL/L (ref 20–32)
CREAT SERPL-MCNC: 1.06 MG/DL (ref 0.52–1.04)
GFR SERPL CREATININE-BSD FRML MDRD: 54 ML/MIN/{1.73_M2}
GLUCOSE SERPL-MCNC: 80 MG/DL (ref 70–99)
POTASSIUM SERPL-SCNC: 2.4 MMOL/L (ref 3.4–5.3)
SODIUM SERPL-SCNC: 131 MMOL/L (ref 133–144)

## 2019-07-22 PROCEDURE — 36415 COLL VENOUS BLD VENIPUNCTURE: CPT | Performed by: PHYSICIAN ASSISTANT

## 2019-07-22 PROCEDURE — 80048 BASIC METABOLIC PNL TOTAL CA: CPT | Performed by: PHYSICIAN ASSISTANT

## 2019-07-22 NOTE — TELEPHONE ENCOUNTER
Returned call for critical lab. Provider notified. Shirlene Francois RN C[facility] Care Coordinator Pt's potassium is 2.4, per provider pt to go to ED. Pt would like to go to local ED at [facility]. Called [facility] and spoke to Dr. Drake, she took information and is now expecting the pt. Shirlene Francois RN CORE Care Coordinator

## 2019-07-22 NOTE — RESULT ENCOUNTER NOTE
Critically low potassium. Will send to ER for replacement, Shirlene Francois RN to contact patient and ER to alert.

## 2019-07-22 NOTE — TELEPHONE ENCOUNTER
Date of call: 7/20/2019    Reason for call: Called Mrs. Guerrero to follow-up her Proenza Schouert message that her weight was increasing.    Assessment/Plan:  Amy's weight initially improved after metolazone, now worsening again. She self-increased her torsemide on the morning of 7/20 to 120mg (from 100mg) and feels like she is urinating more. Will plan to increase to 120mg BID (from 100mg BID), no KCl changes, labs and phone call on Monday.    Yolanda Araya PA-C

## 2019-07-22 NOTE — TELEPHONE ENCOUNTER
[facility] Call Center    Phone Message    May a detailed message be left on voicemail: yes    Reason for Call: Other: [facility] calling with critical findings for pt, please call back   No answer thru priority line    Action Taken: Message routed to:  [facility] Cardiology

## 2019-07-23 ENCOUNTER — TRANSFERRED RECORDS (OUTPATIENT)
Dept: HEALTH INFORMATION MANAGEMENT | Facility: CLINIC | Age: 69
End: 2019-07-23

## 2019-07-24 ENCOUNTER — TELEPHONE (OUTPATIENT)
Dept: CARDIOLOGY | Facility: CLINIC | Age: 69
End: 2019-07-24

## 2019-07-24 NOTE — TELEPHONE ENCOUNTER
Pt with critical potassium levels on 7/22. Pt went to local ED, received IV potassium replacement. Records request sent to [facility]. Per pt she is taking potassium 60 mg BID and has a follow-up appt with her PCP tomorrow where she will have her potassium levels checked. Routed to provider. Shirlene Francois RN CORE Care Coordinator

## 2019-07-25 ENCOUNTER — TELEPHONE (OUTPATIENT)
Dept: CARDIOLOGY | Facility: CLINIC | Age: 69
End: 2019-07-25

## 2019-07-25 DIAGNOSIS — I50.33 ACUTE ON CHRONIC DIASTOLIC HEART FAILURE (H): Primary | ICD-10-CM

## 2019-07-25 ASSESSMENT — ENCOUNTER SYMPTOMS
PALPITATIONS: 1
FATIGUE: 1
MYALGIAS: 1
CONSTIPATION: 1
NERVOUS/ANXIOUS: 1
PANIC: 1
DIARRHEA: 1
DISTURBANCES IN COORDINATION: 1
WEAKNESS: 1
MUSCLE CRAMPS: 1
HYPOTENSION: 1
DYSPNEA ON EXERTION: 1
DIZZINESS: 1
HEADACHES: 1
MUSCLE WEAKNESS: 1
LIGHT-HEADEDNESS: 1
DECREASED CONCENTRATION: 1
LEG PAIN: 1

## 2019-07-25 NOTE — TELEPHONE ENCOUNTER
Called pt to check potassium labs after her ED follow-up with her PCP. Pt states they are still waiting on results. Pt will call with results. Shirlene Francois RN C[facility] Care Coordinator

## 2019-07-29 NOTE — TELEPHONE ENCOUNTER
Pt is taking 40 mEq BID. Will have follow-up appt with her PCP next week. Shirlene Francois RN CORE Care Coordinator

## 2019-07-30 DIAGNOSIS — I50.33 ACUTE ON CHRONIC DIASTOLIC HEART FAILURE (H): Primary | ICD-10-CM

## 2019-07-30 NOTE — TELEPHONE ENCOUNTER
Date: 7/30/2019    Time of Call: 9:02 AM     Diagnosis:  CHF     [ VORB ] Ordering provider: Yolanda Araya PA-C  Order: 2.5 mg of metolazone and an extra 40 mEq of Potassium today. With labs on Thursday     Order received by: Shirlene Francois RN C[facility] Care Coordinator        Follow-up/additional notes: Called pt, reviewed information. She had no further questions.

## 2019-07-31 ENCOUNTER — TELEPHONE (OUTPATIENT)
Dept: CARDIOLOGY | Facility: CLINIC | Age: 69
End: 2019-07-31

## 2019-07-31 NOTE — TELEPHONE ENCOUNTER
"Called pt to check in on how she was doing after taking 2.5 of metolazone. Amy said she didn't get much sleep last night because she was up to the bathroom so much. But says, \"Its worth it to feel better!\" Pt will have labs drawn and will call with any concerns or questions. Shirlene Francois RN CORE Care Coordinator     "

## 2019-08-01 ENCOUNTER — PATIENT OUTREACH (OUTPATIENT)
Dept: CARDIOLOGY | Facility: CLINIC | Age: 69
End: 2019-08-01

## 2019-08-01 DIAGNOSIS — E85.4 AMYLOID HEART DISEASE (H): ICD-10-CM

## 2019-08-01 DIAGNOSIS — I50.33 ACUTE ON CHRONIC DIASTOLIC HEART FAILURE (H): Primary | ICD-10-CM

## 2019-08-01 DIAGNOSIS — I43 AMYLOID HEART DISEASE (H): ICD-10-CM

## 2019-08-01 LAB
ANION GAP SERPL CALCULATED.3IONS-SCNC: 7 MMOL/L (ref 3–14)
BUN SERPL-MCNC: 31 MG/DL (ref 7–30)
CALCIUM SERPL-MCNC: 9.4 MG/DL (ref 8.5–10.1)
CHLORIDE SERPL-SCNC: 87 MMOL/L (ref 94–109)
CO2 SERPL-SCNC: 37 MMOL/L (ref 20–32)
CREAT SERPL-MCNC: 1.05 MG/DL (ref 0.52–1.04)
GFR SERPL CREATININE-BSD FRML MDRD: 55 ML/MIN/{1.73_M2}
GLUCOSE SERPL-MCNC: 122 MG/DL (ref 70–99)
POTASSIUM SERPL-SCNC: 2.7 MMOL/L (ref 3.4–5.3)
SODIUM SERPL-SCNC: 131 MMOL/L (ref 133–144)

## 2019-08-01 PROCEDURE — 80048 BASIC METABOLIC PNL TOTAL CA: CPT | Performed by: PHYSICIAN ASSISTANT

## 2019-08-01 PROCEDURE — 36415 COLL VENOUS BLD VENIPUNCTURE: CPT | Performed by: PHYSICIAN ASSISTANT

## 2019-08-01 NOTE — PROGRESS NOTES
Date: 8/1/2019    Time of Call: 3:14 PM     Diagnosis:  Heart failure     [ VORB ] Ordering provider: CATHY Serrnao    Order: Take extra 80mEq potassium today. Repeat labs tomorrow.     Order received by: Kesha Zhao RN     Follow-up/additional notes: Called Amy to discuss.   She will get labs done at [place] tomorrow.  Will take extra potassium today.   We will follow up with her tomorrow once lab results received.     ADDENDUM: after further evaluation, it was determined patient should present to local emergency room for hypokalemia.  I called Amy to discuss this. She refuses to go to her local emergency room as she feels they rushed her out last time she was there and that they didn't provide complete care. She states 'they don't understand what's going on with me.' She tells me that she would come to the [facility] emergency room but she is unable to drive here and her  is not available to take her until much later tonight. Discussed how important it is for her potassium to stabilize to keep her safe and the dangers of high and low potassium. She says she will take the extra oral dose today as discussed above and present for labs tomorrow. She acknowledges that this is her decision, going against medical advice and states 'i'm making this choice and it's all on me.' Discussed that we just want her to be safe. We will plan to follow up on her labs tomorrow, to be drawn at [facility].

## 2019-08-02 ENCOUNTER — TELEPHONE (OUTPATIENT)
Dept: CARDIOLOGY | Facility: CLINIC | Age: 69
End: 2019-08-02

## 2019-08-02 DIAGNOSIS — I43 AMYLOID HEART DISEASE (H): ICD-10-CM

## 2019-08-02 DIAGNOSIS — I50.33 ACUTE ON CHRONIC DIASTOLIC HEART FAILURE (H): ICD-10-CM

## 2019-08-02 DIAGNOSIS — E85.4 AMYLOID HEART DISEASE (H): ICD-10-CM

## 2019-08-02 LAB
ANION GAP SERPL CALCULATED.3IONS-SCNC: 5 MMOL/L (ref 3–14)
BUN SERPL-MCNC: 32 MG/DL (ref 7–30)
CALCIUM SERPL-MCNC: 9.6 MG/DL (ref 8.5–10.1)
CHLORIDE SERPL-SCNC: 92 MMOL/L (ref 94–109)
CO2 SERPL-SCNC: 36 MMOL/L (ref 20–32)
CREAT SERPL-MCNC: 1.12 MG/DL (ref 0.52–1.04)
GFR SERPL CREATININE-BSD FRML MDRD: 51 ML/MIN/{1.73_M2}
GLUCOSE SERPL-MCNC: 104 MG/DL (ref 70–99)
POTASSIUM SERPL-SCNC: 3.9 MMOL/L (ref 3.4–5.3)
SODIUM SERPL-SCNC: 133 MMOL/L (ref 133–144)

## 2019-08-02 PROCEDURE — 36415 COLL VENOUS BLD VENIPUNCTURE: CPT | Performed by: PHYSICIAN ASSISTANT

## 2019-08-02 PROCEDURE — 80048 BASIC METABOLIC PNL TOTAL CA: CPT | Performed by: PHYSICIAN ASSISTANT

## 2019-08-02 RX ORDER — POTASSIUM CHLORIDE 750 MG/1
TABLET, EXTENDED RELEASE ORAL
Qty: 180 TABLET | Refills: 1 | Status: SHIPPED | OUTPATIENT
Start: 2019-08-02 | End: 2019-08-22

## 2019-08-02 NOTE — TELEPHONE ENCOUNTER
[facility] Call Center    Phone Message    May a detailed message be left on voicemail: yes    Reason for Call: Other: Pt calling in asking about results from lab please call back      Action Taken: Message routed to:  [facility] cardio

## 2019-08-02 NOTE — TELEPHONE ENCOUNTER
Date: 8/2/2019    Time of Call: 3:16 PM     Diagnosis:  sarcoid     [ VORB ] Ordering provider: CATHY Serrano    Order: Increase potassium to 50mEq BID     Order received by: Kesha Zhao RN     Follow-up/additional notes: Called Amy to discuss lab results and med changes. Discussed that potassium has increased to 3.9, which is within normal limits. She was very relieved to hear this and had been anxiously awaiting her results. Discussed medication change. She will add 1 extra tablet to both of her daily doses. Repeat labs and clinic visit on Thursday. NO further questions. Encouraged her to call us with any questions or concerns.

## 2019-08-04 DIAGNOSIS — I50.33 ACUTE ON CHRONIC DIASTOLIC HEART FAILURE (H): Primary | ICD-10-CM

## 2019-08-04 RX ORDER — TORSEMIDE 20 MG/1
20 TABLET ORAL DAILY
Qty: 90 TABLET | Refills: 3 | Status: SHIPPED | OUTPATIENT
Start: 2019-08-04 | End: 2019-08-06

## 2019-08-04 NOTE — PROGRESS NOTES
- Talked to eliana, weight is up 5 lbs, will increase torsemide to 120mg BID   - Will take 140 mg torsemide tonight  - Will take 90 meq of Kcl tonight  - Starting tomorrow: 120mg Kcl BID, 70meq Kcl BID  - F/u with labs on thurs.

## 2019-08-06 DIAGNOSIS — E85.4 AMYLOID HEART DISEASE (H): ICD-10-CM

## 2019-08-06 DIAGNOSIS — I50.33 ACUTE ON CHRONIC DIASTOLIC HEART FAILURE (H): ICD-10-CM

## 2019-08-06 DIAGNOSIS — I43 AMYLOID HEART DISEASE (H): ICD-10-CM

## 2019-08-06 RX ORDER — TORSEMIDE 20 MG/1
TABLET ORAL
Qty: 90 TABLET | Refills: 3 | Status: SHIPPED | OUTPATIENT
Start: 2019-08-06 | End: 2019-08-22

## 2019-08-06 RX ORDER — TORSEMIDE 100 MG/1
TABLET ORAL
Qty: 180 TABLET | Refills: 3 | Status: SHIPPED | OUTPATIENT
Start: 2019-08-06 | End: 2019-08-08

## 2019-08-07 NOTE — PROGRESS NOTES
HPI:   Ms. Guerrero is a 68 year old female with a past medical history including stage IV AL amyloid diagnosed in 2016. Presents to clinic for [facility] follow-up.     Her cardiac and oncologic history are as follows: fatigue starting in 1/2016. She had a coronary angiogram which was reportedly normal but was diagnosed with HF and started on a BBand diuretics. Her symptoms progressed to the point that she was evaluated at [facility] in August/September (Dr. Barron in oncology/hematology, Dr. Nettles is cardiology). She was diagnosed with stage IV AL amyloid (+GI, +bone marrow involvement, no involvement of kidney or liver). Her work up is detailed in our previous notes, however she has lambda AL amyloidosis and she underwent CyBorD chemotherapy and excellent light chain response by serum. Patient has been turned down at [facility] for a stem cell transplant due to her cardiac involvement. Later in 2016, she had 2-3 more right sided pleural taps. She was hospitalized 1/2017 and diuresed 20-30 pounds at that time. Since that time her AL has been in remission by labs without further chemo. Patient was then treated with doxycycline but this was stopped ~6-9 months ago due to thought it wasn't benefiting per patient, although now recently restarted again. She was last admitted to [facility] 4/2 for shortness of breath and edema. Echocardiogram showed EF 60-65%, moderate LVH, normal RV, moderate AI, and mild PH.    Last visit: Ciara Araya 7/11/2019  Her weight is up today to 152. We think that her dry weight is closer to 147. She feels like she is accumulating a lot of fluid in her legs and in her abdomen. Her legs are so edematous that she cannot fit compression stockings on without a lot of pain and her lymphedema wraps cause her to trap an uncomfortable amount of fluid in her feet. She has a normal appetite, but thinks that she might be experiencing some minor early satiety. She denies orthopnea and PND. No chest pain. No palpitations.  "No dizziness. She has been taking Torsemide 100mg BID. No metolazone since prior to 7/5.  At this visit recommended metolazone. Good effect. Subsequent phone call recommended increase to torsemide 120mg BID. Later phone call d/t weight gain, recommended metolazone again, pt had gone back to taking torsemide 100mg BID. Good effect with metolazone, but a few days later gaining weight again, increased torsemide to 120mg BID. Throughout this interval, alternating hypokalemia and hyperkalemia.     This visit:  Has been taking torsemide 120 BID x3 days, has been taking Kcl 70 meq BID. Since increasing her torsemide, she has not lost any weight. She continues to be about 6 lbs above her dry weight. She continues to have a lot of swelling in her legs and some bloating in her abdomen. She is reporting some orthopnea. No PND. She has some early satiety. Yesterday she felt very fatigued just with grocery shopping. Continues to have some lightheadedness which is not unusual for her.     Occasional palpitations, again, not unusual for her. Continues to have some this morning. \"Feels like she is nervous\". No lightheadedness/dizziness today.    PAST MEDICAL HISTORY:  Past Medical History:   Diagnosis Date     AL amyloidosis (H)      Cardiac amyloidosis (H)      Lymphedema      Recurrent right pleural effusion 1/2/2017       FAMILY HISTORY:  Family History   Problem Relation Age of Onset     Other - See Comments Sister         Amyloidosis       SOCIAL HISTORY:  Socioeconomic History     Marital status:    Tobacco Use     Smoking status: Never Smoker     Smokeless tobacco: Never Used   Substance and Sexual Activity     Alcohol use: No     Drug use: No   Other Topics Concern     CURRENT MEDICATIONS:    Current Outpatient Medications on File Prior to Visit:  Acetaminophen (TYLENOL PO) Take 325 mg by mouth   apixaban ANTICOAGULANT (ELIQUIS) 5 MG tablet Take 1 tablet (5 mg) by mouth 2 times daily   doxycycline hyclate " "(VIBRA-TABS) 100 MG tablet Take 1 tablet (100 mg) by mouth daily   granisetron (KYTRIL) 1 MG tablet Take 1 tablet (1 mg) by mouth every 12 hours as needed for nausea   LORazepam (ATIVAN) 0.5 MG tablet Take 1 tablet (0.5 mg) by mouth every 6 hours as needed for anxiety or nausea   metolazone (ZAROXOLYN) 2.5 MG tablet Take 1 tablet (2.5 mg) by mouth as needed (as instructed by [facility] clinic)   ondansetron (ZOFRAN-ODT) 4 MG ODT tab Take 1 tablet (4 mg) by mouth every 6 hours as needed for nausea   potassium chloride ER (K-DUR/KLOR-CON M) 10 MEQ CR tablet Take 50 meq (5 tablets) in the morning and 50 meq (5 tablets) in the afternoon. Take an extra 20 meq on days you take metolazone.   prochlorperazine (COMPAZINE) 5 MG tablet One to two tablets PO q 6 hours prn nausea   torsemide (DEMADEX) 100 MG tablet Take a total of 120 mg twice daily or as instructed by cardiology clinic.   torsemide (DEMADEX) 20 MG tablet Take a total of 120 mg twice daily or as instructed by cardiology clinic.     No current facility-administered medications on file prior to visit.     ROS:   CONSTITUTIONAL: Denies fever, chills, +fatigue, chronic.  HEENT: Denies headache, vision changes, and changes in speech.   CV: Refer to HPI.   PULMONARY: Refer to HPI.   GI: Refer to HPI.   : Denies urinary alterations, dysuria, hematuria, and abnormal drainage. + urinary frequency 2/2 diuretics.  EXT: +Chronic lower extremity edema see hpi, currently worse than baseline  SKIN: Denies abnormal rashes or lesions.   MUSCULOSKELETAL: Denies upper or lower extremity weakness and pain.   NEUROLOGIC: Denies dizziness, seizures, or upper or lower extremity paresthesia.     EXAM:  /69 (BP Location: Left arm, Patient Position: Chair, Cuff Size: Adult Regular)   Pulse 71   Ht 1.6 m (5' 3\")   Wt 69.4 kg (152 lb 14.4 oz)   SpO2 98%   BMI 27.09 kg/m       GENERAL: Appears comfortable, in no acute distress, speaking in full sentences and able to communicate all " needs.   HEENT: Eye symmetrical, no discharge. Mucous membranes moist and without lesions.  CV: RRR, +S1S2, no murmur, rub, or gallop. JVP at midneck 45 degrees, improved from last visit  RESPIRATORY: Respirations regular, even, and unlabored. Lungs CTA throughout.   GI: Soft and mildly distended with normoactive bowel sounds present. No tenderness, rebound, guarding. No hepatomegaly.   EXTREMITIES: 3+ non pitting peripheral edema. 2+ bilateral pedal pulses.   NEUROLOGIC: Alert and interacting appropiratly. No focal deficits.   MUSCULOSKELETAL: No joint swelling or tenderness.   SKIN: No jaundice. No rashes or lesions.     Labs, reviewed with patient in clinic today:  CBC RESULTS:  Lab Results   Component Value Date    WBC 4.8 05/17/2019    RBC 4.59 05/17/2019    HGB 14.9 05/17/2019    HCT 44.4 05/17/2019    MCV 97 05/17/2019    MCH 32.5 05/17/2019    MCHC 33.6 05/17/2019    RDW 14.1 05/17/2019     05/17/2019       CMP RESULTS:  Lab Results   Component Value Date     08/08/2019    POTASSIUM 4.2 08/08/2019    CHLORIDE 97 08/08/2019    CO2 32 08/08/2019    ANIONGAP 5 08/08/2019    GLC 94 08/08/2019    BUN 21 08/08/2019    CR 1.01 08/08/2019    GFRESTIMATED 58 (L) 08/08/2019    GFRESTBLACK 67 08/08/2019    JERROD 9.0 08/08/2019    BILITOTAL 2.6 (H) 05/17/2019    ALBUMIN 4.2 05/17/2019    ALKPHOS 263 (H) 05/17/2019    ALT 32 05/17/2019    AST 25 05/17/2019        INR RESULTS:  Lab Results   Component Value Date    INR 1.30 (H) 01/14/2017       Lab Results   Component Value Date    MAG 3.2 (H) 04/09/2019     Lab Results   Component Value Date    NTBNPI 9,009 (H) 01/13/2017     Lab Results   Component Value Date    NTBNP 1,378 (H) 05/30/2019       Diagnostics:  TTE 4/9/19  Moderate left ventricular hypertrophy.  Global and regional left ventricular function is normal with an EF of 60-65%.  Global right ventricular function is normal.  Moderate aortic valve insufficiency.  Mild pulmonary hypertension with dilated  IVC.  This study was compared with the study from 10/11/18 the AR is worse and RA pressure is now increased.    TTE 10/11/18  Global and regional left ventricular function is normal with an EF of 55-60%.  Moderate concentric wall thickening consistent with left ventricular  hypertrophy is present - known amyloid.  Grade III or advanced diastolic dysfunction.  Normal right ventricular size, wall thickness, and function.  Estimated pulmonary artery pressure 28 mmHg.  IVC with normal diameter but does not collapse (>50%) with inspiration.  Trace pericardial effusion.  Compared to prior study from 8/17/17, no significant change.    Ziopatch 11/2017      Holter Monitor 06/2019  INTERPRETATION  1. The rhythm varied with sinus rhythm and atrial fibrillation/variable atrial flutter. Low voltage noted.  2. The heart rate varied with a minimum rate of 48 bpm, maximum rate of 164 bpm, average rate of 74 bpm.  3. Frequent supraventricular ectopy was seen ranging from 0-469 per hour. Occasional atrail pairs and fairly frequent bigeminal cycles were noted.  Wandering atrial pacemaker noted.  4. Infrequent multifocal ventricular ectopy was seen ranging from 0-36 beats per hour.  5. ST-T wave changes were present.  6. Three patient events were documented and correlated with atrial fibrillation/flutter    EKG 8/8/2019 for palpitations  - NSR at a rate of 70, NM 0.19, QTc 525, QRS is narrow. Occasional PVCs. Biphasic twaves in V4-V6, unchanged from priors.    Assessment and Plan:   Mrs. Guerrero is a 68 year old female with AL amyloidosis with cardiac manifestation who presents to [facility] for hospital follow-up. Patient has cardiac amyloidosis as evidenced by her echocardiogram (severe concentric hypertrophy and biatrial enlargement) and abnormal EKG (near-low voltage, poor R wave progression, biatrial enlargement and no evidence of LVH despite thicken LV on echo) in the setting of biopsy proven (BMB, EGD) AL amyloid. She completed  chemotherapy with CyBorD with an excellent serologic response and her heart failure (i.e. troponin negative, NT pro BNP was stable, serum light chains minimal and urine light chains undetectable).     However, we have continued to struggle with volume overload. Today she is hypervolemic again, she was 152 last visit, improved down to 146 in the interm and now back to 152lbs.  EDW ??147, although seems to be a moving target. Cr stable at 1.01. Normotensive. Exam is hypervolemic.     # Chronic diastolic heart failure/restrictive cardiomyopathy 2/2  # Cardiac amyloidosis    Stage C. NYHA Class III.    Fluid status: Hypervolemic, increased torsemide to 140 mg BID, take torsemide, increased Potassium to 80meq BID.   ACEi/ARB/ARNI: n/a   BB: no indication and relatively contraindicated due to risk of progressive conduction disease/AV block in these patients  Aldosterone antagonist: d/c'd given hyponatremia, Na improving and she is feeling better   SCD prophylaxis: does not meet criteria for implant  NSAID use: contraindicated  Sleep apnea evaluation: deferred today  Remote monitoring: deferred today, could be Cardiocom candidate   Goals of care: prior discussions with Dr Cohen re: planning for EoL care as long term prognosis for AL amyloid is poor.   Other notes: If she takes another metolazone 2.5- will likely need 60 meq EXTRA both the day of metolazone and the day after metolazone, as has been hypokalemic with metolazone recently    # A. Fib/A. Flutter  We did a holter monitor which showed intermittent a. Fib and a. Flutter. [id number] of 4. We discussed stroke vs bleeding risk, and initially she wanted to hold off on anticouagulation, today, however, she is ready to start Eliquis. Having some palpitations today, no lightheadedness, EKG today sinus rhythm with occasional PVCs.  - Continue Eliquis 5mg BID  - Avoiding BB in the setting of amyloid  - Holding off on digoxin given good rate control. Could discuss in the  future if needed.    # Prolonged QTC  QTC today >500 as it has been in the past.  - Minimize QT prolonging meds    # Systemic amyloid  Heme previously monitoring her light chains which have been negative consistent with remission - therefore further palliative chemo not being considered. Nausea has been a large issue and is currently, Kytril was added at her last oncology appointment, but she has not started taking this. to her regimen of compazine and lorazepam.  - Consider palliative care referal if nausea not controlled on her current regimen (per onc)  - She is back on doxycycline     # Hyponatremia  Improving off spironolactone. Feeling better with the improved sodium  - Discontinued spironolactone    Follow: Up: Phone call Monday, if weight not improving, should consider metolazone. Labs Wednesday. Scheduled with Dr. Cohen on 8/22    30 minutes spent face-to-face with patient, >50% in counseling and/or coordination of care as described above    Ciara Araya PA-C  8/8/2019      ALEJANDRO FORD

## 2019-08-08 ENCOUNTER — OFFICE VISIT (OUTPATIENT)
Dept: CARDIOLOGY | Facility: CLINIC | Age: 69
End: 2019-08-08
Attending: PHYSICIAN ASSISTANT
Payer: COMMERCIAL

## 2019-08-08 VITALS
BODY MASS INDEX: 27.09 KG/M2 | HEART RATE: 71 BPM | SYSTOLIC BLOOD PRESSURE: 120 MMHG | DIASTOLIC BLOOD PRESSURE: 69 MMHG | WEIGHT: 152.9 LBS | OXYGEN SATURATION: 98 % | HEIGHT: 63 IN

## 2019-08-08 DIAGNOSIS — I50.32 CHRONIC DIASTOLIC HEART FAILURE (H): ICD-10-CM

## 2019-08-08 DIAGNOSIS — I50.33 ACUTE ON CHRONIC DIASTOLIC HEART FAILURE (H): ICD-10-CM

## 2019-08-08 DIAGNOSIS — R00.2 PALPITATIONS: Primary | ICD-10-CM

## 2019-08-08 DIAGNOSIS — E85.4 AMYLOID HEART DISEASE (H): ICD-10-CM

## 2019-08-08 DIAGNOSIS — I43 AMYLOID HEART DISEASE (H): ICD-10-CM

## 2019-08-08 LAB
ANION GAP SERPL CALCULATED.3IONS-SCNC: 5 MMOL/L (ref 3–14)
BUN SERPL-MCNC: 21 MG/DL (ref 7–30)
CALCIUM SERPL-MCNC: 9 MG/DL (ref 8.5–10.1)
CHLORIDE SERPL-SCNC: 97 MMOL/L (ref 94–109)
CO2 SERPL-SCNC: 32 MMOL/L (ref 20–32)
CREAT SERPL-MCNC: 1.01 MG/DL (ref 0.52–1.04)
GFR SERPL CREATININE-BSD FRML MDRD: 58 ML/MIN/{1.73_M2}
GLUCOSE SERPL-MCNC: 94 MG/DL (ref 70–99)
POTASSIUM SERPL-SCNC: 4.2 MMOL/L (ref 3.4–5.3)
SODIUM SERPL-SCNC: 134 MMOL/L (ref 133–144)

## 2019-08-08 PROCEDURE — 93010 ELECTROCARDIOGRAM REPORT: CPT | Mod: ZP | Performed by: INTERNAL MEDICINE

## 2019-08-08 PROCEDURE — 80048 BASIC METABOLIC PNL TOTAL CA: CPT | Performed by: PHYSICIAN ASSISTANT

## 2019-08-08 PROCEDURE — 99214 OFFICE O/P EST MOD 30 MIN: CPT | Mod: ZP | Performed by: PHYSICIAN ASSISTANT

## 2019-08-08 PROCEDURE — 36415 COLL VENOUS BLD VENIPUNCTURE: CPT | Performed by: PHYSICIAN ASSISTANT

## 2019-08-08 PROCEDURE — G0463 HOSPITAL OUTPT CLINIC VISIT: HCPCS | Mod: ZF

## 2019-08-08 RX ORDER — TORSEMIDE 100 MG/1
TABLET ORAL
Qty: 180 TABLET | Refills: 3 | Status: SHIPPED | OUTPATIENT
Start: 2019-08-08 | End: 2019-08-22

## 2019-08-08 ASSESSMENT — MIFFLIN-ST. JEOR: SCORE: 1202.68

## 2019-08-08 ASSESSMENT — PAIN SCALES - GENERAL: PAINLEVEL: NO PAIN (0)

## 2019-08-08 NOTE — NURSING NOTE
Chief Complaint   Patient presents with     Follow Up     3 week follow up      Vitals were taken and medications were reconciled.  Gina Welsh  9:34 AM

## 2019-08-08 NOTE — LETTER
8/8/2019      RE: Leandra Guerrero  [address] [place] [address]       Dear Colleague,    Thank you for the opportunity to participate in the care of your patient, Leandra Guerrero, at the [facility] at [facility]. Please see a copy of my visit note below.    HPI:   Ms. Guerrero is a 68 year old female with a past medical history including stage IV AL amyloid diagnosed in 2016. Presents to clinic for CORE follow-up.     Her cardiac and oncologic history are as follows: fatigue starting in 1/2016. She had a coronary angiogram which was reportedly normal but was diagnosed with HF and started on a BBand diuretics. Her symptoms progressed to the point that she was evaluated at [facility] in August/September (Dr. Barron in oncology/hematology, Dr. Nettles is cardiology). She was diagnosed with stage IV AL amyloid (+GI, +bone marrow involvement, no involvement of kidney or liver). Her work up is detailed in our previous notes, however she has lambda AL amyloidosis and she underwent CyBorD chemotherapy and excellent light chain response by serum. Patient has been turned down at [facility] for a stem cell transplant due to her cardiac involvement. Later in 2016, she had 2-3 more right sided pleural taps. She was hospitalized 1/2017 and diuresed 20-30 pounds at that time. Since that time her AL has been in remission by labs without further chemo. Patient was then treated with doxycycline but this was stopped ~6-9 months ago due to thought it wasn't benefiting per patient, although now recently restarted again. She was last admitted to [facility] 4/2 for shortness of breath and edema. Echocardiogram showed EF 60-65%, moderate LVH, normal RV, moderate AI, and mild PH.    Last visit: Ciara Araya 7/11/2019  Her weight is up today to 152. We think that her dry weight is closer to 147. She feels like she is accumulating a lot of fluid in her legs and in her abdomen. Her legs  "are so edematous that she cannot fit compression stockings on without a lot of pain and her lymphedema wraps cause her to trap an uncomfortable amount of fluid in her feet. She has a normal appetite, but thinks that she might be experiencing some minor early satiety. She denies orthopnea and PND. No chest pain. No palpitations. No dizziness. She has been taking Torsemide 100mg BID. No metolazone since prior to 7/5.  At this visit recommended metolazone. Good effect. Subsequent phone call recommended increase to torsemide 120mg BID. Later phone call d/t weight gain, recommended metolazone again, pt had gone back to taking torsemide 100mg BID. Good effect with metolazone, but a few days later gaining weight again, increased torsemide to 120mg BID. Throughout this interval, alternating hypokalemia and hyperkalemia.     This visit:  Has been taking torsemide 120 BID x3 days, has been taking Kcl 70 meq BID. Since increasing her torsemide, she has not lost any weight. She continues to be about 6 lbs above her dry weight. She continues to have a lot of swelling in her legs and some bloating in her abdomen. She is reporting some orthopnea. No PND. She has some early satiety. Yesterday she felt very fatigued just with grocery shopping. Continues to have some lightheadedness which is not unusual for her.     Occasional palpitations, again, not unusual for her. Continues to have some this morning. \"Feels like she is nervous\". No lightheadedness/dizziness today.    PAST MEDICAL HISTORY:  Past Medical History:   Diagnosis Date     AL amyloidosis (H)      Cardiac amyloidosis (H)      Lymphedema      Recurrent right pleural effusion 1/2/2017       FAMILY HISTORY:  Family History   Problem Relation Age of Onset     Other - See Comments Sister         Amyloidosis       SOCIAL HISTORY:  Socioeconomic History     Marital status:    Tobacco Use     Smoking status: Never Smoker     Smokeless tobacco: Never Used   Substance and " Sexual Activity     Alcohol use: No     Drug use: No   Other Topics Concern     CURRENT MEDICATIONS:    Current Outpatient Medications on File Prior to Visit:  Acetaminophen (TYLENOL PO) Take 325 mg by mouth   apixaban ANTICOAGULANT (ELIQUIS) 5 MG tablet Take 1 tablet (5 mg) by mouth 2 times daily   doxycycline hyclate (VIBRA-TABS) 100 MG tablet Take 1 tablet (100 mg) by mouth daily   granisetron (KYTRIL) 1 MG tablet Take 1 tablet (1 mg) by mouth every 12 hours as needed for nausea   LORazepam (ATIVAN) 0.5 MG tablet Take 1 tablet (0.5 mg) by mouth every 6 hours as needed for anxiety or nausea   metolazone (ZAROXOLYN) 2.5 MG tablet Take 1 tablet (2.5 mg) by mouth as needed (as instructed by CORE clinic)   ondansetron (ZOFRAN-ODT) 4 MG ODT tab Take 1 tablet (4 mg) by mouth every 6 hours as needed for nausea   potassium chloride ER (K-DUR/KLOR-CON M) 10 MEQ CR tablet Take 50 meq (5 tablets) in the morning and 50 meq (5 tablets) in the afternoon. Take an extra 20 meq on days you take metolazone.   prochlorperazine (COMPAZINE) 5 MG tablet One to two tablets PO q 6 hours prn nausea   torsemide (DEMADEX) 100 MG tablet Take a total of 120 mg twice daily or as instructed by cardiology clinic.   torsemide (DEMADEX) 20 MG tablet Take a total of 120 mg twice daily or as instructed by cardiology clinic.     No current facility-administered medications on file prior to visit.     ROS:   CONSTITUTIONAL: Denies fever, chills, +fatigue, chronic.  HEENT: Denies headache, vision changes, and changes in speech.   CV: Refer to HPI.   PULMONARY: Refer to HPI.   GI: Refer to HPI.   : Denies urinary alterations, dysuria, hematuria, and abnormal drainage. + urinary frequency 2/2 diuretics.  EXT: +Chronic lower extremity edema see hpi, currently worse than baseline  SKIN: Denies abnormal rashes or lesions.   MUSCULOSKELETAL: Denies upper or lower extremity weakness and pain.   NEUROLOGIC: Denies dizziness, seizures, or upper or lower  "extremity paresthesia.     EXAM:  /69 (BP Location: Left arm, Patient Position: Chair, Cuff Size: Adult Regular)   Pulse 71   Ht 1.6 m (5' 3\")   Wt 69.4 kg (152 lb 14.4 oz)   SpO2 98%   BMI 27.09 kg/m        GENERAL: Appears comfortable, in no acute distress, speaking in full sentences and able to communicate all needs.   HEENT: Eye symmetrical, no discharge. Mucous membranes moist and without lesions.  CV: RRR, +S1S2, no murmur, rub, or gallop. JVP at midneck 45 degrees, improved from last visit  RESPIRATORY: Respirations regular, even, and unlabored. Lungs CTA throughout.   GI: Soft and mildly distended with normoactive bowel sounds present. No tenderness, rebound, guarding. No hepatomegaly.   EXTREMITIES: 3+ non pitting peripheral edema. 2+ bilateral pedal pulses.   NEUROLOGIC: Alert and interacting appropiratly. No focal deficits.   MUSCULOSKELETAL: No joint swelling or tenderness.   SKIN: No jaundice. No rashes or lesions.     Labs, reviewed with patient in clinic today:  CBC RESULTS:  Lab Results   Component Value Date    WBC 4.8 05/17/2019    RBC 4.59 05/17/2019    HGB 14.9 05/17/2019    HCT 44.4 05/17/2019    MCV 97 05/17/2019    MCH 32.5 05/17/2019    MCHC 33.6 05/17/2019    RDW 14.1 05/17/2019     05/17/2019       CMP RESULTS:  Lab Results   Component Value Date     08/08/2019    POTASSIUM 4.2 08/08/2019    CHLORIDE 97 08/08/2019    CO2 32 08/08/2019    ANIONGAP 5 08/08/2019    GLC 94 08/08/2019    BUN 21 08/08/2019    CR 1.01 08/08/2019    GFRESTIMATED 58 (L) 08/08/2019    GFRESTBLACK 67 08/08/2019    JERROD 9.0 08/08/2019    BILITOTAL 2.6 (H) 05/17/2019    ALBUMIN 4.2 05/17/2019    ALKPHOS 263 (H) 05/17/2019    ALT 32 05/17/2019    AST 25 05/17/2019        INR RESULTS:  Lab Results   Component Value Date    INR 1.30 (H) 01/14/2017       Lab Results   Component Value Date    MAG 3.2 (H) 04/09/2019     Lab Results   Component Value Date    NTBNPI 9,009 (H) 01/13/2017     Lab Results "   Component Value Date    NTBNP 1,378 (H) 05/30/2019       Diagnostics:  TTE 4/9/19  Moderate left ventricular hypertrophy.  Global and regional left ventricular function is normal with an EF of 60-65%.  Global right ventricular function is normal.  Moderate aortic valve insufficiency.  Mild pulmonary hypertension with dilated IVC.  This study was compared with the study from 10/11/18 the AR is worse and RA pressure is now increased.    TTE 10/11/18  Global and regional left ventricular function is normal with an EF of 55-60%.  Moderate concentric wall thickening consistent with left ventricular  hypertrophy is present - known amyloid.  Grade III or advanced diastolic dysfunction.  Normal right ventricular size, wall thickness, and function.  Estimated pulmonary artery pressure 28 mmHg.  IVC with normal diameter but does not collapse (>50%) with inspiration.  Trace pericardial effusion.  Compared to prior study from 8/17/17, no significant change.    Defywireopatch 11/2017      Holter Monitor 06/2019  INTERPRETATION  1. The rhythm varied with sinus rhythm and atrial fibrillation/variable atrial flutter. Low voltage noted.  2. The heart rate varied with a minimum rate of 48 bpm, maximum rate of 164 bpm, average rate of 74 bpm.  3. Frequent supraventricular ectopy was seen ranging from 0-469 per hour. Occasional atrail pairs and fairly frequent bigeminal cycles were noted.  Wandering atrial pacemaker noted.  4. Infrequent multifocal ventricular ectopy was seen ranging from 0-36 beats per hour.  5. ST-T wave changes were present.  6. Three patient events were documented and correlated with atrial fibrillation/flutter    EKG 8/8/2019 for palpitations  - NSR at a rate of 70, AR 0.19, QTc 525, QRS is narrow. Occasional PVCs. Biphasic twaves in V4-V6, unchanged from priors.    Assessment and Plan:   Mrs. Guerrero is a 68 year old female with AL amyloidosis with cardiac manifestation who presents to CORE for hospital follow-up.  Patient has cardiac amyloidosis as evidenced by her echocardiogram (severe concentric hypertrophy and biatrial enlargement) and abnormal EKG (near-low voltage, poor R wave progression, biatrial enlargement and no evidence of LVH despite thicken LV on echo) in the setting of biopsy proven (BMB, EGD) AL amyloid. She completed chemotherapy with CyBorD with an excellent serologic response and her heart failure (i.e. troponin negative, NT pro BNP was stable, serum light chains minimal and urine light chains undetectable).     However, we have continued to struggle with volume overload. Today she is hypervolemic again, she was 152 last visit, improved down to 146 in the interm and now back to 152lbs.  EDW ??147, although seems to be a moving target. Cr stable at 1.01. Normotensive. Exam is hypervolemic.     # Chronic diastolic heart failure/restrictive cardiomyopathy 2/2  # Cardiac amyloidosis    Stage C. NYHA Class III.    Fluid status: Hypervolemic, increased torsemide to 140 mg BID, take torsemide, increased Potassium to 80meq BID.   ACEi/ARB/ARNI: n/a   BB: no indication and relatively contraindicated due to risk of progressive conduction disease/AV block in these patients  Aldosterone antagonist: d/c'd given hyponatremia, Na improving and she is feeling better   SCD prophylaxis: does not meet criteria for implant  NSAID use: contraindicated  Sleep apnea evaluation: deferred today  Remote monitoring: deferred today, could be Cardiocom candidate   Goals of care: prior discussions with Dr Cohen re: planning for EoL care as long term prognosis for AL amyloid is poor.   Other notes: If she takes another metolazone 2.5- will likely need 60 meq EXTRA both the day of metolazone and the day after metolazone, as has been hypokalemic with metolazone recently    # A. Fib/A. Flutter  We did a holter monitor which showed intermittent a. Fib and a. Flutter. [id number] of 4. We discussed stroke vs bleeding risk, and initially  she wanted to hold off on anticouagulation, today, however, she is ready to start Eliquis. Having some palpitations today, no lightheadedness, EKG today sinus rhythm with occasional PVCs.  - Continue Eliquis 5mg BID  - Avoiding BB in the setting of amyloid  - Holding off on digoxin given good rate control. Could discuss in the future if needed.    # Prolonged QTC  QTC today >500 as it has been in the past.  - Minimize QT prolonging meds    # Systemic amyloid  Heme previously monitoring her light chains which have been negative consistent with remission - therefore further palliative chemo not being considered. Nausea has been a large issue and is currently, Kytril was added at her last oncology appointment, but she has not started taking this. to her regimen of compazine and lorazepam.  - Consider palliative care referal if nausea not controlled on her current regimen (per onc)  - She is back on doxycycline     # Hyponatremia  Improving off spironolactone. Feeling better with the improved sodium  - Discontinued spironolactone    Follow: Up: Phone call Monday, if weight not improving, should consider metolazone. Labs Wednesday. Scheduled with Dr. Cohen on 8/22    30 minutes spent face-to-face with patient, >50% in counseling and/or coordination of care as described above    Ciara Araya PA-C  8/8/2019      ALEJANDRO FORD

## 2019-08-08 NOTE — PATIENT INSTRUCTIONS
Take your medicines every day, as directed    Changes made today:  o Today: increase torsemide to 140 twice a day  o Increase potassium to 80 Meq twice a day  o Phone call on Monday- if your weight is still >150 we will talk about metolazone      Monitor Your Weight and Symptoms    Contact us if you:      Gain 2 pounds in one day or 5 pounds in one week    Feel more short of breath    Notice more leg swelling    Feel lightheadeded   Change your lifestyle    Limit Salt or Sodium:    2000 mg  Limit Fluids:    2000 mL or approximately 64 ounces  Eat a Heart Healthy Diet    Low in saturated fats  Stay Active:    Aim to move at least 150 minutes every  week         To Contact us    During Business Hours:  [phone], option # 1 (University)  Then option # 4 (medical questions)     After hours, weekends or holidays:   [phone], Option #4  Ask to speak to the On-Call Cardiologist. Inform them you are a CORE/heart failure patient at the [facility].     Use [employer] allows you to communicate directly with your heart team through secure messaging.    [facility] [place] can be accessed any time on your phone, computer, or tablet.    If you need assistance, we'd be happy to help!         Keep your Heart Appointments:    - Labs in one week  - F/u with Dr. Cohen in 2 weeks

## 2019-08-10 LAB — INTERPRETATION ECG - MUSE: NORMAL

## 2019-08-12 ENCOUNTER — TELEPHONE (OUTPATIENT)
Dept: CARDIOLOGY | Facility: CLINIC | Age: 69
End: 2019-08-12

## 2019-08-12 DIAGNOSIS — I50.33 ACUTE ON CHRONIC DIASTOLIC HEART FAILURE (H): ICD-10-CM

## 2019-08-12 DIAGNOSIS — I50.32 CHRONIC DIASTOLIC HEART FAILURE (H): Primary | ICD-10-CM

## 2019-08-12 DIAGNOSIS — E85.4 AMYLOID HEART DISEASE (H): ICD-10-CM

## 2019-08-12 DIAGNOSIS — E87.6 HYPOKALEMIA: ICD-10-CM

## 2019-08-12 DIAGNOSIS — I43 AMYLOID HEART DISEASE (H): ICD-10-CM

## 2019-08-12 LAB
ANION GAP SERPL CALCULATED.3IONS-SCNC: 7 MMOL/L (ref 3–14)
BUN SERPL-MCNC: 22 MG/DL (ref 7–30)
CALCIUM SERPL-MCNC: 9 MG/DL (ref 8.5–10.1)
CHLORIDE SERPL-SCNC: 97 MMOL/L (ref 94–109)
CO2 SERPL-SCNC: 31 MMOL/L (ref 20–32)
CREAT SERPL-MCNC: 1.07 MG/DL (ref 0.52–1.04)
GFR SERPL CREATININE-BSD FRML MDRD: 54 ML/MIN/{1.73_M2}
GLUCOSE SERPL-MCNC: 86 MG/DL (ref 70–99)
NT-PROBNP SERPL-MCNC: 1693 PG/ML (ref 0–125)
POTASSIUM SERPL-SCNC: 4.1 MMOL/L (ref 3.4–5.3)
SODIUM SERPL-SCNC: 135 MMOL/L (ref 133–144)

## 2019-08-12 PROCEDURE — 36415 COLL VENOUS BLD VENIPUNCTURE: CPT | Performed by: INTERNAL MEDICINE

## 2019-08-12 PROCEDURE — 80048 BASIC METABOLIC PNL TOTAL CA: CPT | Performed by: INTERNAL MEDICINE

## 2019-08-12 PROCEDURE — 83880 ASSAY OF NATRIURETIC PEPTIDE: CPT | Performed by: INTERNAL MEDICINE

## 2019-08-12 NOTE — TELEPHONE ENCOUNTER
Date: 8/12/2019    Time of Call: 10:54 AM     Diagnosis:  Amyloid      [ TORB ] Ordering provider: Yolanda Araya PA-C  Order: Metolazone 2.5 mg 1/2 hour before afternoon dose of torsemide. Labs today before dose of metolazone and again tomorrow before noon. Pt to take an extra 60 mEq of potassium today and tomorrow.      Order received by: Shirlene Francois RN CORE Care Coordinator        Follow-up/additional notes: Called pt, reviewed information. Pt repeated back changes and lab requests. Pt had no further questions.

## 2019-08-13 DIAGNOSIS — I43 CARDIAC AMYLOIDOSIS (H): Primary | ICD-10-CM

## 2019-08-13 DIAGNOSIS — I50.32 CHRONIC DIASTOLIC HEART FAILURE (H): ICD-10-CM

## 2019-08-13 DIAGNOSIS — E87.6 HYPOKALEMIA: ICD-10-CM

## 2019-08-13 DIAGNOSIS — E85.4 CARDIAC AMYLOIDOSIS (H): Primary | ICD-10-CM

## 2019-08-13 LAB
ANION GAP SERPL CALCULATED.3IONS-SCNC: 4 MMOL/L (ref 3–14)
BUN SERPL-MCNC: 23 MG/DL (ref 7–30)
CALCIUM SERPL-MCNC: 9.6 MG/DL (ref 8.5–10.1)
CHLORIDE SERPL-SCNC: 93 MMOL/L (ref 94–109)
CO2 SERPL-SCNC: 36 MMOL/L (ref 20–32)
CREAT SERPL-MCNC: 1.08 MG/DL (ref 0.52–1.04)
GFR SERPL CREATININE-BSD FRML MDRD: 53 ML/MIN/{1.73_M2}
GLUCOSE SERPL-MCNC: 82 MG/DL (ref 70–99)
POTASSIUM SERPL-SCNC: 4.2 MMOL/L (ref 3.4–5.3)
SODIUM SERPL-SCNC: 133 MMOL/L (ref 133–144)

## 2019-08-13 PROCEDURE — 36415 COLL VENOUS BLD VENIPUNCTURE: CPT | Performed by: PHYSICIAN ASSISTANT

## 2019-08-13 PROCEDURE — 80048 BASIC METABOLIC PNL TOTAL CA: CPT | Performed by: PHYSICIAN ASSISTANT

## 2019-08-14 ENCOUNTER — TELEPHONE (OUTPATIENT)
Dept: CARDIOLOGY | Facility: CLINIC | Age: 69
End: 2019-08-14

## 2019-08-14 DIAGNOSIS — I50.32 CHRONIC DIASTOLIC HEART FAILURE (H): Primary | ICD-10-CM

## 2019-08-14 NOTE — TELEPHONE ENCOUNTER
Called pt to check in on symptoms and request lab work since taking a metolazone dose this week. Pt reports loosing 10 lbs since taking a metolazone. Pt will have labs drawn tomorrow to check potassium. Shirlene Francois RN CORE Care Coordinator

## 2019-08-15 DIAGNOSIS — I50.32 CHRONIC DIASTOLIC HEART FAILURE (H): ICD-10-CM

## 2019-08-15 LAB
ANION GAP SERPL CALCULATED.3IONS-SCNC: 4 MMOL/L (ref 3–14)
BUN SERPL-MCNC: 27 MG/DL (ref 7–30)
CALCIUM SERPL-MCNC: 9.6 MG/DL (ref 8.5–10.1)
CHLORIDE SERPL-SCNC: 91 MMOL/L (ref 94–109)
CO2 SERPL-SCNC: 40 MMOL/L (ref 20–32)
CREAT SERPL-MCNC: 1.07 MG/DL (ref 0.52–1.04)
GFR SERPL CREATININE-BSD FRML MDRD: 54 ML/MIN/{1.73_M2}
GLUCOSE SERPL-MCNC: 87 MG/DL (ref 70–99)
POTASSIUM SERPL-SCNC: 3.5 MMOL/L (ref 3.4–5.3)
SODIUM SERPL-SCNC: 135 MMOL/L (ref 133–144)

## 2019-08-15 PROCEDURE — 36415 COLL VENOUS BLD VENIPUNCTURE: CPT | Performed by: PHYSICIAN ASSISTANT

## 2019-08-15 PROCEDURE — 80048 BASIC METABOLIC PNL TOTAL CA: CPT | Performed by: PHYSICIAN ASSISTANT

## 2019-08-16 ENCOUNTER — TELEPHONE (OUTPATIENT)
Dept: CARDIOLOGY | Facility: CLINIC | Age: 69
End: 2019-08-16

## 2019-08-16 NOTE — TELEPHONE ENCOUNTER
Date: 8/16/2019    Time of Call: 11:55 AM     Diagnosis:  CHF     [ TORB ] Ordering provider: Yolanda Araya PA-C  Order: Extra 40 mEq of potassium today.    Order received by: Shirlene Francois RN CORE Care Coordinator        Follow-up/additional notes: Return to regular dosing tomorrow. If weght loss happens over the weekend, pt will call to get labs drawn. Pt will follow-up in Cardiology on Thursday.

## 2019-08-20 ENCOUNTER — TELEPHONE (OUTPATIENT)
Dept: ONCOLOGY | Facility: CLINIC | Age: 69
End: 2019-08-20

## 2019-08-20 NOTE — TELEPHONE ENCOUNTER
I left a message and mailed a letter appt date/time. I rescheduled the follow up appointment with Dr Moore from 10/25/2019 to 11/01/2019.-cap-08/20/2019

## 2019-08-22 ENCOUNTER — OFFICE VISIT (OUTPATIENT)
Dept: CARDIOLOGY | Facility: CLINIC | Age: 69
End: 2019-08-22
Attending: INTERNAL MEDICINE
Payer: COMMERCIAL

## 2019-08-22 VITALS
SYSTOLIC BLOOD PRESSURE: 117 MMHG | HEIGHT: 63 IN | OXYGEN SATURATION: 98 % | WEIGHT: 155.4 LBS | DIASTOLIC BLOOD PRESSURE: 70 MMHG | BODY MASS INDEX: 27.54 KG/M2 | HEART RATE: 64 BPM

## 2019-08-22 DIAGNOSIS — E85.4 CARDIAC AMYLOIDOSIS (H): ICD-10-CM

## 2019-08-22 DIAGNOSIS — I43 AMYLOID HEART DISEASE (H): ICD-10-CM

## 2019-08-22 DIAGNOSIS — I50.33 ACUTE ON CHRONIC DIASTOLIC HEART FAILURE (H): ICD-10-CM

## 2019-08-22 DIAGNOSIS — E85.4 AMYLOID HEART DISEASE (H): ICD-10-CM

## 2019-08-22 DIAGNOSIS — I43 CARDIAC AMYLOIDOSIS (H): ICD-10-CM

## 2019-08-22 LAB
ANION GAP SERPL CALCULATED.3IONS-SCNC: 5 MMOL/L (ref 3–14)
BUN SERPL-MCNC: 27 MG/DL (ref 7–30)
CALCIUM SERPL-MCNC: 8.9 MG/DL (ref 8.5–10.1)
CHLORIDE SERPL-SCNC: 97 MMOL/L (ref 94–109)
CO2 SERPL-SCNC: 32 MMOL/L (ref 20–32)
CREAT SERPL-MCNC: 0.98 MG/DL (ref 0.52–1.04)
GFR SERPL CREATININE-BSD FRML MDRD: 60 ML/MIN/{1.73_M2}
GLUCOSE SERPL-MCNC: 80 MG/DL (ref 70–99)
NT-PROBNP SERPL-MCNC: 1507 PG/ML (ref 0–125)
POTASSIUM SERPL-SCNC: 3.5 MMOL/L (ref 3.4–5.3)
SODIUM SERPL-SCNC: 135 MMOL/L (ref 133–144)

## 2019-08-22 PROCEDURE — 80048 BASIC METABOLIC PNL TOTAL CA: CPT | Performed by: INTERNAL MEDICINE

## 2019-08-22 PROCEDURE — 36415 COLL VENOUS BLD VENIPUNCTURE: CPT | Performed by: INTERNAL MEDICINE

## 2019-08-22 PROCEDURE — G0463 HOSPITAL OUTPT CLINIC VISIT: HCPCS | Mod: ZF

## 2019-08-22 PROCEDURE — 83880 ASSAY OF NATRIURETIC PEPTIDE: CPT | Performed by: INTERNAL MEDICINE

## 2019-08-22 PROCEDURE — 99215 OFFICE O/P EST HI 40 MIN: CPT | Mod: ZP | Performed by: INTERNAL MEDICINE

## 2019-08-22 RX ORDER — POTASSIUM CHLORIDE 750 MG/1
80 TABLET, EXTENDED RELEASE ORAL 2 TIMES DAILY
Qty: 480 TABLET | Refills: 1 | Status: SHIPPED | OUTPATIENT
Start: 2019-08-22 | End: 2019-09-04

## 2019-08-22 RX ORDER — TORSEMIDE 20 MG/1
TABLET ORAL
Qty: 270 TABLET | Refills: 3 | Status: SHIPPED | OUTPATIENT
Start: 2019-08-22 | End: 2019-08-26

## 2019-08-22 RX ORDER — TORSEMIDE 100 MG/1
TABLET ORAL
Qty: 180 TABLET | Refills: 3 | Status: SHIPPED | OUTPATIENT
Start: 2019-08-22 | End: 2019-08-26

## 2019-08-22 ASSESSMENT — PAIN SCALES - GENERAL: PAINLEVEL: NO PAIN (0)

## 2019-08-22 ASSESSMENT — MIFFLIN-ST. JEOR: SCORE: 1214.02

## 2019-08-22 NOTE — LETTER
8/22/2019      RE: Leandra Guerrero  [address] [place] [address]       Dear Colleague,    Thank you for the opportunity to participate in the care of your patient, Leandra Guerrero, at the [facility]. Please see a copy of my visit note below.    08/22/2019    HPI:  Mrs. Guerrero is a 68 year old female with no past medical history until the Spring of 2016 when she was diagnosed with stage IV AL amyloid; she presents to clinic for follow up of cardiac amyloid (AL type).     Her cardiac and oncologic history are as follows:   Fatigue started in January 2016. She eventually had a coronary angiogram which was reportedly normal and was diagnosed with CHF and started on a bblocker and diuretics. Her symptoms progressed to the point that she was evaluated at [facility] in August/September (Dr. Barron in oncology/hematology, Dr. Nettles is cardiology). She was diagnosed with stage IV AL amyloid (+GI, +bone marrow involvement, no involvement of kidney or liver):  her work up is detailed in our previous notes, however she has lambda AL amyloidosis and she underwent CyBorD chemotherapy and excellent light chain response by serum. She wasthen  turned down at [facility] for a stem cell transplant due to her cardiac involvement.    Unfortunately in 2016, she had 2-3 more pleural taps on the R and came back to my clinic with gross anasarca.  I admitted her to the hospital in 1/2017 where close to 20-30 pounds of fluid overload of volume was removed.  Since that time her AL has been in remission by labs without further chemo; her oncologist recently increase the interval of their meetings which is encouraging from that standpoint.      Interval History:  Since her last clinic visit she reports ongoing difficulty with volume overload despite increases in her torsemide and more regularly taking metolazone. Pt feels best at 145 lbs, but is 155 lbs today. Pt reports  ongoing abdominal bloating, early satiety, LE edema, and orthopnea. Pt feels tired with usual ADLs for the last few weeks. Pt is tearful today because despite her best efforts to eat right and monitor her fluid intake, she continues to gain weight. Pt is wondering if this is the end, but is not ready to give up yet. Pt is worried she might need to be readmitted soon.        PAST MEDICAL HISTORY:  - Stage IV AL amyloid   - GI (stomach and duodenal) involvement  - Cardiac amyloidosis    FAMILY HISTORY:  Family History   Problem Relation Age of Onset     Other - See Comments Sister         Amyloidosis   - Sister passed from multiple myeloma, patient was also told her sister had amyloid as well  - Mom with CAD    SOCIAL HISTORY:  Social History     Social History     Marital Status:      Spouse Name: N/A     Number of Children: N/A     Years of Education: N/A     Social History Main Topics     Smoking status: Never Smoker      Smokeless tobacco: None     Alcohol Use: No     Drug Use: No     Sexual Activity: Not Asked     Social History Narrative   Lives in [place] with her , has 2 kids  Previously did office work, hasn't worked for several months now  Never smoker  Social ETOH    CURRENT MEDICATIONS:  Current Outpatient Medications   Medication Sig Dispense Refill     Acetaminophen (TYLENOL PO) Take 325 mg by mouth       apixaban ANTICOAGULANT (ELIQUIS) 5 MG tablet Take 1 tablet (5 mg) by mouth 2 times daily 60 tablet 11     doxycycline hyclate (VIBRA-TABS) 100 MG tablet Take 1 tablet (100 mg) by mouth daily 90 tablet 3     granisetron (KYTRIL) 1 MG tablet Take 1 tablet (1 mg) by mouth every 12 hours as needed for nausea 10 tablet 0     LORazepam (ATIVAN) 0.5 MG tablet Take 1 tablet (0.5 mg) by mouth every 6 hours as needed for anxiety or nausea 60 tablet 0     metolazone (ZAROXOLYN) 2.5 MG tablet Take 1 tablet (2.5 mg) by mouth as needed (as instructed by CORE clinic) 10 tablet 3     ondansetron  "(ZOFRAN-ODT) 4 MG ODT tab Take 1 tablet (4 mg) by mouth every 6 hours as needed for nausea 15 tablet 1     potassium chloride ER (K-DUR/KLOR-CON M) 10 MEQ CR tablet Take 50 meq (5 tablets) in the morning and 50 meq (5 tablets) in the afternoon. Take an extra 20 meq on days you take metolazone. 180 tablet 1     prochlorperazine (COMPAZINE) 5 MG tablet One to two tablets PO q 6 hours prn nausea 90 tablet 1     torsemide (DEMADEX) 100 MG tablet Take a total of 120 mg twice daily or as instructed by cardiology clinic. 180 tablet 3     torsemide (DEMADEX) 20 MG tablet Take a total of 120 mg twice daily or as instructed by cardiology clinic. 90 tablet 3       ROS:   Constitutional: No fever, chills, or sweats.  Weight is relatively stable  ENT: No visual disturbance, ear ache, epistaxis, sore throat.   Allergies/Immunologic: Negative.   Respiratory: No cough, hemoptysis.   Cardiovascular: As per HPI.   GI: +Nausea, constipation.  : No urinary frequency, dysuria, or hematuria.   Integument: Negative.   Psychiatric: feeling down and anxious since her diagnosis  Neuro: + tingling in legs bilaterally  Endocrinology: Negative.   Musculoskeletal: Negative.    EXAM:  /70 (BP Location: Left arm, Patient Position: Chair, Cuff Size: Adult Large)   Pulse 64   Ht 1.6 m (5' 3\")   Wt 70.5 kg (155 lb 6.4 oz)   SpO2 98%   BMI 27.53 kg/m     General: appears comfortable, alert and articulate, NAD, pleasant  HEENT: EOMI, moist mucosa, no lesions, dentition intact  Heart: regular rate and rhythm. III/VI holosystolic murmur at the apex, JVD ~12 cm  Lungs: decreased breath sounds at the L base, lungs otherwise clear   Abdomen: soft, non-tender, bowel sounds present, no hepatosplenomegaly  Extremities: significant mostly nonpitting edema of bilateral extremities   Neurological: normal speech and affect, no gross motor deficits    Labs:  CBC RESULTS:  Lab Results   Component Value Date    WBC 4.8 05/17/2019    RBC 4.59 05/17/2019 "    HGB 14.9 05/17/2019    HCT 44.4 05/17/2019    MCV 97 05/17/2019    MCH 32.5 05/17/2019    MCHC 33.6 05/17/2019    RDW 14.1 05/17/2019     05/17/2019       CMP RESULTS:  Lab Results   Component Value Date     08/22/2019    POTASSIUM 3.5 08/22/2019    CHLORIDE 97 08/22/2019    CO2 32 08/22/2019    ANIONGAP 5 08/22/2019    GLC 80 08/22/2019    BUN 27 08/22/2019    CR 0.98 08/22/2019    GFRESTIMATED 60 (L) 08/22/2019    GFRESTBLACK 69 08/22/2019    JERROD 8.9 08/22/2019    BILITOTAL 2.6 (H) 05/17/2019    ALBUMIN 4.2 05/17/2019    ALKPHOS 263 (H) 05/17/2019    ALT 32 05/17/2019    AST 25 05/17/2019     INR RESULTS:  Lab Results   Component Value Date    INR 1.30 (H) 01/14/2017       Lab Results   Component Value Date    MAG 3.2 (H) 04/09/2019     Lab Results   Component Value Date    NTBNPI 9,009 (H) 01/13/2017     Lab Results   Component Value Date    NTBNP 1,507 (H) 08/22/2019     TTE 10/2018     Interpretation Summary  Global and regional left ventricular function is normal with an EF of 55-60%.  Moderate concentric wall thickening consistent with left ventricular  hypertrophy is present - known amyloid.  Grade III or advanced diastolic dysfunction.  Normal right ventricular size, wall thickness, and function.  Estimated pulmonary artery pressure 28 mmHg.  IVC with normal diameter but does not collapse (>50%) with inspiration.  Trace pericardial effusion.    TTE 4/3/2019  Moderate left ventricular hypertrophy.  Global and regional left ventricular function is normal with an EF of 60-65%.  Global right ventricular function is normal.  Moderate aortic valve insufficiency.  Mild pulmonary hypertension with dilated IVC.     This study was compared with the study from 10/11/18 the AR is worse and RA  pressure is now increased.       CXR 10/27/17: Persistent small, right greater than left pleural effusions and overlying atelectasis      Assessment and Plan:   Mrs. Guerrero is a 68 year old female with stage IV   AL amyloid with GI, bone marrow and cardiac involvement who presents for follow up in the cardiac amyloid clinic.     Patient has cardiac amyloidosis as evidenced by her echocardiogram (severe concentric hypertrophy and biatrial enlargement) and abnormal EKG (near-low voltage, poor R wave progression, biatrial enlargement and no evidence of LVH despite very thickened LV on echo) in the setting of biopsy proven (BMB, EGD) AL amyloid.  she has completed chemotherapy with CyBorD with an excellent serologic response and her heart failure (both clinically and by labs - i.e. troponin now negative, serum light chains minimal and urine light chains undetectable) has stabilized significantly.     Her NYHA class is III today, Stage C.    Today she appears significantly volume overloaded, proximally 10 pounds up from her dry weight.  We will increase her diuresis to torsemide 160 mg twice daily in addition to metolazone 2.5 mg twice weekly for now. We will continue to monitor her weights closely and she will follow-up in core clinic in the next 2 weeks. If we are unable to make significant progress in her volume status over the next couple of weeks she may need to be readmitted for more aggressive diuresis as an inpatient.  She is hoping to maximize her quality of life for the time that she has left understanding that her overall prognosis remains poor given her stage IV amyloidosis.    Cardiac Amyloidosis (a restrictive cardiomyopathy)   Stage C  NYHA Class III  ACEi/ARB - none- no indication   BB - none, no indication and relatively contraindicated due to risk of progressive conduction disease/AV block in these patients  Aldosterone antagonist: discontinued given persistent hyponatremia  SCD prophylaxis: not indicated  % BiV pacing: not applicable  Fluid status: Increase Torsemide 160 mg BID and metolazone 2.5 mg today and then in two days with repeat labs on 8/26. Increase KCl to 80 mEq BID and additional 60 mEq with  metolazone doses  Remote monitoring: deferred today, could be Cardiocom candidate in the future  Goals of care: prior discussions with Dr. Cohen re: planning for EoL care as long term prognosis for AL amyloid is poor, but for now, she is not ready for palliative care    Other:  Arrhythmia monitoring -- Her Zio patch in 2/17 showed sinus rhythm with non sustained SVT. A repeat 7-day Ziopatch recently reveals short (>45 second) runs of SVT, for which she is generally not symptomatic. No further eval unless clinical change.     Systemic amyloid-heme is presently monitoring her light chains which have been negative consistent with remission - therefore we are not contemplating further palliative chemo.     Follow-up in core clinic in 2 weeks ad with Dr. Cohen in 1 month.    Patient was seen and examined with the attending physician Dr. Domenica Cohen, who agrees with the above.    I have seen and examined the patient with the house staff on August 22, 2019  and agree with the outlined assessment and plan.    She has done better than I would have ever expected thus far- quality of life is deteriorating and there is no cure for her disease - we are going to take it one month at a time.     Domenica Cohen MD   of Medicine   [facility] Division of Cardiology     Karen Kilgore MD  Cardiology Fellow    CC   [place] [address]    Patient Care Team:  Magy Obrien MD as PCP - General (Family Practice)  Mirela Moore MD as MD (Hematology & Oncology)  Domenica Cohen MD as MD (Cardiology)  Eilene Arevalo, RN as Nurse Coordinator (Cardiology)  Codie Nelson NP as Nurse Practitioner (Cardiology)  Niki Fam, RN as Registered Nurse (Cardiology)  Ciara Araya PA-C as Physician Assistant (Physician Assistant)  Shirlene Francois, RN as Specialty Care Coordinator (Cardiology)  EDI  GARRISON Barron MD

## 2019-08-22 NOTE — PROGRESS NOTES
08/22/2019    HPI:  Mrs. Guerrero is a 68 year old female with no past medical history until the Spring of 2016 when she was diagnosed with stage IV AL amyloid; she presents to clinic for follow up of cardiac amyloid (AL type).     Her cardiac and oncologic history are as follows:   Fatigue started in January 2016. She eventually had a coronary angiogram which was reportedly normal and was diagnosed with CHF and started on a bblocker and diuretics. Her symptoms progressed to the point that she was evaluated at [facility] in August/September (Dr. Barron in oncology/hematology, Dr. Nettles is cardiology). She was diagnosed with stage IV AL amyloid (+GI, +bone marrow involvement, no involvement of kidney or liver):  her work up is detailed in our previous notes, however she has lambda AL amyloidosis and she underwent CyBorD chemotherapy and excellent light chain response by serum. She wasthen  turned down at [facility] for a stem cell transplant due to her cardiac involvement.    Unfortunately in 2016, she had 2-3 more pleural taps on the R and came back to my clinic with gross anasarca.  I admitted her to the hospital in 1/2017 where close to 20-30 pounds of fluid overload of volume was removed.  Since that time her AL has been in remission by labs without further chemo; her oncologist recently increase the interval of their meetings which is encouraging from that standpoint.      Interval History:  Since her last clinic visit she reports ongoing difficulty with volume overload despite increases in her torsemide and more regularly taking metolazone. Pt feels best at 145 lbs, but is 155 lbs today. Pt reports ongoing abdominal bloating, early satiety, LE edema, and orthopnea. Pt feels tired with usual ADLs for the last few weeks. Pt is tearful today because despite her best efforts to eat right and monitor her fluid intake, she continues to gain weight. Pt is wondering if this is the end, but is not ready to give up yet. Pt is  worried she might need to be readmitted soon.        PAST MEDICAL HISTORY:  - Stage IV AL amyloid   - GI (stomach and duodenal) involvement  - Cardiac amyloidosis    FAMILY HISTORY:  Family History   Problem Relation Age of Onset     Other - See Comments Sister         Amyloidosis   - Sister passed from multiple myeloma, patient was also told her sister had amyloid as well  - Mom with CAD    SOCIAL HISTORY:  Social History     Social History     Marital Status:      Spouse Name: N/A     Number of Children: N/A     Years of Education: N/A     Social History Main Topics     Smoking status: Never Smoker      Smokeless tobacco: None     Alcohol Use: No     Drug Use: No     Sexual Activity: Not Asked     Social History Narrative   Lives in [place] with her , has 2 kids  Previously did office work, hasn't worked for several months now  Never smoker  Social ETOH    CURRENT MEDICATIONS:  Current Outpatient Medications   Medication Sig Dispense Refill     Acetaminophen (TYLENOL PO) Take 325 mg by mouth       apixaban ANTICOAGULANT (ELIQUIS) 5 MG tablet Take 1 tablet (5 mg) by mouth 2 times daily 60 tablet 11     doxycycline hyclate (VIBRA-TABS) 100 MG tablet Take 1 tablet (100 mg) by mouth daily 90 tablet 3     granisetron (KYTRIL) 1 MG tablet Take 1 tablet (1 mg) by mouth every 12 hours as needed for nausea 10 tablet 0     LORazepam (ATIVAN) 0.5 MG tablet Take 1 tablet (0.5 mg) by mouth every 6 hours as needed for anxiety or nausea 60 tablet 0     metolazone (ZAROXOLYN) 2.5 MG tablet Take 1 tablet (2.5 mg) by mouth as needed (as instructed by CORE clinic) 10 tablet 3     ondansetron (ZOFRAN-ODT) 4 MG ODT tab Take 1 tablet (4 mg) by mouth every 6 hours as needed for nausea 15 tablet 1     potassium chloride ER (K-DUR/KLOR-CON M) 10 MEQ CR tablet Take 50 meq (5 tablets) in the morning and 50 meq (5 tablets) in the afternoon. Take an extra 20 meq on days you take metolazone. 180 tablet 1     prochlorperazine  "(COMPAZINE) 5 MG tablet One to two tablets PO q 6 hours prn nausea 90 tablet 1     torsemide (DEMADEX) 100 MG tablet Take a total of 120 mg twice daily or as instructed by cardiology clinic. 180 tablet 3     torsemide (DEMADEX) 20 MG tablet Take a total of 120 mg twice daily or as instructed by cardiology clinic. 90 tablet 3       ROS:   Constitutional: No fever, chills, or sweats.  Weight is relatively stable  ENT: No visual disturbance, ear ache, epistaxis, sore throat.   Allergies/Immunologic: Negative.   Respiratory: No cough, hemoptysis.   Cardiovascular: As per HPI.   GI: +Nausea, constipation.  : No urinary frequency, dysuria, or hematuria.   Integument: Negative.   Psychiatric: feeling down and anxious since her diagnosis  Neuro: + tingling in legs bilaterally  Endocrinology: Negative.   Musculoskeletal: Negative.    EXAM:  /70 (BP Location: Left arm, Patient Position: Chair, Cuff Size: Adult Large)   Pulse 64   Ht 1.6 m (5' 3\")   Wt 70.5 kg (155 lb 6.4 oz)   SpO2 98%   BMI 27.53 kg/m    General: appears comfortable, alert and articulate, NAD, pleasant  HEENT: EOMI, moist mucosa, no lesions, dentition intact  Heart: regular rate and rhythm. III/VI holosystolic murmur at the apex, JVD ~12 cm  Lungs: decreased breath sounds at the L base, lungs otherwise clear   Abdomen: soft, non-tender, bowel sounds present, no hepatosplenomegaly  Extremities: significant mostly nonpitting edema of bilateral extremities   Neurological: normal speech and affect, no gross motor deficits    Labs:  CBC RESULTS:  Lab Results   Component Value Date    WBC 4.8 05/17/2019    RBC 4.59 05/17/2019    HGB 14.9 05/17/2019    HCT 44.4 05/17/2019    MCV 97 05/17/2019    MCH 32.5 05/17/2019    MCHC 33.6 05/17/2019    RDW 14.1 05/17/2019     05/17/2019       CMP RESULTS:  Lab Results   Component Value Date     08/22/2019    POTASSIUM 3.5 08/22/2019    CHLORIDE 97 08/22/2019    CO2 32 08/22/2019    ANIONGAP 5 " 08/22/2019    GLC 80 08/22/2019    BUN 27 08/22/2019    CR 0.98 08/22/2019    GFRESTIMATED 60 (L) 08/22/2019    GFRESTBLACK 69 08/22/2019    JERROD 8.9 08/22/2019    BILITOTAL 2.6 (H) 05/17/2019    ALBUMIN 4.2 05/17/2019    ALKPHOS 263 (H) 05/17/2019    ALT 32 05/17/2019    AST 25 05/17/2019     INR RESULTS:  Lab Results   Component Value Date    INR 1.30 (H) 01/14/2017       Lab Results   Component Value Date    MAG 3.2 (H) 04/09/2019     Lab Results   Component Value Date    NTBNPI 9,009 (H) 01/13/2017     Lab Results   Component Value Date    NTBNP 1,507 (H) 08/22/2019       TTE 10/2018     Interpretation Summary  Global and regional left ventricular function is normal with an EF of 55-60%.  Moderate concentric wall thickening consistent with left ventricular  hypertrophy is present - known amyloid.  Grade III or advanced diastolic dysfunction.  Normal right ventricular size, wall thickness, and function.  Estimated pulmonary artery pressure 28 mmHg.  IVC with normal diameter but does not collapse (>50%) with inspiration.  Trace pericardial effusion.    TTE 4/3/2019  Moderate left ventricular hypertrophy.  Global and regional left ventricular function is normal with an EF of 60-65%.  Global right ventricular function is normal.  Moderate aortic valve insufficiency.  Mild pulmonary hypertension with dilated IVC.     This study was compared with the study from 10/11/18 the AR is worse and RA  pressure is now increased.       CXR 10/27/17: Persistent small, right greater than left pleural effusions and overlying atelectasis        Assessment and Plan:   Mrs. Guerrero is a 68 year old female with stage IV  AL amyloid with GI, bone marrow and cardiac involvement who presents for follow up in the cardiac amyloid clinic.     Patient has cardiac amyloidosis as evidenced by her echocardiogram (severe concentric hypertrophy and biatrial enlargement) and abnormal EKG (near-low voltage, poor R wave progression, biatrial  enlargement and no evidence of LVH despite very thickened LV on echo) in the setting of biopsy proven (BMB, EGD) AL amyloid.  she has completed chemotherapy with CyBorD with an excellent serologic response and her heart failure (both clinically and by labs - i.e. troponin now negative, serum light chains minimal and urine light chains undetectable) has stabilized significantly.     Her NYHA class is III today, Stage C.    Today she appears significantly volume overloaded, proximally 10 pounds up from her dry weight.  We will increase her diuresis to torsemide 160 mg twice daily in addition to metolazone 2.5 mg twice weekly for now. We will continue to monitor her weights closely and she will follow-up in core clinic in the next 2 weeks. If we are unable to make significant progress in her volume status over the next couple of weeks she may need to be readmitted for more aggressive diuresis as an inpatient.  She is hoping to maximize her quality of life for the time that she has left understanding that her overall prognosis remains poor given her stage IV amyloidosis.    Cardiac Amyloidosis (a restrictive cardiomyopathy)   Stage C  NYHA Class III  ACEi/ARB - none- no indication   BB - none, no indication and relatively contraindicated due to risk of progressive conduction disease/AV block in these patients  Aldosterone antagonist: discontinued given persistent hyponatremia  SCD prophylaxis: not indicated  % BiV pacing: not applicable  Fluid status: Increase Torsemide 160 mg BID and metolazone 2.5 mg today and then in two days with repeat labs on 8/26. Increase KCl to 80 mEq BID and additional 60 mEq with metolazone doses  Remote monitoring: deferred today, could be Cardiocom candidate in the future  Goals of care: prior discussions with Dr. Cohen re: planning for EoL care as long term prognosis for AL amyloid is poor, but for now, she is not ready for palliative care    Other:  Arrhythmia monitoring -- Her [place]  patch in 2/17 showed sinus rhythm with non sustained SVT. A repeat 7-day Ziopatch recently reveals short (>45 second) runs of SVT, for which she is generally not symptomatic. No further eval unless clinical change.     Systemic amyloid-heme is presently monitoring her light chains which have been negative consistent with remission - therefore we are not contemplating further palliative chemo.     Follow-up in core clinic in 2 weeks ad with Dr. Cohen in 1 month.    Patient was seen and examined with the attending physician Dr. Domenica Cohen, who agrees with the above.    I have seen and examined the patient with the house staff on August 22, 2019  and agree with the outlined assessment and plan.    She has done better than I would have ever expected thus far- quality of life is deteriorating and there is no cure for her disease - we are going to take it one month at a time.       Domenica Cohen MD   of Medicine   [facility] Division of Cardiology         Karen Kilgore MD  Cardiology Fellow      CC   [place] [address]      Patient Care Team:  Magy Obrien MD as PCP - General (Family Practice)  Mirela Daley MD as MD (Hematology & Oncology)  Domenica Cohen MD as MD (Cardiology)  Eileen Arevalo, RN as Nurse Coordinator (Cardiology)  Codei Nelson NP as Nurse Practitioner (Cardiology)  Niki Fam, RN as Registered Nurse (Cardiology)  Ciara Araya PA-C as Physician Assistant (Physician Assistant)  Shirlene Francois, RN as Specialty Care Coordinator (Cardiology)  MIRELA DALEY MD

## 2019-08-22 NOTE — NURSING NOTE
Diet: Patient instructed regarding a heart failure healthy diet, including discussion of reduced fat and 2000 mg daily sodium restriction, daily weights, medication purpose and compliance, fluid restrictions and resources for patient and family to access for assistance with heart failure management.       Labs: Patient was given results of the laboratory testing obtained today and patient was instructed about when to return for the next laboratory testing.     Med Reconcile: Reviewed and verified all current medications with the patient. The updated medication list was printed and given to the patient.     Med changes: increased torsemide to 160 BID, potassium to 80 meq BID, and 60 mEq on metolazone days.  Taking 2.5 mg metolazone today.  May take another on Sat/Sun if weight is not low enough or increasing    Return Appointment: Patient given instructions regarding scheduling next clinic visit: labs Monday, CORE 2 weeks and [facility] in 2 months    Patient stated she understood all health information given and agreed to call with further questions or concerns.     iNki Fam RN

## 2019-08-22 NOTE — NURSING NOTE
Vitals completed successfully and medication reconciled.     Judi Lucero CMA  7:34 AM  Chief Complaint   Patient presents with     Follow Up     2 month return

## 2019-08-22 NOTE — PATIENT INSTRUCTIONS
Cardiology Providers you saw during your visit:  Dr. Cohen    Medication changes:  1.  Increase torsemide to 160 mg twice day  2.  Increase potassium to 80 mEq twice a day  3.  Take 2.5 mg of metolazone AND an extra 60 mEq of potassium today before your afternoon dose of torsemide     Follow up:  1.  If you have lost 10 lbs in the next 2 days and do NOT start re-accumulating fluid, you can continue on the new dose of torsemide.  If you have NOT lost 10 lbs OR start accumulating fluid, take another 2.5 mg metolazone and an extra 60 mEq of potassium.    2.  You will need labs checked on Monday and I will call you to see how you are doing  3.  Follow up with [facility] in 2 weeks  4.  Follow up with Dr Cohen in 2 months.    Labs:  Results for HILARY GARCIA (MRN [id number]) as of 8/22/2019 08:04   Ref. Range 8/22/2019 07:22   Sodium Latest Ref Range: 133 - 144 mmol/L 135   Potassium Latest Ref Range: 3.4 - 5.3 mmol/L 3.5   Chloride Latest Ref Range: 94 - 109 mmol/L 97   Carbon Dioxide Latest Ref Range: 20 - 32 mmol/L 32   Urea Nitrogen Latest Ref Range: 7 - 30 mg/dL 27   Creatinine Latest Ref Range: 0.52 - 1.04 mg/dL 0.98   GFR Estimate Latest Ref Range: >60 mL/min/1.73_m2 60 (L)   GFR Estimate If Black Latest Ref Range: >60 mL/min/1.73_m2 69   Calcium Latest Ref Range: 8.5 - 10.1 mg/dL 8.9   Anion Gap Latest Ref Range: 3 - 14 mmol/L 5   N-Terminal Pro Bnp Latest Ref Range: 0 - 125 pg/mL 1,507 (H)   Glucose Latest Ref Range: 70 - 99 mg/dL 80     You can try [email or web address] for a virtual support group- look under blood cancers and  disorders      Please call if you have :  1. Weight gain of more than 2 pounds in a day or 5 pounds in a week  2. Increased shortness of breath, swelling or bloating  3. Dizziness, lightheadedness   4. Any questions or concerns.       Follow the American Heart Association Diet and Lifestyle recommendations:  Limit saturated fat, trans fat, sodium, red meat, sweets and  "sugar-sweetened beverages. If you choose to eat red meat, compare labels and select the leanest cuts available.  Aim for at least 150 minutes of moderate physical activity or 75 minutes of vigorous physical activity - or an equal combination of both - each week.      During business hours: [phone], press option # 1 to be routed to the [facility] then option # 4 for \"To send a message to your care team\"      After hours, weekends or holidays: On Call Cardiologist- [phone]   option #4 and ask to speak to the on-call Cardiologist. Inform them you are a CORE/heart failure patient at the [facility].      Niki Fam RN BSN  Cardiology Care Coordinator - Heart Failure/ [facility]  [facility]   Questions and [phone]   First press #1 for the [facility] and then press #4 for \"To send a message to your care team\"    "

## 2019-08-26 ENCOUNTER — PATIENT OUTREACH (OUTPATIENT)
Dept: CARDIOLOGY | Facility: CLINIC | Age: 69
End: 2019-08-26

## 2019-08-26 DIAGNOSIS — E85.4 AMYLOID HEART DISEASE (H): ICD-10-CM

## 2019-08-26 DIAGNOSIS — I43 AMYLOID HEART DISEASE (H): ICD-10-CM

## 2019-08-26 DIAGNOSIS — I50.33 ACUTE ON CHRONIC DIASTOLIC HEART FAILURE (H): ICD-10-CM

## 2019-08-26 LAB
ANION GAP SERPL CALCULATED.3IONS-SCNC: 7 MMOL/L (ref 3–14)
BUN SERPL-MCNC: 34 MG/DL (ref 7–30)
CALCIUM SERPL-MCNC: 9.3 MG/DL (ref 8.5–10.1)
CHLORIDE SERPL-SCNC: 91 MMOL/L (ref 94–109)
CO2 SERPL-SCNC: 35 MMOL/L (ref 20–32)
CREAT SERPL-MCNC: 1.11 MG/DL (ref 0.52–1.04)
GFR SERPL CREATININE-BSD FRML MDRD: 51 ML/MIN/{1.73_M2}
GLUCOSE SERPL-MCNC: 103 MG/DL (ref 70–99)
POTASSIUM SERPL-SCNC: 3.5 MMOL/L (ref 3.4–5.3)
SODIUM SERPL-SCNC: 133 MMOL/L (ref 133–144)

## 2019-08-26 PROCEDURE — 36415 COLL VENOUS BLD VENIPUNCTURE: CPT | Performed by: INTERNAL MEDICINE

## 2019-08-26 PROCEDURE — 80048 BASIC METABOLIC PNL TOTAL CA: CPT | Performed by: INTERNAL MEDICINE

## 2019-08-26 RX ORDER — TORSEMIDE 20 MG/1
TABLET ORAL
Qty: 270 TABLET | Refills: 3 | Status: SHIPPED | OUTPATIENT
Start: 2019-08-26 | End: 2019-08-29

## 2019-08-26 RX ORDER — TORSEMIDE 100 MG/1
TABLET ORAL
Qty: 180 TABLET | Refills: 3 | Status: SHIPPED | OUTPATIENT
Start: 2019-08-26 | End: 2019-08-29

## 2019-08-26 NOTE — PROGRESS NOTES
Called Amy to follow up on medication changes and weight loss since last week.  Stated she had lost 9 pounds after taking metolazone so had not taken the second dose but noted that she was up 2 pounds this morning.  She also reported that she had been taking only 50 mEq of potassium twice a day, rather than the ordered 80 mEq.  She had taken an extra 60 mEq when she took the metolazone.  Reviewed potassium ordered.  Will discuss with provider.    Niki Fam RN

## 2019-08-26 NOTE — PROGRESS NOTES
Discussed results with Dr Cohen and followed up with Amy who stated understanding    Date: 8/26/2019    Time of Call: 2:48 PM     Diagnosis:  AL Amyloid     [ VORB ] Ordering provider: Dr Cohen    Order: increase torsemide to 180 mg BID     Order received by: Niki Fam RN       Follow-up/additional notes: reminded Amy that she should be on 80 mEq of potassium BID as well.  She stated understanding

## 2019-08-28 ENCOUNTER — TELEPHONE (OUTPATIENT)
Dept: CARDIOLOGY | Facility: CLINIC | Age: 69
End: 2019-08-28

## 2019-08-28 ENCOUNTER — MYC MEDICAL ADVICE (OUTPATIENT)
Dept: CARDIOLOGY | Facility: CLINIC | Age: 69
End: 2019-08-28

## 2019-08-28 DIAGNOSIS — I50.32 CHRONIC DIASTOLIC HEART FAILURE (H): Primary | ICD-10-CM

## 2019-08-28 NOTE — TELEPHONE ENCOUNTER
Discussed with CATHY Orozco.  Will have Amy watch for one more day and call me in the morning with AM weight.  Will decide plan at that point.  Amy stated understanding of the plan    Niki Fam RN

## 2019-08-28 NOTE — TELEPHONE ENCOUNTER
Called Amy back.  She is concerned that her weight is still not coming down.  She is currently 151 pounds (was 155 at clinic), her dry weight is 145-147 but weighed herself at 154 yesterday evening.  Her torsemide had been increased to 180 mg BID on 8/26.  She has a follow up with Pamella Alvarez NP on 9/4 but is wondering if she should take a metolazone to get to that appointment.      Niki Fam RN

## 2019-08-28 NOTE — TELEPHONE ENCOUNTER
Attempted to call Amy back. Phone rang and then hung up with no answer.  Will attempt to send MyChart message    Niki Fam RN

## 2019-08-28 NOTE — TELEPHONE ENCOUNTER
[facility] Call Center    Phone Message    May a detailed message be left on voicemail: yes    Reason for Call: Other: Pt called in requesting to speak with Niki stating she is still retaining fluid at this time and wondering if she should take metolazone please call back, thank you      Action Taken: Message routed to:  [facility] cardio

## 2019-08-29 ENCOUNTER — TELEPHONE (OUTPATIENT)
Dept: CARDIOLOGY | Facility: CLINIC | Age: 69
End: 2019-08-29

## 2019-08-29 DIAGNOSIS — I43 AMYLOID HEART DISEASE (H): ICD-10-CM

## 2019-08-29 DIAGNOSIS — E85.4 AMYLOID HEART DISEASE (H): ICD-10-CM

## 2019-08-29 DIAGNOSIS — I50.33 ACUTE ON CHRONIC DIASTOLIC HEART FAILURE (H): ICD-10-CM

## 2019-08-29 RX ORDER — TORSEMIDE 100 MG/1
200 TABLET ORAL 2 TIMES DAILY
Qty: 180 TABLET | Refills: 3 | Status: SHIPPED | OUTPATIENT
Start: 2019-08-29 | End: 2019-09-04

## 2019-08-29 RX ORDER — METOLAZONE 2.5 MG/1
2.5 TABLET ORAL
Qty: 10 TABLET | Refills: 3 | Status: SHIPPED | OUTPATIENT
Start: 2019-08-29 | End: 2019-11-06

## 2019-08-29 NOTE — TELEPHONE ENCOUNTER
Amy called back as requested to let me know her weight was up to 153 lbs.   Discussed with Dr Cohen.  Will increase bumex to 200 mg BID, keep potassium at 80 mEq BID and increase metolazone to 2.5 mg twice weekly with labs weekly.  Amy agreed to plan but was worried that this may be the beginning of the end.     Date: 8/29/2019    Time of Call: 9:07 AM     Diagnosis:  Amyloid     [ VORB ] Ordering provider: Dr Cohen    Order: Increase bumex to 200 mg BID, metolazone to 2.5 mg twice weekly and weekly labs.  Continue taking 80 meq BID of potassium and an extra 60 meq of potassium on metolazone days      Order received by: Niki Fam RN       Follow-up/additional notes: continue to monitor labs closely

## 2019-08-29 NOTE — TELEPHONE ENCOUNTER
[facility] Call Center    Phone Message    May a detailed message be left on voicemail: yes    Reason for Call: Other: pt returning tin's call and pt's weight is 153 this morning, please call back to discuss further     Action Taken: Message routed to:  [facility] Cardiology

## 2019-08-30 ENCOUNTER — TELEPHONE (OUTPATIENT)
Dept: CARDIOLOGY | Facility: CLINIC | Age: 69
End: 2019-08-30

## 2019-08-30 ASSESSMENT — ENCOUNTER SYMPTOMS
SLEEP DISTURBANCES DUE TO BREATHING: 0
EXERCISE INTOLERANCE: 1
HYPERTENSION: 0
LIGHT-HEADEDNESS: 1
SYNCOPE: 0
ORTHOPNEA: 0
LEG PAIN: 0
HYPOTENSION: 0
PALPITATIONS: 1

## 2019-08-30 NOTE — TELEPHONE ENCOUNTER
[facility] Call Center    Phone Message    May a detailed message be left on voicemail: yes    Reason for Call: Other: Amy calling to state her weight is 145 lbs. Please call her back with any quesitons.      Action Taken: Message routed to:  [facility]

## 2019-08-30 NOTE — TELEPHONE ENCOUNTER
Patient took metolazone yesterday and per chart review goal was to get back in the 140s. Sent [facility] message to check in and told her to call with any symptoms, otherwise plan to follow up as scheduled next week.

## 2019-09-04 ENCOUNTER — OFFICE VISIT (OUTPATIENT)
Dept: CARDIOLOGY | Facility: CLINIC | Age: 69
End: 2019-09-04
Attending: NURSE PRACTITIONER
Payer: COMMERCIAL

## 2019-09-04 VITALS
HEIGHT: 63 IN | OXYGEN SATURATION: 99 % | HEART RATE: 50 BPM | DIASTOLIC BLOOD PRESSURE: 67 MMHG | SYSTOLIC BLOOD PRESSURE: 111 MMHG | BODY MASS INDEX: 26.47 KG/M2 | WEIGHT: 149.4 LBS

## 2019-09-04 DIAGNOSIS — E85.4 AMYLOID HEART DISEASE (H): ICD-10-CM

## 2019-09-04 DIAGNOSIS — I48.0 PAROXYSMAL ATRIAL FIBRILLATION (H): ICD-10-CM

## 2019-09-04 DIAGNOSIS — I43 AMYLOID HEART DISEASE (H): ICD-10-CM

## 2019-09-04 DIAGNOSIS — I48.92 ATRIAL FLUTTER, UNSPECIFIED TYPE (H): ICD-10-CM

## 2019-09-04 DIAGNOSIS — I50.33 ACUTE ON CHRONIC DIASTOLIC HEART FAILURE (H): Primary | ICD-10-CM

## 2019-09-04 DIAGNOSIS — I50.32 CHRONIC DIASTOLIC HEART FAILURE (H): ICD-10-CM

## 2019-09-04 LAB
ANION GAP SERPL CALCULATED.3IONS-SCNC: 6 MMOL/L (ref 3–14)
BUN SERPL-MCNC: 34 MG/DL (ref 7–30)
CALCIUM SERPL-MCNC: 9.2 MG/DL (ref 8.5–10.1)
CHLORIDE SERPL-SCNC: 98 MMOL/L (ref 94–109)
CO2 SERPL-SCNC: 28 MMOL/L (ref 20–32)
CREAT SERPL-MCNC: 1.21 MG/DL (ref 0.52–1.04)
GFR SERPL CREATININE-BSD FRML MDRD: 46 ML/MIN/{1.73_M2}
GLUCOSE SERPL-MCNC: 76 MG/DL (ref 70–99)
POTASSIUM SERPL-SCNC: 4.7 MMOL/L (ref 3.4–5.3)
SODIUM SERPL-SCNC: 132 MMOL/L (ref 133–144)

## 2019-09-04 PROCEDURE — 80048 BASIC METABOLIC PNL TOTAL CA: CPT | Performed by: INTERNAL MEDICINE

## 2019-09-04 PROCEDURE — 36415 COLL VENOUS BLD VENIPUNCTURE: CPT | Performed by: INTERNAL MEDICINE

## 2019-09-04 PROCEDURE — G0463 HOSPITAL OUTPT CLINIC VISIT: HCPCS | Mod: ZF

## 2019-09-04 PROCEDURE — 99214 OFFICE O/P EST MOD 30 MIN: CPT | Mod: ZP | Performed by: NURSE PRACTITIONER

## 2019-09-04 RX ORDER — POTASSIUM CHLORIDE 750 MG/1
TABLET, EXTENDED RELEASE ORAL
Qty: 1320 TABLET | Refills: 3 | Status: SHIPPED | OUTPATIENT
Start: 2019-09-04 | End: 2019-09-10

## 2019-09-04 RX ORDER — TORSEMIDE 100 MG/1
200 TABLET ORAL 2 TIMES DAILY
Qty: 180 TABLET | Refills: 3 | Status: SHIPPED | OUTPATIENT
Start: 2019-09-04 | End: 2020-02-04

## 2019-09-04 ASSESSMENT — PAIN SCALES - GENERAL: PAINLEVEL: NO PAIN (0)

## 2019-09-04 ASSESSMENT — MIFFLIN-ST. JEOR: SCORE: 1186.8

## 2019-09-04 NOTE — PROGRESS NOTES
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  She went to [facility] for consideration of a stem cell transplant, but was turned down due to her cardiac involvement.  She presents to clinic today for routine follow-up.      She was last seen by Dr. Cohen on August 22.  At that time she was approximately 10 pounds over her dry weight of 147 pounds.  Dr. Cohen increased her Torsemide to 200 mg twice daily as well as her metolazone to 2.5 mg twice daily.      Since her last visit, Amy is feeling better.  She took metolazone once last Thursday and has been taking Torsemide 180 mg twice daily, instead of the 200 mg twice daily that was recommended. She has been taking Kdur 60 meq twice daily and is taking 60 meq x 2 doses on her metolazone days.    Since the diuretic adjustments, her breathing has improved.  She is able to walk two blocks before developing SOB. She denies PND or orthopnea. Her lower extremity edema and her abdominal distension have improved. She is eating less but denies early satiety.  She is drinking 75 ounces of fluid daily. Her goal weight is 145 pounds.  Dry weight is 147 lbs.      PAST MEDICAL HISTORY:  Past Medical History:   Diagnosis Date     AL amyloidosis (H)      Atrial fibrillation and flutter (H)      Cardiac amyloidosis (H)      Lymphedema      QT prolongation      Recurrent right pleural effusion 1/2/2017     SVT (supraventricular tachycardia) (H)        FAMILY HISTORY:  Family History   Problem Relation Age of Onset     Other - See Comments Sister         Amyloidosis       SOCIAL HISTORY:  Social History     Socioeconomic History     Marital status:      Spouse name: Not on file     Number of children: Not on file     Years of education: Not on file      Highest education level: Not on file   Occupational History     Not on file   Social Needs     Financial resource strain: Not on file     Food insecurity:     Worry: Not on file     Inability: Not on file     Transportation needs:     Medical: Not on file     Non-medical: Not on file   Tobacco Use     Smoking status: Never Smoker     Smokeless tobacco: Never Used   Substance and Sexual Activity     Alcohol use: No     Drug use: No     Sexual activity: Not on file   Lifestyle     Physical activity:     Days per week: Not on file     Minutes per session: Not on file     Stress: Not on file   Relationships     Social connections:     Talks on phone: Not on file     Gets together: Not on file     Attends Yazidi service: Not on file     Active member of club or organization: Not on file     Attends meetings of clubs or organizations: Not on file     Relationship status: Not on file     Intimate partner violence:     Fear of current or ex partner: Not on file     Emotionally abused: Not on file     Physically abused: Not on file     Forced sexual activity: Not on file   Other Topics Concern     Parent/sibling w/ CABG, MI or angioplasty before 65F 55M? Not Asked   Social History Narrative     Not on file       CURRENT MEDICATIONS:  Current Outpatient Medications   Medication Sig Dispense Refill     Acetaminophen (TYLENOL PO) Take 325 mg by mouth       apixaban ANTICOAGULANT (ELIQUIS) 5 MG tablet Take 1 tablet (5 mg) by mouth 2 times daily 60 tablet 11     doxycycline hyclate (VIBRA-TABS) 100 MG tablet Take 1 tablet (100 mg) by mouth daily 90 tablet 3     granisetron (KYTRIL) 1 MG tablet Take 1 tablet (1 mg) by mouth every 12 hours as needed for nausea 10 tablet 0     LORazepam (ATIVAN) 0.5 MG tablet Take 1 tablet (0.5 mg) by mouth every 6 hours as needed for anxiety or nausea 60 tablet 0     metolazone (ZAROXOLYN) 2.5 MG tablet Take 1 tablet (2.5 mg) by mouth twice a week 10 tablet 3     ondansetron (ZOFRAN-ODT) 4  "MG ODT tab Take 1 tablet (4 mg) by mouth every 6 hours as needed for nausea 15 tablet 1     potassium chloride ER (K-DUR/KLOR-CON M) 10 MEQ CR tablet Take 8 tablets (80 mEq) by mouth 2 times daily Take an extra 60 meq on days you take metolazone. 480 tablet 1     prochlorperazine (COMPAZINE) 5 MG tablet One to two tablets PO q 6 hours prn nausea 90 tablet 1     torsemide (DEMADEX) 100 MG tablet Take 2 tablets (200 mg) by mouth 2 times daily 180 tablet 3       ROS:  Answers for HPI/ROS submitted by the patient on 8/30/2019   General Symptoms: No  Skin Symptoms: No  HENT Symptoms: No  EYE SYMPTOMS: No  HEART SYMPTOMS: Yes  LUNG SYMPTOMS: No  INTESTINAL SYMPTOMS: No  URINARY SYMPTOMS: No  GYNECOLOGIC SYMPTOMS: No  BREAST SYMPTOMS: No  SKELETAL SYMPTOMS: No  BLOOD SYMPTOMS: No  NERVOUS SYSTEM SYMPTOMS: No  MENTAL HEALTH SYMPTOMS: No  Chest pain or pressure: Yes  Fast or irregular heartbeat: Yes  Pain in legs with walking: No  Trouble breathing while lying down: No  Fingers or toes appear blue: No  High blood pressure: No  Low blood pressure: No  Fainting: No  Murmurs: No  Pacemaker: No  Varicose veins: Yes  Edema or swelling: Yes  Wake up at night with shortness of breath: No  Light-headedness: Yes  Exercise intolerance: Yes      EXAM:  /67 (BP Location: Left arm, Patient Position: Chair, Cuff Size: Adult Regular)   Pulse 50   Ht 1.6 m (5' 3\")   Wt 67.8 kg (149 lb 6.4 oz)   SpO2 99%   BMI 26.47 kg/m    General: alert, articulate, and in no acute distress.  HEENT: normocephalic, atraumatic, anicteric sclera, EOMI, normal palate, mucosa moist, no cyanosis.    Neck: neck supple.  No adenopathy, masses, or carotid bruits.  JVP 12-14 cm  Heart: regular rhythm, normal S1/S2, no murmur, gallop, rub.  Precordium quiet with normal PMI.     Lungs: clear, no rales, ronchi, or wheezing.  No accessory muscle use, respirations unlabored.   Abdomen: soft, non-tender, bowel sounds present, no hepatomegaly  Extremities: Trace " edema.  No cyanosis.  Extremities warm to touch.  Neurological: alert and oriented x 3.  Normal speech and affect, no gross motor deficits  Skin:  No ecchymoses, rashes, or clubbing.       Labs:  CBC RESULTS:  Lab Results   Component Value Date    WBC 4.8 05/17/2019    RBC 4.59 05/17/2019    HGB 14.9 05/17/2019    HCT 44.4 05/17/2019    MCV 97 05/17/2019    MCH 32.5 05/17/2019    MCHC 33.6 05/17/2019    RDW 14.1 05/17/2019     05/17/2019       CMP RESULTS:  Lab Results   Component Value Date     08/26/2019    POTASSIUM 3.5 08/26/2019    CHLORIDE 91 (L) 08/26/2019    CO2 35 (H) 08/26/2019    ANIONGAP 7 08/26/2019     (H) 08/26/2019    BUN 34 (H) 08/26/2019    CR 1.11 (H) 08/26/2019    GFRESTIMATED 51 (L) 08/26/2019    GFRESTBLACK 60 (L) 08/26/2019    JERROD 9.3 08/26/2019    BILITOTAL 2.6 (H) 05/17/2019    ALBUMIN 4.2 05/17/2019    ALKPHOS 263 (H) 05/17/2019    ALT 32 05/17/2019    AST 25 05/17/2019        INR RESULTS:  Lab Results   Component Value Date    INR 1.30 (H) 01/14/2017       No components found for: CK  Lab Results   Component Value Date    MAG 3.2 (H) 04/09/2019     Lab Results   Component Value Date    NTBNP 1,507 (H) 08/22/2019       Most recent echocardiogram 4/2/19:  Interpretation Summary  Moderate left ventricular hypertrophy.  Global and regional left ventricular function is normal with an EF of 60-65%.  Global right ventricular function is normal.  Moderate aortic valve insufficiency.  Mild pulmonary hypertension with dilated IVC.     This study was compared with the study from 10/11/18 the AR is worse and RA  pressure is now increased.      Assessment and Plan:  In summary, this is a very pleasant 68 year old with HFpEF who appears hypervolemic today.  She isn't receptive to increasing her diuretics today. She will continue the same meds and repeat labs in one week.  She will return to [facility] in one month.      The risk factors for HFpEF in her include (age, gender.) Therefore the  mainstays of therapy for HFpEF include volume management, blood pressure control, treating underlying sleep apnea if present, treatment of underlying CAD if within the goals of care, weight management, exercise training, rate and rhythm control strategy for atrial fibrillation, as well as consideration for clinical trials.     In all of our patients we consider spironolactone in low risk patients given the positive CHF results in the TOPCAT trial.     1. HFpEF:   Stage C NYHA CLASS IIIa:  Symptoms with minimal activity, no dyspnea at rest  BP: Controlled at 111/67.  Fluid status Hypervolemic.  Aldosterone antagonist: Previous attempts resulted in hyponatremia which normalized after discontinuing.   Ischemia evaluation: Previous angiogram in 5/16. Only mild CAD noted  ACEi/ARB -  N/a with HFpEF  BB - N/a with HFpEF.   SCD prophylaxis - N/A, EF is normal   NSAID use: Contraindicated, reviewed with patient   Sleep Apnea Evaluation: Not discussed today.  Reassess at subsequent visits.  Remote Monitoring:  None.    2.  Afib/flutter:  ChadsVasc score: 4.  On Eliquis. HR controlled at 50 today.    3.  Follow-up:  [facility] in one month.      Pamella SAEZ, SHERITA  [facility]

## 2019-09-04 NOTE — PATIENT INSTRUCTIONS
Take your medicines every day, as directed    Changes made today:  o Decrease Potassium to 60 meq twice daily.  On your metolazone days, take an extra 60 meq the day of metolazone and the day after  o Repeat labs in one week.  o Continue Torsemide at 80 mg twice daily  o    Monitor Your Weight and Symptoms    Contact us if you:      Gain 2 pounds in one day or 5 pounds in one week    Feel more short of breath    Notice more leg swelling    Feel lightheadeded   Change your lifestyle    Limit Salt or Sodium:    2000 mg  Limit Fluids:    2000 mL or approximately 64 ounces  Eat a Heart Healthy Diet    Low in saturated fats  Stay Active:    Aim to move at least 150 minutes every  week         To Contact us    During Business Hours:  [phone], option # 1 ([facility])  Then option # 4 (medical questions)     After hours, weekends or holidays:   [phone], Option #4  Ask to speak to the On-Call Cardiologist. Inform them you are a CORE/heart failure patient at the [facility].     Use [facility] allows you to communicate directly with your heart team through secure messaging.    [employer] can be accessed any time on your phone, computer, or tablet.    If you need assistance, we'd be happy to help!         Keep your Heart Appointments:    1.  [facility] in one month

## 2019-09-04 NOTE — LETTER
9/4/2019      RE: Leandra Guerrero  [address] [place] [address]       Dear Colleague,    Thank you for the opportunity to participate in the care of your patient, Leandra Guerrero, at the [facility] at [facility]. Please see a copy of my visit note below.    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  She went to [place] for consideration of a stem cell transplant, but was turned down due to her cardiac involvement.  She presents to clinic today for routine follow-up.      She was last seen by Dr. Cohen on August 22.  At that time she was approximately 10 pounds over her dry weight of 147 pounds.  Dr. Cohen increased her Torsemide to 200 mg twice daily as well as her metolazone to 2.5 mg twice daily.      Since her last visit, Amy is feeling better.  She took metolazone once last Thursday and has been taking Torsemide 180 mg twice daily, instead of the 200 mg twice daily that was recommended. She has been taking Kdur 60 meq twice daily and is taking 60 meq x 2 doses on her metolazone days.    Since the diuretic adjustments, her breathing has improved.  She is able to walk two blocks before developing SOB. She denies PND or orthopnea. Her lower extremity edema and her abdominal distension have improved. She is eating less but denies early satiety.  She is drinking 75 ounces of fluid daily. Her goal weight is 145 pounds.  Dry weight is 147 lbs.      PAST MEDICAL HISTORY:  Past Medical History:   Diagnosis Date     AL amyloidosis (H)      Atrial fibrillation and flutter (H)      Cardiac amyloidosis (H)      Lymphedema      QT prolongation      Recurrent right pleural effusion 1/2/2017     SVT (supraventricular  tachycardia) (H)        FAMILY HISTORY:  Family History   Problem Relation Age of Onset     Other - See Comments Sister         Amyloidosis       SOCIAL HISTORY:  Social History     Socioeconomic History     Marital status:      Spouse name: Not on file     Number of children: Not on file     Years of education: Not on file     Highest education level: Not on file   Occupational History     Not on file   Social Needs     Financial resource strain: Not on file     Food insecurity:     Worry: Not on file     Inability: Not on file     Transportation needs:     Medical: Not on file     Non-medical: Not on file   Tobacco Use     Smoking status: Never Smoker     Smokeless tobacco: Never Used   Substance and Sexual Activity     Alcohol use: No     Drug use: No     Sexual activity: Not on file   Lifestyle     Physical activity:     Days per week: Not on file     Minutes per session: Not on file     Stress: Not on file   Relationships     Social connections:     Talks on phone: Not on file     Gets together: Not on file     Attends Shinto service: Not on file     Active member of club or organization: Not on file     Attends meetings of clubs or organizations: Not on file     Relationship status: Not on file     Intimate partner violence:     Fear of current or ex partner: Not on file     Emotionally abused: Not on file     Physically abused: Not on file     Forced sexual activity: Not on file   Other Topics Concern     Parent/sibling w/ CABG, MI or angioplasty before 65F 55M? Not Asked   Social History Narrative     Not on file       CURRENT MEDICATIONS:  Current Outpatient Medications   Medication Sig Dispense Refill     Acetaminophen (TYLENOL PO) Take 325 mg by mouth       apixaban ANTICOAGULANT (ELIQUIS) 5 MG tablet Take 1 tablet (5 mg) by mouth 2 times daily 60 tablet 11     doxycycline hyclate (VIBRA-TABS) 100 MG tablet Take 1 tablet (100 mg) by mouth daily 90 tablet 3     granisetron (KYTRIL) 1 MG tablet  "Take 1 tablet (1 mg) by mouth every 12 hours as needed for nausea 10 tablet 0     LORazepam (ATIVAN) 0.5 MG tablet Take 1 tablet (0.5 mg) by mouth every 6 hours as needed for anxiety or nausea 60 tablet 0     metolazone (ZAROXOLYN) 2.5 MG tablet Take 1 tablet (2.5 mg) by mouth twice a week 10 tablet 3     ondansetron (ZOFRAN-ODT) 4 MG ODT tab Take 1 tablet (4 mg) by mouth every 6 hours as needed for nausea 15 tablet 1     potassium chloride ER (K-DUR/KLOR-CON M) 10 MEQ CR tablet Take 8 tablets (80 mEq) by mouth 2 times daily Take an extra 60 meq on days you take metolazone. 480 tablet 1     prochlorperazine (COMPAZINE) 5 MG tablet One to two tablets PO q 6 hours prn nausea 90 tablet 1     torsemide (DEMADEX) 100 MG tablet Take 2 tablets (200 mg) by mouth 2 times daily 180 tablet 3       ROS:  Answers for HPI/ROS submitted by the patient on 8/30/2019   General Symptoms: No  Skin Symptoms: No  HENT Symptoms: No  EYE SYMPTOMS: No  HEART SYMPTOMS: Yes  LUNG SYMPTOMS: No  INTESTINAL SYMPTOMS: No  URINARY SYMPTOMS: No  GYNECOLOGIC SYMPTOMS: No  BREAST SYMPTOMS: No  SKELETAL SYMPTOMS: No  BLOOD SYMPTOMS: No  NERVOUS SYSTEM SYMPTOMS: No  MENTAL HEALTH SYMPTOMS: No  Chest pain or pressure: Yes  Fast or irregular heartbeat: Yes  Pain in legs with walking: No  Trouble breathing while lying down: No  Fingers or toes appear blue: No  High blood pressure: No  Low blood pressure: No  Fainting: No  Murmurs: No  Pacemaker: No  Varicose veins: Yes  Edema or swelling: Yes  Wake up at night with shortness of breath: No  Light-headedness: Yes  Exercise intolerance: Yes      EXAM:  /67 (BP Location: Left arm, Patient Position: Chair, Cuff Size: Adult Regular)   Pulse 50   Ht 1.6 m (5' 3\")   Wt 67.8 kg (149 lb 6.4 oz)   SpO2 99%   BMI 26.47 kg/m     General: alert, articulate, and in no acute distress.  HEENT: normocephalic, atraumatic, anicteric sclera, EOMI, normal palate, mucosa moist, no cyanosis.    Neck: neck supple.  No " adenopathy, masses, or carotid bruits.  JVP 12-14 cm  Heart: regular rhythm, normal S1/S2, no murmur, gallop, rub.  Precordium quiet with normal PMI.     Lungs: clear, no rales, ronchi, or wheezing.  No accessory muscle use, respirations unlabored.   Abdomen: soft, non-tender, bowel sounds present, no hepatomegaly  Extremities: Trace edema.  No cyanosis.  Extremities warm to touch.  Neurological: alert and oriented x 3.  Normal speech and affect, no gross motor deficits  Skin:  No ecchymoses, rashes, or clubbing.       Labs:  CBC RESULTS:  Lab Results   Component Value Date    WBC 4.8 05/17/2019    RBC 4.59 05/17/2019    HGB 14.9 05/17/2019    HCT 44.4 05/17/2019    MCV 97 05/17/2019    MCH 32.5 05/17/2019    MCHC 33.6 05/17/2019    RDW 14.1 05/17/2019     05/17/2019       CMP RESULTS:  Lab Results   Component Value Date     08/26/2019    POTASSIUM 3.5 08/26/2019    CHLORIDE 91 (L) 08/26/2019    CO2 35 (H) 08/26/2019    ANIONGAP 7 08/26/2019     (H) 08/26/2019    BUN 34 (H) 08/26/2019    CR 1.11 (H) 08/26/2019    GFRESTIMATED 51 (L) 08/26/2019    GFRESTBLACK 60 (L) 08/26/2019    JERROD 9.3 08/26/2019    BILITOTAL 2.6 (H) 05/17/2019    ALBUMIN 4.2 05/17/2019    ALKPHOS 263 (H) 05/17/2019    ALT 32 05/17/2019    AST 25 05/17/2019        INR RESULTS:  Lab Results   Component Value Date    INR 1.30 (H) 01/14/2017       No components found for: CK  Lab Results   Component Value Date    MAG 3.2 (H) 04/09/2019     Lab Results   Component Value Date    NTBNP 1,507 (H) 08/22/2019       Most recent echocardiogram 4/2/19:  Interpretation Summary  Moderate left ventricular hypertrophy.  Global and regional left ventricular function is normal with an EF of 60-65%.  Global right ventricular function is normal.  Moderate aortic valve insufficiency.  Mild pulmonary hypertension with dilated IVC.     This study was compared with the study from 10/11/18 the AR is worse and RA  pressure is now  increased.      Assessment and Plan:  In summary, this is a very pleasant 68 year old with HFpEF who appears hypervolemic today.  She isn't receptive to increasing her diuretics today. She will continue the same meds and repeat labs in one week.  She will return to [facility] in one month.      The risk factors for HFpEF in her include (age, gender.) Therefore the mainstays of therapy for HFpEF include volume management, blood pressure control, treating underlying sleep apnea if present, treatment of underlying CAD if within the goals of care, weight management, exercise training, rate and rhythm control strategy for atrial fibrillation, as well as consideration for clinical trials.     In all of our patients we consider spironolactone in low risk patients given the positive CHF results in the TOPCAT trial.     1. HFpEF:   Stage C NYHA CLASS IIIa:  Symptoms with minimal activity, no dyspnea at rest  BP: Controlled at 111/67.  Fluid status Hypervolemic.  Aldosterone antagonist: Previous attempts resulted in hyponatremia which normalized after discontinuing.   Ischemia evaluation: Previous angiogram in 5/16. Only mild CAD noted  ACEi/ARB -  N/a with HFpEF  BB - N/a with HFpEF.   SCD prophylaxis - N/A, EF is normal   NSAID use: Contraindicated, reviewed with patient   Sleep Apnea Evaluation: Not discussed today.  Reassess at subsequent visits.  Remote Monitoring:  None.    2.  Afib/flutter:  ChadsVasc score: 4.  On Eliquis. HR controlled at 50 today.    3.  Follow-up:  [facility] in one month.      Pamella SAEZ, SHERITA  [facility]

## 2019-09-04 NOTE — NURSING NOTE
Chief Complaint   Patient presents with     Follow Up     Return [facility], 67 yo female with HFpEF, amyloid presents for follow up with labs prior.

## 2019-09-10 DIAGNOSIS — E85.4 AMYLOID HEART DISEASE (H): ICD-10-CM

## 2019-09-10 DIAGNOSIS — I43 AMYLOID HEART DISEASE (H): ICD-10-CM

## 2019-09-10 RX ORDER — POTASSIUM CHLORIDE 750 MG/1
TABLET, EXTENDED RELEASE ORAL
Qty: 1320 TABLET | Refills: 3 | Status: SHIPPED | OUTPATIENT
Start: 2019-09-10 | End: 2019-12-03

## 2019-09-11 DIAGNOSIS — E85.4 AMYLOID HEART DISEASE (H): ICD-10-CM

## 2019-09-11 DIAGNOSIS — I43 AMYLOID HEART DISEASE (H): ICD-10-CM

## 2019-09-11 DIAGNOSIS — I50.33 ACUTE ON CHRONIC DIASTOLIC HEART FAILURE (H): ICD-10-CM

## 2019-09-11 LAB
ANION GAP SERPL CALCULATED.3IONS-SCNC: 8 MMOL/L (ref 3–14)
BUN SERPL-MCNC: 33 MG/DL (ref 7–30)
CALCIUM SERPL-MCNC: 9.2 MG/DL (ref 8.5–10.1)
CHLORIDE SERPL-SCNC: 97 MMOL/L (ref 94–109)
CO2 SERPL-SCNC: 31 MMOL/L (ref 20–32)
CREAT SERPL-MCNC: 1.1 MG/DL (ref 0.52–1.04)
GFR SERPL CREATININE-BSD FRML MDRD: 52 ML/MIN/{1.73_M2}
GLUCOSE SERPL-MCNC: 83 MG/DL (ref 70–99)
POTASSIUM SERPL-SCNC: 3.8 MMOL/L (ref 3.4–5.3)
SODIUM SERPL-SCNC: 136 MMOL/L (ref 133–144)

## 2019-09-11 PROCEDURE — 80048 BASIC METABOLIC PNL TOTAL CA: CPT | Performed by: NURSE PRACTITIONER

## 2019-09-11 PROCEDURE — 36415 COLL VENOUS BLD VENIPUNCTURE: CPT | Performed by: NURSE PRACTITIONER

## 2019-09-18 DIAGNOSIS — E85.4 AMYLOID HEART DISEASE (H): ICD-10-CM

## 2019-09-18 DIAGNOSIS — R63.4 WEIGHT LOSS: ICD-10-CM

## 2019-09-18 DIAGNOSIS — E87.6 HYPOKALEMIA: ICD-10-CM

## 2019-09-18 DIAGNOSIS — I43 AMYLOID HEART DISEASE (H): ICD-10-CM

## 2019-09-19 ENCOUNTER — MYC REFILL (OUTPATIENT)
Dept: CARDIOLOGY | Facility: CLINIC | Age: 69
End: 2019-09-19

## 2019-09-19 DIAGNOSIS — I43 AMYLOID HEART DISEASE (H): ICD-10-CM

## 2019-09-19 DIAGNOSIS — E85.4 AMYLOID HEART DISEASE (H): ICD-10-CM

## 2019-09-19 DIAGNOSIS — E87.6 HYPOKALEMIA: ICD-10-CM

## 2019-09-19 DIAGNOSIS — R63.4 WEIGHT LOSS: ICD-10-CM

## 2019-09-19 NOTE — TELEPHONE ENCOUNTER
ondansetron (ZOFRAN-ODT) 4 MG ODT tab       Last Written Prescription Date:  9-26-18  Last Fill Quantity: 15,   # refills: 1  Last Office Visit : 9-4-19  Future Office visit:  10-8-19    Routing refill request to provider for review/approval because:  Not on protocol.      Kathleen M Doege RN

## 2019-09-20 RX ORDER — ONDANSETRON 4 MG/1
4 TABLET, ORALLY DISINTEGRATING ORAL EVERY 6 HOURS PRN
Qty: 15 TABLET | Refills: 1 | OUTPATIENT
Start: 2019-09-20

## 2019-09-21 NOTE — TELEPHONE ENCOUNTER
ondansetron (ZOFRAN-ODT) 4 MG ODT tab  Last Written Prescription Date:  9/26/18  Last Fill Quantity: 15,   # refills: 1  Last Office Visit : 9/4/19  Future Office visit:  10/8/19    Routing refill request to provider for review/approval because:  not on protocol

## 2019-09-23 RX ORDER — ONDANSETRON 4 MG/1
4 TABLET, ORALLY DISINTEGRATING ORAL EVERY 6 HOURS PRN
Qty: 15 TABLET | Refills: 1 | OUTPATIENT
Start: 2019-09-23

## 2019-09-25 ENCOUNTER — PATIENT OUTREACH (OUTPATIENT)
Dept: CARDIOLOGY | Facility: CLINIC | Age: 69
End: 2019-09-25

## 2019-09-25 NOTE — PROGRESS NOTES
Called Amy to reschedule her [facility] appt on 10/3.  She is still inpatient at [facility] but will be discharging later today. She is a little concerned with her torsemide dose (90 mg BID) but will follow up with their clinic on Monday 9/30 and has a [facility] appointment on 10/3.  We were able to change her [facility] appointment and will follow up with her post-discharge.      Niki Fam RN

## 2019-09-27 DIAGNOSIS — I50.32 CHRONIC DIASTOLIC HEART FAILURE (H): Primary | ICD-10-CM

## 2019-10-02 ASSESSMENT — ENCOUNTER SYMPTOMS
HEADACHES: 1
SPUTUM PRODUCTION: 1
FATIGUE: 1
CHILLS: 1
HYPOTENSION: 1
COUGH: 1
TREMORS: 0
DISTURBANCES IN COORDINATION: 0
SLEEP DISTURBANCES DUE TO BREATHING: 0
MEMORY LOSS: 0
LEG PAIN: 0
TINGLING: 0
SYNCOPE: 1
EXERCISE INTOLERANCE: 1
LIGHT-HEADEDNESS: 1
PARALYSIS: 0
WEIGHT GAIN: 1
SPEECH CHANGE: 0
POSTURAL DYSPNEA: 1
DECREASED APPETITE: 1
WEAKNESS: 1
ORTHOPNEA: 1
DIZZINESS: 1
COUGH DISTURBING SLEEP: 1
WHEEZING: 1
PALPITATIONS: 1
SHORTNESS OF BREATH: 1
DYSPNEA ON EXERTION: 1
LOSS OF CONSCIOUSNESS: 1
NUMBNESS: 0
SNORES LOUDLY: 0
HEMOPTYSIS: 0
HYPERTENSION: 0
SEIZURES: 0

## 2019-10-02 NOTE — PROGRESS NOTES
HPI:   Ms. Guerrero is a 68 year old female with a past medical history including stage IV AL amyloid diagnosed in 2016. Presents to clinic for [facility]ORE follow-up.     Her cardiac and oncologic history are as follows: fatigue starting in 1/2016. She had a coronary angiogram which was reportedly normal but was diagnosed with HF and started on a BBand diuretics. Her symptoms progressed to the point that she was evaluated at [facility] in August/September (Dr. Barron in oncology/hematology, Dr. Nettles is cardiology). She was diagnosed with stage IV AL amyloid (+GI, +bone marrow involvement, no involvement of kidney or liver). Her work up is detailed in our previous notes, however she has lambda AL amyloidosis and she underwent CyBorD chemotherapy and excellent light chain response by serum. Patient has been turned down at [facility] for a stem cell transplant due to her cardiac involvement. Later in 2016, she had 2-3 more right sided pleural taps. She was hospitalized 1/2017 and diuresed 20-30 pounds at that time. Since that time her AL has been in remission by labs without further chemo. Patient was then treated with doxycycline but this was stopped ~6-9 months ago due to thought it wasn't benefiting per patient, although now recently restarted again. She was last admitted to [facility] 4/2 for shortness of breath and edema. Echocardiogram showed EF 60-65%, moderate LVH, normal RV, moderate AI, and mild PH.    Last visit: Pamella Alvarez 9/4/2019   Feeling well at this visit.  Had been taking intermittent mmetolazone and Torsemide 180 mg twice daily, instead of the 200 mg twice daily that was recommended. She had been taking Kdur 60 meq twice daily and 60 meq x 2 doses on her metolazone days.    This visit:  Since that visit, she had a fall and was hospitalized in [place] for fractured vertebrae and a knee injury. She has now been home for about one week. She took metolazone 2 days ago, and she has lost 10 lbs since. She did take 60 meq  extra Kcl both day of metolazone and the day after. Since the metolazone, she is feeling much better, breathing is greatly improved, swelling in her legs is decreased, no orthopnea, no PND. No early satiety, no abdominal edema.     Currently she is taking torsemide 100 mg BID. She take Kcl 60 meq BID, she take 120 meq extra every time she take metolazone. She did not notice significant improvement when she was on 180 BID (still needs metolazone every 7-10 days). Blood pressures at home have been 110s-120s.     PAST MEDICAL HISTORY:  Past Medical History:   Diagnosis Date     AL amyloidosis (H)      Atrial fibrillation and flutter (H)      Cardiac amyloidosis (H)      Lymphedema      QT prolongation      Recurrent right pleural effusion 1/2/2017     SVT (supraventricular tachycardia) (H)        FAMILY HISTORY:  Family History   Problem Relation Age of Onset     Other - See Comments Sister         Amyloidosis       SOCIAL HISTORY:  Socioeconomic History     Marital status:    Tobacco Use     Smoking status: Never Smoker     Smokeless tobacco: Never Used   Substance and Sexual Activity     Alcohol use: No     Drug use: No   Other Topics Concern     CURRENT MEDICATIONS:  Acetaminophen (TYLENOL PO), Take 325 mg by mouth  apixaban ANTICOAGULANT (ELIQUIS) 5 MG tablet, Take 1 tablet (5 mg) by mouth 2 times daily  doxycycline hyclate (VIBRA-TABS) 100 MG tablet, Take 1 tablet (100 mg) by mouth daily  granisetron (KYTRIL) 1 MG tablet, Take 1 tablet (1 mg) by mouth every 12 hours as needed for nausea  LORazepam (ATIVAN) 0.5 MG tablet, Take 1 tablet (0.5 mg) by mouth every 6 hours as needed for anxiety or nausea  metolazone (ZAROXOLYN) 2.5 MG tablet, Take 1 tablet (2.5 mg) by mouth twice a week  ondansetron (ZOFRAN-ODT) 4 MG ODT tab, Take 1 tablet (4 mg) by mouth every 6 hours as needed for nausea  potassium chloride ER (K-DUR/KLOR-CON M) 10 MEQ CR tablet, Take 60 meq twice daily except for day you take Metolazone and day  "after Metolazone take 60 meq three times a day.  prochlorperazine (COMPAZINE) 5 MG tablet, One to two tablets PO q 6 hours prn nausea  torsemide (DEMADEX) 100 MG tablet, Take 2 tablets (200 mg) by mouth 2 times daily (Patient taking differently: Take 100 mg by mouth 2 times daily )    No current facility-administered medications on file prior to visit.       ROS:   CONSTITUTIONAL: Denies fever, chills, +fatigue, chronic.  HEENT: Denies headache, vision changes, and changes in speech.   CV: Refer to HPI.   PULMONARY: Refer to HPI.   GI: Refer to HPI.   : Denies urinary alterations, dysuria, hematuria, and abnormal drainage. + urinary frequency 2/2 diuretics.  EXT: +Chronic lower extremity edema see hpi, currently worse than baseline  SKIN: Denies abnormal rashes or lesions.   MUSCULOSKELETAL: Denies upper or lower extremity weakness and pain.   NEUROLOGIC: Denies dizziness, seizures, or upper or lower extremity paresthesia.     EXAM:  /67 (BP Location: Right arm, Patient Position: Chair, Cuff Size: Adult Regular)   Pulse 60   Ht 1.6 m (5' 3\")   Wt 65.2 kg (143 lb 11.2 oz)   SpO2 97%   BMI 25.46 kg/m       GENERAL: Appears comfortable, in no acute distress, speaking in full sentences and able to communicate all needs.   HEENT: Eye symmetrical, no discharge. Mucous membranes moist and without lesions.  CV: RRR, +S1S2, no murmur, rub, or gallop. JVP at about 1-2 cm above clavicle at 90 degrees, improved from last visit  RESPIRATORY: Respirations regular, even, and unlabored. Lungs CTA throughout.   GI: Soft and non-distended with normoactive bowel sounds present. No tenderness, rebound, guarding. No hepatomegaly.   EXTREMITIES: 2+ non pitting peripheral edema. 2+ bilateral pedal pulses.   NEUROLOGIC: Alert and interacting appropiratly. No focal deficits.   MUSCULOSKELETAL: No joint swelling or tenderness.   SKIN: No jaundice. No rashes or lesions.     Labs, reviewed with patient in clinic today:  CBC " RESULTS:  Lab Results   Component Value Date    WBC 4.8 05/17/2019    RBC 4.59 05/17/2019    HGB 14.9 05/17/2019    HCT 44.4 05/17/2019    MCV 97 05/17/2019    MCH 32.5 05/17/2019    MCHC 33.6 05/17/2019    RDW 14.1 05/17/2019     05/17/2019       CMP RESULTS:  Lab Results   Component Value Date     10/03/2019    POTASSIUM 3.1 (L) 10/03/2019    CHLORIDE 93 (L) 10/03/2019    CO2 36 (H) 10/03/2019    ANIONGAP 6 10/03/2019    GLC 83 10/03/2019    BUN 30 10/03/2019    CR 1.15 (H) 10/03/2019    GFRESTIMATED 49 (L) 10/03/2019    GFRESTBLACK 57 (L) 10/03/2019    JERROD 9.7 10/03/2019    BILITOTAL 2.6 (H) 05/17/2019    ALBUMIN 4.2 05/17/2019    ALKPHOS 263 (H) 05/17/2019    ALT 32 05/17/2019    AST 25 05/17/2019        INR RESULTS:  Lab Results   Component Value Date    INR 1.30 (H) 01/14/2017       Lab Results   Component Value Date    MAG 3.2 (H) 04/09/2019     Lab Results   Component Value Date    NTBNPI 9,009 (H) 01/13/2017     Lab Results   Component Value Date    NTBNP 1,507 (H) 08/22/2019       Diagnostics:  TTE 4/9/19  Moderate left ventricular hypertrophy.  Global and regional left ventricular function is normal with an EF of 60-65%.  Global right ventricular function is normal.  Moderate aortic valve insufficiency.  Mild pulmonary hypertension with dilated IVC.  This study was compared with the study from 10/11/18 the AR is worse and RA pressure is now increased.    TTE 10/11/18  Global and regional left ventricular function is normal with an EF of 55-60%.  Moderate concentric wall thickening consistent with left ventricular  hypertrophy is present - known amyloid.  Grade III or advanced diastolic dysfunction.  Normal right ventricular size, wall thickness, and function.  Estimated pulmonary artery pressure 28 mmHg.  IVC with normal diameter but does not collapse (>50%) with inspiration.  Trace pericardial effusion.  Compared to prior study from 8/17/17, no significant change.    [employer]  11/2017      Holter Monitor 06/2019  INTERPRETATION  1. The rhythm varied with sinus rhythm and atrial fibrillation/variable atrial flutter. Low voltage noted.  2. The heart rate varied with a minimum rate of 48 bpm, maximum rate of 164 bpm, average rate of 74 bpm.  3. Frequent supraventricular ectopy was seen ranging from 0-469 per hour. Occasional atrail pairs and fairly frequent bigeminal cycles were noted.  Wandering atrial pacemaker noted.  4. Infrequent multifocal ventricular ectopy was seen ranging from 0-36 beats per hour.  5. ST-T wave changes were present.  6. Three patient events were documented and correlated with atrial fibrillation/flutter    EKG 8/8/2019 for palpitations  - NSR at a rate of 70, DC 0.19, QTc 525, QRS is narrow. Occasional PVCs. Biphasic twaves in V4-V6, unchanged from priors.    Assessment and Plan:   Mrs. Guerrero is a 68 year old female with AL amyloidosis with cardiac manifestation who presents to [facility] for hospital follow-up. Patient has cardiac amyloidosis as evidenced by her echocardiogram (severe concentric hypertrophy and biatrial enlargement) and abnormal EKG (near-low voltage, poor R wave progression, biatrial enlargement and no evidence of LVH despite thicken LV on echo) in the setting of biopsy proven (BMB, EGD) AL amyloid. She completed chemotherapy with CyBorD with an excellent serologic response and her heart failure (i.e. troponin negative, NT pro BNP was stable, serum light chains minimal and urine light chains undetectable).     However, we have continued to struggle with volume overload. She took metolazone 2 days ago, and is currently at 143 lbs and appears quite euvolemic.  Cr stable at 1.15. Normotensive. Potassium has also been a struggle for her- hypokalemic today, took metolazone 2 days ago which is likely the cause. Has not taken any of her regular potassium this morning.    Patient is planning to go on vacation to [place] on 10/15 for about 7-10 days. Lab  identified. She will call her insurance regarding which hospital to go to in case of an emergency.    # Chronic diastolic heart failure/restrictive cardiomyopathy 2/2  # Cardiac amyloidosis    Stage C. NYHA Class III.    Fluid status: Euvolemic, continue torsemide 100 mg BID, KCl 60 meq daily. Needs metolazone every 7-10 days, will call clinic when over 150 lbs. Will need more potassium after metolazone- continue 80 meq x2 or 60 meq x3. Needs labs 2-3 days after metolazone.  ACEi/ARB/ARNI: n/a   BB: no indication and relatively contraindicated due to risk of progressive conduction disease/AV block in these patients  Aldosterone antagonist: d/c'd given hyponatremia, Na improving and she is feeling better   SCD prophylaxis: does not meet criteria for implant  NSAID use: contraindicated  Sleep apnea evaluation: deferred today  Remote monitoring: deferred today, could be Cardiocom candidate   Goals of care: prior discussions with Dr Cohen re: planning for EoL care as long term prognosis for AL amyloid is poor.     # Hypokalemia  3.1 today after taking metolazone. Will take 60 meq TID today. Will take first dose now (Brought pills with her). Next dose in about 3 hours.  - Labs tomorrow    # A. Fib/A. Flutter  We did a holter monitor which showed intermittent a. Fib and a. Flutter. [id number] of 4.   - Continue Eliquis 5mg BID  - Avoiding BB in the setting of amyloid  - Holding off on digoxin given good rate control. Could discuss in the future if needed.    # Prolonged QTC  QTC today >500 as it has been in the past.  - Minimize QT prolonging meds    # Systemic amyloid  Heme previously monitoring her light chains which have been negative consistent with remission - therefore further palliative chemo not being considered. Nausea has been a large issue and is currently, Kytril was added at her last oncology appointment, but she has not started taking this. to her regimen of compazine and lorazepam.  - Consider  palliative care referal if nausea not controlled on her current regimen (per onc)  - She is back on doxycycline     # Hyponatremia  Improving off spironolactone. Feeling better with the improved sodium  - Discontinued spironolactone    Follow: Up: Scheduled with Pamella on 10/8 ([facility]). Dr. Cohen 11/14.    30 minutes spent face-to-face with patient, >50% in counseling and/or coordination of care as described above    VIV Serrano, ALEJANDRO RAMÍREZ

## 2019-10-03 ENCOUNTER — OFFICE VISIT (OUTPATIENT)
Dept: CARDIOLOGY | Facility: CLINIC | Age: 69
End: 2019-10-03
Attending: INTERNAL MEDICINE
Payer: COMMERCIAL

## 2019-10-03 VITALS
HEART RATE: 60 BPM | OXYGEN SATURATION: 97 % | BODY MASS INDEX: 25.46 KG/M2 | HEIGHT: 63 IN | DIASTOLIC BLOOD PRESSURE: 67 MMHG | WEIGHT: 143.7 LBS | SYSTOLIC BLOOD PRESSURE: 133 MMHG

## 2019-10-03 DIAGNOSIS — E87.6 HYPOKALEMIA: ICD-10-CM

## 2019-10-03 DIAGNOSIS — I50.32 CHRONIC DIASTOLIC HEART FAILURE (H): Primary | ICD-10-CM

## 2019-10-03 DIAGNOSIS — E85.81 AL AMYLOIDOSIS (H): ICD-10-CM

## 2019-10-03 DIAGNOSIS — I50.32 CHRONIC DIASTOLIC HEART FAILURE (H): ICD-10-CM

## 2019-10-03 DIAGNOSIS — R94.31 QT PROLONGATION: ICD-10-CM

## 2019-10-03 DIAGNOSIS — I48.20 CHRONIC ATRIAL FIBRILLATION (H): ICD-10-CM

## 2019-10-03 LAB
ANION GAP SERPL CALCULATED.3IONS-SCNC: 6 MMOL/L (ref 3–14)
BUN SERPL-MCNC: 30 MG/DL (ref 7–30)
CALCIUM SERPL-MCNC: 9.7 MG/DL (ref 8.5–10.1)
CHLORIDE SERPL-SCNC: 93 MMOL/L (ref 94–109)
CO2 SERPL-SCNC: 36 MMOL/L (ref 20–32)
CREAT SERPL-MCNC: 1.15 MG/DL (ref 0.52–1.04)
GFR SERPL CREATININE-BSD FRML MDRD: 49 ML/MIN/{1.73_M2}
GLUCOSE SERPL-MCNC: 83 MG/DL (ref 70–99)
MAGNESIUM SERPL-MCNC: 2.4 MG/DL (ref 1.6–2.3)
POTASSIUM SERPL-SCNC: 3.1 MMOL/L (ref 3.4–5.3)
SODIUM SERPL-SCNC: 134 MMOL/L (ref 133–144)

## 2019-10-03 PROCEDURE — 36415 COLL VENOUS BLD VENIPUNCTURE: CPT | Performed by: PHYSICIAN ASSISTANT

## 2019-10-03 PROCEDURE — 83735 ASSAY OF MAGNESIUM: CPT | Performed by: PHYSICIAN ASSISTANT

## 2019-10-03 PROCEDURE — 80048 BASIC METABOLIC PNL TOTAL CA: CPT | Performed by: PHYSICIAN ASSISTANT

## 2019-10-03 PROCEDURE — G0463 HOSPITAL OUTPT CLINIC VISIT: HCPCS | Mod: ZF

## 2019-10-03 PROCEDURE — 99214 OFFICE O/P EST MOD 30 MIN: CPT | Mod: ZP | Performed by: PHYSICIAN ASSISTANT

## 2019-10-03 ASSESSMENT — MIFFLIN-ST. JEOR: SCORE: 1155.95

## 2019-10-03 ASSESSMENT — PAIN SCALES - GENERAL: PAINLEVEL: NO PAIN (0)

## 2019-10-03 NOTE — LETTER
10/3/2019      RE: Leandra Guerrero  [address] [place] [address]       Dear Colleague,    Thank you for the opportunity to participate in the care of your patient, Leandra Guerrero, at the [facility] at [facility]. Please see a copy of my visit note below.    HPI:   Ms. Guerrero is a 68 year old female with a past medical history including stage IV AL amyloid diagnosed in 2016. Presents to clinic for CORE follow-up.     Her cardiac and oncologic history are as follows: fatigue starting in 1/2016. She had a coronary angiogram which was reportedly normal but was diagnosed with HF and started on a BBand diuretics. Her symptoms progressed to the point that she was evaluated at [place] in August/September (Dr. Barron in oncology/hematology, Dr. Nettles is cardiology). She was diagnosed with stage IV AL amyloid (+GI, +bone marrow involvement, no involvement of kidney or liver). Her work up is detailed in our previous notes, however she has lambda AL amyloidosis and she underwent CyBorD chemotherapy and excellent light chain response by serum. Patient has been turned down at [place] [facility] for a stem cell transplant due to her cardiac involvement. Later in 2016, she had 2-3 more right sided pleural taps. She was hospitalized 1/2017 and diuresed 20-30 pounds at that time. Since that time her AL has been in remission by labs without further chemo. Patient was then treated with doxycycline but this was stopped ~6-9 months ago due to thought it wasn't benefiting per patient, although now recently restarted again. She was last admitted to [facility] 4/2 for shortness of breath and edema. Echocardiogram showed EF 60-65%, moderate LVH, normal RV, moderate AI, and mild PH.    Last visit: Pamella Alavrez 9/4/2019   Feeling well at this visit.  Had been taking intermittent mmetolazone and Torsemide 180 mg twice daily, instead of the 200 mg twice daily that was recommended. She had been  taking Kdur 60 meq twice daily and 60 meq x 2 doses on her metolazone days.    This visit:  Since that visit, she had a fall and was hospitalized in [place] for fractured vertebrae and a knee injury. She has now been home for about one week. She took metolazone 2 days ago, and she has lost 10 lbs since. She did take 60 meq extra Kcl both day of metolazone and the day after. Since the metolazone, she is feeling much better, breathing is greatly improved, swelling in her legs is decreased, no orthopnea, no PND. No early satiety, no abdominal edema.     Currently she is taking torsemide 100 mg BID. She take Kcl 60 meq BID, she take 120 meq extra every time she take metolazone. She did not notice significant improvement when she was on 180 BID (still needs metolazone every 7-10 days). Blood pressures at home have been 110s-120s.     PAST MEDICAL HISTORY:  Past Medical History:   Diagnosis Date     AL amyloidosis (H)      Atrial fibrillation and flutter (H)      Cardiac amyloidosis (H)      Lymphedema      QT prolongation      Recurrent right pleural effusion 1/2/2017     SVT (supraventricular tachycardia) (H)        FAMILY HISTORY:  Family History   Problem Relation Age of Onset     Other - See Comments Sister         Amyloidosis       SOCIAL HISTORY:  Socioeconomic History     Marital status:    Tobacco Use     Smoking status: Never Smoker     Smokeless tobacco: Never Used   Substance and Sexual Activity     Alcohol use: No     Drug use: No   Other Topics Concern     CURRENT MEDICATIONS:  Acetaminophen (TYLENOL PO), Take 325 mg by mouth  apixaban ANTICOAGULANT (ELIQUIS) 5 MG tablet, Take 1 tablet (5 mg) by mouth 2 times daily  doxycycline hyclate (VIBRA-TABS) 100 MG tablet, Take 1 tablet (100 mg) by mouth daily  granisetron (KYTRIL) 1 MG tablet, Take 1 tablet (1 mg) by mouth every 12 hours as needed for nausea  LORazepam (ATIVAN) 0.5 MG tablet, Take 1 tablet (0.5 mg) by mouth every 6 hours as needed for anxiety  "or nausea  metolazone (ZAROXOLYN) 2.5 MG tablet, Take 1 tablet (2.5 mg) by mouth twice a week  ondansetron (ZOFRAN-ODT) 4 MG ODT tab, Take 1 tablet (4 mg) by mouth every 6 hours as needed for nausea  potassium chloride ER (K-DUR/KLOR-CON M) 10 MEQ CR tablet, Take 60 meq twice daily except for day you take Metolazone and day after Metolazone take 60 meq three times a day.  prochlorperazine (COMPAZINE) 5 MG tablet, One to two tablets PO q 6 hours prn nausea  torsemide (DEMADEX) 100 MG tablet, Take 2 tablets (200 mg) by mouth 2 times daily (Patient taking differently: Take 100 mg by mouth 2 times daily )    No current facility-administered medications on file prior to visit.       ROS:   CONSTITUTIONAL: Denies fever, chills, +fatigue, chronic.  HEENT: Denies headache, vision changes, and changes in speech.   CV: Refer to HPI.   PULMONARY: Refer to HPI.   GI: Refer to HPI.   : Denies urinary alterations, dysuria, hematuria, and abnormal drainage. + urinary frequency 2/2 diuretics.  EXT: +Chronic lower extremity edema see hpi, currently worse than baseline  SKIN: Denies abnormal rashes or lesions.   MUSCULOSKELETAL: Denies upper or lower extremity weakness and pain.   NEUROLOGIC: Denies dizziness, seizures, or upper or lower extremity paresthesia.     EXAM:  /67 (BP Location: Right arm, Patient Position: Chair, Cuff Size: Adult Regular)   Pulse 60   Ht 1.6 m (5' 3\")   Wt 65.2 kg (143 lb 11.2 oz)   SpO2 97%   BMI 25.46 kg/m        GENERAL: Appears comfortable, in no acute distress, speaking in full sentences and able to communicate all needs.   HEENT: Eye symmetrical, no discharge. Mucous membranes moist and without lesions.  CV: RRR, +S1S2, no murmur, rub, or gallop. JVP at about 1-2 cm above clavicle at 90 degrees, improved from last visit  RESPIRATORY: Respirations regular, even, and unlabored. Lungs CTA throughout.   GI: Soft and non-distended with normoactive bowel sounds present. No tenderness, rebound, " guarding. No hepatomegaly.   EXTREMITIES: 2+ non pitting peripheral edema. 2+ bilateral pedal pulses.   NEUROLOGIC: Alert and interacting appropiratly. No focal deficits.   MUSCULOSKELETAL: No joint swelling or tenderness.   SKIN: No jaundice. No rashes or lesions.     Labs, reviewed with patient in clinic today:  CBC RESULTS:  Lab Results   Component Value Date    WBC 4.8 05/17/2019    RBC 4.59 05/17/2019    HGB 14.9 05/17/2019    HCT 44.4 05/17/2019    MCV 97 05/17/2019    MCH 32.5 05/17/2019    MCHC 33.6 05/17/2019    RDW 14.1 05/17/2019     05/17/2019       CMP RESULTS:  Lab Results   Component Value Date     10/03/2019    POTASSIUM 3.1 (L) 10/03/2019    CHLORIDE 93 (L) 10/03/2019    CO2 36 (H) 10/03/2019    ANIONGAP 6 10/03/2019    GLC 83 10/03/2019    BUN 30 10/03/2019    CR 1.15 (H) 10/03/2019    GFRESTIMATED 49 (L) 10/03/2019    GFRESTBLACK 57 (L) 10/03/2019    JERROD 9.7 10/03/2019    BILITOTAL 2.6 (H) 05/17/2019    ALBUMIN 4.2 05/17/2019    ALKPHOS 263 (H) 05/17/2019    ALT 32 05/17/2019    AST 25 05/17/2019        INR RESULTS:  Lab Results   Component Value Date    INR 1.30 (H) 01/14/2017       Lab Results   Component Value Date    MAG 3.2 (H) 04/09/2019     Lab Results   Component Value Date    NTBNPI 9,009 (H) 01/13/2017     Lab Results   Component Value Date    NTBNP 1,507 (H) 08/22/2019       Diagnostics:  TTE 4/9/19  Moderate left ventricular hypertrophy.  Global and regional left ventricular function is normal with an EF of 60-65%.  Global right ventricular function is normal.  Moderate aortic valve insufficiency.  Mild pulmonary hypertension with dilated IVC.  This study was compared with the study from 10/11/18 the AR is worse and RA pressure is now increased.    TTE 10/11/18  Global and regional left ventricular function is normal with an EF of 55-60%.  Moderate concentric wall thickening consistent with left ventricular  hypertrophy is present - known amyloid.  Grade III or advanced  diastolic dysfunction.  Normal right ventricular size, wall thickness, and function.  Estimated pulmonary artery pressure 28 mmHg.  IVC with normal diameter but does not collapse (>50%) with inspiration.  Trace pericardial effusion.  Compared to prior study from 8/17/17, no significant change.    Ziopatch 11/2017      Holter Monitor 06/2019  INTERPRETATION  1. The rhythm varied with sinus rhythm and atrial fibrillation/variable atrial flutter. Low voltage noted.  2. The heart rate varied with a minimum rate of 48 bpm, maximum rate of 164 bpm, average rate of 74 bpm.  3. Frequent supraventricular ectopy was seen ranging from 0-469 per hour. Occasional atrail pairs and fairly frequent bigeminal cycles were noted.  Wandering atrial pacemaker noted.  4. Infrequent multifocal ventricular ectopy was seen ranging from 0-36 beats per hour.  5. ST-T wave changes were present.  6. Three patient events were documented and correlated with atrial fibrillation/flutter    EKG 8/8/2019 for palpitations  - NSR at a rate of 70, AZ 0.19, QTc 525, QRS is narrow. Occasional PVCs. Biphasic twaves in V4-V6, unchanged from priors.    Assessment and Plan:   Mrs. Guerrero is a 68 year old female with AL amyloidosis with cardiac manifestation who presents to CORE for hospital follow-up. Patient has cardiac amyloidosis as evidenced by her echocardiogram (severe concentric hypertrophy and biatrial enlargement) and abnormal EKG (near-low voltage, poor R wave progression, biatrial enlargement and no evidence of LVH despite thicken LV on echo) in the setting of biopsy proven (BMB, EGD) AL amyloid. She completed chemotherapy with CyBorD with an excellent serologic response and her heart failure (i.e. troponin negative, NT pro BNP was stable, serum light chains minimal and urine light chains undetectable).     However, we have continued to struggle with volume overload. She took metolazone 2 days ago, and is currently at 143 lbs and appears quite  euvolemic.  Cr stable at 1.15. Normotensive. Potassium has also been a struggle for her- hypokalemic today, took metolazone 2 days ago which is likely the cause. Has not taken any of her regular potassium this morning.    Patient is planning to go on vacation to [place] on 10/15 for about 7-10 days. Lab identified. She will call her insurance regarding which hospital to go to in case of an emergency.    # Chronic diastolic heart failure/restrictive cardiomyopathy 2/2  # Cardiac amyloidosis    Stage C. NYHA Class III.    Fluid status: Euvolemic, continue torsemide 100 mg BID, KCl 60 meq daily. Needs metolazone every 7-10 days, will call clinic when over 150 lbs. Will need more potassium after metolazone- continue 80 meq x2 or 60 meq x3. Needs labs 2-3 days after metolazone.  ACEi/ARB/ARNI: n/a   BB: no indication and relatively contraindicated due to risk of progressive conduction disease/AV block in these patients  Aldosterone antagonist: d/c'd given hyponatremia, Na improving and she is feeling better   SCD prophylaxis: does not meet criteria for implant  NSAID use: contraindicated  Sleep apnea evaluation: deferred today  Remote monitoring: deferred today, could be Cardiocom candidate   Goals of care: prior discussions with Dr Cohen re: planning for EoL care as long term prognosis for AL amyloid is poor.     # Hypokalemia  3.1 today after taking metolazone. Will take 60 meq TID today. Will take first dose now (Brought pills with her). Next dose in about 3 hours.  - Labs tomorrow    # A. Fib/A. Flutter  We did a holter monitor which showed intermittent a. Fib and a. Flutter. [id number] of 4.   - Continue Eliquis 5mg BID  - Avoiding BB in the setting of amyloid  - Holding off on digoxin given good rate control. Could discuss in the future if needed.    # Prolonged QTC  QTC today >500 as it has been in the past.  - Minimize QT prolonging meds    # Systemic amyloid  Heme previously monitoring her light chains  which have been negative consistent with remission - therefore further palliative chemo not being considered. Nausea has been a large issue and is currently, Kytril was added at her last oncology appointment, but she has not started taking this. to her regimen of compazine and lorazepam.  - Consider palliative care referal if nausea not controlled on her current regimen (per onc)  - She is back on doxycycline     # Hyponatremia  Improving off spironolactone. Feeling better with the improved sodium  - Discontinued spironolactone    Follow: Up: Scheduled with Pamella on 10/8 (CORE). Dr. Cohen 11/14.    30 minutes spent face-to-face with patient, >50% in counseling and/or coordination of care as described above    VIV Serrano, ALEJANDRO RAMÍREZ

## 2019-10-03 NOTE — PATIENT INSTRUCTIONS
Take your medicines every day, as directed    Changes made today:  o Please take an extra 60 meq of potassium today  o Please keep taking torsemide 100 mg twice a day  o Your homecare nurse will draw labs Friday or Monday  o Please call use when your weight goes over 150 lbs to consider more metolazone     Monitor Your Weight and Symptoms    Contact us if you:      Gain 2 pounds in one day or 5 pounds in one week    Feel more short of breath    Notice more leg swelling    Feel lightheadeded   Change your lifestyle    Limit Salt or Sodium:    2000 mg  Limit Fluids:    2000 mL or approximately 64 ounces  Eat a Heart Healthy Diet    Low in saturated fats  Stay Active:    Aim to move at least 150 minutes every  week         To Contact us    During Business Hours:  [phone], option # 1 (University)  Then option # 4 (medical questions)     After hours, weekends or holidays:   [phone], Option #4  Ask to speak to the On-Call Cardiologist. Inform them you are a CORE/heart failure patient at the [place].     Use [employer] allows you to communicate directly with your heart team through secure messaging.    Virtual 3-D Display for Smartphones can be accessed any time on your phone, computer, or tablet.    If you need assistance, we'd be happy to help!         Keep your Heart Appointments:    - Follow-up in CORE in 1-2 weeks. (We will ask the schedulers to move back your appointment)  - Please follow-up with Dr. Cohen as planned 11/14

## 2019-10-03 NOTE — NURSING NOTE
Chief Complaint   Patient presents with     Follow Up     Return CORE/Hospital follow-up, pt recently DCed from [facility], 69 female, HFpEF, Cardiac Amyloid, recent inpatient at [facility], she is a little concerned with her torsemide dose (90 mg BID) but will follow up with their clinic on Monday 9/30     Vitals were taken and medications were reconciled.     Lilly Kim RMA  7:20 AM

## 2019-10-04 ENCOUNTER — TELEPHONE (OUTPATIENT)
Dept: CARDIOLOGY | Facility: CLINIC | Age: 69
End: 2019-10-04

## 2019-10-04 ENCOUNTER — CARE COORDINATION (OUTPATIENT)
Dept: CARDIOLOGY | Facility: CLINIC | Age: 69
End: 2019-10-04

## 2019-10-04 DIAGNOSIS — I50.32 CHRONIC DIASTOLIC HEART FAILURE (H): Primary | ICD-10-CM

## 2019-10-04 NOTE — TELEPHONE ENCOUNTER
Called [facility]'s lab for pt's potassium levels after draw by home care RN. Pt's potassium is still 3.1 after increase. Pt directed to to go to local ED for potassium infusion. Pt will go to [facility] for infusion. Yolanda Araya NP called [facility] ED and confirmed they would use lidocaine during infusion. Pt to have labs on Monday. Shirlene Francois RN CORE Care Coordinator

## 2019-10-04 NOTE — PROGRESS NOTES
Yesterday potassium of 3.1. Took her normal 60 meq Kcl BID as instructed + an additional 80 meq throughout the day yesterday. K 3.1 again on recheck today. Advised patient to go to ER locally. Called [place] [facility] to make aware, she needs at least 40 meq Kcl IV and recheck K before discharging. Needs increased oral potassium. Recheck labs on Monday.

## 2019-10-08 DIAGNOSIS — I50.32 CHRONIC DIASTOLIC HEART FAILURE (H): Primary | ICD-10-CM

## 2019-10-08 ASSESSMENT — ENCOUNTER SYMPTOMS
HEADACHES: 1
ABDOMINAL PAIN: 0
HEMOPTYSIS: 0
EXERCISE INTOLERANCE: 1
MYALGIAS: 1
ARTHRALGIAS: 0
STIFFNESS: 0
LOSS OF CONSCIOUSNESS: 1
SEIZURES: 0
HYPERTENSION: 0
TINGLING: 0
MUSCLE CRAMPS: 0
LIGHT-HEADEDNESS: 1
NAUSEA: 1
DYSPNEA ON EXERTION: 1
BOWEL INCONTINENCE: 0
COUGH DISTURBING SLEEP: 1
SPEECH CHANGE: 0
SHORTNESS OF BREATH: 0
ORTHOPNEA: 1
POSTURAL DYSPNEA: 1
BLOATING: 0
CONSTIPATION: 0
DIZZINESS: 1
SPUTUM PRODUCTION: 1
DIARRHEA: 1
SYNCOPE: 1
WEAKNESS: 1
BACK PAIN: 1
SLEEP DISTURBANCES DUE TO BREATHING: 0
DISTURBANCES IN COORDINATION: 1
JOINT SWELLING: 0
TREMORS: 0
RECTAL PAIN: 0
COUGH: 1
MUSCLE WEAKNESS: 1
NECK PAIN: 0
VOMITING: 0
NUMBNESS: 0
SNORES LOUDLY: 0
PALPITATIONS: 0
HYPOTENSION: 0
PARALYSIS: 0
JAUNDICE: 0
BLOOD IN STOOL: 0
MEMORY LOSS: 0
WHEEZING: 1
LEG PAIN: 0
HEARTBURN: 0

## 2019-10-09 ENCOUNTER — OFFICE VISIT (OUTPATIENT)
Dept: CARDIOLOGY | Facility: CLINIC | Age: 69
End: 2019-10-09
Attending: NURSE PRACTITIONER
Payer: COMMERCIAL

## 2019-10-09 VITALS
HEART RATE: 52 BPM | SYSTOLIC BLOOD PRESSURE: 102 MMHG | DIASTOLIC BLOOD PRESSURE: 66 MMHG | OXYGEN SATURATION: 99 % | WEIGHT: 147.6 LBS | HEIGHT: 63 IN | BODY MASS INDEX: 26.15 KG/M2

## 2019-10-09 DIAGNOSIS — E85.4 AMYLOID HEART DISEASE (H): ICD-10-CM

## 2019-10-09 DIAGNOSIS — I50.32 CHRONIC DIASTOLIC HEART FAILURE (H): ICD-10-CM

## 2019-10-09 DIAGNOSIS — I43 AMYLOID HEART DISEASE (H): ICD-10-CM

## 2019-10-09 LAB
ANION GAP SERPL CALCULATED.3IONS-SCNC: 5 MMOL/L (ref 3–14)
BUN SERPL-MCNC: 22 MG/DL (ref 7–30)
CALCIUM SERPL-MCNC: 8.9 MG/DL (ref 8.5–10.1)
CHLORIDE SERPL-SCNC: 98 MMOL/L (ref 94–109)
CO2 SERPL-SCNC: 30 MMOL/L (ref 20–32)
CREAT SERPL-MCNC: 1.24 MG/DL (ref 0.52–1.04)
GFR SERPL CREATININE-BSD FRML MDRD: 45 ML/MIN/{1.73_M2}
GLUCOSE SERPL-MCNC: 90 MG/DL (ref 70–99)
POTASSIUM SERPL-SCNC: 4.2 MMOL/L (ref 3.4–5.3)
SODIUM SERPL-SCNC: 133 MMOL/L (ref 133–144)

## 2019-10-09 PROCEDURE — 80048 BASIC METABOLIC PNL TOTAL CA: CPT | Performed by: NURSE PRACTITIONER

## 2019-10-09 PROCEDURE — 99214 OFFICE O/P EST MOD 30 MIN: CPT | Mod: ZP | Performed by: NURSE PRACTITIONER

## 2019-10-09 PROCEDURE — G0463 HOSPITAL OUTPT CLINIC VISIT: HCPCS | Mod: ZF

## 2019-10-09 PROCEDURE — 36415 COLL VENOUS BLD VENIPUNCTURE: CPT | Performed by: NURSE PRACTITIONER

## 2019-10-09 ASSESSMENT — MIFFLIN-ST. JEOR: SCORE: 1173.64

## 2019-10-09 ASSESSMENT — PAIN SCALES - GENERAL: PAINLEVEL: NO PAIN (0)

## 2019-10-09 NOTE — PATIENT INSTRUCTIONS
Take your medicines every day, as directed    Changes made today:  o No changes today  o Please have labs before you leave to [place]   Monitor Your Weight and Symptoms    Contact us if you:      Gain 2 pounds in one day or 5 pounds in one week    Feel more short of breath    Notice more leg swelling    Feel lightheadeded   Change your lifestyle    Limit Salt or Sodium:    2000 mg  Limit Fluids:    2000 mL or approximately 64 ounces  Eat a Heart Healthy Diet    Low in saturated fats  Stay Active:    Aim to move at least 150 minutes every  week         To Contact us    During Business Hours:  [phone], option # 1 (University)  Then option # 4 (medical questions)     After hours, weekends or holidays:   [phone], Option #4  Ask to speak to the On-Call Cardiologist. Inform them you are a CORE/heart failure patient at the [place].     Use [employer] allows you to communicate directly with your heart team through secure messaging.    [employer] can be accessed any time on your phone, computer, or tablet.    If you need assistance, we'd be happy to help!         Keep your Heart Appointments:    Return to see Dr. Cohen in November and contact us with any concerns in the meantime

## 2019-10-09 NOTE — PROGRESS NOTES
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  She went to [facility] for consideration of a stem cell transplant, but was turned down due to her cardiac involvement.  She presents to clinic today for routine follow-up.      Amy's weights have been up several pounds from baseline (147 today and typically 143-144 pounds). She had been taking torsemide 200 mg BID but noted no change in urine output or weight changes. She had a local ED visit following a fall and they cut back her torsemide to 100 mg BID. Amy feels she's been stable since the change but worries she may start to accumulate fluid and hopes to travel to [place] next week. In particular, she worries most that she'll need a dose of metolazone to manage the fluid and then be acutely hypokalemic. Her last dose of metolazone 2.5 mg was just over a week ago. She typically loses roughly 10 pounds. She is currently taking potassium 60 mEq BID. She does not tolerate aldosterone antagonists.     Symptomatically, Amy is feeling tired and weak but denies dyspnea. Her chronic lower extremity edema is stable if not slightly improved. Mild abdominal distension. Appetite is fair and denies nausea. No orthopnea, PND, chest pain, or palpitations.     PAST MEDICAL HISTORY:  Past Medical History:   Diagnosis Date     AL amyloidosis (H)      Atrial fibrillation and flutter (H)      Cardiac amyloidosis (H)      Lymphedema      QT prolongation      Recurrent right pleural effusion 1/2/2017     SVT (supraventricular tachycardia) (H)        FAMILY HISTORY:  Family History   Problem Relation Age of Onset     Other - See Comments Sister         Amyloidosis       SOCIAL HISTORY:  Social History     Socioeconomic History     Marital status:       Spouse name: Not on file     Number of children: Not on file     Years of education: Not on file     Highest education level: Not on file   Occupational History     Not on file   Social Needs     Financial resource strain: Not on file     Food insecurity:     Worry: Not on file     Inability: Not on file     Transportation needs:     Medical: Not on file     Non-medical: Not on file   Tobacco Use     Smoking status: Never Smoker     Smokeless tobacco: Never Used   Substance and Sexual Activity     Alcohol use: No     Drug use: No     Sexual activity: Not on file   Lifestyle     Physical activity:     Days per week: Not on file     Minutes per session: Not on file     Stress: Not on file   Relationships     Social connections:     Talks on phone: Not on file     Gets together: Not on file     Attends Sabianism service: Not on file     Active member of club or organization: Not on file     Attends meetings of clubs or organizations: Not on file     Relationship status: Not on file     Intimate partner violence:     Fear of current or ex partner: Not on file     Emotionally abused: Not on file     Physically abused: Not on file     Forced sexual activity: Not on file   Other Topics Concern     Parent/sibling w/ CABG, MI or angioplasty before 65F 55M? Not Asked   Social History Narrative     Not on file       CURRENT MEDICATIONS:  Current Outpatient Medications   Medication Sig Dispense Refill     Acetaminophen (TYLENOL PO) Take 325 mg by mouth       apixaban ANTICOAGULANT (ELIQUIS) 5 MG tablet Take 1 tablet (5 mg) by mouth 2 times daily 60 tablet 11     doxycycline hyclate (VIBRA-TABS) 100 MG tablet Take 1 tablet (100 mg) by mouth daily 90 tablet 3     granisetron (KYTRIL) 1 MG tablet Take 1 tablet (1 mg) by mouth every 12 hours as needed for nausea 10 tablet 0     LORazepam (ATIVAN) 0.5 MG tablet Take 1 tablet (0.5 mg) by mouth every 6 hours as needed for anxiety or nausea 60 tablet 0     metolazone (ZAROXOLYN) 2.5 MG  "tablet Take 1 tablet (2.5 mg) by mouth twice a week 10 tablet 3     ondansetron (ZOFRAN-ODT) 4 MG ODT tab Take 1 tablet (4 mg) by mouth every 6 hours as needed for nausea 15 tablet 1     potassium chloride ER (K-DUR/KLOR-CON M) 10 MEQ CR tablet Take 60 meq twice daily except for day you take Metolazone and day after Metolazone take 60 meq three times a day. 1320 tablet 3     prochlorperazine (COMPAZINE) 5 MG tablet One to two tablets PO q 6 hours prn nausea 90 tablet 1     torsemide (DEMADEX) 100 MG tablet Take 2 tablets (200 mg) by mouth 2 times daily (Patient taking differently: Take 100 mg by mouth 2 times daily ) 180 tablet 3       EXAM:  /66 (BP Location: Left arm, Patient Position: Chair, Cuff Size: Adult Large)   Pulse 52   Ht 1.6 m (5' 3\")   Wt 67 kg (147 lb 9.6 oz)   SpO2 99%   BMI 26.15 kg/m    General: alert, articulate, and in no acute distress.  HEENT: normocephalic, atraumatic, anicteric sclera, EOMI, normal palate, mucosa moist, no cyanosis.    Neck: neck supple.  No adenopathy, masses, or carotid bruits.    Heart: regular rhythm, normal S1/S2, no murmur, gallop, rub.  Precordium quiet with normal PMI.     Lungs: clear, no rales, ronchi, or wheezing.  No accessory muscle use, respirations unlabored. JVP flat  Abdomen: soft, non-tender, bowel sounds present, no hepatomegaly  Extremities: Non pitting, chronic edema.  No cyanosis.  Extremities warm to touch.  Neurological: alert and oriented x 3.  Normal speech and affect, no gross motor deficits  Skin:  No ecchymoses, rashes, or clubbing.       Labs:  CBC RESULTS:  Lab Results   Component Value Date    WBC 4.8 05/17/2019    RBC 4.59 05/17/2019    HGB 14.9 05/17/2019    HCT 44.4 05/17/2019    MCV 97 05/17/2019    MCH 32.5 05/17/2019    MCHC 33.6 05/17/2019    RDW 14.1 05/17/2019     05/17/2019       CMP RESULTS:  Lab Results   Component Value Date     10/12/2019    POTASSIUM 3.6 10/12/2019    CHLORIDE 97 10/12/2019    CO2 33 (H) " 10/12/2019    ANIONGAP 6 10/12/2019    GLC 71 10/12/2019    BUN 29 10/12/2019    CR 1.10 (H) 10/12/2019    GFRESTIMATED 52 (L) 10/12/2019    GFRESTBLACK 60 (L) 10/12/2019    JERROD 9.3 10/12/2019    BILITOTAL 2.6 (H) 05/17/2019    ALBUMIN 4.2 05/17/2019    ALKPHOS 263 (H) 05/17/2019    ALT 32 05/17/2019    AST 25 05/17/2019        INR RESULTS:  Lab Results   Component Value Date    INR 1.30 (H) 01/14/2017       No components found for: CK  Lab Results   Component Value Date    MAG 2.4 (H) 10/03/2019     Lab Results   Component Value Date    NTBNP 1,507 (H) 08/22/2019       Most recent echocardiogram 4/2/19:  Interpretation Summary  Moderate left ventricular hypertrophy.  Global and regional left ventricular function is normal with an EF of 60-65%.  Global right ventricular function is normal.  Moderate aortic valve insufficiency.  Mild pulmonary hypertension with dilated IVC.     This study was compared with the study from 10/11/18 the AR is worse and RA  pressure is now increased.      Assessment and Plan:   In summary, this is a very pleasant 69 year old with HFpEF who appears euvolemic today. She hopes to travel next week and if her recent pattern continues, she'd likely need a dose of metolazone early in her vacation. We'll check labs just before she leaves town to assure she has sufficient buffer in her serum potassium. She will return to see Dr. Cohen in 1 month and to C[facility] prn in the meantime.     1. 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 Only mild CAD noted  ACEi/ARB -  N/a with HFpEF  BB - N/a with HFpEF.   SCD prophylaxis - N/A, EF is normal   NSAID use: Contraindicated, reviewed with patient   Sleep Apnea Evaluation: Not indicated  Remote Monitoring:  Active on [employer]    2.   Afib/flutter:  ChadsVasc score: 4.  On Eliquis. HR controlled at 52 today.    3. H/o Hypokalemia. Stable today at 4.2. Repeat BMP on 10/12 prior to her departure    30 minutes in direct care, >50% in counseling

## 2019-10-09 NOTE — LETTER
10/9/2019      RE: Leandra Guerrero  [address] [place] [address]       Dear Colleague,    Thank you for the opportunity to participate in the care of your patient, Leandra Guerrero, at the [facility]. Please see a copy of my visit note below.    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  She went to [facility] for consideration of a stem cell transplant, but was turned down due to her cardiac involvement.  She presents to clinic today for routine follow-up.      Amy's weights have been up several pounds from baseline (147 today and typically 143-144 pounds). She had been taking torsemide 200 mg BID but noted no change in urine output or weight changes. She had a local ED visit following a fall and they cut back her torsemide to 100 mg BID. Amy feels she's been stable since the change but worries she may start to accumulate fluid and hopes to travel to [place] next week. In particular, she worries most that she'll need a dose of metolazone to manage the fluid and then be acutely hypokalemic. Her last dose of metolazone 2.5 mg was just over a week ago. She typically loses roughly 10 pounds. She is currently taking potassium 60 mEq BID. She does not tolerate aldosterone antagonists.     Symptomatically, Amy is feeling tired and weak but denies dyspnea. Her chronic lower extremity edema is stable if not slightly improved. Mild abdominal distension. Appetite is fair and denies nausea. No orthopnea, PND, chest pain, or palpitations.     PAST MEDICAL HISTORY:  Past Medical History:   Diagnosis Date     AL amyloidosis (H)      Atrial fibrillation and flutter (H)      Cardiac amyloidosis (H)      Lymphedema       QT prolongation      Recurrent right pleural effusion 1/2/2017     SVT (supraventricular tachycardia) (H)        FAMILY HISTORY:  Family History   Problem Relation Age of Onset     Other - See Comments Sister         Amyloidosis       SOCIAL HISTORY:  Social History     Socioeconomic History     Marital status:      Spouse name: Not on file     Number of children: Not on file     Years of education: Not on file     Highest education level: Not on file   Occupational History     Not on file   Social Needs     Financial resource strain: Not on file     Food insecurity:     Worry: Not on file     Inability: Not on file     Transportation needs:     Medical: Not on file     Non-medical: Not on file   Tobacco Use     Smoking status: Never Smoker     Smokeless tobacco: Never Used   Substance and Sexual Activity     Alcohol use: No     Drug use: No     Sexual activity: Not on file   Lifestyle     Physical activity:     Days per week: Not on file     Minutes per session: Not on file     Stress: Not on file   Relationships     Social connections:     Talks on phone: Not on file     Gets together: Not on file     Attends Shinto service: Not on file     Active member of club or organization: Not on file     Attends meetings of clubs or organizations: Not on file     Relationship status: Not on file     Intimate partner violence:     Fear of current or ex partner: Not on file     Emotionally abused: Not on file     Physically abused: Not on file     Forced sexual activity: Not on file   Other Topics Concern     Parent/sibling w/ CABG, MI or angioplasty before 65F 55M? Not Asked   Social History Narrative     Not on file       CURRENT MEDICATIONS:  Current Outpatient Medications   Medication Sig Dispense Refill     Acetaminophen (TYLENOL PO) Take 325 mg by mouth       apixaban ANTICOAGULANT (ELIQUIS) 5 MG tablet Take 1 tablet (5 mg) by mouth 2 times daily 60 tablet 11     doxycycline hyclate (VIBRA-TABS) 100 MG  "tablet Take 1 tablet (100 mg) by mouth daily 90 tablet 3     granisetron (KYTRIL) 1 MG tablet Take 1 tablet (1 mg) by mouth every 12 hours as needed for nausea 10 tablet 0     LORazepam (ATIVAN) 0.5 MG tablet Take 1 tablet (0.5 mg) by mouth every 6 hours as needed for anxiety or nausea 60 tablet 0     metolazone (ZAROXOLYN) 2.5 MG tablet Take 1 tablet (2.5 mg) by mouth twice a week 10 tablet 3     ondansetron (ZOFRAN-ODT) 4 MG ODT tab Take 1 tablet (4 mg) by mouth every 6 hours as needed for nausea 15 tablet 1     potassium chloride ER (K-DUR/KLOR-CON M) 10 MEQ CR tablet Take 60 meq twice daily except for day you take Metolazone and day after Metolazone take 60 meq three times a day. 1320 tablet 3     prochlorperazine (COMPAZINE) 5 MG tablet One to two tablets PO q 6 hours prn nausea 90 tablet 1     torsemide (DEMADEX) 100 MG tablet Take 2 tablets (200 mg) by mouth 2 times daily (Patient taking differently: Take 100 mg by mouth 2 times daily ) 180 tablet 3       EXAM:  /66 (BP Location: Left arm, Patient Position: Chair, Cuff Size: Adult Large)   Pulse 52   Ht 1.6 m (5' 3\")   Wt 67 kg (147 lb 9.6 oz)   SpO2 99%   BMI 26.15 kg/m     General: alert, articulate, and in no acute distress.  HEENT: normocephalic, atraumatic, anicteric sclera, EOMI, normal palate, mucosa moist, no cyanosis.    Neck: neck supple.  No adenopathy, masses, or carotid bruits.    Heart: regular rhythm, normal S1/S2, no murmur, gallop, rub.  Precordium quiet with normal PMI.     Lungs: clear, no rales, ronchi, or wheezing.  No accessory muscle use, respirations unlabored. JVP flat  Abdomen: soft, non-tender, bowel sounds present, no hepatomegaly  Extremities: Non pitting, chronic edema.  No cyanosis.  Extremities warm to touch.  Neurological: alert and oriented x 3.  Normal speech and affect, no gross motor deficits  Skin:  No ecchymoses, rashes, or clubbing.       Labs:  CBC RESULTS:  Lab Results   Component Value Date    WBC 4.8 " 05/17/2019    RBC 4.59 05/17/2019    HGB 14.9 05/17/2019    HCT 44.4 05/17/2019    MCV 97 05/17/2019    MCH 32.5 05/17/2019    MCHC 33.6 05/17/2019    RDW 14.1 05/17/2019     05/17/2019       CMP RESULTS:  Lab Results   Component Value Date     10/12/2019    POTASSIUM 3.6 10/12/2019    CHLORIDE 97 10/12/2019    CO2 33 (H) 10/12/2019    ANIONGAP 6 10/12/2019    GLC 71 10/12/2019    BUN 29 10/12/2019    CR 1.10 (H) 10/12/2019    GFRESTIMATED 52 (L) 10/12/2019    GFRESTBLACK 60 (L) 10/12/2019    JERROD 9.3 10/12/2019    BILITOTAL 2.6 (H) 05/17/2019    ALBUMIN 4.2 05/17/2019    ALKPHOS 263 (H) 05/17/2019    ALT 32 05/17/2019    AST 25 05/17/2019        INR RESULTS:  Lab Results   Component Value Date    INR 1.30 (H) 01/14/2017       No components found for: CK  Lab Results   Component Value Date    MAG 2.4 (H) 10/03/2019     Lab Results   Component Value Date    NTBNP 1,507 (H) 08/22/2019       Most recent echocardiogram 4/2/19:  Interpretation Summary  Moderate left ventricular hypertrophy.  Global and regional left ventricular function is normal with an EF of 60-65%.  Global right ventricular function is normal.  Moderate aortic valve insufficiency.  Mild pulmonary hypertension with dilated IVC.     This study was compared with the study from 10/11/18 the AR is worse and RA  pressure is now increased.      Assessment and Plan:   In summary, this is a very pleasant 69 year old with HFpEF who appears euvolemic today. She hopes to travel next week and if her recent pattern continues, she'd likely need a dose of metolazone early in her vacation. We'll check labs just before she leaves town to assure she has sufficient buffer in her serum potassium. She will return to see Dr. Cohen in 1 month and to [facility] prn in the meantime.     1. 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 Only mild CAD noted  ACEi/ARB -  N/a with HFpEF  BB - N/a with HFpEF.   SCD prophylaxis - N/A, EF is normal   NSAID use: Contraindicated, reviewed with patient   Sleep Apnea Evaluation: Not indicated  Remote Monitoring:  Active on MyChart    2.  Afib/flutter:  ChadsVasc score: 4.  On Eliquis. HR controlled at 52 today.    3. H/o Hypokalemia. Stable today at 4.2. Repeat BMP on 10/12 prior to her departure    30 minutes in direct care, >50% in counseling      Please do not hesitate to contact me if you have any questions/concerns.     Sincerely,     Codie Nelson NP

## 2019-10-09 NOTE — NURSING NOTE
Vitals completed successfully and medication reconciled.     Judi Lucero, JOSE DANIEL  2:18 PM  Chief Complaint   Patient presents with     Follow Up     Return CORE; 69 year old female with chronic diastolic heart failure presents for follow up with labs prior. Pt with labile K levels.

## 2019-10-12 DIAGNOSIS — I43 AMYLOID HEART DISEASE (H): ICD-10-CM

## 2019-10-12 DIAGNOSIS — E85.4 AMYLOID HEART DISEASE (H): ICD-10-CM

## 2019-10-12 LAB
ANION GAP SERPL CALCULATED.3IONS-SCNC: 6 MMOL/L (ref 3–14)
BUN SERPL-MCNC: 29 MG/DL (ref 7–30)
CALCIUM SERPL-MCNC: 9.3 MG/DL (ref 8.5–10.1)
CHLORIDE SERPL-SCNC: 97 MMOL/L (ref 94–109)
CO2 SERPL-SCNC: 33 MMOL/L (ref 20–32)
CREAT SERPL-MCNC: 1.1 MG/DL (ref 0.52–1.04)
GFR SERPL CREATININE-BSD FRML MDRD: 52 ML/MIN/{1.73_M2}
GLUCOSE SERPL-MCNC: 71 MG/DL (ref 70–99)
POTASSIUM SERPL-SCNC: 3.6 MMOL/L (ref 3.4–5.3)
SODIUM SERPL-SCNC: 136 MMOL/L (ref 133–144)

## 2019-10-12 PROCEDURE — 36415 COLL VENOUS BLD VENIPUNCTURE: CPT | Performed by: INTERNAL MEDICINE

## 2019-10-12 PROCEDURE — 80048 BASIC METABOLIC PNL TOTAL CA: CPT | Performed by: INTERNAL MEDICINE

## 2019-10-26 DIAGNOSIS — E85.81 AL AMYLOIDOSIS (H): ICD-10-CM

## 2019-10-26 DIAGNOSIS — I50.32 CHRONIC DIASTOLIC HEART FAILURE (H): ICD-10-CM

## 2019-10-26 DIAGNOSIS — I43 AMYLOID HEART DISEASE (H): ICD-10-CM

## 2019-10-26 DIAGNOSIS — E85.4 AMYLOID HEART DISEASE (H): ICD-10-CM

## 2019-10-26 LAB
ALBUMIN SERPL-MCNC: 3.9 G/DL (ref 3.4–5)
ALP SERPL-CCNC: 285 U/L (ref 40–150)
ALT SERPL W P-5'-P-CCNC: 28 U/L (ref 0–50)
ANION GAP SERPL CALCULATED.3IONS-SCNC: 6 MMOL/L (ref 3–14)
AST SERPL W P-5'-P-CCNC: 26 U/L (ref 0–45)
BASOPHILS # BLD AUTO: 0.1 10E9/L (ref 0–0.2)
BASOPHILS NFR BLD AUTO: 1.6 %
BILIRUB SERPL-MCNC: 1.9 MG/DL (ref 0.2–1.3)
BUN SERPL-MCNC: 23 MG/DL (ref 7–30)
CALCIUM SERPL-MCNC: 9.1 MG/DL (ref 8.5–10.1)
CHLORIDE SERPL-SCNC: 102 MMOL/L (ref 94–109)
CO2 SERPL-SCNC: 33 MMOL/L (ref 20–32)
CREAT SERPL-MCNC: 1 MG/DL (ref 0.52–1.04)
DIFFERENTIAL METHOD BLD: ABNORMAL
EOSINOPHIL # BLD AUTO: 0.9 10E9/L (ref 0–0.7)
EOSINOPHIL NFR BLD AUTO: 18.2 %
ERYTHROCYTE [DISTWIDTH] IN BLOOD BY AUTOMATED COUNT: 13.1 % (ref 10–15)
GFR SERPL CREATININE-BSD FRML MDRD: 58 ML/MIN/{1.73_M2}
GLUCOSE SERPL-MCNC: 71 MG/DL (ref 70–99)
HCT VFR BLD AUTO: 41.3 % (ref 35–47)
HGB BLD-MCNC: 13.3 G/DL (ref 11.7–15.7)
IMM GRANULOCYTES # BLD: 0 10E9/L (ref 0–0.4)
IMM GRANULOCYTES NFR BLD: 0.4 %
LYMPHOCYTES # BLD AUTO: 0.3 10E9/L (ref 0.8–5.3)
LYMPHOCYTES NFR BLD AUTO: 5.3 %
MAGNESIUM SERPL-MCNC: 2.2 MG/DL (ref 1.6–2.3)
MCH RBC QN AUTO: 31.7 PG (ref 26.5–33)
MCHC RBC AUTO-ENTMCNC: 32.2 G/DL (ref 31.5–36.5)
MCV RBC AUTO: 99 FL (ref 78–100)
MONOCYTES # BLD AUTO: 0.4 10E9/L (ref 0–1.3)
MONOCYTES NFR BLD AUTO: 7.5 %
NEUTROPHILS # BLD AUTO: 3.3 10E9/L (ref 1.6–8.3)
NEUTROPHILS NFR BLD AUTO: 67 %
NT-PROBNP SERPL-MCNC: 1556 PG/ML (ref 0–125)
PLATELET # BLD AUTO: 182 10E9/L (ref 150–450)
POTASSIUM SERPL-SCNC: 3.9 MMOL/L (ref 3.4–5.3)
PROT SERPL-MCNC: 7.1 G/DL (ref 6.8–8.8)
RBC # BLD AUTO: 4.19 10E12/L (ref 3.8–5.2)
SODIUM SERPL-SCNC: 141 MMOL/L (ref 133–144)
WBC # BLD AUTO: 5 10E9/L (ref 4–11)

## 2019-10-26 PROCEDURE — 83735 ASSAY OF MAGNESIUM: CPT | Performed by: INTERNAL MEDICINE

## 2019-10-26 PROCEDURE — 83883 ASSAY NEPHELOMETRY NOT SPEC: CPT | Performed by: INTERNAL MEDICINE

## 2019-10-26 PROCEDURE — 83880 ASSAY OF NATRIURETIC PEPTIDE: CPT | Performed by: INTERNAL MEDICINE

## 2019-10-26 PROCEDURE — 80053 COMPREHEN METABOLIC PANEL: CPT | Performed by: INTERNAL MEDICINE

## 2019-10-26 PROCEDURE — 82784 ASSAY IGA/IGD/IGG/IGM EACH: CPT | Performed by: INTERNAL MEDICINE

## 2019-10-26 PROCEDURE — 85025 COMPLETE CBC W/AUTO DIFF WBC: CPT | Performed by: INTERNAL MEDICINE

## 2019-10-26 PROCEDURE — 00000402 ZZHCL STATISTIC TOTAL PROTEIN: Performed by: INTERNAL MEDICINE

## 2019-10-26 PROCEDURE — 36415 COLL VENOUS BLD VENIPUNCTURE: CPT | Performed by: INTERNAL MEDICINE

## 2019-10-26 PROCEDURE — 84165 PROTEIN E-PHORESIS SERUM: CPT | Performed by: INTERNAL MEDICINE

## 2019-10-28 LAB
ALBUMIN SERPL ELPH-MCNC: 4.1 G/DL (ref 3.7–5.1)
ALPHA1 GLOB SERPL ELPH-MCNC: 0.3 G/DL (ref 0.2–0.4)
ALPHA2 GLOB SERPL ELPH-MCNC: 0.6 G/DL (ref 0.5–0.9)
B-GLOBULIN SERPL ELPH-MCNC: 0.6 G/DL (ref 0.6–1)
GAMMA GLOB SERPL ELPH-MCNC: 0.7 G/DL (ref 0.7–1.6)
IGA SERPL-MCNC: 52 MG/DL (ref 70–380)
KAPPA LC UR-MCNC: 1.57 MG/DL (ref 0.33–1.94)
KAPPA LC/LAMBDA SER: 1.3 {RATIO} (ref 0.26–1.65)
LAMBDA LC SERPL-MCNC: 1.21 MG/DL (ref 0.57–2.63)
M PROTEIN SERPL ELPH-MCNC: 0 G/DL
PROT PATTERN SERPL ELPH-IMP: NORMAL

## 2019-10-31 NOTE — PROGRESS NOTES
Hematology/Oncology Follow-up visit:  Date on this visit: Nov 1, 2019      Referring Physician: Dr. Braydon Barron, [facility], [place].  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 She was diagnosed with CHF at a local clinic in [facility] and was placed on a b-blocker and a diuretic. She has also developed progressive worsening lower extremity edema by May 2016 which in retrospect started in January. Her cardiac angiogram was reportedly negative at that time. She has lost about 35 lbs in the last 6 months. She has significant nausea somewhat controlled with Zofran but she is reluctant to use it because of constipation too. She is on Senna-S one tablet PO BID for constipation and that is improved but still significant. She has very poor PO intake due to nausea and lack of appetite. She requested to be seen at [facility] and was seen by Dr. Braydon Barron on 9/13/2016 with f/up on 9/20/2016. She was also seen by cardiology team there Sara Severson CNP and Dr. Nettles from CHF service.  There was no e/o renal or hepatic amyloidosis.  EKG on 9/7/2016 showed normal sinus rhythm, prolonged QT interval (QTc 521 sec), left atrial enlargement and left anterior fascicular block. There was ST and T wave abnormality.  Apparently, her echocardiogram showed preserved LVEF at 59%.  She reports having R sided thoracentesis on 8/25/2016 after which her breathing has improved. She then had a CXR on 9/7/2016 which showed a small R pleural effusion with right basilar atelectasis. Cardiac silhouette was at the upper level of normal.  I have reviewed extensive outside medical records but we are still in the process of obtaining additional records.  The patient also underwent a minor salivary gland biopsy of the lower lip. The pathology from that procedure (9/20/2016) showed normal salivary gland tissue with  nonspecific chronic sialadenitis.  She had extensive lab workup on 9/7/2016.  Her TSH was normal. Serum protein ELP showed a small abnormality in the gamma fraction.  Serum immunofixation showed small monoclonal IgA lambda in the gamma fraction and a small lambda in the beta fraction. Serum IgA level was normal at 329. Serum IgG level was mildly low at 755 (normal range 767-1590) and IgM level was normal. B2 microglobulin was mildly elevated at 2.94 (ULN 2.7). Mg was normal. Total bilirubin was elevated at 3.9 and direct at 0.8. Uric acid was mildly elevated at 6.9 (ULN 6.1) Creat was 1.0. Troponin was 0.1 (ULN <0.01) and NT-pro BNP was 4747 pg/ml (ULN <=183). Total cholesterol was 190 and LDL was 139. Random urine protein was mildly elevated at 33 mg/dl (normal range <22). INR was 1.2 and PTT 27 (within normal limits). Fibrinogen was elevated at 441 and factor X was mildly decreased at 64% (normal range %). D-dimer was up at 1700 (). Free lambda light chains were 69.5 and free kappa light chains were 1.11 with FLC kappa/lambda ratio of 0.016.   CBC showed normal WBC at 6.0, slightly elevated Hb at 16. Platelet count was normal.  Flow cytometry on bone marrow biopsy showed detectable monotypic plasma cells. There were 12% of monotypic plasma cells on bone marrow biopsy.    The bone marrow biopsy (performed on 9/14/2016) showed normocellular BM, 30-40% cellularity, with involvement by 10-20% of lambda light chain restricted plasma cells.  Congo red stain was positive on the bone marrow biopsy. Liquid chromatography tandem mass spectroscopy showed a peptide profile c/w AL (lambda type) amyloid. BM karyotype was normal 46 XX. FISH on BM showed plasma cell clone with 1q duplication, and monosomy of 13 and 14.  She had an EGD by Dr. Matthew Wadsworth on 9/14/2016. It showed gastric mucosal atrophy and multiple mucosal papules (nodules) seen in the stomach. The duodenum appeared normal. The biopsies of stomach  mucosa, of stomach (antral) nodules and of duodenum, all showed the presence of amyloid in submucosal blood vessel walls in the stomach and duodenum and in deep mucosa in the stomach antrum and body, confirmed by Congo Red stain.   She also had additional labs done at our last visit, on 9/30/2016. Total bilirubin was elevated as below, primarily indirect. K was 3.3 and she was stared on K-dur 20 mEq PO bid. She was noted to have elevated serum IgA level on 9/30/2016 at 392 (). Serum free lambda chains were elevated at 37.75. Serum M spike was 0.1 g/dl and serum FLC ratio was low at 0.01. Serum immunofixation ELP showed monoclonal IgA immunoglobulin of lambda light chain type as well as monoclonal free immunoglobulin light chain of lambda chain type.  She was seen by Dr. Cohen too and had an echocardiogram on 10/6/2016 which showed:  Global and regional left ventricular function was normal with an EF of 60-65%.  The LV mass index was 131 g/m2 (severely increased.) The LV geometry is c/w  concentric hypertrophy measured by relative wall thickness. Global right ventricular function was normal. Severe biatrial enlargement was present.  Right ventricular systolic pressure was 26mmHg above the right atrial pressure. The inferior vena cava was dilated at 2.1 cm without respiratory variability, consistent with increased right atrial pressure. Trivial pericardial effusion was present.    She proceeded to start CyBorD as recommended by Dr. Barron with dose reduced in subq Bortezomib to 0.7 mg/m2, on 10/4/2016.  After one cycle, her M spike, IgA level and free lambda light chains have all improved, with M spike of 0 g/dl, IgA down into the normal range and free serum lambda light chains improving from 37.75 down to 2.09.  She returns today in cycle 3 day 11. She had amyloid labs again on day 1 of cycle 3 (12/8/2016) which showed continued response to therapy and her IgA level was now low and serum FLC ratio was normal  and serum free lambda chains were normal too.   She has developed hematuria with cycle 2 and Cytoxan is on hold from day 15 cycle 2 since hematuria persisted despite Mesnex. She had a CT abdomen/pelvis on 11/29/2016 which showed no abnormalities in the kidneys. There was a moderate right and a trace left sided pleural effusions. Small volume ascites and anasarca was noted as well. The liver had heterogeneous appearance, which can be seen in hepatic venous congestion.   Cytoxan was d/cd  on  11/22/2016 since her cystoscopy showed findings of mild petechia and erythema in her bladder that would be c/w hemorrhagic cystitis.  Pinch biopsies were taken from the bladder and showed no e/o amyloid or malignancy. She has continued on Velcade and dexamethasone until 12/27/2016.  On 1/3/2017 she was seen at [facility] by Dr. Barron and at that time serum free light chains were normal at 0.74 and FLC ratio was normal as well at 0.72. She was recommended to be on observation because free lambda chains normalized. However, her cardiac amyloidosis has progressed by 1/5/2017 and she has required frequent thoracentesis, and had weeping LE edema and elevated neck veins.  She was hospitalized for CHF in Jan 2017 due to cardiac amyloid and was aggressively diuresed.  Her echocardiogram on 1/5/2017 was abnormal (severe concentric hypertrophy and biatrial enlargement) and she also had abnormal EKG (near-low voltage, poor R wave progression, biatrial enlargement and no evidence of LVH despite very thickened LV on echo). She was felt to have  New York Heart Association class IIIB heart failure with clinically severe volume overload. She was seen by cardiologist Dr. Harjit Fischer at  in [place]. She was seen by Dr. Chivo Lei from medical oncology at the [facility] in [place], and was recommended to start on Acyclovir and Doxycycline.  She has not started Acyclovir  but did start daily Doxycyline.   Dr. Lei also recommended she  consider maintenance therapy. She was also seen by Dr. Barron at [facility] on 5/11/2017, and he recommended against maintenance therapy.     History Of Present Illness:  Ms. Guerrero is a 69 year old female, non-smoker, who presents for f/up of AL amyloidosis. She has been off treatment since December 2016.  She has been seeing  Dr. Cohen in f/up of cardiac Amyloidosis with preserved EF, CHF NYHA Class III. They monitor troponin and BNP. She has been followed up by cardiology closely.  Most recently she has been euvolemic.  She is on torsemide 100 mg p.o. twice daily.  In the last year she has had episodes of A. fib/a flutter (CHADsVasc score 4).  Her rate is controlled and she is on Eliquis.  Since our last visit she was hospitalized at outside hospital in [facility] on September 19, 2019 following a fall.  She had a CT chest which demonstrated a small to moderate right pleural effusion.    CT spine showed:. Mild compression along the superior and inferior endplates of L2. This appears to be an acute injury as there is a visible fracture line along the inferior endplate.  There was a mild compression of the superior endplates of T12 and L1 and appear chronic.  3. Multilevel degenerative disc disease that includes moderate central spinal canal narrowing at T11-12 and mild central spinal canal narrowing at T12-L1, L1-2, L3-4 and L4-5.   She is on Norco as needed and that controls her pain.  She currently walks with a walker secondary to back pain.  He will back pain has significantly subsided since her fall initially and she rates it a 3 out of 10.  She is on doxycycline daily and her nausea is controlled with Compazine and occasionally with Zofran.  She denies any constipation.  She feels overall she is tolerating doxycycline better. She has swelling in b/l LE   2+ to 3+.  Her serum IgA level remains normal and she has no M protein on serum protein electrophoresis.  Serum free kappa lambda light chain  "ratio is normal as well.  IgA level and free lambda light chains are normal.  Her weight is stable at 153 pounds.  She denies any shortness of breath or cough     In addition, a complete 12 point  review of systems is negative.      Past Medical/Surgical History:  AL Amyloid  Amyloid cardiomyopathy/CHF    Family History:  Sister  of multiple myeloma    Social History:  Lives in [place], with . Nonsmoker        Allergies:     Allergies   Allergen Reactions     Contrast Dye Shortness Of Breath     Shaking,chills and dypsnea     Cytoxan [Cyclophosphamide]      Hemorrhagic cystitis     Levofloxacin      Severe shaking and abdominal pain     Amoxicillin Nausea and Vomiting and Cramps     Spironolactone      Worsening hyponatremia     Current Medications:  Current Outpatient Medications   Medication     Acetaminophen (TYLENOL PO)     apixaban ANTICOAGULANT (ELIQUIS) 5 MG tablet     doxycycline hyclate (VIBRA-TABS) 100 MG tablet     granisetron (KYTRIL) 1 MG tablet     LORazepam (ATIVAN) 0.5 MG tablet     metolazone (ZAROXOLYN) 2.5 MG tablet     ondansetron (ZOFRAN-ODT) 4 MG ODT tab     potassium chloride ER (K-DUR/KLOR-CON M) 10 MEQ CR tablet     prochlorperazine (COMPAZINE) 5 MG tablet     torsemide (DEMADEX) 100 MG tablet     No current facility-administered medications for this visit.       Physical Exam:  /62 (BP Location: Right arm)   Pulse 65   Temp 98  F (36.7  C) (Oral)   Resp 16   Ht 1.6 m (5' 2.99\")   Wt 68 kg (149 lb 14.4 oz)   SpO2 98%   BMI 26.56 kg/m          GENERAL APPEARANCE: middle aged, alert and no distress     HENT: Mouth without ulcers or lesions     NECK: no adenopathy, no asymmetry or masses     RESP: LL- CTA. RL - decreased BS at base     CARDIOVASCULAR: regular rates and rhythm, normal S1 S2, no S3 or S4 and no murmur.     ABDOMEN:  soft, nontender, no HSM or masses and bowel sounds normal. +  moderate ascites, stable.     MUSCULOSKELETAL: extremities normal- no gross " deformities noted, no evidence of inflammation in joints, FROM in all extremities.  +2-3 edema b/l LE stable compared to last visit.       SKIN: no suspicious lesions or rashes     PSYCHIATRIC: mentation appears normal and affect normal    Laboratory/Imaging Studies  .  Orders Only on 10/26/2019   Component Date Value Ref Range Status     IGA 10/26/2019 52* 70 - 380 mg/dL Final     Kappa Free Lt Chain 10/26/2019 1.57  0.33 - 1.94 mg/dL Final     Lambda Free Lt Chain 10/26/2019 1.21  0.57 - 2.63 mg/dL Final     Kappa Lambda Ratio 10/26/2019 1.30  0.26 - 1.65 Final     Sodium 10/26/2019 141  133 - 144 mmol/L Final     Potassium 10/26/2019 3.9  3.4 - 5.3 mmol/L Final     Chloride 10/26/2019 102  94 - 109 mmol/L Final     Carbon Dioxide 10/26/2019 33* 20 - 32 mmol/L Final     Anion Gap 10/26/2019 6  3 - 14 mmol/L Final     Glucose 10/26/2019 71  70 - 99 mg/dL Final    Non Fasting     Urea Nitrogen 10/26/2019 23  7 - 30 mg/dL Final     Creatinine 10/26/2019 1.00  0.52 - 1.04 mg/dL Final     GFR Estimate 10/26/2019 58* >60 mL/min/[1.73_m2] Final    Comment: Non  GFR Calc  Starting 12/18/2018, serum creatinine based estimated GFR (eGFR) will be   calculated using the Chronic Kidney Disease Epidemiology Collaboration   (CKD-EPI) equation.       GFR Estimate If Black 10/26/2019 68  >60 mL/min/[1.73_m2] Final    Comment:  GFR Calc  Starting 12/18/2018, serum creatinine based estimated GFR (eGFR) will be   calculated using the Chronic Kidney Disease Epidemiology Collaboration   (CKD-EPI) equation.       Calcium 10/26/2019 9.1  8.5 - 10.1 mg/dL Final     Bilirubin Total 10/26/2019 1.9* 0.2 - 1.3 mg/dL Final     Albumin 10/26/2019 3.9  3.4 - 5.0 g/dL Final     Protein Total 10/26/2019 7.1  6.8 - 8.8 g/dL Final     Alkaline Phosphatase 10/26/2019 285* 40 - 150 U/L Final     ALT 10/26/2019 28  0 - 50 U/L Final     AST 10/26/2019 26  0 - 45 U/L Final     Albumin Fraction 10/26/2019 4.1  3.7 - 5.1  g/dL Final     Alpha 1 Fraction 10/26/2019 0.3  0.2 - 0.4 g/dL Final     Alpha 2 Fraction 10/26/2019 0.6  0.5 - 0.9 g/dL Final     Beta Fraction 10/26/2019 0.6  0.6 - 1.0 g/dL Final     Gamma Fraction 10/26/2019 0.7  0.7 - 1.6 g/dL Final     Monoclonal Peak 10/26/2019 0.0  0.0 g/dL Final     ELP Interpretation: 10/26/2019    Final                    Value:Essentially normal electrophoretic pattern.  No monoclonal protein seen. Pathologic   significance requires clinical correlation.  Lokesh Julian M.D., Ph.D., Pathologist.          WBC 10/26/2019 5.0  4.0 - 11.0 10e9/L Final     RBC Count 10/26/2019 4.19  3.8 - 5.2 10e12/L Final     Hemoglobin 10/26/2019 13.3  11.7 - 15.7 g/dL Final     Hematocrit 10/26/2019 41.3  35.0 - 47.0 % Final     MCV 10/26/2019 99  78 - 100 fl Final     MCH 10/26/2019 31.7  26.5 - 33.0 pg Final     MCHC 10/26/2019 32.2  31.5 - 36.5 g/dL Final     RDW 10/26/2019 13.1  10.0 - 15.0 % Final     Platelet Count 10/26/2019 182  150 - 450 10e9/L Final     Diff Method 10/26/2019 Automated Method   Final     % Neutrophils 10/26/2019 67.0  % Final     % Lymphocytes 10/26/2019 5.3  % Final     % Monocytes 10/26/2019 7.5  % Final     % Eosinophils 10/26/2019 18.2  % Final     % Basophils 10/26/2019 1.6  % Final     % Immature Granulocytes 10/26/2019 0.4  % Final     Absolute Neutrophil 10/26/2019 3.3  1.6 - 8.3 10e9/L Final     Absolute Lymphocytes 10/26/2019 0.3* 0.8 - 5.3 10e9/L Final     Absolute Monocytes 10/26/2019 0.4  0.0 - 1.3 10e9/L Final     Absolute Eosinophils 10/26/2019 0.9* 0.0 - 0.7 10e9/L Final     Absolute Basophils 10/26/2019 0.1  0.0 - 0.2 10e9/L Final     Abs Immature Granulocytes 10/26/2019 0.0  0 - 0.4 10e9/L Final     Magnesium 10/26/2019 2.2  1.6 - 2.3 mg/dL Final     N-Terminal Pro Bnp 10/26/2019 1,556* 0 - 125 pg/mL Final    Comment:    Reference range shown and results flagged as abnormal are for the outpatient,   non acute settings. Establishing a baseline value for each  individual patient   is useful for follow-up.  Suggested inpatient cut points for confirming diagnosis of CHF in an acute   setting are:   >450 pg/mL (age 18 to less than 50)   >900 pg/mL (age 50 to less than 75)   >1800 pg/mL (75 yrs and older)  An inpatient or emergency department NT-proPBNP <300 pg/mL effectively rules   out acute CHF, with 99% negative predictive value.          US b/l LE 4/2/19: no DVT in b/l LE.   ASSESSMENT/PLAN:  Leandra Guerrero is a 69 year old woman with  of AL amyloidosis involving the heart and GI tract. 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 Stage IV amyloidosis is associated with median survival of 5 months in patients not undergoing BMT, and 5 year survival of 15%, and in patients who are able to undergo BMT, median survival is 22 months and 5 year survival of 46% (Dejan A, Anderson BAR, Toñito RA, et al. Prognostication of survival using cardiac troponins and N-terminal pro-brain natriuretic peptide in patients with primary systemic amyloidosis undergoing peripheral blood stem cell transplantation. Blood 2004; 104:1881). She was felt not to be a candidate for high dose therapy.   She was on Cybor-D from 10/4/2016-12/27/2016, with Cytoxan omitted after cycle 2 day 1 due to her developing hemorrhagic cystitis. Her disease has responded  biochemically, with serum M spike of zero, and serum IgA level was down to low range and  free lambda light chains have normalized quantitatively.     I have discussed her situation with Dr. Barron, and he felt at that time that with normalization of serum free lambda light chains, no further chemotherapy is indicated.     1.  AL amyloidosis - Serum FLC ratio is normal. Both serum free kappa light chains and lambda light chains are within normal limits. There is no  M protein on serum ELP.  Continue on doxycycline daily which she is tolerating well, although his clinical benefit is questionable.   We'll see her back in 6 months with amyloid labs prior.     2. Recurrent pleural effusions- Last CT chest in [facility] in September 2019 showed small to moderate right pleural effusion.  She denies shortness of breath presently.   PleurX catheter is an option in the future if she were to become symptomatic.    3. Amyloid cardiomyopathy-A. fib-  She is being followed by  Dr. Cohen. Continue current diuretic regimen with Torsemide 100 mg PO twice daily. Continue Zaroxolyn. Cardiac Amyloidosis (a restrictive cardiomyopathy) , Stage C, NYHA Class III.  A. fib is rate controlled.  Continue Eliquis.  4. Nausea-controlled with Compazine as needed and Zofran occasionally.  If nausea worsens, consider adding Zyprexa in the future. May need a palliative care consult if nausea progresses.  5. CKD- Creat stable as above.  6.Stable elevated total bilirubin and alk phos, stable- in the past felt to be secondary to liver congestion. Cont to follow.    At the end of our visit patient and  verbalized understanding and concurred with the plan.

## 2019-11-01 ENCOUNTER — ONCOLOGY VISIT (OUTPATIENT)
Dept: ONCOLOGY | Facility: CLINIC | Age: 69
End: 2019-11-01
Payer: COMMERCIAL

## 2019-11-01 VITALS
DIASTOLIC BLOOD PRESSURE: 62 MMHG | RESPIRATION RATE: 16 BRPM | HEIGHT: 63 IN | OXYGEN SATURATION: 98 % | WEIGHT: 149.9 LBS | SYSTOLIC BLOOD PRESSURE: 119 MMHG | TEMPERATURE: 98 F | BODY MASS INDEX: 26.56 KG/M2 | HEART RATE: 65 BPM

## 2019-11-01 DIAGNOSIS — E85.81 AL AMYLOIDOSIS (H): Primary | ICD-10-CM

## 2019-11-01 PROCEDURE — 99214 OFFICE O/P EST MOD 30 MIN: CPT | Performed by: INTERNAL MEDICINE

## 2019-11-01 ASSESSMENT — MIFFLIN-ST. JEOR: SCORE: 1183.94

## 2019-11-01 ASSESSMENT — PAIN SCALES - GENERAL: PAINLEVEL: MILD PAIN (3)

## 2019-11-01 NOTE — NURSING NOTE
"Oncology Rooming Note    November 1, 2019 11:33 AM   Leandra Guerrero is a 69 year old female who presents for:    Chief Complaint   Patient presents with     Oncology Clinic Visit     6 mon f/u     Initial Vitals: /62 (BP Location: Right arm)   Pulse 65   Temp 98  F (36.7  C) (Oral)   Resp 16   Ht 1.6 m (5' 2.99\")   Wt 68 kg (149 lb 14.4 oz)   SpO2 98%   BMI 26.56 kg/m   Estimated body mass index is 26.56 kg/m  as calculated from the following:    Height as of this encounter: 1.6 m (5' 2.99\").    Weight as of this encounter: 68 kg (149 lb 14.4 oz). Body surface area is 1.74 meters squared.  Mild Pain (3) Comment: Data Unavailable   No LMP recorded. Patient is postmenopausal.  Allergies reviewed: Yes  Medications reviewed: Yes    Medications: Medication refills not needed today.  Pharmacy name entered into [facility]:    [id number] - [place], [place] - [address]  [id number] - [place], [place] - [address] [facility] - [facility] [address] LPN              "

## 2019-11-01 NOTE — LETTER
11/1/2019         RE: Leandra Guerrero  [address] [place] [address]        Dear Colleague,    Thank you for referring your patient, Leandra Guerrero, to the [facility]. Please see a copy of my visit note below.    Hematology/Oncology Follow-up visit:  Date on this visit: Nov 1, 2019      Referring Physician: Dr. Braydon Barron, [facility].  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 She was diagnosed with CHF at a local clinic in [facility] and was placed on a b-blocker and a diuretic. She has also developed progressive worsening lower extremity edema by May 2016 which in retrospect started in January. Her cardiac angiogram was reportedly negative at that time. She has lost about 35 lbs in the last 6 months. She has significant nausea somewhat controlled with Zofran but she is reluctant to use it because of constipation too. She is on Senna-S one tablet PO BID for constipation and that is improved but still significant. She has very poor PO intake due to nausea and lack of appetite. She requested to be seen at [facility] and was seen by Dr. Braydon Barron on 9/13/2016 with f/up on 9/20/2016. She was also seen by cardiology team there Sara Severson CNP and Dr. Nettles from CHF service.  There was no e/o renal or hepatic amyloidosis.  EKG on 9/7/2016 showed normal sinus rhythm, prolonged QT interval (QTc 521 sec), left atrial enlargement and left anterior fascicular block. There was ST and T wave abnormality.  Apparently, her echocardiogram showed preserved LVEF at 59%.  She reports having R sided thoracentesis on 8/25/2016 after which her breathing has improved. She then had a CXR on 9/7/2016 which showed a small R pleural effusion with right basilar atelectasis. Cardiac silhouette was at the upper level of normal.  I have reviewed extensive  outside medical records but we are still in the process of obtaining additional records.  The patient also underwent a minor salivary gland biopsy of the lower lip. The pathology from that procedure (9/20/2016) showed normal salivary gland tissue with nonspecific chronic sialadenitis.  She had extensive lab workup on 9/7/2016.  Her TSH was normal. Serum protein ELP showed a small abnormality in the gamma fraction.  Serum immunofixation showed small monoclonal IgA lambda in the gamma fraction and a small lambda in the beta fraction. Serum IgA level was normal at 329. Serum IgG level was mildly low at 755 (normal range 767-1590) and IgM level was normal. B2 microglobulin was mildly elevated at 2.94 (ULN 2.7). Mg was normal. Total bilirubin was elevated at 3.9 and direct at 0.8. Uric acid was mildly elevated at 6.9 (ULN 6.1) Creat was 1.0. Troponin was 0.1 (ULN <0.01) and NT-pro BNP was 4747 pg/ml (ULN <=183). Total cholesterol was 190 and LDL was 139. Random urine protein was mildly elevated at 33 mg/dl (normal range <22). INR was 1.2 and PTT 27 (within normal limits). Fibrinogen was elevated at 441 and factor X was mildly decreased at 64% (normal range %). D-dimer was up at 1700 (). Free lambda light chains were 69.5 and free kappa light chains were 1.11 with FLC kappa/lambda ratio of 0.016.   CBC showed normal WBC at 6.0, slightly elevated Hb at 16. Platelet count was normal.  Flow cytometry on bone marrow biopsy showed detectable monotypic plasma cells. There were 12% of monotypic plasma cells on bone marrow biopsy.    The bone marrow biopsy (performed on 9/14/2016) showed normocellular BM, 30-40% cellularity, with involvement by 10-20% of lambda light chain restricted plasma cells.  Congo red stain was positive on the bone marrow biopsy. Liquid chromatography tandem mass spectroscopy showed a peptide profile c/w AL (lambda type) amyloid. BM karyotype was normal 46 XX. FISH on BM showed plasma cell  clone with 1q duplication, and monosomy of 13 and 14.  She had an EGD by Dr. Matthew Wadsworth on 9/14/2016. It showed gastric mucosal atrophy and multiple mucosal papules (nodules) seen in the stomach. The duodenum appeared normal. The biopsies of stomach mucosa, of stomach (antral) nodules and of duodenum, all showed the presence of amyloid in submucosal blood vessel walls in the stomach and duodenum and in deep mucosa in the stomach antrum and body, confirmed by Congo Red stain.   She also had additional labs done at our last visit, on 9/30/2016. Total bilirubin was elevated as below, primarily indirect. K was 3.3 and she was stared on K-dur 20 mEq PO bid. She was noted to have elevated serum IgA level on 9/30/2016 at 392 (). Serum free lambda chains were elevated at 37.75. Serum M spike was 0.1 g/dl and serum FLC ratio was low at 0.01. Serum immunofixation ELP showed monoclonal IgA immunoglobulin of lambda light chain type as well as monoclonal free immunoglobulin light chain of lambda chain type.  She was seen by Dr. Cohen too and had an echocardiogram on 10/6/2016 which showed:  Global and regional left ventricular function was normal with an EF of 60-65%.  The LV mass index was 131 g/m2 (severely increased.) The LV geometry is c/w  concentric hypertrophy measured by relative wall thickness. Global right ventricular function was normal. Severe biatrial enlargement was present.  Right ventricular systolic pressure was 26mmHg above the right atrial pressure. The inferior vena cava was dilated at 2.1 cm without respiratory variability, consistent with increased right atrial pressure. Trivial pericardial effusion was present.    She proceeded to start CyBorD as recommended by Dr. Barron with dose reduced in subq Bortezomib to 0.7 mg/m2, on 10/4/2016.  After one cycle, her M spike, IgA level and free lambda light chains have all improved, with M spike of 0 g/dl, IgA down into the normal range and free serum  lambda light chains improving from 37.75 down to 2.09.  She returns today in cycle 3 day 11. She had amyloid labs again on day 1 of cycle 3 (12/8/2016) which showed continued response to therapy and her IgA level was now low and serum FLC ratio was normal and serum free lambda chains were normal too.   She has developed hematuria with cycle 2 and Cytoxan is on hold from day 15 cycle 2 since hematuria persisted despite Mesnex. She had a CT abdomen/pelvis on 11/29/2016 which showed no abnormalities in the kidneys. There was a moderate right and a trace left sided pleural effusions. Small volume ascites and anasarca was noted as well. The liver had heterogeneous appearance, which can be seen in hepatic venous congestion.   Cytoxan was d/cd  on  11/22/2016 since her cystoscopy showed findings of mild petechia and erythema in her bladder that would be c/w hemorrhagic cystitis.  Pinch biopsies were taken from the bladder and showed no e/o amyloid or malignancy. She has continued on Velcade and dexamethasone until 12/27/2016.  On 1/3/2017 she was seen at [facility] by Dr. Barron and at that time serum free light chains were normal at 0.74 and FLC ratio was normal as well at 0.72. She was recommended to be on observation because free lambda chains normalized. However, her cardiac amyloidosis has progressed by 1/5/2017 and she has required frequent thoracentesis, and had weeping LE edema and elevated neck veins.  She was hospitalized for CHF in Jan 2017 due to cardiac amyloid and was aggressively diuresed.  Her echocardiogram on 1/5/2017 was abnormal (severe concentric hypertrophy and biatrial enlargement) and she also had abnormal EKG (near-low voltage, poor R wave progression, biatrial enlargement and no evidence of LVH despite very thickened LV on echo). She was felt to have  New York Heart Association class IIIB heart failure with clinically severe volume overload. She was seen by cardiologist Dr. Harjit Fischer at  in  [place]. She was seen by Dr. Chivo Lei from medical oncology at the [facility] in [place], and was recommended to start on Acyclovir and Doxycycline.  She has not started Acyclovir  but did start daily Doxycyline.   Dr. Lei also recommended she consider maintenance therapy. She was also seen by Dr. Barron at [facility] on 5/11/2017, and he recommended against maintenance therapy.     History Of Present Illness:  Ms. Guerrero is a 69 year old female, non-smoker, who presents for f/up of AL amyloidosis. She has been off treatment since December 2016.  She has been seeing  Dr. Cohen in f/up of cardiac Amyloidosis with preserved EF, CHF NYHA Class III. They monitor troponin and BNP. She has been followed up by cardiology closely.  Most recently she has been euvolemic.  She is on torsemide 100 mg p.o. twice daily.  In the last year she has had episodes of A. fib/a flutter (CHADsVasc score 4).  Her rate is controlled and she is on Eliquis.  Since our last visit she was hospitalized at outside hospital in [facility] on September 19, 2019 following a fall.  She had a CT chest which demonstrated a small to moderate right pleural effusion.    CT spine showed:. Mild compression along the superior and inferior endplates of L2. This appears to be an acute injury as there is a visible fracture line along the inferior endplate.  There was a mild compression of the superior endplates of T12 and L1 and appear chronic.  3. Multilevel degenerative disc disease that includes moderate central spinal canal narrowing at T11-12 and mild central spinal canal narrowing at T12-L1, L1-2, L3-4 and L4-5.   She is on Norco as needed and that controls her pain.  She currently walks with a walker secondary to back pain.  He will back pain has significantly subsided since her fall initially and she rates it a 3 out of 10.  She is on doxycycline daily and her nausea is controlled with Compazine and occasionally with Zofran.  She  "denies any constipation.  She feels overall she is tolerating doxycycline better. She has swelling in b/l LE   2+ to 3+.  Her serum IgA level remains normal and she has no M protein on serum protein electrophoresis.  Serum free kappa lambda light chain ratio is normal as well.  IgA level and free lambda light chains are normal.  Her weight is stable at 153 pounds.  She denies any shortness of breath or cough     In addition, a complete 12 point  review of systems is negative.      Past Medical/Surgical History:  AL Amyloid  Amyloid cardiomyopathy/CHF    Family History:  Sister  of multiple myeloma    Social History:  Lives in [place], with . Nonsmoker        Allergies:     Allergies   Allergen Reactions     Contrast Dye Shortness Of Breath     Shaking,chills and dypsnea     Cytoxan [Cyclophosphamide]      Hemorrhagic cystitis     Levofloxacin      Severe shaking and abdominal pain     Amoxicillin Nausea and Vomiting and Cramps     Spironolactone      Worsening hyponatremia     Current Medications:  Current Outpatient Medications   Medication     Acetaminophen (TYLENOL PO)     apixaban ANTICOAGULANT (ELIQUIS) 5 MG tablet     doxycycline hyclate (VIBRA-TABS) 100 MG tablet     granisetron (KYTRIL) 1 MG tablet     LORazepam (ATIVAN) 0.5 MG tablet     metolazone (ZAROXOLYN) 2.5 MG tablet     ondansetron (ZOFRAN-ODT) 4 MG ODT tab     potassium chloride ER (K-DUR/KLOR-CON M) 10 MEQ CR tablet     prochlorperazine (COMPAZINE) 5 MG tablet     torsemide (DEMADEX) 100 MG tablet     No current facility-administered medications for this visit.       Physical Exam:  /62 (BP Location: Right arm)   Pulse 65   Temp 98  F (36.7  C) (Oral)   Resp 16   Ht 1.6 m (5' 2.99\")   Wt 68 kg (149 lb 14.4 oz)   SpO2 98%   BMI 26.56 kg/m           GENERAL APPEARANCE: middle aged, alert and no distress     HENT: Mouth without ulcers or lesions     NECK: no adenopathy, no asymmetry or masses     RESP: LL- CTA. RL - " decreased BS at base     CARDIOVASCULAR: regular rates and rhythm, normal S1 S2, no S3 or S4 and no murmur.     ABDOMEN:  soft, nontender, no HSM or masses and bowel sounds normal. +  moderate ascites, stable.     MUSCULOSKELETAL: extremities normal- no gross deformities noted, no evidence of inflammation in joints, FROM in all extremities.  +2-3 edema b/l LE stable compared to last visit.       SKIN: no suspicious lesions or rashes     PSYCHIATRIC: mentation appears normal and affect normal    Laboratory/Imaging Studies  .  Orders Only on 10/26/2019   Component Date Value Ref Range Status     IGA 10/26/2019 52* 70 - 380 mg/dL Final     Kappa Free Lt Chain 10/26/2019 1.57  0.33 - 1.94 mg/dL Final     Lambda Free Lt Chain 10/26/2019 1.21  0.57 - 2.63 mg/dL Final     Kappa Lambda Ratio 10/26/2019 1.30  0.26 - 1.65 Final     Sodium 10/26/2019 141  133 - 144 mmol/L Final     Potassium 10/26/2019 3.9  3.4 - 5.3 mmol/L Final     Chloride 10/26/2019 102  94 - 109 mmol/L Final     Carbon Dioxide 10/26/2019 33* 20 - 32 mmol/L Final     Anion Gap 10/26/2019 6  3 - 14 mmol/L Final     Glucose 10/26/2019 71  70 - 99 mg/dL Final    Non Fasting     Urea Nitrogen 10/26/2019 23  7 - 30 mg/dL Final     Creatinine 10/26/2019 1.00  0.52 - 1.04 mg/dL Final     GFR Estimate 10/26/2019 58* >60 mL/min/[1.73_m2] Final    Comment: Non  GFR Calc  Starting 12/18/2018, serum creatinine based estimated GFR (eGFR) will be   calculated using the Chronic Kidney Disease Epidemiology Collaboration   (CKD-EPI) equation.       GFR Estimate If Black 10/26/2019 68  >60 mL/min/[1.73_m2] Final    Comment:  GFR Calc  Starting 12/18/2018, serum creatinine based estimated GFR (eGFR) will be   calculated using the Chronic Kidney Disease Epidemiology Collaboration   (CKD-EPI) equation.       Calcium 10/26/2019 9.1  8.5 - 10.1 mg/dL Final     Bilirubin Total 10/26/2019 1.9* 0.2 - 1.3 mg/dL Final     Albumin 10/26/2019 3.9  3.4 -  5.0 g/dL Final     Protein Total 10/26/2019 7.1  6.8 - 8.8 g/dL Final     Alkaline Phosphatase 10/26/2019 285* 40 - 150 U/L Final     ALT 10/26/2019 28  0 - 50 U/L Final     AST 10/26/2019 26  0 - 45 U/L Final     Albumin Fraction 10/26/2019 4.1  3.7 - 5.1 g/dL Final     Alpha 1 Fraction 10/26/2019 0.3  0.2 - 0.4 g/dL Final     Alpha 2 Fraction 10/26/2019 0.6  0.5 - 0.9 g/dL Final     Beta Fraction 10/26/2019 0.6  0.6 - 1.0 g/dL Final     Gamma Fraction 10/26/2019 0.7  0.7 - 1.6 g/dL Final     Monoclonal Peak 10/26/2019 0.0  0.0 g/dL Final     ELP Interpretation: 10/26/2019    Final                    Value:Essentially normal electrophoretic pattern.  No monoclonal protein seen. Pathologic   significance requires clinical correlation.  Lokesh Julian M.D., Ph.D., Pathologist.          WBC 10/26/2019 5.0  4.0 - 11.0 10e9/L Final     RBC Count 10/26/2019 4.19  3.8 - 5.2 10e12/L Final     Hemoglobin 10/26/2019 13.3  11.7 - 15.7 g/dL Final     Hematocrit 10/26/2019 41.3  35.0 - 47.0 % Final     MCV 10/26/2019 99  78 - 100 fl Final     MCH 10/26/2019 31.7  26.5 - 33.0 pg Final     MCHC 10/26/2019 32.2  31.5 - 36.5 g/dL Final     RDW 10/26/2019 13.1  10.0 - 15.0 % Final     Platelet Count 10/26/2019 182  150 - 450 10e9/L Final     Diff Method 10/26/2019 Automated Method   Final     % Neutrophils 10/26/2019 67.0  % Final     % Lymphocytes 10/26/2019 5.3  % Final     % Monocytes 10/26/2019 7.5  % Final     % Eosinophils 10/26/2019 18.2  % Final     % Basophils 10/26/2019 1.6  % Final     % Immature Granulocytes 10/26/2019 0.4  % Final     Absolute Neutrophil 10/26/2019 3.3  1.6 - 8.3 10e9/L Final     Absolute Lymphocytes 10/26/2019 0.3* 0.8 - 5.3 10e9/L Final     Absolute Monocytes 10/26/2019 0.4  0.0 - 1.3 10e9/L Final     Absolute Eosinophils 10/26/2019 0.9* 0.0 - 0.7 10e9/L Final     Absolute Basophils 10/26/2019 0.1  0.0 - 0.2 10e9/L Final     Abs Immature Granulocytes 10/26/2019 0.0  0 - 0.4 10e9/L Final      Magnesium 10/26/2019 2.2  1.6 - 2.3 mg/dL Final     N-Terminal Pro Bnp 10/26/2019 1,556* 0 - 125 pg/mL Final    Comment:    Reference range shown and results flagged as abnormal are for the outpatient,   non acute settings. Establishing a baseline value for each individual patient   is useful for follow-up.  Suggested inpatient cut points for confirming diagnosis of CHF in an acute   setting are:   >450 pg/mL (age 18 to less than 50)   >900 pg/mL (age 50 to less than 75)   >1800 pg/mL (75 yrs and older)  An inpatient or emergency department NT-proPBNP <300 pg/mL effectively rules   out acute CHF, with 99% negative predictive value.          US b/l LE 4/2/19: no DVT in b/l LE.   ASSESSMENT/PLAN:  Leandra Guerrero is a 69 year old woman with  of AL amyloidosis involving the heart and GI tract. 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 Stage IV amyloidosis is associated with median survival of 5 months in patients not undergoing BMT, and 5 year survival of 15%, and in patients who are able to undergo BMT, median survival is 22 months and 5 year survival of 46% (Dejan A, Anderson BAR, Toñito RA, et al. Prognostication of survival using cardiac troponins and N-terminal pro-brain natriuretic peptide in patients with primary systemic amyloidosis undergoing peripheral blood stem cell transplantation. Blood 2004; 104:1881). She was felt not to be a candidate for high dose therapy.   She was on Cybor-D from 10/4/2016-12/27/2016, with Cytoxan omitted after cycle 2 day 1 due to her developing hemorrhagic cystitis. Her disease has responded  biochemically, with serum M spike of zero, and serum IgA level was down to low range and  free lambda light chains have normalized quantitatively.     I have discussed her situation with Dr. Barron, and he  felt at that time that with normalization of serum free lambda light chains, no further chemotherapy is indicated.     1.  AL amyloidosis - Serum FLC ratio is normal. Both serum free kappa light chains and lambda light chains are within normal limits. There is no M protein on serum ELP.  Continue on doxycycline daily which she is tolerating well, although his clinical benefit is questionable.   We'll see her back in 6 months with amyloid labs prior.     2. Recurrent pleural effusions- Last CT chest in [facility] in September 2019 showed small to moderate right pleural effusion.  She denies shortness of breath presently.   PleurX catheter is an option in the future if she were to become symptomatic.    3. Amyloid cardiomyopathy-A. fib-  She is being followed by  Dr. Cohen. Continue current diuretic regimen with Torsemide 100 mg PO twice daily. Continue Zaroxolyn. Cardiac Amyloidosis (a restrictive cardiomyopathy) , Stage C, NYHA Class III.  A. fib is rate controlled.  Continue Eliquis.  4. Nausea-controlled with Compazine as needed and Zofran occasionally.  If nausea worsens, consider adding Zyprexa in the future. May need a palliative care consult if nausea progresses.  5. CKD- Creat stable as above.  6.Stable elevated total bilirubin and alk phos, stable- in the past felt to be secondary to liver congestion. Cont to follow.    At the end of our visit patient and  verbalized understanding and concurred with the plan.        Again, thank you for allowing me to participate in the care of your patient.        Sincerely,        Mirela Moore MD, MD

## 2019-11-06 DIAGNOSIS — E85.4 AMYLOID HEART DISEASE (H): ICD-10-CM

## 2019-11-06 DIAGNOSIS — I43 AMYLOID HEART DISEASE (H): ICD-10-CM

## 2019-11-07 RX ORDER — METOLAZONE 2.5 MG/1
TABLET ORAL
Qty: 26 TABLET | Refills: 3 | Status: SHIPPED | OUTPATIENT
Start: 2019-11-07 | End: 2020-09-09

## 2019-11-07 NOTE — TELEPHONE ENCOUNTER
metolazone (ZAROXOLYN) 2.5 MG tablet      Last Written Prescription Date:  8-29-19  Last Fill Quantity: 10,   # refills: 2  Last Office Visit : 10-9-19  Future Office visit:  11-14-19    Routing refill request to provider for review/approval because:  Med not on protocol: Thiazide-like diuretics

## 2019-11-12 ASSESSMENT — ENCOUNTER SYMPTOMS
ORTHOPNEA: 0
SPEECH CHANGE: 0
SEIZURES: 0
POOR WOUND HEALING: 0
STIFFNESS: 1
SKIN CHANGES: 0
DIFFICULTY URINATING: 0
MYALGIAS: 1
WEAKNESS: 1
EXERCISE INTOLERANCE: 1
LEG PAIN: 1
PARALYSIS: 0
HEADACHES: 1
HEMATURIA: 0
BACK PAIN: 1
SYNCOPE: 0
PALPITATIONS: 1
MUSCLE CRAMPS: 1
SLEEP DISTURBANCES DUE TO BREATHING: 0
DIZZINESS: 1
ARTHRALGIAS: 0
JOINT SWELLING: 0
FLANK PAIN: 0
MUSCLE WEAKNESS: 1
LOSS OF CONSCIOUSNESS: 0
DISTURBANCES IN COORDINATION: 0
HYPOTENSION: 0
NAIL CHANGES: 0
TINGLING: 1
LIGHT-HEADEDNESS: 1
DYSURIA: 1
NUMBNESS: 0
NECK PAIN: 0
MEMORY LOSS: 0
TREMORS: 0
HYPERTENSION: 0

## 2019-11-14 ENCOUNTER — OFFICE VISIT (OUTPATIENT)
Dept: CARDIOLOGY | Facility: CLINIC | Age: 69
End: 2019-11-14
Attending: INTERNAL MEDICINE
Payer: COMMERCIAL

## 2019-11-14 VITALS
WEIGHT: 146.7 LBS | DIASTOLIC BLOOD PRESSURE: 63 MMHG | HEIGHT: 63 IN | OXYGEN SATURATION: 99 % | BODY MASS INDEX: 25.99 KG/M2 | HEART RATE: 69 BPM | SYSTOLIC BLOOD PRESSURE: 131 MMHG

## 2019-11-14 DIAGNOSIS — E85.4 AMYLOID HEART DISEASE (H): ICD-10-CM

## 2019-11-14 DIAGNOSIS — E85.81 AL AMYLOIDOSIS (H): ICD-10-CM

## 2019-11-14 DIAGNOSIS — I43 AMYLOID HEART DISEASE (H): ICD-10-CM

## 2019-11-14 LAB
ALBUMIN SERPL-MCNC: 4 G/DL (ref 3.4–5)
ALP SERPL-CCNC: 310 U/L (ref 40–150)
ALT SERPL W P-5'-P-CCNC: 29 U/L (ref 0–50)
ANION GAP SERPL CALCULATED.3IONS-SCNC: 4 MMOL/L (ref 3–14)
AST SERPL W P-5'-P-CCNC: 24 U/L (ref 0–45)
BASOPHILS # BLD AUTO: 0.1 10E9/L (ref 0–0.2)
BASOPHILS NFR BLD AUTO: 1.5 %
BILIRUB SERPL-MCNC: 2.3 MG/DL (ref 0.2–1.3)
BUN SERPL-MCNC: 33 MG/DL (ref 7–30)
CALCIUM SERPL-MCNC: 9.4 MG/DL (ref 8.5–10.1)
CHLORIDE SERPL-SCNC: 93 MMOL/L (ref 94–109)
CO2 SERPL-SCNC: 36 MMOL/L (ref 20–32)
CREAT SERPL-MCNC: 1.14 MG/DL (ref 0.52–1.04)
DIFFERENTIAL METHOD BLD: ABNORMAL
EOSINOPHIL # BLD AUTO: 0.8 10E9/L (ref 0–0.7)
EOSINOPHIL NFR BLD AUTO: 15.2 %
ERYTHROCYTE [DISTWIDTH] IN BLOOD BY AUTOMATED COUNT: 12.7 % (ref 10–15)
GFR SERPL CREATININE-BSD FRML MDRD: 50 ML/MIN/{1.73_M2}
GLUCOSE SERPL-MCNC: 93 MG/DL (ref 70–99)
HCT VFR BLD AUTO: 42.9 % (ref 35–47)
HGB BLD-MCNC: 14.2 G/DL (ref 11.7–15.7)
IGA SERPL-MCNC: 58 MG/DL (ref 70–380)
IGG SERPL-MCNC: 829 MG/DL (ref 695–1620)
IGM SERPL-MCNC: 62 MG/DL (ref 60–265)
IMM GRANULOCYTES # BLD: 0 10E9/L (ref 0–0.4)
IMM GRANULOCYTES NFR BLD: 0.4 %
LYMPHOCYTES # BLD AUTO: 0.3 10E9/L (ref 0.8–5.3)
LYMPHOCYTES NFR BLD AUTO: 5.4 %
MCH RBC QN AUTO: 32 PG (ref 26.5–33)
MCHC RBC AUTO-ENTMCNC: 33.1 G/DL (ref 31.5–36.5)
MCV RBC AUTO: 97 FL (ref 78–100)
MONOCYTES # BLD AUTO: 0.4 10E9/L (ref 0–1.3)
MONOCYTES NFR BLD AUTO: 8.3 %
NEUTROPHILS # BLD AUTO: 3.7 10E9/L (ref 1.6–8.3)
NEUTROPHILS NFR BLD AUTO: 69.2 %
NRBC # BLD AUTO: 0 10*3/UL
NRBC BLD AUTO-RTO: 0 /100
PLATELET # BLD AUTO: 202 10E9/L (ref 150–450)
POTASSIUM SERPL-SCNC: 3.1 MMOL/L (ref 3.4–5.3)
PROT SERPL-MCNC: 7.2 G/DL (ref 6.8–8.8)
RBC # BLD AUTO: 4.44 10E12/L (ref 3.8–5.2)
SODIUM SERPL-SCNC: 132 MMOL/L (ref 133–144)
WBC # BLD AUTO: 5.3 10E9/L (ref 4–11)

## 2019-11-14 PROCEDURE — 00000402 ZZHCL STATISTIC TOTAL PROTEIN: Performed by: INTERNAL MEDICINE

## 2019-11-14 PROCEDURE — 83883 ASSAY NEPHELOMETRY NOT SPEC: CPT | Performed by: INTERNAL MEDICINE

## 2019-11-14 PROCEDURE — 84165 PROTEIN E-PHORESIS SERUM: CPT | Performed by: INTERNAL MEDICINE

## 2019-11-14 PROCEDURE — 36415 COLL VENOUS BLD VENIPUNCTURE: CPT | Performed by: INTERNAL MEDICINE

## 2019-11-14 PROCEDURE — G0463 HOSPITAL OUTPT CLINIC VISIT: HCPCS | Mod: ZF

## 2019-11-14 PROCEDURE — 99215 OFFICE O/P EST HI 40 MIN: CPT | Mod: ZP | Performed by: INTERNAL MEDICINE

## 2019-11-14 PROCEDURE — 80053 COMPREHEN METABOLIC PANEL: CPT | Performed by: INTERNAL MEDICINE

## 2019-11-14 PROCEDURE — 82784 ASSAY IGA/IGD/IGG/IGM EACH: CPT | Performed by: INTERNAL MEDICINE

## 2019-11-14 PROCEDURE — 85025 COMPLETE CBC W/AUTO DIFF WBC: CPT | Performed by: INTERNAL MEDICINE

## 2019-11-14 ASSESSMENT — MIFFLIN-ST. JEOR: SCORE: 1169.56

## 2019-11-14 ASSESSMENT — PAIN SCALES - GENERAL: PAINLEVEL: NO PAIN (0)

## 2019-11-14 NOTE — NURSING NOTE
Chief Complaint   Patient presents with     Follow Up     69 year old female with chronic diastolic heart failure presents for follow up with labs prior. Pt with labile K levels.      Vitals were taken and medications were reconciled.     Gina Welsh  11:58 AM

## 2019-11-14 NOTE — PROGRESS NOTES
11/14/2019    HPI:  Mrs. Guerrero is a 69 year old female with no past medical history until the Spring of 2016 when she was diagnosed with stage IV AL amyloid; she presents to clinic for follow up of cardiac amyloid (AL type).     Her cardiac and oncologic history are as follows:   Fatigue started in January 2016. She eventually had a coronary angiogram which was reportedly normal and was diagnosed with CHF and started on a bblocker and diuretics. Her symptoms progressed to the point that she was evaluated at [facility] in August/September (Dr. Barron in oncology/hematology, Dr. Nettles is cardiology). She was diagnosed with stage IV AL amyloid (+GI, +bone marrow involvement, no involvement of kidney or liver):  her work up is detailed in our previous notes, however she has lambda AL amyloidosis and she underwent CyBorD chemotherapy and excellent light chain response by serum. She wasthen  turned down at [facility] for a stem cell transplant due to her cardiac involvement.    Unfortunately in 2016, she had 2-3 more pleural taps on the R and came back to my clinic with gross anasarca.  I admitted her to the hospital in 1/2017 where close to 20-30 pounds of fluid overload of volume was removed.  Since that time her AL has been in remission by labs without further chemo; she is following with oncology on a q6 month basis.    In the past few months, Amy has continued to struggle with frequent volume overload.  Thankfully, she continues to have a good response for metolazone.  She is now requiring metolazone more frequently, about every 4 to 5 days (used to be once every 10 days).  She takes metolazone when she is about 7 to 10 pounds above her dry weight.  Last took 2 days prior to this visit.  Unfortunately, Amy has also struggled with hypokalemia when taking metolazone and we have had a difficult time establishing a consistent dose for her.  Her current regimen has been torsemide 100 mg twice daily with metolazone every 4  to 5 days as outlined above.  She takes 60 mEq of KCl twice daily, with an extra 60 mEq on metolazone days and the day after metolazone.    She is otherwise feeling overall unchanged.  Significant fatigue which limits her day-to-day activities.  She recently went on a trip to [place] which she greatly enjoyed.  Today she has some lightheadedness and dizziness which tends to be associated with her hypokalemia, is otherwise not had significant dizziness.  No presyncope.  No syncope.  No palpitations.  No chest pain.  No orthopnea no PND.    FAMILY HISTORY:  Family History   Problem Relation Age of Onset     Other - See Comments Sister         Amyloidosis     - Sister passed from multiple myeloma, patient was also told her sister had amyloid as well  - Mom with CAD    SOCIAL HISTORY:  Social History     Social History     Marital Status:      Spouse Name: N/A     Number of Children: N/A     Years of Education: N/A     Social History Main Topics     Smoking status: Never Smoker      Smokeless tobacco: None     Alcohol Use: No     Drug Use: No     Sexual Activity: Not Asked     Social History Narrative   Lives in [place] with her , has 2 kids  Previously did office work, hasn't worked for several months now  Never smoker  Social ETOH    CURRENT MEDICATIONS:  Current Outpatient Medications   Medication Sig Dispense Refill     Acetaminophen (TYLENOL PO) Take 325 mg by mouth       apixaban ANTICOAGULANT (ELIQUIS) 5 MG tablet Take 1 tablet (5 mg) by mouth 2 times daily 60 tablet 11     doxycycline hyclate (VIBRA-TABS) 100 MG tablet Take 1 tablet (100 mg) by mouth daily 90 tablet 3     granisetron (KYTRIL) 1 MG tablet Take 1 tablet (1 mg) by mouth every 12 hours as needed for nausea 10 tablet 0     LORazepam (ATIVAN) 0.5 MG tablet Take 1 tablet (0.5 mg) by mouth every 6 hours as needed for anxiety or nausea 60 tablet 0     metolazone (ZAROXOLYN) 2.5 MG tablet TAKE 1 TABLET TWICE WEEKLY 26 tablet 3      "ondansetron (ZOFRAN-ODT) 4 MG ODT tab Take 1 tablet (4 mg) by mouth every 6 hours as needed for nausea 15 tablet 1     potassium chloride ER (K-DUR/KLOR-CON M) 10 MEQ CR tablet Take 60 meq twice daily except for day you take Metolazone and day after Metolazone take 60 meq three times a day. 1320 tablet 3     prochlorperazine (COMPAZINE) 5 MG tablet One to two tablets PO q 6 hours prn nausea 90 tablet 1     torsemide (DEMADEX) 100 MG tablet Take 2 tablets (200 mg) by mouth 2 times daily (Patient taking differently: Take 100 mg by mouth 2 times daily ) 180 tablet 3       ROS:   Constitutional: No fever, chills, or sweats.  Weight fluctuations as above.  ENT: No visual disturbance, ear ache, epistaxis, sore throat.   Allergies/Immunologic: Negative.   Respiratory: No cough, hemoptysis.   Cardiovascular: As per HPI.   GI: +Nausea, constipation.  : No urinary frequency, dysuria, or hematuria.   Integument: Negative.   Psychiatric: feeling down and anxious since her diagnosis  Neuro: + tingling in legs bilaterally  Endocrinology: Negative.   Musculoskeletal: Negative.    EXAM:  /63 (BP Location: Left arm, Patient Position: Chair, Cuff Size: Adult Regular)   Pulse 69   Ht 1.6 m (5' 3\")   Wt 66.5 kg (146 lb 11.2 oz)   SpO2 99%   BMI 25.99 kg/m    General: appears comfortable, alert and articulate, NAD, pleasant  HEENT: EOMI, moist mucosa, no lesions, dentition intact  Heart: regular rate and rhythm. III/VI holosystolic murmur at the apex, JVD ~7 cm  Lungs: CTAB, no adventitious lung sounds  Abdomen: soft, non-tender, bowel sounds present, no hepatosplenomegaly  Extremities: significant mostly nonpitting edema of bilateral extremities   Neurological: normal speech and affect, no gross motor deficits    Labs:  CBC RESULTS:  Lab Results   Component Value Date    WBC 5.3 11/14/2019    RBC 4.44 11/14/2019    HGB 14.2 11/14/2019    HCT 42.9 11/14/2019    MCV 97 11/14/2019    MCH 32.0 11/14/2019    MCHC 33.1 " 11/14/2019    RDW 12.7 11/14/2019     11/14/2019       CMP RESULTS:  Lab Results   Component Value Date     10/26/2019    POTASSIUM 3.9 10/26/2019    CHLORIDE 102 10/26/2019    CO2 33 (H) 10/26/2019    ANIONGAP 6 10/26/2019    GLC 71 10/26/2019    BUN 23 10/26/2019    CR 1.00 10/26/2019    GFRESTIMATED 58 (L) 10/26/2019    GFRESTBLACK 68 10/26/2019    JERROD 9.1 10/26/2019    BILITOTAL 1.9 (H) 10/26/2019    ALBUMIN 3.9 10/26/2019    ALKPHOS 285 (H) 10/26/2019    ALT 28 10/26/2019    AST 26 10/26/2019     INR RESULTS:  Lab Results   Component Value Date    INR 1.30 (H) 01/14/2017       Lab Results   Component Value Date    MAG 2.2 10/26/2019     Lab Results   Component Value Date    NTBNPI 9,009 (H) 01/13/2017     Lab Results   Component Value Date    NTBNP 1,556 (H) 10/26/2019       TTE 4/3/2019  Moderate left ventricular hypertrophy.  Global and regional left ventricular function is normal with an EF of 60-65%.  Global right ventricular function is normal.  Moderate aortic valve insufficiency.  Mild pulmonary hypertension with dilated IVC.     This study was compared with the study from 10/11/18 the AR is worse and RA  pressure is now increased.       CXR 10/27/17: Persistent small, right greater than left pleural effusions and overlying atelectasis      Assessment and Plan:   Mrs. Guerrero is a 69 year old female with stage IV  AL amyloid with GI, bone marrow and cardiac involvement who presents for follow up in the cardiac amyloid clinic.     Patient has cardiac amyloidosis as evidenced by her echocardiogram (severe concentric hypertrophy and biatrial enlargement) and abnormal EKG (near-low voltage, poor R wave progression, biatrial enlargement and no evidence of LVH despite very thickened LV on echo) in the setting of biopsy proven (BMB, EGD) AL amyloid.  she has completed chemotherapy with CyBorD with an excellent serologic response and her heart failure (both clinically and by labs - i.e. troponin now  negative, serum light chains minimal and urine light chains undetectable) has stabilized significantly.     Since her last clinic visit she reports ongoing difficulty with volume overload despite increases in her torsemide and more regularly taking metolazone. Pt feels best at 147 lbs, she is near this weight today, took metolazone two days ago. We have been struggling to find a stable regimen for her diuretics and potassium for the last few months. She is now needing metolazone more frequently, which is not ideal in the setting of her labile potassium, thus we will make some changes as below.,  Hoping that she will have a less dramatic response to a small dose of diarrheal and can use with more consistency. Her NYHA class is III today, Stage C    Cardiac Amyloidosis (a restrictive cardiomyopathy)/ HFpEF   Stage C  NYHA Class III  ACEi/ARB - none- no indication   BB - none, no indication and relatively contraindicated due to risk of progressive conduction disease/AV block in these patients  Aldosterone antagonist: discontinued given persistent hyponatremia  SCD prophylaxis: not indicated  % BiV pacing: not applicable  Fluid status: Continue torsemide 100 mg twice daily.  When weight is 150 pounds will take 250 mg of Diuril (rather than metolazone as she has been doing), will monitor weight response and determine further dosing from there.  Potassium plan as below  Remote monitoring: deferred today, could be Cardiocom candidate in the future  Goals of care: prior discussions with Dr. Cohen re: planning for EoL care as long term prognosis for AL amyloid is poor, but for now, she is not ready for palliative care    Hypokalemia. Potassium is 3.1 today.    - Take an extra 60 meq of potassium today in clinic  - Continue KCl 60 mEq BID until her weight is 150 mg at which point she will start diuril   - Increase to KCl 80 meq BID once she starts diuril  -We will need to monitor potassium response to Diuril, and consider  additional dosing from there.    Urinary Symptoms.  Vague urinary symptoms x1 month.  Was treated for a yeast infection a few weeks ago, no improvement.  Recommend that she sees her PCP for further work-up.    A. Fib/A. Flutter  Holter monitor which showed intermittent a. Fib and a. Flutter. [id number] of 4.   - Continue Eliquis 5mg BID  - Avoiding BB in the setting of amyloid  - Holding off on digoxin given good rate control. Could discuss in the future if needed.     Prolonged QTC  QTC has been >500 in the past  - Minimize QT prolonging meds  - Electrolyte management as above     Systemic amyloid-heme is presently monitoring her light chains which have been negative consistent with remission - therefore we are not contemplating further palliative chemo.   - Continue doxycycline as per oncology    Follow-up: [facility] in 1 month, Dr. Cohen in 2-3 months.    This patient was seen as part of a shared visit with Dr. Cohen.    Ciara Snow PA-C  [facility] cardiology      The patient has been seen by me with an JOEL on November 13, 2019. I have added the following specific exam findings, assessments, and components to the plan.     The patients volume status is hypervolemic.   The overall plan is as outlined in the JOEL's plan. These changes were decided by me.     Domenica Cohen MD   of Medicine   [facility] Division of Cardiology         CC   [place] [address]          Patient Care Team:  Magy Obrien MD as PCP - General (Family Practice)  Mirela Moore MD as MD (Hematology & Oncology)  Domenica Cohen MD as MD (Cardiology)  Eileen Arevalo, RN as Nurse Coordinator (Cardiology)  Codie Nelson NP as Nurse Practitioner (Cardiology)  Niki Fam, RN as Registered Nurse (Cardiology)  Ciara Araya PA-C as Physician Assistant (Physician Assistant)  Shirlene Francois, DILIP as Specialty Care  Coordinator (Cardiology)  GARRISON DALEY MD

## 2019-11-14 NOTE — PATIENT INSTRUCTIONS
Cardiology Providers you saw during your visit:  Dr. Cohen    Medication changes:  1.  Take Potassium 60 mEq today  2.  Stay on Torsemide 100 mg twice a day, Potassium 60 mEq twice a day UNTIL your weight is 150 lb.   3.  When your weight is 150 lbs, take chlorothiazide 250 mg and Potassium 80 mEq twice daily and CALL US THE NEXT DAY to let us know your weight.    4.  Depending on how much weight you lose with the first dose of chlorothiazide, we will determine how often you will take it.      Follow up:  1.  Follow up with [facility] in 1 month  2.  Follow up with Dr Cohen in  2- 3ish months  Labs:  Results for HILARY GARCIA (MRN [id number]) as of 11/14/2019 12:40   Ref. Range 11/14/2019 11:45   Sodium Latest Ref Range: 133 - 144 mmol/L 132 (L)   Potassium Latest Ref Range: 3.4 - 5.3 mmol/L 3.1 (L)   Chloride Latest Ref Range: 94 - 109 mmol/L 93 (L)   Carbon Dioxide Latest Ref Range: 20 - 32 mmol/L 36 (H)   Urea Nitrogen Latest Ref Range: 7 - 30 mg/dL 33 (H)   Creatinine Latest Ref Range: 0.52 - 1.04 mg/dL 1.14 (H)   GFR Estimate Latest Ref Range: >60 mL/min/1.73_m2 50 (L)   GFR Estimate If Black Latest Ref Range: >60 mL/min/1.73_m2 58 (L)   Calcium Latest Ref Range: 8.5 - 10.1 mg/dL 9.4   Anion Gap Latest Ref Range: 3 - 14 mmol/L 4   Albumin Latest Ref Range: 3.4 - 5.0 g/dL 4.0   Protein Total Latest Ref Range: 6.8 - 8.8 g/dL 7.2   Bilirubin Total Latest Ref Range: 0.2 - 1.3 mg/dL 2.3 (H)   Alkaline Phosphatase Latest Ref Range: 40 - 150 U/L 310 (H)   ALT Latest Ref Range: 0 - 50 U/L 29   AST Latest Ref Range: 0 - 45 U/L 24   Glucose Latest Ref Range: 70 - 99 mg/dL 93   WBC Latest Ref Range: 4.0 - 11.0 10e9/L 5.3   Hemoglobin Latest Ref Range: 11.7 - 15.7 g/dL 14.2   Hematocrit Latest Ref Range: 35.0 - 47.0 % 42.9   Platelet Count Latest Ref Range: 150 - 450 10e9/L 202   RBC Count Latest Ref Range: 3.8 - 5.2 10e12/L 4.44   MCV Latest Ref Range: 78 - 100 fl 97   MCH Latest Ref Range: 26.5 - 33.0 pg 32.0   MCHC  "Latest Ref Range: 31.5 - 36.5 g/dL 33.1   RDW Latest Ref Range: 10.0 - 15.0 % 12.7   Diff Method Unknown Automated Method   % Neutrophils Latest Units: % 69.2   % Lymphocytes Latest Units: % 5.4   % Monocytes Latest Units: % 8.3   % Eosinophils Latest Units: % 15.2   % Basophils Latest Units: % 1.5   % Immature Granulocytes Latest Units: % 0.4   Nucleated RBCs Latest Ref Range: 0 /100 0   Absolute Neutrophil Latest Ref Range: 1.6 - 8.3 10e9/L 3.7   Absolute Lymphocytes Latest Ref Range: 0.8 - 5.3 10e9/L 0.3 (L)   Absolute Monocytes Latest Ref Range: 0.0 - 1.3 10e9/L 0.4   Absolute Eosinophils Latest Ref Range: 0.0 - 0.7 10e9/L 0.8 (H)   Absolute Basophils Latest Ref Range: 0.0 - 0.2 10e9/L 0.1   Abs Immature Granulocytes Latest Ref Range: 0 - 0.4 10e9/L 0.0   Absolute Nucleated RBC Unknown 0.0     Please call if you have :  1. Weight gain of more than 2 pounds in a day or 5 pounds in a week  2. Increased shortness of breath, swelling or bloating  3. Dizziness, lightheadedness   4. Any questions or concerns.       Follow the American Heart Association Diet and Lifestyle recommendations:  Limit saturated fat, trans fat, sodium, red meat, sweets and sugar-sweetened beverages. If you choose to eat red meat, compare labels and select the leanest cuts available.  Aim for at least 150 minutes of moderate physical activity or 75 minutes of vigorous physical activity - or an equal combination of both - each week.      During business hours: [phone], press option # 1 to be routed to the [facility] then option # 4 for \"To send a message to your care team\"      After hours, weekends or holidays: On Call Cardiologist- [phone]   option #4 and ask to speak to the on-call Cardiologist. Inform them you are a CORE/heart failure patient at the [facility].      Heart Failure Support Group  [facility]  The Board Room, 8th Floor  [address] [address]    2020 Meetings  Mondays " "1-2 pm  January 6  March 2  May 4  July 6  September 14  November 2      Niki Fam RN BSN CHFN  Cardiology Care Coordinator - Heart Failure/ [facility]   Questions and [phone]   First press #1 for the [place] and then press #4 for \"To send a message to your care team\"    "

## 2019-11-14 NOTE — NURSING NOTE
Diet: Patient instructed regarding a heart failure healthy diet, including discussion of reduced fat and 2000 mg daily sodium restriction, daily weights, medication purpose and compliance, fluid restrictions and resources for patient and family to access for assistance with heart failure management.       Labs: Patient was given results of the laboratory testing obtained today and patient was instructed about when to return for the next laboratory testing.     Med Reconcile: Reviewed and verified all current medications with the patient. The updated medication list was printed and given to the patient.     Med changes: 1.  Take Potassium 60 mEq today  2.  Stay on Torsemide 100 mg twice a day, Potassium 60 mEq twice a day UNTIL your weight is 150 lb.   3.  When your weight is 150 lbs, take chlorothiazide 250 mg and Potassium 80 mEq twice daily and CALL US THE NEXT DAY to let us know your weight.    4.  Depending on how much weight you lose with the first dose of chlorothiazide, we will determine how often you will take it.      Return Appointment: Patient given instructions regarding scheduling next clinic visit: [facility] in 1 month, Dr Cohen in 2-3 months    Patient stated she understood all health information given and agreed to call with further questions or concerns.     Niki Fam RN

## 2019-11-14 NOTE — LETTER
11/14/2019      RE: Leandra Guerrero  [address] [place] [address]       Dear Colleague,    Thank you for the opportunity to participate in the care of your patient, Leandra Guerrero, at the [facility] at [facility]. Please see a copy of my visit note below.      11/14/2019    HPI:  Mrs. Guerrero is a 69 year old female with no past medical history until the Spring of 2016 when she was diagnosed with stage IV AL amyloid; she presents to clinic for follow up of cardiac amyloid (AL type).     Her cardiac and oncologic history are as follows:   Fatigue started in January 2016. She eventually had a coronary angiogram which was reportedly normal and was diagnosed with CHF and started on a bblocker and diuretics. Her symptoms progressed to the point that she was evaluated at [facility] in August/September (Dr. Barron in oncology/hematology, Dr. Nettles is cardiology). She was diagnosed with stage IV AL amyloid (+GI, +bone marrow involvement, no involvement of kidney or liver):  her work up is detailed in our previous notes, however she has lambda AL amyloidosis and she underwent CyBorD chemotherapy and excellent light chain response by serum. She wasthen  turned down at [facility] for a stem cell transplant due to her cardiac involvement.    Unfortunately in 2016, she had 2-3 more pleural taps on the R and came back to my clinic with gross anasarca.  I admitted her to the hospital in 1/2017 where close to 20-30 pounds of fluid overload of volume was removed.  Since that time her AL has been in remission by labs without further chemo; she is following with oncology on a q6 month basis.    In the past few months, Amy has continued to struggle with frequent volume overload.  Thankfully, she continues to have a good response for metolazone.  She is now requiring metolazone more frequently, about every 4 to 5 days (used to be once every 10 days).  She takes metolazone  when she is about 7 to 10 pounds above her dry weight.  Last took 2 days prior to this visit.  Unfortunately, Amy has also struggled with hypokalemia when taking metolazone and we have had a difficult time establishing a consistent dose for her.  Her current regimen has been torsemide 100 mg twice daily with metolazone every 4 to 5 days as outlined above.  She takes 60 mEq of KCl twice daily, with an extra 60 mEq on metolazone days and the day after metolazone.    She is otherwise feeling overall unchanged.  Significant fatigue which limits her day-to-day activities.  She recently went on a trip to [place] which she greatly enjoyed.  Today she has some lightheadedness and dizziness which tends to be associated with her hypokalemia, is otherwise not had significant dizziness.  No presyncope.  No syncope.  No palpitations.  No chest pain.  No orthopnea no PND.    FAMILY HISTORY:  Family History   Problem Relation Age of Onset     Other - See Comments Sister         Amyloidosis     - Sister passed from multiple myeloma, patient was also told her sister had amyloid as well  - Mom with CAD    SOCIAL HISTORY:  Social History     Social History     Marital Status:      Spouse Name: N/A     Number of Children: N/A     Years of Education: N/A     Social History Main Topics     Smoking status: Never Smoker      Smokeless tobacco: None     Alcohol Use: No     Drug Use: No     Sexual Activity: Not Asked     Social History Narrative   Lives in [place] with her , has 2 kids  Previously did office work, hasn't worked for several months now  Never smoker  Social ETOH    CURRENT MEDICATIONS:  Current Outpatient Medications   Medication Sig Dispense Refill     Acetaminophen (TYLENOL PO) Take 325 mg by mouth       apixaban ANTICOAGULANT (ELIQUIS) 5 MG tablet Take 1 tablet (5 mg) by mouth 2 times daily 60 tablet 11     doxycycline hyclate (VIBRA-TABS) 100 MG tablet Take 1 tablet (100 mg) by mouth daily 90 tablet 3  "    granisetron (KYTRIL) 1 MG tablet Take 1 tablet (1 mg) by mouth every 12 hours as needed for nausea 10 tablet 0     LORazepam (ATIVAN) 0.5 MG tablet Take 1 tablet (0.5 mg) by mouth every 6 hours as needed for anxiety or nausea 60 tablet 0     metolazone (ZAROXOLYN) 2.5 MG tablet TAKE 1 TABLET TWICE WEEKLY 26 tablet 3     ondansetron (ZOFRAN-ODT) 4 MG ODT tab Take 1 tablet (4 mg) by mouth every 6 hours as needed for nausea 15 tablet 1     potassium chloride ER (K-DUR/KLOR-CON M) 10 MEQ CR tablet Take 60 meq twice daily except for day you take Metolazone and day after Metolazone take 60 meq three times a day. 1320 tablet 3     prochlorperazine (COMPAZINE) 5 MG tablet One to two tablets PO q 6 hours prn nausea 90 tablet 1     torsemide (DEMADEX) 100 MG tablet Take 2 tablets (200 mg) by mouth 2 times daily (Patient taking differently: Take 100 mg by mouth 2 times daily ) 180 tablet 3       ROS:   Constitutional: No fever, chills, or sweats.  Weight fluctuations as above.  ENT: No visual disturbance, ear ache, epistaxis, sore throat.   Allergies/Immunologic: Negative.   Respiratory: No cough, hemoptysis.   Cardiovascular: As per HPI.   GI: +Nausea, constipation.  : No urinary frequency, dysuria, or hematuria.   Integument: Negative.   Psychiatric: feeling down and anxious since her diagnosis  Neuro: + tingling in legs bilaterally  Endocrinology: Negative.   Musculoskeletal: Negative.    EXAM:  /63 (BP Location: Left arm, Patient Position: Chair, Cuff Size: Adult Regular)   Pulse 69   Ht 1.6 m (5' 3\")   Wt 66.5 kg (146 lb 11.2 oz)   SpO2 99%   BMI 25.99 kg/m     General: appears comfortable, alert and articulate, NAD, pleasant  HEENT: EOMI, moist mucosa, no lesions, dentition intact  Heart: regular rate and rhythm. III/VI holosystolic murmur at the apex, JVD ~7 cm  Lungs: CTAB, no adventitious lung sounds  Abdomen: soft, non-tender, bowel sounds present, no hepatosplenomegaly  Extremities: significant " mostly nonpitting edema of bilateral extremities   Neurological: normal speech and affect, no gross motor deficits    Labs:  CBC RESULTS:  Lab Results   Component Value Date    WBC 5.3 11/14/2019    RBC 4.44 11/14/2019    HGB 14.2 11/14/2019    HCT 42.9 11/14/2019    MCV 97 11/14/2019    MCH 32.0 11/14/2019    MCHC 33.1 11/14/2019    RDW 12.7 11/14/2019     11/14/2019       CMP RESULTS:  Lab Results   Component Value Date     10/26/2019    POTASSIUM 3.9 10/26/2019    CHLORIDE 102 10/26/2019    CO2 33 (H) 10/26/2019    ANIONGAP 6 10/26/2019    GLC 71 10/26/2019    BUN 23 10/26/2019    CR 1.00 10/26/2019    GFRESTIMATED 58 (L) 10/26/2019    GFRESTBLACK 68 10/26/2019    JERROD 9.1 10/26/2019    BILITOTAL 1.9 (H) 10/26/2019    ALBUMIN 3.9 10/26/2019    ALKPHOS 285 (H) 10/26/2019    ALT 28 10/26/2019    AST 26 10/26/2019     INR RESULTS:  Lab Results   Component Value Date    INR 1.30 (H) 01/14/2017       Lab Results   Component Value Date    MAG 2.2 10/26/2019     Lab Results   Component Value Date    NTBNPI 9,009 (H) 01/13/2017     Lab Results   Component Value Date    NTBNP 1,556 (H) 10/26/2019       TTE 4/3/2019  Moderate left ventricular hypertrophy.  Global and regional left ventricular function is normal with an EF of 60-65%.  Global right ventricular function is normal.  Moderate aortic valve insufficiency.  Mild pulmonary hypertension with dilated IVC.     This study was compared with the study from 10/11/18 the AR is worse and RA  pressure is now increased.       CXR 10/27/17: Persistent small, right greater than left pleural effusions and overlying atelectasis      Assessment and Plan:   Mrs. Guerrero is a 69 year old female with stage IV  AL amyloid with GI, bone marrow and cardiac involvement who presents for follow up in the cardiac amyloid clinic.     Patient has cardiac amyloidosis as evidenced by her echocardiogram (severe concentric hypertrophy and biatrial enlargement) and abnormal EKG  (near-low voltage, poor R wave progression, biatrial enlargement and no evidence of LVH despite very thickened LV on echo) in the setting of biopsy proven (BMB, EGD) AL amyloid.  she has completed chemotherapy with CyBorD with an excellent serologic response and her heart failure (both clinically and by labs - i.e. troponin now negative, serum light chains minimal and urine light chains undetectable) has stabilized significantly.     Since her last clinic visit she reports ongoing difficulty with volume overload despite increases in her torsemide and more regularly taking metolazone. Pt feels best at 147 lbs, she is near this weight today, took metolazone two days ago. We have been struggling to find a stable regimen for her diuretics and potassium for the last few months. She is now needing metolazone more frequently, which is not ideal in the setting of her labile potassium, thus we will make some changes as below.,  Hoping that she will have a less dramatic response to a small dose of diarrheal and can use with more consistency. Her NYHA class is III today, Stage C    Cardiac Amyloidosis (a restrictive cardiomyopathy)/ HFpEF   Stage C  NYHA Class III  ACEi/ARB - none- no indication   BB - none, no indication and relatively contraindicated due to risk of progressive conduction disease/AV block in these patients  Aldosterone antagonist: discontinued given persistent hyponatremia  SCD prophylaxis: not indicated  % BiV pacing: not applicable  Fluid status: Continue torsemide 100 mg twice daily.  When weight is 150 pounds will take 250 mg of Diuril (rather than metolazone as she has been doing), will monitor weight response and determine further dosing from there.  Potassium plan as below  Remote monitoring: deferred today, could be Cardiocom candidate in the future  Goals of care: prior discussions with Dr. Cohen re: planning for EoL care as long term prognosis for AL amyloid is poor, but for now, she is not ready  for palliative care    Hypokalemia. Potassium is 3.1 today.    - Take an extra 60 meq of potassium today in clinic  - Continue KCl 60 mEq BID until her weight is 150 mg at which point she will start diuril   - Increase to KCl 80 meq BID once she starts diuril  -We will need to monitor potassium response to Diuril, and consider additional dosing from there.    Urinary Symptoms.  Vague urinary symptoms x1 month.  Was treated for a yeast infection a few weeks ago, no improvement.  Recommend that she sees her PCP for further work-up.    A. Fib/A. Flutter  Holter monitor which showed intermittent a. Fib and a. Flutter. [id number] of 4.   - Continue Eliquis 5mg BID  - Avoiding BB in the setting of amyloid  - Holding off on digoxin given good rate control. Could discuss in the future if needed.     Prolonged QTC  QTC has been >500 in the past  - Minimize QT prolonging meds  - Electrolyte management as above     Systemic amyloid-heme is presently monitoring her light chains which have been negative consistent with remission - therefore we are not contemplating further palliative chemo.   - Continue doxycycline as per oncology    Follow-up: [facility] in 1 month, Dr. Cohen in 2-3 months.    This patient was seen as part of a shared visit with Dr. Cohen.    Ciara Snow PA-C  [facility] cardiology      The patient has been seen by me with an JOEL on November 13, 2019. I have added the following specific exam findings, assessments, and components to the plan.     The patients volume status is hypervolemic.   The overall plan is as outlined in the JOEL's plan. These changes were decided by me.     Domenica Cohen MD   of Medicine   [facility] Division of Cardiology     CC   Dr. Braydon Barron MD  [address] [address]    Patient Care Team:  Magy Obrien MD as PCP - General (Family Practice)  Mirela Moore MD as MD (Hematology & Oncology)  Noel,  Domenica Cruz MD as MD (Cardiology)  Eileen Arevalo, RN as Nurse Coordinator (Cardiology)  Codie Nelson NP as Nurse Practitioner (Cardiology)  Niki Fam, RN as Registered Nurse (Cardiology)  Ciara Araya PA-C as Physician Assistant (Physician Assistant)  Shirlene Francois, RN as Specialty Care Coordinator (Cardiology)  GARRISON DALEY MD

## 2019-11-15 LAB
ALBUMIN SERPL ELPH-MCNC: 4.2 G/DL (ref 3.7–5.1)
ALPHA1 GLOB SERPL ELPH-MCNC: 0.4 G/DL (ref 0.2–0.4)
ALPHA2 GLOB SERPL ELPH-MCNC: 0.6 G/DL (ref 0.5–0.9)
B-GLOBULIN SERPL ELPH-MCNC: 0.7 G/DL (ref 0.6–1)
GAMMA GLOB SERPL ELPH-MCNC: 0.7 G/DL (ref 0.7–1.6)
KAPPA LC UR-MCNC: 1.6 MG/DL (ref 0.33–1.94)
KAPPA LC/LAMBDA SER: 1.11 {RATIO} (ref 0.26–1.65)
LAMBDA LC SERPL-MCNC: 1.44 MG/DL (ref 0.57–2.63)
M PROTEIN SERPL ELPH-MCNC: 0 G/DL
PROT PATTERN SERPL ELPH-IMP: NORMAL

## 2019-11-17 ENCOUNTER — MYC MEDICAL ADVICE (OUTPATIENT)
Dept: CARDIOLOGY | Facility: CLINIC | Age: 69
End: 2019-11-17

## 2019-11-17 DIAGNOSIS — I48.20 CHRONIC ATRIAL FIBRILLATION (H): Primary | ICD-10-CM

## 2019-11-18 ENCOUNTER — TELEPHONE (OUTPATIENT)
Dept: CARDIOLOGY | Facility: CLINIC | Age: 69
End: 2019-11-18

## 2019-11-18 NOTE — TELEPHONE ENCOUNTER
[facility] Call Center    Phone Message    May a detailed message be left on voicemail: yes    Reason for Call: Other: Amy calling to discuss her mediaction. Amy cannot  her new prescription of chlorothiazide (DIURIL) 250 MG tablet until this weekend, but she says she needs it now. She has gained 6 pounds since last week (was at 146 is now at 152). Amy is wondering she should take the metolazone (ZAROXOLYN) 2.5 MG tablet instead while she waits to  the new prescrition, or if she should not take anything while she waits. Please give Amy a call back at your earliest convenience.     Action Taken: Message routed to:  [facility] ([facility]

## 2019-11-18 NOTE — TELEPHONE ENCOUNTER
Called Amy back but appeared she had already gotten a message from Yolanda MORGAN.  Amy stated understanding of the plan.    Niki Fam RN

## 2019-11-20 ENCOUNTER — PATIENT OUTREACH (OUTPATIENT)
Dept: CARDIOLOGY | Facility: CLINIC | Age: 69
End: 2019-11-20

## 2019-11-20 NOTE — PROGRESS NOTES
Discussed with Yolanda who recommended that Amy take an extra 40 mEq of Potassium today and get her labs checked tomorrow instead of Saturday.  Amy stated understanding.    Date: 11/20/2019    Time of Call: 2:54 PM     Diagnosis:  Heart Failure     [ VORB ] Ordering provider: CATHY Orozco    Order: Potassium 40 mEq extra today     Order received by: Niki Fam RN       Follow-up/additional notes: follow up on labs tomorrow

## 2019-11-20 NOTE — TELEPHONE ENCOUNTER
"Called Amy to ask that she get labs tomorrow since she took the metolazone yesterday.  She already had an appointment scheduled for Saturday and was hoping to wait until then.  Her weight is back down to 145 pounds \"right where I like it.\"    She reports feeling \"wobbly and lightheaded, like I do when my potassium is low\" and when at her PCP appointment yesterday she had a headache and felt like she would pass out.  She feels better today but still not like she could go out.      She has taken her scheduled potassium (20 pills yesterday and is taking the extra dose as ordered today as well).  I strongly recommended that she get her labs tomorrow and not wait until Saturday and she thought she could manage that,     Niki Fam RN    "

## 2019-11-21 DIAGNOSIS — I43 AMYLOID HEART DISEASE (H): ICD-10-CM

## 2019-11-21 DIAGNOSIS — E85.4 AMYLOID HEART DISEASE (H): ICD-10-CM

## 2019-11-21 LAB
ANION GAP SERPL CALCULATED.3IONS-SCNC: 5 MMOL/L (ref 3–14)
BUN SERPL-MCNC: 22 MG/DL (ref 7–30)
CALCIUM SERPL-MCNC: 9.4 MG/DL (ref 8.5–10.1)
CHLORIDE SERPL-SCNC: 96 MMOL/L (ref 94–109)
CO2 SERPL-SCNC: 33 MMOL/L (ref 20–32)
CREAT SERPL-MCNC: 1.05 MG/DL (ref 0.52–1.04)
GFR SERPL CREATININE-BSD FRML MDRD: 55 ML/MIN/{1.73_M2}
GLUCOSE SERPL-MCNC: 100 MG/DL (ref 70–99)
POTASSIUM SERPL-SCNC: 4.1 MMOL/L (ref 3.4–5.3)
SODIUM SERPL-SCNC: 134 MMOL/L (ref 133–144)

## 2019-11-21 PROCEDURE — 80048 BASIC METABOLIC PNL TOTAL CA: CPT | Performed by: INTERNAL MEDICINE

## 2019-11-21 PROCEDURE — 36415 COLL VENOUS BLD VENIPUNCTURE: CPT | Performed by: INTERNAL MEDICINE

## 2019-11-21 NOTE — PROGRESS NOTES
Called Amy regarding her potassium levels.  She was pleased to hear her results and is hopeful to wait until her diuril arrives before needing to take more extra diuretics.     Niki Fam RN

## 2019-11-22 ENCOUNTER — DOCUMENTATION ONLY (OUTPATIENT)
Dept: CARDIOLOGY | Facility: CLINIC | Age: 69
End: 2019-11-22

## 2019-11-25 ENCOUNTER — MYC MEDICAL ADVICE (OUTPATIENT)
Dept: CARDIOLOGY | Facility: CLINIC | Age: 69
End: 2019-11-25

## 2019-11-25 DIAGNOSIS — E85.4 AMYLOID HEART DISEASE (H): ICD-10-CM

## 2019-11-25 DIAGNOSIS — I43 AMYLOID HEART DISEASE (H): ICD-10-CM

## 2019-11-25 NOTE — TELEPHONE ENCOUNTER
Called Amy to gather more information.  She took the diuril 250 mg on Saturday and Sunday, each day she lost 2 pounds but gained it back by the end of the day, so she remains around 154 lbs.  She has been taking potassium 80 mEq BID on the days she took the diuril.  Today she took 60 mEq. She denies any of her normal low potassium symptoms - wobbling/shaking.  She is frustrated but is willing to hold off taking a metolazone until she hears back from us regarding dosing for the diuril.    Niki Fam RN

## 2019-11-25 NOTE — TELEPHONE ENCOUNTER
Discussed with CATHY Orozco.  She recommended increasing Amy's Diuril to 500 mg daily starting today, continuing on the potassium 80 mEq BID on diuril days and to touch base tomorrow for a weight check.  Also anticipating need for labs on Wednesday.  Amy understood plan but is nervous about the diuril not working, encouraged her to try the new plan for now.     Date: 11/25/2019    Time of Call: 12:44 PM     Diagnosis:  Heart failure/AL Amyloid     [ VORB ] Ordering provider: CATHY Orozco    Order: increase diuril to 500 mg daily     Order received by: Niki Fam RN       Follow-up/additional notes: follow up on weight tomorrow

## 2019-11-26 ENCOUNTER — MYC MEDICAL ADVICE (OUTPATIENT)
Dept: CARDIOLOGY | Facility: CLINIC | Age: 69
End: 2019-11-26

## 2019-11-26 DIAGNOSIS — I43 AMYLOID HEART DISEASE (H): ICD-10-CM

## 2019-11-26 DIAGNOSIS — E85.4 AMYLOID HEART DISEASE (H): ICD-10-CM

## 2019-11-26 LAB
ANION GAP SERPL CALCULATED.3IONS-SCNC: 3 MMOL/L (ref 3–14)
BUN SERPL-MCNC: 26 MG/DL (ref 7–30)
CALCIUM SERPL-MCNC: 9.4 MG/DL (ref 8.5–10.1)
CHLORIDE SERPL-SCNC: 97 MMOL/L (ref 94–109)
CO2 SERPL-SCNC: 35 MMOL/L (ref 20–32)
CREAT SERPL-MCNC: 1.01 MG/DL (ref 0.52–1.04)
GFR SERPL CREATININE-BSD FRML MDRD: 57 ML/MIN/{1.73_M2}
GLUCOSE SERPL-MCNC: 89 MG/DL (ref 70–99)
POTASSIUM SERPL-SCNC: 4 MMOL/L (ref 3.4–5.3)
SODIUM SERPL-SCNC: 135 MMOL/L (ref 133–144)

## 2019-11-26 PROCEDURE — 36415 COLL VENOUS BLD VENIPUNCTURE: CPT | Performed by: INTERNAL MEDICINE

## 2019-11-26 PROCEDURE — 80048 BASIC METABOLIC PNL TOTAL CA: CPT | Performed by: INTERNAL MEDICINE

## 2019-11-26 NOTE — TELEPHONE ENCOUNTER
Discussed with CATHY Orozco.  She would like Amy to get labs checked today and will decide on her diuril dose based on those results.   Spoke with Amy, she is agreeable to plan.    Date: 11/26/2019    Time of Call: 11:43 AM     Diagnosis:  Heart failure/AL amyloid     [ VORB ] Ordering provider: CATHY Orozco    Order: BMP today     Order received by: Niki Fam RN       Follow-up/additional notes: follow up on labs today

## 2019-11-26 NOTE — TELEPHONE ENCOUNTER
Labs checked today, looking very stable.  Discussed with CATHY Orozco.  Amy will take 500 mg diuril today and an extra 40 mEq of potassium today and then take diuril  250 mg daily with 80 mEq potassium twice a day.  We will place an order for labs, if she is feeling her potassium is low on Friday she can get labs checked that day and give the clinic a heads up that she is getting labs done. Otherwise she will get labs checked on Monday    Date: 11/26/2019    Time of Call: 3:23 PM     Diagnosis:  Heart failure     [ VORB ] Ordering provider: CATHY Orozco    Order: BMP, take 500 mg diuril today and an extra 40 mEq of potassium today.  After that, take 250 mg of diuril and 80 mEq of potassium as previously ordered     Order received by: Niki Fam RN       Follow-up/additional notes: follow up on labs/weights

## 2019-11-26 NOTE — TELEPHONE ENCOUNTER
Called Amy to follow up on the 500 mg diuril dose.  Amy sounds better today over the phone and reports losing 5-6 lbs since yesterday. She is now at 149 lbs, which is approx 5 lbs above her comfortable weight.  She continues to feel asymptomatic regarding the potassium but did report that she felt as if her heart was pounding yesterday.  She has noticed that she has been more tired and dizzy on this medication than she normally is on the metolazone. She is willing to keep trying this medication for a little while longer to see if it helps.  She has not taken the diurill yet today.      Will route to CATHY Orozco for further directions.

## 2019-11-29 ENCOUNTER — MYC MEDICAL ADVICE (OUTPATIENT)
Dept: CARDIOLOGY | Facility: CLINIC | Age: 69
End: 2019-11-29

## 2019-11-29 DIAGNOSIS — I43 AMYLOID HEART DISEASE (H): ICD-10-CM

## 2019-11-29 DIAGNOSIS — E85.4 AMYLOID HEART DISEASE (H): ICD-10-CM

## 2019-12-02 ENCOUNTER — MYC MEDICAL ADVICE (OUTPATIENT)
Dept: CARDIOLOGY | Facility: CLINIC | Age: 69
End: 2019-12-02

## 2019-12-02 DIAGNOSIS — I43 AMYLOID HEART DISEASE (H): ICD-10-CM

## 2019-12-02 DIAGNOSIS — E85.4 AMYLOID HEART DISEASE (H): ICD-10-CM

## 2019-12-02 LAB
ANION GAP SERPL CALCULATED.3IONS-SCNC: 4 MMOL/L (ref 3–14)
BUN SERPL-MCNC: 30 MG/DL (ref 7–30)
CALCIUM SERPL-MCNC: 9.4 MG/DL (ref 8.5–10.1)
CHLORIDE SERPL-SCNC: 96 MMOL/L (ref 94–109)
CO2 SERPL-SCNC: 33 MMOL/L (ref 20–32)
CREAT SERPL-MCNC: 0.97 MG/DL (ref 0.52–1.04)
GFR SERPL CREATININE-BSD FRML MDRD: 60 ML/MIN/{1.73_M2}
GLUCOSE SERPL-MCNC: 104 MG/DL (ref 70–99)
POTASSIUM SERPL-SCNC: 3.6 MMOL/L (ref 3.4–5.3)
SODIUM SERPL-SCNC: 133 MMOL/L (ref 133–144)

## 2019-12-02 PROCEDURE — 36415 COLL VENOUS BLD VENIPUNCTURE: CPT | Performed by: INTERNAL MEDICINE

## 2019-12-02 PROCEDURE — 80048 BASIC METABOLIC PNL TOTAL CA: CPT | Performed by: INTERNAL MEDICINE

## 2019-12-03 ENCOUNTER — MYC MEDICAL ADVICE (OUTPATIENT)
Dept: CARDIOLOGY | Facility: CLINIC | Age: 69
End: 2019-12-03

## 2019-12-03 RX ORDER — POTASSIUM CHLORIDE 750 MG/1
80 TABLET, EXTENDED RELEASE ORAL 2 TIMES DAILY
Qty: 1440 TABLET | Refills: 3 | Status: SHIPPED | OUTPATIENT
Start: 2019-12-03 | End: 2019-12-19

## 2019-12-03 NOTE — TELEPHONE ENCOUNTER
Called Amy to follow up on labs.  She reports that she ended up taking 500 mg diuril both Saturday and Sunday this weekend because her weight 153 lbs, on both these days she took Potassium 80 mEq BID.  Monday she took Diuril 250 mg and Potassium 60 mEq BID since her weight was back to 146 pounds.  She will take an extra Potassium 40 mEq today for a potassium of 3.6 yesterday.     She continues to have some dizziness/lightheadedness, diarrhea and nausea.  At this point she is still managing these symptoms and is willing to continue taking this medication.      She would like her potassium order clarified, I will discuss with CATHY Orozco.      Niki Fam RN

## 2019-12-03 NOTE — TELEPHONE ENCOUNTER
Updating orders:    Date: 12/3/2019    Time of Call: 2:55 PM     Diagnosis:  Amyloid/Heart failure     [ VORB ] Ordering provider: CATHY Orozco    Order: Potassium 80 mEq BID, BMP next week     Order received by: Niki Fam RN       Follow-up/additional notes: follow up on labs next week

## 2019-12-11 DIAGNOSIS — E85.4 AMYLOID HEART DISEASE (H): ICD-10-CM

## 2019-12-11 DIAGNOSIS — I43 AMYLOID HEART DISEASE (H): ICD-10-CM

## 2019-12-11 LAB
ANION GAP SERPL CALCULATED.3IONS-SCNC: 7 MMOL/L (ref 3–14)
BUN SERPL-MCNC: 32 MG/DL (ref 7–30)
CALCIUM SERPL-MCNC: 9.6 MG/DL (ref 8.5–10.1)
CHLORIDE SERPL-SCNC: 93 MMOL/L (ref 94–109)
CO2 SERPL-SCNC: 35 MMOL/L (ref 20–32)
CREAT SERPL-MCNC: 1.1 MG/DL (ref 0.52–1.04)
GFR SERPL CREATININE-BSD FRML MDRD: 52 ML/MIN/{1.73_M2}
GLUCOSE SERPL-MCNC: 99 MG/DL (ref 70–99)
POTASSIUM SERPL-SCNC: 3.8 MMOL/L (ref 3.4–5.3)
SODIUM SERPL-SCNC: 135 MMOL/L (ref 133–144)

## 2019-12-11 PROCEDURE — 80048 BASIC METABOLIC PNL TOTAL CA: CPT | Performed by: PHYSICIAN ASSISTANT

## 2019-12-11 PROCEDURE — 36415 COLL VENOUS BLD VENIPUNCTURE: CPT | Performed by: PHYSICIAN ASSISTANT

## 2019-12-11 NOTE — TELEPHONE ENCOUNTER
Called Amy and clarified that she took 1/2 tab of metolazone on 12/10 and did not take extra potassium with it. She only plans on taking metolazone when her wt is 150lb or above.  Radha Benson RN

## 2019-12-11 NOTE — TELEPHONE ENCOUNTER
Called Ginger to see how she's doing this week.  She has decided to stop the diuril and go back to the metolazone as needed.  She had too many symptoms that she was too uncomfortable with on the diuril, and felt that since she was seeing Yolanda MORGAN next week, this would be the time to try switching back and seeing if her symptoms improved with the metolazone.  She continues to take potassium 80 mEq BID.  Carlitamarquez reports feeling much better this week than she did last week but still getting tired fairly easily.  Will update CATHY Orozco.     Niki Fam RN

## 2019-12-12 DIAGNOSIS — E85.4 AMYLOID HEART DISEASE (H): ICD-10-CM

## 2019-12-12 DIAGNOSIS — I43 AMYLOID HEART DISEASE (H): ICD-10-CM

## 2019-12-13 DIAGNOSIS — I43 AMYLOID HEART DISEASE (H): ICD-10-CM

## 2019-12-13 DIAGNOSIS — E85.4 AMYLOID HEART DISEASE (H): ICD-10-CM

## 2019-12-15 ENCOUNTER — MYC MEDICAL ADVICE (OUTPATIENT)
Dept: CARDIOLOGY | Facility: CLINIC | Age: 69
End: 2019-12-15

## 2019-12-16 DIAGNOSIS — I50.32 CHRONIC DIASTOLIC HEART FAILURE (H): Primary | ICD-10-CM

## 2019-12-16 ASSESSMENT — ENCOUNTER SYMPTOMS
PALPITATIONS: 1
MUSCLE WEAKNESS: 1
CONSTIPATION: 1
JAUNDICE: 0
SYNCOPE: 0
NAUSEA: 1
ORTHOPNEA: 0
RECTAL PAIN: 0
BLOOD IN STOOL: 0
MYALGIAS: 1
LIGHT-HEADEDNESS: 1
EXERCISE INTOLERANCE: 1
ARTHRALGIAS: 0
BOWEL INCONTINENCE: 0
VOMITING: 0
HYPOTENSION: 0
JOINT SWELLING: 0
ABDOMINAL PAIN: 0
LEG PAIN: 1
STIFFNESS: 0
SLEEP DISTURBANCES DUE TO BREATHING: 0
BACK PAIN: 1
MUSCLE CRAMPS: 1
HEARTBURN: 0
DIARRHEA: 1
BLOATING: 0
HYPERTENSION: 0
NECK PAIN: 0

## 2019-12-17 ENCOUNTER — MYC MEDICAL ADVICE (OUTPATIENT)
Dept: CARDIOLOGY | Facility: CLINIC | Age: 69
End: 2019-12-17

## 2019-12-18 NOTE — TELEPHONE ENCOUNTER
Called [facility], they have 6 pills left for a 3 day supply.  Called Amy to update her.  She spoke to [facility] who told her that they had mailed out her Elaquis on Monday.  By her calculations she should have enough to get her through until the [facility] script arrives.      Niki Fam RN

## 2019-12-19 ENCOUNTER — OFFICE VISIT (OUTPATIENT)
Dept: CARDIOLOGY | Facility: CLINIC | Age: 69
End: 2019-12-19
Attending: INTERNAL MEDICINE
Payer: COMMERCIAL

## 2019-12-19 VITALS
WEIGHT: 145 LBS | OXYGEN SATURATION: 100 % | BODY MASS INDEX: 25.69 KG/M2 | HEIGHT: 63 IN | SYSTOLIC BLOOD PRESSURE: 109 MMHG | HEART RATE: 60 BPM | DIASTOLIC BLOOD PRESSURE: 63 MMHG

## 2019-12-19 DIAGNOSIS — E87.6 HYPOKALEMIA: ICD-10-CM

## 2019-12-19 DIAGNOSIS — E85.4 AMYLOID HEART DISEASE (H): ICD-10-CM

## 2019-12-19 DIAGNOSIS — R94.31 QT PROLONGATION: ICD-10-CM

## 2019-12-19 DIAGNOSIS — I48.92 ATRIAL FLUTTER, UNSPECIFIED TYPE (H): ICD-10-CM

## 2019-12-19 DIAGNOSIS — I43 AMYLOID HEART DISEASE (H): ICD-10-CM

## 2019-12-19 DIAGNOSIS — I50.32 CHRONIC DIASTOLIC HEART FAILURE (H): Primary | ICD-10-CM

## 2019-12-19 DIAGNOSIS — I50.32 CHRONIC DIASTOLIC HEART FAILURE (H): ICD-10-CM

## 2019-12-19 LAB
ANION GAP SERPL CALCULATED.3IONS-SCNC: 2 MMOL/L (ref 3–14)
BUN SERPL-MCNC: 29 MG/DL (ref 7–30)
CALCIUM SERPL-MCNC: 9.6 MG/DL (ref 8.5–10.1)
CHLORIDE SERPL-SCNC: 94 MMOL/L (ref 94–109)
CO2 SERPL-SCNC: 36 MMOL/L (ref 20–32)
CREAT SERPL-MCNC: 1.04 MG/DL (ref 0.52–1.04)
GFR SERPL CREATININE-BSD FRML MDRD: 55 ML/MIN/{1.73_M2}
GLUCOSE SERPL-MCNC: 72 MG/DL (ref 70–99)
POTASSIUM SERPL-SCNC: 3.3 MMOL/L (ref 3.4–5.3)
SODIUM SERPL-SCNC: 133 MMOL/L (ref 133–144)

## 2019-12-19 PROCEDURE — 99214 OFFICE O/P EST MOD 30 MIN: CPT | Mod: ZP | Performed by: PHYSICIAN ASSISTANT

## 2019-12-19 PROCEDURE — 80048 BASIC METABOLIC PNL TOTAL CA: CPT | Performed by: PHYSICIAN ASSISTANT

## 2019-12-19 PROCEDURE — G0463 HOSPITAL OUTPT CLINIC VISIT: HCPCS | Mod: ZF

## 2019-12-19 PROCEDURE — 36415 COLL VENOUS BLD VENIPUNCTURE: CPT | Performed by: PHYSICIAN ASSISTANT

## 2019-12-19 ASSESSMENT — PAIN SCALES - GENERAL: PAINLEVEL: NO PAIN (0)

## 2019-12-19 ASSESSMENT — MIFFLIN-ST. JEOR: SCORE: 1161.85

## 2019-12-19 NOTE — LETTER
12/19/2019    RE: Leandra Guerrero  [address] [place] [address]     Dear Colleague,    Thank you for the opportunity to participate in the care of your patient, Leandra Guerrero, at the [facility]. Please see a copy of my visit note below.    HPI:   Ms. Guerrero is a 69 year old female with a past medical history including stage IV AL amyloid diagnosed in 2016. Presents to clinic for CORE follow-up.     Her cardiac and oncologic history are as follows: fatigue starting in 1/2016. She had a coronary angiogram which was reportedly normal but was diagnosed with HF and started on a BBand diuretics. Her symptoms progressed to the point that she was evaluated at [facility] in August/September (Dr. Barron in oncology/hematology, Dr. Nettles is cardiology). She was diagnosed with stage IV AL amyloid (+GI, +bone marrow involvement, no involvement of kidney or liver). Her work up is detailed in our previous notes, however she has lambda AL amyloidosis and she underwent CyBorD chemotherapy and excellent light chain response by serum. Patient has been turned down at [facility] for a stem cell transplant due to her cardiac involvement. Later in 2016, she had 2-3 more right sided pleural taps. She was hospitalized 1/2017 and diuresed 20-30 pounds at that time. Since that time her AL has been in remission by labs without further chemo. Patient was then treated with doxycycline but this was stopped ~6-9 months ago due to thought it wasn't benefiting per patient, although now recently restarted again. She was last admitted to [facility] 4/2 for shortness of breath and edema. Echocardiogram showed EF 60-65%, moderate LVH, normal RV, moderate AI, and mild PH.      Last visit: Dr. Cohen 11/14/2019   Had been needing metolazone frequently.  Continue to struggle with hypervolemia.  Trialed on Diuril and maintain a more consistent diuretic regimen.  Right improving her  ability on diuretics, Diuril made her feel very unwell, fatigue, nausea, dizzy.  She has returned to taking metolazone.    This visit:  At this visit, she feels overall the same past few visits.  She does note a decline over the last year.  She last took metolazone on Monday for a weight of 152, she lost about 7 pounds and is been stable since.  She took 60 extra milliequivalents of potassium on the day of metolazone and 60 mEq of potassium the day after.  This is in addition to her regular 80 mEq twice daily of potassium.  Her lower extremity edema is below her baseline, no orthopnea no PND.  Her breathing is okay at rest. She does have some dyspnea on exertion which is at the baseline.  She continues to have fatigue which is bothersome to her but has not worsened.  She has had no further dizziness.  She has had some palpitations again, last less than 1 minute, no dizziness, usually occur when she is lying down at night.  She has intermittent nausea this gets better with ginger ale.  No chest pain.    She plans to go to [place] again in early January. Will stay for 1-2 weeks.    PAST MEDICAL HISTORY:  Past Medical History:   Diagnosis Date     AL amyloidosis (H)      Atrial fibrillation and flutter (H)      Cardiac amyloidosis (H)      Lymphedema      QT prolongation      Recurrent right pleural effusion 1/2/2017     SVT (supraventricular tachycardia) (H)        FAMILY HISTORY:  Family History   Problem Relation Age of Onset     Other - See Comments Sister         Amyloidosis       SOCIAL HISTORY:  Socioeconomic History     Marital status:    Tobacco Use     Smoking status: Never Smoker     Smokeless tobacco: Never Used   Substance and Sexual Activity     Alcohol use: No     Drug use: No   Other Topics Concern     CURRENT MEDICATIONS:  Acetaminophen (TYLENOL PO), Take 325 mg by mouth  apixaban ANTICOAGULANT (ELIQUIS ANTICOAGULANT) 5 MG tablet, Take 1 tablet (5 mg) by mouth 2 times daily  apixaban  "ANTICOAGULANT (ELIQUIS ANTICOAGULANT) 5 MG tablet, Take 1 tablet (5 mg) by mouth 2 times daily  doxycycline hyclate (VIBRA-TABS) 100 MG tablet, Take 1 tablet (100 mg) by mouth daily  LORazepam (ATIVAN) 0.5 MG tablet, Take 1 tablet (0.5 mg) by mouth every 6 hours as needed for anxiety or nausea  metolazone (ZAROXOLYN) 2.5 MG tablet, TAKE 1 TABLET TWICE WEEKLY  ondansetron (ZOFRAN-ODT) 4 MG ODT tab, Take 1 tablet (4 mg) by mouth every 6 hours as needed for nausea  potassium chloride ER (K-DUR/KLOR-CON M) 10 MEQ CR tablet, Take 8 tablets (80 mEq) by mouth 2 times daily  prochlorperazine (COMPAZINE) 5 MG tablet, One to two tablets PO q 6 hours prn nausea  torsemide (DEMADEX) 100 MG tablet, Take 2 tablets (200 mg) by mouth 2 times daily (Patient taking differently: Take 100 mg by mouth 2 times daily )  chlorothiazide (DIURIL) 250 MG tablet, Take 2 tablets (500 mg) by mouth every morning (Patient not taking: Reported on 12/19/2019)  granisetron (KYTRIL) 1 MG tablet, Take 1 tablet (1 mg) by mouth every 12 hours as needed for nausea (Patient not taking: Reported on 12/19/2019)    No current facility-administered medications on file prior to visit.       ROS:   CONSTITUTIONAL: Denies fever, chills, +fatigue, chronic.  HEENT: Denies headache, vision changes, and changes in speech.   CV: Refer to HPI.   PULMONARY: Refer to HPI.   GI: Refer to HPI.   :  + urinary frequency 2/2 diuretics.  Otherwise no urinary symptoms  EXT: +Chronic lower extremity edema see hpi  SKIN: Denies abnormal rashes or lesions.   MUSCULOSKELETAL: Denies upper or lower extremity weakness and pain.   NEUROLOGIC: Denies dizziness, seizures, or upper or lower extremity paresthesia.     EXAM:  /63 (BP Location: Left arm, Patient Position: Chair, Cuff Size: Adult Regular)   Pulse 60   Ht 1.6 m (5' 3\")   Wt 65.8 kg (145 lb)   SpO2 100%   BMI 25.69 kg/m        GENERAL: Appears comfortable, in no acute distress, speaking in full sentences and able " to communicate all needs.   HEENT: Eye symmetrical, no discharge. Mucous membranes moist and without lesions.  CV: RRR, +S1S2, no murmur, rub, or gallop. JVP at about 1-2 cm above clavicle at 90 degrees  RESPIRATORY: Respirations regular, even, and unlabored. Lungs CTA throughout.   GI: Soft and non-distended with normoactive bowel sounds present. No tenderness, rebound, guarding. No hepatomegaly.   EXTREMITIES: Moderate non pitting peripheral edema. 2+ bilateral pedal pulses.   NEUROLOGIC: Alert and interacting appropiratly. No focal deficits.   MUSCULOSKELETAL: No joint swelling or tenderness.   SKIN: No jaundice. No rashes or lesions.     Labs, reviewed with patient in clinic today:  CBC RESULTS:  Lab Results   Component Value Date    WBC 5.3 11/14/2019    RBC 4.44 11/14/2019    HGB 14.2 11/14/2019    HCT 42.9 11/14/2019    MCV 97 11/14/2019    MCH 32.0 11/14/2019    MCHC 33.1 11/14/2019    RDW 12.7 11/14/2019     11/14/2019       CMP RESULTS:  Lab Results   Component Value Date     12/19/2019    POTASSIUM 3.3 (L) 12/19/2019    CHLORIDE 94 12/19/2019    CO2 36 (H) 12/19/2019    ANIONGAP 2 (L) 12/19/2019    GLC 72 12/19/2019    BUN 29 12/19/2019    CR 1.04 12/19/2019    GFRESTIMATED 55 (L) 12/19/2019    GFRESTBLACK 64 12/19/2019    JERROD 9.6 12/19/2019    BILITOTAL 2.3 (H) 11/14/2019    ALBUMIN 4.0 11/14/2019    ALKPHOS 310 (H) 11/14/2019    ALT 29 11/14/2019    AST 24 11/14/2019        INR RESULTS:  Lab Results   Component Value Date    INR 1.30 (H) 01/14/2017       Lab Results   Component Value Date    MAG 2.2 10/26/2019     Lab Results   Component Value Date    NTBNPI 9,009 (H) 01/13/2017     Lab Results   Component Value Date    NTBNP 1,556 (H) 10/26/2019       Diagnostics:  TTE 4/9/19  Moderate left ventricular hypertrophy.  Global and regional left ventricular function is normal with an EF of 60-65%.  Global right ventricular function is normal.  Moderate aortic valve insufficiency.  Mild  pulmonary hypertension with dilated IVC.  This study was compared with the study from 10/11/18 the AR is worse and RA pressure is now increased.    TTE 10/11/18  Global and regional left ventricular function is normal with an EF of 55-60%.  Moderate concentric wall thickening consistent with left ventricular  hypertrophy is present - known amyloid.  Grade III or advanced diastolic dysfunction.  Normal right ventricular size, wall thickness, and function.  Estimated pulmonary artery pressure 28 mmHg.  IVC with normal diameter but does not collapse (>50%) with inspiration.  Trace pericardial effusion.  Compared to prior study from 8/17/17, no significant change.    Ziopatch 11/2017      Holter Monitor 06/2019  INTERPRETATION  1. The rhythm varied with sinus rhythm and atrial fibrillation/variable atrial flutter. Low voltage noted.  2. The heart rate varied with a minimum rate of 48 bpm, maximum rate of 164 bpm, average rate of 74 bpm.  3. Frequent supraventricular ectopy was seen ranging from 0-469 per hour. Occasional atrail pairs and fairly frequent bigeminal cycles were noted.  Wandering atrial pacemaker noted.  4. Infrequent multifocal ventricular ectopy was seen ranging from 0-36 beats per hour.  5. ST-T wave changes were present.  6. Three patient events were documented and correlated with atrial fibrillation/flutter    EKG 8/8/2019 for palpitations  - NSR at a rate of 70, LA 0.19, QTc 525, QRS is narrow. Occasional PVCs. Biphasic twaves in V4-V6, unchanged from priors.    Assessment and Plan:   Mrs. Guerrero is a 69 year old female with AL amyloidosis with cardiac manifestation who presents to CORE for hospital follow-up. Patient has cardiac amyloidosis as evidenced by her echocardiogram (severe concentric hypertrophy and biatrial enlargement) and abnormal EKG (near-low voltage, poor R wave progression, biatrial enlargement and no evidence of LVH despite thicken LV on echo) in the setting of biopsy proven (BMB,  EGD) AL amyloid. She completed chemotherapy with CyBorD with an excellent serologic response and her heart failure (i.e. troponin negative, NT pro BNP was stable, serum light chains minimal and urine light chains undetectable).     Overall, Aym has maintained her quality of life much longer than originally expected.  We are taking it on a month by month basis.  At this point she has not felt ready for palliative care or hospice.    She has gradually declined over the last year and has ongoing challenges with her volume status.  She requires frequent metolazone and maintaining an adequate potassium level has been a challenge.  Today she is hypokalemic again, please see below.  We did trial her on Diuril in hopes that this would improve the consistency of her regimen but she did not feel well on this medication so we have resumed intermittent metolazone.  Creatinine is stable.     She is planning to go on vacation to [place] in early January.  She will get labs 3 to 4 days before leaving [facility]. She has a lab in [place] which she has used before. She will call her insurance regarding which hospital to go to in case of an emergency.    # Chronic diastolic heart failure/restrictive cardiomyopathy 2/2  # Cardiac amyloidosis    Stage C. NYHA Class III.    Fluid status: Euvolemic, continue torsemide 100 mg BID, KCl 80 meq BID. Needs metolazone every 5-7 days, will take metolazone when >152 lbs, will alert clinic when she take it as she requires labs 2-3 days later due to hypokalemia. When she takes metolazone she will take an ADDITIONAL 80 meq Kcl on the metolazone day and the day after.  ACEi/ARB/ARNI: n/a   BB: no indication and relatively contraindicated due to risk of progressive conduction disease/AV block in these patients  Aldosterone antagonist: d/c'd given hyponatremia, Na improving and she is feeling better   SCD prophylaxis: does not meet criteria for implant  NSAID use: contraindicated  Sleep apnea evaluation:  deferred today  Remote monitoring: deferred today, could be Cardiocom candidate   Goals of care: prior discussions with Dr Cohen re: planning for EoL care as long term prognosis for AL amyloid is poor.     # Hypokalemia  3.3 today after taking metolazone 2 days ago. Will take an additional 40 meq of Kcl now, and an addtional 40 meq Kcl when she gets home. She will also take her usual 80 meq BID.  - Labs next week    # A. Fib/A. Flutter  We did a holter monitor which showed intermittent a. Fib and a. Flutter. [id number] of 4. Occasional palpitations which last less than 1 minute, if these become more frequent can repeat monitor to assess burden.  - Continue Eliquis 5mg BID  - Avoiding BB in the setting of amyloid  - Holding off on digoxin given good rate control. Could discuss in the future if needed.    # Prolonged QTC  - Minimize QT prolonging meds    # Systemic amyloid  Heme previously monitoring her light chains which have been negative consistent with remission - therefore further palliative chemo not being considered. Nausea has been a large issue and is currently, Kytril was added at her last oncology appointment, but she has not started taking this. to her regimen of compazine and lorazepam.  - Consider palliative care referal if nausea not controlled on her current regimen (per onc)  - She is back on doxycycline     Follow: Up: Labs in one week (Assess K), Labs prior to vacation (1/3/2020), CORE in early Feb and Dr. Cohen as scheduled in [facility]    30 minutes spent face-to-face with patient, >50% in counseling and/or coordination of care as described above    Ciara Araya PA-C  [facility]    CC  STEPHY, ALEJANDRO RAMÍREZ

## 2019-12-19 NOTE — NURSING NOTE
Diet: Patient instructed regarding a heart failure healthy diet, including discussion of reduced fat and 2000 mg daily sodium restriction, daily weights, medication purpose and compliance, fluid restrictions and resources for patient and family to access for assistance with heart failure management.       Labs: Patient was given results of the laboratory testing obtained today and patient was instructed about when to return for the next laboratory testing.     Med Reconcile: Reviewed and verified all current medications with the patient. The updated medication list was printed and given to the patient.     Med changes: Take potassium 40 mEq now, take potassium 40 mEq when she gets home,     Return Appointment: Patient given instructions regarding scheduling next clinic visit: Labs checked on 12/23 and 1/3.  Follow up with CORE in early February and with [facility] as scheduled in March    Patient stated she understood all health information given and agreed to call with further questions or concerns.     Niki Fam RN

## 2019-12-19 NOTE — PATIENT INSTRUCTIONS
Take your medicines every day, as directed    Changes made today:  o Please take 40 meq of Potassium now, please take 40 meq of Potassium when you get home  o Next time you take metolazone take 80 meq of Potassium on the metolazone day and the day after  o No other med changes     Monitor Your Weight and Symptoms    Contact us if you:      Gain 2 pounds in one day or 5 pounds in one week    Feel more short of breath    Notice more leg swelling    Feel lightheadeded   Change your lifestyle    Limit Salt or Sodium:    2000 mg  Limit Fluids:    2000 mL or approximately 64 ounces  Eat a Heart Healthy Diet    Low in saturated fats  Stay Active:    Aim to move at least 150 minutes every  week         To Contact us    During Business Hours:  [phone], option # 1 (University)  Then option # 4 (medical questions)     After hours, weekends or holidays:   [phone], Option #4  Ask to speak to the On-Call Cardiologist. Inform them you are a CORE/heart failure patient at the [facility].     Use [employer] allows you to communicate directly with your heart team through secure messaging.    [employer] can be accessed any time on your phone, computer, or tablet.    If you need assistance, we'd be happy to help!         Keep your Heart Appointments:    - Please get labs 1/3/2020  - Please recheck labs on Monday 12/23/19  - Please see CORE in early Feb  - Dr. Cohen is scheduled in March

## 2019-12-19 NOTE — PROGRESS NOTES
HPI:   Ms. Guerrero is a 69 year old female with a past medical history including stage IV AL amyloid diagnosed in 2016. Presents to clinic for [facility] follow-up.     Her cardiac and oncologic history are as follows: fatigue starting in 1/2016. She had a coronary angiogram which was reportedly normal but was diagnosed with HF and started on a BBand diuretics. Her symptoms progressed to the point that she was evaluated at [facility] in August/September (Dr. Barron in oncology/hematology, Dr. Nettles is cardiology). She was diagnosed with stage IV AL amyloid (+GI, +bone marrow involvement, no involvement of kidney or liver). Her work up is detailed in our previous notes, however she has lambda AL amyloidosis and she underwent CyBorD chemotherapy and excellent light chain response by serum. Patient has been turned down at [facility] for a stem cell transplant due to her cardiac involvement. Later in 2016, she had 2-3 more right sided pleural taps. She was hospitalized 1/2017 and diuresed 20-30 pounds at that time. Since that time her AL has been in remission by labs without further chemo. Patient was then treated with doxycycline but this was stopped ~6-9 months ago due to thought it wasn't benefiting per patient, although now recently restarted again. She was last admitted to [facility] 4/2 for shortness of breath and edema. Echocardiogram showed EF 60-65%, moderate LVH, normal RV, moderate AI, and mild PH.      Last visit: Dr. Cohen 11/14/2019   Had been needing metolazone frequently.  Continue to struggle with hypervolemia.  Trialed on Diuril and maintain a more consistent diuretic regimen.  Right improving her ability on diuretics, Diuril made her feel very unwell, fatigue, nausea, dizzy.  She has returned to taking metolazone.    This visit:  At this visit, she feels overall the same past few visits.  She does note a decline over the last year.  She last took metolazone on Monday for a weight of 152, she lost about 7 pounds and is  been stable since.  She took 60 extra milliequivalents of potassium on the day of metolazone and 60 mEq of potassium the day after.  This is in addition to her regular 80 mEq twice daily of potassium.  Her lower extremity edema is below her baseline, no orthopnea no PND.  Her breathing is okay at rest. She does have some dyspnea on exertion which is at the baseline.  She continues to have fatigue which is bothersome to her but has not worsened.  She has had no further dizziness.  She has had some palpitations again, last less than 1 minute, no dizziness, usually occur when she is lying down at night.  She has intermittent nausea this gets better with ginger ale.  No chest pain.    She plans to go to [place] again in early January. Will stay for 1-2 weeks.    PAST MEDICAL HISTORY:  Past Medical History:   Diagnosis Date     AL amyloidosis (H)      Atrial fibrillation and flutter (H)      Cardiac amyloidosis (H)      Lymphedema      QT prolongation      Recurrent right pleural effusion 1/2/2017     SVT (supraventricular tachycardia) (H)        FAMILY HISTORY:  Family History   Problem Relation Age of Onset     Other - See Comments Sister         Amyloidosis       SOCIAL HISTORY:  Socioeconomic History     Marital status:    Tobacco Use     Smoking status: Never Smoker     Smokeless tobacco: Never Used   Substance and Sexual Activity     Alcohol use: No     Drug use: No   Other Topics Concern     CURRENT MEDICATIONS:  Acetaminophen (TYLENOL PO), Take 325 mg by mouth  apixaban ANTICOAGULANT (ELIQUIS ANTICOAGULANT) 5 MG tablet, Take 1 tablet (5 mg) by mouth 2 times daily  apixaban ANTICOAGULANT (ELIQUIS ANTICOAGULANT) 5 MG tablet, Take 1 tablet (5 mg) by mouth 2 times daily  doxycycline hyclate (VIBRA-TABS) 100 MG tablet, Take 1 tablet (100 mg) by mouth daily  LORazepam (ATIVAN) 0.5 MG tablet, Take 1 tablet (0.5 mg) by mouth every 6 hours as needed for anxiety or nausea  metolazone (ZAROXOLYN) 2.5 MG tablet, TAKE  "1 TABLET TWICE WEEKLY  ondansetron (ZOFRAN-ODT) 4 MG ODT tab, Take 1 tablet (4 mg) by mouth every 6 hours as needed for nausea  potassium chloride ER (K-DUR/KLOR-CON M) 10 MEQ CR tablet, Take 8 tablets (80 mEq) by mouth 2 times daily  prochlorperazine (COMPAZINE) 5 MG tablet, One to two tablets PO q 6 hours prn nausea  torsemide (DEMADEX) 100 MG tablet, Take 2 tablets (200 mg) by mouth 2 times daily (Patient taking differently: Take 100 mg by mouth 2 times daily )  chlorothiazide (DIURIL) 250 MG tablet, Take 2 tablets (500 mg) by mouth every morning (Patient not taking: Reported on 12/19/2019)  granisetron (KYTRIL) 1 MG tablet, Take 1 tablet (1 mg) by mouth every 12 hours as needed for nausea (Patient not taking: Reported on 12/19/2019)    No current facility-administered medications on file prior to visit.       ROS:   CONSTITUTIONAL: Denies fever, chills, +fatigue, chronic.  HEENT: Denies headache, vision changes, and changes in speech.   CV: Refer to HPI.   PULMONARY: Refer to HPI.   GI: Refer to HPI.   :  + urinary frequency 2/2 diuretics.  Otherwise no urinary symptoms  EXT: +Chronic lower extremity edema see hpi  SKIN: Denies abnormal rashes or lesions.   MUSCULOSKELETAL: Denies upper or lower extremity weakness and pain.   NEUROLOGIC: Denies dizziness, seizures, or upper or lower extremity paresthesia.     EXAM:  /63 (BP Location: Left arm, Patient Position: Chair, Cuff Size: Adult Regular)   Pulse 60   Ht 1.6 m (5' 3\")   Wt 65.8 kg (145 lb)   SpO2 100%   BMI 25.69 kg/m       GENERAL: Appears comfortable, in no acute distress, speaking in full sentences and able to communicate all needs.   HEENT: Eye symmetrical, no discharge. Mucous membranes moist and without lesions.  CV: RRR, +S1S2, no murmur, rub, or gallop. JVP at about 1-2 cm above clavicle at 90 degrees  RESPIRATORY: Respirations regular, even, and unlabored. Lungs CTA throughout.   GI: Soft and non-distended with normoactive bowel sounds " present. No tenderness, rebound, guarding. No hepatomegaly.   EXTREMITIES: Moderate non pitting peripheral edema. 2+ bilateral pedal pulses.   NEUROLOGIC: Alert and interacting appropiratly. No focal deficits.   MUSCULOSKELETAL: No joint swelling or tenderness.   SKIN: No jaundice. No rashes or lesions.     Labs, reviewed with patient in clinic today:  CBC RESULTS:  Lab Results   Component Value Date    WBC 5.3 11/14/2019    RBC 4.44 11/14/2019    HGB 14.2 11/14/2019    HCT 42.9 11/14/2019    MCV 97 11/14/2019    MCH 32.0 11/14/2019    MCHC 33.1 11/14/2019    RDW 12.7 11/14/2019     11/14/2019       CMP RESULTS:  Lab Results   Component Value Date     12/19/2019    POTASSIUM 3.3 (L) 12/19/2019    CHLORIDE 94 12/19/2019    CO2 36 (H) 12/19/2019    ANIONGAP 2 (L) 12/19/2019    GLC 72 12/19/2019    BUN 29 12/19/2019    CR 1.04 12/19/2019    GFRESTIMATED 55 (L) 12/19/2019    GFRESTBLACK 64 12/19/2019    JERROD 9.6 12/19/2019    BILITOTAL 2.3 (H) 11/14/2019    ALBUMIN 4.0 11/14/2019    ALKPHOS 310 (H) 11/14/2019    ALT 29 11/14/2019    AST 24 11/14/2019        INR RESULTS:  Lab Results   Component Value Date    INR 1.30 (H) 01/14/2017       Lab Results   Component Value Date    MAG 2.2 10/26/2019     Lab Results   Component Value Date    NTBNPI 9,009 (H) 01/13/2017     Lab Results   Component Value Date    NTBNP 1,556 (H) 10/26/2019       Diagnostics:  TTE 4/9/19  Moderate left ventricular hypertrophy.  Global and regional left ventricular function is normal with an EF of 60-65%.  Global right ventricular function is normal.  Moderate aortic valve insufficiency.  Mild pulmonary hypertension with dilated IVC.  This study was compared with the study from 10/11/18 the AR is worse and RA pressure is now increased.    TTE 10/11/18  Global and regional left ventricular function is normal with an EF of 55-60%.  Moderate concentric wall thickening consistent with left ventricular  hypertrophy is present - known  amyloid.  Grade III or advanced diastolic dysfunction.  Normal right ventricular size, wall thickness, and function.  Estimated pulmonary artery pressure 28 mmHg.  IVC with normal diameter but does not collapse (>50%) with inspiration.  Trace pericardial effusion.  Compared to prior study from 8/17/17, no significant change.    Ziopatch 11/2017      Holter Monitor 06/2019  INTERPRETATION  1. The rhythm varied with sinus rhythm and atrial fibrillation/variable atrial flutter. Low voltage noted.  2. The heart rate varied with a minimum rate of 48 bpm, maximum rate of 164 bpm, average rate of 74 bpm.  3. Frequent supraventricular ectopy was seen ranging from 0-469 per hour. Occasional atrail pairs and fairly frequent bigeminal cycles were noted.  Wandering atrial pacemaker noted.  4. Infrequent multifocal ventricular ectopy was seen ranging from 0-36 beats per hour.  5. ST-T wave changes were present.  6. Three patient events were documented and correlated with atrial fibrillation/flutter    EKG 8/8/2019 for palpitations  - NSR at a rate of 70, W[place] 0.19, QTc 525, QRS is narrow. Occasional PVCs. Biphasic twaves in V4-V6, unchanged from priors.    Assessment and Plan:   Mrs. Guerrero is a 69 year old female with AL amyloidosis with cardiac manifestation who presents to [facility] for hospital follow-up. Patient has cardiac amyloidosis as evidenced by her echocardiogram (severe concentric hypertrophy and biatrial enlargement) and abnormal EKG (near-low voltage, poor R wave progression, biatrial enlargement and no evidence of LVH despite thicken LV on echo) in the setting of biopsy proven (BMB, EGD) AL amyloid. She completed chemotherapy with CyBorD with an excellent serologic response and her heart failure (i.e. troponin negative, NT pro BNP was stable, serum light chains minimal and urine light chains undetectable).     Overall, Amy has maintained her quality of life much longer than originally expected.  We are taking it on  a month by month basis.  At this point she has not felt ready for palliative care or hospice.    She has gradually declined over the last year and has ongoing challenges with her volume status.  She requires frequent metolazone and maintaining an adequate potassium level has been a challenge.  Today she is hypokalemic again, please see below.  We did trial her on Diuril in hopes that this would improve the consistency of her regimen but she did not feel well on this medication so we have resumed intermittent metolazone.  Creatinine is stable.     She is planning to go on vacation to [place] in early January.  She will get labs 3 to 4 days before leaving [facility]. She has a lab in [place] which she has used before. She will call her insurance regarding which hospital to go to in case of an emergency.    # Chronic diastolic heart failure/restrictive cardiomyopathy 2/2  # Cardiac amyloidosis    Stage C. NYHA Class III.    Fluid status: Euvolemic, continue torsemide 100 mg BID, KCl 80 meq BID. Needs metolazone every 5-7 days, will take metolazone when >152 lbs, will alert clinic when she take it as she requires labs 2-3 days later due to hypokalemia. When she takes metolazone she will take an ADDITIONAL 80 meq Kcl on the metolazone day and the day after.  ACEi/ARB/ARNI: n/a   BB: no indication and relatively contraindicated due to risk of progressive conduction disease/AV block in these patients  Aldosterone antagonist: d/c'd given hyponatremia, Na improving and she is feeling better   SCD prophylaxis: does not meet criteria for implant  NSAID use: contraindicated  Sleep apnea evaluation: deferred today  Remote monitoring: deferred today, could be Cardiocom candidate   Goals of care: prior discussions with Dr Cohen re: planning for EoL care as long term prognosis for AL amyloid is poor.     # Hypokalemia  3.3 today after taking metolazone 2 days ago. Will take an additional 40 meq of Kcl now, and an addtional 40 meq Kcl when  she gets home. She will also take her usual 80 meq BID.  - Labs next week    # A. Fib/A. Flutter  We did a holter monitor which showed intermittent a. Fib and a. Flutter. [id number] of 4. Occasional palpitations which last less than 1 minute, if these become more frequent can repeat monitor to assess burden.  - Continue Eliquis 5mg BID  - Avoiding BB in the setting of amyloid  - Holding off on digoxin given good rate control. Could discuss in the future if needed.    # Prolonged QTC  - Minimize QT prolonging meds    # Systemic amyloid  Heme previously monitoring her light chains which have been negative consistent with remission - therefore further palliative chemo not being considered. Nausea has been a large issue and is currently, Kytril was added at her last oncology appointment, but she has not started taking this. to her regimen of compazine and lorazepam.  - Consider palliative care referal if nausea not controlled on her current regimen (per onc)  - She is back on doxycycline     Follow: Up: Labs in one week (Assess K), Labs prior to vacation (1/3/2020), [facility] in early Feb and Dr. Cohen as scheduled in [facility] 30 minutes spent face-to-face with patient, >50% in counseling and/or coordination of care as described above    Ciara Araya PA-C  [facility] Cardiology      CC  STEPHY, ALEJANDRO RAMÍREZ

## 2019-12-19 NOTE — NURSING NOTE
Chief Complaint   Patient presents with     Follow Up     Return [facility] 69 year old female with chronic diastolic heart failure presents for follow up with labs prior. Pt with labile K levels.      Vitals were taken and medications were reconciled.     Soco Kim CMA    8:49 AM

## 2019-12-23 ENCOUNTER — PATIENT OUTREACH (OUTPATIENT)
Dept: CARDIOLOGY | Facility: CLINIC | Age: 69
End: 2019-12-23

## 2019-12-23 DIAGNOSIS — I50.32 CHRONIC DIASTOLIC HEART FAILURE (H): ICD-10-CM

## 2019-12-23 DIAGNOSIS — E85.4 AMYLOID HEART DISEASE (H): ICD-10-CM

## 2019-12-23 DIAGNOSIS — I43 AMYLOID HEART DISEASE (H): ICD-10-CM

## 2019-12-23 LAB
ANION GAP SERPL CALCULATED.3IONS-SCNC: 7 MMOL/L (ref 3–14)
BUN SERPL-MCNC: 26 MG/DL (ref 7–30)
CALCIUM SERPL-MCNC: 9 MG/DL (ref 8.5–10.1)
CHLORIDE SERPL-SCNC: 100 MMOL/L (ref 94–109)
CO2 SERPL-SCNC: 32 MMOL/L (ref 20–32)
CREAT SERPL-MCNC: 0.96 MG/DL (ref 0.52–1.04)
GFR SERPL CREATININE-BSD FRML MDRD: 61 ML/MIN/{1.73_M2}
GLUCOSE SERPL-MCNC: 105 MG/DL (ref 70–99)
POTASSIUM SERPL-SCNC: 3.6 MMOL/L (ref 3.4–5.3)
SODIUM SERPL-SCNC: 139 MMOL/L (ref 133–144)

## 2019-12-23 PROCEDURE — 80048 BASIC METABOLIC PNL TOTAL CA: CPT | Performed by: PHYSICIAN ASSISTANT

## 2019-12-23 PROCEDURE — 36415 COLL VENOUS BLD VENIPUNCTURE: CPT | Performed by: PHYSICIAN ASSISTANT

## 2019-12-23 RX ORDER — POTASSIUM CHLORIDE 750 MG/1
80 TABLET, EXTENDED RELEASE ORAL 2 TIMES DAILY
Qty: 1440 TABLET | Refills: 3 | Status: SHIPPED | OUTPATIENT
Start: 2019-12-23 | End: 2020-04-28

## 2019-12-23 NOTE — PROGRESS NOTES
Called Amy to review her labs and determine when she last took metolazone.  She believes she last took it 12/16.  She is planning on getting labs checked 1/4, she is aware of her potassium plan- 80 mEq BID and an extra 80 mEq the day of and day after a metolazone.  Spoke with CATHY Orozco, who would like her to take an extra 40 mEq of potassium today for K of 3.6.    Date: 12/23/2019    Time of Call: 9:53 AM     Diagnosis:  Heart Failure     [ VORB ] Ordering provider: CATHY Orozco    Order: take an extra potassium 40 mEq today for K of 3.6     Order received by: Niki Fam RN       Follow-up/additional notes: called Amy and let her know, she stated understanding

## 2019-12-26 ENCOUNTER — MYC MEDICAL ADVICE (OUTPATIENT)
Dept: CARDIOLOGY | Facility: CLINIC | Age: 69
End: 2019-12-26

## 2019-12-26 DIAGNOSIS — I50.30 DIASTOLIC HEART FAILURE, UNSPECIFIED HF CHRONICITY (H): Primary | ICD-10-CM

## 2019-12-27 NOTE — TELEPHONE ENCOUNTER
Date: 12/27/2019    Time of Call: 9:34 AM     Diagnosis:  Heart Failure     [ VORB ] Ordering provider: CATHY Orozco    Order: BMP on 12/30     Order received by: Niki Fam RN       Follow-up/additional notes: watch for results on Monday

## 2019-12-30 DIAGNOSIS — I50.30 DIASTOLIC HEART FAILURE, UNSPECIFIED HF CHRONICITY (H): ICD-10-CM

## 2019-12-30 LAB
ANION GAP SERPL CALCULATED.3IONS-SCNC: 5 MMOL/L (ref 3–14)
BUN SERPL-MCNC: 31 MG/DL (ref 7–30)
CALCIUM SERPL-MCNC: 9.5 MG/DL (ref 8.5–10.1)
CHLORIDE SERPL-SCNC: 92 MMOL/L (ref 94–109)
CO2 SERPL-SCNC: 38 MMOL/L (ref 20–32)
CREAT SERPL-MCNC: 1.17 MG/DL (ref 0.52–1.04)
GFR SERPL CREATININE-BSD FRML MDRD: 48 ML/MIN/{1.73_M2}
GLUCOSE SERPL-MCNC: 106 MG/DL (ref 70–99)
POTASSIUM SERPL-SCNC: 3.7 MMOL/L (ref 3.4–5.3)
SODIUM SERPL-SCNC: 135 MMOL/L (ref 133–144)

## 2019-12-30 PROCEDURE — 36415 COLL VENOUS BLD VENIPUNCTURE: CPT | Performed by: PHYSICIAN ASSISTANT

## 2019-12-30 PROCEDURE — 80048 BASIC METABOLIC PNL TOTAL CA: CPT | Performed by: PHYSICIAN ASSISTANT

## 2019-12-30 NOTE — TELEPHONE ENCOUNTER
"Called Amy to follow up on labs and POC over the weekend.  Amy reports taking a \"half blaster\" on Sunday.  She lost 9-10 lbs following this dose- weight is at 144 lbs.  She took 80 mEq potassium at 3 am and another 8o mEq potassium at 7:30, labs were drawn at 945.  Will forward to CATHY Orozco to determine plan of care.  Amy is planning on keeping her lab appointment for 1/4 until she hears from Yolanda.  They are still planning on leaving for [place] on 1/8.    Niki Fam RN    "

## 2019-12-30 NOTE — TELEPHONE ENCOUNTER
Discussed with Yolanda, who would like Amy to get labs on Thursday instead of Saturday.  Updated Amy who stated understanding.     Niki Fam RN

## 2020-01-02 DIAGNOSIS — I50.32 CHRONIC DIASTOLIC HEART FAILURE (H): ICD-10-CM

## 2020-01-02 LAB
ANION GAP SERPL CALCULATED.3IONS-SCNC: 2 MMOL/L (ref 3–14)
BUN SERPL-MCNC: 32 MG/DL (ref 7–30)
CALCIUM SERPL-MCNC: 9.7 MG/DL (ref 8.5–10.1)
CHLORIDE SERPL-SCNC: 96 MMOL/L (ref 94–109)
CO2 SERPL-SCNC: 35 MMOL/L (ref 20–32)
CREAT SERPL-MCNC: 1.02 MG/DL (ref 0.52–1.04)
GFR SERPL CREATININE-BSD FRML MDRD: 57 ML/MIN/{1.73_M2}
GLUCOSE SERPL-MCNC: 120 MG/DL (ref 70–99)
POTASSIUM SERPL-SCNC: 4.5 MMOL/L (ref 3.4–5.3)
SODIUM SERPL-SCNC: 133 MMOL/L (ref 133–144)

## 2020-01-02 PROCEDURE — 80048 BASIC METABOLIC PNL TOTAL CA: CPT | Performed by: PHYSICIAN ASSISTANT

## 2020-01-02 PROCEDURE — 36415 COLL VENOUS BLD VENIPUNCTURE: CPT | Performed by: PHYSICIAN ASSISTANT

## 2020-01-02 NOTE — TELEPHONE ENCOUNTER
Labs returned with normal K and Crt.  Per Yolanda, if Amy had followed the plan of taking extra potassium 80 mEq day of and day after metolazone, Amy is ok to get labs in 2 weeks.  Confirmed with Amy who stated that was what she had done.  Stated understanding of plan and will call if she is having symptoms of low potassium and needing labs checked    Date: 1/2/2020    Time of Call: 10:26 AM     Diagnosis:  Heart failure     [ VORB ] Ordering provider: CATHY Orozco    Order: BMP     Order received by: Niki Fam RN       Follow-up/additional notes: labs 1/16

## 2020-01-16 DIAGNOSIS — I43 AMYLOID HEART DISEASE (H): ICD-10-CM

## 2020-01-16 DIAGNOSIS — E85.4 AMYLOID HEART DISEASE (H): ICD-10-CM

## 2020-01-16 LAB
ANION GAP SERPL CALCULATED.3IONS-SCNC: 3 MMOL/L (ref 3–14)
BUN SERPL-MCNC: 32 MG/DL (ref 7–30)
CALCIUM SERPL-MCNC: 8.9 MG/DL (ref 8.5–10.1)
CHLORIDE SERPL-SCNC: 99 MMOL/L (ref 94–109)
CO2 SERPL-SCNC: 32 MMOL/L (ref 20–32)
CREAT SERPL-MCNC: 1.26 MG/DL (ref 0.52–1.04)
GFR SERPL CREATININE-BSD FRML MDRD: 44 ML/MIN/{1.73_M2}
GLUCOSE SERPL-MCNC: 111 MG/DL (ref 70–99)
POTASSIUM SERPL-SCNC: 4.5 MMOL/L (ref 3.4–5.3)
SODIUM SERPL-SCNC: 134 MMOL/L (ref 133–144)

## 2020-01-16 PROCEDURE — 36415 COLL VENOUS BLD VENIPUNCTURE: CPT | Performed by: PHYSICIAN ASSISTANT

## 2020-01-16 PROCEDURE — 80048 BASIC METABOLIC PNL TOTAL CA: CPT | Performed by: PHYSICIAN ASSISTANT

## 2020-01-17 ENCOUNTER — PATIENT OUTREACH (OUTPATIENT)
Dept: CARDIOLOGY | Facility: CLINIC | Age: 70
End: 2020-01-17

## 2020-01-17 DIAGNOSIS — I50.30 DIASTOLIC HEART FAILURE, UNSPECIFIED HF CHRONICITY (H): Primary | ICD-10-CM

## 2020-01-17 NOTE — PROGRESS NOTES
Reviewed labs with Amy.  She last took the metolazone on either Saturday or Sunday January 11 or 12.  She was feeling a little dehydrated prior to her lab draw as she had been traveling and avoiding drinking too much to avoid inconveniencing her travel companions.     Date: 1/17/2020    Time of Call: 9:08 AM     Diagnosis:  Heart failure     [ VORB ] Ordering provider: CATHY Orozco    Order: BMP     Order received by: Niki Fam RN       Follow-up/additional notes: watch for labs in 2 week

## 2020-01-29 ENCOUNTER — MYC MEDICAL ADVICE (OUTPATIENT)
Dept: CARDIOLOGY | Facility: CLINIC | Age: 70
End: 2020-01-29

## 2020-01-29 DIAGNOSIS — I43 AMYLOID HEART DISEASE (H): ICD-10-CM

## 2020-01-29 DIAGNOSIS — E85.4 AMYLOID HEART DISEASE (H): ICD-10-CM

## 2020-01-29 LAB
ANION GAP SERPL CALCULATED.3IONS-SCNC: 3 MMOL/L (ref 3–14)
BUN SERPL-MCNC: 30 MG/DL (ref 7–30)
CALCIUM SERPL-MCNC: 9.6 MG/DL (ref 8.5–10.1)
CHLORIDE SERPL-SCNC: 92 MMOL/L (ref 94–109)
CO2 SERPL-SCNC: 37 MMOL/L (ref 20–32)
CREAT SERPL-MCNC: 1.02 MG/DL (ref 0.52–1.04)
GFR SERPL CREATININE-BSD FRML MDRD: 57 ML/MIN/{1.73_M2}
GLUCOSE SERPL-MCNC: 86 MG/DL (ref 70–99)
POTASSIUM SERPL-SCNC: 4.1 MMOL/L (ref 3.4–5.3)
SODIUM SERPL-SCNC: 132 MMOL/L (ref 133–144)

## 2020-01-29 PROCEDURE — 36415 COLL VENOUS BLD VENIPUNCTURE: CPT | Performed by: INTERNAL MEDICINE

## 2020-01-29 PROCEDURE — 80048 BASIC METABOLIC PNL TOTAL CA: CPT | Performed by: INTERNAL MEDICINE

## 2020-01-30 DIAGNOSIS — I50.32 CHRONIC DIASTOLIC HEART FAILURE (H): Primary | ICD-10-CM

## 2020-01-30 NOTE — TELEPHONE ENCOUNTER
Dr Cohen reviewed labs, stable at this time.  Reviewed with Amy.  She worried a little that her sodium was down but her sodium has been 132-135 over the last month.  She took a 1/2 dose of metolazone earlier this week for weight gain to 153 but this resolved.      She has follow up scheduled with [facility] next week

## 2020-02-01 ASSESSMENT — ENCOUNTER SYMPTOMS
SINUS PAIN: 1
HOARSE VOICE: 1
SORE THROAT: 1
SINUS CONGESTION: 1

## 2020-02-03 NOTE — PROGRESS NOTES
HPI:   Ms. Guerrero is a 69 year old female with a past medical history including stage IV AL amyloid diagnosed in 2016. Presents to clinic for CORE follow-up.     Her cardiac and oncologic history are as follows: fatigue starting in 1/2016. She had a coronary angiogram which was reportedly normal but was diagnosed with HF and started on a BBand diuretics. Her symptoms progressed to the point that she was evaluated at [facility] in August/September (Dr. Barron in oncology/hematology, Dr. Nettles is cardiology). She was diagnosed with stage IV AL amyloid (+GI, +bone marrow involvement, no involvement of kidney or liver). Her work up is detailed in our previous notes, however she has lambda AL amyloidosis and she underwent CyBorD chemotherapy and excellent light chain response by serum. Patient has been turned down at [facility] for a stem cell transplant due to her cardiac involvement. Later in 2016, she had 2-3 more right sided pleural taps. She was hospitalized 1/2017 and diuresed 20-30 pounds at that time. Since that time her AL has been in remission by labs without further chemo. Patient was then treated with doxycycline but this was stopped ~6-9 months ago due to thought it wasn't benefiting per patient, although now recently restarted again. She was last admitted to [facility] 4/2 for shortness of breath and edema. Echocardiogram showed EF 60-65%, moderate LVH, normal RV, moderate AI, and mild PH.      Last visit: CATHY Serrano 12/19/2019  Her Kcl was adjusted given her need for frequent metolazone and hypokalemia. She will now take 80 meq of additional Kcl on the day she takes metolazone as well as the day after (increased from 2 doses of 60 meq). Since that time, her Potassium has been stable.    This visit:  At this visit, she feels overall the same past few visits. Her lower extremity edema is below her baseline, no orthopnea no PND.  Her breathing is okay at rest. No PELAEZ, primarily fatigue. She gets worn out very  easily. Today, she feels slightly off and cloudy/dizzy which is consistent with low potassium for her. No orthopnea or PND. No palpitations or episodic dizziness. She has intermittent nausea this gets better with ginger ale.  No chest pain.    She plans to go to [place] again in February. Will stay for 1-2 weeks.    PAST MEDICAL HISTORY:  Past Medical History:   Diagnosis Date     AL amyloidosis (H)      Atrial fibrillation and flutter (H)      Cardiac amyloidosis (H)      Lymphedema      QT prolongation      Recurrent right pleural effusion 1/2/2017     SVT (supraventricular tachycardia) (H)        FAMILY HISTORY:  Family History   Problem Relation Age of Onset     Other - See Comments Sister         Amyloidosis       SOCIAL HISTORY:  Socioeconomic History     Marital status:    Tobacco Use     Smoking status: Never Smoker     Smokeless tobacco: Never Used   Substance and Sexual Activity     Alcohol use: No     Drug use: No   Other Topics Concern     CURRENT MEDICATIONS:  Acetaminophen (TYLENOL PO), Take 325 mg by mouth  apixaban ANTICOAGULANT (ELIQUIS ANTICOAGULANT) 5 MG tablet, Take 1 tablet (5 mg) by mouth 2 times daily  doxycycline hyclate (VIBRA-TABS) 100 MG tablet, Take 1 tablet (100 mg) by mouth daily  LORazepam (ATIVAN) 0.5 MG tablet, Take 1 tablet (0.5 mg) by mouth every 6 hours as needed for anxiety or nausea  metolazone (ZAROXOLYN) 2.5 MG tablet, TAKE 1 TABLET TWICE WEEKLY  ondansetron (ZOFRAN-ODT) 4 MG ODT tab, Take 1 tablet (4 mg) by mouth every 6 hours as needed for nausea  potassium chloride ER (K-DUR/KLOR-CON M) 10 MEQ CR tablet, Take 8 tablets (80 mEq) by mouth 2 times daily Additionally, take an Extra 80 mEq on metolazone days and the day after a metolazone day  prochlorperazine (COMPAZINE) 5 MG tablet, One to two tablets PO q 6 hours prn nausea  torsemide (DEMADEX) 100 MG tablet, Take 1 tablet (100 mg) by mouth 2 times daily  granisetron (KYTRIL) 1 MG tablet, Take 1 tablet (1 mg) by mouth  "every 12 hours as needed for nausea (Patient not taking: Reported on 12/19/2019)    No current facility-administered medications on file prior to visit.       ROS:   CONSTITUTIONAL: Denies fever, chills, +fatigue, chronic.  HEENT: Denies headache, vision changes, and changes in speech.   CV: Refer to HPI.   PULMONARY: Refer to HPI.   GI: Refer to HPI.   :  + urinary frequency 2/2 diuretics.  Otherwise no urinary symptoms  EXT: +Chronic lower extremity edema see hpi  SKIN: Denies abnormal rashes or lesions.   MUSCULOSKELETAL: Denies upper or lower extremity weakness and pain.   NEUROLOGIC: Denies seizures, or upper or lower extremity paresthesia.     EXAM:  /72 (BP Location: Left arm, Patient Position: Chair, Cuff Size: Adult Regular)   Pulse 60   Ht 1.6 m (5' 3\")   Wt 67.2 kg (148 lb 3.2 oz)   SpO2 97%   BMI 26.25 kg/m       GENERAL: Appears comfortable, in no acute distress, speaking in full sentences and able to communicate all needs.   HEENT: Eye symmetrical, no discharge. Mucous membranes moist and without lesions.  CV: RRR, +S1S2, no murmur, rub, or gallop. JVP at about 2 cm above clavicle at 90 degrees  RESPIRATORY: Respirations regular, even, and unlabored. Lungs CTA throughout.   GI: Soft and non-distended with normoactive bowel sounds present. No tenderness, rebound, guarding. No hepatomegaly.   EXTREMITIES: Moderate non pitting peripheral edema. 2+ bilateral pedal pulses.   NEUROLOGIC: Alert and interacting appropiratly. No focal deficits.   MUSCULOSKELETAL: No joint swelling or tenderness.   SKIN: No jaundice. No rashes or lesions.     Labs, reviewed with patient in clinic today:  CBC RESULTS:  Lab Results   Component Value Date    WBC 5.3 11/14/2019    RBC 4.44 11/14/2019    HGB 14.2 11/14/2019    HCT 42.9 11/14/2019    MCV 97 11/14/2019    MCH 32.0 11/14/2019    MCHC 33.1 11/14/2019    RDW 12.7 11/14/2019     11/14/2019       CMP RESULTS:  Lab Results   Component Value Date     " (L) 02/04/2020    POTASSIUM 3.4 02/04/2020    CHLORIDE 92 (L) 02/04/2020    CO2 36 (H) 02/04/2020    ANIONGAP 4 02/04/2020    GLC 80 02/04/2020    BUN 24 02/04/2020    CR 1.05 (H) 02/04/2020    GFRESTIMATED 55 (L) 02/04/2020    GFRESTBLACK 63 02/04/2020    JERROD 9.3 02/04/2020    BILITOTAL 2.3 (H) 11/14/2019    ALBUMIN 4.0 11/14/2019    ALKPHOS 310 (H) 11/14/2019    ALT 29 11/14/2019    AST 24 11/14/2019        INR RESULTS:  Lab Results   Component Value Date    INR 1.30 (H) 01/14/2017       Lab Results   Component Value Date    MAG 2.2 10/26/2019     Lab Results   Component Value Date    NTBNPI 9,009 (H) 01/13/2017     Lab Results   Component Value Date    NTBNP 1,556 (H) 10/26/2019       Diagnostics:  TTE 4/9/19  Moderate left ventricular hypertrophy.  Global and regional left ventricular function is normal with an EF of 60-65%.  Global right ventricular function is normal.  Moderate aortic valve insufficiency.  Mild pulmonary hypertension with dilated IVC.  This study was compared with the study from 10/11/18 the AR is worse and RA pressure is now increased.    TTE 10/11/18  Global and regional left ventricular function is normal with an EF of 55-60%.  Moderate concentric wall thickening consistent with left ventricular  hypertrophy is present - known amyloid.  Grade III or advanced diastolic dysfunction.  Normal right ventricular size, wall thickness, and function.  Estimated pulmonary artery pressure 28 mmHg.  IVC with normal diameter but does not collapse (>50%) with inspiration.  Trace pericardial effusion.  Compared to prior study from 8/17/17, no significant change.    Ziopatch 11/2017      Holter Monitor 06/2019  INTERPRETATION  1. The rhythm varied with sinus rhythm and atrial fibrillation/variable atrial flutter. Low voltage noted.  2. The heart rate varied with a minimum rate of 48 bpm, maximum rate of 164 bpm, average rate of 74 bpm.  3. Frequent supraventricular ectopy was seen ranging from 0-469 per  hour. Occasional atrail pairs and fairly frequent bigeminal cycles were noted.  Wandering atrial pacemaker noted.  4. Infrequent multifocal ventricular ectopy was seen ranging from 0-36 beats per hour.  5. ST-T wave changes were present.  6. Three patient events were documented and correlated with atrial fibrillation/flutter    EKG 8/8/2019 for palpitations  - NSR at a rate of 70, CT 0.19, QTc 525, QRS is narrow. Occasional PVCs. Biphasic twaves in V4-V6, unchanged from priors.    Assessment and Plan:   Mrs. Guerrero is a 69 year old female with AL amyloidosis with cardiac manifestation who presents to [facility] for hospital follow-up. Patient has cardiac amyloidosis as evidenced by her echocardiogram (severe concentric hypertrophy and biatrial enlargement) and abnormal EKG (near-low voltage, poor R wave progression, biatrial enlargement and no evidence of LVH despite thicken LV on echo) in the setting of biopsy proven (BMB, EGD) AL amyloid. She completed chemotherapy with CyBorD with an excellent serologic response and her heart failure (i.e. troponin negative, NT pro BNP was stable, serum light chains minimal and urine light chains undetectable).     Overall, Amy has maintained her quality of life much longer than originally expected.  We are taking it on a month by month basis.  At this point she has not felt ready for palliative care or hospice.    She has gradually declined over the last year and has ongoing challenges with her volume status.  She requires frequent metolazone and maintaining an adequate potassium level has been a challenge. In the last month, her Potassium has stayed much more stable than it had been earlier this year, although today she is on the lower side again. We did trial her on Diuril in hopes that this would improve the consistency of her regimen but she did not feel well on this medication so we have resumed intermittent metolazone.  Creatinine is stable.     She is planning to go on  vacation to [place] in Feb.  She will get labs 3 to 4 days before leaving [facility]. She has a lab in [place] which she has used before. She will call her insurance regarding which hospital to go to in case of an emergency.    # Chronic diastolic heart failure/restrictive cardiomyopathy 2/2  # Cardiac amyloidosis    Stage C. NYHA Class III.    Fluid status: Euvolemic, continue torsemide 100 mg BID, KCl 80 meq BID. Needs metolazone every 5-7 days, will take metolazone when >152 lbs, will alert clinic when she take it as she requires labs 2-3 days later due to hypokalemia. When she takes metolazone she will take an ADDITIONAL 80 meq Kcl on the metolazone day and the day after.   ACEi/ARB/ARNI: n/a   BB: no indication and relatively contraindicated due to risk of progressive conduction disease/AV block in these patients  Aldosterone antagonist: d/c'd given hyponatremia, Na improving and she is feeling better   SCD prophylaxis: does not meet criteria for implant  NSAID use: contraindicated  Sleep apnea evaluation: deferred today  Remote monitoring: deferred today, could be Cardiocom candidate   Goals of care: prior discussions with Dr Cohen re: planning for EoL care as long term prognosis for AL amyloid is poor.   Other: Have placed a referall for lymphedema therapy in [facility]    # Hypokalemia  3.4 today after taking metolazone 2 days ago. Will take an additional 60 meq of Kcl now. She will also take her usual 80 meq BID.  - Labs every two weeks    # A. Fib/A. Flutter  We did a holter monitor which showed intermittent a. Fib and a. Flutter. [id number] of 4. Occasional palpitations which last less than 1 minute, if these become more frequent can repeat monitor to assess burden.  - Continue Eliquis 5mg BID  - Avoiding BB in the setting of amyloid  - Holding off on digoxin given good rate control. Could discuss in the future if needed.    # Prolonged QTC  - Minimize QT prolonging meds    # Systemic amyloid  Heme previously  monitoring her light chains which have been negative consistent with remission - therefore further palliative chemo not being considered. Nausea has been a large issue and is currently, Kytril was added at her last oncology appointment, but she has not started taking this. to her regimen of compazine and lorazepam.  - Consider palliative care referal if nausea not controlled on her current regimen (per onc)  - She is back on doxycycline     Follow: Up: Labs in two weeks (Assess K), Labs prior to vacation, Dr. Cohen as scheduled in March and CORE 5-6 weeks after that    30 minutes spent face-to-face with patient, >50% in counseling and/or coordination of care as described above    Ciara Araya PA-C  [facility]      CC  STEPHY, ALEJANDRO RAMÍREZ

## 2020-02-04 ENCOUNTER — OFFICE VISIT (OUTPATIENT)
Dept: CARDIOLOGY | Facility: CLINIC | Age: 70
End: 2020-02-04
Attending: PHYSICIAN ASSISTANT
Payer: COMMERCIAL

## 2020-02-04 VITALS
DIASTOLIC BLOOD PRESSURE: 72 MMHG | HEART RATE: 60 BPM | WEIGHT: 148.2 LBS | SYSTOLIC BLOOD PRESSURE: 122 MMHG | OXYGEN SATURATION: 97 % | BODY MASS INDEX: 26.26 KG/M2 | HEIGHT: 63 IN

## 2020-02-04 DIAGNOSIS — R60.9 EDEMA, UNSPECIFIED TYPE: ICD-10-CM

## 2020-02-04 DIAGNOSIS — E85.4 AMYLOID HEART DISEASE (H): ICD-10-CM

## 2020-02-04 DIAGNOSIS — I50.33 ACUTE ON CHRONIC DIASTOLIC HEART FAILURE (H): ICD-10-CM

## 2020-02-04 DIAGNOSIS — I50.32 CHRONIC DIASTOLIC HEART FAILURE (H): ICD-10-CM

## 2020-02-04 DIAGNOSIS — I43 AMYLOID HEART DISEASE (H): ICD-10-CM

## 2020-02-04 DIAGNOSIS — E87.6 HYPOKALEMIA: Primary | ICD-10-CM

## 2020-02-04 LAB
ANION GAP SERPL CALCULATED.3IONS-SCNC: 4 MMOL/L (ref 3–14)
BUN SERPL-MCNC: 24 MG/DL (ref 7–30)
CALCIUM SERPL-MCNC: 9.3 MG/DL (ref 8.5–10.1)
CHLORIDE SERPL-SCNC: 92 MMOL/L (ref 94–109)
CO2 SERPL-SCNC: 36 MMOL/L (ref 20–32)
CREAT SERPL-MCNC: 1.05 MG/DL (ref 0.52–1.04)
GFR SERPL CREATININE-BSD FRML MDRD: 55 ML/MIN/{1.73_M2}
GLUCOSE SERPL-MCNC: 80 MG/DL (ref 70–99)
POTASSIUM SERPL-SCNC: 3.4 MMOL/L (ref 3.4–5.3)
SODIUM SERPL-SCNC: 132 MMOL/L (ref 133–144)

## 2020-02-04 PROCEDURE — 80048 BASIC METABOLIC PNL TOTAL CA: CPT | Performed by: PHYSICIAN ASSISTANT

## 2020-02-04 PROCEDURE — 25000132 ZZH RX MED GY IP 250 OP 250 PS 637: Mod: ZF | Performed by: PHYSICIAN ASSISTANT

## 2020-02-04 PROCEDURE — G0463 HOSPITAL OUTPT CLINIC VISIT: HCPCS | Mod: ZF

## 2020-02-04 PROCEDURE — 99214 OFFICE O/P EST MOD 30 MIN: CPT | Mod: ZP | Performed by: PHYSICIAN ASSISTANT

## 2020-02-04 PROCEDURE — 36415 COLL VENOUS BLD VENIPUNCTURE: CPT | Performed by: PHYSICIAN ASSISTANT

## 2020-02-04 RX ORDER — POTASSIUM CHLORIDE 1500 MG/1
60 TABLET, EXTENDED RELEASE ORAL ONCE
Status: COMPLETED | OUTPATIENT
Start: 2020-02-04 | End: 2020-02-04

## 2020-02-04 RX ORDER — TORSEMIDE 100 MG/1
100 TABLET ORAL 2 TIMES DAILY
COMMUNITY
Start: 2020-02-04 | End: 2020-04-09

## 2020-02-04 RX ADMIN — POTASSIUM CHLORIDE 60 MEQ: 1500 TABLET, EXTENDED RELEASE ORAL at 10:43

## 2020-02-04 ASSESSMENT — MIFFLIN-ST. JEOR: SCORE: 1176.36

## 2020-02-04 ASSESSMENT — PAIN SCALES - GENERAL: PAINLEVEL: NO PAIN (0)

## 2020-02-04 NOTE — LETTER
2/4/2020      RE: Leandra Guerrero  [address] [place] [address]       Dear Colleague,    Thank you for the opportunity to participate in the care of your patient, Leandra Guerrero, at the [facility] at [facility]. Please see a copy of my visit note below.    HPI:   Ms. Guerrero is a 69 year old female with a past medical history including stage IV AL amyloid diagnosed in 2016. Presents to clinic for CORE follow-up.     Her cardiac and oncologic history are as follows: fatigue starting in 1/2016. She had a coronary angiogram which was reportedly normal but was diagnosed with HF and started on a BBand diuretics. Her symptoms progressed to the point that she was evaluated at [facility] in August/September (Dr. Barron in oncology/hematology, Dr. Nettles is cardiology). She was diagnosed with stage IV AL amyloid (+GI, +bone marrow involvement, no involvement of kidney or liver). Her work up is detailed in our previous notes, however she has lambda AL amyloidosis and she underwent CyBorD chemotherapy and excellent light chain response by serum. Patient has been turned down at [facility] for a stem cell transplant due to her cardiac involvement. Later in 2016, she had 2-3 more right sided pleural taps. She was hospitalized 1/2017 and diuresed 20-30 pounds at that time. Since that time her AL has been in remission by labs without further chemo. Patient was then treated with doxycycline but this was stopped ~6-9 months ago due to thought it wasn't benefiting per patient, although now recently restarted again. She was last admitted to [facility] 4/2 for shortness of breath and edema. Echocardiogram showed EF 60-65%, moderate LVH, normal RV, moderate AI, and mild PH.      Last visit: CATHY Serrano 12/19/2019  Her Kcl was adjusted given her need for frequent metolazone and hypokalemia. She will now take 80 meq of additional Kcl on the day she takes metolazone as well as  the day after (increased from 2 doses of 60 meq). Since that time, her Potassium has been stable.    This visit:  At this visit, she feels overall the same past few visits. Her lower extremity edema is below her baseline, no orthopnea no PND.  Her breathing is okay at rest. No PELAEZ, primarily fatigue. She gets worn out very easily. Today, she feels slightly off and cloudy/dizzy which is consistent with low potassium for her. No orthopnea or PND. No palpitations or episodic dizziness. She has intermittent nausea this gets better with ginger ale.  No chest pain.    She plans to go to [place] again in February. Will stay for 1-2 weeks.    PAST MEDICAL HISTORY:  Past Medical History:   Diagnosis Date     AL amyloidosis (H)      Atrial fibrillation and flutter (H)      Cardiac amyloidosis (H)      Lymphedema      QT prolongation      Recurrent right pleural effusion 1/2/2017     SVT (supraventricular tachycardia) (H)        FAMILY HISTORY:  Family History   Problem Relation Age of Onset     Other - See Comments Sister         Amyloidosis       SOCIAL HISTORY:  Socioeconomic History     Marital status:    Tobacco Use     Smoking status: Never Smoker     Smokeless tobacco: Never Used   Substance and Sexual Activity     Alcohol use: No     Drug use: No   Other Topics Concern     CURRENT MEDICATIONS:  Acetaminophen (TYLENOL PO), Take 325 mg by mouth  apixaban ANTICOAGULANT (ELIQUIS ANTICOAGULANT) 5 MG tablet, Take 1 tablet (5 mg) by mouth 2 times daily  doxycycline hyclate (VIBRA-TABS) 100 MG tablet, Take 1 tablet (100 mg) by mouth daily  LORazepam (ATIVAN) 0.5 MG tablet, Take 1 tablet (0.5 mg) by mouth every 6 hours as needed for anxiety or nausea  metolazone (ZAROXOLYN) 2.5 MG tablet, TAKE 1 TABLET TWICE WEEKLY  ondansetron (ZOFRAN-ODT) 4 MG ODT tab, Take 1 tablet (4 mg) by mouth every 6 hours as needed for nausea  potassium chloride ER (K-DUR/KLOR-CON M) 10 MEQ CR tablet, Take 8 tablets (80 mEq) by mouth 2 times  "daily Additionally, take an Extra 80 mEq on metolazone days and the day after a metolazone day  prochlorperazine (COMPAZINE) 5 MG tablet, One to two tablets PO q 6 hours prn nausea  torsemide (DEMADEX) 100 MG tablet, Take 1 tablet (100 mg) by mouth 2 times daily  granisetron (KYTRIL) 1 MG tablet, Take 1 tablet (1 mg) by mouth every 12 hours as needed for nausea (Patient not taking: Reported on 12/19/2019)    No current facility-administered medications on file prior to visit.       ROS:   CONSTITUTIONAL: Denies fever, chills, +fatigue, chronic.  HEENT: Denies headache, vision changes, and changes in speech.   CV: Refer to HPI.   PULMONARY: Refer to HPI.   GI: Refer to HPI.   :  + urinary frequency 2/2 diuretics.  Otherwise no urinary symptoms  EXT: +Chronic lower extremity edema see hpi  SKIN: Denies abnormal rashes or lesions.   MUSCULOSKELETAL: Denies upper or lower extremity weakness and pain.   NEUROLOGIC: Denies seizures, or upper or lower extremity paresthesia.     EXAM:  /72 (BP Location: Left arm, Patient Position: Chair, Cuff Size: Adult Regular)   Pulse 60   Ht 1.6 m (5' 3\")   Wt 67.2 kg (148 lb 3.2 oz)   SpO2 97%   BMI 26.25 kg/m        GENERAL: Appears comfortable, in no acute distress, speaking in full sentences and able to communicate all needs.   HEENT: Eye symmetrical, no discharge. Mucous membranes moist and without lesions.  CV: RRR, +S1S2, no murmur, rub, or gallop. JVP at about 2 cm above clavicle at 90 degrees  RESPIRATORY: Respirations regular, even, and unlabored. Lungs CTA throughout.   GI: Soft and non-distended with normoactive bowel sounds present. No tenderness, rebound, guarding. No hepatomegaly.   EXTREMITIES: Moderate non pitting peripheral edema. 2+ bilateral pedal pulses.   NEUROLOGIC: Alert and interacting appropiratly. No focal deficits.   MUSCULOSKELETAL: No joint swelling or tenderness.   SKIN: No jaundice. No rashes or lesions.     Labs, reviewed with patient in " clinic today:  CBC RESULTS:  Lab Results   Component Value Date    WBC 5.3 11/14/2019    RBC 4.44 11/14/2019    HGB 14.2 11/14/2019    HCT 42.9 11/14/2019    MCV 97 11/14/2019    MCH 32.0 11/14/2019    MCHC 33.1 11/14/2019    RDW 12.7 11/14/2019     11/14/2019       CMP RESULTS:  Lab Results   Component Value Date     (L) 02/04/2020    POTASSIUM 3.4 02/04/2020    CHLORIDE 92 (L) 02/04/2020    CO2 36 (H) 02/04/2020    ANIONGAP 4 02/04/2020    GLC 80 02/04/2020    BUN 24 02/04/2020    CR 1.05 (H) 02/04/2020    GFRESTIMATED 55 (L) 02/04/2020    GFRESTBLACK 63 02/04/2020    JERROD 9.3 02/04/2020    BILITOTAL 2.3 (H) 11/14/2019    ALBUMIN 4.0 11/14/2019    ALKPHOS 310 (H) 11/14/2019    ALT 29 11/14/2019    AST 24 11/14/2019        INR RESULTS:  Lab Results   Component Value Date    INR 1.30 (H) 01/14/2017       Lab Results   Component Value Date    MAG 2.2 10/26/2019     Lab Results   Component Value Date    NTBNPI 9,009 (H) 01/13/2017     Lab Results   Component Value Date    NTBNP 1,556 (H) 10/26/2019       Diagnostics:  TTE 4/9/19  Moderate left ventricular hypertrophy.  Global and regional left ventricular function is normal with an EF of 60-65%.  Global right ventricular function is normal.  Moderate aortic valve insufficiency.  Mild pulmonary hypertension with dilated IVC.  This study was compared with the study from 10/11/18 the AR is worse and RA pressure is now increased.    TTE 10/11/18  Global and regional left ventricular function is normal with an EF of 55-60%.  Moderate concentric wall thickening consistent with left ventricular  hypertrophy is present - known amyloid.  Grade III or advanced diastolic dysfunction.  Normal right ventricular size, wall thickness, and function.  Estimated pulmonary artery pressure 28 mmHg.  IVC with normal diameter but does not collapse (>50%) with inspiration.  Trace pericardial effusion.  Compared to prior study from 8/17/17, no significant change.    Z[employer]  11/2017      Holter Monitor 06/2019  INTERPRETATION  1. The rhythm varied with sinus rhythm and atrial fibrillation/variable atrial flutter. Low voltage noted.  2. The heart rate varied with a minimum rate of 48 bpm, maximum rate of 164 bpm, average rate of 74 bpm.  3. Frequent supraventricular ectopy was seen ranging from 0-469 per hour. Occasional atrail pairs and fairly frequent bigeminal cycles were noted.  Wandering atrial pacemaker noted.  4. Infrequent multifocal ventricular ectopy was seen ranging from 0-36 beats per hour.  5. ST-T wave changes were present.  6. Three patient events were documented and correlated with atrial fibrillation/flutter    EKG 8/8/2019 for palpitations  - NSR at a rate of 70, UT 0.19, QTc 525, QRS is narrow. Occasional PVCs. Biphasic twaves in V4-V6, unchanged from priors.    Assessment and Plan:   Mrs. Guerrero is a 69 year old female with AL amyloidosis with cardiac manifestation who presents to CORE for hospital follow-up. Patient has cardiac amyloidosis as evidenced by her echocardiogram (severe concentric hypertrophy and biatrial enlargement) and abnormal EKG (near-low voltage, poor R wave progression, biatrial enlargement and no evidence of LVH despite thicken LV on echo) in the setting of biopsy proven (BMB, EGD) AL amyloid. She completed chemotherapy with CyBorD with an excellent serologic response and her heart failure (i.e. troponin negative, NT pro BNP was stable, serum light chains minimal and urine light chains undetectable).     Overall, Amy has maintained her quality of life much longer than originally expected.  We are taking it on a month by month basis.  At this point she has not felt ready for palliative care or hospice.    She has gradually declined over the last year and has ongoing challenges with her volume status.  She requires frequent metolazone and maintaining an adequate potassium level has been a challenge. In the last month, her Potassium has stayed  much more stable than it had been earlier this year, although today she is on the lower side again. We did trial her on Diuril in hopes that this would improve the consistency of her regimen but she did not feel well on this medication so we have resumed intermittent metolazone.  Creatinine is stable.     She is planning to go on vacation to [place] in Feb.  She will get labs 3 to 4 days before leaving [facility]. She has a lab in [place] which she has used before. She will call her insurance regarding which hospital to go to in case of an emergency.    # Chronic diastolic heart failure/restrictive cardiomyopathy 2/2  # Cardiac amyloidosis    Stage C. NYHA Class III.    Fluid status: Euvolemic, continue torsemide 100 mg BID, KCl 80 meq BID. Needs metolazone every 5-7 days, will take metolazone when >152 lbs, will alert clinic when she take it as she requires labs 2-3 days later due to hypokalemia. When she takes metolazone she will take an ADDITIONAL 80 meq Kcl on the metolazone day and the day after.   ACEi/ARB/ARNI: n/a   BB: no indication and relatively contraindicated due to risk of progressive conduction disease/AV block in these patients  Aldosterone antagonist: d/c'd given hyponatremia, Na improving and she is feeling better   SCD prophylaxis: does not meet criteria for implant  NSAID use: contraindicated  Sleep apnea evaluation: deferred today  Remote monitoring: deferred today, could be Cardiocom candidate   Goals of care: prior discussions with Dr Cohen re: planning for EoL care as long term prognosis for AL amyloid is poor.   Other: Have placed a referall for lymphedema therapy in [facility]    # Hypokalemia  3.4 today after taking metolazone 2 days ago. Will take an additional 60 meq of Kcl now. She will also take her usual 80 meq BID.  - Labs every two weeks    # A. Fib/A. Flutter  We did a holter monitor which showed intermittent a. Fib and a. Flutter. [id number] of 4. Occasional palpitations which last less  than 1 minute, if these become more frequent can repeat monitor to assess burden.  - Continue Eliquis 5mg BID  - Avoiding BB in the setting of amyloid  - Holding off on digoxin given good rate control. Could discuss in the future if needed.    # Prolonged QTC  - Minimize QT prolonging meds    # Systemic amyloid  Heme previously monitoring her light chains which have been negative consistent with remission - therefore further palliative chemo not being considered. Nausea has been a large issue and is currently, Kytril was added at her last oncology appointment, but she has not started taking this. to her regimen of compazine and lorazepam.  - Consider palliative care referal if nausea not controlled on her current regimen (per onc)  - She is back on doxycycline     Follow: Up: Labs in two weeks (Assess K), Labs prior to vacation, Dr. Cohen as scheduled in March and CORE 5-6 weeks after that    30 minutes spent face-to-face with patient, >50% in counseling and/or coordination of care as described above    Ciara Araya PA-C  [facility] Cardiology      CC  STEPHY, ALEJANDRO RAMÍREZ

## 2020-02-04 NOTE — NURSING NOTE
Diet: Patient instructed regarding a heart healthy diet, including discussion of reduced fat and sodium intake. Patient demonstrated understanding of this information and agreed to call with further questions or concerns.  Labs: Patient was given results of the laboratory testing obtained today. Patient was instructed to return for the next laboratory testing in 2 weeks. Patient demonstrated understanding of this information and agreed to call with further questions or concerns.   Return Appointment: Patient given instructions regarding scheduling next clinic visit. Patient demonstrated understanding of this information and agreed to call with further questions or concerns.  Patient stated she understood all health information given and agreed to call with further questions or concerns.   Vidhi Montgomery RN

## 2020-02-04 NOTE — PATIENT INSTRUCTIONS
"      Take your medicines every day, as directed    Changes made today:  o We will give you 60 meq of Potassium today in clinic  o No other medication changes  o If you are feeling unwell or \"off\" please call us to do labs sooner     Monitor Your Weight and Symptoms    Contact us if you:      Gain 2 pounds in one day or 5 pounds in one week    Feel more short of breath    Notice more leg swelling    Feel lightheadeded   Change your lifestyle    Limit Salt or Sodium:    2000 mg  Limit Fluids:    2000 mL or approximately 64 ounces  Eat a Heart Healthy Diet    Low in saturated fats  Stay Active:    Aim to move at least 150 minutes every  week         To Contact us    During Business Hours:  [phone], option # 1 (University)  Then option # 4 (medical questions)     After hours, weekends or holidays:   [phone], Option #4  Ask to speak to the On-Call Cardiologist. Inform them you are a CORE/heart failure patient at the [facility].     Use [employer] allows you to communicate directly with your heart team through secure messaging.    [employer] can be accessed any time on your phone, computer, or tablet.    If you need assistance, we'd be happy to help!         Keep your Heart Appointments:    - Labs: Please get labs in 2 weeks  - Please see Dr. Cohen in March as scheduled  - Please plan to see Yolanda 6 weeks after that appointment       "

## 2020-02-05 ENCOUNTER — PATIENT OUTREACH (OUTPATIENT)
Dept: CARDIOLOGY | Facility: CLINIC | Age: 70
End: 2020-02-05

## 2020-02-05 NOTE — PROGRESS NOTES
Lymphedema referral faxed to Kamaljit Silva in [place]. Notified patient via [employer].

## 2020-02-14 ENCOUNTER — PATIENT OUTREACH (OUTPATIENT)
Dept: CARDIOLOGY | Facility: CLINIC | Age: 70
End: 2020-02-14

## 2020-02-14 DIAGNOSIS — I43 AMYLOID HEART DISEASE (H): ICD-10-CM

## 2020-02-14 DIAGNOSIS — I50.32 CHRONIC DIASTOLIC HEART FAILURE (H): Primary | ICD-10-CM

## 2020-02-14 DIAGNOSIS — E85.4 AMYLOID HEART DISEASE (H): ICD-10-CM

## 2020-02-14 DIAGNOSIS — I50.30 DIASTOLIC HEART FAILURE, UNSPECIFIED HF CHRONICITY (H): ICD-10-CM

## 2020-02-14 LAB
ANION GAP SERPL CALCULATED.3IONS-SCNC: 7 MMOL/L (ref 3–14)
BUN SERPL-MCNC: 35 MG/DL (ref 7–30)
CALCIUM SERPL-MCNC: 9.2 MG/DL (ref 8.5–10.1)
CHLORIDE SERPL-SCNC: 92 MMOL/L (ref 94–109)
CO2 SERPL-SCNC: 34 MMOL/L (ref 20–32)
CREAT SERPL-MCNC: 0.98 MG/DL (ref 0.52–1.04)
GFR SERPL CREATININE-BSD FRML MDRD: 59 ML/MIN/{1.73_M2}
GLUCOSE SERPL-MCNC: 79 MG/DL (ref 70–99)
POTASSIUM SERPL-SCNC: 3.5 MMOL/L (ref 3.4–5.3)
SODIUM SERPL-SCNC: 133 MMOL/L (ref 133–144)

## 2020-02-14 PROCEDURE — 80048 BASIC METABOLIC PNL TOTAL CA: CPT | Performed by: PHYSICIAN ASSISTANT

## 2020-02-14 PROCEDURE — 36415 COLL VENOUS BLD VENIPUNCTURE: CPT | Performed by: PHYSICIAN ASSISTANT

## 2020-02-14 NOTE — TELEPHONE ENCOUNTER
Called Amy to review labs and give following orders:    Date: 2/14/2020    Time of Call: 4:04 PM     Diagnosis:  Heart failure     [ VORB ] Ordering provider: CATHY Orozco  Order: Potassium 60 mEq one time dose extra today. BMP 2 weeks.      Order received by: Vidhi Montgomery RN      Follow-up/additional notes: left detailed voicemail and sent [employer] message.

## 2020-02-19 DIAGNOSIS — I50.30 DIASTOLIC HEART FAILURE, UNSPECIFIED HF CHRONICITY (H): Primary | ICD-10-CM

## 2020-02-25 DIAGNOSIS — E85.81 AL AMYLOIDOSIS (H): ICD-10-CM

## 2020-02-25 RX ORDER — DOXYCYCLINE HYCLATE 100 MG
100 TABLET ORAL DAILY
Qty: 90 TABLET | Refills: 3 | Status: SHIPPED | OUTPATIENT
Start: 2020-02-25 | End: 2021-03-23

## 2020-02-28 ENCOUNTER — MYC MEDICAL ADVICE (OUTPATIENT)
Dept: CARDIOLOGY | Facility: CLINIC | Age: 70
End: 2020-02-28

## 2020-02-28 DIAGNOSIS — E85.4 AMYLOID HEART DISEASE (H): ICD-10-CM

## 2020-02-28 DIAGNOSIS — I43 AMYLOID HEART DISEASE (H): ICD-10-CM

## 2020-02-28 LAB
ANION GAP SERPL CALCULATED.3IONS-SCNC: 7 MMOL/L (ref 3–14)
BUN SERPL-MCNC: 31 MG/DL (ref 7–30)
CALCIUM SERPL-MCNC: 8.7 MG/DL (ref 8.5–10.1)
CHLORIDE SERPL-SCNC: 100 MMOL/L (ref 94–109)
CO2 SERPL-SCNC: 27 MMOL/L (ref 20–32)
CREAT SERPL-MCNC: 1.17 MG/DL (ref 0.52–1.04)
GFR SERPL CREATININE-BSD FRML MDRD: 48 ML/MIN/{1.73_M2}
GLUCOSE SERPL-MCNC: 90 MG/DL (ref 70–99)
POTASSIUM SERPL-SCNC: 4.8 MMOL/L (ref 3.4–5.3)
SODIUM SERPL-SCNC: 134 MMOL/L (ref 133–144)

## 2020-02-28 PROCEDURE — 36415 COLL VENOUS BLD VENIPUNCTURE: CPT | Performed by: INTERNAL MEDICINE

## 2020-02-28 PROCEDURE — 80048 BASIC METABOLIC PNL TOTAL CA: CPT | Performed by: INTERNAL MEDICINE

## 2020-02-28 NOTE — TELEPHONE ENCOUNTER
Labs reviewed by CATHY Orozco. Released to [employer] with following instructions from Yolanda:    Date: 2/28/2020    Time of Call: 3:35 PM     Diagnosis:  Heart failure     [ VORB ] Ordering provider: Yolanda MORGAN  Order: Ok to take one time dose of Metolazone 2.5 mg.      Order received by: Vidhi Montgomeyr RN      Follow-up/additional notes: sent via [employer].

## 2020-03-02 ENCOUNTER — MYC MEDICAL ADVICE (OUTPATIENT)
Dept: CARDIOLOGY | Facility: CLINIC | Age: 70
End: 2020-03-02

## 2020-03-09 ASSESSMENT — ENCOUNTER SYMPTOMS
SYNCOPE: 0
LEG PAIN: 1
PALPITATIONS: 1
MUSCLE CRAMPS: 0
JOINT SWELLING: 0
MUSCLE WEAKNESS: 0
ORTHOPNEA: 0
SORE THROAT: 0
SINUS CONGESTION: 0
TROUBLE SWALLOWING: 0
LIGHT-HEADEDNESS: 1
NECK PAIN: 0
EXERCISE INTOLERANCE: 0
ARTHRALGIAS: 1
SLEEP DISTURBANCES DUE TO BREATHING: 0
SMELL DISTURBANCE: 0
MYALGIAS: 1
SWOLLEN GLANDS: 0
BRUISES/BLEEDS EASILY: 1
SINUS PAIN: 0
HYPERTENSION: 0
TASTE DISTURBANCE: 0
HOARSE VOICE: 1
BACK PAIN: 0
HYPOTENSION: 0
NECK MASS: 0
STIFFNESS: 1

## 2020-03-10 ENCOUNTER — HEALTH MAINTENANCE LETTER (OUTPATIENT)
Age: 70
End: 2020-03-10

## 2020-03-12 ENCOUNTER — OFFICE VISIT (OUTPATIENT)
Dept: CARDIOLOGY | Facility: CLINIC | Age: 70
End: 2020-03-12
Attending: INTERNAL MEDICINE
Payer: COMMERCIAL

## 2020-03-12 VITALS
BODY MASS INDEX: 26.22 KG/M2 | SYSTOLIC BLOOD PRESSURE: 130 MMHG | HEIGHT: 63 IN | HEART RATE: 56 BPM | OXYGEN SATURATION: 99 % | WEIGHT: 148 LBS | DIASTOLIC BLOOD PRESSURE: 69 MMHG

## 2020-03-12 DIAGNOSIS — E85.4 AMYLOID HEART DISEASE (H): ICD-10-CM

## 2020-03-12 DIAGNOSIS — I50.30 DIASTOLIC HEART FAILURE, UNSPECIFIED HF CHRONICITY (H): ICD-10-CM

## 2020-03-12 DIAGNOSIS — I43 AMYLOID HEART DISEASE (H): ICD-10-CM

## 2020-03-12 LAB
ANION GAP SERPL CALCULATED.3IONS-SCNC: 5 MMOL/L (ref 3–14)
BUN SERPL-MCNC: 32 MG/DL (ref 7–30)
CALCIUM SERPL-MCNC: 9.3 MG/DL (ref 8.5–10.1)
CHLORIDE SERPL-SCNC: 96 MMOL/L (ref 94–109)
CO2 SERPL-SCNC: 32 MMOL/L (ref 20–32)
CREAT SERPL-MCNC: 1.15 MG/DL (ref 0.52–1.04)
GFR SERPL CREATININE-BSD FRML MDRD: 49 ML/MIN/{1.73_M2}
GLUCOSE SERPL-MCNC: 80 MG/DL (ref 70–99)
NT-PROBNP SERPL-MCNC: 892 PG/ML (ref 0–125)
POTASSIUM SERPL-SCNC: 4 MMOL/L (ref 3.4–5.3)
SODIUM SERPL-SCNC: 133 MMOL/L (ref 133–144)

## 2020-03-12 PROCEDURE — G0463 HOSPITAL OUTPT CLINIC VISIT: HCPCS | Mod: ZF

## 2020-03-12 PROCEDURE — 36415 COLL VENOUS BLD VENIPUNCTURE: CPT | Performed by: INTERNAL MEDICINE

## 2020-03-12 PROCEDURE — 83880 ASSAY OF NATRIURETIC PEPTIDE: CPT | Performed by: INTERNAL MEDICINE

## 2020-03-12 PROCEDURE — 99215 OFFICE O/P EST HI 40 MIN: CPT | Mod: GC | Performed by: INTERNAL MEDICINE

## 2020-03-12 PROCEDURE — 80048 BASIC METABOLIC PNL TOTAL CA: CPT | Performed by: INTERNAL MEDICINE

## 2020-03-12 ASSESSMENT — PAIN SCALES - GENERAL: PAINLEVEL: NO PAIN (0)

## 2020-03-12 ASSESSMENT — MIFFLIN-ST. JEOR: SCORE: 1175.45

## 2020-03-12 NOTE — PATIENT INSTRUCTIONS
"Cardiology Providers you saw during your visit:  Dr. Cohen    Medication changes:  None       Follow up: with core as planned         Labs:  Results for HILARY GARCIA (MRN [id number]) as of 3/12/2020 08:11   Ref. Range 3/12/2020 06:45   Sodium Latest Ref Range: 133 - 144 mmol/L 133   Potassium Latest Ref Range: 3.4 - 5.3 mmol/L 4.0   Chloride Latest Ref Range: 94 - 109 mmol/L 96   Carbon Dioxide Latest Ref Range: 20 - 32 mmol/L 32   Urea Nitrogen Latest Ref Range: 7 - 30 mg/dL 32 (H)   Creatinine Latest Ref Range: 0.52 - 1.04 mg/dL 1.15 (H)   GFR Estimate Latest Ref Range: >60 mL/min/1.73_m2 49 (L)   GFR Estimate If Black Latest Ref Range: >60 mL/min/1.73_m2 57 (L)   Calcium Latest Ref Range: 8.5 - 10.1 mg/dL 9.3   Anion Gap Latest Ref Range: 3 - 14 mmol/L 5   N-Terminal Pro Bnp Latest Ref Range: 0 - 125 pg/mL 892 (H)   Glucose Latest Ref Range: 70 - 99 mg/dL 80       Please call if you have :  1. Weight gain of more than 2 pounds in a day or 5 pounds in a week  2. Increased shortness of breath, swelling or bloating  3. Dizziness, lightheadedness   4. Any questions or concerns.       Follow the American Heart Association Diet and Lifestyle recommendations:  Limit saturated fat, trans fat, sodium, red meat, sweets and sugar-sweetened beverages. If you choose to eat red meat, compare labels and select the leanest cuts available.  Aim for at least 150 minutes of moderate physical activity or 75 minutes of vigorous physical activity - or an equal combination of both - each week.      During business hours: [phone], press option # 1 to be routed to the [facility] then option # 4 for \"To send a message to your care team\"      After hours, weekends or holidays: On Call Cardiologist- [phone]   option #4 and ask to speak to the on-call Cardiologist. Inform them you are a CORE/heart failure patient at the [facility].      Heart Failure Support Group  [facility]  The Board Room, 8th " "Floor  [address] [address]     Meetings   1-2 pm  January 6  March 2  May 4  July 6  September 14  November 2      Niki Fam RN BSN CHFN  Cardiology Care Coordinator - Heart Failure/ [facility]  [facility]   Questions and [phone]   First press #1 for the [email or web address] and then press #4 for \"To send a message to your care team\"    "

## 2020-03-12 NOTE — NURSING NOTE
Chief Complaint   Patient presents with     Follow Up     HF 69 year old female with chronic diastolic heart failure/AL presents for follow up with labs prior. Pt with labile K levels.      Vitals were taken and medications were reconciled.     Lilly Kim RMA  7:29 AM

## 2020-03-12 NOTE — LETTER
3/12/2020      RE: Leandra Guerrero  [address] [place] [address]       Dear Colleague,    Thank you for the opportunity to participate in the care of your patient, Leandra Guerrero, at the [facility] at [facility]. Please see a copy of my visit note below.    HPI:   Ms. Guerrero is a 69 year old female with a past medical history including stage IV AL amyloid diagnosed in 2016. Presents to clinic for CORE follow-up.     Her cardiac and oncologic history are as follows: fatigue starting in 1/2016. She had a coronary angiogram which was reportedly normal but was diagnosed with HF and started on a BB and diuretics. Her symptoms progressed to the point that she was evaluated at [facility] in August/September (Dr. Barron in oncology/hematology, Dr. Nettles in cardiology). She was diagnosed with stage IV AL amyloid (+GI, +bone marrow involvement, no involvement of kidney or liver). Her work up is detailed in our previous notes, however she has lambda AL amyloidosis and she underwent CyBorD chemotherapy and excellent light chain response by serum. Patient has been turned down at [facility] for a stem cell transplant due to her cardiac involvement. Later in 2016, she had 2-3 more right sided pleural taps. She was hospitalized 1/2017 and diuresed 20-30 pounds at that time. Since that time her AL has been in remission by labs without further chemo. Patient was then treated with doxycycline but this was stopped ~6-9 months ago due to thought it wasn't benefiting per patient, although now recently restarted again. She was last admitted to [facility] 4/2 for shortness of breath and edema. Echocardiogram showed EF 60-65%, moderate LVH, normal RV, moderate AI, and mild PH.    Since patient's last visit, patient went to the ED for chest pain, arm numbness, and palpitations which woke her up from sleep. Patient reported no intervention was performed. Patient reports she was in atrial  fibrillation during the episode (possibly over 100). Patient was supposed to be admitted; however, there were no beds available. Patient had two negative troponins. Patient has not had any recurrence of chest pain. Patient reports no exertional chest pain or shortness of breath. Patient has not had pain this severe as severe.    Patient has not had any admissions. Patient reports that her dry weight is in the 140's. When she gets into the 150's, she takes a metolozone. Patient has been taking this medication every 3-5 days. No shortness of breath with exertion or laying flat. Patient reports edema in her legs, but slightly worse than her baseline. Patient denies syncope or LH.     Medications:   1. Apixiban 5mg BID   2. Doxycyline 100mg BID   3. Torsemide 100mg BID   4. Metolozone 2.5mg BID   5. KCl 80 mEQ BID with extra 80 mEQ with Metolozone    PAST MEDICAL HISTORY:  Past Medical History:   Diagnosis Date     AL amyloidosis (H)      Atrial fibrillation and flutter (H)      Cardiac amyloidosis (H)      Lymphedema      QT prolongation      Recurrent right pleural effusion 1/2/2017     SVT (supraventricular tachycardia) (H)        FAMILY HISTORY:  Family History   Problem Relation Age of Onset     Other - See Comments Sister         Amyloidosis       SOCIAL HISTORY:  Socioeconomic History     Marital status:    Tobacco Use     Smoking status: Never Smoker     Smokeless tobacco: Never Used   Substance and Sexual Activity     Alcohol use: No     Drug use: No   Other Topics Concern     CURRENT MEDICATIONS:  Acetaminophen (TYLENOL PO), Take 325 mg by mouth  apixaban ANTICOAGULANT (ELIQUIS ANTICOAGULANT) 5 MG tablet, Take 1 tablet (5 mg) by mouth 2 times daily  doxycycline hyclate (VIBRA-TABS) 100 MG tablet, Take 1 tablet (100 mg) by mouth daily  granisetron (KYTRIL) 1 MG tablet, Take 1 tablet (1 mg) by mouth every 12 hours as needed for nausea (Patient not taking: Reported on 12/19/2019)  LORazepam (ATIVAN) 0.5 MG  "tablet, Take 1 tablet (0.5 mg) by mouth every 6 hours as needed for anxiety or nausea  metolazone (ZAROXOLYN) 2.5 MG tablet, TAKE 1 TABLET TWICE WEEKLY  ondansetron (ZOFRAN-ODT) 4 MG ODT tab, Take 1 tablet (4 mg) by mouth every 6 hours as needed for nausea  potassium chloride ER (K-DUR/KLOR-CON M) 10 MEQ CR tablet, Take 8 tablets (80 mEq) by mouth 2 times daily Additionally, take an Extra 80 mEq on metolazone days and the day after a metolazone day  prochlorperazine (COMPAZINE) 5 MG tablet, One to two tablets PO q 6 hours prn nausea  torsemide (DEMADEX) 100 MG tablet, Take 1 tablet (100 mg) by mouth 2 times daily    No current facility-administered medications on file prior to visit.       ROS:   CONSTITUTIONAL: Denies fever, chills, +fatigue, chronic.  HEENT: Denies headache, vision changes, and changes in speech.   CV: Refer to HPI.   PULMONARY: Refer to HPI.   GI: Refer to HPI.   :  + urinary frequency 2/2 diuretics.  Otherwise no urinary symptoms  EXT: +Chronic lower extremity edema see hpi  SKIN: Denies abnormal rashes or lesions.   MUSCULOSKELETAL: Denies upper or lower extremity weakness and pain.   NEUROLOGIC: Denies seizures, or upper or lower extremity paresthesia.     EXAM:  /69 (BP Location: Left arm, Patient Position: Chair, Cuff Size: Adult Regular)   Pulse 56   Ht 1.6 m (5' 3\")   Wt 67.1 kg (148 lb)   SpO2 99%   BMI 26.22 kg/m     GENERAL: Appears comfortable, in no acute distress, speaking in full sentences and able to communicate all needs.   HEENT: Eye symmetrical, no discharge. Mucous membranes moist and without lesions.  CV: RRR, +S1S2, no murmur, rub, or gallop. JVP at about 2 cm above clavicle at 90 degrees  RESPIRATORY: Respirations regular, even, and unlabored. Lungs CTA throughout.   GI: Soft and non-distended with normoactive bowel sounds present. No tenderness, rebound, guarding. No hepatomegaly.   EXTREMITIES: Moderate non pitting peripheral edema. 2+ bilateral pedal pulses. "   NEUROLOGIC: Alert and interacting appropiratly. No focal deficits.   MUSCULOSKELETAL: No joint swelling or tenderness.   SKIN: No jaundice. No rashes or lesions.     Labs, reviewed with patient in clinic today:  Orders Only on 03/12/2020   Component Date Value Ref Range Status     N-Terminal Pro Bnp 03/12/2020 892* 0 - 125 pg/mL Final    Comment:    Reference range shown and results flagged as abnormal are for the outpatient,   non acute settings. Establishing a baseline value for each individual patient   is useful for follow-up.  Suggested inpatient cut points for confirming diagnosis of CHF in an acute   setting are:   >450 pg/mL (age 18 to less than 50)   >900 pg/mL (age 50 to less than 75)   >1800 pg/mL (75 yrs and older)  An inpatient or emergency department NT-proPBNP <300 pg/mL effectively rules   out acute CHF, with 99% negative predictive value.        Sodium 03/12/2020 133  133 - 144 mmol/L Final     Potassium 03/12/2020 4.0  3.4 - 5.3 mmol/L Final     Chloride 03/12/2020 96  94 - 109 mmol/L Final     Carbon Dioxide 03/12/2020 32  20 - 32 mmol/L Final     Anion Gap 03/12/2020 5  3 - 14 mmol/L Final     Glucose 03/12/2020 80  70 - 99 mg/dL Final     Urea Nitrogen 03/12/2020 32* 7 - 30 mg/dL Final     Creatinine 03/12/2020 1.15* 0.52 - 1.04 mg/dL Final     GFR Estimate 03/12/2020 49* >60 mL/min/[1.73_m2] Final    Comment: Non  GFR Calc  Starting 12/18/2018, serum creatinine based estimated GFR (eGFR) will be   calculated using the Chronic Kidney Disease Epidemiology Collaboration   (CKD-EPI) equation.       GFR Estimate If Black 03/12/2020 57* >60 mL/min/[1.73_m2] Final    Comment:  GFR Calc  Starting 12/18/2018, serum creatinine based estimated GFR (eGFR) will be   calculated using the Chronic Kidney Disease Epidemiology Collaboration   (CKD-EPI) equation.       Calcium 03/12/2020 9.3  8.5 - 10.1 mg/dL Final       Diagnostics:    Coronary Angiogram 2016  Diagnostic  Findings  LEFT MAIN CORONARY ARTERY     The LMCA is free of significant disease.    LEFT ANTERIOR DESCENDING     The LAD is free of significant disease.    CIRCUMFLEX     The Circumflex is free of significant disease.    RIGHT CORONARY ARTERY     The RCA is free of significant disease.     TTE 4/9/19  Moderate left ventricular hypertrophy.  Global and regional left ventricular function is normal with an EF of 60-65%.  Global right ventricular function is normal.  Moderate aortic valve insufficiency.  Mild pulmonary hypertension with dilated IVC.  This study was compared with the study from 10/11/18 the AR is worse and RA pressure is now increased.    [employer] 11/2017      Holter Monitor 06/2019  INTERPRETATION  1. The rhythm varied with sinus rhythm and atrial fibrillation/variable atrial flutter. Low voltage noted.  2. The heart rate varied with a minimum rate of 48 bpm, maximum rate of 164 bpm, average rate of 74 bpm.  3. Frequent supraventricular ectopy was seen ranging from 0-469 per hour. Occasional atrail pairs and fairly frequent bigeminal cycles were noted.  Wandering atrial pacemaker noted.  4. Infrequent multifocal ventricular ectopy was seen ranging from 0-36 beats per hour.  5. ST-T wave changes were present.  6. Three patient events were documented and correlated with atrial fibrillation/flutter    Assessment and Plan:   Mrs. Guerrero is a 69 year old female with AL amyloidosis with cardiac manifestation who presents to [facility] for hospital follow-up. Patient has cardiac amyloidosis as evidenced by her echocardiogram (severe concentric hypertrophy and biatrial enlargement) and abnormal EKG (near-low voltage, poor R wave progression, biatrial enlargement and no evidence of LVH despite thicken LV on echo) in the setting of biopsy proven (BMB, EGD) AL amyloid. She completed chemotherapy with CyBorD with an excellent serologic response and her heart failure (i.e. troponin negative, NT pro BNP was stable, serum  light chains minimal and urine light chains undetectable).     Overall, Amy has maintained her quality of life much longer than originally expected.  We are taking it on a month by month basis.  At this point she has not felt ready for palliative care or hospice.    She has gradually declined over the last year and has ongoing challenges with her volume status.  She requires frequent metolazone and maintaining an adequate potassium level has been a challenge. In the last month, her Potassium has stayed much more stable than it had been earlier this year, although today she is on the lower side again. We did trial her on Diuril in hopes that this would improve the consistency of her regimen but she did not feel well on this medication so we have resumed intermittent metolazone.  Creatinine is stable.     She is planning to go on vacation to [place] in Feb.  She will get labs 3 to 4 days before leaving [facility]. She has a lab in [place] which she has used before. She will call her insurance regarding which hospital to go to in case of an emergency.    # Chronic diastolic heart failure/restrictive cardiomyopathy 2/2  # Cardiac amyloidosis    Stage C. NYHA Class III.    Fluid status: Euvolemic, continue torsemide 100 mg BID, KCl 80 meq BID. Needs metolazone every 5-7 days, will take metolazone when >152 lbs, will alert clinic when she take it as she requires labs 2-3 days later due to hypokalemia. When she takes metolazone she will take an ADDITIONAL 80 meq Kcl on the metolazone day and the day after.   ACEi/ARB/ARNI: n/a   BB: no indication and relatively contraindicated due to risk of progressive conduction disease/AV block in these patients  Aldosterone antagonist: d/c'd given hyponatremia, Na improving and she is feeling better   SCD prophylaxis: does not meet criteria for implant  NSAID use: contraindicated  Sleep apnea evaluation: deferred today  Remote monitoring: deferred today, could be Cardiocom candidate   Goals  of care: prior discussions with Dr Cohen re: planning for EoL care as long term prognosis for AL amyloid is poor.   Other: Have placed a referall for lymphedema therapy in [facility]    # Hypokalemia  3.4 today after taking metolazone 2 days ago. Will take an additional 60 meq of Kcl now. She will also take her usual 80 meq BID.  - Labs every two weeks    # A. Fib/A. Flutter  We did a holter monitor which showed intermittent a. Fib and a. Flutter. [id number] of 4. Occasional palpitations which last less than 1 minute, if these become more frequent can repeat monitor to assess burden.  - Continue Eliquis 5mg BID  - Avoiding BB in the setting of amyloid  - Holding off on digoxin given good rate control. Could discuss in the future if needed.    # Prolonged QTC  - Minimize QT prolonging meds    # Systemic amyloid  Heme previously monitoring her light chains which have been negative consistent with remission - therefore further palliative chemo not being considered. Nausea has been a large issue and is currently, Kytril was added at her last oncology appointment, but she has not started taking this. to her regimen of compazine and lorazepam.  - Consider palliative care referal if nausea not controlled on her current regimen (per onc)  - She is back on doxycycline   Follow: Up: Labs in two weeks (Assess K), Labs prior to vacation, Dr. Cohen as scheduled in March and CORE 5-6 weeks after that.    Laila Fischer   Cardiology Fellow   Pager: [phone]    ALEJANDRO FORD      See aboveI     HPI:   Ms. Guerrero is a 69 year old female with a past medical history including stage IV AL amyloid diagnosed in 2016. Presents to clinic for CORE follow-up.     Her cardiac and oncologic history are as follows: She had fatigue in 2016.  She had a coronary angiogram which was reportedly normal but was diagnosed with HF and started on a BB and diuretics. Her symptoms progressed to the point that she was evaluated at [facility]  in August/September (Dr. Barron in oncology/hematology, Dr. Nettles in cardiology). She was diagnosed with stage IV AL amyloid (+GI, +bone marrow involvement, no involvement of kidney or liver). Her work up is detailed in our previous notes, however she has lambda AL amyloidosis and she underwent CyBorD chemotherapy and excellent light chain response by serum. Patient has been turned down at [facility] for a stem cell transplant due to her cardiac involvement. Later in 2016, she had 2-3 more right sided pleural taps. She was hospitalized 1/2017 and diuresed 20-30 pounds at that time. Since that time her AL has been in remission by labs without further chemo. Patient was then treated with doxycycline but this was stopped ~6-9 months ago due to thought it wasn't benefiting per patient, although she has started this again again.    Since patient's last visit, patient went to the ED for chest pain, arm numbness, and palpitations which woke her up from sleep. Patient reported no intervention was performed. She was in atrial fibrillation during the episode (possibly over 100). Patient was supposed to be admitted; however, there were no beds available. Patient had two negative troponins and she has not had any recurrence of chest pain. Patient reports no exertional chest pain or shortness of breath at baseline. She reports that her weight has been stable and she still intermittently takes a metolozone. She has no significant change in her symptoms and feels fairly well compensated.     Medications:   1. Apixiban 5mg BID   2. Doxycyline 100mg BID   3. Torsemide 100mg BID   4. Metolozone 2.5mg BID   5. KCl 80 mEQ BID with extra 80 mEQ with Metolozone    PAST MEDICAL HISTORY:  Past Medical History:   Diagnosis Date     AL amyloidosis (H)      Atrial fibrillation and flutter (H)      Cardiac amyloidosis (H)      Lymphedema      QT prolongation      Recurrent right pleural effusion 1/2/2017     SVT (supraventricular tachycardia) (H)         FAMILY HISTORY:  Family History   Problem Relation Age of Onset     Other - See Comments Sister         Amyloidosis       SOCIAL HISTORY:  Socioeconomic History     Marital status:    Tobacco Use     Smoking status: Never Smoker     Smokeless tobacco: Never Used   Substance and Sexual Activity     Alcohol use: No     Drug use: No   Other Topics Concern     CURRENT MEDICATIONS:  Acetaminophen (TYLENOL PO), Take 325 mg by mouth  apixaban ANTICOAGULANT (ELIQUIS ANTICOAGULANT) 5 MG tablet, Take 1 tablet (5 mg) by mouth 2 times daily  doxycycline hyclate (VIBRA-TABS) 100 MG tablet, Take 1 tablet (100 mg) by mouth daily  granisetron (KYTRIL) 1 MG tablet, Take 1 tablet (1 mg) by mouth every 12 hours as needed for nausea (Patient not taking: Reported on 12/19/2019)  LORazepam (ATIVAN) 0.5 MG tablet, Take 1 tablet (0.5 mg) by mouth every 6 hours as needed for anxiety or nausea  metolazone (ZAROXOLYN) 2.5 MG tablet, TAKE 1 TABLET TWICE WEEKLY  ondansetron (ZOFRAN-ODT) 4 MG ODT tab, Take 1 tablet (4 mg) by mouth every 6 hours as needed for nausea  potassium chloride ER (K-DUR/KLOR-CON M) 10 MEQ CR tablet, Take 8 tablets (80 mEq) by mouth 2 times daily Additionally, take an Extra 80 mEq on metolazone days and the day after a metolazone day  prochlorperazine (COMPAZINE) 5 MG tablet, One to two tablets PO q 6 hours prn nausea  torsemide (DEMADEX) 100 MG tablet, Take 1 tablet (100 mg) by mouth 2 times daily    No current facility-administered medications on file prior to visit.       ROS:   CONSTITUTIONAL: Denies fever, chills  HEENT: Denies headache, vision changes, and changes in speech.   CV: Refer to HPI.   PULMONARY: Refer to HPI.   GI: Refer to HPI.   :   Otherwise no urinary symptoms  EXT: None  SKIN: Denies abnormal rashes or lesions.   MUSCULOSKELETAL: Denies upper or lower extremity weakness and pain.   NEUROLOGIC: Denies seizures, or upper or lower extremity paresthesia.     EXAM:  /69 (BP Location:  "Left arm, Patient Position: Chair, Cuff Size: Adult Regular)   Pulse 56   Ht 1.6 m (5' 3\")   Wt 67.1 kg (148 lb)   SpO2 99%   BMI 26.22 kg/m     GENERAL: Appears comfortable, in no acute distress, speaking in full sentences and able to communicate all needs.   HEENT: NC/AT   CV: RRR, +S1S2, no murmur, rub, or gallop. CVP is not elevated.   RESPIRATORY: Respirations regular, even, and unlabored. Lungs CTA throughout.   GI: Soft and non-distended with normoactive bowel sounds present. No tenderness, rebound, guarding. No hepatomegaly.   EXTREMITIES: Mild Edema   NEUROLOGIC: Alert and interacting appropiratly. No focal deficits.     Labs, reviewed with patient in clinic today:  Orders Only on 03/12/2020   Component Date Value Ref Range Status     N-Terminal Pro Bnp 03/12/2020 892* 0 - 125 pg/mL Final    Comment:    Reference range shown and results flagged as abnormal are for the outpatient,   non acute settings. Establishing a baseline value for each individual patient   is useful for follow-up.  Suggested inpatient cut points for confirming diagnosis of CHF in an acute   setting are:   >450 pg/mL (age 18 to less than 50)   >900 pg/mL (age 50 to less than 75)   >1800 pg/mL (75 yrs and older)  An inpatient or emergency department NT-proPBNP <300 pg/mL effectively rules   out acute CHF, with 99% negative predictive value.        Sodium 03/12/2020 133  133 - 144 mmol/L Final     Potassium 03/12/2020 4.0  3.4 - 5.3 mmol/L Final     Chloride 03/12/2020 96  94 - 109 mmol/L Final     Carbon Dioxide 03/12/2020 32  20 - 32 mmol/L Final     Anion Gap 03/12/2020 5  3 - 14 mmol/L Final     Glucose 03/12/2020 80  70 - 99 mg/dL Final     Urea Nitrogen 03/12/2020 32* 7 - 30 mg/dL Final     Creatinine 03/12/2020 1.15* 0.52 - 1.04 mg/dL Final     GFR Estimate 03/12/2020 49* >60 mL/min/[1.73_m2] Final    Comment: Non  GFR Calc  Starting 12/18/2018, serum creatinine based estimated GFR (eGFR) will be   calculated " using the Chronic Kidney Disease Epidemiology Collaboration   (CKD-EPI) equation.       GFR Estimate If Black 03/12/2020 57* >60 mL/min/[1.73_m2] Final    Comment:  GFR Calc  Starting 12/18/2018, serum creatinine based estimated GFR (eGFR) will be   calculated using the Chronic Kidney Disease Epidemiology Collaboration   (CKD-EPI) equation.       Calcium 03/12/2020 9.3  8.5 - 10.1 mg/dL Final       Diagnostics:    Coronary Angiogram 2016  Diagnostic Findings  LEFT MAIN CORONARY ARTERY     The LMCA is free of significant disease.    LEFT ANTERIOR DESCENDING     The LAD is free of significant disease.    CIRCUMFLEX     The Circumflex is free of significant disease.    RIGHT CORONARY ARTERY     The RCA is free of significant disease.     TTE 4/9/19  Moderate left ventricular hypertrophy.  Global and regional left ventricular function is normal with an EF of 60-65%.  Global right ventricular function is normal.  Moderate aortic valve insufficiency.  Mild pulmonary hypertension with dilated IVC.  This study was compared with the study from 10/11/18 the AR is worse and RA pressure is now increased.    [employer] 11/2017      Holter Monitor 06/2019  INTERPRETATION  1. The rhythm varied with sinus rhythm and atrial fibrillation/variable atrial flutter. Low voltage noted.  2. The heart rate varied with a minimum rate of 48 bpm, maximum rate of 164 bpm, average rate of 74 bpm.  3. Frequent supraventricular ectopy was seen ranging from 0-469 per hour. Occasional atrail pairs and fairly frequent bigeminal cycles were noted.  Wandering atrial pacemaker noted.  4. Infrequent multifocal ventricular ectopy was seen ranging from 0-36 beats per hour.  5. ST-T wave changes were present.  6. Three patient events were documented and correlated with atrial fibrillation/flutter    Assessment and Plan:   Mrs. Guerrero is a 69 year old female with AL amyloidosis with cardiac manifestation who presents for follow up.      #Cardiac Amyloidosis (LVEF 60%)   -Patient has AL Cardiac Amyloid (ECHO with Severe LVH and Biopsy proven Amyloid in BMBx and EGD). She has undergone chemotherapy with excellent response. She has not received a stem cell transplant; however, she is doing well from a symptom standpoint with stable vital signs.   -Even though AL Amyloid has a poor prognosis, she has done exceptionally well and has mild cardiac symptoms at this time. However, I do suspect that these may continue to slowly get worse over time.   -Continue Torsemide 100mg BID with Metolozone 2.5 mg twice a week. She is not on Aldactone because of previous hyponatremia.   -We will not make any medication changes at this time.     #Episode of Chest Pain  -Patient had a normal coronary angiogram in 2016. She was in atrial fibrillation during this episode, so this could have been contributing. If this recurs, we could consider stress test.     # A. Fib/A. Flutter  -Patient continues to have intermittent atrial fibrillation. She had an episode in the ED.   -Continue Elliquis 5mg BID.   -Avoiding BB given her Amyloid history.     #Systemic Amyloid  -Continue Kytril per Oncology   -Continue Doxycycline      Follow up with [facility]       Laila Fischer   Cardiology Fellow   Pager: [phone]    ALEJANDRO FORD

## 2020-03-13 NOTE — PROGRESS NOTES
HPI:   Ms. Guerrero is a 69 year old female with a past medical history including stage IV AL amyloid diagnosed in 2016. Presents to clinic for CORE follow-up.     Her cardiac and oncologic history are as follows: She had fatigue in 2016.  She had a coronary angiogram which was reportedly normal but was diagnosed with HF and started on a BB and diuretics. Her symptoms progressed to the point that she was evaluated at [facility] in August/September (Dr. Barron in oncology/hematology, Dr. Nettles in cardiology). She was diagnosed with stage IV AL amyloid (+GI, +bone marrow involvement, no involvement of kidney or liver). Her work up is detailed in our previous notes, however she has lambda AL amyloidosis and she underwent CyBorD chemotherapy and excellent light chain response by serum. Patient has been turned down at [facility] for a stem cell transplant due to her cardiac involvement. Later in 2016, she had 2-3 more right sided pleural taps. She was hospitalized 1/2017 and diuresed 20-30 pounds at that time. Since that time her AL has been in remission by labs without further chemo. Patient was then treated with doxycycline but this was stopped ~6-9 months ago due to thought it wasn't benefiting per patient, although she has started this again again.    Since patient's last visit, patient went to the ED for chest pain, arm numbness, and palpitations which woke her up from sleep. Patient reported no intervention was performed. She was in atrial fibrillation during the episode (possibly over 100). Patient was supposed to be admitted; however, there were no beds available. Patient had two negative troponins and she has not had any recurrence of chest pain. Patient reports no exertional chest pain or shortness of breath at baseline. She reports that her weight has been stable and she still intermittently takes a metolozone. She has no significant change in her symptoms and feels fairly well compensated.     Medications:   1. Apixiban  5mg BID   2. Doxycyline 100mg BID   3. Torsemide 100mg BID   4. Metolozone 2.5mg BID   5. KCl 80 mEQ BID with extra 80 mEQ with Metolozone    PAST MEDICAL HISTORY:  Past Medical History:   Diagnosis Date     AL amyloidosis (H)      Atrial fibrillation and flutter (H)      Cardiac amyloidosis (H)      Lymphedema      QT prolongation      Recurrent right pleural effusion 1/2/2017     SVT (supraventricular tachycardia) (H)        FAMILY HISTORY:  Family History   Problem Relation Age of Onset     Other - See Comments Sister         Amyloidosis       SOCIAL HISTORY:  Socioeconomic History     Marital status:    Tobacco Use     Smoking status: Never Smoker     Smokeless tobacco: Never Used   Substance and Sexual Activity     Alcohol use: No     Drug use: No   Other Topics Concern     CURRENT MEDICATIONS:  Acetaminophen (TYLENOL PO), Take 325 mg by mouth  apixaban ANTICOAGULANT (ELIQUIS ANTICOAGULANT) 5 MG tablet, Take 1 tablet (5 mg) by mouth 2 times daily  doxycycline hyclate (VIBRA-TABS) 100 MG tablet, Take 1 tablet (100 mg) by mouth daily  granisetron (KYTRIL) 1 MG tablet, Take 1 tablet (1 mg) by mouth every 12 hours as needed for nausea (Patient not taking: Reported on 12/19/2019)  LORazepam (ATIVAN) 0.5 MG tablet, Take 1 tablet (0.5 mg) by mouth every 6 hours as needed for anxiety or nausea  metolazone (ZAROXOLYN) 2.5 MG tablet, TAKE 1 TABLET TWICE WEEKLY  ondansetron (ZOFRAN-ODT) 4 MG ODT tab, Take 1 tablet (4 mg) by mouth every 6 hours as needed for nausea  potassium chloride ER (K-DUR/KLOR-CON M) 10 MEQ CR tablet, Take 8 tablets (80 mEq) by mouth 2 times daily Additionally, take an Extra 80 mEq on metolazone days and the day after a metolazone day  prochlorperazine (COMPAZINE) 5 MG tablet, One to two tablets PO q 6 hours prn nausea  torsemide (DEMADEX) 100 MG tablet, Take 1 tablet (100 mg) by mouth 2 times daily    No current facility-administered medications on file prior to visit.       ROS:  "  CONSTITUTIONAL: Denies fever, chills  HEENT: Denies headache, vision changes, and changes in speech.   CV: Refer to HPI.   PULMONARY: Refer to HPI.   GI: Refer to HPI.   :   Otherwise no urinary symptoms  EXT: None  SKIN: Denies abnormal rashes or lesions.   MUSCULOSKELETAL: Denies upper or lower extremity weakness and pain.   NEUROLOGIC: Denies seizures, or upper or lower extremity paresthesia.     EXAM:  /69 (BP Location: Left arm, Patient Position: Chair, Cuff Size: Adult Regular)   Pulse 56   Ht 1.6 m (5' 3\")   Wt 67.1 kg (148 lb)   SpO2 99%   BMI 26.22 kg/m     GENERAL: Appears comfortable, in no acute distress, speaking in full sentences and able to communicate all needs.   HEENT: NC/AT   CV: RRR, +S1S2, no murmur, rub, or gallop. CVP is not elevated.   RESPIRATORY: Respirations regular, even, and unlabored. Lungs CTA throughout.   GI: Soft and non-distended with normoactive bowel sounds present. No tenderness, rebound, guarding. No hepatomegaly.   EXTREMITIES: Mild Edema   NEUROLOGIC: Alert and interacting appropiratly. No focal deficits.     Labs, reviewed with patient in clinic today:  Orders Only on 03/12/2020   Component Date Value Ref Range Status     N-Terminal Pro Bnp 03/12/2020 892* 0 - 125 pg/mL Final    Comment:    Reference range shown and results flagged as abnormal are for the outpatient,   non acute settings. Establishing a baseline value for each individual patient   is useful for follow-up.  Suggested inpatient cut points for confirming diagnosis of CHF in an acute   setting are:   >450 pg/mL (age 18 to less than 50)   >900 pg/mL (age 50 to less than 75)   >1800 pg/mL (75 yrs and older)  An inpatient or emergency department NT-proPBNP <300 pg/mL effectively rules   out acute CHF, with 99% negative predictive value.        Sodium 03/12/2020 133  133 - 144 mmol/L Final     Potassium 03/12/2020 4.0  3.4 - 5.3 mmol/L Final     Chloride 03/12/2020 96  94 - 109 mmol/L Final     Carbon " Dioxide 03/12/2020 32  20 - 32 mmol/L Final     Anion Gap 03/12/2020 5  3 - 14 mmol/L Final     Glucose 03/12/2020 80  70 - 99 mg/dL Final     Urea Nitrogen 03/12/2020 32* 7 - 30 mg/dL Final     Creatinine 03/12/2020 1.15* 0.52 - 1.04 mg/dL Final     GFR Estimate 03/12/2020 49* >60 mL/min/[1.73_m2] Final    Comment: Non  GFR Calc  Starting 12/18/2018, serum creatinine based estimated GFR (eGFR) will be   calculated using the Chronic Kidney Disease Epidemiology Collaboration   (CKD-EPI) equation.       GFR Estimate If Black 03/12/2020 57* >60 mL/min/[1.73_m2] Final    Comment:  GFR Calc  Starting 12/18/2018, serum creatinine based estimated GFR (eGFR) will be   calculated using the Chronic Kidney Disease Epidemiology Collaboration   (CKD-EPI) equation.       Calcium 03/12/2020 9.3  8.5 - 10.1 mg/dL Final       Diagnostics:    Coronary Angiogram 2016  Diagnostic Findings  LEFT MAIN CORONARY ARTERY     The LMCA is free of significant disease.    LEFT ANTERIOR DESCENDING     The LAD is free of significant disease.    CIRCUMFLEX     The Circumflex is free of significant disease.    RIGHT CORONARY ARTERY     The RCA is free of significant disease.     TTE 4/9/19  Moderate left ventricular hypertrophy.  Global and regional left ventricular function is normal with an EF of 60-65%.  Global right ventricular function is normal.  Moderate aortic valve insufficiency.  Mild pulmonary hypertension with dilated IVC.  This study was compared with the study from 10/11/18 the AR is worse and RA pressure is now increased.    [employer]ed[employer] 11/2017      Holter Monitor 06/2019  INTERPRETATION  1. The rhythm varied with sinus rhythm and atrial fibrillation/variable atrial flutter. Low voltage noted.  2. The heart rate varied with a minimum rate of 48 bpm, maximum rate of 164 bpm, average rate of 74 bpm.  3. Frequent supraventricular ectopy was seen ranging from 0-469 per hour. Occasional atrail pairs and fairly  frequent bigeminal cycles were noted.  Wandering atrial pacemaker noted.  4. Infrequent multifocal ventricular ectopy was seen ranging from 0-36 beats per hour.  5. ST-T wave changes were present.  6. Three patient events were documented and correlated with atrial fibrillation/flutter    Assessment and Plan:   Mrs. Guerrero is a 69 year old female with AL amyloidosis with cardiac manifestation who presents for follow up.     #Cardiac Amyloidosis (LVEF 60%)   -Patient has AL Cardiac Amyloid (ECHO with Severe LVH and Biopsy proven Amyloid in BMBx and EGD). She has undergone chemotherapy with excellent response. She has not received a stem cell transplant; however, she is doing well from a symptom standpoint with stable vital signs.   -Even though AL Amyloid has a poor prognosis, she has done exceptionally well and has mild cardiac symptoms at this time. However, I do suspect that these may continue to slowly get worse over time.   -Continue Torsemide 100mg BID with Metolozone 2.5 mg twice a week. She is not on Aldactone because of previous hyponatremia.   -We will not make any medication changes at this time.     #Episode of Chest Pain  -Patient had a normal coronary angiogram in 2016. She was in atrial fibrillation during this episode, so this could have been contributing. If this recurs, we could consider stress test.     # A. Fib/A. Flutter  -Patient continues to have intermittent atrial fibrillation. She had an episode in the ED.   -Continue Elliquis 5mg BID.   -Avoiding BB given her Amyloid history.     #Systemic Amyloid  -Continue Kytril per Oncology   -Continue Doxycycline      Follow up with [facility]       Laila Fischer   Cardiology Fellow   Pager: [phone]    ALEJANDRO FORD

## 2020-04-08 ENCOUNTER — MYC MEDICAL ADVICE (OUTPATIENT)
Dept: CARDIOLOGY | Facility: CLINIC | Age: 70
End: 2020-04-08

## 2020-04-08 DIAGNOSIS — I43 AMYLOID HEART DISEASE (H): ICD-10-CM

## 2020-04-08 DIAGNOSIS — I50.33 ACUTE ON CHRONIC DIASTOLIC HEART FAILURE (H): ICD-10-CM

## 2020-04-08 DIAGNOSIS — E85.4 AMYLOID HEART DISEASE (H): ICD-10-CM

## 2020-04-09 RX ORDER — TORSEMIDE 100 MG/1
100 TABLET ORAL 2 TIMES DAILY
Qty: 180 TABLET | Refills: 3 | Status: SHIPPED | OUTPATIENT
Start: 2020-04-09 | End: 2021-02-22

## 2020-04-14 DIAGNOSIS — I50.33 ACUTE ON CHRONIC DIASTOLIC HEART FAILURE (H): Primary | ICD-10-CM

## 2020-04-16 DIAGNOSIS — I43 AMYLOID HEART DISEASE (H): ICD-10-CM

## 2020-04-16 DIAGNOSIS — E85.4 AMYLOID HEART DISEASE (H): ICD-10-CM

## 2020-04-17 NOTE — TELEPHONE ENCOUNTER
POTASSIUM CHLORIDE ER 10 MEQ Tablet Extended Release     Last Written Prescription Date:  112/23/2019  Last Fill Quantity: 1440,   # refills: 3  Last Office Visit : 3/12/2020  Future Office visit:  4/28/2020    Routing refill request to provider for review/approval because:  Continue with same dose of medication for Pt care?  Refer to clinic for review    Gloria Peace RN  Central Triage Red Flags/Med Refills

## 2020-04-21 RX ORDER — POTASSIUM CHLORIDE 750 MG/1
TABLET, EXTENDED RELEASE ORAL
OUTPATIENT
Start: 2020-04-21

## 2020-04-21 NOTE — TELEPHONE ENCOUNTER
Refill request noted. Talked to Amy and she just got refill of over 2000 pills of Potassium and doesn't need refill at this time. Called  [employer] to let them know we will send refill when needed in case dose changes. Notified Amy to let us know when she is close to needing a refill and will send at that time.   Vidhi Montgomery RN

## 2020-04-24 DIAGNOSIS — I50.33 ACUTE ON CHRONIC DIASTOLIC HEART FAILURE (H): ICD-10-CM

## 2020-04-24 LAB
ANION GAP SERPL CALCULATED.3IONS-SCNC: 5 MMOL/L (ref 3–14)
BUN SERPL-MCNC: 32 MG/DL (ref 7–30)
CALCIUM SERPL-MCNC: 9.4 MG/DL (ref 8.5–10.1)
CHLORIDE SERPL-SCNC: 94 MMOL/L (ref 94–109)
CO2 SERPL-SCNC: 36 MMOL/L (ref 20–32)
CREAT SERPL-MCNC: 1.09 MG/DL (ref 0.52–1.04)
GFR SERPL CREATININE-BSD FRML MDRD: 52 ML/MIN/{1.73_M2}
GLUCOSE SERPL-MCNC: 98 MG/DL (ref 70–99)
POTASSIUM SERPL-SCNC: 3.4 MMOL/L (ref 3.4–5.3)
SODIUM SERPL-SCNC: 135 MMOL/L (ref 133–144)

## 2020-04-24 PROCEDURE — 36415 COLL VENOUS BLD VENIPUNCTURE: CPT | Performed by: PHYSICIAN ASSISTANT

## 2020-04-24 PROCEDURE — 80048 BASIC METABOLIC PNL TOTAL CA: CPT | Performed by: PHYSICIAN ASSISTANT

## 2020-04-28 ENCOUNTER — VIRTUAL VISIT (OUTPATIENT)
Dept: CARDIOLOGY | Facility: CLINIC | Age: 70
End: 2020-04-28
Attending: PHYSICIAN ASSISTANT
Payer: COMMERCIAL

## 2020-04-28 VITALS — WEIGHT: 146 LBS | BODY MASS INDEX: 25.87 KG/M2 | HEIGHT: 63 IN

## 2020-04-28 DIAGNOSIS — I48.0 PAROXYSMAL ATRIAL FIBRILLATION (H): ICD-10-CM

## 2020-04-28 DIAGNOSIS — I50.30 DIASTOLIC HEART FAILURE, UNSPECIFIED HF CHRONICITY (H): Primary | ICD-10-CM

## 2020-04-28 DIAGNOSIS — E85.4 AMYLOID HEART DISEASE (H): ICD-10-CM

## 2020-04-28 DIAGNOSIS — I43 AMYLOID HEART DISEASE (H): ICD-10-CM

## 2020-04-28 DIAGNOSIS — R94.31 QT PROLONGATION: ICD-10-CM

## 2020-04-28 DIAGNOSIS — E87.6 HYPOKALEMIA: ICD-10-CM

## 2020-04-28 PROCEDURE — 99214 OFFICE O/P EST MOD 30 MIN: CPT | Mod: 95 | Performed by: PHYSICIAN ASSISTANT

## 2020-04-28 RX ORDER — POTASSIUM CHLORIDE 750 MG/1
80 TABLET, EXTENDED RELEASE ORAL 2 TIMES DAILY
Qty: 1440 TABLET | Refills: 3 | Status: SHIPPED | OUTPATIENT
Start: 2020-04-28 | End: 2020-04-30 | Stop reason: ALTCHOICE

## 2020-04-28 ASSESSMENT — MIFFLIN-ST. JEOR: SCORE: 1166.38

## 2020-04-28 ASSESSMENT — PAIN SCALES - GENERAL: PAINLEVEL: NO PAIN (0)

## 2020-04-28 NOTE — PATIENT INSTRUCTIONS
Take your medicines every day, as directed    Changes made today:  o Once a week, take an extra 20 meq of Kcl in addition to your normal dosing   Monitor Your Weight and Symptoms    Contact us if you:      Gain 2 pounds in one day or 5 pounds in one week    Feel more short of breath    Notice more leg swelling    Feel lightheadeded   Change your lifestyle    Limit Salt or Sodium:    2000 mg  Limit Fluids:    2000 mL or approximately 64 ounces  Eat a Heart Healthy Diet    Low in saturated fats  Stay Active:    Aim to move at least 150 minutes every  week         To Contact us    During Business Hours:  [phone], option # 1 ([facility])  Then option # 4 (medical questions)     After hours, weekends or holidays:   [phone], Option #4  Ask to speak to the On-Call Cardiologist. Inform them you are a CORE/heart failure patient at the [facility].     Use [employer] allows you to communicate directly with your heart team through secure messaging.    [employer] can be accessed any time on your phone, computer, or tablet.    If you need assistance, we'd be happy to help!         Keep your Heart Appointments:    - Labs every 3-4 weeks  - CORE in 2 months  - Dr. Cohen in 4 months

## 2020-04-28 NOTE — PROGRESS NOTES
"Leandra Guerrero is a 69 year old female who is being evaluated via a billable telephone visit.      The patient has been notified of following:     \"This telephone visit will be conducted via a call between you and your physician/provider. We have found that certain health care needs can be provided without the need for a physical exam.  This service lets us provide the care you need with a short phone conversation.  If a prescription is necessary we can send it directly to your pharmacy.  If lab work is needed we can place an order for that and you can then stop by our lab to have the test done at a later time.    Telephone visits are billed at different rates depending on your insurance coverage. During this emergency period, for some insurers they may be billed the same as an in-person visit.  Please reach out to your insurance provider with any questions.    If during the course of the call the physician/provider feels a telephone visit is not appropriate, you will not be charged for this service.\"    Patient has given verbal consent for Telephone visit?  Yes    How would you like to obtain your AVS? Maryhart     Weight and height were given by the patient and medications were reconciled.     Soco Kim CMA    9:40 AM    Phone visit start time: 9:57 am  End time: 10:30 AM    HPI:   Ms. Guerrero is a 69 year old female with a past medical history including stage IV AL amyloid diagnosed in 2016. Presents to clinic for CORE follow-up.     Her cardiac and oncologic history are as follows: fatigue starting in 1/2016. She had a coronary angiogram which was reportedly normal but was diagnosed with HF and started on a BBand diuretics. Her symptoms progressed to the point that she was evaluated at [facility] in August/September (Dr. Barron in oncology/hematology, Dr. Nettles is cardiology). She was diagnosed with stage IV AL amyloid (+GI, +bone marrow involvement, no involvement of kidney or liver). Her work up is detailed " in our previous notes, however she has lambda AL amyloidosis and she underwent CyBorD chemotherapy and excellent light chain response by serum. Patient has been turned down at [facility] for a stem cell transplant due to her cardiac involvement. Later in 2016, she had 2-3 more right sided pleural taps. She was hospitalized 1/2017 and diuresed 20-30 pounds at that time. Since that time her AL has been in remission by labs without further chemo. Patient was then treated with doxycycline but this was stopped ~6-9 months ago due to thought it wasn't benefiting per patient, although now recently restarted again. She was last admitted to [facility] 4/2 for shortness of breath and edema. Echocardiogram showed EF 60-65%, moderate LVH, normal RV, moderate AI, and mild PH.    Last visit: Dr. Cohen 3/12/2020  Had been having intermittent a. Fib. Avoiding BB d/t amyloid.    This visit:  Her weight continues to fluctuate. Good weight is 245. When she goes up to 253 she takes a metolazone. And then she looses 5-8 lbs. Takes a metolazone ever 4-5 days. Legs continue to be swollen, about at her baseline. She did see lymphedema and got lymphedema wraps and a pillow to help raise her legs. She uses it during the day. The pillow seems to be helping. Fluid goes to her abdomen as well, this goes away after metolazone. She can walk around out in her yard and do her groceries without having to stop. Feels like she is getting stronger. No SOB at rest. Head of the bed is a little propped up (2 pillow) for comfort, at her baseline.    She still has intermittent palpitations. Last a few minutes.     Today a little bit of dizziness with a sinus headache. Otherwise no significant dizziness.    We talked a lot about COVID precautions. She     No trips coming up.    Cardiac Medications  Apixaban 5 mg BID  Metolazone 2.5 mg daily- every 4-5 days  Torsemide 100 mg BID  Kcl 80 meq BID with 80 meq extra on metolazone days    PAST MEDICAL HISTORY:  Past  Medical History:   Diagnosis Date     AL amyloidosis (H)      Atrial fibrillation and flutter (H)      Cardiac amyloidosis (H)      Lymphedema      QT prolongation      Recurrent right pleural effusion 1/2/2017     SVT (supraventricular tachycardia) (H)        FAMILY HISTORY:  Family History   Problem Relation Age of Onset     Other - See Comments Sister         Amyloidosis       SOCIAL HISTORY:  Socioeconomic History     Marital status:    Tobacco Use     Smoking status: Never Smoker     Smokeless tobacco: Never Used   Substance and Sexual Activity     Alcohol use: No     Drug use: No   Other Topics Concern     CURRENT MEDICATIONS:  Acetaminophen (TYLENOL PO), Take 325 mg by mouth  apixaban ANTICOAGULANT (ELIQUIS ANTICOAGULANT) 5 MG tablet, Take 1 tablet (5 mg) by mouth 2 times daily  doxycycline hyclate (VIBRA-TABS) 100 MG tablet, Take 1 tablet (100 mg) by mouth daily  granisetron (KYTRIL) 1 MG tablet, Take 1 tablet (1 mg) by mouth every 12 hours as needed for nausea (Patient not taking: Reported on 12/19/2019)  LORazepam (ATIVAN) 0.5 MG tablet, Take 1 tablet (0.5 mg) by mouth every 6 hours as needed for anxiety or nausea  metolazone (ZAROXOLYN) 2.5 MG tablet, TAKE 1 TABLET TWICE WEEKLY  ondansetron (ZOFRAN-ODT) 4 MG ODT tab, Take 1 tablet (4 mg) by mouth every 6 hours as needed for nausea  potassium chloride ER (K-DUR/KLOR-CON M) 10 MEQ CR tablet, Take 8 tablets (80 mEq) by mouth 2 times daily Additionally, take an Extra 80 mEq on metolazone days and the day after a metolazone day  prochlorperazine (COMPAZINE) 5 MG tablet, One to two tablets PO q 6 hours prn nausea (Patient not taking: Reported on 3/12/2020)  torsemide (DEMADEX) 100 MG tablet, Take 1 tablet (100 mg) by mouth 2 times daily    No current facility-administered medications on file prior to visit.       ROS:   CONSTITUTIONAL: Denies fever, chills, +fatigue, chronic.  HEENT: Denies headache, vision changes, and changes in speech.   CV: Refer to  HPI.   PULMONARY: Refer to HPI.   GI: Refer to HPI.   :  + urinary frequency 2/2 diuretics.  Otherwise no urinary symptoms  EXT: +Chronic lower extremity edema see hpi  SKIN: Denies abnormal rashes or lesions.   MUSCULOSKELETAL: Denies upper or lower extremity weakness and pain.   NEUROLOGIC: Denies seizures, or upper or lower extremity paresthesia.     EXAM:  There were no vitals taken for this visit.     GENERAL: Appears comfortable, in no acute distress, speaking in full sentences and able to communicate all needs.   HEENT: Eye symmetrical, no discharge. Mucous membranes moist and without lesions.  CV: RRR, +S1S2, no murmur, rub, or gallop. JVP at about 2 cm above clavicle at 90 degrees  RESPIRATORY: Respirations regular, even, and unlabored. Lungs CTA throughout.   GI: Soft and non-distended with normoactive bowel sounds present. No tenderness, rebound, guarding. No hepatomegaly.   EXTREMITIES: Moderate non pitting peripheral edema. 2+ bilateral pedal pulses.   NEUROLOGIC: Alert and interacting appropiratly. No focal deficits.   MUSCULOSKELETAL: No joint swelling or tenderness.   SKIN: No jaundice. No rashes or lesions.     Labs, reviewed with patient in clinic today:  CBC RESULTS:  Lab Results   Component Value Date    WBC 5.3 11/14/2019    RBC 4.44 11/14/2019    HGB 14.2 11/14/2019    HCT 42.9 11/14/2019    MCV 97 11/14/2019    MCH 32.0 11/14/2019    MCHC 33.1 11/14/2019    RDW 12.7 11/14/2019     11/14/2019       CMP RESULTS:  Lab Results   Component Value Date     04/24/2020    POTASSIUM 3.4 04/24/2020    CHLORIDE 94 04/24/2020    CO2 36 (H) 04/24/2020    ANIONGAP 5 04/24/2020    GLC 98 04/24/2020    BUN 32 (H) 04/24/2020    CR 1.09 (H) 04/24/2020    GFRESTIMATED 52 (L) 04/24/2020    GFRESTBLACK 61 04/24/2020    JERROD 9.4 04/24/2020    BILITOTAL 2.3 (H) 11/14/2019    ALBUMIN 4.0 11/14/2019    ALKPHOS 310 (H) 11/14/2019    ALT 29 11/14/2019    AST 24 11/14/2019        INR RESULTS:  Lab Results    Component Value Date    INR 1.30 (H) 01/14/2017       Lab Results   Component Value Date    MAG 2.2 10/26/2019     Lab Results   Component Value Date    NTBNPI 9,009 (H) 01/13/2017     Lab Results   Component Value Date    NTBNP 892 (H) 03/12/2020       Diagnostics:  TTE 4/9/19  Moderate left ventricular hypertrophy.  Global and regional left ventricular function is normal with an EF of 60-65%.  Global right ventricular function is normal.  Moderate aortic valve insufficiency.  Mild pulmonary hypertension with dilated IVC.  This study was compared with the study from 10/11/18 the AR is worse and RA pressure is now increased.    TTE 10/11/18  Global and regional left ventricular function is normal with an EF of 55-60%.  Moderate concentric wall thickening consistent with left ventricular  hypertrophy is present - known amyloid.  Grade III or advanced diastolic dysfunction.  Normal right ventricular size, wall thickness, and function.  Estimated pulmonary artery pressure 28 mmHg.  IVC with normal diameter but does not collapse (>50%) with inspiration.  Trace pericardial effusion.  Compared to prior study from 8/17/17, no significant change.    [facility]      Holter Monitor 06/2019  INTERPRETATION  1. The rhythm varied with sinus rhythm and atrial fibrillation/variable atrial flutter. Low voltage noted.  2. The heart rate varied with a minimum rate of 48 bpm, maximum rate of 164 bpm, average rate of 74 bpm.  3. Frequent supraventricular ectopy was seen ranging from 0-469 per hour. Occasional atrail pairs and fairly frequent bigeminal cycles were noted.  Wandering atrial pacemaker noted.  4. Infrequent multifocal ventricular ectopy was seen ranging from 0-36 beats per hour.  5. ST-T wave changes were present.  6. Three patient events were documented and correlated with atrial fibrillation/flutter    EKG 8/8/2019 for palpitations  - NSR at a rate of 70, ME 0.19, QTc 525, QRS is narrow. Occasional PVCs. Biphasic  twaves in V4-V6, unchanged from priors.    Assessment and Plan:   Mrs. Guerrero is a 69 year old female with AL amyloidosis with cardiac manifestation [place] [facility] [place]. Patient has cardiac amyloidosis as evidenced by her echocardiogram (severe concentric hypertrophy and biatrial enlargement) and abnormal EKG (near-low voltage, poor R wave progression, biatrial enlargement and no evidence of LVH despite thicken LV on echo) in the setting of biopsy proven (BMB, EGD) AL amyloid. She completed chemotherapy with CyBorD with an excellent serologic response and her heart failure (i.e. troponin negative, NT pro BNP was stable, serum light chains minimal and urine light chains undetectable).     Overall, Amy has maintained her quality of life much longer than originally expected.  We are taking it on a month by month basis.  At this point she has not felt ready for palliative care or hospice.    She has gradually declined over the last year and has ongoing challenges with her volume status.  She requires frequent metolazone and maintaining an adequate potassium level has been a challenge. In the past few months, her Potassium has stayed much more stable than it had been earlier this year, although today she is on the lower side again. We did trial her on Diuril in hopes that this would improve the consistency of her regimen but she did not feel well on this medication so we have resumed intermittent metolazone.  Creatinine is stable.     She has also been struggling with A. Fib, which is occaionally symptomatic. These episodes remain rare and given relative contraindications for amyloid patients we do not have her on rate control at this time.      # Chronic diastolic heart failure/restrictive cardiomyopathy 2/2  # Cardiac amyloidosis    Stage C. NYHA Class III.    Fluid status: Euvolemic, continue torsemide 100 mg BID, KCl 80 meq BID. Needs metolazone every 4-5 days, will take metolazone when  >152 lbs. When she takes metolazone she will take an ADDITIONAL 80 meq Kcl. I have also asked her to add an additional 20 meq of Kcl every Friday.  ACEi/ARB/ARNI: n/a   BB: no indication and relatively contraindicated due to risk of progressive conduction disease/AV block in these patients  Aldosterone antagonist: d/c'd given hyponatremia, Na improving and she is feeling better   SCD prophylaxis: does not meet criteria for implant  NSAID use: contraindicated  Sleep apnea evaluation: deferred today  Remote monitoring: deferred today, could be Cardiocom candidate   Goals of care: prior discussions with Dr Cohen re: planning for EoL care as long term prognosis for AL amyloid is poor.   Other: Has a referall for lymphedema therapy in [facility]    # Hypokalemia  3.4 today. Continue her usual regimen as above. Add an additional 20 meq of Kcl every Friday. Needs to take an additional 60 meq today.  - Labs every three weeks    # A. Fib/A. Flutter  We did a holter monitor which showed intermittent a. Fib and a. Flutter. [id number] of 4. Occasional palpitations which last less than 1 minute, if these become more frequent can repeat monitor to assess burden.  - Continue Eliquis 5mg BID  - Avoiding BB in the setting of amyloid  - Holding off on digoxin given generally good rate control/very rare RVR events. Could discuss in the future if needed.    # Prolonged QTC  - Minimize QT prolonging meds    # Systemic amyloid  Heme previously monitoring her light chains which have been negative consistent with remission - therefore further palliative chemo not being considered. Nausea has been a large issue and is currently, Kytril was added at her last oncology appointment, but she has not started taking this. to her regimen of compazine and lorazepam.  - Consider palliative care referal if nausea not controlled on her current regimen (per onc)  - She is back on doxycycline     Follow: Up: Labs in two weeks (Assess K), then labs  every 3 weeks (K monitoring), CORE in 2 months, Dr. Cohen in 4 months    Ciara Araya PA-C  [facility] Cardiology      CC  STEPHY, ALEJANDRO RAMÍREZ

## 2020-04-28 NOTE — PROGRESS NOTES
"Leandra Guerrero is a 69 year old female who is being evaluated via a billable telephone visit.      The patient has been notified of following:     \"This telephone visit will be conducted via a call between you and your physician/provider. We have found that certain health care needs can be provided without the need for a physical exam.  This service lets us provide the care you need with a short phone conversation.  If a prescription is necessary we can send it directly to your pharmacy.  If lab work is needed we can place an order for that and you can then stop by our lab to have the test done at a later time.    Telephone visits are billed at different rates depending on your insurance coverage. During this emergency period, for some insurers they may be billed the same as an in-person visit.  Please reach out to your insurance provider with any questions.    If during the course of the call the physician/provider feels a telephone visit is not appropriate, you will not be charged for this service.\"    Patient has given verbal consent for Telephone visit?  Yes    How would you like to obtain your AVS? Zachariah     Weight and height were given by the patient and medications were reconciled.     Soco Kim CMA    9:40 AM                  "

## 2020-04-30 ENCOUNTER — VIRTUAL VISIT (OUTPATIENT)
Dept: ONCOLOGY | Facility: CLINIC | Age: 70
End: 2020-04-30
Attending: INTERNAL MEDICINE
Payer: COMMERCIAL

## 2020-04-30 DIAGNOSIS — E85.4 AMYLOID HEART DISEASE (H): ICD-10-CM

## 2020-04-30 DIAGNOSIS — R11.0 NAUSEA: ICD-10-CM

## 2020-04-30 DIAGNOSIS — E85.81 AL AMYLOIDOSIS (H): Primary | ICD-10-CM

## 2020-04-30 DIAGNOSIS — I43 AMYLOID HEART DISEASE (H): ICD-10-CM

## 2020-04-30 PROCEDURE — 99214 OFFICE O/P EST MOD 30 MIN: CPT | Mod: 95 | Performed by: INTERNAL MEDICINE

## 2020-04-30 RX ORDER — POTASSIUM CHLORIDE 750 MG/1
80 TABLET, EXTENDED RELEASE ORAL 2 TIMES DAILY
Qty: 1440 TABLET | Refills: 3 | Status: SHIPPED | OUTPATIENT
Start: 2020-04-30 | End: 2020-06-11

## 2020-04-30 RX ORDER — ONDANSETRON 8 MG/1
8 TABLET, ORALLY DISINTEGRATING ORAL EVERY 12 HOURS PRN
Qty: 60 TABLET | Refills: 3 | Status: SHIPPED | OUTPATIENT
Start: 2020-04-30 | End: 2021-02-08

## 2020-04-30 NOTE — NURSING NOTE
"Leandra Guerrero is a 69 year old female who is being evaluated via a billable telephone visit.      The patient has been notified of following:     \"This telephone visit will be conducted via a call between you and your physician/provider. We have found that certain health care needs can be provided without the need for a physical exam.  This service lets us provide the care you need with a short phone conversation.  If a prescription is necessary we can send it directly to your pharmacy.  If lab work is needed we can place an order for that and you can then stop by our lab to have the test done at a later time.    Telephone visits are billed at different rates depending on your insurance coverage. During this emergency period, for some insurers they may be billed the same as an in-person visit.  Please reach out to your insurance provider with any questions.    If during the course of the call the physician/provider feels a telephone visit is not appropriate, you will not be charged for this service.\"    Patient has given verbal consent for Telephone visit?  Yes    How would you like to obtain your AVS? MyChart    Needs refill of Zofran  No Concerns    Elyssa Chan, JOSE DANIEL        "

## 2020-04-30 NOTE — PROGRESS NOTES
"Leandra Guerrero is a 69 year old female who is being evaluated via a billable telephone visit.      The patient has been notified of following:     \"This telephone visit will be conducted via a call between you and your physician/provider. We have found that certain health care needs can be provided without the need for a physical exam.  This service lets us provide the care you need with a short phone conversation.  If a prescription is necessary we can send it directly to your pharmacy.  If lab work is needed we can place an order for that and you can then stop by our lab to have the test done at a later time.    Telephone visits are billed at different rates depending on your insurance coverage. During this emergency period, for some insurers they may be billed the same as an in-person visit.  Please reach out to your insurance provider with any questions.    If during the course of the call the physician/provider feels a telephone visit is not appropriate, you will not be charged for this service.\"    Patient has given verbal consent for Telephone visit? yes    Phone call duration: 11 minutes    Mirela Moore MD, MD      Hematology/Oncology Follow-up visit:  Date on this visit: Apr 30, 2020      Referring Physician: Dr. Braydon Barron, [facility].  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 She was diagnosed with CHF at a local clinic in [facility] and was placed on a b-blocker and a diuretic. She has also developed progressive worsening lower extremity edema by May 2016 which in retrospect started in January. Her cardiac angiogram was reportedly negative at that time. She has lost about 35 lbs in the last 6 months. She has significant nausea somewhat controlled with Zofran but she is reluctant to use it because of constipation too. She is on Senna-S one tablet PO BID for " constipation and that is improved but still significant. She has very poor PO intake due to nausea and lack of appetite. She requested to be seen at [facility] and was seen by Dr. Braydon Barron on 9/13/2016 with f/up on 9/20/2016. She was also seen by cardiology team there Sara Severson CNP and Dr. Nettles from CHF service.  There was no e/o renal or hepatic amyloidosis.  EKG on 9/7/2016 showed normal sinus rhythm, prolonged QT interval (QTc 521 sec), left atrial enlargement and left anterior fascicular block. There was ST and T wave abnormality.  Apparently, her echocardiogram showed preserved LVEF at 59%.  She reports having R sided thoracentesis on 8/25/2016 after which her breathing has improved. She then had a CXR on 9/7/2016 which showed a small R pleural effusion with right basilar atelectasis. Cardiac silhouette was at the upper level of normal.  I have reviewed extensive outside medical records but we are still in the process of obtaining additional records.  The patient also underwent a minor salivary gland biopsy of the lower lip. The pathology from that procedure (9/20/2016) showed normal salivary gland tissue with nonspecific chronic sialadenitis.  She had extensive lab workup on 9/7/2016.  Her TSH was normal. Serum protein ELP showed a small abnormality in the gamma fraction.  Serum immunofixation showed small monoclonal IgA lambda in the gamma fraction and a small lambda in the beta fraction. Serum IgA level was normal at 329. Serum IgG level was mildly low at 755 (normal range 767-1590) and IgM level was normal. B2 microglobulin was mildly elevated at 2.94 (ULN 2.7). Mg was normal. Total bilirubin was elevated at 3.9 and direct at 0.8. Uric acid was mildly elevated at 6.9 (ULN 6.1) Creat was 1.0. Troponin was 0.1 (ULN <0.01) and NT-pro BNP was 4747 pg/ml (ULN <=183). Total cholesterol was 190 and LDL was 139. Random urine protein was mildly elevated at 33 mg/dl (normal range <22). INR was 1.2 and PTT  27 (within normal limits). Fibrinogen was elevated at 441 and factor X was mildly decreased at 64% (normal range %). D-dimer was up at 1700 (). Free lambda light chains were 69.5 and free kappa light chains were 1.11 with FLC kappa/lambda ratio of 0.016.   CBC showed normal WBC at 6.0, slightly elevated Hb at 16. Platelet count was normal.  Flow cytometry on bone marrow biopsy showed detectable monotypic plasma cells. There were 12% of monotypic plasma cells on bone marrow biopsy.    The bone marrow biopsy (performed on 9/14/2016) showed normocellular BM, 30-40% cellularity, with involvement by 10-20% of lambda light chain restricted plasma cells.  Congo red stain was positive on the bone marrow biopsy. Liquid chromatography tandem mass spectroscopy showed a peptide profile c/w AL (lambda type) amyloid. BM karyotype was normal 46 XX. FISH on BM showed plasma cell clone with 1q duplication, and monosomy of 13 and 14.  She had an EGD by Dr. Matthew Wadsworth on 9/14/2016. It showed gastric mucosal atrophy and multiple mucosal papules (nodules) seen in the stomach. The duodenum appeared normal. The biopsies of stomach mucosa, of stomach (antral) nodules and of duodenum, all showed the presence of amyloid in submucosal blood vessel walls in the stomach and duodenum and in deep mucosa in the stomach antrum and body, confirmed by Congo Red stain.   She also had additional labs done at our last visit, on 9/30/2016. Total bilirubin was elevated as below, primarily indirect. K was 3.3 and she was stared on K-dur 20 mEq PO bid. She was noted to have elevated serum IgA level on 9/30/2016 at 392 (). Serum free lambda chains were elevated at 37.75. Serum M spike was 0.1 g/dl and serum FLC ratio was low at 0.01. Serum immunofixation ELP showed monoclonal IgA immunoglobulin of lambda light chain type as well as monoclonal free immunoglobulin light chain of lambda chain type.  She was seen by Dr. Cohen too and  had an echocardiogram on 10/6/2016 which showed:  Global and regional left ventricular function was normal with an EF of 60-65%.  The LV mass index was 131 g/m2 (severely increased.) The LV geometry is c/w  concentric hypertrophy measured by relative wall thickness. Global right ventricular function was normal. Severe biatrial enlargement was present.  Right ventricular systolic pressure was 26mmHg above the right atrial pressure. The inferior vena cava was dilated at 2.1 cm without respiratory variability, consistent with increased right atrial pressure. Trivial pericardial effusion was present.    She proceeded to start CyBorD as recommended by Dr. Barron with dose reduced in subq Bortezomib to 0.7 mg/m2, on 10/4/2016.  After one cycle, her M spike, IgA level and free lambda light chains have all improved, with M spike of 0 g/dl, IgA down into the normal range and free serum lambda light chains improving from 37.75 down to 2.09.  She returns today in cycle 3 day 11. She had amyloid labs again on day 1 of cycle 3 (12/8/2016) which showed continued response to therapy and her IgA level was now low and serum FLC ratio was normal and serum free lambda chains were normal too.   She has developed hematuria with cycle 2 and Cytoxan is on hold from day 15 cycle 2 since hematuria persisted despite Mesnex. She had a CT abdomen/pelvis on 11/29/2016 which showed no abnormalities in the kidneys. There was a moderate right and a trace left sided pleural effusions. Small volume ascites and anasarca was noted as well. The liver had heterogeneous appearance, which can be seen in hepatic venous congestion.   Cytoxan was d/cd  on  11/22/2016 since her cystoscopy showed findings of mild petechia and erythema in her bladder that would be c/w hemorrhagic cystitis.  Pinch biopsies were taken from the bladder and showed no e/o amyloid or malignancy. She has continued on Velcade and dexamethasone until 12/27/2016.  On 1/3/2017 she was seen  at [facility] by Dr. Barron and at that time serum free light chains were normal at 0.74 and FLC ratio was normal as well at 0.72. She was recommended to be on observation because free lambda chains normalized. However, her cardiac amyloidosis has progressed by 1/5/2017 and she has required frequent thoracentesis, and had weeping LE edema and elevated neck veins.  She was hospitalized for CHF in Jan 2017 due to cardiac amyloid and was aggressively diuresed.  Her echocardiogram on 1/5/2017 was abnormal (severe concentric hypertrophy and biatrial enlargement) and she also had abnormal EKG (near-low voltage, poor R wave progression, biatrial enlargement and no evidence of LVH despite very thickened LV on echo). She was felt to have  New York Heart Association class IIIB heart failure with clinically severe volume overload. She was seen by cardiologist Dr. Harjit Fischer at  in [place]. She was seen by Dr. Chivo Lei from medical oncology at the [facility] in [place], and was recommended to start on Acyclovir and Doxycycline.  She has not started Acyclovir  but did start daily Doxycyline.   Dr. Lei also recommended she consider maintenance therapy. She was also seen by Dr. Barron at [facility] on 5/11/2017, and he recommended against maintenance therapy.     History Of Present Illness:  Ms. Guerrero is a 69 year old female, non-smoker, who presents for f/up of AL amyloidosis. She has been off treatment since December 2016.  She has been seeing  Dr. Cohen in f/up of cardiac Amyloidosis with preserved EF, CHF NYHA Class III. They monitor troponin and BNP. She has been followed up by cardiology closely.     She is on doxycycline daily and her nausea is controlled with Zofran as needed.  She rarely takes Zofran, not every day and when she does it is only once a day.  She states the nausea has been improved lately, slightly..  She denies any constipation.  She feels overall she is tolerating doxycycline better. She  has swelling in b/l LE, severe, especially if she stands on her feet.  Amyloid labs were not drawn prior to this visit and will be drawn with her next blood draw in 3 weeks and we will follow-up on those.  Most recently, her serum IgA level has remained normal and she has no M protein on serum protein electrophoresis.  Serum free kappa lambda light chain ratio is normal as well.  She was seen by cardiology in a virtual visit on 2020.  Her cardiac status is felt to have declined gradually over the last year and she has ongoing challenges with her volume status.  She requires frequent metolazone and maintaining an adequate potassium level has been a challenge.  She has been in chronic diastolic heart failure/restrictive cardiomyopathy secondary to cardiac amyloidosis, stage C, NYHA Class III.  She continues on torsemide  100 mg BID, KCl 80 meq BID. Needs metolazone every 5-7 days. When she takes metolazone she will take an ADDITIONAL 80 meq Kcl on the metolazone day and the day after.   Aldosterone antagonist was d/c'd given hyponatremia.  She has not completed lymphedema therapy due to COVID-19.  She continues on Eliquis anticoagulation.  In addition, a complete 12 point  review of systems is negative.      Past Medical/Surgical History:  AL Amyloid  Amyloid cardiomyopathy/CHF    Family History:  Sister  of multiple myeloma    Social History:  Lives in [place], with . Nonsmoker        Allergies:     Allergies   Allergen Reactions     Contrast Dye Shortness Of Breath     Shaking,chills and dypsnea     Cytoxan [Cyclophosphamide]      Hemorrhagic cystitis     Levofloxacin      Severe shaking and abdominal pain     Amoxicillin Nausea and Vomiting and Cramps     Spironolactone      Worsening hyponatremia     Current Medications:  Current Outpatient Medications   Medication     Acetaminophen (TYLENOL PO)     apixaban ANTICOAGULANT (ELIQUIS ANTICOAGULANT) 5 MG tablet     doxycycline hyclate (VIBRA-TABS)  100 MG tablet     granisetron (KYTRIL) 1 MG tablet     LORazepam (ATIVAN) 0.5 MG tablet     metolazone (ZAROXOLYN) 2.5 MG tablet     ondansetron (ZOFRAN-ODT) 4 MG ODT tab     potassium chloride ER (KLOR-CON M) 10 MEQ CR tablet     prochlorperazine (COMPAZINE) 5 MG tablet     torsemide (DEMADEX) 100 MG tablet     No current facility-administered medications for this visit.        Physical Exam:  Constitutional: alert and in no apparent distress  PSYCH: Alert and oriented times 3; coherent speech, normal   rate and volume, able to articulate logical thoughts, able   to abstract reason, no tangential thoughts, no hallucinations   or delusions  His affect is normal  RESP: No cough, no audible wheezing, able to talk in full sentences  Remainder of exam unable to be completed due to telephone visits  The rest the comprehensive physical examination is deferred due to public health emergency       Laboratory/Imaging Studies  Component      Latest Ref Rng & Units 4/24/2020   Sodium      133 - 144 mmol/L 135   Potassium      3.4 - 5.3 mmol/L 3.4   Chloride      94 - 109 mmol/L 94   Carbon Dioxide      20 - 32 mmol/L 36 (H)   Anion Gap      3 - 14 mmol/L 5   Glucose      70 - 99 mg/dL 98   Urea Nitrogen      7 - 30 mg/dL 32 (H)   Creatinine      0.52 - 1.04 mg/dL 1.09 (H)   GFR Estimate      >60 mL/min/1.73:m2 52 (L)   GFR Estimate If Black      >60 mL/min/1.73:m2 61   Calcium      8.5 - 10.1 mg/dL 9.4     ASSESSMENT/PLAN:  Leandra Guerrero is a 69 year old woman with  of AL amyloidosis involving the heart and GI tract. 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 Stage IV amyloidosis is associated with median survival of 5 months in patients not undergoing BMT, and 5 year survival of 15%, and in patients who are able to undergo BMT, median  survival is 22 months and 5 year survival of 46% (Dejan A, Anderson BAR, Toñito RA, et al. Prognostication of survival using cardiac troponins and N-terminal pro-brain natriuretic peptide in patients with primary systemic amyloidosis undergoing peripheral blood stem cell transplantation. Blood 2004; 104:1881). She was felt not to be a candidate for high dose therapy.   She was on Cybor-D from 10/4/2016-12/27/2016, with Cytoxan omitted after cycle 2 day 1 due to her developing hemorrhagic cystitis. Her disease has responded  biochemically, with serum M spike of zero, and serum IgA level was down to low range and  free lambda light chains have normalized quantitatively.     I have discussed her situation with Dr. Barron, and he felt at that time that with normalization of serum free lambda light chains, no further chemotherapy is indicated.     1.  AL amyloidosis -follow-up on serum free kappa light chains and lambda light, serum protein electrophoresis and IgA levels-these will be drawn with her next lab draw in 3 weeks..  Continue doxycycline orally daily.  We'll see her back in 6 months with amyloid labs prior.     2. Recurrent pleural effusions- Last CT chest in [facility] in September 2019 showed small to moderate right pleural effusion.  She denies shortness of breath presently.   PleurX catheter is an option in the future if she were to become symptomatic.    3. Amyloid cardiomyopathy-A. fib-  She is being followed by  Dr. Cohen. Continue current diuretic regimen and KCl supplementation. Cardiac Amyloidosis (a restrictive cardiomyopathy) , Stage C, NYHA Class III.  A. fib is rate controlled.  Continue Eliquis.  4. Nausea-Zofran oral disintegrating tablet works best for her.  She takes it rarely and is only prescribed at 4 mg orally as needed, and we will increase the dose to 8 mg orally as needed.  She does not take more than 1 tablet a day.  If nausea worsens, consider adding Zyprexa in the future. May need a  palliative care consult if nausea progresses.  5. CKD- Creat stable as above.  6.Stable elevated total bilirubin and alk phos, stable- in the past felt to be secondary to liver congestion. Cont to follow.  We will obtain hepatic panel with her labs in 3 weeks.    At the end of our visit patient and  verbalized understanding and concurred with the plan.

## 2020-05-12 DIAGNOSIS — I43 AMYLOID HEART DISEASE (H): ICD-10-CM

## 2020-05-12 DIAGNOSIS — E85.4 AMYLOID HEART DISEASE (H): ICD-10-CM

## 2020-05-12 DIAGNOSIS — E85.81 AL AMYLOIDOSIS (H): ICD-10-CM

## 2020-05-12 LAB
ALBUMIN SERPL-MCNC: 4.4 G/DL (ref 3.4–5)
ALP SERPL-CCNC: 265 U/L (ref 40–150)
ALT SERPL W P-5'-P-CCNC: 30 U/L (ref 0–50)
ANION GAP SERPL CALCULATED.3IONS-SCNC: 5 MMOL/L (ref 3–14)
AST SERPL W P-5'-P-CCNC: 29 U/L (ref 0–45)
BASOPHILS # BLD AUTO: 0.1 10E9/L (ref 0–0.2)
BASOPHILS NFR BLD AUTO: 1.8 %
BILIRUB DIRECT SERPL-MCNC: 0.7 MG/DL (ref 0–0.2)
BILIRUB SERPL-MCNC: 2.5 MG/DL (ref 0.2–1.3)
BUN SERPL-MCNC: 35 MG/DL (ref 7–30)
CALCIUM SERPL-MCNC: 9.6 MG/DL (ref 8.5–10.1)
CHLORIDE SERPL-SCNC: 92 MMOL/L (ref 94–109)
CO2 SERPL-SCNC: 37 MMOL/L (ref 20–32)
CREAT SERPL-MCNC: 1.1 MG/DL (ref 0.52–1.04)
DIFFERENTIAL METHOD BLD: ABNORMAL
EOSINOPHIL # BLD AUTO: 0.9 10E9/L (ref 0–0.7)
EOSINOPHIL NFR BLD AUTO: 15.7 %
ERYTHROCYTE [DISTWIDTH] IN BLOOD BY AUTOMATED COUNT: 13.5 % (ref 10–15)
GFR SERPL CREATININE-BSD FRML MDRD: 52 ML/MIN/{1.73_M2}
GLUCOSE SERPL-MCNC: 72 MG/DL (ref 70–99)
HCT VFR BLD AUTO: 45 % (ref 35–47)
HGB BLD-MCNC: 14.9 G/DL (ref 11.7–15.7)
IMM GRANULOCYTES # BLD: 0 10E9/L (ref 0–0.4)
IMM GRANULOCYTES NFR BLD: 0.4 %
LYMPHOCYTES # BLD AUTO: 0.3 10E9/L (ref 0.8–5.3)
LYMPHOCYTES NFR BLD AUTO: 5.1 %
MCH RBC QN AUTO: 31 PG (ref 26.5–33)
MCHC RBC AUTO-ENTMCNC: 33.1 G/DL (ref 31.5–36.5)
MCV RBC AUTO: 94 FL (ref 78–100)
MONOCYTES # BLD AUTO: 0.6 10E9/L (ref 0–1.3)
MONOCYTES NFR BLD AUTO: 9.9 %
NEUTROPHILS # BLD AUTO: 3.8 10E9/L (ref 1.6–8.3)
NEUTROPHILS NFR BLD AUTO: 67.1 %
PLATELET # BLD AUTO: 197 10E9/L (ref 150–450)
POTASSIUM SERPL-SCNC: 3.4 MMOL/L (ref 3.4–5.3)
PROT SERPL-MCNC: 7.8 G/DL (ref 6.8–8.8)
RBC # BLD AUTO: 4.8 10E12/L (ref 3.8–5.2)
SODIUM SERPL-SCNC: 134 MMOL/L (ref 133–144)
WBC # BLD AUTO: 5.7 10E9/L (ref 4–11)

## 2020-05-12 PROCEDURE — 83883 ASSAY NEPHELOMETRY NOT SPEC: CPT | Performed by: INTERNAL MEDICINE

## 2020-05-12 PROCEDURE — 82784 ASSAY IGA/IGD/IGG/IGM EACH: CPT | Performed by: INTERNAL MEDICINE

## 2020-05-12 PROCEDURE — 00000402 ZZHCL STATISTIC TOTAL PROTEIN: Performed by: INTERNAL MEDICINE

## 2020-05-12 PROCEDURE — 80048 BASIC METABOLIC PNL TOTAL CA: CPT | Performed by: INTERNAL MEDICINE

## 2020-05-12 PROCEDURE — 36415 COLL VENOUS BLD VENIPUNCTURE: CPT | Performed by: INTERNAL MEDICINE

## 2020-05-12 PROCEDURE — 84165 PROTEIN E-PHORESIS SERUM: CPT | Performed by: INTERNAL MEDICINE

## 2020-05-12 PROCEDURE — 85025 COMPLETE CBC W/AUTO DIFF WBC: CPT | Performed by: INTERNAL MEDICINE

## 2020-05-12 PROCEDURE — 80076 HEPATIC FUNCTION PANEL: CPT | Performed by: INTERNAL MEDICINE

## 2020-05-13 LAB
ALBUMIN SERPL ELPH-MCNC: 4.4 G/DL (ref 3.7–5.1)
ALPHA1 GLOB SERPL ELPH-MCNC: 0.4 G/DL (ref 0.2–0.4)
ALPHA2 GLOB SERPL ELPH-MCNC: 0.6 G/DL (ref 0.5–0.9)
B-GLOBULIN SERPL ELPH-MCNC: 0.7 G/DL (ref 0.6–1)
GAMMA GLOB SERPL ELPH-MCNC: 0.8 G/DL (ref 0.7–1.6)
IGA SERPL-MCNC: 64 MG/DL (ref 84–499)
IGG SERPL-MCNC: 918 MG/DL (ref 610–1616)
IGM SERPL-MCNC: 78 MG/DL (ref 35–242)
KAPPA LC UR-MCNC: 1.98 MG/DL (ref 0.33–1.94)
KAPPA LC/LAMBDA SER: 1.29 {RATIO} (ref 0.26–1.65)
LAMBDA LC SERPL-MCNC: 1.53 MG/DL (ref 0.57–2.63)
M PROTEIN SERPL ELPH-MCNC: 0 G/DL
PROT PATTERN SERPL ELPH-IMP: NORMAL

## 2020-05-28 DIAGNOSIS — I43 AMYLOID HEART DISEASE (H): ICD-10-CM

## 2020-05-28 DIAGNOSIS — E85.4 AMYLOID HEART DISEASE (H): ICD-10-CM

## 2020-05-28 LAB
ANION GAP SERPL CALCULATED.3IONS-SCNC: 5 MMOL/L (ref 3–14)
BUN SERPL-MCNC: 35 MG/DL (ref 7–30)
CALCIUM SERPL-MCNC: 9.2 MG/DL (ref 8.5–10.1)
CHLORIDE SERPL-SCNC: 93 MMOL/L (ref 94–109)
CO2 SERPL-SCNC: 35 MMOL/L (ref 20–32)
CREAT SERPL-MCNC: 1.13 MG/DL (ref 0.52–1.04)
GFR SERPL CREATININE-BSD FRML MDRD: 50 ML/MIN/{1.73_M2}
GLUCOSE SERPL-MCNC: 87 MG/DL (ref 70–99)
POTASSIUM SERPL-SCNC: 3.9 MMOL/L (ref 3.4–5.3)
SODIUM SERPL-SCNC: 133 MMOL/L (ref 133–144)

## 2020-05-28 PROCEDURE — 36415 COLL VENOUS BLD VENIPUNCTURE: CPT | Performed by: PHYSICIAN ASSISTANT

## 2020-05-28 PROCEDURE — 80048 BASIC METABOLIC PNL TOTAL CA: CPT | Performed by: PHYSICIAN ASSISTANT

## 2020-06-11 DIAGNOSIS — I43 AMYLOID HEART DISEASE (H): ICD-10-CM

## 2020-06-11 DIAGNOSIS — E85.4 AMYLOID HEART DISEASE (H): ICD-10-CM

## 2020-06-11 LAB
ANION GAP SERPL CALCULATED.3IONS-SCNC: 6 MMOL/L (ref 3–14)
BUN SERPL-MCNC: 36 MG/DL (ref 7–30)
CALCIUM SERPL-MCNC: 9.3 MG/DL (ref 8.5–10.1)
CHLORIDE SERPL-SCNC: 92 MMOL/L (ref 94–109)
CO2 SERPL-SCNC: 36 MMOL/L (ref 20–32)
CREAT SERPL-MCNC: 0.91 MG/DL (ref 0.52–1.04)
GFR SERPL CREATININE-BSD FRML MDRD: 65 ML/MIN/{1.73_M2}
GLUCOSE SERPL-MCNC: 88 MG/DL (ref 70–99)
POTASSIUM SERPL-SCNC: 3.2 MMOL/L (ref 3.4–5.3)
SODIUM SERPL-SCNC: 134 MMOL/L (ref 133–144)

## 2020-06-11 PROCEDURE — 36415 COLL VENOUS BLD VENIPUNCTURE: CPT | Performed by: INTERNAL MEDICINE

## 2020-06-11 PROCEDURE — 80048 BASIC METABOLIC PNL TOTAL CA: CPT | Performed by: INTERNAL MEDICINE

## 2020-06-18 ENCOUNTER — VIRTUAL VISIT (OUTPATIENT)
Dept: CARDIOLOGY | Facility: CLINIC | Age: 70
End: 2020-06-18
Attending: PHYSICIAN ASSISTANT
Payer: COMMERCIAL

## 2020-06-18 DIAGNOSIS — E85.4 AMYLOID HEART DISEASE (H): Primary | ICD-10-CM

## 2020-06-18 DIAGNOSIS — I43 AMYLOID HEART DISEASE (H): Primary | ICD-10-CM

## 2020-06-18 DIAGNOSIS — I50.30 DIASTOLIC HEART FAILURE, UNSPECIFIED HF CHRONICITY (H): ICD-10-CM

## 2020-06-18 PROCEDURE — 99214 OFFICE O/P EST MOD 30 MIN: CPT | Mod: 95 | Performed by: PHYSICIAN ASSISTANT

## 2020-06-18 ASSESSMENT — PAIN SCALES - GENERAL: PAINLEVEL: NO PAIN (0)

## 2020-06-18 NOTE — PATIENT INSTRUCTIONS
Take your medicines every day, as directed    Changes made today:  o No changes to your medications   Monitor Your Weight and Symptoms    Contact us if you:      Gain 2 pounds in one day or 5 pounds in one week    Feel more short of breath    Notice more leg swelling    Feel lightheadeded   Change your lifestyle    Limit Salt or Sodium:    2000 mg  Limit Fluids:    2000 mL or approximately 64 ounces  Eat a Heart Healthy Diet    Low in saturated fats  Stay Active:    Aim to move at least 150 minutes every  week         To Contact us    During Business Hours:  [phone], option # 1 (University)  Then option # 4 (medical questions)     After hours, weekends or holidays:   [phone], Option #4  Ask to speak to the On-Call Cardiologist. Inform them you are a CORE/heart failure patient at the [facility].     Use [employer] allows you to communicate directly with your heart team through secure messaging.    [employer] can be accessed any time on your phone, computer, or tablet.    If you need assistance, we'd be happy to help!         Keep your Heart Appointments:    - Labs when you are back from Vacation- Thursday is okay  - Dr. Cohen as scheduled 7/30  - Yolanda in 3 months

## 2020-06-18 NOTE — PROGRESS NOTES
"Leandra Guerrero is a 69 year old female who is being evaluated via a billable telephone visit.      The patient has been notified of following:     \"This telephone visit will be conducted via a call between you and your physician/provider. We have found that certain health care needs can be provided without the need for a physical exam.  This service lets us provide the care you need with a short phone conversation.  If a prescription is necessary we can send it directly to your pharmacy.  If lab work is needed we can place an order for that and you can then stop by our lab to have the test done at a later time.    Telephone visits are billed at different rates depending on your insurance coverage. During this emergency period, for some insurers they may be billed the same as an in-person visit.  Please reach out to your insurance provider with any questions.    If during the course of the call the physician/provider feels a telephone visit is not appropriate, you will not be charged for this service.\"    Patient has given verbal consent for Telephone visit?  Yes    What phone number would you like to be contacted at? [phone]    How would you like to obtain your AVS? MyChart    "

## 2020-06-18 NOTE — LETTER
6/18/2020      RE: Leandra Guerrero  [address] [place] [address]       Dear Colleague,    Thank you for the opportunity to participate in the care of your patient, Leandra Guerrero, at the [facility] acute hospital. Please see a copy of my visit note below.    Telephone visit  Start time 8:02 AM  End time 8:17 AM    HPI:   Ms. Guerrero is a 69 year old female with a past medical history including stage IV AL amyloid diagnosed in 2016. Presents to clinic for CORE follow-up.     Her cardiac and oncologic history are as follows: fatigue starting in 1/2016. She had a coronary angiogram which was reportedly normal but was diagnosed with HF and started on a BBand diuretics. Her symptoms progressed to the point that she was evaluated at [facility] in August/September (Dr. Barron in oncology/hematology, Dr. Nettles is cardiology). She was diagnosed with stage IV AL amyloid (+GI, +bone marrow involvement, no involvement of kidney or liver). Her work up is detailed in our previous notes, however she has lambda AL amyloidosis and she underwent CyBorD chemotherapy and excellent light chain response by serum. Patient has been turned down at [facility] for a stem cell transplant due to her cardiac involvement. Later in 2016, she had 2-3 more right sided pleural taps. She was hospitalized 1/2017 and diuresed 20-30 pounds at that time. Since that time her AL has been in remission by labs without further chemo. Patient was then treated with doxycycline but this was stopped ~6-9 months ago due to thought it wasn't benefiting per patient, although now recently restarted again. She was last admitted to [facility] 4/2 for shortness of breath and edema. Echocardiogram showed EF 60-65%, moderate LVH, normal RV, moderate AI, and mild PH.    Last visit: 4/28/2020 With CORE  Weight ranging 245-253, metolazone at top end and then looses 5-8 lbs. Takes a metolazone about 4-5 days per  week.    This visit:  Today having more weakness, more fluid in her legs and her feet. She has been taking metolazone about every 4 days. Usually gets 5-10 lbs. Usually then gets a few good days. She has seen the lymphedema team, she has gotten some relief from the wraps but doesn't have them with her right now. She also has a lymphedema treatment. She continues to stuggle with nausea, she has been using the zofran. The palpitations have not been present for the last month. She does have some lightheadedness and dizziness with standing up from sitting, but otherwise no lightheadedness or dizziness. Her breathing is general is okay.    Cardiac Medications  Apixaban 5 mg BID  Metolazone 2.5 mg daily- every 4-5 days  Torsemide 100 mg BID  Kcl 60/60/60 with 80 meq extra on metolazone days    PAST MEDICAL HISTORY:  Past Medical History:   Diagnosis Date     AL amyloidosis (H)      Atrial fibrillation and flutter (H)      Cardiac amyloidosis (H)      Lymphedema      QT prolongation      Recurrent right pleural effusion 1/2/2017     SVT (supraventricular tachycardia) (H)        FAMILY HISTORY:  Family History   Problem Relation Age of Onset     Other - See Comments Sister         Amyloidosis       SOCIAL HISTORY:  Socioeconomic History     Marital status:    Tobacco Use     Smoking status: Never Smoker     Smokeless tobacco: Never Used   Substance and Sexual Activity     Alcohol use: No     Drug use: No   Other Topics Concern     CURRENT MEDICATIONS:  Acetaminophen (TYLENOL PO), Take 325 mg by mouth  apixaban ANTICOAGULANT (ELIQUIS ANTICOAGULANT) 5 MG tablet, Take 1 tablet (5 mg) by mouth 2 times daily  doxycycline hyclate (VIBRA-TABS) 100 MG tablet, Take 1 tablet (100 mg) by mouth daily  LORazepam (ATIVAN) 0.5 MG tablet, Take 1 tablet (0.5 mg) by mouth every 6 hours as needed for anxiety or nausea  metolazone (ZAROXOLYN) 2.5 MG tablet, TAKE 1 TABLET TWICE WEEKLY  ondansetron (ZOFRAN-ODT) 8 MG ODT tab, Take 1 tablet  (8 mg) by mouth every 12 hours as needed for nausea  potassium chloride ER (KLOR-CON M) 10 MEQ CR tablet, Take 6 tablets (60 mEq) by mouth 3 times daily Additionally, take an Extra 80 mEq on metolazone days and the day after a metolazone day  prochlorperazine (COMPAZINE) 5 MG tablet, One to two tablets PO q 6 hours prn nausea  torsemide (DEMADEX) 100 MG tablet, Take 1 tablet (100 mg) by mouth 2 times daily    No current facility-administered medications on file prior to visit.       ROS:   CONSTITUTIONAL: Denies fever, chills, +fatigue, chronic.  HEENT: Denies headache, vision changes, and changes in speech.   CV: Refer to HPI.   PULMONARY: Refer to HPI.   GI: Refer to HPI.   :  + urinary frequency 2/2 diuretics.  Otherwise no urinary symptoms  EXT: +Chronic lower extremity edema see hpi  SKIN: Denies abnormal rashes or lesions.   MUSCULOSKELETAL: Denies upper or lower extremity weakness and pain.   NEUROLOGIC: Denies seizures, or upper or lower extremity paresthesia.     EXAM:  There were no vitals taken for this visit.     N/a- telephone visit.    Labs, reviewed with patient in clinic today:  CBC RESULTS:  Lab Results   Component Value Date    WBC 5.7 05/12/2020    RBC 4.80 05/12/2020    HGB 14.9 05/12/2020    HCT 45.0 05/12/2020    MCV 94 05/12/2020    MCH 31.0 05/12/2020    MCHC 33.1 05/12/2020    RDW 13.5 05/12/2020     05/12/2020       CMP RESULTS:  Lab Results   Component Value Date     06/11/2020    POTASSIUM 3.2 (L) 06/11/2020    CHLORIDE 92 (L) 06/11/2020    CO2 36 (H) 06/11/2020    ANIONGAP 6 06/11/2020    GLC 88 06/11/2020    BUN 36 (H) 06/11/2020    CR 0.91 06/11/2020    GFRESTIMATED 65 06/11/2020    GFRESTBLACK 75 06/11/2020    JERROD 9.3 06/11/2020    BILITOTAL 2.5 (H) 05/12/2020    ALBUMIN 4.4 05/12/2020    ALKPHOS 265 (H) 05/12/2020    ALT 30 05/12/2020    AST 29 05/12/2020        INR RESULTS:  Lab Results   Component Value Date    INR 1.30 (H) 01/14/2017       Lab Results   Component  Value Date    MAG 2.2 10/26/2019     Lab Results   Component Value Date    NTBNPI 9,009 (H) 01/13/2017     Lab Results   Component Value Date    NTBNP 892 (H) 03/12/2020       Diagnostics:  TTE 4/9/19  Moderate left ventricular hypertrophy.  Global and regional left ventricular function is normal with an EF of 60-65%.  Global right ventricular function is normal.  Moderate aortic valve insufficiency.  Mild pulmonary hypertension with dilated IVC.  This study was compared with the study from 10/11/18 the AR is worse and RA pressure is now increased.    TTE 10/11/18  Global and regional left ventricular function is normal with an EF of 55-60%.  Moderate concentric wall thickening consistent with left ventricular  hypertrophy is present - known amyloid.  Grade III or advanced diastolic dysfunction.  Normal right ventricular size, wall thickness, and function.  Estimated pulmonary artery pressure 28 mmHg.  IVC with normal diameter but does not collapse (>50%) with inspiration.  Trace pericardial effusion.  Compared to prior study from 8/17/17, no significant change.    ZiSogoutch 11/2017      Holter Monitor 06/2019  INTERPRETATION  1. The rhythm varied with sinus rhythm and atrial fibrillation/variable atrial flutter. Low voltage noted.  2. The heart rate varied with a minimum rate of 48 bpm, maximum rate of 164 bpm, average rate of 74 bpm.  3. Frequent supraventricular ectopy was seen ranging from 0-469 per hour. Occasional atrail pairs and fairly frequent bigeminal cycles were noted.  Wandering atrial pacemaker noted.  4. Infrequent multifocal ventricular ectopy was seen ranging from 0-36 beats per hour.  5. ST-T wave changes were present.  6. Three patient events were documented and correlated with atrial fibrillation/flutter    EKG 8/8/2019 for palpitations  - NSR at a rate of 70, NC 0.19, QTc 525, QRS is narrow. Occasional PVCs. Biphasic twaves in V4-V6, unchanged from priors.    Assessment and Plan:   Mrs. Guerrero  is a 69 year old female with AL amyloidosis with cardiac manifestation who presents to [facility] for hospital follow-up. Patient has cardiac amyloidosis as evidenced by her echocardiogram (severe concentric hypertrophy and biatrial enlargement) and abnormal EKG (near-low voltage, poor R wave progression, biatrial enlargement and no evidence of LVH despite thicken LV on echo) in the setting of biopsy proven (BMB, EGD) AL amyloid. She completed chemotherapy with CyBorD with an excellent serologic response and her heart failure (i.e. troponin negative, NT pro BNP was stable, serum light chains minimal and urine light chains undetectable).     Overall, Amy has maintained her quality of life much longer than originally expected.  We are taking it on a month by month basis.  At this point she has not felt ready for palliative care or hospice.    She has gradually declined over the last year and has ongoing challenges with her volume status.  She requires frequent metolazone and maintaining an adequate potassium level has been a challenge. In the past few months, her Potassium has stayed much more stable than it had been earlier this year, although on her labs from 6/11 we did need to increase her standing dose. We did trial her on Diuril in hopes that this would improve the consistency of her regimen but she did not feel well on this medication so we have resumed intermittent metolazone.  Creatinine is stable.     She has also been struggling with A. Fib, which is occaionally symptomatic- okay for the last month. These episodes remain rare and given relative contraindications for amyloid patients we do not have her on rate control at this time.    # Chronic diastolic heart failure/restrictive cardiomyopathy 2/2  # Cardiac amyloidosis    Stage C. NYHA Class III.    Fluid status: Euvolemic, continue torsemide 100 mg BID, KCl 60 meq TID. Needs metolazone every 4-5 days, will take metolazone when >152 lbs. When she takes  metolazone she will take an ADDITIONAL 80 meq Kcl. I have also asked her to add an additional 20 meq of Kcl every Friday.  ACEi/ARB/ARNI: n/a   BB: no indication and relatively contraindicated due to risk of progressive conduction disease/AV block in these patients  Aldosterone antagonist: d/c'd given hyponatremia, Na improving and she is feeling better   SCD prophylaxis: does not meet criteria for implant  NSAID use: contraindicated  Sleep apnea evaluation: deferred today  Remote monitoring: deferred today, could be Cardiocom candidate   Goals of care: prior discussions with Dr Cohen re: planning for EoL care as long term prognosis for AL amyloid is poor.   Other: Has a referall for lymphedema therapy in [facility]    # Hypokalemia  3.2 on last labs. This was replaced, see RN note from 6/11/2020. Needs labs next week.  - Labs every three weeks    # A. Fib/A. Flutter  We did a holter monitor which showed intermittent a. Fib and a. Flutter. [id number] of 4. Occasional palpitations which last less than 1 minute, if these become more frequent can repeat monitor to assess burden.  - Continue Eliquis 5mg BID  - Avoiding BB in the setting of amyloid  - Holding off on digoxin given generally good rate control/very rare RVR events. Could discuss in the future if needed.    # Prolonged QTC  - Minimize QT prolonging meds    # Systemic amyloid  Heme previously monitoring her light chains which have been negative consistent with remission - therefore further palliative chemo not being considered. Nausea has been a large issue and is currently, Kytril was added at her last oncology appointment, but she has not started taking this. to her regimen of compazine and lorazepam.  - Consider palliative care referal if nausea not controlled on her current regimen (per onc)  - She is back on doxycycline     Follow: Up:   - Labs next Thursday  - Dr. Hernandez 7/30 as scheduled  - CORE in 3 months or PRN    Ciara Araya PA-C  [facility]  Cardiology      Please do not hesitate to contact me if you have any questions/concerns.     Sincerely,     Ciara Araya PA-C

## 2020-06-18 NOTE — PROGRESS NOTES
Telephone visit  Start time 8:02 AM  End time 8:17 AM    HPI:   Ms. Guerrero is a 69 year old female with a past medical history including stage IV AL amyloid diagnosed in 2016. Presents to clinic for CORE follow-up.     Her cardiac and oncologic history are as follows: fatigue starting in 1/2016. She had a coronary angiogram which was reportedly normal but was diagnosed with HF and started on a BBand diuretics. Her symptoms progressed to the point that she was evaluated at [place] in August/September (Dr. Barron in oncology/hematology, Dr. Nettles is cardiology). She was diagnosed with stage IV AL amyloid (+GI, +bone marrow involvement, no involvement of kidney or liver). Her work up is detailed in our previous notes, however she has lambda AL amyloidosis and she underwent CyBorD chemotherapy and excellent light chain response by serum. Patient has been turned down at [place] for a stem cell transplant due to her cardiac involvement. Later in 2016, she had 2-3 more right sided pleural taps. She was hospitalized 1/2017 and diuresed 20-30 pounds at that time. Since that time her AL has been in remission by labs without further chemo. Patient was then treated with doxycycline but this was stopped ~6-9 months ago due to thought it wasn't benefiting per patient, although now recently restarted again. She was last admitted to [facility] 4/2 for shortness of breath and edema. Echocardiogram showed EF 60-65%, moderate LVH, normal RV, moderate AI, and mild PH.    Last visit: 4/28/2020 With CORE  Weight ranging 245-253, metolazone at top end and then looses 5-8 lbs. Takes a metolazone about 4-5 days per week.    This visit:  Today having more weakness, more fluid in her legs and her feet. She has been taking metolazone about every 4 days. Usually gets 5-10 lbs. Usually then gets a few good days. She has seen the lymphedema team, she has gotten some relief from the wraps but doesn't have them with her right now. She also has a lymphedema  treatment. She continues to stuggle with nausea, she has been using the zofran. The palpitations have not been present for the last month. She does have some lightheadedness and dizziness with standing up from sitting, but otherwise no lightheadedness or dizziness. Her breathing is general is okay.    Cardiac Medications  Apixaban 5 mg BID  Metolazone 2.5 mg daily- every 4-5 days  Torsemide 100 mg BID  Kcl 60/60/60 with 80 meq extra on metolazone days    PAST MEDICAL HISTORY:  Past Medical History:   Diagnosis Date     AL amyloidosis (H)      Atrial fibrillation and flutter (H)      Cardiac amyloidosis (H)      Lymphedema      QT prolongation      Recurrent right pleural effusion 1/2/2017     SVT (supraventricular tachycardia) (H)        FAMILY HISTORY:  Family History   Problem Relation Age of Onset     Other - See Comments Sister         Amyloidosis       SOCIAL HISTORY:  Socioeconomic History     Marital status:    Tobacco Use     Smoking status: Never Smoker     Smokeless tobacco: Never Used   Substance and Sexual Activity     Alcohol use: No     Drug use: No   Other Topics Concern     CURRENT MEDICATIONS:  Acetaminophen (TYLENOL PO), Take 325 mg by mouth  apixaban ANTICOAGULANT (ELIQUIS ANTICOAGULANT) 5 MG tablet, Take 1 tablet (5 mg) by mouth 2 times daily  doxycycline hyclate (VIBRA-TABS) 100 MG tablet, Take 1 tablet (100 mg) by mouth daily  LORazepam (ATIVAN) 0.5 MG tablet, Take 1 tablet (0.5 mg) by mouth every 6 hours as needed for anxiety or nausea  metolazone (ZAROXOLYN) 2.5 MG tablet, TAKE 1 TABLET TWICE WEEKLY  ondansetron (ZOFRAN-ODT) 8 MG ODT tab, Take 1 tablet (8 mg) by mouth every 12 hours as needed for nausea  potassium chloride ER (KLOR-CON M) 10 MEQ CR tablet, Take 6 tablets (60 mEq) by mouth 3 times daily Additionally, take an Extra 80 mEq on metolazone days and the day after a metolazone day  prochlorperazine (COMPAZINE) 5 MG tablet, One to two tablets PO q 6 hours prn nausea  torsemide  (DEMADEX) 100 MG tablet, Take 1 tablet (100 mg) by mouth 2 times daily    No current facility-administered medications on file prior to visit.       ROS:   CONSTITUTIONAL: Denies fever, chills, +fatigue, chronic.  HEENT: Denies headache, vision changes, and changes in speech.   CV: Refer to HPI.   PULMONARY: Refer to HPI.   GI: Refer to HPI.   :  + urinary frequency 2/2 diuretics.  Otherwise no urinary symptoms  EXT: +Chronic lower extremity edema see hpi  SKIN: Denies abnormal rashes or lesions.   MUSCULOSKELETAL: Denies upper or lower extremity weakness and pain.   NEUROLOGIC: Denies seizures, or upper or lower extremity paresthesia.     EXAM:  There were no vitals taken for this visit.     N/a- telephone visit.    Labs, reviewed with patient in clinic today:  CBC RESULTS:  Lab Results   Component Value Date    WBC 5.7 05/12/2020    RBC 4.80 05/12/2020    HGB 14.9 05/12/2020    HCT 45.0 05/12/2020    MCV 94 05/12/2020    MCH 31.0 05/12/2020    MCHC 33.1 05/12/2020    RDW 13.5 05/12/2020     05/12/2020       CMP RESULTS:  Lab Results   Component Value Date     06/11/2020    POTASSIUM 3.2 (L) 06/11/2020    CHLORIDE 92 (L) 06/11/2020    CO2 36 (H) 06/11/2020    ANIONGAP 6 06/11/2020    GLC 88 06/11/2020    BUN 36 (H) 06/11/2020    CR 0.91 06/11/2020    GFRESTIMATED 65 06/11/2020    GFRESTBLACK 75 06/11/2020    JERROD 9.3 06/11/2020    BILITOTAL 2.5 (H) 05/12/2020    ALBUMIN 4.4 05/12/2020    ALKPHOS 265 (H) 05/12/2020    ALT 30 05/12/2020    AST 29 05/12/2020        INR RESULTS:  Lab Results   Component Value Date    INR 1.30 (H) 01/14/2017       Lab Results   Component Value Date    MAG 2.2 10/26/2019     Lab Results   Component Value Date    NTBNPI 9,009 (H) 01/13/2017     Lab Results   Component Value Date    NTBNP 892 (H) 03/12/2020       Diagnostics:  TTE 4/9/19  Moderate left ventricular hypertrophy.  Global and regional left ventricular function is normal with an EF of 60-65%.  Global right  ventricular function is normal.  Moderate aortic valve insufficiency.  Mild pulmonary hypertension with dilated IVC.  This study was compared with the study from 10/11/18 the AR is worse and RA pressure is now increased.    TTE 10/11/18  Global and regional left ventricular function is normal with an EF of 55-60%.  Moderate concentric wall thickening consistent with left ventricular  hypertrophy is present - known amyloid.  Grade III or advanced diastolic dysfunction.  Normal right ventricular size, wall thickness, and function.  Estimated pulmonary artery pressure 28 mmHg.  IVC with normal diameter but does not collapse (>50%) with inspiration.  Trace pericardial effusion.  Compared to prior study from 8/17/17, no significant change.    Ziopatch 11/2017      Holter Monitor 06/2019  INTERPRETATION  1. The rhythm varied with sinus rhythm and atrial fibrillation/variable atrial flutter. Low voltage noted.  2. The heart rate varied with a minimum rate of 48 bpm, maximum rate of 164 bpm, average rate of 74 bpm.  3. Frequent supraventricular ectopy was seen ranging from 0-469 per hour. Occasional atrail pairs and fairly frequent bigeminal cycles were noted.  Wandering atrial pacemaker noted.  4. Infrequent multifocal ventricular ectopy was seen ranging from 0-36 beats per hour.  5. ST-T wave changes were present.  6. Three patient events were documented and correlated with atrial fibrillation/flutter    EKG 8/8/2019 for palpitations  - NSR at a rate of 70, TX 0.19, QTc 525, QRS is narrow. Occasional PVCs. Biphasic twaves in V4-V6, unchanged from priors.    Assessment and Plan:   Mrs. Guerrero is a 69 year old female with AL amyloidosis with cardiac manifestation who presents to [facility] for hospital follow-up. Patient has cardiac amyloidosis as evidenced by her echocardiogram (severe concentric hypertrophy and biatrial enlargement) and abnormal EKG (near-low voltage, poor R wave progression, biatrial enlargement and no  evidence of LVH despite thicken LV on echo) in the setting of biopsy proven (BMB, EGD) AL amyloid. She completed chemotherapy with CyBorD with an excellent serologic response and her heart failure (i.e. troponin negative, NT pro BNP was stable, serum light chains minimal and urine light chains undetectable).     Overall, Amy has maintained her quality of life much longer than originally expected.  We are taking it on a month by month basis.  At this point she has not felt ready for palliative care or hospice.    She has gradually declined over the last year and has ongoing challenges with her volume status.  She requires frequent metolazone and maintaining an adequate potassium level has been a challenge. In the past few months, her Potassium has stayed much more stable than it had been earlier this year, although on her labs from 6/11 we did need to increase her standing dose. We did trial her on Diuril in hopes that this would improve the consistency of her regimen but she did not feel well on this medication so we have resumed intermittent metolazone.  Creatinine is stable.     She has also been struggling with A. Fib, which is occaionally symptomatic- okay for the last month. These episodes remain rare and given relative contraindications for amyloid patients we do not have her on rate control at this time.    # Chronic diastolic heart failure/restrictive cardiomyopathy 2/2  # Cardiac amyloidosis    Stage C. NYHA Class III.    Fluid status: Euvolemic, continue torsemide 100 mg BID, KCl 60 meq TID. Needs metolazone every 4-5 days, will take metolazone when >152 lbs. When she takes metolazone she will take an ADDITIONAL 80 meq Kcl. I have also asked her to add an additional 20 meq of Kcl every Friday.  ACEi/ARB/ARNI: n/a   BB: no indication and relatively contraindicated due to risk of progressive conduction disease/AV block in these patients  Aldosterone antagonist: d/c'd given hyponatremia, Na improving and  she is feeling better   SCD prophylaxis: does not meet criteria for implant  NSAID use: contraindicated  Sleep apnea evaluation: deferred today  Remote monitoring: deferred today, could be Cardiocom candidate   Goals of care: prior discussions with Dr Keyes re: planning for EoL care as long term prognosis for AL amyloid is poor.   Other: Has a referall for lymphedema therapy in [facility]    # Hypokalemia  3.2 on last labs. This was replaced, see RN note from 6/11/2020. Needs labs next week.  - Labs every three weeks    # A. Fib/A. Flutter  We did a holter monitor which showed intermittent a. Fib and a. Flutter. [id number] of 4. Occasional palpitations which last less than 1 minute, if these become more frequent can repeat monitor to assess burden.  - Continue Eliquis 5mg BID  - Avoiding BB in the setting of amyloid  - Holding off on digoxin given generally good rate control/very rare RVR events. Could discuss in the future if needed.    # Prolonged QTC  - Minimize QT prolonging meds    # Systemic amyloid  Heme previously monitoring her light chains which have been negative consistent with remission - therefore further palliative chemo not being considered. Nausea has been a large issue and is currently, Kytril was added at her last oncology appointment, but she has not started taking this. to her regimen of compazine and lorazepam.  - Consider palliative care referal if nausea not controlled on her current regimen (per onc)  - She is back on doxycycline     Follow: Up:   - Labs next Thursday  - Dr. Hernandez 7/30 as scheduled  - CORE in 3 months or PRN    Ciara Araya PA-C  [facility] Cardiology      CC  ALEJANDRO KEYES

## 2020-06-25 DIAGNOSIS — E85.4 AMYLOID HEART DISEASE (H): ICD-10-CM

## 2020-06-25 DIAGNOSIS — I43 AMYLOID HEART DISEASE (H): ICD-10-CM

## 2020-06-25 LAB
ANION GAP SERPL CALCULATED.3IONS-SCNC: 7 MMOL/L (ref 3–14)
BUN SERPL-MCNC: 29 MG/DL (ref 7–30)
CALCIUM SERPL-MCNC: 9.2 MG/DL (ref 8.5–10.1)
CHLORIDE SERPL-SCNC: 93 MMOL/L (ref 94–109)
CO2 SERPL-SCNC: 34 MMOL/L (ref 20–32)
CREAT SERPL-MCNC: 1.08 MG/DL (ref 0.52–1.04)
GFR SERPL CREATININE-BSD FRML MDRD: 53 ML/MIN/{1.73_M2}
GLUCOSE SERPL-MCNC: 95 MG/DL (ref 70–99)
POTASSIUM SERPL-SCNC: 4.3 MMOL/L (ref 3.4–5.3)
SODIUM SERPL-SCNC: 134 MMOL/L (ref 133–144)

## 2020-06-25 PROCEDURE — 80048 BASIC METABOLIC PNL TOTAL CA: CPT | Performed by: PHYSICIAN ASSISTANT

## 2020-06-25 PROCEDURE — 36415 COLL VENOUS BLD VENIPUNCTURE: CPT | Performed by: PHYSICIAN ASSISTANT

## 2020-06-30 ENCOUNTER — MYC MEDICAL ADVICE (OUTPATIENT)
Dept: CARDIOLOGY | Facility: CLINIC | Age: 70
End: 2020-06-30

## 2020-07-01 NOTE — TELEPHONE ENCOUNTER
Reviewed with Yolanda Araya. Recommend Potassium check 48 hours after discharge. Orders are already in. Will watch for results.   Vidhi Montgomery RN

## 2020-07-03 DIAGNOSIS — E85.4 AMYLOID HEART DISEASE (H): ICD-10-CM

## 2020-07-03 DIAGNOSIS — I43 AMYLOID HEART DISEASE (H): ICD-10-CM

## 2020-07-03 LAB
ANION GAP SERPL CALCULATED.3IONS-SCNC: 4 MMOL/L (ref 3–14)
BUN SERPL-MCNC: 30 MG/DL (ref 7–30)
CALCIUM SERPL-MCNC: 9.1 MG/DL (ref 8.5–10.1)
CHLORIDE SERPL-SCNC: 98 MMOL/L (ref 94–109)
CO2 SERPL-SCNC: 31 MMOL/L (ref 20–32)
CREAT SERPL-MCNC: 1.12 MG/DL (ref 0.52–1.04)
GFR SERPL CREATININE-BSD FRML MDRD: 51 ML/MIN/{1.73_M2}
GLUCOSE SERPL-MCNC: 86 MG/DL (ref 70–99)
POTASSIUM SERPL-SCNC: 4.8 MMOL/L (ref 3.4–5.3)
SODIUM SERPL-SCNC: 133 MMOL/L (ref 133–144)

## 2020-07-03 PROCEDURE — 80048 BASIC METABOLIC PNL TOTAL CA: CPT | Performed by: INTERNAL MEDICINE

## 2020-07-03 PROCEDURE — 36415 COLL VENOUS BLD VENIPUNCTURE: CPT | Performed by: INTERNAL MEDICINE

## 2020-07-08 ENCOUNTER — MYC MEDICAL ADVICE (OUTPATIENT)
Dept: CARDIOLOGY | Facility: CLINIC | Age: 70
End: 2020-07-08

## 2020-07-08 ENCOUNTER — PATIENT OUTREACH (OUTPATIENT)
Dept: CARDIOLOGY | Facility: CLINIC | Age: 70
End: 2020-07-08

## 2020-07-08 DIAGNOSIS — I50.30 DIASTOLIC HEART FAILURE, UNSPECIFIED HF CHRONICITY (H): Primary | ICD-10-CM

## 2020-07-08 NOTE — TELEPHONE ENCOUNTER
Date: 7/8/2020    Time of Call: 4:51 PM     Diagnosis:  [id number]     VORB:  Ordering provider: Yolanda MORGAN  Order: BMP next week     Order received by: Vidhi Montgomery RN      Follow-up/additional notes:

## 2020-07-15 DIAGNOSIS — I50.30 DIASTOLIC HEART FAILURE, UNSPECIFIED HF CHRONICITY (H): ICD-10-CM

## 2020-07-15 LAB
ANION GAP SERPL CALCULATED.3IONS-SCNC: 3 MMOL/L (ref 3–14)
BUN SERPL-MCNC: 33 MG/DL (ref 7–30)
CALCIUM SERPL-MCNC: 9 MG/DL (ref 8.5–10.1)
CHLORIDE SERPL-SCNC: 97 MMOL/L (ref 94–109)
CO2 SERPL-SCNC: 32 MMOL/L (ref 20–32)
CREAT SERPL-MCNC: 1.1 MG/DL (ref 0.52–1.04)
GFR SERPL CREATININE-BSD FRML MDRD: 52 ML/MIN/{1.73_M2}
GLUCOSE SERPL-MCNC: 77 MG/DL (ref 70–99)
POTASSIUM SERPL-SCNC: 4.1 MMOL/L (ref 3.4–5.3)
SODIUM SERPL-SCNC: 132 MMOL/L (ref 133–144)

## 2020-07-15 PROCEDURE — 36415 COLL VENOUS BLD VENIPUNCTURE: CPT | Performed by: PHYSICIAN ASSISTANT

## 2020-07-15 PROCEDURE — 80048 BASIC METABOLIC PNL TOTAL CA: CPT | Performed by: PHYSICIAN ASSISTANT

## 2020-07-23 ENCOUNTER — MYC MEDICAL ADVICE (OUTPATIENT)
Dept: CARDIOLOGY | Facility: CLINIC | Age: 70
End: 2020-07-23

## 2020-07-23 DIAGNOSIS — R04.0 BLEEDING NOSE: Primary | ICD-10-CM

## 2020-07-23 DIAGNOSIS — R04.0 NASAL BLEEDING: ICD-10-CM

## 2020-07-23 NOTE — TELEPHONE ENCOUNTER
Date: 7/23/2020    Time of Call: 4:58 PM     Diagnosis:  Epitaxis     [ VORB ] Ordering provider: Dr Cohen    Order: stop apiaxiban     Order received by: Niki Fam RN       Follow-up/additional notes: leonarda Reyes

## 2020-07-23 NOTE — PROGRESS NOTES
Date: 7/23/2020    Time of Call: 3:51 PM     Diagnosis:  Nose bleed     VORB: Ordering provider: Yolanda MORGAN  Order: Hgb     Order received by: Vidhi Montgomery RN      Follow-up/additional notes:

## 2020-07-24 ENCOUNTER — MYC MEDICAL ADVICE (OUTPATIENT)
Dept: CARDIOLOGY | Facility: CLINIC | Age: 70
End: 2020-07-24

## 2020-07-24 ENCOUNTER — TELEPHONE (OUTPATIENT)
Dept: CARDIOLOGY | Facility: CLINIC | Age: 70
End: 2020-07-24

## 2020-07-24 ENCOUNTER — HOSPITAL ENCOUNTER (OUTPATIENT)
Facility: CLINIC | Age: 70
Setting detail: SPECIMEN
Discharge: HOME | End: 2020-07-24
Admitting: PHYSICIAN ASSISTANT
Payer: COMMERCIAL

## 2020-07-24 DIAGNOSIS — I43 CARDIAC AMYLOIDOSIS (H): Primary | ICD-10-CM

## 2020-07-24 DIAGNOSIS — E85.4 CARDIAC AMYLOIDOSIS (H): Primary | ICD-10-CM

## 2020-07-24 DIAGNOSIS — E85.4 AMYLOID HEART DISEASE (H): ICD-10-CM

## 2020-07-24 DIAGNOSIS — I43 AMYLOID HEART DISEASE (H): ICD-10-CM

## 2020-07-24 DIAGNOSIS — R04.0 NASAL BLEEDING: ICD-10-CM

## 2020-07-24 DIAGNOSIS — I50.30 DIASTOLIC HEART FAILURE, UNSPECIFIED HF CHRONICITY (H): Primary | ICD-10-CM

## 2020-07-24 DIAGNOSIS — I50.30 DIASTOLIC HEART FAILURE, UNSPECIFIED HF CHRONICITY (H): ICD-10-CM

## 2020-07-24 LAB
ANION GAP SERPL CALCULATED.3IONS-SCNC: 6 MMOL/L (ref 3–14)
BUN SERPL-MCNC: 29 MG/DL (ref 7–30)
CALCIUM SERPL-MCNC: 9.2 MG/DL (ref 8.5–10.1)
CHLORIDE SERPL-SCNC: 92 MMOL/L (ref 94–109)
CO2 SERPL-SCNC: 36 MMOL/L (ref 20–32)
CREAT SERPL-MCNC: 1.03 MG/DL (ref 0.52–1.04)
GFR SERPL CREATININE-BSD FRML MDRD: 56 ML/MIN/{1.73_M2}
GLUCOSE SERPL-MCNC: 105 MG/DL (ref 70–99)
HGB BLD-MCNC: 13.7 G/DL (ref 11.7–15.7)
POTASSIUM SERPL-SCNC: 3.1 MMOL/L (ref 3.4–5.3)
SODIUM SERPL-SCNC: 134 MMOL/L (ref 133–144)

## 2020-07-24 PROCEDURE — 36415 COLL VENOUS BLD VENIPUNCTURE: CPT | Performed by: PHYSICIAN ASSISTANT

## 2020-07-24 PROCEDURE — 80048 BASIC METABOLIC PNL TOTAL CA: CPT | Performed by: PHYSICIAN ASSISTANT

## 2020-07-24 PROCEDURE — 85018 HEMOGLOBIN: CPT | Performed by: PHYSICIAN ASSISTANT

## 2020-07-24 NOTE — TELEPHONE ENCOUNTER
Date: 7/24/2020    Time of Call: 2:14 PM     Diagnosis:  Hypokalemia      [ VORB ] Ordering provider: CATHY Orozco    Order: take an extra 60 mEq potassium     Order received by: Niki Fam RN       Follow-up/additional notes: Amy stated understanding, she had already taken 40 mEq so will take 20 more

## 2020-07-24 NOTE — TELEPHONE ENCOUNTER
[facility] Call Center    Phone Message    May a detailed message be left on voicemail: yes     Reason for Call: Other: pt nanda a message from tin, she is kind of confused, is she to stop eliquis? please call pt thanks     Action Taken: Message routed to:  [facility] ([facility]): heart    Travel Screening: Not Applicable

## 2020-07-24 NOTE — TELEPHONE ENCOUNTER
Date: 7/24/2020    Time of Call: 11:36 AM     Diagnosis:  HFpEF     VORB: Ordering provider: Yolanda MORGAN  Order: BMP     Order received by: Vidhi Montgomery RN      Follow-up/additional notes:

## 2020-07-24 NOTE — TELEPHONE ENCOUNTER
Returned Amy's call and clarified that she should stop the Elaquis.  She had labs checked today, K is  3.1 and Hgb down slightly to 13.7.  Will send message to provider.  Amy is also torn about doing an inperson vs virtual visit next week.  She feels that the in person visit would have more value- lab check, BP check and physical assessment but is worried about exposure as well.

## 2020-07-28 ASSESSMENT — ENCOUNTER SYMPTOMS
HOARSE VOICE: 1
PALPITATIONS: 1
DEPRESSION: 0
INSOMNIA: 1
DECREASED CONCENTRATION: 0
LEG PAIN: 1
TASTE DISTURBANCE: 0
NERVOUS/ANXIOUS: 1
SINUS CONGESTION: 0
SLEEP DISTURBANCES DUE TO BREATHING: 0
POOR WOUND HEALING: 0
TROUBLE SWALLOWING: 0
NECK MASS: 0
HYPOTENSION: 0
SKIN CHANGES: 0
SORE THROAT: 0
HYPERTENSION: 0
SINUS PAIN: 1
NAIL CHANGES: 0
LIGHT-HEADEDNESS: 1
PANIC: 1
SYNCOPE: 0
EXERCISE INTOLERANCE: 1
ORTHOPNEA: 1
SMELL DISTURBANCE: 0

## 2020-07-30 ENCOUNTER — OFFICE VISIT (OUTPATIENT)
Dept: CARDIOLOGY | Facility: CLINIC | Age: 70
End: 2020-07-30
Attending: INTERNAL MEDICINE
Payer: COMMERCIAL

## 2020-07-30 VITALS
OXYGEN SATURATION: 99 % | BODY MASS INDEX: 26.75 KG/M2 | HEART RATE: 59 BPM | SYSTOLIC BLOOD PRESSURE: 102 MMHG | WEIGHT: 151 LBS | DIASTOLIC BLOOD PRESSURE: 59 MMHG | HEIGHT: 63 IN

## 2020-07-30 DIAGNOSIS — I48.0 PAROXYSMAL ATRIAL FIBRILLATION (H): Primary | ICD-10-CM

## 2020-07-30 DIAGNOSIS — I43 AMYLOID HEART DISEASE (H): ICD-10-CM

## 2020-07-30 DIAGNOSIS — E85.4 CARDIAC AMYLOIDOSIS (H): ICD-10-CM

## 2020-07-30 DIAGNOSIS — I50.30 DIASTOLIC HEART FAILURE, UNSPECIFIED HF CHRONICITY (H): ICD-10-CM

## 2020-07-30 DIAGNOSIS — E85.4 AMYLOID HEART DISEASE (H): ICD-10-CM

## 2020-07-30 DIAGNOSIS — I43 CARDIAC AMYLOIDOSIS (H): ICD-10-CM

## 2020-07-30 LAB
ANION GAP SERPL CALCULATED.3IONS-SCNC: 6 MMOL/L (ref 3–14)
BUN SERPL-MCNC: 29 MG/DL (ref 7–30)
CALCIUM SERPL-MCNC: 9 MG/DL (ref 8.5–10.1)
CHLORIDE SERPL-SCNC: 94 MMOL/L (ref 94–109)
CO2 SERPL-SCNC: 33 MMOL/L (ref 20–32)
CREAT SERPL-MCNC: 1.04 MG/DL (ref 0.52–1.04)
GFR SERPL CREATININE-BSD FRML MDRD: 55 ML/MIN/{1.73_M2}
GLUCOSE SERPL-MCNC: 100 MG/DL (ref 70–99)
NT-PROBNP SERPL-MCNC: 1141 PG/ML (ref 0–125)
POTASSIUM SERPL-SCNC: 3.6 MMOL/L (ref 3.4–5.3)
SODIUM SERPL-SCNC: 132 MMOL/L (ref 133–144)

## 2020-07-30 PROCEDURE — 36415 COLL VENOUS BLD VENIPUNCTURE: CPT | Performed by: INTERNAL MEDICINE

## 2020-07-30 PROCEDURE — 99214 OFFICE O/P EST MOD 30 MIN: CPT | Mod: GC | Performed by: INTERNAL MEDICINE

## 2020-07-30 PROCEDURE — 80048 BASIC METABOLIC PNL TOTAL CA: CPT | Performed by: INTERNAL MEDICINE

## 2020-07-30 PROCEDURE — 83880 ASSAY OF NATRIURETIC PEPTIDE: CPT | Performed by: INTERNAL MEDICINE

## 2020-07-30 PROCEDURE — G0463 HOSPITAL OUTPT CLINIC VISIT: HCPCS | Mod: ZF

## 2020-07-30 ASSESSMENT — PAIN SCALES - GENERAL: PAINLEVEL: NO PAIN (0)

## 2020-07-30 ASSESSMENT — MIFFLIN-ST. JEOR: SCORE: 1189.06

## 2020-07-30 NOTE — NURSING NOTE
Chief Complaint   Patient presents with     Follow Up     4 month follow up, per Pt, records in Epic     Vitals were taken and medications were reconciled.    Soco Kim CMA    7:40 AM

## 2020-07-30 NOTE — PROGRESS NOTES
HPI:   Ms. Guerrero is a 69 year old female with a past medical history including stage IV AL amyloid diagnosed in 2016. Presents to clinic for CORE follow-up.     Her cardiac and oncologic history are as follows: She had fatigue in 2016.  She had a coronary angiogram which was reportedly normal but was diagnosed with HF and started on a BB and diuretics. Her symptoms progressed to the point that she was evaluated at [facility] in August/September (Dr. Barron in oncology/hematology, Dr. Nettles in cardiology). She was diagnosed with stage IV AL amyloid (+GI, +bone marrow involvement, no involvement of kidney or liver). Her work up is detailed in our previous notes, however she has lambda AL amyloidosis and she underwent CyBorD chemotherapy and excellent light chain response by serum. Patient has been turned down at [facility] for a stem cell transplant due to her cardiac involvement. Later in 2016, she had 2-3 more right sided pleural taps. She was hospitalized 1/2017 and diuresed 20-30 pounds at that time. Since that time her AL has been in remission by labs without further chemo. Patient was then treated with doxycycline , which she continues to this day.    Her biggest concern has been nosebleeds.  She said she has been on apixaban for some time now and has not had any bleeding and she is up until the last 2 weeks, where she had recurrent nosebleeds and at one point had to go to the emergency room because of her inability to stop the bleed.  They had to place a Rhino Rocket and for the last week and a half she has been off the apixaban.  She was concerned about restarting her medication primarily because she thought the bleed was in an area that she could not control well herself.  She has not had any other bleeding issues.  She has tried saline sprays, moisturizers, and sleeps next to a humidifier.    Regarding her fluid status, she believes things to be relatively status quo.  She does take metolazone about every fourth day  or so the 2.5 mg dose and continues on a torsemide 100 mg twice a day.  She does get some lower extremity swelling thats relatively typical for her, and does tend to worsen in the evening.  She says right now is actually pretty good. Patient reports no exertional chest pain or shortness of breath at baseline. She reports that her weight has been stable. She has no significant change in her symptoms and feels fairly well compensated.     Medications:   Current Outpatient Medications   Medication     Acetaminophen (TYLENOL PO)     doxycycline hyclate (VIBRA-TABS) 100 MG tablet     LORazepam (ATIVAN) 0.5 MG tablet     metolazone (ZAROXOLYN) 2.5 MG tablet     ondansetron (ZOFRAN-ODT) 8 MG ODT tab     potassium chloride ER (KLOR-CON M) 10 MEQ CR tablet     torsemide (DEMADEX) 100 MG tablet     prochlorperazine (COMPAZINE) 5 MG tablet     No current facility-administered medications for this visit.        PAST MEDICAL HISTORY:  Past Medical History:   Diagnosis Date     AL amyloidosis (H)      Atrial fibrillation and flutter (H)      Cardiac amyloidosis (H)      Lymphedema      QT prolongation      Recurrent right pleural effusion 1/2/2017     SVT (supraventricular tachycardia) (H)        FAMILY HISTORY:  Family History   Problem Relation Age of Onset     Other - See Comments Sister         Amyloidosis       SOCIAL HISTORY:  Socioeconomic History     Marital status:    Tobacco Use     Smoking status: Never Smoker     Smokeless tobacco: Never Used   Substance and Sexual Activity     Alcohol use: No     Drug use: No   Other Topics Concern     CURRENT MEDICATIONS:  Acetaminophen (TYLENOL PO), Take 325 mg by mouth  doxycycline hyclate (VIBRA-TABS) 100 MG tablet, Take 1 tablet (100 mg) by mouth daily  LORazepam (ATIVAN) 0.5 MG tablet, Take 1 tablet (0.5 mg) by mouth every 6 hours as needed for anxiety or nausea  metolazone (ZAROXOLYN) 2.5 MG tablet, TAKE 1 TABLET TWICE WEEKLY  ondansetron (ZOFRAN-ODT) 8 MG ODT tab, Take  1 tablet (8 mg) by mouth every 12 hours as needed for nausea  potassium chloride ER (KLOR-CON M) 10 MEQ CR tablet, Take 6 tablets (60 mEq) by mouth 3 times daily Additionally, take an Extra 80 mEq on metolazone days and the day after a metolazone day  torsemide (DEMADEX) 100 MG tablet, Take 1 tablet (100 mg) by mouth 2 times daily  prochlorperazine (COMPAZINE) 5 MG tablet, One to two tablets PO q 6 hours prn nausea (Patient not taking: Reported on 6/18/2020)    No current facility-administered medications on file prior to visit.       ROS:     Answers for HPI/ROS submitted by the patient on 7/28/2020   General Symptoms: No  Skin Symptoms: Yes  HENT Symptoms: Yes  EYE SYMPTOMS: No  HEART SYMPTOMS: Yes  LUNG SYMPTOMS: No  INTESTINAL SYMPTOMS: No  URINARY SYMPTOMS: No  GYNECOLOGIC SYMPTOMS: No  BREAST SYMPTOMS: No  SKELETAL SYMPTOMS: No  BLOOD SYMPTOMS: No  NERVOUS SYSTEM SYMPTOMS: No  MENTAL HEALTH SYMPTOMS: Yes  Changes in hair: No  Changes in moles/birth marks: No  Itching: Yes  Rashes: Yes  Changes in nails: No  Acne: No  Hair in places you don't want it: No  Change in facial hair: No  Warts: No  Non-healing sores: No  Scarring: No  Flaking of skin: No  Color changes of hands/feet in cold : No  Sun sensitivity: No  Skin thickening: No  Hearing loss: No  Tinnitus: No  Nosebleeds: Yes  Congestion: No  Sinus pain: Yes  Trouble swallowing: No   Voice hoarseness: Yes  Mouth sores: No  Sore throat: No  Tooth pain: No  Gum tenderness: No  Bleeding gums: No  Change in taste: No  Change in sense of smell: No  Dry mouth: Yes  Hearing aid used: No  Neck lump: No  Chest pain or pressure: No  Fast or irregular heartbeat: Yes  Pain in legs with walking: Yes  Trouble breathing while lying down: Yes  Fingers or toes appear blue: No  High blood pressure: No  Low blood pressure: No  Fainting: No  Murmurs: No  Pacemaker: No  Varicose veins: No  Edema or swelling: Yes  Wake up at night with shortness of breath: No  Light-headedness:  "Yes  Exercise intolerance: Yes  Nervous or Anxious: Yes  Depression: No  Trouble sleeping: Yes  Trouble thinking or concentrating: No  Mood changes: No  Panic attacks: Yes      EXAM:  /59 (BP Location: Left arm, Patient Position: Chair, Cuff Size: Adult Regular)   Pulse 59   Ht 1.6 m (5' 3\")   Wt 68.5 kg (151 lb)   SpO2 99%   BMI 26.75 kg/m       GENERAL: Appears comfortable, in no acute distress, speaking in full sentences and able to communicate all needs.   HEENT: NC/AT   CV: RRR, +S1S2, no murmur, rub, or gallop. CVP is not elevated.   RESPIRATORY: Respirations regular, even, and unlabored. Lungs CTA throughout.   GI: Soft and non-distended with normoactive bowel sounds present. No tenderness, rebound, guarding. No hepatomegaly.   EXTREMITIES: Mild Edema   NEUROLOGIC: Alert and interacting appropiratly. No focal deficits.     Labs, reviewed with patient in clinic today:  Last Comprehensive Metabolic Panel:  Sodium   Date Value Ref Range Status   07/30/2020 132 (L) 133 - 144 mmol/L Final     Potassium   Date Value Ref Range Status   07/30/2020 3.6 3.4 - 5.3 mmol/L Final     Chloride   Date Value Ref Range Status   07/30/2020 94 94 - 109 mmol/L Final     Carbon Dioxide   Date Value Ref Range Status   07/30/2020 33 (H) 20 - 32 mmol/L Final     Anion Gap   Date Value Ref Range Status   07/30/2020 6 3 - 14 mmol/L Final     Glucose   Date Value Ref Range Status   07/30/2020 100 (H) 70 - 99 mg/dL Final     Urea Nitrogen   Date Value Ref Range Status   07/30/2020 29 7 - 30 mg/dL Final     Creatinine   Date Value Ref Range Status   07/30/2020 1.04 0.52 - 1.04 mg/dL Final     GFR Estimate   Date Value Ref Range Status   07/30/2020 55 (L) >60 mL/min/[1.73_m2] Final     Comment:     Non  GFR Calc  Starting 12/18/2018, serum creatinine based estimated GFR (eGFR) will be   calculated using the Chronic Kidney Disease Epidemiology Collaboration   (CKD-EPI) equation.       Calcium   Date Value Ref " Range Status   07/30/2020 9.0 8.5 - 10.1 mg/dL Final         Diagnostics:    Coronary Angiogram 2016  Diagnostic Findings  LEFT MAIN CORONARY ARTERY     The LMCA is free of significant disease.    LEFT ANTERIOR DESCENDING     The LAD is free of significant disease.    CIRCUMFLEX     The Circumflex is free of significant disease.    RIGHT CORONARY ARTERY     The RCA is free of significant disease.     TTE 4/9/19  Moderate left ventricular hypertrophy.  Global and regional left ventricular function is normal with an EF of 60-65%.  Global right ventricular function is normal.  Moderate aortic valve insufficiency.  Mild pulmonary hypertension with dilated IVC.  This study was compared with the study from 10/11/18 the AR is worse and RA pressure is now increased.    Ziopatch 11/2017      Holter Monitor 06/2019  INTERPRETATION  1. The rhythm varied with sinus rhythm and atrial fibrillation/variable atrial flutter. Low voltage noted.  2. The heart rate varied with a minimum rate of 48 bpm, maximum rate of 164 bpm, average rate of 74 bpm.  3. Frequent supraventricular ectopy was seen ranging from 0-469 per hour. Occasional atrail pairs and fairly frequent bigeminal cycles were noted.  Wandering atrial pacemaker noted.  4. Infrequent multifocal ventricular ectopy was seen ranging from 0-36 beats per hour.  5. ST-T wave changes were present.  6. Three patient events were documented and correlated with atrial fibrillation/flutter    Assessment and Plan:   Mrs. Guerrero is a 69 year old female with AL amyloidosis with cardiac manifestation who presents for follow up.     #Cardiac Amyloidosis (LVEF 60%)   -Patient has AL Cardiac Amyloid (ECHO with Severe LVH and Biopsy proven Amyloid in BMBx and EGD). She has undergone chemotherapy with excellent response. She has not received a stem cell transplant; however, she is doing well from a symptom standpoint with stable vital signs.   -Even though AL Amyloid has a poor prognosis, she  has done exceptionally well and has mild cardiac symptoms at this time. However, I do suspect that these may continue to slowly get worse over time.   -Continue Torsemide 100mg BID with Metolozone 2.5 mg twice a week. She is not on Aldactone because of previous hyponatremia.   -We will not make any medication changes at this time.     # A. Fib/A. Flutter  -currently sinus, is paroxsymal  -We discussed options regarding her anticoagulation.  (Resuming the same dose, resuming half dose, switching to a new anticoagulant, staying off anticoagulation).  Risks and benefits of each option were discussed.  Because of her concern of where the bleed was, she elects to restart Eliquis at half dose 2.5 mg twice daily.  -Avoiding BB given her Amyloid history.     #Systemic Amyloid  -Continue Kytril per Oncology   -Continue Doxycycline      Follow up with [facility] in 3 months    Seen and discussed with attending Dr. Cohen.    Bethany Solis MD PGY7  Advanced Heart Failure Cardiology Fellow      I have seen and examined the patient with the house staff on July 30, 2020  and agree with the outlined assessment and plan.      Domenica Cohen MD  Associate Professor of Medicine   [facility] Division of Cardiology

## 2020-07-30 NOTE — PATIENT INSTRUCTIONS
"Cardiology Providers you saw during your visit:  Dr. Cohen    Medication changes:  1.  Restart your Eliquis at 2.5 mg BID (this is a lower dose, you can try to cut them in half, otherwise I sent a smaller pill size to [employer])    Follow up:  1.  Follow up with Yolanda in [facility] in 3 months  2. Follow up with Dr Cohen in 6 months    Labs:  Results for HILARY GARCIA (MRN [id number]) as of 7/30/2020 08:28   Ref. Range 7/30/2020 07:11   Sodium Latest Ref Range: 133 - 144 mmol/L 132 (L)   Potassium Latest Ref Range: 3.4 - 5.3 mmol/L 3.6   Chloride Latest Ref Range: 94 - 109 mmol/L 94   Carbon Dioxide Latest Ref Range: 20 - 32 mmol/L 33 (H)   Urea Nitrogen Latest Ref Range: 7 - 30 mg/dL 29   Creatinine Latest Ref Range: 0.52 - 1.04 mg/dL 1.04   GFR Estimate Latest Ref Range: >60 mL/min/1.73_m2 55 (L)   GFR Estimate If Black Latest Ref Range: >60 mL/min/1.73_m2 64   Calcium Latest Ref Range: 8.5 - 10.1 mg/dL 9.0   Anion Gap Latest Ref Range: 3 - 14 mmol/L 6   N-Terminal Pro Bnp Latest Ref Range: 0 - 125 pg/mL 1,141 (H)   Glucose Latest Ref Range: 70 - 99 mg/dL 100 (H)       Please call if you have :  1. Weight gain of more than 2 pounds in a day or 5 pounds in a week  2. Increased shortness of breath, swelling or bloating  3. Dizziness, lightheadedness   4. Any questions or concerns.       Follow the American Heart Association Diet and Lifestyle recommendations:  Limit saturated fat, trans fat, sodium, red meat, sweets and sugar-sweetened beverages. If you choose to eat red meat, compare labels and select the leanest cuts available.  Aim for at least 150 minutes of moderate physical activity or 75 minutes of vigorous physical activity - or an equal combination of both - each week.      During business hours: [phone], press option # 1 to be routed to the [facility] then option # 4 for \"To send a message to your care team\"      After hours, weekends or holidays: On Call Cardiologist- [phone]   option #4 and " "ask to speak to the on-call Cardiologist. Inform them you are a CORE/heart failure patient at the [facility].      Heart Failure Support Group  [facility]  The Board Room, 8th Floor  [address] [address]     Meetings   1-2 pm  January 6  March 2  May 4  July 6  September 14  November 2      Niki Fam RN BSN CHFN  Cardiology Care Coordinator - Heart Failure/ [facility]  [facility]   Questions and [phone]   First press #1 for the [facility] and then press #4 for \"To send a message to your care team\"    "

## 2020-07-30 NOTE — LETTER
7/30/2020      RE: Leandra Guerrero  [address] [place] [address]       Dear Colleague,    Thank you for the opportunity to participate in the care of your patient, Leandra Guerrero, at the [facility] at [facility]. Please see a copy of my visit note below.    HPI:   Ms. Guerrero is a 69 year old female with a past medical history including stage IV AL amyloid diagnosed in 2016. Presents to clinic for CORE follow-up.     Her cardiac and oncologic history are as follows: She had fatigue in 2016.  She had a coronary angiogram which was reportedly normal but was diagnosed with HF and started on a BB and diuretics. Her symptoms progressed to the point that she was evaluated at [facility] in August/September (Dr. Barron in oncology/hematology, Dr. Nettles in cardiology). She was diagnosed with stage IV AL amyloid (+GI, +bone marrow involvement, no involvement of kidney or liver). Her work up is detailed in our previous notes, however she has lambda AL amyloidosis and she underwent CyBorD chemotherapy and excellent light chain response by serum. Patient has been turned down at [facility] for a stem cell transplant due to her cardiac involvement. Later in 2016, she had 2-3 more right sided pleural taps. She was hospitalized 1/2017 and diuresed 20-30 pounds at that time. Since that time her AL has been in remission by labs without further chemo. Patient was then treated with doxycycline , which she continues to this day.    Her biggest concern has been nosebleeds.  She said she has been on apixaban for some time now and has not had any bleeding and she is up until the last 2 weeks, where she had recurrent nosebleeds and at one point had to go to the emergency room because of her inability to stop the bleed.  They had to place a Rhino Rocket and for the last week and a half she has been off the apixaban.  She was concerned about restarting her medication primarily because  she thought the bleed was in an area that she could not control well herself.  She has not had any other bleeding issues.  She has tried saline sprays, moisturizers, and sleeps next to a humidifier.    Regarding her fluid status, she believes things to be relatively status quo.  She does take metolazone about every fourth day or so the 2.5 mg dose and continues on a torsemide 100 mg twice a day.  She does get some lower extremity swelling thats relatively typical for her, and does tend to worsen in the evening.  She says right now is actually pretty good. Patient reports no exertional chest pain or shortness of breath at baseline. She reports that her weight has been stable. She has no significant change in her symptoms and feels fairly well compensated.     Medications:   Current Outpatient Medications   Medication     Acetaminophen (TYLENOL PO)     doxycycline hyclate (VIBRA-TABS) 100 MG tablet     LORazepam (ATIVAN) 0.5 MG tablet     metolazone (ZAROXOLYN) 2.5 MG tablet     ondansetron (ZOFRAN-ODT) 8 MG ODT tab     potassium chloride ER (KLOR-CON M) 10 MEQ CR tablet     torsemide (DEMADEX) 100 MG tablet     prochlorperazine (COMPAZINE) 5 MG tablet     No current facility-administered medications for this visit.        PAST MEDICAL HISTORY:  Past Medical History:   Diagnosis Date     AL amyloidosis (H)      Atrial fibrillation and flutter (H)      Cardiac amyloidosis (H)      Lymphedema      QT prolongation      Recurrent right pleural effusion 1/2/2017     SVT (supraventricular tachycardia) (H)        FAMILY HISTORY:  Family History   Problem Relation Age of Onset     Other - See Comments Sister         Amyloidosis       SOCIAL HISTORY:  Socioeconomic History     Marital status:    Tobacco Use     Smoking status: Never Smoker     Smokeless tobacco: Never Used   Substance and Sexual Activity     Alcohol use: No     Drug use: No   Other Topics Concern     CURRENT MEDICATIONS:  Acetaminophen (TYLENOL PO),  Take 325 mg by mouth  doxycycline hyclate (VIBRA-TABS) 100 MG tablet, Take 1 tablet (100 mg) by mouth daily  LORazepam (ATIVAN) 0.5 MG tablet, Take 1 tablet (0.5 mg) by mouth every 6 hours as needed for anxiety or nausea  metolazone (ZAROXOLYN) 2.5 MG tablet, TAKE 1 TABLET TWICE WEEKLY  ondansetron (ZOFRAN-ODT) 8 MG ODT tab, Take 1 tablet (8 mg) by mouth every 12 hours as needed for nausea  potassium chloride ER (KLOR-CON M) 10 MEQ CR tablet, Take 6 tablets (60 mEq) by mouth 3 times daily Additionally, take an Extra 80 mEq on metolazone days and the day after a metolazone day  torsemide (DEMADEX) 100 MG tablet, Take 1 tablet (100 mg) by mouth 2 times daily  prochlorperazine (COMPAZINE) 5 MG tablet, One to two tablets PO q 6 hours prn nausea (Patient not taking: Reported on 6/18/2020)    No current facility-administered medications on file prior to visit.       ROS:     Answers for HPI/ROS submitted by the patient on 7/28/2020   General Symptoms: No  Skin Symptoms: Yes  HENT Symptoms: Yes  EYE SYMPTOMS: No  HEART SYMPTOMS: Yes  LUNG SYMPTOMS: No  INTESTINAL SYMPTOMS: No  URINARY SYMPTOMS: No  GYNECOLOGIC SYMPTOMS: No  BREAST SYMPTOMS: No  SKELETAL SYMPTOMS: No  BLOOD SYMPTOMS: No  NERVOUS SYSTEM SYMPTOMS: No  MENTAL HEALTH SYMPTOMS: Yes  Changes in hair: No  Changes in moles/birth marks: No  Itching: Yes  Rashes: Yes  Changes in nails: No  Acne: No  Hair in places you don't want it: No  Change in facial hair: No  Warts: No  Non-healing sores: No  Scarring: No  Flaking of skin: No  Color changes of hands/feet in cold : No  Sun sensitivity: No  Skin thickening: No  Hearing loss: No  Tinnitus: No  Nosebleeds: Yes  Congestion: No  Sinus pain: Yes  Trouble swallowing: No   Voice hoarseness: Yes  Mouth sores: No  Sore throat: No  Tooth pain: No  Gum tenderness: No  Bleeding gums: No  Change in taste: No  Change in sense of smell: No  Dry mouth: Yes  Hearing aid used: No  Neck lump: No  Chest pain or pressure: No  Fast or  "irregular heartbeat: Yes  Pain in legs with walking: Yes  Trouble breathing while lying down: Yes  Fingers or toes appear blue: No  High blood pressure: No  Low blood pressure: No  Fainting: No  Murmurs: No  Pacemaker: No  Varicose veins: No  Edema or swelling: Yes  Wake up at night with shortness of breath: No  Light-headedness: Yes  Exercise intolerance: Yes  Nervous or Anxious: Yes  Depression: No  Trouble sleeping: Yes  Trouble thinking or concentrating: No  Mood changes: No  Panic attacks: Yes      EXAM:  /59 (BP Location: Left arm, Patient Position: Chair, Cuff Size: Adult Regular)   Pulse 59   Ht 1.6 m (5' 3\")   Wt 68.5 kg (151 lb)   SpO2 99%   BMI 26.75 kg/m       GENERAL: Appears comfortable, in no acute distress, speaking in full sentences and able to communicate all needs.   HEENT: NC/AT   CV: RRR, +S1S2, no murmur, rub, or gallop. CVP is not elevated.   RESPIRATORY: Respirations regular, even, and unlabored. Lungs CTA throughout.   GI: Soft and non-distended with normoactive bowel sounds present. No tenderness, rebound, guarding. No hepatomegaly.   EXTREMITIES: Mild Edema   NEUROLOGIC: Alert and interacting appropiratly. No focal deficits.     Labs, reviewed with patient in clinic today:  Last Comprehensive Metabolic Panel:  Sodium   Date Value Ref Range Status   07/30/2020 132 (L) 133 - 144 mmol/L Final     Potassium   Date Value Ref Range Status   07/30/2020 3.6 3.4 - 5.3 mmol/L Final     Chloride   Date Value Ref Range Status   07/30/2020 94 94 - 109 mmol/L Final     Carbon Dioxide   Date Value Ref Range Status   07/30/2020 33 (H) 20 - 32 mmol/L Final     Anion Gap   Date Value Ref Range Status   07/30/2020 6 3 - 14 mmol/L Final     Glucose   Date Value Ref Range Status   07/30/2020 100 (H) 70 - 99 mg/dL Final     Urea Nitrogen   Date Value Ref Range Status   07/30/2020 29 7 - 30 mg/dL Final     Creatinine   Date Value Ref Range Status   07/30/2020 1.04 0.52 - 1.04 mg/dL Final     GFR " Estimate   Date Value Ref Range Status   07/30/2020 55 (L) >60 mL/min/[1.73_m2] Final     Comment:     Non  GFR Calc  Starting 12/18/2018, serum creatinine based estimated GFR (eGFR) will be   calculated using the Chronic Kidney Disease Epidemiology Collaboration   (CKD-EPI) equation.       Calcium   Date Value Ref Range Status   07/30/2020 9.0 8.5 - 10.1 mg/dL Final         Diagnostics:    Coronary Angiogram 2016  Diagnostic Findings  LEFT MAIN CORONARY ARTERY     The LMCA is free of significant disease.    LEFT ANTERIOR DESCENDING     The LAD is free of significant disease.    CIRCUMFLEX     The Circumflex is free of significant disease.    RIGHT CORONARY ARTERY     The RCA is free of significant disease.     TTE 4/9/19  Moderate left ventricular hypertrophy.  Global and regional left ventricular function is normal with an EF of 60-65%.  Global right ventricular function is normal.  Moderate aortic valve insufficiency.  Mild pulmonary hypertension with dilated IVC.  This study was compared with the study from 10/11/18 the AR is worse and RA pressure is now increased.    Zigokittch 11/2017      Holter Monitor 06/2019  INTERPRETATION  1. The rhythm varied with sinus rhythm and atrial fibrillation/variable atrial flutter. Low voltage noted.  2. The heart rate varied with a minimum rate of 48 bpm, maximum rate of 164 bpm, average rate of 74 bpm.  3. Frequent supraventricular ectopy was seen ranging from 0-469 per hour. Occasional atrail pairs and fairly frequent bigeminal cycles were noted.  Wandering atrial pacemaker noted.  4. Infrequent multifocal ventricular ectopy was seen ranging from 0-36 beats per hour.  5. ST-T wave changes were present.  6. Three patient events were documented and correlated with atrial fibrillation/flutter    Assessment and Plan:   Mrs. Guerrero is a 69 year old female with AL amyloidosis with cardiac manifestation who presents for follow up.     #Cardiac Amyloidosis (LVEF 60%)    -Patient has AL Cardiac Amyloid (ECHO with Severe LVH and Biopsy proven Amyloid in BMBx and EGD). She has undergone chemotherapy with excellent response. She has not received a stem cell transplant; however, she is doing well from a symptom standpoint with stable vital signs.   -Even though AL Amyloid has a poor prognosis, she has done exceptionally well and has mild cardiac symptoms at this time. However, I do suspect that these may continue to slowly get worse over time.   -Continue Torsemide 100mg BID with Metolozone 2.5 mg twice a week. She is not on Aldactone because of previous hyponatremia.   -We will not make any medication changes at this time.     # A. Fib/A. Flutter  -currently sinus, is paroxsymal  -We discussed options regarding her anticoagulation.  (Resuming the same dose, resuming half dose, switching to a new anticoagulant, staying off anticoagulation).  Risks and benefits of each option were discussed.  Because of her concern of where the bleed was, she elects to restart Eliquis at half dose 2.5 mg twice daily.  -Avoiding BB given her Amyloid history.     #Systemic Amyloid  -Continue Kytril per Oncology   -Continue Doxycycline      Follow up with [facility] in 3 months    Seen and discussed with attending Dr. Cohen.    Bethany Solis MD PGY7  Advanced Heart Failure Cardiology Fellow      I have seen and examined the patient with the house staff on July 30, 2020  and agree with the outlined assessment and plan.      Domenica Cohen MD  Associate Professor of Medicine   [facility] Division of Cardiology       Please do not hesitate to contact me if you have any questions/concerns.     Sincerely,     Domenica Cohen MD

## 2020-07-30 NOTE — NURSING NOTE
Diet: Patient instructed regarding a heart failure healthy diet, including discussion of reduced fat and 2000 mg daily sodium restriction, daily weights, medication purpose and compliance, fluid restrictions and resources for patient and family to access for assistance with heart failure management.       Labs: Patient was given results of the laboratory testing obtained today and patient was instructed about when to return for the next laboratory testing.     Med Reconcile: Reviewed and verified all current medications with the patient. The updated medication list was printed and given to the patient.     Med changes: Restart Eliquis at 2.5 mg BID next week    Return Appointment: Patient given instructions regarding scheduling next clinic visit: Yolanda in 3 months, Noel in 6 months    Patient stated she understood all health information given and agreed to call with further questions or concerns.     Niki Fam RN

## 2020-07-31 ENCOUNTER — MYC MEDICAL ADVICE (OUTPATIENT)
Dept: CARDIOLOGY | Facility: CLINIC | Age: 70
End: 2020-07-31

## 2020-07-31 ENCOUNTER — PATIENT OUTREACH (OUTPATIENT)
Dept: CARDIOLOGY | Facility: CLINIC | Age: 70
End: 2020-07-31

## 2020-07-31 DIAGNOSIS — I43 CARDIAC AMYLOIDOSIS (H): Primary | ICD-10-CM

## 2020-07-31 DIAGNOSIS — E85.4 CARDIAC AMYLOIDOSIS (H): Primary | ICD-10-CM

## 2020-07-31 NOTE — TELEPHONE ENCOUNTER
Date: 7/31/2020    Time of Call: 4:49 PM     Diagnosis:  Heart failure     [ VORB ] Ordering provider: Dr Cohen    Order: labs in 6 weeks bmp     Order received by: Niki Fam RN       Follow-up/additional notes: sent my chart message

## 2020-08-24 ENCOUNTER — MYC MEDICAL ADVICE (OUTPATIENT)
Dept: CARDIOLOGY | Facility: CLINIC | Age: 70
End: 2020-08-24

## 2020-09-04 ENCOUNTER — MYC MEDICAL ADVICE (OUTPATIENT)
Dept: CARDIOLOGY | Facility: CLINIC | Age: 70
End: 2020-09-04

## 2020-09-04 NOTE — TELEPHONE ENCOUNTER
Called [facility]- they no longer have an active prescription.  Clarifying with Amy where she has gotten her metolazone lately

## 2020-09-09 DIAGNOSIS — I43 AMYLOID HEART DISEASE (H): ICD-10-CM

## 2020-09-09 DIAGNOSIS — E85.4 AMYLOID HEART DISEASE (H): ICD-10-CM

## 2020-09-09 RX ORDER — METOLAZONE 2.5 MG/1
2.5 TABLET ORAL
Qty: 26 TABLET | Refills: 3 | Status: SHIPPED | OUTPATIENT
Start: 2020-09-10 | End: 2021-04-13

## 2020-10-06 ENCOUNTER — MYC MEDICAL ADVICE (OUTPATIENT)
Dept: CARDIOLOGY | Facility: CLINIC | Age: 70
End: 2020-10-06

## 2020-10-06 DIAGNOSIS — E85.4 CARDIAC AMYLOIDOSIS (H): Primary | ICD-10-CM

## 2020-10-06 DIAGNOSIS — I43 CARDIAC AMYLOIDOSIS (H): Primary | ICD-10-CM

## 2020-10-06 NOTE — TELEPHONE ENCOUNTER
Reviewed with Yolanda Araya and called Amy back to review. Left voicemail and will send [employer] message.     Called Amy again and spoke with her. She reports she had been taking coated potassium pills and those are the ones that showed up in her stool. It was her PCP who recommended she not take potassium today. Amy thinks it's because you're supposed to take with food and she's not eating.  Now is using the dissolvable 10 meq tabs and dissolving them in water. She is wondering how many to take.   Reviewed with Yolanda Araya. Recommends still going back to primary care and make sure there's no other reason not taking potassium. Otherwise if dissolving can take usual dose of 16 tabs. Should get labs day of procedure and 2 days later. Amy will try to get labs tomorrow after procedure as she said they are not doing them prior to procedure but will see how she is feeling after procedure.     Reviewed with Amy again to find out from PCP why she shouldn't be taking potassium. If its any other reason than they just don't want pills in the colon should call us back. Otherwise plan to dissolve pills and continue taking as instructed.     Date: 10/6/2020    Time of Call: 3:09 PM     Diagnosis:  HFpEF     VORB:  Ordering provider: CAROLINA MORGAN  Order: BMP tomorrow and in 2 days     Order received by: Vidhi Montgomery RN      Follow-up/additional notes:

## 2020-10-07 ENCOUNTER — MYC MEDICAL ADVICE (OUTPATIENT)
Dept: CARDIOLOGY | Facility: CLINIC | Age: 70
End: 2020-10-07

## 2020-10-07 NOTE — TELEPHONE ENCOUNTER
K 2.4 after colonoscopy.      Date: 10/7/2020    Time of Call: 4:12 PM     Diagnosis:  Heart failure     [ VORB ] Ordering provider: Dr Cohen    Order: take an extra 80 mEq potassium today and tomorrow in addition to her normal 60 TID     Order received by: Niki Fam RN       Follow-up/additional notes: Amy is taking potassium 80 mEq BID plus extra on metolazone days and the day after.  She's worried that potassium pills aren't absorbing properly and that she will need a new type of potassium.  Will follow up on.  She will get labs checked on Friday.

## 2020-10-08 ENCOUNTER — TELEPHONE (OUTPATIENT)
Dept: CARDIOLOGY | Facility: CLINIC | Age: 70
End: 2020-10-08

## 2020-10-08 ENCOUNTER — MYC MEDICAL ADVICE (OUTPATIENT)
Dept: CARDIOLOGY | Facility: CLINIC | Age: 70
End: 2020-10-08

## 2020-10-08 NOTE — TELEPHONE ENCOUNTER
[facility] Call Center    Phone Message    May a detailed message be left on voicemail: yes     Reason for Call: Medication Question or concern regarding medication   Prescription Clarification  Name of Medication: potassium chloride ER (KLOR-CON M) 10 MEQ CR tablet  Prescribing Provider: Ciara Araya   Pharmacy: [facility]   What on the order needs clarification? Pharmacy requesting a call back to discuss potassium Rx. Thank you          Action Taken: Message routed to:  [facility] ([facility] Cardio    Travel Screening: Not Applicable

## 2020-10-09 ENCOUNTER — MYC MEDICAL ADVICE (OUTPATIENT)
Dept: CARDIOLOGY | Facility: CLINIC | Age: 70
End: 2020-10-09

## 2020-10-09 DIAGNOSIS — E85.81 AL AMYLOIDOSIS (H): ICD-10-CM

## 2020-10-09 DIAGNOSIS — E85.4 CARDIAC AMYLOIDOSIS (H): ICD-10-CM

## 2020-10-09 DIAGNOSIS — I43 CARDIAC AMYLOIDOSIS (H): ICD-10-CM

## 2020-10-09 LAB
ANION GAP SERPL CALCULATED.3IONS-SCNC: 6 MMOL/L (ref 3–14)
BUN SERPL-MCNC: 31 MG/DL (ref 7–30)
CALCIUM SERPL-MCNC: 9 MG/DL (ref 8.5–10.1)
CHLORIDE SERPL-SCNC: 95 MMOL/L (ref 94–109)
CO2 SERPL-SCNC: 34 MMOL/L (ref 20–32)
CREAT SERPL-MCNC: 1.07 MG/DL (ref 0.52–1.04)
GFR SERPL CREATININE-BSD FRML MDRD: 53 ML/MIN/{1.73_M2}
GLUCOSE SERPL-MCNC: 95 MG/DL (ref 70–99)
POTASSIUM SERPL-SCNC: 3.3 MMOL/L (ref 3.4–5.3)
SODIUM SERPL-SCNC: 135 MMOL/L (ref 133–144)

## 2020-10-09 PROCEDURE — 80048 BASIC METABOLIC PNL TOTAL CA: CPT | Performed by: PHYSICIAN ASSISTANT

## 2020-10-09 NOTE — TELEPHONE ENCOUNTER
Potassium rechecked today for 3.3.  Amy has taken metolazone yesterday and took her potassium dose of 240 total mEq yesterday.  Today before labs were checked she took 120 mEq.  She has been feeling better since taking the dissolving potassium for the last few days.  Discussed with [facility], they will be sending dissolving potassium.    She only lost 6 lbs after the metolazone yesterday and is not due for more for 3 days.

## 2020-10-09 NOTE — TELEPHONE ENCOUNTER
Date: 10/9/2020    Time of Call: 4:22 PM     Diagnosis:  Heart failure     [ VORB ] Ordering provider: CATHY Orozco    Order: Take an extra 20 meq potassium tomorrow and bmp next week     Order received by: Niki Fam RN       Follow-up/additional notes: sent my chart message to eliana

## 2020-10-09 NOTE — TELEPHONE ENCOUNTER
Called [facility] mail order pharmacy.  Was transferred to Prior auth department ([phone]). Prescription was approved for 83 days, updated prescription, requested new prior auth, provided clinical information and urgent appeal.  Per Prior auth team, decision will be made in 24 hours and will be faxed to our office, reference number [id number].  Amy will need to call and release prescription and can request expedited delivery or a short fill at a local pharmacy.

## 2020-10-15 DIAGNOSIS — E85.81 AL AMYLOIDOSIS (H): ICD-10-CM

## 2020-10-15 LAB
ALBUMIN SERPL-MCNC: 3.7 G/DL (ref 3.4–5)
ALP SERPL-CCNC: 195 U/L (ref 40–150)
ALT SERPL W P-5'-P-CCNC: 27 U/L (ref 0–50)
ANION GAP SERPL CALCULATED.3IONS-SCNC: 3 MMOL/L (ref 3–14)
AST SERPL W P-5'-P-CCNC: 23 U/L (ref 0–45)
BILIRUB SERPL-MCNC: 1.8 MG/DL (ref 0.2–1.3)
BUN SERPL-MCNC: 31 MG/DL (ref 7–30)
CALCIUM SERPL-MCNC: 9.2 MG/DL (ref 8.5–10.1)
CHLORIDE SERPL-SCNC: 96 MMOL/L (ref 94–109)
CO2 SERPL-SCNC: 35 MMOL/L (ref 20–32)
CREAT SERPL-MCNC: 0.99 MG/DL (ref 0.52–1.04)
GFR SERPL CREATININE-BSD FRML MDRD: 59 ML/MIN/{1.73_M2}
GLUCOSE SERPL-MCNC: 97 MG/DL (ref 70–99)
POTASSIUM SERPL-SCNC: 4.2 MMOL/L (ref 3.4–5.3)
PROT SERPL-MCNC: 7.1 G/DL (ref 6.8–8.8)
SODIUM SERPL-SCNC: 134 MMOL/L (ref 133–144)

## 2020-10-15 PROCEDURE — 80053 COMPREHEN METABOLIC PANEL: CPT | Performed by: INTERNAL MEDICINE

## 2020-10-23 DIAGNOSIS — I43 CARDIAC AMYLOIDOSIS (H): Primary | ICD-10-CM

## 2020-10-23 DIAGNOSIS — E85.4 CARDIAC AMYLOIDOSIS (H): Primary | ICD-10-CM

## 2020-10-27 NOTE — PROGRESS NOTES
In person visit.    HPI:   Ms. Guerrero is a 70 year old female with a past medical history including stage IV AL amyloid diagnosed in 2016. Presents to clinic for [facility] follow-up.     Her cardiac and oncologic history are as follows: fatigue starting in 1/2016. She had a coronary angiogram which was reportedly normal but was diagnosed with HF and started on a BBand diuretics. Her symptoms progressed to the point that she was evaluated at [facility] in August/September (Dr. Barron in oncology/hematology, Dr. Nettles is cardiology). She was diagnosed with stage IV AL amyloid (+GI, +bone marrow involvement, no involvement of kidney or liver). Her work up is detailed in our previous notes, however she has lambda AL amyloidosis and she underwent CyBorD chemotherapy and excellent light chain response by serum. Patient has been turned down at [facility] for a stem cell transplant due to her cardiac involvement. Later in 2016, she had 2-3 more right sided pleural taps. She was hospitalized 1/2017 and diuresed 20-30 pounds at that time. Since that time her AL has been in remission by labs without further chemo. Patient was then treated with doxycycline but this was stopped for 6-9 months d/t concerns she was not benefiting from this, now restarted.    Last visit: 7/2020 with Dr. Cohen  Had been having nosebleeds. Decreased Eliquis to half dose. Volume sas at her baseline.    This visit:  2 weeks ago, she was having some Kcl problems. She was feeling really bad on the enteric coated medications. They were coming out in her stool. Felt dizzy and fatigued and weak. Since switching back to the non-enteric coated she is feeling much better.    Breathing is okay. She walked upstairs from the  and had no PELAEZ. She has felt better in last 2 weeks than she had in the last 2 months. No orthopnea. No PND. LE edema is still very significant. No abdominal edema. A little bit of lightheadedness with position changes, started yesterday  afternoon. Had been better for a few days but that has not been too unusually. Still having palpitations, not significantly changed from her baseline. No chest pain     Will start using her lymphedema wraps.    Home weight was 150. Old goal weight was 145-146, now having a hard time getting below 148.    Cardiac Medications  Apixaban 2.5 mg BID  Metolazone 2.5 mg daily- every 4-5 days (last took yesterday)  Torsemide 100 mg BID  Kcl 60/60/60 with 80 meq extra on metolazone days and the day after    PAST MEDICAL HISTORY:  Past Medical History:   Diagnosis Date     AL amyloidosis (H)      Atrial fibrillation and flutter (H)      Cardiac amyloidosis (H)      Lymphedema      QT prolongation      Recurrent right pleural effusion 1/2/2017     SVT (supraventricular tachycardia) (H)        FAMILY HISTORY:  Family History   Problem Relation Age of Onset     Other - See Comments Sister         Amyloidosis       SOCIAL HISTORY:  Socioeconomic History     Marital status:    Tobacco Use     Smoking status: Never Smoker     Smokeless tobacco: Never Used   Substance and Sexual Activity     Alcohol use: No     Drug use: No   Other Topics Concern     CURRENT MEDICATIONS:       Acetaminophen (TYLENOL PO), Take 325 mg by mouth       apixaban ANTICOAGULANT (ELIQUIS) 2.5 MG tablet, Take 1 tablet (2.5 mg) by mouth 2 times daily       doxycycline hyclate (VIBRA-TABS) 100 MG tablet, Take 1 tablet (100 mg) by mouth daily       LORazepam (ATIVAN) 0.5 MG tablet, Take 1 tablet (0.5 mg) by mouth every 6 hours as needed for anxiety or nausea       metolazone (ZAROXOLYN) 2.5 MG tablet, Take 1 tablet (2.5 mg) by mouth twice a week       ondansetron (ZOFRAN-ODT) 8 MG ODT tab, Take 1 tablet (8 mg) by mouth every 12 hours as needed for nausea       potassium chloride ER (KLOR-CON M) 10 MEQ CR tablet, Take 6 tablets (60 mEq) by mouth 3 times daily Additionally, take an Extra 80 mEq on metolazone days and the day after a metolazone day        "torsemide (DEMADEX) 100 MG tablet, Take 1 tablet (100 mg) by mouth 2 times daily       prochlorperazine (COMPAZINE) 5 MG tablet, One to two tablets PO q 6 hours prn nausea (Patient not taking: Reported on 6/18/2020)    No current facility-administered medications on file prior to visit.       ROS:   See HPI     EXAM:  /68 (BP Location: Right arm, Patient Position: Sitting, Cuff Size: Adult Regular)   Pulse 67   Ht 1.6 m (5' 3\")   Wt 68 kg (150 lb)   SpO2 98%   BMI 26.57 kg/m       GENERAL: Appears comfortable, in no acute distress. Speaking in full sentences and able to communicate all needs  HEENT: Eye symmetrical, no discharge or icterus bilaterally. Mucous membranes moist and without lesions.  CV: RRR, +S1S2, no murmur, rub, or gallop. JVP ~7-8.   RESPIRATORY: Respirations regular, even, and unlabored. Lungs CTA throughout.   GI: Soft and non distended with normoactive bowel sounds. No tenderness, rebound, guarding.   EXTREMITIES: Moderate b/l non-pitting peripheral edema, less tense than when I have seen in the past. All extremities are warm and well perfused   NEUROLOGIC: Alert and interacting appropriately. No focal deficits.   MUSCULOSKELETAL: No joint swelling or tenderness.   SKIN: No jaundice. No rashes or lesions.       Labs, reviewed with patient in clinic today:  CBC RESULTS:  Lab Results   Component Value Date    WBC 5.7 05/12/2020    RBC 4.80 05/12/2020    HGB 13.7 07/24/2020    HCT 45.0 05/12/2020    MCV 94 05/12/2020    MCH 31.0 05/12/2020    MCHC 33.1 05/12/2020    RDW 13.5 05/12/2020     05/12/2020       CMP RESULTS:  Lab Results   Component Value Date     10/28/2020    POTASSIUM 3.2 (L) 10/28/2020    CHLORIDE 92 (L) 10/28/2020    CO2 36 (H) 10/28/2020    ANIONGAP 7 10/28/2020     (H) 10/28/2020    BUN 35 (H) 10/28/2020    CR 1.11 (H) 10/28/2020    GFRESTIMATED 51 (L) 10/28/2020    GFRESTBLACK 59 (L) 10/28/2020    JERROD 9.4 10/28/2020    BILITOTAL 1.8 (H) 10/15/2020    " ALBUMIN 3.7 10/15/2020    ALKPHOS 195 (H) 10/15/2020    ALT 27 10/15/2020    AST 23 10/15/2020        INR RESULTS:  Lab Results   Component Value Date    INR 1.30 (H) 01/14/2017       Lab Results   Component Value Date    MAG 2.2 10/26/2019     Lab Results   Component Value Date    NTBNPI 9,009 (H) 01/13/2017     Lab Results   Component Value Date    NTBNP 1,141 (H) 07/30/2020       Diagnostics:  6/12/2019 Holter Mnoitor      TTE 4/9/19  Moderate left ventricular hypertrophy.  Global and regional left ventricular function is normal with an EF of 60-65%.  Global right ventricular function is normal.  Moderate aortic valve insufficiency.  Mild pulmonary hypertension with dilated IVC.  This study was compared with the study from 10/11/18 the AR is worse and RA pressure is now increased.    TTE 10/11/18  Global and regional left ventricular function is normal with an EF of 55-60%.  Moderate concentric wall thickening consistent with left ventricular  hypertrophy is present - known amyloid.  Grade III or advanced diastolic dysfunction.  Normal right ventricular size, wall thickness, and function.  Estimated pulmonary artery pressure 28 mmHg.  IVC with normal diameter but does not collapse (>50%) with inspiration.  Trace pericardial effusion.  Compared to prior study from 8/17/17, no significant change.    opatch 11/2017      Holter Monitor 06/2019  INTERPRETATION  1. The rhythm varied with sinus rhythm and atrial fibrillation/variable atrial flutter. Low voltage noted.  2. The heart rate varied with a minimum rate of 48 bpm, maximum rate of 164 bpm, average rate of 74 bpm.  3. Frequent supraventricular ectopy was seen ranging from 0-469 per hour. Occasional atrail pairs and fairly frequent bigeminal cycles were noted.  Wandering atrial pacemaker noted.  4. Infrequent multifocal ventricular ectopy was seen ranging from 0-36 beats per hour.  5. ST-T wave changes were present.  6. Three patient events were documented and  correlated with atrial fibrillation/flutter    EKG 8/8/2019 for palpitations  - NSR at a rate of 70, OK 0.19, QTc 525, QRS is narrow. Occasional PVCs. Biphasic twaves in V4-V6, unchanged from priors.    Assessment and Plan:   Mrs. Guerrero is a 70 year old female with AL amyloidosis with cardiac manifestation who presents to [facility] for hospital follow-up. Patient has cardiac amyloidosis as evidenced by her echocardiogram (severe concentric hypertrophy and biatrial enlargement) and abnormal EKG (near-low voltage, poor R wave progression, biatrial enlargement and no evidence of LVH despite thicken LV on echo) in the setting of biopsy proven (BMB, EGD) AL amyloid. She completed chemotherapy with CyBorD with an excellent serologic response and her heart failure (i.e. troponin negative, NT pro BNP was stable, serum light chains minimal and urine light chains undetectable).       She has gradually declined over the last year and has ongoing challenges with her volume status.  She requires frequent metolazone and maintaining an adequate potassium level has been a challenge. She has also been struggling with A. Fib, which is occaionally symptomatic- okay for the last month. These episodes remain rare and given relative contraindications for amyloid patients we do not have her on rate control at this time.    Cr stable today. Hypokalemic again. May be due to the metolazone yesterday. Will take her extra Kcl today as planned an then an additional 40 meq tomorrow. Recheck labs on Friday as she will likely need another metolazone over the weekend    # Chronic diastolic heart failure/restrictive cardiomyopathy 2/2  # Cardiac amyloidosis    Stage C. NYHA Class III.    Fluid status: Euvolemic, continue torsemide 100 mg BID, KCl 90 meq BID (per her preference over TID dosing). Needs metolazone every 4-5 days. When she takes metolazone she will take an ADDITIONAL 80 meq Kcl the day of metolazone and the day after  ACEi/ARB/ARNI: n/a    BB: no indication and relatively contraindicated due to risk of progressive conduction disease/AV block in these patients  Aldosterone antagonist: d/c'd given hyponatremia, Na improving and she is feeling better   SCD prophylaxis: does not meet criteria for implant  NSAID use: contraindicated  Sleep apnea evaluation: deferred today  Remote monitoring: deferred today, could be Cardiocom candidate   Goals of care: prior discussions with Dr Cohen re: planning for EoL care as long term prognosis for AL amyloid is poor.   Other: Has a referall for lymphedema therapy in [facility]    # Hypokalemia  3.2 on last labs. Will take her planned extra 80 today and an extra 40 meq tomorrow (took metolazone yesteray)  - Recheck K on friday.  - Labs every three weeks    # A. Fib/A. Flutter  We did a holter monitor which showed intermittent a. Fib and a. Flutter. [id number] of 4. Occasional palpitations which last less than 1 minute, if these become more frequent can repeat monitor to assess burden.  - Continue Eliquis 2.5mg BID, reduced dose d/t frequent bleeding, aware of risks  - Avoiding BB in the setting of amyloid  - Holding off on digoxin given generally good rate control/very rare RVR events. Could discuss in the future if needed.    # Prolonged QTC  - Minimize QT prolonging meds    # Systemic amyloid  Heme previously monitoring her light chains which have been negative consistent with remission - therefore further palliative chemo not being considered. Nausea has been a large issue and is currently, Kytril was added at her last oncology appointment, but she has not started taking this. to her regimen of compazine and lorazepam.  - Consider palliative care referal if nausea not controlled on her current regimen (per onc)  - She is back on doxycycline   - Will message Dr. Cohen re: skull indent re: additional imaging.     Follow: Up:   - Labs Friday (replaced Kcl- does she need long-term increase?)  - Labs every three  weeks for Kcl monitoring  - Dr. Cohen in 3 months  - [facility] in 6 months or sooner PRELISE Araya PA-C  [facility]      CC  STEPHY, ALEJANDRO RAMÍREZ

## 2020-10-28 ENCOUNTER — OFFICE VISIT (OUTPATIENT)
Dept: CARDIOLOGY | Facility: CLINIC | Age: 70
End: 2020-10-28
Attending: INTERNAL MEDICINE
Payer: COMMERCIAL

## 2020-10-28 VITALS
HEIGHT: 63 IN | SYSTOLIC BLOOD PRESSURE: 139 MMHG | HEART RATE: 67 BPM | DIASTOLIC BLOOD PRESSURE: 68 MMHG | BODY MASS INDEX: 26.58 KG/M2 | WEIGHT: 150 LBS | OXYGEN SATURATION: 98 %

## 2020-10-28 DIAGNOSIS — E85.4 AMYLOID HEART DISEASE (H): ICD-10-CM

## 2020-10-28 DIAGNOSIS — I43 AMYLOID HEART DISEASE (H): ICD-10-CM

## 2020-10-28 DIAGNOSIS — I43 CARDIAC AMYLOIDOSIS (H): ICD-10-CM

## 2020-10-28 DIAGNOSIS — E85.4 CARDIAC AMYLOIDOSIS (H): ICD-10-CM

## 2020-10-28 DIAGNOSIS — E87.6 HYPOKALEMIA: Primary | ICD-10-CM

## 2020-10-28 DIAGNOSIS — I48.0 PAROXYSMAL ATRIAL FIBRILLATION (H): ICD-10-CM

## 2020-10-28 DIAGNOSIS — I50.32 CHRONIC DIASTOLIC HEART FAILURE (H): ICD-10-CM

## 2020-10-28 LAB
ANION GAP SERPL CALCULATED.3IONS-SCNC: 7 MMOL/L (ref 3–14)
BUN SERPL-MCNC: 35 MG/DL (ref 7–30)
CALCIUM SERPL-MCNC: 9.4 MG/DL (ref 8.5–10.1)
CHLORIDE SERPL-SCNC: 92 MMOL/L (ref 94–109)
CO2 SERPL-SCNC: 36 MMOL/L (ref 20–32)
CREAT SERPL-MCNC: 1.11 MG/DL (ref 0.52–1.04)
GFR SERPL CREATININE-BSD FRML MDRD: 51 ML/MIN/{1.73_M2}
GLUCOSE SERPL-MCNC: 100 MG/DL (ref 70–99)
POTASSIUM SERPL-SCNC: 3.2 MMOL/L (ref 3.4–5.3)
SODIUM SERPL-SCNC: 135 MMOL/L (ref 133–144)

## 2020-10-28 PROCEDURE — 36415 COLL VENOUS BLD VENIPUNCTURE: CPT | Performed by: PATHOLOGY

## 2020-10-28 PROCEDURE — 99214 OFFICE O/P EST MOD 30 MIN: CPT | Performed by: PHYSICIAN ASSISTANT

## 2020-10-28 PROCEDURE — 80048 BASIC METABOLIC PNL TOTAL CA: CPT | Performed by: PATHOLOGY

## 2020-10-28 PROCEDURE — G0463 HOSPITAL OUTPT CLINIC VISIT: HCPCS

## 2020-10-28 ASSESSMENT — PAIN SCALES - GENERAL: PAINLEVEL: NO PAIN (0)

## 2020-10-28 ASSESSMENT — MIFFLIN-ST. JEOR: SCORE: 1179.53

## 2020-10-28 NOTE — NURSING NOTE
Chief Complaint   Patient presents with     Follow Up     Return [facility] 69 year old female with chronic diastolic heart failure/AL presents for follow up .        Vitals were taken and medications were reconciled.   Suzy Thompson  7:46 AM

## 2020-10-28 NOTE — LETTER
10/28/2020      RE: Leandra Guerrero  [address] [place] [address]       Dear Colleague,    Thank you for the opportunity to participate in the care of your patient, Leandra Guerrero, at the [facility]. Please see a copy of my visit note below.    In person visit.    HPI:   Ms. Guerrero is a 70 year old female with a past medical history including stage IV AL amyloid diagnosed in 2016. Presents to clinic for CORE follow-up.     Her cardiac and oncologic history are as follows: fatigue starting in 1/2016. She had a coronary angiogram which was reportedly normal but was diagnosed with HF and started on a BBand diuretics. Her symptoms progressed to the point that she was evaluated at [facility] in August/September (Dr. Barron in oncology/hematology, Dr. Nettles is cardiology). She was diagnosed with stage IV AL amyloid (+GI, +bone marrow involvement, no involvement of kidney or liver). Her work up is detailed in our previous notes, however she has lambda AL amyloidosis and she underwent CyBorD chemotherapy and excellent light chain response by serum. Patient has been turned down at [facility] for a stem cell transplant due to her cardiac involvement. Later in 2016, she had 2-3 more right sided pleural taps. She was hospitalized 1/2017 and diuresed 20-30 pounds at that time. Since that time her AL has been in remission by labs without further chemo. Patient was then treated with doxycycline but this was stopped for 6-9 months d/t concerns she was not benefiting from this, now restarted.    Last visit: 7/2020 with Dr. Cohen  Had been having nosebleeds. Decreased Eliquis to half dose. Volume sas at her baseline.    This visit:  2 weeks ago, she was having some Kcl problems. She was feeling really bad on the enteric coated medications. They were coming out in her stool. Felt dizzy and fatigued and weak. Since switching back to the  non-enteric coated she is feeling much better.    Breathing is okay. She walked upstairs from the  and had no PELAEZ. She has felt better in last 2 weeks than she had in the last 2 months. No orthopnea. No PND. LE edema is still very significant. No abdominal edema. A little bit of lightheadedness with position changes, started yesterday afternoon. Had been better for a few days but that has not been too unusually. Still having palpitations, not significantly changed from her baseline. No chest pain     Will start using her lymphedema wraps.    Home weight was 150. Old goal weight was 145-146, now having a hard time getting below 148.    Cardiac Medications  Apixaban 2.5 mg BID  Metolazone 2.5 mg daily- every 4-5 days (last took yesterday)  Torsemide 100 mg BID  Kcl 60/60/60 with 80 meq extra on metolazone days and the day after    PAST MEDICAL HISTORY:  Past Medical History:   Diagnosis Date     AL amyloidosis (H)      Atrial fibrillation and flutter (H)      Cardiac amyloidosis (H)      Lymphedema      QT prolongation      Recurrent right pleural effusion 1/2/2017     SVT (supraventricular tachycardia) (H)        FAMILY HISTORY:  Family History   Problem Relation Age of Onset     Other - See Comments Sister         Amyloidosis       SOCIAL HISTORY:  Socioeconomic History     Marital status:    Tobacco Use     Smoking status: Never Smoker     Smokeless tobacco: Never Used   Substance and Sexual Activity     Alcohol use: No     Drug use: No   Other Topics Concern     CURRENT MEDICATIONS:       Acetaminophen (TYLENOL PO), Take 325 mg by mouth       apixaban ANTICOAGULANT (ELIQUIS) 2.5 MG tablet, Take 1 tablet (2.5 mg) by mouth 2 times daily       doxycycline hyclate (VIBRA-TABS) 100 MG tablet, Take 1 tablet (100 mg) by mouth daily       LORazepam (ATIVAN) 0.5 MG tablet, Take 1 tablet (0.5 mg) by mouth every 6 hours as needed for anxiety or nausea       metolazone (ZAROXOLYN) 2.5 MG tablet, Take 1 tablet  "(2.5 mg) by mouth twice a week       ondansetron (ZOFRAN-ODT) 8 MG ODT tab, Take 1 tablet (8 mg) by mouth every 12 hours as needed for nausea       potassium chloride ER (KLOR-CON M) 10 MEQ CR tablet, Take 6 tablets (60 mEq) by mouth 3 times daily Additionally, take an Extra 80 mEq on metolazone days and the day after a metolazone day       torsemide (DEMADEX) 100 MG tablet, Take 1 tablet (100 mg) by mouth 2 times daily       prochlorperazine (COMPAZINE) 5 MG tablet, One to two tablets PO q 6 hours prn nausea (Patient not taking: Reported on 6/18/2020)    No current facility-administered medications on file prior to visit.       ROS:   See HPI     EXAM:  /68 (BP Location: Right arm, Patient Position: Sitting, Cuff Size: Adult Regular)   Pulse 67   Ht 1.6 m (5' 3\")   Wt 68 kg (150 lb)   SpO2 98%   BMI 26.57 kg/m       GENERAL: Appears comfortable, in no acute distress. Speaking in full sentences and able to communicate all needs  HEENT: Eye symmetrical, no discharge or icterus bilaterally. Mucous membranes moist and without lesions.  CV: RRR, +S1S2, no murmur, rub, or gallop. JVP ~7-8.   RESPIRATORY: Respirations regular, even, and unlabored. Lungs CTA throughout.   GI: Soft and non distended with normoactive bowel sounds. No tenderness, rebound, guarding.   EXTREMITIES: Moderate b/l non-pitting peripheral edema, less tense than when I have seen in the past. All extremities are warm and well perfused   NEUROLOGIC: Alert and interacting appropriately. No focal deficits.   MUSCULOSKELETAL: No joint swelling or tenderness.   SKIN: No jaundice. No rashes or lesions.       Labs, reviewed with patient in clinic today:  CBC RESULTS:  Lab Results   Component Value Date    WBC 5.7 05/12/2020    RBC 4.80 05/12/2020    HGB 13.7 07/24/2020    HCT 45.0 05/12/2020    MCV 94 05/12/2020    MCH 31.0 05/12/2020    MCHC 33.1 05/12/2020    RDW 13.5 05/12/2020     05/12/2020       CMP RESULTS:  Lab Results   Component " Value Date     10/28/2020    POTASSIUM 3.2 (L) 10/28/2020    CHLORIDE 92 (L) 10/28/2020    CO2 36 (H) 10/28/2020    ANIONGAP 7 10/28/2020     (H) 10/28/2020    BUN 35 (H) 10/28/2020    CR 1.11 (H) 10/28/2020    GFRESTIMATED 51 (L) 10/28/2020    GFRESTBLACK 59 (L) 10/28/2020    JERROD 9.4 10/28/2020    BILITOTAL 1.8 (H) 10/15/2020    ALBUMIN 3.7 10/15/2020    ALKPHOS 195 (H) 10/15/2020    ALT 27 10/15/2020    AST 23 10/15/2020        INR RESULTS:  Lab Results   Component Value Date    INR 1.30 (H) 01/14/2017       Lab Results   Component Value Date    MAG 2.2 10/26/2019     Lab Results   Component Value Date    NTBNPI 9,009 (H) 01/13/2017     Lab Results   Component Value Date    NTBNP 1,141 (H) 07/30/2020       Diagnostics:  6/12/2019 Holter Mnoitor      TTE 4/9/19  Moderate left ventricular hypertrophy.  Global and regional left ventricular function is normal with an EF of 60-65%.  Global right ventricular function is normal.  Moderate aortic valve insufficiency.  Mild pulmonary hypertension with dilated IVC.  This study was compared with the study from 10/11/18 the AR is worse and RA pressure is now increased.    TTE 10/11/18  Global and regional left ventricular function is normal with an EF of 55-60%.  Moderate concentric wall thickening consistent with left ventricular  hypertrophy is present - known amyloid.  Grade III or advanced diastolic dysfunction.  Normal right ventricular size, wall thickness, and function.  Estimated pulmonary artery pressure 28 mmHg.  IVC with normal diameter but does not collapse (>50%) with inspiration.  Trace pericardial effusion.  Compared to prior study from 8/17/17, no significant change.    Ziopatch 11/2017      Holter Monitor 06/2019  INTERPRETATION  1. The rhythm varied with sinus rhythm and atrial fibrillation/variable atrial flutter. Low voltage noted.  2. The heart rate varied with a minimum rate of 48 bpm, maximum rate of 164 bpm, average rate of 74 bpm.  3.  Frequent supraventricular ectopy was seen ranging from 0-469 per hour. Occasional atrail pairs and fairly frequent bigeminal cycles were noted.  Wandering atrial pacemaker noted.  4. Infrequent multifocal ventricular ectopy was seen ranging from 0-36 beats per hour.  5. ST-T wave changes were present.  6. Three patient events were documented and correlated with atrial fibrillation/flutter    EKG 8/8/2019 for palpitations  - NSR at a rate of 70, SD 0.19, QTc 525, QRS is narrow. Occasional PVCs. Biphasic twaves in V4-V6, unchanged from priors.    Assessment and Plan:   Mrs. Guerrero is a 70 year old female with AL amyloidosis with cardiac manifestation who presents to CORE for hospital follow-up. Patient has cardiac amyloidosis as evidenced by her echocardiogram (severe concentric hypertrophy and biatrial enlargement) and abnormal EKG (near-low voltage, poor R wave progression, biatrial enlargement and no evidence of LVH despite thicken LV on echo) in the setting of biopsy proven (BMB, EGD) AL amyloid. She completed chemotherapy with CyBorD with an excellent serologic response and her heart failure (i.e. troponin negative, NT pro BNP was stable, serum light chains minimal and urine light chains undetectable).       She has gradually declined over the last year and has ongoing challenges with her volume status.  She requires frequent metolazone and maintaining an adequate potassium level has been a challenge. She has also been struggling with A. Fib, which is occaionally symptomatic- okay for the last month. These episodes remain rare and given relative contraindications for amyloid patients we do not have her on rate control at this time.    Cr stable today. Hypokalemic again. May be due to the metolazone yesterday. Will take her extra Kcl today as planned an then an additional 40 meq tomorrow. Recheck labs on Friday as she will likely need another metolazone over the weekend    # Chronic diastolic heart  failure/restrictive cardiomyopathy 2/2  # Cardiac amyloidosis    Stage C. NYHA Class III.    Fluid status: Euvolemic, continue torsemide 100 mg BID, KCl 90 meq BID (per her preference over TID dosing). Needs metolazone every 4-5 days. When she takes metolazone she will take an ADDITIONAL 80 meq Kcl the day of metolazone and the day after  ACEi/ARB/ARNI: n/a   BB: no indication and relatively contraindicated due to risk of progressive conduction disease/AV block in these patients  Aldosterone antagonist: d/c'd given hyponatremia, Na improving and she is feeling better   SCD prophylaxis: does not meet criteria for implant  NSAID use: contraindicated  Sleep apnea evaluation: deferred today  Remote monitoring: deferred today, could be Cardiocom candidate   Goals of care: prior discussions with Dr Cohen re: planning for EoL care as long term prognosis for AL amyloid is poor.   Other: Has a referall for lymphedema therapy in [facility]    # Hypokalemia  3.2 on last labs. Will take her planned extra 80 today and an extra 40 meq tomorrow (took metolazone yesteray)  - Recheck K on friday.  - Labs every three weeks    # A. Fib/A. Flutter  We did a holter monitor which showed intermittent a. Fib and a. Flutter. [id number] of 4. Occasional palpitations which last less than 1 minute, if these become more frequent can repeat monitor to assess burden.  - Continue Eliquis 2.5mg BID, reduced dose d/t frequent bleeding, aware of risks  - Avoiding BB in the setting of amyloid  - Holding off on digoxin given generally good rate control/very rare RVR events. Could discuss in the future if needed.    # Prolonged QTC  - Minimize QT prolonging meds    # Systemic amyloid  Heme previously monitoring her light chains which have been negative consistent with remission - therefore further palliative chemo not being considered. Nausea has been a large issue and is currently, Kytril was added at her last oncology appointment, but she has not  started taking this. to her regimen of compazine and lorazepam.  - Consider palliative care referal if nausea not controlled on her current regimen (per onc)  - She is back on doxycycline   - Will message Dr. Keyes re: skull indent re: additional imaging.     Follow: Up:   - Labs Friday (replaced Kcl- does she need long-term increase?)  - Labs every three weeks for Kcl monitoring  - Dr. Keyes in 3 months  - CORE in 6 months or sooner PRN    Ciara Araya PA-C  [facility]      CC  ALEJANDRO KEYES    Please do not hesitate to contact me if you have any questions/concerns.     Sincerely,     Ciara Araya PA-C

## 2020-10-28 NOTE — PATIENT INSTRUCTIONS
Take your medicines every day, as directed    Changes made today:  o Take 40 meq extra Kcl tomorrow  o No other medication changes   Monitor Your Weight and Symptoms    Contact us if you:      Gain 2 pounds in one day or 5 pounds in one week    Feel more short of breath    Notice more leg swelling    Feel lightheadeded   Change your lifestyle    Limit Salt or Sodium:    2000 mg  Limit Fluids:    2000 mL or approximately 64 ounces  Eat a Heart Healthy Diet    Low in saturated fats  Stay Active:    Aim to move at least 150 minutes every  week         To Contact us    During Business Hours:  [phone], option # 1 (University)  Then option # 4 (medical questions)     After hours, weekends or holidays:   [phone], Option #4  Ask to speak to the On-Call Cardiologist. Inform them you are a CORE/heart failure patient at the [facility].     Use [employer] allows you to communicate directly with your heart team through secure messaging.    Level can be accessed any time on your phone, computer, or tablet.    If you need assistance, we'd be happy to help!         Keep your Heart Appointments:    - Get a set of blood work (Cr and potassium level) on Friday  - Labs every three weeks to watch your potassium  - Dr. Cohen in 3 months as schedule  - Yolanda in 6 month

## 2020-10-30 DIAGNOSIS — I43 AMYLOID HEART DISEASE (H): ICD-10-CM

## 2020-10-30 DIAGNOSIS — E87.6 HYPOKALEMIA: ICD-10-CM

## 2020-10-30 DIAGNOSIS — E85.4 AMYLOID HEART DISEASE (H): ICD-10-CM

## 2020-10-30 LAB
CREAT SERPL-MCNC: 1.04 MG/DL (ref 0.52–1.04)
GFR SERPL CREATININE-BSD FRML MDRD: 55 ML/MIN/{1.73_M2}
POTASSIUM SERPL-SCNC: 3.8 MMOL/L (ref 3.4–5.3)

## 2020-10-30 PROCEDURE — 82565 ASSAY OF CREATININE: CPT | Performed by: PHYSICIAN ASSISTANT

## 2020-10-30 PROCEDURE — 84132 ASSAY OF SERUM POTASSIUM: CPT | Performed by: PHYSICIAN ASSISTANT

## 2020-10-30 PROCEDURE — 36415 COLL VENOUS BLD VENIPUNCTURE: CPT | Performed by: PHYSICIAN ASSISTANT

## 2020-11-13 ENCOUNTER — ONCOLOGY VISIT (OUTPATIENT)
Dept: ONCOLOGY | Facility: CLINIC | Age: 70
End: 2020-11-13
Attending: INTERNAL MEDICINE
Payer: COMMERCIAL

## 2020-11-13 VITALS
SYSTOLIC BLOOD PRESSURE: 132 MMHG | RESPIRATION RATE: 16 BRPM | BODY MASS INDEX: 27.49 KG/M2 | HEART RATE: 67 BPM | TEMPERATURE: 97.5 F | OXYGEN SATURATION: 98 % | WEIGHT: 155.2 LBS | DIASTOLIC BLOOD PRESSURE: 65 MMHG

## 2020-11-13 DIAGNOSIS — I50.32 CHRONIC DIASTOLIC HEART FAILURE (H): Primary | ICD-10-CM

## 2020-11-13 DIAGNOSIS — E85.4 CARDIAC AMYLOIDOSIS (H): ICD-10-CM

## 2020-11-13 DIAGNOSIS — I43 CARDIAC AMYLOIDOSIS (H): ICD-10-CM

## 2020-11-13 DIAGNOSIS — E85.81 AL AMYLOIDOSIS (H): ICD-10-CM

## 2020-11-13 DIAGNOSIS — E85.81 AL AMYLOIDOSIS (H): Primary | ICD-10-CM

## 2020-11-13 LAB
ANION GAP SERPL CALCULATED.3IONS-SCNC: 3 MMOL/L (ref 3–14)
BASOPHILS # BLD AUTO: 0.1 10E9/L (ref 0–0.2)
BASOPHILS NFR BLD AUTO: 1.3 %
BUN SERPL-MCNC: 38 MG/DL (ref 7–30)
CALCIUM SERPL-MCNC: 9.6 MG/DL (ref 8.5–10.1)
CHLORIDE SERPL-SCNC: 94 MMOL/L (ref 94–109)
CO2 SERPL-SCNC: 36 MMOL/L (ref 20–32)
CREAT SERPL-MCNC: 1.17 MG/DL (ref 0.52–1.04)
DIFFERENTIAL METHOD BLD: ABNORMAL
EOSINOPHIL # BLD AUTO: 1.2 10E9/L (ref 0–0.7)
EOSINOPHIL NFR BLD AUTO: 16.8 %
ERYTHROCYTE [DISTWIDTH] IN BLOOD BY AUTOMATED COUNT: 13.2 % (ref 10–15)
GFR SERPL CREATININE-BSD FRML MDRD: 48 ML/MIN/{1.73_M2}
GLUCOSE SERPL-MCNC: 98 MG/DL (ref 70–99)
HCT VFR BLD AUTO: 42.7 % (ref 35–47)
HGB BLD-MCNC: 14.5 G/DL (ref 11.7–15.7)
IMM GRANULOCYTES # BLD: 0 10E9/L (ref 0–0.4)
IMM GRANULOCYTES NFR BLD: 0.3 %
LYMPHOCYTES # BLD AUTO: 0.4 10E9/L (ref 0.8–5.3)
LYMPHOCYTES NFR BLD AUTO: 6 %
MCH RBC QN AUTO: 31.4 PG (ref 26.5–33)
MCHC RBC AUTO-ENTMCNC: 34 G/DL (ref 31.5–36.5)
MCV RBC AUTO: 92 FL (ref 78–100)
MONOCYTES # BLD AUTO: 0.5 10E9/L (ref 0–1.3)
MONOCYTES NFR BLD AUTO: 7.1 %
NEUTROPHILS # BLD AUTO: 4.7 10E9/L (ref 1.6–8.3)
NEUTROPHILS NFR BLD AUTO: 68.5 %
PLATELET # BLD AUTO: 256 10E9/L (ref 150–450)
POTASSIUM SERPL-SCNC: 3.5 MMOL/L (ref 3.4–5.3)
RBC # BLD AUTO: 4.62 10E12/L (ref 3.8–5.2)
SODIUM SERPL-SCNC: 133 MMOL/L (ref 133–144)
WBC # BLD AUTO: 6.9 10E9/L (ref 4–11)

## 2020-11-13 PROCEDURE — 82784 ASSAY IGA/IGD/IGG/IGM EACH: CPT | Performed by: INTERNAL MEDICINE

## 2020-11-13 PROCEDURE — 99N1036 PR STATISTIC TOTAL PROTEIN: Performed by: INTERNAL MEDICINE

## 2020-11-13 PROCEDURE — 83883 ASSAY NEPHELOMETRY NOT SPEC: CPT | Performed by: INTERNAL MEDICINE

## 2020-11-13 PROCEDURE — 80048 BASIC METABOLIC PNL TOTAL CA: CPT | Performed by: PHYSICIAN ASSISTANT

## 2020-11-13 PROCEDURE — 85025 COMPLETE CBC W/AUTO DIFF WBC: CPT | Performed by: INTERNAL MEDICINE

## 2020-11-13 PROCEDURE — 99214 OFFICE O/P EST MOD 30 MIN: CPT | Performed by: INTERNAL MEDICINE

## 2020-11-13 PROCEDURE — 84165 PROTEIN E-PHORESIS SERUM: CPT | Performed by: PATHOLOGY

## 2020-11-13 PROCEDURE — 36415 COLL VENOUS BLD VENIPUNCTURE: CPT | Performed by: INTERNAL MEDICINE

## 2020-11-13 ASSESSMENT — PAIN SCALES - GENERAL: PAINLEVEL: NO PAIN (0)

## 2020-11-13 NOTE — PROGRESS NOTES
Hematology/Oncology Follow-up visit:  Date on this visit: Nov 13, 2020      Referring Physician: Dr. Braydon Barron, [facility].  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 She was diagnosed with CHF at a local clinic in [facility] and was placed on a b-blocker and a diuretic. She has also developed progressive worsening lower extremity edema by May 2016 which in retrospect started in January. Her cardiac angiogram was reportedly negative at that time.  She requested to be seen at [facility] and was seen by Dr. Braydon Barron on 9/13/2016 with f/up on 9/20/2016. She was also seen by cardiology team there Sara Severson CNP and Dr. Nettles from CHF service.  There was no e/o renal or hepatic amyloidosis.  EKG on 9/7/2016 showed normal sinus rhythm, prolonged QT interval (QTc 521 sec), left atrial enlargement and left anterior fascicular block. There was ST and T wave abnormality.  Apparently, her echocardiogram showed preserved LVEF at 59%.  She had R sided thoracentesis on 8/25/2016 after which her breathing has improved. She had extensive lab workup on 9/7/2016.  Her TSH was normal. Serum protein ELP showed a small abnormality in the gamma fraction.  Serum immunofixation showed small monoclonal IgA lambda in the gamma fraction and a small lambda in the beta fraction. Serum IgA level was normal at 329. Serum IgG level was mildly low at 755 (normal range 767-1590) and IgM level was normal. B2 microglobulin was mildly elevated at 2.94 (ULN 2.7). Mg was normal. Total bilirubin was elevated at 3.9 and direct at 0.8. Uric acid was mildly elevated at 6.9 (ULN 6.1) Creat was 1.0. Troponin was 0.1 (ULN <0.01) and NT-pro BNP was 4747 pg/ml (ULN <=183). Total cholesterol was 190 and LDL was 139. Random urine protein was mildly elevated at 33 mg/dl (normal range <22). INR was  1.2 and PTT 27 (within normal limits). Fibrinogen was elevated at 441 and factor X was mildly decreased at 64% (normal range %). D-dimer was up at 1700 (). Free lambda light chains were 69.5 and free kappa light chains were 1.11 with FLC kappa/lambda ratio of 0.016.   CBC showed normal WBC at 6.0, slightly elevated Hb at 16. Platelet count was normal.  Flow cytometry on bone marrow biopsy showed detectable monotypic plasma cells. There were 12% of monotypic plasma cells on bone marrow biopsy.    The bone marrow biopsy (performed on 9/14/2016) showed normocellular BM, 30-40% cellularity, with involvement by 10-20% of lambda light chain restricted plasma cells.  Congo red stain was positive on the bone marrow biopsy. Liquid chromatography tandem mass spectroscopy showed a peptide profile c/w AL (lambda type) amyloid. BM karyotype was normal 46 XX. FISH on BM showed plasma cell clone with 1q duplication, and monosomy of 13 and 14.  She had an EGD by Dr. Matthew Wadsworth on 9/14/2016. It showed gastric mucosal atrophy and multiple mucosal papules (nodules) seen in the stomach. The duodenum appeared normal. The biopsies of stomach mucosa, of stomach (antral) nodules and of duodenum, all showed the presence of amyloid in submucosal blood vessel walls in the stomach and duodenum and in deep mucosa in the stomach antrum and body, confirmed by Congo Red stain.   She also had additional labs done at our last visit, on 9/30/2016. Total bilirubin was elevated as below, primarily indirect. K was 3.3 and she was stared on K-dur 20 mEq PO bid. She was noted to have elevated serum IgA level on 9/30/2016 at 392 (). Serum free lambda chains were elevated at 37.75. Serum M spike was 0.1 g/dl and serum FLC ratio was low at 0.01. Serum immunofixation ELP showed monoclonal IgA immunoglobulin of lambda light chain type as well as monoclonal free immunoglobulin light chain of lambda chain type.  She was seen by   [facility] too and had an echocardiogram on 10/6/2016 which showed:  Global and regional left ventricular function was normal with an EF of 60-65%.  The LV mass index was 131 g/m2 (severely increased.) The LV geometry is c/w  concentric hypertrophy measured by relative wall thickness. Global right ventricular function was normal. Severe biatrial enlargement was present.  Right ventricular systolic pressure was 26mmHg above the right atrial pressure. The inferior vena cava was dilated at 2.1 cm without respiratory variability, consistent with increased right atrial pressure. Trivial pericardial effusion was present.    She proceeded to start CyBorD as recommended by Dr. Barron with dose reduced in subq Bortezomib to 0.7 mg/m2, on 10/4/2016.  After one cycle, her M spike, IgA level and free lambda light chains have all improved, with M spike of 0 g/dl, IgA down into the normal range and free serum lambda light chains improving from 37.75 down to 2.09.  She returns today in cycle 3 day 11. She had amyloid labs again on day 1 of cycle 3 (12/8/2016) which showed continued response to therapy and her IgA level was now low and serum FLC ratio was normal and serum free lambda chains were normal too.   She has developed hematuria with cycle 2 and Cytoxan is on hold from day 15 cycle 2 since hematuria persisted despite Mesnex. She had a CT abdomen/pelvis on 11/29/2016 which showed no abnormalities in the kidneys. There was a moderate right and a trace left sided pleural effusions. Small volume ascites and anasarca was noted as well. The liver had heterogeneous appearance, which can be seen in hepatic venous congestion.   Cytoxan was d/cd  on  11/22/2016 since her cystoscopy showed findings of mild petechia and erythema in her bladder that would be c/w hemorrhagic cystitis.  Pinch biopsies were taken from the bladder and showed no e/o amyloid or malignancy. She has continued on Velcade and dexamethasone until 12/27/2016.  On  1/3/2017 she was seen at [facility] by Dr. Barron and at that time serum free light chains were normal at 0.74 and FLC ratio was normal as well at 0.72. She was recommended to be on observation because free lambda chains normalized. However, her cardiac amyloidosis has progressed by 1/5/2017 and she has required frequent thoracentesis, and had weeping LE edema and elevated neck veins.  She was hospitalized for CHF in Jan 2017 due to cardiac amyloid and was aggressively diuresed.  Her echocardiogram on 1/5/2017 was abnormal (severe concentric hypertrophy and biatrial enlargement) and she also had abnormal EKG (near-low voltage, poor R wave progression, biatrial enlargement and no evidence of LVH despite very thickened LV on echo). She was felt to have  New York Heart Association class IIIB heart failure with clinically severe volume overload. She was seen by cardiologist Dr. Harjit Fischer at  in [place]. She was seen by Dr. Chivo Lei from medical oncology at the [facility] in [place], and was recommended to start on Acyclovir and Doxycycline.  She has not started Acyclovir  but did start daily Doxycyline.   Dr. Lei also recommended she consider maintenance therapy. She was also seen by Dr. Barron at [facility] on 5/11/2017, and he recommended against maintenance therapy.     History Of Present Illness:  Ms. Guerrero is a 70 year old female, non-smoker, who presents for f/up of AL amyloidosis, IgA lambda. She has been off treatment since December 2016.  She has been seeing  Dr. Cohen in f/up of cardiac Amyloidosis with preserved EF, CHF NYHA Class III. They monitor troponin and BNP. She is on doxycycline daily and her nausea is controlled with Zofran as needed.  She denies any constipation.  Swelling in b/l LE is improved. Her serum IgA level has remained normal and she has no M protein on serum protein electrophoresis (today's pending).  Serum free kappa lambda light chain ratio is normal as well. Her  amyloid was lambda light chains and that is normal value.    Results for HILARY GARCIA (MRN [id number]) as of 2020 12:37   Ref. Range 2019 11:45 2020 08:57 2020 12:02   IGA Latest Ref Range: 84 - 499 mg/dL 58 (L) 64 (L) 69 (L)   IGG Latest Ref Range: 610 - 1,616 mg/dL 829 918    IGM Latest Ref Range: 35 - 242 mg/dL 62 78    [place] Free Lt Chain Latest Ref Range: 0.33 - 1.94 mg/dL 1.60 1.98 (H) 2.02 (H)   Kappa Lambda Ratio Latest Ref Range: 0.26 - 1.65  1.11 1.29 0.94   Lambda Free Lt Chain Latest Ref Range: 0.57 - 2.63 mg/dL 1.44 1.53 2.14   Monoclonal Peak Latest Ref Range: 0.0 g/dL 0.0 0.0 PENDING       She was seen by cardiology in a virtual visit on 2020.  Her cardiac status is felt to have declined gradually over the last year and she has ongoing challenges with her volume status.  She requires frequent metolazone and maintaining an adequate potassium level has been a challenge.  She has been in chronic diastolic heart failure/restrictive cardiomyopathy secondary to cardiac amyloidosis, stage C, NYHA Class III.  She continues on torsemide  100 mg BID, KCl 90 meq BID. Needs metolazone every 4-5days. When she takes metolazone she will take an ADDITIONAL 80 meq Kcl on the metolazone day and the day after.    She continues on Eliquis anticoagulation for A.fib. She had a holter monitor which showed intermittent A.fib/A.flutter. She still has occasional palpitations which she discussed with cardiology team at their last visit with them on 10/28. She has noted a skull indentation on her skull vertex in the last couple of months. Of note, [facility] 2020 which showed normal head CT.  She also had a negative head CT in 2017 and I have reviewed the images.  In addition, a complete 12 point  review of systems is negative.      Past Medical/Surgical History:  AL Amyloid  Amyloid cardiomyopathy/CHF    Family History:  Sister  of multiple myeloma    Social  History:  Lives in [place], with . Nonsmoker        Allergies:     Allergies   Allergen Reactions     Contrast Dye Shortness Of Breath     Shaking,chills and dypsnea     Cytoxan [Cyclophosphamide]      Hemorrhagic cystitis     Gabapentin      Slurred speech, weakness     Levofloxacin      Severe shaking and abdominal pain     Amoxicillin Nausea and Vomiting and Cramps     Spironolactone      Worsening hyponatremia     Current Medications:  Current Outpatient Medications   Medication     Acetaminophen (TYLENOL PO)     apixaban ANTICOAGULANT (ELIQUIS) 2.5 MG tablet     doxycycline hyclate (VIBRA-TABS) 100 MG tablet     LORazepam (ATIVAN) 0.5 MG tablet     metolazone (ZAROXOLYN) 2.5 MG tablet     ondansetron (ZOFRAN-ODT) 8 MG ODT tab     potassium chloride ER (KLOR-CON M) 10 MEQ CR tablet     prochlorperazine (COMPAZINE) 5 MG tablet     torsemide (DEMADEX) 100 MG tablet     No current facility-administered medications for this visit.        Physical Exam:  /65   Pulse 67   Temp 97.5  F (36.4  C) (Oral)   Resp 16   Wt 70.4 kg (155 lb 3.2 oz)   SpO2 98%   BMI 27.49 kg/m        GENERAL APPEARANCE: healthy, alert and no distress  HEENT: PEERLA, no neck asymmetry or masses  Lymphatics: No cervical, supraclavicular, axillary lymphadenopathy bilaterally    CV: S1S2 reg no murmur  Respiratory: CTA b/l  GI: soft,  BS+, NT, ND  Extremities: no edema.    SKIN: no suspicious lesions or rashes    PSYCHIATRIC: mentation appears normal and affect normal  Neuro: gait intact. Axox3  Scalp: there is an indent about 1cm at the vertex, non tender.      Laboratory/Imaging Studies  Results for HILARY GARCIA (MRN [id number]) as of 11/13/2020 14:34   Ref. Range 7/30/2020 07:11 10/9/2020 10:45 10/15/2020 09:13 10/28/2020 07:26 10/30/2020 09:35 11/13/2020 12:02   Sodium Latest Ref Range: 133 - 144 mmol/L 132 (L) 135 134 135  133   Potassium Latest Ref Range: 3.4 - 5.3 mmol/L 3.6 3.3 (L) 4.2 3.2 (L) 3.8 3.5   Chloride  Latest Ref Range: 94 - 109 mmol/L 94 95 96 92 (L)  94   Carbon Dioxide Latest Ref Range: 20 - 32 mmol/L 33 (H) 34 (H) 35 (H) 36 (H)  36 (H)   Urea Nitrogen Latest Ref Range: 7 - 30 mg/dL 29 31 (H) 31 (H) 35 (H)  38 (H)   Creatinine Latest Ref Range: 0.52 - 1.04 mg/dL 1.04 1.07 (H) 0.99 1.11 (H) 1.04 1.17 (H)   GFR Estimate Latest Ref Range: >60 mL/min/1.73_m2 55 (L) 53 (L) 59 (L) 51 (L) 55 (L) 48 (L)   GFR Estimate If Black Latest Ref Range: >60 mL/min/1.73_m2 64 62 68 59 (L) 64 55 (L)   Calcium Latest Ref Range: 8.5 - 10.1 mg/dL 9.0 9.0 9.2 9.4  9.6   Anion Gap Latest Ref Range: 3 - 14 mmol/L 6 6 3 7  3   Albumin Latest Ref Range: 3.4 - 5.0 g/dL   3.7      Protein Total Latest Ref Range: 6.8 - 8.8 g/dL   7.1      Bilirubin Total Latest Ref Range: 0.2 - 1.3 mg/dL   1.8 (H)      Alkaline Phosphatase Latest Ref Range: 40 - 150 U/L   195 (H)      ALT Latest Ref Range: 0 - 50 U/L   27      AST Latest Ref Range: 0 - 45 U/L   23        ASSESSMENT/PLAN:  Leandra Guerrero is a 70 year old woman with  of AL amyloidosis involving the heart and GI tract. 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 Stage IV amyloidosis is associated with median survival of 5 months in patients not undergoing BMT, and 5 year survival of 15%, and in patients who are able to undergo BMT, median survival is 22 months and 5 year survival of 46% (Dejan A, Anderson MA, Toñito RA, et al. Prognostication of survival using cardiac troponins and N-terminal pro-brain natriuretic peptide in patients with primary systemic amyloidosis undergoing peripheral blood stem cell transplantation. Blood 2004; 104:1881). She was felt not to be a candidate for high dose therapy.   She was on Cybor-D from 10/4/2016-12/27/2016, with Cytoxan omitted after cycle 2 day 1 due to her  developing hemorrhagic cystitis. Her disease has responded  biochemically, with serum M spike of zero, and serum IgA level was down to low range and  free lambda light chains have normalized quantitatively.     I have discussed her situation with Dr. Barron, and he felt at that time that with normalization of serum free lambda light chains, no further chemotherapy is indicated.     1.  AL amyloidosis -follow-up on serum protein electrophoresis from today but serum free lambda light chains are normal and serum IgA level is low.  Continue doxycycline orally daily.  We'll see her back in 6 months with amyloid labs prior.     2. Skull indentation at the vertex- proceed with skeletal survey at this time. Obtain outside images from 6/30/2020 outside CT for comparison. We'll inform the patient of the results and recommendations and  f/up plan based on the results.       3. Amyloid cardiomyopathy-A. fib-  She is being followed by  Dr. Cohen. Continue current diuretic regimen and KCl supplementation. Continue Eliquis. Cardiac Amyloidosis (a restrictive cardiomyopathy) , Stage C, NYHA Class III.  A. fib is rate controlled.  Continue Eliquis.  4. Nausea-Zofran oral disintegrating tablet works best for her.  She takes it rarely and is only prescribed at 4 mg orally as needed, and we will increase the dose to 8 mg orally as needed.  She does not take more than 1 tablet a day.  If nausea worsens, consider adding Zyprexa in the future. May need a palliative care consult if nausea progresses.  5. CKD- Creat stable as above.  6.Stable elevated total bilirubin and alk phos, stable- in the past felt to be secondary to liver congestion. Cont to follow.      7. Recurrent pleural effusions- Last CT chest in [facility] in September 2019 showed small to moderate right pleural effusion.  She denies shortness of breath presently.   PleurX catheter is an option in the future if she were to become symptomatic.    At the end of our visit patient  and  verbalized understanding and concurred with the plan.  Addendum: pt will be postponing skeletal survey due to COVID-19. Outside CT head images scanned into PACS.    Addendum: Skeletal survey on 11/19/2020 (compared to outside CT from 6/30/2020) showed:  1. No radiographic evidence of calvarial abnormality, as questioned.  2. L1 compression deformity is new since CT 11/29/2016.  Redemonstration of T12 and T11 compression deformities.  3. No focal lytic lesion in the axial or appendicular skeleton.    However, patient also had CT lumbar spine in [facility] on 9/19/2020 showed:  1. Mild compression along the superior and inferior endplates of L2. This appears to be an acute injury as there is a visible fracture line along the inferior endplate. This could be confirmed with MRI if there is need for further imaging.  2. Mild compression of the superior endplates of T12 and L1 and appear chronic.  3. Multilevel degenerative disc disease that includes moderate central spinal canal narrowing.  Recommended patient f/up with PCP with DEXA scan for evaluation of possible osteopenia/osteoporosis.

## 2020-11-13 NOTE — LETTER
11/13/2020         RE: Leandra Guerrero  [address] [place] [address]        Dear Colleague,    Thank you for referring your patient, Leandra Guerrero, to the [facility]. Please see a copy of my visit note below.    Hematology/Oncology Follow-up visit:  Date on this visit: Nov 13, 2020      Referring Physician: Dr. Braydon Barron, [facility].  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 She was diagnosed with CHF at a local clinic in [facility] and was placed on a b-blocker and a diuretic. She has also developed progressive worsening lower extremity edema by May 2016 which in retrospect started in January. Her cardiac angiogram was reportedly negative at that time.  She requested to be seen at [facility] and was seen by Dr. Braydon Barron on 9/13/2016 with f/up on 9/20/2016. She was also seen by cardiology team there Sara Severson CNP and Dr. Nettles from CHF service.  There was no e/o renal or hepatic amyloidosis.  EKG on 9/7/2016 showed normal sinus rhythm, prolonged QT interval (QTc 521 sec), left atrial enlargement and left anterior fascicular block. There was ST and T wave abnormality.  Apparently, her echocardiogram showed preserved LVEF at 59%.  She had R sided thoracentesis on 8/25/2016 after which her breathing has improved. She had extensive lab workup on 9/7/2016.  Her TSH was normal. Serum protein ELP showed a small abnormality in the gamma fraction.  Serum immunofixation showed small monoclonal IgA lambda in the gamma fraction and a small lambda in the beta fraction. Serum IgA level was normal at 329. Serum IgG level was mildly low at 755 (normal range 767-1590) and IgM level was normal. B2 microglobulin was mildly elevated at 2.94 (ULN 2.7). Mg was normal. Total bilirubin was elevated at 3.9 and  direct at 0.8. Uric acid was mildly elevated at 6.9 (ULN 6.1) Creat was 1.0. Troponin was 0.1 (ULN <0.01) and NT-pro BNP was 4747 pg/ml (ULN <=183). Total cholesterol was 190 and LDL was 139. Random urine protein was mildly elevated at 33 mg/dl (normal range <22). INR was 1.2 and PTT 27 (within normal limits). Fibrinogen was elevated at 441 and factor X was mildly decreased at 64% (normal range %). D-dimer was up at 1700 (). Free lambda light chains were 69.5 and free kappa light chains were 1.11 with FLC kappa/lambda ratio of 0.016.   CBC showed normal WBC at 6.0, slightly elevated Hb at 16. Platelet count was normal.  Flow cytometry on bone marrow biopsy showed detectable monotypic plasma cells. There were 12% of monotypic plasma cells on bone marrow biopsy.    The bone marrow biopsy (performed on 9/14/2016) showed normocellular BM, 30-40% cellularity, with involvement by 10-20% of lambda light chain restricted plasma cells.  Congo red stain was positive on the bone marrow biopsy. Liquid chromatography tandem mass spectroscopy showed a peptide profile c/w AL (lambda type) amyloid. BM karyotype was normal 46 XX. FISH on BM showed plasma cell clone with 1q duplication, and monosomy of 13 and 14.  She had an EGD by Dr. Matthew Wadsworth on 9/14/2016. It showed gastric mucosal atrophy and multiple mucosal papules (nodules) seen in the stomach. The duodenum appeared normal. The biopsies of stomach mucosa, of stomach (antral) nodules and of duodenum, all showed the presence of amyloid in submucosal blood vessel walls in the stomach and duodenum and in deep mucosa in the stomach antrum and body, confirmed by Congo Red stain.   She also had additional labs done at our last visit, on 9/30/2016. Total bilirubin was elevated as below, primarily indirect. K was 3.3 and she was stared on K-dur 20 mEq PO bid. She was noted to have elevated serum IgA level on 9/30/2016 at 392 (). Serum free lambda chains were  elevated at 37.75. Serum M spike was 0.1 g/dl and serum FLC ratio was low at 0.01. Serum immunofixation ELP showed monoclonal IgA immunoglobulin of lambda light chain type as well as monoclonal free immunoglobulin light chain of lambda chain type.  She was seen by Dr. Cohen too and had an echocardiogram on 10/6/2016 which showed:  Global and regional left ventricular function was normal with an EF of 60-65%.  The LV mass index was 131 g/m2 (severely increased.) The LV geometry is c/w  concentric hypertrophy measured by relative wall thickness. Global right ventricular function was normal. Severe biatrial enlargement was present.  Right ventricular systolic pressure was 26mmHg above the right atrial pressure. The inferior vena cava was dilated at 2.1 cm without respiratory variability, consistent with increased right atrial pressure. Trivial pericardial effusion was present.    She proceeded to start CyBorD as recommended by Dr. Barron with dose reduced in subq Bortezomib to 0.7 mg/m2, on 10/4/2016.  After one cycle, her M spike, IgA level and free lambda light chains have all improved, with M spike of 0 g/dl, IgA down into the normal range and free serum lambda light chains improving from 37.75 down to 2.09.  She returns today in cycle 3 day 11. She had amyloid labs again on day 1 of cycle 3 (12/8/2016) which showed continued response to therapy and her IgA level was now low and serum FLC ratio was normal and serum free lambda chains were normal too.   She has developed hematuria with cycle 2 and Cytoxan is on hold from day 15 cycle 2 since hematuria persisted despite Mesnex. She had a CT abdomen/pelvis on 11/29/2016 which showed no abnormalities in the kidneys. There was a moderate right and a trace left sided pleural effusions. Small volume ascites and anasarca was noted as well. The liver had heterogeneous appearance, which can be seen in hepatic venous congestion.   Cytoxan was d/cd  on  11/22/2016 since her  cystoscopy showed findings of mild petechia and erythema in her bladder that would be c/w hemorrhagic cystitis.  Pinch biopsies were taken from the bladder and showed no e/o amyloid or malignancy. She has continued on Velcade and dexamethasone until 12/27/2016.  On 1/3/2017 she was seen at [facility] by Dr. Barron and at that time serum free light chains were normal at 0.74 and FLC ratio was normal as well at 0.72. She was recommended to be on observation because free lambda chains normalized. However, her cardiac amyloidosis has progressed by 1/5/2017 and she has required frequent thoracentesis, and had weeping LE edema and elevated neck veins.  She was hospitalized for CHF in Jan 2017 due to cardiac amyloid and was aggressively diuresed.  Her echocardiogram on 1/5/2017 was abnormal (severe concentric hypertrophy and biatrial enlargement) and she also had abnormal EKG (near-low voltage, poor R wave progression, biatrial enlargement and no evidence of LVH despite very thickened LV on echo). She was felt to have  New York Heart Association class IIIB heart failure with clinically severe volume overload. She was seen by cardiologist Dr. Harjit Fischer at  in [place]. She was seen by Dr. Chivo Lei from medical oncology at the [facility] in [place], and was recommended to start on Acyclovir and Doxycycline.  She has not started Acyclovir  but did start daily Doxycyline.   Dr. Lei also recommended she consider maintenance therapy. She was also seen by Dr. Barron at [facility] on 5/11/2017, and he recommended against maintenance therapy.     History Of Present Illness:  Ms. Guerrero is a 70 year old female, non-smoker, who presents for f/up of AL amyloidosis, IgA lambda. She has been off treatment since December 2016.  She has been seeing  Dr. Cohen in f/up of cardiac Amyloidosis with preserved EF, CHF NYHA Class III. They monitor troponin and BNP. She is on doxycycline daily and her nausea is controlled with  Zofran as needed.  She denies any constipation.  Swelling in b/l LE is improved. Her serum IgA level has remained normal and she has no M protein on serum protein electrophoresis (today's pending).  Serum free kappa lambda light chain ratio is normal as well. Her amyloid was lambda light chains and that is normal value.    Results for HILARY GARCIA (MRN [id number]) as of 11/16/2020 12:37   Ref. Range 11/14/2019 11:45 5/12/2020 08:57 11/13/2020 12:02   IGA Latest Ref Range: 84 - 499 mg/dL 58 (L) 64 (L) 69 (L)   IGG Latest Ref Range: 610 - 1,616 mg/dL 829 918    IGM Latest Ref Range: 35 - 242 mg/dL 62 78    [place] Free Lt Chain Latest Ref Range: 0.33 - 1.94 mg/dL 1.60 1.98 (H) 2.02 (H)   Kappa Lambda Ratio Latest Ref Range: 0.26 - 1.65  1.11 1.29 0.94   Lambda Free Lt Chain Latest Ref Range: 0.57 - 2.63 mg/dL 1.44 1.53 2.14   Monoclonal Peak Latest Ref Range: 0.0 g/dL 0.0 0.0 PENDING       She was seen by cardiology in a virtual visit on 4/28/2020.  Her cardiac status is felt to have declined gradually over the last year and she has ongoing challenges with her volume status.  She requires frequent metolazone and maintaining an adequate potassium level has been a challenge.  She has been in chronic diastolic heart failure/restrictive cardiomyopathy secondary to cardiac amyloidosis, stage C, NYHA Class III.  She continues on torsemide  100 mg BID, KCl 90 meq BID. Needs metolazone every 4-5days. When she takes metolazone she will take an ADDITIONAL 80 meq Kcl on the metolazone day and the day after.    She continues on Eliquis anticoagulation for A.fib. She had a holter monitor which showed intermittent A.fib/A.flutter. She still has occasional palpitations which she discussed with cardiology team at their last visit with them on 10/28. She has noted a skull indentation on her skull vertex in the last couple of months. Of note, she had a CT head in [facility] in 06/2020 which showed normal head CT.  She also had a  negative head CT in 2017 and I have reviewed the images.  In addition, a complete 12 point  review of systems is negative.      Past Medical/Surgical History:  AL Amyloid  Amyloid cardiomyopathy/CHF    Family History:  Sister  of multiple myeloma    Social History:  Lives in [place], with . Nonsmoker        Allergies:     Allergies   Allergen Reactions     Contrast Dye Shortness Of Breath     Shaking,chills and dypsnea     Cytoxan [Cyclophosphamide]      Hemorrhagic cystitis     Gabapentin      Slurred speech, weakness     Levofloxacin      Severe shaking and abdominal pain     Amoxicillin Nausea and Vomiting and Cramps     Spironolactone      Worsening hyponatremia     Current Medications:  Current Outpatient Medications   Medication     Acetaminophen (TYLENOL PO)     apixaban ANTICOAGULANT (ELIQUIS) 2.5 MG tablet     doxycycline hyclate (VIBRA-TABS) 100 MG tablet     LORazepam (ATIVAN) 0.5 MG tablet     metolazone (ZAROXOLYN) 2.5 MG tablet     ondansetron (ZOFRAN-ODT) 8 MG ODT tab     potassium chloride ER (KLOR-CON M) 10 MEQ CR tablet     prochlorperazine (COMPAZINE) 5 MG tablet     torsemide (DEMADEX) 100 MG tablet     No current facility-administered medications for this visit.        Physical Exam:  /65   Pulse 67   Temp 97.5  F (36.4  C) (Oral)   Resp 16   Wt 70.4 kg (155 lb 3.2 oz)   SpO2 98%   BMI 27.49 kg/m        GENERAL APPEARANCE: healthy, alert and no distress  HEENT: PEERLA, no neck asymmetry or masses  Lymphatics: No cervical, supraclavicular, axillary lymphadenopathy bilaterally    CV: S1S2 reg no murmur  Respiratory: CTA b/l  GI: soft,  BS+, NT, ND  Extremities: no edema.    SKIN: no suspicious lesions or rashes    PSYCHIATRIC: mentation appears normal and affect normal  Neuro: gait intact. Axox3  Scalp: there is an indent about 1cm at the vertex, non tender.      Laboratory/Imaging Studies  Results for HILARY GARCIA PRADIP (MRN [id number]) as of 2020 14:34   Ref.  Range 7/30/2020 07:11 10/9/2020 10:45 10/15/2020 09:13 10/28/2020 07:26 10/30/2020 09:35 11/13/2020 12:02   Sodium Latest Ref Range: 133 - 144 mmol/L 132 (L) 135 134 135  133   Potassium Latest Ref Range: 3.4 - 5.3 mmol/L 3.6 3.3 (L) 4.2 3.2 (L) 3.8 3.5   Chloride Latest Ref Range: 94 - 109 mmol/L 94 95 96 92 (L)  94   Carbon Dioxide Latest Ref Range: 20 - 32 mmol/L 33 (H) 34 (H) 35 (H) 36 (H)  36 (H)   Urea Nitrogen Latest Ref Range: 7 - 30 mg/dL 29 31 (H) 31 (H) 35 (H)  38 (H)   Creatinine Latest Ref Range: 0.52 - 1.04 mg/dL 1.04 1.07 (H) 0.99 1.11 (H) 1.04 1.17 (H)   GFR Estimate Latest Ref Range: >60 mL/min/1.73_m2 55 (L) 53 (L) 59 (L) 51 (L) 55 (L) 48 (L)   GFR Estimate If Black Latest Ref Range: >60 mL/min/1.73_m2 64 62 68 59 (L) 64 55 (L)   Calcium Latest Ref Range: 8.5 - 10.1 mg/dL 9.0 9.0 9.2 9.4  9.6   Anion Gap Latest Ref Range: 3 - 14 mmol/L 6 6 3 7  3   Albumin Latest Ref Range: 3.4 - 5.0 g/dL   3.7      Protein Total Latest Ref Range: 6.8 - 8.8 g/dL   7.1      Bilirubin Total Latest Ref Range: 0.2 - 1.3 mg/dL   1.8 (H)      Alkaline Phosphatase Latest Ref Range: 40 - 150 U/L   195 (H)      ALT Latest Ref Range: 0 - 50 U/L   27      AST Latest Ref Range: 0 - 45 U/L   23        ASSESSMENT/PLAN:  Leandra Guerrero is a 70 year old woman with  of AL amyloidosis involving the heart and GI tract. 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 Stage IV amyloidosis is associated with median survival of 5 months in patients not undergoing BMT, and 5 year survival of 15%, and in patients who are able to undergo BMT, median survival is 22 months and 5 year survival of 46% (Dejan APODACA, Anderson BAR, Toñito MCARTHUR, et al. Prognostication of survival using cardiac troponins and N-terminal pro-brain natriuretic peptide in patients with primary  systemic amyloidosis undergoing peripheral blood stem cell transplantation. Blood 2004; 104:1881). She was felt not to be a candidate for high dose therapy.   She was on Cybor-D from 10/4/2016-12/27/2016, with Cytoxan omitted after cycle 2 day 1 due to her developing hemorrhagic cystitis. Her disease has responded  biochemically, with serum M spike of zero, and serum IgA level was down to low range and  free lambda light chains have normalized quantitatively.     I have discussed her situation with Dr. Barron, and he felt at that time that with normalization of serum free lambda light chains, no further chemotherapy is indicated.     1.  AL amyloidosis -follow-up on serum protein electrophoresis from today but serum free lambda light chains are normal and serum IgA level is low.  Continue doxycycline orally daily.  We'll see her back in 6 months with amyloid labs prior.     2. Skull indentation at the vertex- proceed with skeletal survey at this time. Obtain outside images from 6/30/2020 outside CT for comparison. We'll inform the patient of the results and recommendations and  f/up plan based on the results.       3. Amyloid cardiomyopathy-A. fib-  She is being followed by  Dr. Cohen. Continue current diuretic regimen and KCl supplementation. Continue Eliquis. Cardiac Amyloidosis (a restrictive cardiomyopathy) , Stage C, NYHA Class III.  A. fib is rate controlled.  Continue Eliquis.  4. Nausea-Zofran oral disintegrating tablet works best for her.  She takes it rarely and is only prescribed at 4 mg orally as needed, and we will increase the dose to 8 mg orally as needed.  She does not take more than 1 tablet a day.  If nausea worsens, consider adding Zyprexa in the future. May need a palliative care consult if nausea progresses.  5. CKD- Creat stable as above.  6.Stable elevated total bilirubin and alk phos, stable- in the past felt to be secondary to liver congestion. Cont to follow.      7. Recurrent pleural  effusions- Last CT chest in [facility] in September 2019 showed small to moderate right pleural effusion.  She denies shortness of breath presently.   PleurX catheter is an option in the future if she were to become symptomatic.    At the end of our visit patient and  verbalized understanding and concurred with the plan.          Again, thank you for allowing me to participate in the care of your patient.        Sincerely,        Mirela Moore MD, MD

## 2020-11-13 NOTE — NURSING NOTE
"Oncology Rooming Note    November 13, 2020 2:47 PM   Leandra Guerrero is a 70 year old female who presents for:    Chief Complaint   Patient presents with     Oncology Clinic Visit     follow up visit     Initial Vitals: /65   Pulse 67   Temp 97.5  F (36.4  C) (Oral)   Resp 16   Wt 70.4 kg (155 lb 3.2 oz)   SpO2 98%   BMI 27.49 kg/m   Estimated body mass index is 27.49 kg/m  as calculated from the following:    Height as of 10/28/20: 1.6 m (5' 3\").    Weight as of this encounter: 70.4 kg (155 lb 3.2 oz). Body surface area is 1.77 meters squared.  No Pain (0) Comment: Data Unavailable   No LMP recorded. Patient is postmenopausal.  Allergies reviewed: Yes  Medications reviewed: Yes    Medications: Medication refills not needed today.  Pharmacy name entered into [employer]:     [facility] [address] [place] [address] [facility] - [facility] [address]    Clinical concerns: A few heart palpitations the past few days        CHANA PALUMBO RN              "

## 2020-11-13 NOTE — PROGRESS NOTES
Date: 11/13/2020    Time of Call: 1:58 PM     Diagnosis:  HF     [ VORB ] Ordering provider: Yolanda MORGAN  Order: BMP q3 weeks     Order received by: Vidhi Montgomery RN      Follow-up/additional notes:

## 2020-11-16 LAB
ALBUMIN SERPL ELPH-MCNC: 4.4 G/DL (ref 3.7–5.1)
ALPHA1 GLOB SERPL ELPH-MCNC: 0.4 G/DL (ref 0.2–0.4)
ALPHA2 GLOB SERPL ELPH-MCNC: 0.6 G/DL (ref 0.5–0.9)
B-GLOBULIN SERPL ELPH-MCNC: 0.7 G/DL (ref 0.6–1)
GAMMA GLOB SERPL ELPH-MCNC: 0.9 G/DL (ref 0.7–1.6)
IGA SERPL-MCNC: 69 MG/DL (ref 84–499)
KAPPA LC UR-MCNC: 2.02 MG/DL (ref 0.33–1.94)
KAPPA LC/LAMBDA SER: 0.94 {RATIO} (ref 0.26–1.65)
LAMBDA LC SERPL-MCNC: 2.14 MG/DL (ref 0.57–2.63)
M PROTEIN SERPL ELPH-MCNC: 0 G/DL
PROT PATTERN SERPL ELPH-IMP: NORMAL

## 2020-11-19 ENCOUNTER — ANCILLARY PROCEDURE (OUTPATIENT)
Dept: GENERAL RADIOLOGY | Facility: CLINIC | Age: 70
End: 2020-11-19
Attending: INTERNAL MEDICINE
Payer: COMMERCIAL

## 2020-11-19 PROCEDURE — 77075 RADEX OSSEOUS SURVEY COMPL: CPT | Performed by: RADIOLOGY

## 2020-12-04 DIAGNOSIS — I43 AMYLOID HEART DISEASE (H): ICD-10-CM

## 2020-12-04 DIAGNOSIS — E85.4 AMYLOID HEART DISEASE (H): ICD-10-CM

## 2020-12-04 LAB
ANION GAP SERPL CALCULATED.3IONS-SCNC: 4 MMOL/L (ref 3–14)
BUN SERPL-MCNC: 33 MG/DL (ref 7–30)
CALCIUM SERPL-MCNC: 9.1 MG/DL (ref 8.5–10.1)
CHLORIDE SERPL-SCNC: 96 MMOL/L (ref 94–109)
CO2 SERPL-SCNC: 34 MMOL/L (ref 20–32)
CREAT SERPL-MCNC: 1.21 MG/DL (ref 0.52–1.04)
GFR SERPL CREATININE-BSD FRML MDRD: 46 ML/MIN/{1.73_M2}
GLUCOSE SERPL-MCNC: 84 MG/DL (ref 70–99)
POTASSIUM SERPL-SCNC: 3.4 MMOL/L (ref 3.4–5.3)
SODIUM SERPL-SCNC: 134 MMOL/L (ref 133–144)

## 2020-12-04 PROCEDURE — 80048 BASIC METABOLIC PNL TOTAL CA: CPT | Performed by: PHYSICIAN ASSISTANT

## 2020-12-04 PROCEDURE — 36415 COLL VENOUS BLD VENIPUNCTURE: CPT | Performed by: PHYSICIAN ASSISTANT

## 2020-12-27 ENCOUNTER — HEALTH MAINTENANCE LETTER (OUTPATIENT)
Age: 70
End: 2020-12-27

## 2020-12-28 ENCOUNTER — MYC MEDICAL ADVICE (OUTPATIENT)
Dept: CARDIOLOGY | Facility: CLINIC | Age: 70
End: 2020-12-28

## 2020-12-29 ENCOUNTER — TELEPHONE (OUTPATIENT)
Dept: CARDIOLOGY | Facility: CLINIC | Age: 70
End: 2020-12-29

## 2020-12-29 ENCOUNTER — MYC MEDICAL ADVICE (OUTPATIENT)
Dept: CARDIOLOGY | Facility: CLINIC | Age: 70
End: 2020-12-29

## 2020-12-29 DIAGNOSIS — I50.32 CHRONIC DIASTOLIC HEART FAILURE (H): ICD-10-CM

## 2020-12-29 LAB
ANION GAP SERPL CALCULATED.3IONS-SCNC: <1 MMOL/L (ref 3–14)
BUN SERPL-MCNC: 25 MG/DL (ref 7–30)
CALCIUM SERPL-MCNC: 9.3 MG/DL (ref 8.5–10.1)
CHLORIDE SERPL-SCNC: 99 MMOL/L (ref 94–109)
CO2 SERPL-SCNC: 37 MMOL/L (ref 20–32)
CREAT SERPL-MCNC: 1.08 MG/DL (ref 0.52–1.04)
GFR SERPL CREATININE-BSD FRML MDRD: 53 ML/MIN/{1.73_M2}
GLUCOSE SERPL-MCNC: 82 MG/DL (ref 70–99)
POTASSIUM SERPL-SCNC: 4.5 MMOL/L (ref 3.4–5.3)
SODIUM SERPL-SCNC: 136 MMOL/L (ref 133–144)

## 2020-12-29 PROCEDURE — 80048 BASIC METABOLIC PNL TOTAL CA: CPT | Performed by: PHYSICIAN ASSISTANT

## 2020-12-29 PROCEDURE — 36415 COLL VENOUS BLD VENIPUNCTURE: CPT | Performed by: PHYSICIAN ASSISTANT

## 2020-12-29 NOTE — TELEPHONE ENCOUNTER
----- Message from Ciara Araya PA-C sent at 12/29/2020  1:57 PM CST -----  Please let eliana know her potassium and renal function are stable. No changes to her current plan. I am recommending sarna lotion for the itching and if that does not work would see her PCP.    Called pt, reached VM, LM. Shirlene Francois, RN CORE Care Coordinator

## 2021-01-15 ENCOUNTER — MYC MEDICAL ADVICE (OUTPATIENT)
Dept: CARDIOLOGY | Facility: CLINIC | Age: 71
End: 2021-01-15

## 2021-01-15 DIAGNOSIS — I43 CARDIAC AMYLOIDOSIS (H): ICD-10-CM

## 2021-01-15 DIAGNOSIS — E85.4 CARDIAC AMYLOIDOSIS (H): ICD-10-CM

## 2021-01-15 DIAGNOSIS — I50.32 CHRONIC DIASTOLIC HEART FAILURE (H): Primary | ICD-10-CM

## 2021-01-19 DIAGNOSIS — I50.32 CHRONIC DIASTOLIC HEART FAILURE (H): ICD-10-CM

## 2021-01-19 LAB
ANION GAP SERPL CALCULATED.3IONS-SCNC: 7 MMOL/L (ref 3–14)
BUN SERPL-MCNC: 30 MG/DL (ref 7–30)
CALCIUM SERPL-MCNC: 9.1 MG/DL (ref 8.5–10.1)
CHLORIDE SERPL-SCNC: 90 MMOL/L (ref 94–109)
CO2 SERPL-SCNC: 35 MMOL/L (ref 20–32)
CREAT SERPL-MCNC: 1.04 MG/DL (ref 0.52–1.04)
GFR SERPL CREATININE-BSD FRML MDRD: 55 ML/MIN/{1.73_M2}
GLUCOSE SERPL-MCNC: 87 MG/DL (ref 70–99)
POTASSIUM SERPL-SCNC: 3.1 MMOL/L (ref 3.4–5.3)
SODIUM SERPL-SCNC: 132 MMOL/L (ref 133–144)

## 2021-01-19 PROCEDURE — 36415 COLL VENOUS BLD VENIPUNCTURE: CPT | Performed by: PHYSICIAN ASSISTANT

## 2021-01-19 PROCEDURE — 80048 BASIC METABOLIC PNL TOTAL CA: CPT | Performed by: PHYSICIAN ASSISTANT

## 2021-01-19 NOTE — TELEPHONE ENCOUNTER
Date: 1/19/2021    Time of Call: 2:15 PM     Diagnosis:  Heart failure     [ VORB ] Ordering provider: CATHY Orozco    Order: Extra 40 mEq of potassium today and tomorrow and BMP next week     Order received by: Niki Fam RN       Follow-up/additional notes: Amy read back instructions, stated understanding and is agreeable to seeing Palliative for symptom management

## 2021-01-20 ENCOUNTER — TELEPHONE (OUTPATIENT)
Dept: CARDIOLOGY | Facility: CLINIC | Age: 71
End: 2021-01-20

## 2021-01-20 NOTE — TELEPHONE ENCOUNTER
[facility] Call Center    Phone Message    May a detailed message be left on voicemail: no     Reason for Call: Other: Mohinder, Amy calling to talk to Shea.  Call [phone]     Action Taken: Message routed to:  [facility] ([facility]): Cardiology    Travel Screening: Not Applicable

## 2021-01-20 NOTE — TELEPHONE ENCOUNTER
Called Amy back. Sent a mychart yesterday after initially reading about palliative care online and she was worried it was hospice. Talked more with her daughter today and thinks she'd like to proceed with it. Reviewed with her that the focus is on symptom management and quality of life and it is not the same as hospice and she was ok with referral placed.   Vidhi Montgomery RN

## 2021-01-21 ASSESSMENT — ENCOUNTER SYMPTOMS
JAUNDICE: 0
BLOOD IN STOOL: 0
NUMBNESS: 0
POOR WOUND HEALING: 0
LEG PAIN: 0
PANIC: 0
MEMORY LOSS: 0
JOINT SWELLING: 1
HYPOTENSION: 0
WHEEZING: 0
NERVOUS/ANXIOUS: 1
POSTURAL DYSPNEA: 1
HEARTBURN: 0
ARTHRALGIAS: 1
MYALGIAS: 1
SKIN CHANGES: 0
ALTERED TEMPERATURE REGULATION: 1
CHILLS: 1
WEIGHT LOSS: 0
INCREASED ENERGY: 1
BACK PAIN: 1
MUSCLE WEAKNESS: 1
INSOMNIA: 1
FATIGUE: 1
NAIL CHANGES: 0
NAUSEA: 1
PALPITATIONS: 1
NECK PAIN: 0
WEIGHT GAIN: 0
COUGH: 0
CONSTIPATION: 0
DIZZINESS: 1
DEPRESSION: 1
BOWEL INCONTINENCE: 0
BLOATING: 0
DYSPNEA ON EXERTION: 1
HYPERTENSION: 0
PARALYSIS: 0
SPUTUM PRODUCTION: 0
EXERCISE INTOLERANCE: 1
RECTAL PAIN: 0
POLYPHAGIA: 0
LIGHT-HEADEDNESS: 1
HEMOPTYSIS: 0
ABDOMINAL PAIN: 0
DISTURBANCES IN COORDINATION: 0
FEVER: 0
DIARRHEA: 1
DECREASED APPETITE: 0
TREMORS: 0
COUGH DISTURBING SLEEP: 0
POLYDIPSIA: 0
LOSS OF CONSCIOUSNESS: 0
MUSCLE CRAMPS: 1
SPEECH CHANGE: 0
STIFFNESS: 1
WEAKNESS: 1
HALLUCINATIONS: 0
DECREASED CONCENTRATION: 1
SHORTNESS OF BREATH: 1
SNORES LOUDLY: 0
ORTHOPNEA: 1
TINGLING: 0
VOMITING: 1
SLEEP DISTURBANCES DUE TO BREATHING: 1
HEADACHES: 1
SEIZURES: 0
SYNCOPE: 0

## 2021-01-26 DIAGNOSIS — I50.32 CHRONIC DIASTOLIC HEART FAILURE (H): ICD-10-CM

## 2021-01-26 LAB
ANION GAP SERPL CALCULATED.3IONS-SCNC: 2 MMOL/L (ref 3–14)
BUN SERPL-MCNC: 29 MG/DL (ref 7–30)
CALCIUM SERPL-MCNC: 9.3 MG/DL (ref 8.5–10.1)
CHLORIDE SERPL-SCNC: 98 MMOL/L (ref 94–109)
CO2 SERPL-SCNC: 34 MMOL/L (ref 20–32)
CREAT SERPL-MCNC: 1.06 MG/DL (ref 0.52–1.04)
GFR SERPL CREATININE-BSD FRML MDRD: 54 ML/MIN/{1.73_M2}
GLUCOSE SERPL-MCNC: 86 MG/DL (ref 70–99)
POTASSIUM SERPL-SCNC: 4.3 MMOL/L (ref 3.4–5.3)
SODIUM SERPL-SCNC: 134 MMOL/L (ref 133–144)

## 2021-01-26 PROCEDURE — 36415 COLL VENOUS BLD VENIPUNCTURE: CPT | Performed by: PHYSICIAN ASSISTANT

## 2021-01-26 PROCEDURE — 80048 BASIC METABOLIC PNL TOTAL CA: CPT | Performed by: PHYSICIAN ASSISTANT

## 2021-01-30 ENCOUNTER — MYC MEDICAL ADVICE (OUTPATIENT)
Dept: CARDIOLOGY | Facility: CLINIC | Age: 71
End: 2021-01-30

## 2021-01-30 DIAGNOSIS — E85.4 CARDIAC AMYLOIDOSIS (H): Primary | ICD-10-CM

## 2021-01-30 DIAGNOSIS — I43 CARDIAC AMYLOIDOSIS (H): Primary | ICD-10-CM

## 2021-02-01 NOTE — TELEPHONE ENCOUNTER
"Amy reports that most days for the last several weeks she feels like \"something goes wrong at 4 pm and them I'm done for the rest of the day\"    She does occasionally have some nausea with it- but takes Lorazepam which controls it (Reports that [employer] is not covering Zofran as she is not in chemo and does not have cancer).  She will occasionally notice some dizziness/lightheadedness or leg weakness and heart palpitations as well    She takes her evening medications at 5 pm and takes extra potassium as ordered on metolazone days.       Last Zio patch/holter, EKG and echo were in 2019  "

## 2021-02-02 NOTE — TELEPHONE ENCOUNTER
Date: 2/2/2021    Time of Call: 2:36 PM     Diagnosis:  Heart failure/cardiac amyloid     [ VORB ] Ordering provider: Dr Cohen    Order: Echo and Holter monitor     Order received by: Niki Fam RN       Follow-up/additional notes: scheduled Echo in [place] 2/3, will get Holter monitor placed in clinic

## 2021-02-03 ENCOUNTER — ANCILLARY PROCEDURE (OUTPATIENT)
Dept: CARDIOLOGY | Facility: CLINIC | Age: 71
End: 2021-02-03
Attending: INTERNAL MEDICINE
Payer: COMMERCIAL

## 2021-02-03 DIAGNOSIS — E85.4 CARDIAC AMYLOIDOSIS (H): ICD-10-CM

## 2021-02-03 DIAGNOSIS — I43 CARDIAC AMYLOIDOSIS (H): ICD-10-CM

## 2021-02-03 PROCEDURE — 93306 TTE W/DOPPLER COMPLETE: CPT | Performed by: INTERNAL MEDICINE

## 2021-02-04 ENCOUNTER — OFFICE VISIT (OUTPATIENT)
Dept: CARDIOLOGY | Facility: CLINIC | Age: 71
End: 2021-02-04
Attending: INTERNAL MEDICINE
Payer: COMMERCIAL

## 2021-02-04 VITALS
BODY MASS INDEX: 26.58 KG/M2 | OXYGEN SATURATION: 99 % | WEIGHT: 150 LBS | HEIGHT: 63 IN | SYSTOLIC BLOOD PRESSURE: 118 MMHG | HEART RATE: 63 BPM | DIASTOLIC BLOOD PRESSURE: 59 MMHG

## 2021-02-04 DIAGNOSIS — E85.4 CARDIAC AMYLOIDOSIS (H): ICD-10-CM

## 2021-02-04 DIAGNOSIS — I43 AMYLOID HEART DISEASE (H): ICD-10-CM

## 2021-02-04 DIAGNOSIS — R11.0 NAUSEA: ICD-10-CM

## 2021-02-04 DIAGNOSIS — E85.4 AMYLOID HEART DISEASE (H): ICD-10-CM

## 2021-02-04 DIAGNOSIS — I50.32 CHRONIC DIASTOLIC HEART FAILURE (H): Primary | ICD-10-CM

## 2021-02-04 DIAGNOSIS — I43 CARDIAC AMYLOIDOSIS (H): ICD-10-CM

## 2021-02-04 DIAGNOSIS — I50.32 CHRONIC DIASTOLIC HEART FAILURE (H): ICD-10-CM

## 2021-02-04 DIAGNOSIS — I48.0 PAROXYSMAL ATRIAL FIBRILLATION (H): ICD-10-CM

## 2021-02-04 DIAGNOSIS — I50.30 DIASTOLIC HEART FAILURE, UNSPECIFIED HF CHRONICITY (H): ICD-10-CM

## 2021-02-04 LAB
ANION GAP SERPL CALCULATED.3IONS-SCNC: 8 MMOL/L (ref 3–14)
BUN SERPL-MCNC: 43 MG/DL (ref 7–30)
CALCIUM SERPL-MCNC: 9.6 MG/DL (ref 8.5–10.1)
CHLORIDE SERPL-SCNC: 92 MMOL/L (ref 94–109)
CO2 SERPL-SCNC: 34 MMOL/L (ref 20–32)
CREAT SERPL-MCNC: 1.16 MG/DL (ref 0.52–1.04)
GFR SERPL CREATININE-BSD FRML MDRD: 48 ML/MIN/{1.73_M2}
GLUCOSE SERPL-MCNC: 87 MG/DL (ref 70–99)
NT-PROBNP SERPL-MCNC: 1376 PG/ML (ref 0–125)
POTASSIUM SERPL-SCNC: 3.8 MMOL/L (ref 3.4–5.3)
SODIUM SERPL-SCNC: 134 MMOL/L (ref 133–144)

## 2021-02-04 PROCEDURE — 93244 EXT ECG>48HR<7D REV&INTERPJ: CPT | Performed by: INTERNAL MEDICINE

## 2021-02-04 PROCEDURE — 83880 ASSAY OF NATRIURETIC PEPTIDE: CPT | Performed by: PATHOLOGY

## 2021-02-04 PROCEDURE — 93242 EXT ECG>48HR<7D RECORDING: CPT

## 2021-02-04 PROCEDURE — 36415 COLL VENOUS BLD VENIPUNCTURE: CPT | Performed by: PATHOLOGY

## 2021-02-04 PROCEDURE — G0463 HOSPITAL OUTPT CLINIC VISIT: HCPCS | Mod: 25

## 2021-02-04 PROCEDURE — 99215 OFFICE O/P EST HI 40 MIN: CPT | Mod: 25 | Performed by: INTERNAL MEDICINE

## 2021-02-04 PROCEDURE — 80048 BASIC METABOLIC PNL TOTAL CA: CPT | Performed by: PATHOLOGY

## 2021-02-04 ASSESSMENT — MIFFLIN-ST. JEOR: SCORE: 1179.53

## 2021-02-04 ASSESSMENT — PAIN SCALES - GENERAL: PAINLEVEL: NO PAIN (0)

## 2021-02-04 NOTE — PATIENT INSTRUCTIONS
"Cardiology Providers you saw during your visit:  Dr. Cohen    Medication changes:  No changes    Follow up:  1.  Follow up with Palliative as scheduled tomorrow  2.  GI Referral  3.  Follow up with Yolanda as scheduled in May  4.  Follow up with Dr Cohen in 6 months with labs  5.  5 day Zio monitor  Labs:  Results for HILARY GARCIA (MRN [id number]) as of 2/4/2021 10:18   Ref. Range 2/4/2021 09:38   Sodium Latest Ref Range: 133 - 144 mmol/L 134   Potassium Latest Ref Range: 3.4 - 5.3 mmol/L 3.8   Chloride Latest Ref Range: 94 - 109 mmol/L 92 (L)   Carbon Dioxide Latest Ref Range: 20 - 32 mmol/L 34 (H)   Urea Nitrogen Latest Ref Range: 7 - 30 mg/dL 43 (H)   Creatinine Latest Ref Range: 0.52 - 1.04 mg/dL 1.16 (H)   GFR Estimate Latest Ref Range: >60 mL/min/1.73_m2 48 (L)   GFR Estimate If Black Latest Ref Range: >60 mL/min/1.73_m2 56 (L)   Calcium Latest Ref Range: 8.5 - 10.1 mg/dL 9.6   Anion Gap Latest Ref Range: 3 - 14 mmol/L 8   N-Terminal Pro Bnp Latest Ref Range: 0 - 125 pg/mL 1,376 (H)   Glucose Latest Ref Range: 70 - 99 mg/dL 87       Please call if you have :  1. Weight gain of more than 2 pounds in a day or 5 pounds in a week  2. Increased shortness of breath, swelling or bloating  3. Dizziness, lightheadedness   4. Any questions or concerns.       Follow the American Heart Association Diet and Lifestyle recommendations:  Limit saturated fat, trans fat, sodium, red meat, sweets and sugar-sweetened beverages. If you choose to eat red meat, compare labels and select the leanest cuts available.  Aim for at least 150 minutes of moderate physical activity or 75 minutes of vigorous physical activity - or an equal combination of both - each week.      During business hours: [phone], press option # 1 to be routed to the [facility] then option # 4 for \"To send a message to your care team\"      After hours, weekends or holidays: On Call Cardiologist- [phone]   option #4 and ask to speak to the on-call " "Cardiologist. Inform them you are a CORE/heart failure patient at the [facility].      Heart Failure Support Group  [facility]  The Board Room, 8th Floor  [address] [address]     Meetings   1-2 pm  , 1-2 p.m.  , 1-2 p.m.  , 1-2 p.m.  , 1-2 p.m.  May 3rd, 1-2 p.m.  , 1-2 p.m.  , 1-2 p.m.  , 1-2 p.m.  , 1-2 p.m.  , 1-2 p.m.  , 1-2 p.m.  , 1-2 p.m.      Niki Fam RN BSN CHFN  Cardiology Care Coordinator - Heart Failure/ [facility].O.R[facility] Clinic  [facility]   Questions and [phone]   First press #1 for the [facility] and then press #4 for \"To send a message to your care team\"    "

## 2021-02-04 NOTE — NURSING NOTE
Diet: Patient instructed regarding a heart failure healthy diet, including discussion of reduced fat and 2000 mg daily sodium restriction, daily weights, medication purpose and compliance, fluid restrictions and resources for patient and family to access for assistance with heart failure management.       Labs: Patient was given results of the laboratory testing obtained today and patient was instructed about when to return for the next laboratory testing.     Med Reconcile: Reviewed and verified all current medications with the patient. The updated medication list was printed and given to the patient.     Med changes: no changes    Return Appointment: Patient given instructions regarding scheduling next clinic visit: 5 day zio, CORE in 3 months, Noel in 6 months, palliative as scheduled.  GI referral placed    Patient stated she understood all health information given and agreed to call with further questions or concerns.     Niki Fam RN

## 2021-02-04 NOTE — LETTER
2/4/2021      RE: Leandra Guerrero  [address] [place] [address]       Dear Colleague,    Thank you for the opportunity to participate in the care of your patient, Leandra Guerrero, at the [facility] [place] at [facility]. Please see a copy of my visit note below.    February 4, 2021      70 F year old female with a past medical history including stage IV AL amyloid diagnosed in 2016. Presents to clinic for CORE follow-up.     Her cardiac and oncologic history are as follows: She had fatigue in 2016.  She had a coronary angiogram which was reportedly normal but was diagnosed with HF and started on a BB and diuretics. Her symptoms progressed to the point that she was evaluated at [facility] in August/September (Dr. Barron in oncology/hematology, Dr. Nettles in cardiology). She was diagnosed with stage IV AL amyloid (+GI, +bone marrow involvement, no involvement of kidney or liver). Her work up is detailed in our previous notes, however she has lambda AL amyloidosis and she underwent CyBorD chemotherapy and excellent light chain response by serum. Patient has been turned down at [facility] for a stem cell transplant due to her cardiac involvement. Later in 2016, she had 2-3 more right sided pleural taps. She was hospitalized 1/2017 and diuresed 20-30 pounds at that time. Since that time her AL has been in remission by labs without further chemo. Patient was then treated with doxycycline , which she continues to this day.    With respect to her AL diagnosis, she has been in remission for some time.  She has not required further chemotherapy for light chain suppression.  She just had a follow-up with her oncologist a few months ago.     Despite having all features of end-stage AL cardiac amyloidosis (many high risk features associated with very poor prognosis)-Amy has settled into a period of stability for many years.     She remains on very  high-dose diuretics and needs to be seen frequently and has substantial fatigue and is overall been slowing down, however her endorgan function remains normal and she has not had further hospitalizations.     She is scheduled to see palliative care tomorrow.     She has had a lot of symptoms over the last several years including nausea, fatigue more recently palpitations and shortness of breath with little exertion.     The nausea is particularly bothersome.  In the past we have attributed this to low output heart failure as well as known amyloidosis of the stomach but we have not worked it up further.     Today she reports being a little more fatigued than she was a few months ago.  She does feel as though she is slowing down steadily over time.       Medications:     Current Outpatient Medications   Medication Sig Dispense Refill     Acetaminophen (TYLENOL PO) Take 325 mg by mouth       apixaban ANTICOAGULANT (ELIQUIS) 2.5 MG tablet Take 1 tablet (2.5 mg) by mouth 2 times daily 180 tablet 3     doxycycline hyclate (VIBRA-TABS) 100 MG tablet Take 1 tablet (100 mg) by mouth daily 90 tablet 3     LORazepam (ATIVAN) 0.5 MG tablet Take 1 tablet (0.5 mg) by mouth every 6 hours as needed for anxiety or nausea 60 tablet 0     metolazone (ZAROXOLYN) 2.5 MG tablet Take 1 tablet (2.5 mg) by mouth twice a week 26 tablet 3     ondansetron (ZOFRAN-ODT) 8 MG ODT tab Take 1 tablet (8 mg) by mouth every 12 hours as needed for nausea 60 tablet 3     potassium chloride ER (KLOR-CON M) 10 MEQ CR tablet Take 6 tablets (60 mEq) by mouth 3 times daily Additionally, take an Extra 80 mEq on metolazone days and the day after a metolazone day 1700 tablet 3     prochlorperazine (COMPAZINE) 5 MG tablet One to two tablets PO q 6 hours prn nausea 90 tablet 1     torsemide (DEMADEX) 100 MG tablet Take 1 tablet (100 mg) by mouth 2 times daily 180 tablet 3       PAST MEDICAL HISTORY:  Past Medical History:   Diagnosis Date     AL amyloidosis (H)       Atrial fibrillation and flutter (H)      Cardiac amyloidosis (H)      Lymphedema      QT prolongation      Recurrent right pleural effusion 1/2/2017     SVT (supraventricular tachycardia) (H)        FAMILY HISTORY:  Family History   Problem Relation Age of Onset     Other - See Comments Sister         Amyloidosis       CURRENT MEDICATIONS:    Current Outpatient Medications   Medication Sig Dispense Refill     Acetaminophen (TYLENOL PO) Take 325 mg by mouth       apixaban ANTICOAGULANT (ELIQUIS) 2.5 MG tablet Take 1 tablet (2.5 mg) by mouth 2 times daily 180 tablet 3     doxycycline hyclate (VIBRA-TABS) 100 MG tablet Take 1 tablet (100 mg) by mouth daily 90 tablet 3     LORazepam (ATIVAN) 0.5 MG tablet Take 1 tablet (0.5 mg) by mouth every 6 hours as needed for anxiety or nausea 60 tablet 0     metolazone (ZAROXOLYN) 2.5 MG tablet Take 1 tablet (2.5 mg) by mouth twice a week 26 tablet 3     ondansetron (ZOFRAN-ODT) 8 MG ODT tab Take 1 tablet (8 mg) by mouth every 12 hours as needed for nausea 60 tablet 3     potassium chloride ER (KLOR-CON M) 10 MEQ CR tablet Take 6 tablets (60 mEq) by mouth 3 times daily Additionally, take an Extra 80 mEq on metolazone days and the day after a metolazone day 1700 tablet 3     prochlorperazine (COMPAZINE) 5 MG tablet One to two tablets PO q 6 hours prn nausea (Patient not taking: Reported on 6/18/2020) 90 tablet 1     torsemide (DEMADEX) 100 MG tablet Take 1 tablet (100 mg) by mouth 2 times daily 180 tablet 3         ROS:   Constitutional: No fever, chills, or sweats. Weight . + fatigue   ENT: No visual disturbance, ear ache, epistaxis, sore throat.   Allergies/Immunologic: Negative.   Respiratory: No cough, hemoptysis.   Cardiovascular: As per HPI.   GI: + nausea, vomiting, hematemesis, melena, or hematochezia.   : No urinary frequency, dysuria, or hematuria.   Integument: Negative.   Psychiatric: Negative.   Neuro: Negative.   Endocrinology: Negative.   Musculoskeletal:  "Negative.      EXAM:  /59   Pulse 63   Ht 1.6 m (5' 3\")   Wt 68 kg (150 lb)   SpO2 99%   BMI 26.57 kg/m    General: appears comfortable, alert and articulate  Head: normocephalic, atraumatic  Eyes: anicteric sclera, EOMI  Neck: no adenopathy  Orophyarynx: moist mucosa, no lesions, dentition intact  Heart: PMI diffuse,trace systolic murmur at LLSB, regular, S1/S2, rub, estimated JVP 9 cm   Lungs: clear, no rales or wheezing  Abdomen: soft, non-tender, bowel sounds present, no hepatosplenomegaly  Extremities: no clubbing, cyanosis- does have LE fullness without tense edema   Neurological: normal speech and affect, no gross motor deficits      Global and regional left ventricular function is hyperkinetic with an EF of  65-70%.  Right ventricular function, chamber size, wall motion, and thickness are  normal.  Mild to moderate aortic insufficiency is present.  Moderate tricuspid insufficiency is present.  Right ventricular systolic pressure is 33mmHg above the right atrial pressure.  IVC diameter and respiratory changes fall into an intermediate range  suggesting an RA pressure of 8 mmHg.  No pericardial effusion is present.  A right pleural effusion is present.      Labs, reviewed with patient in clinic today:    Labs reviewed in clinic today NT proBNP is slightly elevated from where it was previously renal function and potassium are stable      Diagnostics:    Coronary Angiogram 2016  Diagnostic Findings  LEFT MAIN CORONARY ARTERY     The LMCA is free of significant disease.    LEFT ANTERIOR DESCENDING     The LAD is free of significant disease.    CIRCUMFLEX     The Circumflex is free of significant disease.    RIGHT CORONARY ARTERY     The RCA is free of significant disease.     TTE 4/9/19  Moderate left ventricular hypertrophy.  Global and regional left ventricular function is normal with an EF of 60-65%.  Global right ventricular function is normal.  Moderate aortic valve insufficiency.  Mild pulmonary " hypertension with dilated IVC.  This study was compared with the study from 10/11/18 the AR is worse and RA pressure is now increased.    Ziopatch 11/2017      Holter Monitor 06/2019  INTERPRETATION  1. The rhythm varied with sinus rhythm and atrial fibrillation/variable atrial flutter. Low voltage noted.  2. The heart rate varied with a minimum rate of 48 bpm, maximum rate of 164 bpm, average rate of 74 bpm.  3. Frequent supraventricular ectopy was seen ranging from 0-469 per hour. Occasional atrail pairs and fairly frequent bigeminal cycles were noted.  Wandering atrial pacemaker noted.  4. Infrequent multifocal ventricular ectopy was seen ranging from 0-36 beats per hour.  5. ST-T wave changes were present.  6. Three patient events were documented and correlated with atrial fibrillation/flutter      ECG today: possible slow atrial flutter with 2:1 conduction        Assessment and Plan:   Mrs. Guerrero is a 70 year old female with AL amyloidosis with cardiac manifestation who presents for follow up.    While she has all features that are consistent with end-stage disease, she has entered a period of stability for many years..     She is slowing down slowly over time and this is quite frustrating to her..   She has a lot of symptoms and therefore we are setting her up with palliative care so that we can aggressively be involved in symptom management.     With respect to her nausea,  we have previously attributed this to low output heart failure plus known amyloidosis infiltration into her intestinal tract, however I expect Amy to be around for some time and if there is something that we are missing with her nausea I would rather have this evaluated by GI so that we can treat something if there is something treatable.       AL cardiac amyloidosis: Rios stage IV, NYHA class IIIb, Stage C/D   -Even though AL Amyloid has a poor prognosis, she has done exceptionally well and has mild cardiac symptoms at this time.  However, I do suspect that these may continue to slowly get worse over time.   -Continue Torsemide 100mg BID with Metolozone 2.5 mg twice a week. She is not on Aldactone because of previous hyponatremia.   -We will not make any medication changes at this time.     # A. Fib/A. Flutter-currently in this today  I am repeating a Zio patch to ensure rate control and also to be sure she is not having heart block-   We may want to consider an ablation in her if this would help with symptoms.   Pending PAF Zio patch results will consider discussing with EP  -Avoiding BB given her Amyloid history.   On anti-coag     #Systemic Amyloid  -Continue Kytril per Oncology   -Continue Doxycycline     Goals of care: For now gender has wanted clinic care, hospitalizations as needed, as well as full aggressive care.  She will be meeting palliative now that symptoms are worsening over time.      Return to clinic in 3 months with nurse practitioner in 6 months with me      Domenica Cohen MD  Associate Professor of Medicine   [facility] Division of Cardiology             Please do not hesitate to contact me if you have any questions/concerns.     Sincerely,     Domenica Cohen MD

## 2021-02-04 NOTE — NURSING NOTE
Chief Complaint   Patient presents with     Follow Up      69 year old female with chronic diastolic heart failure/AL- labs prior, echo done 2/3 at [place], will NEED HOLTER MONITOR placed 2/4      Vitals were taken and medications were reconciled.     Lilly Kim RMA  9:58 AM

## 2021-02-04 NOTE — PROGRESS NOTES
Per Dr. Cohen, patient to have 5 day Ziopatch  monitor placed.  Diagnosis: Heart failure  Monitor placed: Yes  Patient Instructed: Yes  Patient verbalized understanding: Yes  Holter # [id number]     Placed By Lilly Kim

## 2021-02-04 NOTE — PROGRESS NOTES
February 4, 2021      70 F year old female with a past medical history including stage IV AL amyloid diagnosed in 2016. Presents to clinic for CORE follow-up.     Her cardiac and oncologic history are as follows: She had fatigue in 2016.  She had a coronary angiogram which was reportedly normal but was diagnosed with HF and started on a BB and diuretics. Her symptoms progressed to the point that she was evaluated at [place] in August/September (Dr. Barron in oncology/hematology, Dr. Nettles in cardiology). She was diagnosed with stage IV AL amyloid (+GI, +bone marrow involvement, no involvement of kidney or liver). Her work up is detailed in our previous notes, however she has lambda AL amyloidosis and she underwent CyBorD chemotherapy and excellent light chain response by serum. Patient has been turned down at [place] for a stem cell transplant due to her cardiac involvement. Later in 2016, she had 2-3 more right sided pleural taps. She was hospitalized 1/2017 and diuresed 20-30 pounds at that time. Since that time her AL has been in remission by labs without further chemo. Patient was then treated with doxycycline , which she continues to this day.    With respect to her AL diagnosis, she has been in remission for some time.  She has not required further chemotherapy for light chain suppression.  She just had a follow-up with her oncologist a few months ago.     Despite having all features of end-stage AL cardiac amyloidosis (many high risk features associated with very poor prognosis)-Amy has settled into a period of stability for many years.     She remains on very high-dose diuretics and needs to be seen frequently and has substantial fatigue and is overall been slowing down, however her endorgan function remains normal and she has not had further hospitalizations.     She is scheduled to see palliative care tomorrow.     She has had a lot of symptoms over the last several years including nausea, fatigue more  recently palpitations and shortness of breath with little exertion.     The nausea is particularly bothersome.  In the past we have attributed this to low output heart failure as well as known amyloidosis of the stomach but we have not worked it up further.     Today she reports being a little more fatigued than she was a few months ago.  She does feel as though she is slowing down steadily over time.       Medications:     Current Outpatient Medications   Medication Sig Dispense Refill     Acetaminophen (TYLENOL PO) Take 325 mg by mouth       apixaban ANTICOAGULANT (ELIQUIS) 2.5 MG tablet Take 1 tablet (2.5 mg) by mouth 2 times daily 180 tablet 3     doxycycline hyclate (VIBRA-TABS) 100 MG tablet Take 1 tablet (100 mg) by mouth daily 90 tablet 3     LORazepam (ATIVAN) 0.5 MG tablet Take 1 tablet (0.5 mg) by mouth every 6 hours as needed for anxiety or nausea 60 tablet 0     metolazone (ZAROXOLYN) 2.5 MG tablet Take 1 tablet (2.5 mg) by mouth twice a week 26 tablet 3     ondansetron (ZOFRAN-ODT) 8 MG ODT tab Take 1 tablet (8 mg) by mouth every 12 hours as needed for nausea 60 tablet 3     potassium chloride ER (KLOR-CON M) 10 MEQ CR tablet Take 6 tablets (60 mEq) by mouth 3 times daily Additionally, take an Extra 80 mEq on metolazone days and the day after a metolazone day 1700 tablet 3     prochlorperazine (COMPAZINE) 5 MG tablet One to two tablets PO q 6 hours prn nausea 90 tablet 1     torsemide (DEMADEX) 100 MG tablet Take 1 tablet (100 mg) by mouth 2 times daily 180 tablet 3       PAST MEDICAL HISTORY:  Past Medical History:   Diagnosis Date     AL amyloidosis (H)      Atrial fibrillation and flutter (H)      Cardiac amyloidosis (H)      Lymphedema      QT prolongation      Recurrent right pleural effusion 1/2/2017     SVT (supraventricular tachycardia) (H)        FAMILY HISTORY:  Family History   Problem Relation Age of Onset     Other - See Comments Sister         Amyloidosis       CURRENT  "MEDICATIONS:    Current Outpatient Medications   Medication Sig Dispense Refill     Acetaminophen (TYLENOL PO) Take 325 mg by mouth       apixaban ANTICOAGULANT (ELIQUIS) 2.5 MG tablet Take 1 tablet (2.5 mg) by mouth 2 times daily 180 tablet 3     doxycycline hyclate (VIBRA-TABS) 100 MG tablet Take 1 tablet (100 mg) by mouth daily 90 tablet 3     LORazepam (ATIVAN) 0.5 MG tablet Take 1 tablet (0.5 mg) by mouth every 6 hours as needed for anxiety or nausea 60 tablet 0     metolazone (ZAROXOLYN) 2.5 MG tablet Take 1 tablet (2.5 mg) by mouth twice a week 26 tablet 3     ondansetron (ZOFRAN-ODT) 8 MG ODT tab Take 1 tablet (8 mg) by mouth every 12 hours as needed for nausea 60 tablet 3     potassium chloride ER (KLOR-CON M) 10 MEQ CR tablet Take 6 tablets (60 mEq) by mouth 3 times daily Additionally, take an Extra 80 mEq on metolazone days and the day after a metolazone day 1700 tablet 3     prochlorperazine (COMPAZINE) 5 MG tablet One to two tablets PO q 6 hours prn nausea (Patient not taking: Reported on 6/18/2020) 90 tablet 1     torsemide (DEMADEX) 100 MG tablet Take 1 tablet (100 mg) by mouth 2 times daily 180 tablet 3         ROS:   Constitutional: No fever, chills, or sweats. Weight . + fatigue   ENT: No visual disturbance, ear ache, epistaxis, sore throat.   Allergies/Immunologic: Negative.   Respiratory: No cough, hemoptysis.   Cardiovascular: As per HPI.   GI: + nausea, vomiting, hematemesis, melena, or hematochezia.   : No urinary frequency, dysuria, or hematuria.   Integument: Negative.   Psychiatric: Negative.   Neuro: Negative.   Endocrinology: Negative.   Musculoskeletal: Negative.      EXAM:  /59   Pulse 63   Ht 1.6 m (5' 3\")   Wt 68 kg (150 lb)   SpO2 99%   BMI 26.57 kg/m    General: appears comfortable, alert and articulate  Head: normocephalic, atraumatic  Eyes: anicteric sclera, EOMI  Neck: no adenopathy  Orophyarynx: moist mucosa, no lesions, dentition intact  Heart: PMI diffuse,trace " systolic murmur at LLSB, regular, S1/S2, rub, estimated JVP 9 cm   Lungs: clear, no rales or wheezing  Abdomen: soft, non-tender, bowel sounds present, no hepatosplenomegaly  Extremities: no clubbing, cyanosis- does have LE fullness without tense edema   Neurological: normal speech and affect, no gross motor deficits      Global and regional left ventricular function is hyperkinetic with an EF of  65-70%.  Right ventricular function, chamber size, wall motion, and thickness are  normal.  Mild to moderate aortic insufficiency is present.  Moderate tricuspid insufficiency is present.  Right ventricular systolic pressure is 33mmHg above the right atrial pressure.  IVC diameter and respiratory changes fall into an intermediate range  suggesting an RA pressure of 8 mmHg.  No pericardial effusion is present.  A right pleural effusion is present.      Labs, reviewed with patient in clinic today:    Labs reviewed in clinic today NT proBNP is slightly elevated from where it was previously renal function and potassium are stable      Diagnostics:    Coronary Angiogram 2016  Diagnostic Findings  LEFT MAIN CORONARY ARTERY     The LMCA is free of significant disease.    LEFT ANTERIOR DESCENDING     The LAD is free of significant disease.    CIRCUMFLEX     The Circumflex is free of significant disease.    RIGHT CORONARY ARTERY     The RCA is free of significant disease.     TTE 4/9/19  Moderate left ventricular hypertrophy.  Global and regional left ventricular function is normal with an EF of 60-65%.  Global right ventricular function is normal.  Moderate aortic valve insufficiency.  Mild pulmonary hypertension with dilated IVC.  This study was compared with the study from 10/11/18 the AR is worse and RA pressure is now increased.    Ziopatch 11/2017      Holter Monitor 06/2019  INTERPRETATION  1. The rhythm varied with sinus rhythm and atrial fibrillation/variable atrial flutter. Low voltage noted.  2. The heart rate varied  with a minimum rate of 48 bpm, maximum rate of 164 bpm, average rate of 74 bpm.  3. Frequent supraventricular ectopy was seen ranging from 0-469 per hour. Occasional atrail pairs and fairly frequent bigeminal cycles were noted.  Wandering atrial pacemaker noted.  4. Infrequent multifocal ventricular ectopy was seen ranging from 0-36 beats per hour.  5. ST-T wave changes were present.  6. Three patient events were documented and correlated with atrial fibrillation/flutter      ECG today: possible slow atrial flutter with 2:1 conduction        Assessment and Plan:   Mrs. Guerrero is a 70 year old female with AL amyloidosis with cardiac manifestation who presents for follow up.    While she has all features that are consistent with end-stage disease, she has entered a period of stability for many years..     She is slowing down slowly over time and this is quite frustrating to her..   She has a lot of symptoms and therefore we are setting her up with palliative care so that we can aggressively be involved in symptom management.     With respect to her nausea,  we have previously attributed this to low output heart failure plus known amyloidosis infiltration into her intestinal tract, however I expect Amy to be around for some time and if there is something that we are missing with her nausea I would rather have this evaluated by GI so that we can treat something if there is something treatable.       AL cardiac amyloidosis: Rios stage IV, NYHA class IIIb, Stage C/D   -Even though AL Amyloid has a poor prognosis, she has done exceptionally well and has mild cardiac symptoms at this time. However, I do suspect that these may continue to slowly get worse over time.   -Continue Torsemide 100mg BID with Metolozone 2.5 mg twice a week. She is not on Aldactone because of previous hyponatremia.   -We will not make any medication changes at this time.     # A. Fib/A. Flutter-currently in this today  I am repeating a Zio patch  to ensure rate control and also to be sure she is not having heart block-   We may want to consider an ablation in her if this would help with symptoms.   Pending PAF Zio patch results will consider discussing with EP  -Avoiding BB given her Amyloid history.   On anti-coag     #Systemic Amyloid  -Continue Kytril per Oncology   -Continue Doxycycline     Goals of care: For now gender has wanted clinic care, hospitalizations as needed, as well as full aggressive care.  She will be meeting palliative now that symptoms are worsening over time.      Return to clinic in 3 months with nurse practitioner in 6 months with me      Domenica Cohen MD  Associate Professor of Medicine   [facility] Division of Cardiology

## 2021-02-05 ENCOUNTER — VIRTUAL VISIT (OUTPATIENT)
Dept: PALLIATIVE CARE | Facility: CLINIC | Age: 71
End: 2021-02-05
Attending: INTERNAL MEDICINE
Payer: COMMERCIAL

## 2021-02-05 DIAGNOSIS — I43 CARDIAC AMYLOIDOSIS (H): ICD-10-CM

## 2021-02-05 DIAGNOSIS — R11.0 NAUSEA: ICD-10-CM

## 2021-02-05 DIAGNOSIS — E85.81 AL AMYLOIDOSIS (H): Primary | ICD-10-CM

## 2021-02-05 DIAGNOSIS — Z71.89 ADVANCE CARE PLANNING: ICD-10-CM

## 2021-02-05 DIAGNOSIS — E85.4 CARDIAC AMYLOIDOSIS (H): ICD-10-CM

## 2021-02-05 LAB — INTERPRETATION ECG - MUSE: NORMAL

## 2021-02-05 PROCEDURE — 99204 OFFICE O/P NEW MOD 45 MIN: CPT | Mod: 95 | Performed by: INTERNAL MEDICINE

## 2021-02-05 PROCEDURE — 999N001193 HC VIDEO/TELEPHONE VISIT; NO CHARGE

## 2021-02-05 NOTE — LETTER
"2021       RE: Leandra Guerrero  [address] [place] [address]     Dear Colleague,    Thank you for referring your patient, Leandra Guerrero, [facility]. Please see a copy of my visit note below.    Amy is a 70 year old who is being evaluated via a billable video visit.      How would you like to obtain your AVS? MyChart  If the video visit is dropped, the invitation should be resent by: Text to cell phone: [phone]  Will anyone else be joining your video visit? No     LUIS Callaway      Palliative Care Outpatient Clinic    (This note was transcribed using voice recognition software. While I review and edit the transcription, I may miss errors, and the software sometimes does unexpected capitalizations and formatting that I miss. 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 Dr Cohen describes her AL amyoid HF as poor prognosis and notes though she has nonetheless done quite well/stable after chemo several years ago with her cardiomyopathy, although late  has had some progressive sx. She is on high dose torsemide, and metolazone.     Social - Lives in [place] with her . A sister  of myeloma. Retired . Dtr and grandkids in [facility]; son in [place]. No CARMINA hx.     Care Planning - +HCD on chart. Discussion 21: in past has had DNR/DNI order, now FC. She says \"I would like everything except the paddles. If it looks like there was a high percentage I'd come out of it, I'd go on the life support machine, but if you were just going to keep me on a machine to die don't do that.\" She has discussed her wishes with Miguel Ángel. We discuss " "essentials--she clearly states if she had to live long term disabled (people taking car of her body-bathroom/feeding/bathing) she would not want us to keep her alive like that.     History:  History gathered today from: patient, medical chart    Discussed role of palliative care--symptoms, qol, care planning.    Nausea has been worsening--last ~few weeks.  Episodes are worse often mid to late afternoon. \"It feels like morning sickness in the afternoon.\"   Can be severe, feel like emesis, although emesis is rare (1-2x mostly).  Takes ginger ale, sucks on ice, napping helps a little, peppermint aromatherapy.                    Does take zofran, helps a little.  No med changes or other behavioral triggers she can identify.  She emphasizes how miserable this is.     Other sx--some worsening global weakness, an 'off feeling', this has worsened lately too. Per Dr Ellis note she has wondered if related to her heart but it's unclear at this point. Ginger notes other problems, eg going to [employer], lately, feels weaker pushing the cart now. No worse SOB, but has stable PELAEZ.    Mood, ok, but 'boring' due to COVID, grand/kids in school--isolated now from them. Grief sx.    Sleep awful but in part due to nocturia, from diuretics.    Dz understanding: \"It's going to get me in the end, I'm at stage 4 and I know there's no stage 5, it's a matter of time\" but she also reflects she has already outlived the prognosis she was given when she was first dx'd.     Discussed her code status history--was DNR/DNI in past, now FC. She says \"I would like everything except the paddles. If it looks like there was a high percentage I'd come out of it, I'd go on the life support machine, but if you were just going to keep me on a machine to die don't do that.\" She has discussed her wishes with Miguel Ángel. We discuss essentials--she clearly states if she had to live long term disabled (people taking car of her body-bathroom/feeding/bathing) she would " not want us to keep her alive like that.     PE: There were no vitals taken for this visit.   Wt Readings from Last 3 Encounters:   02/04/21 68 kg (150 lb)   11/13/20 70.4 kg (155 lb 3.2 oz)   10/28/20 68 kg (150 lb)     Gen alert, comfortable appearing, NAD.   Head NCAT.  Eyes anicteric without injection  Face symmetric, eyes conjugate  Mouth pink, moist appearing  Lungs unlabored, no cough, speaking full sentences  Skin no rashes or lesions evident on face/neck  Neuro Face symmetric, eyes conjugate; speech fluent.  Neuropsych exam normal including affect, sensorium, gross memory, thought processes, and fund of knowledge.       Data reviewed:  I reviewed recent labs and imaging, my comments:  Echo  Global and regional left ventricular function is hyperkinetic with an EF of  65-70%.  Right ventricular function, chamber size, wall motion, and thickness are  normal.  Mild to moderate aortic insufficiency is present.  Moderate tricuspid insufficiency is present.  Right ventricular systolic pressure is 33mmHg above the right atrial pressure.  IVC diameter and respiratory changes fall into an intermediate range  suggesting an RA pressure of 8 mmHg.  No pericardial effusion is present.  A right pleural effusion is present.    Na 134, Cr 1.16, CO2 34     database reviewed:       Impression & Recommendations:  71 yo F with AL amyloid cardiomyopathy with recent worsening sx (weakness, nausea).    Nausea--  She generally expresses a wish to avoid more meds/side effects.  I recommended trying different aromatherapy options, eg lavendar and alcohol swabs (let her know there is a surprising amount of good data showing that smelling alcohol swabs reduces nausea).   Palliative SW referral for mind/body work for her nausea.  Consider low dose olanzapine 2.5 mg a day--she is open to it. I need to dw Dr Cohen first to make sure she's ok with it.    ACP/Mood-- long discussion as summarized above.    71 minutes spent on the date  of the encounter doing chart review, history and exam, documentation and further activities as noted above.    Thank you for involving us in the patient's care.   Roscoe Elam MD / Palliative Medicine / Jude kang via [facility].      Video-Visit Details    Type of service:  Video Visit    Video Start Time:   Video End Time:    Originating Location (pt. Location): Home    Distant Location (provider location):  home    Platform used for Video Visit: Hypercontext

## 2021-02-05 NOTE — PROGRESS NOTES
"Amy is a 70 year old who is being evaluated via a billable video visit.      How would you like to obtain your AVS? MyChart  If the video visit is dropped, the invitation should be resent by: Text to cell phone: [phone]  Will anyone else be joining your video visit? No       LUIS Callaway    Palliative Care Outpatient Clinic    (This note was transcribed using voice recognition software. While I review and edit the transcription, I may miss errors, and the software sometimes does unexpected capitalizations and formatting that I miss. 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 Dr Cohen describes her AL amyoid HF as poor prognosis and notes though she has nonetheless done quite well/stable after chemo several years ago with her cardiomyopathy, although late  has had some progressive sx. She is on high dose torsemide, and metolazone.     Social - Lives in [place] with her . A sister  of myeloma. Retired . Dtr and grandkids in [facility]; son in [place]. No CARMINA hx.     Care Planning - +HCD on chart. Discussion 21: in past has had DNR/DNI order, now FC. She says \"I would like everything except the paddles. If it looks like there was a high percentage I'd come out of it, I'd go on the life support machine, but if you were just going to keep me on a machine to die don't do that.\" She has discussed her wishes with Miguel Ángel. We discuss essentials--she clearly states if she had to live long term disabled (people taking car of her body-bathroom/feeding/bathing) she would not want us to keep her alive like that.     History:  History gathered today from: patient, medical chart    Discussed role of palliative care--symptoms, qol, care planning.    Nausea " "has been worsening--last ~few weeks.  Episodes are worse often mid to late afternoon. \"It feels like morning sickness in the afternoon.\"   Can be severe, feel like emesis, although emesis is rare (1-2x mostly).  Takes ginger ale, sucks on ice, napping helps a little, peppermint aromatherapy.                    Does take zofran, helps a little.  No med changes or other behavioral triggers she can identify.  She emphasizes how miserable this is.     Other sx--some worsening global weakness, an 'off feeling', this has worsened lately too. Per Dr Ellis note she has wondered if related to her heart but it's unclear at this point. Ginger notes other problems, eg going to [employer], lately, feels weaker pushing the cart now. No worse SOB, but has stable PELAEZ.    Mood, ok, but 'boring' due to COVID, grand/kids in school--isolated now from them. Grief sx.    Sleep awful but in part due to nocturia, from diuretics.    Dz understanding: \"It's going to get me in the end, I'm at stage 4 and I know there's no stage 5, it's a matter of time\" but she also reflects she has already outlived the prognosis she was given when she was first dx'd.     Discussed her code status history--was DNR/DNI in past, now FC. She says \"I would like everything except the paddles. If it looks like there was a high percentage I'd come out of it, I'd go on the life support machine, but if you were just going to keep me on a machine to die don't do that.\" She has discussed her wishes with Miguel Ángel. We discuss essentials--she clearly states if she had to live long term disabled (people taking car of her body-bathroom/feeding/bathing) she would not want us to keep her alive like that.     PE: There were no vitals taken for this visit.   Wt Readings from Last 3 Encounters:   02/04/21 68 kg (150 lb)   11/13/20 70.4 kg (155 lb 3.2 oz)   10/28/20 68 kg (150 lb)     Gen alert, comfortable appearing, NAD.   Head NCAT.  Eyes anicteric without injection  Face " symmetric, eyes conjugate  Mouth pink, moist appearing  Lungs unlabored, no cough, speaking full sentences  Skin no rashes or lesions evident on face/neck  Neuro Face symmetric, eyes conjugate; speech fluent.  Neuropsych exam normal including affect, sensorium, gross memory, thought processes, and fund of knowledge.       Data reviewed:  I reviewed recent labs and imaging, my comments:  Echo  Global and regional left ventricular function is hyperkinetic with an EF of  65-70%.  Right ventricular function, chamber size, wall motion, and thickness are  normal.  Mild to moderate aortic insufficiency is present.  Moderate tricuspid insufficiency is present.  Right ventricular systolic pressure is 33mmHg above the right atrial pressure.  IVC diameter and respiratory changes fall into an intermediate range  suggesting an RA pressure of 8 mmHg.  No pericardial effusion is present.  A right pleural effusion is present.    Na 134, Cr 1.16, CO2 34     database reviewed:       Impression & Recommendations:  71 yo F with AL amyloid cardiomyopathy with recent worsening sx (weakness, nausea).    Nausea--  She generally expresses a wish to avoid more meds/side effects.  I recommended trying different aromatherapy options, eg lavendar and alcohol swabs (let her know there is a surprising amount of good data showing that smelling alcohol swabs reduces nausea).   Palliative SW referral for mind/body work for her nausea.  Consider low dose olanzapine 2.5 mg a day--she is open to it. I need to dw Dr Cohen first to make sure she's ok with it.    ACP/Mood-- long discussion as summarized above.    71 minutes spent on the date of the encounter doing chart review, history and exam, documentation and further activities as noted above.    Thank you for involving us in the patient's care.   Roscoe Elam MD / Palliative Medicine / Text me via [facility].          Video Start Time:   Video-Visit Details    Type of service:  Video  Visit    Video End Time:    Originating Location (pt. Location): Home    Distant Location (provider location):  home    Platform used for Video Visit: Jose Juan

## 2021-02-08 ENCOUNTER — MYC MEDICAL ADVICE (OUTPATIENT)
Dept: PALLIATIVE CARE | Facility: CLINIC | Age: 71
End: 2021-02-08

## 2021-02-08 DIAGNOSIS — R11.0 NAUSEA: ICD-10-CM

## 2021-02-08 RX ORDER — OLANZAPINE 2.5 MG/1
2.5 TABLET, FILM COATED ORAL DAILY
Qty: 30 TABLET | Refills: 1 | Status: SHIPPED | OUTPATIENT
Start: 2021-02-08 | End: 2021-02-09

## 2021-02-09 RX ORDER — OLANZAPINE 2.5 MG/1
2.5 TABLET, FILM COATED ORAL DAILY
Qty: 30 TABLET | Refills: 1 | Status: SHIPPED | OUTPATIENT
Start: 2021-02-09 | End: 2021-06-23

## 2021-02-09 NOTE — TELEPHONE ENCOUNTER
Called patient in response to Adzillat message about concerns with olanzapine. She tells me that she is traditionally really sensitive to meds and scared to try new things - had a terrible experience with opiates once and constipation is terrible with only 1 zofran. She is really worried about side effects after googling side effects. She is concerned that it is a mental health med and we are recommending it for nausea.     We talked about this at length. Explained this med is technically an anti-psychotic med, but that we use it at much lower doses for nausea a lot, particularly in our cancer patients and find it to be effective. Reviewed common side effects that we see with the doses we use - sleepiness (which is why we recommend taking at bedtime to start) and increase in appetite along with decrease in nausea.     She tells me that nausea is really tough for her - she would rather pain to nausea. She feels nausea makes her unable to function and do the things she needs to.     She shares with me that she really doesn't like medicine at all and before she got sick she didn't take anything at all, this is why she is so resistant to any med changes (along with being cautious of side effects).     She asks if vitamin B6 would be helpful, she knows it is used for morning sickness.     After our discussion she tells me that she will try olanzapine and let us know how she is doing. Would like script sent to her local [facility] instead of mail order [facility] where it was sent originally. Advised I would ask the MD to resend the scripts. She understands to stop ondansetron and *replace* that with olanzapine. We talked about trying olanzapine consistently nightly for at least one week as long as she is not having side effects.     26 mins spent on the phone with patient.

## 2021-02-11 NOTE — TELEPHONE ENCOUNTER
Received VM from patient - she heard from her pharmacy that olanzapine was going to cost over $200, she cannot afford that and will not be picking this up.    Called [facility] to check in on this - they saw that it was actually already mailed out through the [facility] as a cost of $4.48. The over $200 price was because it was not run through insurance as it was already run through insurance at the mail order pharmacy. It was mailed out yesterday.    Called patient and advised of the above. She was grateful to hear of the misunderstanding and that the medication is affordable and on it's way. She will call with any other issues if they arise.

## 2021-02-12 ENCOUNTER — MYC MEDICAL ADVICE (OUTPATIENT)
Dept: CARDIOLOGY | Facility: CLINIC | Age: 71
End: 2021-02-12

## 2021-02-12 DIAGNOSIS — I50.32 CHRONIC DIASTOLIC HEART FAILURE (H): Primary | ICD-10-CM

## 2021-02-12 NOTE — PROGRESS NOTES
Date: 2/12/2021    Time of Call: 10:21 AM     Diagnosis:  HFpEF     [ VORB ] Ordering provider: Yolanda MORGAN  Order: BMP every 3weeks     Order received by: Vidhi Montgomery RN      Follow-up/additional notes:

## 2021-02-21 ENCOUNTER — MYC MEDICAL ADVICE (OUTPATIENT)
Dept: CARDIOLOGY | Facility: CLINIC | Age: 71
End: 2021-02-21

## 2021-02-21 DIAGNOSIS — I48.0 PAROXYSMAL ATRIAL FIBRILLATION (H): ICD-10-CM

## 2021-02-21 DIAGNOSIS — I43 AMYLOID HEART DISEASE (H): ICD-10-CM

## 2021-02-21 DIAGNOSIS — I50.33 ACUTE ON CHRONIC DIASTOLIC HEART FAILURE (H): ICD-10-CM

## 2021-02-21 DIAGNOSIS — E85.4 AMYLOID HEART DISEASE (H): ICD-10-CM

## 2021-02-22 ENCOUNTER — MYC MEDICAL ADVICE (OUTPATIENT)
Dept: CARDIOLOGY | Facility: CLINIC | Age: 71
End: 2021-02-22

## 2021-02-22 RX ORDER — TORSEMIDE 100 MG/1
100 TABLET ORAL 2 TIMES DAILY
Qty: 180 TABLET | Refills: 3 | Status: SHIPPED | OUTPATIENT
Start: 2021-02-22 | End: 2021-04-15

## 2021-02-22 NOTE — TELEPHONE ENCOUNTER
Clarified that Amy is getting medications through [facility] mail order now instead of [facility].  Switched scripts to [facility].      No results yet on Zio patch, will continue to watch for them

## 2021-02-23 ENCOUNTER — DOCUMENTATION ONLY (OUTPATIENT)
Dept: LAB | Facility: CLINIC | Age: 71
End: 2021-02-23

## 2021-02-25 ENCOUNTER — PATIENT OUTREACH (OUTPATIENT)
Dept: CARDIOLOGY | Facility: CLINIC | Age: 71
End: 2021-02-25

## 2021-02-25 DIAGNOSIS — E85.81 AL AMYLOIDOSIS (H): ICD-10-CM

## 2021-02-25 DIAGNOSIS — I50.33 ACUTE ON CHRONIC DIASTOLIC HEART FAILURE (H): Primary | ICD-10-CM

## 2021-02-25 DIAGNOSIS — I50.32 CHRONIC DIASTOLIC HEART FAILURE (H): ICD-10-CM

## 2021-02-25 LAB
ANION GAP SERPL CALCULATED.3IONS-SCNC: 4 MMOL/L (ref 3–14)
BUN SERPL-MCNC: 32 MG/DL (ref 7–30)
CALCIUM SERPL-MCNC: 9.2 MG/DL (ref 8.5–10.1)
CHLORIDE SERPL-SCNC: 98 MMOL/L (ref 94–109)
CO2 SERPL-SCNC: 30 MMOL/L (ref 20–32)
CREAT SERPL-MCNC: 1.16 MG/DL (ref 0.52–1.04)
GFR SERPL CREATININE-BSD FRML MDRD: 48 ML/MIN/{1.73_M2}
GLUCOSE SERPL-MCNC: 74 MG/DL (ref 70–99)
POTASSIUM SERPL-SCNC: 5 MMOL/L (ref 3.4–5.3)
SODIUM SERPL-SCNC: 132 MMOL/L (ref 133–144)

## 2021-02-25 PROCEDURE — 80048 BASIC METABOLIC PNL TOTAL CA: CPT | Performed by: PHYSICIAN ASSISTANT

## 2021-02-25 PROCEDURE — 36415 COLL VENOUS BLD VENIPUNCTURE: CPT | Performed by: PHYSICIAN ASSISTANT

## 2021-02-25 NOTE — TELEPHONE ENCOUNTER
Lab result note reviewed. K 5.0. called lab to clarify if sample hemolyzed. They report on hemolysis scale it is a 2 out of 8.   Reviewed with Yolanda Araya and joya Reyes with following recommendations:  Date: 2/25/2021    Time of Call: 2:51 PM     Diagnosis:  HFpEF     [ VORB ] Ordering provider: Yolanda MORGAN  Order: decrease K from 60 meq tid to 60/60/40 for 3 days and recheck BMP end of next week     Order received by: Vidhi Montgomery RN      Follow-up/additional notes: Vidhi Montgomery RN

## 2021-03-04 ENCOUNTER — VIRTUAL VISIT (OUTPATIENT)
Dept: ONCOLOGY | Facility: CLINIC | Age: 71
End: 2021-03-04
Attending: INTERNAL MEDICINE
Payer: COMMERCIAL

## 2021-03-04 VITALS — BODY MASS INDEX: 26.57 KG/M2 | WEIGHT: 150 LBS

## 2021-03-04 DIAGNOSIS — R11.0 NAUSEA: ICD-10-CM

## 2021-03-04 DIAGNOSIS — I43 CARDIAC AMYLOIDOSIS (H): Primary | ICD-10-CM

## 2021-03-04 DIAGNOSIS — E85.4 CARDIAC AMYLOIDOSIS (H): Primary | ICD-10-CM

## 2021-03-04 PROCEDURE — 99213 OFFICE O/P EST LOW 20 MIN: CPT | Mod: 95 | Performed by: INTERNAL MEDICINE

## 2021-03-04 PROCEDURE — 999N001193 HC VIDEO/TELEPHONE VISIT; NO CHARGE

## 2021-03-04 RX ORDER — ONDANSETRON 8 MG/1
8 TABLET, ORALLY DISINTEGRATING ORAL EVERY 8 HOURS PRN
COMMUNITY
Start: 2020-11-23 | End: 2022-06-04

## 2021-03-04 ASSESSMENT — PAIN SCALES - GENERAL: PAINLEVEL: NO PAIN (0)

## 2021-03-04 NOTE — PROGRESS NOTES
"Amy is a 70 year old who is being evaluated via a billable telephone visit.      What phone number would you like to be contacted at? [phone]    How would you like to obtain your AVS? [facility]      Palliative Care Outpatient Clinic    (This note was transcribed using voice recognition software. While I review and edit the transcription, I may miss errors, and the software sometimes does unexpected capitalizations and formatting that I miss. Please let me know of any serious mistranscriptions and I will addend this note.)    Patient ID:  Medical - She has stage IV AL amyoid dx 2016 with heart, gut, and bone marrow involvement, s/p chemo in 2016 leading to a long term 'hematologic' remission; on long-term doxycycline. Dr Cohen describes her AL amyoid HF as poor prognosis and notes though she has nonetheless done quite well/stable after chemo several years ago with her cardiomyopathy, although late  has had some progressive sx. She is on high dose torsemide, and metolazone.     We met 2021--nausea, care planning     Social - Lives in [place] with her . A sister  of myeloma. Retired . Dtr and grandkids in [facility]; son in [place]. No CARMINA hx.      Care Planning - +HCD on chart. Discussion 21: in past has had DNR/DNI order, now FC. She says \"I would like everything except the paddles. If it looks like there was a high percentage I'd come out of it, I'd go on the life support machine, but if you were just going to keep me on a machine to die don't do that.\" She has discussed her wishes with Miguel Ángel. We discuss essentials--she clearly states if she had to live long term disabled (people taking car of her body-bathroom/feeding/bathing) she would not want us to keep her alive like that.       History:  History gathered today from: patient, medical chart  Saw her PCP--wrist pain and ganglion cyst, recommended neurology referral and OT    Nausea--has improved somewhat. Doing aromatherapy " "(peppermint) and ginger ale with some improvement, \"it's more tolerable\" overall. Still has bad days eg yesterday did a lot of housework and then felt really nauseated and had to stop. Took a lorazepam. She has not tried the olanzapine but does have it on hand.     Going to her cabin in [place] on Sunday. Got COVID vax yesterday.     Resps stable. Takes prn metolazone about q4d and urinates a lot.     Globally feels better than when I met her last mo    PE: Wt 68 kg (150 lb)   BMI 26.57 kg/m     Wt Readings from Last 3 Encounters:   03/04/21 68 kg (150 lb)   02/04/21 68 kg (150 lb)   11/13/20 70.4 kg (155 lb 3.2 oz)     On the phone, patient sounds alert and in NAD.  Speaking full sentences, no cough, no noisy resps.  Affect full; pleasant; interactive; clear sounding sensorium; fluent speech; memory/thought processes/fund of knowledge all normal.             Impression & Recommendations:  71 yo F with severe cardiac amyloid and associated chronic nausea  Unfortunately this was just a telephone call today for technical issues.    She has improved with aromatherapy. Discussed keeping olanzapine on hand for a really severe nausea spell, asked her to let me know if she uses it and how it goes. Discussed other behavioral approaches to nausea. F/u 3 mo      10 min on phone with her  22 minutes spent on the date of the encounter doing chart review, history and exam, documentation and further activities as noted above.    Thank you for involving us in the patient's care.   Roscoe Elam MD / Palliative Medicine / Text me via [facility].      "

## 2021-03-04 NOTE — LETTER
"    3/4/2021         RE: Leandra Guerrero  [address] [place] [address]        Dear Colleague,    Thank you for referring your patient, Leandra Guerrero, to the [facility]. Please see a copy of my visit note below.    Amy is a 70 year old who is being evaluated via a billable telephone visit.      What phone number would you like to be contacted at? [phone]    How would you like to obtain your AVS? [facility]      Palliative Care Outpatient Clinic    (This note was transcribed using voice recognition software. While I review and edit the transcription, I may miss errors, and the software sometimes does unexpected capitalizations and formatting that I miss. 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 Dr Cohen describes her AL amyoid HF as poor prognosis and notes though she has nonetheless done quite well/stable after chemo several years ago with her cardiomyopathy, although late  has had some progressive sx. She is on high dose torsemide, and metolazone.     We met 2021--nausea, care planning     Social - Lives in [place] with her . A sister  of myeloma. Retired . Dtr and grandkids in [facility]; son in [place]. No CARMINA hx.      Care Planning - +HCD on chart. Discussion 21: in past has had DNR/DNI order, now FC. She says \"I would like everything except the paddles. If it looks like there was a high percentage I'd come out of it, I'd go on the life support machine, but if you were just going to keep me on a machine to die don't do that.\" She has discussed her wishes with Miguel Ángel. We discuss essentials--she clearly states if she had to live long term disabled (people taking car of her " "body-bathroom/feeding/bathing) she would not want us to keep her alive like that.       History:  History gathered today from: patient, medical chart  Saw her PCP--wrist pain and ganglion cyst, recommended neurology referral and OT    Nausea--has improved somewhat. Doing aromatherapy (peppermint) and ginger ale with some improvement, \"it's more tolerable\" overall. Still has bad days eg yesterday did a lot of housework and then felt really nauseated and had to stop. Took a lorazepam. She has not tried the olanzapine but does have it on hand.     Going to her cabin in [place] on Sunday. Got COVID vax yesterday.     Resps stable. Takes prn metolazone about q4d and urinates a lot.     Globally feels better than when I met her last mo    PE: Wt 68 kg (150 lb)   BMI 26.57 kg/m     Wt Readings from Last 3 Encounters:   03/04/21 68 kg (150 lb)   02/04/21 68 kg (150 lb)   11/13/20 70.4 kg (155 lb 3.2 oz)     On the phone, patient sounds alert and in NAD.  Speaking full sentences, no cough, no noisy resps.  Affect full; pleasant; interactive; clear sounding sensorium; fluent speech; memory/thought processes/fund of knowledge all normal.             Impression & Recommendations:  69 yo F with severe cardiac amyloid and associated chronic nausea  Unfortunately this was just a telephone call today for technical issues.    She has improved with aromatherapy. Discussed keeping olanzapine on hand for a really severe nausea spell, asked her to let me know if she uses it and how it goes. Discussed other behavioral approaches to nausea. F/u 3 mo      10 min on phone with her  22 minutes spent on the date of the encounter doing chart review, history and exam, documentation and further activities as noted above.    Thank you for involving us in the patient's care.   Roscoe Elam MD / Palliative Medicine / Text me via [facility].          Again, thank you for allowing me to participate in the care of your patient.  "       Sincerely,        Roscoe Elam MD

## 2021-03-04 NOTE — LETTER
"    3/4/2021         RE: Leandra Guerrero  [address] [place] [address]        Dear Colleague,    Thank you for referring your patient, Leandra Guerrero, to the [facility]. Please see a copy of my visit note below.    Amy is a 70 year old who is being evaluated via a billable telephone visit.      What phone number would you like to be contacted at? [phone]    How would you like to obtain your AVS? [facility]      Palliative Care Outpatient Clinic    (This note was transcribed using voice recognition software. While I review and edit the transcription, I may miss errors, and the software sometimes does unexpected capitalizations and formatting that I miss. 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 Dr Cohen describes her AL amyoid HF as poor prognosis and notes though she has nonetheless done quite well/stable after chemo several years ago with her cardiomyopathy, although late  has had some progressive sx. She is on high dose torsemide, and metolazone.     We met 2021--nausea, care planning     Social - Lives in [place] with her . A sister  of myeloma. Retired . Dtr and grandkids in [place] [place] [facility] [place]; son in [place]. No CARMINA hx.      Care Planning - +HCD on chart. Discussion 21: in past has had DNR/DNI order, now FC. She says \"I would like everything except the paddles. If it looks like there was a high percentage I'd come out of it, I'd go on the life support machine, but if you were just going to keep me on a machine to die don't do that.\" She has discussed her wishes with Miguel Ángel. We discuss essentials--she clearly states if she had to live long term disabled (people taking car of her " "body-bathroom/feeding/bathing) she would not want us to keep her alive like that.       History:  History gathered today from: patient, medical chart  Saw her PCP--wrist pain and ganglion cyst, recommended neurology referral and OT    Nausea--has improved somewhat. Doing aromatherapy (peppermint) and ginger ale with some improvement, \"it's more tolerable\" overall. Still has bad days eg yesterday did a lot of housework and then felt really nauseated and had to stop. Took a lorazepam. She has not tried the olanzapine but does have it on hand.     Going to her cabin in [place] on Sunday. Got COVID vax yesterday.     Resps stable. Takes prn metolazone about q4d and urinates a lot.     Globally feels better than when I met her last mo    PE: Wt 68 kg (150 lb)   BMI 26.57 kg/m     Wt Readings from Last 3 Encounters:   03/04/21 68 kg (150 lb)   02/04/21 68 kg (150 lb)   11/13/20 70.4 kg (155 lb 3.2 oz)     On the phone, patient sounds alert and in NAD.  Speaking full sentences, no cough, no noisy resps.  Affect full; pleasant; interactive; clear sounding sensorium; fluent speech; memory/thought processes/fund of knowledge all normal.             Impression & Recommendations:  71 yo F with severe cardiac amyloid and associated chronic nausea  Unfortunately this was just a telephone call today for technical issues.    She has improved with aromatherapy. Discussed keeping olanzapine on hand for a really severe nausea spell, asked her to let me know if she uses it and how it goes. Discussed other behavioral approaches to nausea. F/u 3 mo      10 min on phone with her  22 minutes spent on the date of the encounter doing chart review, history and exam, documentation and further activities as noted above.    Thank you for involving us in the patient's care.   Roscoe Elam MD / Palliative Medicine / Text me via [facility].          Again, thank you for allowing me to participate in the care of your patient.  "       Sincerely,        Roscoe Elam MD

## 2021-03-05 ENCOUNTER — MYC MEDICAL ADVICE (OUTPATIENT)
Dept: CARDIOLOGY | Facility: CLINIC | Age: 71
End: 2021-03-05

## 2021-03-05 DIAGNOSIS — I48.0 PAROXYSMAL ATRIAL FIBRILLATION (H): ICD-10-CM

## 2021-03-05 DIAGNOSIS — I50.33 ACUTE ON CHRONIC DIASTOLIC HEART FAILURE (H): ICD-10-CM

## 2021-03-05 LAB
ANION GAP SERPL CALCULATED.3IONS-SCNC: 3 MMOL/L (ref 3–14)
BUN SERPL-MCNC: 42 MG/DL (ref 7–30)
CALCIUM SERPL-MCNC: 9.6 MG/DL (ref 8.5–10.1)
CHLORIDE SERPL-SCNC: 100 MMOL/L (ref 94–109)
CO2 SERPL-SCNC: 28 MMOL/L (ref 20–32)
CREAT SERPL-MCNC: 1.22 MG/DL (ref 0.52–1.04)
GFR SERPL CREATININE-BSD FRML MDRD: 45 ML/MIN/{1.73_M2}
GLUCOSE SERPL-MCNC: 93 MG/DL (ref 70–99)
POTASSIUM SERPL-SCNC: 6.5 MMOL/L (ref 3.4–5.3)
SODIUM SERPL-SCNC: 131 MMOL/L (ref 133–144)

## 2021-03-05 PROCEDURE — 36415 COLL VENOUS BLD VENIPUNCTURE: CPT | Performed by: PHYSICIAN ASSISTANT

## 2021-03-05 PROCEDURE — 80048 BASIC METABOLIC PNL TOTAL CA: CPT | Performed by: PHYSICIAN ASSISTANT

## 2021-03-05 NOTE — TELEPHONE ENCOUNTER
Received message from CATHY Orozco that K was critically high and not a hemolyzed sample.  Called Amy and directed her to report to the ER for treatment.  Updated [facility] that she would be arriving with K of 6.5.

## 2021-03-07 ENCOUNTER — MYC MEDICAL ADVICE (OUTPATIENT)
Dept: CARDIOLOGY | Facility: CLINIC | Age: 71
End: 2021-03-07

## 2021-03-07 DIAGNOSIS — E85.4 CARDIAC AMYLOIDOSIS (H): Primary | ICD-10-CM

## 2021-03-07 DIAGNOSIS — I43 CARDIAC AMYLOIDOSIS (H): Primary | ICD-10-CM

## 2021-03-07 DIAGNOSIS — I43 AMYLOID HEART DISEASE (H): ICD-10-CM

## 2021-03-07 DIAGNOSIS — E85.4 AMYLOID HEART DISEASE (H): ICD-10-CM

## 2021-03-08 DIAGNOSIS — I43 CARDIAC AMYLOIDOSIS (H): ICD-10-CM

## 2021-03-08 DIAGNOSIS — E85.4 CARDIAC AMYLOIDOSIS (H): ICD-10-CM

## 2021-03-08 LAB
ANION GAP SERPL CALCULATED.3IONS-SCNC: 4 MMOL/L (ref 3–14)
BUN SERPL-MCNC: 27 MG/DL (ref 7–30)
CALCIUM SERPL-MCNC: 9 MG/DL (ref 8.5–10.1)
CHLORIDE SERPL-SCNC: 100 MMOL/L (ref 94–109)
CO2 SERPL-SCNC: 32 MMOL/L (ref 20–32)
CREAT SERPL-MCNC: 1 MG/DL (ref 0.52–1.04)
GFR SERPL CREATININE-BSD FRML MDRD: 58 ML/MIN/{1.73_M2}
GLUCOSE SERPL-MCNC: 83 MG/DL (ref 70–99)
POTASSIUM SERPL-SCNC: 3.6 MMOL/L (ref 3.4–5.3)
SODIUM SERPL-SCNC: 136 MMOL/L (ref 133–144)

## 2021-03-08 PROCEDURE — 80048 BASIC METABOLIC PNL TOTAL CA: CPT | Performed by: PHYSICIAN ASSISTANT

## 2021-03-08 PROCEDURE — 36415 COLL VENOUS BLD VENIPUNCTURE: CPT | Performed by: PHYSICIAN ASSISTANT

## 2021-03-08 RX ORDER — POTASSIUM CHLORIDE 750 MG/1
40 TABLET, EXTENDED RELEASE ORAL 2 TIMES DAILY
Qty: 720 TABLET | Refills: 3 | Status: SHIPPED | OUTPATIENT
Start: 2021-03-08 | End: 2021-03-12

## 2021-03-08 NOTE — TELEPHONE ENCOUNTER
Date: 3/8/2021    Time of Call: 8:26 AM     Diagnosis:  Heart failure     [ VORB ] Ordering provider: CATHY Orozco    Order: BMP     Order received by: Niki Fam RN       Follow-up/addtional notes: will get labs checked.  She has not taken her meds today.  Was told to hold her torsemide, but she noted that her weight was up and ended up taking a few doses of Torsemide with good output (4 lbs).      Discussed with Yolanda, will have her hold off taking the tormsemide until after labs to make a better plan.  Amy stated understanding and has a lab appt for 9:30

## 2021-03-08 NOTE — TELEPHONE ENCOUNTER
Date: 3/8/2021    Time of Call: 1:18 PM     Diagnosis:  Heart failure     [ VORB ] Ordering provider: CATHY Orozco    Order: restart Torsemide 100 mg BID, decrease potassium to 40 mEq BID, labs Thursday     Order received by: Niki Fam RN       Follow-up/additional notes: Amy stated understanding but stated that she will be unable to start BID dosing today- as it is already 1:15 and that she will be going to [place]  This week.  She provided me a clinic to fax lab orders to in [place] ([facility]- p: [phone], f: [phone])    Weight is currently 158 and she normally takes metolazone at 154 lbs, she will take the Torsemide 100 mg today and monitor

## 2021-03-10 ENCOUNTER — TELEPHONE (OUTPATIENT)
Dept: CARDIOLOGY | Facility: CLINIC | Age: 71
End: 2021-03-10

## 2021-03-10 NOTE — TELEPHONE ENCOUNTER
Patient was called - the local pharmacy had been able to pull the order from the mail order pharmacy for her. All is as it should be now.

## 2021-03-10 NOTE — TELEPHONE ENCOUNTER
[facility] Call Center    Phone Message    May a detailed message be left on voicemail: yes     Reason for Call: Other: Pt returning call from Ruby Doege, reports that she already picked up the eliquis prescription on 3/8. Would like a call back to discuss why another one was sent today.     Action Taken: Message routed to:  [facility] cardio    Travel Screening: Not Applicable

## 2021-03-11 ENCOUNTER — PATIENT OUTREACH (OUTPATIENT)
Dept: CARDIOLOGY | Facility: CLINIC | Age: 71
End: 2021-03-11

## 2021-03-11 NOTE — TELEPHONE ENCOUNTER
Call received from [facility] - critical lab value with K of 2.6. Faxing the rest of results. Reviewed with Yolanda Araya. Recommend going to ER for K replacement. Called Amy, reviewed this with her and she verbalized understanding. She has a local ER she knows that she can go to. Vidhi Montgomery RN

## 2021-03-12 ENCOUNTER — MYC MEDICAL ADVICE (OUTPATIENT)
Dept: CARDIOLOGY | Facility: CLINIC | Age: 71
End: 2021-03-12

## 2021-03-12 DIAGNOSIS — E85.4 AMYLOID HEART DISEASE (H): ICD-10-CM

## 2021-03-12 DIAGNOSIS — E85.4 CARDIAC AMYLOIDOSIS (H): Primary | ICD-10-CM

## 2021-03-12 DIAGNOSIS — I43 AMYLOID HEART DISEASE (H): ICD-10-CM

## 2021-03-12 DIAGNOSIS — I43 CARDIAC AMYLOIDOSIS (H): Primary | ICD-10-CM

## 2021-03-12 RX ORDER — POTASSIUM CHLORIDE 750 MG/1
TABLET, EXTENDED RELEASE ORAL
Qty: 1176 TABLET | Refills: 3 | Status: SHIPPED | OUTPATIENT
Start: 2021-03-12 | End: 2021-04-15

## 2021-03-12 NOTE — TELEPHONE ENCOUNTER
Date: 3/12/2021    Time of Call: 3:53 PM     Diagnosis:  Heart failure     [ VORB ] Ordering provider: Dr Cohen    Order: potassium 60 mEq BID, extra 60 mEq on metolazone days, BMP next week      Order received by: Niki Fam RN       Follow-up/additional notes: faxed orders to [facility]

## 2021-03-15 ENCOUNTER — TELEPHONE (OUTPATIENT)
Dept: CARDIOLOGY | Facility: CLINIC | Age: 71
End: 2021-03-15

## 2021-03-15 NOTE — TELEPHONE ENCOUNTER
Pt needs to reschedule May 3rd appointment with Quiana with labs prior.     appts have been cancelled due to Quiana's unavailability

## 2021-03-17 LAB
ANION GAP SERPL CALCULATED.3IONS-SCNC: 3 MMOL/L
BUN SERPL-MCNC: 36 MG/DL
CALCIUM SERPL-MCNC: 9.4 MG/DL
CHLORIDE SERPLBLD-SCNC: 97 MMOL/L
CO2 SERPL-SCNC: 36 MMOL/L
CREAT SERPL-MCNC: 1.31 MG/DL
GFR SERPL CREATININE-BSD FRML MDRD: 40 ML/MIN/1.73M2
GLUCOSE SERPL-MCNC: 89 MG/DL (ref 70–99)
POTASSIUM SERPL-SCNC: 4.6 MMOL/L
SODIUM SERPL-SCNC: 136 MMOL/L

## 2021-03-23 DIAGNOSIS — E85.81 AL AMYLOIDOSIS (H): ICD-10-CM

## 2021-03-23 RX ORDER — DOXYCYCLINE HYCLATE 100 MG
100 TABLET ORAL DAILY
Qty: 90 TABLET | Refills: 3 | Status: ON HOLD | OUTPATIENT
Start: 2021-03-23 | End: 2021-11-07

## 2021-03-26 ENCOUNTER — PATIENT OUTREACH (OUTPATIENT)
Dept: CARDIOLOGY | Facility: CLINIC | Age: 71
End: 2021-03-26

## 2021-03-26 DIAGNOSIS — E85.4 CARDIAC AMYLOIDOSIS (H): ICD-10-CM

## 2021-03-26 DIAGNOSIS — I43 CARDIAC AMYLOIDOSIS (H): ICD-10-CM

## 2021-03-26 LAB
ANION GAP SERPL CALCULATED.3IONS-SCNC: 4 MMOL/L (ref 3–14)
BUN SERPL-MCNC: 35 MG/DL (ref 7–30)
CALCIUM SERPL-MCNC: 9 MG/DL (ref 8.5–10.1)
CHLORIDE SERPL-SCNC: 95 MMOL/L (ref 94–109)
CO2 SERPL-SCNC: 34 MMOL/L (ref 20–32)
CREAT SERPL-MCNC: 1.15 MG/DL (ref 0.52–1.04)
GFR SERPL CREATININE-BSD FRML MDRD: 49 ML/MIN/{1.73_M2}
GLUCOSE SERPL-MCNC: 95 MG/DL (ref 70–99)
POTASSIUM SERPL-SCNC: 3.5 MMOL/L (ref 3.4–5.3)
SODIUM SERPL-SCNC: 133 MMOL/L (ref 133–144)

## 2021-03-26 PROCEDURE — 80048 BASIC METABOLIC PNL TOTAL CA: CPT | Performed by: INTERNAL MEDICINE

## 2021-03-26 PROCEDURE — 36415 COLL VENOUS BLD VENIPUNCTURE: CPT | Performed by: INTERNAL MEDICINE

## 2021-03-26 NOTE — TELEPHONE ENCOUNTER
Checked on Amy's labs, WNL for her.  Called to see that she was doing alright symptom-wise, left a message.

## 2021-04-01 ENCOUNTER — VIRTUAL VISIT (OUTPATIENT)
Dept: ONCOLOGY | Facility: CLINIC | Age: 71
End: 2021-04-01
Attending: INTERNAL MEDICINE
Payer: COMMERCIAL

## 2021-04-01 DIAGNOSIS — Z51.5 ENCOUNTER FOR PALLIATIVE CARE: Primary | ICD-10-CM

## 2021-04-01 PROCEDURE — 96127 BRIEF EMOTIONAL/BEHAV ASSMT: CPT | Mod: TEL,95 | Performed by: SOCIAL WORKER

## 2021-04-01 NOTE — LETTER
4/1/2021         RE: Leandra Guerrero  [address] [place] [address]        Dear Colleague,    Thank you for referring your patient, Leandra Guerrero, to the [facility]. Please see a copy of my visit note below.    Telemedicine Visit: The patient's condition can be safely assessed and treated via synchronous audio and visual telemedicine encounter.      Reason for Telemedicine Visit: covid19 precautions    Originating Site (Patient Location): Patient's home    Distant Site (Provider Location): [facility] Clinics: [facility]    Consent:  The patient/guardian has verbally consented to: the potential risks and benefits of telemedicine (video visit) versus in person care; bill my insurance or make self-payment for services provided; and responsibility for payment of non-covered services.     Mode of Communication:  Video Conference via [employer]    As the provider I attest to compliance with applicable laws and regulations related to telemedicine.      Palliative Care Counseling Services - Initial Assessment    PLEASE NOTE:  THIS IS A MENTAL HEALTH NOTE.  OTHER PROVIDERS VIEWING THIS NOTE SHOULD USE THIS ONLY FOR UNDERSTANDING THE CONTEXT OF THE PATIENT S EXPERIENCE.  TOPICS DESCRIBED IN THIS NOTE SHOULD NOT BE REFERENCED TO THE PATIENT BY MEDICAL PROVIDERS    Amy Guerrero is a 70 year old woman with stage cardiac amyloid, seen today for initial palliative care counseling assessment via phone due to [employer] audio not connecting.    Referred by: Dr. Elam    Presenting Issues: Symptom management and Coping with illness    Preferred Name: Amy     Mental Status Exam: (List all that apply)      Appearance: Appropriate      Eye Contact: Good       Orientation: Yes, x4      Mood: Normal      Affect: Appropriate        Thought Content: Clear         Thought Form: Logical      Psychomotor Behavior: Normal    Family:       Marital status:     Years  ": 47    Years together: not asked      Name of spouse/partner: Oscar Guerrero       Children: 2 children Son Cornelio and daughter Evon      Parents:        Siblings: 1 brother.  He is 21 years older than her       Other:     Support system: Family, Friends and Marium community    Living situation: House   Difficulty accessing and/or getting around living space: no 4 story house, after her hospitalization last year steps became more difficult and so they converted a room on their main level to a bedroom for her   Other concerns: no     Employment history:      Current employment status:  Retired         Kind of work:  Secretarial       Spouses/SO current employment status: Employed full time      Kind of work:     Education highest level:     Financial:       Descriptor: not asked       Health insurance:     Legal concerns: no       Area(s) of concern:     Health Care Directive: Has one:  yes       If yes, copy in EMR: Yes       Basic information regarding health care directive provided: yes        Health Care Agent(s): Named in health care directive  Spouse named as primary, daughter named as secondary  POLST?     Medical History/Issues (patient account): history of amaloidosis, has affected the heart \"so it doesn't pump like its supposed to\".  SHe is in the space of juggling managing symptoms especially fluid retention.  She shared that her disease \"is stage IV, and there's no stage V\".  She understands this is terminal, feels what happens is \"up to God\".  She continues to talk with her medical providers about this.  Goal at this time was to feel well enough to see her family for .  SHe shared that she struggles with fatigue, and, at times, with nausea.  Reports that this symptom, in particular, seems to come up in the afternoons \"if I over do it\".  Usually she will rest and try to manage this with peppermint or ginger ale.  She doesn't vomit as a result of the nausea \"though " "maybe I'd feel better if I did\".  We talked about mind/body resources that might help to manage this and agreed to start with setting a time to do a very basic \"body scan\".  Amy noted she has some familiarity with this from lamaze classes.  Thought behind this was to set a time in the late morning or early afternoon, hopefully before nausea has set in.  Take a moment to rest, and take stock of how she is feeling. This might help with sense of groundedness, and help her to conserve energy.      Pain/Discomfort Issues: Nausea/Vomiting Nausea was main focus today.     Coping: Many losses along the way of this illness.  Amy misses being able to work, as she loved her job.  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  Not being able to do this has been a big loss.      Sleep: will often wake up at 3AM and not be able to get back to sleep.  Does rest during the day.     Sexual Health/Intimacy: not asked     Mental Health History and Current Review of Symptoms (patient account):     Depression in past?: no    Treatment in past?:  no   Treatment currently?:      Depression symptoms currently?:  - Anhedonia:  no  - Hopeless or down mood:  no  - Sleep problems (too much or too little):  Yes related to illness  - Fatigue:  Yes related to illness   - Appetite (too much or too little):    - Excessively negative self perception:  no  - Trouble concentrating:  no  - Motor slowness:  no  - Current and/or recent thoughts of suicide:  no    Suicide risk screen:  - Past thoughts of suicide?  no  - Past attempts to end own life?  no  - If yes, how?   - Protective factors currently? - Safety plan needed currently?     Anxiety in past?: no   Treatment in past?  no   Treatment currently?  no     Anxiety symptoms currently?  - Nervous, on edge:  no  - Sleep problems:  yes  - Worrying a lot/hard to manage worries:  no  Specific recent areas of worry/fear/concern:   - Tense, " "hard to relax:  no  - Feeling restless, hard to sit still:  no  - Irritable:  no  - Feeling of dread/ afraid that something awful may happen:  no  - How long?   - Impacts on daily life?     Any other mental health diagnosis or symptoms in past?:  no    Any family history of mental health concerns?  no      Positive Bipolar Disorder screen?  no   Positive Thought Disorder screen? no   History of learning difficulties? no       Grief vs Depression:  Endorses symptoms for: Grief    Psychological Trauma and/or Major Losses:     None     Nightmares?    no  Thought about when not wanting to think about? no  Tried hard not to think about it? yes  Avoided situations that reminded you of it? no  Evans constantly on guard, watchful, or easily startled? no  Felt numb or detached from others, activities, or your surroundings? no    Safety Screen:  History of being harmed or controlled by someone close to you?  no  Being hurt or controlled by someone close to you?  no  Worried will be harmed in future?  no  Worried will harm someone else in future?  no    CD History:   Using alcohol actively? no    Amount per week:   Current concerns (self or other) about alcohol or drug use? no  Past concerns and/or CD treatment? no      Medications:   Any current concerns? no  Taking as prescribed currently? Yes     Marium/Spirituality:       Belong to a marium community: yes     Specify:        Identify with a particular Bahai: Latter day       Identify as spiritual: yes      How find expression: Prayer    Hope:      What do you hope for:    Good time with family,       What gives you hope:    Marium, family      Internal Resources (positive memories, sources of jacob): marium     Perceived Needs: Help with symptom coping     Resource needs/Referrals: None    Assessment:  Amy Guerreor presents as a 70 year old woman, facing heart amyloid. Coping concerns include: ongoing nausea connected to fatigue and \"over doing it\".  SHe does not meet " criteria for a DSM5 diagnosis based on my assessment today.  Most interested in working on nausea and symptom management. Discussed tactics for this.      Beneficial palliative care counseling interventions may include: mind/body teaching         Intervention: Initial palliative care counseling / clinical social work evaluation was conducted.  Palliative Care Counseling interventions available were discussed, including counseling related to serious illness, behavioral interventions for symptom management, consultation regarding goals of care/health care directive/POLST, and other interventions specific to the patient's situation or concerns.     Plan: will follow up in 4 weeks.     DSM5 Diagnoses:   Deferred     Time Spent with Patient/Family: 40  (Start 1:30, end 2:10)    LATRICE Quiñones, [facility]   Palliative Care    Pgr:[phone]  Ph: [phone]      DO NOT SEND ANY LETTERS          Again, thank you for allowing me to participate in the care of your patient.        Sincerely,        KODY Quiñones

## 2021-04-01 NOTE — LETTER
4/1/2021         RE: Leandra Guerrero  [address] [place] [address]        Dear Colleague,    Thank you for referring your patient, Leandra Guerrero, to the [facility] [place]. Please see a copy of my visit note below.    Telemedicine Visit: The patient's condition can be safely assessed and treated via synchronous audio and visual telemedicine encounter.      Reason for Telemedicine Visit: covid19 precautions    Originating Site (Patient Location): Patient's home    Distant Site (Provider Location): [facility] Clinics: [facility]    Consent:  The patient/guardian has verbally consented to: the potential risks and benefits of telemedicine (video visit) versus in person care; bill my insurance or make self-payment for services provided; and responsibility for payment of non-covered services.     Mode of Communication:  Video Conference via [employer]    As the provider I attest to compliance with applicable laws and regulations related to telemedicine.      Palliative Care Counseling Services - Initial Assessment    PLEASE NOTE:  THIS IS A MENTAL HEALTH NOTE.  OTHER PROVIDERS VIEWING THIS NOTE SHOULD USE THIS ONLY FOR UNDERSTANDING THE CONTEXT OF THE PATIENT S EXPERIENCE.  TOPICS DESCRIBED IN THIS NOTE SHOULD NOT BE REFERENCED TO THE PATIENT BY MEDICAL PROVIDERS    Amy Guerrero is a 70 year old woman with stage cardiac amyloid, seen today for initial palliative care counseling assessment via phone due to Primo.iowell audio not connecting.    Referred by: Dr. Elam    Presenting Issues: Symptom management and Coping with illness    Preferred Name: Amy     Mental Status Exam: (List all that apply)      Appearance: Appropriate      Eye Contact: Good       Orientation: Yes, x4      Mood: Normal      Affect: Appropriate        Thought Content: Clear         Thought Form: Logical      Psychomotor Behavior: Normal    Family:       Marital status:     Years  ": 47    Years together: not asked      Name of spouse/partner: Oscar Guerrero       Children: 2 children Son Cornelio and daughter Evon      Parents:        Siblings: 1 brother.  He is 21 years older than her       Other:     Support system: Family, Friends and Marium community    Living situation: House   Difficulty accessing and/or getting around living space: no 4 story house, after her hospitalization last year steps became more difficult and so they converted a room on their main level to a bedroom for her   Other concerns: no     Employment history:      Current employment status:  Retired         Kind of work:  Secretarial       Spouses/SO current employment status: Employed full time      Kind of work:     Education highest level:     Financial:       Descriptor: not asked       Health insurance:     Legal concerns: no       Area(s) of concern:     Health Care Directive: Has one:  yes       If yes, copy in EMR: Yes       Basic information regarding health care directive provided: yes        Health Care Agent(s): Named in health care directive  Spouse named as primary, daughter named as secondary  POLST?     Medical History/Issues (patient account): history of amaloidosis, has affected the heart \"so it doesn't pump like its supposed to\".  SHe is in the space of juggling managing symptoms especially fluid retention.  She shared that her disease \"is stage IV, and there's no stage V\".  She understands this is terminal, feels what happens is \"up to God\".  She continues to talk with her medical providers about this.  Goal at this time was to feel well enough to see her family for .  SHe shared that she struggles with fatigue, and, at times, with nausea.  Reports that this symptom, in particular, seems to come up in the afternoons \"if I over do it\".  Usually she will rest and try to manage this with peppermint or ginger ale.  She doesn't vomit as a result of the nausea \"though " "maybe I'd feel better if I did\".  We talked about mind/body resources that might help to manage this and agreed to start with setting a time to do a very basic \"body scan\".  Amy noted she has some familiarity with this from lamaze classes.  Thought behind this was to set a time in the late morning or early afternoon, hopefully before nausea has set in.  Take a moment to rest, and take stock of how she is feeling. This might help with sense of groundedness, and help her to conserve energy.      Pain/Discomfort Issues: Nausea/Vomiting Nausea was main focus today.     Coping: Many losses along the way of this illness.  Amy misses being able to work, as she loved her job.  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  Not being able to do this has been a big loss.      Sleep: will often wake up at 3AM and not be able to get back to sleep.  Does rest during the day.     Sexual Health/Intimacy: not asked     Mental Health History and Current Review of Symptoms (patient account):     Depression in past?: no    Treatment in past?:  no   Treatment currently?:      Depression symptoms currently?:  - Anhedonia:  no  - Hopeless or down mood:  no  - Sleep problems (too much or too little):  Yes related to illness  - Fatigue:  Yes related to illness   - Appetite (too much or too little):    - Excessively negative self perception:  no  - Trouble concentrating:  no  - Motor slowness:  no  - Current and/or recent thoughts of suicide:  no    Suicide risk screen:  - Past thoughts of suicide?  no  - Past attempts to end own life?  no  - If yes, how?   - Protective factors currently? - Safety plan needed currently?     Anxiety in past?: no   Treatment in past?  no   Treatment currently?  no     Anxiety symptoms currently?  - Nervous, on edge:  no  - Sleep problems:  yes  - Worrying a lot/hard to manage worries:  no  Specific recent areas of worry/fear/concern:   - Tense, " "hard to relax:  no  - Feeling restless, hard to sit still:  no  - Irritable:  no  - Feeling of dread/ afraid that something awful may happen:  no  - How long?   - Impacts on daily life?     Any other mental health diagnosis or symptoms in past?:  no    Any family history of mental health concerns?  no      Positive Bipolar Disorder screen?  no   Positive Thought Disorder screen? no   History of learning difficulties? no       Grief vs Depression:  Endorses symptoms for: Grief    Psychological Trauma and/or Major Losses:     None     Nightmares?    no  Thought about when not wanting to think about? no  Tried hard not to think about it? yes  Avoided situations that reminded you of it? no  [place] constantly on guard, watchful, or easily startled? no  Felt numb or detached from others, activities, or your surroundings? no    Safety Screen:  History of being harmed or controlled by someone close to you?  no  Being hurt or controlled by someone close to you?  no  Worried will be harmed in future?  no  Worried will harm someone else in future?  no    CD History:   Using alcohol actively? no    Amount per week:   Current concerns (self or other) about alcohol or drug use? no  Past concerns and/or CD treatment? no      Medications:   Any current concerns? no  Taking as prescribed currently? Yes     Marium/Spirituality:       Belong to a marium community: yes     Specify:        Identify with a particular Denominational: Quaker       Identify as spiritual: yes      How find expression: Prayer    Hope:      What do you hope for:    Good time with family,       What gives you hope:    Marium, family      Internal Resources (positive memories, sources of jacob): marium     Perceived Needs: Help with symptom coping     Resource needs/Referrals: None    Assessment:  Amy Guerrero presents as a 70 year old woman, facing heart amyloid. Coping concerns include: ongoing nausea connected to fatigue and \"over doing it\".  SHe does not meet " criteria for a DSM5 diagnosis based on my assessment today.  Most interested in working on nausea and symptom management. Discussed tactics for this.      Beneficial palliative care counseling interventions may include: mind/body teaching         Intervention: Initial palliative care counseling / clinical social work evaluation was conducted.  Palliative Care Counseling interventions available were discussed, including counseling related to serious illness, behavioral interventions for symptom management, consultation regarding goals of care/health care directive/POLST, and other interventions specific to the patient's situation or concerns.     Plan: will follow up in 4 weeks.     DSM5 Diagnoses:   Deferred     Time Spent with Patient/Family: 40  (Start 1:30, end 2:10)    LATRICE Quiñones, [facility]   Palliative Care    Pgr:[phone]  Ph: [phone]      DO NOT SEND ANY LETTERS          Again, thank you for allowing me to participate in the care of your patient.        Sincerely,        KODY Quiñones

## 2021-04-01 NOTE — PROGRESS NOTES
Telemedicine Visit: The patient's condition can be safely assessed and treated via synchronous audio and visual telemedicine encounter.      Reason for Telemedicine Visit: covid19 precautions    Originating Site (Patient Location): Patient's home    Distant Site (Provider Location): [facility]: [facility]    Consent:  The patient/guardian has verbally consented to: the potential risks and benefits of telemedicine (video visit) versus in person care; bill my insurance or make self-payment for services provided; and responsibility for payment of non-covered services.     Mode of Communication:  Video Conference via [employer]    As the provider I attest to compliance with applicable laws and regulations related to telemedicine.      Palliative Care Counseling Services - Initial Assessment    PLEASE NOTE:  THIS IS A MENTAL HEALTH NOTE.  OTHER PROVIDERS VIEWING THIS NOTE SHOULD USE THIS ONLY FOR UNDERSTANDING THE CONTEXT OF THE PATIENT S EXPERIENCE.  TOPICS DESCRIBED IN THIS NOTE SHOULD NOT BE REFERENCED TO THE PATIENT BY MEDICAL PROVIDERS    Amy Guerrero is a 70 year old woman with stage cardiac amyloid, seen today for initial palliative care counseling assessment via phone due to amwell audio not connecting.    Referred by: Dr. Elam    Presenting Issues: Symptom management and Coping with illness    Preferred Name: Amy     Mental Status Exam: (List all that apply)      Appearance: Appropriate      Eye Contact: Good       Orientation: Yes, x4      Mood: Normal      Affect: Appropriate        Thought Content: Clear         Thought Form: Logical      Psychomotor Behavior: Normal    Family:       Marital status:     Years : 47    Years together: not asked      Name of spouse/partner: Oscar Guerrero       Children: 2 children Son Cornelio and daughter Evon      Parents:        Siblings: 1 brother.  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     Medical History/Issues (patient account): history of amaloidosis, has affected the heart \"so it doesn't pump like its supposed to\".  SHe is in the space of juggling managing symptoms especially fluid retention.  She shared that her disease \"is stage IV, and there's no stage V\".  She understands this is terminal, feels what happens is \"up to God\".  She continues to talk with her medical providers about this.  Goal at this time was to feel well enough to see her family for Easter Sunday.  SHe shared that she struggles with fatigue, and, at times, with nausea.  Reports that this symptom, in particular, seems to come up in the afternoons \"if I over do it\".  Usually she will rest and try to manage this with peppermint or ginger ale.  She doesn't vomit as a result of the nausea \"though maybe I'd feel better if I did\".  We talked about mind/body resources that might help to manage this and agreed to start with setting a time to do a very basic \"body scan\".  Ginger noted she has some familiarity with this from lamaze classes.  Thought behind this was to " "set a time in the late morning or early afternoon, hopefully before nausea has set in.  Take a moment to rest, and take stock of how she is feeling. This might help with sense of groundedness, and help her to conserve energy.      Pain/Discomfort Issues: Nausea/Vomiting Nausea was main focus today.     Coping: Many losses along the way of this illness.  Amy misses being able to work, as she loved her job.  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  Not being able to do this has been a big loss.      Sleep: will often wake up at 3AM and not be able to get back to sleep.  Does rest during the day.     Sexual Health/Intimacy: not asked     Mental Health History and Current Review of Symptoms (patient account):     Depression in past?: no    Treatment in past?:  no   Treatment currently?:      Depression symptoms currently?:  - Anhedonia:  no  - Hopeless or down mood:  no  - Sleep problems (too much or too little):  Yes related to illness  - Fatigue:  Yes related to illness   - Appetite (too much or too little):    - Excessively negative self perception:  no  - Trouble concentrating:  no  - Motor slowness:  no  - Current and/or recent thoughts of suicide:  no    Suicide risk screen:  - Past thoughts of suicide?  no  - Past attempts to end own life?  no  - If yes, how?   - Protective factors currently? - Safety plan needed currently?     Anxiety in past?: no   Treatment in past?  no   Treatment currently?  no     Anxiety symptoms currently?  - Nervous, on edge:  no  - Sleep problems:  yes  - Worrying a lot/hard to manage worries:  no  Specific recent areas of worry/fear/concern:   - Tense, hard to relax:  no  - Feeling restless, hard to sit still:  no  - Irritable:  no  - Feeling of dread/ afraid that something awful may happen:  no  - How long?   - Impacts on daily life?     Any other mental health diagnosis or symptoms in past?:  no    Any family " "history of mental health concerns?  no      Positive Bipolar Disorder screen?  no   Positive Thought Disorder screen? no   History of learning difficulties? no       Grief vs Depression:  Endorses symptoms for: Grief    Psychological Trauma and/or Major Losses:     None     Nightmares?    no  Thought about when not wanting to think about? no  Tried hard not to think about it? yes  Avoided situations that reminded you of it? no  [place] constantly on guard, watchful, or easily startled? no  Felt numb or detached from others, activities, or your surroundings? no    Safety Screen:  History of being harmed or controlled by someone close to you?  no  Being hurt or controlled by someone close to you?  no  Worried will be harmed in future?  no  Worried will harm someone else in future?  no    CD History:   Using alcohol actively? no    Amount per week:   Current concerns (self or other) about alcohol or drug use? no  Past concerns and/or CD treatment? no      Medications:   Any current concerns? no  Taking as prescribed currently? Yes     Marium/Spirituality:       Belong to a marium community: yes     Specify:        Identify with a particular Sabianism: Worship       Identify as spiritual: yes      How find expression: Prayer    Hope:      What do you hope for:    Good time with family,       What gives you hope:    Marium, family      Internal Resources (positive memories, sources of jacob): marium     Perceived Needs: Help with symptom coping     Resource needs/Referrals: None    Assessment:  Amy Guerrero presents as a 70 year old woman, facing heart amyloid. Coping concerns include: ongoing nausea connected to fatigue and \"over doing it\".  SHe does not meet criteria for a DSM5 diagnosis based on my assessment today.  Most interested in working on nausea and symptom management. Discussed tactics for this.      Beneficial palliative care counseling interventions may include: mind/body teaching         Intervention: Initial " palliative care counseling / clinical social work evaluation was conducted.  Palliative Care Counseling interventions available were discussed, including counseling related to serious illness, behavioral interventions for symptom management, consultation regarding goals of care/health care directive/POLST, and other interventions specific to the patient's situation or concerns.     Plan: will follow up in 4 weeks.     DSM5 Diagnoses:   Deferred     Time Spent with Patient/Family: 40  (Start 1:30, end 2:10)    LATRICE Quiñones, [facility]   Palliative Care    Pgr:[phone]  Ph: [phone]      DO NOT SEND ANY LETTERS

## 2021-04-12 ENCOUNTER — MYC MEDICAL ADVICE (OUTPATIENT)
Dept: CARDIOLOGY | Facility: CLINIC | Age: 71
End: 2021-04-12

## 2021-04-12 DIAGNOSIS — E85.4 AMYLOID HEART DISEASE (H): ICD-10-CM

## 2021-04-12 DIAGNOSIS — I43 AMYLOID HEART DISEASE (H): ICD-10-CM

## 2021-04-13 ENCOUNTER — MYC MEDICAL ADVICE (OUTPATIENT)
Dept: CARDIOLOGY | Facility: CLINIC | Age: 71
End: 2021-04-13

## 2021-04-13 DIAGNOSIS — R63.4 WEIGHT LOSS: ICD-10-CM

## 2021-04-13 DIAGNOSIS — E85.4 AMYLOID HEART DISEASE (H): ICD-10-CM

## 2021-04-13 DIAGNOSIS — I50.33 ACUTE ON CHRONIC DIASTOLIC HEART FAILURE (H): ICD-10-CM

## 2021-04-13 DIAGNOSIS — I43 AMYLOID HEART DISEASE (H): ICD-10-CM

## 2021-04-13 DIAGNOSIS — E85.81 AL AMYLOIDOSIS (H): ICD-10-CM

## 2021-04-13 DIAGNOSIS — E87.6 HYPOKALEMIA: ICD-10-CM

## 2021-04-13 RX ORDER — LORAZEPAM 0.5 MG/1
0.5 TABLET ORAL EVERY 6 HOURS PRN
Qty: 60 TABLET | Refills: 0 | Status: SHIPPED | OUTPATIENT
Start: 2021-04-13 | End: 2021-11-10

## 2021-04-13 RX ORDER — METOLAZONE 2.5 MG/1
2.5 TABLET ORAL
Qty: 26 TABLET | Refills: 3 | Status: ON HOLD | OUTPATIENT
Start: 2021-04-15 | End: 2021-07-27

## 2021-04-14 DIAGNOSIS — E85.4 CARDIAC AMYLOIDOSIS (H): ICD-10-CM

## 2021-04-14 DIAGNOSIS — I43 CARDIAC AMYLOIDOSIS (H): ICD-10-CM

## 2021-04-14 LAB
ANION GAP SERPL CALCULATED.3IONS-SCNC: 5 MMOL/L (ref 3–14)
BUN SERPL-MCNC: 31 MG/DL (ref 7–30)
CALCIUM SERPL-MCNC: 9 MG/DL (ref 8.5–10.1)
CHLORIDE SERPL-SCNC: 91 MMOL/L (ref 94–109)
CO2 SERPL-SCNC: 37 MMOL/L (ref 20–32)
CREAT SERPL-MCNC: 1.05 MG/DL (ref 0.52–1.04)
GFR SERPL CREATININE-BSD FRML MDRD: 54 ML/MIN/{1.73_M2}
GLUCOSE SERPL-MCNC: 89 MG/DL (ref 70–99)
POTASSIUM SERPL-SCNC: 2.8 MMOL/L (ref 3.4–5.3)
SODIUM SERPL-SCNC: 133 MMOL/L (ref 133–144)

## 2021-04-14 PROCEDURE — 36415 COLL VENOUS BLD VENIPUNCTURE: CPT | Performed by: PHYSICIAN ASSISTANT

## 2021-04-14 PROCEDURE — 80048 BASIC METABOLIC PNL TOTAL CA: CPT | Performed by: PHYSICIAN ASSISTANT

## 2021-04-14 NOTE — TELEPHONE ENCOUNTER
Reviewed with Dr Cohen. Increase Torsemide to 150/100. K to 80 BID. Now Amy got labs this morning and potassium 2.8.     Talked to Amy. Doesn't want to go to hospital for low K. Reports she took metolazone yesterday but did not take the usual extra 60 of Potassium with it. Lost 8 lbs overnight and breathing feels much better.     Took her regular dose of 60 meq Potassium at 5:30 this morning. Isn't home but has 50 meq with her right now and will take to supplement her metolazone dose.   Will review with provider for further recommendations. Vidhi Montgomery RN

## 2021-04-14 NOTE — TELEPHONE ENCOUNTER
Reviewed low Potassium with Reyes Wilson NP covering for Yolanda MORGAN. Make sure Amy takes extra 60 meq Potassium she was supposed to take with the metolazone.     Called Amy to confirm that she did take the extra 60 mEq she should have taken yesterday.    Will review further with Dr Cohen tomorrow for recommendations.

## 2021-04-15 RX ORDER — POTASSIUM CHLORIDE 750 MG/1
TABLET, EXTENDED RELEASE ORAL
Qty: 1140 TABLET | Refills: 3 | Status: SHIPPED | OUTPATIENT
Start: 2021-04-15 | End: 2021-04-27

## 2021-04-15 RX ORDER — TORSEMIDE 100 MG/1
TABLET ORAL
Qty: 225 TABLET | Refills: 3 | Status: SHIPPED | OUTPATIENT
Start: 2021-04-15 | End: 2021-04-27

## 2021-04-15 NOTE — TELEPHONE ENCOUNTER
Date: 4/15/2021    Time of Call: 9:20 AM     Diagnosis:  Heart failure     [ VORB ] Ordering provider: Dr Cohen    Order: increase Torsemide to 150 mg in morning and 100 mg in the afternoon, increase potassium to 80 mEq BID.  BMP next week     Order received by: Niki Fam RN       Follow-up/additional notes: Amy is reluctant to make the changes but after some discussion she agreed to try it until her CORE appointment in 2 weeks.

## 2021-04-16 ASSESSMENT — ENCOUNTER SYMPTOMS
NIGHT SWEATS: 0
RECTAL PAIN: 0
EXERCISE INTOLERANCE: 1
ABDOMINAL PAIN: 0
BLOATING: 0
NAUSEA: 1
INCREASED ENERGY: 0
SHORTNESS OF BREATH: 1
BLOOD IN STOOL: 0
FATIGUE: 1
POLYDIPSIA: 1
ORTHOPNEA: 1
SNORES LOUDLY: 0
WEIGHT GAIN: 0
HYPERTENSION: 0
WHEEZING: 0
BOWEL INCONTINENCE: 0
COUGH: 1
PALPITATIONS: 1
LEG PAIN: 0
POSTURAL DYSPNEA: 1
POOR WOUND HEALING: 0
SLEEP DISTURBANCES DUE TO BREATHING: 1
HEMOPTYSIS: 0
JAUNDICE: 0
DIARRHEA: 0
FEVER: 0
POLYPHAGIA: 0
VOMITING: 0
HALLUCINATIONS: 0
LIGHT-HEADEDNESS: 1
WEIGHT LOSS: 0
DYSPNEA ON EXERTION: 1
NAIL CHANGES: 0
ALTERED TEMPERATURE REGULATION: 1
HYPOTENSION: 0
SPUTUM PRODUCTION: 0
CHILLS: 1
COUGH DISTURBING SLEEP: 0
HEARTBURN: 0
CONSTIPATION: 0
SKIN CHANGES: 0
DECREASED APPETITE: 1
SYNCOPE: 0

## 2021-04-20 DIAGNOSIS — I43 AMYLOID HEART DISEASE (H): ICD-10-CM

## 2021-04-20 DIAGNOSIS — E85.4 AMYLOID HEART DISEASE (H): ICD-10-CM

## 2021-04-20 LAB
ANION GAP SERPL CALCULATED.3IONS-SCNC: 2 MMOL/L (ref 3–14)
BUN SERPL-MCNC: 31 MG/DL (ref 7–30)
CALCIUM SERPL-MCNC: 9.3 MG/DL (ref 8.5–10.1)
CHLORIDE SERPL-SCNC: 94 MMOL/L (ref 94–109)
CO2 SERPL-SCNC: 37 MMOL/L (ref 20–32)
CREAT SERPL-MCNC: 1.25 MG/DL (ref 0.52–1.04)
GFR SERPL CREATININE-BSD FRML MDRD: 44 ML/MIN/{1.73_M2}
GLUCOSE SERPL-MCNC: 94 MG/DL (ref 70–99)
POTASSIUM SERPL-SCNC: 3.1 MMOL/L (ref 3.4–5.3)
SODIUM SERPL-SCNC: 133 MMOL/L (ref 133–144)

## 2021-04-20 PROCEDURE — 80048 BASIC METABOLIC PNL TOTAL CA: CPT | Performed by: INTERNAL MEDICINE

## 2021-04-20 PROCEDURE — 36415 COLL VENOUS BLD VENIPUNCTURE: CPT | Performed by: INTERNAL MEDICINE

## 2021-04-21 ENCOUNTER — MYC MEDICAL ADVICE (OUTPATIENT)
Dept: CARDIOLOGY | Facility: CLINIC | Age: 71
End: 2021-04-21

## 2021-04-21 DIAGNOSIS — I50.33 ACUTE ON CHRONIC DIASTOLIC HEART FAILURE (H): Primary | ICD-10-CM

## 2021-04-21 NOTE — TELEPHONE ENCOUNTER
Reviewed with nakul Garcia to take Metolazone for fluid retention tomorrow if needed. Vidhi Montgomery RN

## 2021-04-24 ENCOUNTER — HEALTH MAINTENANCE LETTER (OUTPATIENT)
Age: 71
End: 2021-04-24

## 2021-04-26 ENCOUNTER — VIRTUAL VISIT (OUTPATIENT)
Dept: ONCOLOGY | Facility: CLINIC | Age: 71
End: 2021-04-26
Payer: COMMERCIAL

## 2021-04-26 DIAGNOSIS — Z51.5 ENCOUNTER FOR PALLIATIVE CARE: Primary | ICD-10-CM

## 2021-04-26 PROCEDURE — 96156 HLTH BHV ASSMT/REASSESSMENT: CPT | Performed by: SOCIAL WORKER

## 2021-04-26 NOTE — LETTER
4/26/2021         RE: Leandra Guerrero  [address] [place] [address]        Dear Colleague,    Thank you for referring your patient, Leandra Guerrero, to the [facility]. Please see a copy of my visit note below.    Telemedicine Visit: The patient's condition can be safely assessed and treated via synchronous audio and visual telemedicine encounter.      Reason for Telemedicine Visit: covid19 precautions    Originating Site (Patient Location): Patient's home    Distant Site (Provider Location): [facility] Clinics: [place] [facility]    Consent:  The patient/guardian has verbally consented to: the potential risks and benefits of telemedicine (video visit) versus in person care; bill my insurance or make self-payment for services provided; and responsibility for payment of non-covered services.     Mode of Communication:  Video Conference via [employer]    As the provider I attest to compliance with applicable laws and regulations related to telemedicine.      Palliative Care Clinical Social Work Return Appointment    PLEASE NOTE:  THIS IS A MENTAL HEALTH NOTE.  OTHER PROVIDERS VIEWING THIS NOTE SHOULD USE THIS ONLY FOR UNDERSTANDING THE CONTEXT OF THE PATIENT'S EXPERIENCE.  TOPICS DESCRIBED IN THIS NOTE SHOULD NOT BE REFERENCED TO THE PATIENT BY MEDICAL PROVIDERS.    Amy Guerrero is a 70 year old woman with advanced cardiac amoid , seen today via phone  for a return psychotherapy appointment to address symptoms  as it relates to coping with illness and treatment.    Mental Status Exam:(List all that apply)      Appearance: Appropriate      Eye Contact: Good       Orientation: Yes, x4      Mood: Normal      Affect: Appropriate      Thought Content: Clear         Thought Form: Logical      Psychomotor Behavior: Normal    Visit themes: nausea, symptoms    Adjustment to Illness: Amy shared that her main symptom of concern at this time is fluid  "retention.  She is meeting with her cardiology team about this later this week.  She reports that her nausea is more manageable and describes it as \"mind over matter\"  She is trying to do less and pace herself more which has helped.  She does not that this past week felt difficult, but attributes this to several external factors including the weather and later she shared that her brother  this past week.  She notes that he was only in contact with 2 of his 3 children at the time of his death, and this is a source of grief for her.  She is trying to support her niece and nephew as best she can, recognizing that she can't support them by being physically present, but she can offer emotional support.     Mental Health (thoughts, feelings, actions, coping, and symptoms): Ginger feels she is coping well at this time and id dno thave any mental health concerns at the moment.     Helpful activities: time with famiily     Helpful cognitions:     Unhelpful and/or triggering or exacerbating factors:     Body-Mind Skills Education:     Relationships:     End of Life and Advance Care Planning:     Main therapeutic interventions provided this session include:   Facilitate processing of thoughts and feelings    Plan:  Will return for psychotherapy with palliative care focus as needed.     Time spent with patient/family:  22 minutes (Start 10:00, end 10:22)      Client has contributed regarding goals and concerns, but has not reviewed the treatment plan. Plan to review at future session.    LATRICE Quiñones, [facility]  Palliative Care Clinical        DO NOT SEND ANY LETTERS                    Again, thank you for allowing me to participate in the care of your patient.        Sincerely,        KODY Quiñones    "

## 2021-04-26 NOTE — NURSING NOTE
Chief Complaint   Patient presents with     Follow Up     Return CORE 69 year old female with chronic diastolic heart failure presents for follow up with labs prior. Pt with labile K levels.      Vitals were taken and medications were reconciled.   Gina Welsh  9:48 AM     [de-identified] : cough sinuses, achy [FreeTextEntry1] : cough nasal congestion,

## 2021-04-26 NOTE — PROGRESS NOTES
"In person visit.    HPI:   Ms. Guerrero is a 70 year old female with a past medical history including stage IV AL amyloid diagnosed in 2016. Presents to clinic for CORE follow-up.     Her cardiac and oncologic history are as follows: fatigue starting in 1/2016. She had a coronary angiogram which was reportedly normal but was diagnosed with HF and started on a BBand diuretics. Her symptoms progressed to the point that she was evaluated at [facility] in August/September (Dr. Barron in oncology/hematology, Dr. Nettles is cardiology). She was diagnosed with stage IV AL amyloid (+GI, +bone marrow involvement, no involvement of kidney or liver). Her work up is detailed in our previous notes, however she has lambda AL amyloidosis and she underwent CyBorD chemotherapy and excellent light chain response by serum. Patient has been turned down at [facility] for a stem cell transplant due to her cardiac involvement. Later in 2016, she had 2-3 more right sided pleural taps. She was hospitalized 1/2017 and diuresed 20-30 pounds at that time. Since that time her AL has been in remission by labs without further chemo. Patient was then treated with doxycycline but this was stopped for 6-9 months d/t concerns she was not benefiting from this, now restarted.    Last visit: 2/2021 with Dr. Cohen  Slowing down some. Struggling with nausea. Setting up with palliative care for symptom management.    This visit:  Has been feeling \"off\" for the last month. Feels more fatigued. Breathing is okay during the day. She was able to walk in from the lobby, had some PELAEZ, but she didn't have to stop. Legs are swollen and also has abdominal edema. She does have orthopnea. No PND. Today she has a headache and feels dizzy. She feels like the headache is related to her sinuses. She is having palpitations which are slightly above her b/l. She had some shooting chest pain a few nights ago which was very brief. No other chest pain.    She is taking the metolazone " "about every 4 days. She last took the metolazone Sunday. Home weight today was 154. She takes it if shes at 160.    Cardiac Medications  Apixaban 2.5 mg BID  Metolazone 2.5 mg daily- every 4 days  Torsemide 100 mg BID  Kcl 80 meq BID    PAST MEDICAL HISTORY:  Past Medical History:   Diagnosis Date     AL amyloidosis (H)      Atrial fibrillation and flutter (H)      Cardiac amyloidosis (H)      Lymphedema      QT prolongation      Recurrent right pleural effusion 1/2/2017     SVT (supraventricular tachycardia) (H)        FAMILY HISTORY:  Family History   Problem Relation Age of Onset     Other - See Comments Sister         Amyloidosis       SOCIAL HISTORY:  Socioeconomic History     Marital status:    Tobacco Use     Smoking status: Never Smoker     Smokeless tobacco: Never Used   Substance and Sexual Activity     Alcohol use: No     Drug use: No   Other Topics Concern     CURRENT MEDICATIONS:  Acetaminophen (TYLENOL PO), Take 325 mg by mouth  apixaban ANTICOAGULANT (ELIQUIS) 2.5 MG tablet, Take 1 tablet (2.5 mg) by mouth 2 times daily  doxycycline hyclate (VIBRA-TABS) 100 MG tablet, Take 1 tablet (100 mg) by mouth daily  LORazepam (ATIVAN) 0.5 MG tablet, Take 1 tablet (0.5 mg) by mouth every 6 hours as needed for anxiety or nausea  metolazone (ZAROXOLYN) 2.5 MG tablet, Take 1 tablet (2.5 mg) by mouth twice a week  ondansetron (ZOFRAN-ODT) 8 MG ODT tab, every 8 hours as needed PRN  triamcinolone (KENALOG) 0.1 % external cream, PRN  OLANZapine (ZYPREXA) 2.5 MG tablet, Take 1 tablet (2.5 mg) by mouth daily (Patient not taking: Reported on 4/27/2021)    No current facility-administered medications on file prior to visit.       ROS:   See HPI     EXAM:  /63 (BP Location: Right arm, Patient Position: Chair, Cuff Size: Adult Regular)   Pulse 60   Ht 1.6 m (5' 3\")   Wt 69.9 kg (154 lb)   SpO2 100%   BMI 27.28 kg/m       GENERAL: Appears comfortable, in no acute distress. Speaking in full sentences and able " to communicate all needs  HEENT: Eye symmetrical, no discharge or icterus bilaterally. Mucous membranes moist and without lesions.  CV: RRR, +S1S2, no murmur, rub, or gallop. JVP ~ 8.   RESPIRATORY: Respirations regular, even, and unlabored. Lungs CTA throughout.   GI: Soft and mildly  distended with normoactive bowel sounds. No tenderness, rebound, guarding.   EXTREMITIES: Moderate to severe b/l non-pitting peripheral edema, less tense than when I have seen in the past. All extremities are warm and well perfused   NEUROLOGIC: Alert and interacting appropriately. No focal deficits.   MUSCULOSKELETAL: No joint swelling or tenderness.   SKIN: No jaundice. No rashes or lesions.       Labs, reviewed with patient in clinic today:  CBC RESULTS:  Lab Results   Component Value Date    WBC 6.9 11/13/2020    RBC 4.62 11/13/2020    HGB 14.5 11/13/2020    HCT 42.7 11/13/2020    MCV 92 11/13/2020    MCH 31.4 11/13/2020    MCHC 34.0 11/13/2020    RDW 13.2 11/13/2020     11/13/2020       CMP RESULTS:  Lab Results   Component Value Date     04/27/2021    POTASSIUM 3.3 (L) 04/27/2021    CHLORIDE 94 04/27/2021    CO2 33 (H) 04/27/2021    ANIONGAP 7 04/27/2021    GLC 79 04/27/2021    BUN 31 (H) 04/27/2021    CR 1.16 (H) 04/27/2021    GFRESTIMATED 48 (L) 04/27/2021    GFRESTBLACK 56 (L) 04/27/2021    JERROD 9.1 04/27/2021    BILITOTAL 1.8 (H) 10/15/2020    ALBUMIN 3.7 10/15/2020    ALKPHOS 195 (H) 10/15/2020    ALT 27 10/15/2020    AST 23 10/15/2020        INR RESULTS:  Lab Results   Component Value Date    INR 1.30 (H) 01/14/2017       Lab Results   Component Value Date    MAG 2.2 10/26/2019     Lab Results   Component Value Date    NTBNPI 9,009 (H) 01/13/2017     Lab Results   Component Value Date    NTBNP 1,376 (H) 02/04/2021       Diagnostics:  2/21 Ziopatch       6/12/2019 Holter Mnoitor      TTE 4/9/19  Moderate left ventricular hypertrophy.  Global and regional left ventricular function is normal with an EF of  60-65%.  Global right ventricular function is normal.  Moderate aortic valve insufficiency.  Mild pulmonary hypertension with dilated IVC.  This study was compared with the study from 10/11/18 the AR is worse and RA pressure is now increased.    TTE 10/11/18  Global and regional left ventricular function is normal with an EF of 55-60%.  Moderate concentric wall thickening consistent with left ventricular  hypertrophy is present - known amyloid.  Grade III or advanced diastolic dysfunction.  Normal right ventricular size, wall thickness, and function.  Estimated pulmonary artery pressure 28 mmHg.  IVC with normal diameter but does not collapse (>50%) with inspiration.  Trace pericardial effusion.  Compared to prior study from 8/17/17, no significant change.    Ziopatch 11/2017      Holter Monitor 06/2019  INTERPRETATION  1. The rhythm varied with sinus rhythm and atrial fibrillation/variable atrial flutter. Low voltage noted.  2. The heart rate varied with a minimum rate of 48 bpm, maximum rate of 164 bpm, average rate of 74 bpm.  3. Frequent supraventricular ectopy was seen ranging from 0-469 per hour. Occasional atrail pairs and fairly frequent bigeminal cycles were noted.  Wandering atrial pacemaker noted.  4. Infrequent multifocal ventricular ectopy was seen ranging from 0-36 beats per hour.  5. ST-T wave changes were present.  6. Three patient events were documented and correlated with atrial fibrillation/flutter    EKG 8/8/2019 for palpitations  - NSR at a rate of 70, TN 0.19, QTc 525, QRS is narrow. Occasional PVCs. Biphasic twaves in V4-V6, unchanged from priors.    Assessment and Plan:   Mrs. Guerrero is a 70 year old female with AL amyloidosis with cardiac manifestation who presents to CORE for hospital follow-up. Patient has cardiac amyloidosis as evidenced by her echocardiogram (severe concentric hypertrophy and biatrial enlargement) and abnormal EKG (near-low voltage, poor R wave progression, biatrial  enlargement and no evidence of LVH despite thicken LV on echo) in the setting of biopsy proven (BMB, EGD) AL amyloid. She completed chemotherapy with CyBorD with an excellent serologic response and her heart failure (i.e. troponin negative, NT pro BNP was stable, serum light chains minimal and urine light chains undetectable).       She has gradually declined over the last few years and has ongoing challenges with her volume status.  She requires frequent metolazone and maintaining an adequate potassium level has been a challenge. She has also been struggling with A. Fib, which is occaionally symptomatic. These episodes remain rare and given relative contraindications for amyloid patients we do not have her on rate control at this time.    Cr stable today. Hypokalemic again. Her weight is lower than it normally is when she would take metolazone, but given her worsening swelling and orthopnea today I do think we need to challenge her dry weight. We will increase her Kcl and recheck labs so we could consider another metolazone later this week. Given the increased torsemide did not help her and d/t patient preference we will go back to 100 mg BID. I do not think that she requires hopsitalization right now, but she may need a hospitalization to rechalleng a dry weight sometime in the next few months if we continue to struggle as an outpatient. I have encouraged her to follow-up with palliative care regarding her nausea and insomnia.    # Chronic diastolic heart failure/restrictive cardiomyopathy 2/2  # Cardiac amyloidosis    Stage C. NYHA Class III.    Fluid status: Hypervolemic- change torsemide to 100 mg BID, increase Kcl to 80/60/60. Will try more metolazone once kcl is replaced as above  ACEi/ARB/ARNI: n/a   BB: no indication and relatively contraindicated due to risk of progressive conduction disease/AV block in these patients  Aldosterone antagonist: d/c'd given hyponatremia   SCD prophylaxis: does not meet  criteria for implant  NSAID use: contraindicated  Sleep apnea evaluation: deferred today  Remote monitoring: deferred today, could be Cardiocom candidate   Goals of care: prior discussions with Dr Keyes   Other: Has a referall [facility] lymphedema [facility] in [facility]    # Hypokalemia  3.3 today. Did not continue the increased kcl d/t nausea.   - Increase kcl to 80/60/60 (from 80 BID)  - Recheck K on thursday    # A. Fib/A. Flutter  We did a holter monitor which showed intermittent a. Fib and a. Flutter. [id number] of 4. Occasional palpitations which last less than 1 minute, if these become more frequent can repeat monitor to assess burden.  - Continue Eliquis 2.5mg BID, reduced dose d/t frequent bleeding, aware of risks  - Avoiding BB in the setting of amyloid  - Holding off on digoxin given generally good rate control/very rare RVR events. Could discuss in the future if needed.    # Prolonged QTC  - Minimize QT prolonging meds    # Systemic amyloid  Heme previously monitoring her light chains which have been negative consistent with remission - therefore further palliative chemo not being considered. Nausea has been a large issue and is currently, seeing palliative to address.  - Consider palliative care referal if nausea not controlled on her current regimen (per onc)  - Will message Dr. Keyes re: skull indent re: additional imaging.     #Nausea. Seeing palliative care for his. Has been prescribed olanzapine which she has not tried.   - Recommend ongoing f/u with palliative care    Follow: Up:   - Labs Thurs (replaced Kcl- consider challenging dry weight with metolazone)  - CORE in 2 months  - Dr. Keyes as scheduled in August Billing  - I managed 2 stable chronic problems  - I changed a prescription medication    Ciara Araya PA-C  [facility] Cardiology      CC  ALEJANDRO KEYES

## 2021-04-27 ENCOUNTER — OFFICE VISIT (OUTPATIENT)
Dept: CARDIOLOGY | Facility: CLINIC | Age: 71
End: 2021-04-27
Payer: COMMERCIAL

## 2021-04-27 VITALS
HEART RATE: 60 BPM | WEIGHT: 154 LBS | BODY MASS INDEX: 27.29 KG/M2 | DIASTOLIC BLOOD PRESSURE: 63 MMHG | HEIGHT: 63 IN | SYSTOLIC BLOOD PRESSURE: 123 MMHG | OXYGEN SATURATION: 100 %

## 2021-04-27 DIAGNOSIS — I50.33 ACUTE ON CHRONIC DIASTOLIC HEART FAILURE (H): ICD-10-CM

## 2021-04-27 DIAGNOSIS — E85.4 AMYLOID HEART DISEASE (H): ICD-10-CM

## 2021-04-27 DIAGNOSIS — I43 AMYLOID HEART DISEASE (H): ICD-10-CM

## 2021-04-27 LAB
ANION GAP SERPL CALCULATED.3IONS-SCNC: 7 MMOL/L (ref 3–14)
BUN SERPL-MCNC: 31 MG/DL (ref 7–30)
CALCIUM SERPL-MCNC: 9.1 MG/DL (ref 8.5–10.1)
CHLORIDE SERPL-SCNC: 94 MMOL/L (ref 94–109)
CO2 SERPL-SCNC: 33 MMOL/L (ref 20–32)
CREAT SERPL-MCNC: 1.16 MG/DL (ref 0.52–1.04)
GFR SERPL CREATININE-BSD FRML MDRD: 48 ML/MIN/{1.73_M2}
GLUCOSE SERPL-MCNC: 79 MG/DL (ref 70–99)
POTASSIUM SERPL-SCNC: 3.3 MMOL/L (ref 3.4–5.3)
SODIUM SERPL-SCNC: 134 MMOL/L (ref 133–144)

## 2021-04-27 PROCEDURE — 36415 COLL VENOUS BLD VENIPUNCTURE: CPT | Performed by: PATHOLOGY

## 2021-04-27 PROCEDURE — 80048 BASIC METABOLIC PNL TOTAL CA: CPT | Performed by: PATHOLOGY

## 2021-04-27 PROCEDURE — 99214 OFFICE O/P EST MOD 30 MIN: CPT | Performed by: PHYSICIAN ASSISTANT

## 2021-04-27 PROCEDURE — G0463 HOSPITAL OUTPT CLINIC VISIT: HCPCS

## 2021-04-27 RX ORDER — TORSEMIDE 100 MG/1
TABLET ORAL
Qty: 180 TABLET | Refills: 3 | Status: ON HOLD | OUTPATIENT
Start: 2021-04-27 | End: 2021-11-07

## 2021-04-27 RX ORDER — TRIAMCINOLONE ACETONIDE 1 MG/G
CREAM TOPICAL
COMMUNITY
Start: 2019-11-19 | End: 2021-06-23

## 2021-04-27 RX ORDER — POTASSIUM CHLORIDE 750 MG/1
TABLET, EXTENDED RELEASE ORAL
Qty: 1884 TABLET | Refills: 1 | Status: SHIPPED | OUTPATIENT
Start: 2021-04-27 | End: 2021-05-06

## 2021-04-27 ASSESSMENT — PAIN SCALES - GENERAL: PAINLEVEL: NO PAIN (0)

## 2021-04-27 ASSESSMENT — MIFFLIN-ST. JEOR: SCORE: 1197.67

## 2021-04-27 NOTE — PATIENT INSTRUCTIONS
Take your medicines every day, as directed    Changes made today:  o Okay to go back to torsemide 100 mg twice a day  o Increase your potassium to 80 meq in the morning, 60 meq in the afternoon, and 60 meq in the evening   Monitor Your Weight and Symptoms    Contact us if you:      Gain 2 pounds in one day or 5 pounds in one week    Feel more short of breath    Notice more leg swelling    Feel lightheadeded   Change your lifestyle    Limit Salt or Sodium:    2000 mg  Limit Fluids:    2000 mL or approximately 64 ounces  Eat a Heart Healthy Diet    Low in saturated fats  Stay Active:    Aim to move at least 150 minutes every  week         To Contact us    During Business Hours:  [phone], option # 1 (University)  Then option # 4 (medical questions)     After hours, weekends or holidays:   [phone], Option #4  Ask to speak to the On-Call Cardiologist. Inform them you are a CORE/heart failure patient at the [facility].     Use [employer] allows you to communicate directly with your heart team through secure messaging.    [employer] can be accessed any time on your phone, computer, or tablet.    If you need assistance, we'd be happy to help!         Keep your Heart Appointments:    - Labs Thursday morning, based on those labs we will consider metolazone earlier than you would normally take it  - See me in 2 months  - See Dr. Cohen in August  - See a hand orthopedist for your cyst  - I recommend setting up follow-up with palliative care team to discuss your symptoms of nausea and and sleeping trouble.

## 2021-04-27 NOTE — LETTER
"4/27/2021      RE: Leandra Guerrero  [address] [place] [address]       Dear Colleague,    Thank you for the opportunity to participate in the care of your patient, Leandra Guerrero, at the [facility] [place] at [facility]. Please see a copy of my visit note below.    In person visit.    HPI:   Ms. Guerrero is a 70 year old female with a past medical history including stage IV AL amyloid diagnosed in 2016. Presents to clinic for CORE follow-up.     Her cardiac and oncologic history are as follows: fatigue starting in 1/2016. She had a coronary angiogram which was reportedly normal but was diagnosed with HF and started on a BBand diuretics. Her symptoms progressed to the point that she was evaluated at [facility] in August/September (Dr. Barron in oncology/hematology, Dr. Nettles is cardiology). She was diagnosed with stage IV AL amyloid (+GI, +bone marrow involvement, no involvement of kidney or liver). Her work up is detailed in our previous notes, however she has lambda AL amyloidosis and she underwent CyBorD chemotherapy and excellent light chain response by serum. Patient has been turned down at [facility] for a stem cell transplant due to her cardiac involvement. Later in 2016, she had 2-3 more right sided pleural taps. She was hospitalized 1/2017 and diuresed 20-30 pounds at that time. Since that time her AL has been in remission by labs without further chemo. Patient was then treated with doxycycline but this was stopped for 6-9 months d/t concerns she was not benefiting from this, now restarted.    Last visit: 2/2021 with Dr. Cohen  Slowing down some. Struggling with nausea. Setting up with palliative care for symptom management.    This visit:  Has been feeling \"off\" for the last month. Feels more fatigued. Breathing is okay during the day. She was able to walk in from the lobby, had some PELAEZ, but she didn't have to stop. Legs are " swollen and also has abdominal edema. She does have orthopnea. No PND. Today she has a headache and feels dizzy. She feels like the headache is related to her sinuses. She is having palpitations which are slightly above her b/l. She had some shooting chest pain a few nights ago which was very brief. No other chest pain.    She is taking the metolazone about every 4 days. She last took the metolazone Sunday. Home weight today was 154. She takes it if shes at 160.    Cardiac Medications  Apixaban 2.5 mg BID  Metolazone 2.5 mg daily- every 4 days  Torsemide 100 mg BID  Kcl 80 meq BID    PAST MEDICAL HISTORY:  Past Medical History:   Diagnosis Date     AL amyloidosis (H)      Atrial fibrillation and flutter (H)      Cardiac amyloidosis (H)      Lymphedema      QT prolongation      Recurrent right pleural effusion 1/2/2017     SVT (supraventricular tachycardia) (H)        FAMILY HISTORY:  Family History   Problem Relation Age of Onset     Other - See Comments Sister         Amyloidosis       SOCIAL HISTORY:  Socioeconomic History     Marital status:    Tobacco Use     Smoking status: Never Smoker     Smokeless tobacco: Never Used   Substance and Sexual Activity     Alcohol use: No     Drug use: No   Other Topics Concern     CURRENT MEDICATIONS:  Acetaminophen (TYLENOL PO), Take 325 mg by mouth  apixaban ANTICOAGULANT (ELIQUIS) 2.5 MG tablet, Take 1 tablet (2.5 mg) by mouth 2 times daily  doxycycline hyclate (VIBRA-TABS) 100 MG tablet, Take 1 tablet (100 mg) by mouth daily  LORazepam (ATIVAN) 0.5 MG tablet, Take 1 tablet (0.5 mg) by mouth every 6 hours as needed for anxiety or nausea  metolazone (ZAROXOLYN) 2.5 MG tablet, Take 1 tablet (2.5 mg) by mouth twice a week  ondansetron (ZOFRAN-ODT) 8 MG ODT tab, every 8 hours as needed PRN  triamcinolone (KENALOG) 0.1 % external cream, PRN  OLANZapine (ZYPREXA) 2.5 MG tablet, Take 1 tablet (2.5 mg) by mouth daily (Patient not taking: Reported on 4/27/2021)    No current  "facility-administered medications on file prior to visit.       ROS:   See HPI     EXAM:  /63 (BP Location: Right arm, Patient Position: Chair, Cuff Size: Adult Regular)   Pulse 60   Ht 1.6 m (5' 3\")   Wt 69.9 kg (154 lb)   SpO2 100%   BMI 27.28 kg/m       GENERAL: Appears comfortable, in no acute distress. Speaking in full sentences and able to communicate all needs  HEENT: Eye symmetrical, no discharge or icterus bilaterally. Mucous membranes moist and without lesions.  CV: RRR, +S1S2, no murmur, rub, or gallop. JVP ~ 8.   RESPIRATORY: Respirations regular, even, and unlabored. Lungs CTA throughout.   GI: Soft and mildly  distended with normoactive bowel sounds. No tenderness, rebound, guarding.   EXTREMITIES: Moderate to severe b/l non-pitting peripheral edema, less tense than when I have seen in the past. All extremities are warm and well perfused   NEUROLOGIC: Alert and interacting appropriately. No focal deficits.   MUSCULOSKELETAL: No joint swelling or tenderness.   SKIN: No jaundice. No rashes or lesions.       Labs, reviewed with patient in clinic today:  CBC RESULTS:  Lab Results   Component Value Date    WBC 6.9 11/13/2020    RBC 4.62 11/13/2020    HGB 14.5 11/13/2020    HCT 42.7 11/13/2020    MCV 92 11/13/2020    MCH 31.4 11/13/2020    MCHC 34.0 11/13/2020    RDW 13.2 11/13/2020     11/13/2020       CMP RESULTS:  Lab Results   Component Value Date     04/27/2021    POTASSIUM 3.3 (L) 04/27/2021    CHLORIDE 94 04/27/2021    CO2 33 (H) 04/27/2021    ANIONGAP 7 04/27/2021    GLC 79 04/27/2021    BUN 31 (H) 04/27/2021    CR 1.16 (H) 04/27/2021    GFRESTIMATED 48 (L) 04/27/2021    GFRESTBLACK 56 (L) 04/27/2021    JERROD 9.1 04/27/2021    BILITOTAL 1.8 (H) 10/15/2020    ALBUMIN 3.7 10/15/2020    ALKPHOS 195 (H) 10/15/2020    ALT 27 10/15/2020    AST 23 10/15/2020        INR RESULTS:  Lab Results   Component Value Date    INR 1.30 (H) 01/14/2017       Lab Results   Component Value Date    " MAG 2.2 10/26/2019     Lab Results   Component Value Date    NTBNPI 9,009 (H) 01/13/2017     Lab Results   Component Value Date    NTBNP 1,376 (H) 02/04/2021       Diagnostics:  2/21 Ziopatch       6/12/2019 Holter Mnoitor      TTE 4/9/19  Moderate left ventricular hypertrophy.  Global and regional left ventricular function is normal with an EF of 60-65%.  Global right ventricular function is normal.  Moderate aortic valve insufficiency.  Mild pulmonary hypertension with dilated IVC.  This study was compared with the study from 10/11/18 the AR is worse and RA pressure is now increased.    TTE 10/11/18  Global and regional left ventricular function is normal with an EF of 55-60%.  Moderate concentric wall thickening consistent with left ventricular  hypertrophy is present - known amyloid.  Grade III or advanced diastolic dysfunction.  Normal right ventricular size, wall thickness, and function.  Estimated pulmonary artery pressure 28 mmHg.  IVC with normal diameter but does not collapse (>50%) with inspiration.  Trace pericardial effusion.  Compared to prior study from 8/17/17, no significant change.    opatch 11/2017      Holter Monitor 06/2019  INTERPRETATION  1. The rhythm varied with sinus rhythm and atrial fibrillation/variable atrial flutter. Low voltage noted.  2. The heart rate varied with a minimum rate of 48 bpm, maximum rate of 164 bpm, average rate of 74 bpm.  3. Frequent supraventricular ectopy was seen ranging from 0-469 per hour. Occasional atrail pairs and fairly frequent bigeminal cycles were noted.  Wandering atrial pacemaker noted.  4. Infrequent multifocal ventricular ectopy was seen ranging from 0-36 beats per hour.  5. ST-T wave changes were present.  6. Three patient events were documented and correlated with atrial fibrillation/flutter    EKG 8/8/2019 for palpitations  - NSR at a rate of 70, WY 0.19, QTc 525, QRS is narrow. Occasional PVCs. Biphasic twaves in V4-V6, unchanged from  priors.    Assessment and Plan:   Mrs. Guerrero is a 70 year old female with AL amyloidosis with cardiac manifestation who presents to [facility] for hospital follow-up. Patient has cardiac amyloidosis as evidenced by her echocardiogram (severe concentric hypertrophy and biatrial enlargement) and abnormal EKG (near-low voltage, poor R wave progression, biatrial enlargement and no evidence of LVH despite thicken LV on echo) in the setting of biopsy proven (BMB, EGD) AL amyloid. She completed chemotherapy with CyBorD with an excellent serologic response and her heart failure (i.e. troponin negative, NT pro BNP was stable, serum light chains minimal and urine light chains undetectable).       She has gradually declined over the last few years and has ongoing challenges with her volume status.  She requires frequent metolazone and maintaining an adequate potassium level has been a challenge. She has also been struggling with A. Fib, which is occaionally symptomatic. These episodes remain rare and given relative contraindications for amyloid patients we do not have her on rate control at this time.    Cr stable today. Hypokalemic again. Her weight is lower than it normally is when she would take metolazone, but given her worsening swelling and orthopnea today I do think we need to challenge her dry weight. We will increase her Kcl and recheck labs so we could consider another metolazone later this week. Given the increased torsemide did not help her and d/t patient preference we will go back to 100 mg BID. I do not think that she requires hopsitalization right now, but she may need a hospitalization to rechalleng a dry weight sometime in the next few months if we continue to struggle as an outpatient. I have encouraged her to follow-up with palliative care regarding her nausea and insomnia.    # Chronic diastolic heart failure/restrictive cardiomyopathy 2/2  # Cardiac amyloidosis    Stage C. NYHA Class III.    Fluid status:  Hypervolemic- change torsemide to 100 mg BID, increase Kcl to 80/60/60. Will try more metolazone once kcl is replaced as above  ACEi/ARB/ARNI: n/a   BB: no indication and relatively contraindicated due to risk of progressive conduction disease/AV block in these patients  Aldosterone antagonist: d/c'd given hyponatremia   SCD prophylaxis: does not meet criteria for implant  NSAID use: contraindicated  Sleep apnea evaluation: deferred today  Remote monitoring: deferred today, could be Cardiocom candidate   Goals of care: prior discussions with Dr Cohen   Other: Has a referall for lymphedema therapy in [facility]    # Hypokalemia  3.3 today. Did not continue the increased kcl d/t nausea.   - Increase kcl to 80/60/60 (from 80 BID)  - Recheck K on thursday    # A. Fib/A. Flutter  We did a holter monitor which showed intermittent a. Fib and a. Flutter. [id number] of 4. Occasional palpitations which last less than 1 minute, if these become more frequent can repeat monitor to assess burden.  - Continue Eliquis 2.5mg BID, reduced dose d/t frequent bleeding, aware of risks  - Avoiding BB in the setting of amyloid  - Holding off on digoxin given generally good rate control/very rare RVR events. Could discuss in the future if needed.    # Prolonged QTC  - Minimize QT prolonging meds    # Systemic amyloid  Heme previously monitoring her light chains which have been negative consistent with remission - therefore further palliative chemo not being considered. Nausea has been a large issue and is currently, seeing palliative to address.  - Consider palliative care referal if nausea not controlled on her current regimen (per onc)  - Will message Dr. Cohen re: skull indent re: additional imaging.     #Nausea. Seeing palliative care for his. Has been prescribed olanzapine which she has not tried.   - Recommend ongoing f/u with palliative care    Follow: Up:   - Labs Thurs (replaced Kcl- consider challenging dry weight with  metolazone)  - CORE in 2 months  - Dr. Keyes as scheduled in August Billing  - I managed 2 stable chronic problems  - I changed a prescription medication    Ciara Araya PA-C  [facility]      CC  ALEJANDRO KEYES

## 2021-04-29 ENCOUNTER — PATIENT OUTREACH (OUTPATIENT)
Dept: CARDIOLOGY | Facility: CLINIC | Age: 71
End: 2021-04-29

## 2021-04-29 DIAGNOSIS — E85.4 AMYLOID HEART DISEASE (H): ICD-10-CM

## 2021-04-29 DIAGNOSIS — I43 AMYLOID HEART DISEASE (H): ICD-10-CM

## 2021-04-29 LAB
ANION GAP SERPL CALCULATED.3IONS-SCNC: 5 MMOL/L (ref 3–14)
BUN SERPL-MCNC: 40 MG/DL (ref 7–30)
CALCIUM SERPL-MCNC: 9.2 MG/DL (ref 8.5–10.1)
CHLORIDE SERPL-SCNC: 98 MMOL/L (ref 94–109)
CO2 SERPL-SCNC: 31 MMOL/L (ref 20–32)
CREAT SERPL-MCNC: 1.29 MG/DL (ref 0.52–1.04)
GFR SERPL CREATININE-BSD FRML MDRD: 42 ML/MIN/{1.73_M2}
GLUCOSE SERPL-MCNC: 85 MG/DL (ref 70–99)
POTASSIUM SERPL-SCNC: 4.3 MMOL/L (ref 3.4–5.3)
SODIUM SERPL-SCNC: 134 MMOL/L (ref 133–144)

## 2021-04-29 PROCEDURE — 80048 BASIC METABOLIC PNL TOTAL CA: CPT | Performed by: PHYSICIAN ASSISTANT

## 2021-04-29 PROCEDURE — 36415 COLL VENOUS BLD VENIPUNCTURE: CPT | Performed by: PHYSICIAN ASSISTANT

## 2021-05-01 DIAGNOSIS — E85.4 AMYLOID HEART DISEASE (H): ICD-10-CM

## 2021-05-01 DIAGNOSIS — I43 AMYLOID HEART DISEASE (H): ICD-10-CM

## 2021-05-03 ENCOUNTER — MYC MEDICAL ADVICE (OUTPATIENT)
Dept: CARDIOLOGY | Facility: CLINIC | Age: 71
End: 2021-05-03

## 2021-05-03 DIAGNOSIS — I50.33 ACUTE ON CHRONIC DIASTOLIC HEART FAILURE (H): Primary | ICD-10-CM

## 2021-05-03 DIAGNOSIS — I50.32 CHRONIC DIASTOLIC HEART FAILURE (H): ICD-10-CM

## 2021-05-03 DIAGNOSIS — I50.33 ACUTE ON CHRONIC DIASTOLIC HEART FAILURE (H): ICD-10-CM

## 2021-05-03 LAB
ANION GAP SERPL CALCULATED.3IONS-SCNC: 2 MMOL/L (ref 3–14)
BUN SERPL-MCNC: 34 MG/DL (ref 7–30)
CALCIUM SERPL-MCNC: 9.1 MG/DL (ref 8.5–10.1)
CHLORIDE SERPL-SCNC: 99 MMOL/L (ref 94–109)
CO2 SERPL-SCNC: 32 MMOL/L (ref 20–32)
CREAT SERPL-MCNC: 1.31 MG/DL (ref 0.52–1.04)
GFR SERPL CREATININE-BSD FRML MDRD: 42 ML/MIN/{1.73_M2}
GLUCOSE SERPL-MCNC: 101 MG/DL (ref 70–99)
POTASSIUM SERPL-SCNC: 4.8 MMOL/L (ref 3.4–5.3)
SODIUM SERPL-SCNC: 133 MMOL/L (ref 133–144)

## 2021-05-03 PROCEDURE — 36415 COLL VENOUS BLD VENIPUNCTURE: CPT | Performed by: PHYSICIAN ASSISTANT

## 2021-05-03 PROCEDURE — 80048 BASIC METABOLIC PNL TOTAL CA: CPT | Performed by: PHYSICIAN ASSISTANT

## 2021-05-03 NOTE — TELEPHONE ENCOUNTER
Date: 5/3/2021    Time of Call: 1:41 PM     Diagnosis:  HFpEF     [ VORB ] Ordering provider: Yolanda MORGAN  Order: Metolazone today. BMP Thursday. If needs another metolazone on Wednesday will consider it. If does take on Wednesday would move labs to [place].      Order received by: Vidhi Montgomery RN      Follow-up/additional notes: mychart sent to Amy.

## 2021-05-04 ENCOUNTER — MYC MEDICAL ADVICE (OUTPATIENT)
Dept: CARDIOLOGY | Facility: CLINIC | Age: 71
End: 2021-05-04

## 2021-05-04 NOTE — TELEPHONE ENCOUNTER
Reviewed with CATHY Orozco and then with Amy.      Plan:    Labs tomorrow, and decision regarding metolazone after labs are back.  Direct admission tomorrow to [facility].  CATHY Orozco to discuss with cards 1 team.        Amy agreed to plan, will call to schedule labs for tomorrow morning.  She states that she is miserable enough that an admission is starting to feel like a reasonable move.

## 2021-05-05 ENCOUNTER — MYC MEDICAL ADVICE (OUTPATIENT)
Dept: CARDIOLOGY | Facility: CLINIC | Age: 71
End: 2021-05-05

## 2021-05-05 ENCOUNTER — PATIENT OUTREACH (OUTPATIENT)
Dept: CARDIOLOGY | Facility: CLINIC | Age: 71
End: 2021-05-05

## 2021-05-05 DIAGNOSIS — I50.33 ACUTE ON CHRONIC DIASTOLIC HEART FAILURE (H): Primary | ICD-10-CM

## 2021-05-05 DIAGNOSIS — I50.33 ACUTE ON CHRONIC DIASTOLIC HEART FAILURE (H): ICD-10-CM

## 2021-05-05 LAB
ANION GAP SERPL CALCULATED.3IONS-SCNC: 3 MMOL/L (ref 3–14)
BUN SERPL-MCNC: 30 MG/DL (ref 7–30)
CALCIUM SERPL-MCNC: 9.1 MG/DL (ref 8.5–10.1)
CHLORIDE SERPL-SCNC: 93 MMOL/L (ref 94–109)
CO2 SERPL-SCNC: 38 MMOL/L (ref 20–32)
CREAT SERPL-MCNC: 1.16 MG/DL (ref 0.52–1.04)
GFR SERPL CREATININE-BSD FRML MDRD: 48 ML/MIN/{1.73_M2}
GLUCOSE SERPL-MCNC: 85 MG/DL (ref 70–99)
POTASSIUM SERPL-SCNC: 3.2 MMOL/L (ref 3.4–5.3)
SODIUM SERPL-SCNC: 134 MMOL/L (ref 133–144)

## 2021-05-05 PROCEDURE — 80048 BASIC METABOLIC PNL TOTAL CA: CPT | Performed by: PHYSICIAN ASSISTANT

## 2021-05-05 PROCEDURE — 36415 COLL VENOUS BLD VENIPUNCTURE: CPT | Performed by: PHYSICIAN ASSISTANT

## 2021-05-05 NOTE — TELEPHONE ENCOUNTER
Called Amy to review labs and give recommendations:  Date: 5/5/2021    Time of Call: 11:19 AM     Diagnosis:  HFpEF     [ VORB ] Ordering provider: Yolanda MORGAN  Order: Take extra 40 meq Potassium today. If feeling like needs Metolazone tomorrow can take tomorrow. If she does take it needs BMP on Friday. At any point if feeling worse or metolazone not helping should go to ER.      Order received by: Vidhi Montgomery RN      Follow-up/additional notes: Reviewed this with Amy. Explained that as she is needing metolazone more frequently, is needing more frequent labs and that if doesn't improve managing this inpatient would be the recommendation. She verbalized understanding.

## 2021-05-06 DIAGNOSIS — E85.4 AMYLOID HEART DISEASE (H): ICD-10-CM

## 2021-05-06 DIAGNOSIS — I43 AMYLOID HEART DISEASE (H): ICD-10-CM

## 2021-05-06 RX ORDER — POTASSIUM CHLORIDE 750 MG/1
TABLET, EXTENDED RELEASE ORAL
Qty: 1872 TABLET | Refills: 1 | Status: SHIPPED | OUTPATIENT
Start: 2021-05-06 | End: 2021-05-14

## 2021-05-06 NOTE — TELEPHONE ENCOUNTER
Reviewed with Yolanda Araya. Ok with going to [facility]. Called [facility] and spoke with charge RN and gave report. Gave Ciara Araya's pager number to call for provider report. Vidhi Montgomery RN

## 2021-05-10 ENCOUNTER — MYC MEDICAL ADVICE (OUTPATIENT)
Dept: CARDIOLOGY | Facility: CLINIC | Age: 71
End: 2021-05-10

## 2021-05-10 DIAGNOSIS — I43 AMYLOID HEART DISEASE (H): Primary | ICD-10-CM

## 2021-05-10 DIAGNOSIS — I50.33 ACUTE ON CHRONIC DIASTOLIC HEART FAILURE (H): ICD-10-CM

## 2021-05-10 DIAGNOSIS — E85.4 AMYLOID HEART DISEASE (H): Primary | ICD-10-CM

## 2021-05-11 DIAGNOSIS — E85.81 AL AMYLOIDOSIS (H): ICD-10-CM

## 2021-05-11 LAB
ALBUMIN SERPL-MCNC: 3.8 G/DL (ref 3.4–5)
ALP SERPL-CCNC: 171 U/L (ref 40–150)
ALT SERPL W P-5'-P-CCNC: 35 U/L (ref 0–50)
ANION GAP SERPL CALCULATED.3IONS-SCNC: 5 MMOL/L (ref 3–14)
AST SERPL W P-5'-P-CCNC: 23 U/L (ref 0–45)
BASOPHILS # BLD AUTO: 0.1 10E9/L (ref 0–0.2)
BASOPHILS NFR BLD AUTO: 2 %
BILIRUB SERPL-MCNC: 1.9 MG/DL (ref 0.2–1.3)
BUN SERPL-MCNC: 39 MG/DL (ref 7–30)
CALCIUM SERPL-MCNC: 8.9 MG/DL (ref 8.5–10.1)
CHLORIDE SERPL-SCNC: 96 MMOL/L (ref 94–109)
CO2 SERPL-SCNC: 31 MMOL/L (ref 20–32)
CREAT SERPL-MCNC: 1.21 MG/DL (ref 0.52–1.04)
DIFFERENTIAL METHOD BLD: ABNORMAL
EOSINOPHIL # BLD AUTO: 1.8 10E9/L (ref 0–0.7)
EOSINOPHIL NFR BLD AUTO: 28 %
ERYTHROCYTE [DISTWIDTH] IN BLOOD BY AUTOMATED COUNT: 13.5 % (ref 10–15)
GFR SERPL CREATININE-BSD FRML MDRD: 46 ML/MIN/{1.73_M2}
GLUCOSE SERPL-MCNC: 82 MG/DL (ref 70–99)
HCT VFR BLD AUTO: 41.3 % (ref 35–47)
HGB BLD-MCNC: 13.8 G/DL (ref 11.7–15.7)
LYMPHOCYTES # BLD AUTO: 0.4 10E9/L (ref 0.8–5.3)
LYMPHOCYTES NFR BLD AUTO: 7 %
MCH RBC QN AUTO: 32.2 PG (ref 26.5–33)
MCHC RBC AUTO-ENTMCNC: 33.4 G/DL (ref 31.5–36.5)
MCV RBC AUTO: 96 FL (ref 78–100)
MONOCYTES # BLD AUTO: 0.3 10E9/L (ref 0–1.3)
MONOCYTES NFR BLD AUTO: 5 %
NEUTROPHILS # BLD AUTO: 3.8 10E9/L (ref 1.6–8.3)
NEUTROPHILS NFR BLD AUTO: 58 %
PLATELET # BLD AUTO: 195 10E9/L (ref 150–450)
PLATELET # BLD EST: ABNORMAL 10*3/UL
POTASSIUM SERPL-SCNC: 3.4 MMOL/L (ref 3.4–5.3)
PROT SERPL-MCNC: 7.1 G/DL (ref 6.8–8.8)
RBC # BLD AUTO: 4.29 10E12/L (ref 3.8–5.2)
RBC MORPH BLD: NORMAL
SODIUM SERPL-SCNC: 132 MMOL/L (ref 133–144)
WBC # BLD AUTO: 6.4 10E9/L (ref 4–11)

## 2021-05-11 PROCEDURE — 82784 ASSAY IGA/IGD/IGG/IGM EACH: CPT | Performed by: INTERNAL MEDICINE

## 2021-05-11 PROCEDURE — 36415 COLL VENOUS BLD VENIPUNCTURE: CPT | Performed by: INTERNAL MEDICINE

## 2021-05-11 PROCEDURE — 80053 COMPREHEN METABOLIC PANEL: CPT | Performed by: INTERNAL MEDICINE

## 2021-05-11 PROCEDURE — 84165 PROTEIN E-PHORESIS SERUM: CPT | Performed by: PATHOLOGY

## 2021-05-11 PROCEDURE — 99N1036 PR STATISTIC TOTAL PROTEIN: Performed by: INTERNAL MEDICINE

## 2021-05-11 PROCEDURE — 85025 COMPLETE CBC W/AUTO DIFF WBC: CPT | Performed by: INTERNAL MEDICINE

## 2021-05-11 PROCEDURE — 83883 ASSAY NEPHELOMETRY NOT SPEC: CPT | Performed by: INTERNAL MEDICINE

## 2021-05-11 NOTE — TELEPHONE ENCOUNTER
Called Amy back.  She was inpatient Thursday thru Saturday, was on a bumex gtt but took one FULL bumex prior to admission.  She was switched to PO Bumex prior to discharge and actually was uptrending in her weight when she discharged (148->151).  She is now on Bumex 2 mg BID.      Yesterday her weight was 158 lbs, she took half a blaster and required potassium and is back down to 153 lbs and feeling better then she did yesterday.      She wonders if she should follow up with [facility] or [facility] and hopes she can be seen more frequently then 3-6 months with P.  She doesn't really want to follow up with [facility] at this time.

## 2021-05-11 NOTE — TELEPHONE ENCOUNTER
Amy clarified that she is still taking 80 mEq potassium twice a day.      She had labs checked today    She would prefer to not to be seen by core tomorrow but may consider an appointment next week or the next week.  Tentatively put a hold on a spot 5/24 at 9:00.      She is wondering if she could take another half of blaster since her weight is still up.      She would prefer to not go up on bumex to 5 mg BID right away, would like to go up slower if possible.

## 2021-05-12 ENCOUNTER — TELEPHONE (OUTPATIENT)
Dept: PALLIATIVE CARE | Facility: CLINIC | Age: 71
End: 2021-05-12

## 2021-05-12 LAB
ALBUMIN SERPL ELPH-MCNC: 4.2 G/DL (ref 3.7–5.1)
ALPHA1 GLOB SERPL ELPH-MCNC: 0.3 G/DL (ref 0.2–0.4)
ALPHA2 GLOB SERPL ELPH-MCNC: 0.6 G/DL (ref 0.5–0.9)
B-GLOBULIN SERPL ELPH-MCNC: 0.7 G/DL (ref 0.6–1)
GAMMA GLOB SERPL ELPH-MCNC: 0.8 G/DL (ref 0.7–1.6)
IGA SERPL-MCNC: 66 MG/DL (ref 84–499)
KAPPA LC UR-MCNC: 2.47 MG/DL (ref 0.33–1.94)
KAPPA LC/LAMBDA SER: 0.7 {RATIO} (ref 0.26–1.65)
LAMBDA LC SERPL-MCNC: 3.55 MG/DL (ref 0.57–2.63)
M PROTEIN SERPL ELPH-MCNC: 0 G/DL
PROT PATTERN SERPL ELPH-IMP: NORMAL

## 2021-05-12 NOTE — TELEPHONE ENCOUNTER
Called regarding scheduling Palliative return early June 2021 with Dr. Elam. Patient said she would prefer to wait. She said she will call back if she wants to schedule with Palliative Care.

## 2021-05-12 NOTE — TELEPHONE ENCOUNTER
Reviewed with Yolanda MORGAN.   Date: 5/12/2021    Time of Call: 1:47 PM     Diagnosis:  HF     [ VORB ] Ordering provider: Yolanda MORGAN  Order: Metolazone 2.5 mg  - take half tab with half extra potassium. BMP Friday.      Order received by: Vidhi Montgomery RN      Follow-up/additional notes: left voicemail for Amy and mychart sent.

## 2021-05-13 ASSESSMENT — ENCOUNTER SYMPTOMS
SPUTUM PRODUCTION: 0
WHEEZING: 0
NERVOUS/ANXIOUS: 1
LEG PAIN: 1
NAIL CHANGES: 0
SKIN CHANGES: 0
JOINT SWELLING: 0
INSOMNIA: 1
DYSPNEA ON EXERTION: 1
MUSCLE CRAMPS: 1
NECK PAIN: 0
ARTHRALGIAS: 0
POOR WOUND HEALING: 0
LIGHT-HEADEDNESS: 1
STIFFNESS: 1
HYPOTENSION: 0
DECREASED CONCENTRATION: 0
HYPERTENSION: 0
PANIC: 0
SHORTNESS OF BREATH: 1
POSTURAL DYSPNEA: 1
DEPRESSION: 0
MYALGIAS: 1
EXERCISE INTOLERANCE: 1
COUGH DISTURBING SLEEP: 0
SNORES LOUDLY: 0
BACK PAIN: 1
SYNCOPE: 0
PALPITATIONS: 1
COUGH: 0
SLEEP DISTURBANCES DUE TO BREATHING: 0
ORTHOPNEA: 1
MUSCLE WEAKNESS: 1
HEMOPTYSIS: 0

## 2021-05-14 RX ORDER — POTASSIUM CHLORIDE 750 MG/1
TABLET, EXTENDED RELEASE ORAL
Qty: 1944 TABLET | Refills: 1 | Status: SHIPPED | OUTPATIENT
Start: 2021-05-14 | End: 2021-05-24

## 2021-05-19 DIAGNOSIS — I43 AMYLOID HEART DISEASE (H): ICD-10-CM

## 2021-05-19 DIAGNOSIS — E85.4 AMYLOID HEART DISEASE (H): ICD-10-CM

## 2021-05-20 ENCOUNTER — TELEPHONE (OUTPATIENT)
Dept: DERMATOLOGY | Facility: CLINIC | Age: 71
End: 2021-05-20

## 2021-05-20 ENCOUNTER — ONCOLOGY VISIT (OUTPATIENT)
Dept: ONCOLOGY | Facility: CLINIC | Age: 71
End: 2021-05-20
Payer: COMMERCIAL

## 2021-05-20 VITALS
BODY MASS INDEX: 27.39 KG/M2 | OXYGEN SATURATION: 98 % | SYSTOLIC BLOOD PRESSURE: 121 MMHG | DIASTOLIC BLOOD PRESSURE: 66 MMHG | HEIGHT: 63 IN | TEMPERATURE: 97.4 F | HEART RATE: 66 BPM | RESPIRATION RATE: 18 BRPM | WEIGHT: 154.6 LBS

## 2021-05-20 DIAGNOSIS — E85.81 AL AMYLOIDOSIS (H): Primary | ICD-10-CM

## 2021-05-20 DIAGNOSIS — R21 RASH: ICD-10-CM

## 2021-05-20 PROCEDURE — 99214 OFFICE O/P EST MOD 30 MIN: CPT | Performed by: INTERNAL MEDICINE

## 2021-05-20 RX ORDER — BUMETANIDE 2 MG/1
2 TABLET ORAL 2 TIMES DAILY
COMMUNITY
Start: 2021-05-08 | End: 2021-05-24

## 2021-05-20 ASSESSMENT — PAIN SCALES - GENERAL: PAINLEVEL: NO PAIN (0)

## 2021-05-20 ASSESSMENT — MIFFLIN-ST. JEOR: SCORE: 1200.39

## 2021-05-20 NOTE — TELEPHONE ENCOUNTER
I left a message for patient to call [facility].  I tentatively scheduled her with Dr. Gupta on 6/1/21 at 3:45.  Demetrice Hanna RN

## 2021-05-20 NOTE — TELEPHONE ENCOUNTER
Patient called and left message on department VM at 3:07pm. She states she got a call to schedule a sooner appointment. Inform patient she is tentatively scheduled on 6/1/21 at 3:45pm in clinic.    Roz Tovar LPN

## 2021-05-20 NOTE — NURSING NOTE
"Oncology Rooming Note    May 20, 2021 10:31 AM   Leandra Guerrero is a 70 year old female who presents for:    Chief Complaint   Patient presents with     Oncology Clinic Visit     Follow up     Initial Vitals: /66 (BP Location: Right arm)   Pulse 66   Temp 97.4  F (36.3  C) (Oral)   Resp 18   Ht 1.6 m (5' 3\")   Wt 70.1 kg (154 lb 9.6 oz)   SpO2 98%   BMI 27.39 kg/m   Estimated body mass index is 27.39 kg/m  as calculated from the following:    Height as of this encounter: 1.6 m (5' 3\").    Weight as of this encounter: 70.1 kg (154 lb 9.6 oz). Body surface area is 1.77 meters squared.  No Pain (0) Comment: Data Unavailable   No LMP recorded. Patient is postmenopausal.  Allergies reviewed: Yes  Medications reviewed: Yes    Medications: Medication refills not needed today.  Pharmacy name entered into [facility]: [facility] #[id number] - [place], [place] - [facility] [address]    Clinical concerns: Retaining fluid, abdomen and thighs Dr. DALEY was notified.      Quyen Arboleda CMA            "

## 2021-05-20 NOTE — PROGRESS NOTES
Hematology/Oncology Follow-up visit:  Date on this visit: May 20, 2021      Referring Physician: Dr. Braydon Barron, [facility].  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 She was diagnosed with CHF at a local clinic in [facility] and was placed on a b-blocker and a diuretic. She has also developed progressive worsening lower extremity edema by May 2016 which in retrospect started in January. Her cardiac angiogram was reportedly negative at that time.  She requested to be seen at [facility] and was seen by Dr. Braydon Barron on 9/13/2016 with f/up on 9/20/2016. She was also seen by cardiology team there Sara Severson CNP and Dr. Nettles from CHF service.  There was no e/o renal or hepatic amyloidosis.  EKG on 9/7/2016 showed normal sinus rhythm, prolonged QT interval (QTc 521 sec), left atrial enlargement and left anterior fascicular block. There was ST and T wave abnormality.  Apparently, her echocardiogram showed preserved LVEF at 59%.  She had R sided thoracentesis on 8/25/2016 after which her breathing has improved. She had extensive lab workup on 9/7/2016.  Her TSH was normal. Serum protein ELP showed a small abnormality in the gamma fraction.  Serum immunofixation showed small monoclonal IgA lambda in the gamma fraction and a small lambda in the beta fraction. Serum IgA level was normal at 329. Serum IgG level was mildly low at 755 (normal range 767-1590) and IgM level was normal. B2 microglobulin was mildly elevated at 2.94 (ULN 2.7). Mg was normal. Total bilirubin was elevated at 3.9 and direct at 0.8. Uric acid was mildly elevated at 6.9 (ULN 6.1) Creat was 1.0. Troponin was 0.1 (ULN <0.01) and NT-pro BNP was 4747 pg/ml (ULN <=183). Total cholesterol was 190 and LDL was 139. Random urine protein was mildly elevated at 33 mg/dl (normal range <22). INR was  1.2 and PTT 27 (within normal limits). Fibrinogen was elevated at 441 and factor X was mildly decreased at 64% (normal range %). D-dimer was up at 1700 (). Free lambda light chains were 69.5 and free kappa light chains were 1.11 with FLC kappa/lambda ratio of 0.016.   CBC showed normal WBC at 6.0, slightly elevated Hb at 16. Platelet count was normal.  Flow cytometry on bone marrow biopsy showed detectable monotypic plasma cells. There were 12% of monotypic plasma cells on bone marrow biopsy.    The bone marrow biopsy (performed on 9/14/2016) showed normocellular BM, 30-40% cellularity, with involvement by 10-20% of lambda light chain restricted plasma cells.  Congo red stain was positive on the bone marrow biopsy. Liquid chromatography tandem mass spectroscopy showed a peptide profile c/w AL (lambda type) amyloid. BM karyotype was normal 46 XX. FISH on BM showed plasma cell clone with 1q duplication, and monosomy of 13 and 14.  She had an EGD by Dr. Matthew Wadsworth on 9/14/2016. It showed gastric mucosal atrophy and multiple mucosal papules (nodules) seen in the stomach. The duodenum appeared normal. The biopsies of stomach mucosa, of stomach (antral) nodules and of duodenum, all showed the presence of amyloid in submucosal blood vessel walls in the stomach and duodenum and in deep mucosa in the stomach antrum and body, confirmed by Congo Red stain.   She also had additional labs done at our last visit, on 9/30/2016. Total bilirubin was elevated as below, primarily indirect. K was 3.3 and she was stared on K-dur 20 mEq PO bid. She was noted to have elevated serum IgA level on 9/30/2016 at 392 (). Serum free lambda chains were elevated at 37.75. Serum M spike was 0.1 g/dl and serum FLC ratio was low at 0.01. Serum immunofixation ELP showed monoclonal IgA immunoglobulin of lambda light chain type as well as monoclonal free immunoglobulin light chain of lambda chain type.  She was seen by   Noel too and had an echocardiogram on 10/6/2016 which showed:  Global and regional left ventricular function was normal with an EF of 60-65%.  The LV mass index was 131 g/m2 (severely increased.) The LV geometry is c/w  concentric hypertrophy measured by relative wall thickness. Global right ventricular function was normal. Severe biatrial enlargement was present.  Right ventricular systolic pressure was 26mmHg above the right atrial pressure. The inferior vena cava was dilated at 2.1 cm without respiratory variability, consistent with increased right atrial pressure. Trivial pericardial effusion was present.    She proceeded to start CyBorD as recommended by Dr. Barron with dose reduced in subq Bortezomib to 0.7 mg/m2, on 10/4/2016.  After one cycle, her M spike, IgA level and free lambda light chains have all improved, with M spike of 0 g/dl, IgA down into the normal range and free serum lambda light chains improving from 37.75 down to 2.09.  She returns today in cycle 3 day 11. She had amyloid labs again on day 1 of cycle 3 (12/8/2016) which showed continued response to therapy and her IgA level was now low and serum FLC ratio was normal and serum free lambda chains were normal too.   She has developed hematuria with cycle 2 and Cytoxan is on hold from day 15 cycle 2 since hematuria persisted despite Mesnex. She had a CT abdomen/pelvis on 11/29/2016 which showed no abnormalities in the kidneys. There was a moderate right and a trace left sided pleural effusions. Small volume ascites and anasarca was noted as well. The liver had heterogeneous appearance, which can be seen in hepatic venous congestion.   Cytoxan was d/cd  on  11/22/2016 since her cystoscopy showed findings of mild petechia and erythema in her bladder that would be c/w hemorrhagic cystitis.  Pinch biopsies were taken from the bladder and showed no e/o amyloid or malignancy. She has continued on Velcade and dexamethasone until 12/27/2016.  On  1/3/2017 she was seen at [facility] by Dr. Barron and at that time serum free light chains were normal at 0.74 and FLC ratio was normal as well at 0.72. She was recommended to be on observation because free lambda chains normalized. However, her cardiac amyloidosis has progressed by 1/5/2017 and she has required frequent thoracentesis, and had weeping LE edema and elevated neck veins.  She was hospitalized for CHF in Jan 2017 due to cardiac amyloid and was aggressively diuresed.  Her echocardiogram on 1/5/2017 was abnormal (severe concentric hypertrophy and biatrial enlargement) and she also had abnormal EKG (near-low voltage, poor R wave progression, biatrial enlargement and no evidence of LVH despite very thickened LV on echo). She was felt to have  New York Heart Association class IIIB heart failure with clinically severe volume overload. She was seen by cardiologist Dr. Harjit Fischer at  in [place]. She was seen by Dr. Chivo Lei from medical oncology at the [facility] in [place], and was recommended to start on Acyclovir and Doxycycline.  She has not started Acyclovir  but did start daily Doxycyline.   Dr. Lei also recommended she consider maintenance therapy. She was also seen by Dr. Barron at [facility] on 5/11/2017, and he recommended against maintenance therapy.     History Of Present Illness:  Ms. Guerrero is a 70 year old female, non-smoker, who presents for f/up of AL amyloidosis, IgA lambda. She has been off treatment since December 2016.  She has been seeing  Dr. Cohen in f/up of cardiac Amyloidosis with preserved EF, CHF NYHA Class III. They monitor troponin and BNP. She is on doxycycline daily and her nausea recently has improved and she only takes lorazepam as needed and ginger ale for nausea control.  She does not take ondansetron currently.  She has also seen palliative care and was recommended to try low-dose olanzapine at 2.5 mg daily but has not been taking it.  She feels that the  nausea has improved since her visit with palliative care in February 2021.  She has been getting progressively more edematous in her legs and also has had more fluid in her abdomen.  She is followed closely with cardiac/CHF team.   Swelling in b/l LE is improved. Her serum IgA level has remained normal and she has no M protein on serum protein electrophoresis.  Serum free kappa lambda light chain ratio is normal as well.   Results for HILARY GARCIA (MRN [id number]) as of 5/20/2021 11:25   Ref. Range 11/13/2020 12:02 5/11/2021 10:19   IGA Latest Ref Range: 84 - 499 mg/dL 69 (L) 66 (L)   Kappa Free Lt Chain Latest Ref Range: 0.33 - 1.94 mg/dL 2.02 (H) 2.47 (H)   Kappa Lambda Ratio Latest Ref Range: 0.26 - 1.65  0.94 0.70   Lambda Free Lt Chain Latest Ref Range: 0.57 - 2.63 mg/dL 2.14 3.55 (H)   Monoclonal Peak Latest Ref Range: 0.0 g/dL 0.0 0.0   She was seen in [facility] on May 6, 2021 and I have reviewed outside medical records.  She was complaining of increased bilateral lower extremity edema at that time and was given IV diuretic on top of her current diuretic regimen.  Chest x-ray May 6, 2021 showed:  Findings consistent with small volume right-sided pleural effusion and adjacent atelectasis, which appears similar to comparison. There is minimal blunting left costophrenic angle, which is new to comparison, suggestive of trace pleural fluid versus pleural thickening. No pneumonic consolidation or pneumothorax. Heart size is within normal limits. Pulmonary vasculature is normal appearing. No acute osseous abnormality is seen. There is mild right convex curvature of the thoracolumbar spine. Mild compression fracture deformity of T11 and T12 appears stable to comparisons.  She denies any shortness of breath except she feels that it difficult to breathe when her abdomen gets distended from swelling.   Her cardiac status is felt to have declined gradually over the last couple of years and she has ongoing  challenges with her volume status.  She requires  metolazone and torsemide maintaining an adequate potassium level has been a challenge.  She is on high doses of supplemental KCl.  She has been in chronic diastolic heart failure/restrictive cardiomyopathy secondary to cardiac amyloidosis, stage C, NYHA Class III.   She continues on Eliquis anticoagulation for A.fib. She had a holter monitor which showed intermittent A.fib/A.flutter.  She has had rash that she attributes to Bumex and has had generalized itching especially in her lower back and on her thighs.  She has urinated night because of diuretics.  She had a skeletal survey 2020.  It showed no radiographic evidence of calvarial abnormality.  There was L1 compression fracture new since  and chronic  T12 and T11 compression deformities.  She did sustain a fall in the last 2 to 3 years.  There were no lytic lesions.  Patient also had CT lumbar spine in [facility] on 2020 showed:  1. Mild compression along the superior and inferior endplates of L2. This appears to be an acute injury as there is a visible fracture line along the inferior endplate. This could be confirmed with MRI if there is need for further imaging.  2. Mild compression of the superior endplates of T12 and L1 and appear chronic.  3. Multilevel degenerative disc disease that includes moderate central spinal canal narrowing.  We have discussed proceeding with DEXA scan for evaluation of bone mineral density today, but patient prefers to postpone.  In addition, a complete 12 point  review of systems is negative.      Past Medical/Surgical History:  AL Amyloid  Amyloid cardiomyopathy/CHF    Family History:  Sister  of multiple myeloma    Social History:  Lives in [place], with . Nonsmoker        Allergies:     Allergies   Allergen Reactions     Contrast Dye Shortness Of Breath     Shaking,chills and dypsnea     Cytoxan [Cyclophosphamide]      Hemorrhagic cystitis      "Gabapentin      Slurred speech, weakness     Levofloxacin      Severe shaking and abdominal pain     Amoxicillin Nausea and Vomiting and Cramps     Spironolactone      Worsening hyponatremia     Current Medications:  Current Outpatient Medications   Medication     Acetaminophen (TYLENOL PO)     apixaban ANTICOAGULANT (ELIQUIS) 2.5 MG tablet     bumetanide (BUMEX) 2 MG tablet     doxycycline hyclate (VIBRA-TABS) 100 MG tablet     LORazepam (ATIVAN) 0.5 MG tablet     metolazone (ZAROXOLYN) 2.5 MG tablet     ondansetron (ZOFRAN-ODT) 8 MG ODT tab     potassium chloride ER (KLOR-CON M) 10 MEQ CR tablet     torsemide (DEMADEX) 100 MG tablet     triamcinolone (KENALOG) 0.1 % external cream     OLANZapine (ZYPREXA) 2.5 MG tablet     No current facility-administered medications for this visit.        Physical Exam:  /66 (BP Location: Right arm)   Pulse 66   Temp 97.4  F (36.3  C) (Oral)   Resp 18   Ht 1.6 m (5' 3\")   Wt 70.1 kg (154 lb 9.6 oz)   SpO2 98%   BMI 27.39 kg/m        GENERAL APPEARANCE:alert and no distress  HEENT: PEERLA, no neck asymmetry or masses    CV: S1S2 reg no murmur  Respiratory: CTA b/l  GI: soft,  BS+, NT, + moderate ascites  Extremities: +2-3 edema b/l LE    SKIN: Erythematous confluent rash, somewhat raised, appearing is a drug rash on bilateral lower back and somewhat less conspicuous on anterior lower abdomen.    PSYCHIATRIC: mentation appears normal and affect normal  Neuro: gait intact. Axox3        Laboratory/Imaging Studies  Results for HILARY GARCIA (MRN [id number]) as of 5/20/2021 10:39   Ref. Range 4/17/2019 09:17 5/17/2019 09:17 10/26/2019 09:30 11/14/2019 11:45 5/12/2020 08:57 11/13/2020 12:02 5/11/2021 10:19   Absolute Eosinophils Latest Ref Range: 0.0 - 0.7 10e9/L 0.7 1.0 (H) 0.9 (H) 0.8 (H) 0.9 (H) 1.2 (H) 1.8 (H)     Component      Latest Ref Rng & Units 5/11/2021   WBC      4.0 - 11.0 10e9/L 6.4   RBC Count      3.8 - 5.2 10e12/L 4.29   Hemoglobin      11.7 - 15.7 " g/dL 13.8   Hematocrit      35.0 - 47.0 % 41.3   MCV      78 - 100 fl 96   MCH      26.5 - 33.0 pg 32.2   MCHC      31.5 - 36.5 g/dL 33.4   RDW      10.0 - 15.0 % 13.5   Platelet Count      150 - 450 10e9/L 195   % Neutrophils      % 58.0   % Lymphocytes      % 7.0   % Monocytes      % 5.0   % Eosinophils      % 28.0   % Basophils      % 2.0   Absolute Neutrophil      1.6 - 8.3 10e9/L 3.8   Absolute Lymphocytes      0.8 - 5.3 10e9/L 0.4 (L)   Absolute Monocytes      0.0 - 1.3 10e9/L 0.3   Absolute Eosinophils      0.0 - 0.7 10e9/L 1.8 (H)   Absolute Basophils      0.0 - 0.2 10e9/L 0.1   RBC Morphology       Normal   Platelet Estimate       Automated count confirmed.  Platelet morphology is normal.   Diff Method       Manual Differential   Sodium      133 - 144 mmol/L 132 (L)   Potassium      3.4 - 5.3 mmol/L 3.4   Chloride      94 - 109 mmol/L 96   Carbon Dioxide      20 - 32 mmol/L 31   Anion Gap      3 - 14 mmol/L 5   Glucose      70 - 99 mg/dL 82   Urea Nitrogen      7 - 30 mg/dL 39 (H)   Creatinine      0.52 - 1.04 mg/dL 1.21 (H)   GFR Estimate      >60 mL/min/1.73:m2 46 (L)   GFR Estimate If Black      >60 mL/min/1.73:m2 53 (L)   Calcium      8.5 - 10.1 mg/dL 8.9   Bilirubin Total      0.2 - 1.3 mg/dL 1.9 (H)   Albumin      3.4 - 5.0 g/dL 3.8   Protein Total      6.8 - 8.8 g/dL 7.1   Alkaline Phosphatase      40 - 150 U/L 171 (H)   ALT      0 - 50 U/L 35   AST      0 - 45 U/L 23   Albumin Fraction      3.7 - 5.1 g/dL 4.2   Alpha 1 Fraction      0.2 - 0.4 g/dL 0.3   Alpha 2 Fraction      0.5 - 0.9 g/dL 0.6   Beta Fraction      0.6 - 1.0 g/dL 0.7   Gamma Fraction      0.7 - 1.6 g/dL 0.8   Monoclonal Peak      0.0 g/dL 0.0   ELP Interpretation:       Essentially normal electrophoretic pattern.  No obvious monoclonal proteins seen. . . .   Kappa Free Lt Chain      0.33 - 1.94 mg/dL 2.47 (H)   Lambda Free Lt Chain      0.57 - 2.63 mg/dL 3.55 (H)   Kappa Lambda Ratio      0.26 - 1.65 0.70   IGA      84 - 499 mg/dL 66  (L)       ASSESSMENT/PLAN:  Leandra Guerrero is a 70 year old woman with  of AL amyloidosis involving the heart and GI tract. 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 Stage IV amyloidosis is associated with median survival of 5 months in patients not undergoing BMT, and 5 year survival of 15%, and in patients who are able to undergo BMT, median survival is 22 months and 5 year survival of 46% (Dejan A, Anderson BAR, Toñito MCARTHUR, et al. Prognostication of survival using cardiac troponins and N-terminal pro-brain natriuretic peptide in patients with primary systemic amyloidosis undergoing peripheral blood stem cell transplantation. Blood 2004; 104:1881). She was felt not to be a candidate for high dose therapy.   She was on Cybor-D from 10/4/2016-12/27/2016, with Cytoxan omitted after cycle 2 day 1 due to her developing hemorrhagic cystitis. Her disease has responded  biochemically, with serum M spike of zero, and serum IgA level was down to low range and  free lambda light chains have normalized quantitatively.     I have discussed her situation with Dr. Barron, and he felt at that time that with normalization of serum free lambda light chains, no further chemotherapy is indicated.     1.  AL amyloidosis -amyloid labs stable with normal serum IgA level and no M spike detectable on serum protein electrophoresis.  Serum FLC ratio normal.  Continue doxycycline orally daily.  We'll see her back in 6 months with amyloid labs prior.     2.  Increased abdominal ascites-patient is somewhat uncomfortable and feels it affects her breathing.  She will think about proceeding with abdominal paracentesis.  I have communicated with cardiology team today to see if they have no objections to abdominal paracentesis.    3. Amyloid cardiomyopathy-A.  fib-  She is being followed by  Dr. Cohen. Continue current diuretic regimen and KCl supplementation. Continue Eliquis. Cardiac Amyloidosis (a restrictive cardiomyopathy) , Stage C, NYHA Class III.  A. fib is rate controlled.  Continue Eliquis.  4. Nausea-currently improved.  She uses lorazepam as needed occasionally and that helps.  Currently she is not using Zofran ODT.  Also, not using olanzapine as prescribed by palliative care.    5. CKD- Creat stable.  6.Stable elevated total bilirubin and alk phos, stable- in the past felt to be secondary to liver congestion. Cont to follow.      7. Recurrent pleural effusions-chest x-ray on May 6, 2021 showed small right pleural effusion and questionable small left pleural effusion.  Last CT chest in [facility] in September 2019 showed small to moderate right pleural effusion.  She denies shortness of breath presently.   PleurX catheter is an option in the future if she were to become symptomatic.    8.  Rash which patient attributes to torsemide diuretic.  She has significant pruritus interfering with her quality of life.  We will have her see dermatology.  Referral placed.  At the end of our visit patient and  verbalized understanding and concurred with the plan.

## 2021-05-20 NOTE — LETTER
5/20/2021         RE: Leandra Guerrero  [address] [place] [address]        Dear Colleague,    Thank you for referring your patient, Leandra Guerrero, to the [facility]. Please see a copy of my visit note below.    Hematology/Oncology Follow-up visit:  Date on this visit: May 20, 2021      Referring Physician: Dr. Braydon Barron, [facility].  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 She was diagnosed with CHF at a local clinic in [facility] and was placed on a b-blocker and a diuretic. She has also developed progressive worsening lower extremity edema by May 2016 which in retrospect started in January. Her cardiac angiogram was reportedly negative at that time.  She requested to be seen at [facility] and was seen by Dr. Braydon Barron on 9/13/2016 with f/up on 9/20/2016. She was also seen by cardiology team there Sara Severson CNP and Dr. Nettles from CHF service.  There was no e/o renal or hepatic amyloidosis.  EKG on 9/7/2016 showed normal sinus rhythm, prolonged QT interval (QTc 521 sec), left atrial enlargement and left anterior fascicular block. There was ST and T wave abnormality.  Apparently, her echocardiogram showed preserved LVEF at 59%.  She had R sided thoracentesis on 8/25/2016 after which her breathing has improved. She had extensive lab workup on 9/7/2016.  Her TSH was normal. Serum protein ELP showed a small abnormality in the gamma fraction.  Serum immunofixation showed small monoclonal IgA lambda in the gamma fraction and a small lambda in the beta fraction. Serum IgA level was normal at 329. Serum IgG level was mildly low at 755 (normal range 767-1590) and IgM level was normal. B2 microglobulin was mildly elevated at 2.94 (ULN 2.7). Mg was normal. Total bilirubin was elevated at 3.9 and  direct at 0.8. Uric acid was mildly elevated at 6.9 (ULN 6.1) Creat was 1.0. Troponin was 0.1 (ULN <0.01) and NT-pro BNP was 4747 pg/ml (ULN <=183). Total cholesterol was 190 and LDL was 139. Random urine protein was mildly elevated at 33 mg/dl (normal range <22). INR was 1.2 and PTT 27 (within normal limits). Fibrinogen was elevated at 441 and factor X was mildly decreased at 64% (normal range %). D-dimer was up at 1700 (). Free lambda light chains were 69.5 and free kappa light chains were 1.11 with FLC kappa/lambda ratio of 0.016.   CBC showed normal WBC at 6.0, slightly elevated Hb at 16. Platelet count was normal.  Flow cytometry on bone marrow biopsy showed detectable monotypic plasma cells. There were 12% of monotypic plasma cells on bone marrow biopsy.    The bone marrow biopsy (performed on 9/14/2016) showed normocellular BM, 30-40% cellularity, with involvement by 10-20% of lambda light chain restricted plasma cells.  Congo red stain was positive on the bone marrow biopsy. Liquid chromatography tandem mass spectroscopy showed a peptide profile c/w AL (lambda type) amyloid. BM karyotype was normal 46 XX. FISH on BM showed plasma cell clone with 1q duplication, and monosomy of 13 and 14.  She had an EGD by Dr. Matthew Wadsworth on 9/14/2016. It showed gastric mucosal atrophy and multiple mucosal papules (nodules) seen in the stomach. The duodenum appeared normal. The biopsies of stomach mucosa, of stomach (antral) nodules and of duodenum, all showed the presence of amyloid in submucosal blood vessel walls in the stomach and duodenum and in deep mucosa in the stomach antrum and body, confirmed by Congo Red stain.   She also had additional labs done at our last visit, on 9/30/2016. Total bilirubin was elevated as below, primarily indirect. K was 3.3 and she was stared on K-dur 20 mEq PO bid. She was noted to have elevated serum IgA level on 9/30/2016 at 392 (). Serum free lambda chains were  elevated at 37.75. Serum M spike was 0.1 g/dl and serum FLC ratio was low at 0.01. Serum immunofixation ELP showed monoclonal IgA immunoglobulin of lambda light chain type as well as monoclonal free immunoglobulin light chain of lambda chain type.  She was seen by Dr. Cohen too and had an echocardiogram on 10/6/2016 which showed:  Global and regional left ventricular function was normal with an EF of 60-65%.  The LV mass index was 131 g/m2 (severely increased.) The LV geometry is c/w  concentric hypertrophy measured by relative wall thickness. Global right ventricular function was normal. Severe biatrial enlargement was present.  Right ventricular systolic pressure was 26mmHg above the right atrial pressure. The inferior vena cava was dilated at 2.1 cm without respiratory variability, consistent with increased right atrial pressure. Trivial pericardial effusion was present.    She proceeded to start CyBorD as recommended by Dr. Barron with dose reduced in subq Bortezomib to 0.7 mg/m2, on 10/4/2016.  After one cycle, her M spike, IgA level and free lambda light chains have all improved, with M spike of 0 g/dl, IgA down into the normal range and free serum lambda light chains improving from 37.75 down to 2.09.  She returns today in cycle 3 day 11. She had amyloid labs again on day 1 of cycle 3 (12/8/2016) which showed continued response to therapy and her IgA level was now low and serum FLC ratio was normal and serum free lambda chains were normal too.   She has developed hematuria with cycle 2 and Cytoxan is on hold from day 15 cycle 2 since hematuria persisted despite Mesnex. She had a CT abdomen/pelvis on 11/29/2016 which showed no abnormalities in the kidneys. There was a moderate right and a trace left sided pleural effusions. Small volume ascites and anasarca was noted as well. The liver had heterogeneous appearance, which can be seen in hepatic venous congestion.   Cytoxan was d/cd  on  11/22/2016 since her  cystoscopy showed findings of mild petechia and erythema in her bladder that would be c/w hemorrhagic cystitis.  Pinch biopsies were taken from the bladder and showed no e/o amyloid or malignancy. She has continued on Velcade and dexamethasone until 12/27/2016.  On 1/3/2017 she was seen at [facility] by Dr. Barron and at that time serum free light chains were normal at 0.74 and FLC ratio was normal as well at 0.72. She was recommended to be on observation because free lambda chains normalized. However, her cardiac amyloidosis has progressed by 1/5/2017 and she has required frequent thoracentesis, and had weeping LE edema and elevated neck veins.  She was hospitalized for CHF in Jan 2017 due to cardiac amyloid and was aggressively diuresed.  Her echocardiogram on 1/5/2017 was abnormal (severe concentric hypertrophy and biatrial enlargement) and she also had abnormal EKG (near-low voltage, poor R wave progression, biatrial enlargement and no evidence of LVH despite very thickened LV on echo). She was felt to have  New York Heart Association class IIIB heart failure with clinically severe volume overload. She was seen by cardiologist Dr. Harjit Fischer at  in [place]. She was seen by Dr. Chivo Lei from medical oncology at the [facility] in [place], and was recommended to start on Acyclovir and Doxycycline.  She has not started Acyclovir  but did start daily Doxycyline.   Dr. Lei also recommended she consider maintenance therapy. She was also seen by Dr. Barron at [facility] on 5/11/2017, and he recommended against maintenance therapy.     History Of Present Illness:  Ms. Guerrero is a 70 year old female, non-smoker, who presents for f/up of AL amyloidosis, IgA lambda. She has been off treatment since December 2016.  She has been seeing  Dr. Cohen in f/up of cardiac Amyloidosis with preserved EF, CHF NYHA Class III. They monitor troponin and BNP. She is on doxycycline daily and her nausea recently has improved  and she only takes lorazepam as needed and ginger ale for nausea control.  She does not take ondansetron currently.  She has also seen palliative care and was recommended to try low-dose olanzapine at 2.5 mg daily but has not been taking it.  She feels that the nausea has improved since her visit with palliative care in February 2021.  She has been getting progressively more edematous in her legs and also has had more fluid in her abdomen.  She is followed closely with cardiac/CHF team.   Swelling in b/l LE is improved. Her serum IgA level has remained normal and she has no M protein on serum protein electrophoresis.  Serum free kappa lambda light chain ratio is normal as well.   Results for HILARY GARCIA (MRN [id number]) as of 5/20/2021 11:25   Ref. Range 11/13/2020 12:02 5/11/2021 10:19   IGA Latest Ref Range: 84 - 499 mg/dL 69 (L) 66 (L)   Kappa Free Lt Chain Latest Ref Range: 0.33 - 1.94 mg/dL 2.02 (H) 2.47 (H)   Kappa Lambda Ratio Latest Ref Range: 0.26 - 1.65  0.94 0.70   Lambda Free Lt Chain Latest Ref Range: 0.57 - 2.63 mg/dL 2.14 3.55 (H)   Monoclonal Peak Latest Ref Range: 0.0 g/dL 0.0 0.0   She was seen in [facility] on May 6, 2021 and I have reviewed outside medical records.  She was complaining of increased bilateral lower extremity edema at that time and was given IV diuretic on top of her current diuretic regimen.  Chest x-ray May 6, 2021 showed:  Findings consistent with small volume right-sided pleural effusion and adjacent atelectasis, which appears similar to comparison. There is minimal blunting left costophrenic angle, which is new to comparison, suggestive of trace pleural fluid versus pleural thickening. No pneumonic consolidation or pneumothorax. Heart size is within normal limits. Pulmonary vasculature is normal appearing. No acute osseous abnormality is seen. There is mild right convex curvature of the thoracolumbar spine. Mild compression fracture deformity of T11 and T12 appears  stable to comparisons.  She denies any shortness of breath except she feels that it difficult to breathe when her abdomen gets distended from swelling.   Her cardiac status is felt to have declined gradually over the last couple of years and she has ongoing challenges with her volume status.  She requires  metolazone and torsemide maintaining an adequate potassium level has been a challenge.  She is on high doses of supplemental KCl.  She has been in chronic diastolic heart failure/restrictive cardiomyopathy secondary to cardiac amyloidosis, stage C, NYHA Class III.   She continues on Eliquis anticoagulation for A.fib. She had a holter monitor which showed intermittent A.fib/A.flutter.  She has had rash that she attributes to Bumex and has had generalized itching especially in her lower back and on her thighs.  She has urinated night because of diuretics.  She had a skeletal survey 2020.  It showed no radiographic evidence of calvarial abnormality.  There was L1 compression fracture new since  and chronic  T12 and T11 compression deformities.  She did sustain a fall in the last 2 to 3 years.  There were no lytic lesions.  Patient also had CT lumbar spine in [facility] on 2020 showed:  1. Mild compression along the superior and inferior endplates of L2. This appears to be an acute injury as there is a visible fracture line along the inferior endplate. This could be confirmed with MRI if there is need for further imaging.  2. Mild compression of the superior endplates of T12 and L1 and appear chronic.  3. Multilevel degenerative disc disease that includes moderate central spinal canal narrowing.  We have discussed proceeding with DEXA scan for evaluation of bone mineral density today, but patient prefers to postpone.  In addition, a complete 12 point  review of systems is negative.      Past Medical/Surgical History:  AL Amyloid  Amyloid cardiomyopathy/CHF    Family History:  Sister  of multiple  "myeloma    Social History:  Lives in [place], with . Nonsmoker        Allergies:     Allergies   Allergen Reactions     Contrast Dye Shortness Of Breath     Shaking,chills and dypsnea     Cytoxan [Cyclophosphamide]      Hemorrhagic cystitis     Gabapentin      Slurred speech, weakness     Levofloxacin      Severe shaking and abdominal pain     Amoxicillin Nausea and Vomiting and Cramps     Spironolactone      Worsening hyponatremia     Current Medications:  Current Outpatient Medications   Medication     Acetaminophen (TYLENOL PO)     apixaban ANTICOAGULANT (ELIQUIS) 2.5 MG tablet     bumetanide (BUMEX) 2 MG tablet     doxycycline hyclate (VIBRA-TABS) 100 MG tablet     LORazepam (ATIVAN) 0.5 MG tablet     metolazone (ZAROXOLYN) 2.5 MG tablet     ondansetron (ZOFRAN-ODT) 8 MG ODT tab     potassium chloride ER (KLOR-CON M) 10 MEQ CR tablet     torsemide (DEMADEX) 100 MG tablet     triamcinolone (KENALOG) 0.1 % external cream     OLANZapine (ZYPREXA) 2.5 MG tablet     No current facility-administered medications for this visit.        Physical Exam:  /66 (BP Location: Right arm)   Pulse 66   Temp 97.4  F (36.3  C) (Oral)   Resp 18   Ht 1.6 m (5' 3\")   Wt 70.1 kg (154 lb 9.6 oz)   SpO2 98%   BMI 27.39 kg/m        GENERAL APPEARANCE:alert and no distress  HEENT: PEERLA, no neck asymmetry or masses    CV: S1S2 reg no murmur  Respiratory: CTA b/l  GI: soft,  BS+, NT, + moderate ascites  Extremities: +2-3 edema b/l LE    SKIN: Erythematous confluent rash, somewhat raised, appearing is a drug rash on bilateral lower back and somewhat less conspicuous on anterior lower abdomen.    PSYCHIATRIC: mentation appears normal and affect normal  Neuro: gait intact. Axox3        Laboratory/Imaging Studies  Results for HILARY GARCIA (MRN [id number]) as of 5/20/2021 10:39   Ref. Range 4/17/2019 09:17 5/17/2019 09:17 10/26/2019 09:30 11/14/2019 11:45 5/12/2020 08:57 11/13/2020 12:02 5/11/2021 10:19   Absolute " Eosinophils Latest Ref Range: 0.0 - 0.7 10e9/L 0.7 1.0 (H) 0.9 (H) 0.8 (H) 0.9 (H) 1.2 (H) 1.8 (H)     Component      Latest Ref Rng & Units 5/11/2021   WBC      4.0 - 11.0 10e9/L 6.4   RBC Count      3.8 - 5.2 10e12/L 4.29   Hemoglobin      11.7 - 15.7 g/dL 13.8   Hematocrit      35.0 - 47.0 % 41.3   MCV      78 - 100 fl 96   MCH      26.5 - 33.0 pg 32.2   MCHC      31.5 - 36.5 g/dL 33.4   RDW      10.0 - 15.0 % 13.5   Platelet Count      150 - 450 10e9/L 195   % Neutrophils      % 58.0   % Lymphocytes      % 7.0   % Monocytes      % 5.0   % Eosinophils      % 28.0   % Basophils      % 2.0   Absolute Neutrophil      1.6 - 8.3 10e9/L 3.8   Absolute Lymphocytes      0.8 - 5.3 10e9/L 0.4 (L)   Absolute Monocytes      0.0 - 1.3 10e9/L 0.3   Absolute Eosinophils      0.0 - 0.7 10e9/L 1.8 (H)   Absolute Basophils      0.0 - 0.2 10e9/L 0.1   RBC Morphology       Normal   Platelet Estimate       Automated count confirmed.  Platelet morphology is normal.   Diff Method       Manual Differential   Sodium      133 - 144 mmol/L 132 (L)   Potassium      3.4 - 5.3 mmol/L 3.4   Chloride      94 - 109 mmol/L 96   Carbon Dioxide      20 - 32 mmol/L 31   Anion Gap      3 - 14 mmol/L 5   Glucose      70 - 99 mg/dL 82   Urea Nitrogen      7 - 30 mg/dL 39 (H)   Creatinine      0.52 - 1.04 mg/dL 1.21 (H)   GFR Estimate      >60 mL/min/1.73:m2 46 (L)   GFR Estimate If Black      >60 mL/min/1.73:m2 53 (L)   Calcium      8.5 - 10.1 mg/dL 8.9   Bilirubin Total      0.2 - 1.3 mg/dL 1.9 (H)   Albumin      3.4 - 5.0 g/dL 3.8   Protein Total      6.8 - 8.8 g/dL 7.1   Alkaline Phosphatase      40 - 150 U/L 171 (H)   ALT      0 - 50 U/L 35   AST      0 - 45 U/L 23   Albumin Fraction      3.7 - 5.1 g/dL 4.2   Alpha 1 Fraction      0.2 - 0.4 g/dL 0.3   Alpha 2 Fraction      0.5 - 0.9 g/dL 0.6   Beta Fraction      0.6 - 1.0 g/dL 0.7   Gamma Fraction      0.7 - 1.6 g/dL 0.8   Monoclonal Peak      0.0 g/dL 0.0   ELP Interpretation:       Essentially  normal electrophoretic pattern.  No obvious monoclonal proteins seen. . . .   Kappa Free Lt Chain      0.33 - 1.94 mg/dL 2.47 (H)   Lambda Free Lt Chain      0.57 - 2.63 mg/dL 3.55 (H)   Kappa Lambda Ratio      0.26 - 1.65 0.70   IGA      84 - 499 mg/dL 66 (L)       ASSESSMENT/PLAN:  Leandra Guerrero is a 70 year old woman with  of AL amyloidosis involving the heart and GI tract. 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 Stage IV amyloidosis is associated with median survival of 5 months in patients not undergoing BMT, and 5 year survival of 15%, and in patients who are able to undergo BMT, median survival is 22 months and 5 year survival of 46% (Dejan A, Anderson BAR, Toñito RA, et al. Prognostication of survival using cardiac troponins and N-terminal pro-brain natriuretic peptide in patients with primary systemic amyloidosis undergoing peripheral blood stem cell transplantation. Blood 2004; 104:1881). She was felt not to be a candidate for high dose therapy.   She was on Cybor-D from 10/4/2016-12/27/2016, with Cytoxan omitted after cycle 2 day 1 due to her developing hemorrhagic cystitis. Her disease has responded  biochemically, with serum M spike of zero, and serum IgA level was down to low range and  free lambda light chains have normalized quantitatively.     I have discussed her situation with Dr. Barron, and he felt at that time that with normalization of serum free lambda light chains, no further chemotherapy is indicated.     1.  AL amyloidosis -amyloid labs stable with normal serum IgA level and no M spike detectable on serum protein electrophoresis.  Serum FLC ratio normal.  Continue doxycycline orally daily.  We'll see her back in 6 months with amyloid labs prior.     2.  Increased abdominal ascites-patient is somewhat  uncomfortable and feels it affects her breathing.  She will think about proceeding with abdominal paracentesis.  I have communicated with cardiology team today to see if they have no objections to abdominal paracentesis.    3. Amyloid cardiomyopathy-A. fib-  She is being followed by  Dr. Cohen. Continue current diuretic regimen and KCl supplementation. Continue Eliquis. Cardiac Amyloidosis (a restrictive cardiomyopathy) , Stage C, NYHA Class III.  A. fib is rate controlled.  Continue Eliquis.  4. Nausea-currently improved.  She uses lorazepam as needed occasionally and that helps.  Currently she is not using Zofran ODT.  Also, not using olanzapine as prescribed by palliative care.    5. CKD- Creat stable.  6.Stable elevated total bilirubin and alk phos, stable- in the past felt to be secondary to liver congestion. Cont to follow.      7. Recurrent pleural effusions-chest x-ray on May 6, 2021 showed small right pleural effusion and questionable small left pleural effusion.  Last CT chest in [facility] in September 2019 showed small to moderate right pleural effusion.  She denies shortness of breath presently.   PleurX catheter is an option in the future if she were to become symptomatic.    8.  Rash which patient attributes to torsemide diuretic.  She has significant pruritus interfering with her quality of life.  We will have her see dermatology.  Referral placed.  At the end of our visit patient and  verbalized understanding and concurred with the plan.                            Again, thank you for allowing me to participate in the care of your patient.        Sincerely,        Mirela Moore MD, MD

## 2021-05-20 NOTE — TELEPHONE ENCOUNTER
----- Message from Samreen Benson RN sent at 5/20/2021 11:48 AM CDT -----  Dr. VARSHA Moses saw this patient today. She ordered a derm referral for rash which patient attributes to torsemide diuretic.  She has significant pruritus interfering with her quality of life. Our schedulers said derm is booked into August. Could patient be seen sooner?  Thank you!  Samreen

## 2021-05-23 NOTE — PROGRESS NOTES
In person visit.    HPI:   Ms. Guerrero is a 70 year old female with a past medical history including stage IV AL amyloid diagnosed in 2016. Presents to clinic for [facility] follow-up.     Her cardiac and oncologic history are as follows: fatigue starting in 1/2016. She had a coronary angiogram which was reportedly normal but was diagnosed with HF and started on a BBand diuretics. Her symptoms progressed to the point that she was evaluated at [facility] in August/September (Dr. Barron in oncology/hematology, Dr. Nettles is cardiology). She was diagnosed with stage IV AL amyloid (+GI, +bone marrow involvement, no involvement of kidney or liver). Her work up is detailed in our previous notes, however she has lambda AL amyloidosis and she underwent CyBorD chemotherapy and excellent light chain response by serum. Patient has been turned down at [facility] for a stem cell transplant due to her cardiac involvement. Later in 2016, she had 2-3 more right sided pleural taps. She was hospitalized 1/2017 and diuresed 20-30 pounds at that time. Since that time her AL has been in remission by labs without further chemo. Patient was then treated with doxycycline but this was stopped for 6-9 months d/t concerns she was not benefiting from this, now restarted.    Last visit- was struggling even more with fluid. Attempted to managed with outpatient metolazone, but limited success so was admitted later that week. Since then has been changed from torsemid to bumex.    This visit:  Was 162 lbs last night came down to 153 today after metolazone last night. Breathing is okay during the day. She gets PELAEZ after about one block. Legs are swollen, even after taking the metolazone last night. She is sleeping on 2 pillows. No PND. No too much dizziness currently. She is having palpitations which are slightly above her b/l. No chst pain.    She took a metolazone yesterday (took1/2 in the morning and 1/2 in the afternoon). Since leaving the [facility] she has been  taking metolazone every 3-4 days (she usually has been taking jsut 1/2)    Cardiac Medications  Apixaban 2.5 mg BID  Metolazone 2.5 mg daily- every 4 days  Bumex 2 BID  Kcl 80 meq BID    PAST MEDICAL HISTORY:  Past Medical History:   Diagnosis Date     AL amyloidosis (H)      Atrial fibrillation and flutter (H)      Cardiac amyloidosis (H)      Lymphedema      QT prolongation      Recurrent right pleural effusion 1/2/2017     SVT (supraventricular tachycardia) (H)        FAMILY HISTORY:  Family History   Problem Relation Age of Onset     Other - See Comments Sister         Amyloidosis       SOCIAL HISTORY:  Socioeconomic History     Marital status:    Tobacco Use     Smoking status: Never Smoker     Smokeless tobacco: Never Used   Substance and Sexual Activity     Alcohol use: No     Drug use: No   Other Topics Concern     CURRENT MEDICATIONS:  Acetaminophen (TYLENOL PO), Take 325 mg by mouth  apixaban ANTICOAGULANT (ELIQUIS) 2.5 MG tablet, Take 1 tablet (2.5 mg) by mouth 2 times daily  doxycycline hyclate (VIBRA-TABS) 100 MG tablet, Take 1 tablet (100 mg) by mouth daily  LORazepam (ATIVAN) 0.5 MG tablet, Take 1 tablet (0.5 mg) by mouth every 6 hours as needed for anxiety or nausea  metolazone (ZAROXOLYN) 2.5 MG tablet, Take 1 tablet (2.5 mg) by mouth twice a week  ondansetron (ZOFRAN-ODT) 8 MG ODT tab, every 8 hours as needed PRN  triamcinolone (KENALOG) 0.1 % external cream, PRN  OLANZapine (ZYPREXA) 2.5 MG tablet, Take 1 tablet (2.5 mg) by mouth daily (Patient not taking: Reported on 4/27/2021)  potassium chloride ER (KLOR-CON M) 10 MEQ CR tablet, Take 8 tablets (80 mEq) by mouth every morning AND 8 tablets (80 mEq) daily (with lunch) AND 6 tablets (60 mEq) every evening. Take extra 60 mEq when you take Metolazone.  torsemide (DEMADEX) 100 MG tablet, Take 1 tablet (100 mg) by mouth every morning AND 1 tablet (100 mg) daily at 2 pm. (Patient not taking: Reported on 5/24/2021)    No current  facility-administered medications on file prior to visit.       ROS:   See HPI     EXAM:  /64 (BP Location: Left arm, Patient Position: Chair, Cuff Size: Adult Regular)   Pulse 69   Wt 69.4 kg (153 lb 1.6 oz)   SpO2 99%   BMI 27.12 kg/m       GENERAL: Appears comfortable, in no acute distress. Speaking in full sentences and able to communicate all needs  HEENT: Eye symmetrical, no discharge or icterus bilaterally. Mucous membranes moist and without lesions.  CV: RRR, +S1S2, no murmur, rub, or gallop. JVP ~ 7-8.   RESPIRATORY: Respirations regular, even, and unlabored. Lungs CTA throughout.   GI: Soft and mildly  distended with normoactive bowel sounds. No tenderness, rebound, guarding.   EXTREMITIES: Moderate to severe b/l non-pitting peripheral edema, less tense than last visit. All extremities are warm and well perfused   NEUROLOGIC: Alert and interacting appropriately. No focal deficits.   MUSCULOSKELETAL: No joint swelling or tenderness.   SKIN: No jaundice. Very faint maculopapular rash on back with evidence of excoriation.      Labs, reviewed with patient in clinic today:  CBC RESULTS:  Lab Results   Component Value Date    WBC 6.4 05/11/2021    RBC 4.29 05/11/2021    HGB 13.8 05/11/2021    HCT 41.3 05/11/2021    MCV 96 05/11/2021    MCH 32.2 05/11/2021    MCHC 33.4 05/11/2021    RDW 13.5 05/11/2021     05/11/2021       CMP RESULTS:  Lab Results   Component Value Date     05/24/2021    POTASSIUM 3.4 05/24/2021    CHLORIDE 93 (L) 05/24/2021    CO2 35 (H) 05/24/2021    ANIONGAP 7 05/24/2021    GLC 89 05/24/2021    BUN 29 05/24/2021    CR 1.11 (H) 05/24/2021    GFRESTIMATED 51 (L) 05/24/2021    GFRESTBLACK 59 (L) 05/24/2021    JERROD 9.3 05/24/2021    BILITOTAL 1.9 (H) 05/11/2021    ALBUMIN 3.8 05/11/2021    ALKPHOS 171 (H) 05/11/2021    ALT 35 05/11/2021    AST 23 05/11/2021        INR RESULTS:  Lab Results   Component Value Date    INR 1.30 (H) 01/14/2017       Lab Results   Component Value  Date    MAG 2.2 10/26/2019     Lab Results   Component Value Date    NTBNPI 9,009 (H) 01/13/2017     Lab Results   Component Value Date    NTBNP 1,376 (H) 02/04/2021       Diagnostics:      2/21 Ziopatch       6/12/2019 Holter Mnoitor      TTE 4/9/19  Moderate left ventricular hypertrophy.  Global and regional left ventricular function is normal with an EF of 60-65%.  Global right ventricular function is normal.  Moderate aortic valve insufficiency.  Mild pulmonary hypertension with dilated IVC.  This study was compared with the study from 10/11/18 the AR is worse and RA pressure is now increased.    TTE 10/11/18  Global and regional left ventricular function is normal with an EF of 55-60%.  Moderate concentric wall thickening consistent with left ventricular  hypertrophy is present - known amyloid.  Grade III or advanced diastolic dysfunction.  Normal right ventricular size, wall thickness, and function.  Estimated pulmonary artery pressure 28 mmHg.  IVC with normal diameter but does not collapse (>50%) with inspiration.  Trace pericardial effusion.  Compared to prior study from 8/17/17, no significant change.    opatch 11/2017      Holter Monitor 06/2019  INTERPRETATION  1. The rhythm varied with sinus rhythm and atrial fibrillation/variable atrial flutter. Low voltage noted.  2. The heart rate varied with a minimum rate of 48 bpm, maximum rate of 164 bpm, average rate of 74 bpm.  3. Frequent supraventricular ectopy was seen ranging from 0-469 per hour. Occasional atrail pairs and fairly frequent bigeminal cycles were noted.  Wandering atrial pacemaker noted.  4. Infrequent multifocal ventricular ectopy was seen ranging from 0-36 beats per hour.  5. ST-T wave changes were present.  6. Three patient events were documented and correlated with atrial fibrillation/flutter    EKG 8/8/2019 for palpitations  - NSR at a rate of 70, NJ 0.19, QTc 525, QRS is narrow. Occasional PVCs. Biphasic twaves in V4-V6, unchanged  from priors.    Assessment and Plan:   Mrs. Guerrero is a 70 year old female with AL amyloidosis with cardiac manifestation who presents to [facility] for hospital follow-up. Patient has cardiac amyloidosis as evidenced by her echocardiogram (severe concentric hypertrophy and biatrial enlargement) and abnormal EKG (near-low voltage, poor R wave progression, biatrial enlargement and no evidence of LVH despite thicken LV on echo) in the setting of biopsy proven (BMB, EGD) AL amyloid. She completed chemotherapy with CyBorD with an excellent serologic response and her heart failure (i.e. troponin negative, NT pro BNP was stable, serum light chains minimal and urine light chains undetectable).       She has gradually declined over the last few years and has ongoing challenges with her volume status.  She requires frequent metolazone and maintaining an adequate potassium level has been a challenge. She has also been struggling with A. Fib, which is occaionally symptomatic. These episodes remain rare and given relative contraindications for amyloid patients we do not have her on rate control at this time.    She was recently hospitalized. She was diuresed but unclear if there was a true dry weight challenge. She was changed from torsemide to bumex. She was changed from torsemide 100 mg BID to bumex 2 mg bid. She doesn't feel like the bumex is working as well, I suspect this is due to the lower dose here. We will increase this today. She appears euvolemic today, but this is because she took 2.5 of metolazone yesterday. We will try to keep her here by increasing her bumex.    # Chronic diastolic heart failure/restrictive cardiomyopathy 2/2  # Cardiac amyloidosis    Stage C. NYHA Class III.    Fluid status: Euvolemic- increase bumex to 4 mg in the morning and 3 mg in the afternoon to try to delay next metolazone. Increase Kcl to 80/80/60.   ACEi/ARB/ARNI: n/a   BB: no indication and relatively contraindicated due to risk of  progressive conduction disease/AV block in these patients  Aldosterone antagonist: d/c'd given hyponatremia   SCD prophylaxis: does not meet criteria for implant  NSAID use: contraindicated  Sleep apnea evaluation: deferred today  Remote monitoring: N/A  Goals of care: prior discussions with Dr Cohen   Other: Has a referall for lymphedema therapy in [facility]. If she needs paracentesis in the future for symptomatic control we would support that. Her abdominal edema is mild for her right now.    # A. Fib/A. Flutter  We did a holter monitor which showed intermittent a. Fib and a. Flutter. [id number] of 4. Occasional palpitations which last less than 1 minute, if these become more frequent can repeat monitor to assess burden.  - Continue Eliquis 2.5mg BID, reduced dose d/t frequent bleeding, aware of risks  - Avoiding BB in the setting of amyloid  - Holding off on digoxin given generally good rate control/very rare RVR events. Could discuss in the future if needed.    # Rash Macular papular rash, faint, along back. No symptoms of systemic allergic reaction. She is wondering if this is related to bumex as this started shortly after switching torsemide to bumex. Will see dermatology first, pending their review would consider switching back to torsemide but seems less likely at this point as they are both loop diuretics.  - Derm on Wednesday  - Sarna PRN  - atarax for itching    # Prolonged QTC  - Minimize QT prolonging meds    # Systemic amyloid  Heme previously monitoring her light chains which have been negative consistent with remission - therefore further palliative chemo not being considered. Nausea has been a large issue and is currently, seeing palliative to address.  - Consider palliative care referal if nausea not controlled on her current regimen (per onc)  - Will message Dr. Cohen re: skull indent re: additional imaging.     #Nausea.   - Recommend ongoing f/u with palliative care    Follow: Up:   -  Labs on Thursday (increased bumex, assessing K needs)  - [facility] clinic as scheduled in June  - Dr. Cohen in August as scheduled  - Derm as scheduled    Billing  - I managed 2 stable chronic problems  - I changed a prescription medication    Ciara Araya PA-C  [facility]      CC  STEPHY, ALEJANDRO RAMÍREZ

## 2021-05-24 ENCOUNTER — OFFICE VISIT (OUTPATIENT)
Dept: CARDIOLOGY | Facility: CLINIC | Age: 71
End: 2021-05-24
Attending: PHYSICIAN ASSISTANT
Payer: COMMERCIAL

## 2021-05-24 VITALS
OXYGEN SATURATION: 99 % | WEIGHT: 153.1 LBS | SYSTOLIC BLOOD PRESSURE: 132 MMHG | BODY MASS INDEX: 27.12 KG/M2 | DIASTOLIC BLOOD PRESSURE: 64 MMHG | HEART RATE: 69 BPM

## 2021-05-24 DIAGNOSIS — E85.4 AMYLOID HEART DISEASE (H): ICD-10-CM

## 2021-05-24 DIAGNOSIS — I50.33 ACUTE ON CHRONIC DIASTOLIC HEART FAILURE (H): ICD-10-CM

## 2021-05-24 DIAGNOSIS — I43 AMYLOID HEART DISEASE (H): ICD-10-CM

## 2021-05-24 DIAGNOSIS — I50.33 ACUTE ON CHRONIC DIASTOLIC HEART FAILURE (H): Primary | ICD-10-CM

## 2021-05-24 LAB
ANION GAP SERPL CALCULATED.3IONS-SCNC: 7 MMOL/L (ref 3–14)
BUN SERPL-MCNC: 29 MG/DL (ref 7–30)
CALCIUM SERPL-MCNC: 9.3 MG/DL (ref 8.5–10.1)
CHLORIDE SERPL-SCNC: 93 MMOL/L (ref 94–109)
CO2 SERPL-SCNC: 35 MMOL/L (ref 20–32)
CREAT SERPL-MCNC: 1.11 MG/DL (ref 0.52–1.04)
GFR SERPL CREATININE-BSD FRML MDRD: 51 ML/MIN/{1.73_M2}
GLUCOSE SERPL-MCNC: 89 MG/DL (ref 70–99)
POTASSIUM SERPL-SCNC: 3.4 MMOL/L (ref 3.4–5.3)
SODIUM SERPL-SCNC: 135 MMOL/L (ref 133–144)

## 2021-05-24 PROCEDURE — 80048 BASIC METABOLIC PNL TOTAL CA: CPT | Performed by: PATHOLOGY

## 2021-05-24 PROCEDURE — G0463 HOSPITAL OUTPT CLINIC VISIT: HCPCS

## 2021-05-24 PROCEDURE — 36415 COLL VENOUS BLD VENIPUNCTURE: CPT | Performed by: PATHOLOGY

## 2021-05-24 PROCEDURE — 99214 OFFICE O/P EST MOD 30 MIN: CPT | Performed by: PHYSICIAN ASSISTANT

## 2021-05-24 RX ORDER — POTASSIUM CHLORIDE 750 MG/1
TABLET, EXTENDED RELEASE ORAL
Qty: 1980 TABLET | Refills: 1 | Status: SHIPPED | OUTPATIENT
Start: 2021-05-24 | End: 2021-05-28

## 2021-05-24 RX ORDER — HYDROXYZINE HYDROCHLORIDE 25 MG/1
25 TABLET, FILM COATED ORAL 3 TIMES DAILY PRN
Qty: 30 TABLET | Refills: 0 | Status: SHIPPED | OUTPATIENT
Start: 2021-05-24 | End: 2021-11-10

## 2021-05-24 RX ORDER — BUMETANIDE 2 MG/1
TABLET ORAL
Qty: 315 TABLET | Refills: 1 | Status: SHIPPED | OUTPATIENT
Start: 2021-05-24 | End: 2021-05-28

## 2021-05-24 ASSESSMENT — PAIN SCALES - GENERAL: PAINLEVEL: NO PAIN (0)

## 2021-05-24 NOTE — NURSING NOTE
Chief Complaint   Patient presents with     Follow Up     70 year old female with chronic diastolic heart failure/AL presents for follow up with labs prior .      Vitals were taken and medications where reconciled.    David Diane, EMT  9:03 AM

## 2021-05-24 NOTE — NURSING NOTE
Diet: Patient instructed regarding a heart healthy diet, including discussion of reduced fat and sodium intake. Patient demonstrated understanding of this information and agreed to call with further questions or concerns.  Labs: Patient was given results of the laboratory testing obtained today. Patient was instructed to return for the next laboratory testing on Thursday 5/27. Patient demonstrated understanding of this information and agreed to call with further questions or concerns.   New Medication: Patient was educated regarding newly prescribed medication, including discussion of  the indication, administration, side effects, and when to report to MD or RN. Patient demonstrated understanding of this information and agreed to call with further questions or concerns.  Start Atarax 20 mg TID PRN and Sarna lotion PRN for itching.     Return Appointment: Patient given instructions regarding scheduling next clinic visit. Patient demonstrated understanding of this information and agreed to call with further questions or concerns.   Follow up in [facility] in June as scheduled.   Follow up with Dr. Cohen as scheduled.     Medication Change: Patient was educated regarding prescribed medication change, including discussion of the indication, administration, side effects, and when to report to MD or RN. Patient demonstrated understanding of this information and agreed to call with further questions or concerns.  Patient stated she understood all health information given and agreed to call with further questions or concerns.   Vidhi Montgomery RN

## 2021-05-24 NOTE — LETTER
5/24/2021      RE: Leandra Guerrero  [address] [place] [address]       Dear Colleague,    Thank you for the opportunity to participate in the care of your patient, Leandra Guerrero, at the [facility]. Please see a copy of my visit note below.    In person visit.    HPI:   Ms. Guerrero is a 70 year old female with a past medical history including stage IV AL amyloid diagnosed in 2016. Presents to clinic for CORE follow-up.     Her cardiac and oncologic history are as follows: fatigue starting in 1/2016. She had a coronary angiogram which was reportedly normal but was diagnosed with HF and started on a BBand diuretics. Her symptoms progressed to the point that she was evaluated at [facility] in August/September (Dr. Barron in oncology/hematology, Dr. Nettles is cardiology). She was diagnosed with stage IV AL amyloid (+GI, +bone marrow involvement, no involvement of kidney or liver). Her work up is detailed in our previous notes, however she has lambda AL amyloidosis and she underwent CyBorD chemotherapy and excellent light chain response by serum. Patient has been turned down at [facility] for a stem cell transplant due to her cardiac involvement. Later in 2016, she had 2-3 more right sided pleural taps. She was hospitalized 1/2017 and diuresed 20-30 pounds at that time. Since that time her AL has been in remission by labs without further chemo. Patient was then treated with doxycycline but this was stopped for 6-9 months d/t concerns she was not benefiting from this, now restarted.    Last visit- was struggling even more with fluid. Attempted to managed with outpatient metolazone, but limited success so was admitted later that week. Since then has been changed from torsemid to bumex.    This visit:  Was 162 lbs last night came down to 153 today after metolazone last night. Breathing is okay during the day. She gets PELAEZ  after about one block. Legs are swollen, even after taking the metolazone last night. She is sleeping on 2 pillows. No PND. No too much dizziness currently. She is having palpitations which are slightly above her b/l. No chst pain.    She took a metolazone yesterday (took1/2 in the morning and 1/2 in the afternoon). Since leaving the [facility] she has been taking metolazone every 3-4 days (she usually has been taking jsut 1/2)    Cardiac Medications  Apixaban 2.5 mg BID  Metolazone 2.5 mg daily- every 4 days  Bumex 2 BID  Kcl 80 meq BID    PAST MEDICAL HISTORY:  Past Medical History:   Diagnosis Date     AL amyloidosis (H)      Atrial fibrillation and flutter (H)      Cardiac amyloidosis (H)      Lymphedema      QT prolongation      Recurrent right pleural effusion 1/2/2017     SVT (supraventricular tachycardia) (H)        FAMILY HISTORY:  Family History   Problem Relation Age of Onset     Other - See Comments Sister         Amyloidosis       SOCIAL HISTORY:  Socioeconomic History     Marital status:    Tobacco Use     Smoking status: Never Smoker     Smokeless tobacco: Never Used   Substance and Sexual Activity     Alcohol use: No     Drug use: No   Other Topics Concern     CURRENT MEDICATIONS:  Acetaminophen (TYLENOL PO), Take 325 mg by mouth  apixaban ANTICOAGULANT (ELIQUIS) 2.5 MG tablet, Take 1 tablet (2.5 mg) by mouth 2 times daily  doxycycline hyclate (VIBRA-TABS) 100 MG tablet, Take 1 tablet (100 mg) by mouth daily  LORazepam (ATIVAN) 0.5 MG tablet, Take 1 tablet (0.5 mg) by mouth every 6 hours as needed for anxiety or nausea  metolazone (ZAROXOLYN) 2.5 MG tablet, Take 1 tablet (2.5 mg) by mouth twice a week  ondansetron (ZOFRAN-ODT) 8 MG ODT tab, every 8 hours as needed PRN  triamcinolone (KENALOG) 0.1 % external cream, PRN  OLANZapine (ZYPREXA) 2.5 MG tablet, Take 1 tablet (2.5 mg) by mouth daily (Patient not taking: Reported on 4/27/2021)  potassium chloride ER (KLOR-CON M) 10 MEQ CR tablet, Take 8  tablets (80 mEq) by mouth every morning AND 8 tablets (80 mEq) daily (with lunch) AND 6 tablets (60 mEq) every evening. Take extra 60 mEq when you take Metolazone.  torsemide (DEMADEX) 100 MG tablet, Take 1 tablet (100 mg) by mouth every morning AND 1 tablet (100 mg) daily at 2 pm. (Patient not taking: Reported on 5/24/2021)    No current facility-administered medications on file prior to visit.       ROS:   See HPI     EXAM:  /64 (BP Location: Left arm, Patient Position: Chair, Cuff Size: Adult Regular)   Pulse 69   Wt 69.4 kg (153 lb 1.6 oz)   SpO2 99%   BMI 27.12 kg/m       GENERAL: Appears comfortable, in no acute distress. Speaking in full sentences and able to communicate all needs  HEENT: Eye symmetrical, no discharge or icterus bilaterally. Mucous membranes moist and without lesions.  CV: RRR, +S1S2, no murmur, rub, or gallop. JVP ~ 7-8.   RESPIRATORY: Respirations regular, even, and unlabored. Lungs CTA throughout.   GI: Soft and mildly  distended with normoactive bowel sounds. No tenderness, rebound, guarding.   EXTREMITIES: Moderate to severe b/l non-pitting peripheral edema, less tense than last visit. All extremities are warm and well perfused   NEUROLOGIC: Alert and interacting appropriately. No focal deficits.   MUSCULOSKELETAL: No joint swelling or tenderness.   SKIN: No jaundice. Very faint maculopapular rash on back with evidence of excoriation.      Labs, reviewed with patient in clinic today:  CBC RESULTS:  Lab Results   Component Value Date    WBC 6.4 05/11/2021    RBC 4.29 05/11/2021    HGB 13.8 05/11/2021    HCT 41.3 05/11/2021    MCV 96 05/11/2021    MCH 32.2 05/11/2021    MCHC 33.4 05/11/2021    RDW 13.5 05/11/2021     05/11/2021       CMP RESULTS:  Lab Results   Component Value Date     05/24/2021    POTASSIUM 3.4 05/24/2021    CHLORIDE 93 (L) 05/24/2021    CO2 35 (H) 05/24/2021    ANIONGAP 7 05/24/2021    GLC 89 05/24/2021    BUN 29 05/24/2021    CR 1.11 (H)  05/24/2021    GFRESTIMATED 51 (L) 05/24/2021    GFRESTBLACK 59 (L) 05/24/2021    JERROD 9.3 05/24/2021    BILITOTAL 1.9 (H) 05/11/2021    ALBUMIN 3.8 05/11/2021    ALKPHOS 171 (H) 05/11/2021    ALT 35 05/11/2021    AST 23 05/11/2021        INR RESULTS:  Lab Results   Component Value Date    INR 1.30 (H) 01/14/2017       Lab Results   Component Value Date    MAG 2.2 10/26/2019     Lab Results   Component Value Date    NTBNPI 9,009 (H) 01/13/2017     Lab Results   Component Value Date    NTBNP 1,376 (H) 02/04/2021       Diagnostics:      2/21 Ziopatch       6/12/2019 Holter Mnoitor      TTE 4/9/19  Moderate left ventricular hypertrophy.  Global and regional left ventricular function is normal with an EF of 60-65%.  Global right ventricular function is normal.  Moderate aortic valve insufficiency.  Mild pulmonary hypertension with dilated IVC.  This study was compared with the study from 10/11/18 the AR is worse and RA pressure is now increased.    TTE 10/11/18  Global and regional left ventricular function is normal with an EF of 55-60%.  Moderate concentric wall thickening consistent with left ventricular  hypertrophy is present - known amyloid.  Grade III or advanced diastolic dysfunction.  Normal right ventricular size, wall thickness, and function.  Estimated pulmonary artery pressure 28 mmHg.  IVC with normal diameter but does not collapse (>50%) with inspiration.  Trace pericardial effusion.  Compared to prior study from 8/17/17, no significant change.    Ziopatch 11/2017      Holter Monitor 06/2019  INTERPRETATION  1. The rhythm varied with sinus rhythm and atrial fibrillation/variable atrial flutter. Low voltage noted.  2. The heart rate varied with a minimum rate of 48 bpm, maximum rate of 164 bpm, average rate of 74 bpm.  3. Frequent supraventricular ectopy was seen ranging from 0-469 per hour. Occasional atrail pairs and fairly frequent bigeminal cycles were noted.  Wandering atrial pacemaker noted.  4.  Infrequent multifocal ventricular ectopy was seen ranging from 0-36 beats per hour.  5. ST-T wave changes were present.  6. Three patient events were documented and correlated with atrial fibrillation/flutter    EKG 8/8/2019 for palpitations  - NSR at a rate of 70, WI 0.19, QTc 525, QRS is narrow. Occasional PVCs. Biphasic twaves in V4-V6, unchanged from priors.    Assessment and Plan:   Mrs. Guerrero is a 70 year old female with AL amyloidosis with cardiac manifestation who presents to CORE for hospital follow-up. Patient has cardiac amyloidosis as evidenced by her echocardiogram (severe concentric hypertrophy and biatrial enlargement) and abnormal EKG (near-low voltage, poor R wave progression, biatrial enlargement and no evidence of LVH despite thicken LV on echo) in the setting of biopsy proven (BMB, EGD) AL amyloid. She completed chemotherapy with CyBorD with an excellent serologic response and her heart failure (i.e. troponin negative, NT pro BNP was stable, serum light chains minimal and urine light chains undetectable).       She has gradually declined over the last few years and has ongoing challenges with her volume status.  She requires frequent metolazone and maintaining an adequate potassium level has been a challenge. She has also been struggling with A. Fib, which is occaionally symptomatic. These episodes remain rare and given relative contraindications for amyloid patients we do not have her on rate control at this time.    She was recently hospitalized. She was diuresed but unclear if there was a true dry weight challenge. She was changed from torsemide to bumex. She was changed from torsemide 100 mg BID to bumex 2 mg bid. She doesn't feel like the bumex is working as well, I suspect this is due to the lower dose here. We will increase this today. She appears euvolemic today, but this is because she took 2.5 of metolazone yesterday. We will try to keep her here by increasing her bumex.    # Chronic  diastolic heart failure/restrictive cardiomyopathy 2/2  # Cardiac amyloidosis    Stage C. NYHA Class III.    Fluid status: Euvolemic- increase bumex to 4 mg in the morning and 3 mg in the afternoon to try to delay next metolazone. Increase Kcl to 80/80/60.   ACEi/ARB/ARNI: n/a   BB: no indication and relatively contraindicated due to risk of progressive conduction disease/AV block in these patients  Aldosterone antagonist: d/c'd given hyponatremia   SCD prophylaxis: does not meet criteria for implant  NSAID use: contraindicated  Sleep apnea evaluation: deferred today  Remote monitoring: N/A  Goals of care: prior discussions with Dr Cohen   Other: Has a referall for lymphedema therapy in [facility]. If she needs paracentesis in the future for symptomatic control we would support that. Her abdominal edema is mild for her right now.    # A. Fib/A. Flutter  We did a holter monitor which showed intermittent a. Fib and a. Flutter. [id number] of 4. Occasional palpitations which last less than 1 minute, if these become more frequent can repeat monitor to assess burden.  - Continue Eliquis 2.5mg BID, reduced dose d/t frequent bleeding, aware of risks  - Avoiding BB in the setting of amyloid  - Holding off on digoxin given generally good rate control/very rare RVR events. Could discuss in the future if needed.    # Rash Macular papular rash, faint, along back. No symptoms of systemic allergic reaction. She is wondering if this is related to bumex as this started shortly after switching torsemide to bumex. Will see dermatology first, pending their review would consider switching back to torsemide but seems less likely at this point as they are both loop diuretics.  - Derm on Wednesday  - Sarna PRN  - atarax for itching    # Prolonged QTC  - Minimize QT prolonging meds    # Systemic amyloid  Heme previously monitoring her light chains which have been negative consistent with remission - therefore further palliative chemo  not being considered. Nausea has been a large issue and is currently, seeing palliative to address.  - Consider palliative care referal if nausea not controlled on her current regimen (per onc)  - Will message Dr. Keyes re: skull indent re: additional imaging.     #Nausea.   - Recommend ongoing f/u with palliative care    Follow: Up:   - Labs on Thursday (increased bumex, assessing K needs)  - CORE clinic as scheduled in June  - Dr. Keyes in August as scheduled  - Derm as scheduled    Billing  - I managed 2 stable chronic problems  - I changed a prescription medication    Ciara Araya PA-C  [facility]      CC  ALEJANDRO KEYES        Please do not hesitate to contact me if you have any questions/concerns.     Sincerely,     Ciara Araya PA-C

## 2021-05-24 NOTE — PATIENT INSTRUCTIONS
Take your medicines every day, as directed    Changes made today:  o Increase your bumex to 4 mg in the morning and 3 mg in the afternoon  o TODAY only - take 4 mg Bumex in afternoon and 40 mEQ extra of Potassium.   o Increase your potassium to 80 meq in the morning, 80 meq in the afternoon, and 60 meq in the evening  o Try 25 mg of atarax for the itching (DO NOT TAKE THIS with benadryl)  o Use sarna lotion for the itching  o Use your lymphedema wraps every day       Monitor Your Weight and Symptoms    Contact us if you:      Gain 2 pounds in one day or 5 pounds in one week    Feel more short of breath    Notice more leg swelling    Feel lightheadeded   Change your lifestyle    Limit Salt or Sodium:    2000 mg  Limit Fluids:    2000 mL or approximately 64 ounces  Eat a Heart Healthy Diet    Low in saturated fats  Stay Active:    Aim to move at least 150 minutes every  week         To Contact us    During Business Hours:  [phone], option # 1 (University)  Then option # 4 (medical questions)     After hours, weekends or holidays:   [phone], Option #4  Ask to speak to the On-Call Cardiologist. Inform them you are a CORE/heart failure patient at the [facility].     Use [employer] allows you to communicate directly with your heart team through secure messaging.    [employer] can be accessed any time on your phone, computer, or tablet.    If you need assistance, we'd be happy to help!         Keep your Heart Appointments:    - Labs on Thursday  - CORE clinic as scheduled in June  - Dr. Cohen in August as scheduled  - Derm as scheduled

## 2021-05-24 NOTE — LETTER
5/24/2021      RE: Leandra Guerrero  [address] [place] [address]       In person visit.    HPI:   Ms. Guerrero is a 70 year old female with a past medical history including stage IV AL amyloid diagnosed in 2016. Presents to clinic for [facility] follow-up.     Her cardiac and oncologic history are as follows: fatigue starting in 1/2016. She had a coronary angiogram which was reportedly normal but was diagnosed with HF and started on a BBand diuretics. Her symptoms progressed to the point that she was evaluated at [place] in August/September (Dr. Barron in oncology/hematology, Dr. Nettles is cardiology). She was diagnosed with stage IV AL amyloid (+GI, +bone marrow involvement, no involvement of kidney or liver). Her work up is detailed in our previous notes, however she has lambda AL amyloidosis and she underwent CyBorD chemotherapy and excellent light chain response by serum. Patient has been turned down at [place] for a stem cell transplant due to her cardiac involvement. Later in 2016, she had 2-3 more right sided pleural taps. She was hospitalized 1/2017 and diuresed 20-30 pounds at that time. Since that time her AL has been in remission by labs without further chemo. Patient was then treated with doxycycline but this was stopped for 6-9 months d/t concerns she was not benefiting from this, now restarted.    Last visit- was struggling even more with fluid. Attempted to managed with outpatient metolazone, but limited success so was admitted later that week. Since then has been changed from torsemid to bumex.    This visit:  Was 162 lbs last night came down to 153 today after metolazone last night. Breathing is okay during the day. She gets PELAEZ after about one block. Legs are swollen, even after taking the metolazone last night. She is sleeping on 2 pillows. No PND. No too much dizziness currently. She is having palpitations which are slightly above her b/l. No chst pain.    She took a metolazone yesterday  (took1/2 in the morning and 1/2 in the afternoon). Since [facility] she has been taking metolazone every 3-4 days (she usually has been taking jsut 1/2)    Cardiac Medications  Apixaban 2.5 mg BID  Metolazone 2.5 mg daily- every 4 days  Bumex 2 BID  Kcl 80 meq BID    PAST MEDICAL HISTORY:  Past Medical History:   Diagnosis Date     AL amyloidosis (H)      Atrial fibrillation and flutter (H)      Cardiac amyloidosis (H)      Lymphedema      QT prolongation      Recurrent right pleural effusion 1/2/2017     SVT (supraventricular tachycardia) (H)        FAMILY HISTORY:  Family History   Problem Relation Age of Onset     Other - See Comments Sister         Amyloidosis       SOCIAL HISTORY:  Socioeconomic History     Marital status:    Tobacco Use     Smoking status: Never Smoker     Smokeless tobacco: Never Used   Substance and Sexual Activity     Alcohol use: No     Drug use: No   Other Topics Concern     CURRENT MEDICATIONS:  Acetaminophen (TYLENOL PO), Take 325 mg by mouth  apixaban ANTICOAGULANT (ELIQUIS) 2.5 MG tablet, Take 1 tablet (2.5 mg) by mouth 2 times daily  doxycycline hyclate (VIBRA-TABS) 100 MG tablet, Take 1 tablet (100 mg) by mouth daily  LORazepam (ATIVAN) 0.5 MG tablet, Take 1 tablet (0.5 mg) by mouth every 6 hours as needed for anxiety or nausea  metolazone (ZAROXOLYN) 2.5 MG tablet, Take 1 tablet (2.5 mg) by mouth twice a week  ondansetron (ZOFRAN-ODT) 8 MG ODT tab, every 8 hours as needed PRN  triamcinolone (KENALOG) 0.1 % external cream, PRN  OLANZapine (ZYPREXA) 2.5 MG tablet, Take 1 tablet (2.5 mg) by mouth daily (Patient not taking: Reported on 4/27/2021)  potassium chloride ER (KLOR-CON M) 10 MEQ CR tablet, Take 8 tablets (80 mEq) by mouth every morning AND 8 tablets (80 mEq) daily (with lunch) AND 6 tablets (60 mEq) every evening. Take extra 60 mEq when you take Metolazone.  torsemide (DEMADEX) 100 MG tablet, Take 1 tablet (100 mg) by mouth every morning AND 1 tablet (100 mg)  daily at 2 pm. (Patient not taking: Reported on 5/24/2021)    No current facility-administered medications on file prior to visit.       ROS:   See HPI     EXAM:  /64 (BP Location: Left arm, Patient Position: Chair, Cuff Size: Adult Regular)   Pulse 69   Wt 69.4 kg (153 lb 1.6 oz)   SpO2 99%   BMI 27.12 kg/m       GENERAL: Appears comfortable, in no acute distress. Speaking in full sentences and able to communicate all needs  HEENT: Eye symmetrical, no discharge or icterus bilaterally. Mucous membranes moist and without lesions.  CV: RRR, +S1S2, no murmur, rub, or gallop. JVP ~ 7-8.   RESPIRATORY: Respirations regular, even, and unlabored. Lungs CTA throughout.   GI: Soft and mildly  distended with normoactive bowel sounds. No tenderness, rebound, guarding.   EXTREMITIES: Moderate to severe b/l non-pitting peripheral edema, less tense than last visit. All extremities are warm and well perfused   NEUROLOGIC: Alert and interacting appropriately. No focal deficits.   MUSCULOSKELETAL: No joint swelling or tenderness.   SKIN: No jaundice. Very faint maculopapular rash on back with evidence of excoriation.      Labs, reviewed with patient in clinic today:  CBC RESULTS:  Lab Results   Component Value Date    WBC 6.4 05/11/2021    RBC 4.29 05/11/2021    HGB 13.8 05/11/2021    HCT 41.3 05/11/2021    MCV 96 05/11/2021    MCH 32.2 05/11/2021    MCHC 33.4 05/11/2021    RDW 13.5 05/11/2021     05/11/2021       CMP RESULTS:  Lab Results   Component Value Date     05/24/2021    POTASSIUM 3.4 05/24/2021    CHLORIDE 93 (L) 05/24/2021    CO2 35 (H) 05/24/2021    ANIONGAP 7 05/24/2021    GLC 89 05/24/2021    BUN 29 05/24/2021    CR 1.11 (H) 05/24/2021    GFRESTIMATED 51 (L) 05/24/2021    GFRESTBLACK 59 (L) 05/24/2021    JERROD 9.3 05/24/2021    BILITOTAL 1.9 (H) 05/11/2021    ALBUMIN 3.8 05/11/2021    ALKPHOS 171 (H) 05/11/2021    ALT 35 05/11/2021    AST 23 05/11/2021        INR RESULTS:  Lab Results   Component  Value Date    INR 1.30 (H) 01/14/2017       Lab Results   Component Value Date    MAG 2.2 10/26/2019     Lab Results   Component Value Date    NTBNPI 9,009 (H) 01/13/2017     Lab Results   Component Value Date    NTBNP 1,376 (H) 02/04/2021       Diagnostics:      2/21 Ziopatch       6/12/2019 Holter Mnoitor      TTE 4/9/19  Moderate left ventricular hypertrophy.  Global and regional left ventricular function is normal with an EF of 60-65%.  Global right ventricular function is normal.  Moderate aortic valve insufficiency.  Mild pulmonary hypertension with dilated IVC.  This study was compared with the study from 10/11/18 the AR is worse and RA pressure is now increased.    TTE 10/11/18  Global and regional left ventricular function is normal with an EF of 55-60%.  Moderate concentric wall thickening consistent with left ventricular  hypertrophy is present - known amyloid.  Grade III or advanced diastolic dysfunction.  Normal right ventricular size, wall thickness, and function.  Estimated pulmonary artery pressure 28 mmHg.  IVC with normal diameter but does not collapse (>50%) with inspiration.  Trace pericardial effusion.  Compared to prior study from 8/17/17, no significant change.    Ziopatch 11/2017      Holter Monitor 06/2019  INTERPRETATION  1. The rhythm varied with sinus rhythm and atrial fibrillation/variable atrial flutter. Low voltage noted.  2. The heart rate varied with a minimum rate of 48 bpm, maximum rate of 164 bpm, average rate of 74 bpm.  3. Frequent supraventricular ectopy was seen ranging from 0-469 per hour. Occasional atrail pairs and fairly frequent bigeminal cycles were noted.  Wandering atrial pacemaker noted.  4. Infrequent multifocal ventricular ectopy was seen ranging from 0-36 beats per hour.  5. ST-T wave changes were present.  6. Three patient events were documented and correlated with atrial fibrillation/flutter    EKG 8/8/2019 for palpitations  - NSR at a rate of 70, CO 0.19, QTc  525, QRS is narrow. Occasional PVCs. Biphasic twaves in V4-V6, unchanged from priors.    Assessment and Plan:   Mrs. Guerrero is a 70 year old female with AL amyloidosis with cardiac manifestation who presents to [facility] for hospital follow-up. Patient has cardiac amyloidosis as evidenced by her echocardiogram (severe concentric hypertrophy and biatrial enlargement) and abnormal EKG (near-low voltage, poor R wave progression, biatrial enlargement and no evidence of LVH despite thicken LV on echo) in the setting of biopsy proven (BMB, EGD) AL amyloid. She completed chemotherapy with CyBorD with an excellent serologic response and her heart failure (i.e. troponin negative, NT pro BNP was stable, serum light chains minimal and urine light chains undetectable).       She has gradually declined over the last few years and has ongoing challenges with her volume status.  She requires frequent metolazone and maintaining an adequate potassium level has been a challenge. She has also been struggling with A. Fib, which is occaionally symptomatic. These episodes remain rare and given relative contraindications for amyloid patients we do not have her on rate control at this time.    She was recently hospitalized. She was diuresed but unclear if there was a true dry weight challenge. She was changed from torsemide to bumex. She was changed from torsemide 100 mg BID to bumex 2 mg bid. She doesn't feel like the bumex is working as well, I suspect this is due to the lower dose here. We will increase this today. She appears euvolemic today, but this is because she took 2.5 of metolazone yesterday. We will try to keep her here by increasing her bumex.    # Chronic diastolic heart failure/restrictive cardiomyopathy 2/2  # Cardiac amyloidosis    Stage C. NYHA Class III.    Fluid status: Euvolemic- increase bumex to 4 mg in the morning and 3 mg in the afternoon to try to delay next metolazone. Increase Kcl to 80/80/60.   ACEi/ARB/ARNI: n/a    BB: no indication and relatively contraindicated due to risk of progressive conduction disease/AV block in these patients  Aldosterone antagonist: d/c'd given hyponatremia   SCD prophylaxis: does not meet criteria for implant  NSAID use: contraindicated  Sleep apnea evaluation: deferred today  Remote monitoring: N/A  Goals of care: prior discussions with Dr Cohen   Other: Has a referall for lymphedema therapy in [facility]. If she needs paracentesis in the future for symptomatic control we would support that. Her abdominal edema is mild for her right now.    # A. Fib/A. Flutter  We did a holter monitor which showed intermittent a. Fib and a. Flutter. [id number] of 4. Occasional palpitations which last less than 1 minute, if these become more frequent can repeat monitor to assess burden.  - Continue Eliquis 2.5mg BID, reduced dose d/t frequent bleeding, aware of risks  - Avoiding BB in the setting of amyloid  - Holding off on digoxin given generally good rate control/very rare RVR events. Could discuss in the future if needed.    # Rash Macular papular rash, faint, along back. No symptoms of systemic allergic reaction. She is wondering if this is related to bumex as this started shortly after switching torsemide to bumex. Will see dermatology first, pending their review would consider switching back to torsemide but seems less likely at this point as they are both loop diuretics.  - Derm on Wednesday  - Sarna PRN  - atarax for itching    # Prolonged QTC  - Minimize QT prolonging meds    # Systemic amyloid  Heme previously monitoring her light chains which have been negative consistent with remission - therefore further palliative chemo not being considered. Nausea has been a large issue and is currently, seeing palliative to address.  - Consider palliative care referal if nausea not controlled on her current regimen (per onc)  - Will message Dr. Cohen re: skull indent re: additional imaging.     #Nausea.   -  Recommend ongoing f/u with palliative care    Follow: Up:   - Labs on Thursday (increased bumex, assessing K needs)  - [facility] clinic as scheduled in June  - Dr. Keyes in August as scheduled  - Derm as scheduled    Billing  - I managed 2 stable chronic problems  - I changed a prescription medication    Ciara Araya PA-C  [facility]      CC  ALEJANDRO KEYES PA-C

## 2021-05-27 DIAGNOSIS — I50.33 ACUTE ON CHRONIC DIASTOLIC HEART FAILURE (H): ICD-10-CM

## 2021-05-27 LAB
ANION GAP SERPL CALCULATED.3IONS-SCNC: 4 MMOL/L (ref 3–14)
BUN SERPL-MCNC: 35 MG/DL (ref 7–30)
CALCIUM SERPL-MCNC: 8.8 MG/DL (ref 8.5–10.1)
CHLORIDE SERPL-SCNC: 99 MMOL/L (ref 94–109)
CO2 SERPL-SCNC: 32 MMOL/L (ref 20–32)
CREAT SERPL-MCNC: 1.29 MG/DL (ref 0.52–1.04)
GFR SERPL CREATININE-BSD FRML MDRD: 42 ML/MIN/{1.73_M2}
GLUCOSE SERPL-MCNC: 81 MG/DL (ref 70–99)
POTASSIUM SERPL-SCNC: 4.2 MMOL/L (ref 3.4–5.3)
SODIUM SERPL-SCNC: 135 MMOL/L (ref 133–144)

## 2021-05-27 PROCEDURE — 36415 COLL VENOUS BLD VENIPUNCTURE: CPT | Performed by: PHYSICIAN ASSISTANT

## 2021-05-27 PROCEDURE — 80048 BASIC METABOLIC PNL TOTAL CA: CPT | Performed by: PHYSICIAN ASSISTANT

## 2021-05-28 DIAGNOSIS — I50.33 ACUTE ON CHRONIC DIASTOLIC HEART FAILURE (H): Primary | ICD-10-CM

## 2021-05-28 RX ORDER — POTASSIUM CHLORIDE 750 MG/1
TABLET, EXTENDED RELEASE ORAL
Qty: 2196 TABLET | Refills: 1 | Status: SHIPPED | OUTPATIENT
Start: 2021-05-28 | End: 2021-06-21

## 2021-05-28 RX ORDER — BUMETANIDE 2 MG/1
4 TABLET ORAL 2 TIMES DAILY
Qty: 360 TABLET | Refills: 1 | Status: SHIPPED | OUTPATIENT
Start: 2021-05-28 | End: 2021-06-16

## 2021-05-28 NOTE — PROGRESS NOTES
Date: 5/28/2021    Time of Call: 3:59 PM     Diagnosis:  HFpEF     [ VORB ] Ordering provider: Yolanda MORGAN  Order: Increase Bumex to 4 mg BID. BMP next week. Ok to take metolazone 2.5 mg over weekend if needed.      Order received by: Vidhi Montgomery RN      Follow-up/additional notes: mychart sent to Amy.

## 2021-05-28 NOTE — TELEPHONE ENCOUNTER
Called pharmacy as refill keeps getting sent back to us requesting 90 day supply. They would like the prn metolazone potassium in the quantity as well. Estimated 3x week for metolazone and added to the total quantity

## 2021-06-01 ENCOUNTER — OFFICE VISIT (OUTPATIENT)
Dept: DERMATOLOGY | Facility: CLINIC | Age: 71
End: 2021-06-01
Payer: COMMERCIAL

## 2021-06-01 DIAGNOSIS — L20.84 INTRINSIC ATOPIC DERMATITIS: Primary | ICD-10-CM

## 2021-06-01 DIAGNOSIS — I50.32 CHRONIC DIASTOLIC HEART FAILURE (H): ICD-10-CM

## 2021-06-01 LAB
ANION GAP SERPL CALCULATED.3IONS-SCNC: 5 MMOL/L (ref 3–14)
BUN SERPL-MCNC: 42 MG/DL (ref 7–30)
CALCIUM SERPL-MCNC: 9.6 MG/DL (ref 8.5–10.1)
CHLORIDE SERPL-SCNC: 91 MMOL/L (ref 94–109)
CO2 SERPL-SCNC: 36 MMOL/L (ref 20–32)
CREAT SERPL-MCNC: 1.18 MG/DL (ref 0.52–1.04)
GFR SERPL CREATININE-BSD FRML MDRD: 47 ML/MIN/{1.73_M2}
GLUCOSE SERPL-MCNC: 89 MG/DL (ref 70–99)
POTASSIUM SERPL-SCNC: 3 MMOL/L (ref 3.4–5.3)
SODIUM SERPL-SCNC: 132 MMOL/L (ref 133–144)

## 2021-06-01 PROCEDURE — 99204 OFFICE O/P NEW MOD 45 MIN: CPT | Performed by: DERMATOLOGY

## 2021-06-01 PROCEDURE — 36415 COLL VENOUS BLD VENIPUNCTURE: CPT | Performed by: PHYSICIAN ASSISTANT

## 2021-06-01 PROCEDURE — 80048 BASIC METABOLIC PNL TOTAL CA: CPT | Performed by: PHYSICIAN ASSISTANT

## 2021-06-01 RX ORDER — TRIAMCINOLONE ACETONIDE 1 MG/G
CREAM TOPICAL
Qty: 453.6 G | Refills: 3 | Status: ON HOLD | OUTPATIENT
Start: 2021-06-01 | End: 2023-01-01

## 2021-06-01 ASSESSMENT — PAIN SCALES - GENERAL: PAINLEVEL: NO PAIN (0)

## 2021-06-01 NOTE — PATIENT INSTRUCTIONS
Eczema care plan:    For soap in the shower, I recommend either Dove bar soap unscented for sensitive skin, Cetaphil gentle facial cleanser, or Vanicream gentle facial cleanser.     At least once daily no matter what, apply a cream moisturizer from the neck to the toes. The best time to do this is immediately after showering. My moisturizer recommendations are the creams from either CeraVe or Vanicream.     If your skin is itchy, apply the topical steroid (Triamcinolone 0.1% cream) twice a day on the skin that is itchy and then apply the moisturizer for two weeks. If your skin is not itchy, just apply the moisturizer, no steroid.

## 2021-06-01 NOTE — NURSING NOTE
Leandra Guerrero's goals for this visit include:   Chief Complaint   Patient presents with     Rash     rash on back, bilateral arms, sides-itchy        She requests these members of her care team be copied on today's visit information: no    PCP: Magy Obrien    Referring Provider:  Mirela Moore MD  [id number] [place] [address]    There were no vitals taken for this visit.    Do you need any medication refills at today's visit? Marta Hernandez on 6/1/2021 at 3:52 PM

## 2021-06-01 NOTE — PROGRESS NOTES
[facility] Dermatology Note  Encounter Date: Jun 1, 2021  Office Visit     Dermatology Problem List:  1. Eczema -  TAC 0.1% cream, gentle moisturizer  ____________________________________________    Assessment & Plan:     # Suspected atopic dermatitis. Eczema favored over lichen planus or lichenoid drug eruption. Discussed skin biopsy versus empiric treatment for eczema. Patient favored empiric treatment first. Will do this and follow closely. Discussed the pathogenesis of this condition and emphasized the importance of gentle skin care and cream based moisturizer for long term treatment. Discussed use of topical steroid only when skin is itchy/symptomatic.   - Moisturizer daily  - Gentle soaps only  - TAC 0.1% cream BID as needed for itch.    Procedures Performed:   - None.    Follow-up: 6 weeks in-person, or earlier for new or changing lesions    Staff and Scribe:     Scribe Disclosure:   I, Barbara Javed, am serving as a scribe to document services personally performed by this physician, Dr. Kasie Gupta, based on data collection and the provider's statements to me.     Provider Disclosure:   The documentation recorded by the scribe accurately reflects the services I personally performed and the decisions made by me.    Kasie Gupta MD    Department of Dermatology  [facility]: Phone: [phone], Fax:[phone]  [facility]: Phone: [phone], Fax: [phone]    ____________________________________________    CC: Rash (rash on back, bilateral arms, sides-itchy )    HPI:  Ms. Leandra Guerrero is a(n) 70 year old female who presents today as a new patient for rash.    She is new to our department.     Referred by Dr. Moore for rash on 5/20/21. Sees Dr. Moore for amyloidosis with cardiac and GI invovlement - off treatment since 2016. Note reviewed  from referral. Patient has rash that she attributes to torsemide diruetic (needed due to CHF and precarious volume status).     Meds reviewed. Has rx for Triam 0.1% cream.     Today, patient notes concerns about a rash on the back, bilateral arms, and sides that is itchy. Patient notes improvement in the rash, but the area is itchy still. Patient has been using both creams (triam and sarna) that have helped the itch some but seem to wear off fast. She is not sure if one is more helpful than the other. She initially thought rash was from torsemide but then it actually got worse with stopping toresmide and switching to bumetanide.    Patient is otherwise feeling well, without additional skin concerns.     Labs Reviewed:  N/A    Physical Exam:  Vitals: There were no vitals taken for this visit.  SKIN: Focused examination of the scalp, face, chest, abdomen, arms, and legs was performed.  - Faint eczematous dermatitis from mid to lower back, more defined at the right antecubital fossa and right forearm, left antecubital fossa, left forearm, and most of the bilateral thighs.  - A couple pink shiny papules scattered in linear arrays on the upper mid back.  - Excoriated papules on the chest and upper arm  - Excoriations on the bilateral knees  - No other lesions of concern on areas examined.       Medications:  Current Outpatient Medications   Medication     Acetaminophen (TYLENOL PO)     apixaban ANTICOAGULANT (ELIQUIS) 2.5 MG tablet     bumetanide (BUMEX) 2 MG tablet     camphor-menthol (DERMASARRA) 0.5-0.5 % external lotion     doxycycline hyclate (VIBRA-TABS) 100 MG tablet     hydrOXYzine (ATARAX) 25 MG tablet     LORazepam (ATIVAN) 0.5 MG tablet     metolazone (ZAROXOLYN) 2.5 MG tablet     OLANZapine (ZYPREXA) 2.5 MG tablet     ondansetron (ZOFRAN-ODT) 8 MG ODT tab     potassium chloride ER (KLOR-CON M) 10 MEQ CR tablet     triamcinolone (KENALOG) 0.1 % external cream     torsemide (DEMADEX) 100 MG tablet     No  current facility-administered medications for this visit.       Past Medical History:   Patient Active Problem List   Diagnosis     AL amyloidosis (H)     Weight loss     Hemorrhagic cystitis     Bilateral edema of lower extremity     Cardiac amyloidosis (H)     Recurrent right pleural effusion     Acute on chronic diastolic heart failure (H)     Central sleep apnea     Shortness of breath     Essential hypertension     Gastroesophageal reflux disease     Insomnia     Low back pain     Lymphedema of lower extremity     Normal coronary arteries     Periodic limb movement disorder     Pulmonary emphysema (H)     Seasonal allergic rhinitis     Advance care planning     Heart failure (H)     Past Medical History:   Diagnosis Date     AL amyloidosis (H)      Atrial fibrillation and flutter (H)      Cardiac amyloidosis (H)      Lymphedema      QT prolongation      Recurrent right pleural effusion 1/2/2017     SVT (supraventricular tachycardia) (H)        CC Mirela Moore MD  [id number] [place] [address] on close of this encounter.

## 2021-06-01 NOTE — LETTER
6/1/2021         RE: Leandra Guerrero  [address] [place] [address]        Dear Colleague,    Thank you for referring your patient, Leandra Guerrero, to the [facility]. Please see a copy of my visit note below.    [facility] Dermatology Note  Encounter Date: Jun 1, 2021  Office Visit     Dermatology Problem List:  1. Eczema -  TAC 0.1% cream, gentle moisturizer  ____________________________________________    Assessment & Plan:     # Suspected atopic dermatitis. Eczema favored over lichen planus or lichenoid drug eruption. Discussed skin biopsy versus empiric treatment for eczema. Patient favored empiric treatment first. Will do this and follow closely. Discussed the pathogenesis of this condition and emphasized the importance of gentle skin care and cream based moisturizer for long term treatment. Discussed use of topical steroid only when skin is itchy/symptomatic.   - Moisturizer daily  - Gentle soaps only  - TAC 0.1% cream BID as needed for itch.    Procedures Performed:   - None.    Follow-up: 6 weeks in-person, or earlier for new or changing lesions    Staff and Scribe:     Scribe Disclosure:   I, Barbara Javed, am serving as a scribe to document services personally performed by this physician, Dr. Kasie Gupta, based on data collection and the provider's statements to me.     Provider Disclosure:   The documentation recorded by the scribe accurately reflects the services I personally performed and the decisions made by me.    Kasie Gupta MD    Department of Dermatology  [facility]: Phone: [phone], Fax:[phone]  [facility]: Phone: [phone], Fax: [phone]    ____________________________________________    CC: Rash (rash on back, bilateral arms, sides-itchy )    HPI:  Ms. Leandra Guerrero  is a(n) 70 year old female who presents today as a new patient for rash.    She is new to our department.     Referred by Dr. Moore for rash on 5/20/21. Sees Dr. Moore for amyloidosis with cardiac and GI invovlement - off treatment since 2016. Note reviewed from referral. Patient has rash that she attributes to torsemide diruetic (needed due to CHF and precarious volume status).     Meds reviewed. Has rx for Triam 0.1% cream.     Today, patient notes concerns about a rash on the back, bilateral arms, and sides that is itchy. Patient notes improvement in the rash, but the area is itchy still. Patient has been using both creams (triam and sarna) that have helped the itch some but seem to wear off fast. She is not sure if one is more helpful than the other. She initially thought rash was from torsemide but then it actually got worse with stopping toresmide and switching to bumetanide.    Patient is otherwise feeling well, without additional skin concerns.     Labs Reviewed:  N/A    Physical Exam:  Vitals: There were no vitals taken for this visit.  SKIN: Focused examination of the scalp, face, chest, abdomen, arms, and legs was performed.  - Faint eczematous dermatitis from mid to lower back, more defined at the right antecubital fossa and right forearm, left antecubital fossa, left forearm, and most of the bilateral thighs.  - A couple pink shiny papules scattered in linear arrays on the upper mid back.  - Excoriated papules on the chest and upper arm  - Excoriations on the bilateral knees  - No other lesions of concern on areas examined.       Medications:  Current Outpatient Medications   Medication     Acetaminophen (TYLENOL PO)     apixaban ANTICOAGULANT (ELIQUIS) 2.5 MG tablet     bumetanide (BUMEX) 2 MG tablet     camphor-menthol (DERMASARRA) 0.5-0.5 % external lotion     doxycycline hyclate (VIBRA-TABS) 100 MG tablet     hydrOXYzine (ATARAX) 25 MG tablet     LORazepam (ATIVAN) 0.5 MG tablet      metolazone (ZAROXOLYN) 2.5 MG tablet     OLANZapine (ZYPREXA) 2.5 MG tablet     ondansetron (ZOFRAN-ODT) 8 MG ODT tab     potassium chloride ER (KLOR-CON M) 10 MEQ CR tablet     triamcinolone (KENALOG) 0.1 % external cream     torsemide (DEMADEX) 100 MG tablet     No current facility-administered medications for this visit.       Past Medical History:   Patient Active Problem List   Diagnosis     AL amyloidosis (H)     Weight loss     Hemorrhagic cystitis     Bilateral edema of lower extremity     Cardiac amyloidosis (H)     Recurrent right pleural effusion     Acute on chronic diastolic heart failure (H)     Central sleep apnea     Shortness of breath     Essential hypertension     Gastroesophageal reflux disease     Insomnia     Low back pain     Lymphedema of lower extremity     Normal coronary arteries     Periodic limb movement disorder     Pulmonary emphysema (H)     Seasonal allergic rhinitis     Advance care planning     Heart failure (H)     Past Medical History:   Diagnosis Date     AL amyloidosis (H)      Atrial fibrillation and flutter (H)      Cardiac amyloidosis (H)      Lymphedema      QT prolongation      Recurrent right pleural effusion 1/2/2017     SVT (supraventricular tachycardia) (H)        CC Mirela Moore MD  [id number] [place] [address] on close of this encounter.        Again, thank you for allowing me to participate in the care of your patient.        Sincerely,        Kasie Gupta MD

## 2021-06-02 ENCOUNTER — PATIENT OUTREACH (OUTPATIENT)
Dept: CARDIOLOGY | Facility: CLINIC | Age: 71
End: 2021-06-02

## 2021-06-02 DIAGNOSIS — I50.33 ACUTE ON CHRONIC DIASTOLIC HEART FAILURE (H): Primary | ICD-10-CM

## 2021-06-02 NOTE — TELEPHONE ENCOUNTER
Lab resulted reviewed by Yolanda MORGAN.   Called Amy with following recommendations:  Date: 6/2/2021    Time of Call: 2:11 PM     Diagnosis:  HFpEF     [ VORB ] Ordering provider: Yolanda MORGAN  Order: Take EXTRA 40 meq Potassium today. BMP Friday.      Order received by: Vidhi Montgomery RN      Follow-up/additional notes: Amy is going up north for the day on Friday and they are leaving early. Cannot get labs that day. Able to get them on Monday. Will update provider. Amy reports she last took metolazone Sunday (half tab) and Monday(half tab). Divided the dose into two days. Confirms she still takes the potassium supplement with it. Vidhi Montgomery RN

## 2021-06-03 DIAGNOSIS — I50.33 ACUTE ON CHRONIC DIASTOLIC HEART FAILURE (H): ICD-10-CM

## 2021-06-03 LAB
ANION GAP SERPL CALCULATED.3IONS-SCNC: 5 MMOL/L (ref 3–14)
BUN SERPL-MCNC: 41 MG/DL (ref 7–30)
CALCIUM SERPL-MCNC: 8.8 MG/DL (ref 8.5–10.1)
CHLORIDE SERPL-SCNC: 94 MMOL/L (ref 94–109)
CO2 SERPL-SCNC: 34 MMOL/L (ref 20–32)
CREAT SERPL-MCNC: 1.18 MG/DL (ref 0.52–1.04)
GFR SERPL CREATININE-BSD FRML MDRD: 47 ML/MIN/{1.73_M2}
GLUCOSE SERPL-MCNC: 91 MG/DL (ref 70–99)
POTASSIUM SERPL-SCNC: 3.7 MMOL/L (ref 3.4–5.3)
SODIUM SERPL-SCNC: 133 MMOL/L (ref 133–144)

## 2021-06-03 PROCEDURE — 80048 BASIC METABOLIC PNL TOTAL CA: CPT | Performed by: PHYSICIAN ASSISTANT

## 2021-06-03 PROCEDURE — 36415 COLL VENOUS BLD VENIPUNCTURE: CPT | Performed by: PHYSICIAN ASSISTANT

## 2021-06-07 ENCOUNTER — MYC MEDICAL ADVICE (OUTPATIENT)
Dept: CARDIOLOGY | Facility: CLINIC | Age: 71
End: 2021-06-07

## 2021-06-07 DIAGNOSIS — I50.33 ACUTE ON CHRONIC DIASTOLIC HEART FAILURE (H): Primary | ICD-10-CM

## 2021-06-07 DIAGNOSIS — I50.32 CHRONIC DIASTOLIC HEART FAILURE (H): Primary | ICD-10-CM

## 2021-06-07 ASSESSMENT — ENCOUNTER SYMPTOMS
FEVER: 0
BOWEL INCONTINENCE: 0
VOMITING: 0
ALTERED TEMPERATURE REGULATION: 1
HEARTBURN: 1
PARALYSIS: 0
COUGH DISTURBING SLEEP: 0
BLOOD IN STOOL: 0
POLYDIPSIA: 1
TINGLING: 1
SPEECH CHANGE: 0
HYPERTENSION: 0
SYNCOPE: 0
LOSS OF CONSCIOUSNESS: 0
TREMORS: 0
WEAKNESS: 1
NAUSEA: 1
DIARRHEA: 0
ABDOMINAL PAIN: 0
WEIGHT GAIN: 1
MUSCLE WEAKNESS: 1
WHEEZING: 1
PALPITATIONS: 1
COUGH: 0
DECREASED APPETITE: 0
HEMOPTYSIS: 0
POLYPHAGIA: 0
HYPOTENSION: 0
SEIZURES: 0
HEADACHES: 1
ARTHRALGIAS: 1
SLEEP DISTURBANCES DUE TO BREATHING: 0
FATIGUE: 1
CHILLS: 1
RECTAL PAIN: 0
STIFFNESS: 1
SKIN CHANGES: 0
WEIGHT LOSS: 0
SHORTNESS OF BREATH: 1
JOINT SWELLING: 0
BACK PAIN: 1
NECK PAIN: 0
SNORES LOUDLY: 0
MEMORY LOSS: 0
DYSPNEA ON EXERTION: 1
LIGHT-HEADEDNESS: 1
SPUTUM PRODUCTION: 0
HALLUCINATIONS: 0
ORTHOPNEA: 1
NUMBNESS: 0
POSTURAL DYSPNEA: 1
EXERCISE INTOLERANCE: 1
MUSCLE CRAMPS: 1
LEG PAIN: 1
BLOATING: 0
DISTURBANCES IN COORDINATION: 0
CONSTIPATION: 1
MYALGIAS: 1
NIGHT SWEATS: 0
INCREASED ENERGY: 0
POOR WOUND HEALING: 0
JAUNDICE: 0
DIZZINESS: 1

## 2021-06-07 NOTE — PROGRESS NOTES
Date: 6/7/2021    Time of Call: 12:17 PM     Diagnosis:  HFpEF     [ VORB ] Ordering provider: Reyes Wilson NP (covering for Yolanda Araya)  Order: BMP     Order received by: Vidhi Montgomery RN      Follow-up/additional notes:

## 2021-06-08 DIAGNOSIS — I50.33 ACUTE ON CHRONIC DIASTOLIC HEART FAILURE (H): ICD-10-CM

## 2021-06-08 LAB
ANION GAP SERPL CALCULATED.3IONS-SCNC: 4 MMOL/L (ref 3–14)
BUN SERPL-MCNC: 44 MG/DL (ref 7–30)
CALCIUM SERPL-MCNC: 9.1 MG/DL (ref 8.5–10.1)
CHLORIDE SERPL-SCNC: 93 MMOL/L (ref 94–109)
CO2 SERPL-SCNC: 35 MMOL/L (ref 20–32)
CREAT SERPL-MCNC: 1.07 MG/DL (ref 0.52–1.04)
GFR SERPL CREATININE-BSD FRML MDRD: 53 ML/MIN/{1.73_M2}
GLUCOSE SERPL-MCNC: 90 MG/DL (ref 70–99)
POTASSIUM SERPL-SCNC: 3.1 MMOL/L (ref 3.4–5.3)
SODIUM SERPL-SCNC: 132 MMOL/L (ref 133–144)

## 2021-06-08 PROCEDURE — 36415 COLL VENOUS BLD VENIPUNCTURE: CPT | Performed by: PHYSICIAN ASSISTANT

## 2021-06-08 PROCEDURE — 80048 BASIC METABOLIC PNL TOTAL CA: CPT | Performed by: PHYSICIAN ASSISTANT

## 2021-06-08 NOTE — TELEPHONE ENCOUNTER
"Labs rechecked today.  Noted that K was 3.1, called Amy to assess.   Her weight is back up to 160 today and her feet, ankles and legs are so swollen that she is having trouble getting around.       Amy does endorse taking all her scheduled potassium the day she took the metolazone and reports that today she took her scheduled 80 mEq and then took an extra 50 mEq when she got home because she \"felt like my potassium was low\".    She is wondering if she should take another half metolazone.      Amy is very tearful and not sure how much longer she can go on like this.   She does not want to be admitted to the hospital and would very much like to attend her brother's [facility] service on Friday.  She would be happy to get labs on Friday morning.    "

## 2021-06-08 NOTE — TELEPHONE ENCOUNTER
Reviewed with Reyes Wilson NP.    Date: 6/8/2021    Time of Call: 4:03 PM     Diagnosis:  Heart failure     [ VORB ] Ordering provider: Reyes Wilson NP    Order: take a total extra 60 mEq of potassium today, ok to take metolazone 2.5 mg with scheduled extra potassium, BMP friday     Order received by: Niki Fam RN       Follow-up/additional notes: Amy already took an extra 50 mEq of potassium today, she will take an extra 10 meq to make up the 60, and then will take all of her regularly scheduled as well

## 2021-06-11 ENCOUNTER — MYC MEDICAL ADVICE (OUTPATIENT)
Dept: CARDIOLOGY | Facility: CLINIC | Age: 71
End: 2021-06-11

## 2021-06-11 DIAGNOSIS — I50.32 CHRONIC DIASTOLIC HEART FAILURE (H): ICD-10-CM

## 2021-06-11 DIAGNOSIS — I50.32 CHRONIC DIASTOLIC HEART FAILURE (H): Primary | ICD-10-CM

## 2021-06-11 DIAGNOSIS — I50.33 ACUTE ON CHRONIC DIASTOLIC HEART FAILURE (H): ICD-10-CM

## 2021-06-11 LAB
ANION GAP SERPL CALCULATED.3IONS-SCNC: 4 MMOL/L (ref 3–14)
BUN SERPL-MCNC: 46 MG/DL (ref 7–30)
CALCIUM SERPL-MCNC: 8.8 MG/DL (ref 8.5–10.1)
CHLORIDE SERPL-SCNC: 97 MMOL/L (ref 94–109)
CO2 SERPL-SCNC: 30 MMOL/L (ref 20–32)
CREAT SERPL-MCNC: 1.18 MG/DL (ref 0.52–1.04)
GFR SERPL CREATININE-BSD FRML MDRD: 47 ML/MIN/{1.73_M2}
GLUCOSE SERPL-MCNC: 92 MG/DL (ref 70–99)
POTASSIUM SERPL-SCNC: 5 MMOL/L (ref 3.4–5.3)
SODIUM SERPL-SCNC: 131 MMOL/L (ref 133–144)

## 2021-06-11 PROCEDURE — 80048 BASIC METABOLIC PNL TOTAL CA: CPT | Performed by: PHYSICIAN ASSISTANT

## 2021-06-11 PROCEDURE — 36415 COLL VENOUS BLD VENIPUNCTURE: CPT | Performed by: PHYSICIAN ASSISTANT

## 2021-06-11 NOTE — TELEPHONE ENCOUNTER
Date: 6/11/2021    Time of Call: 1:08 PM     Diagnosis:  Heart failure     [ VORB ] Ordering provider: Reyes Wilson NP    Order: BMP on Monday, take metolazone 2.5 mg with regular extra potassium     Order received by: Niki Fam RN       Follow-up/additional notes: sent mychart to Amy as requested        
appropriate

## 2021-06-14 ENCOUNTER — PATIENT OUTREACH (OUTPATIENT)
Dept: CARDIOLOGY | Facility: CLINIC | Age: 71
End: 2021-06-14

## 2021-06-14 DIAGNOSIS — I50.32 CHRONIC DIASTOLIC HEART FAILURE (H): ICD-10-CM

## 2021-06-14 DIAGNOSIS — I50.32 CHRONIC DIASTOLIC HEART FAILURE (H): Primary | ICD-10-CM

## 2021-06-14 LAB
ANION GAP SERPL CALCULATED.3IONS-SCNC: 5 MMOL/L (ref 3–14)
ANION GAP SERPL CALCULATED.3IONS-SCNC: 8 MMOL/L (ref 3–14)
BUN SERPL-MCNC: 33 MG/DL (ref 7–30)
BUN SERPL-MCNC: 35 MG/DL (ref 7–30)
CALCIUM SERPL-MCNC: 8.9 MG/DL (ref 8.5–10.1)
CALCIUM SERPL-MCNC: 8.9 MG/DL (ref 8.5–10.1)
CHLORIDE SERPL-SCNC: 94 MMOL/L (ref 94–109)
CHLORIDE SERPL-SCNC: 94 MMOL/L (ref 94–109)
CO2 SERPL-SCNC: 32 MMOL/L (ref 20–32)
CO2 SERPL-SCNC: 34 MMOL/L (ref 20–32)
CREAT SERPL-MCNC: 1.1 MG/DL (ref 0.52–1.04)
CREAT SERPL-MCNC: 1.11 MG/DL (ref 0.52–1.04)
GFR SERPL CREATININE-BSD FRML MDRD: 51 ML/MIN/{1.73_M2}
GFR SERPL CREATININE-BSD FRML MDRD: 51 ML/MIN/{1.73_M2}
GLUCOSE SERPL-MCNC: 105 MG/DL (ref 70–99)
GLUCOSE SERPL-MCNC: 83 MG/DL (ref 70–99)
POTASSIUM SERPL-SCNC: 2.9 MMOL/L (ref 3.4–5.3)
POTASSIUM SERPL-SCNC: 3.2 MMOL/L (ref 3.4–5.3)
SODIUM SERPL-SCNC: 133 MMOL/L (ref 133–144)
SODIUM SERPL-SCNC: 134 MMOL/L (ref 133–144)

## 2021-06-14 PROCEDURE — 80048 BASIC METABOLIC PNL TOTAL CA: CPT | Performed by: PHYSICIAN ASSISTANT

## 2021-06-14 PROCEDURE — 80048 BASIC METABOLIC PNL TOTAL CA: CPT | Performed by: NURSE PRACTITIONER

## 2021-06-14 PROCEDURE — 36415 COLL VENOUS BLD VENIPUNCTURE: CPT | Performed by: NURSE PRACTITIONER

## 2021-06-14 NOTE — TELEPHONE ENCOUNTER
Reviewed with Yolanda MORGAN. Called Amy back with following recommendations:  Date: 6/14/2021    Time of Call: 12:07 PM     Diagnosis:  HFpEF     [ VORB ] Ordering provider: Yolanda MORGAN  Order: Should go to ER for IV Potassium. If refuses recommend taking extra 80 meq right now (assuming has already taken 2 doses of her daily order. BMP later today.      Order received by: Vidhi Montgomery RN      Follow-up/additional notes: Spoke with Amy. Explained the risks of low potassium and that previously when she has been low has had SVT and falls. Amy acknowledged but does not want to go to ER for IV potassium.   She reports she's only taking one dose so far of her TID dosing today. Advised to hang up and take second dose now. Will review with Yolanda and call her back for further recommendations.      Reviewed with Yolanda, called Amy back. Recommended  Take 80 meq at 3:45 when driving to lab. Get labs late this afternoon. Still take 60 meq this evening.     Reviewed with Amy who verbalized understanding. Will watch for lab results.

## 2021-06-14 NOTE — TELEPHONE ENCOUNTER
Called Amy to check in as K low at 2.9. She reports she did take the metolazone on Friday per communication with clinic. Reports this did nothing to get fluid off. Took another half tab of metolazone last night with extra 3 tabs of potassium.   Amy says she really doesn't want to go in for IV. Will review with provider.

## 2021-06-14 NOTE — PROGRESS NOTES
Date: 6/14/2021    Time of Call: 4:58 PM     Diagnosis:  HFpEF     [ VORB ] Ordering provider: CAROLINA MORGAN  Order: BMP Wednesday     Order received by: Vidhi Montgomery RN      Follow-up/additional notes: mychart sent to Amy with information.

## 2021-06-16 ENCOUNTER — PATIENT OUTREACH (OUTPATIENT)
Dept: CARDIOLOGY | Facility: CLINIC | Age: 71
End: 2021-06-16

## 2021-06-16 DIAGNOSIS — I50.32 CHRONIC DIASTOLIC HEART FAILURE (H): ICD-10-CM

## 2021-06-16 DIAGNOSIS — I50.32 CHRONIC DIASTOLIC HEART FAILURE (H): Primary | ICD-10-CM

## 2021-06-16 LAB
ANION GAP SERPL CALCULATED.3IONS-SCNC: 4 MMOL/L (ref 3–14)
BUN SERPL-MCNC: 25 MG/DL (ref 7–30)
CALCIUM SERPL-MCNC: 9 MG/DL (ref 8.5–10.1)
CHLORIDE SERPL-SCNC: 98 MMOL/L (ref 94–109)
CO2 SERPL-SCNC: 34 MMOL/L (ref 20–32)
CREAT SERPL-MCNC: 1.29 MG/DL (ref 0.52–1.04)
GFR SERPL CREATININE-BSD FRML MDRD: 42 ML/MIN/{1.73_M2}
GLUCOSE SERPL-MCNC: 101 MG/DL (ref 70–99)
POTASSIUM SERPL-SCNC: 3.8 MMOL/L (ref 3.4–5.3)
SODIUM SERPL-SCNC: 136 MMOL/L (ref 133–144)

## 2021-06-16 PROCEDURE — 36415 COLL VENOUS BLD VENIPUNCTURE: CPT | Performed by: PHYSICIAN ASSISTANT

## 2021-06-16 PROCEDURE — 80048 BASIC METABOLIC PNL TOTAL CA: CPT | Performed by: PHYSICIAN ASSISTANT

## 2021-06-16 RX ORDER — TORSEMIDE 100 MG/1
100 TABLET ORAL 2 TIMES DAILY
Qty: 180 TABLET | Refills: 0
Start: 2021-06-16 | End: 2021-06-23

## 2021-06-16 NOTE — TELEPHONE ENCOUNTER
Called Amy to review labs. She was in a bathroom at the grocery store so didn't want to tell me her weight but when I asked if it was still up and if she was struggling with it she said yes.   She said she absolutely would like to try to switch back to Torsemide and the Bumex doesn't seem to be working. If we do she has a lot of it at home and doesn't need it sent to her pharmacy.   Confirms her previous dose was 100 mg BID.   Will review with provider. Vidhi Montgomery RN

## 2021-06-16 NOTE — TELEPHONE ENCOUNTER
Date: 6/16/2021    Time of Call: 3:51 PM     Diagnosis:  HFpEF     [ VORB ] Ordering provider: Yolanda MORGAN  Order: Okay to stop bumex 4 mg bid.     Restart torsemide 100 mg BID.     Keep current kcl. Recheck BMP on Monday.  If taking Metolazone take 2.5 mg dose, don't take half dose.      Order received by: Vidhi Montgomery RN      Follow-up/additional notes: Reviewed with Amy who verbalized understanding.

## 2021-06-21 ENCOUNTER — OFFICE VISIT (OUTPATIENT)
Dept: CARDIOLOGY | Facility: CLINIC | Age: 71
End: 2021-06-21
Attending: PHYSICIAN ASSISTANT
Payer: COMMERCIAL

## 2021-06-21 VITALS
DIASTOLIC BLOOD PRESSURE: 66 MMHG | HEIGHT: 63 IN | BODY MASS INDEX: 27.82 KG/M2 | HEART RATE: 64 BPM | SYSTOLIC BLOOD PRESSURE: 125 MMHG | WEIGHT: 157 LBS | OXYGEN SATURATION: 98 %

## 2021-06-21 DIAGNOSIS — I43 AMYLOID HEART DISEASE (H): ICD-10-CM

## 2021-06-21 DIAGNOSIS — E85.4 AMYLOID HEART DISEASE (H): ICD-10-CM

## 2021-06-21 DIAGNOSIS — I50.32 CHRONIC DIASTOLIC HEART FAILURE (H): ICD-10-CM

## 2021-06-21 LAB
ANION GAP SERPL CALCULATED.3IONS-SCNC: 2 MMOL/L (ref 3–14)
BUN SERPL-MCNC: 34 MG/DL (ref 7–30)
CALCIUM SERPL-MCNC: 9.2 MG/DL (ref 8.5–10.1)
CHLORIDE SERPL-SCNC: 96 MMOL/L (ref 94–109)
CO2 SERPL-SCNC: 35 MMOL/L (ref 20–32)
CREAT SERPL-MCNC: 1.24 MG/DL (ref 0.52–1.04)
GFR SERPL CREATININE-BSD FRML MDRD: 44 ML/MIN/{1.73_M2}
GLUCOSE SERPL-MCNC: 93 MG/DL (ref 70–99)
POTASSIUM SERPL-SCNC: 3.3 MMOL/L (ref 3.4–5.3)
SODIUM SERPL-SCNC: 132 MMOL/L (ref 133–144)

## 2021-06-21 PROCEDURE — 36415 COLL VENOUS BLD VENIPUNCTURE: CPT | Performed by: PATHOLOGY

## 2021-06-21 PROCEDURE — 80048 BASIC METABOLIC PNL TOTAL CA: CPT | Performed by: PATHOLOGY

## 2021-06-21 PROCEDURE — G0463 HOSPITAL OUTPT CLINIC VISIT: HCPCS

## 2021-06-21 PROCEDURE — 99214 OFFICE O/P EST MOD 30 MIN: CPT | Performed by: PHYSICIAN ASSISTANT

## 2021-06-21 RX ORDER — POTASSIUM CHLORIDE 750 MG/1
TABLET, EXTENDED RELEASE ORAL
Qty: 2268 TABLET | Refills: 1 | Status: ON HOLD | OUTPATIENT
Start: 2021-06-21 | End: 2021-11-07

## 2021-06-21 ASSESSMENT — PAIN SCALES - GENERAL: PAINLEVEL: NO PAIN (0)

## 2021-06-21 ASSESSMENT — MIFFLIN-ST. JEOR: SCORE: 1211.28

## 2021-06-21 NOTE — LETTER
6/21/2021      RE: Leandra Guerrero  [address] [place] [address]       Dear Colleague,    Thank you for the opportunity to participate in the care of your patient, Leandra Guerrero, at the [facility] [place] [facility]. Please see a copy of my visit note below.    In person visit.    HPI:   Ms. Guerrero is a 70 year old female with a past medical history including stage IV AL amyloid diagnosed in 2016. Presents to clinic for [facility] follow-up.     Her cardiac and oncologic history are as follows: fatigue starting in 1/2016. She had a coronary angiogram which was reportedly normal but was diagnosed with HF and started on a BBand diuretics. Her symptoms progressed to the point that she was evaluated at [facility] in August/September (Dr. Barron in oncology/hematology, Dr. Nettles is cardiology). She was diagnosed with stage IV AL amyloid (+GI, +bone marrow involvement, no involvement of kidney or liver). Her work up is detailed in our previous notes, however she has lambda AL amyloidosis and she underwent CyBorD chemotherapy and excellent light chain response by serum. Patient has been turned down at [facility] for a stem cell transplant due to her cardiac involvement. Later in 2016, she had 2-3 more right sided pleural taps. She was hospitalized 1/2017 and diuresed 20-30 pounds at that time. Since that time her AL has been in remission by labs without further chemo. Patient was then treated with doxycycline but this was stopped for 6-9 months d/t concerns she was not benefiting from this, now restarted.    Since the last visit, we have changed bumex back to torsemide per patient preference.    This visit:  Was 162 lbs last night came down to 154 today after metolazone last night.     Frequent LE edema which remains significant even after metolazone. Ongoing fatigue. Cannot walk too far d/t fatigue and PELAEZ. Does not complain of changes to her  "dizziness or palpitations today. Does not complain of chest pain.    She is quite frustrated with her current quality of life. Feels that she gets tired after almost all tasks and isn't able to do a lot of what she wants to do. I will continue to encourage engagement with palliative care as I think they could be very helpful with symptom management as we go forward. At this point, she feels like they \"have her on a list to die\" and does not want to reengage at this time. We will continue to explore her GOC often: at this point she still wants to fight this, she wants to continue coming to clinic and getting labs and coming to the hospital. She does acknowledge her decline.    Cardiac Medications  Apixaban 2.5 mg BID  Metolazone 2.5 mg daily- every 4 days  Torsemide 100 mg BID   Kcl 80/80/60    PAST MEDICAL HISTORY:  Past Medical History:   Diagnosis Date     AL amyloidosis (H)      Atrial fibrillation and flutter (H)      Cardiac amyloidosis (H)      Lymphedema      QT prolongation      Recurrent right pleural effusion 1/2/2017     SVT (supraventricular tachycardia) (H)        FAMILY HISTORY:  Family History   Problem Relation Age of Onset     Other - See Comments Sister         Amyloidosis       SOCIAL HISTORY:  Socioeconomic History     Marital status:    Tobacco Use     Smoking status: Never Smoker     Smokeless tobacco: Never Used   Substance and Sexual Activity     Alcohol use: No     Drug use: No   Other Topics Concern     CURRENT MEDICATIONS:  Acetaminophen (TYLENOL PO), Take 325 mg by mouth  apixaban ANTICOAGULANT (ELIQUIS) 2.5 MG tablet, Take 1 tablet (2.5 mg) by mouth 2 times daily  camphor-menthol (DERMASARRA) 0.5-0.5 % external lotion, Apply 1 mL topically every 6 hours as needed for skin care  doxycycline hyclate (VIBRA-TABS) 100 MG tablet, Take 1 tablet (100 mg) by mouth daily  hydrOXYzine (ATARAX) 25 MG tablet, Take 1 tablet (25 mg) by mouth 3 times daily as needed for itching  LORazepam " "(ATIVAN) 0.5 MG tablet, Take 1 tablet (0.5 mg) by mouth every 6 hours as needed for anxiety or nausea  ondansetron (ZOFRAN-ODT) 8 MG ODT tab, every 8 hours as needed PRN  torsemide (DEMADEX) 100 MG tablet, Take 1 tablet (100 mg) by mouth every morning AND 1 tablet (100 mg) daily at 2 pm.  triamcinolone (KENALOG) 0.1 % external cream, Apply a thin layer twice daily to itchy areas as needed.  metolazone (ZAROXOLYN) 2.5 MG tablet, Take 1 tablet (2.5 mg) by mouth twice a week    No current facility-administered medications on file prior to visit.       ROS:   See HPI     EXAM:  /66 (BP Location: Right arm, Patient Position: Chair, Cuff Size: Adult Regular)   Pulse 64   Ht 1.6 m (5' 3\")   Wt 71.2 kg (157 lb)   SpO2 98%   BMI 27.81 kg/m       GENERAL: Appears comfortable, in no acute distress. Speaking in full sentences and able to communicate all needs  HEENT: Eye symmetrical, no discharge or icterus bilaterally. Mucous membranes moist and without lesions.  CV: RRR, +S1S2, no murmur, rub, or gallop. JVP ~ 7-8.   RESPIRATORY: Respirations regular, even, and unlabored. Lungs CTA throughout.   GI: Soft and mildly  distended with normoactive bowel sounds. No tenderness, rebound, guarding.   EXTREMITIES: Moderate to severe b/l non-pitting peripheral edema, unchanged from last visit. All extremities are warm and well perfused   NEUROLOGIC: Alert and interacting appropriately. No focal deficits.   MUSCULOSKELETAL: No joint swelling or tenderness.   SKIN: No jaundice. No rashes.      Labs, reviewed with patient in clinic today:  CBC RESULTS:  Lab Results   Component Value Date    WBC 6.4 05/11/2021    RBC 4.29 05/11/2021    HGB 13.8 05/11/2021    HCT 41.3 05/11/2021    MCV 96 05/11/2021    MCH 32.2 05/11/2021    MCHC 33.4 05/11/2021    RDW 13.5 05/11/2021     05/11/2021       CMP RESULTS:  Lab Results   Component Value Date     (L) 06/21/2021    POTASSIUM 3.3 (L) 06/21/2021    CHLORIDE 96 06/21/2021    " CO2 35 (H) 06/21/2021    ANIONGAP 2 (L) 06/21/2021    GLC 93 06/21/2021    BUN 34 (H) 06/21/2021    CR 1.24 (H) 06/21/2021    GFRESTIMATED 44 (L) 06/21/2021    GFRESTBLACK 51 (L) 06/21/2021    JERROD 9.2 06/21/2021    BILITOTAL 1.9 (H) 05/11/2021    ALBUMIN 3.8 05/11/2021    ALKPHOS 171 (H) 05/11/2021    ALT 35 05/11/2021    AST 23 05/11/2021        INR RESULTS:  Lab Results   Component Value Date    INR 1.30 (H) 01/14/2017       Lab Results   Component Value Date    MAG 2.2 10/26/2019     Lab Results   Component Value Date    NTBNPI 9,009 (H) 01/13/2017     Lab Results   Component Value Date    NTBNP 1,376 (H) 02/04/2021       Diagnostics:      2/21 Ziopatch       6/12/2019 Holter Mnoitor      TTE 4/9/19  Moderate left ventricular hypertrophy.  Global and regional left ventricular function is normal with an EF of 60-65%.  Global right ventricular function is normal.  Moderate aortic valve insufficiency.  Mild pulmonary hypertension with dilated IVC.  This study was compared with the study from 10/11/18 the AR is worse and RA pressure is now increased.    TTE 10/11/18  Global and regional left ventricular function is normal with an EF of 55-60%.  Moderate concentric wall thickening consistent with left ventricular  hypertrophy is present - known amyloid.  Grade III or advanced diastolic dysfunction.  Normal right ventricular size, wall thickness, and function.  Estimated pulmonary artery pressure 28 mmHg.  IVC with normal diameter but does not collapse (>50%) with inspiration.  Trace pericardial effusion.  Compared to prior study from 8/17/17, no significant change.    Ziopatch 11/2017      Holter Monitor 06/2019  INTERPRETATION  1. The rhythm varied with sinus rhythm and atrial fibrillation/variable atrial flutter. Low voltage noted.  2. The heart rate varied with a minimum rate of 48 bpm, maximum rate of 164 bpm, average rate of 74 bpm.  3. Frequent supraventricular ectopy was seen ranging from 0-469 per hour.  Occasional atrail pairs and fairly frequent bigeminal cycles were noted.  Wandering atrial pacemaker noted.  4. Infrequent multifocal ventricular ectopy was seen ranging from 0-36 beats per hour.  5. ST-T wave changes were present.  6. Three patient events were documented and correlated with atrial fibrillation/flutter    EKG 8/8/2019 for palpitations  - NSR at a rate of 70, OH 0.19, QTc 525, QRS is narrow. Occasional PVCs. Biphasic twaves in V4-V6, unchanged from priors.    Assessment and Plan:   Mrs. Guerrero is a 70 year old female with AL amyloidosis with cardiac manifestation who presents to [facility] for hospital follow-up. Patient has cardiac amyloidosis as evidenced by her echocardiogram (severe concentric hypertrophy and biatrial enlargement) and abnormal EKG (near-low voltage, poor R wave progression, biatrial enlargement and no evidence of LVH despite thicken LV on echo) in the setting of biopsy proven (BMB, EGD) AL amyloid. She completed chemotherapy with CyBorD with an excellent serologic response and her heart failure (i.e. troponin negative, NT pro BNP was stable, serum light chains minimal and urine light chains undetectable).     She has gradually declined over the last few years and has ongoing challenges with her volume status.  She requires frequent metolazone and maintaining an adequate potassium level has been a challenge. She has also been struggling with A. Fib, which is occaionally symptomatic. These episodes remain rare and given relative contraindications for amyloid patients we do not have her on rate control at this time.    She was recently hospitalized. She was diuresed but unclear if there was a true dry weight challenge. We have now changed her bumex back to torsemide, which she had been on prior, and she feels this is working better for her. She is quite frustrated with her current quality of life. Feels that she gets tired after almost all tasks and isn't able to do a lot of what she  "wants to do. I will continue to encourage engagement with palliative care as I think they could be very helpful with symptom management as we go forward. At this point, she feels like they \"have her on a list to die\" and does not want to reengage at this time. We will continue to explore her GOC often: at this point she still wants to fight this, she wants to continue coming to clinic and getting labs and coming to the hospital. She does acknowledge her decline.    # Chronic diastolic heart failure/restrictive cardiomyopathy 2/2  # Cardiac amyloidosis    Stage C. NYHA Class III.    Fluid status: Continue torsemide 100 mg BID and Kcl 80/80/60.  Continue PRN metolazone with an extra 60 meq of kcl the day of and the day after metolazone.  ACEi/ARB/ARNI: n/a   BB: no indication and relatively contraindicated due to risk of progressive conduction disease/AV block in these patients  Aldosterone antagonist: d/c'd given hyponatremia   SCD prophylaxis: does not meet criteria for implant  NSAID use: contraindicated  Sleep apnea evaluation: deferred today  Remote monitoring: N/A  Goals of care: prior discussions with Dr Cohen and ongoing in [facility] as well  Other: Has a referall for lymphedema therapy in [facility]. If she needs paracentesis in the future for symptomatic control we would support that. Her abdominal edema is mild for her right now.    # A. Fib/A. Flutter  We did a holter monitor which showed intermittent a. Fib and a. Flutter. [id number] of 4. Occasional palpitations which last less than 1 minute, if these become more frequent can repeat monitor to assess burden.  - Continue Eliquis 2.5mg BID, reduced dose d/t frequent bleeding, aware of risks  - Avoiding BB in the setting of amyloid  - Holding off on digoxin given generally good rate control/very rare RVR events. Could discuss in the future if needed.    # Prolonged QTC  - Minimize QT prolonging meds    # Systemic amyloid  Heme previously monitoring her light " chains which have been negative consistent with remission - therefore further palliative chemo not being considered. Nausea has been a large issue and is currently, seeing palliative to address.  - Consider palliative care referal if nausea not controlled on her current regimen (per onc)  - Will message Dr. Keyes re: skull indent re: additional imaging.     #Nausea.   - Recommend ongoing f/u with palliative care    Follow: Up:   - Medication Therapy Management referral   - Labs every other week for now  - Dr Keyes in 6 weeks as scheduled.   - [facility] in 3 months     Billing  - I spent 39 minutes with this patient on the day of joaquin Araya PA-C  [facility]      CC  ALEJANDRO KEYES

## 2021-06-21 NOTE — PATIENT INSTRUCTIONS
Take your medicines every day, as directed    Changes made today:  o Continue Torsemide and Metolazone as you are  o    Monitor Your Weight and Symptoms    Contact us if you:      Gain 2 pounds in one day or 5 pounds in one week    Feel more short of breath    Notice more leg swelling    Feel lightheadeded   Change your lifestyle    Limit Salt or Sodium:    2000 mg  Limit Fluids:    2000 mL or approximately 64 ounces  Eat a Heart Healthy Diet    Low in saturated fats  Stay Active:    Aim to move at least 150 minutes every  week         To Contact us    During Business Hours:  [phone], option # 1 (University)  Then option # 4 (medical questions)     After hours, weekends or holidays:   [phone], Option #4  Ask to speak to the On-Call Cardiologist. Inform them you are a CORE/heart failure patient at the [facility].     Use [employer] allows you to communicate directly with your heart team through secure messaging.    [employer] can be accessed any time on your phone, computer, or tablet.    If you need assistance, we'd be happy to help!         Keep your Heart Appointments:    Medication Therapy Management referral   Labs every other week for now  Dr Cohen in 6 weeks as scheduled.   Yolanda in 3 months

## 2021-06-21 NOTE — NURSING NOTE
Chief Complaint   Patient presents with     Follow Up     Return CORE 70 year old female with chronic diastolic heart failure/AL presents for follow up with labs prior .      Vitals were taken and medications were reconciled.     Lilly Kim RMA  8:05 AM

## 2021-06-21 NOTE — NURSING NOTE
Diet: Patient instructed regarding a heart healthy diet, including discussion of reduced fat and sodium intake. Patient demonstrated understanding of this information and agreed to call with further questions or concerns.  Labs: Patient was given results of the laboratory testing obtained today. Patient was instructed to return for the next laboratory testing in 2 weeks . Patient demonstrated understanding of this information and agreed to call with further questions or concerns.   Return Appointment: Patient given instructions regarding scheduling next clinic visit. Patient demonstrated understanding of this information and agreed to call with further questions or concerns.  Patient stated she understood all health information given and agreed to call with further questions or concerns.   Vidhi Montgomery RN

## 2021-06-21 NOTE — PROGRESS NOTES
In person visit.    HPI:   Ms. Guerrero is a 70 year old female with a past medical history including stage IV AL amyloid diagnosed in 2016. Presents to clinic for CORE follow-up.     Her cardiac and oncologic history are as follows: fatigue starting in 1/2016. She had a coronary angiogram which was reportedly normal but was diagnosed with HF and started on a BBand diuretics. Her symptoms progressed to the point that she was evaluated at [facility] in August/September (Dr. Barron in oncology/hematology, Dr. Nettles is cardiology). She was diagnosed with stage IV AL amyloid (+GI, +bone marrow involvement, no involvement of kidney or liver). Her work up is detailed in our previous notes, however she has lambda AL amyloidosis and she underwent CyBorD chemotherapy and excellent light chain response by serum. Patient has been turned down at [facility] for a stem cell transplant due to her cardiac involvement. Later in 2016, she had 2-3 more right sided pleural taps. She was hospitalized 1/2017 and diuresed 20-30 pounds at that time. Since that time her AL has been in remission by labs without further chemo. Patient was then treated with doxycycline but this was stopped for 6-9 months d/t concerns she was not benefiting from this, now restarted.    Since the last visit, we have changed bumex back to torsemide per patient preference.    This visit:  Was 162 lbs last night came down to 154 today after metolazone last night.     Frequent LE edema which remains significant even after metolazone. Ongoing fatigue. Cannot walk too far d/t fatigue and PELAEZ. Does not complain of changes to her dizziness or palpitations today. Does not complain of chest pain.    She is quite frustrated with her current quality of life. Feels that she gets tired after almost all tasks and isn't able to do a lot of what she wants to do. I will continue to encourage engagement with palliative care as I think they could be very helpful with symptom management as we  "go forward. At this point, she feels like they \"have her on a list to die\" and does not want to reengage at this time. We will continue to explore her GOC often: at this point she still wants to fight this, she wants to continue coming to clinic and getting labs and coming to the hospital. She does acknowledge her decline.    Cardiac Medications  Apixaban 2.5 mg BID  Metolazone 2.5 mg daily- every 4 days  Torsemide 100 mg BID   Kcl 80/80/60    PAST MEDICAL HISTORY:  Past Medical History:   Diagnosis Date     AL amyloidosis (H)      Atrial fibrillation and flutter (H)      Cardiac amyloidosis (H)      Lymphedema      QT prolongation      Recurrent right pleural effusion 1/2/2017     SVT (supraventricular tachycardia) (H)        FAMILY HISTORY:  Family History   Problem Relation Age of Onset     Other - See Comments Sister         Amyloidosis       SOCIAL HISTORY:  Socioeconomic History     Marital status:    Tobacco Use     Smoking status: Never Smoker     Smokeless tobacco: Never Used   Substance and Sexual Activity     Alcohol use: No     Drug use: No   Other Topics Concern     CURRENT MEDICATIONS:  Acetaminophen (TYLENOL PO), Take 325 mg by mouth  apixaban ANTICOAGULANT (ELIQUIS) 2.5 MG tablet, Take 1 tablet (2.5 mg) by mouth 2 times daily  camphor-menthol (DERMASARRA) 0.5-0.5 % external lotion, Apply 1 mL topically every 6 hours as needed for skin care  doxycycline hyclate (VIBRA-TABS) 100 MG tablet, Take 1 tablet (100 mg) by mouth daily  hydrOXYzine (ATARAX) 25 MG tablet, Take 1 tablet (25 mg) by mouth 3 times daily as needed for itching  LORazepam (ATIVAN) 0.5 MG tablet, Take 1 tablet (0.5 mg) by mouth every 6 hours as needed for anxiety or nausea  ondansetron (ZOFRAN-ODT) 8 MG ODT tab, every 8 hours as needed PRN  torsemide (DEMADEX) 100 MG tablet, Take 1 tablet (100 mg) by mouth every morning AND 1 tablet (100 mg) daily at 2 pm.  triamcinolone (KENALOG) 0.1 % external cream, Apply a thin layer twice " "daily to itchy areas as needed.  metolazone (ZAROXOLYN) 2.5 MG tablet, Take 1 tablet (2.5 mg) by mouth twice a week    No current facility-administered medications on file prior to visit.       ROS:   See HPI     EXAM:  /66 (BP Location: Right arm, Patient Position: Chair, Cuff Size: Adult Regular)   Pulse 64   Ht 1.6 m (5' 3\")   Wt 71.2 kg (157 lb)   SpO2 98%   BMI 27.81 kg/m       GENERAL: Appears comfortable, in no acute distress. Speaking in full sentences and able to communicate all needs  HEENT: Eye symmetrical, no discharge or icterus bilaterally. Mucous membranes moist and without lesions.  CV: RRR, +S1S2, no murmur, rub, or gallop. JVP ~ 7-8.   RESPIRATORY: Respirations regular, even, and unlabored. Lungs CTA throughout.   GI: Soft and mildly  distended with normoactive bowel sounds. No tenderness, rebound, guarding.   EXTREMITIES: Moderate to severe b/l non-pitting peripheral edema, unchanged from last visit. All extremities are warm and well perfused   NEUROLOGIC: Alert and interacting appropriately. No focal deficits.   MUSCULOSKELETAL: No joint swelling or tenderness.   SKIN: No jaundice. No rashes.      Labs, reviewed with patient in clinic today:  CBC RESULTS:  Lab Results   Component Value Date    WBC 6.4 05/11/2021    RBC 4.29 05/11/2021    HGB 13.8 05/11/2021    HCT 41.3 05/11/2021    MCV 96 05/11/2021    MCH 32.2 05/11/2021    MCHC 33.4 05/11/2021    RDW 13.5 05/11/2021     05/11/2021       CMP RESULTS:  Lab Results   Component Value Date     (L) 06/21/2021    POTASSIUM 3.3 (L) 06/21/2021    CHLORIDE 96 06/21/2021    CO2 35 (H) 06/21/2021    ANIONGAP 2 (L) 06/21/2021    GLC 93 06/21/2021    BUN 34 (H) 06/21/2021    CR 1.24 (H) 06/21/2021    GFRESTIMATED 44 (L) 06/21/2021    GFRESTBLACK 51 (L) 06/21/2021    JERROD 9.2 06/21/2021    BILITOTAL 1.9 (H) 05/11/2021    ALBUMIN 3.8 05/11/2021    ALKPHOS 171 (H) 05/11/2021    ALT 35 05/11/2021    AST 23 05/11/2021        INR " RESULTS:  Lab Results   Component Value Date    INR 1.30 (H) 01/14/2017       Lab Results   Component Value Date    MAG 2.2 10/26/2019     Lab Results   Component Value Date    NTBNPI 9,009 (H) 01/13/2017     Lab Results   Component Value Date    NTBNP 1,376 (H) 02/04/2021       Diagnostics:      2/21 Ziopatch       6/12/2019 Holter Mnoitor      TTE 4/9/19  Moderate left ventricular hypertrophy.  Global and regional left ventricular function is normal with an EF of 60-65%.  Global right ventricular function is normal.  Moderate aortic valve insufficiency.  Mild pulmonary hypertension with dilated IVC.  This study was compared with the study from 10/11/18 the AR is worse and RA pressure is now increased.    TTE 10/11/18  Global and regional left ventricular function is normal with an EF of 55-60%.  Moderate concentric wall thickening consistent with left ventricular  hypertrophy is present - known amyloid.  Grade III or advanced diastolic dysfunction.  Normal right ventricular size, wall thickness, and function.  Estimated pulmonary artery pressure 28 mmHg.  IVC with normal diameter but does not collapse (>50%) with inspiration.  Trace pericardial effusion.  Compared to prior study from 8/17/17, no significant change.    Ziopatch 11/2017      Holter Monitor 06/2019  INTERPRETATION  1. The rhythm varied with sinus rhythm and atrial fibrillation/variable atrial flutter. Low voltage noted.  2. The heart rate varied with a minimum rate of 48 bpm, maximum rate of 164 bpm, average rate of 74 bpm.  3. Frequent supraventricular ectopy was seen ranging from 0-469 per hour. Occasional atrail pairs and fairly frequent bigeminal cycles were noted.  Wandering atrial pacemaker noted.  4. Infrequent multifocal ventricular ectopy was seen ranging from 0-36 beats per hour.  5. ST-T wave changes were present.  6. Three patient events were documented and correlated with atrial fibrillation/flutter    EKG 8/8/2019 for palpitations  -  "NSR at a rate of 70, GA 0.19, QTc 525, QRS is narrow. Occasional PVCs. Biphasic twaves in V4-V6, unchanged from priors.    Assessment and Plan:   Mrs. Guerrero is a 70 year old female with AL amyloidosis with cardiac manifestation who presents to [facility] for hospital follow-up. Patient has cardiac amyloidosis as evidenced by her echocardiogram (severe concentric hypertrophy and biatrial enlargement) and abnormal EKG (near-low voltage, poor R wave progression, biatrial enlargement and no evidence of LVH despite thicken LV on echo) in the setting of biopsy proven (BMB, EGD) AL amyloid. She completed chemotherapy with CyBorD with an excellent serologic response and her heart failure (i.e. troponin negative, NT pro BNP was stable, serum light chains minimal and urine light chains undetectable).     She has gradually declined over the last few years and has ongoing challenges with her volume status.  She requires frequent metolazone and maintaining an adequate potassium level has been a challenge. She has also been struggling with A. Fib, which is occaionally symptomatic. These episodes remain rare and given relative contraindications for amyloid patients we do not have her on rate control at this time.    She was recently hospitalized. She was diuresed but unclear if there was a true dry weight challenge. We have now changed her bumex back to torsemide, which she had been on prior, and she feels this is working better for her. She is quite frustrated with her current quality of life. Feels that she gets tired after almost all tasks and isn't able to do a lot of what she wants to do. I will continue to encourage engagement with palliative care as I think they could be very helpful with symptom management as we go forward. At this point, she feels like they \"have her on a list to die\" and does not want to reengage at this time. We will continue to explore her GOC often: at this point she still wants to fight this, she wants " to continue coming to clinic and getting labs and coming to the hospital. She does acknowledge her decline.    # Chronic diastolic heart failure/restrictive cardiomyopathy 2/2  # Cardiac amyloidosis    Stage C. NYHA Class III.    Fluid status: Continue torsemide 100 mg BID and Kcl 80/80/60.  Continue PRN metolazone with an extra 60 meq of kcl the day of and the day after metolazone.  ACEi/ARB/ARNI: n/a   BB: no indication and relatively contraindicated due to risk of progressive conduction disease/AV block in these patients  Aldosterone antagonist: d/c'd given hyponatremia   SCD prophylaxis: does not meet criteria for implant  NSAID use: contraindicated  Sleep apnea evaluation: deferred today  Remote monitoring: N/A  Goals of care: prior discussions with Dr Cohen and ongoing in [facility] as well  Other: Has a referall for lymphedema therapy in [facility]. If she needs paracentesis in the future for symptomatic control we would support that. Her abdominal edema is mild for her right now.    # A. Fib/A. Flutter  We did a holter monitor which showed intermittent a. Fib and a. Flutter. [id number] of 4. Occasional palpitations which last less than 1 minute, if these become more frequent can repeat monitor to assess burden.  - Continue Eliquis 2.5mg BID, reduced dose d/t frequent bleeding, aware of risks  - Avoiding BB in the setting of amyloid  - Holding off on digoxin given generally good rate control/very rare RVR events. Could discuss in the future if needed.    # Prolonged QTC  - Minimize QT prolonging meds    # Systemic amyloid  Heme previously monitoring her light chains which have been negative consistent with remission - therefore further palliative chemo not being considered. Nausea has been a large issue and is currently, seeing palliative to address.  - Consider palliative care referal if nausea not controlled on her current regimen (per onc)  - Will message Dr. Cohen re: skull indent re: additional imaging.      #Nausea.   - Recommend ongoing f/u with palliative care    Follow: Up:   - Medication Therapy Management referral   - Labs every other week for now  - Dr Keyes in 6 weeks as scheduled.   - CORE in 3 months     Billing  - I spent 39 minutes with this patient on the day of joaquin Araya PA-C  [facility] Cardiology      CC  ALEJANDRO KEYES

## 2021-06-23 ENCOUNTER — VIRTUAL VISIT (OUTPATIENT)
Dept: PHARMACY | Facility: CLINIC | Age: 71
End: 2021-06-23
Payer: COMMERCIAL

## 2021-06-23 DIAGNOSIS — I48.91 ATRIAL FIBRILLATION, UNSPECIFIED TYPE (H): ICD-10-CM

## 2021-06-23 DIAGNOSIS — R21 RASH AND NONSPECIFIC SKIN ERUPTION: ICD-10-CM

## 2021-06-23 DIAGNOSIS — I50.32 CHRONIC DIASTOLIC HEART FAILURE (H): Primary | ICD-10-CM

## 2021-06-23 DIAGNOSIS — E85.81 AL AMYLOIDOSIS (H): ICD-10-CM

## 2021-06-23 PROCEDURE — 99605 MTMS BY PHARM NP 15 MIN: CPT | Performed by: PHARMACIST

## 2021-06-23 PROCEDURE — 99607 MTMS BY PHARM ADDL 15 MIN: CPT | Performed by: PHARMACIST

## 2021-06-23 NOTE — PROGRESS NOTES
Medication Therapy Management (MTM) Encounter    ASSESSMENT:                            Medication Adherence/Access: No issues identified    Chronic Diastolic HF/Cardiac amyloidosis: Recommend against initiation of dandelion root. Clinical effectiveness is modest at best and effects are inconsistent between products. A more effective strategy is to establish her dry weight and maintain with diuretics.     Afib: Stable.     Rash: Improving.     AL Amyloidosis: Plan in place with oncology. Would benefit from use of ondansetron due to improved delivery through ODT and to reduce use of benzodiazepines. Insurance inquiry results in no coverage because she is not actively treated for cancer at this time.     PLAN:                            1. Avoid starting dandelion tea.   2. Work with cardiology to establish and maintain dry weight.   3 Harpreet to contact pharmacy re: ondansetron -  Insurance inquiry results in no coverage because she is not actively treated for cancer at this time.     Follow-up: Return for additional medication related questions.     SUBJECTIVE/OBJECTIVE:                          Leandra Guerrero is a 70 year old female called for an initial visit. She was referred to me from Ciara SANDS      Reason for visit: wants to try dandelion tea. Lack of energy, fatigue and water retention are biggest concerns. Doesn't want to take more pills unless they will help.     Allergies/ADRs: Reviewed in chart  Tobacco: She reports that she has never smoked. She has never used smokeless tobacco.  Alcohol: not currently using  Caffeine: none reported  Activity: none reported  Past Medical History: Reviewed in chart    Medication Adherence/Access: no issues reported    Chronic Diastolic HF/Cardiac amyloidosis: Current medications include torsemide 100 mg twice daily (has previously been on bumetanide but this wasn't effective), potassium Cl 10 mEq tabs 8-8-6 each day (prefers Klor-Con), metolazone 2.5 mg as needed (will  "take when her weight gets up past 157 lb). She had an aggressive diuresis in 2016-17 that resulted in a 20-30 lb weight loss. Amy felt \"miraculous\" after this. They have been unable to establish a dry weight since then. He biggest concern is water retention and fatigue. Wants to consider dandelion tea for additional diuretic effect. Also wants to take other meds if they will help her feel better.     CBC RESULTS:   Recent Labs   Lab Test 05/11/21  1019   WBC 6.4   RBC 4.29   HGB 13.8   HCT 41.3   MCV 96   MCH 32.2   MCHC 33.4   RDW 13.5        Last Comprehensive Metabolic Panel:  Sodium   Date Value Ref Range Status   06/21/2021 132 (L) 133 - 144 mmol/L Final     Potassium   Date Value Ref Range Status   06/21/2021 3.3 (L) 3.4 - 5.3 mmol/L Final     Chloride   Date Value Ref Range Status   06/21/2021 96 94 - 109 mmol/L Final     Carbon Dioxide   Date Value Ref Range Status   06/21/2021 35 (H) 20 - 32 mmol/L Final     Anion Gap   Date Value Ref Range Status   06/21/2021 2 (L) 3 - 14 mmol/L Final     Glucose   Date Value Ref Range Status   06/21/2021 93 70 - 99 mg/dL Final     Urea Nitrogen   Date Value Ref Range Status   06/21/2021 34 (H) 7 - 30 mg/dL Final     Creatinine   Date Value Ref Range Status   06/21/2021 1.24 (H) 0.52 - 1.04 mg/dL Final     GFR Estimate   Date Value Ref Range Status   06/21/2021 44 (L) >60 mL/min/[1.73_m2] Final     Comment:     Non  GFR Calc  Starting 12/18/2018, serum creatinine based estimated GFR (eGFR) will be   calculated using the Chronic Kidney Disease Epidemiology Collaboration   (CKD-EPI) equation.       Calcium   Date Value Ref Range Status   06/21/2021 9.2 8.5 - 10.1 mg/dL Final     Estimated Creatinine Clearance: 41.1 mL/min (A) (based on SCr of 1.24 mg/dL (H)).    Afib: Current medications include Eliquis 2.5 mg twice daily. She denies side effects and has no concerns today.     Rash: Current medications DermaSaara 0.5-0.5% 1 ml every 6 hours as " "needed, hydroxyzine 25 mg three times daily, triamcinoline 0.1% cream as needed. Itching was worse when she was on bumetanide. Symptoms are improving but are the worst when her body temperature is hot. Side effects to medications denied.      AL Amyloidosis: Current medications include ondansetron 8 mg ODT as needed (isn't taking it, insurance doesn't cover), lorazepam 0.5 mg as needed, doxycycline 100 mg daily. History of treatment with Bortezomib SQ / Cyclophosphamide PO / Dexamethasone PO (CyBorD). Last cycle was in 2017. The lorazepam works well to treat nausea at this time. She would like to use ondansetron because it is dissolvable. She has never used olanzapine. She denies side effects to therapy currently.     Today's Vitals: There were no vitals taken for this visit. - telemed    BP Readings from Last 1 Encounters:   06/21/21 125/66     Pulse Readings from Last 1 Encounters:   06/21/21 64     Wt Readings from Last 1 Encounters:   06/21/21 157 lb (71.2 kg)     Ht Readings from Last 1 Encounters:   06/21/21 5' 3\" (1.6 m)     Estimated body mass index is 27.81 kg/m  as calculated from the following:    Height as of 6/21/21: 5' 3\" (1.6 m).    Weight as of 6/21/21: 157 lb (71.2 kg).    Temp Readings from Last 1 Encounters:   05/20/21 97.4  F (36.3  C) (Oral)     ----------------    I spent 21 minutes with this patient today (an extra 15 minutes was spent creating the Medication Action Plan). A copy of the visit note was provided to the patient's referring provider.    The patient was sent via [employer] a summary of these recommendations.     Nelly Williamson, Pharm.D., [facility]  Medication Therapy Management Pharmacist  Page/VM:  [phone]    Telemedicine Visit Details  Type of service:  Telephone visit  Start Time: 2:02 PM  End Time: 2:23 PM  Originating Location (patient location): [place]  Distant Location (provider location):  [facility]      Medication Therapy Recommendations  No " medication therapy recommendations to display

## 2021-06-23 NOTE — PATIENT INSTRUCTIONS
Recommendations from today's MTM visit:                                                       1. Avoid adding dandelion tea to your regimen. It is unlikely to be very helpful and may also produce inconsistent results.   2. I contacted your pharmacy - ondansetron is not covered because your are not actively treated with chemotherapy.     Follow-up: Return for additional medication related questions.    It was great to speak with you today.  I value your experience and would be very thankful for your time with providing feedback on our clinic survey. You may receive a survey via email or text message in the next few days.     To schedule another MTM appointment, please call the clinic directly or you may call the MTM scheduling line at [phone] or toll-free at [phone].     My Clinical Pharmacist's contact information:                                                      Please feel free to contact me with any questions or concerns you have.      Nelly Williamson, Pharm.D., [facility]  Medication Therapy Management Pharmacist  Page/VM:  [phone]

## 2021-06-23 NOTE — LETTER
"        Date: 2021    Leandra Guerrero  [address] [place] [address]    Dear Ms. Guerrero,    Thank you for talking with me on 21 about your health and medications. Medicare s MTM (Medication Therapy Management) program helps you understand your medications and use them safely.      This letter includes an action plan (Medication Action Plan) and medication list (Personal Medication List). The action plan has steps you should take to help you get the best results from your medications. The medication list will help you keep track of your medications and how to use them the right way.       Have your action plan and medication list with you when you talk with your doctors, pharmacists, and other healthcare providers in your care team.     Ask your doctors, pharmacists, and other healthcare providers to update the action plan and medication list at every visit.     Take your medication list with you if you go to the hospital or emergency room.     Give a copy of the action plan and medication list to your family or caregivers.     If you want to talk about this letter or any of the papers with it, please call   [phone].We look forward to working with you, your doctors, and other healthcare providers to help you stay healthy through the [employer] [place] MTM program.    Sincerely,  Nelly Williamson [facility]    Enclosed: Medication Action Plan and Personal Medication List    MEDICATION ACTION PLAN FOR Leandra Guerrero,  1950     This action plan will help you get the best results from your medications if you:   1. Read \"What we talked about.\"   2. Take the steps listed in the \"What I need to do\" boxes.   3. Fill in \"What I did and when I did it.\"   4. Fill in \"My follow-up plan\" and \"Questions I want to ask.\"     Have this action plan with you when you talk with your doctors, pharmacists, and other healthcare providers in your care team. Share this with your " family or caregivers too.  DATE PREPARED: 2021  What we talked about: We reviewed all of your medications today to see if dandelion tea would be helpful                                                  What I need to do: Avoid using dandelion tea - it is known to have an inconsistent, mild diuretic effect and will make other medications more difficult to manage What I did and when I did it:                                              My follow-up plan:                 Questions I want to ask:              If you have any questions about your action plan, call Nelly Williamson [facility], Phone: [phone] , Monday-Friday 8-4:30pm.           PERSONAL MEDICATION LIST FOR Leandra Guerrero,  1950     This medication list was made for you after we talked. We also used information from your doctor's chart.      Use blank rows to add new medications. Then fill in the dates you started using them.    Cross out medications when you no longer use them. Then write the date and why you stopped using them.    Ask your doctors, pharmacists, and other healthcare providers to update this list at every visit. Keep this list up-to-date with:       Prescription medications    Over the counter drugs     Herbals    Vitamins    Minerals      If you go to the hospital or emergency room, take this list with you. Share this with your family or caregivers too.     DATE PREPARED: 2021  Allergies or side effects: Contrast dye, Cytoxan [cyclophosphamide], Gabapentin, Levofloxacin, Amoxicillin, and Spironolactone     Medication:  APIXABAN 2.5 MG PO TABS      How I use it:  Take 1 tablet (2.5 mg) by mouth 2 times daily      Why I use it: Paroxysmal atrial fibrillation (H)    Prescriber:  Domenica Cohen MD      Date I started using it:       Date I stopped using it:         Why I stopped using it:            Medication:  CAMPHOR-MENTHOL 0.5-0.5 % EX LOTN      How I use it:  Apply 1 mL topically every 6 hours as needed  for skin care      Why I use it: Acute on chronic diastolic heart failure (H)    Prescriber:  Ciara Araya PA-C      Date I started using it:       Date I stopped using it:         Why I stopped using it:            Medication:  DOXYCYCLINE HYCLATE 100 MG PO TABS      How I use it:  Take 1 tablet (100 mg) by mouth daily      Why I use it: AL amyloidosis (H)    Prescriber:  Mirela Moore MD      Date I started using it:       Date I stopped using it:         Why I stopped using it:            Medication:  HYDROXYZINE HCL 25 MG PO TABS      How I use it:  Take 1 tablet (25 mg) by mouth 3 times daily as needed for itching      Why I use it: Acute on chronic diastolic heart failure (H)    Prescriber:  Ciara Araya PA-C      Date I started using it:       Date I stopped using it:         Why I stopped using it:            Medication:  LORAZEPAM 0.5 MG PO TABS      How I use it:  Take 1 tablet (0.5 mg) by mouth every 6 hours as needed for anxiety or nausea      Why I use it: Amyloid heart disease (H); Weight loss; Hypokalemia; AL amyloidosis (H)    Prescriber:  Mirela Moore MD      Date I started using it:       Date I stopped using it:         Why I stopped using it:            Medication:  METOLAZONE 2.5 MG PO TABS      How I use it:  Take 1 tablet (2.5 mg) by mouth twice a week      Why I use it: Amyloid heart disease (H)    Prescriber:  Domenica Cohen MD      Date I started using it:       Date I stopped using it:         Why I stopped using it:            Medication:  ONDANSETRON 8 MG PO TBDP      How I use it:  every 8 hours as needed PRN      Why I use it: Nausea    Prescriber:   Mirela Moore MD      Date I started using it:       Date I stopped using it:         Why I stopped using it:            Medication:  POTASSIUM CHLORIDE SAHRA ER 10 MEQ PO TBCR      How I use it:  TAKE 8 TABLETS EVERY MORNING& 8 TABLETS AT LUNCH AND 6 TABLETS EVERY EVENING TAKE EXTRA 6 TABLETS DAY OF AND DAY  AFTER METOLAZONE      Why I use it: Amyloid heart disease (H)    Prescriber:  Ciara Araya PA-C      Date I started using it:       Date I stopped using it:         Why I stopped using it:            Medication:  TORSEMIDE 100 MG PO TABS      How I use it:  Take 1 tablet (100 mg) by mouth every morning AND 1 tablet (100 mg) daily at 2 pm.      Why I use it: Acute on chronic diastolic heart failure (H); Amyloid heart disease (H)    Prescriber:  Ciara Araya PA-C      Date I started using it:       Date I stopped using it:         Why I stopped using it:            Medication:  TRIAMCINOLONE ACETONIDE 0.1 % EX CREA      How I use it:  Apply a thin layer twice daily to itchy areas as needed.      Why I use it: Intrinsic atopic dermatitis    Prescriber:  Kasie Gupta MD      Date I started using it:       Date I stopped using it:         Why I stopped using it:            Medication:  TYLENOL PO      How I use it:  Take 325 mg by mouth      Why I use it: Amyloid heart disease (H)    Prescriber:  Patient Reported      Date I started using it:       Date I stopped using it:         Why I stopped using it:            Medication:         How I use it:         Why I use it:      Prescriber:         Date I started using it:       Date I stopped using it:         Why I stopped using it:            Medication:         How I use it:         Why I use it:      Prescriber:         Date I started using it:       Date I stopped using it:         Why I stopped using it:            Medication:         How I use it:         Why I use it:      Prescriber:         Date I started using it:       Date I stopped using it:         Why I stopped using it:              Other Information:     If you have any questions about your medication list, call Nelly Williamson [facility], Phone: [phone] , Monday-Friday 8-4:30pm.    According to the Paperwork Reduction Act of 1995, no persons are required to respond to a collection of  information unless it displays a valid OMB control number. The valid OMB number for this information collection is [id number]. The time required to complete this information collection is estimated to average 40 minutes per response, including the time to review instructions, searching existing data resources, gather the data needed, and complete and review the information collection. If you have any comments concerning the accuracy of the time estimate(s) or suggestions for improving this form, please write to: CMS, Attn: PRA Reports Clearance Officer, [address] [place] [phone].

## 2021-06-25 ENCOUNTER — PATIENT OUTREACH (OUTPATIENT)
Dept: CARDIOLOGY | Facility: CLINIC | Age: 71
End: 2021-06-25

## 2021-06-25 DIAGNOSIS — I43 AMYLOID HEART DISEASE (H): ICD-10-CM

## 2021-06-25 DIAGNOSIS — E85.4 AMYLOID HEART DISEASE (H): ICD-10-CM

## 2021-06-25 LAB
ANION GAP SERPL CALCULATED.3IONS-SCNC: 4 MMOL/L (ref 3–14)
BUN SERPL-MCNC: 30 MG/DL (ref 7–30)
CALCIUM SERPL-MCNC: 9.2 MG/DL (ref 8.5–10.1)
CHLORIDE SERPL-SCNC: 96 MMOL/L (ref 94–109)
CO2 SERPL-SCNC: 35 MMOL/L (ref 20–32)
CREAT SERPL-MCNC: 1.01 MG/DL (ref 0.52–1.04)
GFR SERPL CREATININE-BSD FRML MDRD: 57 ML/MIN/{1.73_M2}
GLUCOSE SERPL-MCNC: 80 MG/DL (ref 70–99)
POTASSIUM SERPL-SCNC: 3.2 MMOL/L (ref 3.4–5.3)
SODIUM SERPL-SCNC: 135 MMOL/L (ref 133–144)

## 2021-06-25 PROCEDURE — 36415 COLL VENOUS BLD VENIPUNCTURE: CPT | Performed by: PHYSICIAN ASSISTANT

## 2021-06-25 PROCEDURE — 80048 BASIC METABOLIC PNL TOTAL CA: CPT | Performed by: PHYSICIAN ASSISTANT

## 2021-06-25 NOTE — TELEPHONE ENCOUNTER
Labs reviewed. Amy reports she's feeling better on the Torsemide. Took Metolazone yesterday. Will take the extra 60 meq Potassium today as scheduled per metolazone order. Reviewed with her that that should help with her low potassium today.   Will review with provider for timing on when to get labs next and mychart Amy back. Vidhi Montgomery RN

## 2021-07-02 DIAGNOSIS — I50.32 CHRONIC DIASTOLIC HEART FAILURE (H): ICD-10-CM

## 2021-07-02 LAB
ANION GAP SERPL CALCULATED.3IONS-SCNC: 3 MMOL/L (ref 3–14)
BUN SERPL-MCNC: 38 MG/DL (ref 7–30)
CALCIUM SERPL-MCNC: 9.3 MG/DL (ref 8.5–10.1)
CHLORIDE SERPL-SCNC: 96 MMOL/L (ref 94–109)
CO2 SERPL-SCNC: 35 MMOL/L (ref 20–32)
CREAT SERPL-MCNC: 1.16 MG/DL (ref 0.52–1.04)
GFR SERPL CREATININE-BSD FRML MDRD: 48 ML/MIN/{1.73_M2}
GLUCOSE SERPL-MCNC: 87 MG/DL (ref 70–99)
POTASSIUM SERPL-SCNC: 3.9 MMOL/L (ref 3.4–5.3)
SODIUM SERPL-SCNC: 134 MMOL/L (ref 133–144)

## 2021-07-02 PROCEDURE — 80048 BASIC METABOLIC PNL TOTAL CA: CPT | Performed by: PHYSICIAN ASSISTANT

## 2021-07-02 PROCEDURE — 36415 COLL VENOUS BLD VENIPUNCTURE: CPT | Performed by: PHYSICIAN ASSISTANT

## 2021-07-06 ENCOUNTER — MYC MEDICAL ADVICE (OUTPATIENT)
Dept: CARDIOLOGY | Facility: CLINIC | Age: 71
End: 2021-07-06

## 2021-07-06 DIAGNOSIS — I50.32 CHRONIC DIASTOLIC HEART FAILURE (H): Primary | ICD-10-CM

## 2021-07-06 NOTE — TELEPHONE ENCOUNTER
Date: 7/6/2021    Time of Call: 1:41 PM     Diagnosis:  Heart failure     [ VORB ] Ordering provider: CATHY Orozco    Order: BMP this Thursday and next tuesday     Order received by: Niki Fam RN       Follow-up/additional notes: mychart sent to Amy

## 2021-07-08 DIAGNOSIS — I50.32 CHRONIC DIASTOLIC HEART FAILURE (H): ICD-10-CM

## 2021-07-08 LAB
ANION GAP SERPL CALCULATED.3IONS-SCNC: 6 MMOL/L (ref 3–14)
BUN SERPL-MCNC: 35 MG/DL (ref 7–30)
CALCIUM SERPL-MCNC: 9.2 MG/DL (ref 8.5–10.1)
CHLORIDE SERPL-SCNC: 94 MMOL/L (ref 94–109)
CO2 SERPL-SCNC: 34 MMOL/L (ref 20–32)
CREAT SERPL-MCNC: 1.13 MG/DL (ref 0.52–1.04)
GFR SERPL CREATININE-BSD FRML MDRD: 50 ML/MIN/{1.73_M2}
GLUCOSE SERPL-MCNC: 89 MG/DL (ref 70–99)
POTASSIUM SERPL-SCNC: 3.6 MMOL/L (ref 3.4–5.3)
SODIUM SERPL-SCNC: 134 MMOL/L (ref 133–144)

## 2021-07-08 PROCEDURE — 36415 COLL VENOUS BLD VENIPUNCTURE: CPT | Performed by: PHYSICIAN ASSISTANT

## 2021-07-08 PROCEDURE — 80048 BASIC METABOLIC PNL TOTAL CA: CPT | Performed by: PHYSICIAN ASSISTANT

## 2021-07-13 ENCOUNTER — CARE COORDINATION (OUTPATIENT)
Dept: CARDIOLOGY | Facility: CLINIC | Age: 71
End: 2021-07-13

## 2021-07-13 ENCOUNTER — LAB (OUTPATIENT)
Dept: LAB | Facility: CLINIC | Age: 71
End: 2021-07-13
Payer: COMMERCIAL

## 2021-07-13 DIAGNOSIS — I50.32 CHRONIC DIASTOLIC HEART FAILURE (H): ICD-10-CM

## 2021-07-13 DIAGNOSIS — I50.33 ACUTE ON CHRONIC DIASTOLIC HEART FAILURE (H): Primary | ICD-10-CM

## 2021-07-13 DIAGNOSIS — E87.6 HYPOKALEMIA: ICD-10-CM

## 2021-07-13 LAB
ANION GAP SERPL CALCULATED.3IONS-SCNC: 5 MMOL/L (ref 3–14)
BUN SERPL-MCNC: 39 MG/DL (ref 7–30)
CALCIUM SERPL-MCNC: 9.2 MG/DL (ref 8.5–10.1)
CHLORIDE BLD-SCNC: 94 MMOL/L
CO2 SERPL-SCNC: 37 MMOL/L (ref 20–32)
CREAT SERPL-MCNC: 1.08 MG/DL
GFR SERPL CREATININE-BSD FRML MDRD: 53 ML/MIN/1.73M2
GLUCOSE BLD-MCNC: 84 MG/DL (ref 70–99)
POTASSIUM BLD-SCNC: 2.9 MMOL/L (ref 3.4–5.3)
SODIUM SERPL-SCNC: 136 MMOL/L (ref 133–144)

## 2021-07-13 PROCEDURE — 80048 BASIC METABOLIC PNL TOTAL CA: CPT

## 2021-07-13 PROCEDURE — 36415 COLL VENOUS BLD VENIPUNCTURE: CPT

## 2021-07-13 NOTE — PROGRESS NOTES
Per request Ciara Araya NP, called patient re: low potassium level from today. Amy reports she took the metolazone yesterday and took an additional 80 meq of KCL yesterday and today as instructed previously. She will take an additional 80 tomorrow and have potassium redrawn on Thursday.

## 2021-07-15 ENCOUNTER — PATIENT OUTREACH (OUTPATIENT)
Dept: CARDIOLOGY | Facility: CLINIC | Age: 71
End: 2021-07-15

## 2021-07-15 ENCOUNTER — LAB (OUTPATIENT)
Dept: LAB | Facility: CLINIC | Age: 71
End: 2021-07-15
Payer: COMMERCIAL

## 2021-07-15 DIAGNOSIS — I43 AMYLOID HEART DISEASE (H): ICD-10-CM

## 2021-07-15 DIAGNOSIS — E87.6 HYPOKALEMIA: ICD-10-CM

## 2021-07-15 DIAGNOSIS — I50.33 ACUTE ON CHRONIC DIASTOLIC HEART FAILURE (H): Primary | ICD-10-CM

## 2021-07-15 DIAGNOSIS — I50.33 ACUTE ON CHRONIC DIASTOLIC HEART FAILURE (H): ICD-10-CM

## 2021-07-15 DIAGNOSIS — E85.4 AMYLOID HEART DISEASE (H): ICD-10-CM

## 2021-07-15 LAB
ANION GAP SERPL CALCULATED.3IONS-SCNC: 5 MMOL/L (ref 3–14)
BUN SERPL-MCNC: 25 MG/DL (ref 7–30)
CALCIUM SERPL-MCNC: 8.7 MG/DL (ref 8.5–10.1)
CHLORIDE BLD-SCNC: 97 MMOL/L (ref 94–109)
CO2 SERPL-SCNC: 35 MMOL/L (ref 20–32)
CREAT SERPL-MCNC: 0.92 MG/DL (ref 0.52–1.04)
GFR SERPL CREATININE-BSD FRML MDRD: 64 ML/MIN/1.73M2
GLUCOSE BLD-MCNC: 81 MG/DL (ref 70–99)
POTASSIUM BLD-SCNC: 3.2 MMOL/L (ref 3.4–5.3)
SODIUM SERPL-SCNC: 137 MMOL/L (ref 133–144)

## 2021-07-15 PROCEDURE — 80048 BASIC METABOLIC PNL TOTAL CA: CPT

## 2021-07-15 PROCEDURE — 36415 COLL VENOUS BLD VENIPUNCTURE: CPT

## 2021-07-15 NOTE — TELEPHONE ENCOUNTER
Called eliana to review labs and find out when she took her last Metolazone. K 3.2 today. Reports she took a metolazone after she saw her lab results. Has not taken the extra 60 mEq potassium yet. Advised to take this as soon as she hangs up with me as will likely have her take more once review with Yolanda.   Also reports she feels like the 2.5 mg Metolazone dose isn't getting the fluid off anymore. Not feeling great. Her leg swelling is never really going down. Stomach is big and fluid isn't coming off there either. Weight 158 this morning. Got a gentle massage yesterday and had nausea and dry heaves last night, wonders if that could be from the massage.   She wonders if she can take a bigger dose of metolazone. I told her it's something we can talk about but are likely going to want her potassium higher before we could consider it.   They are wanting to leave for [place] on 7/28 but they aren't sure yet as nervous about increasing COVID cases.   Will review for potassium plan and diuretic thoughts with provider.

## 2021-07-15 NOTE — TELEPHONE ENCOUNTER
Reviewed with Yolanda MORGAN    Date: 7/15/2021    Time of Call: 3:41 PM     Diagnosis:  HFpEF     [ VORB ] Ordering provider: Yolanda MORGAN  Order: Increase PRN Potassium when you take metolazone to 80 meq day of and 80 meq day after. Today take an additional 40 meq. Tomorrow take an additional 80 mEq. BMP Saturday     Order received by: Vidhi Montgomery RN      Follow-up/additional notes: Reviewed with Amy who verbalized understanding. She does not want to come to the [facility] to get labs on Saturday. Is ok with getting them late in the day on Friday.   Reviewed with her that once potassium is back in higher range we can discuss increasing diuretics. Until then, if potassium is low and she is having fluid issues, she needs to go to the ER as the hospital is the safest place for her to diurese with a low potassium and we can't safely manage that outpatient.     She verbalized understanding. Vidhi Montgomery RN

## 2021-07-16 ENCOUNTER — PATIENT OUTREACH (OUTPATIENT)
Dept: CARDIOLOGY | Facility: CLINIC | Age: 71
End: 2021-07-16

## 2021-07-16 ENCOUNTER — LAB (OUTPATIENT)
Dept: LAB | Facility: CLINIC | Age: 71
End: 2021-07-16
Payer: COMMERCIAL

## 2021-07-16 DIAGNOSIS — I50.33 ACUTE ON CHRONIC DIASTOLIC HEART FAILURE (H): Primary | ICD-10-CM

## 2021-07-16 DIAGNOSIS — I50.33 ACUTE ON CHRONIC DIASTOLIC HEART FAILURE (H): ICD-10-CM

## 2021-07-16 LAB
ANION GAP SERPL CALCULATED.3IONS-SCNC: 6 MMOL/L (ref 3–14)
BUN SERPL-MCNC: 29 MG/DL (ref 7–30)
CALCIUM SERPL-MCNC: 9 MG/DL (ref 8.5–10.1)
CHLORIDE BLD-SCNC: 95 MMOL/L (ref 94–109)
CO2 SERPL-SCNC: 34 MMOL/L (ref 20–32)
CREAT SERPL-MCNC: 1.17 MG/DL (ref 0.52–1.04)
GFR SERPL CREATININE-BSD FRML MDRD: 48 ML/MIN/1.73M2
GLUCOSE BLD-MCNC: 105 MG/DL (ref 70–99)
POTASSIUM BLD-SCNC: 3.4 MMOL/L (ref 3.4–5.3)
SODIUM SERPL-SCNC: 135 MMOL/L (ref 133–144)

## 2021-07-16 PROCEDURE — 80048 BASIC METABOLIC PNL TOTAL CA: CPT

## 2021-07-16 PROCEDURE — 36415 COLL VENOUS BLD VENIPUNCTURE: CPT

## 2021-07-16 NOTE — TELEPHONE ENCOUNTER
Labs reviewed. Called Amy with following recommendations:    Date: 7/16/2021    Time of Call: 4:41 PM     Diagnosis:  HFpEF     [ VORB ] Ordering provider: Yolanda MORGAN  Order: [facility] Tuesday     Order received by: Vidhi Montgomery RN      Follow-up/additional notes: Ok to take metolazone this weekend. Try to have at least 48 hours between last dose of metolazone and labs.

## 2021-07-18 ENCOUNTER — MYC MEDICAL ADVICE (OUTPATIENT)
Dept: CARDIOLOGY | Facility: CLINIC | Age: 71
End: 2021-07-18

## 2021-07-19 DIAGNOSIS — I50.33 ACUTE ON CHRONIC DIASTOLIC HEART FAILURE (H): Primary | ICD-10-CM

## 2021-07-19 NOTE — TELEPHONE ENCOUNTER
Admission reviewed and accepted by Cards 1. Admitting called with bed request. Asked if she still needs COVID swab prior to direct admission, was told if she doesn't have it might have to put in another unit until can get test completed, then go to tele. Beds are also tight and may not happen today. COVID test added to AM labs tomorrow morning if admission does not happen before then.   Amy is agreeable with this plan. Vidhi Montgomery RN

## 2021-07-20 ENCOUNTER — DOCUMENTATION ONLY (OUTPATIENT)
Dept: CARDIOLOGY | Facility: CLINIC | Age: 71
End: 2021-07-20

## 2021-07-20 ENCOUNTER — LAB (OUTPATIENT)
Dept: LAB | Facility: CLINIC | Age: 71
End: 2021-07-20
Attending: PHYSICIAN ASSISTANT
Payer: COMMERCIAL

## 2021-07-20 ENCOUNTER — HOSPITAL ENCOUNTER (INPATIENT)
Facility: CLINIC | Age: 71
LOS: 7 days | Discharge: HOME | DRG: 292 | End: 2021-07-27
Attending: INTERNAL MEDICINE | Admitting: INTERNAL MEDICINE
Payer: COMMERCIAL

## 2021-07-20 ENCOUNTER — MYC MEDICAL ADVICE (OUTPATIENT)
Dept: CARDIOLOGY | Facility: CLINIC | Age: 71
End: 2021-07-20

## 2021-07-20 DIAGNOSIS — E87.6 HYPOKALEMIA: Primary | ICD-10-CM

## 2021-07-20 DIAGNOSIS — E85.4 AMYLOID HEART DISEASE (H): ICD-10-CM

## 2021-07-20 DIAGNOSIS — I50.33 ACUTE ON CHRONIC DIASTOLIC HEART FAILURE (H): ICD-10-CM

## 2021-07-20 DIAGNOSIS — I43 AMYLOID HEART DISEASE (H): ICD-10-CM

## 2021-07-20 LAB
ANION GAP SERPL CALCULATED.3IONS-SCNC: 5 MMOL/L (ref 3–14)
BUN SERPL-MCNC: 26 MG/DL (ref 7–30)
CALCIUM SERPL-MCNC: 9.2 MG/DL (ref 8.5–10.1)
CHLORIDE BLD-SCNC: 95 MMOL/L (ref 94–109)
CO2 SERPL-SCNC: 34 MMOL/L (ref 20–32)
CREAT SERPL-MCNC: 1.02 MG/DL (ref 0.52–1.04)
ERYTHROCYTE [DISTWIDTH] IN BLOOD BY AUTOMATED COUNT: 13.7 % (ref 10–15)
GFR SERPL CREATININE-BSD FRML MDRD: 57 ML/MIN/1.73M2
GLUCOSE BLD-MCNC: 101 MG/DL (ref 70–99)
HCT VFR BLD AUTO: 40.8 % (ref 35–47)
HGB BLD-MCNC: 13.6 G/DL (ref 11.7–15.7)
HOLD SPECIMEN: NORMAL
HOLD SPECIMEN: NORMAL
MCH RBC QN AUTO: 31.6 PG (ref 26.5–33)
MCHC RBC AUTO-ENTMCNC: 33.3 G/DL (ref 31.5–36.5)
MCV RBC AUTO: 95 FL (ref 78–100)
NT-PROBNP SERPL-MCNC: 1086 PG/ML (ref 0–900)
PLATELET # BLD AUTO: 239 10E3/UL (ref 150–450)
POTASSIUM BLD-SCNC: 3.8 MMOL/L (ref 3.4–5.3)
RBC # BLD AUTO: 4.3 10E6/UL (ref 3.8–5.2)
SARS-COV-2 RNA RESP QL NAA+PROBE: NEGATIVE
SODIUM SERPL-SCNC: 134 MMOL/L (ref 133–144)
WBC # BLD AUTO: 6.7 10E3/UL (ref 4–11)

## 2021-07-20 PROCEDURE — 250N000009 HC RX 250: Performed by: INTERNAL MEDICINE

## 2021-07-20 PROCEDURE — 250N000011 HC RX IP 250 OP 636: Performed by: INTERNAL MEDICINE

## 2021-07-20 PROCEDURE — 80048 BASIC METABOLIC PNL TOTAL CA: CPT

## 2021-07-20 PROCEDURE — U0005 INFEC AGEN DETEC AMPLI PROBE: HCPCS | Performed by: INTERNAL MEDICINE

## 2021-07-20 PROCEDURE — 36415 COLL VENOUS BLD VENIPUNCTURE: CPT | Performed by: INTERNAL MEDICINE

## 2021-07-20 PROCEDURE — 85027 COMPLETE CBC AUTOMATED: CPT | Performed by: INTERNAL MEDICINE

## 2021-07-20 PROCEDURE — 250N000013 HC RX MED GY IP 250 OP 250 PS 637: Performed by: INTERNAL MEDICINE

## 2021-07-20 PROCEDURE — 36415 COLL VENOUS BLD VENIPUNCTURE: CPT

## 2021-07-20 PROCEDURE — 93005 ELECTROCARDIOGRAM TRACING: CPT

## 2021-07-20 PROCEDURE — 83880 ASSAY OF NATRIURETIC PEPTIDE: CPT | Performed by: INTERNAL MEDICINE

## 2021-07-20 PROCEDURE — 214N000001 HC R&B CCU UMMC

## 2021-07-20 PROCEDURE — 99223 1ST HOSP IP/OBS HIGH 75: CPT | Mod: AI | Performed by: INTERNAL MEDICINE

## 2021-07-20 PROCEDURE — 93010 ELECTROCARDIOGRAM REPORT: CPT | Performed by: INTERNAL MEDICINE

## 2021-07-20 RX ORDER — ACETAMINOPHEN 650 MG/1
650 SUPPOSITORY RECTAL EVERY 4 HOURS PRN
Status: DISCONTINUED | OUTPATIENT
Start: 2021-07-20 | End: 2021-07-27 | Stop reason: HOSPADM

## 2021-07-20 RX ORDER — FUROSEMIDE 10 MG/ML
120 INJECTION INTRAMUSCULAR; INTRAVENOUS ONCE
Status: COMPLETED | OUTPATIENT
Start: 2021-07-20 | End: 2021-07-20

## 2021-07-20 RX ORDER — LIDOCAINE 40 MG/G
CREAM TOPICAL
Status: DISCONTINUED | OUTPATIENT
Start: 2021-07-20 | End: 2021-07-22

## 2021-07-20 RX ORDER — POTASSIUM CHLORIDE 750 MG/1
40 TABLET, EXTENDED RELEASE ORAL 2 TIMES DAILY
Status: DISCONTINUED | OUTPATIENT
Start: 2021-07-20 | End: 2021-07-21

## 2021-07-20 RX ORDER — ACETAMINOPHEN 325 MG/1
650 TABLET ORAL EVERY 4 HOURS PRN
Status: DISCONTINUED | OUTPATIENT
Start: 2021-07-20 | End: 2021-07-27 | Stop reason: HOSPADM

## 2021-07-20 RX ORDER — MAGNESIUM HYDROXIDE/ALUMINUM HYDROXICE/SIMETHICONE 120; 1200; 1200 MG/30ML; MG/30ML; MG/30ML
30 SUSPENSION ORAL EVERY 4 HOURS PRN
Status: DISCONTINUED | OUTPATIENT
Start: 2021-07-20 | End: 2021-07-27 | Stop reason: HOSPADM

## 2021-07-20 RX ORDER — METOLAZONE 2.5 MG/1
2.5 TABLET ORAL ONCE
Status: COMPLETED | OUTPATIENT
Start: 2021-07-20 | End: 2021-07-20

## 2021-07-20 RX ADMIN — METOLAZONE 2.5 MG: 2.5 TABLET ORAL at 18:30

## 2021-07-20 RX ADMIN — FUROSEMIDE 20 MG/HR: 10 INJECTION, SOLUTION INTRAVENOUS at 18:58

## 2021-07-20 RX ADMIN — ACETAMINOPHEN 325 MG: 325 TABLET, FILM COATED ORAL at 18:29

## 2021-07-20 RX ADMIN — POTASSIUM CHLORIDE 40 MEQ: 750 TABLET, EXTENDED RELEASE ORAL at 18:29

## 2021-07-20 RX ADMIN — FUROSEMIDE 120 MG: 10 INJECTION, SOLUTION INTRAVENOUS at 18:29

## 2021-07-20 RX ADMIN — APIXABAN 2.5 MG: 2.5 TABLET, FILM COATED ORAL at 20:07

## 2021-07-20 ASSESSMENT — ACTIVITIES OF DAILY LIVING (ADL): ADLS_ACUITY_SCORE: 13

## 2021-07-20 ASSESSMENT — MIFFLIN-ST. JEOR: SCORE: 1218.53

## 2021-07-20 NOTE — H&P
[facility]    Cardiology History and Physical - Cards 1         Date of Admission:  7/20/2021    Assessment & Plan: HVSL    Mrs. Guerrero is a 70 year old female with AL amyloidosis with cardiac manifestation, HFpEF who was directly admitted for heart failure exacerbation.    1. Acute on chronic diastolic heart failure   2. Cardiac Amyloidosis   - Fluid status: Hypervolemic. Start Lasix 20 mg/hr. Will give Lasix 120 mg x Once and 2.5 mg of Metolazone.  Patient did not tolerate Bumex in the past due to significant body aches.  - Monitor electrolytes BID. Low Na diet. Strict I/O.  - Daily standing weights. (Current weight: 158.6 ; Dry weight: ~ 147)    # A. Fib/A. Flutter  - [id number] of 4.   - Continue Eliquis 2.5mg BID, reduced dose d/t frequent bleeding, aware of risks    # Prolonged QTC  - Minimize QT prolonging meds     # Systemic amyloid  - Non on any medications at this time.  - Heme previously monitoring her light chains which have been negative consistent with remission - therefore further palliative chemo not being considered.     Diet:  Low Na Diet  DVT Prophylaxis: Apixaban  Marie Catheter: Not present  Code Status:   Full        Disposition Plan   Expected discharge: 2 - 3 days, recommended to prior living arrangement once fluid volume status optimized on oral medication.    Entered: Raheem Tello MD 07/20/2021, 5:42 PM     The patient's care was discussed with the Attending Physician, Dr. West.    Raheem Tello MD  Cardiology fellow  ______________________________________________________________________    Chief Complaint   SOB    History of Present Illness   Mrs. Guerrero is a 70 year old female with AL amyloidosis with cardiac manifestation, HFpEF who was directly admitted for heart failure exacerbation.  Patient states she has been feeling more short of breath lately with minimal exertion.  She had PND, orthopnea, and lower extreme  edema.  She states that she has gained weight recently and does not think the oral medications are helping her.  She has no chest pain.  No dizziness or lightheadedness.  No syncope.  No palpitations.  She has been compliant with her medications.  [facility].  Of note, patient was admitted 2 years ago with similar weight and was diuresed about 10 pounds.  Her dry weight is around 147 pounds.  Patient weight today is 158.6 pounds.    Past Medical History    I have reviewed this patient's medical history and updated it with pertinent information if needed.   Past Medical History:   Diagnosis Date     AL amyloidosis (H)      Atrial fibrillation and flutter (H)      Cardiac amyloidosis (H)      Lymphedema      QT prolongation      Recurrent right pleural effusion 1/2/2017     SVT (supraventricular tachycardia) (H)        Past Surgical History   I have reviewed this patient's surgical history and updated it with pertinent information if needed.  No past surgical history on file.    Social History   I have reviewed this patient's social history and updated it with pertinent information if needed.  Social History     Tobacco Use     Smoking status: Never Smoker     Smokeless tobacco: Never Used   Substance Use Topics     Alcohol use: No     Drug use: No     Family History   I have reviewed this patient's family history and updated it with pertinent information if needed.   I have reviewed this patient's family history and updated it with pertinent information if needed.  Family History   Problem Relation Age of Onset     Other - See Comments Sister         Amyloidosis       Prior to Admission Medications   Prior to Admission Medications   Prescriptions Last Dose Informant Patient Reported? Taking?   Acetaminophen (TYLENOL PO)   Yes No   Sig: Take 325 mg by mouth   LORazepam (ATIVAN) 0.5 MG tablet   No No   Sig: Take 1 tablet (0.5 mg) by mouth every 6 hours as needed for anxiety or nausea   apixaban  ANTICOAGULANT (ELIQUIS) 2.5 MG tablet   No No   Sig: Take 1 tablet (2.5 mg) by mouth 2 times daily   camphor-menthol (DERMASARRA) 0.5-0.5 % external lotion   No No   Sig: Apply 1 mL topically every 6 hours as needed for skin care   doxycycline hyclate (VIBRA-TABS) 100 MG tablet   No No   Sig: Take 1 tablet (100 mg) by mouth daily   hydrOXYzine (ATARAX) 25 MG tablet   No No   Sig: Take 1 tablet (25 mg) by mouth 3 times daily as needed for itching   metolazone (ZAROXOLYN) 2.5 MG tablet   No No   Sig: Take 1 tablet (2.5 mg) by mouth twice a week   ondansetron (ZOFRAN-ODT) 8 MG ODT tab   Yes No   Sig: every 8 hours as needed PRN   potassium chloride ER (KLOR-CON M) 10 MEQ CR tablet   No No   Sig: TAKE 8 TABLETS EVERY MORNING& 8 TABLETS AT LUNCH AND 6 TABLETS EVERY EVENING TAKE EXTRA 6 TABLETS DAY OF AND DAY AFTER METOLAZONE   torsemide (DEMADEX) 100 MG tablet   No No   Sig: Take 1 tablet (100 mg) by mouth every morning AND 1 tablet (100 mg) daily at 2 pm.   triamcinolone (KENALOG) 0.1 % external cream   No No   Sig: Apply a thin layer twice daily to itchy areas as needed.      Facility-Administered Medications: None     Allergies   Allergies   Allergen Reactions     Contrast Dye Shortness Of Breath     Shaking,chills and dypsnea     Cytoxan [Cyclophosphamide]      Hemorrhagic cystitis     Gabapentin      Slurred speech, weakness     Levofloxacin      Severe shaking and abdominal pain     Amoxicillin Nausea and Vomiting and Cramps     Spironolactone      Worsening hyponatremia       Physical Exam   Vital Signs:                    Weight: 158 lbs 9.6 oz    General: Awake, alert, cooperative, and no apparent distress.  Respiratory: No increased work of breathing, good air exchange, clear to auscultation bilaterally, no crackles or wheezing.  Cardiovascular: Regular rate and rhythm, normal S1 and S2. JVP ~ 10-12 cmH20.  GI: Soft, distended, non-tender, no masses palpated.  Extremities: Full range of motion noted.   Significant LE non-pitting edema.  Neurologic: Awake, alert, oriented to name, place and time.         Data   Data reviewed today: I reviewed all medications, new labs and imaging results over the last 24 hours.     Recent Labs   Lab 07/20/21  0848 07/16/21  1607 07/15/21  0817    135 137   POTASSIUM 3.8 3.4 3.2*   CHLORIDE 95 95 97   CO2 34* 34* 35*   BUN 26 29 25   CR 1.02 1.17* 0.92   ANIONGAP 5 6 5   JERROD 9.2 9.0 8.7   * 105* 81

## 2021-07-20 NOTE — TELEPHONE ENCOUNTER
730 Spoke with Mariel in patient placement - no beds available     1215 spoke with Mariel in patient placement, no beds available,     1350: Spoke with Amy, she would like to wait a bit longer to see if a direct admission bed becomes available instead of waiting in the ER.      1400: Called [facility] lab to see when the COVID swab would be resulted.  They stated that she was not actually swabbed today.  Requested that that the lab status be changed from in- process to ordered to reduce confusion and wait time for Amy.      1410: updated patient placement that no covid test was done at [facility] today, they stated no bed was available.     1530: received call from  that bed was available.  Spoke with CN and updated her on lack of covid swabbing.  Conferred with patient placement and PPM, decision was made to block a double and then swab on site.  Updated Amy, asked her to arrive around 5 pm    1605: Gave report to Blanca galan RN

## 2021-07-20 NOTE — PROGRESS NOTES
Struggling with diuresis and persistent hypervolemia despite metolazone 3-4 times per week. Have not been able to maintain safe potassium levels with metolazone more frequently as an outpatient and has unfortunately had limited benefit from increased loop diuretics. Will plan for admission to [facility] 1 when direct admission bed is available for diuresis with very frequent K monitoring. Case discussed by [facility] 1 attending, Dr. West.

## 2021-07-21 ENCOUNTER — APPOINTMENT (OUTPATIENT)
Dept: GENERAL RADIOLOGY | Facility: CLINIC | Age: 71
DRG: 292 | End: 2021-07-21
Attending: PHYSICIAN ASSISTANT
Payer: COMMERCIAL

## 2021-07-21 LAB
ANION GAP SERPL CALCULATED.3IONS-SCNC: 7 MMOL/L (ref 3–14)
ANION GAP SERPL CALCULATED.3IONS-SCNC: 7 MMOL/L (ref 3–14)
BUN SERPL-MCNC: 25 MG/DL (ref 7–30)
BUN SERPL-MCNC: 26 MG/DL (ref 7–30)
CALCIUM SERPL-MCNC: 9.3 MG/DL (ref 8.5–10.1)
CALCIUM SERPL-MCNC: 9.7 MG/DL (ref 8.5–10.1)
CHLORIDE BLD-SCNC: 92 MMOL/L (ref 94–109)
CHLORIDE BLD-SCNC: 92 MMOL/L (ref 94–109)
CO2 SERPL-SCNC: 35 MMOL/L (ref 20–32)
CO2 SERPL-SCNC: 36 MMOL/L (ref 20–32)
CREAT SERPL-MCNC: 1.07 MG/DL (ref 0.52–1.04)
CREAT SERPL-MCNC: 1.11 MG/DL (ref 0.52–1.04)
GFR SERPL CREATININE-BSD FRML MDRD: 51 ML/MIN/1.73M2
GFR SERPL CREATININE-BSD FRML MDRD: 53 ML/MIN/1.73M2
GLUCOSE BLD-MCNC: 111 MG/DL (ref 70–99)
GLUCOSE BLD-MCNC: 75 MG/DL (ref 70–99)
HOLD SPECIMEN: NORMAL
MAGNESIUM SERPL-MCNC: 2.4 MG/DL (ref 1.6–2.3)
POTASSIUM BLD-SCNC: 2 MMOL/L (ref 3.4–5.3)
POTASSIUM BLD-SCNC: 2.3 MMOL/L (ref 3.4–5.3)
POTASSIUM BLD-SCNC: 2.6 MMOL/L (ref 3.4–5.3)
POTASSIUM BLD-SCNC: 2.7 MMOL/L (ref 3.4–5.3)
POTASSIUM BLD-SCNC: 3.1 MMOL/L (ref 3.4–5.3)
SODIUM SERPL-SCNC: 134 MMOL/L (ref 133–144)
SODIUM SERPL-SCNC: 135 MMOL/L (ref 133–144)

## 2021-07-21 PROCEDURE — 99233 SBSQ HOSP IP/OBS HIGH 50: CPT | Performed by: INTERNAL MEDICINE

## 2021-07-21 PROCEDURE — 84132 ASSAY OF SERUM POTASSIUM: CPT | Performed by: INTERNAL MEDICINE

## 2021-07-21 PROCEDURE — 71045 X-RAY EXAM CHEST 1 VIEW: CPT | Mod: 26 | Performed by: RADIOLOGY

## 2021-07-21 PROCEDURE — 250N000013 HC RX MED GY IP 250 OP 250 PS 637: Performed by: INTERNAL MEDICINE

## 2021-07-21 PROCEDURE — 93005 ELECTROCARDIOGRAM TRACING: CPT

## 2021-07-21 PROCEDURE — 93010 ELECTROCARDIOGRAM REPORT: CPT | Performed by: INTERNAL MEDICINE

## 2021-07-21 PROCEDURE — 999N000065 XR CHEST PORT 1 VIEW

## 2021-07-21 PROCEDURE — 250N000013 HC RX MED GY IP 250 OP 250 PS 637: Performed by: PHYSICIAN ASSISTANT

## 2021-07-21 PROCEDURE — 36415 COLL VENOUS BLD VENIPUNCTURE: CPT | Performed by: PHYSICIAN ASSISTANT

## 2021-07-21 PROCEDURE — 80048 BASIC METABOLIC PNL TOTAL CA: CPT | Performed by: INTERNAL MEDICINE

## 2021-07-21 PROCEDURE — 272N000201 ZZ HC ADHESIVE SKIN CLOSURE, DERMABOND

## 2021-07-21 PROCEDURE — 272N000019 HC KIT OPEN ENDED DOUBLE LUMEN

## 2021-07-21 PROCEDURE — 250N000013 HC RX MED GY IP 250 OP 250 PS 637: Performed by: STUDENT IN AN ORGANIZED HEALTH CARE EDUCATION/TRAINING PROGRAM

## 2021-07-21 PROCEDURE — 36569 INSJ PICC 5 YR+ W/O IMAGING: CPT

## 2021-07-21 PROCEDURE — 36592 COLLECT BLOOD FROM PICC: CPT | Performed by: PHYSICIAN ASSISTANT

## 2021-07-21 PROCEDURE — 250N000009 HC RX 250: Performed by: INTERNAL MEDICINE

## 2021-07-21 PROCEDURE — 83735 ASSAY OF MAGNESIUM: CPT | Performed by: PHYSICIAN ASSISTANT

## 2021-07-21 PROCEDURE — 250N000011 HC RX IP 250 OP 636: Performed by: PHYSICIAN ASSISTANT

## 2021-07-21 PROCEDURE — 80048 BASIC METABOLIC PNL TOTAL CA: CPT | Performed by: PHYSICIAN ASSISTANT

## 2021-07-21 PROCEDURE — 36415 COLL VENOUS BLD VENIPUNCTURE: CPT | Performed by: INTERNAL MEDICINE

## 2021-07-21 PROCEDURE — 214N000001 HC R&B CCU UMMC

## 2021-07-21 RX ORDER — OLANZAPINE 2.5 MG/1
2.5 TABLET, FILM COATED ORAL AT BEDTIME
Status: ON HOLD | COMMUNITY
End: 2021-11-07

## 2021-07-21 RX ORDER — HEPARIN SODIUM,PORCINE 10 UNIT/ML
5-20 VIAL (ML) INTRAVENOUS EVERY 24 HOURS
Status: DISCONTINUED | OUTPATIENT
Start: 2021-07-21 | End: 2021-07-27 | Stop reason: HOSPADM

## 2021-07-21 RX ORDER — POTASSIUM CHLORIDE 20MEQ/15ML
40 LIQUID (ML) ORAL ONCE
Status: COMPLETED | OUTPATIENT
Start: 2021-07-21 | End: 2021-07-21

## 2021-07-21 RX ORDER — POTASSIUM CHLORIDE 7.45 MG/ML
10 INJECTION INTRAVENOUS
Status: DISPENSED | OUTPATIENT
Start: 2021-07-21 | End: 2021-07-21

## 2021-07-21 RX ORDER — POTASSIUM CHLORIDE 1500 MG/1
80 TABLET, EXTENDED RELEASE ORAL 2 TIMES DAILY
Status: DISCONTINUED | OUTPATIENT
Start: 2021-07-21 | End: 2021-07-21

## 2021-07-21 RX ORDER — LIDOCAINE 40 MG/G
CREAM TOPICAL
Status: ACTIVE | OUTPATIENT
Start: 2021-07-21 | End: 2021-07-24

## 2021-07-21 RX ORDER — HEPARIN SODIUM,PORCINE 10 UNIT/ML
5-20 VIAL (ML) INTRAVENOUS
Status: DISCONTINUED | OUTPATIENT
Start: 2021-07-21 | End: 2021-07-27 | Stop reason: HOSPADM

## 2021-07-21 RX ORDER — POTASSIUM CHLORIDE 1500 MG/1
60 TABLET, EXTENDED RELEASE ORAL ONCE
Status: COMPLETED | OUTPATIENT
Start: 2021-07-21 | End: 2021-07-21

## 2021-07-21 RX ORDER — POTASSIUM CHLORIDE 1500 MG/1
80 TABLET, EXTENDED RELEASE ORAL 3 TIMES DAILY
Status: DISCONTINUED | OUTPATIENT
Start: 2021-07-21 | End: 2021-07-27

## 2021-07-21 RX ORDER — DOXYCYCLINE 100 MG/1
100 CAPSULE ORAL DAILY
Status: DISCONTINUED | OUTPATIENT
Start: 2021-07-21 | End: 2021-07-27 | Stop reason: HOSPADM

## 2021-07-21 RX ORDER — POTASSIUM CHLORIDE 1500 MG/1
60 TABLET, EXTENDED RELEASE ORAL 2 TIMES DAILY
Status: DISCONTINUED | OUTPATIENT
Start: 2021-07-21 | End: 2021-07-21

## 2021-07-21 RX ADMIN — DOXYCYCLINE 100 MG: 100 CAPSULE ORAL at 07:53

## 2021-07-21 RX ADMIN — APIXABAN 2.5 MG: 2.5 TABLET, FILM COATED ORAL at 21:15

## 2021-07-21 RX ADMIN — POTASSIUM CHLORIDE 80 MEQ: 1500 TABLET, EXTENDED RELEASE ORAL at 21:15

## 2021-07-21 RX ADMIN — SODIUM CHLORIDE, PRESERVATIVE FREE 10 ML: 5 INJECTION INTRAVENOUS at 21:15

## 2021-07-21 RX ADMIN — POTASSIUM CHLORIDE 40 MEQ: 40 SOLUTION ORAL at 09:51

## 2021-07-21 RX ADMIN — POTASSIUM CHLORIDE 80 MEQ: 1500 TABLET, EXTENDED RELEASE ORAL at 15:52

## 2021-07-21 RX ADMIN — FUROSEMIDE 20 MG/HR: 10 INJECTION, SOLUTION INTRAVENOUS at 05:04

## 2021-07-21 RX ADMIN — APIXABAN 2.5 MG: 2.5 TABLET, FILM COATED ORAL at 07:53

## 2021-07-21 RX ADMIN — ACETAMINOPHEN 325 MG: 325 TABLET, FILM COATED ORAL at 14:23

## 2021-07-21 RX ADMIN — POTASSIUM CHLORIDE 60 MEQ: 1500 TABLET, EXTENDED RELEASE ORAL at 07:51

## 2021-07-21 RX ADMIN — POTASSIUM CHLORIDE 10 MEQ: 7.46 INJECTION, SOLUTION INTRAVENOUS at 11:43

## 2021-07-21 RX ADMIN — POTASSIUM CHLORIDE 10 MEQ: 7.46 INJECTION, SOLUTION INTRAVENOUS at 08:18

## 2021-07-21 ASSESSMENT — ACTIVITIES OF DAILY LIVING (ADL)
ADLS_ACUITY_SCORE: 13

## 2021-07-21 ASSESSMENT — MIFFLIN-ST. JEOR: SCORE: 1197.22

## 2021-07-21 NOTE — PROGRESS NOTES
[facility]   Cardiology Consults  Progress Note     Interval History:  - Large volume output since starting lasix drip last PM. Potassium this AM critically low at 2.0. Given ~110meq in total and stopped lasix drip, repeat K+ 2.6. Patient refusing IV potassium due to burning sensation.   - QTc prolonged with U waves present. No VT.   - headache in the AM with some dizziness with standing.     Physical Exam:  Temp:  [97.5  F (36.4  C)-98.5  F (36.9  C)] 97.5  F (36.4  C)  Pulse:  [] 96  Resp:  [16-18] 16  BP: ()/(46-73) 128/69  SpO2:  [97 %-99 %] 98 %      Intake/Output Summary (Last 24 hours) at 7/21/2021 1745  Last data filed at 7/21/2021 1700  Gross per 24 hour   Intake 1272.07 ml   Output 5350 ml   Net -4077.93 ml         Wt:   Wt Readings from Last 5 Encounters:   07/21/21 69.8 kg (153 lb 14.4 oz)   06/21/21 71.2 kg (157 lb)   05/24/21 69.4 kg (153 lb 1.6 oz)   05/20/21 70.1 kg (154 lb 9.6 oz)   04/27/21 69.9 kg (154 lb)       GEN: NAD, awake, alert, pleasant  Pulm: diminished in the bases  Cardiac: RRR, JVP to mid neck, no murmurs  Vascular: trace to 1+ bilateral lower extremity edema with prominent lymphedema to knee, and palpable pulses  GI: soft, non distended  Neuro: CN II-XII grossly intact    Medications:    apixaban ANTICOAGULANT  2.5 mg Oral BID     doxycycline hyclate  100 mg Oral Daily     lactated ringers  250 mL Intravenous Once     potassium chloride  10 mEq Intravenous Q1H     potassium chloride  80 mEq Oral TID     sodium chloride (PF)  3 mL Intracatheter Q8H       [Held by provider] furosemide 20 mg/hr (07/21/21 0600)     - MEDICATION INSTRUCTIONS -       - MEDICATION INSTRUCTIONS -         Labs:   CMP  Recent Labs   Lab 07/21/21  1045 07/21/21  0755 07/21/21  0551 07/20/21  0848 07/16/21  1607 07/15/21  0817   NA  --   --  135 134 135 137   POTASSIUM 2.7* 2.0* 2.3* 3.8 3.4 3.2*   CHLORIDE  --   --  92* 95 95 97   CO2  --   --  36* 34* 34* 35*    ANIONGAP  --   --  7 5 6 5   GLC  --   --  75 101* 105* 81   BUN  --   --  26 26 29 25   CR  --   --  1.11* 1.02 1.17* 0.92   GFRESTIMATED  --   --  51* 57* 48* 64   JERROD  --   --  9.7 9.2 9.0 8.7   MAG  --   --  2.4*  --   --   --      CBC  Recent Labs   Lab 07/20/21  1738   WBC 6.7   RBC 4.30   HGB 13.6   HCT 40.8   MCV 95   MCH 31.6   MCHC 33.3   RDW 13.7        INRNo lab results found in last 7 days.  Arterial Blood GasNo lab results found in last 7 days.      ASSESSMENT/PLAN:  Mrs. Guerrero is a 70 year old female with AL amyloidosis with cardiac manifestation, HFpEF who was directly admitted for heart failure exacerbation.     #. Acute on chronic diastolic heart failure   # Cardiac Amyloidosis   - Fluid status: Hypervolemic. Hold lasix for now, resume pending K recheck as below  - Monitor electrolytes BID. Low Na diet. Strict I/O.  - Daily standing weights. (Admission weight: 158.6 ; EDW: ~ 147)    #Severe hypokalemia - improving  Secondary to aggressive diuresis. Per outpatient provider, patient is very responsive to metolazone but subsequently gets quite hypokalemic. K+ AM after starting lasix drip and metolazone admin. In total, given ~110 meq potassium with improvement only to 2.6. QTc prolonged on EKG with U waves. Patient refusing IV potassium as it causes discomfort, even at slower rates. Lasix drip on pause since 0700 7/21. Given difficulty with potassium repletion and refusal of IV potassium, and need to frequent lab draws we will obtain PICC line anticipating further issues with potassium as we challenge her dry weight. Mag WNL  - hold lasix gtt for now. Recheck K this evening. May resume if WNL  - VAD consult for PICC for lab draws, IV potassium  - potassium CR 80meq tid  - high replacement protocol  - consider trying spironolactone again (previously got hyponatremic and taken off)     # A. Fib/A. Flutter  - [id number] of 4.   - Continue Eliquis 2.5mg BID, reduced dose d/t frequent bleeding,  aware of risks     #Prolonged QTC  - Minimize QT prolonging meds     # Systemic amyloid  - Non on any medications at this time.  - Heme previously monitoring her light chains which have been negative consistent with remission - therefore further palliative chemo not being considered.     Patient seen and discussed with Dr. West, who agrees with above plan.      Zak Fam PA-C  [facility] Cardiology Team

## 2021-07-21 NOTE — PROGRESS NOTES
Assumed care of this patient who was a direct admit from home. VSS, SR on telemetry, reports headache x3 days. PRN tylenol given. On RA, denies SOB. IV lasix drip started. Covid test pending. Will continue to monitor.

## 2021-07-21 NOTE — PROGRESS NOTES
D: AL amyloidosis with cardiac manifestation, HFpEF who was directly admitted for heart failure exacerbation.   ?  A/I : Monitored vitals and assessed pt status. A0x4. VSS. Conversion to A flutter/A fib this AM. Afebrile. Urinating adequately via bedside commode. Last BM 7/20. Denies chest pain, SOB, nausea. Did endorse occasionally dizziness, palpitations and jaw tightness when converted to A flutter, but these symptoms have subsided. BLE pitting edema +3. K 2.3 and 2.0 this AM, replaced with 60 mEq po K, plus 40 mEq of K solution, 20 mEq of IV K chloride infused via PIV, slowed d/t burning, now on 80 mEq K tablets TID. Will continue to monitor K level and replace accordingly. Plan to place PICC tonight. PRN tylenol given for mild headache with good effect.   ?  P: Continue to monitor Pt status and report changes to treatment team.

## 2021-07-21 NOTE — PROVIDER NOTIFICATION
DATE: 7/21/21  TIME OF RECEIPT FROM LAB: 0830  LAB TEST:  K  LAB VALUE:  2.0  RESULTS GIVEN TO: cards 1  TIME LAB VALUE REPORTED TO PROVIDER:   0865

## 2021-07-21 NOTE — PLAN OF CARE
Patient intermittently slept during the night. Up quite a bit to bedside commode to urinate. Lasix drip with adequate results.  After some sleep, no longer having much of a headache.  Makes needs known.  Yovana Little RN

## 2021-07-21 NOTE — PHARMACY-ADMISSION MEDICATION HISTORY
Admission Medication History Completed by Pharmacy    See [facility] Admission Navigator for allergy information, preferred outpatient pharmacy, prior to admission medications and immunization status.     Medication History Sources:   o Patient  o SureScript  o Care Everywhere     Changes made to PTA medication list (reason):  o Added: Olanzapine 2.5 mg tablet (Patient reported)  o Deleted: None  o Changed: None    Additional Information:  o Patient was a good historian who demonstrated good knowledge of her medication list and use. Per patient, no concern with medication adherence and completeness of the medication ist.   o Olanzapine: Patient reported that she has this medication at hand but never used it because she read about this medication being a schizophrenia medication and she thought she is not indicated for it.     Prior to Admission medications    Medication Sig Last Dose Taking? Auth Provider   apixaban ANTICOAGULANT (ELIQUIS) 2.5 MG tablet Take 1 tablet (2.5 mg) by mouth 2 times daily 7/20/2021 at AM Yes Domenica Cohen MD   doxycycline hyclate (VIBRA-TABS) 100 MG tablet Take 1 tablet (100 mg) by mouth daily 7/20/2021 at AM Yes Mirela Moore MD   OLANZapine (ZYPREXA) 2.5 MG tablet Take 2.5 mg by mouth At Bedtime Patient has it but never used it at Never used Yes Unknown, Entered By History   potassium chloride ER (KLOR-CON M) 10 MEQ CR tablet TAKE 8 TABLETS EVERY MORNING& 8 TABLETS AT LUNCH AND 6 TABLETS EVERY EVENING TAKE EXTRA 6 TABLETS DAY OF AND DAY AFTER METOLAZONE 7/20/2021 at AM Yes Ciara Araya PA-C   torsemide (DEMADEX) 100 MG tablet Take 1 tablet (100 mg) by mouth every morning AND 1 tablet (100 mg) daily at 2 pm. 7/20/2021 at AM Yes Ciara Araya PA-C   Acetaminophen (TYLENOL PO) Take 325 mg by mouth PRN at Unknown  Reported, Patient   camphor-menthol (DERMASARRA) 0.5-0.5 % external lotion Apply 1 mL topically every 6 hours as needed for skin care PRN at Unknown   Ciara Araya PA-C   hydrOXYzine (ATARAX) 25 MG tablet Take 1 tablet (25 mg) by mouth 3 times daily as needed for itching PRN at Unknown  Ciara Araya PA-C   LORazepam (ATIVAN) 0.5 MG tablet Take 1 tablet (0.5 mg) by mouth every 6 hours as needed for anxiety or nausea PRN at Unknown  Mirela Moore MD   metolazone (ZAROXOLYN) 2.5 MG tablet Take 1 tablet (2.5 mg) by mouth twice a week 7/18/2021 at AM  Domenica Cohen MD   ondansetron (ZOFRAN-ODT) 8 MG ODT tab every 8 hours as needed PRN PRN at Unknown  Reported, Patient   triamcinolone (KENALOG) 0.1 % external cream Apply a thin layer twice daily to itchy areas as needed. PRN at Unknown  Kasie Gupta MD       Date completed: 07/21/21    Medication history completed by: Román He

## 2021-07-22 LAB
ANION GAP SERPL CALCULATED.3IONS-SCNC: 2 MMOL/L (ref 3–14)
ANION GAP SERPL CALCULATED.3IONS-SCNC: 3 MMOL/L (ref 3–14)
ANION GAP SERPL CALCULATED.3IONS-SCNC: 5 MMOL/L (ref 3–14)
ATRIAL RATE - MUSE: 66 BPM
ATRIAL RATE - MUSE: 66 BPM
BUN SERPL-MCNC: 26 MG/DL (ref 7–30)
BUN SERPL-MCNC: 28 MG/DL (ref 7–30)
BUN SERPL-MCNC: 28 MG/DL (ref 7–30)
CALCIUM SERPL-MCNC: 10 MG/DL (ref 8.5–10.1)
CALCIUM SERPL-MCNC: 9.3 MG/DL (ref 8.5–10.1)
CALCIUM SERPL-MCNC: 9.5 MG/DL (ref 8.5–10.1)
CHLORIDE BLD-SCNC: 97 MMOL/L (ref 94–109)
CHLORIDE BLD-SCNC: 99 MMOL/L (ref 94–109)
CHLORIDE BLD-SCNC: 99 MMOL/L (ref 94–109)
CO2 SERPL-SCNC: 32 MMOL/L (ref 20–32)
CO2 SERPL-SCNC: 34 MMOL/L (ref 20–32)
CO2 SERPL-SCNC: 35 MMOL/L (ref 20–32)
CREAT SERPL-MCNC: 1.04 MG/DL (ref 0.52–1.04)
CREAT SERPL-MCNC: 1.04 MG/DL (ref 0.52–1.04)
CREAT SERPL-MCNC: 1.09 MG/DL (ref 0.52–1.04)
DIASTOLIC BLOOD PRESSURE - MUSE: NORMAL MMHG
DIASTOLIC BLOOD PRESSURE - MUSE: NORMAL MMHG
GFR SERPL CREATININE-BSD FRML MDRD: 52 ML/MIN/1.73M2
GFR SERPL CREATININE-BSD FRML MDRD: 55 ML/MIN/1.73M2
GFR SERPL CREATININE-BSD FRML MDRD: 55 ML/MIN/1.73M2
GLUCOSE BLD-MCNC: 103 MG/DL (ref 70–99)
GLUCOSE BLD-MCNC: 88 MG/DL (ref 70–99)
GLUCOSE BLD-MCNC: 93 MG/DL (ref 70–99)
INTERPRETATION ECG - MUSE: NORMAL
INTERPRETATION ECG - MUSE: NORMAL
MAGNESIUM SERPL-MCNC: 2.4 MG/DL (ref 1.6–2.3)
P AXIS - MUSE: 67 DEGREES
P AXIS - MUSE: 68 DEGREES
POTASSIUM BLD-SCNC: 3.1 MMOL/L (ref 3.4–5.3)
POTASSIUM BLD-SCNC: 3.8 MMOL/L (ref 3.4–5.3)
POTASSIUM BLD-SCNC: 3.8 MMOL/L (ref 3.4–5.3)
POTASSIUM BLD-SCNC: 4.7 MMOL/L (ref 3.4–5.3)
PR INTERVAL - MUSE: 196 MS
PR INTERVAL - MUSE: 224 MS
QRS DURATION - MUSE: 82 MS
QRS DURATION - MUSE: 90 MS
QT - MUSE: 450 MS
QT - MUSE: 628 MS
QTC - MUSE: 471 MS
QTC - MUSE: 658 MS
R AXIS - MUSE: -38 DEGREES
R AXIS - MUSE: -8 DEGREES
SODIUM SERPL-SCNC: 134 MMOL/L (ref 133–144)
SODIUM SERPL-SCNC: 136 MMOL/L (ref 133–144)
SODIUM SERPL-SCNC: 136 MMOL/L (ref 133–144)
SYSTOLIC BLOOD PRESSURE - MUSE: NORMAL MMHG
SYSTOLIC BLOOD PRESSURE - MUSE: NORMAL MMHG
T AXIS - MUSE: -8 DEGREES
T AXIS - MUSE: 60 DEGREES
VENTRICULAR RATE- MUSE: 66 BPM
VENTRICULAR RATE- MUSE: 66 BPM

## 2021-07-22 PROCEDURE — 250N000013 HC RX MED GY IP 250 OP 250 PS 637: Performed by: PHYSICIAN ASSISTANT

## 2021-07-22 PROCEDURE — 80048 BASIC METABOLIC PNL TOTAL CA: CPT | Performed by: INTERNAL MEDICINE

## 2021-07-22 PROCEDURE — 99233 SBSQ HOSP IP/OBS HIGH 50: CPT | Performed by: INTERNAL MEDICINE

## 2021-07-22 PROCEDURE — 250N000011 HC RX IP 250 OP 636: Performed by: INTERNAL MEDICINE

## 2021-07-22 PROCEDURE — 36592 COLLECT BLOOD FROM PICC: CPT | Performed by: INTERNAL MEDICINE

## 2021-07-22 PROCEDURE — 250N000013 HC RX MED GY IP 250 OP 250 PS 637: Performed by: STUDENT IN AN ORGANIZED HEALTH CARE EDUCATION/TRAINING PROGRAM

## 2021-07-22 PROCEDURE — 250N000011 HC RX IP 250 OP 636: Performed by: PHYSICIAN ASSISTANT

## 2021-07-22 PROCEDURE — 250N000011 HC RX IP 250 OP 636: Performed by: STUDENT IN AN ORGANIZED HEALTH CARE EDUCATION/TRAINING PROGRAM

## 2021-07-22 PROCEDURE — 214N000001 HC R&B CCU UMMC

## 2021-07-22 PROCEDURE — 250N000013 HC RX MED GY IP 250 OP 250 PS 637: Performed by: INTERNAL MEDICINE

## 2021-07-22 PROCEDURE — 83735 ASSAY OF MAGNESIUM: CPT | Performed by: STUDENT IN AN ORGANIZED HEALTH CARE EDUCATION/TRAINING PROGRAM

## 2021-07-22 PROCEDURE — 250N000009 HC RX 250: Performed by: STUDENT IN AN ORGANIZED HEALTH CARE EDUCATION/TRAINING PROGRAM

## 2021-07-22 PROCEDURE — 250N000013 HC RX MED GY IP 250 OP 250 PS 637: Performed by: NURSE PRACTITIONER

## 2021-07-22 PROCEDURE — 84132 ASSAY OF SERUM POTASSIUM: CPT | Performed by: INTERNAL MEDICINE

## 2021-07-22 RX ORDER — POTASSIUM CHLORIDE 1.5 G/1.58G
80 POWDER, FOR SOLUTION ORAL ONCE
Status: COMPLETED | OUTPATIENT
Start: 2021-07-22 | End: 2021-07-22

## 2021-07-22 RX ORDER — POTASSIUM CHLORIDE 29.8 MG/ML
20 INJECTION INTRAVENOUS ONCE
Status: COMPLETED | OUTPATIENT
Start: 2021-07-22 | End: 2021-07-22

## 2021-07-22 RX ORDER — FUROSEMIDE 10 MG/ML
120 INJECTION INTRAMUSCULAR; INTRAVENOUS ONCE
Status: COMPLETED | OUTPATIENT
Start: 2021-07-22 | End: 2021-07-22

## 2021-07-22 RX ORDER — DIPHENHYDRAMINE HCL 25 MG
25 CAPSULE ORAL EVERY 6 HOURS PRN
Status: DISCONTINUED | OUTPATIENT
Start: 2021-07-22 | End: 2021-07-27 | Stop reason: HOSPADM

## 2021-07-22 RX ORDER — DIPHENHYDRAMINE HYDROCHLORIDE 50 MG/ML
25 INJECTION INTRAMUSCULAR; INTRAVENOUS EVERY 6 HOURS PRN
Status: DISCONTINUED | OUTPATIENT
Start: 2021-07-22 | End: 2021-07-22

## 2021-07-22 RX ORDER — SPIRONOLACTONE 25 MG
12.5 TABLET ORAL DAILY
Status: DISCONTINUED | OUTPATIENT
Start: 2021-07-22 | End: 2021-07-27 | Stop reason: HOSPADM

## 2021-07-22 RX ADMIN — POTASSIUM CHLORIDE 80 MEQ: 1500 TABLET, EXTENDED RELEASE ORAL at 08:14

## 2021-07-22 RX ADMIN — DOXYCYCLINE 100 MG: 100 CAPSULE ORAL at 08:14

## 2021-07-22 RX ADMIN — DIPHENHYDRAMINE HYDROCHLORIDE 25 MG: 25 CAPSULE ORAL at 12:04

## 2021-07-22 RX ADMIN — Medication 12.5 MG: at 11:37

## 2021-07-22 RX ADMIN — DIPHENHYDRAMINE HYDROCHLORIDE 25 MG: 25 CAPSULE ORAL at 22:07

## 2021-07-22 RX ADMIN — FUROSEMIDE 120 MG: 10 INJECTION, SOLUTION INTRAVENOUS at 01:00

## 2021-07-22 RX ADMIN — SODIUM CHLORIDE, PRESERVATIVE FREE 5 ML: 5 INJECTION INTRAVENOUS at 20:47

## 2021-07-22 RX ADMIN — APIXABAN 2.5 MG: 2.5 TABLET, FILM COATED ORAL at 08:14

## 2021-07-22 RX ADMIN — POTASSIUM CHLORIDE 20 MEQ: 400 INJECTION, SOLUTION INTRAVENOUS at 10:06

## 2021-07-22 RX ADMIN — POTASSIUM CHLORIDE 80 MEQ: 1.5 POWDER, FOR SOLUTION ORAL at 00:53

## 2021-07-22 RX ADMIN — POTASSIUM CHLORIDE 80 MEQ: 1500 TABLET, EXTENDED RELEASE ORAL at 20:47

## 2021-07-22 RX ADMIN — POTASSIUM CHLORIDE 20 MEQ: 29.8 INJECTION, SOLUTION INTRAVENOUS at 08:37

## 2021-07-22 RX ADMIN — ACETAMINOPHEN 325 MG: 325 TABLET, FILM COATED ORAL at 18:53

## 2021-07-22 RX ADMIN — POTASSIUM CHLORIDE 20 MEQ: 400 INJECTION, SOLUTION INTRAVENOUS at 18:48

## 2021-07-22 RX ADMIN — FUROSEMIDE 10 MG/HR: 10 INJECTION, SOLUTION INTRAVENOUS at 01:51

## 2021-07-22 RX ADMIN — ACETAMINOPHEN 325 MG: 325 TABLET, FILM COATED ORAL at 08:47

## 2021-07-22 RX ADMIN — APIXABAN 2.5 MG: 2.5 TABLET, FILM COATED ORAL at 20:46

## 2021-07-22 RX ADMIN — POTASSIUM CHLORIDE 80 MEQ: 1500 TABLET, EXTENDED RELEASE ORAL at 14:46

## 2021-07-22 ASSESSMENT — ACTIVITIES OF DAILY LIVING (ADL)
ADLS_ACUITY_SCORE: 13

## 2021-07-22 ASSESSMENT — MIFFLIN-ST. JEOR: SCORE: 1183.61

## 2021-07-22 NOTE — PLAN OF CARE
1158-2100 Shift.  Major Shift Events:  Lasix gtt restarted. Making large volume urine output. Potassium supplemented. BMP and lytes checked periodically this shift.   Plan: Diuresis with lasix gtt. Monitor electrolytes, I&O.  For vital signs and complete assessments, please see documentation flowsheets.

## 2021-07-22 NOTE — PROGRESS NOTES
[facility]   Cardiology   Progress Note     ASSESSMENT/PLAN:  Leandra Guerrero is a 70 year old year old female with PMH of AL amyloidosis with cardiac manifestation, HFpEF who was directly admitted for heart failure exacerbation.     # Acute on chronic diastolic heart failure   # Cardiac Amyloidosis   Patient admitted as direct admission with ongoing hypervolemia, SOB in spite of increased PO diuretics as OP. Weight on admission 158 lbs, EDW around 147#  - Fluid status: Mildly Hypervolemic. Continue IV Lasix gtt  - Start 12.5 mg Aldactone (see below)  - Goal net negative 1-2L/day, however continue to push/challenge dry weight  - Monitor electrolytes BID. Low Na diet. Strict I/O.  - Daily weights  - CORE consulted, pt to follow up with Dr. Cohen early August     # Hypokalemia  Secondary to aggressive diuresis. Per outpatient provider, patient is very responsive to metolazone but subsequently gets quite hypokalemic. K+ AM after starting lasix drip and metolazone admin. In total, given ~110 meq potassium with improvement only to 2.6. QTc prolonged on EKG with U waves. PICC line placed 7/21 given frequent labs, sensation of burning in IV with IV potassium  - potassium CR 80meq tid  - high replacement protocol (on top of scheduled_  - Start Aldactone 12.5 mg daily (pt previously reported palpitations with start of this med, pt willing to retrial)     # Paroxysmal A. Fib/A. Flutter  [id number] of 4.   - Anticoagulation: Continue Eliquis 2.5mg BID, reduced dose d/t frequent bleeding, aware of risks  - BB: avoid in setting of amyloid  - Holding off on digoxin given generally good rate control/very rare RVR events. Could discuss in the future if needed.     # Prolonged QTC  - Minimize QT prolonging meds     # Systemic amyloid  Not on any medications at this time.  - Heme previously monitoring her light chains which have been negative consistent with remission - therefore further  palliative chemo not being considered.     FEN: 2 gm Sodium, 2L Fluid restriction  Code status: Full  Prophylaxis:  PO Eliquis  Isolation: None  Disposition: possible discharge 2-3 days pending stable volume status    Patient seen and discussed with Dr. West, who agrees with above plan.    Lizette SAEZ, CNP  [facility] Cardiology Team  [phone]    Interval History:  - No acute events overnight  - K improved to 4.1, Lasix gtt restarted  - Pt net negative 3.7 L yesterday, an additional 3.5L since midnight  - Pt reports feeling better this am; she reports not sleeping last night, but otherwise denies chest pain, SOB, does have mild PELAEZ, no orthopnea, PND  - Cr stable, 1.04, weight down 3 pounds    Physical Exam:  Temp:  [97.9  F (36.6  C)-98.3  F (36.8  C)] 97.9  F (36.6  C)  Pulse:  [] 97  Resp:  [16-18] 16  BP: (109-128)/(61-76) 114/67  SpO2:  [97 %-100 %] 98 %    I/O:  Intake/Output Summary (Last 24 hours) at 7/22/2021 1236  Last data filed at 7/22/2021 1100  Gross per 24 hour   Intake 1674.85 ml   Output 5400 ml   Net -3725.15 ml         Wt:   Wt Readings from Last 5 Encounters:   07/22/21 68.4 kg (150 lb 14.4 oz)   06/21/21 71.2 kg (157 lb)   05/24/21 69.4 kg (153 lb 1.6 oz)   05/20/21 70.1 kg (154 lb 9.6 oz)   04/27/21 69.9 kg (154 lb)       General: NAD  HEENT:  PERRLA, EOMI.   Neck: no jVD  CV: RRR. No murmur appreciated. No rubs or gallops. Peripheral radial pulse intact.  Resp: No increased work of breathing or use of accessory muscles, breathing comfortably on room air.  Lung sounds clear throughout/bilaterally  Abdomen:  Normal active bowel sounds.  Abdomen is soft. No distension, non-tender to palpation.    Extremities: Warm. Capillary refill less than 3 sec. 2/4 radial pulses bilaterally.  2/4 pedal pulses bilaterally. No trace edema. No cyanosis or clubbing.  Skin:  Warm and dry. No erythema, rashes, ulceration or diaphoresis.  Neuro: Alert and oriented x3.      Medications:    apixaban  ANTICOAGULANT  2.5 mg Oral BID     doxycycline hyclate  100 mg Oral Daily     heparin lock flush  5-20 mL Intracatheter Q24H     potassium chloride  80 mEq Oral TID     sodium chloride (PF)  10-40 mL Intracatheter Q8H     sodium chloride (PF)  3 mL Intracatheter Q8H     spironolactone  12.5 mg Oral Daily       furosemide 10 mg/hr (07/22/21 0700)     - MEDICATION INSTRUCTIONS -       - MEDICATION INSTRUCTIONS -         Labs:   CMP  Recent Labs   Lab 07/22/21  0636 07/22/21  0142 07/21/21  2045 07/21/21  1439 07/21/21  0551    134  --  134 135   POTASSIUM 3.1* 4.7 3.1* 2.6* 2.3*   CHLORIDE 99 97  --  92* 92*   CO2 34* 32  --  35* 36*   ANIONGAP 3 5  --  7 7   GLC 88 93  --  111* 75   BUN 26 28  --  25 26   CR 1.04 1.09*  --  1.07* 1.11*   GFRESTIMATED 55* 52*  --  53* 51*   JERROD 10.0 9.5  --  9.3 9.7   MAG  --  2.4*  --   --  2.4*     CBC  Recent Labs   Lab 07/20/21  1738   WBC 6.7   RBC 4.30   HGB 13.6   HCT 40.8   MCV 95   MCH 31.6   MCHC 33.3   RDW 13.7        INRNo lab results found in last 7 days.  Arterial Blood GasNo lab results found in last 7 days.    Diagnostics:  ECG 7/21/2021: NSR, HR 66, 1st deg AVB, occ PAC's      Echo 2/3/2021:  Interpretation Summary     Global and regional left ventricular function is hyperkinetic with an EF of  65-70%.  Right ventricular function, chamber size, wall motion, and thickness are  normal.  Mild to moderate aortic insufficiency is present.  Moderate tricuspid insufficiency is present.  Right ventricular systolic pressure is 33mmHg above the right atrial pressure.  IVC diameter and respiratory changes fall into an intermediate range  suggesting an RA pressure of 8 mmHg.  No pericardial effusion is present.  A right pleural effusion is present.    Recent Results (from the past 24 hour(s))   XR Chest Port 1 View    Narrative    XR CHEST PORT 1 VIEW  7/21/2021 7:11 PM      HISTORY: PICC placement    COMPARISON: 4/2/2019, 11/19/2020    FINDINGS: AP chest radiograph.  PICC line tip projects over the  superior atriocaval junction. Trachea is midline, cardiac silhouette  is mildly enlarged. Left costophrenic angle is collimated out of the  exam. Chronic right pleural effusion is overlying basilar atelectasis.  Nodular density adjacent to the right heart border. No acute osseous  or upper abdominal abnormality.      Impression    IMPRESSION:  1. Right upper extremity PICC line tip projects over the superior  atriocaval junction.   2. Nodular density projecting adjacent to the right heart border.  Recommend CT for further evaluation.  Likely present on outside study  11/19/2020, when correlating with CT 11/29/2016 may represent  loculated fluid.     I have personally reviewed the examination and initial interpretation  and I agree with the findings.    BRAD JOHNSON MD         SYSTEM ID:  [id number]

## 2021-07-22 NOTE — PROGRESS NOTES
D: AL amyloidosis with cardiac manifestation, HFpEF who was directly admitted for heart failure exacerbation.   ?  A/I : Monitored vitals and assessed pt status. A0x4. VSS. Off/on between SR/ST and A flutter rates . Afebrile. Urinating adequately. Last BM this AM. Denies chest pain, palpitations, SOB, nausea, dizziness. Lasix gtt at 10 mg/hr and intermittent K chloride infusions via PICC. Up ind in room. PRN benadryl given for generalized itchy with good effect, lotioned also applied.   ?  P: Continue to monitor Pt status and report changes to treatment team.

## 2021-07-22 NOTE — PROCEDURES
[facility]    Double Lumen PICC Placement    Date/Time: 7/21/2021 7:19 PM  Performed by: Karis Watkins RN  Authorized by: Mayur West MD   Indications: vascular access (Access)    UNIVERSAL PROTOCOL   Site Marked: Yes  Prior Images Obtained and Reviewed:  Yes  Required items: Required blood products, implants, devices and special equipment available    Patient identity confirmed:  Verbally with patient and arm band  NA - No sedation, light sedation, or local anesthesia  Confirmation Checklist:  Patient's identity using two indicators, relevant allergies, procedure was appropriate and matched the consent or emergent situation and correct equipment/implants were available  Time out: Immediately prior to the procedure a time out was called    Universal Protocol: the Joint Commission Universal Protocol was followed    Preparation: Patient was prepped and draped in usual sterile fashion           ANESTHESIA    Local Anesthetic: Lidocaine 1% without epinephrine  Anesthetic Total (mL):  1      SEDATION    Patient Sedated: No        Preparation: skin prepped with ChloraPrep  Skin prep agent: skin prep agent completely dried prior to procedure  Sterile barriers: maximum sterile barriers were used: cap, mask, sterile gown, sterile gloves, and large sterile sheet  Hand hygiene: hand hygiene performed prior to central venous catheter insertion  Type of line used: Power PICC  Catheter type: double lumen  Lumen type: non-valved  Catheter size: 5 Fr  Brand: Bard  Lot number: [id number]  Placement method: venipuncture, MST, ultrasound and tip confirmation system  Number of attempts: 1  Successful placement: yes  Orientation: right  Location: basilic vein (Vein Diameter 0.50)  Arm circumference: adults 10 cm  Extremity circumference: 25  Visible catheter length: 4  Total catheter length: 42  Dressing and securement: blood cleaned with CHG, blood removed, glue, statlock and  site cleaned  Post procedure assessment: placement verified by x-ray and blood return through all ports  PROCEDURE   Patient Tolerance:  Patient tolerated the procedure well with no immediate complications

## 2021-07-23 LAB
ANION GAP SERPL CALCULATED.3IONS-SCNC: 2 MMOL/L (ref 3–14)
ANION GAP SERPL CALCULATED.3IONS-SCNC: 7 MMOL/L (ref 3–14)
BUN SERPL-MCNC: 26 MG/DL (ref 7–30)
BUN SERPL-MCNC: 30 MG/DL (ref 7–30)
CALCIUM SERPL-MCNC: 9 MG/DL (ref 8.5–10.1)
CALCIUM SERPL-MCNC: 9.3 MG/DL (ref 8.5–10.1)
CHLORIDE BLD-SCNC: 96 MMOL/L (ref 94–109)
CHLORIDE BLD-SCNC: 99 MMOL/L (ref 94–109)
CO2 SERPL-SCNC: 32 MMOL/L (ref 20–32)
CO2 SERPL-SCNC: 32 MMOL/L (ref 20–32)
CREAT SERPL-MCNC: 1.07 MG/DL (ref 0.52–1.04)
CREAT SERPL-MCNC: 1.11 MG/DL (ref 0.52–1.04)
GFR SERPL CREATININE-BSD FRML MDRD: 51 ML/MIN/1.73M2
GFR SERPL CREATININE-BSD FRML MDRD: 53 ML/MIN/1.73M2
GLUCOSE BLD-MCNC: 108 MG/DL (ref 70–99)
GLUCOSE BLD-MCNC: 95 MG/DL (ref 70–99)
MAGNESIUM SERPL-MCNC: 2.1 MG/DL (ref 1.6–2.3)
POTASSIUM BLD-SCNC: 3.5 MMOL/L (ref 3.4–5.3)
POTASSIUM BLD-SCNC: 3.5 MMOL/L (ref 3.4–5.3)
POTASSIUM BLD-SCNC: 4.8 MMOL/L (ref 3.4–5.3)
SODIUM SERPL-SCNC: 133 MMOL/L (ref 133–144)
SODIUM SERPL-SCNC: 135 MMOL/L (ref 133–144)

## 2021-07-23 PROCEDURE — 99233 SBSQ HOSP IP/OBS HIGH 50: CPT | Performed by: INTERNAL MEDICINE

## 2021-07-23 PROCEDURE — 36592 COLLECT BLOOD FROM PICC: CPT | Performed by: INTERNAL MEDICINE

## 2021-07-23 PROCEDURE — 80048 BASIC METABOLIC PNL TOTAL CA: CPT | Performed by: INTERNAL MEDICINE

## 2021-07-23 PROCEDURE — 250N000013 HC RX MED GY IP 250 OP 250 PS 637: Performed by: INTERNAL MEDICINE

## 2021-07-23 PROCEDURE — 250N000013 HC RX MED GY IP 250 OP 250 PS 637: Performed by: NURSE PRACTITIONER

## 2021-07-23 PROCEDURE — 36415 COLL VENOUS BLD VENIPUNCTURE: CPT | Performed by: INTERNAL MEDICINE

## 2021-07-23 PROCEDURE — 250N000013 HC RX MED GY IP 250 OP 250 PS 637: Performed by: PHYSICIAN ASSISTANT

## 2021-07-23 PROCEDURE — 250N000011 HC RX IP 250 OP 636: Performed by: PHYSICIAN ASSISTANT

## 2021-07-23 PROCEDURE — 214N000001 HC R&B CCU UMMC

## 2021-07-23 PROCEDURE — 84132 ASSAY OF SERUM POTASSIUM: CPT | Performed by: INTERNAL MEDICINE

## 2021-07-23 PROCEDURE — 250N000009 HC RX 250: Performed by: STUDENT IN AN ORGANIZED HEALTH CARE EDUCATION/TRAINING PROGRAM

## 2021-07-23 PROCEDURE — 250N000013 HC RX MED GY IP 250 OP 250 PS 637: Performed by: STUDENT IN AN ORGANIZED HEALTH CARE EDUCATION/TRAINING PROGRAM

## 2021-07-23 PROCEDURE — 83735 ASSAY OF MAGNESIUM: CPT | Performed by: INTERNAL MEDICINE

## 2021-07-23 RX ORDER — HYDROXYZINE HYDROCHLORIDE 25 MG/1
25 TABLET, FILM COATED ORAL 3 TIMES DAILY PRN
Status: DISCONTINUED | OUTPATIENT
Start: 2021-07-23 | End: 2021-07-27 | Stop reason: HOSPADM

## 2021-07-23 RX ORDER — POTASSIUM CHLORIDE 750 MG/1
20 TABLET, EXTENDED RELEASE ORAL ONCE
Status: COMPLETED | OUTPATIENT
Start: 2021-07-23 | End: 2021-07-23

## 2021-07-23 RX ORDER — DIPHENHYDRAMINE HYDROCHLORIDE, ZINC ACETATE 2; .1 G/100G; G/100G
CREAM TOPICAL 3 TIMES DAILY PRN
Status: DISCONTINUED | OUTPATIENT
Start: 2021-07-23 | End: 2021-07-27 | Stop reason: HOSPADM

## 2021-07-23 RX ORDER — HYDROCORTISONE 25 MG/G
OINTMENT TOPICAL 2 TIMES DAILY PRN
Status: DISCONTINUED | OUTPATIENT
Start: 2021-07-23 | End: 2021-07-27 | Stop reason: HOSPADM

## 2021-07-23 RX ADMIN — APIXABAN 2.5 MG: 2.5 TABLET, FILM COATED ORAL at 20:45

## 2021-07-23 RX ADMIN — POTASSIUM CHLORIDE 80 MEQ: 1500 TABLET, EXTENDED RELEASE ORAL at 14:55

## 2021-07-23 RX ADMIN — POTASSIUM CHLORIDE 20 MEQ: 750 TABLET, EXTENDED RELEASE ORAL at 12:19

## 2021-07-23 RX ADMIN — DOXYCYCLINE 100 MG: 100 CAPSULE ORAL at 08:31

## 2021-07-23 RX ADMIN — SODIUM CHLORIDE, PRESERVATIVE FREE 5 ML: 5 INJECTION INTRAVENOUS at 20:45

## 2021-07-23 RX ADMIN — ACETAMINOPHEN 325 MG: 325 TABLET, FILM COATED ORAL at 16:13

## 2021-07-23 RX ADMIN — APIXABAN 2.5 MG: 2.5 TABLET, FILM COATED ORAL at 08:31

## 2021-07-23 RX ADMIN — DIPHENHYDRAMINE HYDROCHLORIDE 25 MG: 25 CAPSULE ORAL at 12:24

## 2021-07-23 RX ADMIN — FUROSEMIDE 10 MG/HR: 10 INJECTION, SOLUTION INTRAVENOUS at 23:04

## 2021-07-23 RX ADMIN — DIPHENHYDRAMINE HYDROCHLORIDE 25 MG: 25 CAPSULE ORAL at 19:40

## 2021-07-23 RX ADMIN — ACETAMINOPHEN 650 MG: 325 TABLET, FILM COATED ORAL at 09:53

## 2021-07-23 RX ADMIN — POTASSIUM CHLORIDE 80 MEQ: 1500 TABLET, EXTENDED RELEASE ORAL at 08:31

## 2021-07-23 RX ADMIN — POTASSIUM CHLORIDE 80 MEQ: 1500 TABLET, EXTENDED RELEASE ORAL at 20:45

## 2021-07-23 RX ADMIN — Medication 12.5 MG: at 08:31

## 2021-07-23 RX ADMIN — HYDROXYZINE HYDROCHLORIDE 25 MG: 25 TABLET, FILM COATED ORAL at 09:53

## 2021-07-23 ASSESSMENT — ACTIVITIES OF DAILY LIVING (ADL)
ADLS_ACUITY_SCORE: 13
ADLS_ACUITY_SCORE: 12

## 2021-07-23 ASSESSMENT — MIFFLIN-ST. JEOR: SCORE: 1170

## 2021-07-23 NOTE — PROGRESS NOTES
[facility]   Cardiology   Progress Note     ASSESSMENT/PLAN:  Leandra Guerrero is a 70 year old year old female with PMH of AL amyloidosis with cardiac manifestation, HFpEF who was directly admitted for heart failure exacerbation.     # Acute on chronic diastolic heart failure   # Cardiac Amyloidosis   Patient admitted as direct admission with ongoing hypervolemia, SOB in spite of increased PO diuretics as OP. Weight on admission 158 lbs, EDW around 147#  - Fluid status: Euvolemic, will do dry weight challenge, continue IV Lasix  - Continue 12.5 mg Aldactone   - Goal net negative even to 500mL net negative  - Monitor electrolytes daily. Low Na diet. Strict I/O.  - Daily weights  - CORE consulted, pt to follow up with Dr. Cohen 8/12     # Hypokalemia, resolved  Secondary to aggressive diuresis. Per outpatient provider, patient is very responsive to metolazone but subsequently gets quite hypokalemic. K+ AM after starting lasix drip and metolazone admin. In total, given ~110 meq potassium with improvement only to 2.6. QTc prolonged on EKG with U waves. PICC line placed 7/21 given frequent labs, sensation of burning in IV with IV potassium. K stable after start of Aldactone and scheduled K  - potassium CR 80meq tid  - high replacement protocol (on top of scheduled)  - Continue Aldactone 12.5 mg daily     # Paroxysmal A. Fib/A. Flutter  [id number] of 4.   - Anticoagulation: Continue Eliquis 2.5mg BID, reduced dose d/t frequent bleeding, aware of risks  - BB: avoid in setting of amyloid  - Holding off on digoxin given generally good rate control/very rare RVR events. Could discuss in the future if needed.     # Prolonged QTC  - Minimize QT prolonging meds     # Systemic amyloid  Not on any medications at this time.  - Heme previously monitoring her light chains which have been negative consistent with remission - therefore further palliative chemo not being considered.     FEN:  Cardiac  Code status: Full  Prophylaxis:  Eliquis  Isolation: None  Disposition: possible discharge home 2-3 days pending stable volume status on PO diuretics    Patient seen and discussed with Dr. West, who agrees with above plan.    Lizette SAEZ CNP  [facility] Cardiology Team  [phone]    Interval History:  - No acute events overnight  - Pt remains on Lasix gtt, net negative 4.5L yesterday, weight down 3#, Cr remains stable 1.04  - Pt reports feeling well, she denies any lightheadedness, dizziness, SOB, PELAEZ    Physical Exam:  Temp:  [97.4  F (36.3  C)-98.7  F (37.1  C)] 98  F (36.7  C)  Pulse:  [] 105  Resp:  [14-16] 16  BP: ()/(62-73) 97/64  SpO2:  [95 %-100 %] 100 %    I/O:  Intake/Output Summary (Last 24 hours) at 7/23/2021 0913  Last data filed at 7/23/2021 0800  Gross per 24 hour   Intake 1803 ml   Output 4300 ml   Net -2497 ml       Wt:   Wt Readings from Last 5 Encounters:   07/23/21 67.1 kg (147 lb 14.4 oz)   06/21/21 71.2 kg (157 lb)   05/24/21 69.4 kg (153 lb 1.6 oz)   05/20/21 70.1 kg (154 lb 9.6 oz)   04/27/21 69.9 kg (154 lb)       General: NAD  HEENT:  PERRLA, EOMI.   Neck: no JVD  CV: RRR. No murmur appreciated. No rubs or gallops. Peripheral radial pulse intact.  Resp: No increased work of breathing or use of accessory muscles, breathing comfortably on room air.  Lung sounds clear throughout/bilaterally  Abdomen:  Normal active bowel sounds.  Abdomen is soft. No distension, non-tender to palpation.    Extremities: Warm. Capillary refill less than 3 sec. 2/4 radial pulses bilaterally. 2/4 pedal pulses bilaterally. BLE lymphedema. No cyanosis or clubbing.  Skin:  Warm and dry. No erythema, rashes, ulceration or diaphoresis.  Neuro: Alert and oriented x3.      Medications:    apixaban ANTICOAGULANT  2.5 mg Oral BID     doxycycline hyclate  100 mg Oral Daily     heparin lock flush  5-20 mL Intracatheter Q24H     potassium chloride  80 mEq Oral TID     sodium chloride (PF)  10-40 mL  Intracatheter Q8H     sodium chloride (PF)  3 mL Intracatheter Q8H     spironolactone  12.5 mg Oral Daily       furosemide 10 mg/hr (07/23/21 0831)     - MEDICATION INSTRUCTIONS -       - MEDICATION INSTRUCTIONS -         Labs:   CMP  Recent Labs   Lab 07/23/21  0817 07/22/21  1738 07/22/21  1401 07/22/21  0636 07/22/21  0142 07/21/21  0755 07/21/21  0551    136  --  136 134   < > 135   POTASSIUM 3.5  3.5 3.8 3.8 3.1* 4.7  --  2.3*   CHLORIDE 96 99  --  99 97   < > 92*   CO2 32 35*  --  34* 32   < > 36*   ANIONGAP 7 2*  --  3 5   < > 7   * 103*  --  88 93   < > 75   BUN 26 28  --  26 28   < > 26   CR 1.07* 1.04  --  1.04 1.09*   < > 1.11*   GFRESTIMATED 53* 55*  --  55* 52*   < > 51*   JERROD 9.3 9.3  --  10.0 9.5   < > 9.7   MAG  --   --   --   --  2.4*  --  2.4*    < > = values in this interval not displayed.     CBC  Recent Labs   Lab 07/20/21  1738   WBC 6.7   RBC 4.30   HGB 13.6   HCT 40.8   MCV 95   MCH 31.6   MCHC 33.3   RDW 13.7        INRNo lab results found in last 7 days.  Arterial Blood GasNo lab results found in last 7 days.    Diagnostics:  ECG 7/21/2021: NSR, HR 66, 1st deg AVB, occ PAC's       Echo 2/3/2021:  Interpretation Summary     Global and regional left ventricular function is hyperkinetic with an EF of  65-70%.  Right ventricular function, chamber size, wall motion, and thickness are  normal.  Mild to moderate aortic insufficiency is present.  Moderate tricuspid insufficiency is present.  Right ventricular systolic pressure is 33mmHg above the right atrial pressure.  IVC diameter and respiratory changes fall into an intermediate range  suggesting an RA pressure of 8 mmHg.  No pericardial effusion is present.  A right pleural effusion is present.    No results found for this or any previous visit (from the past 24 hour(s)).

## 2021-07-23 NOTE — PLAN OF CARE
VSS. RA. K+ 3.5, replaced. Lasix gtt at 10mg/hr. New redness/itching to skin from CHG bath. Cleaned with soap and water per pt request, MD aware,  PRN Benadryl given.   P- Monitoring.

## 2021-07-23 NOTE — PLAN OF CARE
Afebrile, VSS. Patient denies pain but notes itchiness with relief given from benadryl. Patient on room air, with no respiratory interventions needed. HR have been sitting in the 's. Rhythm measured was sinus. K infusion given today as well as PO KClor. Lasix drip continues. Patient voiding adequately at bedside commode. No bowel movement overnight. Legs edematous with edema noted +2. Updated on plan of care answered any questions patient may have had. Patient was actively participating in self cares. Will continue to monitor and assess.

## 2021-07-24 LAB
ANION GAP SERPL CALCULATED.3IONS-SCNC: 5 MMOL/L (ref 3–14)
ANION GAP SERPL CALCULATED.3IONS-SCNC: 7 MMOL/L (ref 3–14)
BUN SERPL-MCNC: 24 MG/DL (ref 7–30)
BUN SERPL-MCNC: 32 MG/DL (ref 7–30)
CALCIUM SERPL-MCNC: 8.8 MG/DL (ref 8.5–10.1)
CALCIUM SERPL-MCNC: 8.9 MG/DL (ref 8.5–10.1)
CHLORIDE BLD-SCNC: 100 MMOL/L (ref 94–109)
CHLORIDE BLD-SCNC: 98 MMOL/L (ref 94–109)
CO2 SERPL-SCNC: 29 MMOL/L (ref 20–32)
CO2 SERPL-SCNC: 30 MMOL/L (ref 20–32)
CREAT SERPL-MCNC: 1.04 MG/DL (ref 0.52–1.04)
CREAT SERPL-MCNC: 1.25 MG/DL (ref 0.52–1.04)
GFR SERPL CREATININE-BSD FRML MDRD: 44 ML/MIN/1.73M2
GFR SERPL CREATININE-BSD FRML MDRD: 55 ML/MIN/1.73M2
GLUCOSE BLD-MCNC: 89 MG/DL (ref 70–99)
GLUCOSE BLD-MCNC: 99 MG/DL (ref 70–99)
POTASSIUM BLD-SCNC: 3.5 MMOL/L (ref 3.4–5.3)
POTASSIUM BLD-SCNC: 4 MMOL/L (ref 3.4–5.3)
SODIUM SERPL-SCNC: 134 MMOL/L (ref 133–144)
SODIUM SERPL-SCNC: 135 MMOL/L (ref 133–144)

## 2021-07-24 PROCEDURE — 214N000001 HC R&B CCU UMMC

## 2021-07-24 PROCEDURE — 250N000013 HC RX MED GY IP 250 OP 250 PS 637: Performed by: STUDENT IN AN ORGANIZED HEALTH CARE EDUCATION/TRAINING PROGRAM

## 2021-07-24 PROCEDURE — 250N000013 HC RX MED GY IP 250 OP 250 PS 637: Performed by: PHYSICIAN ASSISTANT

## 2021-07-24 PROCEDURE — 250N000011 HC RX IP 250 OP 636: Performed by: PHYSICIAN ASSISTANT

## 2021-07-24 PROCEDURE — 80048 BASIC METABOLIC PNL TOTAL CA: CPT | Performed by: INTERNAL MEDICINE

## 2021-07-24 PROCEDURE — 36592 COLLECT BLOOD FROM PICC: CPT | Performed by: INTERNAL MEDICINE

## 2021-07-24 PROCEDURE — 250N000013 HC RX MED GY IP 250 OP 250 PS 637: Performed by: INTERNAL MEDICINE

## 2021-07-24 PROCEDURE — 250N000013 HC RX MED GY IP 250 OP 250 PS 637: Performed by: NURSE PRACTITIONER

## 2021-07-24 PROCEDURE — 99233 SBSQ HOSP IP/OBS HIGH 50: CPT | Performed by: INTERNAL MEDICINE

## 2021-07-24 RX ORDER — POTASSIUM CHLORIDE 750 MG/1
20 TABLET, EXTENDED RELEASE ORAL ONCE
Status: COMPLETED | OUTPATIENT
Start: 2021-07-24 | End: 2021-07-24

## 2021-07-24 RX ORDER — METOLAZONE 2.5 MG/1
2.5 TABLET ORAL ONCE
Status: COMPLETED | OUTPATIENT
Start: 2021-07-24 | End: 2021-07-24

## 2021-07-24 RX ADMIN — METOLAZONE 2.5 MG: 2.5 TABLET ORAL at 11:38

## 2021-07-24 RX ADMIN — Medication 12.5 MG: at 08:25

## 2021-07-24 RX ADMIN — HYDROCORTISONE: 25 OINTMENT TOPICAL at 11:45

## 2021-07-24 RX ADMIN — DOXYCYCLINE 100 MG: 100 CAPSULE ORAL at 08:26

## 2021-07-24 RX ADMIN — ACETAMINOPHEN 325 MG: 325 TABLET, FILM COATED ORAL at 16:32

## 2021-07-24 RX ADMIN — HYDROCORTISONE: 25 OINTMENT TOPICAL at 02:54

## 2021-07-24 RX ADMIN — POTASSIUM CHLORIDE 80 MEQ: 1500 TABLET, EXTENDED RELEASE ORAL at 14:08

## 2021-07-24 RX ADMIN — DIPHENHYDRAMINE HYDROCHLORIDE 25 MG: 25 CAPSULE ORAL at 02:53

## 2021-07-24 RX ADMIN — POTASSIUM CHLORIDE 20 MEQ: 750 TABLET, EXTENDED RELEASE ORAL at 16:37

## 2021-07-24 RX ADMIN — SODIUM CHLORIDE, PRESERVATIVE FREE 5 ML: 5 INJECTION INTRAVENOUS at 21:28

## 2021-07-24 RX ADMIN — POTASSIUM CHLORIDE 80 MEQ: 1500 TABLET, EXTENDED RELEASE ORAL at 08:24

## 2021-07-24 RX ADMIN — POTASSIUM CHLORIDE 80 MEQ: 1500 TABLET, EXTENDED RELEASE ORAL at 21:28

## 2021-07-24 RX ADMIN — APIXABAN 2.5 MG: 2.5 TABLET, FILM COATED ORAL at 08:24

## 2021-07-24 RX ADMIN — APIXABAN 2.5 MG: 2.5 TABLET, FILM COATED ORAL at 21:28

## 2021-07-24 ASSESSMENT — ACTIVITIES OF DAILY LIVING (ADL)
ADLS_ACUITY_SCORE: 12

## 2021-07-24 ASSESSMENT — MIFFLIN-ST. JEOR: SCORE: 1180.43

## 2021-07-24 NOTE — PLAN OF CARE
D: Patient was admitted on 7/30/2021 with heart failure exacerbation. Hx includes AL amyloidosis with cardiac manifestation and HFpEF.    I: Monitored vitals and assessed patient status  Running: Lasix @ 10 mg/hr    A: Patient's vital signs have been stable except her noon vital signs were done at 14:50. Her rhythm was Afib 100-130 with 0-10 PVC's/min. She still needs to get 20 mEq of KCL at 16 since it was not here    I/O this shift:  In: 713 [P.O.:710; I.V.:3]  Out: 1250 [Urine:1250]    Temp:  [97.7  F (36.5  C)-98.7  F (37.1  C)] 98.7  F (37.1  C)  Pulse:  [102-106] 102  Resp:  [16-18] 18  BP: (116-131)/(63-73) 119/67  SpO2:  [96 %-98 %] 97 %      P: Continue to monitor patient status and report changes to Cards 1 treatment team.

## 2021-07-24 NOTE — PLAN OF CARE
D: Admitted 7/30 with heart failure exacerbation. Hx includes AL amyloidosis with cardiac manifestation and HFpEF.    I: Monitored vitals and assessed pt status.   Changed:  -Continues to endorse significant itching and skin redness since monica wipe bath yesterday.  Running:  -Lasix gtt to PICC at 10 mg/hr  -PIV saline locked  PRN:  -25mg PO benadryl x1  -Hydrocortisone cream applied to back and legs    A: A0x4. VSS, on room air. Aflutter/afib on monitor (rates 100s). +3/4 edema in lower extremities. Afebrile. Denies pain. No BM this shift. Last was 7/23 per pt. 2g Na diet. Adequate urine output. Up independently to bedside commode. Appeared to sleep well between cares.      I/O this shift:  In: 200 [P.O.:200]  Out: 550 [Urine:550]    Temp:  [97.7  F (36.5  C)-98.6  F (37  C)] 97.7  F (36.5  C)  Pulse:  [102-106] 102  Resp:  [16-17] 17  BP: ()/(59-73) 131/66  SpO2:  [96 %-100 %] 97 %      P: Continue to monitor Pt status and report changes to treatment team.

## 2021-07-24 NOTE — PLAN OF CARE
Shift summary [id number]  D:Pt admitted with fluid overload and amyloidosis with heart involvement.  A/I: Pt has been in aflutter (baseline)rate controlled, other VSS. Pt receiving lasix gtt at 10 mg/hr. Pt c/o cramping. Pt medicated with 1 tylenol with fair relief. Pt chief c/o has been of itching skin d/t monica wipes. Pt has received po benadryl which has helped and benadryl cream which has also helped. Benadryl cream is not a med we apparently carry and has to be sent from another facility. Discussed with pharmacy and cortisone cream ordered  and should be tried next. Pt's skin remains reddened and itchy  but pt reports improved. Pt voiding in fair amounts. Pt on RA. Pt eating.  P: continue current POC.

## 2021-07-24 NOTE — PROGRESS NOTES
[facility]   Cardiology   Progress Note     ASSESSMENT/PLAN:  Leandra Guerrero is a 70 year old year old female with PMH of AL amyloidosis with cardiac manifestation, HFpEF who was directly admitted for heart failure exacerbation.     # Acute on chronic diastolic heart failure   # Cardiac Amyloidosis   Patient admitted as direct admission with ongoing hypervolemia, SOB in spite of increased PO diuretics as OP. Weight on admission 158 lbs, EDW around 147#  - Fluid status: Euvolemic, continue dry weight challenge, continue IV Lasix  - Continue 12.5 mg Aldactone   - Goal net negative even to 500mL net negative  - Monitor electrolytes daily. Low Na diet. Strict I/O.  - Daily weights  - CORE consulted, pt to follow up with Dr. Cohen 8/12     # Hypokalemia, resolved  Secondary to aggressive diuresis. Per outpatient provider, patient is very responsive to metolazone but subsequently gets quite hypokalemic. K+ AM after starting lasix drip and metolazone admin. In total, given ~110 meq potassium with improvement only to 2.6. QTc prolonged on EKG with U waves. PICC line placed 7/21 given frequent labs, sensation of burning in IV with IV potassium. K stable after start of Aldactone and scheduled K  - potassium CR 80meq tid  - high replacement protocol (on top of scheduled)  - Continue Aldactone 12.5 mg daily     # Paroxysmal A. Fib/A. Flutter  [id number] of 4.   - Anticoagulation: Continue Eliquis 2.5mg BID, reduced dose d/t frequent bleeding, aware of risks  - BB: avoid in setting of amyloid  - Holding off on digoxin given generally good rate control/very rare RVR events. Could discuss in the future if needed.     # Prolonged QTC  - Minimize QT prolonging meds     # Systemic amyloid  Not on any medications at this time.  - Heme previously monitoring her light chains which have been negative consistent with remission - therefore further palliative chemo not being considered.     #  Rash  2/2 likely allergic response to WANG wipes. Improving with Benadryl, lotion  - CTM  - prn benadryl PO and topical  - prn hydrocortisone cream      FEN: Cardiac  Code status: Full  Prophylaxis:  Eliquis  Isolation: None  Disposition: Possible discharge 2-3 days pending stable volume status on PO diuretics    Patient seen and discussed with Dr. West, who agrees with above plan.    Lizette Hutson APRN, CNP  [facility] Cardiology Team  [phone]    Interval History:  - No acute events overnight  - Pt given CHG wipes early yesterday am to wash with, pt developed rash lower abdomen, BLE afterwards. Pt reports improvement with lotion (WANG wipes added to allergy list)  - She remains on Lasix gtt, net negative 1L yesterday, weight up 3# (was standing weight and verified again this am)  - Cr remains stable 1.1    Physical Exam:  Temp:  [97.7  F (36.5  C)-98.7  F (37.1  C)] 98.7  F (37.1  C)  Pulse:  [102-106] 102  Resp:  [16-18] 18  BP: (110-131)/(59-73) 119/67  SpO2:  [96 %-98 %] 97 %    I/O:  Intake/Output Summary (Last 24 hours) at 7/24/2021 0945  Last data filed at 7/24/2021 0800  Gross per 24 hour   Intake 2947.98 ml   Output 2650 ml   Net 297.98 ml         Wt:   Wt Readings from Last 5 Encounters:   07/24/21 68.1 kg (150 lb 3.2 oz)   06/21/21 71.2 kg (157 lb)   05/24/21 69.4 kg (153 lb 1.6 oz)   05/20/21 70.1 kg (154 lb 9.6 oz)   04/27/21 69.9 kg (154 lb)       General: NAD  HEENT:  PERRLA, EOMI.   Neck: no JVD  CV: RRR. No murmur appreciated. No rubs or gallops. Peripheral radial pulse intact.  Resp: No increased work of breathing or use of accessory muscles, breathing comfortably on room air.  Lung sounds clear throughout/bilaterally  Abdomen:  Normal active bowel sounds.  Abdomen is soft. No distension, non-tender to palpation.    Extremities: Warm. Capillary refill less than 3 sec. 2/4 radial pulses bilaterally.  2/4 pedal pulses bilaterally. BLE lymphedema. No cyanosis or clubbing.  Skin:  Warm and dry.  Scattered rash lower abdomen, groin, BLE  Neuro: Alert and oriented x3.      Medications:    apixaban ANTICOAGULANT  2.5 mg Oral BID     doxycycline hyclate  100 mg Oral Daily     heparin lock flush  5-20 mL Intracatheter Q24H     potassium chloride  80 mEq Oral TID     sodium chloride (PF)  10-40 mL Intracatheter Q8H     sodium chloride (PF)  3 mL Intracatheter Q8H     spironolactone  12.5 mg Oral Daily       furosemide 10 mg/hr (07/24/21 0700)     - MEDICATION INSTRUCTIONS -       - MEDICATION INSTRUCTIONS -         Labs:   CMP  Recent Labs   Lab 07/24/21  0815 07/23/21  2033 07/23/21  1102 07/23/21  0817 07/22/21  1738 07/22/21  0142 07/21/21  0755 07/21/21  0551    133  --  135 136 134   < > 135   POTASSIUM 3.5 4.8  --  3.5  3.5 3.8 4.7  --  2.3*   CHLORIDE 100 99  --  96 99 97   < > 92*   CO2 30 32  --  32 35* 32   < > 36*   ANIONGAP 5 2*  --  7 2* 5   < > 7   GLC 89 95  --  108* 103* 93   < > 75   BUN 24 30  --  26 28 28   < > 26   CR 1.04 1.11*  --  1.07* 1.04 1.09*   < > 1.11*   GFRESTIMATED 55* 51*  --  53* 55* 52*   < > 51*   JERROD 8.8 9.0  --  9.3 9.3 9.5   < > 9.7   MAG  --   --  2.1  --   --  2.4*  --  2.4*    < > = values in this interval not displayed.     CBC  Recent Labs   Lab 07/20/21  1738   WBC 6.7   RBC 4.30   HGB 13.6   HCT 40.8   MCV 95   MCH 31.6   MCHC 33.3   RDW 13.7        INRNo lab results found in last 7 days.  Arterial Blood GasNo lab results found in last 7 days.    Diagnostics:  ECG 7/21/2021: NSR, HR 66, 1st deg AVB, occ PAC's       Echo 2/3/2021:  Interpretation Summary     Global and regional left ventricular function is hyperkinetic with an EF of  65-70%.  Right ventricular function, chamber size, wall motion, and thickness are  normal.  Mild to moderate aortic insufficiency is present.  Moderate tricuspid insufficiency is present.  Right ventricular systolic pressure is 33mmHg above the right atrial pressure.  IVC diameter and respiratory changes fall into an  intermediate range  suggesting an RA pressure of 8 mmHg.  No pericardial effusion is present.  A right pleural effusion is present.    No results found for this or any previous visit (from the past 24 hour(s)).

## 2021-07-25 LAB
ANION GAP SERPL CALCULATED.3IONS-SCNC: 7 MMOL/L (ref 3–14)
ANION GAP SERPL CALCULATED.3IONS-SCNC: 8 MMOL/L (ref 3–14)
BUN SERPL-MCNC: 36 MG/DL (ref 7–30)
BUN SERPL-MCNC: 41 MG/DL (ref 7–30)
CALCIUM SERPL-MCNC: 8.9 MG/DL (ref 8.5–10.1)
CALCIUM SERPL-MCNC: 9.3 MG/DL (ref 8.5–10.1)
CHLORIDE BLD-SCNC: 96 MMOL/L (ref 94–109)
CHLORIDE BLD-SCNC: 97 MMOL/L (ref 94–109)
CO2 SERPL-SCNC: 30 MMOL/L (ref 20–32)
CO2 SERPL-SCNC: 31 MMOL/L (ref 20–32)
CREAT SERPL-MCNC: 1.36 MG/DL (ref 0.52–1.04)
CREAT SERPL-MCNC: 1.48 MG/DL (ref 0.52–1.04)
GFR SERPL CREATININE-BSD FRML MDRD: 36 ML/MIN/1.73M2
GFR SERPL CREATININE-BSD FRML MDRD: 40 ML/MIN/1.73M2
GLUCOSE BLD-MCNC: 110 MG/DL (ref 70–99)
GLUCOSE BLD-MCNC: 85 MG/DL (ref 70–99)
HOLD SPECIMEN: NORMAL
LACTATE SERPL-SCNC: 1.1 MMOL/L (ref 0.7–2)
POTASSIUM BLD-SCNC: 3.2 MMOL/L (ref 3.4–5.3)
POTASSIUM BLD-SCNC: 3.2 MMOL/L (ref 3.4–5.3)
POTASSIUM BLD-SCNC: 4.2 MMOL/L (ref 3.4–5.3)
POTASSIUM BLD-SCNC: 4.2 MMOL/L (ref 3.4–5.3)
SODIUM SERPL-SCNC: 134 MMOL/L (ref 133–144)
SODIUM SERPL-SCNC: 135 MMOL/L (ref 133–144)

## 2021-07-25 PROCEDURE — 99232 SBSQ HOSP IP/OBS MODERATE 35: CPT | Performed by: INTERNAL MEDICINE

## 2021-07-25 PROCEDURE — 250N000009 HC RX 250: Performed by: STUDENT IN AN ORGANIZED HEALTH CARE EDUCATION/TRAINING PROGRAM

## 2021-07-25 PROCEDURE — 250N000011 HC RX IP 250 OP 636: Performed by: PHYSICIAN ASSISTANT

## 2021-07-25 PROCEDURE — 80048 BASIC METABOLIC PNL TOTAL CA: CPT | Performed by: INTERNAL MEDICINE

## 2021-07-25 PROCEDURE — 250N000013 HC RX MED GY IP 250 OP 250 PS 637: Performed by: INTERNAL MEDICINE

## 2021-07-25 PROCEDURE — 258N000003 HC RX IP 258 OP 636: Performed by: NURSE PRACTITIONER

## 2021-07-25 PROCEDURE — 250N000013 HC RX MED GY IP 250 OP 250 PS 637: Performed by: STUDENT IN AN ORGANIZED HEALTH CARE EDUCATION/TRAINING PROGRAM

## 2021-07-25 PROCEDURE — 250N000013 HC RX MED GY IP 250 OP 250 PS 637: Performed by: NURSE PRACTITIONER

## 2021-07-25 PROCEDURE — 214N000001 HC R&B CCU UMMC

## 2021-07-25 PROCEDURE — 250N000013 HC RX MED GY IP 250 OP 250 PS 637: Performed by: PHYSICIAN ASSISTANT

## 2021-07-25 PROCEDURE — 84132 ASSAY OF SERUM POTASSIUM: CPT | Performed by: INTERNAL MEDICINE

## 2021-07-25 PROCEDURE — 250N000011 HC RX IP 250 OP 636: Performed by: INTERNAL MEDICINE

## 2021-07-25 PROCEDURE — 83605 ASSAY OF LACTIC ACID: CPT | Performed by: INTERNAL MEDICINE

## 2021-07-25 PROCEDURE — 36592 COLLECT BLOOD FROM PICC: CPT | Performed by: INTERNAL MEDICINE

## 2021-07-25 RX ORDER — POTASSIUM CHLORIDE 750 MG/1
20 TABLET, EXTENDED RELEASE ORAL ONCE
Status: COMPLETED | OUTPATIENT
Start: 2021-07-25 | End: 2021-07-25

## 2021-07-25 RX ORDER — TORSEMIDE 100 MG/1
100 TABLET ORAL
Status: DISCONTINUED | OUTPATIENT
Start: 2021-07-25 | End: 2021-07-27 | Stop reason: HOSPADM

## 2021-07-25 RX ADMIN — Medication 12.5 MG: at 07:58

## 2021-07-25 RX ADMIN — DOXYCYCLINE 100 MG: 100 CAPSULE ORAL at 07:58

## 2021-07-25 RX ADMIN — PROCHLORPERAZINE EDISYLATE 5 MG: 5 INJECTION INTRAMUSCULAR; INTRAVENOUS at 18:38

## 2021-07-25 RX ADMIN — POTASSIUM CHLORIDE 80 MEQ: 1500 TABLET, EXTENDED RELEASE ORAL at 19:43

## 2021-07-25 RX ADMIN — POTASSIUM CHLORIDE 40 MEQ: 1500 TABLET, EXTENDED RELEASE ORAL at 07:55

## 2021-07-25 RX ADMIN — Medication 5 ML: at 11:06

## 2021-07-25 RX ADMIN — SODIUM CHLORIDE, PRESERVATIVE FREE 5 ML: 5 INJECTION INTRAVENOUS at 19:42

## 2021-07-25 RX ADMIN — HYDROXYZINE HYDROCHLORIDE 25 MG: 25 TABLET, FILM COATED ORAL at 19:42

## 2021-07-25 RX ADMIN — APIXABAN 2.5 MG: 2.5 TABLET, FILM COATED ORAL at 07:58

## 2021-07-25 RX ADMIN — POTASSIUM CHLORIDE 80 MEQ: 1500 TABLET, EXTENDED RELEASE ORAL at 13:45

## 2021-07-25 RX ADMIN — TORSEMIDE 100 MG: 100 TABLET ORAL at 08:11

## 2021-07-25 RX ADMIN — POTASSIUM CHLORIDE 40 MEQ: 1500 TABLET, EXTENDED RELEASE ORAL at 09:50

## 2021-07-25 RX ADMIN — APIXABAN 2.5 MG: 2.5 TABLET, FILM COATED ORAL at 19:42

## 2021-07-25 RX ADMIN — ACETAMINOPHEN 325 MG: 325 TABLET, FILM COATED ORAL at 08:19

## 2021-07-25 RX ADMIN — SODIUM CHLORIDE 250 ML: 9 INJECTION, SOLUTION INTRAVENOUS at 13:31

## 2021-07-25 RX ADMIN — FUROSEMIDE 10 MG/HR: 10 INJECTION, SOLUTION INTRAVENOUS at 00:48

## 2021-07-25 RX ADMIN — POTASSIUM CHLORIDE 20 MEQ: 750 TABLET, EXTENDED RELEASE ORAL at 09:48

## 2021-07-25 RX ADMIN — HYDROCORTISONE: 25 OINTMENT TOPICAL at 00:55

## 2021-07-25 RX ADMIN — Medication 5 ML: at 11:04

## 2021-07-25 ASSESSMENT — ACTIVITIES OF DAILY LIVING (ADL)
ADLS_ACUITY_SCORE: 13
ADLS_ACUITY_SCORE: 12

## 2021-07-25 ASSESSMENT — MIFFLIN-ST. JEOR: SCORE: 1168.19

## 2021-07-25 NOTE — PROGRESS NOTES
[facility]   Cardiology   Progress Note     ASSESSMENT/PLAN:  Leandra Guerrero is a 70 year old year old female with PMH of AL amyloidosis with cardiac manifestation, HFpEF who was directly admitted for heart failure exacerbation.     # Acute on chronic diastolic heart failure   # Cardiac Amyloidosis   Patient admitted as direct admission with ongoing hypervolemia, SOB in spite of increased PO diuretics as OP. Weight on admission 158 lbs, EDW around 147#  - Fluid status: Euvolemic, given bump in Cr, stop Lasix gtt, transition to Torsemide 100 mg BID  - EDW likely 147#.  - Continue 12.5 mg Aldactone   - Goal net negative even to 500mL net negative  - Monitor electrolytes daily. Low Na diet. Strict I/O.  - Daily weights  - CORE consulted, pt to follow up with Dr. Cohen 8/12     # Hypokalemia  Secondary to aggressive diuresis. Per outpatient provider, patient is very responsive to metolazone but subsequently gets quite hypokalemic. After starting lasix drip and metolazone admin, K as low as 2.1. QTc prolonged on EKG with U waves. PICC line placed 7/21 given frequent labs, sensation of burning in IV with IV potassium. K stable after start of Aldactone and K replacements  - potassium CR 80meq tid  - high replacement protocol (on top of scheduled)  - Continue Aldactone 12.5 mg daily     # Paroxysmal A. Fib/A. Flutter  [id number] of 4. Currently in NSR/ST  - Anticoagulation: Continue Eliquis 2.5mg BID, reduced dose d/t frequent bleeding, aware of risks  - BB: avoid in setting of amyloid  - Holding off on digoxin given generally good rate control/very rare RVR events. Could discuss in the future if needed.     # Prolonged QTC  QTc around 600-650  - Minimize QT prolonging meds     # Systemic amyloid  Not on any medications at this time.  - Heme previously monitoring her light chains which have been negative consistent with remission - therefore further palliative chemo not being  considered.     FEN: Cardiac  Code status: Full   Prophylaxis:  Eliquis  Isolation: None  Disposition: possible discharge tomorrow pending stable volume status on PO medications    Patient seen and discussed with Dr. West, who agrees with above plan.    Lizette SAEZ CNP  [facility] Cardiology Team  [phone]    Interval History:  - No acute events overnight  - Patient remains on IV Lasix gtt, was also given 2.5 mg PO Metolazone, net negative 1.3L. Weight down 3 pounds, Cr increased 1.0>1.2, value this am pending, BUN rising  - Pt continues to have rash from WANG wipes, improving with lotions  - Pt reports feeling well this am, has mild headache, otherwise denies chest pain, SOB, lightheadedness, dizziness    Physical Exam:  Temp:  [97.4  F (36.3  C)-98.7  F (37.1  C)] 97.4  F (36.3  C)  Pulse:  [102-116] 111  Resp:  [16-18] 16  BP: (106-129)/(67-85) 129/75  SpO2:  [97 %-99 %] 99 %    I/O:  Intake/Output Summary (Last 24 hours) at 7/25/2021 0754  Last data filed at 7/25/2021 0600  Gross per 24 hour   Intake 1966.02 ml   Output 3275 ml   Net -1308.98 ml         Wt:   Wt Readings from Last 5 Encounters:   07/25/21 66.9 kg (147 lb 8 oz)   06/21/21 71.2 kg (157 lb)   05/24/21 69.4 kg (153 lb 1.6 oz)   05/20/21 70.1 kg (154 lb 9.6 oz)   04/27/21 69.9 kg (154 lb)         General: NAD  HEENT:  PERRLA, EOMI.   Neck: no JVD  CV: RRR. No murmur appreciated. No rubs or gallops. Peripheral radial pulse intact.  Resp: No increased work of breathing or use of accessory muscles, breathing comfortably on room air.  Lung sounds clear throughout/bilaterally  Abdomen:  Normal active bowel sounds.  Abdomen is soft. No distension, non-tender to palpation.    Extremities: Warm. Capillary refill less than 3 sec. 2/4 radial pulses bilaterally.  2/4 pedal pulses bilaterally. BLE lymphedema. No cyanosis or clubbing.  Skin:  Warm and dry. Rash lower abdomen, BLE (improving)  Neuro: Alert and oriented x3.      Medications:    apixaban  ANTICOAGULANT  2.5 mg Oral BID     doxycycline hyclate  100 mg Oral Daily     heparin lock flush  5-20 mL Intracatheter Q24H     potassium chloride  80 mEq Oral TID     sodium chloride (PF)  10-40 mL Intracatheter Q8H     sodium chloride (PF)  3 mL Intracatheter Q8H     spironolactone  12.5 mg Oral Daily     torsemide  100 mg Oral BID       - MEDICATION INSTRUCTIONS -       - MEDICATION INSTRUCTIONS -         Labs:   CMP  Recent Labs   Lab 07/24/21  1733 07/24/21  0815 07/23/21  2033 07/23/21  1102 07/23/21  0817 07/22/21  0142 07/21/21  0755 07/21/21  0551    135 133  --  135 134   < > 135   POTASSIUM 4.0 3.5 4.8  --  3.5  3.5 4.7  --  2.3*   CHLORIDE 98 100 99  --  96 97   < > 92*   CO2 29 30 32  --  32 32   < > 36*   ANIONGAP 7 5 2*  --  7 5   < > 7   GLC 99 89 95  --  108* 93   < > 75   BUN 32* 24 30  --  26 28   < > 26   CR 1.25* 1.04 1.11*  --  1.07* 1.09*   < > 1.11*   GFRESTIMATED 44* 55* 51*  --  53* 52*   < > 51*   JERROD 8.9 8.8 9.0  --  9.3 9.5   < > 9.7   MAG  --   --   --  2.1  --  2.4*  --  2.4*    < > = values in this interval not displayed.     CBC  Recent Labs   Lab 07/20/21  1738   WBC 6.7   RBC 4.30   HGB 13.6   HCT 40.8   MCV 95   MCH 31.6   MCHC 33.3   RDW 13.7        INRNo lab results found in last 7 days.  Arterial Blood GasNo lab results found in last 7 days.    Diagnostics:  ECG 7/21/2021: NSR, HR 66, 1st deg AVB, [facility] PAC's       Echo 2/3/2021:  Interpretation Summary     Global and regional left ventricular function is hyperkinetic with an EF of  65-70%.  Right ventricular function, chamber size, wall motion, and thickness are  normal.  Mild to moderate aortic insufficiency is present.  Moderate tricuspid insufficiency is present.  Right ventricular systolic pressure is 33mmHg above the right atrial pressure.  IVC diameter and respiratory changes fall into an intermediate range  suggesting an RA pressure of 8 mmHg.  No pericardial effusion is present.  A right pleural effusion  is present.    No results found for this or any previous visit (from the past 24 hour(s)).

## 2021-07-25 NOTE — PLAN OF CARE
D: Patient was admitted on 7/20/2021 with heart failure exacerbation. Hx includes AL amyloidosis with cardiac manifestation and HFpEF.    I: Monitored vitals and assessed patient status.   Changed:Lasix drip was discontinued and she started on Torsemide orally  Running: R DL PICC is HL except the purple one is now saline locked  PRN: Hydrocortisone cream is ar her bedside. She has not used it yet    A: Patient vital signs have been stable. Her skin reaction from the monica wipes is much better now    I/O this shift:  In: 840 [P.O.:840]  Out: 1000 [Urine:1000]    Temp:  [97.4  F (36.3  C)-97.9  F (36.6  C)] 97.6  F (36.4  C)  Pulse:  [103-116] 108  Resp:  [16-18] 16  BP: ()/(64-85) 84/65  SpO2:  [97 %-99 %] 99 %      P: Continue to monitor patient status and report changes to the Cards 1  treatment team.

## 2021-07-25 NOTE — PLAN OF CARE
"D: Admitted 7/30 with heart failure exacerbation. Hx includes AL amyloidosis with cardiac manifestation and HFpEF.     I: Monitored vitals and assessed pt status.   Changed:  -Itching continues, but pt reports that it has improved since yesterday.   Running:  -Lasix gtt to PICC at 10 mg/hr  -PIV saline locked  PRN:  -Hydrocortisone cream applied to back     A: A0x4. VSS, on room air. Afib on monitor (rates 100s-120s). +3/4 edema in lower extremities. Afebrile. Denies pain. No BM this shift. 2g Na diet. Adequate urine output. Up independently to bedside commode. Appeared to sleep well between cares.       /75 (BP Location: Left arm)   Pulse 111   Temp 97.4  F (36.3  C) (Oral)   Resp 16   Ht 1.6 m (5' 3\")   Wt 66.9 kg (147 lb 8 oz)   SpO2 99%   BMI 26.13 kg/m         P: Continue to monitor Pt status and report changes to cards 1.  "

## 2021-07-25 NOTE — PROGRESS NOTES
Brief Cardiology Progress Note    When rounding in afternoon, pt reported headache and dizziness, BP soft 80/60's. Given Cr increase, very likely related to over-diuresis.     - will hold pm Torsemide dose (with plan to resume BID dosing tomorrow)  - give 250 mL NS  - remove 2L fluid restriction     SE Chambers, CNP  [facility] Cardiology

## 2021-07-25 NOTE — PLAN OF CARE
Shift summary [id number]    D:Pt admitted for fluid overload secondary to amyloidosis with cardiac anomaly.  A/I: Pt has been in afib rate controlled. Pt's VSS. Pt sating well on RA. Pt continues on lasix gtt at 10 mg/hr. Pt voiding in good amounts. Pt c/o  being very dry. Pt showered and hydrocortisone lotion  applied. Pt's rash appears much better. Pt reports feeling much better. Pt medicated with 1 tylenol 325 mg for HA and reported decrease in pain. Pt up to commode independently.Pt's LE appear less edematous.   P: continue current POC, monitor and document I and O, continue monitoring.

## 2021-07-26 PROBLEM — N17.9 ACUTE KIDNEY FAILURE, UNSPECIFIED (H): Status: ACTIVE | Noted: 2021-07-26

## 2021-07-26 LAB
ANION GAP SERPL CALCULATED.3IONS-SCNC: 5 MMOL/L (ref 3–14)
ANION GAP SERPL CALCULATED.3IONS-SCNC: 7 MMOL/L (ref 3–14)
BUN SERPL-MCNC: 49 MG/DL (ref 7–30)
BUN SERPL-MCNC: 51 MG/DL (ref 7–30)
CALCIUM SERPL-MCNC: 10.1 MG/DL (ref 8.5–10.1)
CALCIUM SERPL-MCNC: 9.1 MG/DL (ref 8.5–10.1)
CHLORIDE BLD-SCNC: 96 MMOL/L (ref 94–109)
CHLORIDE BLD-SCNC: 99 MMOL/L (ref 94–109)
CO2 SERPL-SCNC: 24 MMOL/L (ref 20–32)
CO2 SERPL-SCNC: 26 MMOL/L (ref 20–32)
CREAT SERPL-MCNC: 1.56 MG/DL (ref 0.52–1.04)
CREAT SERPL-MCNC: 1.65 MG/DL (ref 0.52–1.04)
GFR SERPL CREATININE-BSD FRML MDRD: 32 ML/MIN/1.73M2
GFR SERPL CREATININE-BSD FRML MDRD: 34 ML/MIN/1.73M2
GLUCOSE BLD-MCNC: 103 MG/DL (ref 70–99)
GLUCOSE BLD-MCNC: 119 MG/DL (ref 70–99)
HOLD SPECIMEN: NORMAL
POTASSIUM BLD-SCNC: 4.9 MMOL/L (ref 3.4–5.3)
POTASSIUM BLD-SCNC: 6.5 MMOL/L (ref 3.4–5.3)
SODIUM SERPL-SCNC: 128 MMOL/L (ref 133–144)
SODIUM SERPL-SCNC: 129 MMOL/L (ref 133–144)

## 2021-07-26 PROCEDURE — 80048 BASIC METABOLIC PNL TOTAL CA: CPT | Performed by: NURSE PRACTITIONER

## 2021-07-26 PROCEDURE — 36592 COLLECT BLOOD FROM PICC: CPT | Performed by: NURSE PRACTITIONER

## 2021-07-26 PROCEDURE — 250N000013 HC RX MED GY IP 250 OP 250 PS 637: Performed by: NURSE PRACTITIONER

## 2021-07-26 PROCEDURE — 214N000001 HC R&B CCU UMMC

## 2021-07-26 PROCEDURE — 99233 SBSQ HOSP IP/OBS HIGH 50: CPT | Performed by: INTERNAL MEDICINE

## 2021-07-26 PROCEDURE — 36592 COLLECT BLOOD FROM PICC: CPT | Performed by: PHYSICIAN ASSISTANT

## 2021-07-26 PROCEDURE — 250N000011 HC RX IP 250 OP 636: Performed by: PHYSICIAN ASSISTANT

## 2021-07-26 PROCEDURE — 250N000013 HC RX MED GY IP 250 OP 250 PS 637: Performed by: INTERNAL MEDICINE

## 2021-07-26 PROCEDURE — 250N000013 HC RX MED GY IP 250 OP 250 PS 637: Performed by: PHYSICIAN ASSISTANT

## 2021-07-26 PROCEDURE — 80048 BASIC METABOLIC PNL TOTAL CA: CPT | Performed by: PHYSICIAN ASSISTANT

## 2021-07-26 PROCEDURE — 250N000013 HC RX MED GY IP 250 OP 250 PS 637: Performed by: STUDENT IN AN ORGANIZED HEALTH CARE EDUCATION/TRAINING PROGRAM

## 2021-07-26 RX ADMIN — Medication 5 ML: at 08:20

## 2021-07-26 RX ADMIN — APIXABAN 2.5 MG: 2.5 TABLET, FILM COATED ORAL at 19:38

## 2021-07-26 RX ADMIN — TORSEMIDE 100 MG: 100 TABLET ORAL at 16:05

## 2021-07-26 RX ADMIN — DOXYCYCLINE 100 MG: 100 CAPSULE ORAL at 08:13

## 2021-07-26 RX ADMIN — TORSEMIDE 100 MG: 100 TABLET ORAL at 08:14

## 2021-07-26 RX ADMIN — HYDROCORTISONE: 25 OINTMENT TOPICAL at 10:00

## 2021-07-26 RX ADMIN — APIXABAN 2.5 MG: 2.5 TABLET, FILM COATED ORAL at 08:12

## 2021-07-26 RX ADMIN — ACETAMINOPHEN 650 MG: 325 TABLET, FILM COATED ORAL at 01:49

## 2021-07-26 RX ADMIN — SODIUM CHLORIDE, PRESERVATIVE FREE 5 ML: 5 INJECTION INTRAVENOUS at 19:38

## 2021-07-26 RX ADMIN — Medication 12.5 MG: at 08:12

## 2021-07-26 RX ADMIN — Medication 5 ML: at 08:21

## 2021-07-26 ASSESSMENT — ACTIVITIES OF DAILY LIVING (ADL)
ADLS_ACUITY_SCORE: 13

## 2021-07-26 ASSESSMENT — MIFFLIN-ST. JEOR: SCORE: 1186.33

## 2021-07-26 NOTE — PROGRESS NOTES
Shift summary  [id number]    Afebrile, VSS.  98% on room air. Alert & oriented x 4. Sinus Rhythm.  2gm sodium diet= fair po.  Compazine given x 1 for nausea. Pt denies any pain.  Adequate urine output, BM x 1 today.  +3-4 edema on bilateral LE.  Body rash d/t Aaron wipes resolving. Up independently to commode.

## 2021-07-26 NOTE — PROGRESS NOTES
Pt resting in bed quietly. No complaints at this time. Remains on RA.sats> 97%. NSR. 2 GM NA diet. +4 edema to BLE. Pt voids without any difficulty. Had one loose Bm. Pt in low position, side rails up x2. Call light within reach. Pt instructed to use call light for assistance and needs.Pt verbalized understanding. Report will be given to on coming nurse.

## 2021-07-26 NOTE — DISCHARGE SUMMARY
[address] [address]  p: [phone]    Discharge Summary: Cardiology Service    Leandra Guerrero MRN# [id number]   YOB: 1950 Age: 70 year old       Admission Date: 7/20/2021  Discharge Date: 07/26/21    Discharge Diagnoses:  # Acute on chronic diastolic heart failure, resolved  # Cardiac Amyloidosis   # Hypokalemia, improving  # Hyperkalemia,resovled   # ERIC, resolved   # Hyponatremia, improved  # Prolonged QTC  # Systemic amyloid  # Lymphedema     Brief HPI:  Leandra Guerrero is a 70 year old female with a history of AL amyloid with cardiac manifestations and HFpEF who was directly admitted on 7/20/21 with decompensated heart failure and failed outpatient management with increasing loop diuretics and metolazone. Weight on admission was about 10 lb above dry weight. She endorsed symptoms of dyspnea with minimal exertion, orthopnea, PND and increased lower extremity edema.     Hospital Course by Diagnosis:  # Acute on chronic diastolic heart failure, resolved  # Cardiac Amyloidosis   Patient admitted as direct admission with ongoing hypervolemia, SOB inspite of increased PO diuretics as OP. Weight on admission 158 lbs, EDW around 147-150#. Aggressively diuresed with IV lasix. Resumed on home dose of torsemide 100 mg BID. Discharge weight 150 lb.   - diuretic: continue torsemide 100 mg BID, PRN metolazone 1.25-2.5 mg for weight gain of 3 lb in one day or 5 lb in one week  - Continue 12.5 mg Aldactone   - CORE consulted, pt to follow up with Dr. Cohen 8/12  - daily weight  - 2 g sodium restriction encouraged     # Hypokalemia, improving  # Hyperkalemia,resovled   Patient initially struggled with hypokalemia due to aggressive IV diuresis throughout hospitalization requiring high replacement. Patient then was hyperkalemic with K of 6.5 secondary to receiving potassium replacement and spironolactone but not receiving torsemide  due to hypotension. Potassium 3.2 at time of discharge.   - patient instructed to increase PTA potassium replacement for the next 2 days to 80 mEq TID and then resume PTA potassium replacement regimen 80 mEq in morning, 80 mEq at noon, and 60 mEq in evening. PRN 60 mEq when taking metolazone  - recheck BMP on 7/29/21     # ERIC, resolved   # Hyponatremia, improved  Baseline creatinine  1.1-1.2. Cr on admission 1.02. Acute rise in Cr after aggressive diuresis to 1.65 with elevated BUN likely secondary to intravascular hypovolemia as well as hypotension. Creatine returned to baseline at time of discharge  - repeat BMP 7/29/21     # Paroxysmal A. Fib/A. Flutter  [id number] of 4. Currently in NSR  - Anticoagulation: Continue Eliquis 2.5mg BID, reduced dose d/t frequent bleeding, aware of risks  - BB: avoid in setting of amyloid  - Holding off on digoxin given generally good rate control/very rare RVR events. Could discuss in the future if needed.     # Prolonged QTC  QTc around 600-650  - Minimize QT prolonging meds     # Systemic amyloid  Not on any medications at this time.  - Heme previously monitoring her light chains which have been negative consistent with remission - therefore further palliative chemo not being considered.     # Lymphedema   Chronic bilateral lower extremity edema which appears more consistent with lymphedema rather than heart failure. Recommend diuretic regimen and compression wraps.     Pertinent Procedures:  PICC line placed    Consults:  PT/OT/Cardiac rehab      Discharge medications:   Current Discharge Medication List        START taking these medications    Details   spironolactone (ALDACTONE) 25 MG tablet Take 0.5 tablets (12.5 mg) by mouth daily  Qty: 45 tablet, Refills: 0    Associated Diagnoses: Hypokalemia           CONTINUE these medications which have CHANGED    Details   metolazone (ZAROXOLYN) 2.5 MG tablet Take 1 tablet (2.5 mg) by mouth twice a week As needed for increased  swelling and weight gain of 3 lb in one day or 5 lb in one week  Qty: 26 tablet, Refills: 3    Associated Diagnoses: Amyloid heart disease (H)           CONTINUE these medications which have NOT CHANGED    Details   apixaban ANTICOAGULANT (ELIQUIS) 2.5 MG tablet Take 1 tablet (2.5 mg) by mouth 2 times daily  Qty: 180 tablet, Refills: 3    Comments: NOTE NEW INSTRUCTIONS ( NOT NEW FOR PATIENT BUT NEW FOR YOUR PHARMACY)/DIFFERENT THAN THE FAXED REQUEST  Associated Diagnoses: Paroxysmal atrial fibrillation (H)      doxycycline hyclate (VIBRA-TABS) 100 MG tablet Take 1 tablet (100 mg) by mouth daily  Qty: 90 tablet, Refills: 3    Associated Diagnoses: AL amyloidosis (H)      OLANZapine (ZYPREXA) 2.5 MG tablet Take 2.5 mg by mouth At Bedtime      potassium chloride ER (KLOR-CON M) 10 MEQ CR tablet TAKE 8 TABLETS EVERY MORNING& 8 TABLETS AT LUNCH AND 6 TABLETS EVERY EVENING TAKE EXTRA 6 TABLETS DAY OF AND DAY AFTER METOLAZONE  Qty: 2268 tablet, Refills: 1    Comments: Increased quantity to include metolazone for day of and day after. Estimated 90 day supply assuming metolazone 3 times weekly  Associated Diagnoses: Amyloid heart disease (H)      torsemide (DEMADEX) 100 MG tablet Take 1 tablet (100 mg) by mouth every morning AND 1 tablet (100 mg) daily at 2 pm.  Qty: 180 tablet, Refills: 3    Comments: Decrease in dose. Will call for refills.  Associated Diagnoses: Acute on chronic diastolic heart failure (H); Amyloid heart disease (H)      Acetaminophen (TYLENOL PO) Take 325 mg by mouth every 6 hours as needed for mild pain or fever     Associated Diagnoses: Amyloid heart disease (H)      camphor-menthol (DERMASARRA) 0.5-0.5 % external lotion Apply 1 mL topically every 6 hours as needed for skin care  Qty: 222 mL, Refills: 0    Associated Diagnoses: Acute on chronic diastolic heart failure (H)      hydrOXYzine (ATARAX) 25 MG tablet Take 1 tablet (25 mg) by mouth 3 times daily as needed for itching  Qty: 30 tablet, Refills:  0    Associated Diagnoses: Acute on chronic diastolic heart failure (H)      LORazepam (ATIVAN) 0.5 MG tablet Take 1 tablet (0.5 mg) by mouth every 6 hours as needed for anxiety or nausea  Qty: 60 tablet, Refills: 0    Associated Diagnoses: Amyloid heart disease (H); Weight loss; Hypokalemia; AL amyloidosis (H)      ondansetron (ZOFRAN-ODT) 8 MG ODT tab Take 8 mg by mouth every 8 hours as needed PRN      triamcinolone (KENALOG) 0.1 % external cream Apply a thin layer twice daily to itchy areas as needed.  Qty: 453.6 g, Refills: 3    Associated Diagnoses: Intrinsic atopic dermatitis             Follow-up:  [facility] on 7/29/21  Dr. Cohen on 8/12/21    Labs or imaging requiring follow-up after discharge:  [facility]    Code status:  Full     Condition on discharge  Temp:  [97.4  F (36.3  C)-98.2  F (36.8  C)] 97.4  F (36.3  C)  Pulse:  [68-99] 99  Resp:  [16-18] 18  BP: (101-139)/(56-70) 113/57  SpO2:  [97 %-99 %] 98 %  General: Pleasant elderly female who appears comfortable and in no acute distress. Alert and interactive.   HEENT:  NCAT. Sclera clear.   Neck: JVP not elevated.   CV: RRR. Normal S1 and S2. No murmurs appreciated. Radial and pedal pulses palpable bilaterally.   Resp: Normal work of breathing on room air. No use of accessory muscles. Lungs clear to auscultation bilaterally without rales/wheezes/rhonchi.   Extremities: Warm and well perfused. Bilateral lower extremity edema/lymphedema, non-pitting. Varicose veins in lower extremities and feet.   Skin:  Warm and dry.   Neuro: Alert and oriented x3.  CN grossly intact. Moves all extremities.   Psych: Mood and affect appropriate.     Imaging with results:  ECG 7/21/2021: NSR, HR 66, 1st deg AVB, [facility] PAC's             Echo 2/3/2021:  Global and regional left ventricular function is hyperkinetic with an EF of  65-70%.  Right ventricular function, chamber size, wall motion, and thickness are  normal.  Mild to moderate aortic insufficiency is present.  Moderate  tricuspid insufficiency is present.  Right ventricular systolic pressure is 33mmHg above the right atrial pressure.  IVC diameter and respiratory changes fall into an intermediate range  suggesting an RA pressure of 8 mmHg.  No pericardial effusion is present.  A right pleural effusion is present.    Recent Labs   Lab 07/27/21  1412 07/27/21  1147 07/27/21  0628 07/26/21  1423 07/26/21  0635 07/23/21  1102 07/22/21  0142 07/21/21  0755 07/21/21  0551   NA  --  130* 132* 129* 128*  --  134   < > 135   POTASSIUM 3.2* 3.0* 2.2* 4.9 6.5*  --  4.7  --  2.3*   CHLORIDE  --  92* 92* 96 99  --  97   < > 92*   CO2  --  29 30 26 24  --  32   < > 36*   ANIONGAP  --  9 10 7 5  --  5   < > 7   GLC  --  122* 94 119* 103*  --  93   < > 75   BUN  --  41* 49* 51* 49*  --  28   < > 26   CR  --  1.19* 1.30* 1.56* 1.65*  --  1.09*   < > 1.11*   GFRESTIMATED  --  47* 42* 34* 32*  --  52*   < > 51*   JERROD  --  8.9 8.8 9.1 10.1  --  9.5   < > 9.7   MAG  --   --  2.4*  --   --  2.1 2.4*  --  2.4*    < > = values in this interval not displayed.       Patient Care Team:  Magy Obrien MD as PCP - General (Family Practice)  Mirela Moore MD as MD (Hematology & Oncology)  Domenica Cohen MD as MD (Cardiology)  Niki Fam, RN as Specialty Care Coordinator (Cardiology)  Ciara Araya PA-C as Physician Assistant (Physician Assistant)  Vidhi Montgomery, RN as Specialty Care Coordinator (Cardiology)  Mirela Moore MD as Assigned Cancer Care Provider  Roscoe Elam MD as Assigned Palliative Care Provider  Ciara Araya PA-C as Assigned Heart and Vascular Provider  Kasie Gupta MD as Assigned Surgical Provider    50 minutes spent in discharge, including >50% in counseling and coordination of care, medication review and plan of care recommended on follow up. Questions were answered.   It was our pleasure to care for Leandra Guerrero during this hospitalization. Please do not hesitate to  contact me should there be questions regarding the hospital course or discharge plan.      Hallie Izquierdo PA-C  [facility] Cardiology    This is a shared discharge note with Hallie Izquierdo PA-C    I had the privilege to evaluate and examine patient with Timbo Sterling PA-C.  Patient with  history of AL amyloid with cardiac manifestations of acute on chronic HFpEF who was directly admitted with decompensated heart failure and failed outpatient management with increasing loop diuretics and metolazone. Weight on admission was about 10 lb above dry weight. Patient had symptoms of dyspnea with minimal exertion, orthopnea, PND and increased lower extremity Lymphedema. There was also prolonged QTC.    During discharge process I examined patient with VIV.  I reviewed with patient the vitals during discharge.  Temp:  [97.4  F (36.3  C)-98.2  F (36.8  C)] 97.4  F (36.3  C)  Pulse:  [68-99] 99  Resp:  [16-18] 18  BP: (101-139)/(56-70) 113/57  SpO2:  [97 %-99 %] 98 %    I examined patient with VIV.  The most important findings were   HEENT:  NCAT. Sclera clear.   Neck: JVP not elevated.   CV: RRR. Normal S1 and S2. No murmurs appreciated. Radial and pedal pulses palpable bilaterally.   Resp: Normal work of breathing on room air. No use of accessory muscles. Lungs clear to auscultation bilaterally without rales/wheezes/rhonchi.   Extremities: Warm and well perfused. Bilateral lower extremity edema/lymphedema, non-pitting. Varicose veins in lower extremities and feet.     During discharge process I discussed the results of labs, EKG, and the medical therapy.    I have seen, interviewed, and examined patient. I have reviewed the laboratory tests, imaging, and other investigations. I have reviewed the management plan with the patient. I discussed with the team and agree with the findings and plan in this nurse practitioner's note. In addition, changes in the physical examination, assessment and plan have been incorporated into  the note by myself, as to make it a single cohesive document.     35 min were spent directly with patient.    Zak Mejia MD, PhD  Professor of Medicine  Division of Cardiology

## 2021-07-26 NOTE — PROGRESS NOTES
[facility]   Cardiology   Progress Note     ASSESSMENT/PLAN:  Leandra Guerrero is a 70 year old year old female with PMH of AL amyloidosis with cardiac manifestation, HFpEF who was directly admitted for heart failure exacerbation.    Plan for today:  - recheck BMP at 1300  - resume torsemide  - hold potassium supplementation     # Acute on chronic diastolic heart failure   # Cardiac Amyloidosis   Patient admitted as direct admission with ongoing hypervolemia, SOB in spite of increased PO diuretics as OP. Weight on admission 158 lbs, EDW around 147#  - Fluid status: appears hypervolemic on exam with elevated JVP and weight increased from yesterday to 151 lb. Continue torsemide 100 mg BID.   - EDW likely 147-150#.  - Continue 12.5 mg Aldactone   - Goal net negative even to 500mL net negative  - Monitor electrolytes daily. Low Na diet. Strict I/O.  - Daily weights  - CORE consulted, pt to follow up with Dr. Cohen 8/12     # Hypokalemia, resolved  # Hyperkalemia   Patient initially struggled with hypokalemia due to aggressive IV diuresis throughout hospitalization requiring high replacement. Patient is now hyperkalemic with K of 6.5 likely secondary to receiving potassium replacement and spironolactone yesterday but not receiving torsemide due to hypotension. This is also likely further exacerbated by ERIC.   - hold potassium replacement   - administer torsemide  - recheck BMP at 1300   - if still critically elevated, will administer insulin    # ERIC   # Hyponatremia  Baseline creatinine  1.1-1.2. Cr on admission 1.02. Acute rise in Cr after aggressive diuresis on 7/25 to 1.48 accompanied by hypotension. Cr has further risen today to 1.65 with elevated BUN. Patient appears hypervolemic on exam with elevated JVP and increased weight from yesterday. Hyponatremia likely secondary to hypervolemia   - encourage oral intake  - continue diuretic  - recheck BMP at 1300    # Paroxysmal A.  Fib/A. Flutter  [id number] of 4. Currently in NSR  - Anticoagulation: Continue Eliquis 2.5mg BID, reduced dose d/t frequent bleeding, aware of risks  - BB: avoid in setting of amyloid  - Holding off on digoxin given generally good rate control/very rare RVR events. Could discuss in the future if needed.     # Prolonged QTC  QTc around 600-650  - Minimize QT prolonging meds     # Systemic amyloid  Not on any medications at this time.  - Heme previously monitoring her light chains which have been negative consistent with remission - therefore further palliative chemo not being considered.     FEN: Cardiac  Code status: Full   Prophylaxis:  Eliquis  Isolation: None  Disposition: pending stability of K and Cr as well as volume status    Patient seen and discussed with Dr. Mejia, who agrees with above plan.    Hallie Izquierdo PA-C  [facility] Cardiology Team  [phone]    Interval History:  Cr 1.65, K 6.5. Weight up 4 lb from yesterday at 151 lb. Hypotension resolved and blood pressures stable overnight and into this morning. Patient continues to endorse intermittent dizziness/lightheadedness but this is improved from yesterday.  Telemetry unremarkable. Stable on room air.     Physical Exam:  Temp:  [97.2  F (36.2  C)-97.7  F (36.5  C)] 97.7  F (36.5  C)  Pulse:  [] 74  Resp:  [16-20] 20  BP: ()/(62-74) 133/74  SpO2:  [98 %-99 %] 98 %    I/O:  Intake/Output Summary (Last 24 hours) at 7/25/2021 0754  Last data filed at 7/25/2021 0600  Gross per 24 hour   Intake 1966.02 ml   Output 3275 ml   Net -1308.98 ml         Wt:   Wt Readings from Last 5 Encounters:   07/26/21 68.7 kg (151 lb 8 oz)   06/21/21 71.2 kg (157 lb)   05/24/21 69.4 kg (153 lb 1.6 oz)   05/20/21 70.1 kg (154 lb 9.6 oz)   04/27/21 69.9 kg (154 lb)       General: Pleasant elderly female who appears comfortable and in no acute distress. Alert and interactive.   HEENT:  NCAT. Sclera clear.   Neck: JVP elevated ~ 12 cm when sitting upright. +  hepatojugular reflux.  CV: RRR. Normal S1 and S2. No murmurs appreciated. Radial and pedal pulses palpable bilaterally.   Resp: Normal work of breathing on room air. No use of accessory muscles. Lungs clear to auscultation bilaterally without rales/wheezes/rhonchi.   Extremities: Warm and well perfused. Bilateral lower extremity edema/lymphedema, non-pitting. Varicose veins in lower extremities and feet.   Skin:  Warm and dry.   Neuro: Alert and oriented x3.  CN grossly intact. Moves all extremities.   Psych: Mood and affect appropriate.     Medications:    apixaban ANTICOAGULANT  2.5 mg Oral BID     doxycycline hyclate  100 mg Oral Daily     heparin lock flush  5-20 mL Intracatheter Q24H     potassium chloride  80 mEq Oral TID     sodium chloride (PF)  10-40 mL Intracatheter Q8H     sodium chloride (PF)  3 mL Intracatheter Q8H     spironolactone  12.5 mg Oral Daily     torsemide  100 mg Oral BID       - MEDICATION INSTRUCTIONS -       - MEDICATION INSTRUCTIONS -         Labs:   CMP  Recent Labs   Lab 07/26/21  0635 07/25/21  1341 07/25/21  0644 07/24/21  1733 07/23/21  1102 07/22/21  0142 07/21/21  0755 07/21/21  0551   * 134 135 134  --  134   < > 135   POTASSIUM 6.5* 4.2  4.2 3.2*  3.2* 4.0  --  4.7  --  2.3*   CHLORIDE 99 96 97 98  --  97   < > 92*   CO2 24 30 31 29  --  32   < > 36*   ANIONGAP 5 8 7 7  --  5   < > 7   * 110* 85 99  --  93   < > 75   BUN 49* 41* 36* 32*  --  28   < > 26   CR 1.65* 1.36* 1.48* 1.25*  --  1.09*   < > 1.11*   GFRESTIMATED 32* 40* 36* 44*  --  52*   < > 51*   JERROD 10.1 8.9 9.3 8.9  --  9.5   < > 9.7   MAG  --   --   --   --  2.1 2.4*  --  2.4*    < > = values in this interval not displayed.     CBC  Recent Labs   Lab 07/20/21  1738   WBC 6.7   RBC 4.30   HGB 13.6   HCT 40.8   MCV 95   MCH 31.6   MCHC 33.3   RDW 13.7        Diagnostics:  ECG 7/21/2021: NSR, HR 66, 1st deg AVB, occ PAC's           Echo 2/3/2021:  Global and regional left ventricular function is  hyperkinetic with an EF of  65-70%.  Right ventricular function, chamber size, wall motion, and thickness are  normal.  Mild to moderate aortic insufficiency is present.  Moderate tricuspid insufficiency is present.  Right ventricular systolic pressure is 33mmHg above the right atrial pressure.  IVC diameter and respiratory changes fall into an intermediate range  suggesting an RA pressure of 8 mmHg.  No pericardial effusion is present.  A right pleural effusion is present.      This is a shared note with Lizzie Sterling PA-C    Patient a 70 year old year old female with PMH of AL amyloidosis with cardiac manifestation, HFpEF who was directly admitted for heart failure exacerbation.    I evaluated and examined the patient together with Lizzie Sterling PA-C  Interval history: Patient had an increase of fluids with 4 pounds, blood pressures were more stable overnight and the hypotension was resolved.  However patient's complaints still about dizziness and lightheadedness.  There were no arrhythmias found on telemetry.    I examined the patient with VIV; in summary I reviewed the vitals, the intake and output summary and discussed the results with the patient and VIV.  I examined together the patient with VIV and in summary my findings were the following:  Neck: JVP elevated ~ 12 cm when sitting upright. + hepatojugular reflux.  CV: RRR. Normal S1 and S2. No murmurs appreciated. Radial and pedal pulses palpable bilaterally.   Resp: Normal work of breathing on room air. No use of accessory muscles. Lungs clear to auscultation bilaterally without rales/wheezes/rhonchi.   Extremities: Warm and well perfused. Bilateral lower extremity edema/lymphedema, non-pitting. Varicose veins in lower extremities and feet.     I discussed the recent labs with patient and VIV.    In summary , I have seen, interviewed, and examined patient with VIV. I have reviewed the laboratory tests, imaging, and other investigations. I have  reviewed the management plan with the patient. I discussed with the team and agree with the findings and plan in this PA-C's note. In addition, changes in the physical examination, assessment and plan have been incorporated into the note by myself, as to make it a single cohesive document.   The plan for today was resume the diuretic torsemide and hold the potassium supplementation, as well recheck BMP in the afternoon, which showed a normalization of the potassium with comparable renal function of this morning.    Zak Mejia MD, PhD  Professor of Medicine  Division of Cardiology

## 2021-07-26 NOTE — PLAN OF CARE
D: Patient was admitted on 7/20/2021 with heart failure exacerbation. Hx includes AL amyloidosis with cardiac manifestation and HFpEF. She hasd    I: Monitored vitals and assessed patient status.   Running: R DL PICC is heparin locked and her L piv is saline locked  PRN: her hydrocortisone s left with her at her bedside, Elyse used it once so far    A: Patient's vital signs have been stable except it was not done at 8 AM. Her rhythm was SB/SR 50-60 , with  possible Wandering  atrial pacemaker (WAP) and is dysrhythmic. Body rash d/t Aaron wipes is resolving.     I/O this shift:  In: 540 [P.O.:540]  Out: 450 [Urine:450]    Temp:  [97  F (36.1  C)-97.7  F (36.5  C)] 97  F (36.1  C)  Pulse:  [59-74] 59  Resp:  [18-20] 18  BP: (113-133)/(57-74) 120/57  SpO2:  [98 %-99 %] 99 %      P: Continue to monitor patient status and report changes to the Cards 1 treatment team.

## 2021-07-27 VITALS
RESPIRATION RATE: 18 BRPM | TEMPERATURE: 97.4 F | BODY MASS INDEX: 26.64 KG/M2 | OXYGEN SATURATION: 98 % | HEIGHT: 63 IN | DIASTOLIC BLOOD PRESSURE: 57 MMHG | WEIGHT: 150.35 LBS | HEART RATE: 99 BPM | SYSTOLIC BLOOD PRESSURE: 113 MMHG

## 2021-07-27 LAB
ANION GAP SERPL CALCULATED.3IONS-SCNC: 10 MMOL/L (ref 3–14)
ANION GAP SERPL CALCULATED.3IONS-SCNC: 9 MMOL/L (ref 3–14)
BUN SERPL-MCNC: 41 MG/DL (ref 7–30)
BUN SERPL-MCNC: 49 MG/DL (ref 7–30)
CALCIUM SERPL-MCNC: 8.8 MG/DL (ref 8.5–10.1)
CALCIUM SERPL-MCNC: 8.9 MG/DL (ref 8.5–10.1)
CHLORIDE BLD-SCNC: 92 MMOL/L (ref 94–109)
CHLORIDE BLD-SCNC: 92 MMOL/L (ref 94–109)
CO2 SERPL-SCNC: 29 MMOL/L (ref 20–32)
CO2 SERPL-SCNC: 30 MMOL/L (ref 20–32)
CREAT SERPL-MCNC: 1.19 MG/DL (ref 0.52–1.04)
CREAT SERPL-MCNC: 1.3 MG/DL (ref 0.52–1.04)
GFR SERPL CREATININE-BSD FRML MDRD: 42 ML/MIN/1.73M2
GFR SERPL CREATININE-BSD FRML MDRD: 47 ML/MIN/1.73M2
GLUCOSE BLD-MCNC: 122 MG/DL (ref 70–99)
GLUCOSE BLD-MCNC: 94 MG/DL (ref 70–99)
MAGNESIUM SERPL-MCNC: 2.4 MG/DL (ref 1.6–2.3)
POTASSIUM BLD-SCNC: 2.2 MMOL/L (ref 3.4–5.3)
POTASSIUM BLD-SCNC: 3 MMOL/L (ref 3.4–5.3)
POTASSIUM BLD-SCNC: 3.2 MMOL/L (ref 3.4–5.3)
SODIUM SERPL-SCNC: 130 MMOL/L (ref 133–144)
SODIUM SERPL-SCNC: 132 MMOL/L (ref 133–144)

## 2021-07-27 PROCEDURE — 82374 ASSAY BLOOD CARBON DIOXIDE: CPT | Performed by: PHYSICIAN ASSISTANT

## 2021-07-27 PROCEDURE — 36592 COLLECT BLOOD FROM PICC: CPT | Performed by: PHYSICIAN ASSISTANT

## 2021-07-27 PROCEDURE — 99239 HOSP IP/OBS DSCHRG MGMT >30: CPT | Performed by: INTERNAL MEDICINE

## 2021-07-27 PROCEDURE — 84132 ASSAY OF SERUM POTASSIUM: CPT | Performed by: PHYSICIAN ASSISTANT

## 2021-07-27 PROCEDURE — 80048 BASIC METABOLIC PNL TOTAL CA: CPT | Performed by: NURSE PRACTITIONER

## 2021-07-27 PROCEDURE — 250N000013 HC RX MED GY IP 250 OP 250 PS 637: Performed by: STUDENT IN AN ORGANIZED HEALTH CARE EDUCATION/TRAINING PROGRAM

## 2021-07-27 PROCEDURE — 36592 COLLECT BLOOD FROM PICC: CPT | Performed by: INTERNAL MEDICINE

## 2021-07-27 PROCEDURE — 83735 ASSAY OF MAGNESIUM: CPT | Performed by: INTERNAL MEDICINE

## 2021-07-27 PROCEDURE — 250N000013 HC RX MED GY IP 250 OP 250 PS 637: Performed by: NURSE PRACTITIONER

## 2021-07-27 PROCEDURE — 250N000013 HC RX MED GY IP 250 OP 250 PS 637: Performed by: PHYSICIAN ASSISTANT

## 2021-07-27 PROCEDURE — 250N000011 HC RX IP 250 OP 636: Performed by: PHYSICIAN ASSISTANT

## 2021-07-27 PROCEDURE — 36592 COLLECT BLOOD FROM PICC: CPT | Performed by: NURSE PRACTITIONER

## 2021-07-27 RX ORDER — SPIRONOLACTONE 25 MG/1
12.5 TABLET ORAL DAILY
Qty: 45 TABLET | Refills: 0 | Status: SHIPPED | OUTPATIENT
Start: 2021-07-28 | End: 2021-07-27

## 2021-07-27 RX ORDER — POTASSIUM CHLORIDE 1500 MG/1
60 TABLET, EXTENDED RELEASE ORAL DAILY
Status: DISCONTINUED | OUTPATIENT
Start: 2021-07-27 | End: 2021-07-27

## 2021-07-27 RX ORDER — METOLAZONE 2.5 MG/1
2.5 TABLET ORAL
Qty: 26 TABLET | Refills: 3 | Status: ON HOLD | OUTPATIENT
Start: 2021-07-29 | End: 2023-01-01

## 2021-07-27 RX ORDER — POTASSIUM CHLORIDE 1500 MG/1
60 TABLET, EXTENDED RELEASE ORAL EVERY 24 HOURS
Status: DISCONTINUED | OUTPATIENT
Start: 2021-07-27 | End: 2021-07-27 | Stop reason: HOSPADM

## 2021-07-27 RX ORDER — POTASSIUM CHLORIDE 1500 MG/1
80 TABLET, EXTENDED RELEASE ORAL 2 TIMES DAILY
Status: DISCONTINUED | OUTPATIENT
Start: 2021-07-27 | End: 2021-07-27 | Stop reason: HOSPADM

## 2021-07-27 RX ORDER — SPIRONOLACTONE 25 MG/1
12.5 TABLET ORAL DAILY
Qty: 45 TABLET | Refills: 0 | Status: SHIPPED | OUTPATIENT
Start: 2021-07-28 | End: 2021-08-25

## 2021-07-27 RX ORDER — POTASSIUM CHLORIDE 750 MG/1
40 TABLET, EXTENDED RELEASE ORAL ONCE
Status: COMPLETED | OUTPATIENT
Start: 2021-07-27 | End: 2021-07-27

## 2021-07-27 RX ADMIN — POTASSIUM CHLORIDE 80 MEQ: 1500 TABLET, EXTENDED RELEASE ORAL at 11:27

## 2021-07-27 RX ADMIN — POTASSIUM CHLORIDE 80 MEQ: 1500 TABLET, EXTENDED RELEASE ORAL at 08:41

## 2021-07-27 RX ADMIN — DOXYCYCLINE 100 MG: 100 CAPSULE ORAL at 08:40

## 2021-07-27 RX ADMIN — POTASSIUM CHLORIDE 40 MEQ: 750 TABLET, EXTENDED RELEASE ORAL at 12:59

## 2021-07-27 RX ADMIN — Medication 12.5 MG: at 08:40

## 2021-07-27 RX ADMIN — TORSEMIDE 100 MG: 100 TABLET ORAL at 15:02

## 2021-07-27 RX ADMIN — APIXABAN 2.5 MG: 2.5 TABLET, FILM COATED ORAL at 08:41

## 2021-07-27 RX ADMIN — Medication 5 ML: at 06:24

## 2021-07-27 RX ADMIN — TORSEMIDE 100 MG: 100 TABLET ORAL at 08:40

## 2021-07-27 ASSESSMENT — MIFFLIN-ST. JEOR: SCORE: 1181.13

## 2021-07-27 ASSESSMENT — ACTIVITIES OF DAILY LIVING (ADL)
ADLS_ACUITY_SCORE: 11
ADLS_ACUITY_SCORE: 11
ADLS_ACUITY_SCORE: 13

## 2021-07-27 NOTE — PLAN OF CARE
[id number] 7/27/2021    Patient is a 70 year old female admitted on 7/20 for heart failure exacerbation/fluid overload with a PMH of AL amyloidosis with cardiac manifestation, HFpEF who was directly admitted. Patients team is cards 1.    Neuro: A&Ox4; patient makes her needs known  Cardiac: Afib/Aflutter controlled (PAC's occur occasionally as well); AV block; patient denies chest pain; peripheral pulses all palpable; HF education enforced today; patient is on K+ and Mg protocols (potassium was critically low this morning and replaced); patient has generalized +2 edema  Respiratory: WDL; patient on room air at 97%; clear lung sounds; no cough or sputum production; no SOB  GI/: WDL; last BM 7/26; produces urine frequently related to diuretic use; color and clarity WDL; bowel sounds active x4  Endocrine: WDL  Diet/Appetite: 2 gram sodium diet; thin liquids; appropriate appetite  Skin: Widespread generalized rash (related to amyloidosis); skin otherwise intact and elastic; patient repositions herself frequently   LDA: Right double lumen PICC (heparin locked); left PIV (saline locked)  Activity: Independent in room  Pain: Patient had slight headache this morning; one 325mg Tylenol was given PRN  Plan: Discharge this afternoon pending normalized potassium level (was 2.2 this morning, team was notified)

## 2021-07-27 NOTE — PROVIDER NOTIFICATION
Notified MD at 0738 AM regarding lab results.      Spoke with: Cards 1 team.    Orders were not obtained.    Comments: Critical potassium lab- 2.2. Patient is on the protocol.

## 2021-07-27 NOTE — DISCHARGE SUMMARY
DISCHARGE     Discharged to: Home, to [place]  Via: Automobile,  [place]  Accompanied by: Family  Discharge Instructions: Principal diagnosis, major findings/procedures, diet, activity, medications, follow up appointments, when to call the MD, and what to watchout for (i.e. s/s of infection, increasing SOB, palpitations, Angina). Pt states understanding of all discharge instructions.   Prescriptions: Sent to [place] [facility] #[id number]  Follow Up Appointments: 7/29 [facility]: lab appointment   Belongings: All sent with pt. Pt verifies that they are taking all belongings with them.   IV: Discontinued.   Telemetry: Discontinued.   Pt exhibits understanding of above discharge instructions; all questions answered.  Discharge Paperwork: Sent home with patient; signed copy left at .

## 2021-07-27 NOTE — PLAN OF CARE
Shift summary [id number]    D:Pt admitted for fluid overload d/t amyloidosis.   A/I: Pt has denied any c/o dizziness or lightheadedness. Pt  has been in junctional SR rates in the 80's, other VSS. Pt has been getting up to br independently, voiding in good amounts. Pt eating and drinking. Pt continues to have LE edema 3-4 +. Pt's lungs clear. Pt's potassium and Cr improved  P: continue current POC, monitoring and medications. Anticipate discharge later this week.

## 2021-07-27 NOTE — PROGRESS NOTES
Report received from nurse at 5262. Patient received aaox4. Patient ambulating to bathroom with steady gait. VSS. 0 complaints. Patient had an uneventful night. Several trips to bathroom without incident.

## 2021-07-27 NOTE — PLAN OF CARE
Problem: Adult Inpatient Plan of Care  Goal: Plan of Care Review  Outcome: Adequate for Discharge  Goal: Patient-Specific Goal (Individualized)  Outcome: Adequate for Discharge  Goal: Absence of Hospital-Acquired Illness or Injury  Outcome: Adequate for Discharge  Intervention: Identify and Manage Fall Risk  Recent Flowsheet Documentation  Taken 7/27/2021 0811 by Christina Hanna RN  Safety Promotion/Fall Prevention:   nonskid shoes/slippers when out of bed   clutter free environment maintained  Intervention: Prevent Skin Injury  Recent Flowsheet Documentation  Taken 7/27/2021 0800 by Christina Hanna RN  Body Position: position changed independently  Intervention: Prevent and Manage VTE (Venous Thromboembolism) Risk  Recent Flowsheet Documentation  Taken 7/27/2021 0811 by Christina Hanna RN  VTE Prevention/Management:   ambulation promoted   fluids promoted  Intervention: Prevent Infection  Recent Flowsheet Documentation  Taken 7/27/2021 0811 by Christina Hanna RN  Infection Prevention: rest/sleep promoted  Goal: Optimal Comfort and Wellbeing  Outcome: Adequate for Discharge  Intervention: Provide Person-Centered Care  Recent Flowsheet Documentation  Taken 7/27/2021 0811 by Christina Hanna RN  Trust Relationship/Rapport: care explained  Goal: Readiness for Transition of Care  Outcome: Adequate for Discharge

## 2021-07-28 ENCOUNTER — PATIENT OUTREACH (OUTPATIENT)
Dept: CARE COORDINATION | Facility: CLINIC | Age: 71
End: 2021-07-28

## 2021-07-28 DIAGNOSIS — Z71.89 OTHER SPECIFIED COUNSELING: ICD-10-CM

## 2021-07-28 NOTE — PROGRESS NOTES
"Clinic Care Coordination Contact  [facility]: Post-Discharge Note  SITUATION                                                      Admission:    Admission Date: 07/20/21   Reason for Admission: Heart failure (H)Acute kidney failure, unspecified (H)  Discharge:   Discharge Date: 07/27/21  Discharge Diagnosis: Heart failure (H)Acute kidney failure, unspecified (H)    BACKGROUND                                                      # Acute on chronic diastolic heart failure, resolved  # Cardiac Amyloidosis   Patient admitted as direct admission with ongoing hypervolemia, SOB inspite of increased PO diuretics as OP. Weight on admission 158 lbs, EDW around 147-150#. Aggressively diuresed with IV lasix. Resumed on home dose of torsemide 100 mg BID. Discharge weight 150 lb.   - diuretic: continue torsemide 100 mg BID, PRN metolazone 1.25-2.5 mg for weight gain of 3 lb in one day or 5 lb in one week  - Continue 12.5 mg Aldactone   - CORE consulted, pt to follow up with Dr. Cohen 8/12  - daily weight  - 2 g sodium restriction encouraged    ASSESSMENT      Discharge Assessment  How are you doing now that you are home?: \"okay\"  How are your symptoms? (Red Flag symptoms escalate to triage hotline per guidelines): Unchanged  Do you feel your condition is stable enough to be safe at home until your provider visit?: Yes  Does the patient have their discharge instructions? : Yes  Does the patient have questions regarding their discharge instructions? : No  Were you started on any new medications or were there changes to any of your previous medications? : Yes - Schedule RNCC appt within 48 business hours (no questions)  Does the patient have all of their medications?: No or Other - Connect patient to RN (will pick them up this morning)  Do you have questions regarding any of your medications? : No  Do you have all of your needed medical supplies or equipment (DME)?  (i.e. oxygen tank, CPAP, cane, etc.): Yes  Discharge " follow-up appointment scheduled within 14 calendar days? : Yes  Discharge Follow Up Appointment Date: 08/12/21  Discharge Follow Up Appointment Scheduled with?: Specialty Care Provider    Post-op  Did the patient have surgery or a procedure: No  Fever: No  Chills: No  Eating & Drinking: eating and drinking without complaints/concerns  PO Intake: regular diet  Bowel Function: loose stools (Pt is monitoring, CTA provided pt nurse triage number for if she were to have pressing concerns)  Date of last BM: 07/28/21  Urinary Status:  (voiding a lot)        PLAN                                                      Outpatient Plan:  [facility] on 7/29/21  Dr. Cohen on 8/12/21    Future Appointments   Date Time Provider Department Center   7/29/2021  8:40 [facility]   8/12/2021  8:00 AM  LAB [facility]   8/12/2021  8:30 AM Domenica Cohen MD [facility]   9/1/2021  8:00 AM  LAB [facility]   9/1/2021  8:30 AM Ciara Araya PA-C [facility]   11/12/2021  9:30 AM LAB ONC [facility] [place]   11/19/2021 11:00 AM Mirela Moore MD [facility]         For any urgent concerns, please contact our 24 hour nurse triage line: [phone] ([phone])         JOS Powell  [phone]  [facility]

## 2021-07-29 ENCOUNTER — PATIENT OUTREACH (OUTPATIENT)
Dept: CARDIOLOGY | Facility: CLINIC | Age: 71
End: 2021-07-29

## 2021-07-29 ENCOUNTER — LAB (OUTPATIENT)
Dept: LAB | Facility: CLINIC | Age: 71
End: 2021-07-29
Attending: PHYSICIAN ASSISTANT
Payer: COMMERCIAL

## 2021-07-29 DIAGNOSIS — I50.33 ACUTE ON CHRONIC DIASTOLIC HEART FAILURE (H): Primary | ICD-10-CM

## 2021-07-29 DIAGNOSIS — E87.6 HYPOKALEMIA: ICD-10-CM

## 2021-07-29 LAB
ANION GAP SERPL CALCULATED.3IONS-SCNC: 6 MMOL/L (ref 3–14)
BUN SERPL-MCNC: 33 MG/DL (ref 7–30)
CALCIUM SERPL-MCNC: 8.9 MG/DL (ref 8.5–10.1)
CHLORIDE BLD-SCNC: 97 MMOL/L (ref 94–109)
CO2 SERPL-SCNC: 31 MMOL/L (ref 20–32)
CREAT SERPL-MCNC: 1.25 MG/DL (ref 0.52–1.04)
GFR SERPL CREATININE-BSD FRML MDRD: 44 ML/MIN/1.73M2
GLUCOSE BLD-MCNC: 83 MG/DL (ref 70–99)
POTASSIUM BLD-SCNC: 4.4 MMOL/L (ref 3.4–5.3)
SODIUM SERPL-SCNC: 134 MMOL/L (ref 133–144)

## 2021-07-29 PROCEDURE — 36415 COLL VENOUS BLD VENIPUNCTURE: CPT

## 2021-07-29 PROCEDURE — 80048 BASIC METABOLIC PNL TOTAL CA: CPT

## 2021-07-29 NOTE — TELEPHONE ENCOUNTER
"Called Amy to follow up post hospital stay. Labs done today show stable K. She discharged at 150. Weight today 148. Reports having a \"dizzy headache\" that she had the whole time she was in the hospital and never really got rid of.  Has a BP cuff at home, hasn't used it. Advised to check BP and notify us if low.      Discharged on Torsemide 100 mg BID and Metolazone PRN . Leaving tomorrow for [place] and will be back on the 10th of August.   Will update provider.   Vidhi Montgomery RN   "

## 2021-07-29 NOTE — TELEPHONE ENCOUNTER
Reviewed with Yolanda MORGAN and joanna sent to Amy with following recommendations:   Date: 7/29/2021    Time of Call: 12:15 PM     Diagnosis:  HFpEF     [ VORB ] Ordering provider: Yolanda MORGAN  Order: BMP next week     Order received by: Vidhi Montgomery RN      Follow-up/additional notes: No changes to current medications. Give dizziness headache some time to improve. Simrant sent to Amy to find out where to fax lab orders in [place].

## 2021-07-30 ENCOUNTER — MYC MEDICAL ADVICE (OUTPATIENT)
Dept: CARDIOLOGY | Facility: CLINIC | Age: 71
End: 2021-07-30

## 2021-07-30 DIAGNOSIS — I50.32 CHRONIC DIASTOLIC HEART FAILURE (H): Primary | ICD-10-CM

## 2021-08-03 LAB
ANION GAP SERPL CALC-SCNC: 8 MMOL/L
BUN SERPL-MCNC: 25 MG/DL
CALCIUM (EXTERNAL): 9.2 MG/DL
CHLORIDE (EXTERNAL): 99 MMOL/L
CO2 (EXTERNAL): 30 MMOL/L
CREATININE (EXTERNAL): 1.09 MG/DL
GFR ESTIMATED (EXTERNAL): 50 ML/MIN/1.73M2
GFR ESTIMATED (IF AFRICAN AMERICAN) (EXTERNAL): 60 ML/MIN/1.73M2
GLUCOSE (EXTERNAL): 66 MG/DL (ref 70–99)
POTASSIUM (EXTERNAL): 3.8 MMOL/L
SODIUM (EXTERNAL): 137 MMOL/L

## 2021-08-03 NOTE — TELEPHONE ENCOUNTER
Date: 8/3/2021    Time of Call: 1:06 PM     Diagnosis:  Heart failure     [ VORB ] Ordering provider: CATHY Orozco    Order: BMP in 7-10 days     Order received by: Niki Fam RN       Follow-up/additional notes: sent mychart to eliana

## 2021-08-12 ENCOUNTER — MYC MEDICAL ADVICE (OUTPATIENT)
Dept: CARDIOLOGY | Facility: CLINIC | Age: 71
End: 2021-08-12

## 2021-08-12 ENCOUNTER — LAB (OUTPATIENT)
Dept: LAB | Facility: CLINIC | Age: 71
End: 2021-08-12
Payer: COMMERCIAL

## 2021-08-12 ENCOUNTER — OFFICE VISIT (OUTPATIENT)
Dept: CARDIOLOGY | Facility: CLINIC | Age: 71
End: 2021-08-12
Attending: INTERNAL MEDICINE
Payer: COMMERCIAL

## 2021-08-12 VITALS
HEIGHT: 63 IN | WEIGHT: 154.1 LBS | BODY MASS INDEX: 27.3 KG/M2 | DIASTOLIC BLOOD PRESSURE: 61 MMHG | OXYGEN SATURATION: 100 % | HEART RATE: 69 BPM | SYSTOLIC BLOOD PRESSURE: 121 MMHG

## 2021-08-12 DIAGNOSIS — E85.4 AMYLOID HEART DISEASE (H): ICD-10-CM

## 2021-08-12 DIAGNOSIS — I50.32 CHRONIC DIASTOLIC HEART FAILURE (H): Primary | ICD-10-CM

## 2021-08-12 DIAGNOSIS — I50.32 CHRONIC DIASTOLIC HEART FAILURE (H): ICD-10-CM

## 2021-08-12 DIAGNOSIS — E87.6 HYPOKALEMIA: ICD-10-CM

## 2021-08-12 DIAGNOSIS — I43 AMYLOID HEART DISEASE (H): ICD-10-CM

## 2021-08-12 LAB
ANION GAP SERPL CALCULATED.3IONS-SCNC: 6 MMOL/L (ref 3–14)
BUN SERPL-MCNC: 36 MG/DL (ref 7–30)
CALCIUM SERPL-MCNC: 9.2 MG/DL (ref 8.5–10.1)
CHLORIDE BLD-SCNC: 96 MMOL/L (ref 94–109)
CO2 SERPL-SCNC: 32 MMOL/L (ref 20–32)
CREAT SERPL-MCNC: 1.12 MG/DL (ref 0.52–1.04)
GFR SERPL CREATININE-BSD FRML MDRD: 51 ML/MIN/1.73M2
GLUCOSE BLD-MCNC: 99 MG/DL (ref 70–99)
POTASSIUM BLD-SCNC: 3.6 MMOL/L (ref 3.4–5.3)
SODIUM SERPL-SCNC: 134 MMOL/L (ref 133–144)

## 2021-08-12 PROCEDURE — 99215 OFFICE O/P EST HI 40 MIN: CPT | Performed by: INTERNAL MEDICINE

## 2021-08-12 PROCEDURE — G0463 HOSPITAL OUTPT CLINIC VISIT: HCPCS

## 2021-08-12 PROCEDURE — 36415 COLL VENOUS BLD VENIPUNCTURE: CPT | Performed by: PATHOLOGY

## 2021-08-12 PROCEDURE — 80048 BASIC METABOLIC PNL TOTAL CA: CPT | Performed by: PATHOLOGY

## 2021-08-12 ASSESSMENT — MIFFLIN-ST. JEOR: SCORE: 1198.12

## 2021-08-12 ASSESSMENT — PAIN SCALES - GENERAL: PAINLEVEL: NO PAIN (0)

## 2021-08-12 NOTE — LETTER
8/12/2021      RE: Leandra Guerrero  [address] [place] [address]       Dear Colleague,    Thank you for the opportunity to participate in the care of your patient, Leandra Guerrero, at the [facility] [place] [facility]. Please see a copy of my visit note below.    August 12, 2021    CC:     70 F year old female with a past medical history including stage IV AL amyloid diagnosed in 2016. Presents to clinic for HF follow up.      Her cardiac and oncologic history are as follows: She had fatigue in 2016.  She had a coronary angiogram which was reportedly normal but was diagnosed with HF and started on a BB and diuretics. Her symptoms progressed to the point that she was evaluated at [facility] in August/September (Dr. Barron in oncology/hematology, Dr. Nettles in cardiology). She was diagnosed with stage IV AL amyloid (+GI, +bone marrow involvement, no involvement of kidney or liver). Her work up is detailed in our previous notes, however she has lambda AL amyloidosis and she underwent CyBorD chemotherapy and excellent light chain response by serum. Patient has been turned down at [facility] for a stem cell transplant due to her cardiac involvement. Later in 2016, she had 2-3 more right sided pleural taps. She was hospitalized 1/2017 and diuresed 20-30 pounds at that time. Since that time her AL has been in remission by labs without further chemo. Patient was then treated with doxycycline , which she continues to this day.    With respect to her AL diagnosis, she has been in remission for some time.  She has not required further chemotherapy for light chain suppression.  She just had a follow-up with her oncologist a few months ago.     Despite having all features of end-stage AL cardiac amyloidosis (many high risk features associated with very poor prognosis)-Amy has settled into a period of stability for several years and has had much  more of a slow, steady decline from her restrictive cardiomyopathy.     She remains on very high-dose diuretics and needs to be seen frequently and has substantial fatigue and is overall been slowing down, however her endorgan function remains normal- she was recently admitted and lost 11 lbs of fluid in a few days. She felt better after this than she had in a long time. Was able to travel to [place] and enjoy time with friends.     She can walk 1/2 block, can do some light housework. Gets nausea when fluid is up substantially. + LE edema., weight up 5 lbs since discharge.       Medications:     Current Outpatient Medications   Medication Sig Dispense Refill     Acetaminophen (TYLENOL PO) Take 325 mg by mouth every 6 hours as needed for mild pain or fever        apixaban ANTICOAGULANT (ELIQUIS) 2.5 MG tablet Take 1 tablet (2.5 mg) by mouth 2 times daily 180 tablet 3     camphor-menthol (DERMASARRA) 0.5-0.5 % external lotion Apply 1 mL topically every 6 hours as needed for skin care 222 mL 0     doxycycline hyclate (VIBRA-TABS) 100 MG tablet Take 1 tablet (100 mg) by mouth daily 90 tablet 3     hydrOXYzine (ATARAX) 25 MG tablet Take 1 tablet (25 mg) by mouth 3 times daily as needed for itching 30 tablet 0     LORazepam (ATIVAN) 0.5 MG tablet Take 1 tablet (0.5 mg) by mouth every 6 hours as needed for anxiety or nausea 60 tablet 0     metolazone (ZAROXOLYN) 2.5 MG tablet Take 1 tablet (2.5 mg) by mouth twice a week As needed for increased swelling and weight gain of 3 lb in one day or 5 lb in one week 26 tablet 3     ondansetron (ZOFRAN-ODT) 8 MG ODT tab Take 8 mg by mouth every 8 hours as needed PRN       potassium chloride ER (KLOR-CON M) 10 MEQ CR tablet TAKE 8 TABLETS EVERY MORNING& 8 TABLETS AT LUNCH AND 6 TABLETS EVERY EVENING TAKE EXTRA 6 TABLETS DAY OF AND DAY AFTER METOLAZONE 2268 tablet 1     spironolactone (ALDACTONE) 25 MG tablet Take 0.5 tablets (12.5 mg) by mouth daily 45 tablet 0     torsemide  (DEMADEX) 100 MG tablet Take 1 tablet (100 mg) by mouth every morning AND 1 tablet (100 mg) daily at 2 pm. 180 tablet 3     triamcinolone (KENALOG) 0.1 % external cream Apply a thin layer twice daily to itchy areas as needed. 453.6 g 3     OLANZapine (ZYPREXA) 2.5 MG tablet Take 2.5 mg by mouth At Bedtime (Patient not taking: Reported on 8/12/2021)         PAST MEDICAL HISTORY:  Past Medical History:   Diagnosis Date     AL amyloidosis (H)      Atrial fibrillation and flutter (H)      Cardiac amyloidosis (H)      Lymphedema      QT prolongation      Recurrent right pleural effusion 1/2/2017     SVT (supraventricular tachycardia) (H)        FAMILY HISTORY:  Family History   Problem Relation Age of Onset     Other - See Comments Sister         Amyloidosis       CURRENT MEDICATIONS:    Current Outpatient Medications   Medication Sig Dispense Refill     Acetaminophen (TYLENOL PO) Take 325 mg by mouth every 6 hours as needed for mild pain or fever        apixaban ANTICOAGULANT (ELIQUIS) 2.5 MG tablet Take 1 tablet (2.5 mg) by mouth 2 times daily 180 tablet 3     camphor-menthol (DERMASARRA) 0.5-0.5 % external lotion Apply 1 mL topically every 6 hours as needed for skin care 222 mL 0     doxycycline hyclate (VIBRA-TABS) 100 MG tablet Take 1 tablet (100 mg) by mouth daily 90 tablet 3     hydrOXYzine (ATARAX) 25 MG tablet Take 1 tablet (25 mg) by mouth 3 times daily as needed for itching 30 tablet 0     LORazepam (ATIVAN) 0.5 MG tablet Take 1 tablet (0.5 mg) by mouth every 6 hours as needed for anxiety or nausea 60 tablet 0     metolazone (ZAROXOLYN) 2.5 MG tablet Take 1 tablet (2.5 mg) by mouth twice a week As needed for increased swelling and weight gain of 3 lb in one day or 5 lb in one week 26 tablet 3     ondansetron (ZOFRAN-ODT) 8 MG ODT tab Take 8 mg by mouth every 8 hours as needed PRN       potassium chloride ER (KLOR-CON M) 10 MEQ CR tablet TAKE 8 TABLETS EVERY MORNING& 8 TABLETS AT LUNCH AND 6 TABLETS EVERY  "EVENING TAKE EXTRA 6 TABLETS DAY OF AND DAY AFTER METOLAZONE 2268 tablet 1     spironolactone (ALDACTONE) 25 MG tablet Take 0.5 tablets (12.5 mg) by mouth daily 45 tablet 0     torsemide (DEMADEX) 100 MG tablet Take 1 tablet (100 mg) by mouth every morning AND 1 tablet (100 mg) daily at 2 pm. 180 tablet 3     triamcinolone (KENALOG) 0.1 % external cream Apply a thin layer twice daily to itchy areas as needed. 453.6 g 3     OLANZapine (ZYPREXA) 2.5 MG tablet Take 2.5 mg by mouth At Bedtime (Patient not taking: Reported on 8/12/2021)         ROS:   Constitutional: No fever, chills, or sweats. Weight . + fatigue   ENT: No visual disturbance, ear ache, epistaxis, sore throat.   Allergies/Immunologic: Negative.   Respiratory: No cough, hemoptysis.   Cardiovascular: As per HPI.   GI: no current nausea, vomiting, hematemesis, melena, or hematochezia.   : No urinary frequency, dysuria, or hematuria.   Integument: Negative.   Psychiatric: Negative.   Neuro: Negative.   Endocrinology: Negative.   Musculoskeletal: Negative.      EXAM:  /61 (BP Location: Left arm, Patient Position: Chair, Cuff Size: Adult Regular)   Pulse 69   Ht 1.6 m (5' 3\")   Wt 69.9 kg (154 lb 1.6 oz)   SpO2 100%   BMI 27.30 kg/m    General: appears comfortable, alert and articulate  Head: normocephalic, atraumatic  Eyes: anicteric sclera, EOMI  Neck: no adenopathy  Orophyarynx: moist mucosa, no lesions, dentition intact  Heart: PMI diffuse,trace systolic murmur at LLSB, regular, S1/S2, rub, estimated JVP 9 cm   Lungs: clear, no rales or wheezing  Abdomen: soft, non-tender, bowel sounds present, no hepatosplenomegaly  Extremities: no clubbing, cyanosis- does have LE fullness without tense edema   Neurological: normal speech and affect, no gross motor deficits      Labs, reviewed with patient in clinic today:  Cr 1.1     Potassium 3.6       Diagnostics:    Coronary Angiogram 2016  Diagnostic Findings  LEFT MAIN CORONARY ARTERY     The LMCA is " free of significant disease.    LEFT ANTERIOR DESCENDING     The LAD is free of significant disease.    CIRCUMFLEX     The Circumflex is free of significant disease.    RIGHT CORONARY ARTERY     The RCA is free of significant disease.       Holter Monitor 06/2019  INTERPRETATION  1. The rhythm varied with sinus rhythm and atrial fibrillation/variable atrial flutter. Low voltage noted.  2. The heart rate varied with a minimum rate of 48 bpm, maximum rate of 164 bpm, average rate of 74 bpm.  3. Frequent supraventricular ectopy was seen ranging from 0-469 per hour. Occasional atrail pairs and fairly frequent bigeminal cycles were noted.  Wandering atrial pacemaker noted.  4. Infrequent multifocal ventricular ectopy was seen ranging from 0-36 beats per hour.  5. ST-T wave changes were present.  6. Three patient events were documented and correlated with atrial fibrillation/flutter          Echo 2/3/2021     Global and regional left ventricular function is hyperkinetic with an EF of  65-70%.  Right ventricular function, chamber size, wall motion, and thickness are  normal.  Mild to moderate aortic insufficiency is present.  Moderate tricuspid insufficiency is present.  Right ventricular systolic pressure is 33mmHg above the right atrial pressure.  IVC diameter and respiratory changes fall into an intermediate range  suggesting an RA pressure of 8 mmHg.  No pericardial effusion is present.  A right pleural effusion is present.    Assessment and Plan:   Mrs. Guerrero is a 70 year old female with AL cardiac amyloidosis who presents for follow up.    While she has all features that are consistent with end-stage disease, she is taking this month by month. She did feel much better with IV diuretics and felt better for many weeks after that admission. I am willing to offer this in the future given she felt so much better after. We will get to the point where even IV in the hospital is ineffective and will have to move towards  comfort at that time. She is not a good candidate for dialysis given restrictive physiology.        AL cardiac amyloidosis: Rios stage IV, NYHA class IIIb, Stage C/D   -Even though AL Amyloid has a poor prognosis, she has done exceptionally well and has mild cardiac symptoms at this time. However, I do suspect that these may continue to slowly get worse over time.   -Continue Torsemide 100mg BID with Metolozone 2.5 mg twice a week (dosing when she needs)   - aldactone 12.5 mg daily        # A. Fib/A. Flutter-paroxysmal- last zio with 4 % burden  -Avoiding BB given her Amyloid history.   On anti-coag     #Systemic Amyloid  -Continue Doxycycline     Goals of care: For now gender has wanted clinic care, hospitalizations as needed for now. Taking it one month at a time as above.       RTC 1 mo with NP, 4 months with me       Domenica Cohen MD  Associate Professor of Medicine   [facility] Division of Cardiology

## 2021-08-12 NOTE — PROGRESS NOTES
August 12, 2021    CC:     70 F year old female with a past medical history including stage IV AL amyloid diagnosed in 2016. Presents to clinic for HF follow up.      Her cardiac and oncologic history are as follows: She had fatigue in 2016.  She had a coronary angiogram which was reportedly normal but was diagnosed with HF and started on a BB and diuretics. Her symptoms progressed to the point that she was evaluated at [place] in August/September (Dr. Barron in oncology/hematology, Dr. Nettles in cardiology). She was diagnosed with stage IV AL amyloid (+GI, +bone marrow involvement, no involvement of kidney or liver). Her work up is detailed in our previous notes, however she has lambda AL amyloidosis and she underwent CyBorD chemotherapy and excellent light chain response by serum. Patient has been turned down at [place] for a stem cell transplant due to her cardiac involvement. Later in 2016, she had 2-3 more right sided pleural taps. She was hospitalized 1/2017 and diuresed 20-30 pounds at that time. Since that time her AL has been in remission by labs without further chemo. Patient was then treated with doxycycline , which she continues to this day.    With respect to her AL diagnosis, she has been in remission for some time.  She has not required further chemotherapy for light chain suppression.  She just had a follow-up with her oncologist a few months ago.     Despite having all features of end-stage AL cardiac amyloidosis (many high risk features associated with very poor prognosis)-Amy has settled into a period of stability for several years and has had much more of a slow, steady decline from her restrictive cardiomyopathy.     She remains on very high-dose diuretics and needs to be seen frequently and has substantial fatigue and is overall been slowing down, however her endorgan function remains normal- she was recently admitted and lost 11 lbs of fluid in a few days. She felt better after this than she had  in a long time. Was able to travel to [place] and enjoy time with friends.     She can walk 1/2 block, can do some light housework. Gets nausea when fluid is up substantially. + LE edema., weight up 5 lbs since discharge.       Medications:     Current Outpatient Medications   Medication Sig Dispense Refill     Acetaminophen (TYLENOL PO) Take 325 mg by mouth every 6 hours as needed for mild pain or fever        apixaban ANTICOAGULANT (ELIQUIS) 2.5 MG tablet Take 1 tablet (2.5 mg) by mouth 2 times daily 180 tablet 3     camphor-menthol (DERMASARRA) 0.5-0.5 % external lotion Apply 1 mL topically every 6 hours as needed for skin care 222 mL 0     doxycycline hyclate (VIBRA-TABS) 100 MG tablet Take 1 tablet (100 mg) by mouth daily 90 tablet 3     hydrOXYzine (ATARAX) 25 MG tablet Take 1 tablet (25 mg) by mouth 3 times daily as needed for itching 30 tablet 0     LORazepam (ATIVAN) 0.5 MG tablet Take 1 tablet (0.5 mg) by mouth every 6 hours as needed for anxiety or nausea 60 tablet 0     metolazone (ZAROXOLYN) 2.5 MG tablet Take 1 tablet (2.5 mg) by mouth twice a week As needed for increased swelling and weight gain of 3 lb in one day or 5 lb in one week 26 tablet 3     ondansetron (ZOFRAN-ODT) 8 MG ODT tab Take 8 mg by mouth every 8 hours as needed PRN       potassium chloride ER (KLOR-CON M) 10 MEQ CR tablet TAKE 8 TABLETS EVERY MORNING& 8 TABLETS AT LUNCH AND 6 TABLETS EVERY EVENING TAKE EXTRA 6 TABLETS DAY OF AND DAY AFTER METOLAZONE 2268 tablet 1     spironolactone (ALDACTONE) 25 MG tablet Take 0.5 tablets (12.5 mg) by mouth daily 45 tablet 0     torsemide (DEMADEX) 100 MG tablet Take 1 tablet (100 mg) by mouth every morning AND 1 tablet (100 mg) daily at 2 pm. 180 tablet 3     triamcinolone (KENALOG) 0.1 % external cream Apply a thin layer twice daily to itchy areas as needed. 453.6 g 3     OLANZapine (ZYPREXA) 2.5 MG tablet Take 2.5 mg by mouth At Bedtime (Patient not taking: Reported on 8/12/2021)         PAST  MEDICAL HISTORY:  Past Medical History:   Diagnosis Date     AL amyloidosis (H)      Atrial fibrillation and flutter (H)      Cardiac amyloidosis (H)      Lymphedema      QT prolongation      Recurrent right pleural effusion 1/2/2017     SVT (supraventricular tachycardia) (H)        FAMILY HISTORY:  Family History   Problem Relation Age of Onset     Other - See Comments Sister         Amyloidosis       CURRENT MEDICATIONS:    Current Outpatient Medications   Medication Sig Dispense Refill     Acetaminophen (TYLENOL PO) Take 325 mg by mouth every 6 hours as needed for mild pain or fever        apixaban ANTICOAGULANT (ELIQUIS) 2.5 MG tablet Take 1 tablet (2.5 mg) by mouth 2 times daily 180 tablet 3     camphor-menthol (DERMASARRA) 0.5-0.5 % external lotion Apply 1 mL topically every 6 hours as needed for skin care 222 mL 0     doxycycline hyclate (VIBRA-TABS) 100 MG tablet Take 1 tablet (100 mg) by mouth daily 90 tablet 3     hydrOXYzine (ATARAX) 25 MG tablet Take 1 tablet (25 mg) by mouth 3 times daily as needed for itching 30 tablet 0     LORazepam (ATIVAN) 0.5 MG tablet Take 1 tablet (0.5 mg) by mouth every 6 hours as needed for anxiety or nausea 60 tablet 0     metolazone (ZAROXOLYN) 2.5 MG tablet Take 1 tablet (2.5 mg) by mouth twice a week As needed for increased swelling and weight gain of 3 lb in one day or 5 lb in one week 26 tablet 3     ondansetron (ZOFRAN-ODT) 8 MG ODT tab Take 8 mg by mouth every 8 hours as needed PRN       potassium chloride ER (KLOR-CON M) 10 MEQ CR tablet TAKE 8 TABLETS EVERY MORNING& 8 TABLETS AT LUNCH AND 6 TABLETS EVERY EVENING TAKE EXTRA 6 TABLETS DAY OF AND DAY AFTER METOLAZONE 2268 tablet 1     spironolactone (ALDACTONE) 25 MG tablet Take 0.5 tablets (12.5 mg) by mouth daily 45 tablet 0     torsemide (DEMADEX) 100 MG tablet Take 1 tablet (100 mg) by mouth every morning AND 1 tablet (100 mg) daily at 2 pm. 180 tablet 3     triamcinolone (KENALOG) 0.1 % external cream Apply a thin  "layer twice daily to itchy areas as needed. 453.6 g 3     OLANZapine (ZYPREXA) 2.5 MG tablet Take 2.5 mg by mouth At Bedtime (Patient not taking: Reported on 8/12/2021)         ROS:   Constitutional: No fever, chills, or sweats. Weight . + fatigue   ENT: No visual disturbance, ear ache, epistaxis, sore throat.   Allergies/Immunologic: Negative.   Respiratory: No cough, hemoptysis.   Cardiovascular: As per HPI.   GI: no current nausea, vomiting, hematemesis, melena, or hematochezia.   : No urinary frequency, dysuria, or hematuria.   Integument: Negative.   Psychiatric: Negative.   Neuro: Negative.   Endocrinology: Negative.   Musculoskeletal: Negative.      EXAM:  /61 (BP Location: Left arm, Patient Position: Chair, Cuff Size: Adult Regular)   Pulse 69   Ht 1.6 m (5' 3\")   Wt 69.9 kg (154 lb 1.6 oz)   SpO2 100%   BMI 27.30 kg/m    General: appears comfortable, alert and articulate  Head: normocephalic, atraumatic  Eyes: anicteric sclera, EOMI  Neck: no adenopathy  Orophyarynx: moist mucosa, no lesions, dentition intact  Heart: PMI diffuse,trace systolic murmur at LLSB, regular, S1/S2, rub, estimated JVP 9 cm   Lungs: clear, no rales or wheezing  Abdomen: soft, non-tender, bowel sounds present, no hepatosplenomegaly  Extremities: no clubbing, cyanosis- does have LE fullness without tense edema   Neurological: normal speech and affect, no gross motor deficits      Labs, reviewed with patient in clinic today:  Cr 1.1     Potassium 3.6       Diagnostics:    Coronary Angiogram 2016  Diagnostic Findings  LEFT MAIN CORONARY ARTERY     The LMCA is free of significant disease.    LEFT ANTERIOR DESCENDING     The LAD is free of significant disease.    CIRCUMFLEX     The Circumflex is free of significant disease.    RIGHT CORONARY ARTERY     The RCA is free of significant disease.       Holter Monitor 06/2019  INTERPRETATION  1. The rhythm varied with sinus rhythm and atrial fibrillation/variable atrial flutter. " Low voltage noted.  2. The heart rate varied with a minimum rate of 48 bpm, maximum rate of 164 bpm, average rate of 74 bpm.  3. Frequent supraventricular ectopy was seen ranging from 0-469 per hour. Occasional atrail pairs and fairly frequent bigeminal cycles were noted.  Wandering atrial pacemaker noted.  4. Infrequent multifocal ventricular ectopy was seen ranging from 0-36 beats per hour.  5. ST-T wave changes were present.  6. Three patient events were documented and correlated with atrial fibrillation/flutter          Echo 2/3/2021     Global and regional left ventricular function is hyperkinetic with an EF of  65-70%.  Right ventricular function, chamber size, wall motion, and thickness are  normal.  Mild to moderate aortic insufficiency is present.  Moderate tricuspid insufficiency is present.  Right ventricular systolic pressure is 33mmHg above the right atrial pressure.  IVC diameter and respiratory changes fall into an intermediate range  suggesting an RA pressure of 8 mmHg.  No pericardial effusion is present.  A right pleural effusion is present.    Assessment and Plan:   Mrs. Guerrero is a 70 year old female with AL cardiac amyloidosis who presents for follow up.    While she has all features that are consistent with end-stage disease, she is taking this month by month. She did feel much better with IV diuretics and felt better for many weeks after that admission. I am willing to offer this in the future given she felt so much better after. We will get to the point where even IV in the hospital is ineffective and will have to move towards comfort at that time. She is not a good candidate for dialysis given restrictive physiology.        AL cardiac amyloidosis: Rios stage IV, NYHA class IIIb, Stage C/D   -Even though AL Amyloid has a poor prognosis, she has done exceptionally well and has mild cardiac symptoms at this time. However, I do suspect that these may continue to slowly get worse over time.    -Continue Torsemide 100mg BID with Metolozone 2.5 mg twice a week (dosing when she needs)   - aldactone 12.5 mg daily        # A. Fib/A. Flutter-paroxysmal- last zio with 4 % burden  -Avoiding BB given her Amyloid history.   On anti-coag     #Systemic Amyloid  -Continue Doxycycline     Goals of care: For now gender has wanted clinic care, hospitalizations as needed for now. Taking it one month at a time as above.       RTC 1 mo with NP, 4 months with me       Domenica Cohen MD  Associate Professor of Medicine   [facility] Division of Cardiology

## 2021-08-12 NOTE — NURSING NOTE
Chief Complaint   Patient presents with     Follow Up     Return HF, 69 year old female with chronic diastolic heart failure/AL- labs prior,       Vitals were taken and medications reconciled.     Obey David CMA  8:31 AM

## 2021-08-12 NOTE — NURSING NOTE
Diet: Patient instructed regarding a heart failure healthy diet, including discussion of reduced fat and 2000 mg daily sodium restriction, daily weights, medication purpose and compliance, fluid restrictions and resources for patient and family to access for assistance with heart failure management.       Labs: Patient was given results of the laboratory testing obtained today and patient was instructed about when to return for the next laboratory testing.     Med Reconcile: Reviewed and verified all current medications with the patient. The updated medication list was printed and given to the patient.     Med changes: none for now    Return Appointment: Patient given instructions regarding scheduling next clinic visit: [facility] as scheduledNoel with labs in 4 months    Patient stated she understood all health information given and agreed to call with further questions or concerns.     Niki Fam RN

## 2021-08-23 NOTE — TELEPHONE ENCOUNTER
Date: 8/23/2021    Time of Call: 7:48 AM     Diagnosis:  Heart failure     [ VORB ] Ordering provider: Dr Cohen    Order: BMP     Order received by: Niki Fam RN       Follow-up/additional notes: sent mychart to eliana

## 2021-08-24 ENCOUNTER — MYC MEDICAL ADVICE (OUTPATIENT)
Dept: CARDIOLOGY | Facility: CLINIC | Age: 71
End: 2021-08-24

## 2021-08-24 ENCOUNTER — LAB (OUTPATIENT)
Dept: LAB | Facility: CLINIC | Age: 71
End: 2021-08-24
Payer: COMMERCIAL

## 2021-08-24 DIAGNOSIS — I50.32 CHRONIC DIASTOLIC HEART FAILURE (H): Primary | ICD-10-CM

## 2021-08-24 DIAGNOSIS — E87.6 HYPOKALEMIA: ICD-10-CM

## 2021-08-24 DIAGNOSIS — I50.32 CHRONIC DIASTOLIC HEART FAILURE (H): ICD-10-CM

## 2021-08-24 LAB
ANION GAP SERPL CALCULATED.3IONS-SCNC: 5 MMOL/L (ref 3–14)
BUN SERPL-MCNC: 30 MG/DL (ref 7–30)
CALCIUM SERPL-MCNC: 9.1 MG/DL (ref 8.5–10.1)
CHLORIDE BLD-SCNC: 95 MMOL/L (ref 94–109)
CO2 SERPL-SCNC: 34 MMOL/L (ref 20–32)
CREAT SERPL-MCNC: 1.17 MG/DL (ref 0.52–1.04)
GFR SERPL CREATININE-BSD FRML MDRD: 48 ML/MIN/1.73M2
GLUCOSE BLD-MCNC: 72 MG/DL (ref 70–99)
POTASSIUM BLD-SCNC: 3.4 MMOL/L (ref 3.4–5.3)
SODIUM SERPL-SCNC: 134 MMOL/L (ref 133–144)

## 2021-08-24 PROCEDURE — 80048 BASIC METABOLIC PNL TOTAL CA: CPT

## 2021-08-24 PROCEDURE — 36415 COLL VENOUS BLD VENIPUNCTURE: CPT

## 2021-08-25 RX ORDER — SPIRONOLACTONE 25 MG/1
25 TABLET ORAL DAILY
Qty: 90 TABLET | Refills: 1 | Status: SHIPPED | OUTPATIENT
Start: 2021-08-25 | End: 2021-09-24

## 2021-08-25 NOTE — TELEPHONE ENCOUNTER
Date: 8/25/2021    Time of Call: 8:51 AM     Diagnosis:  Heart failure     [ VORB ] Ordering provider: Dr Cohen    Order: increase aldactone to 25 mg daily, BMP in next 1-2 weeks     Order received by: Niki Fam RN       Follow-up/additional notes: mychart sent to Amy.

## 2021-08-26 DIAGNOSIS — I50.32 CHRONIC DIASTOLIC HEART FAILURE (H): Primary | ICD-10-CM

## 2021-08-29 ASSESSMENT — ENCOUNTER SYMPTOMS
BLOOD IN STOOL: 0
HEARTBURN: 0
HYPOTENSION: 0
DIARRHEA: 1
SPUTUM PRODUCTION: 0
EXERCISE INTOLERANCE: 1
SLEEP DISTURBANCES DUE TO BREATHING: 0
PARALYSIS: 0
SKIN CHANGES: 0
LOSS OF CONSCIOUSNESS: 0
BOWEL INCONTINENCE: 0
DIZZINESS: 1
LEG PAIN: 1
SHORTNESS OF BREATH: 1
WEAKNESS: 1
PALPITATIONS: 1
POOR WOUND HEALING: 0
CONSTIPATION: 0
MEMORY LOSS: 0
POSTURAL DYSPNEA: 1
COUGH: 0
NAUSEA: 1
JAUNDICE: 0
NAIL CHANGES: 0
VOMITING: 0
HEMOPTYSIS: 0
SNORES LOUDLY: 0
BLOATING: 0
ORTHOPNEA: 1
WHEEZING: 0
LIGHT-HEADEDNESS: 1
DISTURBANCES IN COORDINATION: 1
RECTAL PAIN: 0
DYSPNEA ON EXERTION: 1
COUGH DISTURBING SLEEP: 0
SYNCOPE: 0
HYPERTENSION: 0
NUMBNESS: 0
SEIZURES: 0
SPEECH CHANGE: 0
HEADACHES: 1
TREMORS: 0
ABDOMINAL PAIN: 1
TINGLING: 0

## 2021-09-01 ENCOUNTER — OFFICE VISIT (OUTPATIENT)
Dept: CARDIOLOGY | Facility: CLINIC | Age: 71
End: 2021-09-01
Attending: PHYSICIAN ASSISTANT
Payer: COMMERCIAL

## 2021-09-01 ENCOUNTER — LAB (OUTPATIENT)
Dept: LAB | Facility: CLINIC | Age: 71
End: 2021-09-01
Payer: COMMERCIAL

## 2021-09-01 VITALS
BODY MASS INDEX: 28.17 KG/M2 | DIASTOLIC BLOOD PRESSURE: 81 MMHG | SYSTOLIC BLOOD PRESSURE: 136 MMHG | WEIGHT: 159 LBS | OXYGEN SATURATION: 99 % | HEART RATE: 70 BPM

## 2021-09-01 DIAGNOSIS — I50.32 CHRONIC DIASTOLIC HEART FAILURE (H): ICD-10-CM

## 2021-09-01 DIAGNOSIS — E85.4 AMYLOID HEART DISEASE (H): ICD-10-CM

## 2021-09-01 DIAGNOSIS — I43 AMYLOID HEART DISEASE (H): ICD-10-CM

## 2021-09-01 LAB
ANION GAP SERPL CALCULATED.3IONS-SCNC: 7 MMOL/L (ref 3–14)
BUN SERPL-MCNC: 28 MG/DL (ref 7–30)
CALCIUM SERPL-MCNC: 9.1 MG/DL (ref 8.5–10.1)
CHLORIDE BLD-SCNC: 100 MMOL/L (ref 94–109)
CO2 SERPL-SCNC: 29 MMOL/L (ref 20–32)
CREAT SERPL-MCNC: 1.11 MG/DL (ref 0.52–1.04)
GFR SERPL CREATININE-BSD FRML MDRD: 51 ML/MIN/1.73M2
GLUCOSE BLD-MCNC: 82 MG/DL (ref 70–99)
POTASSIUM BLD-SCNC: 4.9 MMOL/L (ref 3.4–5.3)
SODIUM SERPL-SCNC: 136 MMOL/L (ref 133–144)

## 2021-09-01 PROCEDURE — 99214 OFFICE O/P EST MOD 30 MIN: CPT | Performed by: PHYSICIAN ASSISTANT

## 2021-09-01 PROCEDURE — 36415 COLL VENOUS BLD VENIPUNCTURE: CPT | Performed by: PATHOLOGY

## 2021-09-01 PROCEDURE — 80048 BASIC METABOLIC PNL TOTAL CA: CPT | Performed by: PATHOLOGY

## 2021-09-01 NOTE — PROGRESS NOTES
In person visit.    HPI:   Ms. Guerrero is a 70 year old female with a past medical history including stage IV AL amyloid diagnosed in 2016. Presents to clinic for CORE follow-up.     Her cardiac and oncologic history are as follows: fatigue starting in 1/2016. She had a coronary angiogram which was reportedly normal but was diagnosed with HF and started on a BBand diuretics. Her symptoms progressed to the point that she was evaluated at [facility] in August/September (Dr. Barron in oncology/hematology, Dr. Nettles is cardiology). She was diagnosed with stage IV AL amyloid (+GI, +bone marrow involvement, no involvement of kidney or liver). Her work up is detailed in our previous notes, however she has lambda AL amyloidosis and she underwent CyBorD chemotherapy and excellent light chain response by serum. Patient has been turned down at [facility] for a stem cell transplant due to her cardiac involvement. Later in 2016, she had 2-3 more right sided pleural taps. She was hospitalized 1/2017 and diuresed 20-30 pounds at that time. Since that time her AL has been in remission by labs without further chemo. Patient was then treated with doxycycline but this was stopped for 6-9 months d/t concerns she was not benefiting from this, now restarted.    Since the last visit, we have changed bumex back to torsemide per patient preference.    This visit:  Weight was 157 today at home. Her weight when she left the hospital was 150 and she felt very well at that point. She dose note when she left the hospital she felt better than she had felt in a long time. She is using the metolazone a little less frequently.    She feels more SOB today. She could only walk 10 feet today before PELAEZ. Severe LE edema and also having abdominal edema as well. She has had diuria for the last few days. More fatigue right now. No palpitations. No chest pain. She is having a little bit of dizziness, not usual for her. No pre-syncope or syncope.     She is planning to  work with a Support Team from [facility] for symptom management as she does not want to work with palliative care at this time.    PAST MEDICAL HISTORY:  Past Medical History:   Diagnosis Date     AL amyloidosis (H)      Atrial fibrillation and flutter (H)      Cardiac amyloidosis (H)      Lymphedema      QT prolongation      Recurrent right pleural effusion 1/2/2017     SVT (supraventricular tachycardia) (H)        FAMILY HISTORY:  Family History   Problem Relation Age of Onset     Other - See Comments Sister         Amyloidosis       SOCIAL HISTORY:  Socioeconomic History     Marital status:    Tobacco Use     Smoking status: Never Smoker     Smokeless tobacco: Never Used   Substance and Sexual Activity     Alcohol use: No     Drug use: No   Other Topics Concern     CURRENT MEDICATIONS:  Acetaminophen (TYLENOL PO), Take 325 mg by mouth every 6 hours as needed for mild pain or fever   apixaban ANTICOAGULANT (ELIQUIS) 2.5 MG tablet, Take 1 tablet (2.5 mg) by mouth 2 times daily  camphor-menthol (DERMASARRA) 0.5-0.5 % external lotion, Apply 1 mL topically every 6 hours as needed for skin care  doxycycline hyclate (VIBRA-TABS) 100 MG tablet, Take 1 tablet (100 mg) by mouth daily  hydrOXYzine (ATARAX) 25 MG tablet, Take 1 tablet (25 mg) by mouth 3 times daily as needed for itching  LORazepam (ATIVAN) 0.5 MG tablet, Take 1 tablet (0.5 mg) by mouth every 6 hours as needed for anxiety or nausea  metolazone (ZAROXOLYN) 2.5 MG tablet, Take 1 tablet (2.5 mg) by mouth twice a week As needed for increased swelling and weight gain of 3 lb in one day or 5 lb in one week  ondansetron (ZOFRAN-ODT) 8 MG ODT tab, Take 8 mg by mouth every 8 hours as needed PRN  potassium chloride ER (KLOR-CON M) 10 MEQ CR tablet, TAKE 8 TABLETS EVERY MORNING& 8 TABLETS AT LUNCH AND 6 TABLETS EVERY EVENING TAKE EXTRA 6 TABLETS DAY OF AND DAY AFTER METOLAZONE  spironolactone (ALDACTONE) 25 MG tablet, Take 1 tablet (25 mg) by mouth  daily  torsemide (DEMADEX) 100 MG tablet, Take 1 tablet (100 mg) by mouth every morning AND 1 tablet (100 mg) daily at 2 pm.  triamcinolone (KENALOG) 0.1 % external cream, Apply a thin layer twice daily to itchy areas as needed.  OLANZapine (ZYPREXA) 2.5 MG tablet, Take 2.5 mg by mouth At Bedtime (Patient not taking: Reported on 8/12/2021)    No current facility-administered medications on file prior to visit.      ROS:   See HPI     EXAM:  /81   Pulse 70   Wt 72.1 kg (159 lb)   SpO2 99%   BMI 28.17 kg/m       GENERAL: Appears fatigued, in no acute distress. Speaking in full sentences and able to communicate all needs  HEENT: Eye symmetrical, no discharge or icterus bilaterally. Mucous membranes moist and without lesions.  CV: RRR, +S1S2, no murmur, rub, or gallop. JVP ~11  RESPIRATORY: Respirations regular, even, and unlabored. Lungs CTA throughout.   GI: Soft and mildly  distended with normoactive bowel sounds. No tenderness, rebound, guarding.   EXTREMITIES: Severe b/l non-pitting peripheral edema, unchanged from last visit. All extremities are warm and well perfused   NEUROLOGIC: Alert and interacting appropriately. No focal deficits.   MUSCULOSKELETAL: No joint swelling or tenderness.   SKIN: No jaundice. No rashes.      Labs, reviewed with patient in clinic today:  CBC RESULTS:  Lab Results   Component Value Date    WBC 6.7 07/20/2021    WBC 6.4 05/11/2021    RBC 4.30 07/20/2021    RBC 4.29 05/11/2021    HGB 13.6 07/20/2021    HGB 13.8 05/11/2021    HCT 40.8 07/20/2021    HCT 41.3 05/11/2021    MCV 95 07/20/2021    MCV 96 05/11/2021    MCH 31.6 07/20/2021    MCH 32.2 05/11/2021    MCHC 33.3 07/20/2021    MCHC 33.4 05/11/2021    RDW 13.7 07/20/2021    RDW 13.5 05/11/2021     07/20/2021     05/11/2021       CMP RESULTS:  Lab Results   Component Value Date     09/01/2021     07/08/2021    POTASSIUM 4.9 09/01/2021    POTASSIUM 3.6 07/08/2021    CHLORIDE 100 09/01/2021     CHLORIDE 94 07/08/2021    CO2 29 09/01/2021    CO2 34 (H) 07/08/2021    ANIONGAP 7 09/01/2021    ANIONGAP 6 07/08/2021    GLC 82 09/01/2021    GLC 89 07/08/2021    BUN 28 09/01/2021    BUN 35 (H) 07/08/2021    CR 1.11 (H) 09/01/2021    CR 1.13 (H) 07/08/2021    GFRESTIMATED 51 (L) 09/01/2021    GFRESTIMATED 50 (L) 07/08/2021    GFRESTBLACK 58 (L) 07/08/2021    JERROD 9.1 09/01/2021    JERROD 9.2 07/08/2021    BILITOTAL 1.9 (H) 05/11/2021    ALBUMIN 3.8 05/11/2021    ALKPHOS 171 (H) 05/11/2021    ALT 35 05/11/2021    AST 23 05/11/2021        INR RESULTS:  Lab Results   Component Value Date    INR 1.30 (H) 01/14/2017       Lab Results   Component Value Date    MAG 2.4 (H) 07/27/2021    MAG 2.2 10/26/2019     Lab Results   Component Value Date    NTBNPI 1,086 (H) 07/20/2021    NTBNPI 9,009 (H) 01/13/2017     Lab Results   Component Value Date    NTBNP 1,376 (H) 02/04/2021       Diagnostics:      2/21 Ziopatch       6/12/2019 Holter Mnoitor      TTE 4/9/19  Moderate left ventricular hypertrophy.  Global and regional left ventricular function is normal with an EF of 60-65%.  Global right ventricular function is normal.  Moderate aortic valve insufficiency.  Mild pulmonary hypertension with dilated IVC.  This study was compared with the study from 10/11/18 the AR is worse and RA pressure is now increased.    TTE 10/11/18  Global and regional left ventricular function is normal with an EF of 55-60%.  Moderate concentric wall thickening consistent with left ventricular  hypertrophy is present - known amyloid.  Grade III or advanced diastolic dysfunction.  Normal right ventricular size, wall thickness, and function.  Estimated pulmonary artery pressure 28 mmHg.  IVC with normal diameter but does not collapse (>50%) with inspiration.  Trace pericardial effusion.  Compared to prior study from 8/17/17, no significant change.    Cedricopatch 11/2017      Holter Monitor 06/2019  INTERPRETATION  1. The rhythm varied with sinus rhythm and  atrial fibrillation/variable atrial flutter. Low voltage noted.  2. The heart rate varied with a minimum rate of 48 bpm, maximum rate of 164 bpm, average rate of 74 bpm.  3. Frequent supraventricular ectopy was seen ranging from 0-469 per hour. Occasional atrail pairs and fairly frequent bigeminal cycles were noted.  Wandering atrial pacemaker noted.  4. Infrequent multifocal ventricular ectopy was seen ranging from 0-36 beats per hour.  5. ST-T wave changes were present.  6. Three patient events were documented and correlated with atrial fibrillation/flutter    EKG 8/8/2019 for palpitations  - NSR at a rate of 70, GA 0.19, QTc 525, QRS is narrow. Occasional PVCs. Biphasic twaves in V4-V6, unchanged from priors.    Assessment and Plan:   Mrs. Guerrero is a 70 year old female with AL amyloidosis with cardiac manifestation who presents to CORE for hospital follow-up. Patient has cardiac amyloidosis as evidenced by her echocardiogram (severe concentric hypertrophy and biatrial enlargement) and abnormal EKG (near-low voltage, poor R wave progression, biatrial enlargement and no evidence of LVH despite thicken LV on echo) in the setting of biopsy proven (BMB, EGD) AL amyloid. She completed chemotherapy with CyBorD with an excellent serologic response and her heart failure (i.e. troponin negative, NT pro BNP was stable, serum light chains minimal and urine light chains undetectable).       She has gradually declined over the last few years and has ongoing challenges with her volume status.  She requires frequent metolazone and maintaining an adequate potassium level has been a challenge. She has also been struggling with A. Fib, which is occaionally symptomatic. These episodes remain rare and given relative contraindications for amyloid patients we do not have her on rate control at this time.    She has had a few hospital stays for volume issues and potassium issues this year. Her last hospital stay she says made her feel  much better for a while. Today she is hypervolemic again. Electrolytes are stable. Cr is stable. See Prior notes for GOC discussions.    # Chronic diastolic heart failure/restrictive cardiomyopathy 2/2  # Cardiac amyloidosis    Stage C. NYHA Class III.    Fluid status: Continue torsemide 100 mg BID and Kcl 80/80/60.  Continue PRN metolazone with an extra 60 meq of kcl the day of and the day after metolazone. Should take a metolazone today  ACEi/ARB/ARNI: n/a   BB: no indication and relatively contraindicated due to risk of progressive conduction disease/AV block in these patients  Aldosterone antagonist: Continue aldactone 25 mg daily  SCD prophylaxis: does not meet criteria for implant  NSAID use: contraindicated  Sleep apnea evaluation: deferred today  Remote monitoring: N/A  Goals of care: prior discussions with Dr Cohen and ongoing in [facility] as well  Other: Has a referall for lymphedema therapy in [facility]. If she needs paracentesis in the future for symptomatic control we would support that. Her abdominal edema is mild for her right now.    # A. Fib/A. Flutter  Prior holter monitor which showed intermittent a. Fib and a. Flutter. [id number] of 4. Occasional palpitations which last less than 1 minute, if these become more frequent can repeat monitor to assess burden.  - Continue Eliquis 2.5mg BID, reduced dose d/t frequent bleeding, aware of risks  - Avoiding BB in the setting of amyloid  - Holding off on digoxin given generally good rate control/very rare RVR events. Could discuss in the future if needed.    # Prolonged QTC  - Minimize QT prolonging meds    # Systemic amyloid  Heme previously monitoring her light chains which have been negative consistent with remission - therefore further palliative chemo not being considered. Nausea has been a large issue and is currently, seeing palliative to address.  - Pt has seen palliative care for nausea, plans to see a Supportive Clinic for symptoms through her  insurance    #Nausea.   - Recommend ongoing f/u with palliative care vs the support team through her insurance.    Follow: Up:     - Labs Tuesday (took metolazone)  - CORE clinic in 6 weeks  -Dr. Keyes in December as scheduled    Billing  - I managed 2+ stable chronic conditions  - I changed a prescription medication- metolazone      Ciara Araya PA-C  [facility] Cardiology      CC  ALEJANDRO KEYES

## 2021-09-01 NOTE — NURSING NOTE
Diet: Patient instructed regarding a heart healthy diet, including discussion of reduced fat and sodium intake. Patient demonstrated understanding of this information and agreed to call with further questions or concerns.  Labs: Patient was given results of the laboratory testing obtained today. Patient was instructed to return for the next laboratory testing on Tuesday 9/7 . Patient demonstrated understanding of this information and agreed to call with further questions or concerns.   Return Appointment: Patient given instructions regarding scheduling next clinic visit. Patient demonstrated understanding of this information and agreed to call with further questions or concerns.  Medication Change: Patient was educated regarding prescribed medication change, including discussion of the indication, administration, side effects, and when to report to MD or RN. Patient demonstrated understanding of this information and agreed to call with further questions or concerns.  Patient stated she understood all health information given and agreed to call with further questions or concerns.   Vidhi Montgomery RN

## 2021-09-01 NOTE — PATIENT INSTRUCTIONS
Take your medicines every day, as directed    Changes made today:  o You should take a metolazone today when you get home with your regular extra potassium   Monitor Your Weight and Symptoms    Contact us if you:      Gain 2 pounds in one day or 5 pounds in one week    Feel more short of breath    Notice more leg swelling    Feel lightheadeded   Change your lifestyle    Limit Salt or Sodium:    2000 mg  Limit Fluids:    2000 mL or approximately 64 ounces  Eat a Heart Healthy Diet    Low in saturated fats  Stay Active:    Aim to move at least 150 minutes every  week         To Contact us    During Business Hours:  [phone], option # 1 (University)  Then option # 4 (medical questions)     After hours, weekends or holidays:   [phone], Option #4  Ask to speak to the On-Call Cardiologist. Inform them you are a CORE/heart failure patient at the [facility].     Use [employer] allows you to communicate directly with your heart team through secure messaging.    [employer] can be accessed any time on your phone, computer, or tablet.    If you need assistance, we'd be happy to help!         Keep your Heart Appointments:    - Labs Tuesday  - CORE clinic in 6 weeks  -Dr. Cohen in December as scheduled

## 2021-09-01 NOTE — LETTER
9/1/2021      RE: Leandra Guerrero  [address] [place] [address]       In person visit.    HPI:   Ms. Guerrero is a 70 year old female with a past medical history including stage IV AL amyloid diagnosed in 2016. Presents to clinic for [facility] follow-up.     Her cardiac and oncologic history are as follows: fatigue starting in 1/2016. She had a coronary angiogram which was reportedly normal but was diagnosed with HF and started on a BBand diuretics. Her symptoms progressed to the point that she was evaluated at [facility] in August/September (Dr. Barron in oncology/hematology, Dr. Nettles is cardiology). She was diagnosed with stage IV AL amyloid (+GI, +bone marrow involvement, no involvement of kidney or liver). Her work up is detailed in our previous notes, however she has lambda AL amyloidosis and she underwent CyBorD chemotherapy and excellent light chain response by serum. Patient has been turned down at [facility] for a stem cell transplant due to her cardiac involvement. Later in 2016, she had 2-3 more right sided pleural taps. She was hospitalized 1/2017 and diuresed 20-30 pounds at that time. Since that time her AL has been in remission by labs without further chemo. Patient was then treated with doxycycline but this was stopped for 6-9 months d/t concerns she was not benefiting from this, now restarted.    Since the last visit, we have changed bumex back to torsemide per patient preference.    This visit:  Weight was 157 today at home. Her weight when she left the hospital was 150 and she felt very well at that point. She dose note when she left the hospital she felt better than she had felt in a long time. She is using the metolazone a little less frequently.    She feels more SOB today. She could only walk 10 feet today before PELAEZ. Severe LE edema and also having abdominal edema as well. She has had diuria for the last few days. More fatigue right now. No palpitations. No chest pain. She is having a  little bit of dizziness, not usual for her. No pre-syncope or syncope.     She is planning to work with a Support Team from [facility] for symptom management as she does not want to work with palliative care at this time.    PAST MEDICAL HISTORY:  Past Medical History:   Diagnosis Date     AL amyloidosis (H)      Atrial fibrillation and flutter (H)      Cardiac amyloidosis (H)      Lymphedema      QT prolongation      Recurrent right pleural effusion 1/2/2017     SVT (supraventricular tachycardia) (H)        FAMILY HISTORY:  Family History   Problem Relation Age of Onset     Other - See Comments Sister         Amyloidosis       SOCIAL HISTORY:  Socioeconomic History     Marital status:    Tobacco Use     Smoking status: Never Smoker     Smokeless tobacco: Never Used   Substance and Sexual Activity     Alcohol use: No     Drug use: No   Other Topics Concern     CURRENT MEDICATIONS:  Acetaminophen (TYLENOL PO), Take 325 mg by mouth every 6 hours as needed for mild pain or fever   apixaban ANTICOAGULANT (ELIQUIS) 2.5 MG tablet, Take 1 tablet (2.5 mg) by mouth 2 times daily  camphor-menthol (DERMASARRA) 0.5-0.5 % external lotion, Apply 1 mL topically every 6 hours as needed for skin care  doxycycline hyclate (VIBRA-TABS) 100 MG tablet, Take 1 tablet (100 mg) by mouth daily  hydrOXYzine (ATARAX) 25 MG tablet, Take 1 tablet (25 mg) by mouth 3 times daily as needed for itching  LORazepam (ATIVAN) 0.5 MG tablet, Take 1 tablet (0.5 mg) by mouth every 6 hours as needed for anxiety or nausea  metolazone (ZAROXOLYN) 2.5 MG tablet, Take 1 tablet (2.5 mg) by mouth twice a week As needed for increased swelling and weight gain of 3 lb in one day or 5 lb in one week  ondansetron (ZOFRAN-ODT) 8 MG ODT tab, Take 8 mg by mouth every 8 hours as needed PRN  potassium chloride ER (KLOR-CON M) 10 MEQ CR tablet, TAKE 8 TABLETS EVERY MORNING& 8 TABLETS AT LUNCH AND 6 TABLETS EVERY EVENING TAKE EXTRA 6 TABLETS DAY OF AND  DAY AFTER METOLAZONE  spironolactone (ALDACTONE) 25 MG tablet, Take 1 tablet (25 mg) by mouth daily  torsemide (DEMADEX) 100 MG tablet, Take 1 tablet (100 mg) by mouth every morning AND 1 tablet (100 mg) daily at 2 pm.  triamcinolone (KENALOG) 0.1 % external cream, Apply a thin layer twice daily to itchy areas as needed.  OLANZapine (ZYPREXA) 2.5 MG tablet, Take 2.5 mg by mouth At Bedtime (Patient not taking: Reported on 8/12/2021)    No current facility-administered medications on file prior to visit.      ROS:   See HPI     EXAM:  /81   Pulse 70   Wt 72.1 kg (159 lb)   SpO2 99%   BMI 28.17 kg/m       GENERAL: Appears fatigued, in no acute distress. Speaking in full sentences and able to communicate all needs  HEENT: Eye symmetrical, no discharge or icterus bilaterally. Mucous membranes moist and without lesions.  CV: RRR, +S1S2, no murmur, rub, or gallop. JVP ~11  RESPIRATORY: Respirations regular, even, and unlabored. Lungs CTA throughout.   GI: Soft and mildly  distended with normoactive bowel sounds. No tenderness, rebound, guarding.   EXTREMITIES: Severe b/l non-pitting peripheral edema, unchanged from last visit. All extremities are warm and well perfused   NEUROLOGIC: Alert and interacting appropriately. No focal deficits.   MUSCULOSKELETAL: No joint swelling or tenderness.   SKIN: No jaundice. No rashes.      Labs, reviewed with patient in clinic today:  CBC RESULTS:  Lab Results   Component Value Date    WBC 6.7 07/20/2021    WBC 6.4 05/11/2021    RBC 4.30 07/20/2021    RBC 4.29 05/11/2021    HGB 13.6 07/20/2021    HGB 13.8 05/11/2021    HCT 40.8 07/20/2021    HCT 41.3 05/11/2021    MCV 95 07/20/2021    MCV 96 05/11/2021    MCH 31.6 07/20/2021    MCH 32.2 05/11/2021    MCHC 33.3 07/20/2021    MCHC 33.4 05/11/2021    RDW 13.7 07/20/2021    RDW 13.5 05/11/2021     07/20/2021     05/11/2021       CMP RESULTS:  Lab Results   Component Value Date     09/01/2021     07/08/2021     POTASSIUM 4.9 09/01/2021    POTASSIUM 3.6 07/08/2021    CHLORIDE 100 09/01/2021    CHLORIDE 94 07/08/2021    CO2 29 09/01/2021    CO2 34 (H) 07/08/2021    ANIONGAP 7 09/01/2021    ANIONGAP 6 07/08/2021    GLC 82 09/01/2021    GLC 89 07/08/2021    BUN 28 09/01/2021    BUN 35 (H) 07/08/2021    CR 1.11 (H) 09/01/2021    CR 1.13 (H) 07/08/2021    GFRESTIMATED 51 (L) 09/01/2021    GFRESTIMATED 50 (L) 07/08/2021    GFRESTBLACK 58 (L) 07/08/2021    JERROD 9.1 09/01/2021    JERROD 9.2 07/08/2021    BILITOTAL 1.9 (H) 05/11/2021    ALBUMIN 3.8 05/11/2021    ALKPHOS 171 (H) 05/11/2021    ALT 35 05/11/2021    AST 23 05/11/2021        INR RESULTS:  Lab Results   Component Value Date    INR 1.30 (H) 01/14/2017       Lab Results   Component Value Date    MAG 2.4 (H) 07/27/2021    MAG 2.2 10/26/2019     Lab Results   Component Value Date    NTBNPI 1,086 (H) 07/20/2021    NTBNPI 9,009 (H) 01/13/2017     Lab Results   Component Value Date    NTBNP 1,376 (H) 02/04/2021       Diagnostics:      2/21 Ziopatch       6/12/2019 Holter Mnoitor      TTE 4/9/19  Moderate left ventricular hypertrophy.  Global and regional left ventricular function is normal with an EF of 60-65%.  Global right ventricular function is normal.  Moderate aortic valve insufficiency.  Mild pulmonary hypertension with dilated IVC.  This study was compared with the study from 10/11/18 the AR is worse and RA pressure is now increased.    TTE 10/11/18  Global and regional left ventricular function is normal with an EF of 55-60%.  Moderate concentric wall thickening consistent with left ventricular  hypertrophy is present - known amyloid.  Grade III or advanced diastolic dysfunction.  Normal right ventricular size, wall thickness, and function.  Estimated pulmonary artery pressure 28 mmHg.  IVC with normal diameter but does not collapse (>50%) with inspiration.  Trace pericardial effusion.  Compared to prior study from 8/17/17, no significant change.    Tucker  11/2017      Holter Monitor 06/2019  INTERPRETATION  1. The rhythm varied with sinus rhythm and atrial fibrillation/variable atrial flutter. Low voltage noted.  2. The heart rate varied with a minimum rate of 48 bpm, maximum rate of 164 bpm, average rate of 74 bpm.  3. Frequent supraventricular ectopy was seen ranging from 0-469 per hour. Occasional atrail pairs and fairly frequent bigeminal cycles were noted.  Wandering atrial pacemaker noted.  4. Infrequent multifocal ventricular ectopy was seen ranging from 0-36 beats per hour.  5. ST-T wave changes were present.  6. Three patient events were documented and correlated with atrial fibrillation/flutter    EKG 8/8/2019 for palpitations  - NSR at a rate of 70, NE 0.19, QTc 525, QRS is narrow. Occasional PVCs. Biphasic twaves in V4-V6, unchanged from priors.    Assessment and Plan:   Mrs. Guerrero is a 70 year old female with AL amyloidosis with cardiac manifestation who presents to [facility] for hospital follow-up. Patient has cardiac amyloidosis as evidenced by her echocardiogram (severe concentric hypertrophy and biatrial enlargement) and abnormal EKG (near-low voltage, poor R wave progression, biatrial enlargement and no evidence of LVH despite thicken LV on echo) in the setting of biopsy proven (BMB, EGD) AL amyloid. She completed chemotherapy with CyBorD with an excellent serologic response and her heart failure (i.e. troponin negative, NT pro BNP was stable, serum light chains minimal and urine light chains undetectable).       She has gradually declined over the last few years and has ongoing challenges with her volume status.  She requires frequent metolazone and maintaining an adequate potassium level has been a challenge. She has also been struggling with A. Fib, which is occaionally symptomatic. These episodes remain rare and given relative contraindications for amyloid patients we do not have her on rate control at this time.    She has had a few hospital stays  for volume issues and potassium issues this year. Her last hospital stay she says made her feel much better for a while. Today she is hypervolemic again. Electrolytes are stable. Cr is stable. See Prior notes for GOC discussions.    # Chronic diastolic heart failure/restrictive cardiomyopathy 2/2  # Cardiac amyloidosis    Stage C. NYHA Class III.    Fluid status: Continue torsemide 100 mg BID and Kcl 80/80/60.  Continue PRN metolazone with an extra 60 meq of kcl the day of and the day after metolazone. Should take a metolazone today  ACEi/ARB/ARNI: n/a   BB: no indication and relatively contraindicated due to risk of progressive conduction disease/AV block in these patients  Aldosterone antagonist: Continue aldactone 25 mg daily  SCD prophylaxis: does not meet criteria for implant  NSAID use: contraindicated  Sleep apnea evaluation: deferred today  Remote monitoring: N/A  Goals of care: prior discussions with Dr Cohen and ongoing in [facility] [place] as well  Other: Has a referall for lymphedema therapy in [facility]. If she needs paracentesis in the future for symptomatic control we would support that. Her abdominal edema is mild for her right now.    # A. Fib/A. Flutter  Prior holter monitor which showed intermittent a. Fib and a. Flutter. [id number] of 4. Occasional palpitations which last less than 1 minute, if these become more frequent can repeat monitor to assess burden.  - Continue Eliquis 2.5mg BID, reduced dose d/t frequent bleeding, aware of risks  - Avoiding BB in the setting of amyloid  - Holding off on digoxin given generally good rate control/very rare RVR events. Could discuss in the future if needed.    # Prolonged QTC  - Minimize QT prolonging meds    # Systemic amyloid  Heme previously monitoring her light chains which have been negative consistent with remission - therefore further palliative chemo not being considered. Nausea has been a large issue and is currently, seeing palliative to address.  - Pt  has seen palliative care for nausea, plans to see a Supportive Clinic for symptoms through her insurance    #Nausea.   - Recommend ongoing f/u with palliative care vs the support team through her insurance.    Follow: Up:     - Labs Tuesday (took metolazone)  - [facility] clinic in 6 weeks  -Dr. Keyes in December as scheduled    Billing  - I managed 2+ stable chronic conditions  - I changed a prescription medication- metolazone      Ciara Araya PA-C  [facility]      CC  ALEJANDRO KEYES PA-C

## 2021-09-01 NOTE — LETTER
9/1/2021      RE: Leandra Guerrero  [address] [place] [address]       Dear Colleague,    Thank you for the opportunity to participate in the care of your patient, Leandra Guerrero, at the [facility] [place] at [facility]. Please see a copy of my visit note below.    In person visit.    HPI:   Ms. Guerrero is a 70 year old female with a past medical history including stage IV AL amyloid diagnosed in 2016. Presents to clinic for [facility] follow-up.     Her cardiac and oncologic history are as follows: fatigue starting in 1/2016. She had a coronary angiogram which was reportedly normal but was diagnosed with HF and started on a BBand diuretics. Her symptoms progressed to the point that she was evaluated at [facility] in August/September (Dr. Barron in oncology/hematology, Dr. Nettles is cardiology). She was diagnosed with stage IV AL amyloid (+GI, +bone marrow involvement, no involvement of kidney or liver). Her work up is detailed in our previous notes, however she has lambda AL amyloidosis and she underwent CyBorD chemotherapy and excellent light chain response by serum. Patient has been turned down at [facility] for a stem cell transplant due to her cardiac involvement. Later in 2016, she had 2-3 more right sided pleural taps. She was hospitalized 1/2017 and diuresed 20-30 pounds at that time. Since that time her AL has been in remission by labs without further chemo. Patient was then treated with doxycycline but this was stopped for 6-9 months d/t concerns she was not benefiting from this, now restarted.    Since the last visit, we have changed bumex back to torsemide per patient preference.    This visit:  Weight was 157 today at home. Her weight when she left the hospital was 150 and she felt very well at that point. She dose note when she left the hospital she felt better than she had felt in a long time. She is using the metolazone a little  less frequently.    She feels more SOB today. She could only walk 10 feet today before PELAEZ. Severe LE edema and also having abdominal edema as well. She has had diuria for the last few days. More fatigue right now. No palpitations. No chest pain. She is having a little bit of dizziness, not usual for her. No pre-syncope or syncope.     She is planning to work with a Support Team from [employer] for symptom management as she does not want to work with palliative care at this time.    PAST MEDICAL HISTORY:  Past Medical History:   Diagnosis Date     AL amyloidosis (H)      Atrial fibrillation and flutter (H)      Cardiac amyloidosis (H)      Lymphedema      QT prolongation      Recurrent right pleural effusion 1/2/2017     SVT (supraventricular tachycardia) (H)        FAMILY HISTORY:  Family History   Problem Relation Age of Onset     Other - See Comments Sister         Amyloidosis       SOCIAL HISTORY:  Socioeconomic History     Marital status:    Tobacco Use     Smoking status: Never Smoker     Smokeless tobacco: Never Used   Substance and Sexual Activity     Alcohol use: No     Drug use: No   Other Topics Concern     CURRENT MEDICATIONS:  Acetaminophen (TYLENOL PO), Take 325 mg by mouth every 6 hours as needed for mild pain or fever   apixaban ANTICOAGULANT (ELIQUIS) 2.5 MG tablet, Take 1 tablet (2.5 mg) by mouth 2 times daily  camphor-menthol (DERMASARRA) 0.5-0.5 % external lotion, Apply 1 mL topically every 6 hours as needed for skin care  doxycycline hyclate (VIBRA-TABS) 100 MG tablet, Take 1 tablet (100 mg) by mouth daily  hydrOXYzine (ATARAX) 25 MG tablet, Take 1 tablet (25 mg) by mouth 3 times daily as needed for itching  LORazepam (ATIVAN) 0.5 MG tablet, Take 1 tablet (0.5 mg) by mouth every 6 hours as needed for anxiety or nausea  metolazone (ZAROXOLYN) 2.5 MG tablet, Take 1 tablet (2.5 mg) by mouth twice a week As needed for increased swelling and weight gain of 3 lb in one day or 5 lb  in one week  ondansetron (ZOFRAN-ODT) 8 MG ODT tab, Take 8 mg by mouth every 8 hours as needed PRN  potassium chloride ER (KLOR-CON M) 10 MEQ CR tablet, TAKE 8 TABLETS EVERY MORNING& 8 TABLETS AT LUNCH AND 6 TABLETS EVERY EVENING TAKE EXTRA 6 TABLETS DAY OF AND DAY AFTER METOLAZONE  spironolactone (ALDACTONE) 25 MG tablet, Take 1 tablet (25 mg) by mouth daily  torsemide (DEMADEX) 100 MG tablet, Take 1 tablet (100 mg) by mouth every morning AND 1 tablet (100 mg) daily at 2 pm.  triamcinolone (KENALOG) 0.1 % external cream, Apply a thin layer twice daily to itchy areas as needed.  OLANZapine (ZYPREXA) 2.5 MG tablet, Take 2.5 mg by mouth At Bedtime (Patient not taking: Reported on 8/12/2021)    No current facility-administered medications on file prior to visit.      ROS:   See HPI     EXAM:  /81   Pulse 70   Wt 72.1 kg (159 lb)   SpO2 99%   BMI 28.17 kg/m       GENERAL: Appears fatigued, in no acute distress. Speaking in full sentences and able to communicate all needs  HEENT: Eye symmetrical, no discharge or icterus bilaterally. Mucous membranes moist and without lesions.  CV: RRR, +S1S2, no murmur, rub, or gallop. JVP ~11  RESPIRATORY: Respirations regular, even, and unlabored. Lungs CTA throughout.   GI: Soft and mildly  distended with normoactive bowel sounds. No tenderness, rebound, guarding.   EXTREMITIES: Severe b/l non-pitting peripheral edema, unchanged from last visit. All extremities are warm and well perfused   NEUROLOGIC: Alert and interacting appropriately. No focal deficits.   MUSCULOSKELETAL: No joint swelling or tenderness.   SKIN: No jaundice. No rashes.      Labs, reviewed with patient in clinic today:  CBC RESULTS:  Lab Results   Component Value Date    WBC 6.7 07/20/2021    WBC 6.4 05/11/2021    RBC 4.30 07/20/2021    RBC 4.29 05/11/2021    HGB 13.6 07/20/2021    HGB 13.8 05/11/2021    HCT 40.8 07/20/2021    HCT 41.3 05/11/2021    MCV 95 07/20/2021    MCV 96 05/11/2021    [facility] 31.6  07/20/2021    MCH 32.2 05/11/2021    MCHC 33.3 07/20/2021    MCHC 33.4 05/11/2021    RDW 13.7 07/20/2021    RDW 13.5 05/11/2021     07/20/2021     05/11/2021       CMP RESULTS:  Lab Results   Component Value Date     09/01/2021     07/08/2021    POTASSIUM 4.9 09/01/2021    POTASSIUM 3.6 07/08/2021    CHLORIDE 100 09/01/2021    CHLORIDE 94 07/08/2021    CO2 29 09/01/2021    CO2 34 (H) 07/08/2021    ANIONGAP 7 09/01/2021    ANIONGAP 6 07/08/2021    GLC 82 09/01/2021    GLC 89 07/08/2021    BUN 28 09/01/2021    BUN 35 (H) 07/08/2021    CR 1.11 (H) 09/01/2021    CR 1.13 (H) 07/08/2021    GFRESTIMATED 51 (L) 09/01/2021    GFRESTIMATED 50 (L) 07/08/2021    GFRESTBLACK 58 (L) 07/08/2021    JERROD 9.1 09/01/2021    JERROD 9.2 07/08/2021    BILITOTAL 1.9 (H) 05/11/2021    ALBUMIN 3.8 05/11/2021    ALKPHOS 171 (H) 05/11/2021    ALT 35 05/11/2021    AST 23 05/11/2021        INR RESULTS:  Lab Results   Component Value Date    INR 1.30 (H) 01/14/2017       Lab Results   Component Value Date    MAG 2.4 (H) 07/27/2021    MAG 2.2 10/26/2019     Lab Results   Component Value Date    NTBNPI 1,086 (H) 07/20/2021    NTBNPI 9,009 (H) 01/13/2017     Lab Results   Component Value Date    NTBNP 1,376 (H) 02/04/2021       Diagnostics:      2/21 Ziopatch       6/12/2019 Holter Mnoitor      TTE 4/9/19  Moderate left ventricular hypertrophy.  Global and regional left ventricular function is normal with an EF of 60-65%.  Global right ventricular function is normal.  Moderate aortic valve insufficiency.  Mild pulmonary hypertension with dilated IVC.  This study was compared with the study from 10/11/18 the AR is worse and RA pressure is now increased.    TTE 10/11/18  Global and regional left ventricular function is normal with an EF of 55-60%.  Moderate concentric wall thickening consistent with left ventricular  hypertrophy is present - known amyloid.  Grade III or advanced diastolic dysfunction.  Normal right ventricular  size, wall thickness, and function.  Estimated pulmonary artery pressure 28 mmHg.  IVC with normal diameter but does not collapse (>50%) with inspiration.  Trace pericardial effusion.  Compared to prior study from 8/17/17, no significant change.    Ziopatch 11/2017      Holter Monitor 06/2019  INTERPRETATION  1. The rhythm varied with sinus rhythm and atrial fibrillation/variable atrial flutter. Low voltage noted.  2. The heart rate varied with a minimum rate of 48 bpm, maximum rate of 164 bpm, average rate of 74 bpm.  3. Frequent supraventricular ectopy was seen ranging from 0-469 per hour. Occasional atrail pairs and fairly frequent bigeminal cycles were noted.  Wandering atrial pacemaker noted.  4. Infrequent multifocal ventricular ectopy was seen ranging from 0-36 beats per hour.  5. ST-T wave changes were present.  6. Three patient events were documented and correlated with atrial fibrillation/flutter    EKG 8/8/2019 for palpitations  - NSR at a rate of 70, DC 0.19, QTc 525, QRS is narrow. Occasional PVCs. Biphasic twaves in V4-V6, unchanged from priors.    Assessment and Plan:   Mrs. Guerrero is a 70 year old female with AL amyloidosis with cardiac manifestation who presents to [facility] for hospital follow-up. Patient has cardiac amyloidosis as evidenced by her echocardiogram (severe concentric hypertrophy and biatrial enlargement) and abnormal EKG (near-low voltage, poor R wave progression, biatrial enlargement and no evidence of LVH despite thicken LV on echo) in the setting of biopsy proven (BMB, EGD) AL amyloid. She completed chemotherapy with CyBorD with an excellent serologic response and her heart failure (i.e. troponin negative, NT pro BNP was stable, serum light chains minimal and urine light chains undetectable).       She has gradually declined over the last few years and has ongoing challenges with her volume status.  She requires frequent metolazone and maintaining an adequate potassium level has been  a challenge. She has also been struggling with A. Fib, which is occaionally symptomatic. These episodes remain rare and given relative contraindications for amyloid patients we do not have her on rate control at this time.    She has had a few hospital stays for volume issues and potassium issues this year. Her last hospital stay she says made her feel much better for a while. Today she is hypervolemic again. Electrolytes are stable. Cr is stable. See Prior notes for GOC discussions.    # Chronic diastolic heart failure/restrictive cardiomyopathy 2/2  # Cardiac amyloidosis    Stage C. NYHA Class III.    Fluid status: Continue torsemide 100 mg BID and Kcl 80/80/60.  Continue PRN metolazone with an extra 60 meq of kcl the day of and the day after metolazone. Should take a metolazone today  ACEi/ARB/ARNI: n/a   BB: no indication and relatively contraindicated due to risk of progressive conduction disease/AV block in these patients  Aldosterone antagonist: Continue aldactone 25 mg daily  SCD prophylaxis: does not meet criteria for implant  NSAID use: contraindicated  Sleep apnea evaluation: deferred today  Remote monitoring: N/A  Goals of care: prior discussions with Dr Cohen and ongoing in [facility] as well  Other: Has a referall for lymphedema therapy in [facility]. If she needs paracentesis in the future for symptomatic control we would support that. Her abdominal edema is mild for her right now.    # A. Fib/A. Flutter  Prior holter monitor which showed intermittent a. Fib and a. Flutter. [id number] of 4. Occasional palpitations which last less than 1 minute, if these become more frequent can repeat monitor to assess burden.  - Continue Eliquis 2.5mg BID, reduced dose d/t frequent bleeding, aware of risks  - Avoiding BB in the setting of amyloid  - Holding off on digoxin given generally good rate control/very rare RVR events. Could discuss in the future if needed.    # Prolonged QTC  - Minimize QT prolonging meds    #  Systemic amyloid  Heme previously monitoring her light chains which have been negative consistent with remission - therefore further palliative chemo not being considered. Nausea has been a large issue and is currently, seeing palliative to address.  - Pt has seen palliative care for nausea, plans to see a Supportive Clinic for symptoms through her insurance    #Nausea.   - Recommend ongoing f/u with palliative care vs the support team through her insurance.    Follow: Up:     - Labs Tuesday (took metolazone)  - [facility] clinic in 6 weeks  -Dr. Cohen in December as scheduled    Billing  - I managed 2+ stable chronic conditions  - I changed a prescription medication- metolazone      Ciara Araya PA-C  [facility]      CC  STEPHY, ALEJANDRO RAMÍREZ        Please do not hesitate to contact me if you have any questions/concerns.     Sincerely,     Ciara Araya PA-C

## 2021-09-07 ENCOUNTER — PATIENT OUTREACH (OUTPATIENT)
Dept: CARDIOLOGY | Facility: CLINIC | Age: 71
End: 2021-09-07

## 2021-09-07 ENCOUNTER — LAB (OUTPATIENT)
Dept: LAB | Facility: CLINIC | Age: 71
End: 2021-09-07
Payer: COMMERCIAL

## 2021-09-07 DIAGNOSIS — E85.4 AMYLOID HEART DISEASE (H): ICD-10-CM

## 2021-09-07 DIAGNOSIS — I50.32 CHRONIC DIASTOLIC HEART FAILURE (H): Primary | ICD-10-CM

## 2021-09-07 DIAGNOSIS — I43 AMYLOID HEART DISEASE (H): ICD-10-CM

## 2021-09-07 LAB
ANION GAP SERPL CALCULATED.3IONS-SCNC: 6 MMOL/L (ref 3–14)
BUN SERPL-MCNC: 39 MG/DL (ref 7–30)
CALCIUM SERPL-MCNC: 9.3 MG/DL (ref 8.5–10.1)
CHLORIDE BLD-SCNC: 96 MMOL/L (ref 94–109)
CO2 SERPL-SCNC: 32 MMOL/L (ref 20–32)
CREAT SERPL-MCNC: 1.51 MG/DL (ref 0.52–1.04)
GFR SERPL CREATININE-BSD FRML MDRD: 35 ML/MIN/1.73M2
GLUCOSE BLD-MCNC: 71 MG/DL (ref 70–99)
HOLD SPECIMEN: NORMAL
HOLD SPECIMEN: NORMAL
POTASSIUM BLD-SCNC: 3.9 MMOL/L (ref 3.4–5.3)
SODIUM SERPL-SCNC: 134 MMOL/L (ref 133–144)

## 2021-09-07 PROCEDURE — 36415 COLL VENOUS BLD VENIPUNCTURE: CPT

## 2021-09-07 PROCEDURE — 80048 BASIC METABOLIC PNL TOTAL CA: CPT

## 2021-09-07 NOTE — TELEPHONE ENCOUNTER
Date: 9/7/2021    Time of Call: 3:43 PM     Diagnosis:  Heart failure     [ VORB ] Ordering provider: CATHY Orozco    Order: hold on metolazone, BMP on Friday     Order received by: Niki Fam RN       Follow-up/additional notes: Amy stated understanding but reports that she was also having diarrhea with some bleeding- bleeding has stopped. She will follow up with PCP if it continues

## 2021-09-07 NOTE — TELEPHONE ENCOUNTER
"    Amy reports that she took metolazone 9/1 and 9/4.  Her weights are around 154 lbs, swelling is the same.  She reports some nausea and has been taking some anti-emetics for that.  She is still measuring her urine and usually puts out 4-5 L but today is \"peeing like a normal person not on diuretics.\"    "

## 2021-09-10 ENCOUNTER — MEDICAL CORRESPONDENCE (OUTPATIENT)
Dept: HEALTH INFORMATION MANAGEMENT | Facility: CLINIC | Age: 71
End: 2021-09-10

## 2021-09-10 ENCOUNTER — LAB (OUTPATIENT)
Dept: LAB | Facility: CLINIC | Age: 71
End: 2021-09-10
Payer: COMMERCIAL

## 2021-09-10 DIAGNOSIS — I50.32 CHRONIC DIASTOLIC HEART FAILURE (H): Primary | ICD-10-CM

## 2021-09-10 DIAGNOSIS — I50.32 CHRONIC DIASTOLIC HEART FAILURE (H): ICD-10-CM

## 2021-09-10 LAB
ANION GAP SERPL CALCULATED.3IONS-SCNC: 3 MMOL/L (ref 3–14)
BUN SERPL-MCNC: 34 MG/DL (ref 7–30)
CALCIUM SERPL-MCNC: 9.1 MG/DL (ref 8.5–10.1)
CHLORIDE BLD-SCNC: 99 MMOL/L (ref 94–109)
CO2 SERPL-SCNC: 31 MMOL/L (ref 20–32)
CREAT SERPL-MCNC: 1.27 MG/DL (ref 0.52–1.04)
GFR SERPL CREATININE-BSD FRML MDRD: 43 ML/MIN/1.73M2
GLUCOSE BLD-MCNC: 85 MG/DL (ref 70–99)
HOLD SPECIMEN: NORMAL
HOLD SPECIMEN: NORMAL
POTASSIUM BLD-SCNC: 4.9 MMOL/L (ref 3.4–5.3)
SODIUM SERPL-SCNC: 133 MMOL/L (ref 133–144)

## 2021-09-10 PROCEDURE — 36415 COLL VENOUS BLD VENIPUNCTURE: CPT

## 2021-09-10 PROCEDURE — 80048 BASIC METABOLIC PNL TOTAL CA: CPT

## 2021-09-10 NOTE — PROGRESS NOTES
Date: 9/10/2021    Time of Call: 11:34 AM     Diagnosis:  HFpEF     [ VORB ] Ordering provider: Yolanda MORGAN  Order: BMP 1 week. Ok to take Metolazone. Call PCP about bleeding. OK to hold Eliquis in meantime.      Order received by: Vidhi Montgomery RN      Follow-up/additional notes: sent to Amy via [employer]. Vidhi Montgomery RN

## 2021-09-16 ENCOUNTER — LAB (OUTPATIENT)
Dept: LAB | Facility: CLINIC | Age: 71
End: 2021-09-16
Payer: COMMERCIAL

## 2021-09-16 DIAGNOSIS — I50.32 CHRONIC DIASTOLIC HEART FAILURE (H): Primary | ICD-10-CM

## 2021-09-16 DIAGNOSIS — I50.32 CHRONIC DIASTOLIC HEART FAILURE (H): ICD-10-CM

## 2021-09-16 LAB
ANION GAP SERPL CALCULATED.3IONS-SCNC: 3 MMOL/L (ref 3–14)
BUN SERPL-MCNC: 33 MG/DL (ref 7–30)
CALCIUM SERPL-MCNC: 9.6 MG/DL (ref 8.5–10.1)
CHLORIDE BLD-SCNC: 94 MMOL/L (ref 94–109)
CO2 SERPL-SCNC: 35 MMOL/L (ref 20–32)
CREAT SERPL-MCNC: 1.24 MG/DL (ref 0.52–1.04)
GFR SERPL CREATININE-BSD FRML MDRD: 44 ML/MIN/1.73M2
GLUCOSE BLD-MCNC: 78 MG/DL (ref 70–99)
POTASSIUM BLD-SCNC: 4.2 MMOL/L (ref 3.4–5.3)
SODIUM SERPL-SCNC: 132 MMOL/L (ref 133–144)

## 2021-09-16 PROCEDURE — 36415 COLL VENOUS BLD VENIPUNCTURE: CPT

## 2021-09-16 PROCEDURE — 80048 BASIC METABOLIC PNL TOTAL CA: CPT

## 2021-09-16 NOTE — PROGRESS NOTES
Date: 9/16/2021    Time of Call: 3:38 PM     Diagnosis:  HF     [ VORB ] Ordering provider: Yolanda MORGAN  Order: BMP 1-2 weeks     Order received by: Vidhi Montgomery RN      Follow-up/additional notes: mychart sent to Amy with lab results. Vidhi Montgomery RN

## 2021-09-24 ENCOUNTER — TELEPHONE (OUTPATIENT)
Dept: CARDIOLOGY | Facility: CLINIC | Age: 71
End: 2021-09-24

## 2021-09-24 DIAGNOSIS — E87.6 HYPOKALEMIA: ICD-10-CM

## 2021-09-24 DIAGNOSIS — I50.32 CHRONIC DIASTOLIC HEART FAILURE (H): Primary | ICD-10-CM

## 2021-09-24 DIAGNOSIS — E85.4 AMYLOID HEART DISEASE (H): ICD-10-CM

## 2021-09-24 DIAGNOSIS — I43 AMYLOID HEART DISEASE (H): ICD-10-CM

## 2021-09-24 RX ORDER — SPIRONOLACTONE 25 MG/1
12.5 TABLET ORAL DAILY
Qty: 90 TABLET | Refills: 1
Start: 2021-09-24 | End: 2021-09-28

## 2021-09-24 NOTE — TELEPHONE ENCOUNTER
Reviewed with Reyes Wilson NP covering for Yolanda MORGAN.   Date: 9/24/2021    Time of Call: 3:55 PM     Diagnosis:  itching     [ VORB ] Ordering provider: Reyes Wilson NP covering for Yolanda MORGAN  Order: Ok to try Benadryl over the counter for itching. Reduce [place] to 12.5 mg daily.      Order received by: Vidhi Montgomery RN      Follow-up/additional notes: Also reviewed with Amy that we'd like to move her labs up a day from 9/28 to 9/27 to keep a close eye on potassium with reducing ashtyn. She can't get to lab on Monday but will keep Tuesday appt. Will follow up on labs. Vidhi Montgomery RN

## 2021-09-24 NOTE — TELEPHONE ENCOUNTER
Called Amy. Itching has been going on for quite awhile. Is progressively getting worse. TriedAtarax and prescription cream that was prescribed and every cream over the counter, hasn't been able to sleep the last 2 nights. No rash, but skin is raw from itching. She thinks it's from the Spironolactone.   Told her I would review with provider. Vidhi Montgomery RN

## 2021-09-24 NOTE — TELEPHONE ENCOUNTER
[facility] Call Center    Phone Message    May a detailed message be left on voicemail: no     Reason for Call: Other: Pt, Amy calling about: Spiralactone is causing itching and it is unbearable for her to get sleep. Please call Amy at: [phone]... She said, per [facility] website it talks about allergic reaction/ side effect.  Please call Amy     Action Taken: Message routed to:  [facility] Cardiology    Travel Screening: Not Applicable

## 2021-09-28 ENCOUNTER — LAB (OUTPATIENT)
Dept: LAB | Facility: CLINIC | Age: 71
End: 2021-09-28
Payer: COMMERCIAL

## 2021-09-28 DIAGNOSIS — I50.32 CHRONIC DIASTOLIC HEART FAILURE (H): ICD-10-CM

## 2021-09-28 LAB
ANION GAP SERPL CALCULATED.3IONS-SCNC: 8 MMOL/L (ref 3–14)
BUN SERPL-MCNC: 26 MG/DL (ref 7–30)
CALCIUM SERPL-MCNC: 9.3 MG/DL (ref 8.5–10.1)
CHLORIDE BLD-SCNC: 91 MMOL/L (ref 94–109)
CO2 SERPL-SCNC: 31 MMOL/L (ref 20–32)
CREAT SERPL-MCNC: 1.22 MG/DL (ref 0.52–1.04)
GFR SERPL CREATININE-BSD FRML MDRD: 45 ML/MIN/1.73M2
GLUCOSE BLD-MCNC: 97 MG/DL (ref 70–99)
HOLD SPECIMEN: NORMAL
HOLD SPECIMEN: NORMAL
POTASSIUM BLD-SCNC: 3.5 MMOL/L (ref 3.4–5.3)
SODIUM SERPL-SCNC: 130 MMOL/L (ref 133–144)

## 2021-09-28 PROCEDURE — 80048 BASIC METABOLIC PNL TOTAL CA: CPT

## 2021-09-28 PROCEDURE — 36415 COLL VENOUS BLD VENIPUNCTURE: CPT

## 2021-09-28 RX ORDER — EPLERENONE 25 MG/1
25 TABLET, FILM COATED ORAL DAILY
Qty: 30 TABLET | Refills: 1 | Status: SHIPPED | OUTPATIENT
Start: 2021-09-28 | End: 2021-11-10

## 2021-09-28 NOTE — TELEPHONE ENCOUNTER
Labs reviewed with Reyes Wilson NP and Amy.     Date: 9/28/2021    Time of Call: 12:11 PM     Diagnosis:  Heart failure     [ VORB ] Ordering provider: Reyes Wilson NP    Order: stop spironolactone, start eplerenone 25 mg daily, BMP on Friday     Order received by: Niki Fam RN       Follow-up/additional notes: reviewed with Amy.  She reports that her itchiness has improved a little with the decrease in spironolactone although she is still not sleeping well even with the Atarax.  She tried the benadryl with no relief.  She reports some right chest sided soreness with no other associated symptoms except some arm pain one day and a hoarse voice with no congestion.  She is following up with her PCP tomorrow to discuss these symptoms.  We also discussed a possible allergy referral and she plans to discuss with her PCP tomorrow.

## 2021-09-29 ENCOUNTER — MYC MEDICAL ADVICE (OUTPATIENT)
Dept: CARDIOLOGY | Facility: CLINIC | Age: 71
End: 2021-09-29

## 2021-09-29 DIAGNOSIS — I50.32 CHRONIC DIASTOLIC HEART FAILURE (H): Primary | ICD-10-CM

## 2021-09-30 DIAGNOSIS — I50.32 CHRONIC DIASTOLIC HEART FAILURE (H): Primary | ICD-10-CM

## 2021-10-01 ENCOUNTER — LAB (OUTPATIENT)
Dept: LAB | Facility: CLINIC | Age: 71
End: 2021-10-01
Payer: COMMERCIAL

## 2021-10-01 DIAGNOSIS — I50.32 CHRONIC DIASTOLIC HEART FAILURE (H): ICD-10-CM

## 2021-10-01 LAB
ANION GAP SERPL CALCULATED.3IONS-SCNC: 5 MMOL/L (ref 3–14)
BUN SERPL-MCNC: 29 MG/DL (ref 7–30)
CALCIUM SERPL-MCNC: 8.9 MG/DL (ref 8.5–10.1)
CHLORIDE BLD-SCNC: 94 MMOL/L (ref 94–109)
CO2 SERPL-SCNC: 33 MMOL/L (ref 20–32)
CREAT SERPL-MCNC: 1.27 MG/DL (ref 0.52–1.04)
GFR SERPL CREATININE-BSD FRML MDRD: 43 ML/MIN/1.73M2
GLUCOSE BLD-MCNC: 81 MG/DL (ref 70–99)
HOLD SPECIMEN: NORMAL
HOLD SPECIMEN: NORMAL
POTASSIUM BLD-SCNC: 3.5 MMOL/L (ref 3.4–5.3)
SODIUM SERPL-SCNC: 132 MMOL/L (ref 133–144)

## 2021-10-01 PROCEDURE — 36415 COLL VENOUS BLD VENIPUNCTURE: CPT

## 2021-10-01 PROCEDURE — 80048 BASIC METABOLIC PNL TOTAL CA: CPT

## 2021-10-01 NOTE — TELEPHONE ENCOUNTER
Amy reports that she feels so so today. Weight was up to 163 yesterday so she took a metolazone yesterday with the extra potassium.  Weight was down to 158-159 today.      Has been off spironolactone for 2 days and started inspra yesterday evening (has taken 2 doses total).  Itching is slightly better but still present.  Labs are stable from earlier this week.      She asked again about her liver US- and whether she should be on doxycyline still.    Amy also reports that they are planning on going to [place] the week of October 11.

## 2021-10-02 ASSESSMENT — ENCOUNTER SYMPTOMS
NIGHT SWEATS: 0
SYNCOPE: 0
TINGLING: 0
HYPERTENSION: 0
WEIGHT LOSS: 0
MYALGIAS: 1
SPUTUM PRODUCTION: 0
DISTURBANCES IN COORDINATION: 0
INSOMNIA: 1
MUSCLE WEAKNESS: 1
RECTAL PAIN: 0
JOINT SWELLING: 0
NUMBNESS: 0
DIARRHEA: 0
ABDOMINAL PAIN: 1
PALPITATIONS: 1
BACK PAIN: 1
NECK PAIN: 0
POOR WOUND HEALING: 0
SHORTNESS OF BREATH: 1
EXERCISE INTOLERANCE: 1
HEARTBURN: 0
CHILLS: 1
JAUNDICE: 0
SEIZURES: 0
NAIL CHANGES: 0
INCREASED ENERGY: 1
WEIGHT GAIN: 1
MUSCLE CRAMPS: 1
POSTURAL DYSPNEA: 1
HEMOPTYSIS: 0
WHEEZING: 1
CONSTIPATION: 1
WEAKNESS: 1
ALTERED TEMPERATURE REGULATION: 1
ARTHRALGIAS: 0
HALLUCINATIONS: 0
MEMORY LOSS: 0
HEADACHES: 1
PARALYSIS: 0
POLYDIPSIA: 1
COUGH DISTURBING SLEEP: 0
HYPOTENSION: 0
SPEECH CHANGE: 0
LIGHT-HEADEDNESS: 1
BLOOD IN STOOL: 0
FEVER: 0
TREMORS: 0
COUGH: 0
DECREASED CONCENTRATION: 0
BLOATING: 1
ORTHOPNEA: 1
SKIN CHANGES: 0
NAUSEA: 1
FATIGUE: 1
LEG PAIN: 1
STIFFNESS: 1
DEPRESSION: 1
DYSPNEA ON EXERTION: 1
SNORES LOUDLY: 0
NERVOUS/ANXIOUS: 1
BOWEL INCONTINENCE: 0
PANIC: 1
SLEEP DISTURBANCES DUE TO BREATHING: 1
LOSS OF CONSCIOUSNESS: 0
DECREASED APPETITE: 0
DIZZINESS: 1
POLYPHAGIA: 0

## 2021-10-06 NOTE — PROGRESS NOTES
In person visit.    HPI:   Ms. Guerrero is a 71 year old female with a past medical history including stage IV AL amyloid diagnosed in 2016. Presents to clinic for [facility] follow-up.     Her cardiac and oncologic history are as follows: fatigue starting in 1/2016. She had a coronary angiogram which was reportedly normal but was diagnosed with HF and started on a BBand diuretics. Her symptoms progressed to the point that she was evaluated at [facility] in August/September (Dr. Barron in oncology/hematology, Dr. Nettles is cardiology). She was diagnosed with stage IV AL amyloid (+GI, +bone marrow involvement, no involvement of kidney or liver). Her work up is detailed in our previous notes, however she has lambda AL amyloidosis and she underwent CyBorD chemotherapy and excellent light chain response by serum. Patient has been turned down at [facility] for a stem cell transplant due to her cardiac involvement. Later in 2016, she had 2-3 more right sided pleural taps. She was hospitalized 1/2017 and diuresed 20-30 pounds at that time. Since that time her AL has been in remission by labs without further chemo. Patient was then treated with doxycycline but this was stopped for 6-9 months d/t concerns she was not benefiting from this, now restarted.    Since the last visit, we have changed bumex back to torsemide per patient preference.    This visit:  Weight was 154 today at home. Her weight when she left the hospital was 150 and she felt very well at that point. She is using the metolazone 3-4 times per week.     She feels more SOB today. She thinks she could walk a block.  Severe LE edema and also having abdominal edema as well. The abdominal edema seems to be getting worse but she is also constipated right now. No palpitations. No chest pain- intermittent right sided, dull. She used to have this almost all the time. Now it is rare and happened the other day for thirty minutes. When it is happening she can make it worse by pressing on  it. She is having a little bit of dizziness, not usual for her. A few days ago she was sitting and felt very dizzy and she had to go lay down. It took about thirty minutes to fel better- she was not having any palpitations at that time.    Going to [place] on Tuesday- will need labs down in [place]. Will be back around the 22-23rd.     She is planning to work with a Support Team from [email or web address] for symptom management as she does not want to work with palliative care at this time.    She switched from aldactone to inspra.     PAST MEDICAL HISTORY:  Past Medical History:   Diagnosis Date     AL amyloidosis (H)      Atrial fibrillation and flutter (H)      Cardiac amyloidosis (H)      Lymphedema      QT prolongation      Recurrent right pleural effusion 1/2/2017     SVT (supraventricular tachycardia) (H)        FAMILY HISTORY:  Family History   Problem Relation Age of Onset     Other - See Comments Sister         Amyloidosis       SOCIAL HISTORY:  Socioeconomic History     Marital status:    Tobacco Use     Smoking status: Never Smoker     Smokeless tobacco: Never Used   Substance and Sexual Activity     Alcohol use: No     Drug use: No   Other Topics Concern     CURRENT MEDICATIONS:  Acetaminophen (TYLENOL PO), Take 325 mg by mouth every 6 hours as needed for mild pain or fever   apixaban ANTICOAGULANT (ELIQUIS) 2.5 MG tablet, Take 1 tablet (2.5 mg) by mouth 2 times daily  camphor-menthol (DERMASARRA) 0.5-0.5 % external lotion, Apply 1 mL topically every 6 hours as needed for skin care  doxepin (SINEQUAN) 10 MG capsule, Take 10 mg by mouth At Bedtime  eplerenone (INSPRA) 25 MG tablet, Take 1 tablet (25 mg) by mouth daily  hydrOXYzine (ATARAX) 25 MG tablet, Take 1 tablet (25 mg) by mouth 3 times daily as needed for itching  LORazepam (ATIVAN) 0.5 MG tablet, Take 1 tablet (0.5 mg) by mouth every 6 hours as needed for anxiety or nausea  metolazone (ZAROXOLYN) 2.5 MG tablet, Take 1 tablet (2.5 mg) by  "mouth twice a week As needed for increased swelling and weight gain of 3 lb in one day or 5 lb in one week  ondansetron (ZOFRAN-ODT) 8 MG ODT tab, Take 8 mg by mouth every 8 hours as needed PRN  potassium chloride ER (KLOR-CON M) 10 MEQ CR tablet, TAKE 8 TABLETS EVERY MORNING& 8 TABLETS AT LUNCH AND 6 TABLETS EVERY EVENING TAKE EXTRA 6 TABLETS DAY OF AND DAY AFTER METOLAZONE  torsemide (DEMADEX) 100 MG tablet, Take 1 tablet (100 mg) by mouth every morning AND 1 tablet (100 mg) daily at 2 pm.  triamcinolone (KENALOG) 0.1 % external cream, Apply a thin layer twice daily to itchy areas as needed.  doxycycline hyclate (VIBRA-TABS) 100 MG tablet, Take 1 tablet (100 mg) by mouth daily (Patient not taking: Reported on 10/7/2021)  OLANZapine (ZYPREXA) 2.5 MG tablet, Take 2.5 mg by mouth At Bedtime (Patient not taking: Reported on 8/12/2021)    No current facility-administered medications on file prior to visit.      ROS:   See HPI     EXAM:  /72 (BP Location: Right arm, Patient Position: Chair, Cuff Size: Adult Regular)   Pulse 69   Ht 1.58 m (5' 2.21\")   Wt 69.9 kg (154 lb)   SpO2 99%   BMI 27.98 kg/m       GENERAL: Appears fatigued, in no acute distress. Speaking in full sentences and able to communicate all needs  HEENT: Eye symmetrical, no discharge or icterus bilaterally. Mucous membranes moist and without lesions.  CV: RRR, +S1S2, no murmur, rub, or gallop. JVP ~9  RESPIRATORY: Respirations regular, even, and unlabored. Lungs CTA throughout.   GI: Soft and mildly  distended with normoactive bowel sounds. No tenderness, rebound, guarding.   EXTREMITIES: Severe b/l non-pitting peripheral edema, slightly increased from last visit. All extremities are warm and well perfused   NEUROLOGIC: Alert and interacting appropriately. No focal deficits.   MUSCULOSKELETAL: No joint swelling or tenderness.   SKIN: No jaundice. No rashes.      Labs, reviewed with patient in clinic today:  CBC RESULTS:  Lab Results "   Component Value Date    WBC 6.7 07/20/2021    WBC 6.4 05/11/2021    RBC 4.30 07/20/2021    RBC 4.29 05/11/2021    HGB 13.6 07/20/2021    HGB 13.8 05/11/2021    HCT 40.8 07/20/2021    HCT 41.3 05/11/2021    MCV 95 07/20/2021    MCV 96 05/11/2021    MCH 31.6 07/20/2021    MCH 32.2 05/11/2021    MCHC 33.3 07/20/2021    MCHC 33.4 05/11/2021    RDW 13.7 07/20/2021    RDW 13.5 05/11/2021     07/20/2021     05/11/2021       CMP RESULTS:  Lab Results   Component Value Date     (L) 10/07/2021     07/08/2021    POTASSIUM 3.2 (L) 10/07/2021    POTASSIUM 3.6 07/08/2021    CHLORIDE 92 (L) 10/07/2021    CHLORIDE 94 07/08/2021    CO2 33 (H) 10/07/2021    CO2 34 (H) 07/08/2021    ANIONGAP 7 10/07/2021    ANIONGAP 6 07/08/2021    GLC 87 10/07/2021    GLC 89 07/08/2021    BUN 34 (H) 10/07/2021    BUN 35 (H) 07/08/2021    CR 1.19 (H) 10/07/2021    CR 1.13 (H) 07/08/2021    GFRESTIMATED 47 (L) 10/07/2021    GFRESTIMATED 50 (L) 07/08/2021    GFRESTBLACK 58 (L) 07/08/2021    JERROD 9.4 10/07/2021    JERORD 9.2 07/08/2021    BILITOTAL 1.9 (H) 05/11/2021    ALBUMIN 3.8 05/11/2021    ALKPHOS 171 (H) 05/11/2021    ALT 35 05/11/2021    AST 23 05/11/2021        INR RESULTS:  Lab Results   Component Value Date    INR 1.30 (H) 01/14/2017       Lab Results   Component Value Date    MAG 2.4 (H) 07/27/2021    MAG 2.2 10/26/2019     Lab Results   Component Value Date    NTBNPI 1,086 (H) 07/20/2021    NTBNPI 9,009 (H) 01/13/2017     Lab Results   Component Value Date    NTBNP 1,376 (H) 02/04/2021       Diagnostics:      2/21 Ziopatch       6/12/2019 Holter Mnoitor      TTE 4/9/19  Moderate left ventricular hypertrophy.  Global and regional left ventricular function is normal with an EF of 60-65%.  Global right ventricular function is normal.  Moderate aortic valve insufficiency.  Mild pulmonary hypertension with dilated IVC.  This study was compared with the study from 10/11/18 the AR is worse and RA pressure is now  increased.    TTE 10/11/18  Global and regional left ventricular function is normal with an EF of 55-60%.  Moderate concentric wall thickening consistent with left ventricular  hypertrophy is present - known amyloid.  Grade III or advanced diastolic dysfunction.  Normal right ventricular size, wall thickness, and function.  Estimated pulmonary artery pressure 28 mmHg.  IVC with normal diameter but does not collapse (>50%) with inspiration.  Trace pericardial effusion.  Compared to prior study from 8/17/17, no significant change.    Ziopatch 11/2017      Holter Monitor 06/2019  INTERPRETATION  1. The rhythm varied with sinus rhythm and atrial fibrillation/variable atrial flutter. Low voltage noted.  2. The heart rate varied with a minimum rate of 48 bpm, maximum rate of 164 bpm, average rate of 74 bpm.  3. Frequent supraventricular ectopy was seen ranging from 0-469 per hour. Occasional atrail pairs and fairly frequent bigeminal cycles were noted.  Wandering atrial pacemaker noted.  4. Infrequent multifocal ventricular ectopy was seen ranging from 0-36 beats per hour.  5. ST-T wave changes were present.  6. Three patient events were documented and correlated with atrial fibrillation/flutter    EKG 8/8/2019 for palpitations  - NSR at a rate of 70, NJ 0.19, QTc 525, QRS is narrow. Occasional PVCs. Biphasic twaves in V4-V6, unchanged from priors.    Assessment and Plan:   Mrs. Guerrero is a 71 year old female with AL amyloidosis with cardiac manifestation who presents to [facility] for hospital follow-up. Patient has cardiac amyloidosis as evidenced by her echocardiogram (severe concentric hypertrophy and biatrial enlargement) and abnormal EKG (near-low voltage, poor R wave progression, biatrial enlargement and no evidence of LVH despite thicken LV on echo) in the setting of biopsy proven (BMB, EGD) AL amyloid. She completed chemotherapy with CyBorD with an excellent serologic response and her heart failure (i.e. troponin  negative, NT pro BNP was stable, serum light chains minimal and urine light chains undetectable).       She has gradually declined over the last few years and has ongoing challenges with her volume status.  She requires frequent metolazone and maintaining an adequate potassium level has been a challenge. She has also been struggling with A. Fib, which is occaionally symptomatic. These episodes remain rare and given relative contraindications for amyloid patients we do not have her on rate control at this time.    She did have an episode of dizziness a few days ago- got better with laying down- sounded vasovagal, but will need to keep a close eye on this.    She has had a few hospital stays for volume issues and potassium issues this year. Her last hospital stay she says made her feel much better for a while. Prior notes for GOC discussions.    # Chronic diastolic heart failure/restrictive cardiomyopathy 2/2  # Cardiac amyloidosis    Stage C. NYHA Class III.    Fluid status: Continue torsemide 100 mg BID and Kcl 80/80/60.  Continue PRN metolazone with an extra 60 meq of kcl the day of and the day after metolazone.   ACEi/ARB/ARNI: n/a   BB: no indication and relatively contraindicated due to risk of progressive conduction disease/AV block in these patients  Aldosterone antagonist: Continue Inspra 25 mg daily  SCD prophylaxis: does not meet criteria for implant  NSAID use: contraindicated  Sleep apnea evaluation: deferred today  Remote monitoring: N/A  Goals of care: prior discussions with Dr Cohen and ongoing in [facility] as well  Other: Has a referall for lymphedema therapy in [facility]. If she needs paracentesis in the future for symptomatic control we would support that. Her abdominal edema is mild for her right now.    # Hypokalmia,  - K is 3.3. Take an extra 60 meq of Kcl today    # CKD stage 3B  - Cr stabe at 1.19, stable at her baseline  - Rehcheck on Monday    # Chirrosis, recently diagnosed. I have a high  supsition that her restrictive physiology is contributing, which I discussed with her today  - Scheduled for hepatology follow-up next week    # A. Fib/A. Flutter  Prior holter monitor which showed intermittent a. Fib and a. Flutter. [id number] of 4. Occasional palpitations which last less than 1 minute, if these become more frequent can repeat monitor to assess burden.  - Continue Eliquis 2.5mg BID, reduced dose d/t frequent bleeding, aware of risks  - Avoiding BB in the setting of amyloid  - Holding off on digoxin given generally good rate control/very rare RVR events. Could discuss in the future if needed.    # Prolonged QTC  - Minimize QT prolonging meds    # Systemic amyloid  Heme previously monitoring her light chains which have been negative consistent with remission - therefore further palliative chemo not being considered. Nausea has been a large issue and is currently, seeing palliative to address.  - Pt has seen palliative care for nausea, plans to see a Supportive Clinic for symptoms through her insurance    #Nausea.   - Recommend ongoing f/u with palliative care vs the support team through her insurance.    Follow: Up:   - Labs Monday (hypokalemia)  - Dr. Keyes in December as scheduled  - [facility] in 4 months or sooner PRN    Billing  - I managed 2+ stable chronic conditions  - I changed a prescription medication- HANSA Araya PA-C  [facility]      CC  ALEJANDRO KEYES

## 2021-10-07 ENCOUNTER — OFFICE VISIT (OUTPATIENT)
Dept: CARDIOLOGY | Facility: CLINIC | Age: 71
End: 2021-10-07
Attending: FAMILY MEDICINE
Payer: COMMERCIAL

## 2021-10-07 ENCOUNTER — LAB (OUTPATIENT)
Dept: LAB | Facility: CLINIC | Age: 71
End: 2021-10-07
Attending: FAMILY MEDICINE
Payer: COMMERCIAL

## 2021-10-07 VITALS
OXYGEN SATURATION: 99 % | HEART RATE: 69 BPM | HEIGHT: 62 IN | WEIGHT: 154 LBS | SYSTOLIC BLOOD PRESSURE: 127 MMHG | DIASTOLIC BLOOD PRESSURE: 72 MMHG | BODY MASS INDEX: 28.34 KG/M2

## 2021-10-07 DIAGNOSIS — E85.4 AMYLOID HEART DISEASE (H): ICD-10-CM

## 2021-10-07 DIAGNOSIS — I43 AMYLOID HEART DISEASE (H): ICD-10-CM

## 2021-10-07 DIAGNOSIS — I50.32 CHRONIC DIASTOLIC HEART FAILURE (H): ICD-10-CM

## 2021-10-07 LAB
ANION GAP SERPL CALCULATED.3IONS-SCNC: 7 MMOL/L (ref 3–14)
BUN SERPL-MCNC: 34 MG/DL (ref 7–30)
CALCIUM SERPL-MCNC: 9.4 MG/DL (ref 8.5–10.1)
CHLORIDE BLD-SCNC: 92 MMOL/L (ref 94–109)
CO2 SERPL-SCNC: 33 MMOL/L (ref 20–32)
CREAT SERPL-MCNC: 1.19 MG/DL (ref 0.52–1.04)
GFR SERPL CREATININE-BSD FRML MDRD: 47 ML/MIN/1.73M2
GLUCOSE BLD-MCNC: 87 MG/DL (ref 70–99)
POTASSIUM BLD-SCNC: 3.2 MMOL/L (ref 3.4–5.3)
SODIUM SERPL-SCNC: 132 MMOL/L (ref 133–144)

## 2021-10-07 PROCEDURE — 99214 OFFICE O/P EST MOD 30 MIN: CPT | Performed by: PHYSICIAN ASSISTANT

## 2021-10-07 PROCEDURE — G0463 HOSPITAL OUTPT CLINIC VISIT: HCPCS

## 2021-10-07 PROCEDURE — 80048 BASIC METABOLIC PNL TOTAL CA: CPT | Performed by: PHYSICIAN ASSISTANT

## 2021-10-07 PROCEDURE — 36415 COLL VENOUS BLD VENIPUNCTURE: CPT | Performed by: PATHOLOGY

## 2021-10-07 RX ORDER — DOXEPIN HYDROCHLORIDE 10 MG/1
10 CAPSULE ORAL AT BEDTIME
Status: ON HOLD | COMMUNITY
Start: 2021-09-30 | End: 2021-11-14

## 2021-10-07 ASSESSMENT — PAIN SCALES - GENERAL: PAINLEVEL: NO PAIN (0)

## 2021-10-07 ASSESSMENT — MIFFLIN-ST. JEOR: SCORE: 1180.04

## 2021-10-07 NOTE — LETTER
10/7/2021      RE: Leandra Guerrero  [address] [place] [address]       Dear Colleague,    Thank you for the opportunity to participate in the care of your patient, Leandra Guerrero, at the [facility] [place] [facility]. Please see a copy of my visit note below.    In person visit.    HPI:   Ms. Guerrero is a 71 year old female with a past medical history including stage IV AL amyloid diagnosed in 2016. Presents to clinic for CORE follow-up.     Her cardiac and oncologic history are as follows: fatigue starting in 1/2016. She had a coronary angiogram which was reportedly normal but was diagnosed with HF and started on a BBand diuretics. Her symptoms progressed to the point that she was evaluated at [facility] in August/September (Dr. Barron in oncology/hematology, Dr. Nettles is cardiology). She was diagnosed with stage IV AL amyloid (+GI, +bone marrow involvement, no involvement of kidney or liver). Her work up is detailed in our previous notes, however she has lambda AL amyloidosis and she underwent CyBorD chemotherapy and excellent light chain response by serum. Patient has been turned down at [facility] for a stem cell transplant due to her cardiac involvement. Later in 2016, she had 2-3 more right sided pleural taps. She was hospitalized 1/2017 and diuresed 20-30 pounds at that time. Since that time her AL has been in remission by labs without further chemo. Patient was then treated with doxycycline but this was stopped for 6-9 months d/t concerns she was not benefiting from this, now restarted.    Since the last visit, we have changed bumex back to torsemide per patient preference.    This visit:  Weight was 154 today at home. Her weight when she left the hospital was 150 and she felt very well at that point. She is using the metolazone 3-4 times per week.     She feels more SOB today. She thinks she could walk a block.  Severe LE  edema and also having abdominal edema as well. The abdominal edema seems to be getting worse but she is also constipated right now. No palpitations. No chest pain- intermittent right sided, dull. She used to have this almost all the time. Now it is rare and happened the other day for thirty minutes. When it is happening she can make it worse by pressing on it. She is having a little bit of dizziness, not usual for her. A few days ago she was sitting and felt very dizzy and she had to go lay down. It took about thirty minutes to fel better- she was not having any palpitations at that time.    Going to [place] on Tuesday- will need labs down in [place]. Will be back around the 22-23rd.     She is planning to work with a Support Team from [facility] for symptom management as she does not want to work with palliative care at this time.    She switched from aldactone to inspra.     PAST MEDICAL HISTORY:  Past Medical History:   Diagnosis Date     AL amyloidosis (H)      Atrial fibrillation and flutter (H)      Cardiac amyloidosis (H)      Lymphedema      QT prolongation      Recurrent right pleural effusion 1/2/2017     SVT (supraventricular tachycardia) (H)        FAMILY HISTORY:  Family History   Problem Relation Age of Onset     Other - See Comments Sister         Amyloidosis       SOCIAL HISTORY:  Socioeconomic History     Marital status:    Tobacco Use     Smoking status: Never Smoker     Smokeless tobacco: Never Used   Substance and Sexual Activity     Alcohol use: No     Drug use: No   Other Topics Concern     CURRENT MEDICATIONS:  Acetaminophen (TYLENOL PO), Take 325 mg by mouth every 6 hours as needed for mild pain or fever   apixaban ANTICOAGULANT (ELIQUIS) 2.5 MG tablet, Take 1 tablet (2.5 mg) by mouth 2 times daily  camphor-menthol (DERMASARRA) 0.5-0.5 % external lotion, Apply 1 mL topically every 6 hours as needed for skin care  doxepin (SINEQUAN) 10 MG capsule, Take 10 mg by mouth At  "Bedtime  eplerenone (INSPRA) 25 MG tablet, Take 1 tablet (25 mg) by mouth daily  hydrOXYzine (ATARAX) 25 MG tablet, Take 1 tablet (25 mg) by mouth 3 times daily as needed for itching  LORazepam (ATIVAN) 0.5 MG tablet, Take 1 tablet (0.5 mg) by mouth every 6 hours as needed for anxiety or nausea  metolazone (ZAROXOLYN) 2.5 MG tablet, Take 1 tablet (2.5 mg) by mouth twice a week As needed for increased swelling and weight gain of 3 lb in one day or 5 lb in one week  ondansetron (ZOFRAN-ODT) 8 MG ODT tab, Take 8 mg by mouth every 8 hours as needed PRN  potassium chloride ER (KLOR-CON M) 10 MEQ CR tablet, TAKE 8 TABLETS EVERY MORNING& 8 TABLETS AT LUNCH AND 6 TABLETS EVERY EVENING TAKE EXTRA 6 TABLETS DAY OF AND DAY AFTER METOLAZONE  torsemide (DEMADEX) 100 MG tablet, Take 1 tablet (100 mg) by mouth every morning AND 1 tablet (100 mg) daily at 2 pm.  triamcinolone (KENALOG) 0.1 % external cream, Apply a thin layer twice daily to itchy areas as needed.  doxycycline hyclate (VIBRA-TABS) 100 MG tablet, Take 1 tablet (100 mg) by mouth daily (Patient not taking: Reported on 10/7/2021)  OLANZapine (ZYPREXA) 2.5 MG tablet, Take 2.5 mg by mouth At Bedtime (Patient not taking: Reported on 8/12/2021)    No current facility-administered medications on file prior to visit.      ROS:   See HPI     EXAM:  /72 (BP Location: Right arm, Patient Position: Chair, Cuff Size: Adult Regular)   Pulse 69   Ht 1.58 m (5' 2.21\")   Wt 69.9 kg (154 lb)   SpO2 99%   BMI 27.98 kg/m       GENERAL: Appears fatigued, in no acute distress. Speaking in full sentences and able to communicate all needs  HEENT: Eye symmetrical, no discharge or icterus bilaterally. Mucous membranes moist and without lesions.  CV: RRR, +S1S2, no murmur, rub, or gallop. JVP ~9  RESPIRATORY: Respirations regular, even, and unlabored. Lungs CTA throughout.   GI: Soft and mildly  distended with normoactive bowel sounds. No tenderness, rebound, guarding.   EXTREMITIES: " Severe b/l non-pitting peripheral edema, slightly increased from last visit. All extremities are warm and well perfused   NEUROLOGIC: Alert and interacting appropriately. No focal deficits.   MUSCULOSKELETAL: No joint swelling or tenderness.   SKIN: No jaundice. No rashes.      Labs, reviewed with patient in clinic today:  CBC RESULTS:  Lab Results   Component Value Date    WBC 6.7 07/20/2021    WBC 6.4 05/11/2021    RBC 4.30 07/20/2021    RBC 4.29 05/11/2021    HGB 13.6 07/20/2021    HGB 13.8 05/11/2021    HCT 40.8 07/20/2021    HCT 41.3 05/11/2021    MCV 95 07/20/2021    MCV 96 05/11/2021    MCH 31.6 07/20/2021    MCH 32.2 05/11/2021    MCHC 33.3 07/20/2021    MCHC 33.4 05/11/2021    RDW 13.7 07/20/2021    RDW 13.5 05/11/2021     07/20/2021     05/11/2021       CMP RESULTS:  Lab Results   Component Value Date     (L) 10/07/2021     07/08/2021    POTASSIUM 3.2 (L) 10/07/2021    POTASSIUM 3.6 07/08/2021    CHLORIDE 92 (L) 10/07/2021    CHLORIDE 94 07/08/2021    CO2 33 (H) 10/07/2021    CO2 34 (H) 07/08/2021    ANIONGAP 7 10/07/2021    ANIONGAP 6 07/08/2021    GLC 87 10/07/2021    GLC 89 07/08/2021    BUN 34 (H) 10/07/2021    BUN 35 (H) 07/08/2021    CR 1.19 (H) 10/07/2021    CR 1.13 (H) 07/08/2021    GFRESTIMATED 47 (L) 10/07/2021    GFRESTIMATED 50 (L) 07/08/2021    GFRESTBLACK 58 (L) 07/08/2021    JERROD 9.4 10/07/2021    JERROD 9.2 07/08/2021    BILITOTAL 1.9 (H) 05/11/2021    ALBUMIN 3.8 05/11/2021    ALKPHOS 171 (H) 05/11/2021    ALT 35 05/11/2021    AST 23 05/11/2021        INR RESULTS:  Lab Results   Component Value Date    INR 1.30 (H) 01/14/2017       Lab Results   Component Value Date    MAG 2.4 (H) 07/27/2021    MAG 2.2 10/26/2019     Lab Results   Component Value Date    NTBNPI 1,086 (H) 07/20/2021    NTBNPI 9,009 (H) 01/13/2017     Lab Results   Component Value Date    NTBNP 1,376 (H) 02/04/2021       Diagnostics:      2/21 Ziopatch       6/12/2019 Holter Mnoitor      TTE  4/9/19  Moderate left ventricular hypertrophy.  Global and regional left ventricular function is normal with an EF of 60-65%.  Global right ventricular function is normal.  Moderate aortic valve insufficiency.  Mild pulmonary hypertension with dilated IVC.  This study was compared with the study from 10/11/18 the AR is worse and RA pressure is now increased.    TTE 10/11/18  Global and regional left ventricular function is normal with an EF of 55-60%.  Moderate concentric wall thickening consistent with left ventricular  hypertrophy is present - known amyloid.  Grade III or advanced diastolic dysfunction.  Normal right ventricular size, wall thickness, and function.  Estimated pulmonary artery pressure 28 mmHg.  IVC with normal diameter but does not collapse (>50%) with inspiration.  Trace pericardial effusion.  Compared to prior study from 8/17/17, no significant change.    Cybronicstch 11/2017      Holter Monitor 06/2019  INTERPRETATION  1. The rhythm varied with sinus rhythm and atrial fibrillation/variable atrial flutter. Low voltage noted.  2. The heart rate varied with a minimum rate of 48 bpm, maximum rate of 164 bpm, average rate of 74 bpm.  3. Frequent supraventricular ectopy was seen ranging from 0-469 per hour. Occasional atrail pairs and fairly frequent bigeminal cycles were noted.  Wandering atrial pacemaker noted.  4. Infrequent multifocal ventricular ectopy was seen ranging from 0-36 beats per hour.  5. ST-T wave changes were present.  6. Three patient events were documented and correlated with atrial fibrillation/flutter    EKG 8/8/2019 for palpitations  - NSR at a rate of 70, AL 0.19, QTc 525, QRS is narrow. Occasional PVCs. Biphasic twaves in V4-V6, unchanged from priors.    Assessment and Plan:   Mrs. Guerrero is a 71 year old female with AL amyloidosis with cardiac manifestation who presents to CORE for hospital follow-up. Patient has cardiac amyloidosis as evidenced by her echocardiogram (severe  concentric hypertrophy and biatrial enlargement) and abnormal EKG (near-low voltage, poor R wave progression, biatrial enlargement and no evidence of LVH despite thicken LV on echo) in the setting of biopsy proven (BMB, EGD) AL amyloid. She completed chemotherapy with CyBorD with an excellent serologic response and her heart failure (i.e. troponin negative, NT pro BNP was stable, serum light chains minimal and urine light chains undetectable).       She has gradually declined over the last few years and has ongoing challenges with her volume status.  She requires frequent metolazone and maintaining an adequate potassium level has been a challenge. She has also been struggling with A. Fib, which is occaionally symptomatic. These episodes remain rare and given relative contraindications for amyloid patients we do not have her on rate control at this time.    She did have an episode of dizziness a few days ago- got better with laying down- sounded vasovagal, but will need to keep a close eye on this.    She has had a few hospital stays for volume issues and potassium issues this year. Her last hospital stay she says made her feel much better for a while. Prior notes for GOC discussions.    # Chronic diastolic heart failure/restrictive cardiomyopathy 2/2  # Cardiac amyloidosis    Stage C. NYHA Class III.    Fluid status: Continue torsemide 100 mg BID and Kcl 80/80/60.  Continue PRN metolazone with an extra 60 meq of kcl the day of and the day after metolazone.   ACEi/ARB/ARNI: n/a   BB: no indication and relatively contraindicated due to risk of progressive conduction disease/AV block in these patients  Aldosterone antagonist: Continue Inspra 25 mg daily  SCD prophylaxis: does not meet criteria for implant  NSAID use: contraindicated  Sleep apnea evaluation: deferred today  Remote monitoring: N/A  Goals of care: prior discussions with Dr Cohen and ongoing in CORE as well  Other: Has a referall for lymphedema therapy  in [facility]. If she needs paracentesis in the future for symptomatic control we would support that. Her abdominal edema is mild for her right now.    # Hypokalmia,  - K is 3.3. Take an extra 60 meq of Kcl today    # CKD stage 3B  - Cr stabe at 1.19, stable at her baseline  - Rehcheck on Monday    # Chirrosis, recently diagnosed. I have a high supsition that her restrictive physiology is contributing, which I discussed with her today  - Scheduled for hepatology follow-up next week    # A. Fib/A. Flutter  Prior holter monitor which showed intermittent a. Fib and a. Flutter. [id number] of 4. Occasional palpitations which last less than 1 minute, if these become more frequent can repeat monitor to assess burden.  - Continue Eliquis 2.5mg BID, reduced dose d/t frequent bleeding, aware of risks  - Avoiding BB in the setting of amyloid  - Holding off on digoxin given generally good rate control/very rare RVR events. Could discuss in the future if needed.    # Prolonged QTC  - Minimize QT prolonging meds    # Systemic amyloid  Heme previously monitoring her light chains which have been negative consistent with remission - therefore further palliative chemo not being considered. Nausea has been a large issue and is currently, seeing palliative to address.  - Pt has seen palliative care for nausea, plans to see a Supportive Clinic for symptoms through her insurance    #Nausea.   - Recommend ongoing f/u with palliative care vs the support team through her insurance.    Follow: Up:   - Labs Monday (hypokalemia)  - Dr. Keyes in December as scheduled  - CORE in 4 months or sooner PRN    Billing  - I managed 2+ stable chronic conditions  - I changed a prescription medication- HANSA Araya PA-C  [facility]      CC  ALEJANDRO KEYES

## 2021-10-07 NOTE — NURSING NOTE
Chief Complaint   Patient presents with     Follow Up     return CORE     Vitals were taken and medications reconciled.    KELLY Booker  8:26 AM

## 2021-10-07 NOTE — PATIENT INSTRUCTIONS
Take your medicines every day, as directed    Changes made today:  o No medication changes  o We will call you today when your labs are back   Monitor Your Weight and Symptoms    Contact us if you:      Gain 2 pounds in one day or 5 pounds in one week    Feel more short of breath    Notice more leg swelling    Feel lightheadeded   Change your lifestyle    Limit Salt or Sodium:    2000 mg  Limit Fluids:    2000 mL or approximately 64 ounces  Eat a Heart Healthy Diet    Low in saturated fats  Stay Active:    Aim to move at least 150 minutes every  week         To Contact us    During Business Hours:  [phone], option # 1 (University)  Then option # 4 (medical questions)     After hours, weekends or holidays:   [phone], Option #4  Ask to speak to the On-Call Cardiologist. Inform them you are a CORE/heart failure patient at the [place].     Use [employer] allows you to communicate directly with your heart team through secure messaging.    [employer] can be accessed any time on your phone, computer, or tablet.    If you need assistance, we'd be happy to help!         Keep your Heart Appointments:    - We will call you when your labs are back with a plan  - Schedule with Yolanda Herrera

## 2021-10-07 NOTE — NURSING NOTE
Labs resulted after clinic visit. Called [facility] with following orders:    Date: 10/7/2021    Time of Call: 9:37 AM     Diagnosis:  amyloid     [ VORB ] Ordering provider: Yolanda MORGAN  Order: Take an extra 60 meq Potassium today  Then resume normal dosing.   Labs Monday.   Ok to take a metolazone as needed.      Order received by: Vidhi Montgomery RN      Follow-up/additional notes:

## 2021-10-09 ENCOUNTER — HEALTH MAINTENANCE LETTER (OUTPATIENT)
Age: 71
End: 2021-10-09

## 2021-10-11 ENCOUNTER — LAB (OUTPATIENT)
Dept: LAB | Facility: CLINIC | Age: 71
End: 2021-10-11
Payer: COMMERCIAL

## 2021-10-11 DIAGNOSIS — I43 AMYLOID HEART DISEASE (H): ICD-10-CM

## 2021-10-11 DIAGNOSIS — E85.4 AMYLOID HEART DISEASE (H): ICD-10-CM

## 2021-10-11 LAB
ANION GAP SERPL CALCULATED.3IONS-SCNC: 8 MMOL/L (ref 3–14)
BUN SERPL-MCNC: 29 MG/DL (ref 7–30)
CALCIUM SERPL-MCNC: 9.2 MG/DL (ref 8.5–10.1)
CHLORIDE BLD-SCNC: 95 MMOL/L (ref 94–109)
CO2 SERPL-SCNC: 32 MMOL/L (ref 20–32)
CREAT SERPL-MCNC: 1.11 MG/DL (ref 0.52–1.04)
GFR SERPL CREATININE-BSD FRML MDRD: 51 ML/MIN/1.73M2
GLUCOSE BLD-MCNC: 79 MG/DL (ref 70–99)
HOLD SPECIMEN: NORMAL
POTASSIUM BLD-SCNC: 3.5 MMOL/L (ref 3.4–5.3)
SODIUM SERPL-SCNC: 135 MMOL/L (ref 133–144)

## 2021-10-11 PROCEDURE — 80048 BASIC METABOLIC PNL TOTAL CA: CPT

## 2021-10-11 PROCEDURE — 36415 COLL VENOUS BLD VENIPUNCTURE: CPT

## 2021-10-12 ENCOUNTER — MYC MEDICAL ADVICE (OUTPATIENT)
Dept: CARDIOLOGY | Facility: CLINIC | Age: 71
End: 2021-10-12

## 2021-10-12 DIAGNOSIS — I50.32 CHRONIC DIASTOLIC HEART FAILURE (H): Primary | ICD-10-CM

## 2021-10-13 NOTE — TELEPHONE ENCOUNTER
Date: 10/13/2021    Time of Call: 8:52 AM     Diagnosis:  Heart failure     [ VORB ] Ordering provider: CATHY Orozco    Order: BMP next Monday - Wednesday     Order received by: Niki Fam RN       Follow-up/additional notes: will Fax to [facility] in [place]

## 2021-10-20 ENCOUNTER — PATIENT OUTREACH (OUTPATIENT)
Dept: CARDIOLOGY | Facility: CLINIC | Age: 71
End: 2021-10-20

## 2021-10-20 NOTE — TELEPHONE ENCOUNTER
Called [facility] again to try to get lab results faxed. They report they've faxed multiple times and get a confirmation each time. Unable to email. Will try one more time. Vidhi Montgomery RN

## 2021-10-21 DIAGNOSIS — I50.32 CHRONIC DIASTOLIC HEART FAILURE (H): Primary | ICD-10-CM

## 2021-10-21 NOTE — PROGRESS NOTES
Date: 10/21/2021    Time of Call: 3:32 PM     Diagnosis:  HFpEF     [ VORB ] Ordering provider: Yolanda MORGAN  Order: BMP when back in town      Order received by: Vidhi Montgomery RN      Follow-up/additional notes:

## 2021-10-22 ENCOUNTER — MYC MEDICAL ADVICE (OUTPATIENT)
Dept: DERMATOLOGY | Facility: CLINIC | Age: 71
End: 2021-10-22

## 2021-10-25 ENCOUNTER — LAB (OUTPATIENT)
Dept: LAB | Facility: CLINIC | Age: 71
End: 2021-10-25
Payer: COMMERCIAL

## 2021-10-25 DIAGNOSIS — I50.32 CHRONIC DIASTOLIC HEART FAILURE (H): ICD-10-CM

## 2021-10-25 LAB
ANION GAP SERPL CALCULATED.3IONS-SCNC: 5 MMOL/L (ref 3–14)
BUN SERPL-MCNC: 25 MG/DL (ref 7–30)
CALCIUM SERPL-MCNC: 9.1 MG/DL (ref 8.5–10.1)
CHLORIDE BLD-SCNC: 95 MMOL/L (ref 94–109)
CO2 SERPL-SCNC: 32 MMOL/L (ref 20–32)
CREAT SERPL-MCNC: 1.17 MG/DL (ref 0.52–1.04)
GFR SERPL CREATININE-BSD FRML MDRD: 48 ML/MIN/1.73M2
GLUCOSE BLD-MCNC: 94 MG/DL (ref 70–99)
HOLD SPECIMEN: NORMAL
HOLD SPECIMEN: NORMAL
POTASSIUM BLD-SCNC: 3.6 MMOL/L (ref 3.4–5.3)
SODIUM SERPL-SCNC: 132 MMOL/L (ref 133–144)

## 2021-10-25 PROCEDURE — 36415 COLL VENOUS BLD VENIPUNCTURE: CPT

## 2021-10-25 PROCEDURE — 80048 BASIC METABOLIC PNL TOTAL CA: CPT

## 2021-10-27 ENCOUNTER — TRANSFERRED RECORDS (OUTPATIENT)
Dept: HEALTH INFORMATION MANAGEMENT | Facility: CLINIC | Age: 71
End: 2021-10-27
Payer: COMMERCIAL

## 2021-10-27 DIAGNOSIS — I50.32 CHRONIC DIASTOLIC HEART FAILURE (H): Primary | ICD-10-CM

## 2021-10-27 LAB
CREATININE (EXTERNAL): 1.36 MG/DL (ref 0.57–1.11)
GFR ESTIMATED (EXTERNAL): 39 ML/MIN/1.73M2
GFR ESTIMATED (IF AFRICAN AMERICAN) (EXTERNAL): 47 ML/MIN/1.73M2
GLUCOSE (EXTERNAL): 92 MG/DL (ref 65–100)
POTASSIUM (EXTERNAL): 3.8 MMOL/L (ref 3.5–5)

## 2021-10-27 NOTE — PROGRESS NOTES
Date: 10/27/2021    Time of Call: 12:36 PM     Diagnosis:  HF     [ VORB ] Ordering provider: Yolanda MORGAN  Order: BMP     Order received by: Vidhi Montgomery RN      Follow-up/additional notes:

## 2021-10-28 ENCOUNTER — APPOINTMENT (OUTPATIENT)
Dept: OCCUPATIONAL THERAPY | Facility: CLINIC | Age: 71
DRG: 292 | End: 2021-10-28
Attending: INTERNAL MEDICINE
Payer: COMMERCIAL

## 2021-10-28 ENCOUNTER — MYC MEDICAL ADVICE (OUTPATIENT)
Dept: DERMATOLOGY | Facility: CLINIC | Age: 71
End: 2021-10-28

## 2021-10-28 ENCOUNTER — HOSPITAL ENCOUNTER (INPATIENT)
Facility: CLINIC | Age: 71
LOS: 10 days | Discharge: HOME | DRG: 292 | End: 2021-11-07
Attending: INTERNAL MEDICINE | Admitting: INTERNAL MEDICINE
Payer: COMMERCIAL

## 2021-10-28 DIAGNOSIS — E85.4 AMYLOID HEART DISEASE (H): ICD-10-CM

## 2021-10-28 DIAGNOSIS — I43 AMYLOID HEART DISEASE (H): ICD-10-CM

## 2021-10-28 DIAGNOSIS — I50.33 ACUTE ON CHRONIC DIASTOLIC HEART FAILURE (H): Primary | ICD-10-CM

## 2021-10-28 LAB
ALBUMIN SERPL-MCNC: 3.6 G/DL (ref 3.4–5)
ALP SERPL-CCNC: 200 U/L (ref 40–150)
ALT SERPL W P-5'-P-CCNC: 25 U/L (ref 0–50)
ANION GAP SERPL CALCULATED.3IONS-SCNC: 5 MMOL/L (ref 3–14)
ANION GAP SERPL CALCULATED.3IONS-SCNC: 6 MMOL/L (ref 3–14)
AST SERPL W P-5'-P-CCNC: 23 U/L (ref 0–45)
ATRIAL RATE - MUSE: 86 BPM
BASOPHILS # BLD MANUAL: 0.1 10E3/UL (ref 0–0.2)
BASOPHILS NFR BLD MANUAL: 1 %
BILIRUB SERPL-MCNC: 2 MG/DL (ref 0.2–1.3)
BUN SERPL-MCNC: 21 MG/DL (ref 7–30)
BUN SERPL-MCNC: 24 MG/DL (ref 7–30)
CALCIUM SERPL-MCNC: 8.9 MG/DL (ref 8.5–10.1)
CALCIUM SERPL-MCNC: 9 MG/DL (ref 8.5–10.1)
CHLORIDE BLD-SCNC: 94 MMOL/L (ref 94–109)
CHLORIDE BLD-SCNC: 97 MMOL/L (ref 94–109)
CO2 SERPL-SCNC: 33 MMOL/L (ref 20–32)
CO2 SERPL-SCNC: 35 MMOL/L (ref 20–32)
CREAT SERPL-MCNC: 1.08 MG/DL (ref 0.52–1.04)
CREAT SERPL-MCNC: 1.1 MG/DL (ref 0.52–1.04)
DIASTOLIC BLOOD PRESSURE - MUSE: NORMAL MMHG
EOSINOPHIL # BLD MANUAL: 2.9 10E3/UL (ref 0–0.7)
EOSINOPHIL NFR BLD MANUAL: 32 %
ERYTHROCYTE [DISTWIDTH] IN BLOOD BY AUTOMATED COUNT: 13.9 % (ref 10–15)
GFR SERPL CREATININE-BSD FRML MDRD: 51 ML/MIN/1.73M2
GFR SERPL CREATININE-BSD FRML MDRD: 53 ML/MIN/1.73M2
GLUCOSE BLD-MCNC: 104 MG/DL (ref 70–99)
GLUCOSE BLD-MCNC: 98 MG/DL (ref 70–99)
HCT VFR BLD AUTO: 39.2 % (ref 35–47)
HGB BLD-MCNC: 12.9 G/DL (ref 11.7–15.7)
INTERPRETATION ECG - MUSE: NORMAL
LYMPHOCYTES # BLD MANUAL: 0.3 10E3/UL (ref 0.8–5.3)
LYMPHOCYTES NFR BLD MANUAL: 3 %
MAGNESIUM SERPL-MCNC: 2.4 MG/DL (ref 1.6–2.3)
MCH RBC QN AUTO: 31.6 PG (ref 26.5–33)
MCHC RBC AUTO-ENTMCNC: 32.9 G/DL (ref 31.5–36.5)
MCV RBC AUTO: 96 FL (ref 78–100)
MONOCYTES # BLD MANUAL: 0.6 10E3/UL (ref 0–1.3)
MONOCYTES NFR BLD MANUAL: 7 %
NEUTROPHILS # BLD MANUAL: 5.1 10E3/UL (ref 1.6–8.3)
NEUTROPHILS NFR BLD MANUAL: 57 %
P AXIS - MUSE: 74 DEGREES
PLAT MORPH BLD: ABNORMAL
PLATELET # BLD AUTO: 289 10E3/UL (ref 150–450)
POTASSIUM BLD-SCNC: 2.7 MMOL/L (ref 3.4–5.3)
POTASSIUM BLD-SCNC: 2.9 MMOL/L (ref 3.4–5.3)
POTASSIUM BLD-SCNC: 3.7 MMOL/L (ref 3.4–5.3)
PR INTERVAL - MUSE: 208 MS
PROT SERPL-MCNC: 7 G/DL (ref 6.8–8.8)
QRS DURATION - MUSE: 88 MS
QT - MUSE: 426 MS
QTC - MUSE: 509 MS
R AXIS - MUSE: -6 DEGREES
RBC # BLD AUTO: 4.08 10E6/UL (ref 3.8–5.2)
RBC MORPH BLD: ABNORMAL
SARS-COV-2 RNA RESP QL NAA+PROBE: NEGATIVE
SODIUM SERPL-SCNC: 134 MMOL/L (ref 133–144)
SODIUM SERPL-SCNC: 136 MMOL/L (ref 133–144)
SYSTOLIC BLOOD PRESSURE - MUSE: NORMAL MMHG
T AXIS - MUSE: 43 DEGREES
URATE SERPL-MCNC: 13.5 MG/DL (ref 2.6–6)
VENTRICULAR RATE- MUSE: 86 BPM
WBC # BLD AUTO: 9 10E3/UL (ref 4–11)

## 2021-10-28 PROCEDURE — 250N000013 HC RX MED GY IP 250 OP 250 PS 637: Performed by: STUDENT IN AN ORGANIZED HEALTH CARE EDUCATION/TRAINING PROGRAM

## 2021-10-28 PROCEDURE — 250N000009 HC RX 250: Performed by: STUDENT IN AN ORGANIZED HEALTH CARE EDUCATION/TRAINING PROGRAM

## 2021-10-28 PROCEDURE — 84132 ASSAY OF SERUM POTASSIUM: CPT | Performed by: STUDENT IN AN ORGANIZED HEALTH CARE EDUCATION/TRAINING PROGRAM

## 2021-10-28 PROCEDURE — 97140 MANUAL THERAPY 1/> REGIONS: CPT | Mod: GO

## 2021-10-28 PROCEDURE — 80053 COMPREHEN METABOLIC PANEL: CPT | Performed by: STUDENT IN AN ORGANIZED HEALTH CARE EDUCATION/TRAINING PROGRAM

## 2021-10-28 PROCEDURE — 250N000011 HC RX IP 250 OP 636: Performed by: STUDENT IN AN ORGANIZED HEALTH CARE EDUCATION/TRAINING PROGRAM

## 2021-10-28 PROCEDURE — 36415 COLL VENOUS BLD VENIPUNCTURE: CPT | Performed by: STUDENT IN AN ORGANIZED HEALTH CARE EDUCATION/TRAINING PROGRAM

## 2021-10-28 PROCEDURE — 85027 COMPLETE CBC AUTOMATED: CPT | Performed by: STUDENT IN AN ORGANIZED HEALTH CARE EDUCATION/TRAINING PROGRAM

## 2021-10-28 PROCEDURE — U0005 INFEC AGEN DETEC AMPLI PROBE: HCPCS | Performed by: STUDENT IN AN ORGANIZED HEALTH CARE EDUCATION/TRAINING PROGRAM

## 2021-10-28 PROCEDURE — 93005 ELECTROCARDIOGRAM TRACING: CPT

## 2021-10-28 PROCEDURE — 84550 ASSAY OF BLOOD/URIC ACID: CPT | Performed by: STUDENT IN AN ORGANIZED HEALTH CARE EDUCATION/TRAINING PROGRAM

## 2021-10-28 PROCEDURE — 120N000003 HC R&B IMCU UMMC

## 2021-10-28 PROCEDURE — 97165 OT EVAL LOW COMPLEX 30 MIN: CPT | Mod: GO

## 2021-10-28 PROCEDURE — 99223 1ST HOSP IP/OBS HIGH 75: CPT | Mod: AI | Performed by: INTERNAL MEDICINE

## 2021-10-28 PROCEDURE — 83735 ASSAY OF MAGNESIUM: CPT | Performed by: STUDENT IN AN ORGANIZED HEALTH CARE EDUCATION/TRAINING PROGRAM

## 2021-10-28 PROCEDURE — 93010 ELECTROCARDIOGRAM REPORT: CPT | Performed by: INTERNAL MEDICINE

## 2021-10-28 RX ORDER — TRIAMCINOLONE ACETONIDE 1 MG/G
CREAM TOPICAL 3 TIMES DAILY PRN
Status: DISCONTINUED | OUTPATIENT
Start: 2021-10-28 | End: 2021-10-28 | Stop reason: CLARIF

## 2021-10-28 RX ORDER — POTASSIUM CHLORIDE 1500 MG/1
80 TABLET, EXTENDED RELEASE ORAL
Status: DISCONTINUED | OUTPATIENT
Start: 2021-10-28 | End: 2021-10-28

## 2021-10-28 RX ORDER — POTASSIUM CHLORIDE 1500 MG/1
60 TABLET, EXTENDED RELEASE ORAL
Status: DISCONTINUED | OUTPATIENT
Start: 2021-10-28 | End: 2021-10-28

## 2021-10-28 RX ORDER — ACETAMINOPHEN 325 MG/1
325 TABLET ORAL EVERY 6 HOURS PRN
Status: DISCONTINUED | OUTPATIENT
Start: 2021-10-28 | End: 2021-11-07 | Stop reason: HOSPADM

## 2021-10-28 RX ORDER — DIPHENHYDRAMINE HCL 25 MG
25 CAPSULE ORAL
Status: CANCELLED | OUTPATIENT
Start: 2021-10-28

## 2021-10-28 RX ORDER — FUROSEMIDE 10 MG/ML
80 INJECTION INTRAMUSCULAR; INTRAVENOUS ONCE
Status: COMPLETED | OUTPATIENT
Start: 2021-10-28 | End: 2021-10-28

## 2021-10-28 RX ORDER — BUMETANIDE 0.25 MG/ML
3 INJECTION INTRAMUSCULAR; INTRAVENOUS ONCE
Status: DISCONTINUED | OUTPATIENT
Start: 2021-10-28 | End: 2021-10-28

## 2021-10-28 RX ORDER — POTASSIUM CHLORIDE 1500 MG/1
100 TABLET, EXTENDED RELEASE ORAL 3 TIMES DAILY
Status: DISCONTINUED | OUTPATIENT
Start: 2021-10-28 | End: 2021-10-30

## 2021-10-28 RX ORDER — DIPHENHYDRAMINE HCL 25 MG
25 CAPSULE ORAL EVERY 6 HOURS PRN
Status: DISCONTINUED | OUTPATIENT
Start: 2021-10-28 | End: 2021-11-07 | Stop reason: HOSPADM

## 2021-10-28 RX ORDER — AMOXICILLIN 250 MG
1 CAPSULE ORAL AT BEDTIME
Status: DISCONTINUED | OUTPATIENT
Start: 2021-10-28 | End: 2021-10-28

## 2021-10-28 RX ORDER — TRIAMCINOLONE ACETONIDE 1 MG/G
CREAM TOPICAL 3 TIMES DAILY
Status: DISCONTINUED | OUTPATIENT
Start: 2021-10-28 | End: 2021-11-07 | Stop reason: HOSPADM

## 2021-10-28 RX ADMIN — Medication 25 MG: at 22:51

## 2021-10-28 RX ADMIN — POTASSIUM CHLORIDE 80 MEQ: 1500 TABLET, EXTENDED RELEASE ORAL at 12:52

## 2021-10-28 RX ADMIN — APIXABAN 2.5 MG: 2.5 TABLET, FILM COATED ORAL at 11:41

## 2021-10-28 RX ADMIN — TRIAMCINOLONE ACETONIDE: 1 CREAM TOPICAL at 19:51

## 2021-10-28 RX ADMIN — POTASSIUM CHLORIDE 100 MEQ: 1500 TABLET, EXTENDED RELEASE ORAL at 19:47

## 2021-10-28 RX ADMIN — FUROSEMIDE 80 MG: 10 INJECTION, SOLUTION INTRAVENOUS at 18:33

## 2021-10-28 RX ADMIN — TRIAMCINOLONE ACETONIDE: 1 CREAM TOPICAL at 13:09

## 2021-10-28 RX ADMIN — TRIAMCINOLONE ACETONIDE: 1 CREAM TOPICAL at 11:42

## 2021-10-28 RX ADMIN — POTASSIUM CHLORIDE 80 MEQ: 1500 TABLET, EXTENDED RELEASE ORAL at 10:39

## 2021-10-28 RX ADMIN — APIXABAN 2.5 MG: 2.5 TABLET, FILM COATED ORAL at 19:47

## 2021-10-28 RX ADMIN — FUROSEMIDE 10 MG/HR: 10 INJECTION, SOLUTION INTRAVENOUS at 18:33

## 2021-10-28 ASSESSMENT — ACTIVITIES OF DAILY LIVING (ADL)
ADLS_ACUITY_SCORE: 9
PREVIOUS_RESPONSIBILITIES: MEAL PREP;HOUSEKEEPING;LAUNDRY;MEDICATION MANAGEMENT
ADLS_ACUITY_SCORE: 9
ADLS_ACUITY_SCORE: 11
ADLS_ACUITY_SCORE: 11
ADLS_ACUITY_SCORE: 7
ADLS_ACUITY_SCORE: 9

## 2021-10-28 ASSESSMENT — MIFFLIN-ST. JEOR: SCORE: 1217.62

## 2021-10-28 NOTE — PROGRESS NOTES
10/28/21 1600   Quick Adds   Type of Visit Initial Occupational Therapy Evaluation   Living Environment   People in home spouse   Current Living Arrangements house   Home Accessibility stairs to enter home;stairs within home   Number of Stairs, Main Entrance 3   Stair Railings, Main Entrance none   Number of Stairs, Within Home, Primary greater than 10 stairs   Stair Railings, Within Home, Primary railings safe and in good condition   Transportation Anticipated family or friend will provide   Living Environment Comments Pt lives with her  in a house, reports there are 3 stairs to enter with no railings, lived in a 4 story house with mutliple levels but can stay on the main level, has been sleeping in her dining room to avoid stairs.    Self-Care   Usual Activity Tolerance moderate   Current Activity Tolerance moderate   Regular Exercise No   Equipment Currently Used at Home cane, straight   Activity/Exercise/Self-Care Comment Pt uses a cane for ambulation at baseline, reports no other AE used, states she has a walker but has not needed to use it.    Instrumental Activities of Daily Living (IADL)   Previous Responsibilities meal prep;housekeeping;laundry;medication management   IADL Comments Pt states her  does most of the driving, and she has been getting groceries delivered to her home since covid started. Is able to do IADLs within the home but reports fatigue is a limiting factor.    Disability/Function   Hearing Difficulty or Deaf no   Wear Glasses or Blind yes   Vision Management glasses   Concentrating, Remembering or Making Decisions Difficulty no   Difficulty Communicating no   Difficulty Eating/Swallowing no   Walking or Climbing Stairs Difficulty yes   Walking or Climbing Stairs ambulation difficulty, requires equipment   Mobility Management cane for ambulation, avoids stairs at home    Dressing/Bathing Difficulty yes   Dressing/Bathing Management reports difficulty transferring out of  tub, has been sponge bathing because her skin has been so dry and cracked    Toileting issues no   Doing Errands Independently Difficulty (such as shopping) yes   Errands Management  assists    Fall history within last six months no   Change in Functional Status Since Onset of Current Illness/Injury yes   General Information   Onset of Illness/Injury or Date of Surgery 10/28/21   Referring Physician Bryant Godwin MD   Patient/Family Therapy Goal Statement (OT) return home, gain energy and independence for IADLs    Existing Precautions/Restrictions cardiac   Left Upper Extremity (Weight-bearing Status) full weight-bearing (FWB)   Right Upper Extremity (Weight-bearing Status) full weight-bearing (FWB)   Left Lower Extremity (Weight-bearing Status) full weight-bearing (FWB)   Right Lower Extremity (Weight-bearing Status) full weight-bearing (FWB)   General Observations and Info Activity: up ad [place]   Cognitive Status Examination   Orientation Status orientation to person, place and time   Affect/Mental Status (Cognitive) WNL   Follows Commands WNL   Cognitive Status Comments No cognition concerns noted    Visual Perception   Visual Impairment/Limitations corrective lenses full-time;WNL   Sensory   Sensory Comments Some numbness reported in feet at baseline, states this comes and goes.    Pain Assessment   Patient Currently in Pain Yes, see Vital Sign flowsheet   Integumentary/Edema   Integumentary/Edema other (describe)   Edema General Information   Onset of Edema   (chronic)   Affected Body Part(s) Left LE;Right LE   Edema Etiology Other (see comments)  (CHF)   Etiology Comments decreased muscle pump activation, hypervolemia   Edema Precautions Cardiac Edema/CHF   General Comments/Previous Edema Treatment/Edema Equipment Reports she has has edema in the hospital before.    Edema Examination/Assessment   Skin Condition Pitting;Dryness;Intact   Skin Condition Comments Skin extremely dry, flaking, and bright red  with severe edema throughout BLEs, creases present in ankles from swelling and hanging over ankles/feet. Venostatis in feet and present on shins. 2+ pitting present.    Scar No   Ulcerations No   Skin Integrity Very red, dry, and flaking, bleeding mildly from pt scratching. .    Pitting Assessment 2+   Posture   Posture not impaired   Range of Motion Comprehensive   General Range of Motion no range of motion deficits identified;bilateral upper extremity ROM WNL   Strength Comprehensive (MMT)   General Manual Muscle Testing (MMT) Assessment no strength deficits identified   Coordination   Upper Extremity Coordination No deficits were identified   Gross Motor Coordination No deficits identified   Bed Mobility   Comment (Bed Mobility) SBA    Transfers   Transfer Comments SBA    Balance   Balance Comments No LOB with OOB activity.    Clinical Impression   Criteria for Skilled Therapeutic Interventions Met (OT) yes;meets criteria;skilled treatment is necessary   OT Diagnosis Decreased IADL-I    Edema: Patient Presentation Edema   OT Problem List-Impairments impacting ADL problems related to;activity tolerance impaired;fear & anxiety;strength;pain;sensation  (fatigue )   Assessment of Occupational Performance 1-3 Performance Deficits   Identified Performance Deficits home management, community mobility, bathing    Planned Therapy Interventions (OT) ADL retraining;IADL retraining;progressive activity/exercise;strengthening   Edema: Planned Interventions Gradient compression bandaging;Fit for compression garment;Edema exercises   Clinical Decision Making Complexity (OT) low complexity   Therapy Frequency (OT) 5x/week   Predicted Duration of Therapy 1 week    Risk & Benefits of therapy have been explained evaluation/treatment results reviewed;care plan/treatment goals reviewed;risks/benefits reviewed;current/potential barriers reviewed;participants voiced agreement with care plan;participants included;patient   OT Discharge  Planning    OT Discharge Recommendation (DC Rec) Home with assist;home with outpatient occupational therapy   OT Rationale for DC Rec Pt would likely benefit from OP lymphedema therapy to manage BLE edema consistently, may also benefit from OP PT/OT to progress functional strength and endurance and IND with all IADLs.    OT Brief overview of current status  SBA

## 2021-10-28 NOTE — PROGRESS NOTES
CLINICAL NUTRITION SERVICES    Reason for Assessment:  Low-sodium (2 g/day) nutrition education    Diet History:  Pt reports that she has been following a low sodium diet since 2016 and her appetite is currently okay.     Nutrition Diagnosis:  No nutrition education diagnosis at this time     Nutrition Prescription/Recs:  Continue low-sodium diet and fluid restriction.      Interventions:  Nutrition Education  Pt politely declined need for verbal low sodium education at this time. Pt requested a low sodium room service menu.   Handout: Low sodium room service menu     Goals:    Pt will verbalize at least five high sodium foods and the importance of avoiding added salt to foods for cooking or seasoning foods.     Follow-up:   Patient to ask any further nutrition-related questions before discharge. In addition, pt may request outpatient RD appointment.    Judi Aldana RD, LD  6B RD pager [phone]

## 2021-10-28 NOTE — PHARMACY-ADMISSION MEDICATION HISTORY
Admission Medication History Completed by Pharmacy    See [facility] Admission Navigator for allergy information, preferred outpatient pharmacy, prior to admission medications and immunization status.     Medication History Sources:     Patient    Changes made to PTA medication list (reason):    Added: None    Deleted: None    Changed: None    Additional Information:    Patient not taking hydroxyzine as it hasn't work for her. She has never started olanzapine.    Prior to Admission medications    Medication Sig Last Dose Taking? Auth Provider   Acetaminophen (TYLENOL PO) Take 325 mg by mouth every 6 hours as needed for mild pain or fever   Yes Reported, Patient   apixaban ANTICOAGULANT (ELIQUIS) 2.5 MG tablet Take 1 tablet (2.5 mg) by mouth 2 times daily 10/27/2021 at am Yes Domenica Cohen MD   camphor-menthol (DERMASARRA) 0.5-0.5 % external lotion Apply 1 mL topically every 6 hours as needed for skin care  Yes Ciara Araya PA-C   doxepin (SINEQUAN) 10 MG capsule Take 10 mg by mouth At Bedtime  Yes Reported, Patient   doxycycline hyclate (VIBRA-TABS) 100 MG tablet Take 1 tablet (100 mg) by mouth daily Past Month at Unknown time Yes Mirela Moore MD   eplerenone (INSPRA) 25 MG tablet Take 1 tablet (25 mg) by mouth daily 10/27/2021 at Unknown time Yes Reyes Wilson APRN CNP   LORazepam (ATIVAN) 0.5 MG tablet Take 1 tablet (0.5 mg) by mouth every 6 hours as needed for anxiety or nausea  at PRN Yes Mirela Moore MD   metolazone (ZAROXOLYN) 2.5 MG tablet Take 1 tablet (2.5 mg) by mouth twice a week As needed for increased swelling and weight gain of 3 lb in one day or 5 lb in one week Past Week at Unknown time Yes Hallie Izquierdo PA-C   ondansetron (ZOFRAN-ODT) 8 MG ODT tab Take 8 mg by mouth every 8 hours as needed PRN  at prn Yes Reported, Patient   potassium chloride ER (KLOR-CON M) 10 MEQ CR tablet TAKE 8 TABLETS EVERY MORNING& 8 TABLETS AT LUNCH AND 6 TABLETS EVERY EVENING TAKE EXTRA  6 TABLETS DAY OF AND DAY AFTER METOLAZONE 10/27/2021 at Unknown time Yes Ciara Araya PA-C   torsemide (DEMADEX) 100 MG tablet Take 1 tablet (100 mg) by mouth every morning AND 1 tablet (100 mg) daily at 2 pm. 10/27/2021 at Unknown time Yes Ciara Araya PA-C   triamcinolone (KENALOG) 0.1 % external cream Apply a thin layer twice daily to itchy areas as needed.  Yes Kasie Gupta MD   hydrOXYzine (ATARAX) 25 MG tablet Take 1 tablet (25 mg) by mouth 3 times daily as needed for itching  at Doesn't work  Ciara Araya PA-C   OLANZapine (ZYPREXA) 2.5 MG tablet Take 2.5 mg by mouth At Bedtime  Patient not taking: Reported on 10/28/2021 Not Taking at Unknown time  Unknown, Entered By History       Date completed: 10/28/21    Medication history completed by: Apryl Mckinney RPH

## 2021-10-28 NOTE — PLAN OF CARE
Neuro: A&Ox4. Neuro's intact  Cardiac: SR. VSS.   Respiratory: Sating greater than 90% on RA.  GI/: Adequate urine output. LBM reported 10/27 but patient requested senna be added to hospital med list.  Diet/appetite: Tolerating 2 g Na diet. Eating well. 2 L fluid restriction. NPO at midnight for abdominal U/S tomorrow.  Activity:  SBA, up in room ambulating well.  Pain: At acceptable level on current regimen. Using kenalog cream for skin discomfort.  Skin: rash on all extremities, abdomen, chest, and back. Providers considering derm consult. Patient states she has had rash for awhile and has found no relief with any specific creams/treatments.  LDA's:  PIV saline locked.    Plan: Continue with POC. Notify primary team with changes.

## 2021-10-28 NOTE — H&P
[facility]    Cardiology History and Physical - Cards 2         Date of Admission:  10/28/2021    Assessment & Plan: HVSL    Leandra Guerrero is a 71 y.o. female with history of chronic diastolic heart failure/restrictive cardiomyopathy 2/2 AL amyloidosis (diagnosed in 2016, with gastric and bone marrow involvement only, s/p CyBorD chemotherapy - in remission) admitted from outside hospital on 10/28/21 with 1 week of shortness of breath and 13 pound weight gain. Admission weight 159lbs.    # Acute on chronic diastolic heart failure/restrictive cardiomyopathy  # Cardiac Amyloidosis  NYHA III, Stage C  Patient admitted as direct admission from outside hospital for ongoing hypervolemia and shortness of breath despite outpatient PO diuretics following recent travel and medication change. Admission weight of 159 with estimated dry weight of 147-150 pounds. Of note, cardiac amyloidosis diagnosed per 2016 Echo with bi-atrial enlargement and severe left ventricular enlargement with low voltage on EKG. Followed by [facility] outpatient.   2/4/21 Cardiac Echo with EF 65-70%, moderate aortic and tricuspid insufficiency, mod LV wall thickening, Grade III diastolic dysfunction  - IV Lasix 80mg with gtt  - Strict I/Os with daily standing weights    # Hypokalemia  Patient given IV Lasix while in OSH ED without potassium replacement. Admission potassium of 2.7, improved to 3.7 after 80 mEq x2 KCl  - 100 KCl TID  - BID BMP    # Chronic Dermatitis  Per chart review, outpatient dermatology suspects atopic dermatitis vs lichen planus vs lichenoid drug eruption.   - Improving on topical triamcinolone  - Plan for derm consult if worsening    # Paroxysmal Afib/Aflutter  [id number] Score of 4  - Continue PTA apixaban 2.5mg BID, reduced dose to hx of epistaxis and hematochezia per chart review    # Chronic Lymphedema  Previously managed with compression stockings but discontinued due to  patient discomfort  - Management with diuresis as above  - OT consulted for lymphedema management, appreciate recs    # Hx of Prolonged QT Interval  - Admission EKG with , QTc 509  - Will monitor and plan to avoid QT prolonging medications    # CKD3  Creatinine baseline of 1.1-1.2, likely secondary to underlying cardiac function.  - Avoiding nephrotoxic medications  - Continue to monitor    # Hx of AL Amyloidosis, monoclonal IgA lambda s/p chemotherapy  Diagnosed in 2016 with follow-up EGD and bone marrow biopsies confirming spread to stomach and bone marrow - now s/p CyBorD chemotherapy and in remission without maintenance therapy. Refer to 5/20/21 Oncology progress note by Mirela Moore for full treatment history.  - 5/11/21 Kappa Lambda Ratio 0.70, within normal limits     Diet:  2g sodium 2L fluid restriction  DVT Prophylaxis: PTA Apixaban  Marie Catheter: Not present  Code Status: Full Code  Fluids: None   Lines: PIV only     Disposition Plan   Expected discharge: 4 - 7 days, disposition pending PT/OT evaluation     The patient's care was discussed with the Attending Physician, Dr. Jamal Turner.    Bryant Godwin MD PGY-1  [facility]  [phone]  ______________________________________________________________________    Chief Complaint     Shortness of breath and swelling    History is obtained from the patient    History of Present Illness     Leandra Guerrero is a 71 y.o. female with history of chronic diastolic heart failure/restrictive cardiomyopathy 2/2 AL amyloidosis (diagnosed in 2016, with gastric and bone marrow involvement only, s/p CyBorD chemotherapy - in remission) presenting with 1 week of shortness of breath and 13 pound weight gain. She notes that 1 week ago she completed a 14 hour road trip with her  and  that her acute symptoms began after that. She denies any recent illness, sick contact exposures, or changes in diet. She has  been adherent to her diuresis regimen (torsemide 100 BID, metolazone 2.5 PRN and eplerenone 25 daily) and was switched from spironolactone to eplerenone on 9/28/21.     She follows with Dr. Cohen who advised her on [facility] admission for diuresis. Of note, she was previously admitted 7/20/21-7/26/21 with identical complaints of swelling and shortness of breath that improved with IV diuresis.     Review of Systems    Review of systems negative except as above.    Past Medical History    I have reviewed this patient's medical history and updated it with pertinent information if needed.   Past Medical History:   Diagnosis Date     AL amyloidosis (H)      Atrial fibrillation and flutter (H)      Cardiac amyloidosis (H)      Lymphedema      QT prolongation      Recurrent right pleural effusion 1/2/2017     SVT (supraventricular tachycardia) (H)      Past Surgical History   I have reviewed this patient's surgical history and updated it with pertinent information if needed.  No past surgical history on file.    Social History   I have reviewed this patient's social history and updated it with pertinent information if needed.  Social History     Tobacco Use     Smoking status: Never Smoker     Smokeless tobacco: Never Used   Substance Use Topics     Alcohol use: No     Drug use: No     Family History   I have reviewed this patient's family history and updated it with pertinent information if needed.   I have reviewed this patient's family history and updated it with pertinent information if needed.  Family History   Problem Relation Age of Onset     Other - See Comments Sister         Amyloidosis     Prior to Admission Medications   Prior to Admission Medications   Prescriptions Last Dose Informant Patient Reported? Taking?   Acetaminophen (TYLENOL PO)   Yes No   Sig: Take 325 mg by mouth every 6 hours as needed for mild pain or fever    LORazepam (ATIVAN) 0.5 MG tablet   No No   Sig: Take 1 tablet (0.5 mg) by mouth every 6  hours as needed for anxiety or nausea   OLANZapine (ZYPREXA) 2.5 MG tablet   Yes No   Sig: Take 2.5 mg by mouth At Bedtime   Patient not taking: Reported on 8/12/2021   apixaban ANTICOAGULANT (ELIQUIS) 2.5 MG tablet   No No   Sig: Take 1 tablet (2.5 mg) by mouth 2 times daily   camphor-menthol (DERMASARRA) 0.5-0.5 % external lotion   No No   Sig: Apply 1 mL topically every 6 hours as needed for skin care   doxepin (SINEQUAN) 10 MG capsule   Yes No   Sig: Take 10 mg by mouth At Bedtime   doxycycline hyclate (VIBRA-TABS) 100 MG tablet   No No   Sig: Take 1 tablet (100 mg) by mouth daily   Patient not taking: Reported on 10/7/2021   eplerenone (INSPRA) 25 MG tablet   No No   Sig: Take 1 tablet (25 mg) by mouth daily   hydrOXYzine (ATARAX) 25 MG tablet   No No   Sig: Take 1 tablet (25 mg) by mouth 3 times daily as needed for itching   metolazone (ZAROXOLYN) 2.5 MG tablet   No No   Sig: Take 1 tablet (2.5 mg) by mouth twice a week As needed for increased swelling and weight gain of 3 lb in one day or 5 lb in one week   ondansetron (ZOFRAN-ODT) 8 MG ODT tab   Yes No   Sig: Take 8 mg by mouth every 8 hours as needed PRN   potassium chloride ER (KLOR-CON M) 10 MEQ CR tablet   No No   Sig: TAKE 8 TABLETS EVERY MORNING& 8 TABLETS AT LUNCH AND 6 TABLETS EVERY EVENING TAKE EXTRA 6 TABLETS DAY OF AND DAY AFTER METOLAZONE   torsemide (DEMADEX) 100 MG tablet   No No   Sig: Take 1 tablet (100 mg) by mouth every morning AND 1 tablet (100 mg) daily at 2 pm.   triamcinolone (KENALOG) 0.1 % external cream   No No   Sig: Apply a thin layer twice daily to itchy areas as needed.      Facility-Administered Medications: None     Allergies   Allergies   Allergen Reactions     Contrast Dye Shortness Of Breath     Shaking,chills and dypsnea     Cytoxan [Cyclophosphamide]      Hemorrhagic cystitis     Gabapentin      Slurred speech, weakness     Levofloxacin      Severe shaking and abdominal pain     Aaron Weed      Rash and itchyness      Amoxicillin Nausea and Vomiting and Cramps     Spironolactone      Worsening hyponatremia, palpitations     Chlorhexidine Dermatitis and Rash     WANG wipes  2% chlorhexidine gluconate   Rash to everywhere wipe was applied        Physical Exam   Vital Signs: Temp: 97.6  F (36.4  C) Temp src: Axillary BP: 132/66 Pulse: 84   Resp: 16 SpO2: 100 % O2 Device: None (Room air)    Weight: 0 lbs 0 oz    General Appearance: No apparent distress, appears age  Eyes: No scleral icterus, pupillary equally responsive to light  HEENT: JVP elevated to mid neck, no lymphadenopathy noted at head/neck  Respiratory: Clear to auscultation bilaterally, no appreciable crackles or wheeze on exam  Cardiovascular: Regular rate and rhythm  GI: Soft, non-tender to palpation  Skin: Irritated, raw appearance to arms and chest without specific rash/bleeding/sloughing of skin/excoriations   Musculoskeletal: Warm with palpable pulses, non-pitting edema throughout lower extremities  Neurologic: Alert and oriented x3, dull sensation intact in upper and lower extremities, CN II-XII intact  Psychiatric: appropriate mood and affect, fluent speech    Data   Data reviewed today: I reviewed all medications, new labs and imaging results over the last 24 hours.     Recent Labs   Lab Test 10/28/21  1324 10/28/21  0824    136   POTASSIUM 2.9* 2.7*   CHLORIDE 94 97   CO2 35* 33*   ANIONGAP 5 6   * 98   BUN 21 24   CR 1.08* 1.10*   JERROD 8.9 9.0     CBC RESULTS: Recent Labs   Lab Test 10/28/21  0824   WBC 9.0   RBC 4.08   HGB 12.9   HCT 39.2   MCV 96   MCH 31.6   MCHC 32.9   RDW 13.9        10/28/21 EKG    - Notable for intermittent premature atrial contractions and , QTc 509    10/27/2021 2 view Xray    - Right costophrenic angle blunting with small amount of pleural fluid or thickening  - Hazy right mid lung infiltrate    Prior to admission imaging    2/2021 Echo    Global and regional left ventricular function is hyperkinetic with an EF  of  65-70%.  Right ventricular function, chamber size, wall motion, and thickness are  normal.  Mild to moderate aortic insufficiency is present.  Moderate tricuspid insufficiency is present.  Right ventricular systolic pressure is 33mmHg above the right atrial pressure.  IVC diameter and respiratory changes fall into an intermediate range  suggesting an RA pressure of 8 mmHg.  No pericardial effusion is present.  A right pleural effusion is present.    9/30/2021 US Abdomen Upper    1. Heterogenous liver appearance with subtle nodular liver contour - suggestive of cirrhosis, no discrete masses.  2. Suspected loculated right perihepatic ascites vs pleural effusion  3. Right lower intrarenal calculi, no hydronephrosis      Most Recent 3 CBC's:Recent Labs   Lab Test 10/28/21  0824 07/20/21  1738 05/11/21  1019   WBC 9.0 6.7 6.4   HGB 12.9 13.6 13.8   MCV 96 95 96    239 195

## 2021-10-28 NOTE — TELEPHONE ENCOUNTER
Spoke with patient regarding cancelling virtual visit appointment this afternoon. Patient is a current inpatient in the hospital, she will ask the team taking care to consult Dermatology if needed to help with dermatitis.    Maricarmen Wade RN

## 2021-10-29 ENCOUNTER — APPOINTMENT (OUTPATIENT)
Dept: OCCUPATIONAL THERAPY | Facility: CLINIC | Age: 71
DRG: 292 | End: 2021-10-29
Attending: INTERNAL MEDICINE
Payer: COMMERCIAL

## 2021-10-29 LAB
ANION GAP SERPL CALCULATED.3IONS-SCNC: 5 MMOL/L (ref 3–14)
ANION GAP SERPL CALCULATED.3IONS-SCNC: 6 MMOL/L (ref 3–14)
ANION GAP SERPL CALCULATED.3IONS-SCNC: 6 MMOL/L (ref 3–14)
BUN SERPL-MCNC: 27 MG/DL (ref 7–30)
BUN SERPL-MCNC: 32 MG/DL (ref 7–30)
BUN SERPL-MCNC: 35 MG/DL (ref 7–30)
CALCIUM SERPL-MCNC: 9 MG/DL (ref 8.5–10.1)
CALCIUM SERPL-MCNC: 9 MG/DL (ref 8.5–10.1)
CALCIUM SERPL-MCNC: 9.1 MG/DL (ref 8.5–10.1)
CHLORIDE BLD-SCNC: 101 MMOL/L (ref 94–109)
CHLORIDE BLD-SCNC: 102 MMOL/L (ref 94–109)
CHLORIDE BLD-SCNC: 103 MMOL/L (ref 94–109)
CO2 SERPL-SCNC: 26 MMOL/L (ref 20–32)
CO2 SERPL-SCNC: 28 MMOL/L (ref 20–32)
CO2 SERPL-SCNC: 29 MMOL/L (ref 20–32)
CREAT SERPL-MCNC: 1.15 MG/DL (ref 0.52–1.04)
CREAT SERPL-MCNC: 1.21 MG/DL (ref 0.52–1.04)
CREAT SERPL-MCNC: 1.33 MG/DL (ref 0.52–1.04)
GFR SERPL CREATININE-BSD FRML MDRD: 41 ML/MIN/1.73M2
GFR SERPL CREATININE-BSD FRML MDRD: 46 ML/MIN/1.73M2
GFR SERPL CREATININE-BSD FRML MDRD: 49 ML/MIN/1.73M2
GLUCOSE BLD-MCNC: 104 MG/DL (ref 70–99)
GLUCOSE BLD-MCNC: 88 MG/DL (ref 70–99)
GLUCOSE BLD-MCNC: 98 MG/DL (ref 70–99)
HOLD SPECIMEN: NORMAL
INR PPP: 1.23 (ref 0.85–1.15)
MAGNESIUM SERPL-MCNC: 2.5 MG/DL (ref 1.6–2.3)
POTASSIUM BLD-SCNC: 4 MMOL/L (ref 3.4–5.3)
POTASSIUM BLD-SCNC: 4.8 MMOL/L (ref 3.4–5.3)
POTASSIUM BLD-SCNC: 5.4 MMOL/L (ref 3.4–5.3)
SODIUM SERPL-SCNC: 134 MMOL/L (ref 133–144)
SODIUM SERPL-SCNC: 136 MMOL/L (ref 133–144)
SODIUM SERPL-SCNC: 136 MMOL/L (ref 133–144)

## 2021-10-29 PROCEDURE — 250N000013 HC RX MED GY IP 250 OP 250 PS 637: Performed by: STUDENT IN AN ORGANIZED HEALTH CARE EDUCATION/TRAINING PROGRAM

## 2021-10-29 PROCEDURE — 99232 SBSQ HOSP IP/OBS MODERATE 35: CPT | Mod: GC | Performed by: INTERNAL MEDICINE

## 2021-10-29 PROCEDURE — 250N000009 HC RX 250: Performed by: STUDENT IN AN ORGANIZED HEALTH CARE EDUCATION/TRAINING PROGRAM

## 2021-10-29 PROCEDURE — 85610 PROTHROMBIN TIME: CPT | Performed by: STUDENT IN AN ORGANIZED HEALTH CARE EDUCATION/TRAINING PROGRAM

## 2021-10-29 PROCEDURE — 80048 BASIC METABOLIC PNL TOTAL CA: CPT | Performed by: STUDENT IN AN ORGANIZED HEALTH CARE EDUCATION/TRAINING PROGRAM

## 2021-10-29 PROCEDURE — 83735 ASSAY OF MAGNESIUM: CPT | Performed by: STUDENT IN AN ORGANIZED HEALTH CARE EDUCATION/TRAINING PROGRAM

## 2021-10-29 PROCEDURE — 36415 COLL VENOUS BLD VENIPUNCTURE: CPT | Performed by: STUDENT IN AN ORGANIZED HEALTH CARE EDUCATION/TRAINING PROGRAM

## 2021-10-29 PROCEDURE — 97535 SELF CARE MNGMENT TRAINING: CPT | Mod: GO

## 2021-10-29 PROCEDURE — 120N000003 HC R&B IMCU UMMC

## 2021-10-29 PROCEDURE — 250N000011 HC RX IP 250 OP 636: Performed by: STUDENT IN AN ORGANIZED HEALTH CARE EDUCATION/TRAINING PROGRAM

## 2021-10-29 RX ORDER — POLYETHYLENE GLYCOL 3350 17 G/17G
17 POWDER, FOR SOLUTION ORAL DAILY
Status: DISCONTINUED | OUTPATIENT
Start: 2021-10-29 | End: 2021-11-07 | Stop reason: HOSPADM

## 2021-10-29 RX ORDER — FUROSEMIDE 10 MG/ML
60 INJECTION INTRAMUSCULAR; INTRAVENOUS ONCE
Status: COMPLETED | OUTPATIENT
Start: 2021-10-29 | End: 2021-10-29

## 2021-10-29 RX ORDER — CALCIUM CARBONATE 500 MG/1
500 TABLET, CHEWABLE ORAL ONCE
Status: DISCONTINUED | OUTPATIENT
Start: 2021-10-29 | End: 2021-11-07 | Stop reason: HOSPADM

## 2021-10-29 RX ORDER — AMOXICILLIN 250 MG
1 CAPSULE ORAL 2 TIMES DAILY PRN
Status: DISCONTINUED | OUTPATIENT
Start: 2021-10-29 | End: 2021-11-07 | Stop reason: HOSPADM

## 2021-10-29 RX ADMIN — FUROSEMIDE 60 MG: 10 INJECTION, SOLUTION INTRAMUSCULAR; INTRAVENOUS at 17:40

## 2021-10-29 RX ADMIN — ACETAMINOPHEN 325 MG: 325 TABLET, FILM COATED ORAL at 17:50

## 2021-10-29 RX ADMIN — FUROSEMIDE 20 MG/HR: 10 INJECTION, SOLUTION INTRAVENOUS at 21:14

## 2021-10-29 RX ADMIN — TRIAMCINOLONE ACETONIDE: 1 CREAM TOPICAL at 21:03

## 2021-10-29 RX ADMIN — POTASSIUM CHLORIDE 100 MEQ: 1500 TABLET, EXTENDED RELEASE ORAL at 21:03

## 2021-10-29 RX ADMIN — TRIAMCINOLONE ACETONIDE: 1 CREAM TOPICAL at 08:22

## 2021-10-29 RX ADMIN — POTASSIUM CHLORIDE 100 MEQ: 1500 TABLET, EXTENDED RELEASE ORAL at 09:47

## 2021-10-29 RX ADMIN — Medication 25 MG: at 05:18

## 2021-10-29 RX ADMIN — POTASSIUM CHLORIDE 100 MEQ: 1500 TABLET, EXTENDED RELEASE ORAL at 15:26

## 2021-10-29 RX ADMIN — ACETAMINOPHEN 325 MG: 325 TABLET, FILM COATED ORAL at 03:34

## 2021-10-29 RX ADMIN — APIXABAN 2.5 MG: 2.5 TABLET, FILM COATED ORAL at 21:03

## 2021-10-29 RX ADMIN — APIXABAN 2.5 MG: 2.5 TABLET, FILM COATED ORAL at 08:26

## 2021-10-29 ASSESSMENT — MIFFLIN-ST. JEOR
SCORE: 1228.13
SCORE: 1228.13

## 2021-10-29 ASSESSMENT — ACTIVITIES OF DAILY LIVING (ADL)
ADLS_ACUITY_SCORE: 9
ADLS_ACUITY_SCORE: 7
ADLS_ACUITY_SCORE: 9
ADLS_ACUITY_SCORE: 7
ADLS_ACUITY_SCORE: 9
ADLS_ACUITY_SCORE: 7

## 2021-10-29 NOTE — PROVIDER NOTIFICATION
Pt woke up intensely itchy, crying and scratching herself to the point of bleeding.  Benadryl given and ice packs/lotion applied.  Pt has a chronic generalized rash d/t her amyloidosis.  MD notified to see if possibly the itchiness is a rxn to the Lasix, MD felt this was unlikely.  Suggested that if Benadryl doesn't work, can order an extra dose or possibly try some Atarax.

## 2021-10-29 NOTE — PLAN OF CARE
"Neuro: A&Ox4. Calls appropriately.Expresses occasional anxiety.   Cardiac: SR. VSS. Hypertensive overnight with SBPs 140-150s.   Respiratory: Sating >92 on RA. Denies SOB.   GI/: Adequate urine output. Lasix drip currently running, so frequently getting up to use the bathroom. Had a BM this AM, but feels like she would like to have a PRN stool softener. To address with the day team.   Diet/appetite: Currently NPO for abdominal ultrasound. Otherwise on a 2g Na and 2L FR.  Activity:  Stand by assist  Pain: At acceptable level on current regimen. Pt had one headache overnight, r/r tylenol.   Skin: Pt has red generalized rash and fragile skin. Intermittently gets flare ups where her skin itches very intensely. PRN benadryl given, and lotion applied. Pt states that ice packs can reduce the itching as well, but because of the intense itching, pt has small scabs all over body.  LDA's: PIV infusing lasix.     Plan: Continue with POC. Notify primary team with changes.     BP (!) 144/74 (BP Location: Right arm)   Pulse 66   Temp 98.2  F (36.8  C) (Oral)   Resp 18   Ht 1.6 m (5' 3\")   Wt 73.4 kg (161 lb 13.1 oz)   SpO2 100%   BMI 28.66 kg/m      "

## 2021-10-29 NOTE — PLAN OF CARE
NEURO: A&Ox4  TELE: SR  VITALS: Stable  CV: RRR, +3-4 BLE edema, lymph wraps currently in place  PULM: LS diminished in the bases, occasional crackles in LLL, sats stable on RA  GI: +BS, last BM yesterday, tolerating low Na diet and 2L FR  : UOP adequate, started on a Lasix gtt tonight  SKIN: Has generalized red rash, Kenalog cream applied per orders and pt's direction.  Has a few small scabbed areas on left arm, SHEEBA.  LABS: K normalized to 3.7 tonight  LDA: Left PIV  GTT: Lasix gtt at 10mg/hr  PAIN: Denies  ACTIVITY: Up with SBA  PLAN: Continue cares on 6B

## 2021-10-29 NOTE — PLAN OF CARE
"NEURO: A&O x4  VITALS: Blood pressure 127/66, pulse 77, temperature 97.6  F (36.4  C), temperature source Oral, resp. rate 18, height 1.6 m (5' 3\"), weight 73.4 kg (161 lb 13.1 oz), SpO2 99 %, not currently breastfeeding.  PAIN: Given PRN tylenol x1 for headache  CARDIAC: SR  RESPIRATORY: RA, SOB w/ activity, providers aware  GI: Bowel sounds active, BM x3, adequate PO intake  : Marginal UOP given lasix gtt, team aware and increased lasix gtt and ordered 1x 60 mg dose  LDA: PIV x1  IV: Lasix gtt @ 20 mg/hr  ACTIVITY: Up independently in room  GOALS: Increase UOP with increased lasix gtt    "

## 2021-10-29 NOTE — PROGRESS NOTES
[facility]    Progress Note - Cards 2 Service        Date of Admission:  10/28/2021    Assessment & Plan   Leandra Guerrero is a 71 y.o. female with history of chronic diastolic heart failure/restrictive cardiomyopathy 2/2 AL amyloidosis (diagnosed in 2016, with gastric and bone marrow involvement only, s/p CyBorD chemotherapy - in remission) admitted from outside hospital on 10/28/21 with 1 week of shortness of breath and 13 pound weight gain. Admission weight 159lbs.    Today:  -Will continue diuresis with lasix gtt, will adjust rate to aim for net negative 2-2.5 L today     # Acute on chronic diastolic heart failure/restrictive cardiomyopathy  # Cardiac Amyloidosis  NYHA III, Stage C  Patient admitted as direct admission from outside hospital for ongoing hypervolemia and shortness of breath despite outpatient PO diuretics following recent travel and medication change. Admission weight of 159 with estimated dry weight of 147-150 pounds. Of note, cardiac amyloidosis diagnosed per 2016 Echo with bi-atrial enlargement and severe left ventricular enlargement with low voltage on EKG. Followed by [facility] outpatient.   2/4/21 Cardiac Echo with EF 65-70%, moderate aortic and tricuspid insufficiency, mod LV wall thickening, Grade III diastolic dysfunction  - Lasix gtt @ 10/hr will plan to titrate to net negative 2-2.5 L   - Strict I/Os with daily standing weights     # Hypokalemia  Patient given IV Lasix while in OSH ED without potassium replacement. Admission potassium of 2.7, improved to 3.7 after 80 mEq x2 KCl. Potassium is well replaced with 100 mEq of scheduled potassium.   - 100 KCl TID  - BID BMP while aggressively diuresing      # Chronic Dermatitis with pruritis   Per chart review, outpatient dermatology suspects atopic dermatitis vs lichen planus vs lichenoid drug eruption. Itching has been going on for the last 1-2 months. Had RUQ US last month to assess for  any underlying reason.   - Improving on topical triamcinolone  - Plan for derm consult if worsening  - Has prn benadryl      # Paroxysmal Afib/Aflutter  [id number] Score of 4  - Continue PTA apixaban 2.5mg BID, reduced dose to hx of epistaxis and hematochezia per chart review     # Chronic Lymphedema  Previously managed with compression stockings but discontinued due to patient discomfort  - Management with diuresis as above  - OT consulted for lymphedema management, appreciate recs     # Hx of Prolonged QT Interval  - Admission EKG with , QTc 509  - Will monitor and plan to avoid QT prolonging medications     # CKD3  Creatinine baseline of 1.1-1.2, likely secondary to underlying cardiac function.  - Avoiding nephrotoxic medications  - Continue to monitor     # Hx of AL Amyloidosis, monoclonal IgA lambda s/p chemotherapy  Diagnosed in 2016 with follow-up EGD and bone marrow biopsies confirming spread to stomach and bone marrow - now s/p CyBorD chemotherapy and in remission without maintenance therapy. Refer to 5/20/21 Oncology progress note by Mirela Moore for full treatment history.  - 5/11/21 Kappa Lambda Ratio 0.70, within normal limits    Diet: Fluid restriction 2000 ML FLUID  2 Gram Sodium Diet    DVT Prophylaxis: DOAC  Marie Catheter: Not present  Fluids: Restriction   Central Lines: None  Code Status: Full Code      Disposition Plan   Expected discharge: 11/03/2021   recommended to prior living arrangement once pending appropriate diuresis to EDW.     The patient's care was discussed with the Attending Physician, Dr. Zak Dudley.    Mirit Mohsen Yacoup, MD  Cards 2 Advanced HF Service  [facility]  Securely message with the Vocera Web Console (learn more here)  Text page via [employer] Paging/Directory    ______________________________________________________________________    Interval History   No acute events overnight. Nursing notes reviewed. Continues  to feel very itchy this morning did get benadryl twice yesterday. Her itching has been going on for over a month.     Data reviewed today: I reviewed all medications, new labs and imaging results over the last 24 hours.    Physical Exam   Vital Signs: Temp: 97.6  F (36.4  C) Temp src: Oral BP: 127/66 Pulse: 77   Resp: 18 SpO2: 99 % O2 Device: None (Room air)    Weight: 161 lbs 13.08 oz  General Appearance: No acute distress   Respiratory: clear to auscultation and breathing comfortably on room air   Cardiovascular: JVP ~16, irregular rhythm normal rate no murmur   GI: soft, non-tender, non-distended   Skin: erythematous and thickened diffusely   MSK: 2+ bilateral LE edema that is pitting     Data   Recent Labs   Lab 10/29/21  1340 10/29/21  0432 10/28/21  1615 10/28/21  1324 10/28/21  1324 10/28/21  0824 10/28/21  0824   WBC  --   --   --   --   --   --  9.0   HGB  --   --   --   --   --   --  12.9   MCV  --   --   --   --   --   --  96   PLT  --   --   --   --   --   --  289   INR  --  1.23*  --   --   --   --   --     136  --   --  134   < > 136   POTASSIUM 4.0 4.8 3.7   < > 2.9*   < > 2.7*   CHLORIDE 101 102  --   --  94   < > 97   CO2 29 28  --   --  35*   < > 33*   BUN 32* 27  --   --  21   < > 24   CR 1.15* 1.21*  --   --  1.08*   < > 1.10*   ANIONGAP 6 6  --   --  5   < > 6   JERROD 9.1 9.0  --   --  8.9   < > 9.0   GLC 88 104*  --   --  104*   < > 98   ALBUMIN  --   --   --   --   --   --  3.6   PROTTOTAL  --   --   --   --   --   --  7.0   BILITOTAL  --   --   --   --   --   --  2.0*   ALKPHOS  --   --   --   --   --   --  200*   ALT  --   --   --   --   --   --  25   AST  --   --   --   --   --   --  23    < > = values in this interval not displayed.     No results found for this or any previous visit (from the past 24 hour(s)).     I have reviewed today's vital signs, notes, medications, labs and imaging.  I have also seen and examined the patient and agree with the findings and plan as outlined above.   Pt without complaints.  VSS with AL Amyloid and restrictive cardiac physiology.  Med problems include  1) AL Amyloid   2) Hypervolemia on lasix 10 mg/hr  3) Stable renal fn with Cr 1.2 uptrending from 1.08     Will monitor UO and electrolytes.  Continue diuresis.     Zak Dudley MD, PhD  Professor, Heart Failure and Cardiac Transplantation  [facility]

## 2021-10-30 ENCOUNTER — APPOINTMENT (OUTPATIENT)
Dept: OCCUPATIONAL THERAPY | Facility: CLINIC | Age: 71
DRG: 292 | End: 2021-10-30
Attending: INTERNAL MEDICINE
Payer: COMMERCIAL

## 2021-10-30 LAB
ANION GAP SERPL CALCULATED.3IONS-SCNC: 5 MMOL/L (ref 3–14)
ANION GAP SERPL CALCULATED.3IONS-SCNC: 6 MMOL/L (ref 3–14)
BUN SERPL-MCNC: 34 MG/DL (ref 7–30)
BUN SERPL-MCNC: 36 MG/DL (ref 7–30)
CALCIUM SERPL-MCNC: 8.6 MG/DL (ref 8.5–10.1)
CALCIUM SERPL-MCNC: 9.4 MG/DL (ref 8.5–10.1)
CHLORIDE BLD-SCNC: 103 MMOL/L (ref 94–109)
CHLORIDE BLD-SCNC: 107 MMOL/L (ref 94–109)
CO2 SERPL-SCNC: 23 MMOL/L (ref 20–32)
CO2 SERPL-SCNC: 29 MMOL/L (ref 20–32)
CREAT SERPL-MCNC: 1.2 MG/DL (ref 0.52–1.04)
CREAT SERPL-MCNC: 1.24 MG/DL (ref 0.52–1.04)
GFR SERPL CREATININE-BSD FRML MDRD: 44 ML/MIN/1.73M2
GFR SERPL CREATININE-BSD FRML MDRD: 46 ML/MIN/1.73M2
GLUCOSE BLD-MCNC: 103 MG/DL (ref 70–99)
GLUCOSE BLD-MCNC: 94 MG/DL (ref 70–99)
HOLD SPECIMEN: NORMAL
INR PPP: 1.21 (ref 0.85–1.15)
MAGNESIUM SERPL-MCNC: 2.9 MG/DL (ref 1.6–2.3)
POTASSIUM BLD-SCNC: 3.7 MMOL/L (ref 3.4–5.3)
POTASSIUM BLD-SCNC: 4.3 MMOL/L (ref 3.4–5.3)
POTASSIUM BLD-SCNC: 6.3 MMOL/L (ref 3.4–5.3)
SODIUM SERPL-SCNC: 136 MMOL/L (ref 133–144)
SODIUM SERPL-SCNC: 137 MMOL/L (ref 133–144)

## 2021-10-30 PROCEDURE — 250N000009 HC RX 250: Performed by: STUDENT IN AN ORGANIZED HEALTH CARE EDUCATION/TRAINING PROGRAM

## 2021-10-30 PROCEDURE — 80048 BASIC METABOLIC PNL TOTAL CA: CPT | Performed by: STUDENT IN AN ORGANIZED HEALTH CARE EDUCATION/TRAINING PROGRAM

## 2021-10-30 PROCEDURE — 93005 ELECTROCARDIOGRAM TRACING: CPT

## 2021-10-30 PROCEDURE — 97535 SELF CARE MNGMENT TRAINING: CPT | Mod: GO | Performed by: OCCUPATIONAL THERAPIST

## 2021-10-30 PROCEDURE — 84132 ASSAY OF SERUM POTASSIUM: CPT | Performed by: STUDENT IN AN ORGANIZED HEALTH CARE EDUCATION/TRAINING PROGRAM

## 2021-10-30 PROCEDURE — 97530 THERAPEUTIC ACTIVITIES: CPT | Mod: GO | Performed by: OCCUPATIONAL THERAPIST

## 2021-10-30 PROCEDURE — 36569 INSJ PICC 5 YR+ W/O IMAGING: CPT

## 2021-10-30 PROCEDURE — 272N000468 HC KIT, CATH IV, 20 GA X 6CM, ENDURANCE EXT DWELL

## 2021-10-30 PROCEDURE — 83735 ASSAY OF MAGNESIUM: CPT | Performed by: STUDENT IN AN ORGANIZED HEALTH CARE EDUCATION/TRAINING PROGRAM

## 2021-10-30 PROCEDURE — 250N000013 HC RX MED GY IP 250 OP 250 PS 637: Performed by: STUDENT IN AN ORGANIZED HEALTH CARE EDUCATION/TRAINING PROGRAM

## 2021-10-30 PROCEDURE — 93010 ELECTROCARDIOGRAM REPORT: CPT | Performed by: INTERNAL MEDICINE

## 2021-10-30 PROCEDURE — 99232 SBSQ HOSP IP/OBS MODERATE 35: CPT | Mod: GC | Performed by: INTERNAL MEDICINE

## 2021-10-30 PROCEDURE — 36415 COLL VENOUS BLD VENIPUNCTURE: CPT | Performed by: STUDENT IN AN ORGANIZED HEALTH CARE EDUCATION/TRAINING PROGRAM

## 2021-10-30 PROCEDURE — 85610 PROTHROMBIN TIME: CPT | Performed by: STUDENT IN AN ORGANIZED HEALTH CARE EDUCATION/TRAINING PROGRAM

## 2021-10-30 PROCEDURE — 120N000003 HC R&B IMCU UMMC

## 2021-10-30 RX ORDER — LIDOCAINE 40 MG/G
CREAM TOPICAL
Status: ACTIVE | OUTPATIENT
Start: 2021-10-30 | End: 2021-11-02

## 2021-10-30 RX ORDER — SALIVA STIMULANT COMB. NO.3
2 SPRAY, NON-AEROSOL (ML) MUCOUS MEMBRANE 4 TIMES DAILY PRN
Status: DISCONTINUED | OUTPATIENT
Start: 2021-10-30 | End: 2021-11-07 | Stop reason: HOSPADM

## 2021-10-30 RX ORDER — POTASSIUM CHLORIDE 1500 MG/1
80 TABLET, EXTENDED RELEASE ORAL 3 TIMES DAILY
Status: DISCONTINUED | OUTPATIENT
Start: 2021-10-30 | End: 2021-11-01

## 2021-10-30 RX ADMIN — TRIAMCINOLONE ACETONIDE: 1 CREAM TOPICAL at 14:02

## 2021-10-30 RX ADMIN — POTASSIUM CHLORIDE 80 MEQ: 1500 TABLET, EXTENDED RELEASE ORAL at 20:18

## 2021-10-30 RX ADMIN — POTASSIUM CHLORIDE 80 MEQ: 1500 TABLET, EXTENDED RELEASE ORAL at 13:58

## 2021-10-30 RX ADMIN — Medication 2 SPRAY: at 03:08

## 2021-10-30 RX ADMIN — APIXABAN 2.5 MG: 2.5 TABLET, FILM COATED ORAL at 08:46

## 2021-10-30 RX ADMIN — FUROSEMIDE 20 MG/HR: 10 INJECTION, SOLUTION INTRAVENOUS at 21:43

## 2021-10-30 RX ADMIN — APIXABAN 2.5 MG: 2.5 TABLET, FILM COATED ORAL at 20:18

## 2021-10-30 RX ADMIN — TRIAMCINOLONE ACETONIDE: 1 CREAM TOPICAL at 08:46

## 2021-10-30 ASSESSMENT — ACTIVITIES OF DAILY LIVING (ADL)
ADLS_ACUITY_SCORE: 7

## 2021-10-30 ASSESSMENT — MIFFLIN-ST. JEOR: SCORE: 1221.13

## 2021-10-30 NOTE — PLAN OF CARE
NEURO: A&O x4  PAIN: Given PRN tylenol x1 for headache  CARDIAC: SR-SB, low HR 48 MD aware  RESPIRATORY: RA, SOB w/ activity, providers aware  GI: Bowel sounds active, multiple BMs  : Adequate UOP with lasix gtt.  LDA: PIV x1  IV: Lasix gtt @ 20 mg/hr  ACTIVITY: Up independently in room  LAB: K 6.3, EKG ordered  GOALS: Diurese.

## 2021-10-30 NOTE — PLAN OF CARE
Neuro: A&Ox4.   Cardiac: SR. VSS.   Respiratory: Sating 92% +on RA.  GI/: Adequate urine output on lasix gtt.. BM X1  Diet/appetite: Tolerating 2L FR- tearful, education and visuals provided bedside Eating well.  Activity:  Assist of ind- 1 assist  Pain: At acceptable level on current regimen.   Skin: No new deficits noted.  LDA's: 1 PIV  1 extended dwell PIV- no BP on this arm. Pulsatile flush and withdraw to keep line patent.    Plan: Continue with POC. Notify primary team with changes.

## 2021-10-30 NOTE — PROGRESS NOTES
[facility]    Progress Note - Cards 2 Service        Date of Admission:  10/28/2021    Assessment & Plan   Leandra Guerrero is a 71 y.o. female with history of chronic diastolic heart failure/restrictive cardiomyopathy 2/2 AL amyloidosis (diagnosed in 2016, with gastric and bone marrow involvement only, s/p CyBorD chemotherapy - in remission) admitted from outside hospital on 10/28/21 with 1 week of shortness of breath and 13 pound weight gain. Admission weight 159lbs.    Today:  -Will continue diuresis with lasix gtt, goal net negative 2L daily  -Adjusted KCl to 80 mEq TID from 100 mEq TID     # Acute on chronic diastolic heart failure/restrictive cardiomyopathy  # Cardiac Amyloidosis  NYHA III, Stage C  Patient admitted as direct admission from outside hospital for ongoing hypervolemia and shortness of breath despite outpatient PO diuretics following recent travel and medication change. Admission weight of 159 with estimated dry weight of 147-150 pounds. Of note, cardiac amyloidosis diagnosed per 2016 Echo with bi-atrial enlargement and severe left ventricular enlargement with low voltage on EKG. Followed by [facility] outpatient.   2/4/21 Cardiac Echo with EF 65-70%, moderate aortic and tricuspid insufficiency, mod LV wall thickening, Grade III diastolic dysfunction  - Lasix gtt @ 10/hr will plan to titrate to net negative 2L   - Strict I/Os with daily standing weights     # Hypokalemia  # Hyperkalemia  Patient given IV Lasix while in OSH ED without potassium replacement. Admission potassium of 2.7, improved to 3.7 after 80 mEq x2 KCl. Found to be hyperkalemic on 10/30 check to 6.3. Extensive history of fluctuating potassium levels with inpatient diuresis.   - 80 KCl TID from 100 KCl TID  - BID BMP while aggressively diuresing      # Chronic Dermatitis with pruritis   Per chart review, outpatient dermatology suspects atopic dermatitis vs lichen planus vs lichenoid  drug eruption. Itching has been going on for the last 1-2 months. Had RUQ US last month to assess for any underlying reason - noted changes suggestive of liver cirrhosis but no discrete masses or changes in gallbladder. Per discussion with dermatology, skin changes are likely consequence of previous chemotherapy for AL amyloid.  - Improving on scheduled topical triamcinolone  - Has prn benadryl      # Paroxysmal Afib/Aflutter  [id number] Score of 4  - Continue PTA apixaban 2.5mg BID, reduced dose to hx of epistaxis and hematochezia per chart review     # Chronic Lymphedema  Previously managed with compression stockings but discontinued due to patient discomfort  - Management with diuresis as above  - OT consulted for lymphedema management, appreciate recs     # Hx of Prolonged QT Interval  - Admission EKG with , QTc 509  - Will monitor and plan to avoid QT prolonging medications     # CKD3  Creatinine baseline of 1.1-1.2, likely secondary to underlying cardiac function.  - Avoiding nephrotoxic medications  - Continue to monitor     # Hx of AL Amyloidosis, monoclonal IgA lambda s/p chemotherapy  Diagnosed in 2016 with follow-up EGD and bone marrow biopsies confirming spread to stomach and bone marrow - now s/p CyBorD chemotherapy and in remission without maintenance therapy. Refer to 5/20/21 Oncology progress note by Mirela Moore for full treatment history.  - 5/11/21 Kappa Lambda Ratio 0.70, within normal limits    Diet: Fluid restriction 2000 ML FLUID  2 Gram Sodium Diet    DVT Prophylaxis: DOAC  Marie Catheter: Not present  Fluids: Restriction   Central Lines: None  Code Status: Full Code      Disposition Plan   Expected discharge: 11/03/2021   recommended to prior living arrangement once pending appropriate diuresis to EDW.     The patient's care was discussed with the Attending Physician, Dr. Zak Dudley.    Bryant Godwin MD  Cards 2 Advanced HF Service  [facility]  [facility]  Securely message with the Vocera Web Console (learn more here)  Text page via [facility] Paging/Directory    ______________________________________________________________________    Interval History   No acute events overnight. Nursing notes reviewed. Elevated potassium to 6.3 on morning labs noted. Ongoing itching improved with triamcinolone.    Tearful when seen this morning due to death of friend and frustration with fluid restriction and lack of urine output. Writer spoke with patient at length regarding need for ongoing fluid restriction and diuresis with updates to plan as above. Patient was agreeable to plan. Also offered counseling support but patient refused.     Review of systems notable for intermittent muscle cramping in right arm and diarrhea. No chest pain, shortness of breath, nausea/vomiting or abdominal pain.     Data reviewed today: I reviewed all medications, new labs and imaging results over the last 24 hours.    Physical Exam   Vital Signs: Temp: 97.8  F (36.6  C) Temp src: Oral BP: (!) 140/75 Pulse: 71   Resp: 18 SpO2: 100 % O2 Device: None (Room air)    Weight: 160 lbs 4.39 oz  General Appearance: Tearful when seen this morning but reassured following discussion with writer  Respiratory: clear to auscultation and breathing comfortably on room air   Cardiovascular: JVP ~16, irregular rhythm normal rate no murmur   GI: soft, mild tenderness at left quadrants, non-distended   Skin: erythematous and thickened diffusely with excoriations  MSK: Lower extremities wrapped, warm with palpable pulses, no cyanosis, 2+ bilateral LE edema     Data   Recent Labs   Lab 10/30/21  0443 10/29/21  2146 10/29/21  1340 10/29/21  0432 10/29/21  0432 10/28/21  1324 10/28/21  0824   WBC  --   --   --   --   --   --  9.0   HGB  --   --   --   --   --   --  12.9   MCV  --   --   --   --   --   --  96   PLT  --   --   --   --   --   --  289   INR 1.21*  --   --   --  1.23*  --   --     134 136   < >  136   < > 136   POTASSIUM 6.3* 5.4* 4.0   < > 4.8   < > 2.7*   CHLORIDE 107 103 101   < > 102   < > 97   CO2 23 26 29   < > 28   < > 33*   BUN 36* 35* 32*   < > 27   < > 24   CR 1.24* 1.33* 1.15*   < > 1.21*   < > 1.10*   ANIONGAP 6 5 6   < > 6   < > 6   JERROD 9.4 9.0 9.1   < > 9.0   < > 9.0   * 98 88   < > 104*   < > 98   ALBUMIN  --   --   --   --   --   --  3.6   PROTTOTAL  --   --   --   --   --   --  7.0   BILITOTAL  --   --   --   --   --   --  2.0*   ALKPHOS  --   --   --   --   --   --  200*   ALT  --   --   --   --   --   --  25   AST  --   --   --   --   --   --  23    < > = values in this interval not displayed.     No results found for this or any previous visit (from the past 24 hour(s)).     I have reviewed today's vital signs, notes, medications, labs and imaging.  I have also seen and examined the patient and agree with the findings and plan as outlined above.  Pt with AL amyloid and restrictive cardiac physiology now with gentle diureiss and stable Cr.  Will continue as outlined.     Zak Dudley MD, PhD  Professor, Heart Failure and Cardiac Transplantation  [facility]

## 2021-10-31 ENCOUNTER — APPOINTMENT (OUTPATIENT)
Dept: GENERAL RADIOLOGY | Facility: CLINIC | Age: 71
DRG: 292 | End: 2021-10-31
Attending: INTERNAL MEDICINE
Payer: COMMERCIAL

## 2021-10-31 ENCOUNTER — APPOINTMENT (OUTPATIENT)
Dept: CARDIOLOGY | Facility: CLINIC | Age: 71
DRG: 292 | End: 2021-10-31
Attending: STUDENT IN AN ORGANIZED HEALTH CARE EDUCATION/TRAINING PROGRAM
Payer: COMMERCIAL

## 2021-10-31 LAB
ANION GAP SERPL CALCULATED.3IONS-SCNC: 2 MMOL/L (ref 3–14)
ANION GAP SERPL CALCULATED.3IONS-SCNC: 4 MMOL/L (ref 3–14)
ATRIAL RATE - MUSE: 68 BPM
BUN SERPL-MCNC: 28 MG/DL (ref 7–30)
BUN SERPL-MCNC: 30 MG/DL (ref 7–30)
CALCIUM SERPL-MCNC: 8.7 MG/DL (ref 8.5–10.1)
CALCIUM SERPL-MCNC: 9.1 MG/DL (ref 8.5–10.1)
CHLORIDE BLD-SCNC: 102 MMOL/L (ref 94–109)
CHLORIDE BLD-SCNC: 103 MMOL/L (ref 94–109)
CO2 SERPL-SCNC: 30 MMOL/L (ref 20–32)
CO2 SERPL-SCNC: 32 MMOL/L (ref 20–32)
CREAT SERPL-MCNC: 1.09 MG/DL (ref 0.52–1.04)
CREAT SERPL-MCNC: 1.14 MG/DL (ref 0.52–1.04)
DIASTOLIC BLOOD PRESSURE - MUSE: NORMAL MMHG
GFR SERPL CREATININE-BSD FRML MDRD: 49 ML/MIN/1.73M2
GFR SERPL CREATININE-BSD FRML MDRD: 52 ML/MIN/1.73M2
GLUCOSE BLD-MCNC: 85 MG/DL (ref 70–99)
GLUCOSE BLD-MCNC: 91 MG/DL (ref 70–99)
HOLD SPECIMEN: NORMAL
INR PPP: 1.43 (ref 0.85–1.15)
INTERPRETATION ECG - MUSE: NORMAL
LVEF ECHO: NORMAL
MAGNESIUM SERPL-MCNC: 2.5 MG/DL (ref 1.6–2.3)
P AXIS - MUSE: 80 DEGREES
POTASSIUM BLD-SCNC: 3.9 MMOL/L (ref 3.4–5.3)
POTASSIUM BLD-SCNC: 4.5 MMOL/L (ref 3.4–5.3)
PR INTERVAL - MUSE: 192 MS
QRS DURATION - MUSE: 78 MS
QT - MUSE: 462 MS
QTC - MUSE: 491 MS
R AXIS - MUSE: -10 DEGREES
SODIUM SERPL-SCNC: 136 MMOL/L (ref 133–144)
SODIUM SERPL-SCNC: 137 MMOL/L (ref 133–144)
SYSTOLIC BLOOD PRESSURE - MUSE: NORMAL MMHG
T AXIS - MUSE: 96 DEGREES
TROPONIN I SERPL-MCNC: <0.015 UG/L (ref 0–0.04)
VENTRICULAR RATE- MUSE: 68 BPM

## 2021-10-31 PROCEDURE — 36415 COLL VENOUS BLD VENIPUNCTURE: CPT | Performed by: STUDENT IN AN ORGANIZED HEALTH CARE EDUCATION/TRAINING PROGRAM

## 2021-10-31 PROCEDURE — 93306 TTE W/DOPPLER COMPLETE: CPT

## 2021-10-31 PROCEDURE — 99232 SBSQ HOSP IP/OBS MODERATE 35: CPT | Mod: 25 | Performed by: INTERNAL MEDICINE

## 2021-10-31 PROCEDURE — 80048 BASIC METABOLIC PNL TOTAL CA: CPT | Performed by: STUDENT IN AN ORGANIZED HEALTH CARE EDUCATION/TRAINING PROGRAM

## 2021-10-31 PROCEDURE — 71045 X-RAY EXAM CHEST 1 VIEW: CPT | Mod: 26 | Performed by: STUDENT IN AN ORGANIZED HEALTH CARE EDUCATION/TRAINING PROGRAM

## 2021-10-31 PROCEDURE — 120N000003 HC R&B IMCU UMMC

## 2021-10-31 PROCEDURE — 250N000013 HC RX MED GY IP 250 OP 250 PS 637: Performed by: STUDENT IN AN ORGANIZED HEALTH CARE EDUCATION/TRAINING PROGRAM

## 2021-10-31 PROCEDURE — 84484 ASSAY OF TROPONIN QUANT: CPT | Performed by: STUDENT IN AN ORGANIZED HEALTH CARE EDUCATION/TRAINING PROGRAM

## 2021-10-31 PROCEDURE — 71045 X-RAY EXAM CHEST 1 VIEW: CPT

## 2021-10-31 PROCEDURE — 93010 ELECTROCARDIOGRAM REPORT: CPT | Performed by: INTERNAL MEDICINE

## 2021-10-31 PROCEDURE — 83735 ASSAY OF MAGNESIUM: CPT | Performed by: STUDENT IN AN ORGANIZED HEALTH CARE EDUCATION/TRAINING PROGRAM

## 2021-10-31 PROCEDURE — 250N000009 HC RX 250: Performed by: STUDENT IN AN ORGANIZED HEALTH CARE EDUCATION/TRAINING PROGRAM

## 2021-10-31 PROCEDURE — 93005 ELECTROCARDIOGRAM TRACING: CPT

## 2021-10-31 PROCEDURE — 85610 PROTHROMBIN TIME: CPT | Performed by: STUDENT IN AN ORGANIZED HEALTH CARE EDUCATION/TRAINING PROGRAM

## 2021-10-31 PROCEDURE — 93306 TTE W/DOPPLER COMPLETE: CPT | Mod: 26 | Performed by: STUDENT IN AN ORGANIZED HEALTH CARE EDUCATION/TRAINING PROGRAM

## 2021-10-31 RX ORDER — LORAZEPAM 0.5 MG/1
0.5 TABLET ORAL EVERY 6 HOURS PRN
Status: DISCONTINUED | OUTPATIENT
Start: 2021-10-31 | End: 2021-11-01

## 2021-10-31 RX ADMIN — APIXABAN 2.5 MG: 2.5 TABLET, FILM COATED ORAL at 08:26

## 2021-10-31 RX ADMIN — TRIAMCINOLONE ACETONIDE: 1 CREAM TOPICAL at 13:59

## 2021-10-31 RX ADMIN — LORAZEPAM 0.5 MG: 0.5 TABLET ORAL at 23:29

## 2021-10-31 RX ADMIN — Medication 25 MG: at 13:58

## 2021-10-31 RX ADMIN — POTASSIUM CHLORIDE 80 MEQ: 1500 TABLET, EXTENDED RELEASE ORAL at 20:08

## 2021-10-31 RX ADMIN — ACETAMINOPHEN 325 MG: 325 TABLET, FILM COATED ORAL at 20:08

## 2021-10-31 RX ADMIN — TRIAMCINOLONE ACETONIDE: 1 CREAM TOPICAL at 20:10

## 2021-10-31 RX ADMIN — POTASSIUM CHLORIDE 80 MEQ: 1500 TABLET, EXTENDED RELEASE ORAL at 13:59

## 2021-10-31 RX ADMIN — DOCUSATE SODIUM 50 MG AND SENNOSIDES 8.6 MG 1 TABLET: 8.6; 5 TABLET, FILM COATED ORAL at 20:07

## 2021-10-31 RX ADMIN — APIXABAN 2.5 MG: 2.5 TABLET, FILM COATED ORAL at 20:07

## 2021-10-31 RX ADMIN — POTASSIUM CHLORIDE 80 MEQ: 1500 TABLET, EXTENDED RELEASE ORAL at 08:26

## 2021-10-31 RX ADMIN — TRIAMCINOLONE ACETONIDE: 1 CREAM TOPICAL at 08:27

## 2021-10-31 RX ADMIN — FUROSEMIDE 20 MG/HR: 10 INJECTION, SOLUTION INTRAVENOUS at 23:13

## 2021-10-31 RX ADMIN — ACETAMINOPHEN 325 MG: 325 TABLET, FILM COATED ORAL at 10:18

## 2021-10-31 ASSESSMENT — ACTIVITIES OF DAILY LIVING (ADL)
ADLS_ACUITY_SCORE: 11
ADLS_ACUITY_SCORE: 9
ADLS_ACUITY_SCORE: 7
ADLS_ACUITY_SCORE: 9
ADLS_ACUITY_SCORE: 11
ADLS_ACUITY_SCORE: 7
ADLS_ACUITY_SCORE: 7
ADLS_ACUITY_SCORE: 9
ADLS_ACUITY_SCORE: 7
ADLS_ACUITY_SCORE: 9
ADLS_ACUITY_SCORE: 11
ADLS_ACUITY_SCORE: 9
ADLS_ACUITY_SCORE: 9

## 2021-10-31 ASSESSMENT — MIFFLIN-ST. JEOR: SCORE: 1213.13

## 2021-10-31 NOTE — PLAN OF CARE
Neuro: A&Ox4.   Cardiac: SR. VSS.   Respiratory: Sating 100 on RA.  GI/: Adequate urine output. BM X1  Diet/appetite: Tolerating low sodium diet. 2L FR. Has had 1L so far this shift.   Activity: Up ad [place], up to chair and in halls.  Pain: At acceptable level on current regimen. C/o headache, tylenol given  Skin: No new deficits noted. benedryl given x1 for itching  LDA's: L PIV running IV lasix    Plan: Continue with POC. Notify primary team with changes.

## 2021-10-31 NOTE — PLAN OF CARE
Neuro: A&Ox4. Pleasant, able to make needs known.  Cardiac: SR 70s. VSS.   Respiratory: Sating >95% on RA.  GI/: Adequate urine output. BM X1  Diet/appetite: Tolerating 2gm Na diet, 2L FR - good cooperation with dry erase visual on graduated cylinders. Eating well.  Activity:  Independent in room, up to commode.  Pain: At acceptable level on current regimen.   Skin: No new deficits noted. Lymphedema wraps in place.  LDA's:  -L PIV: lasix gtt  -L extended peripheral removed by vascular d/t lost blood return, per [facility] there was a kink.    Plan: Diurese. Continue with POC. Notify primary team with changes.

## 2021-10-31 NOTE — PROGRESS NOTES
[facility]    Progress Note - Cards 2 Service        Date of Admission:  10/28/2021    Assessment & Plan   Leandra Guerrero is a 71 y.o. female with history of chronic diastolic heart failure/restrictive cardiomyopathy 2/2 AL amyloidosis (diagnosed in 2016, with gastric and bone marrow involvement only, s/p CyBorD chemotherapy - in remission) admitted from outside hospital on 10/28/21 with 1 week of shortness of breath and 13 pound weight gain. Admission weight 159lbs.    Today:  - Continue lasix diuresis and 80 mEq KCl TID     # Acute on chronic diastolic heart failure/restrictive cardiomyopathy  # Cardiac Amyloidosis  NYHA III, Stage C  Patient admitted as direct admission from outside hospital for ongoing hypervolemia and shortness of breath despite outpatient PO diuretics following recent travel and medication change. Admission weight of 159 with estimated dry weight of 147-150 pounds. Of note, cardiac amyloidosis diagnosed per 2016 Echo with bi-atrial enlargement and severe left ventricular enlargement with low voltage on EKG. Followed by [facility] outpatient.   2/4/21 Cardiac Echo with EF 65-70%, moderate aortic and tricuspid insufficiency, mod LV wall thickening, Grade III diastolic dysfunction  - Lasix gtt @ 20/hr will plan to titrate to net negative 2L   - Strict I/Os with daily standing weights     # Hypokalemia  # Hyperkalemia  Patient given IV Lasix while in OSH ED without potassium replacement. Admission potassium of 2.7, improved to 3.7 after 80 mEq x2 KCl. Found to be hyperkalemic on 10/30 check to 6.3, currently within normal limits. Extensive history of fluctuating potassium levels with inpatient diuresis.   - 80 KCl TID from 100 KCl TID on 10/30, tolerating well  - BID BMP while diuresing      # Chronic Dermatitis with pruritis   Per chart review, outpatient dermatology suspects atopic dermatitis vs lichen planus vs lichenoid drug eruption. Itching  has been going on for the last 1-2 months. Had RUQ US last month to assess for any underlying reason - noted changes suggestive of liver cirrhosis but no discrete masses or changes in gallbladder. Per discussion with dermatology, skin changes are likely consequence of previous chemotherapy for AL amyloid.  - Improving on scheduled topical triamcinolone  - Has prn benadryl      # Paroxysmal Afib/Aflutter  [id number] Score of 4  - Continue PTA apixaban 2.5mg BID, reduced dose to hx of epistaxis and hematochezia per chart review     # Chronic Lymphedema  Previously managed with compression stockings but discontinued due to patient discomfort  - Management with diuresis as above  - OT consulted for lymphedema management, appreciate recs     # Hx of Prolonged QT Interval  - Admission EKG with , QTc 509  - Will monitor and plan to avoid QT prolonging medications     # CKD3  Creatinine baseline of 1.1-1.2, likely secondary to underlying cardiac function.  - Avoiding nephrotoxic medications  - Continue to monitor     # Hx of AL Amyloidosis, monoclonal IgA lambda s/p chemotherapy  Diagnosed in 2016 with follow-up EGD and bone marrow biopsies confirming spread to stomach and bone marrow - now s/p CyBorD chemotherapy and in remission without maintenance therapy. Refer to 5/20/21 Oncology progress note by Mirela Moore for full treatment history.  - 5/11/21 Kappa Lambda Ratio 0.70, within normal limits    Diet: Fluid restriction 2000 ML FLUID  2 Gram Sodium Diet    DVT Prophylaxis: DOAC  Marie Catheter: Not present  Fluids: Restriction   Central Lines: None  Code Status: Full Code      Disposition Plan   Expected discharge: 11/03/2021   recommended to prior living arrangement once pending appropriate diuresis to EDW.     The patient's care was discussed with the Attending Physician, Dr. Zak Dudley.    Bryant Godwin MD  Cards 2 Advanced HF Service  [facility]  Securely  message with the Vocera Web Console (learn more here)  Text page via [facility] Paging/Directory    ______________________________________________________________________    Interval History     No acute events overnight. Nursing notes reviewed. Within goal range for urine output and tolerating diuresis and potassium regimen without acute complaints. Patient notes ongoing improvement since admission.     Review of systems negative for chest pain, shortness of breath, abdominal pain, nausea/vomiting, diarrhea or cramping. Ongoing itch from admission, improved with current regimen of triamcinolone.     Data reviewed today: I reviewed all medications, new labs and imaging results over the last 24 hours.    Physical Exam   Vital Signs: Temp: 97.6  F (36.4  C) Temp src: Oral BP: 137/68 Pulse: 81   Resp: 18 SpO2: 95 % O2 Device: None (Room air)    Weight: 158 lbs 8.17 oz  General Appearance: No apparent distress when seen this morning  Respiratory: clear to auscultation and breathing comfortably on room air   Cardiovascular: JVP at midneck, irregular rhythm normal rate, no murmur   GI: soft, bowel sounds not appreciated, non-tender to palpation today  Skin: erythematous and thickened diffusely with excoriations  MSK: Lower extremities wrapped, warm with palpable pulses, no cyanosis, 2+ bilateral LE edema     Data   Recent Labs   Lab 10/31/21  0434 10/30/21  1612 10/30/21  1114 10/30/21  0443 10/30/21  0443 10/29/21  1340 10/29/21  0432 10/28/21  1324 10/28/21  0824   WBC  --   --   --   --   --   --   --   --  9.0   HGB  --   --   --   --   --   --   --   --  12.9   MCV  --   --   --   --   --   --   --   --  96   PLT  --   --   --   --   --   --   --   --  289   INR 1.43*  --   --   --  1.21*  --  1.23*  --   --     137  --   --  136   < > 136   < > 136   POTASSIUM 3.9 3.7 4.3   < > 6.3*   < > 4.8   < > 2.7*   CHLORIDE 103 103  --   --  107   < > 102   < > 97   CO2 30 29  --   --  23   < > 28   < > 33*   BUN 28 34*  --    --  36*   < > 27   < > 24   CR 1.09* 1.20*  --   --  1.24*   < > 1.21*   < > 1.10*   ANIONGAP 4 5  --   --  6   < > 6   < > 6   JERROD 9.1 8.6  --   --  9.4   < > 9.0   < > 9.0   GLC 91 94  --   --  103*   < > 104*   < > 98   ALBUMIN  --   --   --   --   --   --   --   --  3.6   PROTTOTAL  --   --   --   --   --   --   --   --  7.0   BILITOTAL  --   --   --   --   --   --   --   --  2.0*   ALKPHOS  --   --   --   --   --   --   --   --  200*   ALT  --   --   --   --   --   --   --   --  25   AST  --   --   --   --   --   --   --   --  23    < > = values in this interval not displayed.     No results found for this or any previous visit (from the past 24 hour(s)).     I have reviewed today's vital signs, notes, medications, labs and imaging.  I have also seen and examined the patient and agree with the findings and plan as outlined above.  Pt without complaints. VSS with Cr at 1.07 with good diuresis.  Plan to contitnue IV diuretics in pt with AL amyloid.      Zak Dudley MD, PhD  Professor, Heart Failure and Cardiac Transplantation  [facility]

## 2021-11-01 ENCOUNTER — APPOINTMENT (OUTPATIENT)
Dept: OCCUPATIONAL THERAPY | Facility: CLINIC | Age: 71
DRG: 292 | End: 2021-11-01
Attending: INTERNAL MEDICINE
Payer: COMMERCIAL

## 2021-11-01 LAB
ANION GAP SERPL CALCULATED.3IONS-SCNC: 5 MMOL/L (ref 3–14)
ANION GAP SERPL CALCULATED.3IONS-SCNC: 6 MMOL/L (ref 3–14)
ATRIAL RATE - MUSE: 60 BPM
BUN SERPL-MCNC: 31 MG/DL (ref 7–30)
BUN SERPL-MCNC: 31 MG/DL (ref 7–30)
CALCIUM SERPL-MCNC: 9 MG/DL (ref 8.5–10.1)
CALCIUM SERPL-MCNC: 9 MG/DL (ref 8.5–10.1)
CHLORIDE BLD-SCNC: 103 MMOL/L (ref 94–109)
CHLORIDE BLD-SCNC: 104 MMOL/L (ref 94–109)
CO2 SERPL-SCNC: 26 MMOL/L (ref 20–32)
CO2 SERPL-SCNC: 26 MMOL/L (ref 20–32)
CREAT SERPL-MCNC: 1.14 MG/DL (ref 0.52–1.04)
CREAT SERPL-MCNC: 1.14 MG/DL (ref 0.52–1.04)
DIASTOLIC BLOOD PRESSURE - MUSE: NORMAL MMHG
ERYTHROCYTE [DISTWIDTH] IN BLOOD BY AUTOMATED COUNT: 14 % (ref 10–15)
GFR SERPL CREATININE-BSD FRML MDRD: 49 ML/MIN/1.73M2
GFR SERPL CREATININE-BSD FRML MDRD: 49 ML/MIN/1.73M2
GLUCOSE BLD-MCNC: 84 MG/DL (ref 70–99)
GLUCOSE BLD-MCNC: 93 MG/DL (ref 70–99)
HCT VFR BLD AUTO: 37.2 % (ref 35–47)
HGB BLD-MCNC: 11.6 G/DL (ref 11.7–15.7)
INR PPP: 1.32 (ref 0.85–1.15)
INTERPRETATION ECG - MUSE: NORMAL
MAGNESIUM SERPL-MCNC: 2.5 MG/DL (ref 1.6–2.3)
MCH RBC QN AUTO: 31.4 PG (ref 26.5–33)
MCHC RBC AUTO-ENTMCNC: 31.2 G/DL (ref 31.5–36.5)
MCV RBC AUTO: 101 FL (ref 78–100)
P AXIS - MUSE: -10 DEGREES
PLATELET # BLD AUTO: 220 10E3/UL (ref 150–450)
POTASSIUM BLD-SCNC: 4.3 MMOL/L (ref 3.4–5.3)
POTASSIUM BLD-SCNC: 4.5 MMOL/L (ref 3.4–5.3)
POTASSIUM BLD-SCNC: 5.4 MMOL/L (ref 3.4–5.3)
PR INTERVAL - MUSE: 170 MS
QRS DURATION - MUSE: 76 MS
QT - MUSE: 442 MS
QTC - MUSE: 442 MS
R AXIS - MUSE: -27 DEGREES
RBC # BLD AUTO: 3.7 10E6/UL (ref 3.8–5.2)
SODIUM SERPL-SCNC: 135 MMOL/L (ref 133–144)
SODIUM SERPL-SCNC: 135 MMOL/L (ref 133–144)
SYSTOLIC BLOOD PRESSURE - MUSE: NORMAL MMHG
T AXIS - MUSE: 113 DEGREES
VENTRICULAR RATE- MUSE: 60 BPM
WBC # BLD AUTO: 6.7 10E3/UL (ref 4–11)

## 2021-11-01 PROCEDURE — 97110 THERAPEUTIC EXERCISES: CPT | Mod: GO | Performed by: OCCUPATIONAL THERAPIST

## 2021-11-01 PROCEDURE — 83735 ASSAY OF MAGNESIUM: CPT | Performed by: STUDENT IN AN ORGANIZED HEALTH CARE EDUCATION/TRAINING PROGRAM

## 2021-11-01 PROCEDURE — 85610 PROTHROMBIN TIME: CPT | Performed by: STUDENT IN AN ORGANIZED HEALTH CARE EDUCATION/TRAINING PROGRAM

## 2021-11-01 PROCEDURE — 250N000009 HC RX 250: Performed by: STUDENT IN AN ORGANIZED HEALTH CARE EDUCATION/TRAINING PROGRAM

## 2021-11-01 PROCEDURE — 250N000013 HC RX MED GY IP 250 OP 250 PS 637: Performed by: STUDENT IN AN ORGANIZED HEALTH CARE EDUCATION/TRAINING PROGRAM

## 2021-11-01 PROCEDURE — 99232 SBSQ HOSP IP/OBS MODERATE 35: CPT | Mod: GC | Performed by: INTERNAL MEDICINE

## 2021-11-01 PROCEDURE — 85027 COMPLETE CBC AUTOMATED: CPT | Performed by: STUDENT IN AN ORGANIZED HEALTH CARE EDUCATION/TRAINING PROGRAM

## 2021-11-01 PROCEDURE — 36415 COLL VENOUS BLD VENIPUNCTURE: CPT | Performed by: STUDENT IN AN ORGANIZED HEALTH CARE EDUCATION/TRAINING PROGRAM

## 2021-11-01 PROCEDURE — 120N000005 HC R&B MS OVERFLOW UMMC

## 2021-11-01 PROCEDURE — 97535 SELF CARE MNGMENT TRAINING: CPT | Mod: GO | Performed by: OCCUPATIONAL THERAPIST

## 2021-11-01 PROCEDURE — 97530 THERAPEUTIC ACTIVITIES: CPT | Mod: GO | Performed by: OCCUPATIONAL THERAPIST

## 2021-11-01 PROCEDURE — 80048 BASIC METABOLIC PNL TOTAL CA: CPT | Performed by: STUDENT IN AN ORGANIZED HEALTH CARE EDUCATION/TRAINING PROGRAM

## 2021-11-01 PROCEDURE — 84132 ASSAY OF SERUM POTASSIUM: CPT | Performed by: STUDENT IN AN ORGANIZED HEALTH CARE EDUCATION/TRAINING PROGRAM

## 2021-11-01 PROCEDURE — 999N000128 HC STATISTIC PERIPHERAL IV START W/O US GUIDANCE

## 2021-11-01 RX ORDER — POTASSIUM CHLORIDE 1500 MG/1
80 TABLET, EXTENDED RELEASE ORAL
Status: DISCONTINUED | OUTPATIENT
Start: 2021-11-01 | End: 2021-11-02

## 2021-11-01 RX ORDER — POTASSIUM CHLORIDE 750 MG/1
40 TABLET, EXTENDED RELEASE ORAL
Status: DISCONTINUED | OUTPATIENT
Start: 2021-11-01 | End: 2021-11-03

## 2021-11-01 RX ORDER — POTASSIUM CHLORIDE 1500 MG/1
60 TABLET, EXTENDED RELEASE ORAL
Status: DISCONTINUED | OUTPATIENT
Start: 2021-11-01 | End: 2021-11-01

## 2021-11-01 RX ORDER — SIMETHICONE 80 MG
80 TABLET,CHEWABLE ORAL EVERY 6 HOURS PRN
Status: DISCONTINUED | OUTPATIENT
Start: 2021-11-01 | End: 2021-11-07 | Stop reason: HOSPADM

## 2021-11-01 RX ADMIN — APIXABAN 2.5 MG: 2.5 TABLET, FILM COATED ORAL at 07:53

## 2021-11-01 RX ADMIN — SIMETHICONE 80 MG: 80 TABLET, CHEWABLE ORAL at 02:35

## 2021-11-01 RX ADMIN — Medication 25 MG: at 20:12

## 2021-11-01 RX ADMIN — APIXABAN 2.5 MG: 2.5 TABLET, FILM COATED ORAL at 19:49

## 2021-11-01 RX ADMIN — TRIAMCINOLONE ACETONIDE: 1 CREAM TOPICAL at 19:50

## 2021-11-01 RX ADMIN — POTASSIUM CHLORIDE 80 MEQ: 1500 TABLET, EXTENDED RELEASE ORAL at 19:49

## 2021-11-01 RX ADMIN — POTASSIUM CHLORIDE 80 MEQ: 1500 TABLET, EXTENDED RELEASE ORAL at 12:23

## 2021-11-01 RX ADMIN — TRIAMCINOLONE ACETONIDE: 1 CREAM TOPICAL at 07:54

## 2021-11-01 RX ADMIN — TRIAMCINOLONE ACETONIDE: 1 CREAM TOPICAL at 13:08

## 2021-11-01 RX ADMIN — POTASSIUM CHLORIDE 40 MEQ: 750 TABLET, EXTENDED RELEASE ORAL at 08:46

## 2021-11-01 RX ADMIN — FUROSEMIDE 20 MG/HR: 10 INJECTION, SOLUTION INTRAVENOUS at 15:33

## 2021-11-01 RX ADMIN — Medication 25 MG: at 09:22

## 2021-11-01 RX ADMIN — ACETAMINOPHEN 325 MG: 325 TABLET, FILM COATED ORAL at 15:24

## 2021-11-01 ASSESSMENT — ACTIVITIES OF DAILY LIVING (ADL)
ADLS_ACUITY_SCORE: 9
ADLS_ACUITY_SCORE: 11
ADLS_ACUITY_SCORE: 9
ADLS_ACUITY_SCORE: 11
ADLS_ACUITY_SCORE: 11
ADLS_ACUITY_SCORE: 9
ADLS_ACUITY_SCORE: 11
ADLS_ACUITY_SCORE: 9
DEPENDENT_IADLS:: INDEPENDENT
ADLS_ACUITY_SCORE: 9
ADLS_ACUITY_SCORE: 11
ADLS_ACUITY_SCORE: 11

## 2021-11-01 ASSESSMENT — MIFFLIN-ST. JEOR: SCORE: 1234.13

## 2021-11-01 NOTE — PLAN OF CARE
"Neuro: A&Ox4. Pleasant, able to make needs known. Anxious, tearful at times.  Cardiac: SR 70s, SB 46-50s when sleeping, team aware. VSS.            Respiratory: Sating >95% on RA.  GI/: Adequate urine output.  C/o abdominal discomfort/fullness and bloating, stated her abdomen felt \"hard\" - soft but distended on assessment. Hyperactive BS initially. BM X1, prn senna given. Cards 2 notified of increasing pt discomfort, prn simethicone given with improvement in AM.  Diet/appetite: Tolerating 2gm Na diet, 2L FR. Eating well.  Activity:  Independent in room, up to commode/BR.  Pain: Headache managed with tylenol.   Skin: No new deficits noted. Lymphedema wraps in place.  LDA's:  -L PIV: lasix gtt     ~8:45pm Cards 2 notified of c/o chest tightness, SOB & anxiety, see crosscover note. Prn ativan given x1 with improvement in anxiety symptoms and ability to sleep.      Plan: Diurese. Continue with POC. Notify primary team with changes.    "

## 2021-11-01 NOTE — PROGRESS NOTES
"BRIEF CROSS COVER NOTE    Patient seen at bedside ~9PM for complaint of \"feeling off\" and having chest tightness. Patient states this is not a new symptom and that she frequently feels this at home. Endorsing bilateral chest tightness while laying in bed. SOB with exertion is improving since admission. No palpitations, N/V, diaphoresis, extremity pain. She expresses anxiety about her condition and an upcoming telemedicine visit tomorrow with hepatology regarding US findings concerning for cirrhosis. She became tearful several times throughout encounter. Vital signs stable and O2 sats in 90s on room air, no change from earlier in the day.Checked CXR, EKG, and troponin, all normal or unchanged from previous exams and patient reassured. Patient states she has contacted Dr. Cohen about her symptoms and had also been reassured at that time. RN was told that patient usually takes PTA lorazepam. Reordered PTA lorazepam for anxiety.    Hallie Lopez DO  [facility]  PGY2, Internal Medicine  Cardiology NF Resident  See [place]edar for pager  "

## 2021-11-01 NOTE — PLAN OF CARE
Neuro: Patient alert and oriented x4. Anxious/tearful at times.   Cardiac: SB/NSR 50's-70's. BP's stable Afebrile.        Respiratory: Sating >95% on RA.  GI/: Adequate urine output, but less than previous days on lasix gtt. 2 BM this shift.   Diet/appetite: Tolerating 2gm Na diet, 2L FR. Eating well.  Activity:  Independent in room, up to commode/BR.  Pain: Headache managed with tylenol.   Skin: No new deficits noted. Lymphedema wraps in place.  LDA's: R PIV with lasix gtt at 20mg/hr.   Plan: Diurese. Continue with POC. Notify primary team with changes.

## 2021-11-01 NOTE — CONSULTS
Care Management Initial Consult    General Information  Assessment completed with: Patient,    Type of CM/SW Visit: Initial Assessment    Primary Care Provider verified and updated as needed: Yes   Readmission within the last 30 days: no previous admission in last 30 days   Reason for Consult:  (elevated risk score)  Advance Care Planning: Advance Care Planning Reviewed: present on chart       Communication Assessment  Patient's communication style: spoken language (English or Bilingual)    Hearing Difficulty or Deaf: no   Wear Glasses or Blind: yes    Cognitive  Cognitive/Neuro/Behavioral: WDL                      Living Environment:   People in home: spouse     Current living Arrangements: house      Able to return to prior arrangements: yes    Family/Social Support:  Care provided by: self  Provides care for: no one  Marital Status:   Children,           Description of Support System: Supportive, Involved    Support Assessment: Adequate family and caregiver support    Current Resources:   Patient receiving home care services: No  Community Resources: None  Equipment currently used at home: cane, straight  Supplies currently used at home: None    Employment/Financial:  Employment Status: retired     Financial Concerns: No concerns identified     Lifestyle & Psychosocial Needs:  Social Determinants of Health     Tobacco Use: Low Risk      Smoking Tobacco Use: Never Smoker     Smokeless Tobacco Use: Never Used   Alcohol Use:      Frequency of Alcohol Consumption:      Average Number of Drinks:      Frequency of Binge Drinking:    Financial Resource Strain:      Difficulty of Paying Living Expenses:    Food Insecurity:      Worried About Running Out of Food in the Last Year:      Ran Out of Food in the Last Year:    Transportation Needs:      Lack of Transportation (Medical):      Lack of Transportation (Non-Medical):    Physical Activity:      Days of Exercise per Week:      Minutes of Exercise per  "Session:    Stress:      Feeling of Stress :    Social Connections:      Frequency of Communication with Friends and Family:      Frequency of Social Gatherings with Friends and Family:      Attends Rastafarian Services:      Active Member of Clubs or Organizations:      Attends Club or Organization Meetings:      Marital Status:    Intimate Partner Violence:      Fear of Current or Ex-Partner:      Emotionally Abused:      Physically Abused:      Sexually Abused:    Depression: Not at risk     PHQ-2 Score: 0   Housing Stability:      Unable to Pay for Housing in the Last Year:      Number of Places Lived in the Last Year:      Unstable Housing in the Last Year:      Functional Status:  Prior to admission patient needed assistance:   Dependent ADLs:: Ambulation-cane  Dependent IADLs:: Independent  Assesssment of Functional Status: At functional baseline    Mental Health Status:  Mental Health Status: No Current Concerns       Chemical Dependency Status:  Chemical Dependency Status: No Current Concerns          Values/Beliefs:  Spiritual, Cultural Beliefs, Rastafarian Practices, Values that affect care: yes          Additional Information:  Per H&P, pt \"is a 71 y.o. female with history of chronic diastolic heart failure/restrictive cardiomyopathy 2/2 AL amyloidosis (diagnosed in 2016, with gastric and bone marrow involvement only, s/p CyBorD chemotherapy - in remission) presenting with 1 week of shortness of breath and 13 pound weight gain. She notes that 1 week ago she completed a 14 hour road trip with her  and  that her acute symptoms began after that. She denies any recent illness, sick contact exposures, or changes in diet. She has been adherent to her diuresis regimen (torsemide 100 BID, metolazone 2.5 PRN and eplerenone 25 daily) and was switched from spironolactone to eplerenone on 9/28/21.      She follows with Dr. Cohen who advised her on [facility] admission for diuresis. Of note, she was previously " "admitted 7/20/21-7/26/21 with identical complaints of swelling and shortness of breath that improved with IV diuresis\".     SW met with pt in room to introduce self, role, and to complete assessment. Pt confirmed that she resides in [place], [place] with her , Oscar. The home has 4 levels and pt's bedroom is located on the main floor. Per pt, her children converted the dining room into her bedroom for easier access to meet needs. Pt shared that she utilizes a cane for mobility and that although she has a walker, she does not use it. Pt shared that she is overall independent at baseline with ADLs and IADLs. Pt denied utilizing any home care agencies, support, or community resources. Per pt, she has a \"good\" support system.     Pt understands care management will continue to follow for support and dispo needs as appropriate.    LATRICE Moraes, LGSW  6B   [facility]  Phone: [phone]  Pager: [phone]  "

## 2021-11-01 NOTE — PROGRESS NOTES
[facility]    Progress Note - Cards 2 Service        Date of Admission:  10/28/2021    Assessment & Plan   Leandra Guerrero is a 71 y.o. female with history of chronic diastolic heart failure/restrictive cardiomyopathy 2/2 AL amyloidosis (diagnosed in 2016, with gastric and bone marrow involvement only, s/p CyBorD chemotherapy - in remission) admitted from outside hospital on 10/28/21 with 1 week of shortness of breath and 13 pound weight gain. Admission weight 159lbs.    Today:  - Continue lasix diuresis and 80 mEq KCl TID     # Acute on chronic diastolic heart failure/restrictive cardiomyopathy  # Cardiac Amyloidosis  NYHA III, Stage C  Patient admitted as direct admission from outside hospital for ongoing hypervolemia and shortness of breath despite outpatient PO diuretics following recent travel and medication change. Admission weight of 159 with estimated dry weight of 147-150 pounds. Of note, cardiac amyloidosis diagnosed per 2016 Echo with bi-atrial enlargement and severe left ventricular enlargement with low voltage on EKG. Followed by [facility] outpatient.   2/4/21 Cardiac Echo with EF 65-70%, moderate aortic and tricuspid insufficiency, mod LV wall thickening, Grade III diastolic dysfunction  - Lasix gtt @ 20/hr will plan to titrate to net negative 2L   - Strict I/Os with daily standing weights     # Hypokalemia  # Hyperkalemia  Patient given IV Lasix while in OSH ED without potassium replacement. Admission potassium of 2.7, improved to 3.7 after 80 mEq x2 KCl. Found to be hyperkalemic on 10/30 check to 6.3, currently within normal limits. Extensive history of fluctuating potassium levels with inpatient diuresis.   - 80 KCl TID from 100 KCl TID on 10/30, tolerating well  - BID BMP while diuresing      # Chronic Dermatitis with pruritis   Per chart review, outpatient dermatology suspects atopic dermatitis vs lichen planus vs lichenoid drug eruption. Itching  has been going on for the last 1-2 months. Had RUQ US last month to assess for any underlying reason - noted changes suggestive of liver cirrhosis but no discrete masses or changes in gallbladder. Per discussion with dermatology, skin changes are likely consequence of previous chemotherapy for AL amyloid.  - Improving on scheduled topical triamcinolone  - Has prn benadryl      # Paroxysmal Afib/Aflutter  [id number] Score of 4  - Continue PTA apixaban 2.5mg BID, reduced dose to hx of epistaxis and hematochezia per chart review     # Chronic Lymphedema  Previously managed with compression stockings but discontinued due to patient discomfort  - Management with diuresis as above  - OT consulted for lymphedema management, appreciate recs     # Hx of Prolonged QT Interval  - Admission EKG with , QTc 509  - Will monitor and plan to avoid QT prolonging medications     # CKD3  Creatinine baseline of 1.1-1.2, likely secondary to underlying cardiac function.  - Avoiding nephrotoxic medications  - Continue to monitor     # Hx of AL Amyloidosis, monoclonal IgA lambda s/p chemotherapy  Diagnosed in 2016 with follow-up EGD and bone marrow biopsies confirming spread to stomach and bone marrow - now s/p CyBorD chemotherapy and in remission without maintenance therapy. Refer to 5/20/21 Oncology progress note by Mirela Moore for full treatment history.  - 5/11/21 Kappa Lambda Ratio 0.70, within normal limits    Diet: Fluid restriction 2000 ML FLUID  2 Gram Sodium Diet    DVT Prophylaxis: DOAC  Marie Catheter: Not present  Fluids: Restriction   Central Lines: None  Code Status: Full Code      Disposition Plan   Expected discharge: 11/03/2021   recommended to prior living arrangement once pending appropriate diuresis to EDW.     The patient's care was discussed with the Attending Physician, Dr. Zak Dudley.    Bryant Godwin MD  Cards 2 Advanced HF Service  [facility]  Securely  message with the [employer] Web Console (learn more here)  Text page via [facility] Paging/Directory    ______________________________________________________________________    Interval History     No acute events overnight. Nursing notes reviewed. Tolerating diuresis and potassium regimen without acute complaints. Patient frustrated with ongoing hospital stay and anxiety regarding progression of disease. Questions answered at bedside and discussed limiting fluid and salt at length.     Review of systems negative for chest pain, shortness of breath, abdominal pain, nausea/vomiting, diarrhea or cramping. Ongoing itch from admission, improved with current regimen of triamcinolone.     Data reviewed today: I reviewed all medications, new labs and imaging results over the last 24 hours.    Physical Exam   Vital Signs: Temp: 97.9  F (36.6  C) Temp src: Oral BP: 121/69 Pulse: 58   Resp: 18 SpO2: 99 % O2 Device: None (Room air)    Weight: 163 lbs 2.25 oz  General Appearance: No apparent distress when seen this morning  Respiratory: clear to auscultation and breathing comfortably on room air   Cardiovascular: JVP at midneck, irregular rhythm normal rate, no murmur   GI: soft, bowel sounds not appreciated, non-tender to palpation today  Skin: erythematous and thickened diffusely with excoriations  MSK: Lower extremities wrapped, warm with palpable pulses, no cyanosis, 2+ bilateral LE edema     Data   Recent Labs   Lab 11/01/21  0422 10/31/21  2133 10/31/21  1617 10/31/21  0434 10/30/21  1114 10/30/21  0443 10/28/21  1324 10/28/21  0824   WBC 6.7  --   --   --   --   --   --  9.0   HGB 11.6*  --   --   --   --   --   --  12.9   *  --   --   --   --   --   --  96     --   --   --   --   --   --  289   INR 1.32*  --   --  1.43*  --  1.21*   < >  --      --  136 137   < > 136   < > 136   POTASSIUM 5.4*  --  4.5 3.9   < > 6.3*   < > 2.7*   CHLORIDE 104  --  102 103   < > 107   < > 97   CO2 26  --  32 30   < > 23   < >  33*   BUN 31*  --  30 28   < > 36*   < > 24   CR 1.14*  --  1.14* 1.09*   < > 1.24*   < > 1.10*   ANIONGAP 5  --  2* 4   < > 6   < > 6   JERROD 9.0  --  8.7 9.1   < > 9.4   < > 9.0   GLC 93  --  85 91   < > 103*   < > 98   ALBUMIN  --   --   --   --   --   --   --  3.6   PROTTOTAL  --   --   --   --   --   --   --  7.0   BILITOTAL  --   --   --   --   --   --   --  2.0*   ALKPHOS  --   --   --   --   --   --   --  200*   ALT  --   --   --   --   --   --   --  25   AST  --   --   --   --   --   --   --  23   TROPONIN  --  <0.015  --   --   --   --   --   --     < > = values in this interval not displayed.     Recent Results (from the past 24 hour(s))   Echo Complete   Result Value    LVEF  65-70%    Narrative    [id number]  [id number]  [id number]  [id number]^KUSUM^SHASHI^UMER     [facility]  Echocardiography Laboratory  [address] [place] [id number]     Name: JESSICA GARCIA  MRN: [id number]  : 1950  Study Date: 10/31/2021 10:19 AM  Age: 71 yrs  Gender: Female  Patient Location: [facility]  Reason For Study: CHF  Ordering Physician: SHASHI NOE  Performed By: Samreen Zamora RDCS     BSA: 1.8 m2  Height: 63 in  Weight: 160 lb  HR: 76  BP: 113/56 mmHg  ______________________________________________________________________________  Procedure  Complete Portable Echo Adult.  ______________________________________________________________________________  Interpretation Summary  Global and regional left ventricular function is hyperkinetic with an EF of  65-70%. The LV cavity is small. Grade III or advanced diastolic dysfunction.  Global right ventricular function is normal. The right ventricle is normal  size.  Moderate tricuspid insufficiency is present.  The estimated PA systolic pressure is at least 41 mmHg.  IVC diameter >2.1 cm collapsing <50% with sniff suggests a high RA pressure  estimated at 15 mmHg or greater.  This study was compared with the study from 2021.  The right atrial  pressure is higher.  ______________________________________________________________________________  Left Ventricle  Global and regional left ventricular function is hyperkinetic with an EF of  65-70%. The LV cavity is small. Mild to moderate concentric wall thickening  consistent with left ventricular hypertrophy is present. Grade III or advanced  diastolic dysfunction. Diastolic Doppler findings (E/E' ratio and/or other  parameters) suggest left ventricular filling pressures are increased.     Right Ventricle  Global right ventricular function is normal. The right ventricle is normal  size.     Atria  Moderate to severe left atrial enlargement is present. Moderate right atrial  enlargement is present.     Mitral Valve  Mild mitral annular calcification is present. Trace mitral insufficiency is  present.     Aortic Valve  The aortic valve is tricuspid. Mild aortic insufficiency is present.     Tricuspid Valve  Moderate tricuspid insufficiency is present. The right ventricular systolic  pressure is approximated at 26.2 mmHg plus the right atrial pressure.     Pulmonic Valve  The valve leaflets are not well visualized. Trace pulmonic insufficiency is  present.     Vessels  The aorta root cannot be assessed. Ascending aorta 2.9 cm. IVC diameter >2.1  cm collapsing <50% with sniff suggests a high RA pressure estimated at 15 mmHg  or greater.     Pericardium  No pericardial effusion is present.     Compared to Previous Study  This study was compared with the study from 02/03/2021 . The right atrial  pressure is higher.  ______________________________________________________________________________  MMode/2D Measurements & Calculations     IVSd: 1.3 cm  LVIDd: 3.4 cm  LVIDs: 2.4 cm  LVPWd: 1.6 cm  FS: 31.3 %  LV mass(C)d: 179.3 grams  LV mass(C)dI: 102.0 grams/m2  asc Aorta Diam: 2.9 cm  LVOT diam: 2.0 cm  LVOT area: 3.1 cm2  RWT: 0.94     Doppler Measurements & Calculations  MV E max ella: 83.4  cm/sec  MV A max ella: 40.7 cm/sec  MV E/A: 2.0  MV dec slope: 548.0 cm/sec2  PI end-d ella: 122.0 cm/sec  TR max ella: 256.0 cm/sec  TR max P.2 mmHg  E/E' av.7  Lateral E/e': 12.0  Medial E/e': 15.3     ______________________________________________________________________________  Report approved by: Jasper Farr 10/31/2021 04:17 PM         XR Chest Port 1 View    Narrative    Exam: XR CHEST PORT 1 VIEW, 10/31/2021 9:16 PM    Indication: sob, chest pain    Comparison: 2021    Findings:   Right PICC has been removed. Cardiac silhouette is not enlarged.  Increased size of the still small right pleural effusion with adjacent  hazy right basilar airspace opacities. No pneumothorax. Left lung is  clear. No acute osseous abnormalities.      Impression    Impression: Increased size in the chronic small right pleural effusion  with hazy adjacent basilar airspace opacities.    I have personally reviewed the examination and initial interpretation  and I agree with the findings.    JOHN GONZALEZ MD         SYSTEM ID:  [id number]      I have reviewed today's vital signs, notes, medications, labs and imaging.  I have also seen and examined the patient and agree with the findings and plan as outlined above.  Pt tearful due to wts and Is and Os.  VSS with good UO. Exam as above with Labs WNL.  Pt with AL amyloid and restrictive physiology with hypervolemia.  Will continue diuresis.     Zak Dudley MD, PhD  Professor, Heart Failure and Cardiac Transplantation  [facility]

## 2021-11-02 ENCOUNTER — APPOINTMENT (OUTPATIENT)
Dept: OCCUPATIONAL THERAPY | Facility: CLINIC | Age: 71
DRG: 292 | End: 2021-11-02
Attending: INTERNAL MEDICINE
Payer: COMMERCIAL

## 2021-11-02 ENCOUNTER — APPOINTMENT (OUTPATIENT)
Dept: PHYSICAL THERAPY | Facility: CLINIC | Age: 71
DRG: 292 | End: 2021-11-02
Attending: INTERNAL MEDICINE
Payer: COMMERCIAL

## 2021-11-02 LAB
ANION GAP SERPL CALCULATED.3IONS-SCNC: 5 MMOL/L (ref 3–14)
ANION GAP SERPL CALCULATED.3IONS-SCNC: 6 MMOL/L (ref 3–14)
BUN SERPL-MCNC: 35 MG/DL (ref 7–30)
BUN SERPL-MCNC: 35 MG/DL (ref 7–30)
CALCIUM SERPL-MCNC: 8.5 MG/DL (ref 8.5–10.1)
CALCIUM SERPL-MCNC: 9.3 MG/DL (ref 8.5–10.1)
CHLORIDE BLD-SCNC: 101 MMOL/L (ref 94–109)
CHLORIDE BLD-SCNC: 103 MMOL/L (ref 94–109)
CO2 SERPL-SCNC: 25 MMOL/L (ref 20–32)
CO2 SERPL-SCNC: 28 MMOL/L (ref 20–32)
CREAT SERPL-MCNC: 1.16 MG/DL (ref 0.52–1.04)
CREAT SERPL-MCNC: 1.26 MG/DL (ref 0.52–1.04)
GFR SERPL CREATININE-BSD FRML MDRD: 44 ML/MIN/1.73M2
GFR SERPL CREATININE-BSD FRML MDRD: 48 ML/MIN/1.73M2
GLUCOSE BLD-MCNC: 96 MG/DL (ref 70–99)
GLUCOSE BLD-MCNC: 97 MG/DL (ref 70–99)
HOLD SPECIMEN: NORMAL
INR PPP: 1.26 (ref 0.85–1.15)
MAGNESIUM SERPL-MCNC: 2.7 MG/DL (ref 1.6–2.3)
POTASSIUM BLD-SCNC: 3.5 MMOL/L (ref 3.4–5.3)
POTASSIUM BLD-SCNC: 3.5 MMOL/L (ref 3.4–5.3)
POTASSIUM BLD-SCNC: 3.7 MMOL/L (ref 3.4–5.3)
POTASSIUM BLD-SCNC: 4.7 MMOL/L (ref 3.4–5.3)
POTASSIUM BLD-SCNC: 5.5 MMOL/L (ref 3.4–5.3)
SODIUM SERPL-SCNC: 133 MMOL/L (ref 133–144)
SODIUM SERPL-SCNC: 135 MMOL/L (ref 133–144)
TROPONIN I SERPL-MCNC: <0.015 UG/L (ref 0–0.04)

## 2021-11-02 PROCEDURE — 250N000013 HC RX MED GY IP 250 OP 250 PS 637: Performed by: STUDENT IN AN ORGANIZED HEALTH CARE EDUCATION/TRAINING PROGRAM

## 2021-11-02 PROCEDURE — 250N000013 HC RX MED GY IP 250 OP 250 PS 637

## 2021-11-02 PROCEDURE — 36415 COLL VENOUS BLD VENIPUNCTURE: CPT

## 2021-11-02 PROCEDURE — 83735 ASSAY OF MAGNESIUM: CPT | Performed by: STUDENT IN AN ORGANIZED HEALTH CARE EDUCATION/TRAINING PROGRAM

## 2021-11-02 PROCEDURE — 84132 ASSAY OF SERUM POTASSIUM: CPT | Performed by: STUDENT IN AN ORGANIZED HEALTH CARE EDUCATION/TRAINING PROGRAM

## 2021-11-02 PROCEDURE — 99232 SBSQ HOSP IP/OBS MODERATE 35: CPT | Mod: GC | Performed by: INTERNAL MEDICINE

## 2021-11-02 PROCEDURE — 85610 PROTHROMBIN TIME: CPT | Performed by: STUDENT IN AN ORGANIZED HEALTH CARE EDUCATION/TRAINING PROGRAM

## 2021-11-02 PROCEDURE — 84132 ASSAY OF SERUM POTASSIUM: CPT

## 2021-11-02 PROCEDURE — 80048 BASIC METABOLIC PNL TOTAL CA: CPT | Performed by: STUDENT IN AN ORGANIZED HEALTH CARE EDUCATION/TRAINING PROGRAM

## 2021-11-02 PROCEDURE — 36415 COLL VENOUS BLD VENIPUNCTURE: CPT | Performed by: STUDENT IN AN ORGANIZED HEALTH CARE EDUCATION/TRAINING PROGRAM

## 2021-11-02 PROCEDURE — 120N000005 HC R&B MS OVERFLOW UMMC

## 2021-11-02 PROCEDURE — 97162 PT EVAL MOD COMPLEX 30 MIN: CPT | Mod: GP

## 2021-11-02 PROCEDURE — 93005 ELECTROCARDIOGRAM TRACING: CPT

## 2021-11-02 PROCEDURE — 97530 THERAPEUTIC ACTIVITIES: CPT | Mod: GP

## 2021-11-02 PROCEDURE — 97535 SELF CARE MNGMENT TRAINING: CPT | Mod: GO

## 2021-11-02 PROCEDURE — 84484 ASSAY OF TROPONIN QUANT: CPT | Performed by: STUDENT IN AN ORGANIZED HEALTH CARE EDUCATION/TRAINING PROGRAM

## 2021-11-02 PROCEDURE — 250N000009 HC RX 250: Performed by: STUDENT IN AN ORGANIZED HEALTH CARE EDUCATION/TRAINING PROGRAM

## 2021-11-02 PROCEDURE — 93010 ELECTROCARDIOGRAM REPORT: CPT | Performed by: INTERNAL MEDICINE

## 2021-11-02 RX ORDER — METOLAZONE 10 MG/1
10 TABLET ORAL ONCE
Status: COMPLETED | OUTPATIENT
Start: 2021-11-02 | End: 2021-11-02

## 2021-11-02 RX ORDER — POTASSIUM CHLORIDE 1500 MG/1
80 TABLET, EXTENDED RELEASE ORAL
Status: DISCONTINUED | OUTPATIENT
Start: 2021-11-02 | End: 2021-11-05

## 2021-11-02 RX ADMIN — TRIAMCINOLONE ACETONIDE: 1 CREAM TOPICAL at 20:07

## 2021-11-02 RX ADMIN — APIXABAN 2.5 MG: 2.5 TABLET, FILM COATED ORAL at 07:45

## 2021-11-02 RX ADMIN — APIXABAN 2.5 MG: 2.5 TABLET, FILM COATED ORAL at 20:06

## 2021-11-02 RX ADMIN — ACETAMINOPHEN 325 MG: 325 TABLET, FILM COATED ORAL at 22:09

## 2021-11-02 RX ADMIN — METOLAZONE 10 MG: 10 TABLET ORAL at 11:23

## 2021-11-02 RX ADMIN — ACETAMINOPHEN 325 MG: 325 TABLET, FILM COATED ORAL at 15:03

## 2021-11-02 RX ADMIN — TRIAMCINOLONE ACETONIDE: 1 CREAM TOPICAL at 07:46

## 2021-11-02 RX ADMIN — Medication 25 MG: at 03:57

## 2021-11-02 RX ADMIN — POTASSIUM CHLORIDE 80 MEQ: 1500 TABLET, EXTENDED RELEASE ORAL at 17:10

## 2021-11-02 RX ADMIN — TRIAMCINOLONE ACETONIDE: 1 CREAM TOPICAL at 14:01

## 2021-11-02 RX ADMIN — FUROSEMIDE 20 MG/HR: 10 INJECTION, SOLUTION INTRAVENOUS at 14:00

## 2021-11-02 RX ADMIN — POTASSIUM CHLORIDE 40 MEQ: 750 TABLET, EXTENDED RELEASE ORAL at 07:45

## 2021-11-02 RX ADMIN — POTASSIUM CHLORIDE 80 MEQ: 1500 TABLET, EXTENDED RELEASE ORAL at 20:06

## 2021-11-02 ASSESSMENT — ACTIVITIES OF DAILY LIVING (ADL)
ADLS_ACUITY_SCORE: 9
ADLS_ACUITY_SCORE: 9
ADLS_ACUITY_SCORE: 8
ADLS_ACUITY_SCORE: 9
ADLS_ACUITY_SCORE: 8
ADLS_ACUITY_SCORE: 9
ADLS_ACUITY_SCORE: 8
ADLS_ACUITY_SCORE: 9
ADLS_ACUITY_SCORE: 8
ADLS_ACUITY_SCORE: 9
ADLS_ACUITY_SCORE: 8
ADLS_ACUITY_SCORE: 8
ADLS_ACUITY_SCORE: 9
ADLS_ACUITY_SCORE: 8
ADLS_ACUITY_SCORE: 9
ADLS_ACUITY_SCORE: 9
ADLS_ACUITY_SCORE: 8
ADLS_ACUITY_SCORE: 9
ADLS_ACUITY_SCORE: 9

## 2021-11-02 ASSESSMENT — MIFFLIN-ST. JEOR: SCORE: 1244.13

## 2021-11-02 NOTE — PROGRESS NOTES
11/02/21 1000   Quick Adds   Type of Visit Initial PT Evaluation       Present no   Living Environment   People in home spouse   Current Living Arrangements house   Home Accessibility stairs to enter home;stairs within home   Number of Stairs, Main Entrance 3   Stair Railings, Main Entrance none   Number of Stairs, Within Home, Primary greater than 10 stairs   Stair Railings, Within Home, Primary railings on both sides of stairs   Transportation Anticipated family or friend will provide;car, drives self   Living Environment Comments PT: Pt lives in 4 story home with .  is able to assist but does work during the day. Pt has 3 MARIOLA no HR, has been staying on main level and sleeping in the dining room. Reports 6 stairs to basement where she showers standing up 1-2 x/week and 10 stairs to reach second floor bedroom level. Stairs to basement w/o hand rails and stairs to 2nd level w/ BHR, but pt reports feeling that basement stairs easier as smaller height steps.    Self-Care   Usual Activity Tolerance moderate   Current Activity Tolerance fair   Regular Exercise No   Equipment Currently Used at Home cane, straight   Activity/Exercise/Self-Care Comment PT: Pt IND at baseline w/ mobility and ADLs. Pt reports ~3-4 weeks progressive decreased activity tolerance for basic household tasks. Reports unable to clean or cook more than basic meals; which is distressing to her as cooking is a hobby of hers. Unable to perform stairs to bedroom level and taxing to go to basement for shower. Uses a SEC for community distances but reports not being able to ambulate >25' recently.    Disability/Function   Hearing Difficulty or Deaf no   Wear Glasses or Blind yes   Vision Management glasses   Concentrating, Remembering or Making Decisions Difficulty no   Difficulty Communicating no   Difficulty Eating/Swallowing no   Walking or Climbing Stairs Difficulty yes   Walking or Climbing Stairs other (see  comments);ambulation difficulty, requires equipment  (SEC community, limited tolerance)   Dressing/Bathing Difficulty no   Dressing/Bathing Management has been performing bathing infrequently 2/2 decreased activity tolerance. Mostly sponge baths   Toileting issues no   Errands Management  or orders groceries   Fall history within last six months no   Change in Functional Status Since Onset of Current Illness/Injury yes   General Information   Onset of Illness/Injury or Date of Surgery 10/28/21   Referring Physician Bryant Godwin MD   Patient/Family Therapy Goals Statement (PT) to have energy for household tasks and get back to her hobbies, improved QOL   Pertinent History of Current Problem (include personal factors and/or comorbidities that impact the POC) Leandra Guerrero is a 71 y.o. female with history of chronic diastolic heart failure/restrictive cardiomyopathy 2/2 AL amyloidosis (diagnosed in 2016, with gastric and bone marrow involvement only, s/p CyBorD chemotherapy - in remission) admitted from outside hospital on 10/28/21 with 1 week of shortness of breath and 13 pound weight gain   Existing Precautions/Restrictions fall;cardiac   Heart Disease Risk Factors Age;Medical history;Lack of physical activity   General Observations PT: pt generally distressed and tearful over medical status and reduced functoinal status   Cognition   Orientation Status (Cognition) oriented x 4   Affect/Mental Status (Cognition) WNL   Follows Commands (Cognition) WNL   Pain Assessment   Patient Currently in Pain No   Integumentary/Edema   Integumentary/Edema Comments significant 3+ pitting BLE edema, managed with compression stockings, followed by OT edema   Posture    Posture Not impaired   Range of Motion (ROM)   ROM Comment grossly WFL w/ mild tissue approximation deficits in BLEs   Strength   Strength Comments 4/5 hip flexion, 4+/5 knee extension, 5/5 rest of BLEs   Bed Mobility   Comment (Bed Mobility) SBA    Transfers   Transfer Safety Comments SBA   Gait/Stairs (Locomotion)   Comment (Gait/Stairs) SBA w/ IV pole   Balance   Balance Comments fair dynamic balance, benefits from UE support on IV pole   Sensory Examination   Sensory Perception patient reports no sensory changes   Coordination   Coordination no deficits were identified   Muscle Tone   Muscle Tone no deficits were identified   Clinical Impression   Criteria for Skilled Therapeutic Intervention yes, treatment indicated   PT Diagnosis (PT) impaired functional mobility tolerance   Influenced by the following impairments medical status, deconditioning   Functional limitations due to impairments decreased (I) w/ gait, home access, tolerance for basic ADLs and access to leisure activities   Clinical Presentation Stable/Uncomplicated   Clinical Presentation Rationale current status, medical status, clinical judgement, home set up and support   Clinical Decision Making (Complexity) low complexity   Therapy Frequency (PT) 5x/week   Predicted Duration of Therapy Intervention (days/wks) 3-4 days   Planned Therapy Interventions (PT) balance training;bed mobility training;gait training;patient/family education;stair training;strengthening;transfer training;progressive activity/exercise;risk factor education;home program guidelines   Anticipated Equipment Needs at Discharge (PT) tub bench;shower chair   Risk & Benefits of therapy have been explained evaluation/treatment results reviewed;care plan/treatment goals reviewed;risks/benefits reviewed;current/potential barriers reviewed;participants voiced agreement with care plan;patient;participants included   Clinical Impression Comments PT: Pt presents with decreased activity tolerance and deconditioning 2/2 medical status and hospitalization. Pt will benefit from skilled PT to progress functional tolerance and IND towards successful discharge.    PT Discharge Planning    PT Discharge Recommendation (DC Rec) home with home  care physical therapy   PT Rationale for DC Rec Anticipate with medical improvement to progress to home discharge w/ HH PT. Pt may require TCU pending progress in setting of limited activity tolerance and functional ADL tolerance. Will continue to update as appropriate.    PT Brief overview of current status  SBA w/ IV pole   Total Evaluation Time   Total Evaluation Time (Minutes) 8

## 2021-11-02 NOTE — PLAN OF CARE
Neuro: Patient alert and oriented x4. Anxious/tearful at times.   Cardiac: SB/NSR 50's-70's. BP's stable Afebrile.        Respiratory: Sating >95% on RA.  GI/: Adequate urine output, on lasix gtt. 1 dose of metolazone added. No BM this shift.   Diet/appetite: Tolerating 2gm Na diet, 2L FR. Eating well.  Activity:  Independent in room, up to commode/BR.  Pain: Headache managed with tylenol.   Skin: No new deficits noted. Lymphedema wraps removed.  LDA's: R PIV with lasix gtt at 20mg/hr.   Plan: Diurese. Continue with POC. Notify primary team with changes.

## 2021-11-02 NOTE — PLAN OF CARE
Neuro: A&Ox4. Anxious, tearful at times.   Cardiac: SB/SR, HR 50-70s. VSS.   Respiratory: Sating > 94% on RA.  GI/: Adequate urine output, but less than previous shifts on lasix gtt. No stool this shift.   Diet/appetite: Tolerating 2gm Na diet, 2L FR. Eating well.  Activity:  Standby assist/independent, up to commode.   Pain: At acceptable level on current regimen.   Skin: No new deficits noted. Lymphedema wraps in place the majority of the shift, pt refusing for a few hours. Generalized itching, PRN benadryl given.   LDA's: PIV x1 infusing lasix gtt @ 20 mg/hr.    Plan: Continue with POC. Notify primary team with changes.

## 2021-11-02 NOTE — PROGRESS NOTES
[facility]    Cardiology Progress Note- Cards 2        Date of Admission:  10/28/2021     Assessment & Plan: HVSL   Leandra Guerrero is a 71 y.o. female with history of chronic diastolic heart failure/restrictive cardiomyopathy 2/2 AL amyloidosis (diagnosed in 2016, with gastric and bone marrow involvement only, s/p CyBorD chemotherapy - in remission) admitted from outside hospital on 10/28/21 with 1 week of shortness of breath and 13 pound weight gain. Admission weight 159lbs. Dry weight thought to be ~150lb.     Today:  - Continue lasix gtt at 20/hr  - metolazone x 1  - consult health psychology  - RUQ ultrasound     # Acute on chronic diastolic heart failure/restrictive cardiomyopathy  # Cardiac Amyloidosis  NYHA III, Stage C  Patient admitted as direct admission from outside hospital for ongoing hypervolemia and shortness of breath despite outpatient PO diuretics following recent travel and medication change. Admission weight of 159 with estimated dry weight of 147-150 pounds. Of note, cardiac amyloidosis diagnosed per 2016 Echo with bi-atrial enlargement and severe left ventricular enlargement with low voltage on EKG. Followed by [place] outpatient.   2/4/21 Cardiac Echo with EF 65-70%, moderate aortic and tricuspid insufficiency, mod LV wall thickening, Grade III diastolic dysfunction  - Lasix gtt @ 20/hr + metolazone x1; goal net negative 2L   - Strict I/Os with daily standing weights  - abdominal ultrasound to evaluate for ascites     # Hypokalemia  # Hyperkalemia  Patient given IV Lasix while in OSH ED without potassium replacement. Admission potassium of 2.7, improved to 3.7 after 80 mEq x2 KCl. Found to be hyperkalemic on 10/30 check to 6.3, currently within normal limits. Extensive history of fluctuating potassium levels with inpatient diuresis.   - hold standing potassium given hyperkalemia; recheck this afternoon and replace as needed  - BID BMP  while diuresing      # Chronic Dermatitis with pruritis   Per chart review, outpatient dermatology suspects atopic dermatitis vs lichen planus vs lichenoid drug eruption. Itching has been going on for the last 1-2 months. Had RUQ US last month to assess for any underlying reason - noted changes suggestive of liver cirrhosis but no discrete masses or changes in gallbladder. Per discussion with dermatology, skin changes are likely consequence of previous chemotherapy for AL amyloid.  - Improving on scheduled topical triamcinolone  - Has prn benadryl      # Paroxysmal Afib/Aflutter  [id number] Score of 4  - Continue PTA apixaban 2.5mg BID, reduced dose to hx of epistaxis and hematochezia per chart review     # Chronic Lymphedema  Previously managed with compression stockings but discontinued due to patient discomfort  - Management with diuresis as above  - OT consulted for lymphedema management, appreciate recs     # Hx of Prolonged QT Interval  - Admission EKG with , QTc 509  - Will monitor and plan to avoid QT prolonging medications     # CKD3  Creatinine baseline of 1.1-1.2, likely secondary to underlying cardiac function.  - Avoiding nephrotoxic medications  - Continue to monitor     # Hx of AL Amyloidosis, monoclonal IgA lambda s/p chemotherapy  Diagnosed in 2016 with follow-up EGD and bone marrow biopsies confirming spread to stomach and bone marrow - now s/p CyBorD chemotherapy and in remission without maintenance therapy. Refer to 5/20/21 Oncology progress note by Mirela Moore for full treatment history.  - 5/11/21 Kappa Lambda Ratio 0.70, within normal limits    # Possible Cirrhosis   Ultrasound from OSH 9/30 with coarse echotexture consistent with cirrhosis. Her previously scheduled telemedicine visit with hepatology was reportedly canceled due to her inpatient status. ALP and TBil have been mildly elevated  - RUQ ultrasound  - consult hepatology pending ultrasound results    Diet: Fluid  restriction 2000 ML FLUID  2 Gram Sodium Diet    DVT Prophylaxis: DOAC  Marie Catheter: Not present  Fluids: Restriction   Central Lines: None  Code Status: Full Code        Disposition Plan   Expected discharge: 2 - 3 days, recommended to prior living arrangement once fluid volume status optimized on oral medication.      Entered: Jesse Jhaveri MD 11/02/2021, 8:53 AM       The patient's care was discussed with the Attending Physician, Dr. Dudley, Patient and Patient's Family.    Jesse Jhaveri MD   Cardiology Fellow  [facility] [id number]  [facility]  ______________________________________________________________________    Interval History   Remains distraught regarding her overall health condition. Fixated on daily weight as a marker of her overall status. Standing weight is up, though Is/Os show a net negative fluid balance. Discussed strict I's and O's with patient. Will need to document ice chips as oral intake as patient is eating a lot of ice chips. Encouraged to ambulate and elevate extremities when lying in bed.     Data reviewed today: I reviewed all medications, new labs and imaging results over the last 24 hours.     Physical Exam   Vital Signs: Temp: 97.3  F (36.3  C) Temp src: Axillary BP: 131/59 Pulse: 71   Resp: 16 SpO2: 100 % O2 Device: None (Room air)    Weight: 165 lbs 5.52 oz  General Appearance: Lying comfortably in hospital bed  Respiratory: clear to auscultation and breathing comfortably on room air   Cardiovascular: JVP at midneck, irregular rhythm normal rate, no murmur   GI: soft, + BS, mildly distended, non-tender to palpation today  Skin: erythematous and thickened diffusely with excoriations  MSK: Lower extremities wrapped, warm with palpable pulses, no cyanosis, 2+ bilateral LE edema     Data   Recent Labs   Lab 11/02/21  1128 11/02/21  0431 11/01/21  1611 11/01/21  1106 11/01/21  0422 10/31/21  2133 10/31/21  1617 10/31/21  0434 10/28/21  1324  10/28/21  0824   WBC  --   --   --   --  6.7  --   --   --   --  9.0   HGB  --   --   --   --  11.6*  --   --   --   --  12.9   MCV  --   --   --   --  101*  --   --   --   --  96   PLT  --   --   --   --  220  --   --   --   --  289   INR  --  1.26*  --   --  1.32*  --   --  1.43*   < >  --    NA  --  133 135  --  135  --    < > 137   < > 136   POTASSIUM 4.7 5.5* 4.3   < > 5.4*  --    < > 3.9   < > 2.7*   CHLORIDE  --  103 103  --  104  --    < > 103   < > 97   CO2  --  25 26  --  26  --    < > 30   < > 33*   BUN  --  35* 31*  --  31*  --    < > 28   < > 24   CR  --  1.26* 1.14*  --  1.14*  --    < > 1.09*   < > 1.10*   ANIONGAP  --  5 6  --  5  --    < > 4   < > 6   JERROD  --  9.3 9.0  --  9.0  --    < > 9.1   < > 9.0   GLC  --  97 84  --  93  --    < > 91   < > 98   ALBUMIN  --   --   --   --   --   --   --   --   --  3.6   PROTTOTAL  --   --   --   --   --   --   --   --   --  7.0   BILITOTAL  --   --   --   --   --   --   --   --   --  2.0*   ALKPHOS  --   --   --   --   --   --   --   --   --  200*   ALT  --   --   --   --   --   --   --   --   --  25   AST  --   --   --   --   --   --   --   --   --  23   TROPONIN  --   --   --   --   --  <0.015  --   --   --   --     < > = values in this interval not displayed.     No results found for this or any previous visit (from the past 24 hour(s)).  Medications     - MEDICATION INSTRUCTIONS -       furosemide 20 mg/hr (11/01/21 2000)     - MEDICATION INSTRUCTIONS -       BETA BLOCKER NOT PRESCRIBED         apixaban ANTICOAGULANT  2.5 mg Oral BID     calcium carbonate  500 mg Oral Once     polyethylene glycol  17 g Oral Daily     [Held by provider] potassium chloride  40 mEq Oral Daily     [Held by provider] potassium chloride  80 mEq Oral 2 times per day     sodium chloride (PF)  10 mL Intracatheter Q8H     triamcinolone   Topical TID       I have reviewed today's vital signs, notes, medications, labs and imaging.  I have also seen and examined the patient and agree  with the findings and plan as outlined above.  Pt with spouse at bedside.  No complaints.  VSS with modest UO.  Labs WNL.  Plan to add thiazide to enhance diuresis.  Will check IVC and volume status.      Zak Dudley MD, PhD  Professor, Heart Failure and Cardiac Transplantation  [facility]

## 2021-11-02 NOTE — PROVIDER NOTIFICATION
"Time of notification: 1530   Provider notified: Cards 2 MD   Patient status: Pt complaining of \"different type of stabbing chest pain. Improved after 30 minutes.   Temp:  [97.3  F (36.3  C)-98.4  F (36.9  C)] 97.5  F (36.4  C)  Pulse:  [56-79] 79  Resp:  [16-20] 16  BP: (131-151)/(59-75) 151/73  Cuff Mean (mmHg):  [79] 79  SpO2:  [95 %-100 %] 100 %  Orders received: Checking troponin level and EKG.   "

## 2021-11-02 NOTE — PROGRESS NOTES
BRIEF PROGRESS NOTE    Saw patient at bedside ~1500 for complaint of chest pain. Described as left sided with spread to right side, described as a heaviness. Endorses mild SOB. No NV, diaphoresis, extremity pain. Some reproducibility on palpation of right chest. No pleuritic. EKG with no ST changes, troponin wnl. Provided reassurance that this is not ACS.     Hallie Lopez DO  [facility]  PGY2, Internal Medicine  Cards 2 Resident  See [place] for pager

## 2021-11-03 ENCOUNTER — APPOINTMENT (OUTPATIENT)
Dept: OCCUPATIONAL THERAPY | Facility: CLINIC | Age: 71
DRG: 292 | End: 2021-11-03
Attending: INTERNAL MEDICINE
Payer: COMMERCIAL

## 2021-11-03 ENCOUNTER — APPOINTMENT (OUTPATIENT)
Dept: ULTRASOUND IMAGING | Facility: CLINIC | Age: 71
DRG: 292 | End: 2021-11-03
Attending: STUDENT IN AN ORGANIZED HEALTH CARE EDUCATION/TRAINING PROGRAM
Payer: COMMERCIAL

## 2021-11-03 ENCOUNTER — APPOINTMENT (OUTPATIENT)
Dept: PHYSICAL THERAPY | Facility: CLINIC | Age: 71
DRG: 292 | End: 2021-11-03
Attending: INTERNAL MEDICINE
Payer: COMMERCIAL

## 2021-11-03 LAB
ANION GAP SERPL CALCULATED.3IONS-SCNC: 7 MMOL/L (ref 3–14)
ANION GAP SERPL CALCULATED.3IONS-SCNC: 8 MMOL/L (ref 3–14)
ATRIAL RATE - MUSE: 75 BPM
BUN SERPL-MCNC: 35 MG/DL (ref 7–30)
BUN SERPL-MCNC: 38 MG/DL (ref 7–30)
CALCIUM SERPL-MCNC: 8.7 MG/DL (ref 8.5–10.1)
CALCIUM SERPL-MCNC: 9.2 MG/DL (ref 8.5–10.1)
CHLORIDE BLD-SCNC: 97 MMOL/L (ref 94–109)
CHLORIDE BLD-SCNC: 98 MMOL/L (ref 94–109)
CO2 SERPL-SCNC: 30 MMOL/L (ref 20–32)
CO2 SERPL-SCNC: 32 MMOL/L (ref 20–32)
CREAT SERPL-MCNC: 1.17 MG/DL (ref 0.52–1.04)
CREAT SERPL-MCNC: 1.27 MG/DL (ref 0.52–1.04)
DIASTOLIC BLOOD PRESSURE - MUSE: NORMAL MMHG
GFR SERPL CREATININE-BSD FRML MDRD: 43 ML/MIN/1.73M2
GFR SERPL CREATININE-BSD FRML MDRD: 48 ML/MIN/1.73M2
GLUCOSE BLD-MCNC: 101 MG/DL (ref 70–99)
GLUCOSE BLD-MCNC: 84 MG/DL (ref 70–99)
HOLD SPECIMEN: NORMAL
INR PPP: 1.17 (ref 0.85–1.15)
INTERPRETATION ECG - MUSE: NORMAL
MAGNESIUM SERPL-MCNC: 2.7 MG/DL (ref 1.6–2.3)
P AXIS - MUSE: 24 DEGREES
POTASSIUM BLD-SCNC: 2.8 MMOL/L (ref 3.4–5.3)
POTASSIUM BLD-SCNC: 3 MMOL/L (ref 3.4–5.3)
POTASSIUM BLD-SCNC: 3.1 MMOL/L (ref 3.4–5.3)
POTASSIUM BLD-SCNC: 3.5 MMOL/L (ref 3.4–5.3)
POTASSIUM BLD-SCNC: 3.6 MMOL/L (ref 3.4–5.3)
PR INTERVAL - MUSE: 200 MS
QRS DURATION - MUSE: 76 MS
QT - MUSE: 436 MS
QTC - MUSE: 486 MS
R AXIS - MUSE: -7 DEGREES
SODIUM SERPL-SCNC: 136 MMOL/L (ref 133–144)
SODIUM SERPL-SCNC: 136 MMOL/L (ref 133–144)
SYSTOLIC BLOOD PRESSURE - MUSE: NORMAL MMHG
T AXIS - MUSE: 1 DEGREES
VENTRICULAR RATE- MUSE: 75 BPM

## 2021-11-03 PROCEDURE — 250N000009 HC RX 250: Performed by: STUDENT IN AN ORGANIZED HEALTH CARE EDUCATION/TRAINING PROGRAM

## 2021-11-03 PROCEDURE — 85610 PROTHROMBIN TIME: CPT | Performed by: STUDENT IN AN ORGANIZED HEALTH CARE EDUCATION/TRAINING PROGRAM

## 2021-11-03 PROCEDURE — 84132 ASSAY OF SERUM POTASSIUM: CPT | Performed by: STUDENT IN AN ORGANIZED HEALTH CARE EDUCATION/TRAINING PROGRAM

## 2021-11-03 PROCEDURE — 120N000005 HC R&B MS OVERFLOW UMMC

## 2021-11-03 PROCEDURE — 97535 SELF CARE MNGMENT TRAINING: CPT | Mod: GO | Performed by: OCCUPATIONAL THERAPIST

## 2021-11-03 PROCEDURE — 250N000013 HC RX MED GY IP 250 OP 250 PS 637

## 2021-11-03 PROCEDURE — 76705 ECHO EXAM OF ABDOMEN: CPT

## 2021-11-03 PROCEDURE — 97530 THERAPEUTIC ACTIVITIES: CPT | Mod: GP

## 2021-11-03 PROCEDURE — 36415 COLL VENOUS BLD VENIPUNCTURE: CPT

## 2021-11-03 PROCEDURE — 36415 COLL VENOUS BLD VENIPUNCTURE: CPT | Performed by: STUDENT IN AN ORGANIZED HEALTH CARE EDUCATION/TRAINING PROGRAM

## 2021-11-03 PROCEDURE — 250N000013 HC RX MED GY IP 250 OP 250 PS 637: Performed by: STUDENT IN AN ORGANIZED HEALTH CARE EDUCATION/TRAINING PROGRAM

## 2021-11-03 PROCEDURE — 83735 ASSAY OF MAGNESIUM: CPT | Performed by: STUDENT IN AN ORGANIZED HEALTH CARE EDUCATION/TRAINING PROGRAM

## 2021-11-03 PROCEDURE — 99232 SBSQ HOSP IP/OBS MODERATE 35: CPT | Mod: GC | Performed by: INTERNAL MEDICINE

## 2021-11-03 PROCEDURE — 76705 ECHO EXAM OF ABDOMEN: CPT | Mod: 26 | Performed by: RADIOLOGY

## 2021-11-03 PROCEDURE — 97110 THERAPEUTIC EXERCISES: CPT | Mod: GP

## 2021-11-03 PROCEDURE — 80048 BASIC METABOLIC PNL TOTAL CA: CPT | Performed by: STUDENT IN AN ORGANIZED HEALTH CARE EDUCATION/TRAINING PROGRAM

## 2021-11-03 PROCEDURE — 97116 GAIT TRAINING THERAPY: CPT | Mod: GP

## 2021-11-03 PROCEDURE — 80048 BASIC METABOLIC PNL TOTAL CA: CPT

## 2021-11-03 RX ORDER — POTASSIUM CHLORIDE 1500 MG/1
60 TABLET, EXTENDED RELEASE ORAL ONCE
Status: COMPLETED | OUTPATIENT
Start: 2021-11-03 | End: 2021-11-03

## 2021-11-03 RX ORDER — POTASSIUM CHLORIDE 750 MG/1
40 TABLET, EXTENDED RELEASE ORAL
Status: DISCONTINUED | OUTPATIENT
Start: 2021-11-04 | End: 2021-11-04

## 2021-11-03 RX ORDER — POTASSIUM CHLORIDE 750 MG/1
20 TABLET, EXTENDED RELEASE ORAL ONCE
Status: DISCONTINUED | OUTPATIENT
Start: 2021-11-03 | End: 2021-11-03 | Stop reason: ALTCHOICE

## 2021-11-03 RX ORDER — POTASSIUM CHLORIDE 750 MG/1
40 TABLET, EXTENDED RELEASE ORAL ONCE
Status: COMPLETED | OUTPATIENT
Start: 2021-11-03 | End: 2021-11-03

## 2021-11-03 RX ORDER — POTASSIUM CHLORIDE 750 MG/1
40 TABLET, EXTENDED RELEASE ORAL ONCE
Status: DISCONTINUED | OUTPATIENT
Start: 2021-11-03 | End: 2021-11-03 | Stop reason: ALTCHOICE

## 2021-11-03 RX ADMIN — TRIAMCINOLONE ACETONIDE: 1 CREAM TOPICAL at 13:07

## 2021-11-03 RX ADMIN — DOCUSATE SODIUM 50 MG AND SENNOSIDES 8.6 MG 1 TABLET: 8.6; 5 TABLET, FILM COATED ORAL at 19:20

## 2021-11-03 RX ADMIN — TRIAMCINOLONE ACETONIDE: 1 CREAM TOPICAL at 07:29

## 2021-11-03 RX ADMIN — APIXABAN 2.5 MG: 2.5 TABLET, FILM COATED ORAL at 07:28

## 2021-11-03 RX ADMIN — ACETAMINOPHEN 325 MG: 325 TABLET, FILM COATED ORAL at 09:16

## 2021-11-03 RX ADMIN — POTASSIUM CHLORIDE 80 MEQ: 1500 TABLET, EXTENDED RELEASE ORAL at 19:13

## 2021-11-03 RX ADMIN — TRIAMCINOLONE ACETONIDE: 1 CREAM TOPICAL at 19:12

## 2021-11-03 RX ADMIN — POTASSIUM CHLORIDE 80 MEQ: 1500 TABLET, EXTENDED RELEASE ORAL at 10:12

## 2021-11-03 RX ADMIN — FUROSEMIDE 20 MG/HR: 10 INJECTION, SOLUTION INTRAVENOUS at 13:06

## 2021-11-03 RX ADMIN — ACETAMINOPHEN 325 MG: 325 TABLET, FILM COATED ORAL at 13:49

## 2021-11-03 RX ADMIN — POTASSIUM CHLORIDE 40 MEQ: 750 TABLET, EXTENDED RELEASE ORAL at 07:28

## 2021-11-03 RX ADMIN — POTASSIUM CHLORIDE 60 MEQ: 1500 TABLET, EXTENDED RELEASE ORAL at 14:57

## 2021-11-03 ASSESSMENT — ACTIVITIES OF DAILY LIVING (ADL)
ADLS_ACUITY_SCORE: 8

## 2021-11-03 ASSESSMENT — MIFFLIN-ST. JEOR: SCORE: 1210.13

## 2021-11-03 NOTE — PROGRESS NOTES
[facility]    Cardiology Progress Note- Cards 2        Date of Admission:  10/28/2021     Assessment & Plan: HVSL   Leandra Guerrero is a 71 y.o. female with history of chronic diastolic heart failure/restrictive cardiomyopathy 2/2 AL amyloidosis (diagnosed in 2016, with gastric and bone marrow involvement only, s/p CyBorD chemotherapy - in remission) admitted from outside hospital on 10/28/21 with 1 week of shortness of breath and 13 pound weight gain. Admission weight 159lbs. Dry weight thought to be ~150lb.     Today:  - Continue lasix gtt at 20/hr  - Hypokalemic to 2.8 today, additional KCl added to regimen    - Next Potassium check at 7pm  - RUQ ultrasound, notable for large right pleural effusion  - CAPS consult tomorrow for thoracentesis  - IVC ultrasound today pending     # Acute on chronic diastolic heart failure/restrictive cardiomyopathy  # Cardiac Amyloidosis  NYHA III, Stage C  Patient admitted as direct admission from outside hospital for ongoing hypervolemia and shortness of breath despite outpatient PO diuretics following recent travel and medication change. Admission weight of 159 with estimated dry weight of 147-150 pounds. Of note, cardiac amyloidosis diagnosed per 2016 Echo with bi-atrial enlargement and severe left ventricular enlargement with low voltage on EKG. Followed by [place] outpatient.   2/4/21 Cardiac Echo with EF 65-70%, moderate aortic and tricuspid insufficiency, mod LV wall thickening, Grade III diastolic dysfunction  - Volume: Hypervolemic but improving with ongoing diuresis, net negative 13L    - Appears to be discrepancy between recorded volume loss and weights, unclear etiology    - IVC ultrasound today to further evaluate volume status, pending  - Lasix gtt @ 20/hr (+ metolazone x1 on 11/2); above goal net negative 2-3 L   - Strict I/Os with daily standing weights  - 11/3 Abdominal Ultrasound    - No ascites, gallbladder  pathology or liver pathology    - Notable right pleural effusion    # Right Pleural Effusion  Chronic finding, noted on several previous chest xrays and again on 11/3 abdominal ultrasound. Patient noted ongoing baseline dyspnea despite improvement with diuresis.  - Plan for CAPS consult for thoracentesis on 11/4    # Hypokalemia  # Hyperkalemia  Patient given IV Lasix while in OSH ED without potassium replacement. Potassium trough of 2.7 and peak of 6.3 during this admission. Most recent potassium 2.8 on 11/3. Extensive history of fluctuating potassium levels with inpatient diuresis.   - Current potassium regimen of morning 40mEq followed by 80 mEq in afternoon and evening    - Additional 80 mEq and 60 mEq on 11/3 due to recalcitrant hypokalemia with diuresis  - BID BMP while diuresing      # Chronic Dermatitis with pruritis   Per chart review, outpatient dermatology suspects atopic dermatitis vs lichen planus vs lichenoid drug eruption. Itching has been going on for the last 1-2 months. Had RUQ US last month to assess for any underlying reason - noted changes suggestive of liver cirrhosis but no discrete masses or changes in gallbladder. Per discussion with dermatology, skin changes are likely consequence of previous chemotherapy for AL amyloid.  - Improving on scheduled topical triamcinolone  - Has prn benadryl      # Paroxysmal Afib/Aflutter  [id number] Score of 4  - Continue PTA apixaban 2.5mg BID, reduced dose to hx of epistaxis and hematochezia per chart review     # Chronic Lymphedema  Previously managed with compression stockings but discontinued due to patient discomfort  - Management with diuresis as above  - OT consulted for lymphedema management, appreciate recs     # Hx of Prolonged QT Interval  - Admission EKG with , QTc 509  - Will monitor and plan to avoid QT prolonging medications     # CKD3  Creatinine baseline of 1.1-1.2, likely secondary to underlying cardiac function.  - Avoiding  nephrotoxic medications  - Continue to monitor     # Hx of AL Amyloidosis, monoclonal IgA lambda s/p chemotherapy  Diagnosed in 2016 with follow-up EGD and bone marrow biopsies confirming spread to stomach and bone marrow - now s/p CyBorD chemotherapy and in remission without maintenance therapy. Refer to 5/20/21 Oncology progress note by Mirela Moore for full treatment history.  - 5/11/21 Kappa Lambda Ratio 0.70, within normal limits    # Possible liver cirrhosis, resolved   Ultrasound from OSH 9/30 with coarse echotexture consistent with cirrhosis. Her previously scheduled telemedicine visit with hepatology was reportedly canceled due to her inpatient status. ALP and TBil have been mildly elevated  - RUQ ultrasound with no findings suggestive of liver or gallbladder pathology    - Noted right pleural effusion, management as above.     Diet: Fluid restriction 2000 ML FLUID  2 Gram Sodium Diet    DVT Prophylaxis: DOAC  Marie Catheter: Not present  Fluids: Restriction   Central Lines: None  Code Status: Full Code        Disposition Plan   Expected discharge: 2 - 3 days, recommended to prior living arrangement once fluid volume status optimized on oral medication.    The patient's care was discussed with the Attending Physician, Dr. Dudley, Patient and Patient's Family.    Bryant Godwin MD   Cardiology Fellow  [facility] [id number]  [facility]  ______________________________________________________________________    Interval History     NAEON. Nursing notes reviewed. Patient notably more hopeful today after seeing increased urine output and weight loss following metolazone. Team discussed ongoing challenge of electrolyte shifts with changes in diuresis.     Review of systems notable for mild-moderate headache, remainder of 10 point review of systems negative.     Data reviewed today: I reviewed all medications, new labs and imaging results over the last 24 hours.     Physical  Exam   Vital Signs: Temp: 97.7  F (36.5  C) Temp src: Oral BP: (!) 146/71 Pulse: 86   Resp: 16 SpO2: 98 % O2 Device: None (Room air)    Weight: 157 lbs 13.59 oz  General Appearance: Lying comfortably, no apparent distress  Respiratory: clear to auscultation and breathing comfortably on room air   Cardiovascular: JVP at midneck, irregular rhythm normal rate, no murmur   GI: soft, + BS, mildly distended, non-tender to palpation today  Skin: erythematous and thickened diffusely with excoriations  MSK: Lower extremities visualized and less edematous than on previous exam but still 2+ edema, warm with palpable pulses, no cyanosis    Data   Recent Labs   Lab 11/03/21  0438 11/02/21  2359 11/02/21  1941 11/02/21  1741 11/02/21  1534 11/02/21  1128 11/02/21  0431 11/01/21  1106 11/01/21  0422 10/31/21  2133 10/31/21  1617 10/28/21  1324 10/28/21  0824   WBC  --   --   --   --   --   --   --   --  6.7  --   --   --  9.0   HGB  --   --   --   --   --   --   --   --  11.6*  --   --   --  12.9   MCV  --   --   --   --   --   --   --   --  101*  --   --   --  96   PLT  --   --   --   --   --   --   --   --  220  --   --   --  289   INR 1.17*  --   --   --   --   --  1.26*  --  1.32*  --   --    < >  --      --   --   --  135  --  133   < > 135  --    < >   < > 136   POTASSIUM 3.0* 3.6 3.7   < > 3.5   < > 5.5*   < > 5.4*  --    < >   < > 2.7*   CHLORIDE 98  --   --   --  101  --  103   < > 104  --    < >   < > 97   CO2 30  --   --   --  28  --  25   < > 26  --    < >   < > 33*   BUN 35*  --   --   --  35*  --  35*   < > 31*  --    < >   < > 24   CR 1.17*  --   --   --  1.16*  --  1.26*   < > 1.14*  --    < >   < > 1.10*   ANIONGAP 8  --   --   --  6  --  5   < > 5  --    < >   < > 6   JERROD 8.7  --   --   --  8.5  --  9.3   < > 9.0  --    < >   < > 9.0   GLC 84  --   --   --  96  --  97   < > 93  --    < >   < > 98   ALBUMIN  --   --   --   --   --   --   --   --   --   --   --   --  3.6   PROTTOTAL  --   --   --   --   --    --   --   --   --   --   --   --  7.0   BILITOTAL  --   --   --   --   --   --   --   --   --   --   --   --  2.0*   ALKPHOS  --   --   --   --   --   --   --   --   --   --   --   --  200*   ALT  --   --   --   --   --   --   --   --   --   --   --   --  25   AST  --   --   --   --   --   --   --   --   --   --   --   --  23   TROPONIN  --   --   --   --  <0.015  --   --   --   --  <0.015  --   --   --     < > = values in this interval not displayed.     No results found for this or any previous visit (from the past 24 hour(s)).  Medications     - MEDICATION INSTRUCTIONS -       furosemide 20 mg/hr (11/02/21 2000)     - MEDICATION INSTRUCTIONS -       BETA BLOCKER NOT PRESCRIBED         apixaban ANTICOAGULANT  2.5 mg Oral BID     calcium carbonate  500 mg Oral Once     polyethylene glycol  17 g Oral Daily     [START ON 11/4/2021] potassium chloride  40 mEq Oral Daily     potassium chloride  80 mEq Oral 2 times per day     sodium chloride (PF)  10 mL Intracatheter Q8H     triamcinolone   Topical TID       I have reviewed today's vital signs, notes, medications, labs and imaging.  I have also seen and examined the patient and agree with the findings and plan as outlined above.  Pt with brighter affect.  VSS with 5.8L UO.  Lab with Cr 1.2.  Pt with AL amyloid and HFpEF requiring diuresis.  Will US IVC.     Zak Dudley MD, PhD  Professor, Heart Failure and Cardiac Transplantation  [facility]

## 2021-11-03 NOTE — PLAN OF CARE
Neuro: A&Ox4. Anxious, tearful at times.    Cardiac: SB/SR, HR 50-70s. /70s. VSS.   Respiratory: Sating > 94% on RA.  GI/: Good urine output with lasix gtt. No stool this shift.   Diet/appetite: Tolerating 2gm Na diet, 2L FR. Eating well. NPO since midnight for abdominal ultrasound today.   Activity: Standby assist/independent, up to commode.   Pain: At acceptable level on current regimen. PRN tylenol given for headache.   Skin: No new deficits noted. Lymphedema wraps in place. Generalized itching.  LDA's: PIV x1 infusing lasix gtt @ 20 mg/hr.     Plan: Plan for abdominal ultrasound today. Continue with POC. Notify primary team with changes.

## 2021-11-04 ENCOUNTER — APPOINTMENT (OUTPATIENT)
Dept: OCCUPATIONAL THERAPY | Facility: CLINIC | Age: 71
DRG: 292 | End: 2021-11-04
Attending: INTERNAL MEDICINE
Payer: COMMERCIAL

## 2021-11-04 ENCOUNTER — APPOINTMENT (OUTPATIENT)
Dept: PHYSICAL THERAPY | Facility: CLINIC | Age: 71
DRG: 292 | End: 2021-11-04
Attending: INTERNAL MEDICINE
Payer: COMMERCIAL

## 2021-11-04 LAB
ANION GAP SERPL CALCULATED.3IONS-SCNC: 7 MMOL/L (ref 3–14)
ANION GAP SERPL CALCULATED.3IONS-SCNC: 7 MMOL/L (ref 3–14)
BUN SERPL-MCNC: 36 MG/DL (ref 7–30)
BUN SERPL-MCNC: 36 MG/DL (ref 7–30)
CALCIUM SERPL-MCNC: 9.2 MG/DL (ref 8.5–10.1)
CALCIUM SERPL-MCNC: 9.4 MG/DL (ref 8.5–10.1)
CHLORIDE BLD-SCNC: 94 MMOL/L (ref 94–109)
CHLORIDE BLD-SCNC: 95 MMOL/L (ref 94–109)
CO2 SERPL-SCNC: 33 MMOL/L (ref 20–32)
CO2 SERPL-SCNC: 34 MMOL/L (ref 20–32)
CREAT SERPL-MCNC: 1.16 MG/DL (ref 0.52–1.04)
CREAT SERPL-MCNC: 1.28 MG/DL (ref 0.52–1.04)
GFR SERPL CREATININE-BSD FRML MDRD: 43 ML/MIN/1.73M2
GFR SERPL CREATININE-BSD FRML MDRD: 48 ML/MIN/1.73M2
GLUCOSE BLD-MCNC: 101 MG/DL (ref 70–99)
GLUCOSE BLD-MCNC: 98 MG/DL (ref 70–99)
HOLD SPECIMEN: NORMAL
HOLD SPECIMEN: NORMAL
INR PPP: 1.11 (ref 0.85–1.15)
LDH SERPL L TO P-CCNC: 281 U/L (ref 81–234)
MAGNESIUM SERPL-MCNC: 2.9 MG/DL (ref 1.6–2.3)
POTASSIUM BLD-SCNC: 2.9 MMOL/L (ref 3.4–5.3)
POTASSIUM BLD-SCNC: 3 MMOL/L (ref 3.4–5.3)
POTASSIUM BLD-SCNC: 3.1 MMOL/L (ref 3.4–5.3)
POTASSIUM BLD-SCNC: 3.2 MMOL/L (ref 3.4–5.3)
POTASSIUM BLD-SCNC: 3.2 MMOL/L (ref 3.4–5.3)
POTASSIUM BLD-SCNC: 4.2 MMOL/L (ref 3.4–5.3)
PROT SERPL-MCNC: 7.2 G/DL (ref 6.8–8.8)
SODIUM SERPL-SCNC: 134 MMOL/L (ref 133–144)
SODIUM SERPL-SCNC: 136 MMOL/L (ref 133–144)

## 2021-11-04 PROCEDURE — 97535 SELF CARE MNGMENT TRAINING: CPT | Mod: GO

## 2021-11-04 PROCEDURE — 83735 ASSAY OF MAGNESIUM: CPT | Performed by: STUDENT IN AN ORGANIZED HEALTH CARE EDUCATION/TRAINING PROGRAM

## 2021-11-04 PROCEDURE — 99232 SBSQ HOSP IP/OBS MODERATE 35: CPT | Mod: GC | Performed by: INTERNAL MEDICINE

## 2021-11-04 PROCEDURE — 250N000013 HC RX MED GY IP 250 OP 250 PS 637

## 2021-11-04 PROCEDURE — 250N000009 HC RX 250: Performed by: STUDENT IN AN ORGANIZED HEALTH CARE EDUCATION/TRAINING PROGRAM

## 2021-11-04 PROCEDURE — 80048 BASIC METABOLIC PNL TOTAL CA: CPT | Performed by: STUDENT IN AN ORGANIZED HEALTH CARE EDUCATION/TRAINING PROGRAM

## 2021-11-04 PROCEDURE — 97530 THERAPEUTIC ACTIVITIES: CPT | Mod: GP

## 2021-11-04 PROCEDURE — 83615 LACTATE (LD) (LDH) ENZYME: CPT | Performed by: STUDENT IN AN ORGANIZED HEALTH CARE EDUCATION/TRAINING PROGRAM

## 2021-11-04 PROCEDURE — 120N000005 HC R&B MS OVERFLOW UMMC

## 2021-11-04 PROCEDURE — 97110 THERAPEUTIC EXERCISES: CPT | Mod: GO

## 2021-11-04 PROCEDURE — 84132 ASSAY OF SERUM POTASSIUM: CPT | Performed by: STUDENT IN AN ORGANIZED HEALTH CARE EDUCATION/TRAINING PROGRAM

## 2021-11-04 PROCEDURE — 250N000013 HC RX MED GY IP 250 OP 250 PS 637: Performed by: INTERNAL MEDICINE

## 2021-11-04 PROCEDURE — 36415 COLL VENOUS BLD VENIPUNCTURE: CPT | Performed by: STUDENT IN AN ORGANIZED HEALTH CARE EDUCATION/TRAINING PROGRAM

## 2021-11-04 PROCEDURE — 250N000013 HC RX MED GY IP 250 OP 250 PS 637: Performed by: STUDENT IN AN ORGANIZED HEALTH CARE EDUCATION/TRAINING PROGRAM

## 2021-11-04 PROCEDURE — 84155 ASSAY OF PROTEIN SERUM: CPT | Performed by: STUDENT IN AN ORGANIZED HEALTH CARE EDUCATION/TRAINING PROGRAM

## 2021-11-04 PROCEDURE — 85610 PROTHROMBIN TIME: CPT | Performed by: STUDENT IN AN ORGANIZED HEALTH CARE EDUCATION/TRAINING PROGRAM

## 2021-11-04 RX ORDER — POTASSIUM CHLORIDE 1500 MG/1
80 TABLET, EXTENDED RELEASE ORAL
Status: DISCONTINUED | OUTPATIENT
Start: 2021-11-05 | End: 2021-11-05

## 2021-11-04 RX ORDER — POTASSIUM CHLORIDE 750 MG/1
20 TABLET, EXTENDED RELEASE ORAL ONCE
Status: COMPLETED | OUTPATIENT
Start: 2021-11-04 | End: 2021-11-04

## 2021-11-04 RX ORDER — POTASSIUM CHLORIDE 1500 MG/1
60 TABLET, EXTENDED RELEASE ORAL
Status: DISCONTINUED | OUTPATIENT
Start: 2021-11-05 | End: 2021-11-04

## 2021-11-04 RX ORDER — HYDROXYZINE HYDROCHLORIDE 25 MG/1
25 TABLET, FILM COATED ORAL
Status: DISCONTINUED | OUTPATIENT
Start: 2021-11-04 | End: 2021-11-07 | Stop reason: HOSPADM

## 2021-11-04 RX ORDER — POTASSIUM CHLORIDE 750 MG/1
40 TABLET, EXTENDED RELEASE ORAL ONCE
Status: COMPLETED | OUTPATIENT
Start: 2021-11-04 | End: 2021-11-04

## 2021-11-04 RX ORDER — POTASSIUM CHLORIDE 20MEQ/15ML
80 LIQUID (ML) ORAL ONCE
Status: COMPLETED | OUTPATIENT
Start: 2021-11-04 | End: 2021-11-04

## 2021-11-04 RX ADMIN — TRIAMCINOLONE ACETONIDE: 1 CREAM TOPICAL at 08:06

## 2021-11-04 RX ADMIN — POTASSIUM CHLORIDE 40 MEQ: 750 TABLET, EXTENDED RELEASE ORAL at 00:53

## 2021-11-04 RX ADMIN — POTASSIUM CHLORIDE 40 MEQ: 750 TABLET, EXTENDED RELEASE ORAL at 05:57

## 2021-11-04 RX ADMIN — POTASSIUM CHLORIDE 80 MEQ: 40 SOLUTION ORAL at 13:56

## 2021-11-04 RX ADMIN — POTASSIUM CHLORIDE 80 MEQ: 1500 TABLET, EXTENDED RELEASE ORAL at 19:45

## 2021-11-04 RX ADMIN — FUROSEMIDE 20 MG/HR: 10 INJECTION, SOLUTION INTRAVENOUS at 14:26

## 2021-11-04 RX ADMIN — TRIAMCINOLONE ACETONIDE: 1 CREAM TOPICAL at 19:47

## 2021-11-04 RX ADMIN — DOCUSATE SODIUM 50 MG AND SENNOSIDES 8.6 MG 1 TABLET: 8.6; 5 TABLET, FILM COATED ORAL at 19:45

## 2021-11-04 RX ADMIN — POTASSIUM CHLORIDE 40 MEQ: 750 TABLET, EXTENDED RELEASE ORAL at 10:33

## 2021-11-04 RX ADMIN — POTASSIUM CHLORIDE 20 MEQ: 1500 TABLET, EXTENDED RELEASE ORAL at 08:05

## 2021-11-04 ASSESSMENT — ACTIVITIES OF DAILY LIVING (ADL)
ADLS_ACUITY_SCORE: 8

## 2021-11-04 ASSESSMENT — MIFFLIN-ST. JEOR: SCORE: 1193.13

## 2021-11-04 NOTE — PLAN OF CARE
Neuro: A&Ox4.   Cardiac: SR. VSS. B/P: 126/61, T: 98, P: 72, R: 16  Respiratory: O2 sats stable on RA  GI/: Adequate urine output. BM X1  Diet/appetite: Tolerating 2g Na diet and 2L FR.  Activity:  Independent in room  Pain: denies  Skin: No new deficits noted. Lymph wraps  LDA's: PIV with lasix gtt @ 20mg/h    Plan for thoracentesis tomorrow 11/5  Checking K Q4H K 4.2 at 1600. MD managed replacement    Plan: Continue with POC. Notify primary team with changes.

## 2021-11-04 NOTE — PLAN OF CARE
Neuro: A&Ox4.    Cardiac: SR 70's. VSS.   Respiratory: Sating >92% on RA. Dyspnea with exertion   GI/: Good UOP with lasix gtt. No BM since 11/1  Diet/appetite: 2gm Na diet. Eating well. 2L fluid restriction   Activity:  Independent in room   Pain: At acceptable level on current regimen.   Skin: No new deficits noted. Very itchy  LDA's: PIV - lasix gtt @ 20mg/hr    Plan: Continue with POC. Notify primary team with changes.

## 2021-11-04 NOTE — PROGRESS NOTES
"CLINICAL NUTRITION SERVICES - ASSESSMENT NOTE     Nutrition Prescription    RECOMMENDATIONS FOR MDs/PROVIDERS TO ORDER:  None at this time    Malnutrition Status:    Patient does not meet two of the established criteria necessary for diagnosing malnutrition    Recommendations already ordered by Registered Dietitian (RD):  None at this time     Future/Additional Recommendations:  -- oral intake of meals   -- questions relating to nutrition education      REASON FOR ASSESSMENT  Leandra Guerrero is a/an 71 year old female assessed by the dietitian for LOS    Per chart review patient with a history of chronic diastolic heart failure/restrictive cardiomyopathy 2/2 AL amyloidosis (diagnosed in 2016, with gastric and bone marrow involvement only, s/p CyBorD chemotherapy - in remission)    NUTRITION HISTORY  Amy reports her appetite is good. She is finding it difficult to find items she likes that are low sodium on hospital menu. She also reports the fluid restriction can be difficult at times    CURRENT NUTRITION ORDERS  Diet: 2 g Sodium with 2000 ml fluid restriction   Intake/Tolerance: PO intake > 75% of meals     LABS  Labs reviewed  (11/4): K+ 2.9 mmol/L (L), BUN 36 mg/dL (H), Cr 1.16 mg/dL (H)     MEDICATIONS  Medications reviewed  Klor-con m     ANTHROPOMETRICS  Height: 160 cm (5' 3\")  Most Recent Weight: 69.9 kg (154 lb 1.6 oz)    IBW: 52.2 kg  BMI: Overweight BMI 25-29.9  Weight History:   Wt Readings from Last 10 Encounters:   11/04/21 69.9 kg (154 lb 1.6 oz)   10/07/21 69.9 kg (154 lb)   09/01/21 72.1 kg (159 lb)   08/12/21 69.9 kg (154 lb 1.6 oz)   07/27/21 68.2 kg (150 lb 5.7 oz)   06/21/21 71.2 kg (157 lb)   05/24/21 69.4 kg (153 lb 1.6 oz)   05/20/21 70.1 kg (154 lb 9.6 oz)   04/27/21 69.9 kg (154 lb)   03/04/21 68 kg (150 lb)   patient denies weight loss   Dosing Weight: 57 kg (adjBW based off IBW and lowest weight this admission)    ASSESSED NUTRITION NEEDS  Estimated Energy Needs: 0943-3055 kcals/day " (25 - 30 kcals/kg)  Justification: Maintenance  Estimated Protein Needs: 57-68 grams protein/day (1 - 1.2 grams of pro/kg)  Justification: Maintenance  Estimated Fluid Needs: 2000 mL/day    Justification: On a fluid restriction    PHYSICAL FINDINGS  See malnutrition section below.    MALNUTRITION  % Intake: No decreased intake noted  % Weight Loss: None noted  Subcutaneous Fat Loss: None observed  Muscle Loss: None observed  Fluid Accumulation/Edema: Severe  Malnutrition Diagnosis: Patient does not meet two of the established criteria necessary for diagnosing malnutrition    NUTRITION DIAGNOSIS  Food- and nutrition-related knowledge deficit related to fluid restricion as evidenced by patient self report       INTERVENTIONS  Implementation  Nutrition Education: Provided education on fluid restriction. Handout provided: managing your fluid intake. Patient verbalized understanding      Goals  Patient to consume % of nutritionally adequate meal trays TID, or the equivalent with supplements/snacks.     Monitoring/Evaluation  Progress toward goals will be monitored and evaluated per protocol.    Macarena Garcia, MS/RD/TRUPTI/CNS  [address] pager [phone]

## 2021-11-04 NOTE — PLAN OF CARE
Neuro: Patient alert and oriented x4. In better spirits today.   Cardiac: SB/NSR 50's-80's. BP's stable Afebrile.        Respiratory: Sating >95% on RA.  GI/: Adequate urine output, on lasix gtt. No BM this shift.   Diet/appetite: Tolerating 2gm Na diet, 2L FR. Eating well.  Activity:  Independent in room, up to commode/BR.  Pain: Headache managed with tylenol.   Skin: No new deficits noted. Lymphedema wraps on.   LDA's: R PIV with lasix gtt at 20mg/hr.   Plan: Abdomnial US completed. Possible thoracentesis tomorrow. Continue with POC. Notify primary team with changes.

## 2021-11-04 NOTE — DISCHARGE SUMMARY
[facility]    Cardiology Discharge Summary- Cards 2    Date of Admission:  10/28/2021  Date of Discharge:  11/5/2021  Discharging Provider: Domenica Cohen MD, Bryant Godwin MD PGY-1    Discharge Diagnoses   Acute on chronic diastolic heart failure  Cardiac Amyloidosis  Right pleural effusion  Hypokalemia  Hyperkalemia  Chronic Dermatitis  Paroxsymal Afib/Aflutter  Chronic Lymphedema  Prolonged QT Interval  Chronic Kidney Disease 3    Follow-ups Needed After Discharge     Please follow-up for labs and evaluation at [facility] on Wednesday 11/10/21.    Discharge Disposition   Discharged to home  Condition at discharge: Stable    Hospital Course     Leandra Guerrero is a 71 y.o. 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 Prior to admission, she presented to an outside hospital ED with 1 week of dyspnea and 13 pound weight gain, received IV lasix and was transferred to [facility] for further cares. While admitted at [facility], she underwent further IV diuresis with lasix and metolazone, achieved baseline dry weight of 150 pounds and was discharged home with close cardiology clinic follow-up.     Admission weight of 159 with estimated dry weight of 147-150 pounds. Discharge weight 150 pounds.    Acute on chronic diastolic heart failure, NYHA III, Stage C  Cardiac Amyloidosis s/p chemotherapy - in remission  Patient admitted as direct admission from outside hospital ED for ongoing hypervolemia and shortness of breath despite outpatient PO diuretics following recent travel and outpatient medication changes. Followed by Dr. Cohen outpatient with follow-up in [facility] on 11/10/21. Patient diuresed 28 liters with lasix drip at 20/hour with two doses of  metolazone. Admission weight of 159 with estimated dry weight of 147-150 pounds. Discharge weight 150 pounds with some hypervolemia on exam.  - Adjusted PTA Torsemide to 150mg BID  - Adjusted PTA potassium chloride to 80 mEq TID  - [facility] follow-up    Chronic right pleural effusion  Patient with shortness of breath on admission that improved following diuresis but noted to have chronic right pleural fluid collection, likely secondary to diastolic heart failure. Inpatient procedure team was consulted on 11/5 for therapeutic and diagnostic thoracentesis, but fluid collection was too small for drainage.  - Continue to monitor    Hypokalemia  Hyperkalemia  Patient with history of fluctuating potassium levels while on inpatient diuresis. Potassium trough of 2.7 and peak of 6.3 during this admission. Potassium regimen adjusted to 80 mEq three times daily with at least twice daily labs.  - Continue to monitor outpatient    Chronic Dermatitis  Patient with 12 months of chronic itching and diffuse skin thickening with excoriations. Outpatient dermatology suspected atopic dermatitis vs lichen planus vs lichenoid drug eruption. Per discussion with inpatient dermatology, skin changes are likely consequence of previous chemotherapy for AL amyloid.  - Continue triamcinolone ointment per dermatology    Paroxsymal Afib/Aflutter  - Continue PTA apixaban 2.5mg BID    Chronic Lymphedema  - Continue leg wrapping and compression stockings as tolerated    Prolonged QT Interval  - Continue to avoid QT     Chronic Kidney Disease 3  Creatinine baseline of 1.1-1.2, remained at baseline throughout admission    Concern for liver cirrhosis, resolved  Patient completed 9/30 right upper quadrant ultrasound at outside hospital that noted heterogenous echo texture consistent with cirrhosis of patient liver without notable changes to her gallbladder. Repeat abdominal ultrasound at [facility] noted normal liver and gallbladder echo texture with  possible right kidney stone.     Consultations This Hospital Stay   OCCUPATIONAL THERAPY ADULT IP CONSULT  NUTRITION SERVICES ADULT IP CONSULT  VASCULAR ACCESS CARE ADULT IP CONSULT  OCCUPATIONAL THERAPY ADULT IP CONSULT  VASCULAR ACCESS FOR PICC PLACEMENT ADULT IP CONSULT  VASCULAR ACCESS CARE ADULT IP CONSULT  PHYSICAL THERAPY ADULT IP CONSULT  VASCULAR ACCESS CARE ADULT IP CONSULT  CARE MANAGEMENT / SOCIAL WORK IP CONSULT  PSYCHOLOGY ADULT IP CONSULT  INTERNAL MEDICINE PROCEDURE TEAM ADULT IP CONSULT [facility] - THORACENTESIS  SMOKING CESSATION PROGRAM IP CONSULT    Code Status   Full Code      Bryant Godwin MD  [facility]  ______________________________________________________________________    Physical Exam   Vital Signs: Temp: 98  F (36.7  C) Temp src: Oral BP: 126/61 Pulse: 72   Resp: 16 SpO2: 99 % O2 Device: None (Room air)    Weight: 154 lbs 1.62 oz  General Appearance: No apparent distress when seen, resting comfortably in bed  Respiratory: Clear to auscultation bilaterally, no crackles or wheezing on exam  Cardiovascular: Regular rate and rhythm, no murmurs/rubs/gallops  Neck: JVD elevated to jaw line at 45 degree angle when seen  GI: soft, non-tender to palpation, bowel sounds appreciated  Skin: Diffusely thickened with intermittent excoriations  Extremities: 1+ pitting edema on lower extremities, all extremities warm with palpable pulses       Primary Care Physician   CARLYN ALVA    Discharge Orders   No discharge procedures on file.    Significant Results and Procedures   Results for orders placed or performed during the hospital encounter of 10/28/21   XR Chest Port 1 View    Narrative    Exam: XR CHEST PORT 1 VIEW, 10/31/2021 9:16 PM    Indication: sob, chest pain    Comparison: 7/21/2021    Findings:   Right PICC has been removed. Cardiac silhouette is not enlarged.  Increased size of the still small right pleural effusion with adjacent  hazy right  basilar airspace opacities. No pneumothorax. Left lung is  clear. No acute osseous abnormalities.      Impression    Impression: Increased size in the chronic small right pleural effusion  with hazy adjacent basilar airspace opacities.    I have personally reviewed the examination and initial interpretation  and I agree with the findings.    JOHN GONZALEZ MD         SYSTEM ID:  [id number]   US Abdomen Limited    Narrative    EXAMINATION: Limited Abdominal Ultrasound, 11/3/2021 9:15 AM     COMPARISON: Abdomen and pelvis CT from 11/29/2016.    HISTORY: Evaluate for hepatic fibrosis, ascites.    FINDINGS:   Fluid: No evidence of ascites or pleural effusions.    Liver: The liver demonstrates normal echotexture, measuring 14 cm in  craniocaudal dimension. There is no focal mass.     Gallbladder: There is no wall thickening, pericholecystic fluid,  positive sonographic Nieves's sign or evidence for cholelithiasis.    Bile Ducts: Both the intra- and extrahepatic biliary system are of  normal caliber.  The common bile duct measures 3.4 mm in diameter.    Pancreas: Visualized portions of the head and body of the pancreas are  unremarkable.     Kidney: The right kidney measures 8.7 cm long. Twinkling artifact  within the inferior pole of the right kidney, may represent stone.  There is no hydronephrosis or hydroureter, cystic lesion or mass.     Inferior vena cava measures 2.7 cm. Proximal aorta measures 1.7 cm.    Large amount of right pleural effusion.      Impression    IMPRESSION:   1. No imaging finding to suggest fibrosis or ascites. No focal mass.  2. Possible nephrolithiasis within the inferior pole of the right  kidney.  3. Large amount of right pleural effusion.    I have personally reviewed the examination and initial interpretation  and I agree with the findings.    BRAD TIPTON MD         SYSTEM ID:  [id number]   Echo Complete     Value    LVEF  65-70%    Narrative     [id number]  [id number]  [id number]  [id number]^KUSUM^SHASHI^UMER     [facility],[place]  Echocardiography Laboratory  [address] [place] [id number]     Name: JESSICA GARCIA  MRN: [id number]  : 1950  Study Date: 10/31/2021 10:19 AM  Age: 71 yrs  Gender: Female  Patient Location: [facility]  Reason For Study: CHF  Ordering Physician: SHASHI NOE  Performed By: Samreen Zamora RDCS     BSA: 1.8 m2  Height: 63 in  Weight: 160 lb  HR: 76  BP: 113/56 mmHg  ______________________________________________________________________________  Procedure  Complete Portable Echo Adult.  ______________________________________________________________________________  Interpretation Summary  Global and regional left ventricular function is hyperkinetic with an EF of  65-70%. The LV cavity is small. Grade III or advanced diastolic dysfunction.  Global right ventricular function is normal. The right ventricle is normal  size.  Moderate tricuspid insufficiency is present.  The estimated PA systolic pressure is at least 41 mmHg.  IVC diameter >2.1 cm collapsing <50% with sniff suggests a high RA pressure  estimated at 15 mmHg or greater.  This study was compared with the study from 2021. The right atrial  pressure is higher.  ______________________________________________________________________________  Left Ventricle  Global and regional left ventricular function is hyperkinetic with an EF of  65-70%. The LV cavity is small. Mild to moderate concentric wall thickening  consistent with left ventricular hypertrophy is present. Grade III or advanced  diastolic dysfunction. Diastolic Doppler findings (E/E' ratio and/or other  parameters) suggest left ventricular filling pressures are increased.     Right Ventricle  Global right ventricular function is normal. The right ventricle is normal  size.     Atria  Moderate to severe left atrial enlargement is present. Moderate right atrial  enlargement  is present.     Mitral Valve  Mild mitral annular calcification is present. Trace mitral insufficiency is  present.     Aortic Valve  The aortic valve is tricuspid. Mild aortic insufficiency is present.     Tricuspid Valve  Moderate tricuspid insufficiency is present. The right ventricular systolic  pressure is approximated at 26.2 mmHg plus the right atrial pressure.     Pulmonic Valve  The valve leaflets are not well visualized. Trace pulmonic insufficiency is  present.     Vessels  The aorta root cannot be assessed. Ascending aorta 2.9 cm. IVC diameter >2.1  cm collapsing <50% with sniff suggests a high RA pressure estimated at 15 mmHg  or greater.     Pericardium  No pericardial effusion is present.     Compared to Previous Study  This study was compared with the study from 2021 . The right atrial  pressure is higher.  ______________________________________________________________________________  MMode/2D Measurements & Calculations     IVSd: 1.3 cm  LVIDd: 3.4 cm  LVIDs: 2.4 cm  LVPWd: 1.6 cm  FS: 31.3 %  LV mass(C)d: 179.3 grams  LV mass(C)dI: 102.0 grams/m2  asc Aorta Diam: 2.9 cm  LVOT diam: 2.0 cm  LVOT area: 3.1 cm2  RWT: 0.94     Doppler Measurements & Calculations  MV E max ella: 83.4 cm/sec  MV A max ella: 40.7 cm/sec  MV E/A: 2.0  MV dec slope: 548.0 cm/sec2  PI end-d ella: 122.0 cm/sec  TR max ella: 256.0 cm/sec  TR max P.2 mmHg  E/E' av.7  Lateral E/e': 12.0  Medial E/e': 15.3     ______________________________________________________________________________  Report approved by: Jasper Farr 10/31/2021 04:17 PM           Discharge Medications   Current Discharge Medication List      CONTINUE these medications which have NOT CHANGED    Details   Acetaminophen (TYLENOL PO) Take 325 mg by mouth every 6 hours as needed for mild pain or fever     Associated Diagnoses: Amyloid heart disease (H)      apixaban ANTICOAGULANT (ELIQUIS) 2.5 MG tablet Take 1 tablet (2.5 mg) by mouth 2 times  daily  Qty: 180 tablet, Refills: 3    Comments: NOTE NEW INSTRUCTIONS ( NOT NEW FOR PATIENT BUT NEW FOR YOUR PHARMACY)/DIFFERENT THAN THE FAXED REQUEST  Associated Diagnoses: Paroxysmal atrial fibrillation (H)      camphor-menthol (DERMASARRA) 0.5-0.5 % external lotion Apply 1 mL topically every 6 hours as needed for skin care  Qty: 222 mL, Refills: 0    Associated Diagnoses: Acute on chronic diastolic heart failure (H)      doxepin (SINEQUAN) 10 MG capsule Take 10 mg by mouth At Bedtime      doxycycline hyclate (VIBRA-TABS) 100 MG tablet Take 1 tablet (100 mg) by mouth daily  Qty: 90 tablet, Refills: 3    Associated Diagnoses: AL amyloidosis (H)      eplerenone (INSPRA) 25 MG tablet Take 1 tablet (25 mg) by mouth daily  Qty: 30 tablet, Refills: 1    Associated Diagnoses: Hypokalemia; Chronic diastolic heart failure (H)      LORazepam (ATIVAN) 0.5 MG tablet Take 1 tablet (0.5 mg) by mouth every 6 hours as needed for anxiety or nausea  Qty: 60 tablet, Refills: 0    Associated Diagnoses: Amyloid heart disease (H); Weight loss; Hypokalemia; AL amyloidosis (H)      metolazone (ZAROXOLYN) 2.5 MG tablet Take 1 tablet (2.5 mg) by mouth twice a week As needed for increased swelling and weight gain of 3 lb in one day or 5 lb in one week  Qty: 26 tablet, Refills: 3    Associated Diagnoses: Amyloid heart disease (H)      ondansetron (ZOFRAN-ODT) 8 MG ODT tab Take 8 mg by mouth every 8 hours as needed PRN      potassium chloride ER (KLOR-CON M) 10 MEQ CR tablet TAKE 8 TABLETS EVERY MORNING& 8 TABLETS AT LUNCH AND 6 TABLETS EVERY EVENING TAKE EXTRA 6 TABLETS DAY OF AND DAY AFTER METOLAZONE  Qty: 2268 tablet, Refills: 1    Comments: Increased quantity to include metolazone for day of and day after. Estimated 90 day supply assuming metolazone 3 times weekly  Associated Diagnoses: Amyloid heart disease (H)      torsemide (DEMADEX) 100 MG tablet Take 1 tablet (100 mg) by mouth every morning AND 1 tablet (100 mg) daily at 2 pm.  Qty:  180 tablet, Refills: 3    Comments: Decrease in dose. Will call for refills.  Associated Diagnoses: Acute on chronic diastolic heart failure (H); Amyloid heart disease (H)      triamcinolone (KENALOG) 0.1 % external cream Apply a thin layer twice daily to itchy areas as needed.  Qty: 453.6 g, Refills: 3    Associated Diagnoses: Intrinsic atopic dermatitis      hydrOXYzine (ATARAX) 25 MG tablet Take 1 tablet (25 mg) by mouth 3 times daily as needed for itching  Qty: 30 tablet, Refills: 0    Associated Diagnoses: Acute on chronic diastolic heart failure (H)      OLANZapine (ZYPREXA) 2.5 MG tablet Take 2.5 mg by mouth At Bedtime           Allergies   Allergies   Allergen Reactions     Contrast Dye Shortness Of Breath     Shaking,chills and dypsnea     Cytoxan [Cyclophosphamide]      Hemorrhagic cystitis     Gabapentin      Slurred speech, weakness     Levofloxacin      Severe shaking and abdominal pain     Aaron Weed      Rash and itchyness     Amoxicillin Nausea and Vomiting and Cramps     Spironolactone      Worsening hyponatremia, palpitations     Chlorhexidine Dermatitis and Rash     AARON wipes  2% chlorhexidine gluconate   Rash to everywhere wipe was applied

## 2021-11-04 NOTE — PROGRESS NOTES
[facility]    Cardiology Progress Note- Cards 2        Date of Admission:  10/28/2021     Assessment & Plan: HVSL   Leandra Guerrero is a 71 y.o. female with history of chronic diastolic heart failure/restrictive cardiomyopathy 2/2 AL amyloidosis (diagnosed in 2016, with gastric and bone marrow involvement only, s/p CyBorD chemotherapy - in remission) admitted from outside hospital on 10/28/21 with 1 week of shortness of breath and 13 pound weight gain. Admission weight 159lbs. Dry weight thought to be ~150lb.     Today:  - Continue lasix gtt at 20/hr  - Ongoing surveillance and management of hypokalemia  - RUQ ultrasound, notable for large right pleural effusion  - CAPS consult for diagnostic/therapeutic thoracentesis tomorrow  - IVC ultrasound today following thoracentesis     # Acute on chronic diastolic heart failure/restrictive cardiomyopathy, NYHA III, Stage C  # Cardiac Amyloidosis  Patient admitted as direct admission from outside hospital for ongoing hypervolemia and shortness of breath despite outpatient PO diuretics following recent travel and outpatient medication changes. Admission weight of 159 with estimated dry weight of 147-150 pounds. Of note, cardiac amyloidosis diagnosed per 2016 Echo with bi-atrial enlargement and severe left ventricular enlargement with low voltage on EKG. Followed by [place] outpatient.   2/4/21 Cardiac Echo with EF 65-70%, moderate aortic and tricuspid insufficiency, mod LV wall thickening, Grade III diastolic dysfunction  - Volume: Hypervolemic but improving with ongoing diuresis, net negative 17L    - Appears to be discrepancy between recorded volume loss and weights     - Weight improved overnight: 154 from 157    - IVC ultrasound tomorrow to further evaluate volume status  - Lasix gtt @ 20/hr (+ metolazone x1 on 11/2); above goal net negative 3-5L   - Strict I/Os with daily standing weights  - 11/3 Abdominal  Ultrasound    - No ascites, gallbladder pathology or liver pathology    - Notable right pleural effusion    # Right Pleural Effusion  Chronic finding, noted on several previous chest xrays and again on 11/3 abdominal ultrasound. Likely secondary to chronic diastolic heart failure.   - CAPS consult for diagnostic and therapeutic thoracentesis on 11/5    # Hypokalemia  # Hyperkalemia  Patient given IV Lasix while in OSH ED without potassium replacement. Potassium trough of 2.7 and peak of 6.3 during this admission. Most recent potassium 2.9 on 11/4. Extensive history of fluctuating potassium levels with inpatient diuresis.   - Current potassium regimen of morning 60mEq followed by 80 mEq in afternoon and evening    - Additional 160 mEq KCl and 80 mEq potassium solution today    - Plan to replete potassium with solution only due to ongoing challenge repleting while on lasix drip  - BID BMP with q4h potassium while diuresing      # Chronic Dermatitis with pruritis   Per chart review, outpatient dermatology suspects atopic dermatitis vs lichen planus vs lichenoid drug eruption. Itching has been going on for the last 1-2 months. Had RUQ US last month to assess for any underlying reason - noted changes suggestive of liver cirrhosis but no discrete masses or changes in gallbladder. Per discussion with dermatology, skin changes are likely consequence of previous chemotherapy for AL amyloid.  - Improving on scheduled topical triamcinolone  - Has prn benadryl      # Paroxysmal Afib/Aflutter  [id number] Score of 4  - Continued PTA apixaban 2.5mg BID, reduced dose to hx of epistaxis and hematochezia per chart review  - Holding since 11/4 for thoracentesis on 11/5     # Chronic Lymphedema  Previously managed with compression stockings but discontinued due to patient discomfort.   - Management with diuresis as above  - OT consulted for lymphedema management, appreciate recs  - Notable improvement in lymphedema since  admission     # Hx of Prolonged QT Interval  - Admission EKG with , QTc 509  - Will monitor and plan to avoid QT prolonging medications     # CKD3  Creatinine baseline of 1.1-1.2, likely secondary to underlying cardiac function. At creatinine baseline per 11/4 BMP  - Avoiding nephrotoxic medications  - Continue to monitor     # Hx of AL Amyloidosis, monoclonal IgA lambda s/p chemotherapy  Diagnosed in 2016 with follow-up EGD and bone marrow biopsies confirming spread to stomach and bone marrow - now s/p CyBorD chemotherapy and in remission without maintenance therapy. Refer to 5/20/21 Oncology progress note by Mirela Moore for full treatment history.  - 5/11/21 Kappa Lambda Ratio 0.70, within normal limits    # Concern liver cirrhosis on OSH ultrasound, resolved  Ultrasound from OSH 9/30 with coarse echotexture consistent with cirrhosis. Her previously scheduled telemedicine visit with hepatology was reportedly canceled due to her inpatient status. ALP and TBil have been mildly elevated  - RUQ ultrasound with no findings suggestive of liver or gallbladder pathology    - Noted right pleural effusion, management as above.     Diet: Fluid restriction 2000 ML FLUID  2 Gram Sodium Diet    DVT Prophylaxis: DOAC  Marie Catheter: Not present  Fluids: Restriction   Central Lines: None  Code Status: Full Code        Disposition Plan   Expected discharge: 2 - 3 days, recommended to prior living arrangement once fluid volume status optimized on oral medication.    The patient's care was discussed with the Attending Physician, Dr. Dudley, Patient and Patient's Family.    Bryant Godwin MD   Cardiology Fellow  [facility] [id number]  [facility]  ______________________________________________________________________    Interval History     NAEON. Nursing notes reviewed. Patient continues to note symptomatic improvement and good urine output. Team discussed need for diagnostic and  therapeutic thoracentesis to relieve ongoing baseline dyspnea. Patient was agreeable to procedure if provided PRN Hydroxyzine.     Review of systems notable for mild-moderate headache and mild dyspnea, remainder of 10 point review of systems negative. Weight today 154.    Data reviewed today: I reviewed all medications, new labs and imaging results over the last 24 hours.     Physical Exam   Vital Signs: Temp: 98  F (36.7  C) Temp src: Oral BP: 129/69 Pulse: 77   Resp: 16 SpO2: 100 % O2 Device: None (Room air)    Weight: 154 lbs 1.62 oz  General Appearance: Lying comfortably, no apparent distress  Respiratory: clear to auscultation and breathing comfortably on room air   Cardiovascular: JVP ~8cm, irregular rhythm normal rate, no murmur   GI: soft, + BS, mildly distended, non-tender to palpation today  Skin: erythematous and thickened diffusely with excoriations  MSK: Lower extremities visualized and less edematous than on previous exam but still 2+ pitting edema, warm with palpable pulses, no cyanosis    Data   Recent Labs   Lab 11/04/21  0431 11/03/21  2349 11/03/21  1823 11/03/21  0724 11/03/21  0438 11/02/21  1741 11/02/21  1534 11/02/21  1128 11/02/21  0431 11/01/21  1106 11/01/21  0422 10/31/21  2133 10/31/21  1617 10/28/21  1324 10/28/21  0824   WBC  --   --   --   --   --   --   --   --   --   --  6.7  --   --   --  9.0   HGB  --   --   --   --   --   --   --   --   --   --  11.6*  --   --   --  12.9   MCV  --   --   --   --   --   --   --   --   --   --  101*  --   --   --  96   PLT  --   --   --   --   --   --   --   --   --   --  220  --   --   --  289   INR 1.11  --   --   --  1.17*  --   --   --  1.26*  --  1.32*  --   --    < >  --      --  136  --  136   < > 135  --  133   < > 135  --    < >   < > 136   POTASSIUM 3.2* 3.2* 3.5   < > 3.0*   < > 3.5   < > 5.5*   < > 5.4*  --    < >   < > 2.7*   CHLORIDE 95  --  97  --  98   < > 101  --  103   < > 104  --    < >   < > 97   CO2 34*  --  32  --  30    < > 28  --  25   < > 26  --    < >   < > 33*   BUN 36*  --  38*  --  35*   < > 35*  --  35*   < > 31*  --    < >   < > 24   CR 1.16*  --  1.27*  --  1.17*   < > 1.16*  --  1.26*   < > 1.14*  --    < >   < > 1.10*   ANIONGAP 7  --  7  --  8   < > 6  --  5   < > 5  --    < >   < > 6   JERROD 9.4  --  9.2  --  8.7   < > 8.5  --  9.3   < > 9.0  --    < >   < > 9.0   *  --  101*  --  84   < > 96  --  97   < > 93  --    < >   < > 98   ALBUMIN  --   --   --   --   --   --   --   --   --   --   --   --   --   --  3.6   PROTTOTAL  --   --   --   --   --   --   --   --   --   --   --   --   --   --  7.0   BILITOTAL  --   --   --   --   --   --   --   --   --   --   --   --   --   --  2.0*   ALKPHOS  --   --   --   --   --   --   --   --   --   --   --   --   --   --  200*   ALT  --   --   --   --   --   --   --   --   --   --   --   --   --   --  25   AST  --   --   --   --   --   --   --   --   --   --   --   --   --   --  23   TROPONIN  --   --   --   --   --   --  <0.015  --   --   --   --  <0.015  --   --   --     < > = values in this interval not displayed.     Recent Results (from the past 24 hour(s))   US Abdomen Limited    Narrative    EXAMINATION: Limited Abdominal Ultrasound, 11/3/2021 9:15 AM     COMPARISON: Abdomen and pelvis CT from 11/29/2016.    HISTORY: Evaluate for hepatic fibrosis, ascites.    FINDINGS:   Fluid: No evidence of ascites or pleural effusions.    Liver: The liver demonstrates normal echotexture, measuring 14 cm in  craniocaudal dimension. There is no focal mass.     Gallbladder: There is no wall thickening, pericholecystic fluid,  positive sonographic Nieves's sign or evidence for cholelithiasis.    Bile Ducts: Both the intra- and extrahepatic biliary system are of  normal caliber.  The common bile duct measures 3.4 mm in diameter.    Pancreas: Visualized portions of the head and body of the pancreas are  unremarkable.     Kidney: The right kidney measures 8.7 cm long. Twinkling  artifact  within the inferior pole of the right kidney, may represent stone.  There is no hydronephrosis or hydroureter, cystic lesion or mass.     Inferior vena cava measures 2.7 cm. Proximal aorta measures 1.7 cm.    Large amount of right pleural effusion.      Impression    IMPRESSION:   1. No imaging finding to suggest fibrosis or ascites. No focal mass.  2. Possible nephrolithiasis within the inferior pole of the right  kidney.  3. Large amount of right pleural effusion.    I have personally reviewed the examination and initial interpretation  and I agree with the findings.    BRAD TIPTON MD         SYSTEM ID:  [id number]     Medications     - MEDICATION INSTRUCTIONS -       furosemide 20 mg/hr (11/03/21 0535)     - MEDICATION INSTRUCTIONS -       BETA BLOCKER NOT PRESCRIBED         [Held by provider] apixaban ANTICOAGULANT  2.5 mg Oral BID     calcium carbonate  500 mg Oral Once     polyethylene glycol  17 g Oral Daily     potassium chloride  20 mEq Oral Once     [START ON 11/5/2021] potassium chloride  60 mEq Oral Daily     potassium chloride  80 mEq Oral 2 times per day     sodium chloride (PF)  10 mL Intracatheter Q8H     triamcinolone   Topical TID     I have reviewed today's vital signs, notes, medications, labs and imaging.  I have also seen and examined the patient and agree with the findings and plan as outlined above.  Pt with AL amyloid and restrictive physiology with diuresis (7.2l UO yest) with Cr 1.16.  Pt will have thoracentesis later today.  Plan discussed with pt and spouse.     Zak Dudley MD, PhD  Professor, Heart Failure and Cardiac Transplantation  [facility]

## 2021-11-05 ENCOUNTER — APPOINTMENT (OUTPATIENT)
Dept: ULTRASOUND IMAGING | Facility: CLINIC | Age: 71
DRG: 292 | End: 2021-11-05
Attending: INTERNAL MEDICINE
Payer: COMMERCIAL

## 2021-11-05 LAB
ANION GAP SERPL CALCULATED.3IONS-SCNC: 5 MMOL/L (ref 3–14)
ANION GAP SERPL CALCULATED.3IONS-SCNC: 7 MMOL/L (ref 3–14)
ATRIAL RATE - MUSE: 71 BPM
BUN SERPL-MCNC: 34 MG/DL (ref 7–30)
BUN SERPL-MCNC: 37 MG/DL (ref 7–30)
CALCIUM SERPL-MCNC: 8.7 MG/DL (ref 8.5–10.1)
CALCIUM SERPL-MCNC: 9 MG/DL (ref 8.5–10.1)
CHLORIDE BLD-SCNC: 95 MMOL/L (ref 94–109)
CHLORIDE BLD-SCNC: 96 MMOL/L (ref 94–109)
CO2 SERPL-SCNC: 32 MMOL/L (ref 20–32)
CO2 SERPL-SCNC: 33 MMOL/L (ref 20–32)
CREAT SERPL-MCNC: 1.17 MG/DL (ref 0.52–1.04)
CREAT SERPL-MCNC: 1.25 MG/DL (ref 0.52–1.04)
DIASTOLIC BLOOD PRESSURE - MUSE: NORMAL MMHG
ERYTHROCYTE [DISTWIDTH] IN BLOOD BY AUTOMATED COUNT: 13.6 % (ref 10–15)
GFR SERPL CREATININE-BSD FRML MDRD: 44 ML/MIN/1.73M2
GFR SERPL CREATININE-BSD FRML MDRD: 48 ML/MIN/1.73M2
GLUCOSE BLD-MCNC: 126 MG/DL (ref 70–99)
GLUCOSE BLD-MCNC: 94 MG/DL (ref 70–99)
HCT VFR BLD AUTO: 37.2 % (ref 35–47)
HGB BLD-MCNC: 12 G/DL (ref 11.7–15.7)
HOLD SPECIMEN: NORMAL
INR PPP: 1.1 (ref 0.85–1.15)
INTERPRETATION ECG - MUSE: NORMAL
MAGNESIUM SERPL-MCNC: 2.8 MG/DL (ref 1.6–2.3)
MCH RBC QN AUTO: 31.5 PG (ref 26.5–33)
MCHC RBC AUTO-ENTMCNC: 32.3 G/DL (ref 31.5–36.5)
MCV RBC AUTO: 98 FL (ref 78–100)
P AXIS - MUSE: 64 DEGREES
PLATELET # BLD AUTO: 270 10E3/UL (ref 150–450)
POTASSIUM BLD-SCNC: 2.8 MMOL/L (ref 3.4–5.3)
POTASSIUM BLD-SCNC: 3.4 MMOL/L (ref 3.4–5.3)
POTASSIUM BLD-SCNC: 3.5 MMOL/L (ref 3.4–5.3)
POTASSIUM BLD-SCNC: 4 MMOL/L (ref 3.4–5.3)
POTASSIUM BLD-SCNC: 4.1 MMOL/L (ref 3.4–5.3)
POTASSIUM BLD-SCNC: 4.4 MMOL/L (ref 3.4–5.3)
PR INTERVAL - MUSE: 224 MS
QRS DURATION - MUSE: 86 MS
QT - MUSE: 440 MS
QTC - MUSE: 478 MS
R AXIS - MUSE: -13 DEGREES
RBC # BLD AUTO: 3.81 10E6/UL (ref 3.8–5.2)
SODIUM SERPL-SCNC: 133 MMOL/L (ref 133–144)
SODIUM SERPL-SCNC: 135 MMOL/L (ref 133–144)
SYSTOLIC BLOOD PRESSURE - MUSE: NORMAL MMHG
T AXIS - MUSE: 251 DEGREES
VENTRICULAR RATE- MUSE: 71 BPM
WBC # BLD AUTO: 5.8 10E3/UL (ref 4–11)

## 2021-11-05 PROCEDURE — 250N000013 HC RX MED GY IP 250 OP 250 PS 637: Performed by: STUDENT IN AN ORGANIZED HEALTH CARE EDUCATION/TRAINING PROGRAM

## 2021-11-05 PROCEDURE — 80048 BASIC METABOLIC PNL TOTAL CA: CPT | Performed by: STUDENT IN AN ORGANIZED HEALTH CARE EDUCATION/TRAINING PROGRAM

## 2021-11-05 PROCEDURE — 93970 EXTREMITY STUDY: CPT

## 2021-11-05 PROCEDURE — 93970 EXTREMITY STUDY: CPT | Mod: 26 | Performed by: RADIOLOGY

## 2021-11-05 PROCEDURE — 93005 ELECTROCARDIOGRAM TRACING: CPT

## 2021-11-05 PROCEDURE — 93010 ELECTROCARDIOGRAM REPORT: CPT | Performed by: INTERNAL MEDICINE

## 2021-11-05 PROCEDURE — 85027 COMPLETE CBC AUTOMATED: CPT | Performed by: STUDENT IN AN ORGANIZED HEALTH CARE EDUCATION/TRAINING PROGRAM

## 2021-11-05 PROCEDURE — 120N000005 HC R&B MS OVERFLOW UMMC

## 2021-11-05 PROCEDURE — 250N000011 HC RX IP 250 OP 636: Performed by: STUDENT IN AN ORGANIZED HEALTH CARE EDUCATION/TRAINING PROGRAM

## 2021-11-05 PROCEDURE — 84132 ASSAY OF SERUM POTASSIUM: CPT | Performed by: STUDENT IN AN ORGANIZED HEALTH CARE EDUCATION/TRAINING PROGRAM

## 2021-11-05 PROCEDURE — 250N000009 HC RX 250: Performed by: STUDENT IN AN ORGANIZED HEALTH CARE EDUCATION/TRAINING PROGRAM

## 2021-11-05 PROCEDURE — 85610 PROTHROMBIN TIME: CPT | Performed by: STUDENT IN AN ORGANIZED HEALTH CARE EDUCATION/TRAINING PROGRAM

## 2021-11-05 PROCEDURE — 250N000013 HC RX MED GY IP 250 OP 250 PS 637

## 2021-11-05 PROCEDURE — 36415 COLL VENOUS BLD VENIPUNCTURE: CPT | Performed by: STUDENT IN AN ORGANIZED HEALTH CARE EDUCATION/TRAINING PROGRAM

## 2021-11-05 PROCEDURE — 76604 US EXAM CHEST: CPT | Mod: 26 | Performed by: INTERNAL MEDICINE

## 2021-11-05 PROCEDURE — 83735 ASSAY OF MAGNESIUM: CPT | Performed by: STUDENT IN AN ORGANIZED HEALTH CARE EDUCATION/TRAINING PROGRAM

## 2021-11-05 PROCEDURE — 999N000128 HC STATISTIC PERIPHERAL IV START W/O US GUIDANCE

## 2021-11-05 PROCEDURE — 99233 SBSQ HOSP IP/OBS HIGH 50: CPT | Mod: GC | Performed by: INTERNAL MEDICINE

## 2021-11-05 RX ORDER — EPLERENONE 25 MG/1
25 TABLET, FILM COATED ORAL DAILY
Status: DISCONTINUED | OUTPATIENT
Start: 2021-11-05 | End: 2021-11-05

## 2021-11-05 RX ORDER — POTASSIUM CHLORIDE 20MEQ/15ML
60 LIQUID (ML) ORAL ONCE
Status: COMPLETED | OUTPATIENT
Start: 2021-11-05 | End: 2021-11-05

## 2021-11-05 RX ORDER — POTASSIUM CHLORIDE 1500 MG/1
80 TABLET, EXTENDED RELEASE ORAL
Status: DISCONTINUED | OUTPATIENT
Start: 2021-11-06 | End: 2021-11-07 | Stop reason: HOSPADM

## 2021-11-05 RX ORDER — POTASSIUM CHLORIDE 1500 MG/1
60 TABLET, EXTENDED RELEASE ORAL DAILY
Status: DISCONTINUED | OUTPATIENT
Start: 2021-11-05 | End: 2021-11-06

## 2021-11-05 RX ORDER — FUROSEMIDE 10 MG/ML
120 INJECTION INTRAMUSCULAR; INTRAVENOUS ONCE
Status: COMPLETED | OUTPATIENT
Start: 2021-11-05 | End: 2021-11-05

## 2021-11-05 RX ORDER — TORSEMIDE 100 MG/1
100 TABLET ORAL
Status: DISCONTINUED | OUTPATIENT
Start: 2021-11-05 | End: 2021-11-05

## 2021-11-05 RX ADMIN — POTASSIUM CHLORIDE 60 MEQ: 1500 TABLET, EXTENDED RELEASE ORAL at 18:29

## 2021-11-05 RX ADMIN — TRIAMCINOLONE ACETONIDE: 1 CREAM TOPICAL at 17:08

## 2021-11-05 RX ADMIN — POTASSIUM CHLORIDE 80 MEQ: 1500 TABLET, EXTENDED RELEASE ORAL at 13:18

## 2021-11-05 RX ADMIN — ACETAMINOPHEN 325 MG: 325 TABLET, FILM COATED ORAL at 10:41

## 2021-11-05 RX ADMIN — APIXABAN 2.5 MG: 2.5 TABLET, FILM COATED ORAL at 21:05

## 2021-11-05 RX ADMIN — TORSEMIDE 100 MG: 100 TABLET ORAL at 10:51

## 2021-11-05 RX ADMIN — FUROSEMIDE 120 MG: 10 INJECTION, SOLUTION INTRAVENOUS at 18:13

## 2021-11-05 RX ADMIN — POTASSIUM CHLORIDE 80 MEQ: 1500 TABLET, EXTENDED RELEASE ORAL at 09:31

## 2021-11-05 RX ADMIN — TRIAMCINOLONE ACETONIDE: 1 CREAM TOPICAL at 10:52

## 2021-11-05 RX ADMIN — FUROSEMIDE 20 MG/HR: 10 INJECTION, SOLUTION INTRAVENOUS at 18:32

## 2021-11-05 RX ADMIN — POTASSIUM CHLORIDE 60 MEQ: 20 SOLUTION ORAL at 01:58

## 2021-11-05 RX ADMIN — ACETAMINOPHEN 325 MG: 325 TABLET, FILM COATED ORAL at 21:05

## 2021-11-05 ASSESSMENT — ACTIVITIES OF DAILY LIVING (ADL)
ADLS_ACUITY_SCORE: 8

## 2021-11-05 ASSESSMENT — MIFFLIN-ST. JEOR: SCORE: 1181.13

## 2021-11-05 NOTE — PROGRESS NOTES
[facility]    Cardiology Progress Note- Cards 2        Date of Admission:  10/28/2021     Assessment & Plan: HVSL   Leandra Guerrero is a 71 y.o. female with history of chronic diastolic heart failure/restrictive cardiomyopathy 2/2 AL amyloidosis (diagnosed in 2016, with gastric and bone marrow involvement only, s/p CyBorD chemotherapy - in remission) admitted from outside hospital on 10/28/21 with 1 week of shortness of breath and 13 pound weight gain. Inpatient management with IV diuresis only with significant improvement in patient symptoms and weight. Plan to continue IV lasix drip with transition to oral regimen within 1-2 days.      Updates today:  - Continue lasix gtt with additional IV lasix load  - Restarted PTA potassium replacement regimen  - Bilateral lower extremity ultrasound without evidence of DVT  - 11/5 diagnostic/therapeutic thoracentesis, pending     # Acute on chronic diastolic heart failure/restrictive cardiomyopathy, NYHA III, Stage C  # Cardiac Amyloidosis  Patient admitted as direct admission from outside hospital for ongoing hypervolemia and shortness of breath despite outpatient PO diuretics following recent travel and outpatient medication changes. Admission weight of 159 with estimated dry weight of 147-150 pounds. Of note, cardiac amyloidosis diagnosed per 2016 Echo with bi-atrial enlargement and severe left ventricular enlargement with low voltage on EKG. Followed by Dr. Cohen outpatient with follow-up in [facility] on 11/10/21.  2/4/21 Cardiac Echo with EF 65-70%, moderate aortic and tricuspid insufficiency, mod LV wall thickening, Grade III diastolic dysfunction  - Volume: Hypervolemic but improving with ongoing diuresis, net negative 22L    - Appears to be discrepancy between recorded volume loss and weights     - Weight improved to 151 from 159 on admission  -Continue Lasix gtt @ 20/hr (+ metolazone x1 on 11/2); above goal net  negative 3-5L    - Briefly stopped Lasix gtt on 11/5 to attempt oral diuresis but restarted given low output and volume excess    - Additional Lasix 120 IV on 11/5  - Strict I/Os with daily standing weights  - 11/3 Abdominal Ultrasound    - No ascites, gallbladder pathology or liver pathology    - Notable right pleural effusion    # Right Pleural Effusion  Chronic finding, noted on several previous chest xrays and again on 11/3 abdominal ultrasound. Likely secondary to chronic diastolic heart failure.   - CAPS consult for diagnostic and therapeutic thoracentesis on 11/5    - Per CAPS, fluid collection is too small to drain so will continue to monitor only    # Hypokalemia  # Hyperkalemia  Patient given IV Lasix while in OSH ED without potassium replacement. Potassium trough of 2.7 and peak of 6.3 during this admission. Extensive history of fluctuating potassium levels with inpatient diuresis.   - PTA potassium regimen of 80 mEq in morning and afternoon with 60 mEq in evening restarted    - Plan to replete potassium with solution only due to ongoing challenge repleting while on lasix drip  - BID BMP with q4h potassium while diuresing      # Chronic Dermatitis with pruritis   Per chart review, outpatient dermatology suspects atopic dermatitis vs lichen planus vs lichenoid drug eruption. Itching has been going on for the last 1-2 months. Had RUQ US last month to assess for any underlying reason - noted changes suggestive of liver cirrhosis but no discrete masses or changes in gallbladder. Per discussion with dermatology, skin changes are likely consequence of previous chemotherapy for AL amyloid.  - Improving on scheduled topical triamcinolone  - Has prn benadryl      # Paroxysmal Afib/Aflutter  [id number] Score of 4  - Continued PTA apixaban 2.5mg BID, reduced dose to hx of epistaxis and hematochezia per chart review  - Holding since 11/4 for thoracentesis on 11/5     # Chronic Lymphedema  Previously managed with  compression stockings but discontinued due to patient discomfort.   - Management with diuresis as above  - OT consulted for lymphedema management, appreciate recs  - Notable improvement in lymphedema since admission     # Hx of Prolonged QT Interval  - Admission EKG with , QTc 509  - Will monitor and plan to avoid QT prolonging medications     # CKD3  Creatinine baseline of 1.1-1.2, likely secondary to underlying cardiac function. At creatinine baseline per 11/4 BMP  - Avoiding nephrotoxic medications  - Continue to monitor     # Hx of AL Amyloidosis, monoclonal IgA lambda s/p chemotherapy  Diagnosed in 2016 with follow-up EGD and bone marrow biopsies confirming spread to stomach and bone marrow - now s/p CyBorD chemotherapy and in remission without maintenance therapy. Refer to 5/20/21 Oncology progress note by Mirela Moore for full treatment history.  - 5/11/21 Kappa Lambda Ratio 0.70, within normal limits    # Concern liver cirrhosis on OSH ultrasound, resolved  Ultrasound from OSH 9/30 with coarse echotexture consistent with cirrhosis. Her previously scheduled telemedicine visit with hepatology was reportedly canceled due to her inpatient status. ALP and TBil have been mildly elevated  - RUQ ultrasound with no findings suggestive of liver or gallbladder pathology    - Noted right pleural effusion, management as above.     Diet: Fluid restriction 2000 ML FLUID  2 Gram Sodium Diet    DVT Prophylaxis: DOAC  Marie Catheter: Not present  Fluids: Restriction   Central Lines: None  Code Status: Full Code        Disposition Plan   Expected discharge: 2 - 3 days, recommended to prior living arrangement once fluid volume status optimized on oral medication.    The patient's care was discussed with the Attending physician, Dr. Coty Cohen.     Bryant Godwin MD   Cardiology Fellow  [facility] [id number]  [facility]  ______________________________________________________________________    Interval History     NAEON. Nursing notes reviewed. Patient continues to note improvement and good urine output. We discussed attempting to move off lasix drip today and evaluate urine output. She has regular bowel movements and ongoing improvement in chronic itch.     Lasix drip restarted this afternoon due to notable drop in urine output and ongoing elevation in JVP.     Review of systems notable for mild-moderate headache, remainder of 10 point review of systems negative. Weight today 151 from 154, admission weight of 159.    Data reviewed today: I reviewed all medications, new labs and imaging results over the last 24 hours.     Physical Exam   Vital Signs: Temp: 98  F (36.7  C) Temp src: Oral BP: 121/63 Pulse: 68   Resp: 22 SpO2: 98 % O2 Device: None (Room air)    Weight: 151 lbs 7.3 oz  General Appearance: Lying comfortably, no apparent distress  Respiratory: clear to auscultation and breathing comfortably on room air   Cardiovascular: JVP at ear at 45 degrees, irregular rhythm normal rate, no murmur   GI: soft, + BS, mildly distended, non-tender to palpation today  Skin: erythematous and thickened diffusely with excoriations  MSK: Lower extremities visualized and less edematous than on previous exam but still 2+ pitting edema, warm with palpable pulses, no cyanosis    Data   Recent Labs   Lab 11/05/21  1141 11/05/21  0748 11/05/21  0016 11/04/21  1953 11/04/21  1603 11/04/21  1137 11/04/21  0758 11/04/21  0431 11/04/21  0431 11/03/21  0724 11/03/21  0438 11/02/21  1741 11/02/21  1534 11/01/21  1106 11/01/21  0422 10/31/21  2133 10/31/21  1617   WBC  --   --  5.8  --   --   --   --   --   --   --   --   --   --   --  6.7  --   --    HGB  --   --  12.0  --   --   --   --   --   --   --   --   --   --   --  11.6*  --   --    MCV  --   --  98  --   --   --   --   --   --   --   --   --   --   --  101*  --   --    PLT  --   --  270   --   --   --   --   --   --   --   --   --   --   --  220  --   --    INR  --   --  1.10  --   --   --   --   --  1.11  --  1.17*  --   --    < > 1.32*  --   --    NA  --   --  135  --  134  --   --   --  136   < > 136   < > 135   < > 135  --    < >   POTASSIUM 3.5 2.8* 3.4   < > 4.2   < > 3.0*   < > 3.2*   < > 3.0*   < > 3.5   < > 5.4*  --    < >   CHLORIDE  --   --  96  --  94  --   --   --  95   < > 98   < > 101   < > 104  --    < >   CO2  --   --  32  --  33*  --   --   --  34*   < > 30   < > 28   < > 26  --    < >   BUN  --   --  37*  --  36*  --   --   --  36*   < > 35*   < > 35*   < > 31*  --    < >   CR  --   --  1.17*  --  1.28*  --   --   --  1.16*   < > 1.17*   < > 1.16*   < > 1.14*  --    < >   ANIONGAP  --   --  7  --  7  --   --   --  7   < > 8   < > 6   < > 5  --    < >   JERROD  --   --  9.0  --  9.2  --   --   --  9.4   < > 8.7   < > 8.5   < > 9.0  --    < >   GLC  --   --  126*  --  98  --   --   --  101*   < > 84   < > 96   < > 93  --    < >   PROTTOTAL  --   --   --   --   --   --  7.2  --   --   --   --   --   --   --   --   --   --    TROPONIN  --   --   --   --   --   --   --   --   --   --   --   --  <0.015  --   --  <0.015  --     < > = values in this interval not displayed.     Recent Results (from the past 24 hour(s))   US Lower Extremity Venous Duplex Bilateral    Narrative    BILATERAL LOWER EXTREMITY DUPLEX VENOUS ULTRASOUND 11/5/2021 10:03 AM    CLINICAL HISTORY: Chronic lower extremity pain with difficulty  palpating pulses, looking to rule out DVT    COMPARISONS: Bilateral lower extremity venous duplex ultrasound  4/5/2019    REFERRING PROVIDER: VIVIAN JUNG    TECHNIQUE: Grayscale, color Doppler, Doppler waveform ultrasound  evaluation was performed through the bilateral common femoral,  femoral, and popliteal veins. Bilateral posterior tibial veins were  evaluated with grayscale, color Doppler, and compression.    FINDINGS: Right common femoral, femoral, and popliteal veins are  fully  compressible, patent, and demonstrate normal phasic Doppler waveforms.    Right posterior tibial veins are fully compressible to the ankle.    Left common femoral, femoral, and popliteal veins are fully  compressible, patent, and demonstrate normal phasic Doppler waveforms.    Left posterior tibial veins are fully compressible to the ankle.      Impression    IMPRESSION: No deep venous thrombosis demonstrated in either leg.    I have personally reviewed the examination and initial interpretation  and I agree with the findings.    [place]         SYSTEM ID:  [id number]     Medications     - MEDICATION INSTRUCTIONS -       - MEDICATION INSTRUCTIONS -       BETA BLOCKER NOT PRESCRIBED         [Held by provider] apixaban ANTICOAGULANT  2.5 mg Oral BID     calcium carbonate  500 mg Oral Once     eplerenone  25 mg Oral Daily     polyethylene glycol  17 g Oral Daily     potassium chloride  60 mEq Oral Daily     [START ON 11/6/2021] potassium chloride  80 mEq Oral 2 times per day     sodium chloride (PF)  10 mL Intracatheter Q8H     torsemide  100 mg Oral 2 times per day     triamcinolone   Topical TID

## 2021-11-05 NOTE — PROVIDER NOTIFICATION
"Called cards:   HR has been 's all night and quickly dropped to 50's. Per tele, had a couple junctional beats with HR in 30's before returning to rate of 60's. BP stable, asymptomatic. Provider came to bedside to assess patient. No new orders at this time    /60   Pulse 66   Temp 98.4  F (36.9  C) (Oral)   Resp 18   Ht 1.6 m (5' 3\")   Wt 68.7 kg (151 lb 7.3 oz)   SpO2 100%   BMI 26.83 kg/m     "

## 2021-11-05 NOTE — CONSULTS
Limited Bedside Lung Ultrasound, performed and interpreted by me.   Indication: shortness of breath    With the patient positioned upright, bilateral anterior, posterior and lateral lung fields were examined for evidence of thoracic free fluid, pulmonary consolidation, and pulmonary edema.     Findings: rt lung field posteriorly ultrasounded, not a sufficient pocket of pleural fluid to access for thoracentesis. Clinically, dysnea has improved significantly in the past day, and I am suspicious there is not enough fluid to remove even by IR.    Chanell Harkins MD   11/5/2021

## 2021-11-05 NOTE — PLAN OF CARE
"Neuro: A&Ox4.   Cardiac: SR-ST 's with frequent PAC/PVC's. HR dropped to 30's with a couple junctional beats, then back to 60's. BP stable through night. Reported some midsternal \"burning\" that resolved, no further c/o chest pain    Respiratory: Sating % on RA  GI/: Adequate urine output with lasix gtt. No BM overnight  Diet/appetite: Tolerating 2gm Na diet. Eating well. 2L fluid restriction   Activity:  Independent   Pain: At acceptable level on current regimen.   Skin: No new deficits noted.  LDA's: PIV - SL  Lasix gtt discontinued and stopped at 0630    Plan: Thoracentesis planned for today. Continue with POC. Notify primary team with changes.    "

## 2021-11-06 ENCOUNTER — APPOINTMENT (OUTPATIENT)
Dept: PHYSICAL THERAPY | Facility: CLINIC | Age: 71
DRG: 292 | End: 2021-11-06
Attending: INTERNAL MEDICINE
Payer: COMMERCIAL

## 2021-11-06 ENCOUNTER — APPOINTMENT (OUTPATIENT)
Dept: OCCUPATIONAL THERAPY | Facility: CLINIC | Age: 71
DRG: 292 | End: 2021-11-06
Attending: INTERNAL MEDICINE
Payer: COMMERCIAL

## 2021-11-06 LAB
ALBUMIN SERPL-MCNC: 3.6 G/DL (ref 3.4–5)
ALP SERPL-CCNC: 212 U/L (ref 40–150)
ALT SERPL W P-5'-P-CCNC: 24 U/L (ref 0–50)
ANION GAP SERPL CALCULATED.3IONS-SCNC: 6 MMOL/L (ref 3–14)
ANION GAP SERPL CALCULATED.3IONS-SCNC: 7 MMOL/L (ref 3–14)
ANION GAP SERPL CALCULATED.3IONS-SCNC: 7 MMOL/L (ref 3–14)
AST SERPL W P-5'-P-CCNC: 33 U/L (ref 0–45)
BILIRUB SERPL-MCNC: 1.6 MG/DL (ref 0.2–1.3)
BUN SERPL-MCNC: 40 MG/DL (ref 7–30)
BUN SERPL-MCNC: 40 MG/DL (ref 7–30)
BUN SERPL-MCNC: 41 MG/DL (ref 7–30)
CALCIUM SERPL-MCNC: 8.9 MG/DL (ref 8.5–10.1)
CALCIUM SERPL-MCNC: 8.9 MG/DL (ref 8.5–10.1)
CALCIUM SERPL-MCNC: 9 MG/DL (ref 8.5–10.1)
CHLORIDE BLD-SCNC: 95 MMOL/L (ref 94–109)
CHLORIDE BLD-SCNC: 98 MMOL/L (ref 94–109)
CHLORIDE BLD-SCNC: 99 MMOL/L (ref 94–109)
CO2 SERPL-SCNC: 28 MMOL/L (ref 20–32)
CO2 SERPL-SCNC: 29 MMOL/L (ref 20–32)
CO2 SERPL-SCNC: 31 MMOL/L (ref 20–32)
CREAT SERPL-MCNC: 1.25 MG/DL (ref 0.52–1.04)
CREAT SERPL-MCNC: 1.29 MG/DL (ref 0.52–1.04)
CREAT SERPL-MCNC: 1.3 MG/DL (ref 0.52–1.04)
ERYTHROCYTE [DISTWIDTH] IN BLOOD BY AUTOMATED COUNT: 13.6 % (ref 10–15)
GFR SERPL CREATININE-BSD FRML MDRD: 42 ML/MIN/1.73M2
GFR SERPL CREATININE-BSD FRML MDRD: 42 ML/MIN/1.73M2
GFR SERPL CREATININE-BSD FRML MDRD: 44 ML/MIN/1.73M2
GLUCOSE BLD-MCNC: 100 MG/DL (ref 70–99)
GLUCOSE BLD-MCNC: 93 MG/DL (ref 70–99)
GLUCOSE BLD-MCNC: 98 MG/DL (ref 70–99)
HCT VFR BLD AUTO: 37.6 % (ref 35–47)
HGB BLD-MCNC: 12.2 G/DL (ref 11.7–15.7)
INR PPP: 1.14 (ref 0.85–1.15)
MAGNESIUM SERPL-MCNC: 2.8 MG/DL (ref 1.6–2.3)
MCH RBC QN AUTO: 31.6 PG (ref 26.5–33)
MCHC RBC AUTO-ENTMCNC: 32.4 G/DL (ref 31.5–36.5)
MCV RBC AUTO: 97 FL (ref 78–100)
PLATELET # BLD AUTO: 276 10E3/UL (ref 150–450)
POTASSIUM BLD-SCNC: 3.3 MMOL/L (ref 3.4–5.3)
POTASSIUM BLD-SCNC: 3.3 MMOL/L (ref 3.4–5.3)
POTASSIUM BLD-SCNC: 4 MMOL/L (ref 3.4–5.3)
POTASSIUM BLD-SCNC: 4.3 MMOL/L (ref 3.4–5.3)
POTASSIUM BLD-SCNC: 4.7 MMOL/L (ref 3.4–5.3)
PROT SERPL-MCNC: 7 G/DL (ref 6.8–8.8)
RBC # BLD AUTO: 3.86 10E6/UL (ref 3.8–5.2)
SODIUM SERPL-SCNC: 132 MMOL/L (ref 133–144)
SODIUM SERPL-SCNC: 134 MMOL/L (ref 133–144)
SODIUM SERPL-SCNC: 134 MMOL/L (ref 133–144)
WBC # BLD AUTO: 6 10E3/UL (ref 4–11)

## 2021-11-06 PROCEDURE — 250N000013 HC RX MED GY IP 250 OP 250 PS 637: Performed by: STUDENT IN AN ORGANIZED HEALTH CARE EDUCATION/TRAINING PROGRAM

## 2021-11-06 PROCEDURE — 80048 BASIC METABOLIC PNL TOTAL CA: CPT | Performed by: STUDENT IN AN ORGANIZED HEALTH CARE EDUCATION/TRAINING PROGRAM

## 2021-11-06 PROCEDURE — 97110 THERAPEUTIC EXERCISES: CPT | Mod: GP

## 2021-11-06 PROCEDURE — 82040 ASSAY OF SERUM ALBUMIN: CPT | Performed by: STUDENT IN AN ORGANIZED HEALTH CARE EDUCATION/TRAINING PROGRAM

## 2021-11-06 PROCEDURE — 99233 SBSQ HOSP IP/OBS HIGH 50: CPT | Mod: GC | Performed by: INTERNAL MEDICINE

## 2021-11-06 PROCEDURE — 83735 ASSAY OF MAGNESIUM: CPT | Performed by: STUDENT IN AN ORGANIZED HEALTH CARE EDUCATION/TRAINING PROGRAM

## 2021-11-06 PROCEDURE — 97116 GAIT TRAINING THERAPY: CPT | Mod: GP

## 2021-11-06 PROCEDURE — 214N000001 HC R&B CCU UMMC

## 2021-11-06 PROCEDURE — 97530 THERAPEUTIC ACTIVITIES: CPT | Mod: GP

## 2021-11-06 PROCEDURE — 84132 ASSAY OF SERUM POTASSIUM: CPT | Performed by: STUDENT IN AN ORGANIZED HEALTH CARE EDUCATION/TRAINING PROGRAM

## 2021-11-06 PROCEDURE — 250N000013 HC RX MED GY IP 250 OP 250 PS 637: Performed by: INTERNAL MEDICINE

## 2021-11-06 PROCEDURE — 36415 COLL VENOUS BLD VENIPUNCTURE: CPT | Performed by: STUDENT IN AN ORGANIZED HEALTH CARE EDUCATION/TRAINING PROGRAM

## 2021-11-06 PROCEDURE — 85610 PROTHROMBIN TIME: CPT | Performed by: STUDENT IN AN ORGANIZED HEALTH CARE EDUCATION/TRAINING PROGRAM

## 2021-11-06 PROCEDURE — 85027 COMPLETE CBC AUTOMATED: CPT | Performed by: STUDENT IN AN ORGANIZED HEALTH CARE EDUCATION/TRAINING PROGRAM

## 2021-11-06 PROCEDURE — 97110 THERAPEUTIC EXERCISES: CPT | Mod: GO | Performed by: OCCUPATIONAL THERAPIST

## 2021-11-06 PROCEDURE — 250N000009 HC RX 250: Performed by: STUDENT IN AN ORGANIZED HEALTH CARE EDUCATION/TRAINING PROGRAM

## 2021-11-06 PROCEDURE — 80053 COMPREHEN METABOLIC PANEL: CPT | Performed by: STUDENT IN AN ORGANIZED HEALTH CARE EDUCATION/TRAINING PROGRAM

## 2021-11-06 RX ORDER — POTASSIUM CHLORIDE 750 MG/1
20 TABLET, EXTENDED RELEASE ORAL ONCE
Status: COMPLETED | OUTPATIENT
Start: 2021-11-06 | End: 2021-11-06

## 2021-11-06 RX ORDER — EPLERENONE 25 MG/1
25 TABLET, FILM COATED ORAL DAILY
Status: DISCONTINUED | OUTPATIENT
Start: 2021-11-06 | End: 2021-11-07 | Stop reason: HOSPADM

## 2021-11-06 RX ORDER — METOLAZONE 10 MG/1
10 TABLET ORAL ONCE
Status: COMPLETED | OUTPATIENT
Start: 2021-11-06 | End: 2021-11-06

## 2021-11-06 RX ORDER — POTASSIUM CHLORIDE 1500 MG/1
60 TABLET, EXTENDED RELEASE ORAL DAILY
Status: DISCONTINUED | OUTPATIENT
Start: 2021-11-06 | End: 2021-11-07 | Stop reason: HOSPADM

## 2021-11-06 RX ADMIN — POTASSIUM CHLORIDE 80 MEQ: 1500 TABLET, EXTENDED RELEASE ORAL at 09:51

## 2021-11-06 RX ADMIN — TRIAMCINOLONE ACETONIDE: 1 CREAM TOPICAL at 09:00

## 2021-11-06 RX ADMIN — EPLERENONE 25 MG: 25 TABLET, FILM COATED ORAL at 14:37

## 2021-11-06 RX ADMIN — FUROSEMIDE 20 MG/HR: 10 INJECTION, SOLUTION INTRAVENOUS at 12:56

## 2021-11-06 RX ADMIN — POTASSIUM CHLORIDE 20 MEQ: 750 TABLET, EXTENDED RELEASE ORAL at 19:11

## 2021-11-06 RX ADMIN — APIXABAN 2.5 MG: 2.5 TABLET, FILM COATED ORAL at 20:43

## 2021-11-06 RX ADMIN — METOLAZONE 10 MG: 10 TABLET ORAL at 09:52

## 2021-11-06 RX ADMIN — APIXABAN 2.5 MG: 2.5 TABLET, FILM COATED ORAL at 09:51

## 2021-11-06 RX ADMIN — POTASSIUM CHLORIDE 80 MEQ: 1500 TABLET, EXTENDED RELEASE ORAL at 13:07

## 2021-11-06 RX ADMIN — TRIAMCINOLONE ACETONIDE: 1 CREAM TOPICAL at 20:43

## 2021-11-06 RX ADMIN — TRIAMCINOLONE ACETONIDE: 1 CREAM TOPICAL at 13:07

## 2021-11-06 RX ADMIN — POTASSIUM CHLORIDE 60 MEQ: 1500 TABLET, EXTENDED RELEASE ORAL at 18:00

## 2021-11-06 ASSESSMENT — ACTIVITIES OF DAILY LIVING (ADL)
ADLS_ACUITY_SCORE: 8
ADLS_ACUITY_SCORE: 8
ADLS_ACUITY_SCORE: 12
ADLS_ACUITY_SCORE: 8
ADLS_ACUITY_SCORE: 8
ADLS_ACUITY_SCORE: 11
ADLS_ACUITY_SCORE: 8
ADLS_ACUITY_SCORE: 10
ADLS_ACUITY_SCORE: 8
ADLS_ACUITY_SCORE: 8
ADLS_ACUITY_SCORE: 11
ADLS_ACUITY_SCORE: 8
ADLS_ACUITY_SCORE: 11
ADLS_ACUITY_SCORE: 8
ADLS_ACUITY_SCORE: 8
ADLS_ACUITY_SCORE: 11

## 2021-11-06 ASSESSMENT — MIFFLIN-ST. JEOR: SCORE: 1193.13

## 2021-11-06 NOTE — PLAN OF CARE
Neuro: A&Ox4. Calm and able to follow commands. Reports headache with intermittent dizziness.   Cardiac: SR with 70s-80s. Rare PVCs and PVCs. Other VSS   Respiratory: Sating 100% on RA.  GI/: Adequate urine output via bedside commode. No BM  Diet/appetite: Tolerating  2g Na diet with 2L Fluid restriction. Eating well.  Activity:  Independent, up to bedside commode.   Pain: At acceptable level on current regimen. PRN tylenol given 1x  Skin: No new deficits noted. Lower extremities have 4+ edema. General itching.   LDA's: 1 PIV infusing Lasix gtt at 20mg/hr.     Plan: Continue with POC. Notify primary team with changes.

## 2021-11-06 NOTE — PROGRESS NOTES
[facility]    Cardiology Progress Note- Cards 2        Date of Admission:  10/28/2021     Assessment & Plan: HVSL   Leandra Guerrero is a 71 y.o. female with history of chronic diastolic heart failure/restrictive cardiomyopathy 2/2 AL amyloidosis (diagnosed in 2016, with gastric and bone marrow involvement only, s/p CyBorD chemotherapy - in remission) admitted from outside hospital on 10/28/21 with 1 week of shortness of breath and 13 pound weight gain. Inpatient management with IV diuresis only with significant improvement in patient symptoms and weight. Plan to continue IV lasix drip with transition to oral regimen within 1-2 days.      Updates today:  - Continue lasix gtt  - Additional 10mg Metolazone added  - Restarted Eplerenone 25mg  - Continue PTA potassium replacement regimen     # Acute on chronic diastolic heart failure/restrictive cardiomyopathy, NYHA III, Stage C  # Cardiac Amyloidosis  Patient admitted as direct admission from outside hospital for ongoing hypervolemia and shortness of breath despite outpatient PO diuretics following recent travel and outpatient medication changes. Admission weight of 159 with estimated dry weight of 147-150 pounds. Of note, cardiac amyloidosis diagnosed per 2016 Echo with bi-atrial enlargement and severe left ventricular enlargement with low voltage on EKG. Followed by Dr. Cohen outpatient with follow-up in [facility] on 11/10/21.  2/4/21 Cardiac Echo with EF 65-70%, moderate aortic and tricuspid insufficiency, mod LV wall thickening, Grade III diastolic dysfunction  - Volume: Hypervolemic but improving with ongoing diuresis, net negative 23L    - Appears to be discrepancy between recorded volume loss and weights  -Continue Lasix gtt @ 20/hr (+ metolazone x1 on 11/2, x1 on 11/6); above goal net negative 3-5L    - Briefly stopped Lasix gtt on 11/5 to attempt oral diuresis but restarted given low output    -  Additional Lasix 120 IV on 11/5  - Strict I/Os with daily standing weights  - 11/3 Abdominal Ultrasound    - No ascites, gallbladder pathology or liver pathology    - Notable right pleural effusion    # Right Pleural Effusion  Chronic finding, noted on several previous chest xrays and again on 11/3 abdominal ultrasound. Likely secondary to chronic diastolic heart failure.   - CAPS consult for diagnostic and therapeutic thoracentesis on 11/5    - Per CAPS, fluid collection is too small to drain so will continue to monitor only    # Hypokalemia  # Hyperkalemia  Patient given IV Lasix while in OSH ED without potassium replacement. Potassium trough of 2.7 and peak of 6.3 during this admission. Extensive history of fluctuating potassium levels with inpatient diuresis.   - PTA potassium regimen of 80 mEq in morning and afternoon with 60 mEq in evening restarted    - Plan to replete potassium with solution only due to ongoing challenge repleting while on lasix drip  - BID BMP while diuresing     # Chronic Dermatitis with pruritis   Per chart review, outpatient dermatology suspects atopic dermatitis vs lichen planus vs lichenoid drug eruption. Itching has been going on for the last 1-2 months. Had RUQ US last month to assess for any underlying reason - noted changes suggestive of liver cirrhosis but no discrete masses or changes in gallbladder. Per discussion with dermatology, skin changes are likely consequence of previous chemotherapy for AL amyloid.  - Improving on scheduled topical triamcinolone  - Has prn benadryl      # Paroxysmal Afib/Aflutter  [id number] Score of 4  - Continued PTA apixaban 2.5mg BID, reduced dose to hx of epistaxis and hematochezia per chart review  - Holding since 11/4 for thoracentesis on 11/5     # Chronic Lymphedema  Previously managed with compression stockings but discontinued due to patient discomfort.   - Management with diuresis as above  - OT consulted for lymphedema management,  appreciate recs  - Notable improvement in lymphedema since admission     # Hx of Prolonged QT Interval  - Admission EKG with , QTc 509  - Will monitor and plan to avoid QT prolonging medications     # CKD3  Creatinine baseline of 1.1-1.2, likely secondary to underlying cardiac function. At creatinine baseline per 11/4 BMP  - Avoiding nephrotoxic medications  - Continue to monitor     # Hx of AL Amyloidosis, monoclonal IgA lambda s/p chemotherapy  Diagnosed in 2016 with follow-up EGD and bone marrow biopsies confirming spread to stomach and bone marrow - now s/p CyBorD chemotherapy and in remission without maintenance therapy. Refer to 5/20/21 Oncology progress note by Mirela Moore for full treatment history.  - 5/11/21 Kappa Lambda Ratio 0.70, within normal limits    # Concern liver cirrhosis on OSH ultrasound, resolved  Ultrasound from OSH 9/30 with coarse echotexture consistent with cirrhosis. Her previously scheduled telemedicine visit with hepatology was reportedly canceled due to her inpatient status. ALP and TBil have been mildly elevated  - RUQ ultrasound with no findings suggestive of liver or gallbladder pathology    - Noted right pleural effusion, management as above.     Diet: Fluid restriction 2000 ML FLUID  2 Gram Sodium Diet    DVT Prophylaxis: DOAC  Marie Catheter: Not present  Fluids: Restriction   Central Lines: None  Code Status: Full Code        Disposition Plan   Expected discharge: 2 - 3 days, recommended to prior living arrangement once fluid volume status optimized on oral medication.    The patient's care was discussed with the Attending physician, Dr. Coty Cohen.     Bryant Godwin MD   Cardiology Fellow  [facility] [id number]  [facility]  ______________________________________________________________________    Interval History     NAEON. Nursing notes reviewed. Patient continues to note good urine output but reduced from earlier this  admission when on metolazone. We discussed with her this morning and agreed to retry the metolazone in addition to her home Epleronone and lasix. Plan to continue diuresis until euvolemic on exam with JVP < 12 and closer to dry weight of 150.     Review of systems notable for mild-moderate headache, remainder of 10 point review of systems negative. Weight today 154 from 151, admission weight of 159.    Data reviewed today: I reviewed all medications, new labs and imaging results over the last 24 hours.     Physical Exam   Vital Signs: Temp: 98.2  F (36.8  C) Temp src: Oral BP: (!) 152/70 Pulse: 70   Resp: 18 SpO2: 100 % O2 Device: None (Room air)    Weight: 154 lbs 1.62 oz  General Appearance: Lying comfortably, no apparent distress  Respiratory: clear to auscultation and breathing comfortably on room air   Cardiovascular: JVP at mid neck at 45 degrees, irregular rhythm normal rate, no murmur   GI: soft, + BS, mildly distended, non-tender to palpation today  Skin: erythematous and thickened diffusely with excoriations  MSK: Lower extremities visualized and less edematous than on previous exam but still edematous, warm with palpable pulses, no cyanosis    Data   Recent Labs   Lab 11/06/21  0428 11/05/21  2337 11/05/21 2007 11/05/21  1850 11/05/21  1555 11/05/21  0748 11/05/21  0016 11/04/21  1137 11/04/21  0758 11/04/21  0431 11/04/21  0431 11/02/21  1741 11/02/21  1534 11/01/21  1106 11/01/21  0422 10/31/21  2133 10/31/21  1617   WBC 6.0  --   --   --   --   --  5.8  --   --   --   --   --   --   --  6.7  --   --    HGB 12.2  --   --   --   --   --  12.0  --   --   --   --   --   --   --  11.6*  --   --    MCV 97  --   --   --   --   --  98  --   --   --   --   --   --   --  101*  --   --      --   --   --   --   --  270  --   --   --   --   --   --   --  220  --   --    INR 1.14  --   --   --   --   --  1.10  --   --   --  1.11   < >  --    < > 1.32*  --   --      --   --   --  133  --  135   < >  --    --  136   < > 135   < > 135  --    < >   POTASSIUM 4.3  4.3 4.7 4.4   < > 4.0   < > 3.4   < > 3.0*   < > 3.2*   < > 3.5   < > 5.4*  --    < >   CHLORIDE 98  --   --   --  95  --  96   < >  --   --  95   < > 101   < > 104  --    < >   CO2 29  --   --   --  33*  --  32   < >  --   --  34*   < > 28   < > 26  --    < >   BUN 41*  --   --   --  34*  --  37*   < >  --   --  36*   < > 35*   < > 31*  --    < >   CR 1.30*  --   --   --  1.25*  --  1.17*   < >  --   --  1.16*   < > 1.16*   < > 1.14*  --    < >   ANIONGAP 7  --   --   --  5  --  7   < >  --   --  7   < > 6   < > 5  --    < >   JERROD 9.0  --   --   --  8.7  --  9.0   < >  --   --  9.4   < > 8.5   < > 9.0  --    < >   *  --   --   --  94  --  126*   < >  --   --  101*   < > 96   < > 93  --    < >   PROTTOTAL  --   --   --   --   --   --   --   --  7.2  --   --   --   --   --   --   --   --    TROPONIN  --   --   --   --   --   --   --   --   --   --   --   --  <0.015  --   --  <0.015  --     < > = values in this interval not displayed.     Recent Results (from the past 24 hour(s))   US Lower Extremity Venous Duplex Bilateral    Narrative    BILATERAL LOWER EXTREMITY DUPLEX VENOUS ULTRASOUND 11/5/2021 10:03 AM    CLINICAL HISTORY: Chronic lower extremity pain with difficulty  palpating pulses, looking to rule out DVT    COMPARISONS: Bilateral lower extremity venous duplex ultrasound  4/5/2019    REFERRING PROVIDER: VIVIAN JUNG    TECHNIQUE: Grayscale, color Doppler, Doppler waveform ultrasound  evaluation was performed through the bilateral common femoral,  femoral, and popliteal veins. Bilateral posterior tibial veins were  evaluated with grayscale, color Doppler, and compression.    FINDINGS: Right common femoral, femoral, and popliteal veins are fully  compressible, patent, and demonstrate normal phasic Doppler waveforms.    Right posterior tibial veins are fully compressible to the ankle.    Left common femoral, femoral, and popliteal veins are  fully  compressible, patent, and demonstrate normal phasic Doppler waveforms.    Left posterior tibial veins are fully compressible to the ankle.      Impression    IMPRESSION: No deep venous thrombosis demonstrated in either leg.    I have personally reviewed the examination and initial interpretation  and I agree with the findings.    [place] VALDIVIA         SYSTEM ID:  [id number]   POC US Guide for Thorcentesis    Impression    rt lung field posteriorly ultrasounded, not a sufficient pocket of pleural fluid to access for thoracentesis. Clinically, dysnea has improved significantly in the past day, and I am suspicious there is not enough fluid to remove even by IR.     Medications     - MEDICATION INSTRUCTIONS -       furosemide 20 mg/hr (11/06/21 4710)     - MEDICATION INSTRUCTIONS -       BETA BLOCKER NOT PRESCRIBED         apixaban ANTICOAGULANT  2.5 mg Oral BID     calcium carbonate  500 mg Oral Once     polyethylene glycol  17 g Oral Daily     potassium chloride  60 mEq Oral Daily     potassium chloride  80 mEq Oral 2 times per day     sodium chloride (PF)  10 mL Intracatheter Q8H     triamcinolone   Topical TID

## 2021-11-06 NOTE — PLAN OF CARE
Care Provided 07:00 - 19:00    Neuro: A&Ox4.   Cardiac: SR with HR 70s-80s. Rare PVCs and PVCs. Other VSS.   Respiratory: Sating >98% on RA.  GI/: Adequate urine output via toilet. BM X2  Diet/appetite: Tolerating 2g Na diet with 2L FR. Eating well.  Activity:  Up independently in room.  Pain: At acceptable level on current regimen. PRN Tylenol given x1 for headache.   Skin: No new deficits noted.  LDA's: RPIV with Lasix gtt @ 20 mg/hr.     -BLE US completed today.   -Trending K q4h.   -Lasix gtt re-started this anselmo.   -no Thoracentesis today.     Plan: Continue with POC. Notify primary team with changes.

## 2021-11-06 NOTE — PLAN OF CARE
Care Provided 07:00 - 18:30    Neuro: A&Ox4.   Cardiac: SR with HR 70s-80s. Rare PVCs. Other VSS.   Respiratory: Sating >98% on RA.  GI/: Adequate urine output via toilet. BM X2  Diet/appetite: Tolerating 2g Na diet with 2L FR. Eating well.  Activity:  Up independently in room and to bathroom.  Pain: Pt denied any pain.   Skin: No new deficits noted. Kenalog cream to itchy areas.  LDA's: RPIV with Lasix gtt @ 20 mg/hr.     - trending K BID.        Plan: Transferred to  at 18:30. Continue with POC. Notify primary team with changes

## 2021-11-07 VITALS
RESPIRATION RATE: 18 BRPM | SYSTOLIC BLOOD PRESSURE: 113 MMHG | OXYGEN SATURATION: 97 % | HEART RATE: 68 BPM | HEIGHT: 63 IN | DIASTOLIC BLOOD PRESSURE: 62 MMHG | TEMPERATURE: 97.8 F | WEIGHT: 150 LBS | BODY MASS INDEX: 26.58 KG/M2

## 2021-11-07 LAB
ANION GAP SERPL CALCULATED.3IONS-SCNC: 8 MMOL/L (ref 3–14)
BUN SERPL-MCNC: 42 MG/DL (ref 7–30)
CALCIUM SERPL-MCNC: 9.5 MG/DL (ref 8.5–10.1)
CHLORIDE BLD-SCNC: 93 MMOL/L (ref 94–109)
CO2 SERPL-SCNC: 32 MMOL/L (ref 20–32)
CREAT SERPL-MCNC: 1.18 MG/DL (ref 0.52–1.04)
GFR SERPL CREATININE-BSD FRML MDRD: 47 ML/MIN/1.73M2
GLUCOSE BLD-MCNC: 83 MG/DL (ref 70–99)
INR PPP: 1.13 (ref 0.85–1.15)
MAGNESIUM SERPL-MCNC: 2.9 MG/DL (ref 1.6–2.3)
POTASSIUM BLD-SCNC: 3 MMOL/L (ref 3.4–5.3)
POTASSIUM BLD-SCNC: 3.4 MMOL/L (ref 3.4–5.3)
POTASSIUM BLD-SCNC: 4 MMOL/L (ref 3.4–5.3)
SODIUM SERPL-SCNC: 133 MMOL/L (ref 133–144)

## 2021-11-07 PROCEDURE — 250N000013 HC RX MED GY IP 250 OP 250 PS 637: Performed by: STUDENT IN AN ORGANIZED HEALTH CARE EDUCATION/TRAINING PROGRAM

## 2021-11-07 PROCEDURE — 84132 ASSAY OF SERUM POTASSIUM: CPT | Performed by: STUDENT IN AN ORGANIZED HEALTH CARE EDUCATION/TRAINING PROGRAM

## 2021-11-07 PROCEDURE — 36415 COLL VENOUS BLD VENIPUNCTURE: CPT | Performed by: STUDENT IN AN ORGANIZED HEALTH CARE EDUCATION/TRAINING PROGRAM

## 2021-11-07 PROCEDURE — 36415 COLL VENOUS BLD VENIPUNCTURE: CPT | Performed by: INTERNAL MEDICINE

## 2021-11-07 PROCEDURE — 85610 PROTHROMBIN TIME: CPT | Performed by: STUDENT IN AN ORGANIZED HEALTH CARE EDUCATION/TRAINING PROGRAM

## 2021-11-07 PROCEDURE — 83735 ASSAY OF MAGNESIUM: CPT | Performed by: STUDENT IN AN ORGANIZED HEALTH CARE EDUCATION/TRAINING PROGRAM

## 2021-11-07 PROCEDURE — 84132 ASSAY OF SERUM POTASSIUM: CPT | Performed by: INTERNAL MEDICINE

## 2021-11-07 PROCEDURE — 80048 BASIC METABOLIC PNL TOTAL CA: CPT | Performed by: INTERNAL MEDICINE

## 2021-11-07 RX ORDER — POTASSIUM CHLORIDE 750 MG/1
20 TABLET, EXTENDED RELEASE ORAL ONCE
Status: DISCONTINUED | OUTPATIENT
Start: 2021-11-07 | End: 2021-11-07

## 2021-11-07 RX ORDER — POTASSIUM CHLORIDE 20MEQ/15ML
60 LIQUID (ML) ORAL DAILY
Status: DISCONTINUED | OUTPATIENT
Start: 2021-11-07 | End: 2021-11-07 | Stop reason: HOSPADM

## 2021-11-07 RX ORDER — TORSEMIDE 100 MG/1
TABLET ORAL
Qty: 90 TABLET | Refills: 1 | Status: SHIPPED | OUTPATIENT
Start: 2021-11-07 | End: 2022-01-17

## 2021-11-07 RX ORDER — POTASSIUM CHLORIDE 20MEQ/15ML
40 LIQUID (ML) ORAL ONCE
Status: DISCONTINUED | OUTPATIENT
Start: 2021-11-07 | End: 2021-11-07

## 2021-11-07 RX ORDER — POTASSIUM CHLORIDE 1500 MG/1
80 TABLET, EXTENDED RELEASE ORAL 3 TIMES DAILY
Qty: 90 TABLET | Refills: 1 | Status: SHIPPED | OUTPATIENT
Start: 2021-11-07 | End: 2021-11-10

## 2021-11-07 RX ORDER — POTASSIUM CHLORIDE 20MEQ/15ML
60 LIQUID (ML) ORAL ONCE
Status: COMPLETED | OUTPATIENT
Start: 2021-11-07 | End: 2021-11-07

## 2021-11-07 RX ADMIN — EPLERENONE 25 MG: 25 TABLET, FILM COATED ORAL at 08:25

## 2021-11-07 RX ADMIN — POTASSIUM CHLORIDE 60 MEQ: 20 SOLUTION ORAL at 01:29

## 2021-11-07 RX ADMIN — ACETAMINOPHEN 325 MG: 325 TABLET, FILM COATED ORAL at 11:46

## 2021-11-07 RX ADMIN — POTASSIUM CHLORIDE 80 MEQ: 1500 TABLET, EXTENDED RELEASE ORAL at 08:27

## 2021-11-07 RX ADMIN — POTASSIUM CHLORIDE 60 MEQ: 40 SOLUTION ORAL at 09:37

## 2021-11-07 RX ADMIN — TRIAMCINOLONE ACETONIDE: 1 CREAM TOPICAL at 14:16

## 2021-11-07 RX ADMIN — TRIAMCINOLONE ACETONIDE: 1 CREAM TOPICAL at 08:29

## 2021-11-07 RX ADMIN — TORSEMIDE 150 MG: 100 TABLET ORAL at 08:23

## 2021-11-07 RX ADMIN — ACETAMINOPHEN 325 MG: 325 TABLET, FILM COATED ORAL at 14:15

## 2021-11-07 RX ADMIN — APIXABAN 2.5 MG: 2.5 TABLET, FILM COATED ORAL at 08:27

## 2021-11-07 RX ADMIN — POTASSIUM CHLORIDE 80 MEQ: 1500 TABLET, EXTENDED RELEASE ORAL at 10:57

## 2021-11-07 ASSESSMENT — ACTIVITIES OF DAILY LIVING (ADL)
ADLS_ACUITY_SCORE: 11
ADLS_ACUITY_SCORE: 11
ADLS_ACUITY_SCORE: 9
ADLS_ACUITY_SCORE: 11
ADLS_ACUITY_SCORE: 9
ADLS_ACUITY_SCORE: 11

## 2021-11-07 ASSESSMENT — MIFFLIN-ST. JEOR: SCORE: 1174.53

## 2021-11-07 NOTE — PROGRESS NOTES
Transfer  Transferred to: 6C  Via: ambulated with SBA  Reason for transfer: Pt no longer appropriate for 6B- improved  patient condition  Family: Aware of transfer  Belongings: Packed and sent with pt  Chart: Delivered with pt to next unit  Medications: Meds sent to new unit with pt  Report given to: Magdi CHERRY  Pt status:   A&Ox4, VSS, Sat >98 on RA. Denied pain. Lasix gtt running @ 20 mg/hr.

## 2021-11-07 NOTE — PLAN OF CARE
D: Patient admitted 10/28 from OSH for fluid overload. Hx. 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  I/A: A&Ox4. VSSA on RA. PELAEZ. SR 60s-80s. Denies pain. Lasix gtt infusing at 20 mg/hr. K+ replaced overnight w/one time orders (per MD KCL solution not tablets). Good uop. SBA. Appeared to rest comfortably overnight.   P: Continue to monitor and notify Cards 2 with questions/concerns.

## 2021-11-07 NOTE — PROGRESS NOTES
-Pt's potassium level raised to 4.0 after replacement was given in the morning.  -Pt received orders that were carried out to discharge to home.   -Pt up ad-philly with safe judgement.  -Pt reviewed and understood her discharge orders, instructions, follow-up appointments and prescriptions.  -Pt to be transported home by her .

## 2021-11-08 DIAGNOSIS — I50.33 ACUTE ON CHRONIC DIASTOLIC HEART FAILURE (H): Primary | ICD-10-CM

## 2021-11-08 NOTE — PLAN OF CARE
Occupational Therapy Discharge Summary    Reason for therapy discharge:    Discharged to home with outpatient therapy.    Progress towards therapy goal(s). See goals on Care Plan in [facility] electronic health record for goal details.  Goals partially met.  Barriers to achieving goals:   discharge from facility.    Therapy recommendation(s):    Continued therapy is recommended.  Rationale/Recommendations:  Pt would benefit from continued therapy to progress functional strength and endurance needed for all ADLs and IADLs.

## 2021-11-10 ENCOUNTER — OFFICE VISIT (OUTPATIENT)
Dept: CARDIOLOGY | Facility: CLINIC | Age: 71
End: 2021-11-10
Attending: NURSE PRACTITIONER
Payer: COMMERCIAL

## 2021-11-10 ENCOUNTER — LAB (OUTPATIENT)
Dept: LAB | Facility: CLINIC | Age: 71
End: 2021-11-10
Payer: COMMERCIAL

## 2021-11-10 VITALS
BODY MASS INDEX: 27 KG/M2 | SYSTOLIC BLOOD PRESSURE: 114 MMHG | DIASTOLIC BLOOD PRESSURE: 60 MMHG | WEIGHT: 152.4 LBS | OXYGEN SATURATION: 99 % | HEIGHT: 63 IN | HEART RATE: 54 BPM

## 2021-11-10 DIAGNOSIS — I50.33 ACUTE ON CHRONIC DIASTOLIC HEART FAILURE (H): ICD-10-CM

## 2021-11-10 DIAGNOSIS — E85.81 AL AMYLOIDOSIS (H): ICD-10-CM

## 2021-11-10 DIAGNOSIS — R11.0 NAUSEA: Primary | ICD-10-CM

## 2021-11-10 LAB
ALBUMIN SERPL-MCNC: 3.8 G/DL (ref 3.4–5)
ALP SERPL-CCNC: 255 U/L (ref 40–150)
ALT SERPL W P-5'-P-CCNC: 38 U/L (ref 0–50)
ANION GAP SERPL CALCULATED.3IONS-SCNC: 6 MMOL/L (ref 3–14)
AST SERPL W P-5'-P-CCNC: 40 U/L (ref 0–45)
BASOPHILS # BLD AUTO: 0.1 10E3/UL (ref 0–0.2)
BASOPHILS NFR BLD AUTO: 1 %
BILIRUB SERPL-MCNC: 1.5 MG/DL (ref 0.2–1.3)
BUN SERPL-MCNC: 52 MG/DL (ref 7–30)
CALCIUM SERPL-MCNC: 9.1 MG/DL (ref 8.5–10.1)
CHLORIDE BLD-SCNC: 98 MMOL/L (ref 94–109)
CO2 SERPL-SCNC: 28 MMOL/L (ref 20–32)
CREAT SERPL-MCNC: 1.49 MG/DL (ref 0.52–1.04)
EOSINOPHIL # BLD AUTO: 1.3 10E3/UL (ref 0–0.7)
EOSINOPHIL NFR BLD AUTO: 19 %
ERYTHROCYTE [DISTWIDTH] IN BLOOD BY AUTOMATED COUNT: 13.4 % (ref 10–15)
GFR SERPL CREATININE-BSD FRML MDRD: 36 ML/MIN/1.73M2
GLUCOSE BLD-MCNC: 70 MG/DL (ref 70–99)
HCT VFR BLD AUTO: 39.7 % (ref 35–47)
HGB BLD-MCNC: 12.9 G/DL (ref 11.7–15.7)
IGA SERPL-MCNC: 69 MG/DL (ref 84–499)
IMM GRANULOCYTES # BLD: 0 10E3/UL
IMM GRANULOCYTES NFR BLD: 1 %
KAPPA LC FREE SER-MCNC: 2.67 MG/DL (ref 0.33–1.94)
KAPPA LC FREE/LAMBDA FREE SER NEPH: 0.44 {RATIO} (ref 0.26–1.65)
LAMBDA LC FREE SERPL-MCNC: 6.06 MG/DL (ref 0.57–2.63)
LYMPHOCYTES # BLD AUTO: 0.3 10E3/UL (ref 0.8–5.3)
LYMPHOCYTES NFR BLD AUTO: 4 %
MCH RBC QN AUTO: 31.2 PG (ref 26.5–33)
MCHC RBC AUTO-ENTMCNC: 32.5 G/DL (ref 31.5–36.5)
MCV RBC AUTO: 96 FL (ref 78–100)
MONOCYTES # BLD AUTO: 0.8 10E3/UL (ref 0–1.3)
MONOCYTES NFR BLD AUTO: 12 %
NEUTROPHILS # BLD AUTO: 4.1 10E3/UL (ref 1.6–8.3)
NEUTROPHILS NFR BLD AUTO: 63 %
NRBC # BLD AUTO: 0 10E3/UL
NRBC BLD AUTO-RTO: 0 /100
PLATELET # BLD AUTO: 266 10E3/UL (ref 150–450)
POTASSIUM BLD-SCNC: 6 MMOL/L (ref 3.4–5.3)
PROT SERPL-MCNC: 7.3 G/DL (ref 6.8–8.8)
RBC # BLD AUTO: 4.13 10E6/UL (ref 3.8–5.2)
SODIUM SERPL-SCNC: 132 MMOL/L (ref 133–144)
TOTAL PROTEIN SERUM FOR ELP: 7 G/DL (ref 6.8–8.8)
WBC # BLD AUTO: 6.5 10E3/UL (ref 4–11)

## 2021-11-10 PROCEDURE — 85025 COMPLETE CBC W/AUTO DIFF WBC: CPT | Performed by: PATHOLOGY

## 2021-11-10 PROCEDURE — 80053 COMPREHEN METABOLIC PANEL: CPT | Performed by: PATHOLOGY

## 2021-11-10 PROCEDURE — 86334 IMMUNOFIX E-PHORESIS SERUM: CPT | Mod: TC | Performed by: INTERNAL MEDICINE

## 2021-11-10 PROCEDURE — 99215 OFFICE O/P EST HI 40 MIN: CPT | Performed by: NURSE PRACTITIONER

## 2021-11-10 PROCEDURE — G0463 HOSPITAL OUTPT CLINIC VISIT: HCPCS

## 2021-11-10 PROCEDURE — 86334 IMMUNOFIX E-PHORESIS SERUM: CPT | Mod: 26 | Performed by: PATHOLOGY

## 2021-11-10 PROCEDURE — 84165 PROTEIN E-PHORESIS SERUM: CPT | Mod: TC | Performed by: PATHOLOGY

## 2021-11-10 PROCEDURE — 250N000011 HC RX IP 250 OP 636: Performed by: NURSE PRACTITIONER

## 2021-11-10 PROCEDURE — 83883 ASSAY NEPHELOMETRY NOT SPEC: CPT | Performed by: INTERNAL MEDICINE

## 2021-11-10 PROCEDURE — 82784 ASSAY IGA/IGD/IGG/IGM EACH: CPT | Performed by: INTERNAL MEDICINE

## 2021-11-10 PROCEDURE — 84165 PROTEIN E-PHORESIS SERUM: CPT | Mod: 26 | Performed by: PATHOLOGY

## 2021-11-10 PROCEDURE — 36415 COLL VENOUS BLD VENIPUNCTURE: CPT | Performed by: PATHOLOGY

## 2021-11-10 PROCEDURE — 84155 ASSAY OF PROTEIN SERUM: CPT | Performed by: INTERNAL MEDICINE

## 2021-11-10 RX ORDER — ONDANSETRON 4 MG/1
4 TABLET, ORALLY DISINTEGRATING ORAL EVERY 6 HOURS PRN
Status: DISCONTINUED | OUTPATIENT
Start: 2021-11-10 | End: 2021-11-10

## 2021-11-10 RX ORDER — ONDANSETRON 4 MG/1
4 TABLET, ORALLY DISINTEGRATING ORAL ONCE
Status: COMPLETED | OUTPATIENT
Start: 2021-11-10 | End: 2021-11-10

## 2021-11-10 RX ORDER — POTASSIUM CHLORIDE 1500 MG/1
40 TABLET, EXTENDED RELEASE ORAL 2 TIMES DAILY
Qty: 180 TABLET | Refills: 1 | Status: ON HOLD | OUTPATIENT
Start: 2021-11-10 | End: 2021-11-14

## 2021-11-10 RX ADMIN — ONDANSETRON 4 MG: 4 TABLET, ORALLY DISINTEGRATING ORAL at 07:41

## 2021-11-10 ASSESSMENT — MIFFLIN-ST. JEOR: SCORE: 1185.41

## 2021-11-10 ASSESSMENT — PAIN SCALES - GENERAL: PAINLEVEL: NO PAIN (0)

## 2021-11-10 NOTE — LETTER
"11/10/2021      RE: Leandra Guerrero  [address] [place] [address]       Dear Colleague,    Thank you for the opportunity to participate in the care of your patient, Leandra Guerrero, at the [facility] [place] [facility]. Please see a copy of my visit note below.    Advanced Heart Failure Clinic -- [facility] Follow Up  November 10, 2021    HPI:   Ms. Leandra Guerrero is a 71yr old female with a history of stage IV AL cardiac amyloidosis (diagnosed in 2016, with GI and bone marrow involvement, s/p CyBorD chemotherapy and excellent light chain response by serum, now in remission, on chronic doxycycline), restrictive cardiomyopathy/HFpEF (LVEF 65-70% per TTE 10/31/21), pAFib/FL, and CKD who [place] [facility] [place].    She presented to [facility] ED 10/28 with 1 week of dyspnea and 13# weight gain, where she was diuresed then transferred to [facility] for continued care.  She was then diuresed from an admission weight of 159# --> 150#, and her PTA torsemide was increased to 150mg twice daily.  She ultimately discharged 11/5, and presents today for follow-up.    Since discharge, she states that she has not felt great.  She has been nauseated since discharge, noting that her stomach feels \"icky.\"  She has been able to eat regularly, noting that she had some toast this morning, and food has been staying down.  She denies vomiting, diarrhea, and constipation.  She has been taking her potassium with food.  She is hydrating okay, trying not to drink too much.  She is unsure how much fluid she is drinking in a day.  She has been eating a lot of ice chips, and reports only drinking water when she takes medications.  Her weight today on her home scale is 154#, up from 150# on Monday.  She is wondering if she needs any metolazone.  She is not sure if she is feeling any fluid retention.  She notes that her stomach always " feels bloated.  She is wearing her lower extremity compression stockings, and cannot tell if her legs are any worse.  She has noted less urine output, stating that she measures her urine output at home and has only been getting 200-300mL each void, which is down from her usual 600.  She is not sleeping well, which is not a new issue.  Her breathing has been labored when walking, and is worse today.  She was working with rehab therapies while she was hospitalized, which felt okay but did make her tired.  She does feel worse now than when compared to when she was in the hospital.  Yesterday, she was able to do some laundry and climb stairs, which she was able to do okay.  She denies PND and orthopnea.  She has ongoing tenderness over her left chest, which was evaluated while she was hospitalized, and unrevealing.  The tenderness has not improved.  She has ongoing headaches, for which she takes Tylenol, with relief.  She is not checking her blood pressures at home.  She has ongoing dizziness, which predated her hospitalization, and she feels like she loses her balance when she is not moving.  She is unsure if this is worse, but is wondering if it could be due to poor strength.  She has not fallen.  She always feels cold.  She otherwise denies palpitations, acute vision changes, fevers, cough, sore throat, and signs of bleeding.    PAST MEDICAL HISTORY:  Past Medical History:   Diagnosis Date     AL amyloidosis (H)      Atrial fibrillation and flutter (H)      Cardiac amyloidosis (H)      Lymphedema      QT prolongation      Recurrent right pleural effusion 1/2/2017     SVT (supraventricular tachycardia) (H)        CURRENT MEDICATIONS:  Current Outpatient Medications   Medication Sig Dispense Refill     Acetaminophen (TYLENOL PO) Take 325 mg by mouth every 6 hours as needed for mild pain or fever        apixaban ANTICOAGULANT (ELIQUIS) 2.5 MG tablet Take 1 tablet (2.5 mg) by mouth 2 times daily 180 tablet 3      camphor-menthol (DERMASARRA) 0.5-0.5 % external lotion Apply 1 mL topically every 6 hours as needed for skin care 222 mL 0     doxepin (SINEQUAN) 10 MG capsule Take 10 mg by mouth At Bedtime       eplerenone (INSPRA) 25 MG tablet Take 1 tablet (25 mg) by mouth daily 30 tablet 1     hydrOXYzine (ATARAX) 25 MG tablet Take 1 tablet (25 mg) by mouth 3 times daily as needed for itching 30 tablet 0     LORazepam (ATIVAN) 0.5 MG tablet Take 1 tablet (0.5 mg) by mouth every 6 hours as needed for anxiety or nausea 60 tablet 0     metolazone (ZAROXOLYN) 2.5 MG tablet Take 1 tablet (2.5 mg) by mouth twice a week As needed for increased swelling and weight gain of 3 lb in one day or 5 lb in one week 26 tablet 3     ondansetron (ZOFRAN-ODT) 8 MG ODT tab Take 8 mg by mouth every 8 hours as needed PRN       potassium chloride ER (KLOR-CON M) 20 MEQ CR tablet Take 4 tablets (80 mEq) by mouth 3 times daily 90 tablet 1     torsemide (DEMADEX) 100 MG tablet Take 1.5 tablets (150 mg) by mouth every morning AND 1.5 tablets (150 mg) daily at 2 pm. 90 tablet 1     triamcinolone (KENALOG) 0.1 % external cream Apply a thin layer twice daily to itchy areas as needed. 453.6 g 3       PAST SURGICAL HISTORY:  No past surgical history on file.    ALLERGIES     Allergies   Allergen Reactions     Contrast Dye Shortness Of Breath     Shaking,chills and dypsnea     Cytoxan [Cyclophosphamide]      Hemorrhagic cystitis     Gabapentin      Slurred speech, weakness     Levofloxacin      Severe shaking and abdominal pain     Aaron Weed      Rash and itchyness     Amoxicillin Nausea and Vomiting and Cramps     Spironolactone      Worsening hyponatremia, palpitations     Chlorhexidine Dermatitis and Rash     AARON wipes  2% chlorhexidine gluconate   Rash to everywhere wipe was applied        FAMILY HISTORY:  Family History   Problem Relation Age of Onset     Other - See Comments Sister         Amyloidosis       SOCIAL HISTORY:  Social History  "    Socioeconomic History     Marital status:      Spouse name: Not on file     Number of children: Not on file     Years of education: Not on file     Highest education level: Not on file   Occupational History     Not on file   Tobacco Use     Smoking status: Never Smoker     Smokeless tobacco: Never Used   Substance and Sexual Activity     Alcohol use: No     Drug use: No     Sexual activity: Not on file   Other Topics Concern     Parent/sibling w/ CABG, MI or angioplasty before 65F 55M? Not Asked   Social History Narrative     Not on file     Social Determinants of Health     Financial Resource Strain: Not on file   Food Insecurity: Not on file   Transportation Needs: Not on file   Physical Activity: Not on file   Stress: Not on file   Social Connections: Not on file   Intimate Partner Violence: Not on file   Housing Stability: Not on file       ROS:   Constitutional: No fever, chills, or sweats.  Weight gain since hospital discharge  ENT: No visual disturbance, ear ache, epistaxis, sore throat  Allergies/Immunologic: Negative.   Respiratory: As per HPI  Cardiovascular: As per HPI  GI: As per HPI  : No urinary frequency, dysuria, or hematuria  Integument: Negative  Psychiatric: Negative  Neuro: Negative  Endocrinology: Negative   Musculoskeletal: As per HPI    EXAM:  /60 (BP Location: Right arm, Patient Position: Chair, Cuff Size: Adult Regular)   Pulse 54   Ht 1.6 m (5' 3\")   Wt 69.1 kg (152 lb 6.4 oz)   SpO2 99%   BMI 27.00 kg/m    In general, the patient is a pleasant female in no apparent distress.    HEENT: NC/AT.  PERRLA.  EOMI.  Sclerae white, not injected.  Nares clear.  Pharynx without erythema or exudate.  Dentition intact.    Neck: No adenopathy.  No thyromegaly.  JVD above clavicle when sitting upright..   Heart: RRR. Normal S1, S2 splits physiologically. No murmur, rub, click, or gallop. The PMI is in the 5th ICS in the midclavicular line. There is no heave.    Lungs: CTA.  No " ronchi, wheezes, rales.  No dullness to percussion.   Abdomen: Soft, nontender, nondistended. No organomegaly.  No bruits.   Extremities: No clubbing or cyanosis, moderate bilateral LE edema compression wraps in place.    Neurologic: Alert and oriented to person/place/time, normal speech, gait and affect  Skin: No petechiae, purpura or rash.    Labs:  LIPID RESULTS:  Lab Results   Component Value Date    CHOL 143 01/14/2017    HDL 24 (L) 01/14/2017     (H) 01/14/2017    TRIG 79 01/14/2017    NHDL 119 01/14/2017       LIVER ENZYME RESULTS:  Lab Results   Component Value Date    AST 33 11/06/2021    AST 23 05/11/2021    ALT 24 11/06/2021    ALT 35 05/11/2021       CBC RESULTS:  Lab Results   Component Value Date    WBC 6.0 11/06/2021    WBC 6.4 05/11/2021    RBC 3.86 11/06/2021    RBC 4.29 05/11/2021    HGB 12.2 11/06/2021    HGB 13.8 05/11/2021    HCT 37.6 11/06/2021    HCT 41.3 05/11/2021    MCV 97 11/06/2021    MCV 96 05/11/2021    MCH 31.6 11/06/2021    MCH 32.2 05/11/2021    MCHC 32.4 11/06/2021    MCHC 33.4 05/11/2021    RDW 13.6 11/06/2021    RDW 13.5 05/11/2021     11/06/2021     05/11/2021       BMP RESULTS:  Lab Results   Component Value Date     11/07/2021     07/08/2021    POTASSIUM 4.0 11/07/2021    POTASSIUM 3.6 07/08/2021    CHLORIDE 93 (L) 11/07/2021    CHLORIDE 94 07/08/2021    CO2 32 11/07/2021    CO2 34 (H) 07/08/2021    ANIONGAP 8 11/07/2021    ANIONGAP 6 07/08/2021    GLC 83 11/07/2021    GLC 89 07/08/2021    BUN 42 (H) 11/07/2021    BUN 35 (H) 07/08/2021    CR 1.18 (H) 11/07/2021    CR 1.13 (H) 07/08/2021    GFRESTIMATED 47 (L) 11/07/2021    GFRESTIMATED 50 (L) 07/08/2021    GFRESTBLACK 58 (L) 07/08/2021    JERROD 9.5 11/07/2021    JERROD 9.2 07/08/2021        A1C RESULTS:  Lab Results   Component Value Date    A1C 5.8 (H) 09/07/2016       INR RESULTS:  Lab Results   Component Value Date    INR 1.13 11/07/2021    INR 1.14 11/06/2021    INR 1.30 (H) 01/14/2017    INR  1.12 12/28/2016       Assessment and Plan:   Ms. Leandra Guerrero is a 71yr old female with a history of stage IV AL cardiac amyloidosis (diagnosed in 2016, with GI and bone marrow involvement, s/p CyBorD chemotherapy and excellent light chain response by serum, now in remission, on chronic doxycycline), restrictive cardiomyopathy/HFpEF (LVEF 65-70% per TTE 10/31/21), pAFib/FL, and CKD who presents to [facility] for follow up of her recent hospitalization.    Labs today show K 6.0, Na 132, cr 1.49.  Other electrolytes and blood counts remained stable.    Ms. Guerrero appears euvolemic today, so advised that she continue taking torsemide 150mg twice daily.  Due to her hyperkalemia, advised that she hold all potassium supplements today.  Starting tomorrow, advised that she start KCl 40mEq twice daily.  We will plan to repeat a BMP on Friday to follow-up these changes.      Advised that she continue as needed Tylenol for headaches.  We will give her 1 dose of Zofran in clinic today for nausea.  We will plan to see her as her symptoms are on Friday, after she has her labs checked.    We will plan for her to follow-up in [facility] in 1 month, or sooner should new concerns arise.      Restrictive cardiomyopathy/chronic HFpEF (LVEF 65-70% per TTE 10/31/21)  Cardiac amyloidosis  Risk factors include female gender, age and arrhythmia  The mainstays of therapy for HFpEF include volume management, blood pressure control, treating underlying sleep apnea if present, treatment of underlying CAD if within the goals of care, weight management, exercise training, rate control for atrial fibrillation as well as consideration for a rhythm control strategy, and consideration for clinical trials. Consideration of spironolactone in low risk patients should be taken given the positive CHF results in the TOPCAT trial.    Stage C. NYHA Class III.  Primary Cardiologist: Noel; Last seen 8/2021  BP: controlled   Fluid status: Euvolemic, continue  torsemide 150mg twice daily  Aldosterone antagonist: She states that she has not been taking eplerenone, as she ran out prior to her hospitalization.  Defer restarting now due to potassium level.  Will consider resuming at next visit.  ACEi/ARB/ARNI: Could consider trialing Entresto at future visits  BB: n/a, no evidence for use in HFpEF -- relatively contraindicated due to progressive conduction disease/AV block  SCD prophylaxis: n/a, no evidence for use in HFpEF  NSAID use: Contraindicated    pAFib/FL  HRs controlled, not on BB.  Continue apixaban 2.5mg twice daily.    CKD  Baseline creatinine ~1.1-1.2.  Creatinine today slightly elevated, likely due to increased torsemide dosing.  Will repeat BMP on Friday.    Systemic amyloidosis  Heme previously monitored light chains, which have been negative --> consistent with remission.  She remains on doxycycline.    Concern for cirrhosis  Underwent a right upper quadrant ultrasound at outside hospital (9/30/21) that noted heterogenous echo texture consistent with cirrhosis of liver without notable changes to her gallbladder.  Repeat abdominal ultrasound at [facility] 11/3 noted normal liver and gallbladder echo texture with possible right kidney stone.       The above was reviewed with Ms. Guerrero, who verbalized understanding and will call with further questions/concerns.    50 minutes spent with patient, along with preparing for visit, reviewing follow up, and completing documentation.      Amanda Hodges DNP, FNP-BC, CHFN  Advanced Heart Failure Nurse Practitioner  [facility]  Patient Care Team:  Magy Obrien MD as PCP - General (Family Practice)  Mirela Moore MD as MD (Hematology & Oncology)  Domenica Cohen MD as MD (Cardiology)  Niki Fam, RN as Specialty Care Coordinator (Cardiology)  Ciara Araya PA-C as Physician Assistant (Physician Assistant)  Vidhi Montgomery, RN as Specialty Care Coordinator  (Cardiology)  Mirela Moore MD as Assigned Cancer Care Provider  Roscoe Elam MD as Assigned Palliative Care Provider  Ciara Araya PA-C as Assigned Heart and Vascular Provider  Kasie Gupta MD as Assigned Surgical Provider

## 2021-11-10 NOTE — PROGRESS NOTES
"Advanced Heart Failure Clinic -- CORE Follow Up  November 10, 2021    HPI:   Ms. Leandra Guerrero is a 71yr old female with a history of stage IV AL cardiac amyloidosis (diagnosed in 2016, with GI and bone marrow involvement, s/p CyBorD chemotherapy and excellent light chain response by serum, now in remission, on chronic doxycycline), restrictive cardiomyopathy/HFpEF (LVEF 65-70% per TTE 10/31/21), pAFib/FL, and CKD who presents to [facility] for follow up of her recent hospitalization.    She presented to [facility] ED 10/28 with 1 week of dyspnea and 13# weight gain, where she was diuresed then transferred to [facility] for continued care.  She was then diuresed from an admission weight of 159# --> 150#, and her PTA torsemide was increased to 150mg twice daily.  She ultimately discharged 11/5, and presents today for follow-up.    Since discharge, she states that she has not felt great.  She has been nauseated since discharge, noting that her stomach feels \"icky.\"  She has been able to eat regularly, noting that she had some toast this morning, and food has been staying down.  She denies vomiting, diarrhea, and constipation.  She has been taking her potassium with food.  She is hydrating okay, trying not to drink too much.  She is unsure how much fluid she is drinking in a day.  She has been eating a lot of ice chips, and reports only drinking water when she takes medications.  Her weight today on her home scale is 154#, up from 150# on Monday.  She is wondering if she needs any metolazone.  She is not sure if she is feeling any fluid retention.  She notes that her stomach always feels bloated.  She is wearing her lower extremity compression stockings, and cannot tell if her legs are any worse.  She has noted less urine output, stating that she measures her urine output at home and has only been getting 200-300mL each void, which is down from her usual 600.  She is not sleeping well, which is not a new issue.  Her breathing has been " labored when walking, and is worse today.  She was working with rehab therapies while she was hospitalized, which felt okay but did make her tired.  She does feel worse now than when compared to when she was in the hospital.  Yesterday, she was able to do some laundry and climb stairs, which she was able to do okay.  She denies PND and orthopnea.  She has ongoing tenderness over her left chest, which was evaluated while she was hospitalized, and unrevealing.  The tenderness has not improved.  She has ongoing headaches, for which she takes Tylenol, with relief.  She is not checking her blood pressures at home.  She has ongoing dizziness, which predated her hospitalization, and she feels like she loses her balance when she is not moving.  She is unsure if this is worse, but is wondering if it could be due to poor strength.  She has not fallen.  She always feels cold.  She otherwise denies palpitations, acute vision changes, fevers, cough, sore throat, and signs of bleeding.    PAST MEDICAL HISTORY:  Past Medical History:   Diagnosis Date     AL amyloidosis (H)      Atrial fibrillation and flutter (H)      Cardiac amyloidosis (H)      Lymphedema      QT prolongation      Recurrent right pleural effusion 1/2/2017     SVT (supraventricular tachycardia) (H)        CURRENT MEDICATIONS:  Current Outpatient Medications   Medication Sig Dispense Refill     Acetaminophen (TYLENOL PO) Take 325 mg by mouth every 6 hours as needed for mild pain or fever        apixaban ANTICOAGULANT (ELIQUIS) 2.5 MG tablet Take 1 tablet (2.5 mg) by mouth 2 times daily 180 tablet 3     camphor-menthol (DERMASARRA) 0.5-0.5 % external lotion Apply 1 mL topically every 6 hours as needed for skin care 222 mL 0     doxepin (SINEQUAN) 10 MG capsule Take 10 mg by mouth At Bedtime       eplerenone (INSPRA) 25 MG tablet Take 1 tablet (25 mg) by mouth daily 30 tablet 1     hydrOXYzine (ATARAX) 25 MG tablet Take 1 tablet (25 mg) by mouth 3 times daily as  needed for itching 30 tablet 0     LORazepam (ATIVAN) 0.5 MG tablet Take 1 tablet (0.5 mg) by mouth every 6 hours as needed for anxiety or nausea 60 tablet 0     metolazone (ZAROXOLYN) 2.5 MG tablet Take 1 tablet (2.5 mg) by mouth twice a week As needed for increased swelling and weight gain of 3 lb in one day or 5 lb in one week 26 tablet 3     ondansetron (ZOFRAN-ODT) 8 MG ODT tab Take 8 mg by mouth every 8 hours as needed PRN       potassium chloride ER (KLOR-CON M) 20 MEQ CR tablet Take 4 tablets (80 mEq) by mouth 3 times daily 90 tablet 1     torsemide (DEMADEX) 100 MG tablet Take 1.5 tablets (150 mg) by mouth every morning AND 1.5 tablets (150 mg) daily at 2 pm. 90 tablet 1     triamcinolone (KENALOG) 0.1 % external cream Apply a thin layer twice daily to itchy areas as needed. 453.6 g 3       PAST SURGICAL HISTORY:  No past surgical history on file.    ALLERGIES     Allergies   Allergen Reactions     Contrast Dye Shortness Of Breath     Shaking,chills and dypsnea     Cytoxan [Cyclophosphamide]      Hemorrhagic cystitis     Gabapentin      Slurred speech, weakness     Levofloxacin      Severe shaking and abdominal pain     Aaron Weed      Rash and itchyness     Amoxicillin Nausea and Vomiting and Cramps     Spironolactone      Worsening hyponatremia, palpitations     Chlorhexidine Dermatitis and Rash     AARON wipes  2% chlorhexidine gluconate   Rash to everywhere wipe was applied        FAMILY HISTORY:  Family History   Problem Relation Age of Onset     Other - See Comments Sister         Amyloidosis       SOCIAL HISTORY:  Social History     Socioeconomic History     Marital status:      Spouse name: Not on file     Number of children: Not on file     Years of education: Not on file     Highest education level: Not on file   Occupational History     Not on file   Tobacco Use     Smoking status: Never Smoker     Smokeless tobacco: Never Used   Substance and Sexual Activity     Alcohol use: No     Drug  "use: No     Sexual activity: Not on file   Other Topics Concern     Parent/sibling w/ CABG, MI or angioplasty before 65F 55M? Not Asked   Social History Narrative     Not on file     Social Determinants of Health     Financial Resource Strain: Not on file   Food Insecurity: Not on file   Transportation Needs: Not on file   Physical Activity: Not on file   Stress: Not on file   Social Connections: Not on file   Intimate Partner Violence: Not on file   Housing Stability: Not on file       ROS:   Constitutional: No fever, chills, or sweats.  Weight gain since hospital discharge  ENT: No visual disturbance, ear ache, epistaxis, sore throat  Allergies/Immunologic: Negative.   Respiratory: As per HPI  Cardiovascular: As per HPI  GI: As per HPI  : No urinary frequency, dysuria, or hematuria  Integument: Negative  Psychiatric: Negative  Neuro: Negative  Endocrinology: Negative   Musculoskeletal: As per HPI    EXAM:  /60 (BP Location: Right arm, Patient Position: Chair, Cuff Size: Adult Regular)   Pulse 54   Ht 1.6 m (5' 3\")   Wt 69.1 kg (152 lb 6.4 oz)   SpO2 99%   BMI 27.00 kg/m    In general, the patient is a pleasant female in no apparent distress.    HEENT: NC/AT.  PERRLA.  EOMI.  Sclerae white, not injected.  Nares clear.  Pharynx without erythema or exudate.  Dentition intact.    Neck: No adenopathy.  No thyromegaly.  JVD above clavicle when sitting upright..   Heart: RRR. Normal S1, S2 splits physiologically. No murmur, rub, click, or gallop. The PMI is in the 5th ICS in the midclavicular line. There is no heave.    Lungs: CTA.  No ronchi, wheezes, rales.  No dullness to percussion.   Abdomen: Soft, nontender, nondistended. No organomegaly.  No bruits.   Extremities: No clubbing or cyanosis, moderate bilateral LE edema compression wraps in place.    Neurologic: Alert and oriented to person/place/time, normal speech, gait and affect  Skin: No petechiae, purpura or rash.    Labs:  LIPID RESULTS:  Lab " Results   Component Value Date    CHOL 143 01/14/2017    HDL 24 (L) 01/14/2017     (H) 01/14/2017    TRIG 79 01/14/2017    NHDL 119 01/14/2017       LIVER ENZYME RESULTS:  Lab Results   Component Value Date    AST 33 11/06/2021    AST 23 05/11/2021    ALT 24 11/06/2021    ALT 35 05/11/2021       CBC RESULTS:  Lab Results   Component Value Date    WBC 6.0 11/06/2021    WBC 6.4 05/11/2021    RBC 3.86 11/06/2021    RBC 4.29 05/11/2021    HGB 12.2 11/06/2021    HGB 13.8 05/11/2021    HCT 37.6 11/06/2021    HCT 41.3 05/11/2021    MCV 97 11/06/2021    MCV 96 05/11/2021    MCH 31.6 11/06/2021    MCH 32.2 05/11/2021    MCHC 32.4 11/06/2021    MCHC 33.4 05/11/2021    RDW 13.6 11/06/2021    RDW 13.5 05/11/2021     11/06/2021     05/11/2021       BMP RESULTS:  Lab Results   Component Value Date     11/07/2021     07/08/2021    POTASSIUM 4.0 11/07/2021    POTASSIUM 3.6 07/08/2021    CHLORIDE 93 (L) 11/07/2021    CHLORIDE 94 07/08/2021    CO2 32 11/07/2021    CO2 34 (H) 07/08/2021    ANIONGAP 8 11/07/2021    ANIONGAP 6 07/08/2021    GLC 83 11/07/2021    GLC 89 07/08/2021    BUN 42 (H) 11/07/2021    BUN 35 (H) 07/08/2021    CR 1.18 (H) 11/07/2021    CR 1.13 (H) 07/08/2021    GFRESTIMATED 47 (L) 11/07/2021    GFRESTIMATED 50 (L) 07/08/2021    GFRESTBLACK 58 (L) 07/08/2021    JERROD 9.5 11/07/2021    JERROD 9.2 07/08/2021        A1C RESULTS:  Lab Results   Component Value Date    A1C 5.8 (H) 09/07/2016       INR RESULTS:  Lab Results   Component Value Date    INR 1.13 11/07/2021    INR 1.14 11/06/2021    INR 1.30 (H) 01/14/2017    INR 1.12 12/28/2016       Assessment and Plan:   Ms. Leandra Guerrero is a 71yr old female with a history of stage IV AL cardiac amyloidosis (diagnosed in 2016, with GI and bone marrow involvement, s/p CyBorD chemotherapy and excellent light chain response by serum, now in remission, on chronic doxycycline), restrictive cardiomyopathy/HFpEF (LVEF 65-70% per TTE 10/31/21),  pAFib/FL, and CKD who presents to C[facility] for follow up of her recent hospitalization.    Labs today show K 6.0, Na 132, cr 1.49.  Other electrolytes and blood counts remained stable.    Ms. Guerrero appears euvolemic today, so advised that she continue taking torsemide 150mg twice daily.  Due to her hyperkalemia, advised that she hold all potassium supplements today.  Starting tomorrow, advised that she start KCl 40mEq twice daily.  We will plan to repeat a BMP on Friday to follow-up these changes.      Advised that she continue as needed Tylenol for headaches.  We will give her 1 dose of Zofran in clinic today for nausea.  We will plan to see her as her symptoms are on Friday, after she has her labs checked.    We will plan for her to follow-up in CORE in 1 month, or sooner should new concerns arise.      Restrictive cardiomyopathy/chronic HFpEF (LVEF 65-70% per TTE 10/31/21)  Cardiac amyloidosis  Risk factors include female gender, age and arrhythmia  The mainstays of therapy for HFpEF include volume management, blood pressure control, treating underlying sleep apnea if present, treatment of underlying CAD if within the goals of care, weight management, exercise training, rate control for atrial fibrillation as well as consideration for a rhythm control strategy, and consideration for clinical trials. Consideration of spironolactone in low risk patients should be taken given the positive CHF results in the TOPCAT trial.    Stage C. NYHA Class III.  Primary Cardiologist: Noel; Last seen 8/2021  BP: controlled   Fluid status: Euvolemic, continue torsemide 150mg twice daily  Aldosterone antagonist: She states that she has not been taking eplerenone, as she ran out prior to her hospitalization.  Defer restarting now due to potassium level.  Will consider resuming at next visit.  ACEi/ARB/ARNI: Could consider trialing Entresto at future visits  BB: n/a, no evidence for use in HFpEF -- relatively contraindicated due to  progressive conduction disease/AV block  SCD prophylaxis: n/a, no evidence for use in HFpEF  NSAID use: Contraindicated    pAFib/FL  HRs controlled, not on BB.  Continue apixaban 2.5mg twice daily.    CKD  Baseline creatinine ~1.1-1.2.  Creatinine today slightly elevated, likely due to increased torsemide dosing.  Will repeat BMP on Friday.    Systemic amyloidosis  Heme previously monitored light chains, which have been negative --> consistent with remission.  She remains on doxycycline.    Concern for cirrhosis  Underwent a right upper quadrant ultrasound at outside hospital (9/30/21) that noted heterogenous echo texture consistent with cirrhosis of liver without notable changes to her gallbladder.  Repeat abdominal ultrasound at [facility] 11/3 noted normal liver and gallbladder echo texture with possible right kidney stone.       The above was reviewed with Ms. Guerrero, who verbalized understanding and will call with further questions/concerns.    50 minutes spent with patient, along with preparing for visit, reviewing follow up, and completing documentation.      Amanda Hodges DNP, FNP-BC, CHFN  Advanced Heart Failure Nurse Practitioner  [facility]  Patient Care Team:  Magy Obrien MD as PCP - General (Family Practice)  Mirela Moore MD as MD (Hematology & Oncology)  Domenica Cohen MD as MD (Cardiology)  Niki Fam, RN as Specialty Care Coordinator (Cardiology)  Ciara Araya PA-C as Physician Assistant (Physician Assistant)  Vidhi Montgomery, RN as Specialty Care Coordinator (Cardiology)  Mirela Moore MD as Assigned Cancer Care Provider  Roscoe Elam MD as Assigned Palliative Care Provider  Ciara Araya PA-C as Assigned Heart and Vascular Provider  Kasie Gupta MD as Assigned Surgical Provider

## 2021-11-10 NOTE — NURSING NOTE
Chief Complaint   Patient presents with     Follow Up     Core      Vitals were taken and medications were reconciled.   KELLY Barr  7:07 AM

## 2021-11-10 NOTE — NURSING NOTE
Diet: Patient instructed regarding a heart failure healthy diet, including discussion of reduced fat and 2000 mg daily sodium restriction, daily weights, medication purpose and compliance, fluid restrictions and resources for patient and family to access for assistance with heart failure management.       Labs: Patient was given results of the laboratory testing obtained today and patient was instructed about when to return for the next laboratory testing. Repeat labs on Friday    Med Reconcile: Reviewed and verified all current medications with the patient. The updated medication list was printed and given to the patient. HOLD potassium today. Starting tomorrow, decrease potassium to 40mEq BID.    Return Appointment: Patient given instructions regarding scheduling next clinic visit. RTC to see CORE clinic in 2-3 weeks.     Patient stated she understood all health information given and agreed to call with further questions or concerns.     Kesha Zhao RN    Clinic Administered Medication Documentation    Administrations This Visit     ondansetron (ZOFRAN-ODT) ODT tab 4 mg     Admin Date  11/10/2021 Action  Given Dose  4 mg Route  Oral Site   Administered By  Kesha Zhao RN    Ordering Provider: Amanda Hodges NP    Patient Supplied?: No                Oral Medication Documentation    Patient was given 4mg Ondansetron (Zofran) . Prior to medication administration, verified patients identity using patient s name and date of birth. Please see MAR and medication order for additional information.     Was entire amount of medication used? Yes  Expiration Date: 11/2021

## 2021-11-10 NOTE — PATIENT INSTRUCTIONS
Take your medicines every day, as directed    Changes made today:  o No potassium today  o Tomorrow, 11/11, start potassium 40mEq twice per day   Monitor Your Weight and Symptoms    Contact us if you:      Gain 2 pounds in one day or 5 pounds in one week    Feel more short of breath    Notice more leg swelling    Feel lightheadeded   Change your lifestyle    Limit Salt or Sodium:    2000 mg  Limit Fluids:    2000 mL or approximately 64 ounces  Eat a Heart Healthy Diet    Low in saturated fats  Stay Active:    Aim to move at least 150 minutes every  week         To Contact us    During Business Hours:  [phone], option # 1 (University)  Then option # 4 (medical questions)     After hours, weekends or holidays:   [phone], Option #4  Ask to speak to the On-Call Cardiologist. Inform them you are a CORE/heart failure patient at the [place].     Use [employer] allows you to communicate directly with your heart team through secure messaging.    [employer] can be accessed any time on your phone, computer, or tablet.    If you need assistance, we'd be happy to help!         Keep your Heart Appointments:    Labs on Friday    Follow up with CORE clinic in 2 weeks    Dr Cohen as scheduled

## 2021-11-11 LAB
ALBUMIN SERPL ELPH-MCNC: 5.4 G/DL (ref 3.7–5.1)
ALPHA1 GLOB SERPL ELPH-MCNC: 0.5 G/DL (ref 0.2–0.4)
ALPHA2 GLOB SERPL ELPH-MCNC: 0.8 G/DL (ref 0.5–0.9)
B-GLOBULIN SERPL ELPH-MCNC: 1 G/DL (ref 0.6–1)
GAMMA GLOB SERPL ELPH-MCNC: 1.1 G/DL (ref 0.7–1.6)
M PROTEIN SERPL ELPH-MCNC: 0 G/DL
PROT PATTERN SERPL ELPH-IMP: ABNORMAL
PROT PATTERN SERPL IFE-IMP: NORMAL

## 2021-11-12 ENCOUNTER — APPOINTMENT (OUTPATIENT)
Dept: CT IMAGING | Facility: CLINIC | Age: 71
DRG: 641 | End: 2021-11-12
Attending: FAMILY MEDICINE
Payer: COMMERCIAL

## 2021-11-12 ENCOUNTER — HOSPITAL ENCOUNTER (INPATIENT)
Facility: CLINIC | Age: 71
LOS: 2 days | Discharge: HOME | DRG: 641 | End: 2021-11-14
Attending: STUDENT IN AN ORGANIZED HEALTH CARE EDUCATION/TRAINING PROGRAM | Admitting: INTERNAL MEDICINE
Payer: COMMERCIAL

## 2021-11-12 ENCOUNTER — DOCUMENTATION ONLY (OUTPATIENT)
Dept: ONCOLOGY | Facility: CLINIC | Age: 71
End: 2021-11-12

## 2021-11-12 ENCOUNTER — APPOINTMENT (OUTPATIENT)
Dept: NUCLEAR MEDICINE | Facility: CLINIC | Age: 71
DRG: 641 | End: 2021-11-12
Attending: STUDENT IN AN ORGANIZED HEALTH CARE EDUCATION/TRAINING PROGRAM
Payer: COMMERCIAL

## 2021-11-12 ENCOUNTER — DOCUMENTATION ONLY (OUTPATIENT)
Dept: ONCOLOGY | Facility: CLINIC | Age: 71
End: 2021-11-12
Payer: COMMERCIAL

## 2021-11-12 ENCOUNTER — ONCOLOGY VISIT (OUTPATIENT)
Dept: ONCOLOGY | Facility: CLINIC | Age: 71
End: 2021-11-12
Attending: INTERNAL MEDICINE
Payer: COMMERCIAL

## 2021-11-12 ENCOUNTER — LAB (OUTPATIENT)
Dept: LAB | Facility: CLINIC | Age: 71
End: 2021-11-12
Payer: COMMERCIAL

## 2021-11-12 VITALS — SYSTOLIC BLOOD PRESSURE: 121 MMHG | DIASTOLIC BLOOD PRESSURE: 62 MMHG | OXYGEN SATURATION: 98 % | HEART RATE: 102 BPM

## 2021-11-12 DIAGNOSIS — Z20.822 CONTACT WITH AND (SUSPECTED) EXPOSURE TO COVID-19: ICD-10-CM

## 2021-11-12 DIAGNOSIS — I50.33 ACUTE ON CHRONIC DIASTOLIC HEART FAILURE (H): ICD-10-CM

## 2021-11-12 DIAGNOSIS — R07.9 CHEST PAIN, UNSPECIFIED TYPE: ICD-10-CM

## 2021-11-12 DIAGNOSIS — E85.81 AL AMYLOIDOSIS (H): Primary | ICD-10-CM

## 2021-11-12 DIAGNOSIS — E85.4 AMYLOID HEART DISEASE (H): Primary | ICD-10-CM

## 2021-11-12 DIAGNOSIS — I50.32 CHRONIC DIASTOLIC HEART FAILURE (H): ICD-10-CM

## 2021-11-12 DIAGNOSIS — I43 AMYLOID HEART DISEASE (H): Primary | ICD-10-CM

## 2021-11-12 DIAGNOSIS — Z79.01 LONG TERM (CURRENT) USE OF ANTICOAGULANTS: ICD-10-CM

## 2021-11-12 DIAGNOSIS — E87.6 HYPOKALEMIA: ICD-10-CM

## 2021-11-12 LAB
ALBUMIN SERPL-MCNC: 3 G/DL (ref 3.4–5)
ALBUMIN UR-MCNC: NEGATIVE MG/DL
ALP SERPL-CCNC: 196 U/L (ref 40–150)
ALT SERPL W P-5'-P-CCNC: 28 U/L (ref 0–50)
ANION GAP SERPL CALCULATED.3IONS-SCNC: 11 MMOL/L (ref 3–14)
ANION GAP SERPL CALCULATED.3IONS-SCNC: 9 MMOL/L (ref 3–14)
APPEARANCE UR: CLEAR
APTT PPP: 30 SECONDS (ref 22–38)
AST SERPL W P-5'-P-CCNC: 27 U/L (ref 0–45)
ATRIAL RATE - MUSE: 65 BPM
BASOPHILS # BLD AUTO: 0.1 10E3/UL (ref 0–0.2)
BASOPHILS NFR BLD AUTO: 1 %
BILIRUB SERPL-MCNC: 1.6 MG/DL (ref 0.2–1.3)
BILIRUB UR QL STRIP: NEGATIVE
BUN SERPL-MCNC: 34 MG/DL (ref 7–30)
BUN SERPL-MCNC: 37 MG/DL (ref 7–30)
CALCIUM SERPL-MCNC: 8.2 MG/DL (ref 8.5–10.1)
CALCIUM SERPL-MCNC: 8.8 MG/DL (ref 8.5–10.1)
CHLORIDE BLD-SCNC: 88 MMOL/L (ref 94–109)
CHLORIDE BLD-SCNC: 92 MMOL/L (ref 94–109)
CO2 SERPL-SCNC: 30 MMOL/L (ref 20–32)
CO2 SERPL-SCNC: 30 MMOL/L (ref 20–32)
COLOR UR AUTO: NORMAL
CREAT SERPL-MCNC: 1.13 MG/DL (ref 0.52–1.04)
CREAT SERPL-MCNC: 1.27 MG/DL (ref 0.52–1.04)
CRP SERPL-MCNC: <2.9 MG/L (ref 0–8)
D DIMER PPP FEU-MCNC: 0.88 UG/ML FEU (ref 0–0.5)
DIASTOLIC BLOOD PRESSURE - MUSE: NORMAL MMHG
EOSINOPHIL # BLD AUTO: 0.9 10E3/UL (ref 0–0.7)
EOSINOPHIL NFR BLD AUTO: 15 %
ERYTHROCYTE [DISTWIDTH] IN BLOOD BY AUTOMATED COUNT: 13.1 % (ref 10–15)
GFR SERPL CREATININE-BSD FRML MDRD: 43 ML/MIN/1.73M2
GFR SERPL CREATININE-BSD FRML MDRD: 50 ML/MIN/1.73M2
GLUCOSE BLD-MCNC: 133 MG/DL (ref 70–99)
GLUCOSE BLD-MCNC: 92 MG/DL (ref 70–99)
GLUCOSE UR STRIP-MCNC: NEGATIVE MG/DL
HCT VFR BLD AUTO: 33.5 % (ref 35–47)
HGB BLD-MCNC: 11.3 G/DL (ref 11.7–15.7)
HGB UR QL STRIP: NEGATIVE
HOLD SPECIMEN: NORMAL
IMM GRANULOCYTES # BLD: 0 10E3/UL
IMM GRANULOCYTES NFR BLD: 0 %
INR PPP: 1.34 (ref 0.85–1.15)
INTERPRETATION ECG - MUSE: NORMAL
KETONES UR STRIP-MCNC: NEGATIVE MG/DL
LACTATE SERPL-SCNC: 1.5 MMOL/L (ref 0.7–2)
LEUKOCYTE ESTERASE UR QL STRIP: NEGATIVE
LYMPHOCYTES # BLD AUTO: 0.2 10E3/UL (ref 0.8–5.3)
LYMPHOCYTES NFR BLD AUTO: 4 %
MCH RBC QN AUTO: 31.8 PG (ref 26.5–33)
MCHC RBC AUTO-ENTMCNC: 33.7 G/DL (ref 31.5–36.5)
MCV RBC AUTO: 94 FL (ref 78–100)
MONOCYTES # BLD AUTO: 0.6 10E3/UL (ref 0–1.3)
MONOCYTES NFR BLD AUTO: 10 %
NEUTROPHILS # BLD AUTO: 4.2 10E3/UL (ref 1.6–8.3)
NEUTROPHILS NFR BLD AUTO: 70 %
NITRATE UR QL: NEGATIVE
NRBC # BLD AUTO: 0 10E3/UL
NRBC BLD AUTO-RTO: 0 /100
NT-PROBNP SERPL-MCNC: 1578 PG/ML (ref 0–900)
P AXIS - MUSE: 73 DEGREES
PH UR STRIP: 7 [PH] (ref 5–7)
PLATELET # BLD AUTO: 247 10E3/UL (ref 150–450)
POTASSIUM BLD-SCNC: 2.2 MMOL/L (ref 3.4–5.3)
POTASSIUM BLD-SCNC: 2.3 MMOL/L (ref 3.4–5.3)
POTASSIUM BLD-SCNC: 2.3 MMOL/L (ref 3.4–5.3)
PR INTERVAL - MUSE: 240 MS
PROT SERPL-MCNC: 6.1 G/DL (ref 6.8–8.8)
QRS DURATION - MUSE: 86 MS
QT - MUSE: 542 MS
QTC - MUSE: 563 MS
R AXIS - MUSE: -19 DEGREES
RBC # BLD AUTO: 3.55 10E6/UL (ref 3.8–5.2)
RBC URINE: 0 /HPF
SARS-COV-2 RNA RESP QL NAA+PROBE: NEGATIVE
SODIUM SERPL-SCNC: 129 MMOL/L (ref 133–144)
SODIUM SERPL-SCNC: 131 MMOL/L (ref 133–144)
SP GR UR STRIP: 1.01 (ref 1–1.03)
SQUAMOUS EPITHELIAL: <1 /HPF
SYSTOLIC BLOOD PRESSURE - MUSE: NORMAL MMHG
T AXIS - MUSE: 57 DEGREES
TRANSITIONAL EPI: <1 /HPF
TROPONIN I SERPL-MCNC: 0.02 UG/L (ref 0–0.04)
TROPONIN I SERPL-MCNC: <0.015 UG/L (ref 0–0.04)
UROBILINOGEN UR STRIP-MCNC: NORMAL MG/DL
VENTRICULAR RATE- MUSE: 65 BPM
WBC # BLD AUTO: 5.9 10E3/UL (ref 4–11)
WBC URINE: 1 /HPF

## 2021-11-12 PROCEDURE — 99223 1ST HOSP IP/OBS HIGH 75: CPT | Mod: GC | Performed by: INTERNAL MEDICINE

## 2021-11-12 PROCEDURE — 85025 COMPLETE CBC W/AUTO DIFF WBC: CPT | Performed by: STUDENT IN AN ORGANIZED HEALTH CARE EDUCATION/TRAINING PROGRAM

## 2021-11-12 PROCEDURE — 36415 COLL VENOUS BLD VENIPUNCTURE: CPT | Performed by: INTERNAL MEDICINE

## 2021-11-12 PROCEDURE — 86140 C-REACTIVE PROTEIN: CPT | Performed by: STUDENT IN AN ORGANIZED HEALTH CARE EDUCATION/TRAINING PROGRAM

## 2021-11-12 PROCEDURE — 70450 CT HEAD/BRAIN W/O DYE: CPT

## 2021-11-12 PROCEDURE — 85610 PROTHROMBIN TIME: CPT | Performed by: STUDENT IN AN ORGANIZED HEALTH CARE EDUCATION/TRAINING PROGRAM

## 2021-11-12 PROCEDURE — 84484 ASSAY OF TROPONIN QUANT: CPT | Performed by: STUDENT IN AN ORGANIZED HEALTH CARE EDUCATION/TRAINING PROGRAM

## 2021-11-12 PROCEDURE — 250N000013 HC RX MED GY IP 250 OP 250 PS 637: Performed by: STUDENT IN AN ORGANIZED HEALTH CARE EDUCATION/TRAINING PROGRAM

## 2021-11-12 PROCEDURE — 81001 URINALYSIS AUTO W/SCOPE: CPT | Performed by: STUDENT IN AN ORGANIZED HEALTH CARE EDUCATION/TRAINING PROGRAM

## 2021-11-12 PROCEDURE — 214N000001 HC R&B CCU UMMC

## 2021-11-12 PROCEDURE — 96365 THER/PROPH/DIAG IV INF INIT: CPT | Performed by: STUDENT IN AN ORGANIZED HEALTH CARE EDUCATION/TRAINING PROGRAM

## 2021-11-12 PROCEDURE — 83605 ASSAY OF LACTIC ACID: CPT | Performed by: STUDENT IN AN ORGANIZED HEALTH CARE EDUCATION/TRAINING PROGRAM

## 2021-11-12 PROCEDURE — 84484 ASSAY OF TROPONIN QUANT: CPT | Performed by: FAMILY MEDICINE

## 2021-11-12 PROCEDURE — 80053 COMPREHEN METABOLIC PANEL: CPT | Performed by: INTERNAL MEDICINE

## 2021-11-12 PROCEDURE — 85730 THROMBOPLASTIN TIME PARTIAL: CPT | Performed by: STUDENT IN AN ORGANIZED HEALTH CARE EDUCATION/TRAINING PROGRAM

## 2021-11-12 PROCEDURE — 84132 ASSAY OF SERUM POTASSIUM: CPT | Performed by: STUDENT IN AN ORGANIZED HEALTH CARE EDUCATION/TRAINING PROGRAM

## 2021-11-12 PROCEDURE — 250N000013 HC RX MED GY IP 250 OP 250 PS 637: Performed by: FAMILY MEDICINE

## 2021-11-12 PROCEDURE — 96366 THER/PROPH/DIAG IV INF ADDON: CPT | Performed by: STUDENT IN AN ORGANIZED HEALTH CARE EDUCATION/TRAINING PROGRAM

## 2021-11-12 PROCEDURE — 99215 OFFICE O/P EST HI 40 MIN: CPT | Performed by: INTERNAL MEDICINE

## 2021-11-12 PROCEDURE — 78580 LUNG PERFUSION IMAGING: CPT | Mod: 26 | Performed by: STUDENT IN AN ORGANIZED HEALTH CARE EDUCATION/TRAINING PROGRAM

## 2021-11-12 PROCEDURE — A9540 TC99M MAA: HCPCS | Performed by: STUDENT IN AN ORGANIZED HEALTH CARE EDUCATION/TRAINING PROGRAM

## 2021-11-12 PROCEDURE — 99285 EMERGENCY DEPT VISIT HI MDM: CPT | Mod: 25 | Performed by: STUDENT IN AN ORGANIZED HEALTH CARE EDUCATION/TRAINING PROGRAM

## 2021-11-12 PROCEDURE — 343N000001 HC RX 343: Performed by: STUDENT IN AN ORGANIZED HEALTH CARE EDUCATION/TRAINING PROGRAM

## 2021-11-12 PROCEDURE — 36415 COLL VENOUS BLD VENIPUNCTURE: CPT | Performed by: STUDENT IN AN ORGANIZED HEALTH CARE EDUCATION/TRAINING PROGRAM

## 2021-11-12 PROCEDURE — 93010 ELECTROCARDIOGRAM REPORT: CPT | Performed by: STUDENT IN AN ORGANIZED HEALTH CARE EDUCATION/TRAINING PROGRAM

## 2021-11-12 PROCEDURE — 70450 CT HEAD/BRAIN W/O DYE: CPT | Mod: 26 | Performed by: RADIOLOGY

## 2021-11-12 PROCEDURE — 93005 ELECTROCARDIOGRAM TRACING: CPT | Performed by: STUDENT IN AN ORGANIZED HEALTH CARE EDUCATION/TRAINING PROGRAM

## 2021-11-12 PROCEDURE — 78580 LUNG PERFUSION IMAGING: CPT

## 2021-11-12 PROCEDURE — 85379 FIBRIN DEGRADATION QUANT: CPT | Performed by: STUDENT IN AN ORGANIZED HEALTH CARE EDUCATION/TRAINING PROGRAM

## 2021-11-12 PROCEDURE — 83880 ASSAY OF NATRIURETIC PEPTIDE: CPT | Performed by: STUDENT IN AN ORGANIZED HEALTH CARE EDUCATION/TRAINING PROGRAM

## 2021-11-12 PROCEDURE — 250N000011 HC RX IP 250 OP 636: Performed by: FAMILY MEDICINE

## 2021-11-12 PROCEDURE — 36415 COLL VENOUS BLD VENIPUNCTURE: CPT | Performed by: FAMILY MEDICINE

## 2021-11-12 PROCEDURE — U0005 INFEC AGEN DETEC AMPLI PROBE: HCPCS | Performed by: FAMILY MEDICINE

## 2021-11-12 RX ORDER — LIDOCAINE 40 MG/G
CREAM TOPICAL
Status: DISCONTINUED | OUTPATIENT
Start: 2021-11-12 | End: 2021-11-14 | Stop reason: HOSPADM

## 2021-11-12 RX ORDER — POTASSIUM CHLORIDE 7.45 MG/ML
10 INJECTION INTRAVENOUS
Status: DISCONTINUED | OUTPATIENT
Start: 2021-11-12 | End: 2021-11-13

## 2021-11-12 RX ORDER — ACETAMINOPHEN 325 MG/1
325 TABLET ORAL EVERY 6 HOURS PRN
Status: DISCONTINUED | OUTPATIENT
Start: 2021-11-12 | End: 2021-11-12

## 2021-11-12 RX ORDER — POTASSIUM CHLORIDE 20MEQ/15ML
20 LIQUID (ML) ORAL ONCE
Status: COMPLETED | OUTPATIENT
Start: 2021-11-12 | End: 2021-11-12

## 2021-11-12 RX ORDER — ACETAMINOPHEN 325 MG/1
650 TABLET ORAL EVERY 4 HOURS PRN
Status: DISCONTINUED | OUTPATIENT
Start: 2021-11-12 | End: 2021-11-14 | Stop reason: HOSPADM

## 2021-11-12 RX ORDER — MAGNESIUM HYDROXIDE/ALUMINUM HYDROXICE/SIMETHICONE 120; 1200; 1200 MG/30ML; MG/30ML; MG/30ML
30 SUSPENSION ORAL EVERY 4 HOURS PRN
Status: DISCONTINUED | OUTPATIENT
Start: 2021-11-12 | End: 2021-11-14 | Stop reason: HOSPADM

## 2021-11-12 RX ORDER — ACETAMINOPHEN 650 MG/1
650 SUPPOSITORY RECTAL EVERY 4 HOURS PRN
Status: DISCONTINUED | OUTPATIENT
Start: 2021-11-12 | End: 2021-11-14 | Stop reason: HOSPADM

## 2021-11-12 RX ORDER — POTASSIUM CHLORIDE 7.45 MG/ML
10 INJECTION INTRAVENOUS ONCE
Status: DISCONTINUED | OUTPATIENT
Start: 2021-11-12 | End: 2021-11-12

## 2021-11-12 RX ORDER — POTASSIUM CHLORIDE 1500 MG/1
80 TABLET, EXTENDED RELEASE ORAL 3 TIMES DAILY
Status: DISCONTINUED | OUTPATIENT
Start: 2021-11-13 | End: 2021-11-13

## 2021-11-12 RX ORDER — AMOXICILLIN 250 MG
2 CAPSULE ORAL 2 TIMES DAILY
Status: DISCONTINUED | OUTPATIENT
Start: 2021-11-12 | End: 2021-11-14 | Stop reason: HOSPADM

## 2021-11-12 RX ORDER — POTASSIUM CHLORIDE 1.5 G/1.58G
20 POWDER, FOR SOLUTION ORAL ONCE
Status: COMPLETED | OUTPATIENT
Start: 2021-11-12 | End: 2021-11-12

## 2021-11-12 RX ORDER — EPLERENONE 25 MG/1
25 TABLET, FILM COATED ORAL DAILY
Status: ON HOLD | COMMUNITY
End: 2021-11-14

## 2021-11-12 RX ORDER — ONDANSETRON 4 MG/1
8 TABLET, ORALLY DISINTEGRATING ORAL EVERY 8 HOURS PRN
Status: DISCONTINUED | OUTPATIENT
Start: 2021-11-12 | End: 2021-11-14 | Stop reason: HOSPADM

## 2021-11-12 RX ORDER — POTASSIUM CHLORIDE 1500 MG/1
80 TABLET, EXTENDED RELEASE ORAL ONCE
Status: COMPLETED | OUTPATIENT
Start: 2021-11-12 | End: 2021-11-12

## 2021-11-12 RX ORDER — AMOXICILLIN 250 MG
1 CAPSULE ORAL 2 TIMES DAILY
Status: DISCONTINUED | OUTPATIENT
Start: 2021-11-12 | End: 2021-11-14 | Stop reason: HOSPADM

## 2021-11-12 RX ADMIN — POTASSIUM CHLORIDE 80 MEQ: 1500 TABLET, EXTENDED RELEASE ORAL at 21:14

## 2021-11-12 RX ADMIN — DOCUSATE SODIUM 50 MG AND SENNOSIDES 8.6 MG 2 TABLET: 8.6; 5 TABLET, FILM COATED ORAL at 23:09

## 2021-11-12 RX ADMIN — KIT FOR THE PREPARATION OF TECHNETIUM TC 99M ALBUMIN AGGREGATED 7.2 MILLICURIE: 2.5 INJECTION, POWDER, FOR SOLUTION INTRAVENOUS at 15:30

## 2021-11-12 RX ADMIN — POTASSIUM CHLORIDE 10 MEQ: 7.46 INJECTION, SOLUTION INTRAVENOUS at 23:26

## 2021-11-12 RX ADMIN — POTASSIUM CHLORIDE 10 MEQ: 7.46 INJECTION, SOLUTION INTRAVENOUS at 21:15

## 2021-11-12 RX ADMIN — POTASSIUM CHLORIDE 20 MEQ: 20 SOLUTION ORAL at 16:52

## 2021-11-12 RX ADMIN — POTASSIUM CHLORIDE 20 MEQ: 20 SOLUTION ORAL at 13:12

## 2021-11-12 RX ADMIN — APIXABAN 2.5 MG: 2.5 TABLET, FILM COATED ORAL at 23:09

## 2021-11-12 RX ADMIN — POTASSIUM CHLORIDE 10 MEQ: 7.46 INJECTION, SOLUTION INTRAVENOUS at 18:56

## 2021-11-12 RX ADMIN — POTASSIUM CHLORIDE 20 MEQ: 1.5 POWDER, FOR SOLUTION ORAL at 18:41

## 2021-11-12 ASSESSMENT — ENCOUNTER SYMPTOMS
SPEECH DIFFICULTY: 0
SEIZURES: 0
RHINORRHEA: 0
SHORTNESS OF BREATH: 1
HEADACHES: 1
EYE REDNESS: 0
WHEEZING: 0
BACK PAIN: 0
FEVER: 0
VOMITING: 0
CHILLS: 0
DIARRHEA: 0
ABDOMINAL PAIN: 0
FATIGUE: 1
NAUSEA: 0
WOUND: 0
DYSURIA: 0
EYE PAIN: 0
NUMBNESS: 1
NECK PAIN: 0
FACIAL SWELLING: 0
SORE THROAT: 0
FLANK PAIN: 0
TREMORS: 0
FACIAL ASYMMETRY: 0
DIZZINESS: 0
CONSTIPATION: 0
HEMATURIA: 0
WEAKNESS: 0
LIGHT-HEADEDNESS: 0
EYE DISCHARGE: 0

## 2021-11-12 ASSESSMENT — PAIN SCALES - GENERAL: PAINLEVEL: EXTREME PAIN (8)

## 2021-11-12 ASSESSMENT — ACTIVITIES OF DAILY LIVING (ADL)
ADLS_ACUITY_SCORE: 10

## 2021-11-12 NOTE — ED PROVIDER NOTES
"    [place] EMERGENCY DEPARTMENT ([facility])  11/12/21  History     Chief Complaint   Patient presents with     Chest Pain     The history is provided by the patient and medical records.     Leandra Guerrero is a 71 year old female with a past medical history significant for chronic diastolic heart failure/restrictive cardiomyopathy (anticoagulated on Eliquis) 2/2 AL amyloidosis (s/p CyBorD chemotherapy, in remission) who presents to the Emergency Department for evaluation of acute onset of chest pain which began while she was on her way to clinic this morning.  She reports slight shortness of breath, but denies difficulty breathing here in the ED. She endorses onset of headache this morning which worsened after receiving nitro. Patient endorses fatigue and numbness in her right and left upper extremities, more noticeable in her right extremity. She endorses 3/10 chest pain here in the ED.  She denies visual disturbances. Patient was discharged from the hospital on Sunday (11/7). Her potassium was high during her hospitalization so she was scheduled for a follow up visit for blood work  this morning. She has experienced a similar episode previously during which she was told she was having a \"heart episode\". She denies history of previous heart attacks, surgeries, stents, or problems with her heart valves. She is followed for cardiology care by Dr. Cohen. She is taking her Eliquis and torsemide as prescribed and denies recent missed doses. She notes that her potassium was recently reduced because it was high during her last hospitalization. She is not currently on blood pressure medication.    Per review of the patient's medical record, patient was admitted to [facility] Cardiology service from 10/28/2021-11/7/2021 for further cares of acute on chronic diastolic heart failure. Prior to admission she presented to an outside hospital ED with 1 week of dyspnea and 13 pound weight gain. She was " transferred to [facility] after receiving IV Lasix. During admission, she underwent further IV diuresis with Lasix and metolazone until achieving her baseline dry weight of 150 pounds from admission weight of 159. Her dyspnea improved, but she was noted to have a chronic right pleural effusion, likely secondary to diastolic heart failure. Consult was called on 11/5 for therapeutic and diagnostic thoracentesis, but fluid collection was too small for drainage.  Continued monitoring was recommended.  During admission, she had a potassium trough of 2.7 and peak of 6.3. Her potassium regimen was adjusted to 80 mEQ 3x daily. Creatinine was 1.4. She was discharged home with instruction for close cardiology follow up.     Echocardiogram 10/31/21  Interpretation Summary  Global and regional left ventricular function is hyperkinetic with an EF of  65-70%. The LV cavity is small. Grade III or advanced diastolic dysfunction.  Global right ventricular function is normal. The right ventricle is normal  size.  Moderate tricuspid insufficiency is present.  The estimated PA systolic pressure is at least 41 mmHg.  IVC diameter >2.1 cm collapsing <50% with sniff suggests a high RA pressure  estimated at 15 mmHg or greater.  This study was compared with the study from 02/03/2021. The right atrial  pressure is higher.    US Abdomen Limited 11/3/21  IMPRESSION:   1. No imaging finding to suggest fibrosis or ascites. No focal mass.  2. Possible nephrolithiasis within the inferior pole of the right  kidney.  3. Large amount of right pleural effusion.    XR Chest Port 1 View 10/31/21  Impression: Increased size in the chronic small right pleural effusion  with hazy adjacent basilar airspace opacities.        Past Medical History:   Diagnosis Date     AL amyloidosis (H)      Atrial fibrillation and flutter (H)      Cardiac amyloidosis (H)      Lymphedema      QT prolongation      Recurrent right pleural effusion 1/2/2017     SVT (supraventricular  tachycardia) (H)        No past surgical history on file.    Family History   Problem Relation Age of Onset     Other - See Comments Sister         Amyloidosis       Social History     Tobacco Use     Smoking status: Never Smoker     Smokeless tobacco: Never Used   Substance Use Topics     Alcohol use: No       Current Facility-Administered Medications   Medication     potassium chloride 10 mEq in 100 mL sterile water intermittent infusion (premix)     Current Outpatient Medications   Medication     Acetaminophen (TYLENOL PO)     apixaban ANTICOAGULANT (ELIQUIS) 2.5 MG tablet     camphor-menthol (DERMASARRA) 0.5-0.5 % external lotion     doxepin (SINEQUAN) 10 MG capsule     metolazone (ZAROXOLYN) 2.5 MG tablet     ondansetron (ZOFRAN-ODT) 8 MG ODT tab     potassium chloride ER (KLOR-CON M) 20 MEQ CR tablet     torsemide (DEMADEX) 100 MG tablet     triamcinolone (KENALOG) 0.1 % external cream        Allergies   Allergen Reactions     Contrast Dye Shortness Of Breath     Shaking,chills and dypsnea     Cytoxan [Cyclophosphamide]      Hemorrhagic cystitis     Gabapentin      Slurred speech, weakness     Levofloxacin      Severe shaking and abdominal pain     Aaron Weed      Rash and itchyness     Amoxicillin Nausea and Vomiting and Cramps     Spironolactone      Worsening hyponatremia, palpitations     Chlorhexidine Dermatitis and Rash     AARON wipes  2% chlorhexidine gluconate   Rash to everywhere wipe was applied          I have reviewed the Medications, Allergies, Past Medical and Surgical History, and Social History in the Epic system.    Review of Systems   Constitutional: Positive for fatigue. Negative for chills and fever.   HENT: Negative for ear pain, facial swelling, rhinorrhea and sore throat.    Eyes: Negative for pain, discharge, redness and visual disturbance.   Respiratory: Positive for shortness of breath. Negative for wheezing.    Cardiovascular: Positive for chest pain.   Gastrointestinal: Negative for  abdominal pain, constipation, diarrhea, nausea and vomiting.   Genitourinary: Negative for dysuria, flank pain, hematuria, vaginal bleeding and vaginal discharge.   Musculoskeletal: Negative for back pain and neck pain.   Skin: Negative for rash and wound.   Neurological: Positive for numbness (bilateral upper extremities) and headaches. Negative for dizziness, tremors, seizures, syncope, facial asymmetry, speech difficulty, weakness and light-headedness.   All other systems reviewed and are negative.    A complete review of systems was performed with pertinent positives and negatives noted in the HPI, and all other systems negative.    Physical Exam   BP: 122/61  Pulse: 66  Temp: 98.1  F (36.7  C)  Resp: 25  SpO2: 100 %      Physical Exam  Constitutional:       General: She is not in acute distress.     Appearance: Normal appearance. She is not diaphoretic.   HENT:      Head: Normocephalic and atraumatic.      Nose: Nose normal.      Mouth/Throat:      Mouth: Mucous membranes are moist.      Pharynx: Oropharynx is clear. No oropharyngeal exudate.   Eyes:      General: Lids are normal. No scleral icterus.     Extraocular Movements: Extraocular movements intact.      Conjunctiva/sclera: Conjunctivae normal.      Pupils: Pupils are equal, round, and reactive to light.   Cardiovascular:      Rate and Rhythm: Normal rate and regular rhythm.      Pulses: Normal pulses.      Heart sounds: Normal heart sounds. No murmur heard.  No friction rub. No gallop.    Pulmonary:      Effort: Pulmonary effort is normal. No respiratory distress.      Breath sounds: Normal breath sounds. No stridor. No wheezing, rhonchi or rales.   Abdominal:      General: Bowel sounds are normal.      Palpations: Abdomen is soft.      Tenderness: There is no abdominal tenderness.   Musculoskeletal:         General: No tenderness. Normal range of motion.      Cervical back: Full passive range of motion without pain, normal range of motion and neck  supple.   Skin:     General: Skin is warm and dry.      Capillary Refill: Capillary refill takes less than 2 seconds.      Findings: No rash.   Neurological:      General: No focal deficit present.      Mental Status: She is oriented to person, place, and time. Mental status is at baseline.      GCS: GCS eye subscore is 4. GCS verbal subscore is 5. GCS motor subscore is 6.      Cranial Nerves: Cranial nerves are intact.      Sensory: Sensation is intact.      Motor: Motor function is intact.      Coordination: Coordination is intact.      Gait: Gait is intact.   Psychiatric:         Attention and Perception: Attention normal.         Mood and Affect: Mood normal.         Speech: Speech normal.         Behavior: Behavior normal.         ED Course     At 11:14 AM the patient was seen and examined by Neal Fields MD in Room ED12.     ED Course as of 11/12/21 1738 Fri Nov 12, 2021   1241 Troponin I: <0.015  Negative reassuring for ACS     Procedures              EKG Interpretation:      Interpreted by Neal Fields MD  Time reviewed: 11:18  Symptoms at time of EKG: SOB   Rhythm: 1 degree AV block  Rate: Normal  Axis: Normal  Ectopy: none  Conduction: 1st degree AV block  ST Segments/ T Waves: Non-specific ST-T wave changes  Q Waves: I and aVl  Comparison to prior: No old EKG available    Clinical Impression: 1st degree AV block, w/ prolonged QT      Results for orders placed or performed during the hospital encounter of 11/12/21 (from the past 24 hour(s))   EKG 12 lead   Result Value Ref Range    Systolic Blood Pressure  mmHg    Diastolic Blood Pressure  mmHg    Ventricular Rate 65 BPM    Atrial Rate 65 BPM    SC Interval 240 ms    QRS Duration 86 ms     ms    QTc 563 ms    P Axis 73 degrees    R AXIS -19 degrees    T Axis 57 degrees    Interpretation ECG       Sinus rhythm with 1st degree A-V block  Nonspecific T wave abnormality  Prolonged QT  Abnormal ECG  Unconfirmed report - interpretation of  this ECG is computer generated - see medical record for final interpretation  Confirmed by - EMERGENCY ROOM, PHYSICIAN ([id number]),  MOHIT CAPPS ([id number]) on 11/12/2021 12:12:23 PM     CBC with platelets differential    Narrative    The following orders were created for panel order CBC with platelets differential.  Procedure                               Abnormality         Status                     ---------                               -----------         ------                     CBC with platelets and d...[[id number]]  Abnormal            Final result                 Please view results for these tests on the individual orders.   D dimer quantitative   Result Value Ref Range    D-Dimer Quantitative 0.88 (H) 0.00 - 0.50 ug/mL FEU    Narrative    This D-dimer assay is intended for use in conjunction with a clinical pretest probability assessment model to exclude pulmonary embolism (PE) and deep venous thrombosis (DVT) in outpatients suspected of PE or DVT. The cut-off value is 0.50 ug/mL FEU.   Partial thromboplastin time   Result Value Ref Range    aPTT 30 22 - 38 Seconds   INR   Result Value Ref Range    INR 1.34 (H) 0.85 - 1.15   Lactic acid whole blood   Result Value Ref Range    Lactic Acid 1.5 0.7 - 2.0 mmol/L   CRP inflammation   Result Value Ref Range    CRP Inflammation <2.9 0.0 - 8.0 mg/L   Wisner Draw    Narrative    The following orders were created for panel order Wisner Draw.  Procedure                               Abnormality         Status                     ---------                               -----------         ------                     Extra Blue Top Tube[[id number]]                              Final result               Extra Red Top Tube[[id number]]                               Final result               Extra Green Top (Lithium...[[id number]]                      Final result               Extra Purple Top Tube[[id number]]                            Final result               Extra  Urine Collection[[id number]]                           Final result                 Please view results for these tests on the individual orders.   CBC with platelets and differential   Result Value Ref Range    WBC Count 5.9 4.0 - 11.0 10e3/uL    RBC Count 3.55 (L) 3.80 - 5.20 10e6/uL    Hemoglobin 11.3 (L) 11.7 - 15.7 g/dL    Hematocrit 33.5 (L) 35.0 - 47.0 %    MCV 94 78 - 100 fL    MCH 31.8 26.5 - 33.0 pg    MCHC 33.7 31.5 - 36.5 g/dL    RDW 13.1 10.0 - 15.0 %    Platelet Count 247 150 - 450 10e3/uL    % Neutrophils 70 %    % Lymphocytes 4 %    % Monocytes 10 %    % Eosinophils 15 %    % Basophils 1 %    % Immature Granulocytes 0 %    NRBCs per 100 WBC 0 <1 /100    Absolute Neutrophils 4.2 1.6 - 8.3 10e3/uL    Absolute Lymphocytes 0.2 (L) 0.8 - 5.3 10e3/uL    Absolute Monocytes 0.6 0.0 - 1.3 10e3/uL    Absolute Eosinophils 0.9 (H) 0.0 - 0.7 10e3/uL    Absolute Basophils 0.1 0.0 - 0.2 10e3/uL    Absolute Immature Granulocytes 0.0 <=0.0 10e3/uL    Absolute NRBCs 0.0 10e3/uL   Extra Blue Top Tube   Result Value Ref Range    Hold Specimen [facility]    Extra Red Top Tube   Result Value Ref Range    Hold Specimen [facility]    Extra Purple Top Tube   Result Value Ref Range    Hold Specimen [facility]    Extra Urine Collection   Result Value Ref Range    Hold Specimen [facility]    UA with Microscopic reflex to Culture    Specimen: Urine, Midstream   Result Value Ref Range    Color Urine Light Yellow Colorless, Straw, Light Yellow, Yellow    Appearance Urine Clear Clear    Glucose Urine Negative Negative mg/dL    Bilirubin Urine Negative Negative    Ketones Urine Negative Negative mg/dL    Specific Gravity Urine 1.007 1.003 - 1.035    Blood Urine Negative Negative    pH Urine 7.0 5.0 - 7.0    Protein Albumin Urine Negative Negative mg/dL    Urobilinogen Urine Normal Normal, 2.0 mg/dL    Nitrite Urine Negative Negative    Leukocyte Esterase Urine Negative Negative    RBC Urine 0 <=2 /HPF    WBC Urine 1 <=5 /HPF    Squamous Epithelials Urine <1 <=1  /HPF    Transitional Epithelials Urine <1 <=1 /HPF    Narrative    Urine Culture not indicated   Comprehensive metabolic panel   Result Value Ref Range    Sodium 131 (L) 133 - 144 mmol/L    Potassium 2.3 (LL) 3.4 - 5.3 mmol/L    Chloride 92 (L) 94 - 109 mmol/L    Carbon Dioxide (CO2) 30 20 - 32 mmol/L    Anion Gap 9 3 - 14 mmol/L    Urea Nitrogen 34 (H) 7 - 30 mg/dL    Creatinine 1.13 (H) 0.52 - 1.04 mg/dL    Calcium 8.2 (L) 8.5 - 10.1 mg/dL    Glucose 92 70 - 99 mg/dL    Alkaline Phosphatase 196 (H) 40 - 150 U/L    AST 27 0 - 45 U/L    ALT 28 0 - 50 U/L    Protein Total 6.1 (L) 6.8 - 8.8 g/dL    Albumin 3.0 (L) 3.4 - 5.0 g/dL    Bilirubin Total 1.6 (H) 0.2 - 1.3 mg/dL    GFR Estimate 50 (L) >60 mL/min/1.73m2   Troponin I   Result Value Ref Range    Troponin I <0.015 0.000 - 0.045 ug/L   Extra Green Top (Lithium Heparin) Tube   Result Value Ref Range    Hold Specimen [facility]    Nt probnp inpatient (BNP)   Result Value Ref Range    N terminal Pro BNP Inpatient 1,578 (H) 0 - 900 pg/mL   NM Lung Scan Perfusion Particulate    Narrative    Examination: NM LUNG SCAN PERFUSION PARTICULATE       Date: 11/12/2021 4:15 PM     Indication: PE suspected, low/intermediate prob, positive D-dimer; no     Comparison: Chest x-ray 10/31/2021    Technique:    The patient received 7.2 mCi of Tc-99m labeled MAA intravenously.  Ventilation imaging was deferred as per department policy precautions  during COVID-19 pandemic. A standard eight view lung perfusion scan  was obtained. SPECT images of the lungs were obtained. CT images of  the chest were obtained for the purposes of anatomic localization and  attenuation correction only. Fused SPECT-CT processing was performed  in the [employer] workstation, archived in PACS, and reviewed by the  radiologist.    Findings:    Planar images demonstrate no focal perfusion deficit.    Fused SPECT-CT images demonstrate subtle decreased uptake in the  peripheral left lung, which appears to correlate with  the left fissure  when accounting for respiratory motion artifact. No focal segmental  perfusion defect.    CT images demonstrate moderate right pleural effusion with associated  consolidation or atelectasis. There is mild mosaic attenuation in the  antidependent lungs, which may be related to partial respiratory  expiration phase timing of the study. Heart is unremarkable. Multiple  hypodensities in the thyroid suggesting small nodules or cysts.  Visualized upper abdomen is unremarkable. Degenerative changes in the  spine.      Impression    Impression:  1. No evidence of acute pulmonary embolism.  2. Moderate right pleural effusion with associated  atelectasis/consolidation, increased since radiograph 10/31/2021.    TIARRA WHATLEY MD         SYSTEM ID:  [id number]     Medications   potassium chloride 10 mEq in 100 mL sterile water intermittent infusion (premix) (10 mEq Intravenous Not Given 11/12/21 1313)   potassium chloride (KAYCIEL) solution 20 mEq (20 mEq Oral Given 11/12/21 1312)   technetium pertechnetate with albumin (Tc99m MAA) radioisotope injection 4.8-7.2 millicurie (7.2 millicuries Intravenous Given 11/12/21 1530)   potassium chloride (KAYCIEL) solution 20 mEq (20 mEq Oral Given 11/12/21 1652)             Assessments & Plan (with Medical Decision Making)   This is a 71 year old female who presents to the ED for evaluation of chest pain.  The patient currently does not have active chest pain. Initial ECG does not show a STEMI or equivalent. Considering her history and examination ACS remains in the differential. Additional possible etiologies include pulmonary embolism, aortic  dissection, pneumonia, pneumothorax, GERD, PUD, pancreatitis, biliary colic or cholecystitis, gastritis and esophagitis.  Evaluation and management will include labs, chest x-ray, cardiac monitoring and symptom alleviation as needed. Disposition pending results of these studies and  patient course.    Blood work shows  significant hypokalemia, will begin immediately repleting, patient is mildly hyponatremic as well.  Patient has a mildly elevated creatinine, will initiate IV fluids as well as oral repletion.  Her troponin is negative which is reassuring for ACS, her BNP is mildly elevated, however has no shortness of breath or oxygen requirement at this time.  D-dimer is mildly elevated, given her contrast allergy will obtain a VQ scan.  Urinalysis is negative for signs of UTI, lactate is reassuring, no significant anemia requiring transfusion, no elevation of acute inflammatory markers.    VQ scan shows no obvious signs of large acute pulmonary emboli.    Spoke with patient in great length, given her relatively reassuring work-up, however I do feel strongly she requires close follow-up with cardiology, given her recent lengthy inpatient stay, and her desire to avoid possible Covid exposure, I do think it is reasonable to discharge her home, as she lives with her , will put in for a stat cardiology outpatient visit, return precautions were provided, we did discuss this, patient does feel comfortable, will continue replete her potassium while she waits for her  to come pick her up, patient signed out to Dr. Richardson for further care.  Plan is to discharge as long as her potassium is upper trending, given that her outpatient potassium dosing was recently decreased, this will likely need to be revisited and titrated to an appropriate level to maintain normokalemia.    I have reviewed the nursing notes.    I have reviewed the findings, diagnosis, plan and need for follow up with the patient.    New Prescriptions    No medications on file       Final diagnoses:   Hypokalemia   Chest pain, unspecified type       I, Janette Clancy am serving as a trained medical scribe to document services personally performed by Neal Fields MD, based on the provider's statements to me.      I, Neal Fields MD, was  physically present and have reviewed and verified the accuracy of this note documented by Janette Clancy.     Neal Fields MD  11/12/2021   [facility] EMERGENCY DEPARTMENT     Neal Fields MD  11/12/21 [id number]

## 2021-11-12 NOTE — NURSING NOTE
RRT was [facility] who experienced acute onset chest pain while on the elevator.     Pt has an extensive cardiac hx, including AFIB and CHF and was discharged from [facility] on 11/7 after being treated for fluid overload. Pt was found sitting in a lobby chair, guarding her chest, anxious, AOx4. C/o 8/10 chest pain with L arm numbness. Per pt report, she recently had her diuretic dose changed and took 1/2 her prescribed potassium dose due to lab results indicating it was 6.0 on 11/8. Pt was transferred to a wheelchair and brought back to an exam room. 911 was called.     Initial vitals were found: , RR 20, /95, SpO2 98% RA. EKG obtained, found AFIB. During assessment/treatment, pt's BP decreased to 95/61. Noted pt to have pedal edema, which  reported was improved from baseline. Stroke scale was negative for findings.STAT labs results obtained indicated pt's potassium was 2.3.      mg administered orally. NTG SL administered prior to BP decrease. IV access obtained, 22g L forearm. NS administered. Pt's BP increased to 112/85 after NS administration. Pt's HR decreased to 78. O2 administered via NC, 2 LPM for comfort. Verbal reassurance mildly improved pt's anxiety & distress.     Pt care was handed off to EMS upon their arrival.    Protocol used: chest pain.  911 called prior to RRT: No

## 2021-11-12 NOTE — NURSING NOTE
Patient presents to oncology clinic for lab. She reports chest pain 8/10, right arm numb and severe headache. Rapid response called. Paramedic here and gave patient nitroglycerin and aspirin. EKG done. 911 called.  Dr. Moore saw patient and ordered Troponin.  911 here and patient transported to hospital at 10:00 am.

## 2021-11-12 NOTE — LETTER
11/12/2021         RE: Leandra Guerrero  [address] [place] [address]        Dear Colleague,    Thank you for referring your patient, Leandra Guerrero, to the [facility]. Please see a copy of my visit note below.    Hematology/Oncology Follow-up visit:  Date on this visit: Nov 12, 2021      Referring Physician: Dr. Braydon Barron, [facility].  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 She was diagnosed with CHF at a local clinic in [facility] and was placed on a b-blocker and a diuretic. She has also developed progressive worsening lower extremity edema by May 2016 which in retrospect started in January. Her cardiac angiogram was reportedly negative at that time.  She requested to be seen at [facility] and was seen by Dr. Braydon Barron on 9/13/2016 with f/up on 9/20/2016. She was also seen by cardiology team there Sara Severson CNP and Dr. Nettles from CHF service.  There was no e/o renal or hepatic amyloidosis.  EKG on 9/7/2016 showed normal sinus rhythm, prolonged QT interval (QTc 521 sec), left atrial enlargement and left anterior fascicular block. There was ST and T wave abnormality.  Apparently, her echocardiogram showed preserved LVEF at 59%.  She had R sided thoracentesis on 8/25/2016 after which her breathing has improved. She had extensive lab workup on 9/7/2016.  Her TSH was normal. Serum protein ELP showed a small abnormality in the gamma fraction.  Serum immunofixation showed small monoclonal IgA lambda in the gamma fraction and a small lambda in the beta fraction. Serum IgA level was normal at 329. Serum IgG level was mildly low at 755 (normal range 767-1590) and IgM level was normal. B2 microglobulin was mildly elevated at 2.94 (ULN 2.7). Mg was normal. Total bilirubin was elevated at 3.9 and  direct at 0.8. Uric acid was mildly elevated at 6.9 (ULN 6.1) Creat was 1.0. Troponin was 0.1 (ULN <0.01) and NT-pro BNP was 4747 pg/ml (ULN <=183). Total cholesterol was 190 and LDL was 139. Random urine protein was mildly elevated at 33 mg/dl (normal range <22). INR was 1.2 and PTT 27 (within normal limits). Fibrinogen was elevated at 441 and factor X was mildly decreased at 64% (normal range %). D-dimer was up at 1700 (). Free lambda light chains were 69.5 and free kappa light chains were 1.11 with FLC kappa/lambda ratio of 0.016.   CBC showed normal WBC at 6.0, slightly elevated Hb at 16. Platelet count was normal.  Flow cytometry on bone marrow biopsy showed detectable monotypic plasma cells. There were 12% of monotypic plasma cells on bone marrow biopsy.    The bone marrow biopsy (performed on 9/14/2016) showed normocellular BM, 30-40% cellularity, with involvement by 10-20% of lambda light chain restricted plasma cells.  Congo red stain was positive on the bone marrow biopsy. Liquid chromatography tandem mass spectroscopy showed a peptide profile c/w AL (lambda type) amyloid. BM karyotype was normal 46 XX. FISH on BM showed plasma cell clone with 1q duplication, and monosomy of 13 and 14.  She had an EGD by Dr. Matthew Wadsworth on 9/14/2016. It showed gastric mucosal atrophy and multiple mucosal papules (nodules) seen in the stomach. The duodenum appeared normal. The biopsies of stomach mucosa, of stomach (antral) nodules and of duodenum, all showed the presence of amyloid in submucosal blood vessel walls in the stomach and duodenum and in deep mucosa in the stomach antrum and body, confirmed by Congo Red stain.   She also had additional labs done at our last visit, on 9/30/2016. Total bilirubin was elevated as below, primarily indirect. K was 3.3 and she was stared on K-dur 20 mEq PO bid. She was noted to have elevated serum IgA level on 9/30/2016 at 392 (). Serum free lambda chains were  elevated at 37.75. Serum M spike was 0.1 g/dl and serum FLC ratio was low at 0.01. Serum immunofixation ELP showed monoclonal IgA immunoglobulin of lambda light chain type as well as monoclonal free immunoglobulin light chain of lambda chain type.  She was seen by Dr. Cohen too and had an echocardiogram on 10/6/2016 which showed:  Global and regional left ventricular function was normal with an EF of 60-65%.  The LV mass index was 131 g/m2 (severely increased.) The LV geometry is c/w  concentric hypertrophy measured by relative wall thickness. Global right ventricular function was normal. Severe biatrial enlargement was present.  Right ventricular systolic pressure was 26mmHg above the right atrial pressure. The inferior vena cava was dilated at 2.1 cm without respiratory variability, consistent with increased right atrial pressure. Trivial pericardial effusion was present.    She proceeded to start CyBorD as recommended by Dr. Barron with dose reduced in subq Bortezomib to 0.7 mg/m2, on 10/4/2016.  After one cycle, her M spike, IgA level and free lambda light chains have all improved, with M spike of 0 g/dl, IgA down into the normal range and free serum lambda light chains improving from 37.75 down to 2.09.  She returns today in cycle 3 day 11. She had amyloid labs again on day 1 of cycle 3 (12/8/2016) which showed continued response to therapy and her IgA level was now low and serum FLC ratio was normal and serum free lambda chains were normal too.   She has developed hematuria with cycle 2 and Cytoxan is on hold from day 15 cycle 2 since hematuria persisted despite Mesnex. She had a CT abdomen/pelvis on 11/29/2016 which showed no abnormalities in the kidneys. There was a moderate right and a trace left sided pleural effusions. Small volume ascites and anasarca was noted as well. The liver had heterogeneous appearance, which can be seen in hepatic venous congestion.   Cytoxan was d/cd  on  11/22/2016 since her  cystoscopy showed findings of mild petechia and erythema in her bladder that would be c/w hemorrhagic cystitis.  Pinch biopsies were taken from the bladder and showed no e/o amyloid or malignancy. She has continued on Velcade and dexamethasone until 12/27/2016.  On 1/3/2017 she was seen at [facility] by Dr. Barron and at that time serum free light chains were normal at 0.74 and FLC ratio was normal as well at 0.72. She was recommended to be on observation because free lambda chains normalized. However, her cardiac amyloidosis has progressed by 1/5/2017 and she has required frequent thoracentesis, and had weeping LE edema and elevated neck veins.  She was hospitalized for CHF in Jan 2017 due to cardiac amyloid and was aggressively diuresed.  Her echocardiogram on 1/5/2017 was abnormal (severe concentric hypertrophy and biatrial enlargement) and she also had abnormal EKG (near-low voltage, poor R wave progression, biatrial enlargement and no evidence of LVH despite very thickened LV on echo). She was felt to have  New York Heart Association class IIIB heart failure with clinically severe volume overload. She was seen by cardiologist Dr. Harjit Fischer at  in [place]. She was seen by Dr. Chivo Lei from medical oncology at the [facility] in [place], and was recommended to start on Acyclovir and Doxycycline.  She has not started Acyclovir  but did start daily Doxycyline.   Dr. Lei also recommended she consider maintenance therapy. She was also seen by Dr. Barron at [facility] on 5/11/2017, and he recommended against maintenance therapy.     Bone - She had a skeletal survey November 2020.  It showed no radiographic evidence of calvarial abnormality.  There was L1 compression fracture new since 2016 and chronic  T12 and T11 compression deformities.  She did sustain a fall in the last 2 to 3 years.  There were no lytic lesions.  She continues on Eliquis anticoagulation for A.fib.     Patient also had CT lumbar spine  in [facility] on 9/19/2020 showed:  1. Mild compression along the superior and inferior endplates of L2. This appears to be an acute injury as there is a visible fracture line along the inferior endplate. This could be confirmed with MRI if there is need for further imaging.  2. Mild compression of the superior endplates of T12 and L1 and appear chronic.  3. Multilevel degenerative disc disease that includes moderate central spinal canal narrowing.  We have discussed proceeding with DEXA scan for evaluation of bone mineral density today, but patient prefers to postpone.      History Of Present Illness:  Ms. Garcia is a 71 year old female, non-smoker, who is seen in  f/up of AL amyloidosis, IgA lambda, but this morning after lab check was c/o severe chest pain and ambulance was called and the patient was assessed for CP in an exam room.    She has been seeing  Dr. Cohen in f/up of cardiac Amyloidosis with preserved EF, CHF NYHA Class III. They monitor troponin and BNP.  She is followed closely by cardiology team (Dr. Cohen). She was recently hospitalized at [facility] from 10/28/2021-11/5/2-21, for acute on chronic diastolic heart failure, Paroxysmal A.fib/A.flutter, and was diuresed inpatient with Lasix drip. She was d/cd on Torsemide 150 mg PO BID and K was increased to 80 mg PO TID, although on 11/10/2021 she wsa hyperkalemic with K of 6.0 and I have communicated with cardiology team and KCL dose was decreased. She had BMP rechecked today and K was 2.3. She is having moderate to severe CP. No SOB. LE edema is improved since discharge from the hospital. She was given ASA and NTG now, without significant CP relief.  is at bedside.  As far as AL amyloidosis, she has been off treatment since December 2016.  She is on doxycycline.   AL labs are stable. FLC is normal.  Results for HILARY GARCIA (MRN [id number]) as of 11/17/2021 23:20   Ref. Range 11/13/2020 12:02 5/11/2021 10:19 11/10/2021 06:29   [place] Free Lt  Chain Latest Ref Range: 0.33 - 1.94 mg/dL 2.02 (H) 2.47 (H)    Kappa Lambda Ratio Latest Ref Range: 0.26 - 1.65  0.94 0.70    Lambda Free Lt Chain Latest Ref Range: 0.57 - 2.63 mg/dL 2.14 3.55 (H)    Monoclonal Peak Latest Ref Range: <=0.0 g/dL 0.0 0.0 0.0   Kappa Free Light Chains Latest Ref Range: 0.33 - 1.94 mg/dL   2.67 (H)   LAMBDA FREE LT CHAINS Latest Ref Range: 0.57 - 2.63 mg/dL   6.06 (H)   KAPPA/LAMBDA RATIO Latest Ref Range: 0.26 - 1.65    0.44   Total Protein Serum for ELP Latest Ref Range: 6.8 - 8.8 g/dL   7.0   Serum immunofixation ELP showed no M protein.   Her serum IgA level is low.    Past Medical/Surgical History:  AL Amyloid  Amyloid cardiomyopathy/CHF    Family History:  Sister  of multiple myeloma    Social History:  Lives in [place], with . Nonsmoker        Allergies:     Allergies   Allergen Reactions     Contrast Dye Shortness Of Breath     Shaking,chills and dypsnea     Cytoxan [Cyclophosphamide]      Hemorrhagic cystitis     Gabapentin      Slurred speech, weakness     Levofloxacin      Severe shaking and abdominal pain     Aaron Weed      Rash and itchyness     Amoxicillin Nausea and Vomiting and Cramps     Spironolactone      Worsening hyponatremia, palpitations     Chlorhexidine Dermatitis and Rash     AARON wipes  2% chlorhexidine gluconate   Rash to everywhere wipe was applied      Current Medications:  Current Outpatient Medications   Medication     Acetaminophen (TYLENOL PO)     apixaban ANTICOAGULANT (ELIQUIS) 2.5 MG tablet     camphor-menthol (DERMASARRA) 0.5-0.5 % external lotion     eplerenone (INSPRA) 25 MG tablet     metolazone (ZAROXOLYN) 2.5 MG tablet     ondansetron (ZOFRAN-ODT) 8 MG ODT tab     potassium chloride ER (KLOR-CON M) 20 MEQ CR tablet     torsemide (DEMADEX) 100 MG tablet     triamcinolone (KENALOG) 0.1 % external cream     No current facility-administered medications for this visit.       Physical Exam:  Vital signs are stable and are documented in  the EMR.      GENERAL APPEARANCE:alert and no distress    CV: S1S2 reg no murmur  Respiratory: CTA b/l  GI: soft,  BS+, NT, + moderate ascites  Extremities: +2-3 edema b/l LE    SKIN: Erythematous confluent rash, somewhat raised, appearing is a drug rash on bilateral lower back and somewhat less conspicuous on anterior lower abdomen.    PSYCHIATRIC: mentation appears normal and affect normal  Neuro: gait intact. Axox3        Laboratory/Imaging Studies  WBC 5.9, Hb 11.3g/dl, platelet count 247k  K2.3  Creat 1.27  Na 129  AlkP 255  Total kalyn 1.5    ASSESSMENT/PLAN:  Leanrda Guerrero is a 71 year old woman with  of AL amyloidosis involving the heart and GI tract. 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 Stage IV amyloidosis is associated with median survival of 5 months in patients not undergoing BMT, and 5 year survival of 15%, and in patients who are able to undergo BMT, median survival is 22 months and 5 year survival of 46% (Keithi A, Anderson MA, Toñito RA, et al. Prognostication of survival using cardiac troponins and N-terminal pro-brain natriuretic peptide in patients with primary systemic amyloidosis undergoing peripheral blood stem cell transplantation. Blood 2004; 104:1881). She was felt not to be a candidate for high dose therapy.   She was on Cybor-D from 10/4/2016-12/27/2016, with Cytoxan omitted after cycle 2 day 1 due to her developing hemorrhagic cystitis. Her disease has responded  biochemically, with serum M spike of zero, and serum IgA level was down to low range and  free lambda light chains have normalized quantitatively.      1.  Chest pain- transfer to [facility]. Add Troponin to labs. S/p ASA, NTG.   Will follow patient's progress inpatient.    2. AL amyloidosis -amyloid labs stable with low serum IgA level and no  M protein on serum immunofixation ELP.  Serum FLC ratio normal.  Was on  Doxycycline but not sice discharge from the hospital. Not convinced it would be helpful for cardiac amyloidosis at this point especially if associated with nausea.  We'll revisit following hospital discharge..  We'll see her back in 6 months with amyloid labs prior.       3. Amyloid cardiomyopathy-A. fib-  She is being followed by  Dr. Cohen. On aggressive diuresis with high dose KCL supplementation but hypokalemic today. On Eliquis. Cardiac Amyloidosis (a restrictive cardiomyopathy) , Stage C, NYHA Class III. Cardiology will follow inpatient.  4. Nausea-Continue Zofran ODT prn.    5. CKD- Creat stable.  6.Stable elevated total bilirubin and alk phos, stable- in the past felt to be secondary to liver congestion. Cont to follow.      7. Recurrent pleural effusions-chest x-ray on May 6, 2021 showed small right pleural effusion and questionable small left pleural effusion.  Last CT chest in [facility] in September 2019 showed small to moderate right pleural effusion. CXR 10/31/2021 (inpatient)- Increased size in the chronic small right pleural effusion  with hazy adjacent basilar airspace opacities. She denies shortness of breath presently.   PleurX catheter is an option in the future if she were to become symptomatic.    At the end of our visit patient and  verbalized understanding and concurred with the plan.    Addendum:  Readmitted to [facility] from 11/12/-11/14/2021 for atypical CP, ruled out for MI,  which has resolved, hypokalemia, paroxysmal A.fib. She is on Eplerenone. D/Cd on KCL 60mEq TID, Torsemide 150 mg PO BID.  On low dose Apixaban 2.5 mg PO BID.                   Again, thank you for allowing me to participate in the care of your patient.        Sincerely,        Mirela Moore MD, MD

## 2021-11-13 ENCOUNTER — APPOINTMENT (OUTPATIENT)
Dept: GENERAL RADIOLOGY | Facility: CLINIC | Age: 71
DRG: 641 | End: 2021-11-13
Payer: COMMERCIAL

## 2021-11-13 LAB
ALBUMIN SERPL-MCNC: 3.6 G/DL (ref 3.4–5)
ALP SERPL-CCNC: 212 U/L (ref 40–150)
ALT SERPL W P-5'-P-CCNC: 30 U/L (ref 0–50)
ANION GAP SERPL CALCULATED.3IONS-SCNC: 9 MMOL/L (ref 3–14)
AST SERPL W P-5'-P-CCNC: 26 U/L (ref 0–45)
BILIRUB SERPL-MCNC: 1.9 MG/DL (ref 0.2–1.3)
BUN SERPL-MCNC: 33 MG/DL (ref 7–30)
CALCIUM SERPL-MCNC: 9.3 MG/DL (ref 8.5–10.1)
CHLORIDE BLD-SCNC: 94 MMOL/L (ref 94–109)
CO2 SERPL-SCNC: 29 MMOL/L (ref 20–32)
CREAT SERPL-MCNC: 1.16 MG/DL (ref 0.52–1.04)
GFR SERPL CREATININE-BSD FRML MDRD: 48 ML/MIN/1.73M2
GLUCOSE BLD-MCNC: 92 MG/DL (ref 70–99)
HOLD SPECIMEN: NORMAL
MAGNESIUM SERPL-MCNC: 2.7 MG/DL (ref 1.6–2.3)
POTASSIUM BLD-SCNC: 3 MMOL/L (ref 3.4–5.3)
POTASSIUM BLD-SCNC: 3 MMOL/L (ref 3.4–5.3)
POTASSIUM BLD-SCNC: 4.1 MMOL/L (ref 3.4–5.3)
POTASSIUM BLD-SCNC: 4.3 MMOL/L (ref 3.4–5.3)
PROT SERPL-MCNC: 7.2 G/DL (ref 6.8–8.8)
SODIUM SERPL-SCNC: 132 MMOL/L (ref 133–144)

## 2021-11-13 PROCEDURE — 36415 COLL VENOUS BLD VENIPUNCTURE: CPT | Performed by: STUDENT IN AN ORGANIZED HEALTH CARE EDUCATION/TRAINING PROGRAM

## 2021-11-13 PROCEDURE — 71045 X-RAY EXAM CHEST 1 VIEW: CPT | Mod: 26 | Performed by: RADIOLOGY

## 2021-11-13 PROCEDURE — 93005 ELECTROCARDIOGRAM TRACING: CPT

## 2021-11-13 PROCEDURE — 250N000013 HC RX MED GY IP 250 OP 250 PS 637: Performed by: STUDENT IN AN ORGANIZED HEALTH CARE EDUCATION/TRAINING PROGRAM

## 2021-11-13 PROCEDURE — 96366 THER/PROPH/DIAG IV INF ADDON: CPT | Performed by: STUDENT IN AN ORGANIZED HEALTH CARE EDUCATION/TRAINING PROGRAM

## 2021-11-13 PROCEDURE — 214N000001 HC R&B CCU UMMC

## 2021-11-13 PROCEDURE — 80053 COMPREHEN METABOLIC PANEL: CPT | Performed by: STUDENT IN AN ORGANIZED HEALTH CARE EDUCATION/TRAINING PROGRAM

## 2021-11-13 PROCEDURE — 84132 ASSAY OF SERUM POTASSIUM: CPT | Performed by: STUDENT IN AN ORGANIZED HEALTH CARE EDUCATION/TRAINING PROGRAM

## 2021-11-13 PROCEDURE — 82040 ASSAY OF SERUM ALBUMIN: CPT | Performed by: STUDENT IN AN ORGANIZED HEALTH CARE EDUCATION/TRAINING PROGRAM

## 2021-11-13 PROCEDURE — 93010 ELECTROCARDIOGRAM REPORT: CPT | Performed by: INTERNAL MEDICINE

## 2021-11-13 PROCEDURE — 250N000013 HC RX MED GY IP 250 OP 250 PS 637: Performed by: NURSE PRACTITIONER

## 2021-11-13 PROCEDURE — 71045 X-RAY EXAM CHEST 1 VIEW: CPT

## 2021-11-13 PROCEDURE — 83735 ASSAY OF MAGNESIUM: CPT | Performed by: STUDENT IN AN ORGANIZED HEALTH CARE EDUCATION/TRAINING PROGRAM

## 2021-11-13 RX ORDER — POTASSIUM CHLORIDE 750 MG/1
20 TABLET, EXTENDED RELEASE ORAL ONCE
Status: COMPLETED | OUTPATIENT
Start: 2021-11-13 | End: 2021-11-13

## 2021-11-13 RX ORDER — EPLERENONE 25 MG/1
25 TABLET, FILM COATED ORAL DAILY
Status: DISCONTINUED | OUTPATIENT
Start: 2021-11-13 | End: 2021-11-14 | Stop reason: HOSPADM

## 2021-11-13 RX ORDER — POTASSIUM CHLORIDE 1500 MG/1
60 TABLET, EXTENDED RELEASE ORAL 3 TIMES DAILY
Status: DISCONTINUED | OUTPATIENT
Start: 2021-11-13 | End: 2021-11-14 | Stop reason: HOSPADM

## 2021-11-13 RX ORDER — POTASSIUM CHLORIDE 750 MG/1
40 TABLET, EXTENDED RELEASE ORAL ONCE
Status: COMPLETED | OUTPATIENT
Start: 2021-11-13 | End: 2021-11-13

## 2021-11-13 RX ORDER — METOLAZONE 2.5 MG/1
2.5 TABLET ORAL ONCE
Status: DISCONTINUED | OUTPATIENT
Start: 2021-11-13 | End: 2021-11-14 | Stop reason: HOSPADM

## 2021-11-13 RX ADMIN — POTASSIUM CHLORIDE 60 MEQ: 1500 TABLET, EXTENDED RELEASE ORAL at 09:17

## 2021-11-13 RX ADMIN — EPLERENONE 25 MG: 25 TABLET, FILM COATED ORAL at 12:49

## 2021-11-13 RX ADMIN — APIXABAN 2.5 MG: 2.5 TABLET, FILM COATED ORAL at 19:39

## 2021-11-13 RX ADMIN — APIXABAN 2.5 MG: 2.5 TABLET, FILM COATED ORAL at 09:17

## 2021-11-13 RX ADMIN — DOCUSATE SODIUM 50 MG AND SENNOSIDES 8.6 MG 1 TABLET: 8.6; 5 TABLET, FILM COATED ORAL at 19:39

## 2021-11-13 RX ADMIN — POTASSIUM CHLORIDE 60 MEQ: 1500 TABLET, EXTENDED RELEASE ORAL at 19:40

## 2021-11-13 RX ADMIN — TORSEMIDE 150 MG: 20 TABLET ORAL at 16:45

## 2021-11-13 RX ADMIN — TORSEMIDE 150 MG: 20 TABLET ORAL at 09:17

## 2021-11-13 RX ADMIN — POTASSIUM CHLORIDE 60 MEQ: 1500 TABLET, EXTENDED RELEASE ORAL at 14:20

## 2021-11-13 RX ADMIN — ACETAMINOPHEN 325 MG: 325 TABLET, FILM COATED ORAL at 10:58

## 2021-11-13 RX ADMIN — POTASSIUM CHLORIDE 40 MEQ: 750 TABLET, EXTENDED RELEASE ORAL at 10:55

## 2021-11-13 RX ADMIN — POTASSIUM CHLORIDE 20 MEQ: 750 TABLET, EXTENDED RELEASE ORAL at 13:39

## 2021-11-13 ASSESSMENT — ACTIVITIES OF DAILY LIVING (ADL)
DIFFICULTY_COMMUNICATING: NO
ADLS_ACUITY_SCORE: 10
ADLS_ACUITY_SCORE: 10
HEARING_DIFFICULTY_OR_DEAF: NO
ADLS_ACUITY_SCORE: 10
ADLS_ACUITY_SCORE: 10
ADLS_ACUITY_SCORE: 6
ADLS_ACUITY_SCORE: 10
FALL_HISTORY_WITHIN_LAST_SIX_MONTHS: NO
DOING_ERRANDS_INDEPENDENTLY_DIFFICULTY: NO
ADLS_ACUITY_SCORE: 10
DIFFICULTY_EATING/SWALLOWING: NO
ADLS_ACUITY_SCORE: 10
ADLS_ACUITY_SCORE: 10
CONCENTRATING,_REMEMBERING_OR_MAKING_DECISIONS_DIFFICULTY: NO
DRESSING/BATHING_DIFFICULTY: NO
ADLS_ACUITY_SCORE: 6
ADLS_ACUITY_SCORE: 10
ADLS_ACUITY_SCORE: 6
ADLS_ACUITY_SCORE: 10
ADLS_ACUITY_SCORE: 6
ADLS_ACUITY_SCORE: 10
ADLS_ACUITY_SCORE: 10
VISION_MANAGEMENT: GLASSES
ADLS_ACUITY_SCORE: 10
WALKING_OR_CLIMBING_STAIRS_DIFFICULTY: NO
ADLS_ACUITY_SCORE: 10
EQUIPMENT_CURRENTLY_USED_AT_HOME: CANE, STRAIGHT
WEAR_GLASSES_OR_BLIND: YES
TOILETING_ISSUES: NO

## 2021-11-13 ASSESSMENT — MIFFLIN-ST. JEOR: SCORE: 1192.67

## 2021-11-13 NOTE — H&P
[facility]    History and Physical - Cardiology Service (Cards 1)       Date of Admission:  11/12/2021    Assessment & Plan      Leandra Guerrero is a 71yr old female with a history of stage IV AL cardiac amyloidosis (diagnosed in 2016, with GI and bone marrow involvement, s/p CyBorD chemotherapy and excellent light chain response by serum, now in remission), restrictive cardiomyopathy/HFpEF (LVEF 65-70% per TTE 10/31/21), pAFib/FL, and CKD admitted for hypokalemia.    # Hypokalemia related to diuretic use  Pt has recently had very volatile K; was 6 two days ago and she was directed to decrease her KCL supplement from 80 TID to 40 BID for hyperkalemia. Also with poor PO intake recently. K on admission 2.2. S/p 40 meq by the ER without improvement. 40mEq additional ordered. QTc prolonged on EKG.   - Continue IV K (40mEq ordered, patient getting 5/hr)  - 80mEq PO now with recheck (WNL)  - 60mEq TID ordered  - q12h checks    # Chest pain, possible cardiac etiology  Severe, abrupt onset chest discomfort radiating to R arm with associated dyspnea while at rest, improved with NTG from 8 to 1-2. Trop negative x 2. EKG without suspicious changes. Prior ischemic workup with cath 2016: mild non-obstructive CAD. D-dimer was elevated but NM lung scan negative for PE and pt reports compliance on apixaban (initially was confused and said she held it but believes now she was holding the eplerenone).   - Monitor clinically for recurrence  - Consideration of stress testing if appropriate    # Restrictive cardiomyopathy/chronic HFpEF (LVEF 65-70% per TTE 10/31/21, acute on chronic; Stage C. NYHA Class III.  # Cardiac amyloidosis  Follows with No[facility]; seen in [facility] clinic 11/10. Most recent TTE 10/31 with EF 65-70%, grade III diastolic dysfunction, moderate TR, mod/severe LAE. RFs: female, age, arrhythmia. BNP 1376 (similar to prior). #, no weight as of yet.   - BP:  well-controlled   - Fluid status: hypervolemic, continue torsemide 150mg twice daily with additional metolazone (2.5 ordered once for AM); consider gtt once K normalizes  - Daily weights, intake/output  - No need for repeat TTE at this time  - PHARM TX  --Aldosterone antagonist: Consider restarting eplerenone, which was held for hyperkalemia recently (holding off now given hx volatile K)  --ACEi/ARB/ARNI: Entresto considered but not currently prescribed    # Right Pleural Effusion  Chronic finding, noted on several previous chest xrays and again on 11/3 abdominal ultrasound. Likely secondary to chronic diastolic heart failure. Tried to get thora for dx last admission but did not have enough fluid.   - CXR to re-eval     # pAFib/FL  HRs controlled, not on BB.  Continue apixaban 2.5mg twice daily.     # CKD  At baseline creatinine ~1.1-1.2.       # Systemic amyloidosis  Heme previously monitored light chains, which have been negative --> consistent with remission.  Per notes, she is on doxycycline but I do not see this on her med list.     # Eosinophilia, chronic   ? Noted since 2019. Some case reports with eosinophilia a/w pulmonary amyloid. Also wonder if DRESS is possible given recent notes mentioning chronic dermatitis, unknown reason behind LFT abnormalities (though the latter is very long-standing eg since at least 2016).     # Hyponatremia  Na 129 on presentation. May be hypervolemic but also may relate to SIADH in light of recent episode (pain, dizziness, nausea, etc). Re-check 131.   - Continue to monitor    # Anemia  Hgb recently 11-12. 11.3 on admission.     # Chronic lymphedema  - OT consulted for lymphedema wraps    # Deconditioning  - PT, OT consults    # Encephalopathy, multifactorial  Pt describes recently being a bit more 'out of it', thinking she left the grocery store without paying but not able to confirm. Initially she stated she hadn't taken her apixaban for the past week but on re-visit states  she meant the eplerenone. Normal neuro exam and the patient was A&O x 3 but noncon CT head obtained to investigate for significant vascular occlusion; this was unremarkable. Believe this is likely related to possible CNS amyloid, hyponatremia, chronic illness, poor PO intake, etc.   - Continue to monitor clinically     Diet:  cardiac  DVT Prophylaxis: DOAC  Marie Catheter: Not present  Fluids: None  Central Lines: None  Code Status:   Full; the patient asked her partner about this, seemed hesitant about the decision; discussed at length and 'if we aren't sure it's reversible' she would like us to attempt resuscitation    Clinically Significant Risk Factors Present on Admission        # Hypokalemia: K = 2.2 mmol/L (Ref range: 3.4 - 5.3 mmol/L) on admission, will replace as needed  # Hyponatremia: Na = 131 mmol/L (Ref range: 133 - 144 mmol/L) on admission, will monitor as appropriate     # Coagulation Defect: home medication list includes an anticoagulant medication       Disposition Plan   Expected discharge:  1-3 days recommended to prior living arrangement once potassium stable, diuretic regimen chosen.     The patient will be formally staffed in the AM.     Nelly Fontaine MD  [address]  Securely message with the Vocera Web Console (learn more here)  Text page via [facility] Paging/Directory    Please see sign in/sign out for up to date coverage information  ______________________________________________________________________    Chief Complaint   Chest complaint    History is obtained from the patient.     History of Present Illness   Leandra Guerrero is a 71yr old female with a history of stage IV AL cardiac amyloidosis (diagnosed in 2016, with GI and bone marrow involvement, s/p CyBorD chemotherapy and excellent light chain response by serum, now in remission, on chronic doxycycline), restrictive cardiomyopathy/HFpEF (LVEF 65-70% per TTE  10/31/21), pAFib/FL, and CKD presenting with chest pain.     The patient stated that last night she woke up feeling 'as if something was wrong' last night. Specifically she had some headache, nausea, dizziness. She got up, went to the bathroom, then got back to sleep. Upon waking she felt 'better than she had in the last several days'. Then later in the day while she was riding in the car she began experiencing sudden chest pressure 'as if her heart were going to explode'. She described it as 'like the most horrible heartburn you've ever had in your life,' which was accompanied by R arm numbness. She denies having similar episodes prior to this. The pain lasted perhaps 30 minutes until the 2nd NTG given by EMS. At its worst it was an 8-9/10; on interview only a 1-2. Also has been a little confused. Wednesday evening almost walked out with groceries forgetting to pay.  She has had poor PO intake. Does have LE swelling but cannot tell if this has been worse recently.     She was seen in [facility] clinic 11/10 following hospital admission 10/28-11/5 during which she was diuresed from 159# to 150#; Torsemide was increased to 150mg BID. On 11/10 her K was 6 and she appeared euvolemic. She was advised to hold KCl that day and thereafter was decreased from 80mEq TID to 40mEq BID, per patient. Of note, she had a chronic R pleural effusion during last hospitalization; thoracentesis was planned but fluid collection too small for drainage.     ED course: Initially HR recorded as 102 but quickly declined to 70s. Other vitals WNL. Labs revealed K 2.3, Na 129, Hgb 11.3.  D-dimer high at 0.88. NM lung scan negative for PE. Troponin negative. NT-proBNP 1578. 40mEq KCL given PO with K recheck 2.3. 40mEq IV ordered but the patient had issues with administration so rate was 5mEq qhr.    Review of Systems    The 10 point Review of Systems is negative other than noted in the HPI or here.     Past Medical History    I have reviewed this  patient's medical history and updated it with pertinent information if needed.   Past Medical History:   Diagnosis Date     AL amyloidosis (H)      Atrial fibrillation and flutter (H)      Cardiac amyloidosis (H)      Lymphedema      QT prolongation      Recurrent right pleural effusion 1/2/2017     SVT (supraventricular tachycardia) (H)       Past Surgical History   I have reviewed this patient's surgical history and updated it with pertinent information if needed.  No past surgical history on file.     Social History   I have reviewed this patient's social history and updated it with pertinent information if needed. Leandra Guerrero  reports that she has never smoked. She has never used smokeless tobacco. She reports that she does not drink alcohol and does not use drugs.    Family History   I have reviewed this patient's family history and updated it with pertinent information if needed.  Family History   Problem Relation Age of Onset     Other - See Comments Sister         Amyloidosis       Prior to Admission Medications   Prior to Admission Medications   Prescriptions Last Dose Informant Patient Reported? Taking?   Acetaminophen (TYLENOL PO)  Self Yes No   Sig: Take 325 mg by mouth every 6 hours as needed for mild pain or fever    apixaban ANTICOAGULANT (ELIQUIS) 2.5 MG tablet  Self No No   Sig: Take 1 tablet (2.5 mg) by mouth 2 times daily   camphor-menthol (DERMASARRA) 0.5-0.5 % external lotion  Self No No   Sig: Apply 1 mL topically every 6 hours as needed for skin care   doxepin (SINEQUAN) 10 MG capsule  Self Yes No   Sig: Take 10 mg by mouth At Bedtime   Patient not taking: Reported on 11/10/2021   metolazone (ZAROXOLYN) 2.5 MG tablet  Self No No   Sig: Take 1 tablet (2.5 mg) by mouth twice a week As needed for increased swelling and weight gain of 3 lb in one day or 5 lb in one week   ondansetron (ZOFRAN-ODT) 8 MG ODT tab  Self Yes No   Sig: Take 8 mg by mouth every 8 hours as needed PRN   potassium  chloride ER (KLOR-CON M) 20 MEQ CR tablet   No No   Sig: Take 2 tablets (40 mEq) by mouth 2 times daily   torsemide (DEMADEX) 100 MG tablet   No No   Sig: Take 1.5 tablets (150 mg) by mouth every morning AND 1.5 tablets (150 mg) daily at 2 pm.   triamcinolone (KENALOG) 0.1 % external cream  Self No No   Sig: Apply a thin layer twice daily to itchy areas as needed.      Facility-Administered Medications: None     Allergies   Allergies   Allergen Reactions     Contrast Dye Shortness Of Breath     Shaking,chills and dypsnea     Cytoxan [Cyclophosphamide]      Hemorrhagic cystitis     Gabapentin      Slurred speech, weakness     Levofloxacin      Severe shaking and abdominal pain     Aaron Weed      Rash and itchyness     Amoxicillin Nausea and Vomiting and Cramps     Spironolactone      Worsening hyponatremia, palpitations     Chlorhexidine Dermatitis and Rash     AARON wipes  2% chlorhexidine gluconate   Rash to everywhere wipe was applied      Physical Exam   Vital Signs: Temp: 98.1  F (36.7  C)   BP: (!) 114/99 Pulse: 63   Resp: 21 SpO2: 92 %      Weight: 0 lbs 0 oz    General Appearance: ill-appearing female, lying in bed  Eyes: EOMI, PERRL, no scleral icterus or injection  HEENT: NCAT, neck supple  Respiratory: Normal work of breathing; Poor breath sounds throughout, no rales appreciated; dull bases bilaterally  Cardiovascular: RRR, S4 appreciated; JVD difficult to quantify, est 14cm; moderate BLE edema   GI: Abdomen soft, nontender; normal bowel sounds  Lymph/Hematologic: No LAD appreciated  Skin: Thin skin; no rash visualized  Neurologic: CN II-XII intact 5/5 strength in all 4 extremities; A&O x 3  Psychiatric: sad mood, full affect    Data   Data reviewed today: I reviewed all medications, new labs and imaging results over the last 24 hours. I personally reviewed the EKG tracing showing sinus rhythm with first degree AV block, prolonged QT.    Recent Labs   Lab 11/12/21  2105 11/12/21  1715 11/12/21  1147  11/12/21  1146 11/12/21  0949 11/10/21  0629 11/07/21  1154 11/07/21  0606 11/06/21  0745 11/06/21  1608   WBC  --   --   --  5.9  --  6.5  --   --   --  6.0   HGB  --   --   --  11.3*  --  12.9  --   --   --  12.2   MCV  --   --   --  94  --  96  --   --   --  97   PLT  --   --   --  247  --  266  --   --   --  276   INR  --   --   --  1.34*  --   --   --  1.13  --  1.14   NA  --   --  131*  --  129* 132*  --  133   < > 134  134   POTASSIUM  --  2.2* 2.3*  --  2.3* 6.0*   < > 3.0*   < > 4.3  4.3  4.3   CHLORIDE  --   --  92*  --  88* 98  --  93*   < > 99  98   CO2  --   --  30  --  30 28  --  32   < > 28  29   BUN  --   --  34*  --  37* 52*  --  42*   < > 40*  41*   CR  --   --  1.13*  --  1.27* 1.49*  --  1.18*   < > 1.25*  1.30*   ANIONGAP  --   --  9  --  11 6  --  8   < > 7  7   JERROD  --   --  8.2*  --  8.8 9.1  --  9.5   < > 8.9  9.0   GLC  --   --  92  --  133* 70  --  83   < > 93  100*   ALBUMIN  --   --  3.0*  --   --  3.8  --   --   --  3.6   PROTTOTAL  --   --  6.1*  --   --  7.3  --   --   --  7.0   BILITOTAL  --   --  1.6*  --   --  1.5*  --   --   --  1.6*   ALKPHOS  --   --  196*  --   --  255*  --   --   --  212*   ALT  --   --  28  --   --  38  --   --   --  24   AST  --   --  27  --   --  40  --   --   --  33   TROPONIN 0.018  --  <0.015  --   --   --   --   --   --   --     < > = values in this interval not displayed.      Echocardiogram 10/31/21  Interpretation Summary  Global and regional left ventricular function is hyperkinetic with an EF of  65-70%. The LV cavity is small. Grade III or advanced diastolic dysfunction.  Global right ventricular function is normal. The right ventricle is normal  size.  Moderate tricuspid insufficiency is present.  The estimated PA systolic pressure is at least 41 mmHg.  IVC diameter >2.1 cm collapsing <50% with sniff suggests a high RA pressure  estimated at 15 mmHg or greater.  This study was compared with the study from 02/03/2021. The right  atrial  pressure is higher.     US Abdomen Limited 11/3/21  IMPRESSION:   1. No imaging finding to suggest fibrosis or ascites. No focal mass.  2. Possible nephrolithiasis within the inferior pole of the right  kidney.  3. Large amount of right pleural effusion.     XR Chest Port 1 View 10/31/21  Impression: Increased size in the chronic small right pleural effusion  with hazy adjacent basilar airspace opacities.    NM Lung Scan PE 11/12/21  Impression:  1. No evidence of acute pulmonary embolism.  2. Moderate right pleural effusion with associated  atelectasis/consolidation, increased since radiograph 10/31/2021.       I very much appreciated the opportunity to see and assess Leandra Guerrero in the hospital ED with CV NP Lizette Mckoy CNP. We discussed at length the patients labile potassium's and indicated that the basis for this issue is currently unknown. She voiced frustration and we indicated empathy . Potentially we can discharge later today if K+ seems more stable.    I agree with the note above which summarizes my findings and current recommendations.  Please do not hesitate to contact my office if you have any questions or concerns.      Bryant Morales MD  Cardiac Arrhythmia Service  [facility]  [phone]

## 2021-11-13 NOTE — ED PROVIDER NOTES
Initially seen by .  Please refer to his note.    Patient valuate for chest pain noted to have a potassium 2.3.  Patient given oral potassium recheck potassium 2.2 signed out to me at this point patient otherwise denies shortness of breath she been admitted for hyperkalemia held her potassium she is on diuretics also she has history of amyloid heart failure A. fish is on Eliquis also.  Patient notes she has not taken anything IV because it burns in the past she is had a PICC line placed for repletion replacement of potassium that way in blood draws.  Patient valuate discussed at length with her still having a very low potassium was not improving and feel at least admission would be appropriate.  We will plan to run IV peripheral potassium over 2 hours instead of 1 to see if she can tolerate this also will add another 20 mEq orally here I talked to initially cards to staff who recommended cards 1 admission he did agree will talk to the resident also plan to admit for hypokalemia with amyloid heart failure with chest pain with history of A. fib on Eliquis on diuretics also.      This note was created at least in part by the use of dragon voice dictation system. Inadvertent typographical errors may still exist.  Israel Richardson MD.    Patient evaluated in the emergency department during the COVID-19 pandemic period. Careful attention to patients safety was addressed throughout the evaluation. Evaluation and treatment management was initiated with disposition made efficiently and appropriate as possible to minimize any risk of potential exposure to patient during this evaluation.  \     Israel Richardson MD  11/12/21 [id number]

## 2021-11-13 NOTE — PROGRESS NOTES
Pt tearful, crying upon initial meet & assessment at 0830. Pt verbalized frustration with wanting to go home and having her  unnecessarily drive to hospital to drive pt home for discharge Pt verbalized no sleep last night & hunger. This RN spoke w/ pt regarding current plan of care & latest potassium level. Encouraged pt to eat & catch up on sleep today. Pt able to calm down. Cards team notified of pt's frustration & current K level. Pt updated w/ plan of care by Lizette RIVERA for cardiology. Tolerating PO. I&Os. Will continue to emotionally support pt. Will continue to monitor K level & replace per protocol, per order. Pt stable.

## 2021-11-14 VITALS
TEMPERATURE: 98 F | WEIGHT: 154.6 LBS | BODY MASS INDEX: 27.39 KG/M2 | RESPIRATION RATE: 16 BRPM | HEIGHT: 63 IN | OXYGEN SATURATION: 97 % | SYSTOLIC BLOOD PRESSURE: 116 MMHG | HEART RATE: 81 BPM | DIASTOLIC BLOOD PRESSURE: 56 MMHG

## 2021-11-14 LAB
ALBUMIN SERPL-MCNC: 3.5 G/DL (ref 3.4–5)
ALP SERPL-CCNC: 202 U/L (ref 40–150)
ALT SERPL W P-5'-P-CCNC: 26 U/L (ref 0–50)
ANION GAP SERPL CALCULATED.3IONS-SCNC: 7 MMOL/L (ref 3–14)
AST SERPL W P-5'-P-CCNC: 24 U/L (ref 0–45)
BILIRUB SERPL-MCNC: 1.6 MG/DL (ref 0.2–1.3)
BUN SERPL-MCNC: 34 MG/DL (ref 7–30)
CALCIUM SERPL-MCNC: 9.2 MG/DL (ref 8.5–10.1)
CHLORIDE BLD-SCNC: 98 MMOL/L (ref 94–109)
CO2 SERPL-SCNC: 27 MMOL/L (ref 20–32)
CREAT SERPL-MCNC: 1.23 MG/DL (ref 0.52–1.04)
ERYTHROCYTE [DISTWIDTH] IN BLOOD BY AUTOMATED COUNT: 13.6 % (ref 10–15)
GFR SERPL CREATININE-BSD FRML MDRD: 45 ML/MIN/1.73M2
GLUCOSE BLD-MCNC: 93 MG/DL (ref 70–99)
HCT VFR BLD AUTO: 36.8 % (ref 35–47)
HGB BLD-MCNC: 11.9 G/DL (ref 11.7–15.7)
MAGNESIUM SERPL-MCNC: 2.7 MG/DL (ref 1.6–2.3)
MCH RBC QN AUTO: 31.8 PG (ref 26.5–33)
MCHC RBC AUTO-ENTMCNC: 32.3 G/DL (ref 31.5–36.5)
MCV RBC AUTO: 98 FL (ref 78–100)
PLATELET # BLD AUTO: 251 10E3/UL (ref 150–450)
POTASSIUM BLD-SCNC: 4.6 MMOL/L (ref 3.4–5.3)
PROT SERPL-MCNC: 6.9 G/DL (ref 6.8–8.8)
RBC # BLD AUTO: 3.74 10E6/UL (ref 3.8–5.2)
SODIUM SERPL-SCNC: 132 MMOL/L (ref 133–144)
WBC # BLD AUTO: 6.7 10E3/UL (ref 4–11)

## 2021-11-14 PROCEDURE — 82040 ASSAY OF SERUM ALBUMIN: CPT | Performed by: STUDENT IN AN ORGANIZED HEALTH CARE EDUCATION/TRAINING PROGRAM

## 2021-11-14 PROCEDURE — 999N000111 HC STATISTIC OT IP EVAL DEFER: Performed by: OCCUPATIONAL THERAPIST

## 2021-11-14 PROCEDURE — 83735 ASSAY OF MAGNESIUM: CPT | Performed by: STUDENT IN AN ORGANIZED HEALTH CARE EDUCATION/TRAINING PROGRAM

## 2021-11-14 PROCEDURE — 82374 ASSAY BLOOD CARBON DIOXIDE: CPT | Performed by: STUDENT IN AN ORGANIZED HEALTH CARE EDUCATION/TRAINING PROGRAM

## 2021-11-14 PROCEDURE — 99207 PR APP CREDIT; MD BILLING SHARED VISIT: CPT | Performed by: NURSE PRACTITIONER

## 2021-11-14 PROCEDURE — 999N000147 HC STATISTIC PT IP EVAL DEFER

## 2021-11-14 PROCEDURE — 250N000013 HC RX MED GY IP 250 OP 250 PS 637: Performed by: NURSE PRACTITIONER

## 2021-11-14 PROCEDURE — 85014 HEMATOCRIT: CPT | Performed by: STUDENT IN AN ORGANIZED HEALTH CARE EDUCATION/TRAINING PROGRAM

## 2021-11-14 PROCEDURE — 250N000013 HC RX MED GY IP 250 OP 250 PS 637: Performed by: STUDENT IN AN ORGANIZED HEALTH CARE EDUCATION/TRAINING PROGRAM

## 2021-11-14 PROCEDURE — 99239 HOSP IP/OBS DSCHRG MGMT >30: CPT | Performed by: INTERNAL MEDICINE

## 2021-11-14 PROCEDURE — 36415 COLL VENOUS BLD VENIPUNCTURE: CPT | Performed by: STUDENT IN AN ORGANIZED HEALTH CARE EDUCATION/TRAINING PROGRAM

## 2021-11-14 RX ORDER — POTASSIUM CHLORIDE 1500 MG/1
60 TABLET, EXTENDED RELEASE ORAL 3 TIMES DAILY
Qty: 180 TABLET | Refills: 11 | Status: SHIPPED | OUTPATIENT
Start: 2021-11-14 | End: 2021-12-02

## 2021-11-14 RX ORDER — EPLERENONE 25 MG/1
25 TABLET, FILM COATED ORAL DAILY
Qty: 90 TABLET | Refills: 3 | Status: SHIPPED | OUTPATIENT
Start: 2021-11-14 | End: 2022-02-07

## 2021-11-14 RX ADMIN — TORSEMIDE 150 MG: 20 TABLET ORAL at 07:59

## 2021-11-14 RX ADMIN — ACETAMINOPHEN 650 MG: 325 TABLET, FILM COATED ORAL at 08:19

## 2021-11-14 RX ADMIN — POTASSIUM CHLORIDE 60 MEQ: 1500 TABLET, EXTENDED RELEASE ORAL at 08:00

## 2021-11-14 RX ADMIN — APIXABAN 2.5 MG: 2.5 TABLET, FILM COATED ORAL at 08:00

## 2021-11-14 RX ADMIN — EPLERENONE 25 MG: 25 TABLET, FILM COATED ORAL at 08:00

## 2021-11-14 ASSESSMENT — ACTIVITIES OF DAILY LIVING (ADL)
ADLS_ACUITY_SCORE: 6

## 2021-11-14 ASSESSMENT — MIFFLIN-ST. JEOR: SCORE: 1195.39

## 2021-11-14 NOTE — PLAN OF CARE
Focus:  Admission  Diagnosis: hypokalemia  Admitted from: [facility]   Via: stretcher   Accompanied by:transport.  Belongings: Placed in closet; valuables sent home with family.   Admission paperwork: complete.   Teaching: Call don't fall, use of console, meal times, visiting hours, orientation to unit, when to call for the RN (angina/sob/dizzyness, etc.), and stressed the importance of safety.   Access: PIV   Telemetry: Placed on pt   Ht./Wt.: complete

## 2021-11-14 NOTE — PROGRESS NOTES
Care Coordinator  D: admitted with hypokalemia/chest pain  I: Per chart review. Pt has signed discharge orders.  A: per MD pt may discharge  P: I attempted to call pt on room phone @ 2104 with no answer. Pt to have labs checked  Next week and has CORE appointment with labs on 12/1/21. No discharge needs identified--lives with spouse who can drive per notes.

## 2021-11-14 NOTE — DISCHARGE SUMMARY
[address] [address]  p: [phone]    Discharge Summary: Cardiology Service    Leandra Guerrero MRN# [id number]   YOB: 1950 Age: 71 year old       Admission Date: 11/12/2021  Discharge Date: 11/14/2021    Discharge Diagnoses:  # Hypokalemia related to medication changes  # Chest pain, atypical, resolved  # Restrictive cardiomyopathy/chronic HFpEF (LVEF 65-70% per TTE 10/31/21, acute on chronic; Stage C. NYHA Class III.  # Cardiac amyloidosis  # Right Pleural Effusion, stable  # Paroxysmal Atrial Fibrillation/ Flutter  # CKD 3a  # Systemic amyloidosis  # Eosinophilia, chronic  # Hyponatremia, improving  # Chronic Anemia  # Chronic Lymphedema    Brief HPI:  Amy Guerrero is a 71yr old female with a history of stage IV AL cardiac amyloidosis (diagnosed in 2016, with GI and bone marrow involvement, s/p CyBorD chemotherapy and excellent light chain response by serum, now in remission), restrictive cardiomyopathy/HFpEF (LVEF 65-70% per TTE 10/31/21), pAFib/FL, and CKD admitted for hypokalemia.    Hospital Course by Diagnosis:  # Hypokalemia related to medication changes  Pt has recently had very volatile K; was 6 two days ago and she was directed to decrease her KCL supplement from 80 TID to 40 BID for hyperkalemia. Pt had also run out of Eplerenone at that time. Also with poor PO intake recently. K on admission 2.2. S/p KCL replacements per protocol, resumption of Eplerenone. K stable 4.0>4.6  - Increase PTA KLOR 60 meQ TID  - Resume Eplerenone  - Follow up PCP 1 week with repeat BMP prior     # Chest pain, atypical, resolved  Severe, abrupt onset chest discomfort radiating to R arm with associated dyspnea while at rest, improved with NTG from 8 to 1-2. Trop negative x 2. EKG without suspicious changes. Prior ischemic workup with cath 2016: mild non-obstructive CAD. D-dimer was elevated but NM lung scan negative for PE and pt  reports compliance on apixaban (initially was confused and said she held it but believes now she was holding the eplerenone).   - Monitor clinically for recurrence     # Restrictive cardiomyopathy/chronic HFpEF (LVEF 65-70% per TTE 10/31/21, acute on chronic; Stage C. NYHA Class III.  # Cardiac amyloidosis  Follows with Noel; seen in [facility] clinic 11/10. Most recent TTE 10/31 with EF 65-70%, grade III diastolic dysfunction, moderate TR, mod/severe LAE. RFs: female, age, arrhythmia. BNP 1376 (similar to prior).   - Fluid status: euvolemic, continue torsemide 150mg twice daily with additional metolazone prn weight gain  - Weight on day of discharge 154 lbs  --Aldosterone antagonist: Resume Eplerenone 25 mg daily for hypokalemia     # Right Pleural Effusion, stable  Chronic finding, noted on several previous chest xrays and again on 11/3 abdominal ultrasound. Likely secondary to chronic diastolic heart failure. Tried to get thora for dx last admission but did not have enough fluid. Cxray with no change in size of effusion     # pAFib/FL  HRs controlled, not on BB.  Currently in NSR  - Continue apixaban 2.5mg twice daily.     # CKD  At baseline creatinine ~1.1-1.2.  Cr 1.23 on day of discharge     # Systemic amyloidosis  Heme previously monitored light chains, which have been negative --> consistent with remission.       # Eosinophilia, chronic   ? Noted since 2019. Some case reports with eosinophilia a/w pulmonary amyloid. Also wonder if DRESS is possible given recent notes mentioning chronic dermatitis, unknown reason behind LFT abnormalities (though the latter is very long-standing eg since at least 2016).      # Hyponatremia, improving  Na 129 on presentation. Euvolemic on exam. Na improved 132 after PO Torsemide and PO Metolazone     # Chronic Anemia  Hgb recently 11-12. 11.3 on admission.      # Chronic lymphedema  - OT consulted for lymphedema wraps    Medication Changes:  RESUME Eplerenone 25 mg daily  MODIFY  KLOR 60 mEq TID     Discharge medications:   Current Discharge Medication List      CONTINUE these medications which have CHANGED    Details   eplerenone (INSPRA) 25 MG tablet Take 1 tablet (25 mg) by mouth daily  Qty: 90 tablet, Refills: 3    Associated Diagnoses: Hypokalemia; Acute on chronic diastolic heart failure (H)      potassium chloride ER (KLOR-CON M) 20 MEQ CR tablet Take 3 tablets (60 mEq) by mouth 3 times daily  Qty: 180 tablet, Refills: 11    Associated Diagnoses: Acute on chronic diastolic heart failure (H)         CONTINUE these medications which have NOT CHANGED    Details   Acetaminophen (TYLENOL PO) Take 325 mg by mouth every 6 hours as needed for mild pain or fever     Associated Diagnoses: Amyloid heart disease (H)      apixaban ANTICOAGULANT (ELIQUIS) 2.5 MG tablet Take 1 tablet (2.5 mg) by mouth 2 times daily  Qty: 180 tablet, Refills: 3    Comments: NOTE NEW INSTRUCTIONS ( NOT NEW FOR PATIENT BUT NEW FOR YOUR PHARMACY)/DIFFERENT THAN THE FAXED REQUEST  Associated Diagnoses: Paroxysmal atrial fibrillation (H)      camphor-menthol (DERMASARRA) 0.5-0.5 % external lotion Apply 1 mL topically every 6 hours as needed for skin care  Qty: 222 mL, Refills: 0    Associated Diagnoses: Acute on chronic diastolic heart failure (H)      metolazone (ZAROXOLYN) 2.5 MG tablet Take 1 tablet (2.5 mg) by mouth twice a week As needed for increased swelling and weight gain of 3 lb in one day or 5 lb in one week  Qty: 26 tablet, Refills: 3    Associated Diagnoses: Amyloid heart disease (H)      ondansetron (ZOFRAN-ODT) 8 MG ODT tab Take 8 mg by mouth every 8 hours as needed PRN      torsemide (DEMADEX) 100 MG tablet Take 1.5 tablets (150 mg) by mouth every morning AND 1.5 tablets (150 mg) daily at 2 pm.  Qty: 90 tablet, Refills: 1    Associated Diagnoses: Acute on chronic diastolic heart failure (H); Amyloid heart disease (H)      triamcinolone (KENALOG) 0.1 % external cream Apply a thin layer twice daily to itchy  areas as needed.  Qty: 453.6 g, Refills: 3    Associated Diagnoses: Intrinsic atopic dermatitis         STOP taking these medications       doxepin (SINEQUAN) 10 MG capsule Comments:   Reason for Stopping:               Follow-up:  - PCP 7-10 days for post hospitalization visit, repeat BMP  - [facility] 12/1 for HFpEF follow up     Labs or imaging requiring follow-up after discharge:  BMP 1 week    Code status:  Full    Condition on discharge  Temp:  [97.6  F (36.4  C)-98  F (36.7  C)] 98  F (36.7  C)  Pulse:  [59-81] 81  Resp:  [15-20] 16  BP: (110-140)/(56-71) 116/56  Cuff Mean (mmHg):  [74] 74  SpO2:  [97 %-99 %] 97 %  General: Alert, interactive, NAD  Eyes: sclera anicteric, EOMI  Neck: no JVD, carotid 2+ bilaterally  Cardiovascular: regular rate and rhythm, normal S1 and S2, no murmurs, gallops, or rubs  Resp: clear to auscultation bilaterally, no rales, wheezes, or rhonchi  GI: Soft, nontender, nondistended. +BS.  No HSM or masses, no rebound or guarding.  Extremities: chronic BLE lymphedema, no cyanosis or clubbing, dorsalis pedis and posterior tibialis pulses 2+ bilaterally  Skin: Warm and dry, no jaundice or rash  Neuro: CN 2-12 intact, moves all extremities equally  Psych: Alert & oriented x 3    Imaging with results:  Other imaging studies:  EKG 12 Lead 11/12/2021: NSR with 1st deg AVB, HR 65      Patient Care Team:  Magy Obrien MD as PCP - General (Family Practice)  Mirela Moore MD as MD (Hematology & Oncology)  Domenica Cohen MD as MD (Cardiology)  Niki Fam, RN as Specialty Care Coordinator (Cardiology)  Ciara Araya PA-C as Physician Assistant (Physician Assistant)  Vidhi Montgomery, RN as Specialty Care Coordinator (Cardiology)  Mirela Moore MD as Assigned Cancer Care Provider  Roscoe Elam MD as Assigned Palliative Care Provider  Ciara Araya PA-C as Assigned Heart and Vascular Provider  Kasie Gupta MD as Assigned Surgical Provider    45  minutes spent in discharge, including >50% in counseling and coordination of care, medication review and plan of care recommended on follow up. Questions were answered.   It was our pleasure to care for Amy during this hospitalization. Please do not hesitate to contact me should there be questions regarding the hospital course or discharge plan.      Lizette SAEZ, CNP  [facility] Cardiology  [phone]    I very much appreciated the opportunity to see and assess Leandra Guerrero in the hospital with  Lizette Hutson CNP.  Patient's potassium seems to have stabilized on current medications which now include eplerenone.  She feels much more confident about returning home and we have prescribed sufficient medication that she should not run out in the foreseeable future.      I agree with the note above which summarizes my findings and current recommendations.  Please do not hesitate to contact my office if you have any questions or concerns.      Bryant Morales MD  Cardiac Arrhythmia Service  [facility]  [phone]

## 2021-11-14 NOTE — PROGRESS NOTES
DISCHARGE   Discharged to: Home  Via: Automobile  Accompanied by:   Discharge Instructions: principal diagnosis, major findings/procedures, diet, activity, medications, follow up appointments, when to call the MD, and what to watchout for (i.e. s/s of infection, increasing SOB, palpitations, Angina). Pt states understanding of all discharge instructions.   Prescriptions: To be filled by home pharmacy per pt's request; scripts sent with pt.  Follow Up Appointments: PCP in 1 week.  Dec 1st, HF follow up appt, labs prior.   Belongings: All sent with pt. Pt verifies that they are taking all belongings with them.   IV: discontinued.   Telemetry: discontinued.   Pt exhibits understanding of above discharge instructions; all questions answered.  Discharge Paperwork: faxed to discharge planner.

## 2021-11-14 NOTE — PLAN OF CARE
Hypokalemia related to diuretic use  Neuro: A&Ox4.   Cardiac: SR.VSS.   Respiratory: Sating WNL on RA.  GI/: Adequate urine output.   Diet/appetite: 2gm Na diet ordered. Fluid restriction of 2 liters.  Activity: Pt up ad [place].  Pain: Denies.  Skin: No  deficits noted.  LDA's: PIV NS locked.    Plan: Continue with POC. Notify primary team with changes.

## 2021-11-14 NOTE — PROGRESS NOTES
"SPIRITUAL HEALTH SERVICES  SPIRITUAL ASSESSMENT Progress Note  [facility] 6C   ON-CALL VISIT    REFERRAL SOURCE: [facility] consult for emotional support.    Pt Ginger identifies as Denominational. She stated to me that, \"God has chosen not to cure me\" from her disease but attributes to God that \"He has been with me the whole time\".      Ginger is optimistic on her outlook of her prognosis and sees death as something inevitable and that she would like to live out the rest of her life \"doing God's work\".    Ginger lives in [place], [place] with her . She attends Hinduism service online with her smartphone.     Ginger welcomed prayers of healing/peace before her discharge today. We prayed together at her request.      PLAN: No follow up needed at this time.     Gerald Arvizu  [facility] [employer]   Pager [phone]    [facility] remains available 24/7 for emergent requests/referrals, either by having the switchboard page the on-call  or by entering an ASAP/STAT consult in Epic (this will also page the on-call ).    "

## 2021-11-15 ENCOUNTER — PATIENT OUTREACH (OUTPATIENT)
Dept: CARE COORDINATION | Facility: CLINIC | Age: 71
End: 2021-11-15
Payer: COMMERCIAL

## 2021-11-15 ENCOUNTER — TELEPHONE (OUTPATIENT)
Dept: CARDIOLOGY | Facility: CLINIC | Age: 71
End: 2021-11-15
Payer: COMMERCIAL

## 2021-11-15 DIAGNOSIS — Z71.89 OTHER SPECIFIED COUNSELING: ICD-10-CM

## 2021-11-15 NOTE — TELEPHONE ENCOUNTER
[facility] Call Center    Phone Message    May a detailed message be left on voicemail: no     Reason for Call: Other: Amy called to advise Dr gustafson she was admitted to the [facility] on 11/12/2021 because her Potassium had dropped. She was discharged from the hospital on 11/14/2021 and since her discharge her weight has increased to 159lbs. Please reach out to Amy with direction.      Action Taken: Message routed to:  [facility] Cardiology    Travel Screening: Not Applicable

## 2021-11-15 NOTE — TELEPHONE ENCOUNTER
Weight is up 4 lbs from discharge.  Not drinking much, scared and crying.  Feeling miserable.      Taking medications as ordered.      Date: 11/15/2021    Time of Call: 5:11 PM     Diagnosis:  Heart failure     [ VORB ] Ordering provider: Amanda Hodges NP    Order: take metolazone 1.25 mg today.  Extra potassium 40 mEq today and tomorrow     Order received by: Niki Fam RN       Follow-up/additional notes: called Jeffery.  Labs and core scheduled for Wednesday

## 2021-11-15 NOTE — PROGRESS NOTES
Clinic Care Coordination Contact  [facility]: Post-Discharge Note  SITUATION                                                      Admission:    Admission Date: 11/12/21   Reason for Admission: low potassium  Discharge:   Discharge Date: 11/14/21  Discharge Diagnosis: low potassium    BACKGROUND                                                      Leandra Guerrero is a 71yr old female with a history of stage IV AL cardiac amyloidosis (diagnosed in 2016, with GI and bone marrow involvement, s/p CyBorD chemotherapy and excellent light chain response by serum, now in remission, on chronic doxycycline), restrictive cardiomyopathy/HFpEF (LVEF 65-70% per TTE 10/31/21), pAFib/FL, and CKD presenting with chest pain.     ASSESSMENT      Enrollment  Primary Care Care Coordination Status: Not a Candidate    Discharge Assessment  How are you doing now that you are home?: slept pretty well last night, doing okay  How are your symptoms? (Red Flag symptoms escalate to triage hotline per guidelines): Unchanged  Do you feel your condition is stable enough to be safe at home until your provider visit?: Yes  Does the patient have their discharge instructions? : Yes  Does the patient have questions regarding their discharge instructions? : No  Were you started on any new medications or were there changes to any of your previous medications? : Yes  Does the patient have all of their medications?: Yes  Do you have questions regarding any of your medications? : No  Do you have all of your needed medical supplies or equipment (DME)?  (i.e. oxygen tank, CPAP, cane, etc.): Yes  Discharge follow-up appointment scheduled within 14 calendar days? : Yes  Discharge Follow Up Appointment Date: 12/01/21 (Follow up is with cardiology. Patient does not want to follow up with PCP but has there number if she needs to call.)  Discharge Follow Up Appointment Scheduled with?: Specialty Care Provider    Post-op ([facility] Only)  If the patient had a  surgery or procedure, do they have any questions for a nurse?: No             PLAN                                                      Outpatient Plan:  Follow up with primary care provider, CARLYN ALVA, within 7-10  days to evaluate medication change and for hospital follow- up. The  following labs/tests are recommended: BMP.  Follow up with Cardiology Clinic 12/1, as previously arranged, for heart  failure follow up    Future Appointments   Date Time Provider Department Center   12/1/2021 10:00 AM [facility]   12/1/2021 10:30 AM Ciara Araya PA-C [facility]   12/16/2021  8:00 AM [facility]   12/16/2021  8:30 AM Domenica Cohen MD [facility]   2/7/2022  8:00 AM [facility]   2/7/2022  8:30 AM Ciara Araya PA-C [facility]         For any urgent concerns, please contact our 24 hour nurse triage line: [phone] ([phone])       NAOMI Forde  [phone]  [facility]

## 2021-11-16 DIAGNOSIS — I50.32 CHRONIC DIASTOLIC HEART FAILURE (H): Primary | ICD-10-CM

## 2021-11-17 ENCOUNTER — LAB (OUTPATIENT)
Dept: LAB | Facility: CLINIC | Age: 71
End: 2021-11-17
Attending: PHYSICIAN ASSISTANT
Payer: COMMERCIAL

## 2021-11-17 ENCOUNTER — OFFICE VISIT (OUTPATIENT)
Dept: CARDIOLOGY | Facility: CLINIC | Age: 71
End: 2021-11-17
Attending: PHYSICIAN ASSISTANT
Payer: COMMERCIAL

## 2021-11-17 VITALS
SYSTOLIC BLOOD PRESSURE: 139 MMHG | HEART RATE: 77 BPM | BODY MASS INDEX: 27.28 KG/M2 | OXYGEN SATURATION: 98 % | WEIGHT: 154 LBS | DIASTOLIC BLOOD PRESSURE: 74 MMHG

## 2021-11-17 DIAGNOSIS — I50.32 CHRONIC DIASTOLIC HEART FAILURE (H): ICD-10-CM

## 2021-11-17 DIAGNOSIS — I50.32 CHRONIC DIASTOLIC HEART FAILURE (H): Primary | ICD-10-CM

## 2021-11-17 LAB
ANION GAP SERPL CALCULATED.3IONS-SCNC: 9 MMOL/L (ref 3–14)
BUN SERPL-MCNC: 32 MG/DL (ref 7–30)
CALCIUM SERPL-MCNC: 9.3 MG/DL (ref 8.5–10.1)
CHLORIDE BLD-SCNC: 95 MMOL/L (ref 94–109)
CO2 SERPL-SCNC: 31 MMOL/L (ref 20–32)
CREAT SERPL-MCNC: 1.02 MG/DL (ref 0.52–1.04)
GFR SERPL CREATININE-BSD FRML MDRD: 56 ML/MIN/1.73M2
GLUCOSE BLD-MCNC: 100 MG/DL (ref 70–99)
POTASSIUM BLD-SCNC: 3.9 MMOL/L (ref 3.4–5.3)
SODIUM SERPL-SCNC: 135 MMOL/L (ref 133–144)

## 2021-11-17 PROCEDURE — 80048 BASIC METABOLIC PNL TOTAL CA: CPT | Performed by: PATHOLOGY

## 2021-11-17 PROCEDURE — 99214 OFFICE O/P EST MOD 30 MIN: CPT | Performed by: PHYSICIAN ASSISTANT

## 2021-11-17 PROCEDURE — 36415 COLL VENOUS BLD VENIPUNCTURE: CPT | Performed by: PATHOLOGY

## 2021-11-17 PROCEDURE — G0463 HOSPITAL OUTPT CLINIC VISIT: HCPCS

## 2021-11-17 ASSESSMENT — PAIN SCALES - GENERAL: PAINLEVEL: NO PAIN (0)

## 2021-11-17 NOTE — PROGRESS NOTES
"In person visit.    HPI:   Ms. Guerrero is a 71 year old female with a past medical history including stage IV AL amyloid diagnosed in 2016. Presents to clinic for CORE follow-up.     Her cardiac and oncologic history are as follows: fatigue starting in 1/2016. She had a coronary angiogram which was reportedly normal but was diagnosed with HF and started on a BBand diuretics. Her symptoms progressed to the point that she was evaluated at [facility] in August/September (Dr. Barron in oncology/hematology, Dr. Nettles is cardiology). She was diagnosed with stage IV AL amyloid (+GI, +bone marrow involvement, no involvement of kidney or liver). Her work up is detailed in our previous notes, however she has lambda AL amyloidosis and she underwent CyBorD chemotherapy and excellent light chain response by serum. Patient has been turned down at [facility] for a stem cell transplant due to her cardiac involvement. Later in 2016, she had 2-3 more right sided pleural taps. She was hospitalized 1/2017 and diuresed 20-30 pounds at that time. Since that time her AL has been in remission by labs without further chemo. Patient was then treated with doxycycline but this was stopped for 6-9 months d/t concerns she was not benefiting from this, now restarted.    This visit:  Weight today at home today was 153. When her weight was up to 159 this week it was harder to breath, but now that her weight is down her PELAEZ. LE edema is there, but \"not horrible\". No abdominal edema. Rare palpitations. She is having a little bit of dizziness, not usual for her. No more chest pain since last Friday when she came into the hospital. No PND. Sleeping on 2 pillows.    No more chest pain since she left the hospital.    Appetite is okay.    Cardiac Medications  Torsemide 150 mg BID  Kcl 60 meq TID  Instpra 25 mg daily  Metolazone 2.5- when she takes half take 40 meqextra the day of metolazone and the day after. When she takes 2.5 mg of metolazone she takes 60 meq " EXTRA of kcl the day of metolazone and the day after.    PAST MEDICAL HISTORY:  Past Medical History:   Diagnosis Date     AL amyloidosis (H)      Atrial fibrillation and flutter (H)      Cardiac amyloidosis (H)      Lymphedema      QT prolongation      Recurrent right pleural effusion 1/2/2017     SVT (supraventricular tachycardia) (H)        FAMILY HISTORY:  Family History   Problem Relation Age of Onset     Other - See Comments Sister         Amyloidosis       SOCIAL HISTORY:  Socioeconomic History     Marital status:    Tobacco Use     Smoking status: Never Smoker     Smokeless tobacco: Never Used   Substance and Sexual Activity     Alcohol use: No     Drug use: No   Other Topics Concern     CURRENT MEDICATIONS:  Acetaminophen (TYLENOL PO), Take 325 mg by mouth every 6 hours as needed for mild pain or fever   apixaban ANTICOAGULANT (ELIQUIS) 2.5 MG tablet, Take 1 tablet (2.5 mg) by mouth 2 times daily  camphor-menthol (DERMASARRA) 0.5-0.5 % external lotion, Apply 1 mL topically every 6 hours as needed for skin care  eplerenone (INSPRA) 25 MG tablet, Take 1 tablet (25 mg) by mouth daily  metolazone (ZAROXOLYN) 2.5 MG tablet, Take 1 tablet (2.5 mg) by mouth twice a week As needed for increased swelling and weight gain of 3 lb in one day or 5 lb in one week  ondansetron (ZOFRAN-ODT) 8 MG ODT tab, Take 8 mg by mouth every 8 hours as needed PRN  potassium chloride ER (KLOR-CON M) 20 MEQ CR tablet, Take 3 tablets (60 mEq) by mouth 3 times daily  torsemide (DEMADEX) 100 MG tablet, Take 1.5 tablets (150 mg) by mouth every morning AND 1.5 tablets (150 mg) daily at 2 pm.  triamcinolone (KENALOG) 0.1 % external cream, Apply a thin layer twice daily to itchy areas as needed.    No current facility-administered medications on file prior to visit.      ROS:   See HPI     EXAM:  /74 (BP Location: Right arm, Patient Position: Chair, Cuff Size: Adult Regular)   Pulse 77   Wt 69.9 kg (154 lb)   SpO2 98%   BMI  27.28 kg/m       GENERAL: Appears fatigued, in no acute distress. Speaking in full sentences and able to communicate all needs  HEENT: Eye symmetrical, no discharge or icterus bilaterally. Mucous membranes moist and without lesions.  CV: RRR, +S1S2, no murmur, rub, or gallop. JVP lower third of neck at 90 degrees  RESPIRATORY: Respirations regular, even, and unlabored. Lungs CTA throughout.   GI: Soft and mildly  distended with normoactive bowel sounds. No tenderness, rebound, guarding.   EXTREMITIES: Severe b/l non-pitting peripheral edema, slightly increased from last visit. All extremities are warm and well perfused   NEUROLOGIC: Alert and interacting appropriately. No focal deficits.   MUSCULOSKELETAL: No joint swelling or tenderness.   SKIN: No jaundice. No rashes.      Labs, reviewed with patient in clinic today:  CBC RESULTS:  Lab Results   Component Value Date    WBC 6.7 11/14/2021    WBC 6.4 05/11/2021    RBC 3.74 (L) 11/14/2021    RBC 4.29 05/11/2021    HGB 11.9 11/14/2021    HGB 13.8 05/11/2021    HCT 36.8 11/14/2021    HCT 41.3 05/11/2021    MCV 98 11/14/2021    MCV 96 05/11/2021    MCH 31.8 11/14/2021    MCH 32.2 05/11/2021    MCHC 32.3 11/14/2021    MCHC 33.4 05/11/2021    RDW 13.6 11/14/2021    RDW 13.5 05/11/2021     11/14/2021     05/11/2021       CMP RESULTS:  Lab Results   Component Value Date     (L) 11/14/2021     07/08/2021    POTASSIUM 4.6 11/14/2021    POTASSIUM 3.6 07/08/2021    CHLORIDE 98 11/14/2021    CHLORIDE 94 07/08/2021    CO2 27 11/14/2021    CO2 34 (H) 07/08/2021    ANIONGAP 7 11/14/2021    ANIONGAP 6 07/08/2021    GLC 93 11/14/2021    GLC 89 07/08/2021    BUN 34 (H) 11/14/2021    BUN 35 (H) 07/08/2021    CR 1.23 (H) 11/14/2021    CR 1.13 (H) 07/08/2021    GFRESTIMATED 45 (L) 11/14/2021    GFRESTIMATED 50 (L) 07/08/2021    GFRESTBLACK 58 (L) 07/08/2021    JERROD 9.2 11/14/2021    JERROD 9.2 07/08/2021    BILITOTAL 1.6 (H) 11/14/2021    BILITOTAL 1.9 (H)  05/11/2021    ALBUMIN 3.5 11/14/2021    ALBUMIN 3.8 05/11/2021    ALKPHOS 202 (H) 11/14/2021    ALKPHOS 171 (H) 05/11/2021    ALT 26 11/14/2021    ALT 35 05/11/2021    AST 24 11/14/2021    AST 23 05/11/2021        INR RESULTS:  Lab Results   Component Value Date    INR 1.34 (H) 11/12/2021    INR 1.30 (H) 01/14/2017       Lab Results   Component Value Date    MAG 2.7 (H) 11/14/2021    MAG 2.2 10/26/2019     Lab Results   Component Value Date    NTBNPI 1,578 (H) 11/12/2021    NTBNPI 9,009 (H) 01/13/2017     Lab Results   Component Value Date    NTBNP 1,376 (H) 02/04/2021       Diagnostics:      2/21 Ziopatch       6/12/2019 Holter Mnoitor      TTE 4/9/19  Moderate left ventricular hypertrophy.  Global and regional left ventricular function is normal with an EF of 60-65%.  Global right ventricular function is normal.  Moderate aortic valve insufficiency.  Mild pulmonary hypertension with dilated IVC.  This study was compared with the study from 10/11/18 the AR is worse and RA pressure is now increased.    TTE 10/11/18  Global and regional left ventricular function is normal with an EF of 55-60%.  Moderate concentric wall thickening consistent with left ventricular  hypertrophy is present - known amyloid.  Grade III or advanced diastolic dysfunction.  Normal right ventricular size, wall thickness, and function.  Estimated pulmonary artery pressure 28 mmHg.  IVC with normal diameter but does not collapse (>50%) with inspiration.  Trace pericardial effusion.  Compared to prior study from 8/17/17, no significant change.    Ziopatch 11/2017      Holter Monitor 06/2019  INTERPRETATION  1. The rhythm varied with sinus rhythm and atrial fibrillation/variable atrial flutter. Low voltage noted.  2. The heart rate varied with a minimum rate of 48 bpm, maximum rate of 164 bpm, average rate of 74 bpm.  3. Frequent supraventricular ectopy was seen ranging from 0-469 per hour. Occasional atrail pairs and fairly frequent bigeminal  cycles were noted.  Wandering atrial pacemaker noted.  4. Infrequent multifocal ventricular ectopy was seen ranging from 0-36 beats per hour.  5. ST-T wave changes were present.  6. Three patient events were documented and correlated with atrial fibrillation/flutter    EKG 8/8/2019 for palpitations  - NSR at a rate of 70, TN 0.19, QTc 525, QRS is narrow. Occasional PVCs. Biphasic twaves in V4-V6, unchanged from priors.    Assessment and Plan:   Mrs. Guerrero is a 71 year old female with AL amyloidosis with cardiac manifestation who [place] [facility] [place]. Patient has cardiac amyloidosis as evidenced by her echocardiogram (severe concentric hypertrophy and biatrial enlargement) and abnormal EKG (near-low voltage, poor R wave progression, biatrial enlargement and no evidence of LVH despite thicken LV on echo) in the setting of biopsy proven (BMB, EGD) AL amyloid. She completed chemotherapy with CyBorD with an excellent serologic response and her heart failure (i.e. troponin negative, NT pro BNP was stable, serum light chains minimal and urine light chains undetectable).     She has gradually declined over the last few years and has ongoing challenges with her volume status.  She requires frequent metolazone and maintaining an adequate potassium level has been a challenge. She has also been struggling with A. Fib, which is occaionally symptomatic. These episodes remain rare and given relative contraindications for amyloid patients we do not have her on rate control at this time. She has had a few hospital stays for volume issues and potassium issues this year. Today, she is doing okay. Her Cr and K are stable. We will continue on her current medications and watch closely.    Cardiac Medications  Torsemide 150 mg BID  Kcl 60 meq TID  Instpra 25 mg daily  Metolazone 2.5- when she takes half take 40 meq BID. When she take 2.5 mg of metolazone      # Chronic diastolic heart failure/restrictive  cardiomyopathy 2/2  # Cardiac amyloidosis    Stage C. NYHA Class III.    Fluid status: Continue torsemide 150 mg BID and Kcl 60 meq TID.  Continue PRN metolazone with an extra 60 meq of kcl the day of and the day after metolazone if she take a full 2.5 mg tab. If she takes a half tab of metolazone, she take 40 meq EXTRA of kcl the day of metolazone and the day after  ACEi/ARB/ARNI: n/a   BB: no indication and relatively contraindicated due to risk of progressive conduction disease/AV block in these patients  Aldosterone antagonist: Continue Inspra 25 mg daily  SCD prophylaxis: does not meet criteria for implant  NSAID use: contraindicated  Sleep apnea evaluation: deferred today  Remote monitoring: N/A  Goals of care: prior discussions with Dr Cohen and ongoing in [facility] as well  Other: Has a referall for lymphedema therapy in [facility]. If she needs paracentesis in the future for symptomatic control we would support that. Her abdominal edema is mild for her right now. Also watching her pleural effusion closely.    # Pleural effusion. Noted during recent admission. On last admission, thoracentesis was defered.  - Continue to monitor- if symptomatic will consider a therapeutic thoracentesis    # CKD stage 3B  - Cr stabe at 1.02, stable at her baseline    # Possible Chirrosis, recently had ultrasound which suggested chirrosis, but on repeat, echotexture of the liver was normal  - Following up with hepatology    # A. Fib/A. Flutter  Prior holter monitor which showed intermittent a. Fib and a. Flutter. [id number] of 4. Occasional palpitations which last less than 1 minute, if these become more frequent can repeat monitor to assess burden.  - Continue Eliquis 2.5mg BID, reduced dose d/t frequent bleeding, aware of risks  - Avoiding BB in the setting of amyloid  - Holding off on digoxin given generally good rate control/very rare RVR events. Could discuss in the future if needed.    # Prolonged QTC  - Minimize QT  prolonging meds    # Systemic amyloid  Heme previously monitoring her light chains which have been negative consistent with remission - therefore further palliative chemo not being considered. Nausea has been a large issue and is currently, seeing palliative to address.  - Pt has seen palliative care for nausea, plans to see a Supportive Clinic for symptoms through her insurance  - Follow-up with oncology this week as scheduled    #Nausea.   - Recommend ongoing f/u with palliative care vs the support team through her insurance.    Follow: Up:   - BMP in 1 week  - Dr. Keyes as schedule 12/16    Billing  - I managed 2+ stable chronic conditions  - I reviewed one test and ordered one test  - I reviewed an outside note (discharge summary)      Ciara Araya PA-C  [facility] Cardiology      CC  ALEJANDRO KEYES

## 2021-11-17 NOTE — PATIENT INSTRUCTIONS
Take your medicines every day, as directed    Changes made today:  o No medication changes  o You are okay to take a metolazone when you need to  o If you take half a tab of metolazone, take 40 meq of potassium EXTRA on the day of metolazone and the day after  o If you take full tab of metolazone, take 60 meq of potassium EXTRA on the day of metolazone and the day after     Monitor Your Weight and Symptoms    Contact us if you:      Gain 2 pounds in one day or 5 pounds in one week    Feel more short of breath    Notice more leg swelling    Feel lightheadeded   Change your lifestyle    Limit Salt or Sodium:    2000 mg  Limit Fluids:    2000 mL or approximately 64 ounces  Eat a Heart Healthy Diet    Low in saturated fats  Stay Active:    Aim to move at least 150 minutes every  week         To Contact us    During Business Hours:  [phone], option # 1 (University)  Then option # 4 (medical questions)     After hours, weekends or holidays:   [phone], Option #4  Ask to speak to the On-Call Cardiologist. Inform them you are a CORE/heart failure patient at the [facility].     Use [employer] allows you to communicate directly with your heart team through secure messaging.    [employer] can be accessed any time on your phone, computer, or tablet.    If you need assistance, we'd be happy to help!         Keep your Heart Appointments:    - BMP in one week  - You can cancel your [facility] appointment on 12/1  - You have Dr. Cohen on 12/16

## 2021-11-17 NOTE — NURSING NOTE
Chief Complaint   Patient presents with     Follow Up     CORE      Vitals were taken and medications reconciled.    John Govea, EMT  10:30 AM

## 2021-11-17 NOTE — LETTER
"11/17/2021      RE: Leandra Guerrero  [address] [place] [address]       Dear Colleague,    Thank you for the opportunity to participate in the care of your patient, Leandra Guerrero, at the [facility] [place] [facility]. Please see a copy of my visit note below.    In person visit.    HPI:   Ms. Guerrero is a 71 year old female with a past medical history including stage IV AL amyloid diagnosed in 2016. Presents to clinic for [facility] follow-up.     Her cardiac and oncologic history are as follows: fatigue starting in 1/2016. She had a coronary angiogram which was reportedly normal but was diagnosed with HF and started on a BBand diuretics. Her symptoms progressed to the point that she was evaluated at [facility] in August/September (Dr. Barron in oncology/hematology, Dr. Nettles is cardiology). She was diagnosed with stage IV AL amyloid (+GI, +bone marrow involvement, no involvement of kidney or liver). Her work up is detailed in our previous notes, however she has lambda AL amyloidosis and she underwent CyBorD chemotherapy and excellent light chain response by serum. Patient has been turned down at [facility] for a stem cell transplant due to her cardiac involvement. Later in 2016, she had 2-3 more right sided pleural taps. She was hospitalized 1/2017 and diuresed 20-30 pounds at that time. Since that time her AL has been in remission by labs without further chemo. Patient was then treated with doxycycline but this was stopped for 6-9 months d/t concerns she was not benefiting from this, now restarted.    This visit:  Weight today at home today was 153. When her weight was up to 159 this week it was harder to breath, but now that her weight is down her PELAEZ. LE edema is there, but \"not horrible\". No abdominal edema. Rare palpitations. She is having a little bit of dizziness, not usual for her. No more chest pain since last Friday when she came " into the hospital. No PND. Sleeping on 2 pillows.    No more chest pain since she left the hospital.    Appetite is okay.    Cardiac Medications  Torsemide 150 mg BID  Kcl 60 meq TID  Instpra 25 mg daily  Metolazone 2.5- when she takes half take 40 meqextra the day of metolazone and the day after. When she takes 2.5 mg of metolazone she takes 60 meq EXTRA of kcl the day of metolazone and the day after.    PAST MEDICAL HISTORY:  Past Medical History:   Diagnosis Date     AL amyloidosis (H)      Atrial fibrillation and flutter (H)      Cardiac amyloidosis (H)      Lymphedema      QT prolongation      Recurrent right pleural effusion 1/2/2017     SVT (supraventricular tachycardia) (H)        FAMILY HISTORY:  Family History   Problem Relation Age of Onset     Other - See Comments Sister         Amyloidosis       SOCIAL HISTORY:  Socioeconomic History     Marital status:    Tobacco Use     Smoking status: Never Smoker     Smokeless tobacco: Never Used   Substance and Sexual Activity     Alcohol use: No     Drug use: No   Other Topics Concern     CURRENT MEDICATIONS:  Acetaminophen (TYLENOL PO), Take 325 mg by mouth every 6 hours as needed for mild pain or fever   apixaban ANTICOAGULANT (ELIQUIS) 2.5 MG tablet, Take 1 tablet (2.5 mg) by mouth 2 times daily  camphor-menthol (DERMASARRA) 0.5-0.5 % external lotion, Apply 1 mL topically every 6 hours as needed for skin care  eplerenone (INSPRA) 25 MG tablet, Take 1 tablet (25 mg) by mouth daily  metolazone (ZAROXOLYN) 2.5 MG tablet, Take 1 tablet (2.5 mg) by mouth twice a week As needed for increased swelling and weight gain of 3 lb in one day or 5 lb in one week  ondansetron (ZOFRAN-ODT) 8 MG ODT tab, Take 8 mg by mouth every 8 hours as needed PRN  potassium chloride ER (KLOR-CON M) 20 MEQ CR tablet, Take 3 tablets (60 mEq) by mouth 3 times daily  torsemide (DEMADEX) 100 MG tablet, Take 1.5 tablets (150 mg) by mouth every morning AND 1.5 tablets (150 mg) daily at 2  pm.  triamcinolone (KENALOG) 0.1 % external cream, Apply a thin layer twice daily to itchy areas as needed.    No current facility-administered medications on file prior to visit.      ROS:   See HPI     EXAM:  /74 (BP Location: Right arm, Patient Position: Chair, Cuff Size: Adult Regular)   Pulse 77   Wt 69.9 kg (154 lb)   SpO2 98%   BMI 27.28 kg/m       GENERAL: Appears fatigued, in no acute distress. Speaking in full sentences and able to communicate all needs  HEENT: Eye symmetrical, no discharge or icterus bilaterally. Mucous membranes moist and without lesions.  CV: RRR, +S1S2, no murmur, rub, or gallop. JVP lower third of neck at 90 degrees  RESPIRATORY: Respirations regular, even, and unlabored. Lungs CTA throughout.   GI: Soft and mildly  distended with normoactive bowel sounds. No tenderness, rebound, guarding.   EXTREMITIES: Severe b/l non-pitting peripheral edema, slightly increased from last visit. All extremities are warm and well perfused   NEUROLOGIC: Alert and interacting appropriately. No focal deficits.   MUSCULOSKELETAL: No joint swelling or tenderness.   SKIN: No jaundice. No rashes.      Labs, reviewed with patient in clinic today:  CBC RESULTS:  Lab Results   Component Value Date    WBC 6.7 11/14/2021    WBC 6.4 05/11/2021    RBC 3.74 (L) 11/14/2021    RBC 4.29 05/11/2021    HGB 11.9 11/14/2021    HGB 13.8 05/11/2021    HCT 36.8 11/14/2021    HCT 41.3 05/11/2021    MCV 98 11/14/2021    MCV 96 05/11/2021    MCH 31.8 11/14/2021    MCH 32.2 05/11/2021    MCHC 32.3 11/14/2021    MCHC 33.4 05/11/2021    RDW 13.6 11/14/2021    RDW 13.5 05/11/2021     11/14/2021     05/11/2021       CMP RESULTS:  Lab Results   Component Value Date     (L) 11/14/2021     07/08/2021    POTASSIUM 4.6 11/14/2021    POTASSIUM 3.6 07/08/2021    CHLORIDE 98 11/14/2021    CHLORIDE 94 07/08/2021    CO2 27 11/14/2021    CO2 34 (H) 07/08/2021    ANIONGAP 7 11/14/2021    ANIONGAP 6 07/08/2021     GLC 93 11/14/2021    GLC 89 07/08/2021    BUN 34 (H) 11/14/2021    BUN 35 (H) 07/08/2021    CR 1.23 (H) 11/14/2021    CR 1.13 (H) 07/08/2021    GFRESTIMATED 45 (L) 11/14/2021    GFRESTIMATED 50 (L) 07/08/2021    GFRESTBLACK 58 (L) 07/08/2021    JERROD 9.2 11/14/2021    JERROD 9.2 07/08/2021    BILITOTAL 1.6 (H) 11/14/2021    BILITOTAL 1.9 (H) 05/11/2021    ALBUMIN 3.5 11/14/2021    ALBUMIN 3.8 05/11/2021    ALKPHOS 202 (H) 11/14/2021    ALKPHOS 171 (H) 05/11/2021    ALT 26 11/14/2021    ALT 35 05/11/2021    AST 24 11/14/2021    AST 23 05/11/2021        INR RESULTS:  Lab Results   Component Value Date    INR 1.34 (H) 11/12/2021    INR 1.30 (H) 01/14/2017       Lab Results   Component Value Date    MAG 2.7 (H) 11/14/2021    MAG 2.2 10/26/2019     Lab Results   Component Value Date    NTBNPI 1,578 (H) 11/12/2021    NTBNPI 9,009 (H) 01/13/2017     Lab Results   Component Value Date    NTBNP 1,376 (H) 02/04/2021       Diagnostics:      2/21 Ziopatch       6/12/2019 Holter Mnoitor      TTE 4/9/19  Moderate left ventricular hypertrophy.  Global and regional left ventricular function is normal with an EF of 60-65%.  Global right ventricular function is normal.  Moderate aortic valve insufficiency.  Mild pulmonary hypertension with dilated IVC.  This study was compared with the study from 10/11/18 the AR is worse and RA pressure is now increased.    TTE 10/11/18  Global and regional left ventricular function is normal with an EF of 55-60%.  Moderate concentric wall thickening consistent with left ventricular  hypertrophy is present - known amyloid.  Grade III or advanced diastolic dysfunction.  Normal right ventricular size, wall thickness, and function.  Estimated pulmonary artery pressure 28 mmHg.  IVC with normal diameter but does not collapse (>50%) with inspiration.  Trace pericardial effusion.  Compared to prior study from 8/17/17, no significant change.    Ziopatch 11/2017      Holter Monitor 06/2019  INTERPRETATION  1.  The rhythm varied with sinus rhythm and atrial fibrillation/variable atrial flutter. Low voltage noted.  2. The heart rate varied with a minimum rate of 48 bpm, maximum rate of 164 bpm, average rate of 74 bpm.  3. Frequent supraventricular ectopy was seen ranging from 0-469 per hour. Occasional atrail pairs and fairly frequent bigeminal cycles were noted.  Wandering atrial pacemaker noted.  4. Infrequent multifocal ventricular ectopy was seen ranging from 0-36 beats per hour.  5. ST-T wave changes were present.  6. Three patient events were documented and correlated with atrial fibrillation/flutter    EKG 8/8/2019 for palpitations  - NSR at a rate of 70, AL 0.19, QTc 525, QRS is narrow. Occasional PVCs. Biphasic twaves in V4-V6, unchanged from priors.    Assessment and Plan:   Mrs. Guerrero is a 71 year old female with AL amyloidosis with cardiac manifestation who presents to [facility] for hospital follow-up. Patient has cardiac amyloidosis as evidenced by her echocardiogram (severe concentric hypertrophy and biatrial enlargement) and abnormal EKG (near-low voltage, poor R wave progression, biatrial enlargement and no evidence of LVH despite thicken LV on echo) in the setting of biopsy proven (BMB, EGD) AL amyloid. She completed chemotherapy with CyBorD with an excellent serologic response and her heart failure (i.e. troponin negative, NT pro BNP was stable, serum light chains minimal and urine light chains undetectable).     She has gradually declined over the last few years and has ongoing challenges with her volume status.  She requires frequent metolazone and maintaining an adequate potassium level has been a challenge. She has also been struggling with A. Fib, which is occaionally symptomatic. These episodes remain rare and given relative contraindications for amyloid patients we do not have her on rate control at this time. She has had a few hospital stays for volume issues and potassium issues this year. Today, she  is doing okay. Her Cr and K are stable. We will continue on her current medications and watch closely.    Cardiac Medications  Torsemide 150 mg BID  Kcl 60 meq TID  Instpra 25 mg daily  Metolazone 2.5- when she takes half take 40 meq BID. When she take 2.5 mg of metolazone      # Chronic diastolic heart failure/restrictive cardiomyopathy 2/2  # Cardiac amyloidosis    Stage C. NYHA Class III.    Fluid status: Continue torsemide 150 mg BID and Kcl 60 meq TID.  Continue PRN metolazone with an extra 60 meq of kcl the day of and the day after metolazone if she take a full 2.5 mg tab. If she takes a half tab of metolazone, she take 40 meq EXTRA of kcl the day of metolazone and the day after  ACEi/ARB/ARNI: n/a   BB: no indication and relatively contraindicated due to risk of progressive conduction disease/AV block in these patients  Aldosterone antagonist: Continue Inspra 25 mg daily  SCD prophylaxis: does not meet criteria for implant  NSAID use: contraindicated  Sleep apnea evaluation: deferred today  Remote monitoring: N/A  Goals of care: prior discussions with Dr Cohen and [place] [facility]  Other: Has a referall for lymphedema therapy in [facility]. If she needs paracentesis in the future for symptomatic control we would support that. Her abdominal edema is mild for her right now. Also watching her pleural effusion closely.    # Pleural effusion. Noted during recent admission. On last admission, thoracentesis was defered.  - Continue to monitor- if symptomatic will consider a therapeutic thoracentesis    # CKD stage 3B  - Cr stabe at 1.02, stable at her baseline    # Possible Chirrosis, recently had ultrasound which suggested chirrosis, but on repeat, echotexture of the liver was normal  - Following up with hepatology    # A. Fib/A. Flutter  Prior holter monitor which showed intermittent a. Fib and a. Flutter. [id number] of 4. Occasional palpitations which last less than 1 minute, if these become more  frequent can repeat monitor to assess burden.  - Continue Eliquis 2.5mg BID, reduced dose d/t frequent bleeding, aware of risks  - Avoiding BB in the setting of amyloid  - Holding off on digoxin given generally good rate control/very rare RVR events. Could discuss in the future if needed.    # Prolonged QTC  - Minimize QT prolonging meds    # Systemic amyloid  Heme previously monitoring her light chains which have been negative consistent with remission - therefore further palliative chemo not being considered. Nausea has been a large issue and is currently, seeing palliative to address.  - Pt has seen palliative care for nausea, plans to see a Supportive Clinic for symptoms through her insurance  - Follow-up with oncology this week as scheduled    #Nausea.   - Recommend ongoing f/u with palliative care vs the support team through her insurance.    Follow: Up:   - BMP in 1 week  - Dr. Keyes as schedule 12/16    Billing  - I managed 2+ stable chronic conditions  - I reviewed one test and ordered one test  - I reviewed an outside note (discharge summary)      Ciara Araya PA-C  [facility]      CC  ALEJANDRO KEYES

## 2021-11-18 ENCOUNTER — PATIENT OUTREACH (OUTPATIENT)
Dept: ONCOLOGY | Facility: CLINIC | Age: 71
End: 2021-11-18
Payer: COMMERCIAL

## 2021-11-18 NOTE — PROGRESS NOTES
Hematology/Oncology Follow-up visit:  Date on this visit: Nov 12, 2021      Referring Physician: Dr. Braydon Barron, [facility].  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 She was diagnosed with CHF at a local clinic in [facility] and was placed on a b-blocker and a diuretic. She has also developed progressive worsening lower extremity edema by May 2016 which in retrospect started in January. Her cardiac angiogram was reportedly negative at that time.  She requested to be seen at [facility] and was seen by Dr. Braydon Barron on 9/13/2016 with f/up on 9/20/2016. She was also seen by cardiology team there Sara Severson CNP and Dr. Nettles from CHF service.  There was no e/o renal or hepatic amyloidosis.  EKG on 9/7/2016 showed normal sinus rhythm, prolonged QT interval (QTc 521 sec), left atrial enlargement and left anterior fascicular block. There was ST and T wave abnormality.  Apparently, her echocardiogram showed preserved LVEF at 59%.  She had R sided thoracentesis on 8/25/2016 after which her breathing has improved. She had extensive lab workup on 9/7/2016.  Her TSH was normal. Serum protein ELP showed a small abnormality in the gamma fraction.  Serum immunofixation showed small monoclonal IgA lambda in the gamma fraction and a small lambda in the beta fraction. Serum IgA level was normal at 329. Serum IgG level was mildly low at 755 (normal range 767-1590) and IgM level was normal. B2 microglobulin was mildly elevated at 2.94 (ULN 2.7). Mg was normal. Total bilirubin was elevated at 3.9 and direct at 0.8. Uric acid was mildly elevated at 6.9 (ULN 6.1) Creat was 1.0. Troponin was 0.1 (ULN <0.01) and NT-pro BNP was 4747 pg/ml (ULN <=183). Total cholesterol was 190 and LDL was 139. Random urine protein was mildly elevated at 33 mg/dl (normal range <22). INR was  1.2 and PTT 27 (within normal limits). Fibrinogen was elevated at 441 and factor X was mildly decreased at 64% (normal range %). D-dimer was up at 1700 (). Free lambda light chains were 69.5 and free kappa light chains were 1.11 with FLC kappa/lambda ratio of 0.016.   CBC showed normal WBC at 6.0, slightly elevated Hb at 16. Platelet count was normal.  Flow cytometry on bone marrow biopsy showed detectable monotypic plasma cells. There were 12% of monotypic plasma cells on bone marrow biopsy.    The bone marrow biopsy (performed on 9/14/2016) showed normocellular BM, 30-40% cellularity, with involvement by 10-20% of lambda light chain restricted plasma cells.  Congo red stain was positive on the bone marrow biopsy. Liquid chromatography tandem mass spectroscopy showed a peptide profile c/w AL (lambda type) amyloid. BM karyotype was normal 46 XX. FISH on BM showed plasma cell clone with 1q duplication, and monosomy of 13 and 14.  She had an EGD by Dr. Matthew Wadsworth on 9/14/2016. It showed gastric mucosal atrophy and multiple mucosal papules (nodules) seen in the stomach. The duodenum appeared normal. The biopsies of stomach mucosa, of stomach (antral) nodules and of duodenum, all showed the presence of amyloid in submucosal blood vessel walls in the stomach and duodenum and in deep mucosa in the stomach antrum and body, confirmed by Congo Red stain.   She also had additional labs done at our last visit, on 9/30/2016. Total bilirubin was elevated as below, primarily indirect. K was 3.3 and she was stared on K-dur 20 mEq PO bid. She was noted to have elevated serum IgA level on 9/30/2016 at 392 (). Serum free lambda chains were elevated at 37.75. Serum M spike was 0.1 g/dl and serum FLC ratio was low at 0.01. Serum immunofixation ELP showed monoclonal IgA immunoglobulin of lambda light chain type as well as monoclonal free immunoglobulin light chain of lambda chain type.  She was seen by   Noel too and had an echocardiogram on 10/6/2016 which showed:  Global and regional left ventricular function was normal with an EF of 60-65%.  The LV mass index was 131 g/m2 (severely increased.) The LV geometry is c/w  concentric hypertrophy measured by relative wall thickness. Global right ventricular function was normal. Severe biatrial enlargement was present.  Right ventricular systolic pressure was 26mmHg above the right atrial pressure. The inferior vena cava was dilated at 2.1 cm without respiratory variability, consistent with increased right atrial pressure. Trivial pericardial effusion was present.    She proceeded to start CyBorD as recommended by Dr. Barron with dose reduced in subq Bortezomib to 0.7 mg/m2, on 10/4/2016.  After one cycle, her M spike, IgA level and free lambda light chains have all improved, with M spike of 0 g/dl, IgA down into the normal range and free serum lambda light chains improving from 37.75 down to 2.09.  She returns today in cycle 3 day 11. She had amyloid labs again on day 1 of cycle 3 (12/8/2016) which showed continued response to therapy and her IgA level was now low and serum FLC ratio was normal and serum free lambda chains were normal too.   She has developed hematuria with cycle 2 and Cytoxan is on hold from day 15 cycle 2 since hematuria persisted despite Mesnex. She had a CT abdomen/pelvis on 11/29/2016 which showed no abnormalities in the kidneys. There was a moderate right and a trace left sided pleural effusions. Small volume ascites and anasarca was noted as well. The liver had heterogeneous appearance, which can be seen in hepatic venous congestion.   Cytoxan was d/cd  on  11/22/2016 since her cystoscopy showed findings of mild petechia and erythema in her bladder that would be c/w hemorrhagic cystitis.  Pinch biopsies were taken from the bladder and showed no e/o amyloid or malignancy. She has continued on Velcade and dexamethasone until 12/27/2016.  On  1/3/2017 she was seen at [facility] by Dr. Barron and at that time serum free light chains were normal at 0.74 and FLC ratio was normal as well at 0.72. She was recommended to be on observation because free lambda chains normalized. However, her cardiac amyloidosis has progressed by 1/5/2017 and she has required frequent thoracentesis, and had weeping LE edema and elevated neck veins.  She was hospitalized for CHF in Jan 2017 due to cardiac amyloid and was aggressively diuresed.  Her echocardiogram on 1/5/2017 was abnormal (severe concentric hypertrophy and biatrial enlargement) and she also had abnormal EKG (near-low voltage, poor R wave progression, biatrial enlargement and no evidence of LVH despite very thickened LV on echo). She was felt to have  New York Heart Association class IIIB heart failure with clinically severe volume overload. She was seen by cardiologist Dr. Harjit Fischer at  in [place]. She was seen by Dr. Chivo Lei from medical oncology at the [facility] in [place], and was recommended to start on Acyclovir and Doxycycline.  She has not started Acyclovir  but did start daily Doxycyline.   Dr. Lei also recommended she consider maintenance therapy. She was also seen by Dr. Barron at [facility] on 5/11/2017, and he recommended against maintenance therapy.     Bone - She had a skeletal survey November 2020.  It showed no radiographic evidence of calvarial abnormality.  There was L1 compression fracture new since 2016 and chronic  T12 and T11 compression deformities.  She did sustain a fall in the last 2 to 3 years.  There were no lytic lesions.  She continues on Eliquis anticoagulation for A.fib.     Patient also had CT lumbar spine in [facility] on 9/19/2020 showed:  1. Mild compression along the superior and inferior endplates of L2. This appears to be an acute injury as there is a visible fracture line along the inferior endplate. This could be confirmed with MRI if there is need for further  imaging.  2. Mild compression of the superior endplates of T12 and L1 and appear chronic.  3. Multilevel degenerative disc disease that includes moderate central spinal canal narrowing.  We have discussed proceeding with DEXA scan for evaluation of bone mineral density today, but patient prefers to postpone.      History Of Present Illness:  Ms. Garcia is a 71 year old female, non-smoker, who is seen in  f/up of AL amyloidosis, IgA lambda, but this morning after lab check was c/o severe chest pain and ambulance was called and the patient was assessed for CP in an exam room.    She has been seeing  Dr. Cohen in f/up of cardiac Amyloidosis with preserved EF, CHF NYHA Class III. They monitor troponin and BNP.  She is followed closely by cardiology team (Dr. Cohen). She was recently hospitalized at [facility] from 10/28/2021-11/5/2-21, for acute on chronic diastolic heart failure, Paroxysmal A.fib/A.flutter, and was diuresed inpatient with Lasix drip. She was d/cd on Torsemide 150 mg PO BID and K was increased to 80 mg PO TID, although on 11/10/2021 she wsa hyperkalemic with K of 6.0 and I have communicated with cardiology team and KCL dose was decreased. She had BMP rechecked today and K was 2.3. She is having moderate to severe CP. No SOB. LE edema is improved since discharge from the hospital. She was given ASA and NTG now, without significant CP relief.  is at bedside.  As far as AL amyloidosis, she has been off treatment since December 2016.  She is on doxycycline.   AL labs are stable. FLC is normal.  Results for HILARY GARCIA (MRN [id number]) as of 11/17/2021 23:20   Ref. Range 11/13/2020 12:02 5/11/2021 10:19 11/10/2021 06:29   Kappa Free Lt Chain Latest Ref Range: 0.33 - 1.94 mg/dL 2.02 (H) 2.47 (H)    Kappa Lambda Ratio Latest Ref Range: 0.26 - 1.65  0.94 0.70    Lambda Free Lt Chain Latest Ref Range: 0.57 - 2.63 mg/dL 2.14 3.55 (H)    Monoclonal Peak Latest Ref Range: <=0.0 g/dL 0.0 0.0 0.0    Kappa Free Light Chains Latest Ref Range: 0.33 - 1.94 mg/dL   2.67 (H)   LAMBDA FREE LT CHAINS Latest Ref Range: 0.57 - 2.63 mg/dL   6.06 (H)   KAPPA/LAMBDA RATIO Latest Ref Range: 0.26 - 1.65    0.44   Total Protein Serum for ELP Latest Ref Range: 6.8 - 8.8 g/dL   7.0   Serum immunofixation ELP showed no M protein.   Her serum IgA level is low.    Past Medical/Surgical History:  AL Amyloid  Amyloid cardiomyopathy/CHF    Family History:  Sister  of multiple myeloma    Social History:  Lives in [place], with . Nonsmoker        Allergies:     Allergies   Allergen Reactions     Contrast Dye Shortness Of Breath     Shaking,chills and dypsnea     Cytoxan [Cyclophosphamide]      Hemorrhagic cystitis     Gabapentin      Slurred speech, weakness     Levofloxacin      Severe shaking and abdominal pain     Aaron Weed      Rash and itchyness     Amoxicillin Nausea and Vomiting and Cramps     Spironolactone      Worsening hyponatremia, palpitations     Chlorhexidine Dermatitis and Rash     AARON wipes  2% chlorhexidine gluconate   Rash to everywhere wipe was applied      Current Medications:  Current Outpatient Medications   Medication     Acetaminophen (TYLENOL PO)     apixaban ANTICOAGULANT (ELIQUIS) 2.5 MG tablet     camphor-menthol (DERMASARRA) 0.5-0.5 % external lotion     eplerenone (INSPRA) 25 MG tablet     metolazone (ZAROXOLYN) 2.5 MG tablet     ondansetron (ZOFRAN-ODT) 8 MG ODT tab     potassium chloride ER (KLOR-CON M) 20 MEQ CR tablet     torsemide (DEMADEX) 100 MG tablet     triamcinolone (KENALOG) 0.1 % external cream     No current facility-administered medications for this visit.       Physical Exam:  Vital signs are stable and are documented in the EMR.      GENERAL APPEARANCE:alert and no distress    CV: S1S2 reg no murmur  Respiratory: CTA b/l  GI: soft,  BS+, NT, + moderate ascites  Extremities: +2-3 edema b/l LE    SKIN: Erythematous confluent rash, somewhat raised, appearing is a drug rash on  bilateral lower back and somewhat less conspicuous on anterior lower abdomen.    PSYCHIATRIC: mentation appears normal and affect normal  Neuro: gait intact. Axox3        Laboratory/Imaging Studies  WBC 5.9, Hb 11.3g/dl, platelet count 247k  K2.3  Creat 1.27  Na 129  AlkP 255  Total kalyn 1.5    ASSESSMENT/PLAN:  Leandra Guerrero is a 71 year old woman with  of AL amyloidosis involving the heart and GI tract. 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 Stage IV amyloidosis is associated with median survival of 5 months in patients not undergoing BMT, and 5 year survival of 15%, and in patients who are able to undergo BMT, median survival is 22 months and 5 year survival of 46% (Dejan A, Anderson MA, Toñito MCARTHUR, et al. Prognostication of survival using cardiac troponins and N-terminal pro-brain natriuretic peptide in patients with primary systemic amyloidosis undergoing peripheral blood stem cell transplantation. Blood 2004; 104:1881). She was felt not to be a candidate for high dose therapy.   She was on Cybor-D from 10/4/2016-12/27/2016, with Cytoxan omitted after cycle 2 day 1 due to her developing hemorrhagic cystitis. Her disease has responded  biochemically, with serum M spike of zero, and serum IgA level was down to low range and  free lambda light chains have normalized quantitatively.      1.  Chest pain- transfer to [facility]. Add Troponin to labs. S/p ASA, NTG.   Will follow patient's progress inpatient.    2. AL amyloidosis -amyloid labs stable with low serum IgA level and no M protein on serum immunofixation ELP.  Serum FLC ratio normal.  Was on  Doxycycline but not sice discharge from the hospital. Not convinced it would be helpful for cardiac amyloidosis at this point especially if associated with nausea.  We'll revisit  following hospital discharge..  We'll see her back in 6 months with amyloid labs prior.       3. Amyloid cardiomyopathy-A. fib-  She is being followed by  Dr. Cohen. On aggressive diuresis with high dose KCL supplementation but hypokalemic today. On Eliquis. Cardiac Amyloidosis (a restrictive cardiomyopathy) , Stage C, NYHA Class III. Cardiology will follow inpatient.  4. Nausea-Continue Zofran ODT prn.    5. CKD- Creat stable.  6.Stable elevated total bilirubin and alk phos, stable- in the past felt to be secondary to liver congestion. Cont to follow.      7. Recurrent pleural effusions-chest x-ray on May 6, 2021 showed small right pleural effusion and questionable small left pleural effusion.  Last CT chest in [facility] in September 2019 showed small to moderate right pleural effusion. CXR 10/31/2021 (inpatient)- Increased size in the chronic small right pleural effusion  with hazy adjacent basilar airspace opacities. She denies shortness of breath presently.   PleurX catheter is an option in the future if she were to become symptomatic.    At the end of our visit patient and  verbalized understanding and concurred with the plan.    Addendum:  Readmitted to [facility] from 11/12/-11/14/2021 for atypical CP, ruled out for MI,  which has resolved, hypokalemia, paroxysmal A.fib. She is on Eplerenone. D/Cd on KCL 60mEq TID, Torsemide 150 mg PO BID.  On low dose Apixaban 2.5 mg PO BID.

## 2021-11-18 NOTE — PROGRESS NOTES
Call placed to patient to follow up on [employer] message that she sent today. Dr. Moore said patient's labs are stable and that she should follow up in 6 months with labs prior. Also, informed patient that Dr. Moore is leaving [facility].   She is recommending that patient see Dr. Wang in 6 months for follow up.

## 2021-11-24 ENCOUNTER — LAB (OUTPATIENT)
Dept: LAB | Facility: CLINIC | Age: 71
End: 2021-11-24
Payer: COMMERCIAL

## 2021-11-24 DIAGNOSIS — I50.32 CHRONIC DIASTOLIC HEART FAILURE (H): Primary | ICD-10-CM

## 2021-11-24 DIAGNOSIS — I50.32 CHRONIC DIASTOLIC HEART FAILURE (H): ICD-10-CM

## 2021-11-24 LAB
ANION GAP SERPL CALCULATED.3IONS-SCNC: 7 MMOL/L (ref 3–14)
BUN SERPL-MCNC: 33 MG/DL (ref 7–30)
CALCIUM SERPL-MCNC: 9.1 MG/DL (ref 8.5–10.1)
CHLORIDE BLD-SCNC: 94 MMOL/L (ref 94–109)
CO2 SERPL-SCNC: 31 MMOL/L (ref 20–32)
CREAT SERPL-MCNC: 1.23 MG/DL (ref 0.52–1.04)
GFR SERPL CREATININE-BSD FRML MDRD: 45 ML/MIN/1.73M2
GLUCOSE BLD-MCNC: 90 MG/DL (ref 70–99)
HOLD SPECIMEN: NORMAL
HOLD SPECIMEN: NORMAL
POTASSIUM BLD-SCNC: 3.9 MMOL/L (ref 3.4–5.3)
SODIUM SERPL-SCNC: 132 MMOL/L (ref 133–144)

## 2021-11-24 PROCEDURE — 80048 BASIC METABOLIC PNL TOTAL CA: CPT

## 2021-11-24 PROCEDURE — 36415 COLL VENOUS BLD VENIPUNCTURE: CPT

## 2021-11-24 NOTE — PROGRESS NOTES
Date: 11/24/2021    Time of Call: 2:00 PM     Diagnosis:  HFpEF     [ VORB ] Ordering provider: Yolanda MORGAN  Order:  BMP in 2 weeks      Order received by: Vidhi Montgomery RN      Follow-up/additional notes: communicated to Amy via [employer]. Vidhi Montgomery RN

## 2021-11-27 ENCOUNTER — MYC MEDICAL ADVICE (OUTPATIENT)
Dept: CARDIOLOGY | Facility: CLINIC | Age: 71
End: 2021-11-27
Payer: COMMERCIAL

## 2021-11-27 DIAGNOSIS — I50.32 CHRONIC DIASTOLIC HEART FAILURE (H): Primary | ICD-10-CM

## 2021-11-29 ENCOUNTER — MYC MEDICAL ADVICE (OUTPATIENT)
Dept: CARDIOLOGY | Facility: CLINIC | Age: 71
End: 2021-11-29
Payer: COMMERCIAL

## 2021-11-29 NOTE — TELEPHONE ENCOUNTER
Reviewed with Yolanda MORGAN.     Date: 11/29/2021    Time of Call: 11:39 AM     Diagnosis:  HFpEF     [ VORB ] Ordering provider: Yolanda MORGAN  Order: BMP weekly     Order received by: Vidhi Montgomery RN      Follow-up/additional notes: Ok with weekly labs. Ok for another metolazone. Address constipation with PCP. Mychart sent to Amy.

## 2021-12-02 ENCOUNTER — PATIENT OUTREACH (OUTPATIENT)
Dept: CARDIOLOGY | Facility: CLINIC | Age: 71
End: 2021-12-02

## 2021-12-02 ENCOUNTER — LAB (OUTPATIENT)
Dept: LAB | Facility: CLINIC | Age: 71
End: 2021-12-02
Payer: COMMERCIAL

## 2021-12-02 ENCOUNTER — TELEPHONE (OUTPATIENT)
Dept: CARDIOLOGY | Facility: CLINIC | Age: 71
End: 2021-12-02

## 2021-12-02 DIAGNOSIS — I50.32 CHRONIC DIASTOLIC HEART FAILURE (H): ICD-10-CM

## 2021-12-02 DIAGNOSIS — I50.33 ACUTE ON CHRONIC DIASTOLIC HEART FAILURE (H): Primary | ICD-10-CM

## 2021-12-02 DIAGNOSIS — Z20.822 EXPOSURE TO 2019 NOVEL CORONAVIRUS: ICD-10-CM

## 2021-12-02 LAB
ANION GAP SERPL CALCULATED.3IONS-SCNC: 4 MMOL/L (ref 3–14)
BUN SERPL-MCNC: 29 MG/DL (ref 7–30)
CALCIUM SERPL-MCNC: 9.2 MG/DL (ref 8.5–10.1)
CHLORIDE BLD-SCNC: 99 MMOL/L (ref 94–109)
CO2 SERPL-SCNC: 31 MMOL/L (ref 20–32)
CREAT SERPL-MCNC: 1.2 MG/DL (ref 0.52–1.04)
GFR SERPL CREATININE-BSD FRML MDRD: 46 ML/MIN/1.73M2
GLUCOSE BLD-MCNC: 95 MG/DL (ref 70–99)
HOLD SPECIMEN: NORMAL
HOLD SPECIMEN: NORMAL
POTASSIUM BLD-SCNC: 5.6 MMOL/L (ref 3.4–5.3)
SODIUM SERPL-SCNC: 134 MMOL/L (ref 133–144)

## 2021-12-02 PROCEDURE — 36415 COLL VENOUS BLD VENIPUNCTURE: CPT

## 2021-12-02 PROCEDURE — 80048 BASIC METABOLIC PNL TOTAL CA: CPT

## 2021-12-02 RX ORDER — POTASSIUM CHLORIDE 1500 MG/1
TABLET, EXTENDED RELEASE ORAL
Qty: 240 TABLET | Refills: 3 | Status: SHIPPED | OUTPATIENT
Start: 2021-12-02 | End: 2021-12-10

## 2021-12-02 NOTE — TELEPHONE ENCOUNTER
Received a call from [facility] requesting an order for COVID testing to be entered before they can do it.

## 2021-12-02 NOTE — TELEPHONE ENCOUNTER
Called Amy to review labs. Potassium elevated. Given following verbal orders:  Date: 12/2/2021    Time of Call: 12:58 PM     Diagnosis:  Hyperkalemia     [ VORB ] Ordering provider: Yolanda MORGAN  Order: Hold her third dose of kcl today and then tomorrow decrease to potassium 60/60/40 (from 60 meq TID). Recheck BMP tomorrow        Order received by: Vidhi Montgomery RN      Follow-up/additional notes: Amy has to work on getting a ride and she will call the clinic to schedule. She did take a Metolazone today as she reports the shortness of breath is just getting worse. She's sometimes short of breath at rest. She did take a Metolazone this morning (with her normal extra K). Last Metolazone prior to this was Sunday.     She also reports she had a COVID exposure recently. Tried to get a COVID test today when she was getting labs drawn but didn't realize she needed an order. I sent her instructions on how to request a test through [employer] and recommended she get tested.     Also recommended that if SOB is getting worse or she is frequently SOB at rest should go to ER. Amy is hesitant to do that right now due to all the COVID right now in the hospitals but she verbalized understanding.

## 2021-12-03 ENCOUNTER — LAB (OUTPATIENT)
Dept: LAB | Facility: CLINIC | Age: 71
End: 2021-12-03
Payer: COMMERCIAL

## 2021-12-03 DIAGNOSIS — Z20.822 EXPOSURE TO 2019 NOVEL CORONAVIRUS: ICD-10-CM

## 2021-12-03 DIAGNOSIS — I50.33 ACUTE ON CHRONIC DIASTOLIC HEART FAILURE (H): ICD-10-CM

## 2021-12-03 LAB
ANION GAP SERPL CALCULATED.3IONS-SCNC: 6 MMOL/L (ref 3–14)
BUN SERPL-MCNC: 32 MG/DL (ref 7–30)
CALCIUM SERPL-MCNC: 9.4 MG/DL (ref 8.5–10.1)
CHLORIDE BLD-SCNC: 96 MMOL/L (ref 94–109)
CO2 SERPL-SCNC: 33 MMOL/L (ref 20–32)
CREAT SERPL-MCNC: 1.23 MG/DL (ref 0.52–1.04)
GFR SERPL CREATININE-BSD FRML MDRD: 45 ML/MIN/1.73M2
GLUCOSE BLD-MCNC: 105 MG/DL (ref 70–99)
HOLD SPECIMEN: NORMAL
HOLD SPECIMEN: NORMAL
POTASSIUM BLD-SCNC: 3.4 MMOL/L (ref 3.4–5.3)
SODIUM SERPL-SCNC: 135 MMOL/L (ref 133–144)

## 2021-12-03 PROCEDURE — 80048 BASIC METABOLIC PNL TOTAL CA: CPT

## 2021-12-03 PROCEDURE — 36415 COLL VENOUS BLD VENIPUNCTURE: CPT

## 2021-12-03 PROCEDURE — U0003 INFECTIOUS AGENT DETECTION BY NUCLEIC ACID (DNA OR RNA); SEVERE ACUTE RESPIRATORY SYNDROME CORONAVIRUS 2 (SARS-COV-2) (CORONAVIRUS DISEASE [COVID-19]), AMPLIFIED PROBE TECHNIQUE, MAKING USE OF HIGH THROUGHPUT TECHNOLOGIES AS DESCRIBED BY CMS-2020-01-R: HCPCS

## 2021-12-03 PROCEDURE — U0005 INFEC AGEN DETEC AMPLI PROBE: HCPCS

## 2021-12-04 LAB — SARS-COV-2 RNA RESP QL NAA+PROBE: NEGATIVE

## 2021-12-07 ENCOUNTER — TRANSFERRED RECORDS (OUTPATIENT)
Dept: HEALTH INFORMATION MANAGEMENT | Facility: CLINIC | Age: 71
End: 2021-12-07
Payer: COMMERCIAL

## 2021-12-09 ENCOUNTER — LAB (OUTPATIENT)
Dept: LAB | Facility: CLINIC | Age: 71
End: 2021-12-09
Payer: COMMERCIAL

## 2021-12-09 ENCOUNTER — TELEPHONE (OUTPATIENT)
Dept: CARDIOLOGY | Facility: CLINIC | Age: 71
End: 2021-12-09

## 2021-12-09 DIAGNOSIS — I50.32 CHRONIC DIASTOLIC HEART FAILURE (H): ICD-10-CM

## 2021-12-09 DIAGNOSIS — I48.0 PAROXYSMAL ATRIAL FIBRILLATION (H): ICD-10-CM

## 2021-12-09 LAB
ANION GAP SERPL CALCULATED.3IONS-SCNC: 3 MMOL/L (ref 3–14)
BUN SERPL-MCNC: 36 MG/DL (ref 7–30)
CALCIUM SERPL-MCNC: 9.2 MG/DL (ref 8.5–10.1)
CHLORIDE BLD-SCNC: 96 MMOL/L (ref 94–109)
CO2 SERPL-SCNC: 30 MMOL/L (ref 20–32)
CREAT SERPL-MCNC: 1.63 MG/DL (ref 0.52–1.04)
GFR SERPL CREATININE-BSD FRML MDRD: 32 ML/MIN/1.73M2
GLUCOSE BLD-MCNC: 101 MG/DL (ref 70–99)
HOLD SPECIMEN: NORMAL
HOLD SPECIMEN: NORMAL
POTASSIUM BLD-SCNC: 6.5 MMOL/L (ref 3.4–5.3)
SODIUM SERPL-SCNC: 129 MMOL/L (ref 133–144)

## 2021-12-09 PROCEDURE — 80048 BASIC METABOLIC PNL TOTAL CA: CPT

## 2021-12-09 PROCEDURE — 36415 COLL VENOUS BLD VENIPUNCTURE: CPT

## 2021-12-09 NOTE — TELEPHONE ENCOUNTER
[facility] Call Center    Phone Message    May a detailed message be left on voicemail: no     Reason for Call: Other: Edna called from [facility] lab to report a critical lab value/ Potassium 6.5. Please reach out to Edna to acknowledge the critical result, she can be reached at [phone].     Action Taken: Message routed to:  [facility] ([facility]): Cardiology    Travel Screening: Not Applicable

## 2021-12-09 NOTE — TELEPHONE ENCOUNTER
Called Edna back. Confirmed lab result of K of 6.5   Notified Ciara MORGAN who recommends Amy go to ER.   Called Amy. She reports she does not feel well and having more fluid retention.I advised she should go to the ER now. She reports Oscar will be home soon to drive her but I told her if he's not home shortly she needs to call 911.     Amy prefers to go to [place] [facility] and report called to charge nurse at [facility]. Vidhi Montgomery RN

## 2021-12-10 ENCOUNTER — MYC MEDICAL ADVICE (OUTPATIENT)
Dept: CARDIOLOGY | Facility: CLINIC | Age: 71
End: 2021-12-10

## 2021-12-10 ENCOUNTER — LAB (OUTPATIENT)
Dept: LAB | Facility: CLINIC | Age: 71
End: 2021-12-10
Payer: COMMERCIAL

## 2021-12-10 DIAGNOSIS — I50.33 ACUTE ON CHRONIC DIASTOLIC HEART FAILURE (H): ICD-10-CM

## 2021-12-10 DIAGNOSIS — I50.33 ACUTE ON CHRONIC DIASTOLIC HEART FAILURE (H): Primary | ICD-10-CM

## 2021-12-10 LAB
ANION GAP SERPL CALCULATED.3IONS-SCNC: 8 MMOL/L (ref 3–14)
BUN SERPL-MCNC: 29 MG/DL (ref 7–30)
CALCIUM SERPL-MCNC: 8.9 MG/DL (ref 8.5–10.1)
CHLORIDE BLD-SCNC: 94 MMOL/L (ref 94–109)
CO2 SERPL-SCNC: 30 MMOL/L (ref 20–32)
CREAT SERPL-MCNC: 1.35 MG/DL (ref 0.52–1.04)
GFR SERPL CREATININE-BSD FRML MDRD: 40 ML/MIN/1.73M2
GLUCOSE BLD-MCNC: 118 MG/DL (ref 70–99)
HOLD SPECIMEN: NORMAL
HOLD SPECIMEN: NORMAL
POTASSIUM BLD-SCNC: 3.7 MMOL/L (ref 3.4–5.3)
SODIUM SERPL-SCNC: 132 MMOL/L (ref 133–144)

## 2021-12-10 PROCEDURE — 80048 BASIC METABOLIC PNL TOTAL CA: CPT

## 2021-12-10 PROCEDURE — 36415 COLL VENOUS BLD VENIPUNCTURE: CPT

## 2021-12-10 RX ORDER — POTASSIUM CHLORIDE 1500 MG/1
TABLET, EXTENDED RELEASE ORAL
Qty: 240 TABLET | Refills: 3
Start: 2021-12-10 | End: 2021-12-30

## 2021-12-10 NOTE — TELEPHONE ENCOUNTER
Labs reviewed with Yolanda MORGAN. esterday in ER they discharged Amy on Potassium dose of 60/40/40. Agree with continuing 60/40/40 for Potassium and recheck labs Monday. Message sent to Amy and orders updated.   Date: 12/10/2021    Time of Call: 12:43 PM     Diagnosis:  HF     [ VORB ] Ordering provider: Yolanda MORGAN  Order: Torsemide 60/40/40. BMP Monday.      Order received by: Vidhi Montgomery RN      Follow-up/additional notes:

## 2021-12-10 NOTE — PROGRESS NOTES
Date: 12/10/2021    Time of Call: 8:35 AM     Diagnosis:  HF     [ VORB ] Ordering provider: Yolanda MORGAN  Order: BMP     Order received by: Vidhi Montgomery RN      Follow-up/additional notes:

## 2021-12-10 NOTE — TELEPHONE ENCOUNTER
apixaban ANTICOAGULANT (ELIQUIS) 2.5 MG tablet    11/17/2021  [facility] Ciara Rausch PA-C    Interventional Cardiology

## 2021-12-13 ENCOUNTER — LAB (OUTPATIENT)
Dept: LAB | Facility: CLINIC | Age: 71
End: 2021-12-13
Payer: COMMERCIAL

## 2021-12-13 DIAGNOSIS — I50.33 ACUTE ON CHRONIC DIASTOLIC HEART FAILURE (H): ICD-10-CM

## 2021-12-13 LAB
ANION GAP SERPL CALCULATED.3IONS-SCNC: 6 MMOL/L (ref 3–14)
BUN SERPL-MCNC: 35 MG/DL (ref 7–30)
CALCIUM SERPL-MCNC: 9.2 MG/DL (ref 8.5–10.1)
CHLORIDE BLD-SCNC: 94 MMOL/L (ref 94–109)
CO2 SERPL-SCNC: 31 MMOL/L (ref 20–32)
CREAT SERPL-MCNC: 1.39 MG/DL (ref 0.52–1.04)
GFR SERPL CREATININE-BSD FRML MDRD: 39 ML/MIN/1.73M2
GLUCOSE BLD-MCNC: 104 MG/DL (ref 70–99)
HOLD SPECIMEN: NORMAL
HOLD SPECIMEN: NORMAL
POTASSIUM BLD-SCNC: 4.7 MMOL/L (ref 3.4–5.3)
SODIUM SERPL-SCNC: 131 MMOL/L (ref 133–144)

## 2021-12-13 PROCEDURE — 36415 COLL VENOUS BLD VENIPUNCTURE: CPT

## 2021-12-13 PROCEDURE — 80048 BASIC METABOLIC PNL TOTAL CA: CPT

## 2021-12-14 DIAGNOSIS — I50.32 CHRONIC DIASTOLIC HEART FAILURE (H): Primary | ICD-10-CM

## 2021-12-14 NOTE — PROGRESS NOTES
Date: 12/14/2021    Time of Call: 2:34 PM     Diagnosis:  Heart failure     [ VORB ] Ordering provider: CATHY Orozco    Order: BMP next week     Order received by: Niki Fam RN       Follow-up/additional notes: sent review note

## 2021-12-16 ENCOUNTER — OFFICE VISIT (OUTPATIENT)
Dept: CARDIOLOGY | Facility: CLINIC | Age: 71
End: 2021-12-16
Attending: INTERNAL MEDICINE
Payer: COMMERCIAL

## 2021-12-16 ENCOUNTER — LAB (OUTPATIENT)
Dept: LAB | Facility: CLINIC | Age: 71
End: 2021-12-16
Payer: COMMERCIAL

## 2021-12-16 VITALS
HEIGHT: 63 IN | WEIGHT: 153.7 LBS | BODY MASS INDEX: 27.23 KG/M2 | SYSTOLIC BLOOD PRESSURE: 131 MMHG | HEART RATE: 80 BPM | DIASTOLIC BLOOD PRESSURE: 68 MMHG | OXYGEN SATURATION: 97 %

## 2021-12-16 DIAGNOSIS — E85.4 AMYLOID HEART DISEASE (H): ICD-10-CM

## 2021-12-16 DIAGNOSIS — I43 AMYLOID HEART DISEASE (H): ICD-10-CM

## 2021-12-16 DIAGNOSIS — I50.32 CHRONIC DIASTOLIC HEART FAILURE (H): ICD-10-CM

## 2021-12-16 LAB
ANION GAP SERPL CALCULATED.3IONS-SCNC: 7 MMOL/L (ref 3–14)
BUN SERPL-MCNC: 30 MG/DL (ref 7–30)
CALCIUM SERPL-MCNC: 9.3 MG/DL (ref 8.5–10.1)
CHLORIDE BLD-SCNC: 93 MMOL/L (ref 94–109)
CO2 SERPL-SCNC: 32 MMOL/L (ref 20–32)
CREAT SERPL-MCNC: 1.15 MG/DL (ref 0.52–1.04)
GFR SERPL CREATININE-BSD FRML MDRD: 49 ML/MIN/1.73M2
GLUCOSE BLD-MCNC: 88 MG/DL (ref 70–99)
POTASSIUM BLD-SCNC: 4 MMOL/L (ref 3.4–5.3)
SODIUM SERPL-SCNC: 132 MMOL/L (ref 133–144)

## 2021-12-16 PROCEDURE — 36415 COLL VENOUS BLD VENIPUNCTURE: CPT | Performed by: PATHOLOGY

## 2021-12-16 PROCEDURE — G0463 HOSPITAL OUTPT CLINIC VISIT: HCPCS

## 2021-12-16 PROCEDURE — 99214 OFFICE O/P EST MOD 30 MIN: CPT | Performed by: INTERNAL MEDICINE

## 2021-12-16 PROCEDURE — 80048 BASIC METABOLIC PNL TOTAL CA: CPT | Performed by: PATHOLOGY

## 2021-12-16 ASSESSMENT — PAIN SCALES - GENERAL: PAINLEVEL: NO PAIN (0)

## 2021-12-16 ASSESSMENT — MIFFLIN-ST. JEOR: SCORE: 1191.31

## 2021-12-16 NOTE — PATIENT INSTRUCTIONS
"Cardiology Providers you saw during your visit:  Dr. Cohen    Medication changes:  1.  No medication changes    Follow up:  1.  Follow up with Yolanda MORGAN as scheduled  2.  Follow up with Dr Cohen in April  3.  Do get your Covid Booster    Labs:  Results for HILARY GARCIA (MRN [id number]) as of 12/16/2021 08:46   Ref. Range 12/16/2021 08:13   Sodium Latest Ref Range: 133 - 144 mmol/L 132 (L)   Potassium Latest Ref Range: 3.4 - 5.3 mmol/L 4.0   Chloride Latest Ref Range: 94 - 109 mmol/L 93 (L)   Carbon Dioxide Latest Ref Range: 20 - 32 mmol/L 32   Urea Nitrogen Latest Ref Range: 7 - 30 mg/dL 30   Creatinine Latest Ref Range: 0.52 - 1.04 mg/dL 1.15 (H)   GFR Estimate Latest Ref Range: >60 mL/min/1.73m2 49 (L)   Calcium Latest Ref Range: 8.5 - 10.1 mg/dL 9.3   Anion Gap Latest Ref Range: 3 - 14 mmol/L 7   Glucose Latest Ref Range: 70 - 99 mg/dL 88     Please call if you have :  1. Weight gain of more than 2 pounds in a day or 5 pounds in a week  2. Increased shortness of breath, swelling or bloating  3. Dizziness, lightheadedness   4. Any questions or concerns.       Follow the American Heart Association Diet and Lifestyle recommendations:  Limit saturated fat, trans fat, sodium, red meat, sweets and sugar-sweetened beverages. If you choose to eat red meat, compare labels and select the leanest cuts available.  Aim for at least 150 minutes of moderate physical activity or 75 minutes of vigorous physical activity - or an equal combination of both - each week.      During business hours: [phone], press option # 1 to be routed to the [place] then option # 4 for \"To send a message to your care team\"      After hours, weekends or holidays: On Call Cardiologist- [phone]   option #4 and ask to speak to the on-call Cardiologist. Inform them you are a CORE/heart failure patient at the [place].      Heart Failure Support Group  [facility]  The Board Room, 8th Floor  [address] [place] [address]" "[facility] [id number]     Meetings   1-2 pm  , 1-2 p.m.  , 1-2 p.m.  , 1-2 p.m.  , 1-2 p.m.  May 3rd, 1-2 p.m.  , 1-2 p.m.  , 1-2 p.m.  , 1-2 p.m.  , 1-2 p.m.  , 1-2 p.m.  , 1-2 p.m.  , 1-2 p.m.      Niki Fam RN BSN CHFN  Cardiology Care Coordinator - Heart Failure/ [facility]   Questions and [phone]   First press #1 for the [place] and then press #4 for \"To send a message to your care team\"    "

## 2021-12-16 NOTE — LETTER
12/16/2021      RE: Leandra Guerrero  [address] [place] [address]       Dear Colleague,    Thank you for the opportunity to participate in the care of your patient, Leandra Guerrero, at the [facility] [place] [facility]. Please see a copy of my visit note below.      December 16, 2021    CC:     70 F year old female with a past medical history including stage IV AL amyloid diagnosed in 2016. Presents to clinic for HF follow up.      Her cardiac and oncologic history are as follows: She had fatigue in 2016.  She had a coronary angiogram which was reportedly normal but was diagnosed with HF and started on a BB and diuretics. Her symptoms progressed to the point that she was evaluated at [facility] in August/September (Dr. Barron in oncology/hematology, Dr. Nettles in cardiology). She was diagnosed with stage IV AL amyloid (+GI, +bone marrow involvement, no involvement of kidney or liver). Her work up is detailed in our previous notes, however she has lambda AL amyloidosis and she underwent CyBorD chemotherapy and excellent light chain response by serum. Patient has been turned down at [facility] for a stem cell transplant due to her cardiac involvement. Later in 2016, she had 2-3 more right sided pleural taps. She was hospitalized 1/2017 and diuresed 20-30 pounds at that time. Since that time her AL has been in remission by labs without further chemo. Patient was then treated with doxycycline , which she continues to this day.    With respect to her AL diagnosis, she has been in remission for some time.  She has not required further chemotherapy for light chain suppression.  She just had a follow-up with her oncologist a few months ago.     Despite having all features of end-stage AL cardiac amyloidosis (many high risk features associated with very poor prognosis)-Amy has settled into a period of stability for several years and has had  much more of a slow, steady decline from her restrictive cardiomyopathy.     She just had another admission and is her second over the last 16 months.  She required many days of high-dose IV diuretics and it remains on high-dose diuretics now.  She is on metolazone every fourth day and torsemide 150 twice daily.  She is currently taking potassium 60/60/40 and it is 40 to get her diabetes so he will and an extra 60 potassium I told on days- .     She does feel as though she has been feeling down very slowly over the last several years.  She still feels her quality of life is acceptable.  She gets short of breath walking half a block.  She generally avoids stairs.  She is propped up some at night to assist with her breathing but has not had PND.  She endorses lower extremity edema.  Her weight is up about 4 pounds over the last week and she is due for metolazone today.      she again emphasized today that she is not ready to give up the fight and wants ongoing aggressive medical care.     Medications:   Current Outpatient Medications   Medication Sig Dispense Refill     Acetaminophen (TYLENOL PO) Take 325 mg by mouth every 6 hours as needed for mild pain or fever        apixaban ANTICOAGULANT (ELIQUIS ANTICOAGULANT) 2.5 MG tablet Take 1 tablet (2.5 mg) by mouth 2 times daily 180 tablet 3     camphor-menthol (DERMASARRA) 0.5-0.5 % external lotion Apply 1 mL topically every 6 hours as needed for skin care 222 mL 0     eplerenone (INSPRA) 25 MG tablet Take 1 tablet (25 mg) by mouth daily 90 tablet 3     metolazone (ZAROXOLYN) 2.5 MG tablet Take 1 tablet (2.5 mg) by mouth twice a week As needed for increased swelling and weight gain of 3 lb in one day or 5 lb in one week 26 tablet 3     ondansetron (ZOFRAN-ODT) 8 MG ODT tab Take 8 mg by mouth every 8 hours as needed PRN       potassium chloride ER (KLOR-CON M) 20 MEQ CR tablet Take 3 tablets (60 mEq) by mouth every morning AND 2 tablets (40 mEq) daily (with lunch) AND 2  tablets (40 mEq) every evening. 240 tablet 3     torsemide (DEMADEX) 100 MG tablet Take 1.5 tablets (150 mg) by mouth every morning AND 1.5 tablets (150 mg) daily at 2 pm. 90 tablet 1     triamcinolone (KENALOG) 0.1 % external cream Apply a thin layer twice daily to itchy areas as needed. 453.6 g 3       Corrections from above:  60/60/40     Q 4 days   Extra potassium: extra 60       PAST MEDICAL HISTORY:  Past Medical History:   Diagnosis Date     AL amyloidosis (H)      Atrial fibrillation and flutter (H)      Cardiac amyloidosis (H)      Lymphedema      QT prolongation      Recurrent right pleural effusion 1/2/2017     SVT (supraventricular tachycardia) (H)        Reviewed no changes from prior    Social history no change from prior    CURRENT MEDICATIONS:    Current Outpatient Medications   Medication Sig Dispense Refill     Acetaminophen (TYLENOL PO) Take 325 mg by mouth every 6 hours as needed for mild pain or fever        apixaban ANTICOAGULANT (ELIQUIS ANTICOAGULANT) 2.5 MG tablet Take 1 tablet (2.5 mg) by mouth 2 times daily 180 tablet 3     camphor-menthol (DERMASARRA) 0.5-0.5 % external lotion Apply 1 mL topically every 6 hours as needed for skin care 222 mL 0     eplerenone (INSPRA) 25 MG tablet Take 1 tablet (25 mg) by mouth daily 90 tablet 3     metolazone (ZAROXOLYN) 2.5 MG tablet Take 1 tablet (2.5 mg) by mouth twice a week As needed for increased swelling and weight gain of 3 lb in one day or 5 lb in one week 26 tablet 3     ondansetron (ZOFRAN-ODT) 8 MG ODT tab Take 8 mg by mouth every 8 hours as needed PRN       potassium chloride ER (KLOR-CON M) 20 MEQ CR tablet Take 3 tablets (60 mEq) by mouth every morning AND 2 tablets (40 mEq) daily (with lunch) AND 2 tablets (40 mEq) every evening. 240 tablet 3     torsemide (DEMADEX) 100 MG tablet Take 1.5 tablets (150 mg) by mouth every morning AND 1.5 tablets (150 mg) daily at 2 pm. 90 tablet 1     triamcinolone (KENALOG) 0.1 % external cream Apply a thin  "layer twice daily to itchy areas as needed. 453.6 g 3       ROS:   Constitutional: No fever, chills, or sweats. Weight . + fatigue   ENT: No visual disturbance, ear ache, epistaxis, sore throat.   Allergies/Immunologic: Negative.   Respiratory: No cough, hemoptysis.   Cardiovascular: As per HPI.   GI: no current nausea, vomiting, hematemesis, melena, or hematochezia.   : No urinary frequency, dysuria, or hematuria.   Integument: Negative.   Psychiatric: Feeling down and anxious about the state of her health  Neuro: Negative.   Endocrinology: Negative.   Musculoskeletal: Negative    EXAM:  /68 (BP Location: Left arm, Patient Position: Chair, Cuff Size: Adult Regular)   Pulse 80   Ht 1.6 m (5' 3\")   Wt 69.7 kg (153 lb 11.2 oz)   SpO2 97%   BMI 27.23 kg/m    General: appears comfortable, alert and articulate  Head: normocephalic, atraumatic  Eyes: anicteric sclera, EOMI  Neck: no adenopathy  Orophyarynx: moist mucosa, no lesions, dentition intact  Heart: PMI diffuse,trace systolic murmur at LLSB, regular, S1/S2, rub, estimated JVP 14 cm   Lungs: clear, no rales or wheezing  Abdomen: soft, non-tender, bowel sounds present, no hepatosplenomegaly  Extremities: no clubbing, cyanosis-1+ lower extremity edema to the knees bilaterally  Neurological: normal speech and affect, no gross motor deficits      Labs, reviewed with patient in clinic today:  Cr 1.1     Potassium 4.0       Other data:  Coronary Angiogram 2016  Diagnostic Findings  LEFT MAIN CORONARY ARTERY     The LMCA is free of significant disease.    LEFT ANTERIOR DESCENDING     The LAD is free of significant disease.    CIRCUMFLEX     The Circumflex is free of significant disease.    RIGHT CORONARY ARTERY     The RCA is free of significant disease.         Echo 10/31/2021     Interpretation Summary  Global and regional left ventricular function is hyperkinetic with an EF of  65-70%. The LV cavity is small. Grade III or advanced diastolic " dysfunction.  Global right ventricular function is normal. The right ventricle is normal  size.  Moderate tricuspid insufficiency is present.  The estimated PA systolic pressure is at least 41 mmHg.  IVC diameter >2.1 cm collapsing <50% with sniff suggests a high RA pressure  estimated at 15 mmHg or greater.  This study was compared with the study from 02/03/2021. The right atrial  pressure is higher.  ______________________________________________________________________________  Left Ventricle      History of first-degree AV block        Assessment and Plan:   Mrs. Guerrero is a 70 year old female with AL cardiac amyloidosis who presents for follow up.    While she has all features that are consistent with end-stage disease, she is taking this month by month. She did feel much better with IV diuretics and felt better for many weeks after that admission. I am willing to offer this in the future given she felt so much better after.     We talked a lot today about overall goals of care.  Given that she does not have a lot of other comorbidities, she does not have other organ involvement with her amyloidosis, her light chains are under control-and she has a relatively good functional status-I would contemplate offering peritoneal dialysis if we got to a point where we were unable to manage her fluid any further with outpatient diuretics.  My guess is that we will be at this [facility] within the next year.     She is NYHA class IIIb, stage C.  Slightly hypervolemic today.  She is due for metolazone.   For now I will keep her on 150/150 of torsemide and the above potassium  She is currently taking ~on every fourth day and would like to keep it like that.  The next move would be to go up to 200/200 twice daily of torsemide  If we have another admission or 2 with partner with nephrology to talk about PD at that point.. She would have to have a good functional status for us to consider this as an option.       # A. Fib/A.  Flutter-paroxysmal- last zio with 4 % burden  -Avoiding BB given her Amyloid history.   On anti-coag       #Systemic Amyloid-per oncology, last light chains were under control      Return to clinic in 2 3 months with nurse day and in 5 to 6 months with me.   We will continue to have ongoing goals of care discussions about I can best help Amy achieve her goals at this phase of her disease      Domenica Cohen MD  Associate Professor of Medicine   [facility] Division of Cardiology

## 2021-12-16 NOTE — NURSING NOTE
Diet: Patient instructed regarding a heart failure healthy diet, including discussion of reduced fat and 2000 mg daily sodium restriction, daily weights, medication purpose and compliance, fluid restrictions and resources for patient and family to access for assistance with heart failure management.       Labs: Patient was given results of the laboratory testing obtained today and patient was instructed about when to return for the next laboratory testing.     Med Reconcile: Reviewed and verified all current medications with the patient. The updated medication list was printed and given to the patient.     Med changes: none    Return Appointment: Patient given instructions regarding scheduling next clinic visit: [facility] as scheduled in February, Dr Cohen in April with labs    Patient stated she understood all health information given and agreed to call with further questions or concerns.     Niki Fam RN

## 2021-12-16 NOTE — NURSING NOTE
Chief Complaint   Patient presents with     Follow Up     4 month follow up      Vitals were taken and medications were reconciled.   David Diane, EMT  8:29 AM

## 2021-12-16 NOTE — PROGRESS NOTES
December 16, 2021    CC:     70 F year old female with a past medical history including stage IV AL amyloid diagnosed in 2016. Presents to clinic for HF follow up.      Her cardiac and oncologic history are as follows: She had fatigue in 2016.  She had a coronary angiogram which was reportedly normal but was diagnosed with HF and started on a BB and diuretics. Her symptoms progressed to the point that she was evaluated at [place] in August/September (Dr. Barron in oncology/hematology, Dr. Nettles in cardiology). She was diagnosed with stage IV AL amyloid (+GI, +bone marrow involvement, no involvement of kidney or liver). Her work up is detailed in our previous notes, however she has lambda AL amyloidosis and she underwent CyBorD chemotherapy and excellent light chain response by serum. Patient has been turned down at [place] for a stem cell transplant due to her cardiac involvement. Later in 2016, she had 2-3 more right sided pleural taps. She was hospitalized 1/2017 and diuresed 20-30 pounds at that time. Since that time her AL has been in remission by labs without further chemo. Patient was then treated with doxycycline , which she continues to this day.    With respect to her AL diagnosis, she has been in remission for some time.  She has not required further chemotherapy for light chain suppression.  She just had a follow-up with her oncologist a few months ago.     Despite having all features of end-stage AL cardiac amyloidosis (many high risk features associated with very poor prognosis)-Amy has settled into a period of stability for several years and has had much more of a slow, steady decline from her restrictive cardiomyopathy.     She just had another admission and is her second over the last 16 months.  She required many days of high-dose IV diuretics and it remains on high-dose diuretics now.  She is on metolazone every fourth day and torsemide 150 twice daily.  She is currently taking potassium 60/60/40  and it is 40 to get her diabetes so he will and an extra 60 potassium I told on days- .     She does feel as though she has been feeling down very slowly over the last several years.  She still feels her quality of life is acceptable.  She gets short of breath walking half a block.  She generally avoids stairs.  She is propped up some at night to assist with her breathing but has not had PND.  She endorses lower extremity edema.  Her weight is up about 4 pounds over the last week and she is due for metolazone today.      she again emphasized today that she is not ready to give up the fight and wants ongoing aggressive medical care.     Medications:   Current Outpatient Medications   Medication Sig Dispense Refill     Acetaminophen (TYLENOL PO) Take 325 mg by mouth every 6 hours as needed for mild pain or fever        apixaban ANTICOAGULANT (ELIQUIS ANTICOAGULANT) 2.5 MG tablet Take 1 tablet (2.5 mg) by mouth 2 times daily 180 tablet 3     camphor-menthol (DERMASARRA) 0.5-0.5 % external lotion Apply 1 mL topically every 6 hours as needed for skin care 222 mL 0     eplerenone (INSPRA) 25 MG tablet Take 1 tablet (25 mg) by mouth daily 90 tablet 3     metolazone (ZAROXOLYN) 2.5 MG tablet Take 1 tablet (2.5 mg) by mouth twice a week As needed for increased swelling and weight gain of 3 lb in one day or 5 lb in one week 26 tablet 3     ondansetron (ZOFRAN-ODT) 8 MG ODT tab Take 8 mg by mouth every 8 hours as needed PRN       potassium chloride ER (KLOR-CON M) 20 MEQ CR tablet Take 3 tablets (60 mEq) by mouth every morning AND 2 tablets (40 mEq) daily (with lunch) AND 2 tablets (40 mEq) every evening. 240 tablet 3     torsemide (DEMADEX) 100 MG tablet Take 1.5 tablets (150 mg) by mouth every morning AND 1.5 tablets (150 mg) daily at 2 pm. 90 tablet 1     triamcinolone (KENALOG) 0.1 % external cream Apply a thin layer twice daily to itchy areas as needed. 453.6 g 3       Corrections from above:  60/60/40     Q 4 days    Extra potassium: extra 60       PAST MEDICAL HISTORY:  Past Medical History:   Diagnosis Date     AL amyloidosis (H)      Atrial fibrillation and flutter (H)      Cardiac amyloidosis (H)      Lymphedema      QT prolongation      Recurrent right pleural effusion 1/2/2017     SVT (supraventricular tachycardia) (H)        Reviewed no changes from prior    Social history no change from prior    CURRENT MEDICATIONS:    Current Outpatient Medications   Medication Sig Dispense Refill     Acetaminophen (TYLENOL PO) Take 325 mg by mouth every 6 hours as needed for mild pain or fever        apixaban ANTICOAGULANT (ELIQUIS ANTICOAGULANT) 2.5 MG tablet Take 1 tablet (2.5 mg) by mouth 2 times daily 180 tablet 3     camphor-menthol (DERMASARRA) 0.5-0.5 % external lotion Apply 1 mL topically every 6 hours as needed for skin care 222 mL 0     eplerenone (INSPRA) 25 MG tablet Take 1 tablet (25 mg) by mouth daily 90 tablet 3     metolazone (ZAROXOLYN) 2.5 MG tablet Take 1 tablet (2.5 mg) by mouth twice a week As needed for increased swelling and weight gain of 3 lb in one day or 5 lb in one week 26 tablet 3     ondansetron (ZOFRAN-ODT) 8 MG ODT tab Take 8 mg by mouth every 8 hours as needed PRN       potassium chloride ER (KLOR-CON M) 20 MEQ CR tablet Take 3 tablets (60 mEq) by mouth every morning AND 2 tablets (40 mEq) daily (with lunch) AND 2 tablets (40 mEq) every evening. 240 tablet 3     torsemide (DEMADEX) 100 MG tablet Take 1.5 tablets (150 mg) by mouth every morning AND 1.5 tablets (150 mg) daily at 2 pm. 90 tablet 1     triamcinolone (KENALOG) 0.1 % external cream Apply a thin layer twice daily to itchy areas as needed. 453.6 g 3       ROS:   Constitutional: No fever, chills, or sweats. Weight . + fatigue   ENT: No visual disturbance, ear ache, epistaxis, sore throat.   Allergies/Immunologic: Negative.   Respiratory: No cough, hemoptysis.   Cardiovascular: As per HPI.   GI: no current nausea, vomiting, hematemesis, melena,  "or hematochezia.   : No urinary frequency, dysuria, or hematuria.   Integument: Negative.   Psychiatric: Feeling down and anxious about the state of her health  Neuro: Negative.   Endocrinology: Negative.   Musculoskeletal: Negative    EXAM:  /68 (BP Location: Left arm, Patient Position: Chair, Cuff Size: Adult Regular)   Pulse 80   Ht 1.6 m (5' 3\")   Wt 69.7 kg (153 lb 11.2 oz)   SpO2 97%   BMI 27.23 kg/m    General: appears comfortable, alert and articulate  Head: normocephalic, atraumatic  Eyes: anicteric sclera, EOMI  Neck: no adenopathy  Orophyarynx: moist mucosa, no lesions, dentition intact  Heart: PMI diffuse,trace systolic murmur at LLSB, regular, S1/S2, rub, estimated JVP 14 cm   Lungs: clear, no rales or wheezing  Abdomen: soft, non-tender, bowel sounds present, no hepatosplenomegaly  Extremities: no clubbing, cyanosis-1+ lower extremity edema to the knees bilaterally  Neurological: normal speech and affect, no gross motor deficits      Labs, reviewed with patient in clinic today:  Cr 1.1     Potassium 4.0       Other data:  Coronary Angiogram 2016  Diagnostic Findings  LEFT MAIN CORONARY ARTERY     The LMCA is free of significant disease.    LEFT ANTERIOR DESCENDING     The LAD is free of significant disease.    CIRCUMFLEX     The Circumflex is free of significant disease.    RIGHT CORONARY ARTERY     The RCA is free of significant disease.         Echo 10/31/2021     Interpretation Summary  Global and regional left ventricular function is hyperkinetic with an EF of  65-70%. The LV cavity is small. Grade III or advanced diastolic dysfunction.  Global right ventricular function is normal. The right ventricle is normal  size.  Moderate tricuspid insufficiency is present.  The estimated PA systolic pressure is at least 41 mmHg.  IVC diameter >2.1 cm collapsing <50% with sniff suggests a high RA pressure  estimated at 15 mmHg or greater.  This study was compared with the study from 02/03/2021. " The right atrial  pressure is higher.  ______________________________________________________________________________  Left Ventricle      History of first-degree AV block        Assessment and Plan:   Mrs. Guerrero is a 70 year old female with AL cardiac amyloidosis who presents for follow up.    While she has all features that are consistent with end-stage disease, she is taking this month by month. She did feel much better with IV diuretics and felt better for many weeks after that admission. I am willing to offer this in the future given she felt so much better after.     We talked a lot today about overall goals of care.  Given that she does not have a lot of other comorbidities, she does not have other organ involvement with her amyloidosis, her light chains are under control-and she has a relatively good functional status-I would contemplate offering peritoneal dialysis if we got to a point where we were unable to manage her fluid any further with outpatient diuretics.  My guess is that we will be at this [facility] within the next year.     She is NYHA class IIIb, stage C.  Slightly hypervolemic today.  She is due for metolazone.   For now I will keep her on 150/150 of torsemide and the above potassium  She is currently taking ~on every fourth day and would like to keep it like that.  The next move would be to go up to 200/200 twice daily of torsemide  If we have another admission or 2 with partner with nephrology to talk about PD at that point.. She would have to have a good functional status for us to consider this as an option.       # A. Fib/A. Flutter-paroxysmal- last zio with 4 % burden  -Avoiding BB given her Amyloid history.   On anti-coag       #Systemic Amyloid-per oncology, last light chains were under control      Return to clinic in 2 3 months with nurse day and in 5 to 6 months with me.   We will continue to have ongoing goals of care discussions about I can best help Amy achieve her  goals at this phase of her disease      Domenica Cohen MD  Associate Professor of Medicine   [facility] Division of Cardiology

## 2021-12-18 ENCOUNTER — MYC MEDICAL ADVICE (OUTPATIENT)
Dept: CARDIOLOGY | Facility: CLINIC | Age: 71
End: 2021-12-18
Payer: COMMERCIAL

## 2021-12-18 DIAGNOSIS — E85.4 CARDIAC AMYLOIDOSIS (H): Primary | ICD-10-CM

## 2021-12-18 DIAGNOSIS — I43 CARDIAC AMYLOIDOSIS (H): Primary | ICD-10-CM

## 2021-12-20 ENCOUNTER — TRANSCRIBE ORDERS (OUTPATIENT)
Dept: OTHER | Age: 71
End: 2021-12-20

## 2021-12-20 DIAGNOSIS — I89.0 LYMPHEDEMA, NOT ELSEWHERE CLASSIFIED: Primary | ICD-10-CM

## 2021-12-21 NOTE — TELEPHONE ENCOUNTER
Date: 12/21/2021    Time of Call: 10:31 AM     Diagnosis:  Heart failure     [ VORB ] Ordering provider: Dr Cohen    Order: weekly standing orders for BMP     Order received by: Niki Fam RN       Follow-up/additional notes: sent mychart to Amy

## 2021-12-23 ENCOUNTER — LAB (OUTPATIENT)
Dept: LAB | Facility: CLINIC | Age: 71
End: 2021-12-23
Payer: COMMERCIAL

## 2021-12-23 DIAGNOSIS — E85.4 CARDIAC AMYLOIDOSIS (H): ICD-10-CM

## 2021-12-23 DIAGNOSIS — I43 CARDIAC AMYLOIDOSIS (H): ICD-10-CM

## 2021-12-23 LAB
ANION GAP SERPL CALCULATED.3IONS-SCNC: 6 MMOL/L (ref 3–14)
BUN SERPL-MCNC: 34 MG/DL (ref 7–30)
CALCIUM SERPL-MCNC: 9.7 MG/DL (ref 8.5–10.1)
CHLORIDE BLD-SCNC: 93 MMOL/L (ref 94–109)
CO2 SERPL-SCNC: 34 MMOL/L (ref 20–32)
CREAT SERPL-MCNC: 1.17 MG/DL (ref 0.52–1.04)
GFR SERPL CREATININE-BSD FRML MDRD: 50 ML/MIN/1.73M2
GLUCOSE BLD-MCNC: 99 MG/DL (ref 70–99)
HOLD SPECIMEN: NORMAL
HOLD SPECIMEN: NORMAL
POTASSIUM BLD-SCNC: 3.7 MMOL/L (ref 3.4–5.3)
SODIUM SERPL-SCNC: 133 MMOL/L (ref 133–144)

## 2021-12-23 PROCEDURE — 36415 COLL VENOUS BLD VENIPUNCTURE: CPT

## 2021-12-23 PROCEDURE — 80048 BASIC METABOLIC PNL TOTAL CA: CPT

## 2021-12-30 ENCOUNTER — MYC MEDICAL ADVICE (OUTPATIENT)
Dept: CARDIOLOGY | Facility: CLINIC | Age: 71
End: 2021-12-30

## 2021-12-30 ENCOUNTER — LAB (OUTPATIENT)
Dept: LAB | Facility: CLINIC | Age: 71
End: 2021-12-30
Payer: COMMERCIAL

## 2021-12-30 DIAGNOSIS — I43 CARDIAC AMYLOIDOSIS (H): ICD-10-CM

## 2021-12-30 DIAGNOSIS — E85.4 CARDIAC AMYLOIDOSIS (H): ICD-10-CM

## 2021-12-30 DIAGNOSIS — I50.33 ACUTE ON CHRONIC DIASTOLIC HEART FAILURE (H): ICD-10-CM

## 2021-12-30 LAB
ANION GAP SERPL CALCULATED.3IONS-SCNC: 6 MMOL/L (ref 3–14)
BUN SERPL-MCNC: 24 MG/DL (ref 7–30)
CALCIUM SERPL-MCNC: 8.8 MG/DL (ref 8.5–10.1)
CHLORIDE BLD-SCNC: 94 MMOL/L (ref 94–109)
CO2 SERPL-SCNC: 29 MMOL/L (ref 20–32)
CREAT SERPL-MCNC: 1.18 MG/DL (ref 0.52–1.04)
GFR SERPL CREATININE-BSD FRML MDRD: 50 ML/MIN/1.73M2
GLUCOSE BLD-MCNC: 90 MG/DL (ref 70–99)
HOLD SPECIMEN: NORMAL
HOLD SPECIMEN: NORMAL
POTASSIUM BLD-SCNC: 3.2 MMOL/L (ref 3.4–5.3)
SODIUM SERPL-SCNC: 129 MMOL/L (ref 133–144)

## 2021-12-30 PROCEDURE — 36415 COLL VENOUS BLD VENIPUNCTURE: CPT

## 2021-12-30 PROCEDURE — 80048 BASIC METABOLIC PNL TOTAL CA: CPT

## 2021-12-30 RX ORDER — POTASSIUM CHLORIDE 1500 MG/1
60 TABLET, EXTENDED RELEASE ORAL 3 TIMES DAILY
Qty: 810 TABLET | Refills: 3 | Status: SHIPPED | OUTPATIENT
Start: 2021-12-30 | End: 2022-03-21

## 2021-12-30 NOTE — TELEPHONE ENCOUNTER
Date: 12/30/2021    Time of Call: 5:14 PM     Diagnosis:  Heart failure     [ VORB ] Ordering provider: Dr Cohen    Order: increase potassium to 60 mEq TID and take a total of 60 extra today     Order received by: Niki Fam RN       Follow-up/additional notes: Amy stated understanding.  Labs next week as previously scheduled

## 2022-01-01 ENCOUNTER — TRANSFERRED RECORDS (OUTPATIENT)
Dept: HEALTH INFORMATION MANAGEMENT | Facility: CLINIC | Age: 72
End: 2022-01-01

## 2022-01-01 ENCOUNTER — HEALTH MAINTENANCE LETTER (OUTPATIENT)
Age: 72
End: 2022-01-01

## 2022-01-01 ENCOUNTER — TELEPHONE (OUTPATIENT)
Dept: ANESTHESIOLOGY | Facility: CLINIC | Age: 72
End: 2022-01-01

## 2022-01-01 ENCOUNTER — LAB REQUISITION (OUTPATIENT)
Dept: LAB | Facility: CLINIC | Age: 72
End: 2022-01-01
Payer: OTHER MISCELLANEOUS

## 2022-01-01 DIAGNOSIS — J10.01 INFLUENZA DUE TO OTHER IDENTIFIED INFLUENZA VIRUS WITH THE SAME OTHER IDENTIFIED INFLUENZA VIRUS PNEUMONIA: ICD-10-CM

## 2022-01-01 DIAGNOSIS — I50.9 HEART FAILURE, UNSPECIFIED (H): Primary | ICD-10-CM

## 2022-01-01 DIAGNOSIS — I50.9 HEART FAILURE, UNSPECIFIED (H): ICD-10-CM

## 2022-01-01 LAB
FLUAV AG SPEC QL IA: NEGATIVE
FLUBV AG SPEC QL IA: NEGATIVE
POTASSIUM BLD-SCNC: 2.8 MMOL/L (ref 3.4–5.3)
SARS-COV-2 RNA RESP QL NAA+PROBE: POSITIVE

## 2022-01-01 PROCEDURE — U0005 INFEC AGEN DETEC AMPLI PROBE: HCPCS | Mod: ORL | Performed by: FAMILY MEDICINE

## 2022-01-01 PROCEDURE — 84132 ASSAY OF SERUM POTASSIUM: CPT | Mod: ORL | Performed by: PEDIATRICS

## 2022-01-01 PROCEDURE — 36415 COLL VENOUS BLD VENIPUNCTURE: CPT | Mod: ORL | Performed by: PEDIATRICS

## 2022-01-01 PROCEDURE — 87804 INFLUENZA ASSAY W/OPTIC: CPT | Mod: ORL | Performed by: FAMILY MEDICINE

## 2022-01-05 DIAGNOSIS — R06.02 SHORTNESS OF BREATH: Primary | ICD-10-CM

## 2022-01-06 ENCOUNTER — LAB (OUTPATIENT)
Dept: LAB | Facility: CLINIC | Age: 72
End: 2022-01-06
Payer: COMMERCIAL

## 2022-01-06 DIAGNOSIS — R06.02 SHORTNESS OF BREATH: ICD-10-CM

## 2022-01-06 DIAGNOSIS — E85.4 CARDIAC AMYLOIDOSIS (H): ICD-10-CM

## 2022-01-06 DIAGNOSIS — I43 CARDIAC AMYLOIDOSIS (H): ICD-10-CM

## 2022-01-06 LAB
ANION GAP SERPL CALCULATED.3IONS-SCNC: 5 MMOL/L (ref 3–14)
BUN SERPL-MCNC: 27 MG/DL (ref 7–30)
CALCIUM SERPL-MCNC: 8.9 MG/DL (ref 8.5–10.1)
CHLORIDE BLD-SCNC: 96 MMOL/L (ref 94–109)
CO2 SERPL-SCNC: 31 MMOL/L (ref 20–32)
CREAT SERPL-MCNC: 1.35 MG/DL (ref 0.52–1.04)
GFR SERPL CREATININE-BSD FRML MDRD: 42 ML/MIN/1.73M2
GLUCOSE BLD-MCNC: 98 MG/DL (ref 70–99)
HOLD SPECIMEN: NORMAL
HOLD SPECIMEN: NORMAL
POTASSIUM BLD-SCNC: 4.6 MMOL/L (ref 3.4–5.3)
SODIUM SERPL-SCNC: 132 MMOL/L (ref 133–144)

## 2022-01-06 PROCEDURE — U0003 INFECTIOUS AGENT DETECTION BY NUCLEIC ACID (DNA OR RNA); SEVERE ACUTE RESPIRATORY SYNDROME CORONAVIRUS 2 (SARS-COV-2) (CORONAVIRUS DISEASE [COVID-19]), AMPLIFIED PROBE TECHNIQUE, MAKING USE OF HIGH THROUGHPUT TECHNOLOGIES AS DESCRIBED BY CMS-2020-01-R: HCPCS

## 2022-01-06 PROCEDURE — 80048 BASIC METABOLIC PNL TOTAL CA: CPT

## 2022-01-06 PROCEDURE — 36415 COLL VENOUS BLD VENIPUNCTURE: CPT

## 2022-01-06 PROCEDURE — U0005 INFEC AGEN DETEC AMPLI PROBE: HCPCS

## 2022-01-07 LAB — SARS-COV-2 RNA RESP QL NAA+PROBE: NEGATIVE

## 2022-01-20 ENCOUNTER — LAB (OUTPATIENT)
Dept: LAB | Facility: CLINIC | Age: 72
End: 2022-01-20
Payer: COMMERCIAL

## 2022-01-20 DIAGNOSIS — E85.4 CARDIAC AMYLOIDOSIS (H): ICD-10-CM

## 2022-01-20 DIAGNOSIS — I50.32 CHRONIC DIASTOLIC HEART FAILURE (H): ICD-10-CM

## 2022-01-20 DIAGNOSIS — I43 AMYLOID HEART DISEASE (H): ICD-10-CM

## 2022-01-20 DIAGNOSIS — E87.6 HYPOKALEMIA: ICD-10-CM

## 2022-01-20 DIAGNOSIS — E85.4 AMYLOID HEART DISEASE (H): ICD-10-CM

## 2022-01-20 DIAGNOSIS — I43 CARDIAC AMYLOIDOSIS (H): ICD-10-CM

## 2022-01-20 LAB
ANION GAP SERPL CALCULATED.3IONS-SCNC: 6 MMOL/L (ref 3–14)
BUN SERPL-MCNC: 27 MG/DL (ref 7–30)
CALCIUM SERPL-MCNC: 8.5 MG/DL (ref 8.5–10.1)
CHLORIDE BLD-SCNC: 98 MMOL/L (ref 94–109)
CO2 SERPL-SCNC: 30 MMOL/L (ref 20–32)
CREAT SERPL-MCNC: 1.13 MG/DL (ref 0.52–1.04)
GFR SERPL CREATININE-BSD FRML MDRD: 52 ML/MIN/1.73M2
GLUCOSE BLD-MCNC: 81 MG/DL (ref 70–99)
HOLD SPECIMEN: NORMAL
HOLD SPECIMEN: NORMAL
NT-PROBNP SERPL-MCNC: 976 PG/ML (ref 0–125)
POTASSIUM BLD-SCNC: 4 MMOL/L (ref 3.4–5.3)
SODIUM SERPL-SCNC: 134 MMOL/L (ref 133–144)
TROPONIN I SERPL HS-MCNC: 12 NG/L

## 2022-01-20 PROCEDURE — 80048 BASIC METABOLIC PNL TOTAL CA: CPT

## 2022-01-20 PROCEDURE — 84484 ASSAY OF TROPONIN QUANT: CPT

## 2022-01-20 PROCEDURE — 36415 COLL VENOUS BLD VENIPUNCTURE: CPT

## 2022-01-20 PROCEDURE — 83880 ASSAY OF NATRIURETIC PEPTIDE: CPT

## 2022-01-31 DIAGNOSIS — I50.33 ACUTE ON CHRONIC DIASTOLIC HEART FAILURE (H): Primary | ICD-10-CM

## 2022-02-02 ENCOUNTER — OFFICE VISIT (OUTPATIENT)
Dept: DERMATOLOGY | Facility: CLINIC | Age: 72
End: 2022-02-02
Payer: COMMERCIAL

## 2022-02-02 ENCOUNTER — LAB (OUTPATIENT)
Dept: LAB | Facility: CLINIC | Age: 72
End: 2022-02-02
Payer: COMMERCIAL

## 2022-02-02 DIAGNOSIS — I50.33 ACUTE ON CHRONIC DIASTOLIC HEART FAILURE (H): ICD-10-CM

## 2022-02-02 DIAGNOSIS — L20.84 INTRINSIC ATOPIC DERMATITIS: Primary | ICD-10-CM

## 2022-02-02 LAB
ANION GAP SERPL CALCULATED.3IONS-SCNC: 5 MMOL/L (ref 3–14)
BUN SERPL-MCNC: 22 MG/DL (ref 7–30)
CALCIUM SERPL-MCNC: 9.1 MG/DL (ref 8.5–10.1)
CHLORIDE BLD-SCNC: 96 MMOL/L (ref 94–109)
CO2 SERPL-SCNC: 32 MMOL/L (ref 20–32)
CREAT SERPL-MCNC: 1 MG/DL (ref 0.52–1.04)
GFR SERPL CREATININE-BSD FRML MDRD: 61 ML/MIN/1.73M2
GLUCOSE BLD-MCNC: 84 MG/DL (ref 70–99)
HOLD SPECIMEN: NORMAL
HOLD SPECIMEN: NORMAL
POTASSIUM BLD-SCNC: 4.2 MMOL/L (ref 3.4–5.3)
SODIUM SERPL-SCNC: 133 MMOL/L (ref 133–144)

## 2022-02-02 PROCEDURE — 80048 BASIC METABOLIC PNL TOTAL CA: CPT

## 2022-02-02 PROCEDURE — 99214 OFFICE O/P EST MOD 30 MIN: CPT | Performed by: DERMATOLOGY

## 2022-02-02 PROCEDURE — 36415 COLL VENOUS BLD VENIPUNCTURE: CPT

## 2022-02-02 NOTE — LETTER
2/2/2022         RE: Leandra Guerrero  [address] [place] [address]        Dear Colleague,    Thank you for referring your patient, Leandra Guerrero, to the [facility]. Please see a copy of my visit note below.    [facility] Dermatology Note  Encounter Date: Feb 2, 2022  Office Visit     Dermatology Problem List:  1. Eczema  - gentle moisturizer, triamcinolone 0.1% cream  2. Relevant medical history: amyloidosis with cardiac and GI involvement (follows with onc, no treatment since 2016)  ____________________________________________    Assessment & Plan:     # Suspected atopic dermatitis. Eczema favored over lichen planus or lichenoid drug eruption. Today, we reviewed the pathogenesis of this condition and emphasized the importance of gentle skin care and cream based moisturizer for long term treatment. This part has been missing from her skin care, likely why she has not improved. Discussed use of topical steroid only when skin is itchy/symptomatic.   - Encouraged cream based moisturizer twice daily. Discussed vanicream and cerave.  - Recommended gentle soaps. Recommended Dove bar soap.  - Apply triamcinolone 0.1% cream BID for itch. Cover with moisturizer. No refills needed.  - Recommended patient resist urge to scratch.  - Consider biopsy at follow up, if not improved.    # Patient reported hair loss.  - Referred to my colleague, Dr. Arevalo who is a hair loss expert.  - Will help patient schedule a visit on her way out today.    Procedures Performed:   None.    Follow-up: 6 weeks in person, sooner for concerns.    Staff and Scribe:     Scribe Disclosure:   I, Barbara Javed, am serving as a scribe to document services personally performed by this physician, Dr. Kasie Gupta, based on data collection and the provider's statements to me.     Provider Disclosure:   The documentation recorded by the scribe accurately reflects the services I  personally performed and the decisions made by me.    Kasie Gupta MD    Department of Dermatology  [facility]: Phone: [phone], Fax:[phone]  [facility]: Phone: [phone], Fax: [phone]    ____________________________________________    CC: Derm Problem (Follow up dermatitis-arms are very itchy)    HPI:  Ms. Leandra Guerrero is a(n) 71 year old female who presents today as a return patient for eczema.    Last seen 6/1/21 for itchy skin/rash. Diagnosed as atopic dermatitis. Plan was to treat with gentle skin care and TAC 0.1% cream and re-evaluate in 6 weeks.    Today, Amy notes the arms are the most itchy. Has used vaseline and triamcinolone. Sometimes used cerave moisturizer. Uses bar soap (possibly Ivory). Wondering if an increase in torsemide is causing this.    Patient is otherwise feeling well, without additional skin concerns.     Labs Reviewed:  N/A    Physical Exam:  Vitals: There were no vitals taken for this visit.  SKIN: Focused examination of the chest, back, upper extremities, and lower extremities was performed.  - There is eczamatous dermatitis covering most of back, chest, and upper extremities.  - 3+ non-pitting edema on the bilateral lower extremities.  - No other lesions of concern on areas examined.     Medications:  Current Outpatient Medications   Medication     Acetaminophen (TYLENOL PO)     apixaban ANTICOAGULANT (ELIQUIS ANTICOAGULANT) 2.5 MG tablet     camphor-menthol (DERMASARRA) 0.5-0.5 % external lotion     eplerenone (INSPRA) 25 MG tablet     metolazone (ZAROXOLYN) 2.5 MG tablet     ondansetron (ZOFRAN-ODT) 8 MG ODT tab     potassium chloride ER (KLOR-CON M) 20 MEQ CR tablet     torsemide (DEMADEX) 100 MG tablet     triamcinolone (KENALOG) 0.1 % external cream     No current facility-administered medications for this visit.      Past  Medical History:   Patient Active Problem List   Diagnosis     AL amyloidosis (H)     Weight loss     Hemorrhagic cystitis     Bilateral edema of lower extremity     Cardiac amyloidosis (H)     Recurrent right pleural effusion     Acute on chronic diastolic heart failure (H)     Central sleep apnea     Shortness of breath     Essential hypertension     Gastroesophageal reflux disease     Insomnia     Low back pain     Lymphedema of lower extremity     Normal coronary arteries     Periodic limb movement disorder     Pulmonary emphysema (H)     Seasonal allergic rhinitis     Advance care planning     Heart failure (H)     Acute kidney failure, unspecified (H)     Hypokalemia     Chest pain, unspecified type     Past Medical History:   Diagnosis Date     AL amyloidosis (H)      Atrial fibrillation and flutter (H)      Cardiac amyloidosis (H)      Lymphedema      QT prolongation      Recurrent right pleural effusion 1/2/2017     SVT (supraventricular tachycardia) (H)        CC Referred Self, MD  No address on file on close of this encounter.        Again, thank you for allowing me to participate in the care of your patient.        Sincerely,        Kasie Gupta MD

## 2022-02-02 NOTE — PROGRESS NOTES
[facility] Dermatology Note  Encounter Date: Feb 2, 2022  Office Visit     Dermatology Problem List:  1. Eczema  - gentle moisturizer, triamcinolone 0.1% cream  2. Relevant medical history: amyloidosis with cardiac and GI involvement (follows with onc, no treatment since 2016)  ____________________________________________    Assessment & Plan:     # Suspected atopic dermatitis. Eczema favored over lichen planus or lichenoid drug eruption. Today, we reviewed the pathogenesis of this condition and emphasized the importance of gentle skin care and cream based moisturizer for long term treatment. This part has been missing from her skin care, likely why she has not improved. Discussed use of topical steroid only when skin is itchy/symptomatic.   - Encouraged cream based moisturizer twice daily. Discussed vanicream and cerave.  - Recommended gentle soaps. Recommended Dove bar soap.  - Apply triamcinolone 0.1% cream BID for itch. Cover with moisturizer. No refills needed.  - Recommended patient resist urge to scratch.  - Consider biopsy at follow up, if not improved.    # Patient reported hair loss.  - Referred to my colleague, Dr. Arevalo who is a hair loss expert.  - Will help patient schedule a visit on her way out today.    Procedures Performed:   None.    Follow-up: 6 weeks in person, sooner for concerns.    Staff and Scribe:     Scribe Disclosure:   I, Barbara Javed, am serving as a scribe to document services personally performed by this physician, Dr. Kasie Gupta, based on data collection and the provider's statements to me.     Provider Disclosure:   The documentation recorded by the scribe accurately reflects the services I personally performed and the decisions made by me.    Kasie Gupta MD    Department of Dermatology  [facility]: Phone: [phone], Fax:[phone]  [facility]  [facility]: Phone: [phone], Fax: [phone]    ____________________________________________    CC: Derm Problem (Follow up dermatitis-arms are very itchy)    HPI:  Ms. Leandra Guerrero is a(n) 71 year old female who presents today as a return patient for eczema.    Last seen 6/1/21 for itchy skin/rash. Diagnosed as atopic dermatitis. Plan was to treat with gentle skin care and TAC 0.1% cream and re-evaluate in 6 weeks.    Today, Amy notes the arms are the most itchy. Has used vaseline and triamcinolone. Sometimes used cerave moisturizer. Uses bar soap (possibly Ivory). Wondering if an increase in torsemide is causing this.    Patient is otherwise feeling well, without additional skin concerns.     Labs Reviewed:  N/A    Physical Exam:  Vitals: There were no vitals taken for this visit.  SKIN: Focused examination of the chest, back, upper extremities, and lower extremities was performed.  - There is eczamatous dermatitis covering most of back, chest, and upper extremities.  - 3+ non-pitting edema on the bilateral lower extremities.  - No other lesions of concern on areas examined.     Medications:  Current Outpatient Medications   Medication     Acetaminophen (TYLENOL PO)     apixaban ANTICOAGULANT (ELIQUIS ANTICOAGULANT) 2.5 MG tablet     camphor-menthol (DERMASARRA) 0.5-0.5 % external lotion     eplerenone (INSPRA) 25 MG tablet     metolazone (ZAROXOLYN) 2.5 MG tablet     ondansetron (ZOFRAN-ODT) 8 MG ODT tab     potassium chloride ER (KLOR-CON M) 20 MEQ CR tablet     torsemide (DEMADEX) 100 MG tablet     triamcinolone (KENALOG) 0.1 % external cream     No current facility-administered medications for this visit.      Past Medical History:   Patient Active Problem List   Diagnosis     AL amyloidosis (H)     Weight loss     Hemorrhagic cystitis     Bilateral edema of lower extremity     Cardiac amyloidosis (H)     Recurrent right pleural effusion     Acute on chronic diastolic heart failure  (H)     Central sleep apnea     Shortness of breath     Essential hypertension     Gastroesophageal reflux disease     Insomnia     Low back pain     Lymphedema of lower extremity     Normal coronary arteries     Periodic limb movement disorder     Pulmonary emphysema (H)     Seasonal allergic rhinitis     Advance care planning     Heart failure (H)     Acute kidney failure, unspecified (H)     Hypokalemia     Chest pain, unspecified type     Past Medical History:   Diagnosis Date     AL amyloidosis (H)      Atrial fibrillation and flutter (H)      Cardiac amyloidosis (H)      Lymphedema      QT prolongation      Recurrent right pleural effusion 1/2/2017     SVT (supraventricular tachycardia) (H)        CC Referred Self, MD  No address on file on close of this encounter.

## 2022-02-02 NOTE — PATIENT INSTRUCTIONS
Eczema care plan:    For soap in the shower, I recommend either Dove bar soap unscented for sensitive skin, Cetaphil gentle facial cleanser, or Vanicream gentle facial cleanser.     At least once daily no matter what, apply a cream moisturizer from the neck to the toes. The best time to do this is immediately after showering. My moisturizer recommendations are the creams from either CeraVe or Vanicream.     If your skin is itchy, apply the topical steroid Triamcinolone 0.1% cream twice a day on the skin that is itchy and then apply the moisturizer. If your skin is not itchy, just apply the moisturizer, no steroid.

## 2022-02-02 NOTE — NURSING NOTE
Leandra Guerrero's goals for this visit include:   Chief Complaint   Patient presents with     Derm Problem     Follow up dermatitis-arms are very itchy       She requests these members of her care team be copied on today's visit information:     PCP: Magy Obrien    Referring Provider:  Referred Self, MD  No address on file    There were no vitals taken for this visit.    Do you need any medication refills at today's visit? laura Hernandez on 2/2/2022 at 8:58 AM

## 2022-02-07 ENCOUNTER — MYC MEDICAL ADVICE (OUTPATIENT)
Dept: CARDIOLOGY | Facility: CLINIC | Age: 72
End: 2022-02-07
Payer: COMMERCIAL

## 2022-02-07 DIAGNOSIS — R60.9 EDEMA: ICD-10-CM

## 2022-02-07 DIAGNOSIS — I50.33 ACUTE ON CHRONIC DIASTOLIC HEART FAILURE (H): ICD-10-CM

## 2022-02-07 DIAGNOSIS — I50.32 CHRONIC DIASTOLIC HEART FAILURE (H): Primary | ICD-10-CM

## 2022-02-07 DIAGNOSIS — E87.6 HYPOKALEMIA: ICD-10-CM

## 2022-02-07 RX ORDER — EPLERENONE 25 MG/1
25 TABLET, FILM COATED ORAL DAILY
Qty: 90 TABLET | Refills: 3 | Status: SHIPPED | OUTPATIENT
Start: 2022-02-07 | End: 2022-02-09

## 2022-02-07 NOTE — TELEPHONE ENCOUNTER
Date: 2/7/2022    Time of Call: 4:03 PM     Diagnosis:  Heart failure     [ VORB ] Ordering provider: Dr Cohen    Order: Compression stocking- 20/30, knee high, 2 pair     Order received by: Niki Fam RN       Follow-up/additional notes:

## 2022-02-08 NOTE — PROGRESS NOTES
In person visit.    HPI:   Ms. Guerrero is a 71 year old female with a past medical history including stage IV AL amyloid diagnosed in 2016. Presents to clinic for CORE follow-up.     Her cardiac and oncologic history are as follows: fatigue starting in 1/2016. She had a coronary angiogram which was reportedly normal but was diagnosed with HF and started on a BBand diuretics. Her symptoms progressed to the point that she was evaluated at [facility] in August/September (Dr. Barron in oncology/hematology, Dr. Nettles is cardiology). She was diagnosed with stage IV AL amyloid (+GI, +bone marrow involvement, no involvement of kidney or liver). Her work up is detailed in our previous notes, however she has lambda AL amyloidosis and she underwent CyBorD chemotherapy and excellent light chain response by serum. Patient has been turned down at [facility] for a stem cell transplant due to her cardiac involvement. Later in 2016, she had 2-3 more right sided pleural taps. She was hospitalized 1/2017 and diuresed 20-30 pounds at that time. Since that time her AL has been in remission by labs without further chemo.     At her last visit with Dr. Cohen they discussed that peritoneal dialysis may be an option in the future if needed for volume management.    This visit:  Weight today at home today was 153. Her weight got down to 143 with the flu, but now she has gained that back. She had been feeling pretty good since recovering from the flu. She is feeling more fatigued and SOB today. LE edema is there, but was not too bad this morning. She has been getting lymphedema massages, which has been helpful. Mild abdominal edema.     She did have an episode of palpitations that lasted one hour over the weekend.    Her metolazone use has decreased a bit, down to once or twice a week.    She had been having a bit of lightheadedness today. No chest pain. No PND. Sleeping on 2.5 pillows.    Cardiac Medications   Apixaban 2.5 mg BID  Torsemide 150 mg  "BID  Kcl 60 meq TID  Inspra 25 mg daily  Metolazone 2.5-When she takes 2.5 mg of metolazone she takes 60 meq EXTRA of kcl the day of metolazone and the day after.    PAST MEDICAL HISTORY:  Past Medical History:   Diagnosis Date     AL amyloidosis (H)      Atrial fibrillation and flutter (H)      Cardiac amyloidosis (H)      Lymphedema      QT prolongation      Recurrent right pleural effusion 1/2/2017     SVT (supraventricular tachycardia) (H)        FAMILY HISTORY:  Family History   Problem Relation Age of Onset     Other - See Comments Sister         Amyloidosis       SOCIAL HISTORY:  Socioeconomic History     Marital status:    Tobacco Use     Smoking status: Never Smoker     Smokeless tobacco: Never Used   Substance and Sexual Activity     Alcohol use: No     Drug use: No   Other Topics Concern     CURRENT MEDICATIONS:  Acetaminophen (TYLENOL PO), Take 325 mg by mouth every 6 hours as needed for mild pain or fever   apixaban ANTICOAGULANT (ELIQUIS ANTICOAGULANT) 2.5 MG tablet, Take 1 tablet (2.5 mg) by mouth 2 times daily  camphor-menthol (DERMASARRA) 0.5-0.5 % external lotion, Apply 1 mL topically every 6 hours as needed for skin care  metolazone (ZAROXOLYN) 2.5 MG tablet, Take 1 tablet (2.5 mg) by mouth twice a week As needed for increased swelling and weight gain of 3 lb in one day or 5 lb in one week  ondansetron (ZOFRAN-ODT) 8 MG ODT tab, Take 8 mg by mouth every 8 hours as needed PRN  potassium chloride ER (KLOR-CON M) 20 MEQ CR tablet, Take 3 tablets (60 mEq) by mouth 3 times daily  torsemide (DEMADEX) 100 MG tablet, Take 1.5 tablets (150 mg) by mouth every morning AND 1.5 tablets (150 mg) daily at 2 pm.  triamcinolone (KENALOG) 0.1 % external cream, Apply a thin layer twice daily to itchy areas as needed.    No current facility-administered medications on file prior to visit.      ROS:   See HPI     EXAM:  /60   Pulse 78   Ht 1.577 m (5' 2.09\")   Wt 69.8 kg (153 lb 14.4 oz)   SpO2 99%   " BMI 28.07 kg/m       GENERAL: Appears fatigued and frail, in no acute distress. Speaking in full sentences and able to communicate all needs  HEENT: Eye symmetrical, no discharge or icterus bilaterally. Mucous membranes moist and without lesions.  CV: RRR, +S1S2, no murmur, rub, or gallop. JVP mid third of neck at 90 degrees  RESPIRATORY: Respirations regular, even, and unlabored. Lungs CTA throughout.   GI: Soft and mildly  distended with normoactive bowel sounds. No tenderness, rebound, guarding.   EXTREMITIES: Moderate b/l non-pitting peripheral edema, slightly increased from last visit. All extremities are warm and well perfused   NEUROLOGIC: Alert and interacting appropriately. No focal deficits.   MUSCULOSKELETAL: No joint swelling or tenderness.   SKIN: No jaundice. No rashes.    Labs, reviewed with patient in clinic today:  CBC RESULTS:  Lab Results   Component Value Date    WBC 6.7 11/14/2021    WBC 6.4 05/11/2021    RBC 3.74 (L) 11/14/2021    RBC 4.29 05/11/2021    HGB 11.9 11/14/2021    HGB 13.8 05/11/2021    HCT 36.8 11/14/2021    HCT 41.3 05/11/2021    MCV 98 11/14/2021    MCV 96 05/11/2021    MCH 31.8 11/14/2021    MCH 32.2 05/11/2021    MCHC 32.3 11/14/2021    MCHC 33.4 05/11/2021    RDW 13.6 11/14/2021    RDW 13.5 05/11/2021     11/14/2021     05/11/2021       CMP RESULTS:  Lab Results   Component Value Date     02/09/2022     07/08/2021    POTASSIUM 3.8 02/09/2022    POTASSIUM 3.6 07/08/2021    CHLORIDE 100 02/09/2022    CHLORIDE 94 07/08/2021    CO2 29 02/09/2022    CO2 34 (H) 07/08/2021    ANIONGAP 6 02/09/2022    ANIONGAP 6 07/08/2021    GLC 84 02/09/2022    GLC 89 07/08/2021    BUN 27 02/09/2022    BUN 35 (H) 07/08/2021    CR 0.99 02/09/2022    CR 1.13 (H) 07/08/2021    GFRESTIMATED 61 02/09/2022    GFRESTIMATED 50 (L) 07/08/2021    GFRESTBLACK 58 (L) 07/08/2021    JERROD 9.1 02/09/2022    JERROD 9.2 07/08/2021    BILITOTAL 1.6 (H) 11/14/2021    BILITOTAL 1.9 (H) 05/11/2021     ALBUMIN 3.5 11/14/2021    ALBUMIN 3.8 05/11/2021    ALKPHOS 202 (H) 11/14/2021    ALKPHOS 171 (H) 05/11/2021    ALT 26 11/14/2021    ALT 35 05/11/2021    AST 24 11/14/2021    AST 23 05/11/2021        INR RESULTS:  Lab Results   Component Value Date    INR 1.34 (H) 11/12/2021    INR 1.30 (H) 01/14/2017       Lab Results   Component Value Date    MAG 2.7 (H) 11/14/2021    MAG 2.2 10/26/2019     Lab Results   Component Value Date    NTBNPI 1,578 (H) 11/12/2021    NTBNPI 9,009 (H) 01/13/2017     Lab Results   Component Value Date    NTBNP 976 (H) 01/20/2022    NTBNP 1,376 (H) 02/04/2021       Diagnostics:      2/21 Ziopatch       6/12/2019 Holter Mnoitor      TTE 4/9/19  Moderate left ventricular hypertrophy.  Global and regional left ventricular function is normal with an EF of 60-65%.  Global right ventricular function is normal.  Moderate aortic valve insufficiency.  Mild pulmonary hypertension with dilated IVC.  This study was compared with the study from 10/11/18 the AR is worse and RA pressure is now increased.    TTE 10/11/18  Global and regional left ventricular function is normal with an EF of 55-60%.  Moderate concentric wall thickening consistent with left ventricular  hypertrophy is present - known amyloid.  Grade III or advanced diastolic dysfunction.  Normal right ventricular size, wall thickness, and function.  Estimated pulmonary artery pressure 28 mmHg.  IVC with normal diameter but does not collapse (>50%) with inspiration.  Trace pericardial effusion.  Compared to prior study from 8/17/17, no significant change.    Ziopatch 11/2017      Holter Monitor 06/2019  INTERPRETATION  1. The rhythm varied with sinus rhythm and atrial fibrillation/variable atrial flutter. Low voltage noted.  2. The heart rate varied with a minimum rate of 48 bpm, maximum rate of 164 bpm, average rate of 74 bpm.  3. Frequent supraventricular ectopy was seen ranging from 0-469 per hour. Occasional atrail pairs and fairly  frequent bigeminal cycles were noted.  Wandering atrial pacemaker noted.  4. Infrequent multifocal ventricular ectopy was seen ranging from 0-36 beats per hour.  5. ST-T wave changes were present.  6. Three patient events were documented and correlated with atrial fibrillation/flutter    EKG 8/8/2019 for palpitations  - NSR at a rate of 70, CT 0.19, QTc 525, QRS is narrow. Occasional PVCs. Biphasic twaves in V4-V6, unchanged from priors.    Assessment and Plan:   Mrs. Guerrero is a 71 year old female with AL amyloidosis with cardiac manifestation who presents to [facility] [place] for hospital follow-up. Patient has cardiac amyloidosis as evidenced by her echocardiogram (severe concentric hypertrophy and biatrial enlargement) and abnormal EKG (near-low voltage, poor R wave progression, biatrial enlargement and no evidence of LVH despite thicken LV on echo) in the setting of biopsy proven (BMB, EGD) AL amyloid. She completed chemotherapy with CyBorD with an excellent serologic response and her heart failure (i.e. troponin negative, NT pro BNP was stable, serum light chains minimal and urine light chains undetectable).       She has gradually declined over the last few years and has ongoing challenges with her volume status.  She requires frequent metolazone and maintaining an adequate potassium level has been a challenge. She has also been struggling with A. Fib, which is occaionally symptomatic. These episodes remain rare and given relative contraindications for amyloid patients we do not have her on rate control at this time. She has had a few hospital stays for volume issues and potassium issues this year. At her last appointment with Dr. Cohen, they did discuss the option of peritoneal dialysis if needed in the future for volume management.     Today, she is doing okay. She overall looks very frail. Her Cr and K are stable. We will continue on her current medications and watch closely. She will take a metolazone today or  tomorrow.    # Chronic diastolic heart failure/restrictive cardiomyopathy 2/2  # Cardiac amyloidosis    Stage C. NYHA Class III.    Fluid status: Continue torsemide 150 mg BID and Kcl 60 meq TID.  Continue PRN metolazone with an extra 60 meq of kcl the day of and the day after metolazone if she take a full 2.5 mg tab. If she takes a half tab of metolazone, she take 40 meq EXTRA of kcl the day of metolazone and the day after. She will take a metolazone today or tomorrow.  ACEi/ARB/ARNI: n/a   BB: no indication and relatively contraindicated due to risk of progressive conduction disease/AV block in these patients  Aldosterone antagonist: Continue Inspra 25 mg daily  SCD prophylaxis: does not meet criteria for implant  NSAID use: contraindicated  Sleep apnea evaluation: deferred today  Remote monitoring: N/A  Goals of care: prior discussions with Dr Cohen and ongoing in [facility] as well  Other: Has a referall for lymphedema therapy in [facility]. If she needs paracentesis in the future for symptomatic control we would support that. Also watching her pleural effusion closely.    # Lower extremity edema.  # Lymphedema   Has been a significant issue for her for a long time.This is minimally responsive to diuretics. She is getting lymphedema massage. She requires fitted compression stockings for additional treatment.    # Pleural effusion. Noted during recent admission. On last admission, thoracentesis was defered.  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 Fib/A. Flutter  Prior holter monitor which showed intermittent a. Fib and a. Flutter. [id number] of 4. Occasional palpitations which last less than 1 minute usually although she had a prolonged episode  this weekend, if these become more frequent can repeat monitor to assess burden.  - Continue Eliquis 2.5mg BID, reduced dose d/t frequent bleeding, aware of risks  - Avoiding BB in the setting of amyloid  - Holding off on digoxin given generally good rate control/very rare RVR events. Could discuss in the future if needed.    # Prolonged QTC  - Minimize QT prolonging meds    # Systemic amyloid  Heme previously monitoring her light chains which have been negative consistent with remission - therefore further palliative chemo not being considered. Nausea has been a large issue and is currently, seeing palliative to address.  - Pt has seen palliative care for nausea, plans to see a Supportive Clinic for symptoms through her insurance  - Follow-up with oncology this week as scheduled    #Nausea.   - Recommend ongoing f/u with palliative care vs the support team through her insurance.    Follow: Up:   - BMP in 1 week  - Dr. Keyes as scheduled in 2 months  - CORE clinic in 4 months    Billing  - I managed 2+ stable chronic conditions  - I reviewed one test and ordered one test  - I managed a prescription medication (will take metolazone tomorrow)    Ciara Araya PA-C  [facility]      CC  ALEJANDRO KEYES

## 2022-02-09 ENCOUNTER — LAB (OUTPATIENT)
Dept: LAB | Facility: CLINIC | Age: 72
End: 2022-02-09
Attending: NURSE PRACTITIONER
Payer: COMMERCIAL

## 2022-02-09 ENCOUNTER — OFFICE VISIT (OUTPATIENT)
Dept: CARDIOLOGY | Facility: CLINIC | Age: 72
End: 2022-02-09
Attending: NURSE PRACTITIONER
Payer: COMMERCIAL

## 2022-02-09 VITALS
WEIGHT: 153.9 LBS | BODY MASS INDEX: 28.32 KG/M2 | OXYGEN SATURATION: 99 % | HEART RATE: 78 BPM | SYSTOLIC BLOOD PRESSURE: 128 MMHG | HEIGHT: 62 IN | DIASTOLIC BLOOD PRESSURE: 60 MMHG

## 2022-02-09 DIAGNOSIS — I43 CARDIAC AMYLOIDOSIS (H): ICD-10-CM

## 2022-02-09 DIAGNOSIS — E85.4 CARDIAC AMYLOIDOSIS (H): ICD-10-CM

## 2022-02-09 DIAGNOSIS — E87.6 HYPOKALEMIA: ICD-10-CM

## 2022-02-09 DIAGNOSIS — I50.33 ACUTE ON CHRONIC DIASTOLIC HEART FAILURE (H): ICD-10-CM

## 2022-02-09 LAB
ANION GAP SERPL CALCULATED.3IONS-SCNC: 6 MMOL/L (ref 3–14)
BUN SERPL-MCNC: 27 MG/DL (ref 7–30)
CALCIUM SERPL-MCNC: 9.1 MG/DL (ref 8.5–10.1)
CHLORIDE BLD-SCNC: 100 MMOL/L (ref 94–109)
CO2 SERPL-SCNC: 29 MMOL/L (ref 20–32)
CREAT SERPL-MCNC: 0.99 MG/DL (ref 0.52–1.04)
GFR SERPL CREATININE-BSD FRML MDRD: 61 ML/MIN/1.73M2
GLUCOSE BLD-MCNC: 84 MG/DL (ref 70–99)
POTASSIUM BLD-SCNC: 3.8 MMOL/L (ref 3.4–5.3)
SODIUM SERPL-SCNC: 135 MMOL/L (ref 133–144)

## 2022-02-09 PROCEDURE — 99214 OFFICE O/P EST MOD 30 MIN: CPT | Performed by: PHYSICIAN ASSISTANT

## 2022-02-09 PROCEDURE — 36415 COLL VENOUS BLD VENIPUNCTURE: CPT | Performed by: PATHOLOGY

## 2022-02-09 PROCEDURE — 80048 BASIC METABOLIC PNL TOTAL CA: CPT | Performed by: PATHOLOGY

## 2022-02-09 PROCEDURE — G0463 HOSPITAL OUTPT CLINIC VISIT: HCPCS

## 2022-02-09 RX ORDER — EPLERENONE 25 MG/1
25 TABLET, FILM COATED ORAL DAILY
Qty: 90 TABLET | Refills: 3 | Status: SHIPPED | OUTPATIENT
Start: 2022-02-09 | End: 2022-04-06

## 2022-02-09 ASSESSMENT — PAIN SCALES - GENERAL: PAINLEVEL: NO PAIN (0)

## 2022-02-09 ASSESSMENT — MIFFLIN-ST. JEOR: SCORE: 1177.72

## 2022-02-09 NOTE — NURSING NOTE
Chief Complaint   Patient presents with     Follow Up     4 month f/u labs prior     Vitals were taken and medications reconciled.    Nando La, EMT  8:47 AM

## 2022-02-09 NOTE — LETTER
2/9/2022      RE: Leandra Guerrero  [address] [place] [address]       Dear Colleague,    Thank you for the opportunity to participate in the care of your patient, Leandra Guerrero, at the [facility] [place] [facility]. Please see a copy of my visit note below.    In person visit.    HPI:   Ms. Guerrero is a 71 year old female with a past medical history including stage IV AL amyloid diagnosed in 2016. Presents to clinic for CORE follow-up.     Her cardiac and oncologic history are as follows: fatigue starting in 1/2016. She had a coronary angiogram which was reportedly normal but was diagnosed with HF and started on a BBand diuretics. Her symptoms progressed to the point that she was evaluated at [facility] in August/September (Dr. Barron in oncology/hematology, Dr. Nettles is cardiology). She was diagnosed with stage IV AL amyloid (+GI, +bone marrow involvement, no involvement of kidney or liver). Her work up is detailed in our previous notes, however she has lambda AL amyloidosis and she underwent CyBorD chemotherapy and excellent light chain response by serum. Patient has been turned down at [facility] for a stem cell transplant due to her cardiac involvement. Later in 2016, she had 2-3 more right sided pleural taps. She was hospitalized 1/2017 and diuresed 20-30 pounds at that time. Since that time her AL has been in remission by labs without further chemo.     At her last visit with Dr. Coehn they discussed that peritoneal dialysis may be an option in the future if needed for volume management.    This visit:  Weight today at home today was 153. Her weight got down to 143 with the flu, but now she has gained that back. She had been feeling pretty good since recovering from the flu. She is feeling more fatigued and SOB today. LE edema is there, but was not too bad this morning. She has been getting lymphedema massages, which has been  helpful. Mild abdominal edema.     She did have an episode of palpitations that lasted one hour over the weekend.    Her metolazone use has decreased a bit, down to once or twice a week.    She had been having a bit of lightheadedness today. No chest pain. No PND. Sleeping on 2.5 pillows.    Cardiac Medications   Apixaban 2.5 mg BID  Torsemide 150 mg BID  Kcl 60 meq TID  Inspra 25 mg daily  Metolazone 2.5-When she takes 2.5 mg of metolazone she takes 60 meq EXTRA of kcl the day of metolazone and the day after.    PAST MEDICAL HISTORY:  Past Medical History:   Diagnosis Date     AL amyloidosis (H)      Atrial fibrillation and flutter (H)      Cardiac amyloidosis (H)      Lymphedema      QT prolongation      Recurrent right pleural effusion 1/2/2017     SVT (supraventricular tachycardia) (H)        FAMILY HISTORY:  Family History   Problem Relation Age of Onset     Other - See Comments Sister         Amyloidosis       SOCIAL HISTORY:  Socioeconomic History     Marital status:    Tobacco Use     Smoking status: Never Smoker     Smokeless tobacco: Never Used   Substance and Sexual Activity     Alcohol use: No     Drug use: No   Other Topics Concern     CURRENT MEDICATIONS:  Acetaminophen (TYLENOL PO), Take 325 mg by mouth every 6 hours as needed for mild pain or fever   apixaban ANTICOAGULANT (ELIQUIS ANTICOAGULANT) 2.5 MG tablet, Take 1 tablet (2.5 mg) by mouth 2 times daily  camphor-menthol (DERMASARRA) 0.5-0.5 % external lotion, Apply 1 mL topically every 6 hours as needed for skin care  metolazone (ZAROXOLYN) 2.5 MG tablet, Take 1 tablet (2.5 mg) by mouth twice a week As needed for increased swelling and weight gain of 3 lb in one day or 5 lb in one week  ondansetron (ZOFRAN-ODT) 8 MG ODT tab, Take 8 mg by mouth every 8 hours as needed PRN  potassium chloride ER (KLOR-CON M) 20 MEQ CR tablet, Take 3 tablets (60 mEq) by mouth 3 times daily  torsemide (DEMADEX) 100 MG tablet, Take 1.5 tablets (150 mg) by mouth  "every morning AND 1.5 tablets (150 mg) daily at 2 pm.  triamcinolone (KENALOG) 0.1 % external cream, Apply a thin layer twice daily to itchy areas as needed.    No current facility-administered medications on file prior to visit.      ROS:   See HPI     EXAM:  /60   Pulse 78   Ht 1.577 m (5' 2.09\")   Wt 69.8 kg (153 lb 14.4 oz)   SpO2 99%   BMI 28.07 kg/m       GENERAL: Appears fatigued and frail, in no acute distress. Speaking in full sentences and able to communicate all needs  HEENT: Eye symmetrical, no discharge or icterus bilaterally. Mucous membranes moist and without lesions.  CV: RRR, +S1S2, no murmur, rub, or gallop. JVP mid third of neck at 90 degrees  RESPIRATORY: Respirations regular, even, and unlabored. Lungs CTA throughout.   GI: Soft and mildly  distended with normoactive bowel sounds. No tenderness, rebound, guarding.   EXTREMITIES: Moderate b/l non-pitting peripheral edema, slightly increased from last visit. All extremities are warm and well perfused   NEUROLOGIC: Alert and interacting appropriately. No focal deficits.   MUSCULOSKELETAL: No joint swelling or tenderness.   SKIN: No jaundice. No rashes.    Labs, reviewed with patient in clinic today:  CBC RESULTS:  Lab Results   Component Value Date    WBC 6.7 11/14/2021    WBC 6.4 05/11/2021    RBC 3.74 (L) 11/14/2021    RBC 4.29 05/11/2021    HGB 11.9 11/14/2021    HGB 13.8 05/11/2021    HCT 36.8 11/14/2021    HCT 41.3 05/11/2021    MCV 98 11/14/2021    MCV 96 05/11/2021    MCH 31.8 11/14/2021    MCH 32.2 05/11/2021    MCHC 32.3 11/14/2021    MCHC 33.4 05/11/2021    RDW 13.6 11/14/2021    RDW 13.5 05/11/2021     11/14/2021     05/11/2021       CMP RESULTS:  Lab Results   Component Value Date     02/09/2022     07/08/2021    POTASSIUM 3.8 02/09/2022    POTASSIUM 3.6 07/08/2021    CHLORIDE 100 02/09/2022    CHLORIDE 94 07/08/2021    CO2 29 02/09/2022    CO2 34 (H) 07/08/2021    ANIONGAP 6 02/09/2022    ANIONGAP 6 " 07/08/2021    GLC 84 02/09/2022    GLC 89 07/08/2021    BUN 27 02/09/2022    BUN 35 (H) 07/08/2021    CR 0.99 02/09/2022    CR 1.13 (H) 07/08/2021    GFRESTIMATED 61 02/09/2022    GFRESTIMATED 50 (L) 07/08/2021    GFRESTBLACK 58 (L) 07/08/2021    JERROD 9.1 02/09/2022    JERROD 9.2 07/08/2021    BILITOTAL 1.6 (H) 11/14/2021    BILITOTAL 1.9 (H) 05/11/2021    ALBUMIN 3.5 11/14/2021    ALBUMIN 3.8 05/11/2021    ALKPHOS 202 (H) 11/14/2021    ALKPHOS 171 (H) 05/11/2021    ALT 26 11/14/2021    ALT 35 05/11/2021    AST 24 11/14/2021    AST 23 05/11/2021        INR RESULTS:  Lab Results   Component Value Date    INR 1.34 (H) 11/12/2021    INR 1.30 (H) 01/14/2017       Lab Results   Component Value Date    MAG 2.7 (H) 11/14/2021    MAG 2.2 10/26/2019     Lab Results   Component Value Date    NTBNPI 1,578 (H) 11/12/2021    NTBNPI 9,009 (H) 01/13/2017     Lab Results   Component Value Date    NTBNP 976 (H) 01/20/2022    NTBNP 1,376 (H) 02/04/2021       Diagnostics:      2/21 Ziopatch       6/12/2019 Holter Mnoitor      TTE 4/9/19  Moderate left ventricular hypertrophy.  Global and regional left ventricular function is normal with an EF of 60-65%.  Global right ventricular function is normal.  Moderate aortic valve insufficiency.  Mild pulmonary hypertension with dilated IVC.  This study was compared with the study from 10/11/18 the AR is worse and RA pressure is now increased.    TTE 10/11/18  Global and regional left ventricular function is normal with an EF of 55-60%.  Moderate concentric wall thickening consistent with left ventricular  hypertrophy is present - known amyloid.  Grade III or advanced diastolic dysfunction.  Normal right ventricular size, wall thickness, and function.  Estimated pulmonary artery pressure 28 mmHg.  IVC with normal diameter but does not collapse (>50%) with inspiration.  Trace pericardial effusion.  Compared to prior study from 8/17/17, no significant change.    Tucker 11/2017      Holter Monitor  06/2019  INTERPRETATION  1. The rhythm varied with sinus rhythm and atrial fibrillation/variable atrial flutter. Low voltage noted.  2. The heart rate varied with a minimum rate of 48 bpm, maximum rate of 164 bpm, average rate of 74 bpm.  3. Frequent supraventricular ectopy was seen ranging from 0-469 per hour. Occasional atrail pairs and fairly frequent bigeminal cycles were noted.  Wandering atrial pacemaker noted.  4. Infrequent multifocal ventricular ectopy was seen ranging from 0-36 beats per hour.  5. ST-T wave changes were present.  6. Three patient events were documented and correlated with atrial fibrillation/flutter    EKG 8/8/2019 for palpitations  - NSR at a rate of 70, CA 0.19, QTc 525, QRS is narrow. Occasional PVCs. Biphasic twaves in V4-V6, unchanged from priors.    Assessment and Plan:   Mrs. Guerrero is a 71 year old female with AL amyloidosis with cardiac manifestation who presents to CORE for hospital follow-up. Patient has cardiac amyloidosis as evidenced by her echocardiogram (severe concentric hypertrophy and biatrial enlargement) and abnormal EKG (near-low voltage, poor R wave progression, biatrial enlargement and no evidence of LVH despite thicken LV on echo) in the setting of biopsy proven (BMB, EGD) AL amyloid. She completed chemotherapy with CyBorD with an excellent serologic response and her heart failure (i.e. troponin negative, NT pro BNP was stable, serum light chains minimal and urine light chains undetectable).       She has gradually declined over the last few years and has ongoing challenges with her volume status.  She requires frequent metolazone and maintaining an adequate potassium level has been a challenge. She has also been struggling with A. Fib, which is occaionally symptomatic. These episodes remain rare and given relative contraindications for amyloid patients we do not have her on rate control at this time. She has had a few hospital stays for volume issues and  potassium issues this year. At her last appointment with Dr. Cohen, they did discuss the option of peritoneal dialysis if needed in the future for volume management.     Today, she is doing okay. She overall looks very frail. Her Cr and K are stable. We will continue on her current medications and watch closely. She will take a metolazone today or tomorrow.    # Chronic diastolic heart failure/restrictive cardiomyopathy 2/2  # Cardiac amyloidosis    Stage C. NYHA Class III.    Fluid status: Continue torsemide 150 mg BID and Kcl 60 meq TID.  Continue PRN metolazone with an extra 60 meq of kcl the day of and the day after metolazone if she take a full 2.5 mg tab. If she takes a half tab of metolazone, she take 40 meq EXTRA of kcl the day of metolazone and the day after. She will take a metolazone today or tomorrow.  ACEi/ARB/ARNI: n/a   BB: no indication and relatively contraindicated due to risk of progressive conduction disease/AV block in these patients  Aldosterone antagonist: Continue Inspra 25 mg daily  SCD prophylaxis: does not meet criteria for implant  NSAID use: contraindicated  Sleep apnea evaluation: deferred today  Remote monitoring: N/A  Goals of care: prior discussions with Dr Cohen and ongoing in [facility] as well  Other: Has a referall for lymphedema therapy in [facility]. If she needs paracentesis in the future for symptomatic control we would support that. Also watching her pleural effusion closely.    # Lower extremity edema.  # Lymphedema   Has been a significant issue for her for a long time.This is minimally responsive to diuretics. She is getting lymphedema massage. She requires fitted compression stockings for additional treatment.    # Pleural effusion. Noted during recent admission. On last admission, thoracentesis was defered.  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 Fib/A. Flutter  Prior holter monitor which showed intermittent a. Fib and a. Flutter. [id number] of 4. Occasional palpitations which last less than 1 minute usually although she had a prolonged episode this weekend, if these become more frequent can repeat monitor to assess burden.  - Continue Eliquis 2.5mg BID, reduced dose d/t frequent bleeding, aware of risks  - Avoiding BB in the setting of amyloid  - Holding off on digoxin given generally good rate control/very rare RVR events. Could discuss in the future if needed.    # Prolonged QTC  - Minimize QT prolonging meds    # Systemic amyloid  Heme previously monitoring her light chains which have been negative consistent with remission - therefore further palliative chemo not being considered. Nausea has been a large issue and is currently, seeing palliative to address.  - Pt has seen palliative care for nausea, plans to see a Supportive Clinic for symptoms through her insurance  - Follow-up with oncology this week as scheduled    #Nausea.   - Recommend ongoing f/u with palliative care vs the support team through her insurance.    Follow: Up:   - BMP in 1 week  - Dr. Keyes as scheduled in 2 months  - CORE clinic in 4 months    Billing  - I managed 2+ stable chronic conditions  - I reviewed one test and ordered one test  - I managed a prescription medication (will take metolazone tomorrow)    Ciara Araya PA-C  [facility] Cardiology      CC  ALEJANDRO KEYES

## 2022-02-09 NOTE — PATIENT INSTRUCTIONS
Take your medicines every day, as directed    Changes made today:  o Take a metolazone today or tomorrow with your extra potassium  o I refilled your Inspra today  o I will write reasoning for your compression stockings in your note   Monitor Your Weight and Symptoms    Contact us if you:      Gain 2 pounds in one day or 5 pounds in one week    Feel more short of breath    Notice more leg swelling    Feel lightheadeded   Change your lifestyle    Limit Salt or Sodium:    2000 mg  Limit Fluids:    2000 mL or approximately 64 ounces  Eat a Heart Healthy Diet    Low in saturated fats  Stay Active:    Aim to move at least 150 minutes every  week         To Contact us    During Business Hours:  [phone], option # 1      After hours, weekends or holidays:   [phone], Option #4  Ask to speak to the On-Call Cardiologist. Inform them you are a CORE/heart failure patient at the [place].     Use [employer] allows you to communicate directly with your heart team through secure messaging.    [employer] can be accessed any time on your phone, computer, or tablet.    If you need assistance, we'd be happy to help!         Keep your Heart Appointments:    - BMP in 1 week  - Dr. Cohen in 2 months as scheduled  - CORe clinic in 4 months

## 2022-02-18 ENCOUNTER — PATIENT OUTREACH (OUTPATIENT)
Dept: CARDIOLOGY | Facility: CLINIC | Age: 72
End: 2022-02-18
Payer: COMMERCIAL

## 2022-02-18 NOTE — TELEPHONE ENCOUNTER
Called [facility] to follow up on lab results. Asked for them to fax results. Vidhi Montgomery RN

## 2022-02-21 ENCOUNTER — MYC MEDICAL ADVICE (OUTPATIENT)
Dept: CARDIOLOGY | Facility: CLINIC | Age: 72
End: 2022-02-21
Payer: COMMERCIAL

## 2022-02-21 ENCOUNTER — TELEPHONE (OUTPATIENT)
Dept: CARDIOLOGY | Facility: CLINIC | Age: 72
End: 2022-02-21
Payer: COMMERCIAL

## 2022-02-21 DIAGNOSIS — I50.32 CHRONIC DIASTOLIC HEART FAILURE (H): Primary | ICD-10-CM

## 2022-02-21 NOTE — TELEPHONE ENCOUNTER
Deandra from [facility] called stating that the order needs to go through a DME company to  Get coded correctly.  Amy will need to call [facility] to find out which DME companies she can use.  The order was withdrawn from [facility] and will be resent to the DME store of Amy's choice.

## 2022-02-21 NOTE — TELEPHONE ENCOUNTER
[facility] Call Center    Phone Message    May a detailed message be left on voicemail: yes     Reason for Call: Order(s): Other:   Reason for requested: Compression socks need to be sent through the [employer] company and not from the 's office.  IT needs to be withdrawn and resubmitted through the [employer] instead of through Dr. De La Torre  Date needed: ASAP  Provider name: Noel      Action Taken: Message routed to:  [facility] ([facility] cardio    Travel Screening: Not Applicable

## 2022-02-25 NOTE — TELEPHONE ENCOUNTER
Reviewed with Dr Cohen.      Date: 2/25/2022    Time of Call: 4:08 PM     Diagnosis:  Heart failure      [ VORB ] Ordering provider: Dr Cohen    Order: paracentesis, abdominal ultrasound for volume     Order received by: Niki Fam RN       Follow-up/additional notes: Amy would prefer to have para closer to home.  Called [facility], they do not do velvet but send to [facility].  Called [facility]- they will fax order sheet over, once returned will be able to schedule in 1-2 days.  No covid screening or h&p required.  Other option is [facility]- requires covid screen and is farther from home.  Amy elected to use [facility], will arrange for orders to be sent on Monday.      Amy also wondered if she should take another blaster, reviewed orders with Amy, ok to take twice a week and symptomatic.  Amy meets parameters and will take one.      2/28- Called [facility] to ask for orders to be refaxed.     3/1- orders faxed, Amy updated via [employer]

## 2022-02-26 ENCOUNTER — MYC MEDICAL ADVICE (OUTPATIENT)
Dept: CARDIOLOGY | Facility: CLINIC | Age: 72
End: 2022-02-26
Payer: COMMERCIAL

## 2022-02-28 NOTE — TELEPHONE ENCOUNTER
Called [facility] and left message to remove Bumex from medication list.     Called [facility] to get fax number for DME order. Will fax orders for Compression stockings.     [facility]:  Phone [phone]  Fax: [phone]

## 2022-03-03 ENCOUNTER — LAB (OUTPATIENT)
Dept: LAB | Facility: CLINIC | Age: 72
End: 2022-03-03
Payer: COMMERCIAL

## 2022-03-03 DIAGNOSIS — I50.33 ACUTE ON CHRONIC DIASTOLIC HEART FAILURE (H): Primary | ICD-10-CM

## 2022-03-03 DIAGNOSIS — I50.33 ACUTE ON CHRONIC DIASTOLIC HEART FAILURE (H): ICD-10-CM

## 2022-03-03 LAB
ANION GAP SERPL CALCULATED.3IONS-SCNC: 9 MMOL/L (ref 3–14)
BUN SERPL-MCNC: 40 MG/DL (ref 7–30)
CALCIUM SERPL-MCNC: 9.3 MG/DL (ref 8.5–10.1)
CHLORIDE BLD-SCNC: 91 MMOL/L (ref 94–109)
CO2 SERPL-SCNC: 30 MMOL/L (ref 20–32)
CREAT SERPL-MCNC: 1.26 MG/DL (ref 0.52–1.04)
GFR SERPL CREATININE-BSD FRML MDRD: 46 ML/MIN/1.73M2
GLUCOSE BLD-MCNC: 85 MG/DL (ref 70–99)
HOLD SPECIMEN: NORMAL
HOLD SPECIMEN: NORMAL
POTASSIUM BLD-SCNC: 3.6 MMOL/L (ref 3.4–5.3)
SODIUM SERPL-SCNC: 130 MMOL/L (ref 133–144)

## 2022-03-03 PROCEDURE — 36415 COLL VENOUS BLD VENIPUNCTURE: CPT

## 2022-03-03 PROCEDURE — 80048 BASIC METABOLIC PNL TOTAL CA: CPT

## 2022-03-03 NOTE — PROGRESS NOTES
Date: 3/3/2022    Time of Call: 3:24 PM     Diagnosis:  HF     [ VORB ] Ordering provider: Yolanda MORGAN  Order: BMP 1-2 weeks. . If she has taken a metolazone in the 48 hours prior to this lab, I would just recheck in 2 weeks. If she had not taken metolazone in the 48 hours prior to this lab, would check BMP in 1 week        Order received by: Vidhi Montgomery RN      Follow-up/additional notes:

## 2022-03-06 ENCOUNTER — HOSPITAL ENCOUNTER (INPATIENT)
Facility: CLINIC | Age: 72
LOS: 2 days | Discharge: HOME | DRG: 292 | End: 2022-03-09
Attending: EMERGENCY MEDICINE | Admitting: INTERNAL MEDICINE
Payer: COMMERCIAL

## 2022-03-06 ENCOUNTER — APPOINTMENT (OUTPATIENT)
Dept: ULTRASOUND IMAGING | Facility: CLINIC | Age: 72
DRG: 292 | End: 2022-03-06
Attending: EMERGENCY MEDICINE
Payer: COMMERCIAL

## 2022-03-06 ENCOUNTER — APPOINTMENT (OUTPATIENT)
Dept: GENERAL RADIOLOGY | Facility: CLINIC | Age: 72
DRG: 292 | End: 2022-03-06
Attending: EMERGENCY MEDICINE
Payer: COMMERCIAL

## 2022-03-06 DIAGNOSIS — R07.9 CHEST PAIN, UNSPECIFIED TYPE: ICD-10-CM

## 2022-03-06 DIAGNOSIS — I50.9 ACUTE ON CHRONIC CONGESTIVE HEART FAILURE, UNSPECIFIED HEART FAILURE TYPE (H): ICD-10-CM

## 2022-03-06 DIAGNOSIS — Z11.52 ENCOUNTER FOR SCREENING LABORATORY TESTING FOR SEVERE ACUTE RESPIRATORY SYNDROME CORONAVIRUS 2 (SARS-COV-2): ICD-10-CM

## 2022-03-06 LAB
ALBUMIN SERPL-MCNC: 3.6 G/DL (ref 3.4–5)
ALP SERPL-CCNC: 225 U/L (ref 40–150)
ALT SERPL W P-5'-P-CCNC: 24 U/L (ref 0–50)
ANION GAP SERPL CALCULATED.3IONS-SCNC: 6 MMOL/L (ref 3–14)
AST SERPL W P-5'-P-CCNC: 22 U/L (ref 0–45)
BASOPHILS # BLD AUTO: 0.1 10E3/UL (ref 0–0.2)
BASOPHILS NFR BLD AUTO: 2 %
BILIRUB SERPL-MCNC: 1.2 MG/DL (ref 0.2–1.3)
BUN SERPL-MCNC: 38 MG/DL (ref 7–30)
CALCIUM SERPL-MCNC: 9 MG/DL (ref 8.5–10.1)
CHLORIDE BLD-SCNC: 97 MMOL/L (ref 94–109)
CO2 SERPL-SCNC: 28 MMOL/L (ref 20–32)
CREAT SERPL-MCNC: 1.45 MG/DL (ref 0.52–1.04)
EOSINOPHIL # BLD AUTO: 1.3 10E3/UL (ref 0–0.7)
EOSINOPHIL NFR BLD AUTO: 20 %
ERYTHROCYTE [DISTWIDTH] IN BLOOD BY AUTOMATED COUNT: 15.6 % (ref 10–15)
GFR SERPL CREATININE-BSD FRML MDRD: 39 ML/MIN/1.73M2
GLUCOSE BLD-MCNC: 103 MG/DL (ref 70–99)
HCT VFR BLD AUTO: 34.8 % (ref 35–47)
HGB BLD-MCNC: 11.1 G/DL (ref 11.7–15.7)
IMM GRANULOCYTES # BLD: 0 10E3/UL
IMM GRANULOCYTES NFR BLD: 0 %
LIPASE SERPL-CCNC: 164 U/L (ref 73–393)
LYMPHOCYTES # BLD AUTO: 0.4 10E3/UL (ref 0.8–5.3)
LYMPHOCYTES NFR BLD AUTO: 6 %
MCH RBC QN AUTO: 28 PG (ref 26.5–33)
MCHC RBC AUTO-ENTMCNC: 31.9 G/DL (ref 31.5–36.5)
MCV RBC AUTO: 88 FL (ref 78–100)
MONOCYTES # BLD AUTO: 0.7 10E3/UL (ref 0–1.3)
MONOCYTES NFR BLD AUTO: 10 %
NEUTROPHILS # BLD AUTO: 4.2 10E3/UL (ref 1.6–8.3)
NEUTROPHILS NFR BLD AUTO: 62 %
NRBC # BLD AUTO: 0 10E3/UL
NRBC BLD AUTO-RTO: 0 /100
PLATELET # BLD AUTO: 306 10E3/UL (ref 150–450)
POTASSIUM BLD-SCNC: 4.5 MMOL/L (ref 3.4–5.3)
PROT SERPL-MCNC: 7.6 G/DL (ref 6.8–8.8)
RBC # BLD AUTO: 3.97 10E6/UL (ref 3.8–5.2)
SARS-COV-2 RNA RESP QL NAA+PROBE: NEGATIVE
SODIUM SERPL-SCNC: 131 MMOL/L (ref 133–144)
TROPONIN I SERPL HS-MCNC: 14 NG/L
WBC # BLD AUTO: 6.7 10E3/UL (ref 4–11)

## 2022-03-06 PROCEDURE — 36415 COLL VENOUS BLD VENIPUNCTURE: CPT | Performed by: EMERGENCY MEDICINE

## 2022-03-06 PROCEDURE — 99285 EMERGENCY DEPT VISIT HI MDM: CPT | Mod: 25 | Performed by: EMERGENCY MEDICINE

## 2022-03-06 PROCEDURE — 83690 ASSAY OF LIPASE: CPT | Performed by: EMERGENCY MEDICINE

## 2022-03-06 PROCEDURE — 96366 THER/PROPH/DIAG IV INF ADDON: CPT

## 2022-03-06 PROCEDURE — 99285 EMERGENCY DEPT VISIT HI MDM: CPT | Mod: 25

## 2022-03-06 PROCEDURE — 71045 X-RAY EXAM CHEST 1 VIEW: CPT

## 2022-03-06 PROCEDURE — 84484 ASSAY OF TROPONIN QUANT: CPT | Performed by: EMERGENCY MEDICINE

## 2022-03-06 PROCEDURE — 76705 ECHO EXAM OF ABDOMEN: CPT

## 2022-03-06 PROCEDURE — 96365 THER/PROPH/DIAG IV INF INIT: CPT

## 2022-03-06 PROCEDURE — C9803 HOPD COVID-19 SPEC COLLECT: HCPCS

## 2022-03-06 PROCEDURE — 76705 ECHO EXAM OF ABDOMEN: CPT | Mod: 26 | Performed by: STUDENT IN AN ORGANIZED HEALTH CARE EDUCATION/TRAINING PROGRAM

## 2022-03-06 PROCEDURE — 93010 ELECTROCARDIOGRAM REPORT: CPT | Performed by: EMERGENCY MEDICINE

## 2022-03-06 PROCEDURE — 85025 COMPLETE CBC W/AUTO DIFF WBC: CPT | Performed by: EMERGENCY MEDICINE

## 2022-03-06 PROCEDURE — 250N000013 HC RX MED GY IP 250 OP 250 PS 637: Performed by: EMERGENCY MEDICINE

## 2022-03-06 PROCEDURE — 71045 X-RAY EXAM CHEST 1 VIEW: CPT | Mod: 26 | Performed by: RADIOLOGY

## 2022-03-06 PROCEDURE — 93005 ELECTROCARDIOGRAM TRACING: CPT

## 2022-03-06 PROCEDURE — 80053 COMPREHEN METABOLIC PANEL: CPT | Performed by: EMERGENCY MEDICINE

## 2022-03-06 PROCEDURE — U0005 INFEC AGEN DETEC AMPLI PROBE: HCPCS | Performed by: EMERGENCY MEDICINE

## 2022-03-06 RX ORDER — ACETAMINOPHEN 325 MG/1
650 TABLET ORAL EVERY 4 HOURS PRN
Status: DISCONTINUED | OUTPATIENT
Start: 2022-03-06 | End: 2022-03-08

## 2022-03-06 RX ADMIN — ACETAMINOPHEN 650 MG: 325 TABLET ORAL at 21:45

## 2022-03-06 ASSESSMENT — ENCOUNTER SYMPTOMS
ABDOMINAL PAIN: 0
SHORTNESS OF BREATH: 1
COUGH: 0
CONFUSION: 0
DIFFICULTY URINATING: 0
EYE REDNESS: 0
DIARRHEA: 0
NAUSEA: 1
BACK PAIN: 0
VOMITING: 0
HEADACHES: 0
SEIZURES: 0
FEVER: 0

## 2022-03-07 PROBLEM — R07.9 CHEST PAIN, UNSPECIFIED TYPE: Status: RESOLVED | Noted: 2021-11-12 | Resolved: 2022-03-07

## 2022-03-07 PROBLEM — R07.9 CHEST PAIN, UNSPECIFIED TYPE: Status: ACTIVE | Noted: 2021-11-12

## 2022-03-07 PROBLEM — I50.9 ACUTE ON CHRONIC CONGESTIVE HEART FAILURE, UNSPECIFIED HEART FAILURE TYPE (H): Status: ACTIVE | Noted: 2022-03-07

## 2022-03-07 PROBLEM — I48.0 PAROXYSMAL ATRIAL FIBRILLATION (H): Status: ACTIVE | Noted: 2022-03-07

## 2022-03-07 LAB
ANION GAP SERPL CALCULATED.3IONS-SCNC: 7 MMOL/L (ref 3–14)
ANION GAP SERPL CALCULATED.3IONS-SCNC: 8 MMOL/L (ref 3–14)
ATRIAL RATE - MUSE: 86 BPM
BUN SERPL-MCNC: 34 MG/DL (ref 7–30)
BUN SERPL-MCNC: 35 MG/DL (ref 7–30)
CALCIUM SERPL-MCNC: 8.8 MG/DL (ref 8.5–10.1)
CALCIUM SERPL-MCNC: 8.9 MG/DL (ref 8.5–10.1)
CHLORIDE BLD-SCNC: 94 MMOL/L (ref 94–109)
CHLORIDE BLD-SCNC: 99 MMOL/L (ref 94–109)
CO2 SERPL-SCNC: 28 MMOL/L (ref 20–32)
CO2 SERPL-SCNC: 30 MMOL/L (ref 20–32)
CREAT SERPL-MCNC: 1.15 MG/DL (ref 0.52–1.04)
CREAT SERPL-MCNC: 1.24 MG/DL (ref 0.52–1.04)
DIASTOLIC BLOOD PRESSURE - MUSE: NORMAL MMHG
ERYTHROCYTE [DISTWIDTH] IN BLOOD BY AUTOMATED COUNT: 15.6 % (ref 10–15)
GFR SERPL CREATININE-BSD FRML MDRD: 47 ML/MIN/1.73M2
GFR SERPL CREATININE-BSD FRML MDRD: 51 ML/MIN/1.73M2
GLUCOSE BLD-MCNC: 128 MG/DL (ref 70–99)
GLUCOSE BLD-MCNC: 95 MG/DL (ref 70–99)
HCT VFR BLD AUTO: 33.9 % (ref 35–47)
HGB BLD-MCNC: 10.7 G/DL (ref 11.7–15.7)
INTERPRETATION ECG - MUSE: NORMAL
MCH RBC QN AUTO: 28.3 PG (ref 26.5–33)
MCHC RBC AUTO-ENTMCNC: 31.6 G/DL (ref 31.5–36.5)
MCV RBC AUTO: 90 FL (ref 78–100)
P AXIS - MUSE: 81 DEGREES
PLATELET # BLD AUTO: 268 10E3/UL (ref 150–450)
POTASSIUM BLD-SCNC: 3.5 MMOL/L (ref 3.4–5.3)
POTASSIUM BLD-SCNC: 3.7 MMOL/L (ref 3.4–5.3)
PR INTERVAL - MUSE: 136 MS
QRS DURATION - MUSE: 74 MS
QT - MUSE: 398 MS
QTC - MUSE: 476 MS
R AXIS - MUSE: 54 DEGREES
RBC # BLD AUTO: 3.78 10E6/UL (ref 3.8–5.2)
SODIUM SERPL-SCNC: 132 MMOL/L (ref 133–144)
SODIUM SERPL-SCNC: 134 MMOL/L (ref 133–144)
SYSTOLIC BLOOD PRESSURE - MUSE: NORMAL MMHG
T AXIS - MUSE: 176 DEGREES
TROPONIN I SERPL HS-MCNC: 13 NG/L
VENTRICULAR RATE- MUSE: 86 BPM
WBC # BLD AUTO: 5.7 10E3/UL (ref 4–11)

## 2022-03-07 PROCEDURE — 36415 COLL VENOUS BLD VENIPUNCTURE: CPT | Performed by: STUDENT IN AN ORGANIZED HEALTH CARE EDUCATION/TRAINING PROGRAM

## 2022-03-07 PROCEDURE — 36415 COLL VENOUS BLD VENIPUNCTURE: CPT | Performed by: NURSE PRACTITIONER

## 2022-03-07 PROCEDURE — 250N000013 HC RX MED GY IP 250 OP 250 PS 637: Performed by: STUDENT IN AN ORGANIZED HEALTH CARE EDUCATION/TRAINING PROGRAM

## 2022-03-07 PROCEDURE — 82310 ASSAY OF CALCIUM: CPT | Performed by: STUDENT IN AN ORGANIZED HEALTH CARE EDUCATION/TRAINING PROGRAM

## 2022-03-07 PROCEDURE — 250N000011 HC RX IP 250 OP 636: Performed by: INTERNAL MEDICINE

## 2022-03-07 PROCEDURE — 99223 1ST HOSP IP/OBS HIGH 75: CPT | Mod: GC | Performed by: INTERNAL MEDICINE

## 2022-03-07 PROCEDURE — 85027 COMPLETE CBC AUTOMATED: CPT | Performed by: STUDENT IN AN ORGANIZED HEALTH CARE EDUCATION/TRAINING PROGRAM

## 2022-03-07 PROCEDURE — 80048 BASIC METABOLIC PNL TOTAL CA: CPT | Performed by: NURSE PRACTITIONER

## 2022-03-07 PROCEDURE — 250N000013 HC RX MED GY IP 250 OP 250 PS 637: Performed by: NURSE PRACTITIONER

## 2022-03-07 PROCEDURE — 36415 COLL VENOUS BLD VENIPUNCTURE: CPT | Performed by: EMERGENCY MEDICINE

## 2022-03-07 PROCEDURE — 84484 ASSAY OF TROPONIN QUANT: CPT | Performed by: EMERGENCY MEDICINE

## 2022-03-07 PROCEDURE — 214N000001 HC R&B CCU UMMC

## 2022-03-07 RX ORDER — EPLERENONE 25 MG/1
25 TABLET, FILM COATED ORAL DAILY
Status: DISCONTINUED | OUTPATIENT
Start: 2022-03-07 | End: 2022-03-09 | Stop reason: HOSPADM

## 2022-03-07 RX ORDER — ACETAMINOPHEN 325 MG/1
650 TABLET ORAL EVERY 4 HOURS PRN
Status: DISCONTINUED | OUTPATIENT
Start: 2022-03-07 | End: 2022-03-07

## 2022-03-07 RX ORDER — POTASSIUM CHLORIDE 750 MG/1
60 TABLET, EXTENDED RELEASE ORAL 3 TIMES DAILY
Status: DISCONTINUED | OUTPATIENT
Start: 2022-03-07 | End: 2022-03-09 | Stop reason: HOSPADM

## 2022-03-07 RX ORDER — METOLAZONE 2.5 MG/1
2.5 TABLET ORAL ONCE
Status: COMPLETED | OUTPATIENT
Start: 2022-03-07 | End: 2022-03-07

## 2022-03-07 RX ORDER — ACETAMINOPHEN 650 MG/1
650 SUPPOSITORY RECTAL EVERY 4 HOURS PRN
Status: DISCONTINUED | OUTPATIENT
Start: 2022-03-07 | End: 2022-03-08

## 2022-03-07 RX ORDER — BUMETANIDE 0.25 MG/ML
4 INJECTION INTRAMUSCULAR; INTRAVENOUS ONCE
Status: DISCONTINUED | OUTPATIENT
Start: 2022-03-07 | End: 2022-03-07

## 2022-03-07 RX ORDER — POTASSIUM CHLORIDE 1500 MG/1
60 TABLET, EXTENDED RELEASE ORAL ONCE
Status: COMPLETED | OUTPATIENT
Start: 2022-03-07 | End: 2022-03-07

## 2022-03-07 RX ORDER — ALBUTEROL SULFATE 90 UG/1
2 AEROSOL, METERED RESPIRATORY (INHALATION) EVERY 6 HOURS PRN
COMMUNITY
End: 2022-06-04

## 2022-03-07 RX ORDER — LIDOCAINE 40 MG/G
CREAM TOPICAL
Status: DISCONTINUED | OUTPATIENT
Start: 2022-03-07 | End: 2022-03-09 | Stop reason: HOSPADM

## 2022-03-07 RX ADMIN — POTASSIUM CHLORIDE 60 MEQ: 1500 TABLET, EXTENDED RELEASE ORAL at 13:05

## 2022-03-07 RX ADMIN — APIXABAN 2.5 MG: 2.5 TABLET, FILM COATED ORAL at 20:54

## 2022-03-07 RX ADMIN — POTASSIUM CHLORIDE 60 MEQ: 1500 TABLET, EXTENDED RELEASE ORAL at 14:55

## 2022-03-07 RX ADMIN — METOLAZONE 2.5 MG: 2.5 TABLET ORAL at 13:05

## 2022-03-07 RX ADMIN — APIXABAN 2.5 MG: 2.5 TABLET, FILM COATED ORAL at 09:48

## 2022-03-07 RX ADMIN — FUROSEMIDE 150 MG: 10 INJECTION, SOLUTION INTRAVENOUS at 18:40

## 2022-03-07 RX ADMIN — POTASSIUM CHLORIDE 60 MEQ: 1500 TABLET, EXTENDED RELEASE ORAL at 20:54

## 2022-03-07 RX ADMIN — FUROSEMIDE 150 MG: 10 INJECTION, SOLUTION INTRAVENOUS at 13:25

## 2022-03-07 RX ADMIN — EPLERENONE 25 MG: 25 TABLET, FILM COATED ORAL at 09:48

## 2022-03-07 RX ADMIN — POTASSIUM CHLORIDE 60 MEQ: 1500 TABLET, EXTENDED RELEASE ORAL at 09:48

## 2022-03-07 ASSESSMENT — ACTIVITIES OF DAILY LIVING (ADL)
ADLS_ACUITY_SCORE: 9
ADLS_ACUITY_SCORE: 8
ADLS_ACUITY_SCORE: 9
ADLS_ACUITY_SCORE: 6
ADLS_ACUITY_SCORE: 9
ADLS_ACUITY_SCORE: 8

## 2022-03-07 NOTE — CONSULTS
Amy is a 71 year old female presenting with chest pain and afib with rvr. CORE consult requested. She is currently admitted with diastolic heart failure and afib     She is already known to [facility] as an established patient.     Appointment made in [facility] on  at [facility] at 4pm, labs day prior at [place].  I will follow-up with the patient at that time, sooner if needed.  Thank you for the consult.    Kesha Zhao RN BSN  Cardiology Care Coordinator - [facility]  Questions and [phone], option 1

## 2022-03-07 NOTE — UTILIZATION REVIEW
"  Admission Status; Secondary Review Determination     Admission Date: 3/6/2022  8:58 PM      Under the authority of the Utilization Management Committee, the utilization review process indicated a secondary review on the above patient.  The review outcome is based on review of the medical records, discussions with staff, and applying clinical experience noted on the date of the review.        (x)      Inpatient Status Appropriate - This patient's medical care is consistent with medical management for inpatient care and reasonable inpatient medical practice.      () Observation Status Appropriate - This patient does not meet hospital inpatient criteria and is placed in observation status. If this patient's primary payer is Medicare and was admitted as an inpatient, Condition Code 44 should be used and patient status changed to \"observation\".   () Admission Status NOT Appropriate - This patient's medical care is not consistent with medical management for Inpatient or Observation Status.          RATIONALE FOR DETERMINATION   Leandra Guerrero is a 71 year old year old female with PMH of  IV AL amyloid diagnosed in 2016, restrictive cardiomyopathy/HFpEF, pAFib/Flutter, pulmonary emphysema, and CKD.  She presented to the emergency room with chest pain and shortness of breath.  She is found to have acute exacerbation of her chronic heart failure.  She is requiring IV diuresis with bumetanide.  She is expected to require a prolonged hospital stay.  due to this patient's advanced age, complex past medical history, acute heart failure exacerbation, need for IV bumetanide, and expectation of a prolonged hospital stay, it is appropriate to have her admitted to the hospital as an inpatient.      The severity of illness, intensity of service provided, expected LOS and risk for adverse outcome make the care complex, high risk and appropriate for hospital admission.        The information on this document is developed by the " utilization review team in order for the business office to ensure compliance.  This only denotes the appropriateness of proper admission status and does not reflect the quality of care rendered.         The definitions of Inpatient Status and Observation Status used in making the determination above are those provided in the CMS Coverage Manual, Chapter 1 and Chapter 6, section 70.4.      Sincerely,     Raz Cooper D.O.  Utilization Review/ Case Management  [facility].

## 2022-03-07 NOTE — ED TRIAGE NOTES
Pt comes in with Chest pain (8/10) that started at 1900, hx of A-fib, cardiac Amyliodosis.  EMS report 1 nitroglycerin was given, pain was improved to a 3/10, 125ml NS given en route.

## 2022-03-07 NOTE — H&P
[facility]    Cardiology History and Physical - Cardiology         Date of Admission:  3/6/2022    Assessment & Plan: S    Leandra Guerrero is a 71 year old female admitted on 3/6/2022. She has a pmhx significant for stage IV AL amyloid diagnosed in 2016, restrictive cardiomyopathy/HFpEF, pAFib/Flutter, pulmonary emphysema, and CKD who was admitted for chest pain, shortness of breath, and was found to be in A. Fib with RVR.    #Chest pain/Pressure-Exertional, happened at rest, relieved with nitroglycerin, currently chest pain free but reports any movement even from bed to commode provokes chest pain. Troponin negative x2. EKG at rest reassuring. Will obtain repeat EKG if symptoms return. Per ED EMS tele was reviewed and patient was in afib w/rvr and some concerning ST depressions in II, III, aVF, however these resolved by the time she arrived and had an EKG here. Concerning for demand ischemia vs. CAD in a known cardiac amyloid patient.  -Continue to monitor  -tele  -troponin negative x2    # Restrictive cardiomyopathy/chronic HFpEF (LVEF 65-70% per TTE 10/31/21, acute on chronic; Stage C. NYHA Class III.  # Cardiac amyloidosis  Follows with [facility]; seen in [facility] clinic 2/9/22. Most recent TTE 10/31 with EF 65-70%, grade III diastolic dysfunction, moderate TR, mod/severe LAE. RFs: female, age, arrhythmia.   - Fluid status: Slightly hypervolemic, took metolazone today and increased potassium. Resume PTA Torsemide and potassium, will consider increased dosing based on labs in AM, per chart review patient is historically harder to diurese. Would like to get some sleep before aggressive diuresis is considered.  - EDW per patient: 150lbs, weight today on same scale 154lbs  --Aldosterone antagonist:  Eplerenone 25 mg daily    # Right Pleural Effusion, Increased in size, loculate-Per patient decided not to perform thoracentesis last admission, no obvious pocket.  Increased today, unclear significance. Given time course, no concern for infectious process at this time.    # pAFib/FL  HRs usually controlled, not on BB. NSR on tele and ekg here.   - Continue apixaban 2.5mg twice daily.  -tele     # CKD   baseline creatinine appears to be ~1-1.2.  Cr 1.45 today, does not technically meet criteria for ERIC, suspect cardiorenal, patient looks hypervolemic on exam. Did have increased UOP today after metolazone so will continue to monitor and trend.  -Trend  -Avoid nephrotoxins    # Chronic lymphedema  # Hyponatremia-Appears near baseline  -trend     Diet: 2g sodium, 2L fluid restriction  DVT Prophylaxis: DOAC  Marie Catheter: Not present  Code Status:  DNR/DNI   Fluids: PO  Lines: PIV         Disposition Plan   Expected discharge: 2 - 3 days, recommended to prior living arrangement once arrhythmia stabilized .    Entered: Luis Enrique Wilburn MD 03/07/2022, 1:59 AM     Patient will be formally staffed in AM    Luis Enrique Wilburn MD  [facility]    ______________________________________________________________________    Chief Complaint   Chest pain    History is obtained from the patient    History of Present Illness   Leandra Guerrero is a 71 year old female who presents after an episode of chest pressure at home. Patient reports she was sitting down watching tv when she developed chest pressure. Stated it was located centrally and radiated to both shoulders. She called EMS who noted she had a rapid heart rate, she was experiencing shortness of breath at that time. She was given nitroglycerin in the ambulance and reports this helped with her pain. She does feel as though she has more fluid on her so she did take a dose of metolazone today as instructed by her clinic, and increased her potassium as well. Feels as though her urination has increased since taking it. Thinks she is about 4lbs up by her scale at home which she uses daily. Reports poor  diet due to traveling and high sodium. She denies any dizziness, lightheadedness, palpitations, fevers, chills, or cough.    Chest pain has currently subsided, however with any exertion it returns, moving from the commode from her bed  Brings on chest pain, relieved shortly after rest.    Review of Systems    The 10 point Review of Systems is negative other than noted in the HPI or here.     Past Medical History    I have reviewed this patient's medical history and updated it with pertinent information if needed.   Past Medical History:   Diagnosis Date     AL amyloidosis (H)      Atrial fibrillation and flutter (H)      Cardiac amyloidosis (H)      Lymphedema      QT prolongation      Recurrent right pleural effusion 1/2/2017     SVT (supraventricular tachycardia) (H)        Past Surgical History   I have reviewed this patient's surgical history and updated it with pertinent information if needed.  History reviewed. No pertinent surgical history.    Social History   I have reviewed this patient's social history and updated it with pertinent information if needed.  Social History     Tobacco Use     Smoking status: Never Smoker     Smokeless tobacco: Never Used   Substance Use Topics     Alcohol use: No     Drug use: No     Family History   I have reviewed this patient's family history and updated it with pertinent information if needed.   I have reviewed this patient's family history and updated it with pertinent information if needed.  Family History   Problem Relation Age of Onset     Other - See Comments Sister         Amyloidosis       Prior to Admission Medications   Prior to Admission Medications   Prescriptions Last Dose Informant Patient Reported? Taking?   Acetaminophen (TYLENOL PO)  Self Yes No   Sig: Take 325 mg by mouth every 6 hours as needed for mild pain or fever    apixaban ANTICOAGULANT (ELIQUIS ANTICOAGULANT) 2.5 MG tablet   No No   Sig: Take 1 tablet (2.5 mg) by mouth 2 times daily    camphor-menthol (DERMASARRA) 0.5-0.5 % external lotion  Self No No   Sig: Apply 1 mL topically every 6 hours as needed for skin care   eplerenone (INSPRA) 25 MG tablet   No No   Sig: Take 1 tablet (25 mg) by mouth daily   metolazone (ZAROXOLYN) 2.5 MG tablet  Self No No   Sig: Take 1 tablet (2.5 mg) by mouth twice a week As needed for increased swelling and weight gain of 3 lb in one day or 5 lb in one week   ondansetron (ZOFRAN-ODT) 8 MG ODT tab  Self Yes No   Sig: Take 8 mg by mouth every 8 hours as needed PRN   potassium chloride ER (KLOR-CON M) 20 MEQ CR tablet   No No   Sig: Take 3 tablets (60 mEq) by mouth 3 times daily   torsemide (DEMADEX) 100 MG tablet   No No   Sig: Take 1.5 tablets (150 mg) by mouth every morning AND 1.5 tablets (150 mg) daily at 2 pm.   triamcinolone (KENALOG) 0.1 % external cream  Self No No   Sig: Apply a thin layer twice daily to itchy areas as needed.      Facility-Administered Medications: None     Allergies   Allergies   Allergen Reactions     Contrast Dye Shortness Of Breath     Shaking,chills and dypsnea     Cytoxan [Cyclophosphamide]      Hemorrhagic cystitis     Gabapentin      Slurred speech, weakness     Levofloxacin      Severe shaking and abdominal pain     Aaron Weed      Rash and itchyness     Amoxicillin Nausea and Vomiting and Cramps     Spironolactone      Worsening hyponatremia, palpitations     Chlorhexidine Dermatitis and Rash     AARON wipes  2% chlorhexidine gluconate   Rash to everywhere wipe was applied        Physical Exam   Vital Signs: Temp: 98.9  F (37.2  C) Temp src: Oral BP: 111/61 Pulse: 86   Resp: 18 SpO2: 98 % O2 Device: None (Room air)    Weight: 164 lbs 0 oz    Gen:Well appearing, well nourished, in no acute distress, sitting semi upright in bed, ON RA  HEENT: NC/AT, MMM, PHILIP, EOMI, Supple  CV: RRR, normal s1, normal s2, no m/r/g, slight JVD  Resp: CTAB, normal I/E effort, no additional respiratory sounds, full sentences on RA  Abd: +BS, non-tender,  non-distended, no guarding or rebound tenderness  Ext:No significant deformities or trauma, moving all ext freely, bilateral non-pitting peripheral edema.  Skin: No erythema, no lesions or rashes.   Neuro:No focal neurologic deficit, AxOx4. Strength and sensation grossly intact  Psych: Pleasant, answering questions appropriately, normal mood/affect. Insight good, judgement intact.     Data   Data reviewed today: I reviewed all medications, new labs and imaging results over the last 24 hours.     Recent Labs   Lab 03/06/22  2105 03/03/22  1201   WBC 6.7  --    HGB 11.1*  --    MCV 88  --      --    * 130*   POTASSIUM 4.5 3.6   CHLORIDE 97 91*   CO2 28 30   BUN 38* 40*   CR 1.45* 1.26*   ANIONGAP 6 9   JERROD 9.0 9.3   * 85   ALBUMIN 3.6  --    PROTTOTAL 7.6  --    BILITOTAL 1.2  --    ALKPHOS 225*  --    ALT 24  --    AST 22  --    LIPASE 164  --      Recent Results (from the past 24 hour(s))   XR Chest Port 1 View    Narrative    Exam: XR CHEST PORT 1 VIEW, 3/6/2022 9:37 PM    Indication: chest pain    Comparison: 11/13/2021    Findings:   Portable semiupright AP view of the chest. Trachea is midline. Cardiac  mediastinal silhouette is within normal limits. Slightly increased  right loculated pleural effusion/right basilar atelectasis. The left  lung is relatively clear. No pneumothorax.      Impression    Impression: Slightly increased right loculated effusion and basilar  atelectasis.    I have personally reviewed the examination and initial interpretation  and I agree with the findings.    SABI HARMON DO         SYSTEM ID:  [id number]   Abdomen US, limited (RUQ only)    Impression    RESIDENT PRELIMINARY INTERPRETATION  IMPRESSION:     Incidental visualization of right pleural effusion. Otherwise  unremarkable abdominal ultrasound

## 2022-03-07 NOTE — ED NOTES
Bed: ED06  Expected date:   Expected time:   Means of arrival:   Comments:  [facility] 71 F  Chest Pain

## 2022-03-07 NOTE — PROGRESS NOTES
[facility]   Cardiology   Progress Note     ASSESSMENT/PLAN:  Leandra Guerrero is a 71 year old year old female with PMH of  IV AL amyloid diagnosed in 2016, restrictive cardiomyopathy/HFpEF, pAFib/Flutter, pulmonary emphysema, and CKD who was admitted for chest pain, shortness of breath, and was found to be in A. Fib with RVR.     # Acute on Chronic Diastolic Heart Failure  # Restrictive cardiomyopathy  # Cardiac amyloidosis  # Chronic Lymphedema  Stage C. NYHA Class III.  Follows with [facility]; seen in [facility] clinic 2/9/22. Most recent TTE 10/31 with EF 65-70%, grade III diastolic dysfunction, moderate TR, mod/severe LAE. Patient reports about 4 pound weight gain this week. Took an extra Metolazone at home, but continues to have significant PELAEZ.    - Fluid status: Slightly hypervolemic, 4 mg IV Bumex x 1, will reassess this afternoon for possible additional dose  - Give 2.5 mg PO Metolazone x 1   - AA: Continue PTA Eplerenone   - Continue PTA KLOR 60 mEq TID; will also give additional dose this am given pt to receive IV Bumex and Metolazone (this is what patient does at home when taking Metolazone)  - Strict I/O  - Daily weights, EDW around 150#  - Daily BMP  - K replacement protocol     # Atypical Chest pain  Pt reports chest pressure at rest, lasted about 1-2 hours, resolved after SL Nitroglycerin. Troponin 14, 13, ruling out ACS. EKG with no ST-T changes. Patient has remained chest pain free since arrival to ED. Pain could be related to hypervolemia, will monitor  - No need to trend troponin  - Conditional EKG for any chest pain     # Right Pleural Effusion, Increased in size, loculated  Per patient decided not to perform thoracentesis last admission, no obvious pocket. Increased today, unclear significance. Given time course, no concern for infectious process at this time.     # pAFib/FL  Patient reports feeling unwell at home, HR 54, then when EMS got to house, 's), this  resolved prior to getting to our ED. Patient currently in NSR HR 70's.   - AC: PTA Eliquis 2.5 mg BID  - BB: Contraindicated given amyloid     # CKD  Baseline creatinine appears to be ~1-1.2.  Cr 1.45 on admission, does not technically meet criteria for ERIC, suspect cardiorenal, patient looks hypervolemic on exam. Did have increased UOP today after metolazone so will continue to monitor and trend.  - Trend  - Diuresis as above       FEN: 2 gm Sodium, 2L Fluid restriction  Code status: DNR/DNI  Prophylaxis:  PO Eliquis  Isolation: None  Disposition: possible discharge home 2-3 days pending stable volume status    Patient seen and discussed with Dr. West, who agrees with above plan. See H&P for attestation    Lizette SAEZ CNP  [facility] Cardiology Team    Interval History:  - No acute events overnight  - Pt reports feeling tired this am; she also reports shortness of breath with minimal activity  - Patient took PO metolazone yesterday, prior to arriving to ED, Cr slightly improved, 1.2. no weight yet this am  - pt denies any chest pain, lightheadedness, or dizziness    Physical Exam:  Temp:  [98.3  F (36.8  C)-98.9  F (37.2  C)] 98.3  F (36.8  C)  Pulse:  [71-91] 71  Resp:  [16-18] 18  BP: (102-140)/(50-80) 113/50  SpO2:  [97 %-100 %] 99 %      Wt:   Wt Readings from Last 5 Encounters:   03/06/22 74.4 kg (164 lb)   02/09/22 69.8 kg (153 lb 14.4 oz)   12/16/21 69.7 kg (153 lb 11.2 oz)   11/17/21 69.9 kg (154 lb)   11/14/21 70.1 kg (154 lb 9.6 oz)       General: NAD  HEENT:  PERRLA, EOMI.   Neck: JVD mildly elevated  CV: RRR. No murmur appreciated. No rubs or gallops. Peripheral radial pulse intact.  Resp: No increased work of breathing or use of accessory muscles, breathing comfortably on room air.  Lung sounds clear throughout/bilaterally  Abdomen:  Normal active bowel sounds.  Abdomen is soft. No distension, non-tender to palpation.    Extremities: Warm. Capillary refill less than 3 sec. 2/4 radial pulses  bilaterally.  2/4 pedal pulses bilaterally. Chronic Lymphedema. No cyanosis or clubbing.  Skin:  Warm and dry. No erythema, rashes, ulceration or diaphoresis.  Neuro: Alert and oriented x3.      Medications:    apixaban ANTICOAGULANT  2.5 mg Oral BID     bumetanide  4 mg Intravenous Once     eplerenone  25 mg Oral Daily     potassium chloride ER  60 mEq Oral TID     potassium chloride  60 mEq Oral Once     sodium chloride (PF)  3 mL Intracatheter Q8H     [Held by provider] torsemide  150 mg Oral BID       - MEDICATION INSTRUCTIONS -       - MEDICATION INSTRUCTIONS -         Labs:   CMP  Recent Labs   Lab 03/07/22 0628 03/06/22 2105 03/03/22  1201    131* 130*   POTASSIUM 3.7 4.5 3.6   CHLORIDE 99 97 91*   CO2 28 28 30   ANIONGAP 7 6 9   GLC 95 103* 85   BUN 34* 38* 40*   CR 1.24* 1.45* 1.26*   GFRESTIMATED 47* 39* 46*   JERROD 8.9 9.0 9.3   PROTTOTAL  --  7.6  --    ALBUMIN  --  3.6  --    BILITOTAL  --  1.2  --    ALKPHOS  --  225*  --    AST  --  22  --    ALT  --  24  --      CBC  Recent Labs   Lab 03/07/22 0628 03/06/22 2105   WBC 5.7 6.7   RBC 3.78* 3.97   HGB 10.7* 11.1*   HCT 33.9* 34.8*   MCV 90 88   MCH 28.3 28.0   MCHC 31.6 31.9   RDW 15.6* 15.6*    306     INRNo lab results found in last 7 days.  Arterial Blood GasNo lab results found in last 7 days.    Diagnostics:  Recent Results (from the past 24 hour(s))   XR Chest Port 1 View    Narrative    Exam: XR CHEST PORT 1 VIEW, 3/6/2022 9:37 PM    Indication: chest pain    Comparison: 11/13/2021    Findings:   Portable semiupright AP view of the chest. Trachea is midline. Cardiac  mediastinal silhouette is within normal limits. Slightly increased  right loculated pleural effusion/right basilar atelectasis. The left  lung is relatively clear. No pneumothorax.      Impression    Impression: Slightly increased right loculated effusion and basilar  atelectasis.    I have personally reviewed the examination and initial interpretation  and I agree  with the findings.    SABI HARMON DO         SYSTEM ID:  [id number]   Abdomen US, limited (RUQ only)    Narrative    EXAMINATION: US ABDOMEN LIMITED, 3/6/2022 11:39 PM     INDICATION: abdominal pain, nausea, elevated alk phos, concerning for  gallbladder pathology, abdominal pain, nausea, elevated alk phos,  concerning for gallbladder pathology    COMPARISON: 11/13/2021    TECHNIQUE: The abdomen was scanned in the standard fashion with  specialized ultrasound transducer(s) using both gray scale and limited  color/spectral Doppler techniques.    FINDINGS:   Fluid: Right pleural effusion is present.    Liver: The liver demonstrates normal echotexture, measuring 15 cm in  craniocaudal dimension. No focal hepatic mass. No intrahepatic biliary  dilatation. The main portal vein is patent with antegrade flow.    Gallbladder: The gallbladder is distended and of normal morphology. No  wall thickening, pericholecystic fluid, sonographic Nieves's sign, or  evidence of cholelithiasis.    Bile Ducts: Normal caliber intra and extrahepatic biliary tree. The  common bile duct measures 3 mm in diameter.    Pancreas: Visualized portions of the pancreas are unremarkable.  Pancreas is partially obscured    Kidney: The right kidney measures 8.2 cm in long dimension. No  hydronephrosis, inferior pole not well-visualized.       Impression    IMPRESSION:     1. No cholelithiasis or CBD dilatation.  2. Right-sided pleural effusion  3. Partially visualized right kidney and pancreas.    I have personally reviewed the examination and initial interpretation  and I agree with the findings.    JOHN GONZALEZ MD         SYSTEM ID:  [id number]       Time Spent on this Encounter   I spent 40 minutes on the unit/floor managing the care of Leandra Guerrero. Over 50% of my time was spent on the following:   - Counseling the patient and/or family regarding: diagnostic results, prognosis and risks and benefits of treatment options  - Coordination of  care with the: care coordinator/ and nurse    Kath Hutson, CNP

## 2022-03-07 NOTE — ED PROVIDER NOTES
ED Provider Note  [facility]      History     Chief Complaint   Patient presents with     Chest Pain     HPI  Leandra Guerrero is a 71 year old female with a PMH of organ-limited amyloidosis, cardiac amyloidosis, heart failure (EF 60-70% 10/2021), SVT, QT prolongation, A. fib and flutter, pulmonary emphysema and AKF who presents to the ED today complaining of central chest pain that she describes as pressure initially 8-9 out of 10 in severity that began at 1900 today.  She activated EMS who noted her to be in A. fib with RVR.  She was given a dose of sublingual nitroglycerin as well as a full dose of aspirin and had significant improvement in her pain.  She reports the pain is now down to a 3-4 out of 10.  She reports that she has a history of heart failure, takes torsemide, has been compliant with that medication, however she noted that she is approximately 4 pounds above her goal dry weight.  She has been traveling recently.  She is on Eliquis for stroke prevention from history of A. fib.  She follows up with Dr. Cohen of cardiology.  She denies any cough.  She endorses shortness of breath with with the onset of the chest pain.  She additionally reports that the onset of the chest pain came while she was sitting down.  She has had nausea for the past 2 days but currently does not have any nausea.  No fevers or chills.    EMS gave 1 nitroglycerin and 125 mL NS.    Past Medical History  Past Medical History:   Diagnosis Date     AL amyloidosis (H)      Atrial fibrillation and flutter (H)      Cardiac amyloidosis (H)      Lymphedema      QT prolongation      Recurrent right pleural effusion 1/2/2017     SVT (supraventricular tachycardia) (H)      History reviewed. No pertinent surgical history.  Acetaminophen (TYLENOL PO)  apixaban ANTICOAGULANT (ELIQUIS ANTICOAGULANT) 2.5 MG tablet  camphor-menthol (DERMASARRA) 0.5-0.5 % external lotion  eplerenone (INSPRA) 25 MG tablet  metolazone  (ZAROXOLYN) 2.5 MG tablet  ondansetron (ZOFRAN-ODT) 8 MG ODT tab  potassium chloride ER (KLOR-CON M) 20 MEQ CR tablet  torsemide (DEMADEX) 100 MG tablet  triamcinolone (KENALOG) 0.1 % external cream      Allergies   Allergen Reactions     Contrast Dye Shortness Of Breath     Shaking,chills and dypsnea     Cytoxan [Cyclophosphamide]      Hemorrhagic cystitis     Gabapentin      Slurred speech, weakness     Levofloxacin      Severe shaking and abdominal pain     Aaron Weed      Rash and itchyness     Amoxicillin Nausea and Vomiting and Cramps     Spironolactone      Worsening hyponatremia, palpitations     Chlorhexidine Dermatitis and Rash     AARON wipes  2% chlorhexidine gluconate   Rash to everywhere wipe was applied      Family History  Family History   Problem Relation Age of Onset     Other - See Comments Sister         Amyloidosis     Social History   Social History     Tobacco Use     Smoking status: Never Smoker     Smokeless tobacco: Never Used   Substance Use Topics     Alcohol use: No     Drug use: No      Past medical history, past surgical history, medications, allergies, family history, and social history were reviewed with the patient. No additional pertinent items.       Review of Systems   Constitutional: Negative for fever.   HENT: Negative for congestion.    Eyes: Negative for redness.   Respiratory: Positive for shortness of breath. Negative for cough.    Cardiovascular: Positive for chest pain and leg swelling.   Gastrointestinal: Positive for nausea. Negative for abdominal pain, diarrhea and vomiting.   Genitourinary: Negative for difficulty urinating.   Musculoskeletal: Negative for back pain.   Skin: Negative for rash.   Neurological: Negative for seizures and headaches.   Psychiatric/Behavioral: Negative for confusion.     A complete review of systems was performed with pertinent positives and negatives noted in the HPI, and all other systems negative.    Physical Exam   BP: (!) 140/70  Pulse:  85  Temp: 98.9  F (37.2  C)  Resp: 16  Weight: 74.4 kg (164 lb)  SpO2: 100 %  Physical Exam  Constitutional:       General: She is awake. She is in acute distress.      Appearance: Normal appearance. She is well-developed. She is ill-appearing. She is not toxic-appearing.   HENT:      Head: Atraumatic.      Mouth/Throat:      Pharynx: No oropharyngeal exudate.   Eyes:      General: No scleral icterus.     Pupils: Pupils are equal, round, and reactive to light.   Cardiovascular:      Rate and Rhythm: Normal rate and regular rhythm.      Heart sounds: Normal heart sounds, S1 normal and S2 normal.   Pulmonary:      Effort: No respiratory distress.      Breath sounds: Normal breath sounds.   Chest:      Comments: Unable to reproduce chest pain by palpation.  Abdominal:      General: Bowel sounds are normal.      Palpations: Abdomen is soft.      Tenderness: There is abdominal tenderness in the right upper quadrant and epigastric area.   Musculoskeletal:         General: No tenderness.      Comments: 1+ pitting edema bilateral lower extremities   Skin:     General: Skin is warm.      Findings: No rash.   Neurological:      Mental Status: She is alert and oriented to person, place, and time.   Psychiatric:         Attention and Perception: Attention normal.         Mood and Affect: Mood normal.         Speech: Speech normal.         Behavior: Behavior normal. Behavior is cooperative.         ED Course      Procedures            EKG Interpretation:      Interpreted by BRAD ALMONTE MD  Time reviewed: 2112  Symptoms at time of EKG: Chest pain   Rhythm: normal sinus   Rate: normal  Axis: normal  Ectopy: none  Conduction: normal  ST Segments/ T Waves: No ST-T wave changes  Q Waves: none  Comparison to prior: Unchanged from 11/2021    Clinical Impression: Generally low amplitudes but otherwise no evidence of acute changes or acute ischemia.                    Results for orders placed or performed during the hospital encounter  of 03/06/22   XR Chest Port 1 View     Status: None    Narrative    Exam: XR CHEST PORT 1 VIEW, 3/6/2022 9:37 PM    Indication: chest pain    Comparison: 11/13/2021    Findings:   Portable semiupright AP view of the chest. Trachea is midline. Cardiac  mediastinal silhouette is within normal limits. Slightly increased  right loculated pleural effusion/right basilar atelectasis. The left  lung is relatively clear. No pneumothorax.      Impression    Impression: Slightly increased right loculated effusion and basilar  atelectasis.    I have personally reviewed the examination and initial interpretation  and I agree with the findings.    SABI HARMON          SYSTEM ID:  [id number]   Abdomen US, limited (RUQ only)     Status: None (Preliminary result)    Impression    RESIDENT PRELIMINARY INTERPRETATION  IMPRESSION:     Incidental visualization of right pleural effusion. Otherwise  unremarkable abdominal ultrasound   Comprehensive metabolic panel     Status: Abnormal   Result Value Ref Range    Sodium 131 (L) 133 - 144 mmol/L    Potassium 4.5 3.4 - 5.3 mmol/L    Chloride 97 94 - 109 mmol/L    Carbon Dioxide (CO2) 28 20 - 32 mmol/L    Anion Gap 6 3 - 14 mmol/L    Urea Nitrogen 38 (H) 7 - 30 mg/dL    Creatinine 1.45 (H) 0.52 - 1.04 mg/dL    Calcium 9.0 8.5 - 10.1 mg/dL    Glucose 103 (H) 70 - 99 mg/dL    Alkaline Phosphatase 225 (H) 40 - 150 U/L    AST 22 0 - 45 U/L    ALT 24 0 - 50 U/L    Protein Total 7.6 6.8 - 8.8 g/dL    Albumin 3.6 3.4 - 5.0 g/dL    Bilirubin Total 1.2 0.2 - 1.3 mg/dL    GFR Estimate 39 (L) >60 mL/min/1.73m2   Troponin I     Status: Normal   Result Value Ref Range    Troponin I High Sensitivity 14 <54 ng/L   CBC with platelets and differential     Status: Abnormal   Result Value Ref Range    WBC Count 6.7 4.0 - 11.0 10e3/uL    RBC Count 3.97 3.80 - 5.20 10e6/uL    Hemoglobin 11.1 (L) 11.7 - 15.7 g/dL    Hematocrit 34.8 (L) 35.0 - 47.0 %    MCV 88 78 - 100 fL    MCH 28.0 26.5 - 33.0 pg    MCHC 31.9  31.5 - 36.5 g/dL    RDW 15.6 (H) 10.0 - 15.0 %    Platelet Count 306 150 - 450 10e3/uL    % Neutrophils 62 %    % Lymphocytes 6 %    % Monocytes 10 %    % Eosinophils 20 %    % Basophils 2 %    % Immature Granulocytes 0 %    NRBCs per 100 WBC 0 <1 /100    Absolute Neutrophils 4.2 1.6 - 8.3 10e3/uL    Absolute Lymphocytes 0.4 (L) 0.8 - 5.3 10e3/uL    Absolute Monocytes 0.7 0.0 - 1.3 10e3/uL    Absolute Eosinophils 1.3 (H) 0.0 - 0.7 10e3/uL    Absolute Basophils 0.1 0.0 - 0.2 10e3/uL    Absolute Immature Granulocytes 0.0 <=0.4 10e3/uL    Absolute NRBCs 0.0 10e3/uL   Asymptomatic COVID-19 Virus (Coronavirus) by PCR Nasopharyngeal     Status: Normal    Specimen: Nasopharyngeal; Swab   Result Value Ref Range    SARS CoV2 PCR Negative Negative, Testing sent to reference lab. Results will be returned via unsolicited result    Narrative    Testing was performed using the Xpert Xpress SARS-CoV-2 Assay on the  Cepheid Gene-Xpert Instrument Systems. Additional information about  this Emergency Use Authorization (EUA) assay can be found via the Lab  Guide. This test should be ordered for the detection of SARS-CoV-2 in  individuals who meet SARS-CoV-2 clinical and/or epidemiological  criteria. Test performance is unknown in asymptomatic patients. This  test is for in vitro diagnostic use under the FDA EUA for  laboratories certified under CLIA to perform high complexity testing.  This test has not been FDA cleared or approved. A negative result  does not rule out the presence of PCR inhibitors in the specimen or  target RNA in concentration below the limit of detection for the  assay. The possibility of a false negative should be considered if  the patient's recent exposure or clinical presentation suggests  COVID-19. This test was validated by the [facility]. This laboratory is certified under  the Clinical Laboratory Improvement Amendments of 1988 (CLIA-88) as  qualified to  perform high complexity laboratory testing.     Lipase     Status: Normal   Result Value Ref Range    Lipase 164 73 - 393 U/L   EKG 12-lead, tracing only     Status: None (Preliminary result)   Result Value Ref Range    Systolic Blood Pressure  mmHg    Diastolic Blood Pressure  mmHg    Ventricular Rate 86 BPM    Atrial Rate 86 BPM    WA Interval 136 ms    QRS Duration 74 ms     ms    QTc 476 ms    P Axis 81 degrees    R AXIS 54 degrees    T Axis 176 degrees    Interpretation ECG       Sinus rhythm  Anterior infarct , age undetermined  Abnormal ECG     CBC with platelets differential     Status: Abnormal    Narrative    The following orders were created for panel order CBC with platelets differential.  Procedure                               Abnormality         Status                     ---------                               -----------         ------                     CBC with platelets and d...[[id number]]  Abnormal            Final result                 Please view results for these tests on the individual orders.     Medications   acetaminophen (TYLENOL) tablet 650 mg (650 mg Oral Given 3/6/22 2145)        Assessments & Plan (with Medical Decision Making)   Leandra Guerrero is a 71 year old female with a PMH of organ-limited amyloidosis, cardiac amyloidosis, heart failure (EF 60-70% 10/2021), SVT, QT prolongation, A. fib and flutter, pulmonary emphysema and AKF who presents to the ED today complaining of central chest pain that she describes as pressure initially 8-9 out of 10 in severity that began at 1900 today.  Concerning for ACS.  Able to review the paramedics EKG and she was in what appears to be A. fib with RVR at the time which has since resolved.  However during that RVR.,  There is concern that she had ST depressions in lead II, 3, aVF as well as sub-1 mm elevations in V1 and V2.  This is concerning for strain pattern.  Her EKG in our department is consistent with her baseline however.  We  will send screening labs, chest x-ray, consult cardiology.  We will plan for admission at the very least for observation and telemetry.    I have reviewed the nursing notes. I have reviewed the findings, diagnosis, plan and need for follow up with the patient.    Labs revealing for elevated alk phos, normal lipase, normal liver enzymes.  She is indeed tender to palpation in her epigastrium and right upper quadrant, may have biliary pathology.  Right upper quadrant ultrasound ordered and is unrevealing and reassuring.  Discussed patient with her outpatient cardiologist who agrees with admission.  Anticipate that she will require diuresis, will await for close evaluation and recommendations before initiating diuresis at this time.  She was weighed today and she is actually 164 pounds which is 14 pounds up from her goal dry weight.    New Prescriptions    No medications on file       Final diagnoses:   Acute on chronic congestive heart failure, unspecified heart failure type (H)   Chest pain, unspecified type       --  Zak Woodward MD PhD  [facility] EMERGENCY DEPARTMENT  3/6/2022     Zak Woodward MD  03/07/22 0051

## 2022-03-07 NOTE — PHARMACY-ADMISSION MEDICATION HISTORY
"  Admission Medication History Completed by Pharmacy    See [facility] Admission Navigator for allergy information, preferred outpatient pharmacy, prior to admission medications and immunization status.     Medication History Sources:     Patient, pharmacy dispense records    Changes made to PTA medication list (reason):    Added: albuterol, vitamin D3, biotin    Deleted: None    Changed: acetaminophen (changed \"PO\" to \"325 mg tablet\")    Additional Information:    None    Prior to Admission medications    Medication Sig Last Dose Taking? Auth Provider   acetaminophen (TYLENOL) 325 MG tablet Take 325 mg by mouth every 6 hours as needed for mild pain or fever   at PRN Yes Reported, Patient   albuterol (PROAIR HFA/PROVENTIL HFA/VENTOLIN HFA) 108 (90 Base) MCG/ACT inhaler Inhale 2 puffs into the lungs every 6 hours as needed for shortness of breath / dyspnea or wheezing  at PRN Yes Unknown, Entered By History   apixaban ANTICOAGULANT (ELIQUIS ANTICOAGULANT) 2.5 MG tablet Take 1 tablet (2.5 mg) by mouth 2 times daily 3/6/2022 at PM Yes Domenica Cohen MD   BIOTIN PO Take 2 tablets by mouth daily  Past Week at Unknown time Yes Unknown, Entered By History   camphor-menthol (DERMASARRA) 0.5-0.5 % external lotion Apply 1 mL topically every 6 hours as needed for skin care  at PRN Yes Ciara Araya PA-C   Cholecalciferol (VITAMIN D3 PO) Take by mouth daily Dose unknown Past Week at Unknown time Yes Unknown, Entered By History   eplerenone (INSPRA) 25 MG tablet Take 1 tablet (25 mg) by mouth daily 3/6/2022 at Unknown time Yes Ciara Araya PA-C   metolazone (ZAROXOLYN) 2.5 MG tablet Take 1 tablet (2.5 mg) by mouth twice a week As needed for increased swelling and weight gain of 3 lb in one day or 5 lb in one week 3/6/2022 at AM Yes Hallie Izquierdo PA-C   ondansetron (ZOFRAN-ODT) 8 MG ODT tab Take 8 mg by mouth every 8 hours as needed PRN  at PRN Yes Reported, Patient   potassium chloride ER (KLOR-CON M) 20 MEQ " CR tablet Take 3 tablets (60 mEq) by mouth 3 times daily 3/6/2022 at PM Yes Domenica Cohen MD   torsemide (DEMADEX) 100 MG tablet Take 1.5 tablets (150 mg) by mouth every morning AND 1.5 tablets (150 mg) daily at 2 pm. 3/6/2022 at PM Yes Domenica Cohen MD   triamcinolone (KENALOG) 0.1 % external cream Apply a thin layer twice daily to itchy areas as needed.  at PRN Yes Kasie Gupta MD     Date completed: 03/07/22    Medication history completed by: Camelia Hernandes [facility]

## 2022-03-08 LAB
ANION GAP SERPL CALCULATED.3IONS-SCNC: 7 MMOL/L (ref 3–14)
BUN SERPL-MCNC: 36 MG/DL (ref 7–30)
CALCIUM SERPL-MCNC: 9.3 MG/DL (ref 8.5–10.1)
CHLORIDE BLD-SCNC: 95 MMOL/L (ref 94–109)
CO2 SERPL-SCNC: 32 MMOL/L (ref 20–32)
CREAT SERPL-MCNC: 1.11 MG/DL (ref 0.52–1.04)
GFR SERPL CREATININE-BSD FRML MDRD: 54 ML/MIN/1.73M2
GLUCOSE BLD-MCNC: 89 MG/DL (ref 70–99)
POTASSIUM BLD-SCNC: 3 MMOL/L (ref 3.4–5.3)
POTASSIUM BLD-SCNC: 4.3 MMOL/L (ref 3.4–5.3)
SODIUM SERPL-SCNC: 134 MMOL/L (ref 133–144)

## 2022-03-08 PROCEDURE — 93010 ELECTROCARDIOGRAM REPORT: CPT | Performed by: INTERNAL MEDICINE

## 2022-03-08 PROCEDURE — 36415 COLL VENOUS BLD VENIPUNCTURE: CPT | Performed by: NURSE PRACTITIONER

## 2022-03-08 PROCEDURE — 250N000013 HC RX MED GY IP 250 OP 250 PS 637: Performed by: STUDENT IN AN ORGANIZED HEALTH CARE EDUCATION/TRAINING PROGRAM

## 2022-03-08 PROCEDURE — 214N000001 HC R&B CCU UMMC

## 2022-03-08 PROCEDURE — 82310 ASSAY OF CALCIUM: CPT | Performed by: NURSE PRACTITIONER

## 2022-03-08 PROCEDURE — 250N000013 HC RX MED GY IP 250 OP 250 PS 637: Performed by: INTERNAL MEDICINE

## 2022-03-08 PROCEDURE — 250N000013 HC RX MED GY IP 250 OP 250 PS 637: Performed by: NURSE PRACTITIONER

## 2022-03-08 PROCEDURE — 99233 SBSQ HOSP IP/OBS HIGH 50: CPT | Mod: FS | Performed by: INTERNAL MEDICINE

## 2022-03-08 PROCEDURE — 250N000013 HC RX MED GY IP 250 OP 250 PS 637: Performed by: EMERGENCY MEDICINE

## 2022-03-08 PROCEDURE — 999N000111 HC STATISTIC OT IP EVAL DEFER: Performed by: OCCUPATIONAL THERAPIST

## 2022-03-08 PROCEDURE — 84132 ASSAY OF SERUM POTASSIUM: CPT | Performed by: NURSE PRACTITIONER

## 2022-03-08 PROCEDURE — 93005 ELECTROCARDIOGRAM TRACING: CPT

## 2022-03-08 RX ORDER — POTASSIUM CHLORIDE 750 MG/1
20 TABLET, EXTENDED RELEASE ORAL ONCE
Status: COMPLETED | OUTPATIENT
Start: 2022-03-08 | End: 2022-03-08

## 2022-03-08 RX ORDER — ACETAMINOPHEN 325 MG/1
975 TABLET ORAL EVERY 6 HOURS PRN
Status: DISCONTINUED | OUTPATIENT
Start: 2022-03-09 | End: 2022-03-09

## 2022-03-08 RX ORDER — OXYCODONE HYDROCHLORIDE 5 MG/1
5 TABLET ORAL ONCE
Status: COMPLETED | OUTPATIENT
Start: 2022-03-08 | End: 2022-03-08

## 2022-03-08 RX ORDER — ACETAMINOPHEN 325 MG/1
325-650 TABLET ORAL EVERY 4 HOURS PRN
Status: DISCONTINUED | OUTPATIENT
Start: 2022-03-08 | End: 2022-03-08

## 2022-03-08 RX ORDER — POTASSIUM CHLORIDE 750 MG/1
40 TABLET, EXTENDED RELEASE ORAL ONCE
Status: COMPLETED | OUTPATIENT
Start: 2022-03-08 | End: 2022-03-08

## 2022-03-08 RX ORDER — ACETAMINOPHEN 325 MG/1
650 TABLET ORAL EVERY 4 HOURS PRN
Status: DISCONTINUED | OUTPATIENT
Start: 2022-03-08 | End: 2022-03-08

## 2022-03-08 RX ADMIN — POTASSIUM CHLORIDE 60 MEQ: 1500 TABLET, EXTENDED RELEASE ORAL at 14:30

## 2022-03-08 RX ADMIN — OXYCODONE HYDROCHLORIDE 5 MG: 5 TABLET ORAL at 20:32

## 2022-03-08 RX ADMIN — TORSEMIDE 150 MG: 100 TABLET ORAL at 10:51

## 2022-03-08 RX ADMIN — POTASSIUM CHLORIDE 60 MEQ: 1500 TABLET, EXTENDED RELEASE ORAL at 08:19

## 2022-03-08 RX ADMIN — POTASSIUM CHLORIDE 60 MEQ: 1500 TABLET, EXTENDED RELEASE ORAL at 20:32

## 2022-03-08 RX ADMIN — ACETAMINOPHEN 650 MG: 325 TABLET ORAL at 08:19

## 2022-03-08 RX ADMIN — ACETAMINOPHEN 325 MG: 325 TABLET ORAL at 18:10

## 2022-03-08 RX ADMIN — APIXABAN 2.5 MG: 2.5 TABLET, FILM COATED ORAL at 20:32

## 2022-03-08 RX ADMIN — POTASSIUM CHLORIDE 40 MEQ: 750 TABLET, EXTENDED RELEASE ORAL at 12:05

## 2022-03-08 RX ADMIN — EPLERENONE 25 MG: 25 TABLET, FILM COATED ORAL at 08:19

## 2022-03-08 RX ADMIN — TORSEMIDE 150 MG: 100 TABLET ORAL at 14:30

## 2022-03-08 RX ADMIN — APIXABAN 2.5 MG: 2.5 TABLET, FILM COATED ORAL at 08:19

## 2022-03-08 RX ADMIN — ACETAMINOPHEN 325 MG: 325 TABLET ORAL at 15:59

## 2022-03-08 RX ADMIN — POTASSIUM CHLORIDE 20 MEQ: 1500 TABLET, EXTENDED RELEASE ORAL at 14:29

## 2022-03-08 ASSESSMENT — ACTIVITIES OF DAILY LIVING (ADL)
ADLS_ACUITY_SCORE: 8

## 2022-03-08 NOTE — PLAN OF CARE
Focus:  Admission  Diagnosis:   Admitted from: [facility]   Via: bed   Accompanied by:self-pt notified spouse of admission to   Belongings: Placed in closet and at bedside  Admission paperwork: complete.   Teaching: Call don't fall, use of console, meal times, visiting hours, orientation to unit, when to call for the RN (angina/sob/dizzyness, etc.), and stressed the importance of safety.   Access: PIV   Telemetry: Placed on pt   Ht./Wt.: complete    2 person skin check done w/Amy Willett RN. Skin intact, blanchable redness on back, abdomen and buttocks. Small scattered scabs from scratching

## 2022-03-08 NOTE — PLAN OF CARE
Edema orders received. Chart reviewed and discussed with patient.?IP Edema not indicated due to Pt reporting she has custom stockings at home and an edema pump. Pt reported not wanting stockings or GCBs during this stay.?Will complete orders.

## 2022-03-08 NOTE — PLAN OF CARE
D: admitted for chest pain, shortness of breath, and was found to be in A. Fib with RVR.  PMH of IV AL amyloid diagnosed in 2016, restrictive cardiomyopathy/HFpEF, pAFib/Flutter, pulmonary emphysema, and CKD     I: Monitored vitals and assessed pt status.   Running: PIV SL'd  PRN: none    A: A0x4. VSS, on RA. Monitor SR w/1st degree AVB. Last BM 3/7. Good UO. On 2L FR. Had bloody nose overnight-resolved w/pressure and ice pack.     I/O this shift:  In: -   Out: 1600 [Urine:1600]    Temp:  [97.4  F (36.3  C)-98.3  F (36.8  C)] 97.9  F (36.6  C)  Pulse:  [64-91] 78  Resp:  [18] 18  BP: (102-138)/(50-74) 138/63  SpO2:  [97 %-99 %] 98 %      P: Continue to monitor Pt status and report changes to treatment team.

## 2022-03-08 NOTE — PLAN OF CARE
SHIFT [id number]    VS: /65   Pulse 70   Temp 98.1  F (36.7  C) (Oral)   Resp 18   Wt 74.4 kg (164 lb)   SpO2 99%   BMI 29.91 kg/m   Cardiac: denies chest pain during assessment  Resp: no resp distress  Neuro: AOX4, Independent and ambulatory  G/: (-) dysuria noted, no other complaints    Patient's for admission in 6C, report already given by outgoing staff, just check on and assessed the pt before going for admission. All needs attended

## 2022-03-08 NOTE — DISCHARGE SUMMARY
[address] [address]  p: [phone]    Discharge Summary: Cardiology Service    Leandra Guerrero MRN# [id number]   YOB: 1950 Age: 71 year old       Admission Date: 03/06/2022  Discharge Date: 03/09/2022    Discharge Diagnoses:  # Acute on Chronic Diastolic Heart Failure  # Restrictive cardiomyopathy  # Cardiac amyloidosis  # Chronic Lymphedema  # Hypokalemia  # Atypical Chest Pain  # Right Pleural Effusion  # pAFib/Flutter  # CKD    Brief HPI:  Leandra Guerrero is a 71 year old year old female with PMH of IV AL amyloid diagnosed in 2016, restrictive cardiomyopathy/HFpEF, pAFib/Flutter, pulmonary emphysema, and CKD who was admitted for chest pain, shortness of breath, and was found to be in A. Fib with RVR. This resolved prior to arrival to ED. Patient was admitted for hypervolemia, diuresed with IV Lasix    Hospital Course by Diagnosis:  # Acute on Chronic Diastolic Heart Failure  # Restrictive cardiomyopathy  # Cardiac amyloidosis  # Chronic Lymphedema  # Hypokalemia  Stage C. NYHA Class III.  Follows with Dr. Cohen; seen in [facility] clinic 2/9/22. Most recent TTE 10/31 with EF 65-70%, grade III diastolic dysfunction, moderate TR, mod/severe LAE. Patient reports about 4 pound weight gain this week. She thinks weight gain r/t recent travel and increase salt intake. She took an extra Metolazone at home, but continued to have significant PELAEZ. Patient given IV Lasix 3/7 with improvement in Cr and weight.      - Fluid status: Euvolemic, resume PTA Torsemide 150 mg BID  - AA: Continue PTA Eplerenone   - Hx of very labile potassium, Continue PTA KLOR 60 mEq TID   - Daily weights, EDW around 150lbs, weight today 148lbs   - [facility] consulted; pt with follow up 3/11  - Pt previously requested to change code status to Special (no shocks, CPR and intubation ok--temporarily only), did provide education about different types of codes/arrests,  some requiring shocks, some not. She will speak to family about GOC/Code status     # Atypical Chest pain  Pt reports chest pressure at rest, lasted about 1-2 hours, resolved after SL Nitroglycerin. Troponin 14, 13, ruling out ACS. EKG with no ST-T changes. Patient has remained chest pain free since arrival to ED. Pain could be related to hypervolemia vs pAF.      # Right Pleural Effusion, Increased in size, loculated  Per patient decided not to perform thoracentesis last admission, no obvious pocket. Increased today, unclear significance. Given time course, no concern for infectious process at this time.     # pAFib/FL  Patient reports feeling unwell at home, HR 54, then when EMS got to house, 's), this resolved prior to getting to our ED. Overnight 3/8-3/9, pt went in and out of afib x 2, 1st episode HR in , lasted 4 minutes, 2nd episode HR , lasted about 15 minutes. Patietn converted on own. Pt reported having a headache, no specific palpitations. Patient currently in NSR HR 70's.   - AC: PTA Eliquis 2.5 mg BID (at lower dose 2/2 hx bleeding)  - BB: Contraindicated given amyloid  - Pt verbalized feeling anxious about going in and out of afib, reassurance provided, that she is likely to continue to go in and out of afib, as long has HR controlled (less than 130 sustained), this is ok.      # CKD  Baseline creatinine appears to be ~1-1.2.  Cr 1.45 on admission, does not technically meet criteria for ERIC, suspect cardiorenal, patient looks mildly hypervolemic on exam. Cr today improved, 1.1  - Diuresis as above      Consults:  [facility]    Medication Changes:  No med changes    Discharge medications:   Current Discharge Medication List      CONTINUE these medications which have NOT CHANGED    Details   acetaminophen (TYLENOL) 325 MG tablet Take 325 mg by mouth every 6 hours as needed for mild pain or fever     Associated Diagnoses: Amyloid heart disease (H)      albuterol (PROAIR HFA/PROVENTIL  HFA/VENTOLIN HFA) 108 (90 Base) MCG/ACT inhaler Inhale 2 puffs into the lungs every 6 hours as needed for shortness of breath / dyspnea or wheezing      apixaban ANTICOAGULANT (ELIQUIS ANTICOAGULANT) 2.5 MG tablet Take 1 tablet (2.5 mg) by mouth 2 times daily  Qty: 180 tablet, Refills: 3    Associated Diagnoses: Paroxysmal atrial fibrillation (H)      BIOTIN PO Take 2 tablets by mouth daily       camphor-menthol (DERMASARRA) 0.5-0.5 % external lotion Apply 1 mL topically every 6 hours as needed for skin care  Qty: 222 mL, Refills: 0    Associated Diagnoses: Acute on chronic diastolic heart failure (H)      Cholecalciferol (VITAMIN D3 PO) Take by mouth daily Dose unknown      eplerenone (INSPRA) 25 MG tablet Take 1 tablet (25 mg) by mouth daily  Qty: 90 tablet, Refills: 3    Associated Diagnoses: Acute on chronic diastolic heart failure (H); Hypokalemia      metolazone (ZAROXOLYN) 2.5 MG tablet Take 1 tablet (2.5 mg) by mouth twice a week As needed for increased swelling and weight gain of 3 lb in one day or 5 lb in one week  Qty: 26 tablet, Refills: 3    Associated Diagnoses: Amyloid heart disease (H)      ondansetron (ZOFRAN-ODT) 8 MG ODT tab Take 8 mg by mouth every 8 hours as needed PRN      potassium chloride ER (KLOR-CON M) 20 MEQ CR tablet Take 3 tablets (60 mEq) by mouth 3 times daily  Qty: 810 tablet, Refills: 3    Comments: Dose increase, patient will call for refills  Associated Diagnoses: Acute on chronic diastolic heart failure (H)      torsemide (DEMADEX) 100 MG tablet Take 1.5 tablets (150 mg) by mouth every morning AND 1.5 tablets (150 mg) daily at 2 pm.  Qty: 270 tablet, Refills: 1    Associated Diagnoses: Acute on chronic diastolic heart failure (H); Amyloid heart disease (H)      triamcinolone (KENALOG) 0.1 % external cream Apply a thin layer twice daily to itchy areas as needed.  Qty: 453.6 g, Refills: 3    Associated Diagnoses: Intrinsic atopic dermatitis             Follow-up:  - [facility]  3/11     Code status:  Full    Condition on discharge  Temp:  [97.8  F (36.6  C)-98.3  F (36.8  C)] 98.2  F (36.8  C)  Pulse:  [] 85  Resp:  [16-20] 16  BP: (110-146)/(50-66) 137/62  SpO2:  [96 %-100 %] 99 %  General: Alert, interactive, NAD  Eyes: sclera anicteric, EOMI  Neck: no JVD, carotid 2+ bilaterally  Cardiovascular: regular rate and rhythm, normal S1 and S2, no murmurs, gallops, or rubs  Resp: clear to auscultation bilaterally, no rales, wheezes, or rhonchi  GI: Soft, nontender, nondistended. +BS.  No HSM or masses, no rebound or guarding.  Extremities: chronic lymphedema, no cyanosis or clubbing, dorsalis pedis and posterior tibialis pulses 2+ bilaterally  Skin: Warm and dry, no jaundice or rash  Neuro: CN 2-12 intact, moves all extremities equally  Psych: Alert & oriented x 3    Imaging with results:  EKG 12 Lead 3/6/2022: NSR HR 86    EKG 12 Lead 3/8/2022: Afib,       Patient Care Team:  Magy Obrien MD as PCP - General (Family Practice)  Mireal Moore MD as MD (Hematology & Oncology)  Domenica Cohen MD as MD (Cardiology)  Niki Fam, RN as Specialty Care Coordinator (Cardiology)  Ciara Araya PA-C as Physician Assistant (Physician Assistant)  Vidhi Montgomery, RN as Specialty Care Coordinator (Cardiology)  Mirela Moore MD as Assigned Cancer Care Provider  Roscoe Elam MD as Assigned Palliative Care Provider  Ciara Araya PA-C as Assigned Heart and Vascular Provider  Kasie Gupta MD as Assigned Surgical Provider    Time Spent on this Encounter   I, Kath Hutson CNP, personally saw the patient today and spent greater than 30 minutes discharging this patient.     Lizette SAEZ, SHERITA  [facility] Cardiology

## 2022-03-08 NOTE — PROGRESS NOTES
[facility]   Cardiology   Progress Note     ASSESSMENT/PLAN:  Leandra Guerrero is a 71 year old year old female with PMH of IV AL amyloid diagnosed in 2016, restrictive cardiomyopathy/HFpEF, pAFib/Flutter, pulmonary emphysema, and CKD who was admitted for chest pain, shortness of breath, and was found to be in A. Fib with RVR.     # Acute on Chronic Diastolic Heart Failure  # Restrictive cardiomyopathy  # Cardiac amyloidosis  # Chronic Lymphedema  # Hypokalemia  Stage C. NYHA Class III.  Follows with Dr. Cohen; seen in [facility] clinic 2/9/22. Most recent TTE 10/31 with EF 65-70%, grade III diastolic dysfunction, moderate TR, mod/severe LAE. Patient reports about 4 pound weight gain this week. She thinks weight gain r/t recent travel and increase salt intake. She took an extra Metolazone at home, but continued to have significant PELAEZ.     - Fluid status: Euvolemic, resume PTA Torsemide 150 mg BID, no need for metolazone today  - AA: Continue PTA Eplerenone   - Hx of very labile potassium, Continue PTA KLOR 60 mEq TID and utilize K replacement protocol  - Strict I/O  - Daily weights, EDW around 150lbs, weight today 148lbs  - Daily BMP  - CORE consulted; pt with follow up 3/11     # Atypical Chest pain  Pt reports chest pressure at rest, lasted about 1-2 hours, resolved after SL Nitroglycerin. Troponin 14, 13, ruling out ACS. EKG with no ST-T changes. Patient has remained chest pain free since arrival to ED. Pain could be related to hypervolemia, will monitor  - No need to trend troponin  - Conditional EKG for any chest pain     # Right Pleural Effusion, Increased in size, loculated  Per patient decided not to perform thoracentesis last admission, no obvious pocket. Increased today, unclear significance. Given time course, no concern for infectious process at this time.     # pAFib/FL  Patient reports feeling unwell at home, HR 54, then when EMS got to house, 's), this resolved  "prior to getting to our ED. Patient currently in NSR HR 70's.   - AC: PTA Eliquis 2.5 mg BID (at lower dose 2/2 hx bleeding)  - BB: Contraindicated given amyloid     # CKD  Baseline creatinine appears to be ~1-1.2.  Cr 1.45 on admission, does not technically meet criteria for ERIC, suspect cardiorenal, patient looks mildly hypervolemic on exam. Cr today improved, 1.1  - Diuresis as above    FEN: 2 gm sodium  Code status: Special Code; spoke to patient at length about code status, we went over what typically occurs during a code. She had originally thought that she would like to be no shocks, no intubation, CPR ok. Patient would like to speak to family some more, but for now, she would not want any shocks/dfibs, but is ok with temporary CPR and intubation  Prophylaxis:  PO Eliquis  Isolation: None  Disposition: possible discharge home tomorrow pending stable Cr, volume status on PO medications    Patient seen and discussed with Dr. West, who agrees with above plan.    Lizette SAEZ, CNP  [facility] Cardiology Team    Interval History:  - No acute events overnight  - Pt given 2.5 mg PO Metolazone and 120 mg IV Lasix x 2, net negative 1.9L, weight down 5 lbs), Cr improved 1.1 (down from 1.2, 1.4)  - Pt reports had \"rough night,\" stating had a nose bleed that lasted about 30 minutes, she also did not sleep well  - she continues to feel PELAEZ, taking longer to do normal ADL's    Physical Exam:  Temp:  [97.4  F (36.3  C)-98.6  F (37  C)] 98.6  F (37  C)  Pulse:  [64-81] 80  Resp:  [16-18] 16  BP: (113-138)/(50-74) 114/53  SpO2:  [97 %-99 %] 98 %    I/O:  Intake/Output Summary (Last 24 hours) at 3/8/2022 0913  Last data filed at 3/8/2022 0800  Gross per 24 hour   Intake 1540 ml   Output 3450 ml   Net -1910 ml         Wt:   Wt Readings from Last 5 Encounters:   03/08/22 67.2 kg (148 lb 1.6 oz)   02/09/22 69.8 kg (153 lb 14.4 oz)   12/16/21 69.7 kg (153 lb 11.2 oz)   11/17/21 69.9 kg (154 lb)   11/14/21 70.1 kg (154 lb 9.6 oz) "       General: NAD  HEENT:  PERRLA, EOMI.   Neck: no JVD  CV: RRR. No murmur appreciated. No rubs or gallops. Peripheral radial pulse intact.  Resp: No increased work of breathing or use of accessory muscles, breathing comfortably on room air.  Lung sounds clear throughout/bilaterally  Abdomen:  Normal active bowel sounds.  Abdomen is soft. No distension, non-tender to palpation.    Extremities: Warm. Capillary refill less than 3 sec. 2/4 radial pulses bilaterally.  2/4 pedal pulses bilaterally. Chronic lymphedema. No cyanosis or clubbing.  Skin:  Warm and dry. No erythema, rashes, ulceration or diaphoresis.  Neuro: Alert and oriented x3.      Medications:    apixaban ANTICOAGULANT  2.5 mg Oral BID     eplerenone  25 mg Oral Daily     potassium chloride ER  60 mEq Oral TID     sodium chloride (PF)  3 mL Intracatheter Q8H     torsemide  150 mg Oral BID       - MEDICATION INSTRUCTIONS -       - MEDICATION INSTRUCTIONS -         Labs:   CMP  Recent Labs   Lab 03/08/22  0752 03/07/22  1547 03/07/22 0628 03/06/22  2105    132* 134 131*   POTASSIUM 3.0* 3.5 3.7 4.5   CHLORIDE 95 94 99 97   CO2 32 30 28 28   ANIONGAP 7 8 7 6   GLC 89 128* 95 103*   BUN 36* 35* 34* 38*   CR 1.11* 1.15* 1.24* 1.45*   GFRESTIMATED 54* 51* 47* 39*   JERROD 9.3 8.8 8.9 9.0   PROTTOTAL  --   --   --  7.6   ALBUMIN  --   --   --  3.6   BILITOTAL  --   --   --  1.2   ALKPHOS  --   --   --  225*   AST  --   --   --  22   ALT  --   --   --  24     CBC  Recent Labs   Lab 03/07/22  0628 03/06/22  2105   WBC 5.7 6.7   RBC 3.78* 3.97   HGB 10.7* 11.1*   HCT 33.9* 34.8*   MCV 90 88   MCH 28.3 28.0   MCHC 31.6 31.9   RDW 15.6* 15.6*    306     INRNo lab results found in last 7 days.  Arterial Blood GasNo lab results found in last 7 days.    Diagnostics:  No results found for this or any previous visit (from the past 24 hour(s)).    Time Spent on this Encounter   I spent 40 minutes on the unit/floor managing the care of Leandra Guerrero. Over  50% of my time was spent on the following:   - Counseling the patient and/or family regarding: diagnostic results, prognosis and recommended follow-up  - Coordination of care with the: care coordinator/ and nurse      Kath Hutson, CNP

## 2022-03-09 VITALS
RESPIRATION RATE: 16 BRPM | TEMPERATURE: 98 F | WEIGHT: 148.6 LBS | BODY MASS INDEX: 26.33 KG/M2 | OXYGEN SATURATION: 97 % | SYSTOLIC BLOOD PRESSURE: 126 MMHG | DIASTOLIC BLOOD PRESSURE: 61 MMHG | HEART RATE: 78 BPM | HEIGHT: 63 IN

## 2022-03-09 LAB
ANION GAP SERPL CALCULATED.3IONS-SCNC: 7 MMOL/L (ref 3–14)
ATRIAL RATE - MUSE: 156 BPM
BUN SERPL-MCNC: 40 MG/DL (ref 7–30)
CALCIUM SERPL-MCNC: 9.2 MG/DL (ref 8.5–10.1)
CHLORIDE BLD-SCNC: 97 MMOL/L (ref 94–109)
CO2 SERPL-SCNC: 30 MMOL/L (ref 20–32)
CREAT SERPL-MCNC: 1.12 MG/DL (ref 0.52–1.04)
DIASTOLIC BLOOD PRESSURE - MUSE: NORMAL MMHG
GFR SERPL CREATININE-BSD FRML MDRD: 53 ML/MIN/1.73M2
GLUCOSE BLD-MCNC: 96 MG/DL (ref 70–99)
HOLD SPECIMEN: NORMAL
INTERPRETATION ECG - MUSE: NORMAL
P AXIS - MUSE: NORMAL DEGREES
POTASSIUM BLD-SCNC: 3.2 MMOL/L (ref 3.4–5.3)
POTASSIUM BLD-SCNC: 3.7 MMOL/L (ref 3.4–5.3)
PR INTERVAL - MUSE: NORMAL MS
QRS DURATION - MUSE: 80 MS
QT - MUSE: 334 MS
QTC - MUSE: 443 MS
R AXIS - MUSE: 263 DEGREES
SODIUM SERPL-SCNC: 134 MMOL/L (ref 133–144)
SYSTOLIC BLOOD PRESSURE - MUSE: NORMAL MMHG
T AXIS - MUSE: 88 DEGREES
VENTRICULAR RATE- MUSE: 106 BPM

## 2022-03-09 PROCEDURE — 82310 ASSAY OF CALCIUM: CPT | Performed by: NURSE PRACTITIONER

## 2022-03-09 PROCEDURE — 250N000013 HC RX MED GY IP 250 OP 250 PS 637: Performed by: INTERNAL MEDICINE

## 2022-03-09 PROCEDURE — 250N000013 HC RX MED GY IP 250 OP 250 PS 637: Performed by: NURSE PRACTITIONER

## 2022-03-09 PROCEDURE — 999N000248 HC STATISTIC IV INSERT WITH US BY RN

## 2022-03-09 PROCEDURE — 250N000013 HC RX MED GY IP 250 OP 250 PS 637: Performed by: STUDENT IN AN ORGANIZED HEALTH CARE EDUCATION/TRAINING PROGRAM

## 2022-03-09 PROCEDURE — 36415 COLL VENOUS BLD VENIPUNCTURE: CPT | Performed by: NURSE PRACTITIONER

## 2022-03-09 PROCEDURE — 99239 HOSP IP/OBS DSCHRG MGMT >30: CPT | Performed by: NURSE PRACTITIONER

## 2022-03-09 PROCEDURE — 84132 ASSAY OF SERUM POTASSIUM: CPT | Performed by: NURSE PRACTITIONER

## 2022-03-09 RX ORDER — POTASSIUM CHLORIDE 750 MG/1
40 TABLET, EXTENDED RELEASE ORAL ONCE
Status: COMPLETED | OUTPATIENT
Start: 2022-03-09 | End: 2022-03-09

## 2022-03-09 RX ORDER — POTASSIUM CHLORIDE 1.5 G/1.58G
40 POWDER, FOR SOLUTION ORAL ONCE
Status: DISCONTINUED | OUTPATIENT
Start: 2022-03-09 | End: 2022-03-09

## 2022-03-09 RX ORDER — ACETAMINOPHEN 325 MG/1
325-975 TABLET ORAL EVERY 6 HOURS PRN
Status: DISCONTINUED | OUTPATIENT
Start: 2022-03-09 | End: 2022-03-09 | Stop reason: HOSPADM

## 2022-03-09 RX ADMIN — TORSEMIDE 150 MG: 100 TABLET ORAL at 08:35

## 2022-03-09 RX ADMIN — APIXABAN 2.5 MG: 2.5 TABLET, FILM COATED ORAL at 08:35

## 2022-03-09 RX ADMIN — POTASSIUM CHLORIDE 40 MEQ: 750 TABLET, EXTENDED RELEASE ORAL at 08:37

## 2022-03-09 RX ADMIN — EPLERENONE 25 MG: 25 TABLET, FILM COATED ORAL at 08:35

## 2022-03-09 RX ADMIN — ACETAMINOPHEN 325 MG: 325 TABLET ORAL at 08:37

## 2022-03-09 RX ADMIN — POTASSIUM CHLORIDE 60 MEQ: 1500 TABLET, EXTENDED RELEASE ORAL at 08:43

## 2022-03-09 RX ADMIN — ACETAMINOPHEN 325 MG: 325 TABLET ORAL at 10:30

## 2022-03-09 ASSESSMENT — ACTIVITIES OF DAILY LIVING (ADL)
ADLS_ACUITY_SCORE: 8

## 2022-03-09 NOTE — PROGRESS NOTES
DISCHARGE   Discharged to: Home  Via: Automobile  Accompanied by:   Discharge Instructions: diet, activity, medications, follow up appointments, when to call the MD, and what to watchout for (i.e. s/s of infection, increasing SOB, palpitations, chest pain,)  Prescriptions: No new prescriptionst; medication list reviewed & sent with pt  Follow Up Appointments: arranged; information given  Belongings: All sent with pt  IV: out  Telemetry: off  Pt exhibits understanding of above discharge instructions; all questions answered.  Discharge Paperwork: faxed

## 2022-03-09 NOTE — PLAN OF CARE
Temp: 97.9  F (36.6  C) Temp src: Oral BP: 128/66 Pulse: 84   Resp: 16 SpO2: 98 % O2 Device: None (Room air)       D: Admitted with chest pain and SOB, found to be in AFib with RVR. Hx cardiac amyloidosis, restrictive CM, HFpEF, pAFib/flutter, pulmonary emphysema, CKD     I/A: Amy is A&O x4, anxious about discharge. Tele in place, in and out of AFib (possibly with WAP while in SR). VSS on RA. PIV in place, SL. Up independently. Slept between cares overnight    P: Continue to monitor and follow POC. Notify Cards 1 with changes      Goal Outcome Evaluation:    Plan of Care Reviewed With: patient   Overall Patient Progress: no change  Outcome Evaluation: continues to be in and out of afib

## 2022-03-09 NOTE — PLAN OF CARE
Goal Outcome Evaluation:    Plan of Care Reviewed With: patient     Overall Patient Progress: improving    Outcome Evaluation: Stabilized fluid volume, less Afib than was reported overnight

## 2022-03-09 NOTE — PLAN OF CARE
D: admitted for chest pain, shortness of breath, and was found to be in A. Fib with RVR.  PMH of IV AL amyloid diagnosed in 2016, restrictive cardiomyopathy/HFpEF, pAFib/Flutter, pulmonary emphysema, and CKD       I: Monitored vitals and assessed pt status.   Changed: Restarted torsemide. Persistent headache most of the day rate 6-8.  Running: PIV SL  PRN: Tylenol for headache  Tele: Monitor SR with 1st degree AVB.   O2: RA  Mobility: Independent.    A: A0x4. VSS. Afebrile. Urinating adequately. Mild to moderate edema in lower extremities. Skin is dry with a leathery texture easily blanchable with bruising and scabbing.    P: Continue to monitor Pt status and report changes to Cards 1. Possible discharge tomorrow.     Temp:  [97.4  F (36.3  C)-98.6  F (37  C)] 97.8  F (36.6  C)  Pulse:  [67-81] 70  Resp:  [16-18] 18  BP: (110-138)/(50-74) 110/50  SpO2:  [97 %-100 %] 100 %     Shift [id number]      Goal Outcome Evaluation:    Plan of Care Reviewed With: patient     Overall Patient Progress: improving    Outcome Evaluation: Continue Plan of Care

## 2022-03-09 NOTE — PROGRESS NOTES
D: Admitted 3/6/22 with chest pain and SOB, found to be in Afib with RVR. PMH of cardiac amyloidosis, restrictive cardiomyopathy, HFpEF, pAFib/flutter, pulmonary emphysema, CKD.     I: Monitored vitals and assessed pt status.   Changed: Potassium 3.2 at 0540, potassium replacement given per protocol  Running: Right PIV SL  PRN: Acetaminophen 325 mg x 2 for ongoing HA  Tele: In and out of Afib  O2: RA  Mobility: Up independently     A: A&O x4. VSS on RA. Afebrile. Denied numbness, tingling, nausea, vomiting, difficulty breathing. Reported HA pain of 4/10, was given acetaminophen 325 mg x 2, reported 2/10 pain after each medication intervention. Urinating adequately. Last BM 3/8/22. Left PIV site edematous, erythematous, and painful; notified primary team; cleaned with wound cleanser and dry gauze and heat pack applied.     P: Continue to monitor Pt status and report changes to Cards 1. Will prepare for discharge today.     Temp:  [97.9  F (36.6  C)-98.3  F (36.8  C)] 98  F (36.7  C)  Pulse:  [] 78  Resp:  [16-20] 16  BP: (114-146)/(53-66) 126/61  SpO2:  [96 %-99 %] 97 %

## 2022-03-10 ENCOUNTER — LAB (OUTPATIENT)
Dept: LAB | Facility: CLINIC | Age: 72
End: 2022-03-10
Payer: COMMERCIAL

## 2022-03-10 ENCOUNTER — PATIENT OUTREACH (OUTPATIENT)
Dept: CARE COORDINATION | Facility: CLINIC | Age: 72
End: 2022-03-10

## 2022-03-10 DIAGNOSIS — Z71.89 OTHER SPECIFIED COUNSELING: ICD-10-CM

## 2022-03-10 DIAGNOSIS — I50.33 ACUTE ON CHRONIC DIASTOLIC HEART FAILURE (H): ICD-10-CM

## 2022-03-10 LAB
ANION GAP SERPL CALCULATED.3IONS-SCNC: 9 MMOL/L (ref 3–14)
BUN SERPL-MCNC: 36 MG/DL (ref 7–30)
CALCIUM SERPL-MCNC: 8.9 MG/DL (ref 8.5–10.1)
CHLORIDE BLD-SCNC: 92 MMOL/L (ref 94–109)
CO2 SERPL-SCNC: 28 MMOL/L (ref 20–32)
CREAT SERPL-MCNC: 1.2 MG/DL (ref 0.52–1.04)
GFR SERPL CREATININE-BSD FRML MDRD: 49 ML/MIN/1.73M2
GLUCOSE BLD-MCNC: 130 MG/DL (ref 70–99)
HOLD SPECIMEN: NORMAL
HOLD SPECIMEN: NORMAL
POTASSIUM BLD-SCNC: 3.7 MMOL/L (ref 3.4–5.3)
SODIUM SERPL-SCNC: 129 MMOL/L (ref 133–144)

## 2022-03-10 PROCEDURE — 36415 COLL VENOUS BLD VENIPUNCTURE: CPT

## 2022-03-10 PROCEDURE — 80048 BASIC METABOLIC PNL TOTAL CA: CPT

## 2022-03-10 NOTE — PROGRESS NOTES
"Clinic Care Coordination Contact  [facility]: Post-Discharge Note  SITUATION                                                      Admission:    Admission Date: 03/06/22   Reason for Admission: Acute on chronic diastolic heart failure  Discharge:   Discharge Date: 03/09/22  Discharge Diagnosis: Cardiac amyloidosis, Acute on chronic diastolic heart failure, Paroxysmal atrial fibrillation, Chest pain, unspecified type    BACKGROUND                                                      Per hospital discharge summary and inpatient provider notes:    Leandra Guerrero is a 71 year old year old female with PMH of IV AL amyloid diagnosed in 2016, restrictive cardiomyopathy/HFpEF, pAFib/Flutter, pulmonary emphysema, and CKD who was admitted for chest pain, shortness of breath, and was found to be in A. Fib with RVR. This resolved prior to arrival to ED. Patient was admitted for hypervolemia, diuresed with IV Lasix    ASSESSMENT      Enrollment  Primary Care Care Coordination Status: Declined    Discharge Assessment  How are you doing now that you are home?: \"Not good my head still hurts\"  How are your symptoms? (Red Flag symptoms escalate to triage hotline per guidelines): Unchanged  Do you feel your condition is stable enough to be safe at home until your provider visit?: Yes  Does the patient have their discharge instructions? : Yes  Does the patient have questions regarding their discharge instructions? : No  Were you started on any new medications or were there changes to any of your previous medications? : No (Patient has been taking Tylenol for pain related to headache.)  Does the patient have all of their medications?: Yes  Do you have questions regarding any of your medications? : No  Do you have all of your needed medical supplies or equipment (DME)?  (i.e. oxygen tank, CPAP, cane, etc.): Yes  Discharge follow-up appointment scheduled within 14 calendar days? : Yes  Discharge Follow Up Appointment Date: " 03/11/22  Discharge Follow Up Appointment Scheduled with?: Specialty Care Provider (Cardiology - [facility])  Is patient agreeable to assistance with scheduling? : Yes    Post-op ([facility] Only)  If the patient had a surgery or procedure, do they have any questions for a nurse?: No    PLAN                                                      Outpatient Plan:     Adult [facility] Follow-up and recommended labs and tests  Follow up with [facility] 3/11 with lab work prior for heart failure  follow up    Appointments on [employer] and/or [facility] (with [facility] or  [facility] provider or service). Call [phone] if you haven't heard  regarding these appointments within 7 days of discharge.    Future Appointments   Date Time Provider Department Center   3/11/2022  4:00 PM Ciara Araya PA-C [facility] PSA [facility]   3/29/2022  2:00 PM Blank Arevalo MD [facility]   4/6/2022  1:15 PM  LAB [facility]   4/6/2022  2:00 PM Domenica Cohen MD [facility]   4/25/2022  8:00 AM Blank Arevalo MD [facility]   5[facility]18[facility]2022  [facility] [place]   5/18/2022 10:00 AM Yasmani Wang MD [facility] PAO [place]   6/8/2022  8:00 AM  LAB [facility]   6/8/2022  8:30 AM Ciara Araya PA-C [facility]       For any urgent concerns, please contact our 24 hour nurse triage line: [phone] ([phone])       NAOMI Woods  [phone]  [facility]

## 2022-03-11 ENCOUNTER — OFFICE VISIT (OUTPATIENT)
Dept: CARDIOLOGY | Facility: CLINIC | Age: 72
End: 2022-03-11
Payer: COMMERCIAL

## 2022-03-11 VITALS
DIASTOLIC BLOOD PRESSURE: 70 MMHG | SYSTOLIC BLOOD PRESSURE: 133 MMHG | WEIGHT: 151 LBS | OXYGEN SATURATION: 100 % | BODY MASS INDEX: 26.75 KG/M2 | HEART RATE: 99 BPM

## 2022-03-11 DIAGNOSIS — I50.33 ACUTE ON CHRONIC DIASTOLIC HEART FAILURE (H): Primary | ICD-10-CM

## 2022-03-11 DIAGNOSIS — N18.31 CHRONIC KIDNEY DISEASE, STAGE 3A (H): ICD-10-CM

## 2022-03-11 DIAGNOSIS — R07.89 OTHER CHEST PAIN: ICD-10-CM

## 2022-03-11 PROCEDURE — 99215 OFFICE O/P EST HI 40 MIN: CPT | Performed by: PHYSICIAN ASSISTANT

## 2022-03-11 PROCEDURE — 93000 ELECTROCARDIOGRAM COMPLETE: CPT | Performed by: PHYSICIAN ASSISTANT

## 2022-03-11 RX ORDER — NITROGLYCERIN 0.4 MG/1
TABLET SUBLINGUAL
Qty: 25 TABLET | Refills: 0 | Status: ON HOLD | OUTPATIENT
Start: 2022-03-11 | End: 2023-01-01

## 2022-03-11 NOTE — NURSING NOTE
"Chief Complaint   Patient presents with     Heart Failure       Initial /70 (BP Location: Left arm, Patient Position: Sitting, Cuff Size: Adult Large)   Pulse 99   Wt 68.5 kg (151 lb)   SpO2 100%   BMI 26.75 kg/m   Estimated body mass index is 26.75 kg/m  as calculated from the following:    Height as of 3/7/22: 1.6 m (5' 3\").    Weight as of this encounter: 68.5 kg (151 lb)..  BP completed using cuff size: large    Aminata Art L.P.N.    "
Ekg. Test procedure explained to patient .Ekg.test performed today per Provider order.Then Ekg. Result relayed  to provider for review.  Aminata Art L.P.N.        
Initiate Treatment: Betamethasone AA BID X2 weeks on X2 weeks off
Detail Level: Zone
Otc Regimen: Lamasil cream BID X2 weeks

## 2022-03-11 NOTE — LETTER
3/11/2022      RE: Leandra Guerrero  [address] [place] [address]       Dear Colleague,    Thank you for the opportunity to participate in the care of your patient, Leandra Guerrero, at the [facility]. Please see a copy of my visit note below.    In person visit.    HPI:   Ms. Guerrero is a 71 year old female with a past medical history including stage IV AL amyloid diagnosed in 2016. Presents to clinic for CORE follow-up.     Her cardiac and oncologic history are as follows: fatigue starting in 1/2016. She had a coronary angiogram which was reportedly normal but was diagnosed with HF and started on a BBand diuretics. Her symptoms progressed to the point that she was evaluated at [facility] in August/September (Dr. Barron in oncology/hematology, Dr. Nettles is cardiology). She was diagnosed with stage IV AL amyloid (+GI, +bone marrow involvement, no involvement of kidney or liver). Her work up is detailed in our previous notes, however she has lambda AL amyloidosis and she underwent CyBorD chemotherapy and excellent light chain response by serum. Patient has been turned down at [facility] for a stem cell transplant due to her cardiac involvement. Later in 2016, she had 2-3 more right sided pleural taps. She was hospitalized 1/2017 and diuresed 20-30 pounds at that time. Since that time her AL has been in remission by labs without further chemo.     At her last visit with Dr. Cohen they discussed that peritoneal dialysis may be an option in the future if needed for volume management.    This visit:  Weight today at home today was 149. Breathing is okay right now. LE edema looked really good this morning, but is coming back. She hasn't done her lymphedema pump in a few days. Mild abdominal edema.     She did have an episode of palpitations that lasted one hour over the weekend.    Last metolazone was in the  hospital.    She is having some chest pressure today, more mild than it was when she came to the hospital. During that episode she was having a. Fib.    She had been having a bit of lightheadedness today. No PND. Sleeping on 2.5 pillows.    Cardiac Medications   Apixaban 2.5 mg BID  Torsemide 150 mg BID  Kcl 60 meq TID  Inspra 25 mg daily  Metolazone 2.5-When she takes 2.5 mg of metolazone she takes 60 meq EXTRA of kcl the day of metolazone and the day after.    PAST MEDICAL HISTORY:  Past Medical History:   Diagnosis Date     AL amyloidosis (H)      Atrial fibrillation and flutter (H)      Cardiac amyloidosis (H)      Lymphedema      QT prolongation      Recurrent right pleural effusion 1/2/2017     SVT (supraventricular tachycardia) (H)        FAMILY HISTORY:  Family History   Problem Relation Age of Onset     Other - See Comments Sister         Amyloidosis       SOCIAL HISTORY:  Socioeconomic History     Marital status:    Tobacco Use     Smoking status: Never Smoker     Smokeless tobacco: Never Used   Substance and Sexual Activity     Alcohol use: No     Drug use: No   Other Topics Concern     CURRENT MEDICATIONS:  acetaminophen (TYLENOL) 325 MG tablet, Take 325 mg by mouth every 6 hours as needed for mild pain or fever   albuterol (PROAIR HFA/PROVENTIL HFA/VENTOLIN HFA) 108 (90 Base) MCG/ACT inhaler, Inhale 2 puffs into the lungs every 6 hours as needed for shortness of breath / dyspnea or wheezing  apixaban ANTICOAGULANT (ELIQUIS ANTICOAGULANT) 2.5 MG tablet, Take 1 tablet (2.5 mg) by mouth 2 times daily  BIOTIN PO, Take 2 tablets by mouth daily   camphor-menthol (DERMASARRA) 0.5-0.5 % external lotion, Apply 1 mL topically every 6 hours as needed for skin care  Cholecalciferol (VITAMIN D3 PO), Take by mouth daily Dose unknown  eplerenone (INSPRA) 25 MG tablet, Take 1 tablet (25 mg) by mouth daily  metolazone (ZAROXOLYN) 2.5 MG tablet, Take 1 tablet (2.5 mg) by mouth twice a week As needed for increased  swelling and weight gain of 3 lb in one day or 5 lb in one week  ondansetron (ZOFRAN-ODT) 8 MG ODT tab, Take 8 mg by mouth every 8 hours as needed PRN  potassium chloride ER (KLOR-CON M) 20 MEQ CR tablet, Take 3 tablets (60 mEq) by mouth 3 times daily  torsemide (DEMADEX) 100 MG tablet, Take 1.5 tablets (150 mg) by mouth every morning AND 1.5 tablets (150 mg) daily at 2 pm.  triamcinolone (KENALOG) 0.1 % external cream, Apply a thin layer twice daily to itchy areas as needed.    No current facility-administered medications on file prior to visit.      ROS:   See HPI     EXAM:  /70 (BP Location: Left arm, Patient Position: Sitting, Cuff Size: Adult Large)   Pulse 99   Wt 68.5 kg (151 lb)   SpO2 100%   BMI 26.75 kg/m       GENERAL: Appears fatigued and frail, in no acute distress. Speaking in full sentences and able to communicate all needs  HEENT: Eye symmetrical, no discharge or icterus bilaterally. Mucous membranes moist and without lesions.  CV: RRR, +S1S2, no murmur, rub, or gallop. JVP mid third of neck at 90 degrees  RESPIRATORY: Respirations regular, even, and unlabored. Lungs CTA throughout.   GI: Soft and mildly  distended with normoactive bowel sounds. No tenderness, rebound, guarding.   EXTREMITIES: Moderate b/l non-pitting peripheral edema, slightly increased from last visit. All extremities are warm and well perfused   NEUROLOGIC: Alert and interacting appropriately. No focal deficits.   MUSCULOSKELETAL: No joint swelling or tenderness.   SKIN: No jaundice. No rashes.    Labs, reviewed with patient in clinic today:  CBC RESULTS:  Lab Results   Component Value Date    WBC 5.7 03/07/2022    WBC 6.4 05/11/2021    RBC 3.78 (L) 03/07/2022    RBC 4.29 05/11/2021    HGB 10.7 (L) 03/07/2022    HGB 13.8 05/11/2021    HCT 33.9 (L) 03/07/2022    HCT 41.3 05/11/2021    MCV 90 03/07/2022    MCV 96 05/11/2021    MCH 28.3 03/07/2022    MCH 32.2 05/11/2021    MCHC 31.6 03/07/2022    MCHC 33.4 05/11/2021    RDW  15.6 (H) 03/07/2022    RDW 13.5 05/11/2021     03/07/2022     05/11/2021       CMP RESULTS:  Lab Results   Component Value Date     (L) 03/15/2022     07/08/2021    POTASSIUM 3.3 (L) 03/15/2022    POTASSIUM 3.6 07/08/2021    CHLORIDE 92 (L) 03/15/2022    CHLORIDE 99 08/03/2021    CHLORIDE 94 07/08/2021    CO2 29 03/15/2022    CO2 34 (H) 07/08/2021    ANIONGAP 9 03/15/2022    ANIONGAP 6 07/08/2021    GLC 88 03/15/2022    GLC 89 07/08/2021    BUN 35 (H) 03/15/2022    BUN 35 (H) 07/08/2021    CR 1.27 (H) 03/15/2022    CR 1.13 (H) 07/08/2021    GFRESTIMATED 46 (L) 03/15/2022    GFRESTIMATED 50 (L) 07/08/2021    GFRESTBLACK 58 (L) 07/08/2021    JERROD 9.2 03/15/2022    JERROD 9.2 07/08/2021    BILITOTAL 1.2 03/06/2022    BILITOTAL 1.9 (H) 05/11/2021    ALBUMIN 3.6 03/06/2022    ALBUMIN 3.8 05/11/2021    ALKPHOS 225 (H) 03/06/2022    ALKPHOS 171 (H) 05/11/2021    ALT 24 03/06/2022    ALT 35 05/11/2021    AST 22 03/06/2022    AST 23 05/11/2021        INR RESULTS:  Lab Results   Component Value Date    INR 1.34 (H) 11/12/2021    INR 1.30 (H) 01/14/2017       Lab Results   Component Value Date    MAG 2.7 (H) 11/14/2021    MAG 2.2 10/26/2019     Lab Results   Component Value Date    NTBNPI 1,578 (H) 11/12/2021    NTBNPI 9,009 (H) 01/13/2017     Lab Results   Component Value Date    NTBNP 976 (H) 01/20/2022    NTBNP 1,376 (H) 02/04/2021       Diagnostics:  3/11/22 EKG: My personal review: NSR at 99, no signficiant ST elevations or depressions (stable boarderline ST depressions <1mm in V5-V6, dates back prior to Nov 2021), no TWI,    2/21 Ziopatch       6/12/2019 Holter Mnoitor      TTE 4/9/19  Moderate left ventricular hypertrophy.  Global and regional left ventricular function is normal with an EF of 60-65%.  Global right ventricular function is normal.  Moderate aortic valve insufficiency.  Mild pulmonary hypertension with dilated IVC.  This study was compared with the study from 10/11/18 the AR is worse  and RA pressure is now increased.    TTE 10/11/18  Global and regional left ventricular function is normal with an EF of 55-60%.  Moderate concentric wall thickening consistent with left ventricular  hypertrophy is present - known amyloid.  Grade III or advanced diastolic dysfunction.  Normal right ventricular size, wall thickness, and function.  Estimated pulmonary artery pressure 28 mmHg.  IVC with normal diameter but does not collapse (>50%) with inspiration.  Trace pericardial effusion.  Compared to prior study from 8/17/17, no significant change.    Ziopatch 11/2017      Holter Monitor 06/2019  INTERPRETATION  1. The rhythm varied with sinus rhythm and atrial fibrillation/variable atrial flutter. Low voltage noted.  2. The heart rate varied with a minimum rate of 48 bpm, maximum rate of 164 bpm, average rate of 74 bpm.  3. Frequent supraventricular ectopy was seen ranging from 0-469 per hour. Occasional atrail pairs and fairly frequent bigeminal cycles were noted.  Wandering atrial pacemaker noted.  4. Infrequent multifocal ventricular ectopy was seen ranging from 0-36 beats per hour.  5. ST-T wave changes were present.  6. Three patient events were documented and correlated with atrial fibrillation/flutter    EKG 8/8/2019 for palpitations  - NSR at a rate of 70, MD 0.19, QTc 525, QRS is narrow. Occasional PVCs. Biphasic twaves in V4-V6, unchanged from priors.    Assessment and Plan:   Mrs. Guerrero is a 71 year old female with AL amyloidosis with cardiac manifestation who presents to CORE for hospital follow-up. Patient has cardiac amyloidosis as evidenced by her echocardiogram (severe concentric hypertrophy and biatrial enlargement) and abnormal EKG (near-low voltage, poor R wave progression, biatrial enlargement and no evidence of LVH despite thicken LV on echo) in the setting of biopsy proven (BMB, EGD) AL amyloid. She completed chemotherapy with CyBorD with an excellent serologic response and her heart  failure (i.e. troponin negative, NT pro BNP was stable, serum light chains minimal and urine light chains undetectable).     She has gradually declined over the last few years and has ongoing challenges with her volume status.  She requires frequent metolazone and maintaining an adequate potassium level has been a challenge. She has also been struggling with A. Fib, which is occaionally symptomatic. These episodes remain rare and given relative contraindications for amyloid patients we do not have her on rate control at this time. She has had a few hospital stays for volume issues and potassium issues this year. At her last appointment with Dr. Cohen, they did discuss the option of peritoneal dialysis if needed in the future for volume management.     Today, she recently left the hospital and she appears hypervolemic. She overall looks very frail. Her Cr and K are stable. We will continue on her current medications and watch closely. She will take a metolazone today or tomorrow.    # Chronic diastolic heart failure/restrictive cardiomyopathy 2/2  # Cardiac amyloidosis    Stage C. NYHA Class III.    Fluid status: Continue torsemide 150 mg BID and Kcl 60 meq TID.  Continue PRN metolazone with an extra 60 meq of kcl the day of and the day after metolazone if she take a full 2.5 mg tab. If she takes a half tab of metolazone, she take 40 meq EXTRA of kcl the day of metolazone and the day after. She will take a metolazone today or tomorrow.  ACEi/ARB/ARNI: n/a   BB: no indication and relatively contraindicated due to risk of progressive conduction disease/AV block in these patients  Aldosterone antagonist: Continue Inspra 25 mg daily  SCD prophylaxis: does not meet criteria for implant  NSAID use: contraindicated  Sleep apnea evaluation: deferred today  Remote monitoring: N/A  Goals of care: prior discussions with Dr Cohen and ongoing in [facility] as well  Other: Has a referall for lymphedema therapy in [facility]. If  she needs paracentesis in the future for symptomatic control we would support that although recent ultrasasound without acities. Also watching her pleural effusion closely.    # A. Fib/A. Flutter  Prior holter monitor which showed intermittent a. Fib and a. Flutter. [id number] of 4. Occasional palpitations which last less than 1 minute usually although she had a prolonged episode this weekend, if these become more frequent can repeat monitor to assess burden.  - Continue Eliquis 2.5mg BID, reduced dose d/t frequent bleeding, aware of risks  - Avoiding BB in the setting of amyloid  - Holding off on digoxin given generally good rate control/very rare RVR events. Could discuss in the future if needed.    # Chest pressure. Recently went to the hospital for this same sensation, although this is more mild. Unchanged EKG. Overall improved from her symptoms during her recent hospitalization. Most likely releated to her volume status, but have discussed with patient on multiple occasions what to look for and when to present to the ER.  - PRN nitroglygerin    # Lower extremity edema.  # Lymphedema   Has been a significant issue for her for a long time.This is minimally responsive to diuretics. She is getting lymphedema massage. Continue home lymphedema pumps. She requires fitted compression stockings for additional treatment.    # Pleural effusion. Noted during recent admission. On last admission, thoracentesis was defered.  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 Nausea has been a large issue and is currently, seeing palliative to address.  - Pt has seen palliative care for nausea, plans to see a Supportive Clinic for symptoms through her insurance  - Follow-up with oncology this week as scheduled    #Nausea.   - Recommend ongoing f/u with palliative care vs the support team through her insurance.    Follow: Up:   - BMP in 1 week  - Dr. Keyes as scheduled in April  - CORE clinic in 2 months    Billing  - I managed 2+ stable chronic conditions  - I reviewed one test and ordered one test  - I managed a prescription medication (will take metolazone tomorrow)    Ciara Araya PA-C  [facility]      CC  ALEJANDRO KEYES

## 2022-03-11 NOTE — PATIENT INSTRUCTIONS
"      Take your medicines every day, as directed    Changes made today:  o No medication changes  o I have prescribed nitroglycerin tablets to take as needed for chest pain. You can take 1 tab every 5 minutes as needed for chest pain. If you have to take more than 2 tablets you need to go to the ER.   o If you are taking more than 1 nitroglycerin tab a week, please notify us.    o    Monitor Your Weight and Symptoms    Contact us if you:      Gain 2 pounds in one day or 5 pounds in one week    Feel more short of breath    Notice more leg swelling    Feel lightheadeded   Change your lifestyle    Limit Salt or Sodium:    2000 mg  Limit Fluids:    2000 mL or approximately 64 ounces  Eat a Heart Healthy Diet    Low in saturated fats  Stay Active:    Aim to move at least 150 minutes every  week         To Contact us    During Business Hours:  [phone], option # 1      After hours, weekends or holidays:   [phone], Option #4  Ask to speak to the On-Call Cardiologist. Inform them you are a CORE/heart failure patient at the [facility].     Use [employer] allows you to communicate directly with your heart team through secure messaging.    [employer] can be accessed any time on your phone, computer, or tablet.    If you need assistance, we'd be happy to help!         Keep your Heart Appointments:    - Please see your primary care doctor about your headache    - You have to go to the ER for any of the \"red flag\" headache symptoms that we discussed today    - Dr. Cohen in April as scheduled    - CORE clinic in two months     Preventive Care:    Breast Cancer Screening: During our visit today, we discussed that it is recommended you receive breast cancer screening. Please call or make an appointment with your primary care provider to discuss this with them. You may also call the [facility] scheduling line ([phone]) to set up a mammography appointment at the Breast Center within the [facility] and " [facility] Center.

## 2022-03-15 ENCOUNTER — LAB (OUTPATIENT)
Dept: LAB | Facility: CLINIC | Age: 72
End: 2022-03-15
Payer: COMMERCIAL

## 2022-03-15 DIAGNOSIS — E85.4 CARDIAC AMYLOIDOSIS (H): ICD-10-CM

## 2022-03-15 DIAGNOSIS — I43 CARDIAC AMYLOIDOSIS (H): ICD-10-CM

## 2022-03-15 LAB
ANION GAP SERPL CALCULATED.3IONS-SCNC: 9 MMOL/L (ref 3–14)
BUN SERPL-MCNC: 35 MG/DL (ref 7–30)
CALCIUM SERPL-MCNC: 9.2 MG/DL (ref 8.5–10.1)
CHLORIDE BLD-SCNC: 92 MMOL/L (ref 94–109)
CO2 SERPL-SCNC: 29 MMOL/L (ref 20–32)
CREAT SERPL-MCNC: 1.27 MG/DL (ref 0.52–1.04)
GFR SERPL CREATININE-BSD FRML MDRD: 46 ML/MIN/1.73M2
GLUCOSE BLD-MCNC: 88 MG/DL (ref 70–99)
HOLD SPECIMEN: NORMAL
HOLD SPECIMEN: NORMAL
POTASSIUM BLD-SCNC: 3.3 MMOL/L (ref 3.4–5.3)
SODIUM SERPL-SCNC: 130 MMOL/L (ref 133–144)

## 2022-03-15 PROCEDURE — 80048 BASIC METABOLIC PNL TOTAL CA: CPT

## 2022-03-15 PROCEDURE — 36415 COLL VENOUS BLD VENIPUNCTURE: CPT

## 2022-03-16 ENCOUNTER — PATIENT OUTREACH (OUTPATIENT)
Dept: CARDIOLOGY | Facility: CLINIC | Age: 72
End: 2022-03-16
Payer: COMMERCIAL

## 2022-03-16 ENCOUNTER — MYC MEDICAL ADVICE (OUTPATIENT)
Dept: CARDIOLOGY | Facility: CLINIC | Age: 72
End: 2022-03-16
Payer: COMMERCIAL

## 2022-03-16 DIAGNOSIS — I50.33 ACUTE ON CHRONIC DIASTOLIC HEART FAILURE (H): Primary | ICD-10-CM

## 2022-03-16 NOTE — TELEPHONE ENCOUNTER
Called Amy to review lab results and for symptom check. Left message and asked for call back. Mychart message sent.

## 2022-03-16 NOTE — TELEPHONE ENCOUNTER
Date: 3/16/2022    Time of Call: 2:44 PM     Diagnosis:  HF     [ VORB ] Ordering provider: Yolanda Araya   Order: BMP in one week. Extra 40 meq Potassium today.      Order received by: Vidhi Montgomery RN      Follow-up/additional notes:

## 2022-03-17 PROBLEM — N18.31 CHRONIC KIDNEY DISEASE, STAGE 3A (H): Status: ACTIVE | Noted: 2022-03-17

## 2022-03-18 NOTE — TELEPHONE ENCOUNTER
Called Amy back to further assess nosebleeds.   Had a bad one in the hospital that they couldn't stop. Per notes lasted for half an hour. That was the worst one.   Has been having them daily since leaving hospital.  Yesterday had two nosebleeds. One she blew out a big clot. This one lasted for about 20 minutes.    Advised her to really try not to blow nose when this is happening as it can make it worse. Make sure she's sitting up, can also use ice packs.   Advised if it isn't stopping she needs to go to ER.     Already had reduced Eliquis a few years ago to 2.5 mg BID due to nosebleeds. Will review with Dr. Cohen.     She had a question about who to see for cyst on her wrist causing pain. She wonders if we have anyone we can refer. She did say someone had given her a name of an orthopedic doctor at the  and she thinks they might be able to help. Told her to check with insurance and if referral not needed should make an appointment directly. Otherwise follow up with PCP.

## 2022-03-20 ENCOUNTER — HEALTH MAINTENANCE LETTER (OUTPATIENT)
Age: 72
End: 2022-03-20

## 2022-03-20 ENCOUNTER — MYC MEDICAL ADVICE (OUTPATIENT)
Dept: CARDIOLOGY | Facility: CLINIC | Age: 72
End: 2022-03-20
Payer: COMMERCIAL

## 2022-03-20 DIAGNOSIS — I50.33 ACUTE ON CHRONIC DIASTOLIC HEART FAILURE (H): ICD-10-CM

## 2022-03-21 DIAGNOSIS — I50.33 ACUTE ON CHRONIC DIASTOLIC HEART FAILURE (H): ICD-10-CM

## 2022-03-21 RX ORDER — POTASSIUM CHLORIDE 750 MG/1
60 TABLET, EXTENDED RELEASE ORAL 3 TIMES DAILY
Qty: 1740 TABLET | Refills: 0 | Status: SHIPPED | OUTPATIENT
Start: 2022-03-21 | End: 2022-03-23

## 2022-03-21 NOTE — TELEPHONE ENCOUNTER
Pending Prescriptions:                       Disp   Refills    potassium chloride ER (KLOR-CON M) 10 MEQ*1740 t*0            Sig: Take 6 tablets (60 mEq) by mouth 3 times daily           Take an extra 60 meq of Potassium the day of           Metolazone and 60 meq extra the day after a           Metolazone    Last visit: 3/11/2022    Last filled: ?    Quantity: Original Quantity: 1740; Quantity Requested: 2237    Req received: 03/21/2022    Eleonora BARROSO, Visit Facilitator

## 2022-03-23 RX ORDER — POTASSIUM CHLORIDE 750 MG/1
60 TABLET, EXTENDED RELEASE ORAL 3 TIMES DAILY
Qty: 1980 TABLET | Refills: 0 | Status: ON HOLD | OUTPATIENT
Start: 2022-03-23 | End: 2022-06-06

## 2022-03-23 NOTE — TELEPHONE ENCOUNTER
DIAGNOSIS: ganglion cyst now causing pain   APPOINTMENT DATE: 03/28/2022   NOTES STATUS DETAILS   OFFICE NOTE from referring provider N/A    OFFICE NOTE from other specialist Care Everywhere 02/22/2021 [facility] Dr Isabel    DISCHARGE SUMMARY from hospital N/A    DISCHARGE REPORT from the ER N/A    OPERATIVE REPORT N/A    EMG report N/A    MEDICATION LIST N/A    MRI N/A    DEXA (osteoporosis/bone health) N/A    CT SCAN N/A    XRAYS (IMAGES & REPORTS) Received 02/22/2021 LFT wrist     Images in PACS  Luz Sellers on 3/23/2022 at 4:19 PM

## 2022-03-24 ENCOUNTER — LAB (OUTPATIENT)
Dept: LAB | Facility: CLINIC | Age: 72
End: 2022-03-24
Payer: COMMERCIAL

## 2022-03-24 ENCOUNTER — PATIENT OUTREACH (OUTPATIENT)
Dept: CARDIOLOGY | Facility: CLINIC | Age: 72
End: 2022-03-24

## 2022-03-24 DIAGNOSIS — I43 CARDIAC AMYLOIDOSIS (H): ICD-10-CM

## 2022-03-24 DIAGNOSIS — R07.89 OTHER CHEST PAIN: Primary | ICD-10-CM

## 2022-03-24 DIAGNOSIS — I50.33 ACUTE ON CHRONIC DIASTOLIC HEART FAILURE (H): ICD-10-CM

## 2022-03-24 DIAGNOSIS — E85.4 CARDIAC AMYLOIDOSIS (H): ICD-10-CM

## 2022-03-24 LAB
ANION GAP SERPL CALCULATED.3IONS-SCNC: 5 MMOL/L (ref 3–14)
BUN SERPL-MCNC: 36 MG/DL (ref 7–30)
CALCIUM SERPL-MCNC: 9.3 MG/DL (ref 8.5–10.1)
CHLORIDE BLD-SCNC: 99 MMOL/L (ref 94–109)
CO2 SERPL-SCNC: 31 MMOL/L (ref 20–32)
CREAT SERPL-MCNC: 0.99 MG/DL (ref 0.52–1.04)
GFR SERPL CREATININE-BSD FRML MDRD: 61 ML/MIN/1.73M2
GLUCOSE BLD-MCNC: 91 MG/DL (ref 70–99)
HOLD SPECIMEN: NORMAL
HOLD SPECIMEN: NORMAL
POTASSIUM BLD-SCNC: 3.7 MMOL/L (ref 3.4–5.3)
SODIUM SERPL-SCNC: 135 MMOL/L (ref 133–144)

## 2022-03-24 PROCEDURE — 36415 COLL VENOUS BLD VENIPUNCTURE: CPT

## 2022-03-24 PROCEDURE — 80048 BASIC METABOLIC PNL TOTAL CA: CPT

## 2022-03-24 NOTE — TELEPHONE ENCOUNTER
Labs reviewed by Yolanda Araya and have improved. Should continue to get labs q2-3 weeks. Mychart sent to Amy.     Date: 3/24/2022    Time of Call: 12:51 PM     Diagnosis:  HF     [ TORB ] Ordering provider: Yolanda MORGAN  Order: BMP every 2-3 weeks     Order received by: Vidhi Montgomery RN      Follow-up/additional notes:

## 2022-03-28 ENCOUNTER — ANCILLARY PROCEDURE (OUTPATIENT)
Dept: GENERAL RADIOLOGY | Facility: CLINIC | Age: 72
End: 2022-03-28
Attending: PREVENTIVE MEDICINE
Payer: COMMERCIAL

## 2022-03-28 ENCOUNTER — OFFICE VISIT (OUTPATIENT)
Dept: ORTHOPEDICS | Facility: CLINIC | Age: 72
End: 2022-03-28
Payer: COMMERCIAL

## 2022-03-28 ENCOUNTER — PRE VISIT (OUTPATIENT)
Dept: ORTHOPEDICS | Facility: CLINIC | Age: 72
End: 2022-03-28

## 2022-03-28 VITALS — WEIGHT: 150 LBS | HEIGHT: 63 IN | BODY MASS INDEX: 26.58 KG/M2

## 2022-03-28 DIAGNOSIS — M18.10 ARTHRITIS OF CARPOMETACARPAL (CMC) JOINT OF THUMB: ICD-10-CM

## 2022-03-28 DIAGNOSIS — M67.432 GANGLION CYST OF WRIST, LEFT: ICD-10-CM

## 2022-03-28 DIAGNOSIS — M19.032 ARTHRITIS OF LEFT WRIST: ICD-10-CM

## 2022-03-28 DIAGNOSIS — M19.032 ARTHRITIS OF LEFT WRIST: Primary | ICD-10-CM

## 2022-03-28 PROCEDURE — 20600 DRAIN/INJ JOINT/BURSA W/O US: CPT | Mod: LT | Performed by: PREVENTIVE MEDICINE

## 2022-03-28 PROCEDURE — 99203 OFFICE O/P NEW LOW 30 MIN: CPT | Mod: 25 | Performed by: PREVENTIVE MEDICINE

## 2022-03-28 PROCEDURE — 73110 X-RAY EXAM OF WRIST: CPT | Mod: LT | Performed by: RADIOLOGY

## 2022-03-28 RX ADMIN — METHYLPREDNISOLONE ACETATE 40 MG: 40 INJECTION, SUSPENSION INTRA-ARTICULAR; INTRALESIONAL; INTRAMUSCULAR; SOFT TISSUE at 12:23

## 2022-03-28 NOTE — PATIENT INSTRUCTIONS
Thanks for coming today.  Ortho/Sports Medicine Clinic  [address] [place] [address]    To schedule future appointments in Ortho Clinic, you may call [phone].    To schedule ordered imaging by your Provider: Call [facility] at [phone]    [employer] available online at:   [email or web address]    Please call if any further questions or concerns [phone] and ask for the Orthopedic Department. Clinic hours 8 am to 5 pm.    Return to clinic if symptoms worsen.

## 2022-03-28 NOTE — TELEPHONE ENCOUNTER
DIAGNOSIS: ganglion cyst    APPOINTMENT DATE: 04/15/2022   NOTES STATUS DETAILS   OFFICE NOTE from referring provider Internal 03/28/2022 Dr Mckeon [facility]    OFFICE NOTE from other specialist [facility] Every[facility]    DISCHARGE SUMMARY from hospital N/A    DISCHARGE REPORT from the ER N/A    OPERATIVE REPORT N/A    MEDICATION LIST N/A    EMG (for Spine) N/A    IMPLANT RECORD/STICKER N/A    LABS     CBC/DIFF N/A    CULTURES N/A    INJECTIONS DONE IN RADIOLOGY N/A    MRI N/A    CT SCAN N/A    XRAYS (IMAGES & REPORTS) Internal 03/28/2022 LFT wrist   TUMOR     PATHOLOGY  Slides & report N/A

## 2022-03-28 NOTE — PROGRESS NOTES
HISTORY OF PRESENT ILLNESS  Ms. Guerrero is a pleasant 71 year old year old female who presents to clinic today with   [place]ia explains that her left thumb has bothered her over the past several months worse when she works with her hands  Location: left cmc joint  Quality:  achy pain    Severity: 6/10 at worst    Duration: see above  Timing: occurs intermittently    Modifying factors:  resting and non-use makes it better, movement and use makes it worse  Associated signs & symptoms: some swelling    MEDICAL HISTORY  Patient Active Problem List   Diagnosis     AL amyloidosis (H)     Weight loss     Hemorrhagic cystitis     Bilateral edema of lower extremity     Cardiac amyloidosis (H)     Recurrent right pleural effusion     Acute on chronic diastolic heart failure (H)     Central sleep apnea     Shortness of breath     Essential hypertension     Gastroesophageal reflux disease     Insomnia     Low back pain     Lymphedema of lower extremity     Normal coronary arteries     Periodic limb movement disorder     Pulmonary emphysema (H)     Seasonal allergic rhinitis     Advance care planning     Heart failure (H)     Acute kidney failure, unspecified (H)     Hypokalemia     Paroxysmal atrial fibrillation (H)     Chronic kidney disease, stage 3a (H)       Current Outpatient Medications   Medication Sig Dispense Refill     acetaminophen (TYLENOL) 325 MG tablet Take 325 mg by mouth every 6 hours as needed for mild pain or fever        albuterol (PROAIR HFA/PROVENTIL HFA/VENTOLIN HFA) 108 (90 Base) MCG/ACT inhaler Inhale 2 puffs into the lungs every 6 hours as needed for shortness of breath / dyspnea or wheezing       apixaban ANTICOAGULANT (ELIQUIS ANTICOAGULANT) 2.5 MG tablet Take 1 tablet (2.5 mg) by mouth 2 times daily 180 tablet 3     BIOTIN PO Take 2 tablets by mouth daily        camphor-menthol (DERMASARRA) 0.5-0.5 % external lotion Apply 1 mL topically every 6 hours as needed for skin care 222 mL 0      Cholecalciferol (VITAMIN D3 PO) Take by mouth daily Dose unknown       eplerenone (INSPRA) 25 MG tablet Take 1 tablet (25 mg) by mouth daily 90 tablet 3     metolazone (ZAROXOLYN) 2.5 MG tablet Take 1 tablet (2.5 mg) by mouth twice a week As needed for increased swelling and weight gain of 3 lb in one day or 5 lb in one week 26 tablet 3     nitroGLYcerin (NITROSTAT) 0.4 MG sublingual tablet For chest pain. Place 1 tablet under the tongue every 5 minutes. If symptoms persist after 2 doses call 911. 25 tablet 0     ondansetron (ZOFRAN-ODT) 8 MG ODT tab Take 8 mg by mouth every 8 hours as needed PRN       potassium chloride ER (KLOR-CON M) 10 MEQ CR tablet Take 6 tablets (60 mEq) by mouth 3 times daily Take an extra 60 meq of Potassium the day of Metolazone and 60 meq extra the day after a Metolazone 1980 tablet 0     torsemide (DEMADEX) 100 MG tablet Take 1.5 tablets (150 mg) by mouth every morning AND 1.5 tablets (150 mg) daily at 2 pm. 270 tablet 1     triamcinolone (KENALOG) 0.1 % external cream Apply a thin layer twice daily to itchy areas as needed. 453.6 g 3       Allergies   Allergen Reactions     Contrast Dye Shortness Of Breath     Shaking,chills and dypsnea     Cytoxan [Cyclophosphamide]      Hemorrhagic cystitis     Gabapentin      Slurred speech, weakness     Levofloxacin      Severe shaking and abdominal pain     Aaron Weed      Rash and itchyness     Amoxicillin Nausea and Vomiting and Cramps     Spironolactone      Worsening hyponatremia, palpitations     Chlorhexidine Dermatitis and Rash     AARON wipes  2% chlorhexidine gluconate   Rash to everywhere wipe was applied        Family History   Problem Relation Age of Onset     Other - See Comments Sister         Amyloidosis     Social History     Socioeconomic History     Marital status:      Spouse name: Not on file     Number of children: Not on file     Years of education: Not on file     Highest education level: Not on file   Occupational  "History     Not on file   Tobacco Use     Smoking status: Never Smoker     Smokeless tobacco: Never Used   Substance and Sexual Activity     Alcohol use: No     Drug use: No     Sexual activity: Not on file   Other Topics Concern     Parent/sibling w/ CABG, MI or angioplasty before 65F 55M? Not Asked   Social History Narrative     Not on file     Social Determinants of Health     Financial Resource Strain: Not on file   Food Insecurity: Not on file   Transportation Needs: Not on file   Physical Activity: Not on file   Stress: Not on file   Social Connections: Not on file   Intimate Partner Violence: Not on file   Housing Stability: Not on file       Additional medical/Social/Surgical histories reviewed in [facility] and updated as appropriate.     REVIEW OF SYSTEMS (3/28/2022)  10 point ROS of systems including Constitutional, Eyes, Respiratory, Cardiovascular, Gastroenterology, Genitourinary, Integumentary, Musculoskeletal, Psychiatric, Allergic/Immunologic were all negative except for pertinent positives noted in my HPI.     PHYSICAL EXAM  Vitals:    03/28/22 1138   Weight: 68 kg (150 lb)   Height: 1.6 m (5' 3\")     Vital Signs: Ht 1.6 m (5' 3\")   Wt 68 kg (150 lb)   BMI 26.57 kg/m   Patient declined being weighed. Body mass index is 26.57 kg/m .    General  - normal appearance, in no obvious distress  HEENT  - conjunctivae not injected, moist mucous membranes, normocephalic/atraumatic head, ears normal appearance, no lesions, mouth normal appearance, no scars, normal dentition and teeth present  CV  - normal radial pulse  Pulm  - normal respiratory pattern, non-labored  Musculoskeletal - left hand/wrist  - inspection: normal joint alignment, no obvious deformity, no swelling  - palpation: ttp over cmc joint, no tenderness at the anatomical snuffbox  - ROM: full rom of thumb, pain with flexion/extension  - strength: 5/5  strength, 5/5 flexion, extension, pronation, supination, adduction, abduction    Neuro  - no " sensory or motor deficit, grossly normal coordination, normal muscle tone  Skin  - no ecchymosis, erythema, warmth, or induration, no obvious rash  Psych  - interactive, appropriate, normal mood and affect  ASSESSMENT & PLAN  70 yo female with left cmc arthritis hand    I independently reviewed the following imaging studies:    Xray hand: shows cmc arthritis  After a 20 minute discussion and examination, we decided to perform a same day injection for diagnostic and therapeutic purposes for cmc joint  Start thumb splint  F/u in 1 month  voltren gel PRN    Appropriate PPE was utilized for prevention of spread of Covid-19.  Bryant Mckeon MD, CAQSM    PROCEDURE: left cmc thumb injection     The patient was apprised of the risks and the benefits of the procedure and consented and a written consent was signed by the patient.   The patient s left cmc joint was sterilely prepped with chloraprep.   40 mg of depo suspension was drawn up into a 5 mL syringe with 1 mL of 1% lidocaine   The patient was injected with a 1.5-inch 25-gauge needle at left cmc joint  There were no complications. The patient tolerated the procedure well. There was minimal bleeding.   The patient was instructed to ice the left cmc joint upon leaving clinic and refrain from overuse over the next 3 days.   The patient was instructed to go to the emergency room with any usual pain, swelling, or redness occurred in the injected area.   The patient was given a followup appointment to evaluate response to the injection to their increased range of motion and reduction of pain.    followup PRN  Dr Bryant Mckeon

## 2022-03-28 NOTE — NURSING NOTE
DME FITTING    Relevant Diagnosis: Arthritis of left wrist  Thumb restrcit  brace was fit on patient's Left wrist.     Person(s) involved in teaching:   Patient    Brace was applied in standard [place]anner:  Yes  Brace fit well:  Yes  Patient reports brace to fit comfortably:  Yes    Education:   Patient shown self application and removal of brace: Yes  Patient shown how to adjust brace fit, if necessary: Yes  Patient educated on billing and return policy: Yes  Patient confirmed understanding when and how to contact clinic with concerns: Yes

## 2022-03-28 NOTE — PROGRESS NOTES
Small Joint Injection/Arthrocentesis: L thumb CMC    Date/Time: 3/28/2022 12:23 PM  Performed by: Bryant Mckeon MD  Authorized by: Bryant Mckeon MD   Indications:  Pain, diagnostic evaluation and joint swelling  Needle Size:  25 G  Guidance: landmark     Approach:  Dorsal  Location:  Thumb    Site:  L thumb CMC                    Medications:  40 mg methylPREDNISolone 40 MG/ML        Outcome:  Tolerated well, no immediate complications  Procedure discussed: discussed risks, benefits, and alternatives    Consent Given by:  Patient  Timeout: timeout called immediately prior to procedure    Prep: patient was prepped and draped in usual sterile fashion

## 2022-03-28 NOTE — LETTER
3/28/2022         RE: Leandra Guerrero  [address] [place] [address]        Dear Colleague,    Thank you for referring your patient, Leandra Guerrero, to the [facility] [place]. Please see a copy of my visit note below.    HISTORY OF PRESENT ILLNESS  Ms. Guerrero is a pleasant 71 year old year old female who presents to clinic today with   [place] explains that her left thumb has bothered her over the past several months worse when she works with her hands  Location: left cmc joint  Quality:  achy pain    Severity: 6/10 at worst    Duration: see above  Timing: occurs intermittently    Modifying factors:  resting and non-use makes it better, movement and use makes it worse  Associated signs & symptoms: some swelling    MEDICAL HISTORY  Patient Active Problem List   Diagnosis     AL amyloidosis (H)     Weight loss     Hemorrhagic cystitis     Bilateral edema of lower extremity     Cardiac amyloidosis (H)     Recurrent right pleural effusion     Acute on chronic diastolic heart failure (H)     Central sleep apnea     Shortness of breath     Essential hypertension     Gastroesophageal reflux disease     Insomnia     Low back pain     Lymphedema of lower extremity     Normal coronary arteries     Periodic limb movement disorder     Pulmonary emphysema (H)     Seasonal allergic rhinitis     Advance care planning     Heart failure (H)     Acute kidney failure, unspecified (H)     Hypokalemia     Paroxysmal atrial fibrillation (H)     Chronic kidney disease, stage 3a (H)       Current Outpatient Medications   Medication Sig Dispense Refill     acetaminophen (TYLENOL) 325 MG tablet Take 325 mg by mouth every 6 hours as needed for mild pain or fever        albuterol (PROAIR HFA/PROVENTIL HFA/VENTOLIN HFA) 108 (90 Base) MCG/ACT inhaler Inhale 2 puffs into the lungs every 6 hours as needed for shortness of breath / dyspnea or wheezing       apixaban ANTICOAGULANT  (ELIQUIS ANTICOAGULANT) 2.5 MG tablet Take 1 tablet (2.5 mg) by mouth 2 times daily 180 tablet 3     BIOTIN PO Take 2 tablets by mouth daily        camphor-menthol (DERMASARRA) 0.5-0.5 % external lotion Apply 1 mL topically every 6 hours as needed for skin care 222 mL 0     Cholecalciferol (VITAMIN D3 PO) Take by mouth daily Dose unknown       eplerenone (INSPRA) 25 MG tablet Take 1 tablet (25 mg) by mouth daily 90 tablet 3     metolazone (ZAROXOLYN) 2.5 MG tablet Take 1 tablet (2.5 mg) by mouth twice a week As needed for increased swelling and weight gain of 3 lb in one day or 5 lb in one week 26 tablet 3     nitroGLYcerin (NITROSTAT) 0.4 MG sublingual tablet For chest pain. Place 1 tablet under the tongue every 5 minutes. If symptoms persist after 2 doses call 911. 25 tablet 0     ondansetron (ZOFRAN-ODT) 8 MG ODT tab Take 8 mg by mouth every 8 hours as needed PRN       potassium chloride ER (KLOR-CON M) 10 MEQ CR tablet Take 6 tablets (60 mEq) by mouth 3 times daily Take an extra 60 meq of Potassium the day of Metolazone and 60 meq extra the day after a Metolazone 1980 tablet 0     torsemide (DEMADEX) 100 MG tablet Take 1.5 tablets (150 mg) by mouth every morning AND 1.5 tablets (150 mg) daily at 2 pm. 270 tablet 1     triamcinolone (KENALOG) 0.1 % external cream Apply a thin layer twice daily to itchy areas as needed. 453.6 g 3       Allergies   Allergen Reactions     Contrast Dye Shortness Of Breath     Shaking,chills and dypsnea     Cytoxan [Cyclophosphamide]      Hemorrhagic cystitis     Gabapentin      Slurred speech, weakness     Levofloxacin      Severe shaking and abdominal pain     Aaron Weed      Rash and itchyness     Amoxicillin Nausea and Vomiting and Cramps     Spironolactone      Worsening hyponatremia, palpitations     Chlorhexidine Dermatitis and Rash     AARON wipes  2% chlorhexidine gluconate   Rash to everywhere wipe was applied        Family History   Problem Relation Age of Onset     Other -  "See Comments Sister         Amyloidosis     Social History     Socioeconomic History     Marital status:      Spouse name: Not on file     Number of children: Not on file     Years of education: Not on file     Highest education level: Not on file   Occupational History     Not on file   Tobacco Use     Smoking status: Never Smoker     Smokeless tobacco: Never Used   Substance and Sexual Activity     Alcohol use: No     Drug use: No     Sexual activity: Not on file   Other Topics Concern     Parent/sibling w/ CABG, MI or angioplasty before 65F 55M? Not Asked   Social History Narrative     Not on file     Social Determinants of Health     Financial Resource Strain: Not on file   Food Insecurity: Not on file   Transportation Needs: Not on file   Physical Activity: Not on file   Stress: Not on file   Social Connections: Not on file   Intimate Partner Violence: Not on file   Housing Stability: Not on file       Additional medical/Social/Surgical histories reviewed in [facility] and updated as appropriate.     REVIEW OF SYSTEMS (3/28/2022)  10 point ROS of systems including Constitutional, Eyes, Respiratory, Cardiovascular, Gastroenterology, Genitourinary, Integumentary, Musculoskeletal, Psychiatric, Allergic/Immunologic were all negative except for pertinent positives noted in my HPI.     PHYSICAL EXAM  Vitals:    03/28/22 1138   Weight: 68 kg (150 lb)   Height: 1.6 m (5' 3\")     Vital Signs: Ht 1.6 m (5' 3\")   Wt 68 kg (150 lb)   BMI 26.57 kg/m   Patient declined being weighed. Body mass index is 26.57 kg/m .    General  - normal appearance, in no obvious distress  HEENT  - conjunctivae not injected, moist mucous membranes, normocephalic/atraumatic head, ears normal appearance, no lesions, mouth normal appearance, no scars, normal dentition and teeth present  CV  - normal radial pulse  Pulm  - normal respiratory pattern, non-labored  Musculoskeletal - left hand/wrist  - inspection: normal joint alignment, no obvious " deformity, no swelling  - palpation: ttp over cmc joint, no tenderness at the anatomical snuffbox  - ROM: full rom of thumb, pain with flexion/extension  - strength: 5/5  strength, 5/5 flexion, extension, pronation, supination, adduction, abduction    Neuro  - no sensory or motor deficit, grossly normal coordination, normal muscle tone  Skin  - no ecchymosis, erythema, warmth, or induration, no obvious rash  Psych  - interactive, appropriate, normal mood and affect  ASSESSMENT & PLAN  72 yo female with left cmc arthritis hand    I independently reviewed the following imaging studies:    Xray hand: shows cmc arthritis  After a 20 minute discussion and examination, we decided to perform a same day injection for diagnostic and therapeutic purposes for cmc joint  Start thumb splint  F/u in 1 month  voltren gel PRN    Appropriate PPE was utilized for prevention of spread of Covid-19.  Bryant Mckeon MD, CAQSM    PROCEDURE: left cmc thumb injection     The patient was apprised of the risks and the benefits of the procedure and consented and a written consent was signed by the patient.   The patient s left cmc joint was sterilely prepped with chloraprep.   40 mg of depo suspension was drawn up into a 5 mL syringe with 1 mL of 1% lidocaine   The patient was injected with a 1.5-inch 25-gauge needle at left cmc joint  There were no complications. The patient tolerated the procedure well. There was minimal bleeding.   The patient was instructed to ice the left cmc joint upon leaving clinic and refrain from overuse over the next 3 days.   The patient was instructed to go to the emergency room with any usual pain, swelling, or redness occurred in the injected area.   The patient was given a followup appointment to evaluate response to the injection to their increased range of motion and reduction of pain.    followup PRN  Dr Bryant Mckeon    Small Joint Injection/Arthrocentesis: L thumb CMC    Date/Time: 3/28/2022 12:23  PM  Performed by: Bryant Mckeon MD  Authorized by: Bryant Mckeon MD   Indications:  Pain, diagnostic evaluation and joint swelling  Needle Size:  25 G  Guidance: landmark     Approach:  Dorsal  Location:  Thumb    Site:  L thumb CMC                    Medications:  40 mg methylPREDNISolone 40 MG/ML        Outcome:  Tolerated well, no immediate complications  Procedure discussed: discussed risks, benefits, and alternatives    Consent Given by:  Patient  Timeout: timeout called immediately prior to procedure    Prep: patient was prepped and draped in usual sterile fashion              Again, thank you for allowing me to participate in the care of your patient.        Sincerely,        Bryant Mckeon MD

## 2022-03-29 ENCOUNTER — LAB (OUTPATIENT)
Dept: LAB | Facility: CLINIC | Age: 72
End: 2022-03-29
Payer: COMMERCIAL

## 2022-03-29 ENCOUNTER — OFFICE VISIT (OUTPATIENT)
Dept: DERMATOLOGY | Facility: CLINIC | Age: 72
End: 2022-03-29
Payer: COMMERCIAL

## 2022-03-29 DIAGNOSIS — L21.9 DERMATITIS, SEBORRHEIC: ICD-10-CM

## 2022-03-29 DIAGNOSIS — I43 CARDIAC AMYLOIDOSIS (H): ICD-10-CM

## 2022-03-29 DIAGNOSIS — E85.4 CARDIAC AMYLOIDOSIS (H): ICD-10-CM

## 2022-03-29 DIAGNOSIS — L65.9 LOSS OF HAIR: ICD-10-CM

## 2022-03-29 DIAGNOSIS — L65.9 LOSS OF HAIR: Primary | ICD-10-CM

## 2022-03-29 LAB
ANION GAP SERPL CALCULATED.3IONS-SCNC: 8 MMOL/L (ref 3–14)
BASOPHILS # BLD AUTO: 0.1 10E3/UL (ref 0–0.2)
BASOPHILS NFR BLD AUTO: 1 %
BUN SERPL-MCNC: 48 MG/DL (ref 7–30)
CALCIUM SERPL-MCNC: 9.7 MG/DL (ref 8.5–10.1)
CHLORIDE BLD-SCNC: 94 MMOL/L (ref 94–109)
CO2 SERPL-SCNC: 31 MMOL/L (ref 20–32)
CREAT SERPL-MCNC: 1.16 MG/DL (ref 0.52–1.04)
EOSINOPHIL # BLD AUTO: 0.6 10E3/UL (ref 0–0.7)
EOSINOPHIL NFR BLD AUTO: 10 %
ERYTHROCYTE [DISTWIDTH] IN BLOOD BY AUTOMATED COUNT: 15.7 % (ref 10–15)
FERRITIN SERPL-MCNC: 22 NG/ML (ref 8–252)
GFR SERPL CREATININE-BSD FRML MDRD: 51 ML/MIN/1.73M2
GLUCOSE BLD-MCNC: 100 MG/DL (ref 70–99)
HCT VFR BLD AUTO: 36.9 % (ref 35–47)
HGB BLD-MCNC: 11.8 G/DL (ref 11.7–15.7)
IMM GRANULOCYTES # BLD: 0 10E3/UL
IMM GRANULOCYTES NFR BLD: 0 %
LYMPHOCYTES # BLD AUTO: 0.4 10E3/UL (ref 0.8–5.3)
LYMPHOCYTES NFR BLD AUTO: 7 %
MCH RBC QN AUTO: 28.2 PG (ref 26.5–33)
MCHC RBC AUTO-ENTMCNC: 32 G/DL (ref 31.5–36.5)
MCV RBC AUTO: 88 FL (ref 78–100)
MONOCYTES # BLD AUTO: 0.5 10E3/UL (ref 0–1.3)
MONOCYTES NFR BLD AUTO: 8 %
NEUTROPHILS # BLD AUTO: 4.3 10E3/UL (ref 1.6–8.3)
NEUTROPHILS NFR BLD AUTO: 74 %
NRBC # BLD AUTO: 0 10E3/UL
NRBC BLD AUTO-RTO: 0 /100
PLATELET # BLD AUTO: 297 10E3/UL (ref 150–450)
POTASSIUM BLD-SCNC: 4.3 MMOL/L (ref 3.4–5.3)
RBC # BLD AUTO: 4.19 10E6/UL (ref 3.8–5.2)
SODIUM SERPL-SCNC: 133 MMOL/L (ref 133–144)
TSH SERPL DL<=0.005 MIU/L-ACNC: 1.73 MU/L (ref 0.4–4)
WBC # BLD AUTO: 5.9 10E3/UL (ref 4–11)

## 2022-03-29 PROCEDURE — 82728 ASSAY OF FERRITIN: CPT | Performed by: DERMATOLOGY

## 2022-03-29 PROCEDURE — 82306 VITAMIN D 25 HYDROXY: CPT | Mod: GA | Performed by: DERMATOLOGY

## 2022-03-29 PROCEDURE — 85025 COMPLETE CBC W/AUTO DIFF WBC: CPT

## 2022-03-29 PROCEDURE — 84630 ASSAY OF ZINC: CPT | Mod: 90 | Performed by: DERMATOLOGY

## 2022-03-29 PROCEDURE — 84443 ASSAY THYROID STIM HORMONE: CPT | Performed by: DERMATOLOGY

## 2022-03-29 PROCEDURE — 36415 COLL VENOUS BLD VENIPUNCTURE: CPT

## 2022-03-29 PROCEDURE — 80048 BASIC METABOLIC PNL TOTAL CA: CPT

## 2022-03-29 PROCEDURE — 99213 OFFICE O/P EST LOW 20 MIN: CPT | Performed by: DERMATOLOGY

## 2022-03-29 PROCEDURE — 99000 SPECIMEN HANDLING OFFICE-LAB: CPT | Mod: GA | Performed by: DERMATOLOGY

## 2022-03-29 RX ORDER — CLOBETASOL PROPIONATE 0.5 MG/ML
SOLUTION TOPICAL
Qty: 30 ML | Refills: 3 | Status: SHIPPED | OUTPATIENT
Start: 2022-03-29 | End: 2022-06-04

## 2022-03-29 ASSESSMENT — PAIN SCALES - GENERAL: PAINLEVEL: NO PAIN (0)

## 2022-03-29 NOTE — LETTER
3/29/2022         RE: Leandra Guerrero  [address] [place] [address]        Dear Colleague,    Thank you for referring your patient, Leandra Guerrero, to the [facility]. Please see a copy of my visit note below.    [facility] Dermatology Note  Encounter Date: Mar 29, 2022  Office Visit     Dermatology Problem List:  1. 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 Eczema  - gentle moisturizer, triamcinolone 0.1% cream  3. Relevant medical history: amyloidosis with cardiac and GI involvement (follows with onc, no treatment since 2016)    Family history: Hair loss in one male  ____________________________________________    Assessment & Plan:    # Hair Loss most c/w androgenetic alopecia and telogen effluvium. Reviewed possibility of alopecia areata and offered biopsy which she declined  - For alopecia, will obtain TSH with reflex T4, CBC with Diff, ferritin, zinc, and Vitamin D.   - Start clobetasol solution to the scalp x 3 months in the PM  - Start minoxidil 5% foam or solution once daily to scalp in the am  - Future: consider biopsy, patient deferred today    # Seborrheic dermatitis, moderated    - Begin washing the hair three times per week at least, she is doing jsut once    # Atopic dermatitis s/p triamcinolone    Procedures Performed:   None    Follow-up: 3 month(s) in-person, or earlier for new or changing lesions. Call if worsening immediately    Staff and Scribe:     Scribe Disclosure:   I, Mohit Castro, am serving as a scribe to document services personally performed by this physician, Dr. Blank Arevalo, based on data collection and the provider's statements to me.     Provider Disclosure:   The documentation recorded by the scribe accurately reflects the services I personally performed and the decisions made by me.    Blank Arevalo MD  Assistant  Professor  Department of Dermatology  [facility]: Phone: [phone], Fax:[phone]  [facility]: Phone: [phone], Fax: [phone]          ____________________________________________    CC: Hair Loss (patient states started loosing hair after rekha post having the flu and has continued since to loose hair, currently using a shampoo OTC with biotin with no change, never seen for hair loss prior, currently taking biotin, never tried rogaine  )    HPI:  Ms. Leandra Guerrero is a(n) 71 year old female who presents today as a new patient for hair loss.     Last seen 2/2/22 by Dr. Gupta for atopic dermatitis. Patient given triamcinolone cream BID for itch. Also instructed to apply moisturizer and use gentle soaps daily. Consider bx if not improved. Patient reported hair loss and was referred to me for hair loss.    Today, she notes she has been losing hair since January 1st. She was sick with the flu in December for 2 weeks, without a high fever. She has continued to lose hair since then. She has not noticed clumps of hair falling out, but has noticed significantly thin areas. Less in the comb, thin front. Currently, she is using an OTC shampoo with biotin, and is currently taking biotin. She washes her hair once weekly. She has not noticed any improvement. She has not tried rogaine and has not seen hair loss in the past.    Hair loss has been sudden, falling out of roots. Thinning and shedding. Eyelashes and eyebrows are stable. Washing hair 1-2 times/week. Using biotin shampoo & conditioner. Uses hot rollers 2 times/week or less. Uses curling irons. No hair chemicals. Scalp is moderately itchy. No sores on the scalp. On a low sodium diet. History of hair loss in men. No family hx of thyroid disease, anemia or lupus.    Patient is otherwise feeling well, without additional skin  concerns.    Labs Reviewed:   Result Notes     1 HM Topic      Component Ref Range & Units 5 d ago   (3/24/22) 2 wk ago   (3/15/22) 2 wk ago   (3/10/22) 2 wk ago   (3/9/22) 2 wk ago   (3/9/22) 3 wk ago   (3/8/22) 3 wk ago   (3/8/22)    Sodium 133 - 144 mmol/L 135  130 Low   129 Low    134   134     Potassium 3.4 - 5.3 mmol/L 3.7  3.3 Low   3.7  3.7  3.2 Low   4.3  3.0 Low      Chloride 94 - 109 mmol/L 99  92 Low   92 Low    97   95     Carbon Dioxide (CO2) 20 - 32 mmol/L 31  29  28   30   32     Anion Gap 3 - 14 mmol/L 5  9  9   7   7     Urea Nitrogen 7 - 30 mg/dL 36 High   35 High   36 High    40 High    36 High      Creatinine 0.52 - 1.04 mg/dL 0.99  1.27 High   1.20 High    1.12 High    1.11 High      Calcium 8.5 - 10.1 mg/dL 9.3  9.2  8.9   9.2   9.3     Glucose 70 - 99 mg/dL 91  88  130 High    96   89     GFR Estimate >60 mL/min/1.73m2 61  46 Low  CM  49 Low  CM                 Physical Exam:  Vitals: There were no vitals taken for this visit.  SKIN: Focused examination of the scalp was performed.  - Moderate scaling  - thinning on the crown  - mild frontotemporal thinning  - positive hair pull test  - hair regrowth of 1-2 cm in the posterior scalp  - No other lesions of concern on areas examined.     Medications:  Current Outpatient Medications   Medication     acetaminophen (TYLENOL) 325 MG tablet     albuterol (PROAIR HFA/PROVENTIL HFA/VENTOLIN HFA) 108 (90 Base) MCG/ACT inhaler     apixaban ANTICOAGULANT (ELIQUIS ANTICOAGULANT) 2.5 MG tablet     BIOTIN PO     camphor-menthol (DERMASARRA) 0.5-0.5 % external lotion     Cholecalciferol (VITAMIN D3 PO)     eplerenone (INSPRA) 25 MG tablet     metolazone (ZAROXOLYN) 2.5 MG tablet     nitroGLYcerin (NITROSTAT) 0.4 MG sublingual tablet     ondansetron (ZOFRAN-ODT) 8 MG ODT tab     potassium chloride ER (KLOR-CON M) 10 MEQ CR tablet     torsemide (DEMADEX) 100 MG tablet     triamcinolone (KENALOG) 0.1 % external cream     No current facility-administered  medications for this visit.      Past Medical History:   Patient Active Problem List   Diagnosis     AL amyloidosis (H)     Weight loss     Hemorrhagic cystitis     Bilateral edema of lower extremity     Cardiac amyloidosis (H)     Recurrent right pleural effusion     Acute on chronic diastolic heart failure (H)     Central sleep apnea     Shortness of breath     Essential hypertension     Gastroesophageal reflux disease     Insomnia     Low back pain     Lymphedema of lower extremity     Normal coronary arteries     Periodic limb movement disorder     Pulmonary emphysema (H)     Seasonal allergic rhinitis     Advance care planning     Heart failure (H)     Acute kidney failure, unspecified (H)     Hypokalemia     Paroxysmal atrial fibrillation (H)     Chronic kidney disease, stage 3a (H)     Past Medical History:   Diagnosis Date     AL amyloidosis (H)      Atrial fibrillation and flutter (H)      Cardiac amyloidosis (H)      Lymphedema      QT prolongation      Recurrent right pleural effusion 1/2/2017     SVT (supraventricular tachycardia) (H)         CC No referring provider defined for this encounter. on close of this encounter.      Again, thank you for allowing me to participate in the care of your patient.        Sincerely,        Blank Arevalo MD

## 2022-03-29 NOTE — PATIENT INSTRUCTIONS
In the AM: Apply Rogaine. This is available over the counter    In the PM: Apply clobetasol for three months.    You should also shampoo the hair more often. Begin shampooing three times weekly.    We will follow up in 3 months. If you begin losing clumps of hair before then, you should return sooner.    Topical Rogaine (Minoxidil) for Pattern Hair Loss      Minoxidil is an FDA approved over the counter topical for the treatment of hair loss and thinning hair in men and women.     Initially a 2% solution was available however this required application twice daily. A 5% solution is also now approved, only requiring application once per day.     Available Products:     Rogaine 5% solution: Packaged for men however can be used by men or women. Use dropper and apply directly to scalp at bedtime. This product can cause an allergy because of presence of propylene glycol. Stop this product if you develop a rash or itching and contact your physician.     Rogaine 5% foam: Packaged for men and women: Apply foam directly to the scalp once daily. This is less greasy compared to the solution. This formula is preferred for those who had a reaction to the solution product. If you develop rash or itching, stop the product and contact your physician.     What if I stop minoxidil topical?     After stopping minoxidil the hair will return to the usual pattern of thinning. Using the product 3-4 times per week is better than not using product at all.       Can I use generic minoxidil?     Yes, look for 5% minoxidil.     What are the side effects?    The most common side effect is rash or itching of the scalp. This can occur if a contact allergy develops with propylene glycol. A small group of patients noticed the appearance of facial hair if the product runs onto the face or with prolonged use. Keep it away from the face.    This can cause temporary shedding. Please, call us if this happens.    This can irritated the scalp. Please, call  us if this happens.    Stop this product if you become pregnant or are breastfeeding      Last updated: 11/2/2021

## 2022-03-29 NOTE — NURSING NOTE
Leandra Guerrero's goals for this visit include:   Chief Complaint   Patient presents with     Hair Loss     patient states started loosing hair after rekha post having the flu and has continued since to loose hair, currently using a shampoo OTC with biotin with no change, never seen for hair loss prior, currently taking biotin, never tried rogaine         She requests these members of her care team be copied on today's visit information:     PCP: Magy Obrien    Referring Provider:  No referring provider defined for this encounter.    There were no vitals taken for this visit.    Do you need any medication refills at today's visit? Marta Angelo LPN

## 2022-03-29 NOTE — PROGRESS NOTES
[facility] Dermatology Note  Encounter Date: Mar 29, 2022  Office Visit     Dermatology Problem List:  1. 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 Eczema  - gentle moisturizer, triamcinolone 0.1% cream  3. Relevant medical history: amyloidosis with cardiac and GI involvement (follows with onc, no treatment since 2016)    Family history: Hair loss in one male  ____________________________________________    Assessment & Plan:    # Hair Loss most c/w androgenetic alopecia and telogen effluvium. Reviewed possibility of alopecia areata and offered biopsy which she declined  - For alopecia, will obtain TSH with reflex T4, CBC with Diff, ferritin, zinc, and Vitamin D.   - Start clobetasol solution to the scalp x 3 months in the PM  - Start minoxidil 5% foam or solution once daily to scalp in the am  - Future: consider biopsy, patient deferred today    # Seborrheic dermatitis, moderated    - Begin washing the hair three times per week at least, she is doing jsut once    # Atopic dermatitis s/p triamcinolone    Procedures Performed:   None    Follow-up: 3 month(s) in-person, or earlier for new or changing lesions. Call if worsening immediately    Staff and Scribe:     Scribe Disclosure:   Mohit MACIEL, am serving as a scribe to document services personally performed by this physician, Dr. Blank Arevalo, based on data collection and the provider's statements to me.     Provider Disclosure:   The documentation recorded by the scribe accurately reflects the services I personally performed and the decisions made by me.    Blank Arevalo MD    Department of Dermatology  [facility]: Phone: [phone], Fax:[phone]  [facility]: Phone: [phone], Fax:  [phone]          ____________________________________________    CC: Hair Loss (patient states started loosing hair after rekha post having the flu and has continued since to loose hair, currently using a shampoo OTC with biotin with no change, never seen for hair loss prior, currently taking biotin, never tried rogaine  )    HPI:  Ms. Leandra Guerrero is a(n) 71 year old female who presents today as a new patient for hair loss.     Last seen 2/2/22 by Dr. Gupta for atopic dermatitis. Patient given triamcinolone cream BID for itch. Also instructed to apply moisturizer and use gentle soaps daily. Consider bx if not improved. Patient reported hair loss and was referred to me for hair loss.    Today, she notes she has been losing hair since January 1st. She was sick with the flu in December for 2 weeks, without a high fever. She has continued to lose hair since then. She has not noticed clumps of hair falling out, but has noticed significantly thin areas. Less in the comb, thin front. Currently, she is using an OTC shampoo with biotin, and is currently taking biotin. She washes her hair once weekly. She has not noticed any improvement. She has not tried rogaine and has not seen hair loss in the past.    Hair loss has been sudden, falling out of roots. Thinning and shedding. Eyelashes and eyebrows are stable. Washing hair 1-2 times/week. Using biotin shampoo & conditioner. Uses hot rollers 2 times/week or less. Uses curling irons. No hair chemicals. Scalp is moderately itchy. No sores on the scalp. On a low sodium diet. History of hair loss in men. No family hx of thyroid disease, anemia or lupus.    Patient is otherwise feeling well, without additional skin concerns.    Labs Reviewed:   Result Notes     1 HM Topic      Component Ref Range & Units 5 d ago   (3/24/22) 2 wk ago   (3/15/22) 2 wk ago   (3/10/22) 2 wk ago   (3/9/22) 2 wk ago   (3/9/22) 3 wk ago   (3/8/22) 3 wk ago   (3/8/22)    Sodium 133 -  144 mmol/L 135  130 Low   129 Low    134   134     Potassium 3.4 - 5.3 mmol/L 3.7  3.3 Low   3.7  3.7  3.2 Low   4.3  3.0 Low      Chloride 94 - 109 mmol/L 99  92 Low   92 Low    97   95     Carbon Dioxide (CO2) 20 - 32 mmol/L 31  29  28   30   32     Anion Gap 3 - 14 mmol/L 5  9  9   7   7     Urea Nitrogen 7 - 30 mg/dL 36 High   35 High   36 High    40 High    36 High      Creatinine 0.52 - 1.04 mg/dL 0.99  1.27 High   1.20 High    1.12 High    1.11 High      Calcium 8.5 - 10.1 mg/dL 9.3  9.2  8.9   9.2   9.3     Glucose 70 - 99 mg/dL 91  88  130 High    96   89     GFR Estimate >60 mL/min/1.73m2 61  46 Low  CM  49 Low  CM                 Physical Exam:  Vitals: There were no vitals taken for this visit.  SKIN: Focused examination of the scalp was performed.  - Moderate scaling  - thinning on the crown  - mild frontotemporal thinning  - positive hair pull test  - hair regrowth of 1-2 cm in the posterior scalp  - No other lesions of concern on areas examined.     Medications:  Current Outpatient Medications   Medication     acetaminophen (TYLENOL) 325 MG tablet     albuterol (PROAIR HFA/PROVENTIL HFA/VENTOLIN HFA) 108 (90 Base) MCG/ACT inhaler     apixaban ANTICOAGULANT (ELIQUIS ANTICOAGULANT) 2.5 MG tablet     BIOTIN PO     camphor-menthol (DERMASARRA) 0.5-0.5 % external lotion     Cholecalciferol (VITAMIN D3 PO)     eplerenone (INSPRA) 25 MG tablet     metolazone (ZAROXOLYN) 2.5 MG tablet     nitroGLYcerin (NITROSTAT) 0.4 MG sublingual tablet     ondansetron (ZOFRAN-ODT) 8 MG ODT tab     potassium chloride ER (KLOR-CON M) 10 MEQ CR tablet     torsemide (DEMADEX) 100 MG tablet     triamcinolone (KENALOG) 0.1 % external cream     No current facility-administered medications for this visit.      Past Medical History:   Patient Active Problem List   Diagnosis     AL amyloidosis (H)     Weight loss     Hemorrhagic cystitis     Bilateral edema of lower extremity     Cardiac amyloidosis (H)     Recurrent right pleural  effusion     Acute on chronic diastolic heart failure (H)     Central sleep apnea     Shortness of breath     Essential hypertension     Gastroesophageal reflux disease     Insomnia     Low back pain     Lymphedema of lower extremity     Normal coronary arteries     Periodic limb movement disorder     Pulmonary emphysema (H)     Seasonal allergic rhinitis     Advance care planning     Heart failure (H)     Acute kidney failure, unspecified (H)     Hypokalemia     Paroxysmal atrial fibrillation (H)     Chronic kidney disease, stage 3a (H)     Past Medical History:   Diagnosis Date     AL amyloidosis (H)      Atrial fibrillation and flutter (H)      Cardiac amyloidosis (H)      Lymphedema      QT prolongation      Recurrent right pleural effusion 1/2/2017     SVT (supraventricular tachycardia) (H)         CC No referring provider defined for this encounter. on close of this encounter.

## 2022-03-30 LAB — DEPRECATED CALCIDIOL+CALCIFEROL SERPL-MC: 54 UG/L (ref 20–75)

## 2022-03-31 LAB — ZINC SERPL-MCNC: 68.7 UG/DL

## 2022-03-31 ASSESSMENT — ENCOUNTER SYMPTOMS
LEG PAIN: 1
EYE IRRITATION: 1
HYPERTENSION: 0
PALPITATIONS: 1
DOUBLE VISION: 0
POOR WOUND HEALING: 0
EXERCISE INTOLERANCE: 1
NERVOUS/ANXIOUS: 0
SKIN CHANGES: 0
SYNCOPE: 0
PANIC: 1
ORTHOPNEA: 1
LIGHT-HEADEDNESS: 1
INSOMNIA: 1
DEPRESSION: 0
EYE PAIN: 0
HYPOTENSION: 0
SLEEP DISTURBANCES DUE TO BREATHING: 0
EYE WATERING: 1
EYE REDNESS: 1
DECREASED CONCENTRATION: 1
NAIL CHANGES: 0

## 2022-04-01 DIAGNOSIS — E85.4 CARDIAC AMYLOIDOSIS (H): Primary | ICD-10-CM

## 2022-04-01 DIAGNOSIS — I50.32 CHRONIC DIASTOLIC HEART FAILURE (H): ICD-10-CM

## 2022-04-01 DIAGNOSIS — I43 CARDIAC AMYLOIDOSIS (H): Primary | ICD-10-CM

## 2022-04-04 RX ORDER — METHYLPREDNISOLONE ACETATE 40 MG/ML
40 INJECTION, SUSPENSION INTRA-ARTICULAR; INTRALESIONAL; INTRAMUSCULAR; SOFT TISSUE
Status: DISCONTINUED | OUTPATIENT
Start: 2022-03-28 | End: 2022-06-06

## 2022-04-06 ENCOUNTER — LAB (OUTPATIENT)
Dept: LAB | Facility: CLINIC | Age: 72
End: 2022-04-06
Payer: COMMERCIAL

## 2022-04-06 ENCOUNTER — OFFICE VISIT (OUTPATIENT)
Dept: CARDIOLOGY | Facility: CLINIC | Age: 72
End: 2022-04-06
Attending: INTERNAL MEDICINE
Payer: COMMERCIAL

## 2022-04-06 VITALS
HEIGHT: 62 IN | WEIGHT: 153.6 LBS | SYSTOLIC BLOOD PRESSURE: 133 MMHG | DIASTOLIC BLOOD PRESSURE: 72 MMHG | HEART RATE: 84 BPM | OXYGEN SATURATION: 100 % | BODY MASS INDEX: 28.26 KG/M2

## 2022-04-06 DIAGNOSIS — E87.6 HYPOKALEMIA: ICD-10-CM

## 2022-04-06 DIAGNOSIS — I43 AMYLOID HEART DISEASE (H): ICD-10-CM

## 2022-04-06 DIAGNOSIS — E85.4 CARDIAC AMYLOIDOSIS (H): ICD-10-CM

## 2022-04-06 DIAGNOSIS — I50.32 CHRONIC DIASTOLIC HEART FAILURE (H): ICD-10-CM

## 2022-04-06 DIAGNOSIS — I50.33 ACUTE ON CHRONIC DIASTOLIC HEART FAILURE (H): ICD-10-CM

## 2022-04-06 DIAGNOSIS — I43 CARDIAC AMYLOIDOSIS (H): ICD-10-CM

## 2022-04-06 DIAGNOSIS — E85.4 AMYLOID HEART DISEASE (H): ICD-10-CM

## 2022-04-06 DIAGNOSIS — I50.32 CHRONIC DIASTOLIC HEART FAILURE (H): Primary | ICD-10-CM

## 2022-04-06 LAB
ANION GAP SERPL CALCULATED.3IONS-SCNC: 7 MMOL/L (ref 3–14)
BUN SERPL-MCNC: 39 MG/DL (ref 7–30)
CALCIUM SERPL-MCNC: 9.1 MG/DL (ref 8.5–10.1)
CHLORIDE BLD-SCNC: 95 MMOL/L (ref 94–109)
CO2 SERPL-SCNC: 31 MMOL/L (ref 20–32)
CREAT SERPL-MCNC: 1.3 MG/DL (ref 0.52–1.04)
GFR SERPL CREATININE-BSD FRML MDRD: 44 ML/MIN/1.73M2
GLUCOSE BLD-MCNC: 84 MG/DL (ref 70–99)
NT-PROBNP SERPL-MCNC: 2341 PG/ML (ref 0–125)
POTASSIUM BLD-SCNC: 3.5 MMOL/L (ref 3.4–5.3)
SODIUM SERPL-SCNC: 133 MMOL/L (ref 133–144)

## 2022-04-06 PROCEDURE — G0463 HOSPITAL OUTPT CLINIC VISIT: HCPCS

## 2022-04-06 PROCEDURE — 80048 BASIC METABOLIC PNL TOTAL CA: CPT | Performed by: PATHOLOGY

## 2022-04-06 PROCEDURE — 36415 COLL VENOUS BLD VENIPUNCTURE: CPT | Performed by: PATHOLOGY

## 2022-04-06 PROCEDURE — 99215 OFFICE O/P EST HI 40 MIN: CPT | Performed by: INTERNAL MEDICINE

## 2022-04-06 PROCEDURE — 83880 ASSAY OF NATRIURETIC PEPTIDE: CPT | Performed by: PATHOLOGY

## 2022-04-06 RX ORDER — EPLERENONE 25 MG/1
25 TABLET, FILM COATED ORAL 2 TIMES DAILY
Qty: 180 TABLET | Refills: 3 | Status: SHIPPED | OUTPATIENT
Start: 2022-04-06

## 2022-04-06 ASSESSMENT — PAIN SCALES - GENERAL: PAINLEVEL: MILD PAIN (3)

## 2022-04-06 NOTE — NURSING NOTE
Diet: Patient instructed regarding a heart failure healthy diet, including discussion of reduced fat and 2000 mg daily sodium restriction, daily weights, medication purpose and compliance, fluid restrictions and resources for patient and family to access for assistance with heart failure management.       Labs: Patient was given results of the laboratory testing obtained today and patient was instructed about when to return for the next laboratory testing.     Med Reconcile: Reviewed and verified all current medications with the patient. The updated medication list was printed and given to the patient.     Med changes: increase Inspra to 25 mg BID    Return Appointment: Patient given instructions regarding scheduling next clinic visit: [facility] as scheduled in May and June, [facility] in July     Patient stated she understood all health information given and agreed to call with further questions or concerns.     Niki Fam RN

## 2022-04-06 NOTE — NURSING NOTE
Chief Complaint   Patient presents with     Follow Up      Return [facility] 71 year old female with chronic diastolic heart failure/AL presents for follow up with labs prior .      Vitals were taken and medications reconciled.    Nando La, EMT  1:53 PM

## 2022-04-06 NOTE — LETTER
4/6/2022      RE: Leandra Gurerero  [address] [place] [address]       Dear Colleague,    Thank you for the opportunity to participate in the care of your patient, Leandra Guerrero, at the [facility] [place] at [facility]. Please see a copy of my visit note below.      April 6, 2022    CC:     71 F year old female with a past medical history including stage IV AL amyloid diagnosed in 2016. Presents to clinic for HF follow up.      Her cardiac and oncologic history are as follows: She had fatigue in 2016.  She had a coronary angiogram which was reportedly normal but was diagnosed with HF and started on a BB and diuretics. Her symptoms progressed to the point that she was evaluated at [facility] in August/September (Dr. Barron in oncology/hematology, Dr. Nettles in cardiology). She was diagnosed with stage IV AL amyloid (+GI, +bone marrow involvement, no involvement of kidney or liver). Her work up is detailed in our previous notes, however she has lambda AL amyloidosis and she underwent CyBorD chemotherapy and excellent light chain response by serum. Patient has been turned down at [facility] for a stem cell transplant due to her cardiac involvement. Later in 2016, she had 2-3 more right sided pleural taps. She was hospitalized 1/2017 and diuresed 20-30 pounds at that time. Since that time her AL has been in remission by labs without further chemo. Patient was then treated with doxycycline , which she continues to this day.    With respect to her AL diagnosis, she has been in remission for some time.  She has not required further chemotherapy for light chain suppression.  She just had a follow-up with her oncologist a few months ago.     Despite having all features of end-stage AL cardiac amyloidosis (many high risk features associated with very poor prognosis)-Amy has settled into a period of stability that lasted several years -now to  starting to decline further.     She has not had several admissions over the last several months for IV diuretics-she is not feeling as well at the time of discharge as she has in the past.  She no longer wants to take trips to [place] given she was so fatigued last time and was scared that she might need to come to the hospital.  She is having more bad days than good days than she did before.  She continues to have nausea, fatigue, dyspnea on exertion that is very limiting, she is taking more naps, and generally feeling exhausted.     She said this last time she was coming back from [place] she thought it might be the end.     The past she has always wanted to continue clinic visits, she is always emphasized that she would want to go to the hospital if needed,     Of note she recently was diagnosed with atrial flutter and was sent to the hospital after converting quickly.  She was dizzy with this.     Weight has gone up slightly since the time of discharge but has been steady over the last couple of months.  She does endorse some PND and orthopnea when her weights are up increase abdominal swelling.  No syncope.          Medications:   Current Outpatient Medications   Medication Sig Dispense Refill     acetaminophen (TYLENOL) 325 MG tablet Take 325 mg by mouth every 6 hours as needed for mild pain or fever        albuterol (PROAIR HFA/PROVENTIL HFA/VENTOLIN HFA) 108 (90 Base) MCG/ACT inhaler Inhale 2 puffs into the lungs every 6 hours as needed for shortness of breath / dyspnea or wheezing       apixaban ANTICOAGULANT (ELIQUIS ANTICOAGULANT) 2.5 MG tablet Take 1 tablet (2.5 mg) by mouth 2 times daily 180 tablet 3     BIOTIN PO Take 2 tablets by mouth daily        camphor-menthol (DERMASARRA) 0.5-0.5 % external lotion Apply 1 mL topically every 6 hours as needed for skin care 222 mL 0     Cholecalciferol (VITAMIN D3 PO) Take by mouth daily Dose unknown       clobetasol (TEMOVATE) 0.05 % external solution Apply to the  scalp nightly for 3 months 30 mL 3     eplerenone (INSPRA) 25 MG tablet Take 1 tablet (25 mg) by mouth daily 90 tablet 3     metolazone (ZAROXOLYN) 2.5 MG tablet Take 1 tablet (2.5 mg) by mouth twice a week As needed for increased swelling and weight gain of 3 lb in one day or 5 lb in one week 26 tablet 3     nitroGLYcerin (NITROSTAT) 0.4 MG sublingual tablet For chest pain. Place 1 tablet under the tongue every 5 minutes. If symptoms persist after 2 doses call 911. 25 tablet 0     ondansetron (ZOFRAN-ODT) 8 MG ODT tab Take 8 mg by mouth every 8 hours as needed PRN       potassium chloride ER (KLOR-CON M) 10 MEQ CR tablet Take 6 tablets (60 mEq) by mouth 3 times daily Take an extra 60 meq of Potassium the day of Metolazone and 60 meq extra the day after a Metolazone 1980 tablet 0     torsemide (DEMADEX) 100 MG tablet Take 1.5 tablets (150 mg) by mouth every morning AND 1.5 tablets (150 mg) daily at 2 pm. 270 tablet 1     triamcinolone (KENALOG) 0.1 % external cream Apply a thin layer twice daily to itchy areas as needed. 453.6 g 3           PAST MEDICAL HISTORY:  Past Medical History:   Diagnosis Date     AL amyloidosis (H)      Atrial fibrillation and flutter (H)      Cardiac amyloidosis (H)      Lymphedema      QT prolongation      Recurrent right pleural effusion 1/2/2017     SVT (supraventricular tachycardia) (H)        Reviewed no changes from prior    Social history no change from prior    CURRENT MEDICATIONS:    Current Outpatient Medications   Medication Sig Dispense Refill     acetaminophen (TYLENOL) 325 MG tablet Take 325 mg by mouth every 6 hours as needed for mild pain or fever        albuterol (PROAIR HFA/PROVENTIL HFA/VENTOLIN HFA) 108 (90 Base) MCG/ACT inhaler Inhale 2 puffs into the lungs every 6 hours as needed for shortness of breath / dyspnea or wheezing       apixaban ANTICOAGULANT (ELIQUIS ANTICOAGULANT) 2.5 MG tablet Take 1 tablet (2.5 mg) by mouth 2 times daily 180 tablet 3     BIOTIN PO Take  2 tablets by mouth daily        camphor-menthol (DERMASARRA) 0.5-0.5 % external lotion Apply 1 mL topically every 6 hours as needed for skin care 222 mL 0     Cholecalciferol (VITAMIN D3 PO) Take by mouth daily Dose unknown       clobetasol (TEMOVATE) 0.05 % external solution Apply to the scalp nightly for 3 months 30 mL 3     eplerenone (INSPRA) 25 MG tablet Take 1 tablet (25 mg) by mouth daily 90 tablet 3     metolazone (ZAROXOLYN) 2.5 MG tablet Take 1 tablet (2.5 mg) by mouth twice a week As needed for increased swelling and weight gain of 3 lb in one day or 5 lb in one week 26 tablet 3     nitroGLYcerin (NITROSTAT) 0.4 MG sublingual tablet For chest pain. Place 1 tablet under the tongue every 5 minutes. If symptoms persist after 2 doses call 911. 25 tablet 0     ondansetron (ZOFRAN-ODT) 8 MG ODT tab Take 8 mg by mouth every 8 hours as needed PRN       potassium chloride ER (KLOR-CON M) 10 MEQ CR tablet Take 6 tablets (60 mEq) by mouth 3 times daily Take an extra 60 meq of Potassium the day of Metolazone and 60 meq extra the day after a Metolazone 1980 tablet 0     torsemide (DEMADEX) 100 MG tablet Take 1.5 tablets (150 mg) by mouth every morning AND 1.5 tablets (150 mg) daily at 2 pm. 270 tablet 1     triamcinolone (KENALOG) 0.1 % external cream Apply a thin layer twice daily to itchy areas as needed. 453.6 g 3       ROS:   Constitutional: No fever, chills, or sweats. Weight . + fatigue   ENT: No visual disturbance, ear ache, epistaxis, sore throat.   Allergies/Immunologic: Negative.   Respiratory: No cough, hemoptysis.   Cardiovascular: As per HPI.   GI: + nausea, no vomiting, hematemesis, melena, or hematochezia.   : No urinary frequency, dysuria, or hematuria.   Integument: Negative.   Psychiatric: Feeling down and anxious about the state of her health  Neuro: Negative.   Endocrinology: Negative.   Musculoskeletal: Negative    EXAM:  /72 (BP Location: Left arm, Patient Position: Chair, Cuff Size:  "Adult Regular)   Pulse 84   Ht 1.575 m (5' 2.01\")   Wt 69.7 kg (153 lb 9.6 oz)   SpO2 100%   BMI 28.09 kg/m    General: appears comfortable, alert and articulate  Head: normocephalic, atraumatic  Eyes: anicteric sclera, EOMI  Neck: no adenopathy  Orophyarynx: moist mucosa, no lesions, dentition intact  Heart: PMI diffuse,trace systolic murmur at LLSB, regular, S1/S2, rub, estimated JVP 14 cm   Lungs: clear, no rales or wheezing  Abdomen: soft, non-tender, bowel sounds present, no hepatosplenomegaly  Extremities: no clubbing, cyanosis-1+ lower extremity edema to the knees bilaterally  Neurological: normal speech and affect, no gross motor deficits      Labs, reviewed with patient in clinic today:  Cr 1.1     Potassium 4.0     Other data:  Coronary Angiogram 2016  Diagnostic Findings  LEFT MAIN CORONARY ARTERY     The LMCA is free of significant disease.    LEFT ANTERIOR DESCENDING     The LAD is free of significant disease.    CIRCUMFLEX     The Circumflex is free of significant disease.    RIGHT CORONARY ARTERY     The RCA is free of significant disease.         Sodium 133, potassium 3.5, creatinine 1.3, NT proBNP 2341 October 2021  Interpretation Summary  Global and regional left ventricular function is hyperkinetic with an EF of  65-70%. The LV cavity is small. Grade III or advanced diastolic dysfunction.  Global right ventricular function is normal. The right ventricle is normal  size.  Moderate tricuspid insufficiency is present.  The estimated PA systolic pressure is at least 41 mmHg.  IVC diameter >2.1 cm collapsing <50% with sniff suggests a high RA pressure  estimated at 15 mmHg or greater.  This study was compared with the study from 02/03/2021. The right atrial  pressure is higher.      Assessment and Plan:   Mrs. Guerrero is a 71 year old female with AL cardiac amyloidosis who presents for follow up.    We talked a lot today about overall goals of care.  Her symptoms are progressing-she has more " fatigue, we have had more admissions, need for higher dose of diuretics, more nausea and more napping.  More concerning to me is the fact that I cannot get her feeling better with diuresis at this point.  She is also no longer willing to travel which I know is important to her.     She is NYHA class IIIb, stage D.  Slightly hypervolemic today.      Given her symptoms or not entirely managed with volume management at this point I am not totally sure that a trial of dialysis when we get to that point is going to make sense.     I talked a lot today about what the disease looks like from this point forward.  Many patients get to the point where having some in-home assistance helps a lot - whether it be a hospital bed on the first floor or medications for symptom management-I also talked about the fact that patients often times get to a point where getting out of bed becomes more difficult and usually at that point we focus on symptoms and less on diuretics.  She is very afraid of the feeling that she will drown and talking about any of what this looks like was very upsetting to her today.  She does not want her daughters to see her decline.  In the idea of them being around her when she is in a hospital bed and dying is really unsettling to her.     I pushed the conversation a little bit today because I do not want her to get to the point that she does not think she has any other options.  I think she is feeling progressively worse and I would like to start working on her symptoms more than we are right now.  I will keep the conversation going about the idea of involving palliative care in her case.     I not waking any changes in her diuretics today but I think we are to a point where she likely will need metolazone 3 times a week.     # A. Fib/A. Flutter-paroxysmal- last zio with 4 % burden-recent admission where she was symptomatic-I am not opposed to adding amiodarone if this happens again to keep her out of the  hospital  -Avoiding BB given her Amyloid history.   On anti-coag       #Systemic Amyloid-per oncology, last light chains were under control      We will continue close follow-up between the core clinic and myself to help Amy navigate the months going forward.         Domenica Cohen MD  Associate Professor of Medicine   [facility] Division of Cardiology

## 2022-04-06 NOTE — PATIENT INSTRUCTIONS
Cardiology Providers you saw during your visit:  Dr. Cohen    Medication changes:  1.  Increase Inspra to 25 mg BID    Follow up:  1.  Follow up with Yolanda as scheduled in May and June.    2.  Follow up with Dr Cohen in 3 months with labs    Labs:  Results for HILARY GARCIA (MRN [id number]) as of 4/6/2022 14:53   Ref. Range 4/6/2022 13:33   Sodium Latest Ref Range: 133 - 144 mmol/L 133   Potassium Latest Ref Range: 3.4 - 5.3 mmol/L 3.5   Chloride Latest Ref Range: 94 - 109 mmol/L 95   Carbon Dioxide Latest Ref Range: 20 - 32 mmol/L 31   Urea Nitrogen Latest Ref Range: 7 - 30 mg/dL 39 (H)   Creatinine Latest Ref Range: 0.52 - 1.04 mg/dL 1.30 (H)   GFR Estimate Latest Ref Range: >60 mL/min/1.73m2 44 (L)   Calcium Latest Ref Range: 8.5 - 10.1 mg/dL 9.1   Anion Gap Latest Ref Range: 3 - 14 mmol/L 7   N-Terminal Pro Bnp Latest Ref Range: 0 - 125 pg/mL 2,341 (H)   Glucose Latest Ref Range: 70 - 99 mg/dL 84       Please call if you have :  1. Weight gain of more than 2 pounds in a day or 5 pounds in a week  2. Increased shortness of breath, swelling or bloating  3. Dizziness, lightheadedness   4. Any questions or concerns.       Follow the American Heart Association Diet and Lifestyle recommendations:  Limit saturated fat, trans fat, sodium, red meat, sweets and sugar-sweetened beverages. If you choose to eat red meat, compare labels and select the leanest cuts available.  Aim for at least 150 minutes of moderate physical activity or 75 minutes of vigorous physical activity - or an equal combination of both - each week.      During business hours: [phone], press option # 1 to schedule and leave a message for your care team.        After hours, weekends or holidays: On Call Cardiologist- [phone]   option #4 and ask to speak to the on-call Cardiologist. Inform them you are a CORE/heart failure patient at the [facility].      Heart Failure Support Group  Virtual meetings 2022 Monday, January 10th, 1-2  "p.m.  Monday, , 1-2 p.m.  Monday, , 1-2 p.m.  Monday, , 1-2 p.m.  Monday, May 2nd, 1-2 p.m.  Monday, , 1-2 p.m.  Monday, , 1-2 p.m.  Monday, -2 p.m.  Monday, , 1-2 p.m.  Monday, , 1-2 p.m.  Monday, , 1-2 p.m.  Monday, , 1-2 p.m.      Niki Fam RN BSN CHFN  Cardiology Care Coordinator - Heart Failure/ [facility]  [facility]   Questions and [phone]   First press #1 for the [employer] and then press #4 for \"To send a message to your care team\"    "

## 2022-04-06 NOTE — PROGRESS NOTES
April 6, 2022    CC:     71 F year old female with a past medical history including stage IV AL amyloid diagnosed in 2016. Presents to clinic for HF follow up.      Her cardiac and oncologic history are as follows: She had fatigue in 2016.  She had a coronary angiogram which was reportedly normal but was diagnosed with HF and started on a BB and diuretics. Her symptoms progressed to the point that she was evaluated at [facility] in August/September (Dr. Barron in oncology/hematology, Dr. Ntetles in cardiology). She was diagnosed with stage IV AL amyloid (+GI, +bone marrow involvement, no involvement of kidney or liver). Her work up is detailed in our previous notes, however she has lambda AL amyloidosis and she underwent CyBorD chemotherapy and excellent light chain response by serum. Patient has been turned down at [facility] for a stem cell transplant due to her cardiac involvement. Later in 2016, she had 2-3 more right sided pleural taps. She was hospitalized 1/2017 and diuresed 20-30 pounds at that time. Since that time her AL has been in remission by labs without further chemo. Patient was then treated with doxycycline , which she continues to this day.    With respect to her AL diagnosis, she has been in remission for some time.  She has not required further chemotherapy for light chain suppression.  She just had a follow-up with her oncologist a few months ago.     Despite having all features of end-stage AL cardiac amyloidosis (many high risk features associated with very poor prognosis)-Amy has settled into a period of stability that lasted several years -now to starting to decline further.     She has not had several admissions over the last several months for IV diuretics-she is not feeling as well at the time of discharge as she has in the past.  She no longer wants to take trips to [place] given she was so fatigued last time and was scared that she might need to come to the hospital.  She is having more bad days  than good days than she did before.  She continues to have nausea, fatigue, dyspnea on exertion that is very limiting, she is taking more naps, and generally feeling exhausted.     She said this last time she was coming back from [place] she thought it might be the end.     The past she has always wanted to continue clinic visits, she is always emphasized that she would want to go to the hospital if needed,     Of note she recently was diagnosed with atrial flutter and was sent to the hospital after converting quickly.  She was dizzy with this.     Weight has gone up slightly since the time of discharge but has been steady over the last couple of months.  She does endorse some PND and orthopnea when her weights are up increase abdominal swelling.  No syncope.          Medications:   Current Outpatient Medications   Medication Sig Dispense Refill     acetaminophen (TYLENOL) 325 MG tablet Take 325 mg by mouth every 6 hours as needed for mild pain or fever        albuterol (PROAIR HFA/PROVENTIL HFA/VENTOLIN HFA) 108 (90 Base) MCG/ACT inhaler Inhale 2 puffs into the lungs every 6 hours as needed for shortness of breath / dyspnea or wheezing       apixaban ANTICOAGULANT (ELIQUIS ANTICOAGULANT) 2.5 MG tablet Take 1 tablet (2.5 mg) by mouth 2 times daily 180 tablet 3     BIOTIN PO Take 2 tablets by mouth daily        camphor-menthol (DERMASARRA) 0.5-0.5 % external lotion Apply 1 mL topically every 6 hours as needed for skin care 222 mL 0     Cholecalciferol (VITAMIN D3 PO) Take by mouth daily Dose unknown       clobetasol (TEMOVATE) 0.05 % external solution Apply to the scalp nightly for 3 months 30 mL 3     eplerenone (INSPRA) 25 MG tablet Take 1 tablet (25 mg) by mouth daily 90 tablet 3     metolazone (ZAROXOLYN) 2.5 MG tablet Take 1 tablet (2.5 mg) by mouth twice a week As needed for increased swelling and weight gain of 3 lb in one day or 5 lb in one week 26 tablet 3     nitroGLYcerin (NITROSTAT) 0.4 MG sublingual  tablet For chest pain. Place 1 tablet under the tongue every 5 minutes. If symptoms persist after 2 doses call 911. 25 tablet 0     ondansetron (ZOFRAN-ODT) 8 MG ODT tab Take 8 mg by mouth every 8 hours as needed PRN       potassium chloride ER (KLOR-CON M) 10 MEQ CR tablet Take 6 tablets (60 mEq) by mouth 3 times daily Take an extra 60 meq of Potassium the day of Metolazone and 60 meq extra the day after a Metolazone 1980 tablet 0     torsemide (DEMADEX) 100 MG tablet Take 1.5 tablets (150 mg) by mouth every morning AND 1.5 tablets (150 mg) daily at 2 pm. 270 tablet 1     triamcinolone (KENALOG) 0.1 % external cream Apply a thin layer twice daily to itchy areas as needed. 453.6 g 3           PAST MEDICAL HISTORY:  Past Medical History:   Diagnosis Date     AL amyloidosis (H)      Atrial fibrillation and flutter (H)      Cardiac amyloidosis (H)      Lymphedema      QT prolongation      Recurrent right pleural effusion 1/2/2017     SVT (supraventricular tachycardia) (H)        Reviewed no changes from prior    Social history no change from prior    CURRENT MEDICATIONS:    Current Outpatient Medications   Medication Sig Dispense Refill     acetaminophen (TYLENOL) 325 MG tablet Take 325 mg by mouth every 6 hours as needed for mild pain or fever        albuterol (PROAIR HFA/PROVENTIL HFA/VENTOLIN HFA) 108 (90 Base) MCG/ACT inhaler Inhale 2 puffs into the lungs every 6 hours as needed for shortness of breath / dyspnea or wheezing       apixaban ANTICOAGULANT (ELIQUIS ANTICOAGULANT) 2.5 MG tablet Take 1 tablet (2.5 mg) by mouth 2 times daily 180 tablet 3     BIOTIN PO Take 2 tablets by mouth daily        camphor-menthol (DERMASARRA) 0.5-0.5 % external lotion Apply 1 mL topically every 6 hours as needed for skin care 222 mL 0     Cholecalciferol (VITAMIN D3 PO) Take by mouth daily Dose unknown       clobetasol (TEMOVATE) 0.05 % external solution Apply to the scalp nightly for 3 months 30 mL 3     eplerenone (INSPRA) 25 MG  "tablet Take 1 tablet (25 mg) by mouth daily 90 tablet 3     metolazone (ZAROXOLYN) 2.5 MG tablet Take 1 tablet (2.5 mg) by mouth twice a week As needed for increased swelling and weight gain of 3 lb in one day or 5 lb in one week 26 tablet 3     nitroGLYcerin (NITROSTAT) 0.4 MG sublingual tablet For chest pain. Place 1 tablet under the tongue every 5 minutes. If symptoms persist after 2 doses call 911. 25 tablet 0     ondansetron (ZOFRAN-ODT) 8 MG ODT tab Take 8 mg by mouth every 8 hours as needed PRN       potassium chloride ER (KLOR-CON M) 10 MEQ CR tablet Take 6 tablets (60 mEq) by mouth 3 times daily Take an extra 60 meq of Potassium the day of Metolazone and 60 meq extra the day after a Metolazone 1980 tablet 0     torsemide (DEMADEX) 100 MG tablet Take 1.5 tablets (150 mg) by mouth every morning AND 1.5 tablets (150 mg) daily at 2 pm. 270 tablet 1     triamcinolone (KENALOG) 0.1 % external cream Apply a thin layer twice daily to itchy areas as needed. 453.6 g 3       ROS:   Constitutional: No fever, chills, or sweats. Weight . + fatigue   ENT: No visual disturbance, ear ache, epistaxis, sore throat.   Allergies/Immunologic: Negative.   Respiratory: No cough, hemoptysis.   Cardiovascular: As per HPI.   GI: + nausea, no vomiting, hematemesis, melena, or hematochezia.   : No urinary frequency, dysuria, or hematuria.   Integument: Negative.   Psychiatric: Feeling down and anxious about the state of her health  Neuro: Negative.   Endocrinology: Negative.   Musculoskeletal: Negative    EXAM:  /72 (BP Location: Left arm, Patient Position: Chair, Cuff Size: Adult Regular)   Pulse 84   Ht 1.575 m (5' 2.01\")   Wt 69.7 kg (153 lb 9.6 oz)   SpO2 100%   BMI 28.09 kg/m    General: appears comfortable, alert and articulate  Head: normocephalic, atraumatic  Eyes: anicteric sclera, EOMI  Neck: no adenopathy  Orophyarynx: moist mucosa, no lesions, dentition intact  Heart: PMI diffuse,trace systolic murmur at LLSB, " regular, S1/S2, rub, estimated JVP 14 cm   Lungs: clear, no rales or wheezing  Abdomen: soft, non-tender, bowel sounds present, no hepatosplenomegaly  Extremities: no clubbing, cyanosis-1+ lower extremity edema to the knees bilaterally  Neurological: normal speech and affect, no gross motor deficits      Labs, reviewed with patient in clinic today:  Cr 1.1     Potassium 4.0     Other data:  Coronary Angiogram 2016  Diagnostic Findings  LEFT MAIN CORONARY ARTERY     The LMCA is free of significant disease.    LEFT ANTERIOR DESCENDING     The LAD is free of significant disease.    CIRCUMFLEX     The Circumflex is free of significant disease.    RIGHT CORONARY ARTERY     The RCA is free of significant disease.         Sodium 133, potassium 3.5, creatinine 1.3, NT proBNP 2341 October 2021  Interpretation Summary  Global and regional left ventricular function is hyperkinetic with an EF of  65-70%. The LV cavity is small. Grade III or advanced diastolic dysfunction.  Global right ventricular function is normal. The right ventricle is normal  size.  Moderate tricuspid insufficiency is present.  The estimated PA systolic pressure is at least 41 mmHg.  IVC diameter >2.1 cm collapsing <50% with sniff suggests a high RA pressure  estimated at 15 mmHg or greater.  This study was compared with the study from 02/03/2021. The right atrial  pressure is higher.      Assessment and Plan:   Mrs. Guerrero is a 71 year old female with AL cardiac amyloidosis who presents for follow up.    We talked a lot today about overall goals of care.  Her symptoms are progressing-she has more fatigue, we have had more admissions, need for higher dose of diuretics, more nausea and more napping.  More concerning to me is the fact that I cannot get her feeling better with diuresis at this point.  She is also no longer willing to travel which I know is important to her.     She is NYHA class IIIb, stage D.  Slightly hypervolemic today.      Given  her symptoms or not entirely managed with volume management at this point I am not totally sure that a trial of dialysis when we get to that point is going to make sense.     I talked a lot today about what the disease looks like from this point forward.  Many patients get to the point where having some in-home assistance helps a lot - whether it be a hospital bed on the first floor or medications for symptom management-I also talked about the fact that patients often times get to a point where getting out of bed becomes more difficult and usually at that point we focus on symptoms and less on diuretics.  She is very afraid of the feeling that she will drown and talking about any of what this looks like was very upsetting to her today.  She does not want her daughters to see her decline.  In the idea of them being around her when she is in a hospital bed and dying is really unsettling to her.     I pushed the conversation a little bit today because I do not want her to get to the point that she does not think she has any other options.  I think she is feeling progressively worse and I would like to start working on her symptoms more than we are right now.  I will keep the conversation going about the idea of involving palliative care in her case.     I not waking any changes in her diuretics today but I think we are to a point where she likely will need metolazone 3 times a week.     # A. Fib/A. Flutter-paroxysmal- last zio with 4 % burden-recent admission where she was symptomatic-I am not opposed to adding amiodarone if this happens again to keep her out of the hospital  -Avoiding BB given her Amyloid history.   On anti-coag       #Systemic Amyloid-per oncology, last light chains were under control      We will continue close follow-up between the core clinic and myself to help Amy navigate the months going forward.         Domenica Cohen MD  Associate Professor of Medicine   [facility] Division  of Cardiology

## 2022-04-07 ENCOUNTER — PATIENT OUTREACH (OUTPATIENT)
Dept: CARDIOLOGY | Facility: CLINIC | Age: 72
End: 2022-04-07
Payer: COMMERCIAL

## 2022-04-07 NOTE — TELEPHONE ENCOUNTER
Received call back from Jonathan in Ortho.  Amy is scheduled for a consult on 4/15- not an actual procedure- where the plan going forward will be discussed.  Per Jonathan, most procedures would be down with local unless it is too complicated.  After consult, Amy will need to seek directions on holding AC for procedure.    
No

## 2022-04-13 ENCOUNTER — LAB (OUTPATIENT)
Dept: LAB | Facility: CLINIC | Age: 72
End: 2022-04-13
Payer: COMMERCIAL

## 2022-04-13 DIAGNOSIS — I50.32 CHRONIC DIASTOLIC HEART FAILURE (H): ICD-10-CM

## 2022-04-13 DIAGNOSIS — E85.81 AL AMYLOIDOSIS (H): ICD-10-CM

## 2022-04-13 LAB
ALBUMIN SERPL-MCNC: 3.8 G/DL (ref 3.4–5)
ALP SERPL-CCNC: 218 U/L (ref 40–150)
ALT SERPL W P-5'-P-CCNC: 37 U/L (ref 0–50)
ANION GAP SERPL CALCULATED.3IONS-SCNC: 8 MMOL/L (ref 3–14)
AST SERPL W P-5'-P-CCNC: 30 U/L (ref 0–45)
BASOPHILS # BLD AUTO: 0.1 10E3/UL (ref 0–0.2)
BASOPHILS NFR BLD AUTO: 1 %
BILIRUB SERPL-MCNC: 1.5 MG/DL (ref 0.2–1.3)
BUN SERPL-MCNC: 33 MG/DL (ref 7–30)
CALCIUM SERPL-MCNC: 9.3 MG/DL (ref 8.5–10.1)
CHLORIDE BLD-SCNC: 95 MMOL/L (ref 94–109)
CO2 SERPL-SCNC: 31 MMOL/L (ref 20–32)
CREAT SERPL-MCNC: 1.05 MG/DL (ref 0.52–1.04)
EOSINOPHIL # BLD AUTO: 0.8 10E3/UL (ref 0–0.7)
EOSINOPHIL NFR BLD AUTO: 12 %
ERYTHROCYTE [DISTWIDTH] IN BLOOD BY AUTOMATED COUNT: 16.5 % (ref 10–15)
GFR SERPL CREATININE-BSD FRML MDRD: 57 ML/MIN/1.73M2
GLUCOSE BLD-MCNC: 82 MG/DL (ref 70–99)
HCT VFR BLD AUTO: 37.9 % (ref 35–47)
HGB BLD-MCNC: 12.1 G/DL (ref 11.7–15.7)
IMM GRANULOCYTES # BLD: 0 10E3/UL
IMM GRANULOCYTES NFR BLD: 1 %
LYMPHOCYTES # BLD AUTO: 0.3 10E3/UL (ref 0.8–5.3)
LYMPHOCYTES NFR BLD AUTO: 5 %
MCH RBC QN AUTO: 28.2 PG (ref 26.5–33)
MCHC RBC AUTO-ENTMCNC: 31.9 G/DL (ref 31.5–36.5)
MCV RBC AUTO: 88 FL (ref 78–100)
MONOCYTES # BLD AUTO: 0.5 10E3/UL (ref 0–1.3)
MONOCYTES NFR BLD AUTO: 8 %
NEUTROPHILS # BLD AUTO: 4.6 10E3/UL (ref 1.6–8.3)
NEUTROPHILS NFR BLD AUTO: 73 %
NRBC # BLD AUTO: 0 10E3/UL
NRBC BLD AUTO-RTO: 0 /100
PLATELET # BLD AUTO: 251 10E3/UL (ref 150–450)
POTASSIUM BLD-SCNC: 3.5 MMOL/L (ref 3.4–5.3)
PROT SERPL-MCNC: 7.7 G/DL (ref 6.8–8.8)
RBC # BLD AUTO: 4.29 10E6/UL (ref 3.8–5.2)
SODIUM SERPL-SCNC: 134 MMOL/L (ref 133–144)
TOTAL PROTEIN SERUM FOR ELP: 7.3 G/DL (ref 6.8–8.8)
WBC # BLD AUTO: 6.3 10E3/UL (ref 4–11)

## 2022-04-13 PROCEDURE — 80053 COMPREHEN METABOLIC PANEL: CPT

## 2022-04-13 PROCEDURE — 85025 COMPLETE CBC W/AUTO DIFF WBC: CPT

## 2022-04-13 PROCEDURE — 84165 PROTEIN E-PHORESIS SERUM: CPT

## 2022-04-13 PROCEDURE — 82784 ASSAY IGA/IGD/IGG/IGM EACH: CPT

## 2022-04-13 PROCEDURE — 36415 COLL VENOUS BLD VENIPUNCTURE: CPT

## 2022-04-13 PROCEDURE — 84155 ASSAY OF PROTEIN SERUM: CPT | Mod: 59

## 2022-04-13 PROCEDURE — 83521 IG LIGHT CHAINS FREE EACH: CPT

## 2022-04-14 LAB
ALBUMIN SERPL ELPH-MCNC: 4.3 G/DL (ref 3.7–5.1)
ALPHA1 GLOB SERPL ELPH-MCNC: 0.4 G/DL (ref 0.2–0.4)
ALPHA2 GLOB SERPL ELPH-MCNC: 0.6 G/DL (ref 0.5–0.9)
B-GLOBULIN SERPL ELPH-MCNC: 0.8 G/DL (ref 0.6–1)
GAMMA GLOB SERPL ELPH-MCNC: 1.2 G/DL (ref 0.7–1.6)
IGA SERPL-MCNC: 97 MG/DL (ref 84–499)
KAPPA LC FREE SER-MCNC: 3.24 MG/DL (ref 0.33–1.94)
KAPPA LC FREE/LAMBDA FREE SER NEPH: 0.24 {RATIO} (ref 0.26–1.65)
LAMBDA LC FREE SERPL-MCNC: 13.63 MG/DL (ref 0.57–2.63)
M PROTEIN SERPL ELPH-MCNC: 0 G/DL
PROT PATTERN SERPL ELPH-IMP: NORMAL

## 2022-04-15 ENCOUNTER — PRE VISIT (OUTPATIENT)
Dept: ORTHOPEDICS | Facility: CLINIC | Age: 72
End: 2022-04-15

## 2022-04-15 ENCOUNTER — OFFICE VISIT (OUTPATIENT)
Dept: ORTHOPEDICS | Facility: CLINIC | Age: 72
End: 2022-04-15
Payer: COMMERCIAL

## 2022-04-15 ENCOUNTER — TELEPHONE (OUTPATIENT)
Dept: ORTHOPEDICS | Facility: CLINIC | Age: 72
End: 2022-04-15

## 2022-04-15 DIAGNOSIS — M67.432 GANGLION CYST OF VOLAR ASPECT OF LEFT WRIST: Primary | ICD-10-CM

## 2022-04-15 PROCEDURE — 99204 OFFICE O/P NEW MOD 45 MIN: CPT | Performed by: STUDENT IN AN ORGANIZED HEALTH CARE EDUCATION/TRAINING PROGRAM

## 2022-04-15 NOTE — LETTER
4/15/2022         RE: Leandra Guerrero  [address] [place] [address]        Dear Colleague,    Thank you for referring your patient, Leandra Guerrero, to the [facility]. Please see a copy of my visit note below.    Ortho Hand    HPI: 71-year-old female with history of atrial fibrillation presenting with left volar wrist mass.  She noticed the mass a long time ago.  She most recently had a cortisone steroid injection, approximately 2 weeks ago.  She is noticed the mass change in size over the last several months, but it has gotten larger over the long-term.  She has left wrist pain associated with strenuous activity and extreme wrist flexion.  She would like to have this mass removed.    ROS: Negative, see HPI  Past medical history: Diastolic heart failure, chronic kidney disease, atrial fibrillation, amyloidosis  Past surgical history: No surgeries to the hands or wrist  Medications: Torsemide, Zaroxolyn, apixaban  Allergies: Gabapentin, levofloxacin, amoxicillin, spironolactone  Social history: Non-smoker, denies any tobacco or nicotine use  Family history: No bleeding or clotting problems, or issues with anesthesia    Examination:  Nonlabored breathing  Not distressed  Left volar wrist with soft, mildly tender, mobile, transilluminating mass with no palpable pulse within it substance    XR: No radiopaque foreign body or radiopaque mass    A/P: 71-year-old female with history of atrial fibrillation and heart failure presenting for evaluation of a left volar wrist mass consistent with ganglion cyst    -Discussed options for management, including aspiration versus surgery.  Since the patient had prior recent aspiration with steroid injection, with no improvement or recurrence, my recommendation is surgical excision.  Since the patient had a steroid injection 2 weeks ago, no surgical intervention indicated for least another 4 weeks to reduce the risk of infection or  wound healing problems.  Patient is agreeable.  -Discussed the risks of surgery, including but not limited to: Infection, bleeding, pain, poor scarring, injury to nerves or vessels or tendons, neuroma formation, recurrence of cyst, need for revision surgery, complex regional pain syndrome.  Despite these risks, patient consents and would like to proceed with left volar wrist ganglion excision.   -Discussed wide-awake surgery versus monitored anesthesia care with light sedation.  Patient would like sedation, we will contact cardiologist for clearance and patient will need PAC appointment.  Patient would prefer sedation.  -Case request will be placed  -Case will not be scheduled until mid May or later  -A total of 30 minutes was devoted to review of chart, direct face-to-face patient counseling and documentation during this encounter    Tato Diaz MD, PhD      Again, thank you for allowing me to participate in the care of your patient.        Sincerely,        Tato Diaz MD

## 2022-04-15 NOTE — NURSING NOTE
Leandra Guerrero's chief complaint for this visit includes:  Chief Complaint   Patient presents with     Left Wrist - Mass     PCP: Magy Obrien    Referring Provider:  No referring provider defined for this encounter.    There were no vitals taken for this visit.  Data Unavailable        Allergies   Allergen Reactions     Contrast Dye Shortness Of Breath     Shaking,chills and dypsnea     Cytoxan [Cyclophosphamide]      Hemorrhagic cystitis     Gabapentin      Slurred speech, weakness     Levofloxacin      Severe shaking and abdominal pain     Aaron Weed      Rash and itchyness     Amoxicillin Nausea and Vomiting and Cramps     Spironolactone      Worsening hyponatremia, palpitations     Chlorhexidine Dermatitis and Rash     AARON wipes  2% chlorhexidine gluconate   Rash to everywhere wipe was applied          Do you need any medication refills at today's visit? No.

## 2022-04-15 NOTE — TELEPHONE ENCOUNTER
Needs to see PAC, referral placed, looking at late May/June earliest    Procedure: Left volar wrist ganglion cyst excision  [place] [facility] [place]: 45 minutes  Anesthesia: Local, MAC  Post-op appointments needed: 2 weeks provider only, 6 weeks with provider only.  Surgery packet/instructions given to patient?  mailed    Jonathan Art RN

## 2022-04-15 NOTE — PROGRESS NOTES
Ortho Hand    HPI: 71-year-old female with history of atrial fibrillation presenting with left volar wrist mass.  She noticed the mass a long time ago.  She most recently had a cortisone steroid injection, approximately 2 weeks ago.  She is noticed the mass change in size over the last several months, but it has gotten larger over the long-term.  She has left wrist pain associated with strenuous activity and extreme wrist flexion.  She would like to have this mass removed.    ROS: Negative, see HPI  Past medical history: Diastolic heart failure, chronic kidney disease, atrial fibrillation, amyloidosis  Past surgical history: No surgeries to the hands or wrist  Medications: Torsemide, Zaroxolyn, apixaban  Allergies: Gabapentin, levofloxacin, amoxicillin, spironolactone  Social history: Non-smoker, denies any tobacco or nicotine use  Family history: No bleeding or clotting problems, or issues with anesthesia    Examination:  Nonlabored breathing  Not distressed  Left volar wrist with soft, mildly tender, mobile, transilluminating mass with no palpable pulse within it substance    XR: No radiopaque foreign body or radiopaque mass    A/P: 71-year-old female with history of atrial fibrillation and heart failure presenting for evaluation of a left volar wrist mass consistent with ganglion cyst    -Discussed options for management, including aspiration versus surgery.  Since the patient had prior recent aspiration with steroid injection, with no improvement or recurrence, my recommendation is surgical excision.  Since the patient had a steroid injection 2 weeks ago, no surgical intervention indicated for least another 4 weeks to reduce the risk of infection or wound healing problems.  Patient is agreeable.  -Discussed the risks of surgery, including but not limited to: Infection, bleeding, pain, poor scarring, injury to nerves or vessels or tendons, neuroma formation, recurrence of cyst, need for revision surgery, complex  regional pain syndrome.  Despite these risks, patient consents and would like to proceed with left volar wrist ganglion excision.   -Discussed wide-awake surgery versus monitored anesthesia care with light sedation.  Patient would like sedation, we will contact cardiologist for clearance and patient will need PAC appointment.  Patient would prefer sedation.  -Case request will be placed  -Case will not be scheduled until mid May or later  -A total of 30 minutes was devoted to review of chart, direct face-to-face patient counseling and documentation during this encounter    Taot Diaz MD, PhD

## 2022-04-19 ENCOUNTER — VIRTUAL VISIT (OUTPATIENT)
Dept: ONCOLOGY | Facility: CLINIC | Age: 72
End: 2022-04-19
Payer: COMMERCIAL

## 2022-04-19 DIAGNOSIS — Z11.59 ENCOUNTER FOR SCREENING FOR OTHER VIRAL DISEASES: Primary | ICD-10-CM

## 2022-04-19 DIAGNOSIS — E85.81 LIGHT CHAIN (AL) AMYLOIDOSIS (H): Primary | ICD-10-CM

## 2022-04-19 PROBLEM — M67.432 GANGLION CYST OF VOLAR ASPECT OF LEFT WRIST: Status: ACTIVE | Noted: 2022-04-19

## 2022-04-19 PROCEDURE — 99213 OFFICE O/P EST LOW 20 MIN: CPT | Mod: 95 | Performed by: INTERNAL MEDICINE

## 2022-04-19 RX ORDER — DOXYCYCLINE 100 MG/1
100 CAPSULE ORAL DAILY
Status: ON HOLD | COMMUNITY
Start: 2022-01-05 | End: 2022-06-06

## 2022-04-19 RX ORDER — SERTRALINE HYDROCHLORIDE 25 MG/1
25 TABLET, FILM COATED ORAL DAILY
Status: ON HOLD | COMMUNITY
Start: 2022-04-19 | End: 2023-01-01

## 2022-04-19 NOTE — PROGRESS NOTES
Phone encounter to review labs, briefly summarized as:    Amy remains clinically well.  No new health concerns or issues.    Free light chains worsening suspicious for recurrence of light chain disease.  Will plan marrow and CT CAP; eval other processes.    Will plan visit to decide upon final therapy; leaning towards repeat of prior therapy given excellent mileage.  May look to add some daratumumab.    Goal would be to return to remission, prevent end organ damage.  Amy in agreement with the plan.    Yasmani Wang MD  Total time 22 mins.

## 2022-04-19 NOTE — LETTER
4/19/2022         RE: Leandra Guerrero  [address] [place] [address]        Dear Colleague,    Thank you for referring your patient, Leandra Guerrero, to the [facility]. Please see a copy of my visit note below.    Phone encounter to review labs, briefly summarized as:    Amy remains clinically well.  No new health concerns or issues.    Free light chains worsening suspicious for recurrence of light chain disease.  Will plan marrow and CT CAP; eval other processes.    Will plan visit to decide upon final therapy; leaning towards repeat of prior therapy given excellent mileage.  May look to add some daratumumab.    Goal would be to return to remission, prevent end organ damage.  Amy in agreement with the plan.    Yasmani Wang MD  Total time 22 mins.        Again, thank you for allowing me to participate in the care of your patient.        Sincerely,        Yasmani Wang MD

## 2022-04-19 NOTE — PATIENT INSTRUCTIONS
1) Bone marrow biopsy.  2) CT CAP (not PET).  3) Please discuss w/ BJT if we can sneak Amy in sooner.    Yasmani Wang MD.

## 2022-04-19 NOTE — TELEPHONE ENCOUNTER
Spoke with patient and she would like to know if she needs to hold her Eliquis.     Scheduled for 5/11 in [place].    COVID test to be done within 4 days of the procedure.    No further questions/concerns at this time besides Eliquis question.

## 2022-04-19 NOTE — TELEPHONE ENCOUNTER
Message sent to Dr. Diaz to clarify stopping Eliquis. May need to ask Dr. Cohen.     Jonathan Art RN

## 2022-04-19 NOTE — TELEPHONE ENCOUNTER
Dr. Diaz reached out to Pt's cardiologist and it was discussed that she is high risk to perform this type of surgery with anesthesia and should be avoided if possible. While it is possible to still do surgery at the [place], it would be much safer to do this under local-only anesthesia.     Called Pt to discuss. She is ok with local only anesthesia and at any location it needs to be done.    Jonathan Art RN

## 2022-04-21 ENCOUNTER — PATIENT OUTREACH (OUTPATIENT)
Dept: CARE COORDINATION | Facility: CLINIC | Age: 72
End: 2022-04-21

## 2022-04-21 NOTE — PROGRESS NOTES
Oncology Distress Screening Follow-up  Clinical Social Work  [facility]    Identified Concern and Score From Distress Screenin. How concerned are you about your ability to eat?  2  Appetite has decreased.         2. How concerned are you about unintended weight loss or your current weight?  0         3. How concerned are you about feeling depressed or very sad?  6 Abnormal          4. How concerned are you about feeling anxious or very scared?  6 Abnormal          5. Do you struggle with the loss of meaning and jacob in your life?  Somewhat         6. How concerned are you about work and home life issues that may be affected by your cancer?  7 Abnormal          7. How concerned are you about knowing what resources are available to help you?  9 Abnormal          8. Do you currently have what you would describe as Buddhist or spiritual struggles?             Not at all                   You can also ask to be contacted by one of our Oncology Supportive Care professionals.      9. If you want to be contacted by one of our professionals, I can send a message to them right now.  Oncology Social Worker                 Date of Distress Screenin22      Data: Amy was seen in virtual visit for follow up light chain amyloidosis.       Intervention/Education Provided:: PAT called and left message, introducing self and reason for call. PAT provided contact information and enouraged Amy to call back when they are able to discuss support and resource needs.         Follow-up Required: PAT will await Amy's call back.         LATRICE Marroquin, [facility]  Clinical , Adult Oncology  Phone: [phone]

## 2022-04-22 ENCOUNTER — TELEPHONE (OUTPATIENT)
Dept: CARDIOLOGY | Facility: CLINIC | Age: 72
End: 2022-04-22
Payer: COMMERCIAL

## 2022-04-22 ENCOUNTER — MYC MEDICAL ADVICE (OUTPATIENT)
Dept: CARDIOLOGY | Facility: CLINIC | Age: 72
End: 2022-04-22
Payer: COMMERCIAL

## 2022-04-22 ENCOUNTER — MYC MEDICAL ADVICE (OUTPATIENT)
Dept: ONCOLOGY | Facility: CLINIC | Age: 72
End: 2022-04-22
Payer: COMMERCIAL

## 2022-04-22 DIAGNOSIS — E85.81 LIGHT CHAIN (AL) AMYLOIDOSIS (H): Primary | ICD-10-CM

## 2022-04-22 NOTE — TELEPHONE ENCOUNTER
"Called Eleonora back. She reports Fabian is seeing Ginger for palliative care and has been doing virtual visits since last fall with her. She reports Ginger was referred by insurance [employer] as someone who might beneift from their services.     They would like to prescribe Zyprexa for Ginger. Reviewed with her that with Ginger's history of heart arrythmias will likely not recommend but will review with Dr. Cohen.   Reviewed that Dr. Cohen discussed trying low dose Ativan PRN 0.5 mg PRN q6 hours as needed - starting with a half dose. She asked me to review the Zyprexa option first with Dr. Cohen then will review with their team for prescribing Ativan. Advised her otherwise Dr. Cohen is ok with prescribing it as well.     Spoke with Ginger about all of this. She reports she wasn't sure what Fabian was and in their last conversation she asked them if they were palliative care. She is ok with trying the Ativan.     She also notes that she saw her oncology team recently and they are going to do a bone marrow biopsy and CT scan. 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         "

## 2022-04-22 NOTE — TELEPHONE ENCOUNTER
Reviewed with Dr. Cohen. Would prefer to not use Zyprexa, but ok with Ativan. Spoke with DILIP Crews at [facility]. She will review with her NP in clinic to prescribe the ativan and call us back if there's any issues.   Update sent to Amy via [employer]. Vidhi Montgomery RN

## 2022-04-22 NOTE — TELEPHONE ENCOUNTER
Health Call Center    Phone Message    May a detailed message be left on voicemail: no     Reason for Call: Other: Eleonora called from [employer] to get approval to start the Pt on Rx Olanzapine 2.5mg  for management of refractory nausea. Pt also has a history of prolonged QT intervals. Please reach out to Jia with clarification.      Action Taken: Message routed to:  [facility] Cardiology    Travel Screening: Not Applicable

## 2022-04-26 ENCOUNTER — LAB (OUTPATIENT)
Dept: LAB | Facility: CLINIC | Age: 72
End: 2022-04-26
Payer: COMMERCIAL

## 2022-04-26 DIAGNOSIS — I50.32 CHRONIC DIASTOLIC HEART FAILURE (H): ICD-10-CM

## 2022-04-26 LAB
ANION GAP SERPL CALCULATED.3IONS-SCNC: 7 MMOL/L (ref 3–14)
BUN SERPL-MCNC: 30 MG/DL (ref 7–30)
CALCIUM SERPL-MCNC: 9 MG/DL (ref 8.5–10.1)
CHLORIDE BLD-SCNC: 94 MMOL/L (ref 94–109)
CO2 SERPL-SCNC: 28 MMOL/L (ref 20–32)
CREAT SERPL-MCNC: 1.16 MG/DL (ref 0.52–1.04)
GFR SERPL CREATININE-BSD FRML MDRD: 51 ML/MIN/1.73M2
GLUCOSE BLD-MCNC: 87 MG/DL (ref 70–99)
POTASSIUM BLD-SCNC: 3.9 MMOL/L (ref 3.4–5.3)
SODIUM SERPL-SCNC: 129 MMOL/L (ref 133–144)

## 2022-04-26 PROCEDURE — 36415 COLL VENOUS BLD VENIPUNCTURE: CPT

## 2022-04-26 PROCEDURE — 80048 BASIC METABOLIC PNL TOTAL CA: CPT

## 2022-04-28 ENCOUNTER — TRANSFERRED RECORDS (OUTPATIENT)
Dept: HEALTH INFORMATION MANAGEMENT | Facility: CLINIC | Age: 72
End: 2022-04-28
Payer: COMMERCIAL

## 2022-05-03 ENCOUNTER — TELEPHONE (OUTPATIENT)
Dept: SURGERY | Facility: CLINIC | Age: 72
End: 2022-05-03

## 2022-05-03 ENCOUNTER — MYC MEDICAL ADVICE (OUTPATIENT)
Dept: CARDIOLOGY | Facility: CLINIC | Age: 72
End: 2022-05-03

## 2022-05-03 ENCOUNTER — LAB (OUTPATIENT)
Dept: LAB | Facility: CLINIC | Age: 72
End: 2022-05-03
Payer: COMMERCIAL

## 2022-05-03 DIAGNOSIS — I50.32 CHRONIC DIASTOLIC HEART FAILURE (H): ICD-10-CM

## 2022-05-03 LAB
ANION GAP SERPL CALCULATED.3IONS-SCNC: 7 MMOL/L (ref 3–14)
BUN SERPL-MCNC: 29 MG/DL (ref 7–30)
CALCIUM SERPL-MCNC: 9.1 MG/DL (ref 8.5–10.1)
CHLORIDE BLD-SCNC: 97 MMOL/L (ref 94–109)
CO2 SERPL-SCNC: 27 MMOL/L (ref 20–32)
CREAT SERPL-MCNC: 1.09 MG/DL (ref 0.52–1.04)
GFR SERPL CREATININE-BSD FRML MDRD: 55 ML/MIN/1.73M2
GLUCOSE BLD-MCNC: 90 MG/DL (ref 70–99)
POTASSIUM BLD-SCNC: 4.5 MMOL/L (ref 3.4–5.3)
SODIUM SERPL-SCNC: 131 MMOL/L (ref 133–144)

## 2022-05-03 PROCEDURE — 80048 BASIC METABOLIC PNL TOTAL CA: CPT

## 2022-05-03 PROCEDURE — 36415 COLL VENOUS BLD VENIPUNCTURE: CPT

## 2022-05-03 NOTE — TELEPHONE ENCOUNTER
[facility] Call Center    Phone Message    May a detailed message be left on voicemail: yes     Reason for Call: Other: they have 2 questions they need to clarify with team      Action Taken: Message routed to:  [facility]): ortho    Travel Screening: Not Applicable    They are wondering how long patient is supposed to hold apixaban ANTICOAGULANT medication -- they also want to clarify that Local anesthesia is to be used and that she is to NOT be fully put under

## 2022-05-05 ENCOUNTER — PATIENT OUTREACH (OUTPATIENT)
Dept: ONCOLOGY | Facility: CLINIC | Age: 72
End: 2022-05-05
Payer: COMMERCIAL

## 2022-05-05 NOTE — PROGRESS NOTES
Received telephone call from Eleonora, RNCC with [employer]  [phone], who has been involved in patient care. Eleonora reports patient is scheduled for CT Scan with contrast and she does have high risk sensitivity to dye resulting in SOB, chill, shaking. First reported to [facility] on 8/31/21. Writer agrees to notify provider.  Message sent to Dr Wang.  Laura Ventura RN  Care Coordinator- Cancer Clinic

## 2022-05-06 ENCOUNTER — TELEPHONE (OUTPATIENT)
Dept: CARDIOLOGY | Facility: CLINIC | Age: 72
End: 2022-05-06
Payer: COMMERCIAL

## 2022-05-06 DIAGNOSIS — I50.33 ACUTE ON CHRONIC DIASTOLIC HEART FAILURE (H): Primary | ICD-10-CM

## 2022-05-06 RX ORDER — METHYLPREDNISOLONE 32 MG/1
TABLET ORAL
Qty: 2 TABLET | Refills: 0 | Status: ON HOLD | OUTPATIENT
Start: 2022-05-06 | End: 2022-06-06

## 2022-05-06 RX ORDER — DIPHENHYDRAMINE HCL 50 MG
CAPSULE ORAL
Qty: 1 CAPSULE | Refills: 0 | Status: SHIPPED | OUTPATIENT
Start: 2022-05-06 | End: 2022-06-04

## 2022-05-06 NOTE — TELEPHONE ENCOUNTER
Patient scheduled on days of other procedure at [place]. Patient informed of lab appointment.    Gabriella Fuentes CMA

## 2022-05-06 NOTE — PROGRESS NOTES
Cristiano received from Dr. Wang:    Scripts for solumedrol and benadryl sent to her pharmacy for contrast pre-meds     Solumedrol 12 hours and 1 hour prior to contrast.   Benadryl 1 hour prior to contrast.     ANCA     TC placed to patient to let her know about medications.  She verbalized understanding and will  the medications.

## 2022-05-06 NOTE — TELEPHONE ENCOUNTER
----- Message from Vidhi Montgomery RN sent at 5/6/2022  8:09 AM CDT -----  Regarding: labs  Hi - patient needs labs done  prior to CORE visit next week, can you reach out to schedule? She has a few days of appointments next week, maybe can coordinate something? Thanks, Carla

## 2022-05-07 ENCOUNTER — LAB (OUTPATIENT)
Dept: LAB | Facility: CLINIC | Age: 72
End: 2022-05-07
Attending: STUDENT IN AN ORGANIZED HEALTH CARE EDUCATION/TRAINING PROGRAM
Payer: COMMERCIAL

## 2022-05-07 DIAGNOSIS — Z11.59 ENCOUNTER FOR SCREENING FOR OTHER VIRAL DISEASES: ICD-10-CM

## 2022-05-07 PROCEDURE — U0003 INFECTIOUS AGENT DETECTION BY NUCLEIC ACID (DNA OR RNA); SEVERE ACUTE RESPIRATORY SYNDROME CORONAVIRUS 2 (SARS-COV-2) (CORONAVIRUS DISEASE [COVID-19]), AMPLIFIED PROBE TECHNIQUE, MAKING USE OF HIGH THROUGHPUT TECHNOLOGIES AS DESCRIBED BY CMS-2020-01-R: HCPCS

## 2022-05-07 PROCEDURE — U0005 INFEC AGEN DETEC AMPLI PROBE: HCPCS

## 2022-05-08 LAB — SARS-COV-2 RNA RESP QL NAA+PROBE: NEGATIVE

## 2022-05-09 ENCOUNTER — HOSPITAL ENCOUNTER (OUTPATIENT)
Dept: CT IMAGING | Facility: CLINIC | Age: 72
Discharge: HOME | End: 2022-05-09
Attending: INTERNAL MEDICINE | Admitting: INTERNAL MEDICINE
Payer: COMMERCIAL

## 2022-05-09 DIAGNOSIS — E85.81 LIGHT CHAIN (AL) AMYLOIDOSIS (H): ICD-10-CM

## 2022-05-09 PROCEDURE — 250N000011 HC RX IP 250 OP 636: Performed by: STUDENT IN AN ORGANIZED HEALTH CARE EDUCATION/TRAINING PROGRAM

## 2022-05-09 PROCEDURE — 74177 CT ABD & PELVIS W/CONTRAST: CPT

## 2022-05-09 PROCEDURE — 71260 CT THORAX DX C+: CPT | Mod: 26 | Performed by: RADIOLOGY

## 2022-05-09 PROCEDURE — 74177 CT ABD & PELVIS W/CONTRAST: CPT | Mod: 26 | Performed by: RADIOLOGY

## 2022-05-09 RX ORDER — IOPAMIDOL 755 MG/ML
93 INJECTION, SOLUTION INTRAVASCULAR ONCE
Status: COMPLETED | OUTPATIENT
Start: 2022-05-09 | End: 2022-05-09

## 2022-05-09 RX ADMIN — IOPAMIDOL 93 ML: 755 INJECTION, SOLUTION INTRAVENOUS at 11:02

## 2022-05-10 ENCOUNTER — ONCOLOGY VISIT (OUTPATIENT)
Dept: ONCOLOGY | Facility: CLINIC | Age: 72
End: 2022-05-10
Payer: COMMERCIAL

## 2022-05-10 ENCOUNTER — ALLIED HEALTH/NURSE VISIT (OUTPATIENT)
Dept: ONCOLOGY | Facility: CLINIC | Age: 72
End: 2022-05-10

## 2022-05-10 ENCOUNTER — LAB (OUTPATIENT)
Dept: LAB | Facility: CLINIC | Age: 72
End: 2022-05-10
Payer: COMMERCIAL

## 2022-05-10 VITALS
WEIGHT: 153 LBS | HEART RATE: 52 BPM | SYSTOLIC BLOOD PRESSURE: 118 MMHG | DIASTOLIC BLOOD PRESSURE: 74 MMHG | OXYGEN SATURATION: 95 % | RESPIRATION RATE: 16 BRPM | BODY MASS INDEX: 27.98 KG/M2 | TEMPERATURE: 97.3 F

## 2022-05-10 DIAGNOSIS — E85.81 LIGHT CHAIN (AL) AMYLOIDOSIS (H): Primary | ICD-10-CM

## 2022-05-10 DIAGNOSIS — E85.81 LIGHT CHAIN (AL) AMYLOIDOSIS (H): ICD-10-CM

## 2022-05-10 DIAGNOSIS — I50.33 ACUTE ON CHRONIC DIASTOLIC HEART FAILURE (H): ICD-10-CM

## 2022-05-10 LAB
ANION GAP SERPL CALCULATED.3IONS-SCNC: 10 MMOL/L (ref 3–14)
BASOPHILS # BLD AUTO: 0.1 10E3/UL (ref 0–0.2)
BASOPHILS NFR BLD AUTO: 1 %
BUN SERPL-MCNC: 47 MG/DL (ref 7–30)
CALCIUM SERPL-MCNC: 9.5 MG/DL (ref 8.5–10.1)
CHLORIDE BLD-SCNC: 93 MMOL/L (ref 94–109)
CO2 SERPL-SCNC: 27 MMOL/L (ref 20–32)
CREAT SERPL-MCNC: 1.39 MG/DL (ref 0.52–1.04)
EOSINOPHIL # BLD AUTO: 0.1 10E3/UL (ref 0–0.7)
EOSINOPHIL NFR BLD AUTO: 2 %
ERYTHROCYTE [DISTWIDTH] IN BLOOD BY AUTOMATED COUNT: 17.1 % (ref 10–15)
GFR SERPL CREATININE-BSD FRML MDRD: 41 ML/MIN/1.73M2
GLUCOSE BLD-MCNC: 118 MG/DL (ref 70–99)
HCT VFR BLD AUTO: 37 % (ref 35–47)
HGB BLD-MCNC: 11.8 G/DL (ref 11.7–15.7)
HOLD SPECIMEN: NORMAL
HOLD SPECIMEN: NORMAL
IMM GRANULOCYTES # BLD: 0 10E3/UL
IMM GRANULOCYTES NFR BLD: 0 %
INTERPRETATION: NORMAL
LYMPHOCYTES # BLD AUTO: 0.5 10E3/UL (ref 0.8–5.3)
LYMPHOCYTES NFR BLD AUTO: 6 %
MCH RBC QN AUTO: 28.5 PG (ref 26.5–33)
MCHC RBC AUTO-ENTMCNC: 31.9 G/DL (ref 31.5–36.5)
MCV RBC AUTO: 89 FL (ref 78–100)
MONOCYTES # BLD AUTO: 0.8 10E3/UL (ref 0–1.3)
MONOCYTES NFR BLD AUTO: 11 %
NEUTROPHILS # BLD AUTO: 6 10E3/UL (ref 1.6–8.3)
NEUTROPHILS NFR BLD AUTO: 80 %
NRBC # BLD AUTO: 0 10E3/UL
NRBC BLD AUTO-RTO: 0 /100
PATH REPORT.COMMENTS IMP SPEC: NORMAL
PATH REPORT.FINAL DX SPEC: NORMAL
PATH REPORT.MICROSCOPIC SPEC OTHER STN: NORMAL
PATH REPORT.RELEVANT HX SPEC: NORMAL
PLATELET # BLD AUTO: 277 10E3/UL (ref 150–450)
POTASSIUM BLD-SCNC: 4.6 MMOL/L (ref 3.4–5.3)
RBC # BLD AUTO: 4.14 10E6/UL (ref 3.8–5.2)
SODIUM SERPL-SCNC: 130 MMOL/L (ref 133–144)
WBC # BLD AUTO: 7.5 10E3/UL (ref 4–11)

## 2022-05-10 PROCEDURE — 85060 BLOOD SMEAR INTERPRETATION: CPT | Performed by: PATHOLOGY

## 2022-05-10 PROCEDURE — 88237 TISSUE CULTURE BONE MARROW: CPT | Performed by: NURSE PRACTITIONER

## 2022-05-10 PROCEDURE — 88364 INSITU HYBRIDIZATION (FISH): CPT | Mod: XS | Performed by: MEDICAL GENETICS

## 2022-05-10 PROCEDURE — 38222 DX BONE MARROW BX & ASPIR: CPT | Performed by: NURSE PRACTITIONER

## 2022-05-10 PROCEDURE — 88365 INSITU HYBRIDIZATION (FISH): CPT | Mod: XS | Performed by: MEDICAL GENETICS

## 2022-05-10 PROCEDURE — 80048 BASIC METABOLIC PNL TOTAL CA: CPT

## 2022-05-10 PROCEDURE — 88184 FLOWCYTOMETRY/ TC 1 MARKER: CPT | Performed by: NURSE PRACTITIONER

## 2022-05-10 PROCEDURE — 36415 COLL VENOUS BLD VENIPUNCTURE: CPT

## 2022-05-10 PROCEDURE — 88369 M/PHMTRC ALYSISHQUANT/SEMIQ: CPT | Performed by: MEDICAL GENETICS

## 2022-05-10 PROCEDURE — 88271 CYTOGENETICS DNA PROBE: CPT | Mod: XS | Performed by: NURSE PRACTITIONER

## 2022-05-10 PROCEDURE — 88291 CYTO/MOLECULAR REPORT: CPT | Performed by: MEDICAL GENETICS

## 2022-05-10 PROCEDURE — 88311 DECALCIFY TISSUE: CPT | Performed by: PATHOLOGY

## 2022-05-10 PROCEDURE — 88341 IMHCHEM/IMCYTCHM EA ADD ANTB: CPT | Performed by: PATHOLOGY

## 2022-05-10 PROCEDURE — 88342 IMHCHEM/IMCYTCHM 1ST ANTB: CPT | Performed by: PATHOLOGY

## 2022-05-10 PROCEDURE — 88264 CHROMOSOME ANALYSIS 20-25: CPT | Performed by: NURSE PRACTITIONER

## 2022-05-10 PROCEDURE — 88313 SPECIAL STAINS GROUP 2: CPT | Performed by: PATHOLOGY

## 2022-05-10 PROCEDURE — 96374 THER/PROPH/DIAG INJ IV PUSH: CPT | Mod: 59

## 2022-05-10 PROCEDURE — 85025 COMPLETE CBC W/AUTO DIFF WBC: CPT

## 2022-05-10 PROCEDURE — 88188 FLOWCYTOMETRY/READ 9-15: CPT | Mod: GC | Performed by: PATHOLOGY

## 2022-05-10 PROCEDURE — 88368 INSITU HYBRIDIZATION MANUAL: CPT | Performed by: MEDICAL GENETICS

## 2022-05-10 PROCEDURE — 88305 TISSUE EXAM BY PATHOLOGIST: CPT | Performed by: PATHOLOGY

## 2022-05-10 PROCEDURE — 88275 CYTOGENETICS 100-300: CPT | Mod: XS | Performed by: NURSE PRACTITIONER

## 2022-05-10 PROCEDURE — 88280 CHROMOSOME KARYOTYPE STUDY: CPT | Performed by: NURSE PRACTITIONER

## 2022-05-10 PROCEDURE — 85097 BONE MARROW INTERPRETATION: CPT | Performed by: PATHOLOGY

## 2022-05-10 PROCEDURE — 88185 FLOWCYTOMETRY/TC ADD-ON: CPT | Performed by: NURSE PRACTITIONER

## 2022-05-10 ASSESSMENT — PAIN SCALES - GENERAL
PAINLEVEL: NO PAIN (0)
PAINLEVEL: NO PAIN (0)

## 2022-05-10 NOTE — PROGRESS NOTES
"Patient presents for bone marrow biopsy.  Discussed procedure with patient.  Vitals obtained and stable.  Lab staff present during PIV start and lab drawn.  Genie Black CNP met with patient and consent was signed.  Pre-medications administered, patient positioned in side-lying position.  Patient tolerated procedure well.  Post observation x 30\", water and snack provided. PIV D/C'd.  Dressing assessed; no bleeding. Discharge instructions reviewed with patient and , both understanding.   accompanied patient to the car and will drive patient home.  Patient discharged at 1045.     Ciarra Baumann, RN-BSN, PHN, OCN  [facility]      "

## 2022-05-10 NOTE — LETTER
5/10/2022         RE: Leandra Guerrero  [address] [place] [address]        Dear Colleague,    Thank you for referring your patient, Leandra Guerrero, to the [facility]. Please see a copy of my visit note below.    ONC Adult Bone Marrow Biopsy Procedure Note  May 10, 2022  There were no vitals taken for this visit.   /65   Pulse 66   Temp 97.3  F (36.3  C) (Oral)   Resp 16   Wt 69.4 kg (153 lb)   SpO2 99%   BMI 27.98 kg/m     Lab Results   Component Value Date    WBC 7.5 05/10/2022    WBC 6.4 05/11/2021     Lab Results   Component Value Date    RBC 4.14 05/10/2022    RBC 4.29 05/11/2021     Lab Results   Component Value Date    HGB 11.8 05/10/2022    HGB 13.8 05/11/2021     Lab Results   Component Value Date    HCT 37.0 05/10/2022    HCT 41.3 05/11/2021     No components found for: MCT  Lab Results   Component Value Date    MCV 89 05/10/2022    MCV 96 05/11/2021     Lab Results   Component Value Date    MCH 28.5 05/10/2022    MCH 32.2 05/11/2021     Lab Results   Component Value Date    MCHC 31.9 05/10/2022    MCHC 33.4 05/11/2021     Lab Results   Component Value Date    RDW 17.1 05/10/2022    RDW 13.5 05/11/2021     Lab Results   Component Value Date     05/10/2022     05/11/2021         DIAGNOSIS: Recurrent amyloidosis     PROCEDURE: Unilateral Bone Marrow Biopsy and Unilateral Aspirate    LOCATION: [facility]    Patient s identification was positively verified by verbal identification and invasive procedure safety checklist was completed. Informed consent was obtained. Following the administration of Midazolam 1 mg as pre-medication, patient was placed in the left lateral decubitus position and prepped and draped in a sterile manner. Approximately 8 cc of 1% Lidocaine was used over the right posterior iliac spine. Following this a 3 mm incision was made. Trephine bone marrow core(s) was (were) obtained  from the [facility]. Bone marrow aspirates were obtained from the [facility]. Aspirates were sent for morphology, immunophenotyping, cytogenetics and molecular diagnostics. A total of approximately 20 ml of marrow was aspirated. Following this procedure a sterile dressing was applied to the bone marrow biopsy site(s). The patient was placed in the supine position to maintain pressure on the biopsy site. Post-procedure wound care instructions were given.     Complications: did take Eliquis yesterday but not today- easy bleeding and needed more time to control bleeding post procedure    Pre-procedural pain: 0 out of 10 on the numeric pain rating scale.     Post-procedural pain assessment: 0 out of 10 on the numeric pain rating scale.     Interventions: YES- additional time with post procedural bleeding control    Length of procedure:20 minutes or less      Procedure performed by: Genie Black CNP        Again, thank you for allowing me to participate in the care of your patient.        Sincerely,        Genie Black NP, APRN CNP

## 2022-05-10 NOTE — PROGRESS NOTES
ONC Adult Bone Marrow Biopsy Procedure Note  May 10, 2022  There were no vitals taken for this visit.   /65   Pulse 66   Temp 97.3  F (36.3  C) (Oral)   Resp 16   Wt 69.4 kg (153 lb)   SpO2 99%   BMI 27.98 kg/m     Lab Results   Component Value Date    WBC 7.5 05/10/2022    WBC 6.4 05/11/2021     Lab Results   Component Value Date    RBC 4.14 05/10/2022    RBC 4.29 05/11/2021     Lab Results   Component Value Date    HGB 11.8 05/10/2022    HGB 13.8 05/11/2021     Lab Results   Component Value Date    HCT 37.0 05/10/2022    HCT 41.3 05/11/2021     No components found for: MCT  Lab Results   Component Value Date    MCV 89 05/10/2022    MCV 96 05/11/2021     Lab Results   Component Value Date    MCH 28.5 05/10/2022    MCH 32.2 05/11/2021     Lab Results   Component Value Date    MCHC 31.9 05/10/2022    MCHC 33.4 05/11/2021     Lab Results   Component Value Date    RDW 17.1 05/10/2022    RDW 13.5 05/11/2021     Lab Results   Component Value Date     05/10/2022     05/11/2021         DIAGNOSIS: Recurrent amyloidosis     PROCEDURE: Unilateral Bone Marrow Biopsy and Unilateral Aspirate    LOCATION: [facility]    Patient s identification was positively verified by verbal identification and invasive procedure safety checklist was completed. Informed consent was obtained. Following the administration of Midazolam 1 mg as pre-medication, patient was placed in the left lateral decubitus position and prepped and draped in a sterile manner. Approximately 8 cc of 1% Lidocaine was used over the right posterior iliac spine. Following this a 3 mm incision was made. Trephine bone marrow core(s) was (were) obtained from the [facility]. Bone marrow aspirates were obtained from the [facility]. Aspirates were sent for morphology, immunophenotyping, cytogenetics and molecular diagnostics. A total of approximately 20 ml of marrow was aspirated. Following this procedure a sterile dressing was applied to  the bone marrow biopsy site(s). The patient was placed in the supine position to maintain pressure on the biopsy site. Post-procedure wound care instructions were given.     Complications: did take Eliquis yesterday but not today- easy bleeding and needed more time to control bleeding post procedure    Pre-procedural pain: 0 out of 10 on the numeric pain rating scale.     Post-procedural pain assessment: 0 out of 10 on the numeric pain rating scale.     Interventions: YES- additional time with post procedural bleeding control    Length of procedure:20 minutes or less      Procedure performed by: Genie Black CNP

## 2022-05-11 ENCOUNTER — HOSPITAL ENCOUNTER (OUTPATIENT)
Facility: AMBULATORY SURGERY CENTER | Age: 72
Discharge: HOME | End: 2022-05-11
Attending: STUDENT IN AN ORGANIZED HEALTH CARE EDUCATION/TRAINING PROGRAM | Admitting: STUDENT IN AN ORGANIZED HEALTH CARE EDUCATION/TRAINING PROGRAM
Payer: COMMERCIAL

## 2022-05-11 VITALS
BODY MASS INDEX: 27.98 KG/M2 | TEMPERATURE: 98.1 F | SYSTOLIC BLOOD PRESSURE: 120 MMHG | RESPIRATION RATE: 16 BRPM | WEIGHT: 153 LBS | OXYGEN SATURATION: 99 % | DIASTOLIC BLOOD PRESSURE: 61 MMHG

## 2022-05-11 DIAGNOSIS — M67.432 GANGLION CYST OF VOLAR ASPECT OF LEFT WRIST: ICD-10-CM

## 2022-05-11 LAB
PATH REPORT.COMMENTS IMP SPEC: NORMAL
PATH REPORT.COMMENTS IMP SPEC: NORMAL
PATH REPORT.FINAL DX SPEC: NORMAL
PATH REPORT.GROSS SPEC: NORMAL
PATH REPORT.MICROSCOPIC SPEC OTHER STN: NORMAL
PATH REPORT.MICROSCOPIC SPEC OTHER STN: NORMAL
PATH REPORT.RELEVANT HX SPEC: NORMAL

## 2022-05-11 PROCEDURE — 25111 REMOVE WRIST TENDON LESION: CPT | Mod: LT

## 2022-05-11 PROCEDURE — 25111 REMOVE WRIST TENDON LESION: CPT | Mod: LT | Performed by: STUDENT IN AN ORGANIZED HEALTH CARE EDUCATION/TRAINING PROGRAM

## 2022-05-11 PROCEDURE — G8918 PT W/O PREOP ORDER IV AB PRO: HCPCS

## 2022-05-11 PROCEDURE — G8907 PT DOC NO EVENTS ON DISCHARG: HCPCS

## 2022-05-11 NOTE — OP NOTE
HAND SURGERY OPERATIVE REPORT     Date of Surgery: 5/11/2022  Surgical Service: Ortho Hand     Preoperative Diagnosis: Left volar wrist ganglion     Postoperative Diagnosis: Same as preoperative diagnosis     Procedures Performed: Left volar wrist ganglion excision     Attending:  MARIE Diaz  Assistant: CONSTANZA Gallegos     Complications: None apparent  Specimens: Left volar wrist mass for permanent  Implants: None  Estimated blood loss: < 5 mL  Tourniquet time: 17 minutes  Wound classification: Clean  Anesthesia: Wide-awake with local (0.25% Bupivicaine mixed 1:1 with 1% lidocaine containing 1:100k epinephrine)     Indications for Procedure: Patient presents with long-standing relapsing-remitting left volar wrist mass consistent with ganglion cyst. She states that she would like to have it removed as it is symptomatic. On exam, it is transilluminating, soft, mildly tender and does not have a palpable thrill or pulse within the cyst. It is, however, near the radial artery, which is palpable. The Jacques's test is normal, ulnar dominant and the thumb is perfused well with complete occlusion of the radial artery - just in case it would have to be ligated. After discussing the risks, benefits and alternative, patient consented and elected to undergo left volar wrist ganglion excision.      Intraoperative findings: left volar wrist ganglion with two thickened stalks extending down into the radiocarpal and scaphotrapezialtrapezoidal joints. The volar branch and the radial artery proper were dissected off of the cyst capsule and preserved with no bleeding at the end of the case.      Description of Procedure: Patient was seen in the preoperative holding area.  Consent was verified.  The left volar wrist mass was marked.  All additional questions were answered.  The risks of the surgery were reiterated, including (but not limited to): infection, bleeding, scarring, pain, injury to surrounding structures including nerves and tendons,  recurrence of cyst, need for additional revision surgery, neuroma formation, complex regional pain syndrome, stiffness, cold intolerance, cardiac event.  Following discussion of all these risks, the patient again agreed to proceed with surgery.  Patient was then transferred to the operating room and placed supine on the operating table.  All pressure points were padded.  Sequential compression devices were placed on bilateral lower extremities and verified to be operational.  Preinjection timeout was performed.  Monitored nursing care was commenced.  Local anesthesia was injected subdermally along the planned longitudinally-oriented incision. A tourniquet was place over the forearm. The left upper extremity was prepped and draped in usual sterile fashion using Duraprep since the patient has an allergy to chlorhexidine. The left hand was elevated and the forearm tourniquet was inflated to 250 mm Hg. A #15 blade was used to incise the skin. Next, sharp dissection was done over the cyst and circumferentially around. The volar branch and the radial artery proper were identified, dissected gently off the cyst capsule and both were retracted out of the way. Also, any sensory nerves or veins were also dissected off the cyst capsule and gently retracted. Once isolated, there were two stalks identified and found to arise from the radiocarpal and scaphotrapezialtrapezoidal joints. This stalks were divided and the cyst was removed. The base of the stalk was cauterized with bipolar electrocautery. Next, the tourniquet was taken down and hemostasis was additionally obtained. The surgical wound was copiously irrigated with sterile normal saline. The skin was closed with 4-0 interrupted deep dermal Monocryl and 4-0 interrupted Nylon suture. The incision was dressed with Xeroform, gauze, cast padding and an ACE bandage. The patient was woken up and transferred to the PACU without events.     Postoperative plan: Clinic in 10-14  days.     Tato Diaz MD, PhD

## 2022-05-11 NOTE — DISCHARGE INSTRUCTIONS
Hand Surgery Discharge Instructions    Patient has been treated for a left volar wrist ganglion with Dr. Diaz on 5/11/2022     Care: Please keep the splint/cast/dressing clean and dry at all times. Should it get wet, please call the clinic to schedule an appointment - as it may need to be replaced. Do not hesitate to use the sling to help protect the affected extremity and in particular in the setting of a nerve block.     Medications: You can take Ibuprofen 400-800 mg and Tylenol 650 mg for pain relief. Please take each every 6 hours, and for optimal pain relief - please stagger the medications so that you are taking one or the other every 3 hours. If you had a nerve block, the effects may last 8-12 hours. If you have been prescribed additional pain medications, please take as instructed and as needed. If you are taking additional pain medications, please do not exceed 4000 mg of Tylenol daily from all sources. Also, if you are taking narcotic medications, please do not operate heavy machinery or drive. You can restart Eliquis tomorrow per Cardiologist.    Diet: Advance the diet as tolerated.    Weight-bearing/Activities: Move your fingers to prevent stiffness. Elevate the affected extremity to improve throbbing sensation or pain. No strenuous activities and do not raise your heart rate above 100 bpm for the first few weeks. Limit your weight-bearing with the affected extremity to 2-3 pounds unless otherwise specified.    ER Instructions: Please call the office and/or consider return to the ER if you experience worsening pain not relieved by medications, increased swelling, redness or high fevers >101F or if there are unexpected problems like shortness of breath.    Post-op follow-up: Clinic in 10-14 days with Dr. Diaz at the [facility]    Tato Diaz MD, PhD

## 2022-05-11 NOTE — BRIEF OP NOTE
[facility] Brief Operative Note    Pre-operative diagnosis: Ganglion cyst of volar aspect of left wrist [M67.432]   Post-operative diagnosis Same as preop   Procedure: Procedure(s):  LEFT VOLAR WRIST GANGLION EXCISION   Surgeon(s): Surgeon(s) and Role:     * Tato Diaz MD - Primary   Estimated blood loss: 5 mL    Specimens: Left volar wrist ganglion for permanent   Findings: Ganglion cyst from radiocarpal joint AND the STT joint (two stalks). Volar carpal and proper radial artery needed to be peeled off the cyst gently. No bleeding at the end of the case at all.

## 2022-05-11 NOTE — H&P
Ortho Hand     HPI: 71-year-old female with history of atrial fibrillation presenting with left volar wrist mass.  She noticed the mass a long time ago.  She most recently had a cortisone steroid injection, approximately 2 weeks ago.  She is noticed the mass change in size over the last several months, but it has gotten larger over the long-term.  She has left wrist pain associated with strenuous activity and extreme wrist flexion.  She would like to have this mass removed. She is not a candidate for sedation, but can undergo wide-awake surgery per cardiologist. She has stopped Eliquis for 1 day and can be restart Eliquis a day after surgery.      ROS: Negative, see HPI  Past medical history: Diastolic heart failure, chronic kidney disease, atrial fibrillation, amyloidosis  Past surgical history: No surgeries to the hands or wrist  Medications: Torsemide, Zaroxolyn, apixaban  Allergies: Gabapentin, levofloxacin, amoxicillin, spironolactone  Social history: Non-smoker, denies any tobacco or nicotine use  Family history: No bleeding or clotting problems, or issues with anesthesia     Examination:  /46   Temp 98.2  F (36.8  C) (Temporal)   Resp 18   Wt 69.4 kg (153 lb)   SpO2 99%   BMI 27.98 kg/m    Nonlabored breathing  Not distressed  Left volar wrist with soft, mildly tender, mobile, transilluminating mass with no palpable pulse within it substance  Left Jacques's test ulnar-dominant with all of hand perfused with radial artery occlusion     XR: No radiopaque foreign body or radiopaque mass     A/P: 71-year-old female with history of atrial fibrillation and heart failure presenting for evaluation of a left volar wrist mass consistent with ganglion cyst     -OR for LEFT volar wrist ganglion excision  -Discussed options for management, including aspiration versus surgery.  Since the patient had prior recent aspiration with steroid injection, with no improvement or recurrence, my recommendation is surgical  excision.  Since the patient had a steroid injection 2 weeks ago, no surgical intervention indicated for least another 4 weeks to reduce the risk of infection or wound healing problems.  Patient is agreeable.  -Discussed the risks of surgery, including but not limited to: Infection, bleeding, pain, poor scarring, injury to nerves or vessels or tendons, neuroma formation, recurrence of cyst, need for revision surgery, complex regional pain syndrome.  Despite these risks, patient consents and would like to proceed with left volar wrist ganglion excision. Offered doing no surgery, but patient insists on undergoing excision.   -Wide-awake surgery  -Clinic in 10-14 days     Tato Diaz MD, PhD

## 2022-05-13 ENCOUNTER — OFFICE VISIT (OUTPATIENT)
Dept: CARDIOLOGY | Facility: CLINIC | Age: 72
End: 2022-05-13
Payer: COMMERCIAL

## 2022-05-13 VITALS
DIASTOLIC BLOOD PRESSURE: 59 MMHG | BODY MASS INDEX: 28.34 KG/M2 | WEIGHT: 155 LBS | HEART RATE: 56 BPM | SYSTOLIC BLOOD PRESSURE: 120 MMHG | OXYGEN SATURATION: 98 %

## 2022-05-13 DIAGNOSIS — I50.33 ACUTE ON CHRONIC DIASTOLIC HEART FAILURE (H): Primary | ICD-10-CM

## 2022-05-13 LAB
ANION GAP SERPL CALCULATED.3IONS-SCNC: 14 MMOL/L (ref 3–14)
BUN SERPL-MCNC: 46 MG/DL (ref 7–30)
CALCIUM SERPL-MCNC: 9.1 MG/DL (ref 8.5–10.1)
CHLORIDE BLD-SCNC: 89 MMOL/L (ref 94–109)
CO2 SERPL-SCNC: 28 MMOL/L (ref 20–32)
CREAT SERPL-MCNC: 1.23 MG/DL (ref 0.52–1.04)
GFR SERPL CREATININE-BSD FRML MDRD: 47 ML/MIN/1.73M2
GLUCOSE BLD-MCNC: 101 MG/DL (ref 70–99)
PATH REPORT.COMMENTS IMP SPEC: NORMAL
PATH REPORT.COMMENTS IMP SPEC: NORMAL
PATH REPORT.FINAL DX SPEC: NORMAL
PATH REPORT.GROSS SPEC: NORMAL
PATH REPORT.MICROSCOPIC SPEC OTHER STN: NORMAL
PATH REPORT.RELEVANT HX SPEC: NORMAL
PHOTO IMAGE: NORMAL
POTASSIUM BLD-SCNC: 3.3 MMOL/L (ref 3.4–5.3)
SODIUM SERPL-SCNC: 131 MMOL/L (ref 133–144)

## 2022-05-13 PROCEDURE — 99215 OFFICE O/P EST HI 40 MIN: CPT | Performed by: PHYSICIAN ASSISTANT

## 2022-05-13 PROCEDURE — 80048 BASIC METABOLIC PNL TOTAL CA: CPT | Performed by: PHYSICIAN ASSISTANT

## 2022-05-13 PROCEDURE — 36415 COLL VENOUS BLD VENIPUNCTURE: CPT | Performed by: PHYSICIAN ASSISTANT

## 2022-05-13 PROCEDURE — 88304 TISSUE EXAM BY PATHOLOGIST: CPT | Performed by: PATHOLOGY

## 2022-05-13 NOTE — LETTER
5/13/2022       RE: Leandra Guerrero  [address] [place] [address]     Dear Colleague,    Thank you for referring your patient, Leandra Guerrero, to the [facility] [place] at [facility]. Please see a copy of my visit note below.    In person visit.    HPI:   Ms. Guerrero is a 71 year old female with a past medical history including stage IV AL amyloid diagnosed in 2016. Presents to clinic for CORE follow-up.     Her cardiac and oncologic history are as follows: fatigue starting in 1/2016. She had a coronary angiogram which was reportedly normal but was diagnosed with HF and started on a BBand diuretics. Her symptoms progressed to the point that she was evaluated at [facility] in August/September (Dr. Barron in oncology/hematology, Dr. Nettles is cardiology). She was diagnosed with stage IV AL amyloid (+GI, +bone marrow involvement, no involvement of kidney or liver). Her work up is detailed in our previous notes, however she has lambda AL amyloidosis and she underwent CyBorD chemotherapy and excellent light chain response by serum. Patient has been turned down at [facility] for a stem cell transplant due to her cardiac involvement. Later in 2016, she had 2-3 more right sided pleural taps. She was hospitalized 1/2017 and diuresed 20-30 pounds at that time. Since that time her AL has been in remission by labs without further chemo.     At her last visit with Dr. Cohen they discussed that peritoneal dialysis may be an option in the future if needed for volume management.    This visit:  Weight today at home today was 153. Breathing is okay right now. She does get PELAEZ. Le edema in her legs continues. She is using her lymphedema pumps and used them this AM. She still has bloating, although about the same as it has been when we've done ultrasounds. No PND. Sleeping on 3 pillows.    Palpitations are okay right now.    Last metolazone was in the  hospital.    She continues to have some chest tightness with exertion, but it is more mild than when she was in the hospital. When she was in the hospital with chest tightness, she was having a. Fib.    She had been having a bit of lightheadedness today and a sinus headache which is really bothersom to her.      Last metolazone was a few days ago.    Cardiac Medications   Apixaban 2.5 mg BID  Torsemide 150 mg BID  Kcl 60 meq TID  Inspra 25 mg BID  Metolazone 2.5-When she takes 2.5 mg of metolazone she takes 60 meq EXTRA of kcl the day of metolazone and the day after. She didn't take it with the last metolazone.    PAST MEDICAL HISTORY:  Past Medical History:   Diagnosis Date     AL amyloidosis (H)      Atrial fibrillation and flutter (H)      Cardiac amyloidosis (H)      Lymphedema      QT prolongation      Recurrent right pleural effusion 1/2/2017     SVT (supraventricular tachycardia) (H)        FAMILY HISTORY:  Family History   Problem Relation Age of Onset     Other - See Comments Sister         Amyloidosis       SOCIAL HISTORY:  Socioeconomic History     Marital status:    Tobacco Use     Smoking status: Never Smoker     Smokeless tobacco: Never Used   Substance and Sexual Activity     Alcohol use: No     Drug use: No   Other Topics Concern     CURRENT MEDICATIONS:  acetaminophen (TYLENOL) 325 MG tablet, Take 325 mg by mouth every 6 hours as needed for mild pain or fever   albuterol (PROAIR HFA/PROVENTIL HFA/VENTOLIN HFA) 108 (90 Base) MCG/ACT inhaler, Inhale 2 puffs into the lungs every 6 hours as needed for shortness of breath / dyspnea or wheezing  apixaban ANTICOAGULANT (ELIQUIS ANTICOAGULANT) 2.5 MG tablet, Take 1 tablet (2.5 mg) by mouth 2 times daily  BIOTIN PO, Take 2 tablets by mouth daily   camphor-menthol (DERMASARRA) 0.5-0.5 % external lotion, Apply 1 mL topically every 6 hours as needed for skin care  Cholecalciferol (VITAMIN D3 PO), Take by mouth daily Dose unknown  clobetasol (TEMOVATE)  0.05 % external solution, Apply to the scalp nightly for 3 months  diphenhydrAMINE (BENADRYL) 50 MG capsule, Take 1 tablet 1 hour prior to CT scan contrast.  doxycycline hyclate (VIBRAMYCIN) 100 MG capsule,   eplerenone (INSPRA) 25 MG tablet, Take 1 tablet (25 mg) by mouth 2 times daily  methylPREDNISolone (MEDROL) 32 MG tablet, Take 1 tablet by mouth 12 hours and 1 hour prior to CT scan contrast.  metolazone (ZAROXOLYN) 2.5 MG tablet, Take 1 tablet (2.5 mg) by mouth twice a week As needed for increased swelling and weight gain of 3 lb in one day or 5 lb in one week  nitroGLYcerin (NITROSTAT) 0.4 MG sublingual tablet, For chest pain. Place 1 tablet under the tongue every 5 minutes. If symptoms persist after 2 doses call 911.  ondansetron (ZOFRAN-ODT) 8 MG ODT tab, Take 8 mg by mouth every 8 hours as needed PRN  potassium chloride ER (KLOR-CON M) 10 MEQ CR tablet, Take 6 tablets (60 mEq) by mouth 3 times daily Take an extra 60 meq of Potassium the day of Metolazone and 60 meq extra the day after a Metolazone  sertraline (ZOLOFT) 25 MG tablet, Take 25 mg by mouth  torsemide (DEMADEX) 100 MG tablet, Take 1.5 tablets (150 mg) by mouth every morning AND 1.5 tablets (150 mg) daily at 2 pm.  triamcinolone (KENALOG) 0.1 % external cream, Apply a thin layer twice daily to itchy areas as needed.    methylPREDNISolone (DEPO-MEDROL) injection 40 mg        ROS:   See HPI     EXAM:  /59 (BP Location: Left arm, Patient Position: Sitting, Cuff Size: Adult Regular)   Pulse 56   Wt 70.3 kg (155 lb)   SpO2 98%   BMI 28.34 kg/m       GENERAL: Appears fatigued and frail, in no acute distress. Speaking in full sentences and able to communicate all needs  HEENT: Eye symmetrical, no discharge or icterus bilaterally. Mucous membranes moist and without lesions.  CV: RRR, +S1S2, no murmur, rub, or gallop. JVP mid third of neck at 90 degrees  RESPIRATORY: Respirations regular, even, and unlabored. Lungs CTA throughout.   GI: Soft and  mildly  distended with normoactive bowel sounds. No tenderness, rebound, guarding.   EXTREMITIES: Severe b/l non-pitting peripheral edema, slightly increased from last visit. All extremities are warm and well perfused   NEUROLOGIC: Alert and interacting appropriately. No focal deficits.   MUSCULOSKELETAL: No joint swelling or tenderness.   SKIN: No jaundice. No rashes.    Labs, reviewed with patient in clinic today:  CBC RESULTS:  Lab Results   Component Value Date    WBC 7.5 05/10/2022    WBC 6.4 05/11/2021    RBC 4.14 05/10/2022    RBC 4.29 05/11/2021    HGB 11.8 05/10/2022    HGB 13.8 05/11/2021    HCT 37.0 05/10/2022    HCT 41.3 05/11/2021    MCV 89 05/10/2022    MCV 96 05/11/2021    MCH 28.5 05/10/2022    MCH 32.2 05/11/2021    MCHC 31.9 05/10/2022    MCHC 33.4 05/11/2021    RDW 17.1 (H) 05/10/2022    RDW 13.5 05/11/2021     05/10/2022     05/11/2021       CMP RESULTS:  Lab Results   Component Value Date     (L) 05/10/2022     07/08/2021    POTASSIUM 4.6 05/10/2022    POTASSIUM 3.6 07/08/2021    CHLORIDE 93 (L) 05/10/2022    CHLORIDE 99 08/03/2021    CHLORIDE 94 07/08/2021    CO2 27 05/10/2022    CO2 34 (H) 07/08/2021    ANIONGAP 10 05/10/2022    ANIONGAP 6 07/08/2021     (H) 05/10/2022    GLC 89 07/08/2021    BUN 47 (H) 05/10/2022    BUN 35 (H) 07/08/2021    CR 1.39 (H) 05/10/2022    CR 1.13 (H) 07/08/2021    GFRESTIMATED 41 (L) 05/10/2022    GFRESTIMATED 50 (L) 07/08/2021    GFRESTBLACK 58 (L) 07/08/2021    JERROD 9.5 05/10/2022    JERROD 9.2 07/08/2021    BILITOTAL 1.5 (H) 04/13/2022    BILITOTAL 1.9 (H) 05/11/2021    ALBUMIN 3.8 04/13/2022    ALBUMIN 3.8 05/11/2021    ALKPHOS 218 (H) 04/13/2022    ALKPHOS 171 (H) 05/11/2021    ALT 37 04/13/2022    ALT 35 05/11/2021    AST 30 04/13/2022    AST 23 05/11/2021        INR RESULTS:  Lab Results   Component Value Date    INR 1.34 (H) 11/12/2021    INR 1.30 (H) 01/14/2017       Lab Results   Component Value Date    MAG 2.7 (H) 11/14/2021     MAG 2.2 10/26/2019     Lab Results   Component Value Date    NTBNPI 1,578 (H) 11/12/2021    NTBNPI 9,009 (H) 01/13/2017     Lab Results   Component Value Date    NTBNP 2,341 (H) 04/06/2022    NTBNP 1,376 (H) 02/04/2021       Diagnostics:  3/11/22 EKG: My personal review: NSR at 99, no signficiant ST elevations or depressions (stable boarderline ST depressions <1mm in V5-V6, dates back prior to Nov 2021), no TWI,    2/21 Ziopatch       6/12/2019 Holter Mnoitor      TTE 4/9/19  Moderate left ventricular hypertrophy.  Global and regional left ventricular function is normal with an EF of 60-65%.  Global right ventricular function is normal.  Moderate aortic valve insufficiency.  Mild pulmonary hypertension with dilated IVC.  This study was compared with the study from 10/11/18 the AR is worse and RA pressure is now increased.    TTE 10/11/18  Global and regional left ventricular function is normal with an EF of 55-60%.  Moderate concentric wall thickening consistent with left ventricular  hypertrophy is present - known amyloid.  Grade III or advanced diastolic dysfunction.  Normal right ventricular size, wall thickness, and function.  Estimated pulmonary artery pressure 28 mmHg.  IVC with normal diameter but does not collapse (>50%) with inspiration.  Trace pericardial effusion.  Compared to prior study from 8/17/17, no significant change.    Ziopatch 11/2017      Holter Monitor 06/2019  INTERPRETATION  1. The rhythm varied with sinus rhythm and atrial fibrillation/variable atrial flutter. Low voltage noted.  2. The heart rate varied with a minimum rate of 48 bpm, maximum rate of 164 bpm, average rate of 74 bpm.  3. Frequent supraventricular ectopy was seen ranging from 0-469 per hour. Occasional atrail pairs and fairly frequent bigeminal cycles were noted.  Wandering atrial pacemaker noted.  4. Infrequent multifocal ventricular ectopy was seen ranging from 0-36 beats per hour.  5. ST-T wave changes were present.  6.  Three patient events were documented and correlated with atrial fibrillation/flutter    EKG 8/8/2019 for palpitations  - NSR at a rate of 70, MO 0.19, QTc 525, QRS is narrow. Occasional PVCs. Biphasic twaves in V4-V6, unchanged from priors.    Assessment and Plan:   Mrs. Guerrero is a 71 year old female with AL amyloidosis with cardiac manifestation who presents to CORE for hospital follow-up. Patient has cardiac amyloidosis as evidenced by her echocardiogram (severe concentric hypertrophy and biatrial enlargement) and abnormal EKG (near-low voltage, poor R wave progression, biatrial enlargement and no evidence of LVH despite thicken LV on echo) in the setting of biopsy proven (BMB, EGD) AL amyloid. She completed chemotherapy with CyBorD with an excellent serologic response and her heart failure (i.e. troponin negative, NT pro BNP was stable, serum light chains minimal and urine light chains undetectable).     She has gradually declined over the last few years and has ongoing challenges with her volume status.  She requires frequent metolazone and maintaining an adequate potassium level has been a challenge. She has also been struggling with A. Fib, which is occaionally symptomatic. These episodes remain rare and given relative contraindications for amyloid patients we do not have her on rate control at this time. She has had a few hospital stays for volume issues and potassium issues this year. At her last appointment with Dr. Cohen, they did discuss the option of peritoneal dialysis if needed in the future for volume management.     She is seeing Dr. Wang with hematology/oncology. Her light chains are worsening. Awaiting results of her bone marrow biopsy.    Today, from a cardiovascular standpoint, she appears about stable with her recent b/l. Continue intermittent metolazone. We will get a BMP today thought because she did not take her prescribed extra kcl with her last round of metolazone.    # Chronic  diastolic heart failure/restrictive cardiomyopathy 2/2  # Cardiac amyloidosis    Stage C. NYHA Class III.    Fluid status: Continue torsemide 150 mg BID and Kcl 60 meq TID.  Continue PRN metolazone with an extra 60 meq of kcl the day of and the day after metolazone if she take a full 2.5 mg tab. If she takes a half tab of metolazone, she take 40 meq EXTRA of kcl the day of metolazone and the day after.  ACEi/ARB/ARNI: n/a   BB: no indication and relatively contraindicated due to risk of progressive conduction disease/AV block in these patients  Aldosterone antagonist: Continue Inspra 25 mg BID  SCD prophylaxis: does not meet criteria for implant  NSAID use: contraindicated  Sleep apnea evaluation: deferred today  Remote monitoring: N/A  Goals of care: prior discussions with Dr Cohen and [place] [facility] [place]  Other: Has a referall for lymphedema therapy in [facility]. If she needs paracentesis in the future for symptomatic control we would support that although recent ultrasasound without acities. Also watching her pleural effusion closely.    # A. Fib/A. Flutter  Prior holter monitor which showed intermittent a. Fib and a. Flutter. [id number] of 4. Occasional palpitations which last less than 1 minute usually although she had a prolonged episode this weekend, if these become more frequent can repeat monitor to assess burden.  - Continue Eliquis 2.5mg BID, reduced dose d/t frequent bleeding, aware of risks  - Avoiding BB in the setting of amyloid  - Holding off on digoxin given generally good rate control/very rare RVR events. Could discuss in the future if needed.    # Chest pressure. Recently went to the hospital for this same sensation, although what she has had since discharge has been more mild. Overall improved from her symptoms during her recent hospitalization. Most likely releated to her volume status, but have discussed with patient on multiple occasions what to look for and when to present to the  ER.  - PRN nitroglygerin    # Lower extremity edema.  # Lymphedema   Has been a significant issue for her for a long time.This is minimally responsive to diuretics. She is getting lymphedema massage. - Continue home lymphedema pumps. She requires fitted compression stockings for additional treatment.    # Pleural effusion. Noted during recent admission. On last admission, thoracentesis was defered.  - Continue to monitor- if symptomatic will consider a therapeutic thoracentesis    # CKD stage 3a  - Cr slightly elevated at 1.39  - Rechecking BMP today    # Possible Chirrosis, recently had ultrasound which suggested chirrosis, but on repeat, echotexture of the liver was normal  - Following up with hepatology    # Prolonged QTC  - Minimize QT prolonging meds    # Systemic amyloid  Worsening light chains on last check with heme/onc. Bone marrow biopsy is pending which will guide further planning.  - Continue follow-up with heme/onc as planned    #Nausea.   - Recommend ongoing f/u with palliative care vs the support team through her insurance.    Follow: Up:   - BMP today  - CORE in 1 month  - Dr. Keyes as scheduled in July    Billing  - I managed 2+ stable chronic conditions  - I reviewed one test and ordered one test  - I reviewed the hematologist's note    Ciara Araya PA-C  [facility]      CC  ALEJANDRO KEYES        Again, thank you for allowing me to participate in the care of your patient.      Sincerely,    Ciara Araya PA-C

## 2022-05-13 NOTE — PATIENT INSTRUCTIONS
Take your medicines every day, as directed    Changes made today:  No medicine changes    Don't take anything with decongestant in it: I would recommend saline nasal spray, flonase for the sinuses. You could also try a claritin (without decongestant to see if that helps). If it is not better next week, please follow-up with your primary care doctor.     Monitor Your Weight and Symptoms    Contact us if you:    Gain 2 pounds in one day or 5 pounds in one week  Feel more short of breath  Notice more leg swelling  Feel lightheadeded   Change your lifestyle    Limit Salt or Sodium:  2000 mg  Limit Fluids:  2000 mL or approximately 64 ounces  Eat a Heart Healthy Diet  Low in saturated fats  Stay Active:  Aim to move at least 150 minutes every  week         To Contact us    During Business Hours:  [phone], option # 1      After hours, weekends or holidays:   [phone], Option #4  Ask to speak to the On-Call Cardiologist. Inform them you are a CORE/heart failure patient at the [facility].     Use [employer] allows you to communicate directly with your heart team through secure messaging.  [employer] can be accessed any time on your phone, computer, or tablet.  If you need assistance, we'd be happy to help!         Keep your Heart Appointments:    - BMP today before you leave    - Yolanda 6/8 as scheduled    - Dr. Cohen in July as scheduled

## 2022-05-13 NOTE — LETTER
5/13/2022      RE: Leandra Guerrero  [address] [place] [address]       Dear Colleague,    Thank you for the opportunity to participate in the care of your patient, Leandra Guerrero, at the [facility]. Please see a copy of my visit note below.    In person visit.    HPI:   Ms. Guerrero is a 71 year old female with a past medical history including stage IV AL amyloid diagnosed in 2016. Presents to clinic for CORE follow-up.     Her cardiac and oncologic history are as follows: fatigue starting in 1/2016. She had a coronary angiogram which was reportedly normal but was diagnosed with HF and started on a BBand diuretics. Her symptoms progressed to the point that she was evaluated at [facility] in August/September (Dr. Barron in oncology/hematology, Dr. Nettles is cardiology). She was diagnosed with stage IV AL amyloid (+GI, +bone marrow involvement, no involvement of kidney or liver). Her work up is detailed in our previous notes, however she has lambda AL amyloidosis and she underwent CyBorD chemotherapy and excellent light chain response by serum. Patient has been turned down at [facility] for a stem cell transplant due to her cardiac involvement. Later in 2016, she had 2-3 more right sided pleural taps. She was hospitalized 1/2017 and diuresed 20-30 pounds at that time. Since that time her AL has been in remission by labs without further chemo.     At her last visit with Dr. Cohen they discussed that peritoneal dialysis may be an option in the future if needed for volume management.    This visit:  Weight today at home today was 153. Breathing is okay right now. She does get PELAEZ. Le edema in her legs continues. She is using her lymphedema pumps and used them this AM. She still has bloating, although about the same as it has been when we've done ultrasounds. No PND. Sleeping on 3 pillows.    Palpitations are okay right  now.    Last metolazone was in the hospital.    She continues to have some chest tightness with exertion, but it is more mild than when she was in the hospital. When she was in the hospital with chest tightness, she was having a. Fib.    She had been having a bit of lightheadedness today and a sinus headache which is really bothersom to her.      Last metolazone was a few days ago.    Cardiac Medications   Apixaban 2.5 mg BID  Torsemide 150 mg BID  Kcl 60 meq TID  Inspra 25 mg BID  Metolazone 2.5-When she takes 2.5 mg of metolazone she takes 60 meq EXTRA of kcl the day of metolazone and the day after. She didn't take it with the last metolazone.    PAST MEDICAL HISTORY:  Past Medical History:   Diagnosis Date     AL amyloidosis (H)      Atrial fibrillation and flutter (H)      Cardiac amyloidosis (H)      Lymphedema      QT prolongation      Recurrent right pleural effusion 1/2/2017     SVT (supraventricular tachycardia) (H)        FAMILY HISTORY:  Family History   Problem Relation Age of Onset     Other - See Comments Sister         Amyloidosis       SOCIAL HISTORY:  Socioeconomic History     Marital status:    Tobacco Use     Smoking status: Never Smoker     Smokeless tobacco: Never Used   Substance and Sexual Activity     Alcohol use: No     Drug use: No   Other Topics Concern     CURRENT MEDICATIONS:  acetaminophen (TYLENOL) 325 MG tablet, Take 325 mg by mouth every 6 hours as needed for mild pain or fever   albuterol (PROAIR HFA/PROVENTIL HFA/VENTOLIN HFA) 108 (90 Base) MCG/ACT inhaler, Inhale 2 puffs into the lungs every 6 hours as needed for shortness of breath / dyspnea or wheezing  apixaban ANTICOAGULANT (ELIQUIS ANTICOAGULANT) 2.5 MG tablet, Take 1 tablet (2.5 mg) by mouth 2 times daily  BIOTIN PO, Take 2 tablets by mouth daily   camphor-menthol (DERMASARRA) 0.5-0.5 % external lotion, Apply 1 mL topically every 6 hours as needed for skin care  Cholecalciferol (VITAMIN D3 PO), Take by mouth daily  Dose unknown  clobetasol (TEMOVATE) 0.05 % external solution, Apply to the scalp nightly for 3 months  diphenhydrAMINE (BENADRYL) 50 MG capsule, Take 1 tablet 1 hour prior to CT scan contrast.  doxycycline hyclate (VIBRAMYCIN) 100 MG capsule,   eplerenone (INSPRA) 25 MG tablet, Take 1 tablet (25 mg) by mouth 2 times daily  methylPREDNISolone (MEDROL) 32 MG tablet, Take 1 tablet by mouth 12 hours and 1 hour prior to CT scan contrast.  metolazone (ZAROXOLYN) 2.5 MG tablet, Take 1 tablet (2.5 mg) by mouth twice a week As needed for increased swelling and weight gain of 3 lb in one day or 5 lb in one week  nitroGLYcerin (NITROSTAT) 0.4 MG sublingual tablet, For chest pain. Place 1 tablet under the tongue every 5 minutes. If symptoms persist after 2 doses call 911.  ondansetron (ZOFRAN-ODT) 8 MG ODT tab, Take 8 mg by mouth every 8 hours as needed PRN  potassium chloride ER (KLOR-CON M) 10 MEQ CR tablet, Take 6 tablets (60 mEq) by mouth 3 times daily Take an extra 60 meq of Potassium the day of Metolazone and 60 meq extra the day after a Metolazone  sertraline (ZOLOFT) 25 MG tablet, Take 25 mg by mouth  torsemide (DEMADEX) 100 MG tablet, Take 1.5 tablets (150 mg) by mouth every morning AND 1.5 tablets (150 mg) daily at 2 pm.  triamcinolone (KENALOG) 0.1 % external cream, Apply a thin layer twice daily to itchy areas as needed.    methylPREDNISolone (DEPO-MEDROL) injection 40 mg        ROS:   See HPI     EXAM:  /59 (BP Location: Left arm, Patient Position: Sitting, Cuff Size: Adult Regular)   Pulse 56   Wt 70.3 kg (155 lb)   SpO2 98%   BMI 28.34 kg/m       GENERAL: Appears fatigued and frail, in no acute distress. Speaking in full sentences and able to communicate all needs  HEENT: Eye symmetrical, no discharge or icterus bilaterally. Mucous membranes moist and without lesions.  CV: RRR, +S1S2, no murmur, rub, or gallop. JVP mid third of neck at 90 degrees  RESPIRATORY: Respirations regular, even, and unlabored.  Lungs CTA throughout.   GI: Soft and mildly  distended with normoactive bowel sounds. No tenderness, rebound, guarding.   EXTREMITIES: Severe b/l non-pitting peripheral edema, slightly increased from last visit. All extremities are warm and well perfused   NEUROLOGIC: Alert and interacting appropriately. No focal deficits.   MUSCULOSKELETAL: No joint swelling or tenderness.   SKIN: No jaundice. No rashes.    Labs, reviewed with patient in clinic today:  CBC RESULTS:  Lab Results   Component Value Date    WBC 7.5 05/10/2022    WBC 6.4 05/11/2021    RBC 4.14 05/10/2022    RBC 4.29 05/11/2021    HGB 11.8 05/10/2022    HGB 13.8 05/11/2021    HCT 37.0 05/10/2022    HCT 41.3 05/11/2021    MCV 89 05/10/2022    MCV 96 05/11/2021    MCH 28.5 05/10/2022    MCH 32.2 05/11/2021    MCHC 31.9 05/10/2022    MCHC 33.4 05/11/2021    RDW 17.1 (H) 05/10/2022    RDW 13.5 05/11/2021     05/10/2022     05/11/2021       CMP RESULTS:  Lab Results   Component Value Date     (L) 05/10/2022     07/08/2021    POTASSIUM 4.6 05/10/2022    POTASSIUM 3.6 07/08/2021    CHLORIDE 93 (L) 05/10/2022    CHLORIDE 99 08/03/2021    CHLORIDE 94 07/08/2021    CO2 27 05/10/2022    CO2 34 (H) 07/08/2021    ANIONGAP 10 05/10/2022    ANIONGAP 6 07/08/2021     (H) 05/10/2022    GLC 89 07/08/2021    BUN 47 (H) 05/10/2022    BUN 35 (H) 07/08/2021    CR 1.39 (H) 05/10/2022    CR 1.13 (H) 07/08/2021    GFRESTIMATED 41 (L) 05/10/2022    GFRESTIMATED 50 (L) 07/08/2021    GFRESTBLACK 58 (L) 07/08/2021    JERROD 9.5 05/10/2022    JERROD 9.2 07/08/2021    BILITOTAL 1.5 (H) 04/13/2022    BILITOTAL 1.9 (H) 05/11/2021    ALBUMIN 3.8 04/13/2022    ALBUMIN 3.8 05/11/2021    ALKPHOS 218 (H) 04/13/2022    ALKPHOS 171 (H) 05/11/2021    ALT 37 04/13/2022    ALT 35 05/11/2021    AST 30 04/13/2022    AST 23 05/11/2021        INR RESULTS:  Lab Results   Component Value Date    INR 1.34 (H) 11/12/2021    INR 1.30 (H) 01/14/2017       Lab Results   Component  Value Date    MAG 2.7 (H) 11/14/2021    MAG 2.2 10/26/2019     Lab Results   Component Value Date    NTBNPI 1,578 (H) 11/12/2021    NTBNPI 9,009 (H) 01/13/2017     Lab Results   Component Value Date    NTBNP 2,341 (H) 04/06/2022    NTBNP 1,376 (H) 02/04/2021       Diagnostics:  3/11/22 EKG: My personal review: NSR at 99, no signficiant ST elevations or depressions (stable boarderline ST depressions <1mm in V5-V6, dates back prior to Nov 2021), no TWI,    2/21 Ziopatch       6/12/2019 Holter Mnoitor      TTE 4/9/19  Moderate left ventricular hypertrophy.  Global and regional left ventricular function is normal with an EF of 60-65%.  Global right ventricular function is normal.  Moderate aortic valve insufficiency.  Mild pulmonary hypertension with dilated IVC.  This study was compared with the study from 10/11/18 the AR is worse and RA pressure is now increased.    TTE 10/11/18  Global and regional left ventricular function is normal with an EF of 55-60%.  Moderate concentric wall thickening consistent with left ventricular  hypertrophy is present - known amyloid.  Grade III or advanced diastolic dysfunction.  Normal right ventricular size, wall thickness, and function.  Estimated pulmonary artery pressure 28 mmHg.  IVC with normal diameter but does not collapse (>50%) with inspiration.  Trace pericardial effusion.  Compared to prior study from 8/17/17, no significant change.    Ziopatch 11/2017      Holter Monitor 06/2019  INTERPRETATION  1. The rhythm varied with sinus rhythm and atrial fibrillation/variable atrial flutter. Low voltage noted.  2. The heart rate varied with a minimum rate of 48 bpm, maximum rate of 164 bpm, average rate of 74 bpm.  3. Frequent supraventricular ectopy was seen ranging from 0-469 per hour. Occasional atrail pairs and fairly frequent bigeminal cycles were noted.  Wandering atrial pacemaker noted.  4. Infrequent multifocal ventricular ectopy was seen ranging from 0-36 beats per  hour.  5. ST-T wave changes were present.  6. Three patient events were documented and correlated with atrial fibrillation/flutter    EKG 8/8/2019 for palpitations  - NSR at a rate of 70, OR 0.19, QTc 525, QRS is narrow. Occasional PVCs. Biphasic twaves in V4-V6, unchanged from priors.    Assessment and Plan:   Mrs. Guerrero is a 71 year old female with AL amyloidosis with cardiac manifestation who presents to [facility] for hospital follow-up. Patient has cardiac amyloidosis as evidenced by her echocardiogram (severe concentric hypertrophy and biatrial enlargement) and abnormal EKG (near-low voltage, poor R wave progression, biatrial enlargement and no evidence of LVH despite thicken LV on echo) in the setting of biopsy proven (BMB, EGD) AL amyloid. She completed chemotherapy with CyBorD with an excellent serologic response and her heart failure (i.e. troponin negative, NT pro BNP was stable, serum light chains minimal and urine light chains undetectable).     She has gradually declined over the last few years and has ongoing challenges with her volume status.  She requires frequent metolazone and maintaining an adequate potassium level has been a challenge. She has also been struggling with A. Fib, which is occaionally symptomatic. These episodes remain rare and given relative contraindications for amyloid patients we do not have her on rate control at this time. She has had a few hospital stays for volume issues and potassium issues this year. At her last appointment with Dr. Cohen, they did discuss the option of peritoneal dialysis if needed in the future for volume management.     She is seeing Dr. Wang with hematology/oncology. Her light chains are worsening. Awaiting results of her bone marrow biopsy.    Today, from a cardiovascular standpoint, she appears about stable with her recent b/l. Continue intermittent metolazone. We will get a BMP today thought because she did not take her prescribed extra kcl with  her last round of metolazone.    # Chronic diastolic heart failure/restrictive cardiomyopathy 2/2  # Cardiac amyloidosis    Stage C. NYHA Class III.    Fluid status: Continue torsemide 150 mg BID and Kcl 60 meq TID.  Continue PRN metolazone with an extra 60 meq of kcl the day of and the day after metolazone if she take a full 2.5 mg tab. If she takes a half tab of metolazone, she take 40 meq EXTRA of kcl the day of metolazone and the day after.  ACEi/ARB/ARNI: n/a   BB: no indication and relatively contraindicated due to risk of progressive conduction disease/AV block in these patients  Aldosterone antagonist: Continue Inspra 25 mg BID  SCD prophylaxis: does not meet criteria for implant  NSAID use: contraindicated  Sleep apnea evaluation: deferred today  Remote monitoring: N/A  Goals of care: prior discussions with Dr Cohen and ongoing in [facility] as well  Other: Has a referall for lymphedema therapy in [facility]. If she needs paracentesis in the future for symptomatic control we would support that although recent ultrasasound without acities. Also watching her pleural effusion closely.    # A. Fib/A. Flutter  Prior holter monitor which showed intermittent a. Fib and a. Flutter. [id number] of 4. Occasional palpitations which last less than 1 minute usually although she had a prolonged episode this weekend, if these become more frequent can repeat monitor to assess burden.  - Continue Eliquis 2.5mg BID, reduced dose d/t frequent bleeding, aware of risks  - Avoiding BB in the setting of amyloid  - Holding off on digoxin given generally good rate control/very rare RVR events. Could discuss in the future if needed.    # Chest pressure. Recently went to the hospital for this same sensation, although what she has had since discharge has been more mild. Overall improved from her symptoms during her recent hospitalization. Most likely releated to her volume status, but have discussed with patient on multiple occasions  what to look for and when to present to the ER.  - PRN nitroglygerin    # Lower extremity edema.  # Lymphedema   Has been a significant issue for her for a long time.This is minimally responsive to diuretics. She is getting lymphedema massage. - Continue home lymphedema pumps. She requires fitted compression stockings for additional treatment.    # Pleural effusion. Noted during recent admission. On last admission, thoracentesis was defered.  - Continue to monitor- if symptomatic will consider a therapeutic thoracentesis    # CKD stage 3a  - Cr slightly elevated at 1.39  - Rechecking BMP today    # Possible Chirrosis, recently had ultrasound which suggested chirrosis, but on repeat, echotexture of the liver was normal  - Following up with hepatology    # Prolonged QTC  - Minimize QT prolonging meds    # Systemic amyloid  Worsening light chains on last check with heme/onc. Bone marrow biopsy is pending which will guide further planning.  - Continue follow-up with heme/onc as planned    #Nausea.   - Recommend ongoing f/u with palliative care vs the support team through her insurance.    Follow: Up:   - [facility] today  - [facility] in 1 month  - Dr. Cohen as scheduled in July    Billing  - I managed 2+ stable chronic conditions  - I reviewed one test and ordered one test  - I reviewed the hematologist's note    Ciara Araya PA-C  [facility]      CC  STEPHY, ALEJANDRO RAMÍREZ        Please do not hesitate to contact me if you have any questions/concerns.     Sincerely,     Ciara Araya PA-C

## 2022-05-13 NOTE — PROGRESS NOTES
In person visit.    HPI:   Ms. Guerrero is a 71 year old female with a past medical history including stage IV AL amyloid diagnosed in 2016. Presents to clinic for C[facility] follow-up.     Her cardiac and oncologic history are as follows: fatigue starting in 1/2016. She had a coronary angiogram which was reportedly normal but was diagnosed with HF and started on a BBand diuretics. Her symptoms progressed to the point that she was evaluated at [facility] in August/September (Dr. Barron in oncology/hematology, Dr. Nettles is cardiology). She was diagnosed with stage IV AL amyloid (+GI, +bone marrow involvement, no involvement of kidney or liver). Her work up is detailed in our previous notes, however she has lambda AL amyloidosis and she underwent CyBorD chemotherapy and excellent light chain response by serum. Patient has been turned down at [facility] for a stem cell transplant due to her cardiac involvement. Later in 2016, she had 2-3 more right sided pleural taps. She was hospitalized 1/2017 and diuresed 20-30 pounds at that time. Since that time her AL has been in remission by labs without further chemo.     At her last visit with Dr. Cohen they discussed that peritoneal dialysis may be an option in the future if needed for volume management.    This visit:  Weight today at home today was 153. Breathing is okay right now. She does get PELAEZ. Le edema in her legs continues. She is using her lymphedema pumps and used them this AM. She still has bloating, although about the same as it has been when we've done ultrasounds. No PND. Sleeping on 3 pillows.    Palpitations are okay right now.    Last metolazone was in the hospital.    She continues to have some chest tightness with exertion, but it is more mild than when she was in the hospital. When she was in the hospital with chest tightness, she was having a. Fib.    She had been having a bit of lightheadedness today and a sinus headache which is really bothersom to her.      Last  metolazone was a few days ago.    Cardiac Medications   Apixaban 2.5 mg BID  Torsemide 150 mg BID  Kcl 60 meq TID  Inspra 25 mg BID  Metolazone 2.5-When she takes 2.5 mg of metolazone she takes 60 meq EXTRA of kcl the day of metolazone and the day after. She didn't take it with the last metolazone.    PAST MEDICAL HISTORY:  Past Medical History:   Diagnosis Date     AL amyloidosis (H)      Atrial fibrillation and flutter (H)      Cardiac amyloidosis (H)      Lymphedema      QT prolongation      Recurrent right pleural effusion 1/2/2017     SVT (supraventricular tachycardia) (H)        FAMILY HISTORY:  Family History   Problem Relation Age of Onset     Other - See Comments Sister         Amyloidosis       SOCIAL HISTORY:  Socioeconomic History     Marital status:    Tobacco Use     Smoking status: Never Smoker     Smokeless tobacco: Never Used   Substance and Sexual Activity     Alcohol use: No     Drug use: No   Other Topics Concern     CURRENT MEDICATIONS:  acetaminophen (TYLENOL) 325 MG tablet, Take 325 mg by mouth every 6 hours as needed for mild pain or fever   albuterol (PROAIR HFA/PROVENTIL HFA/VENTOLIN HFA) 108 (90 Base) MCG/ACT inhaler, Inhale 2 puffs into the lungs every 6 hours as needed for shortness of breath / dyspnea or wheezing  apixaban ANTICOAGULANT (ELIQUIS ANTICOAGULANT) 2.5 MG tablet, Take 1 tablet (2.5 mg) by mouth 2 times daily  BIOTIN PO, Take 2 tablets by mouth daily   camphor-menthol (DERMASARRA) 0.5-0.5 % external lotion, Apply 1 mL topically every 6 hours as needed for skin care  Cholecalciferol (VITAMIN D3 PO), Take by mouth daily Dose unknown  clobetasol (TEMOVATE) 0.05 % external solution, Apply to the scalp nightly for 3 months  diphenhydrAMINE (BENADRYL) 50 MG capsule, Take 1 tablet 1 hour prior to CT scan contrast.  doxycycline hyclate (VIBRAMYCIN) 100 MG capsule,   eplerenone (INSPRA) 25 MG tablet, Take 1 tablet (25 mg) by mouth 2 times daily  methylPREDNISolone (MEDROL) 32  MG tablet, Take 1 tablet by mouth 12 hours and 1 hour prior to CT scan contrast.  metolazone (ZAROXOLYN) 2.5 MG tablet, Take 1 tablet (2.5 mg) by mouth twice a week As needed for increased swelling and weight gain of 3 lb in one day or 5 lb in one week  nitroGLYcerin (NITROSTAT) 0.4 MG sublingual tablet, For chest pain. Place 1 tablet under the tongue every 5 minutes. If symptoms persist after 2 doses call 911.  ondansetron (ZOFRAN-ODT) 8 MG ODT tab, Take 8 mg by mouth every 8 hours as needed PRN  potassium chloride ER (KLOR-CON M) 10 MEQ CR tablet, Take 6 tablets (60 mEq) by mouth 3 times daily Take an extra 60 meq of Potassium the day of Metolazone and 60 meq extra the day after a Metolazone  sertraline (ZOLOFT) 25 MG tablet, Take 25 mg by mouth  torsemide (DEMADEX) 100 MG tablet, Take 1.5 tablets (150 mg) by mouth every morning AND 1.5 tablets (150 mg) daily at 2 pm.  triamcinolone (KENALOG) 0.1 % external cream, Apply a thin layer twice daily to itchy areas as needed.    methylPREDNISolone (DEPO-MEDROL) injection 40 mg        ROS:   See HPI     EXAM:  /59 (BP Location: Left arm, Patient Position: Sitting, Cuff Size: Adult Regular)   Pulse 56   Wt 70.3 kg (155 lb)   SpO2 98%   BMI 28.34 kg/m       GENERAL: Appears fatigued and frail, in no acute distress. Speaking in full sentences and able to communicate all needs  HEENT: Eye symmetrical, no discharge or icterus bilaterally. Mucous membranes moist and without lesions.  CV: RRR, +S1S2, no murmur, rub, or gallop. JVP mid third of neck at 90 degrees  RESPIRATORY: Respirations regular, even, and unlabored. Lungs CTA throughout.   GI: Soft and mildly  distended with normoactive bowel sounds. No tenderness, rebound, guarding.   EXTREMITIES: Severe b/l non-pitting peripheral edema, slightly increased from last visit. All extremities are warm and well perfused   NEUROLOGIC: Alert and interacting appropriately. No focal deficits.   MUSCULOSKELETAL: No joint  swelling or tenderness.   SKIN: No jaundice. No rashes.    Labs, reviewed with patient in clinic today:  CBC RESULTS:  Lab Results   Component Value Date    WBC 7.5 05/10/2022    WBC 6.4 05/11/2021    RBC 4.14 05/10/2022    RBC 4.29 05/11/2021    HGB 11.8 05/10/2022    HGB 13.8 05/11/2021    HCT 37.0 05/10/2022    HCT 41.3 05/11/2021    MCV 89 05/10/2022    MCV 96 05/11/2021    MCH 28.5 05/10/2022    MCH 32.2 05/11/2021    MCHC 31.9 05/10/2022    MCHC 33.4 05/11/2021    RDW 17.1 (H) 05/10/2022    RDW 13.5 05/11/2021     05/10/2022     05/11/2021       CMP RESULTS:  Lab Results   Component Value Date     (L) 05/10/2022     07/08/2021    POTASSIUM 4.6 05/10/2022    POTASSIUM 3.6 07/08/2021    CHLORIDE 93 (L) 05/10/2022    CHLORIDE 99 08/03/2021    CHLORIDE 94 07/08/2021    CO2 27 05/10/2022    CO2 34 (H) 07/08/2021    ANIONGAP 10 05/10/2022    ANIONGAP 6 07/08/2021     (H) 05/10/2022    GLC 89 07/08/2021    BUN 47 (H) 05/10/2022    BUN 35 (H) 07/08/2021    CR 1.39 (H) 05/10/2022    CR 1.13 (H) 07/08/2021    GFRESTIMATED 41 (L) 05/10/2022    GFRESTIMATED 50 (L) 07/08/2021    GFRESTBLACK 58 (L) 07/08/2021    JERROD 9.5 05/10/2022    JERROD 9.2 07/08/2021    BILITOTAL 1.5 (H) 04/13/2022    BILITOTAL 1.9 (H) 05/11/2021    ALBUMIN 3.8 04/13/2022    ALBUMIN 3.8 05/11/2021    ALKPHOS 218 (H) 04/13/2022    ALKPHOS 171 (H) 05/11/2021    ALT 37 04/13/2022    ALT 35 05/11/2021    AST 30 04/13/2022    AST 23 05/11/2021        INR RESULTS:  Lab Results   Component Value Date    INR 1.34 (H) 11/12/2021    INR 1.30 (H) 01/14/2017       Lab Results   Component Value Date    MAG 2.7 (H) 11/14/2021    MAG 2.2 10/26/2019     Lab Results   Component Value Date    NTBNPI 1,578 (H) 11/12/2021    NTBNPI 9,009 (H) 01/13/2017     Lab Results   Component Value Date    NTBNP 2,341 (H) 04/06/2022    NTBNP 1,376 (H) 02/04/2021       Diagnostics:  3/11/22 EKG: My personal review: NSR at 99, no signficiant ST elevations  or depressions (stable boarderline ST depressions <1mm in V5-V6, dates back prior to Nov 2021), no TWI,    2/21 Ziopatch       6/12/2019 Holter Mnoitor      TTE 4/9/19  Moderate left ventricular hypertrophy.  Global and regional left ventricular function is normal with an EF of 60-65%.  Global right ventricular function is normal.  Moderate aortic valve insufficiency.  Mild pulmonary hypertension with dilated IVC.  This study was compared with the study from 10/11/18 the AR is worse and RA pressure is now increased.    TTE 10/11/18  Global and regional left ventricular function is normal with an EF of 55-60%.  Moderate concentric wall thickening consistent with left ventricular  hypertrophy is present - known amyloid.  Grade III or advanced diastolic dysfunction.  Normal right ventricular size, wall thickness, and function.  Estimated pulmonary artery pressure 28 mmHg.  IVC with normal diameter but does not collapse (>50%) with inspiration.  Trace pericardial effusion.  Compared to prior study from 8/17/17, no significant change.    Ziopatch 11/2017      Holter Monitor 06/2019  INTERPRETATION  1. The rhythm varied with sinus rhythm and atrial fibrillation/variable atrial flutter. Low voltage noted.  2. The heart rate varied with a minimum rate of 48 bpm, maximum rate of 164 bpm, average rate of 74 bpm.  3. Frequent supraventricular ectopy was seen ranging from 0-469 per hour. Occasional atrail pairs and fairly frequent bigeminal cycles were noted.  Wandering atrial pacemaker noted.  4. Infrequent multifocal ventricular ectopy was seen ranging from 0-36 beats per hour.  5. ST-T wave changes were present.  6. Three patient events were documented and correlated with atrial fibrillation/flutter    EKG 8/8/2019 for palpitations  - NSR at a rate of 70, HI 0.19, QTc 525, QRS is narrow. Occasional PVCs. Biphasic twaves in V4-V6, unchanged from priors.    Assessment and Plan:   Mrs. Guerrero is a 71 year old female with AL  amyloidosis with cardiac manifestation who presents to [facility] for hospital follow-up. Patient has cardiac amyloidosis as evidenced by her echocardiogram (severe concentric hypertrophy and biatrial enlargement) and abnormal EKG (near-low voltage, poor R wave progression, biatrial enlargement and no evidence of LVH despite thicken LV on echo) in the setting of biopsy proven (BMB, EGD) AL amyloid. She completed chemotherapy with CyBorD with an excellent serologic response and her heart failure (i.e. troponin negative, NT pro BNP was stable, serum light chains minimal and urine light chains undetectable).     She has gradually declined over the last few years and has ongoing challenges with her volume status.  She requires frequent metolazone and maintaining an adequate potassium level has been a challenge. She has also been struggling with A. Fib, which is occaionally symptomatic. These episodes remain rare and given relative contraindications for amyloid patients we do not have her on rate control at this time. She has had a few hospital stays for volume issues and potassium issues this year. At her last appointment with Dr. Cohen, they did discuss the option of peritoneal dialysis if needed in the future for volume management.     She is seeing Dr. Wang with hematology/oncology. Her light chains are worsening. Awaiting results of her bone marrow biopsy.    Today, from a cardiovascular standpoint, she appears about stable with her recent b/l. Continue intermittent metolazone. We will get a BMP today thought because she did not take her prescribed extra kcl with her last round of metolazone.    # Chronic diastolic heart failure/restrictive cardiomyopathy 2/2  # Cardiac amyloidosis    Stage C. NYHA Class III.    Fluid status: Continue torsemide 150 mg BID and Kcl 60 meq TID.  Continue PRN metolazone with an extra 60 meq of kcl the day of and the day after metolazone if she take a full 2.5 mg tab. If she takes a  half tab of metolazone, she take 40 meq EXTRA of kcl the day of metolazone and the day after.  ACEi/ARB/ARNI: n/a   BB: no indication and relatively contraindicated due to risk of progressive conduction disease/AV block in these patients  Aldosterone antagonist: Continue Inspra 25 mg BID  SCD prophylaxis: does not meet criteria for implant  NSAID use: contraindicated  Sleep apnea evaluation: deferred today  Remote monitoring: N/A  Goals of care: prior discussions with Dr Cohen and ongoing in [facility] as well  Other: Has a referall for lymphedema therapy in [facility]. If she needs paracentesis in the future for symptomatic control we would support that although recent ultrasasound without acities. Also watching her pleural effusion closely.    # A. Fib/A. Flutter  Prior holter monitor which showed intermittent a. Fib and a. Flutter. [id number] of 4. Occasional palpitations which last less than 1 minute usually although she had a prolonged episode this weekend, if these become more frequent can repeat monitor to assess burden.  - Continue Eliquis 2.5mg BID, reduced dose d/t frequent bleeding, aware of risks  - Avoiding BB in the setting of amyloid  - Holding off on digoxin given generally good rate control/very rare RVR events. Could discuss in the future if needed.    # Chest pressure. Recently went to the hospital for this same sensation, although what she has had since discharge has been more mild. Overall improved from her symptoms during her recent hospitalization. Most likely releated to her volume status, but have discussed with patient on multiple occasions what to look for and when to present to the ER.  - PRN nitroglygerin    # Lower extremity edema.  # Lymphedema   Has been a significant issue for her for a long time.This is minimally responsive to diuretics. She is getting lymphedema massage. - Continue home lymphedema pumps. She requires fitted compression stockings for additional treatment.    # Pleural  effusion. Noted during recent admission. On last admission, thoracentesis was defered.  - Continue to monitor- if symptomatic will consider a therapeutic thoracentesis    # CKD stage 3a  - Cr slightly elevated at 1.39  - Rechecking BMP today    # Possible Chirrosis, recently had ultrasound which suggested chirrosis, but on repeat, echotexture of the liver was normal  - Following up with hepatology    # Prolonged QTC  - Minimize QT prolonging meds    # Systemic amyloid  Worsening light chains on last check with heme/onc. Bone marrow biopsy is pending which will guide further planning.  - Continue follow-up with heme/onc as planned    #Nausea.   - Recommend ongoing f/u with palliative care vs the support team through her insurance.    Follow: Up:   - BMP today  - CORE in 1 month  - Dr. Keyes as scheduled in July    Billing  - I managed 2+ stable chronic conditions  - I reviewed one test and ordered one test  - I reviewed the hematologist's note    Ciara Araya PA-C  [facility]      CC  ALEJANDRO KEYES

## 2022-05-13 NOTE — NURSING NOTE
"Chief Complaint   Patient presents with     Follow Up     Pt reports just had wrist surgery, pt reports \"I'm not doing well\", was dizzy walking to room in clinic, chest feels tight right now, SOB, last two days have been 'terrible' for dizziness, Return [facility] 71 year old female with chronic diastolic heart failure/AL presents for follow up with labs prior       Initial /59 (BP Location: Left arm, Patient Position: Sitting, Cuff Size: Adult Regular)   Pulse 56   Wt 70.3 kg (155 lb)   SpO2 98%   BMI 28.34 kg/m   Estimated body mass index is 28.34 kg/m  as calculated from the following:    Height as of 4/6/22: 1.575 m (5' 2.01\").    Weight as of this encounter: 70.3 kg (155 lb)..  BP completed using cuff size: regular    Eleonora Childers, Visit Facilitator    "

## 2022-05-16 ENCOUNTER — HEALTH MAINTENANCE LETTER (OUTPATIENT)
Age: 72
End: 2022-05-16

## 2022-05-16 DIAGNOSIS — I50.33 ACUTE ON CHRONIC DIASTOLIC HEART FAILURE (H): Primary | ICD-10-CM

## 2022-05-16 NOTE — PROGRESS NOTES
Date: 5/16/2022    Time of Call: 2:47 PM     Diagnosis:  hypokalemia     [ VORB ] Ordering provider: Yolanda Araya   Order: Extra one time dose of 60 meq K. BMP Tuesday or Wednesday this week     Order received by: Vidhi Montgomery RN      Follow-up/additional notes:

## 2022-05-18 ENCOUNTER — PATIENT OUTREACH (OUTPATIENT)
Dept: ONCOLOGY | Facility: CLINIC | Age: 72
End: 2022-05-18

## 2022-05-18 ENCOUNTER — TRANSFERRED RECORDS (OUTPATIENT)
Dept: HEALTH INFORMATION MANAGEMENT | Facility: CLINIC | Age: 72
End: 2022-05-18

## 2022-05-18 ENCOUNTER — ONCOLOGY VISIT (OUTPATIENT)
Dept: ONCOLOGY | Facility: CLINIC | Age: 72
End: 2022-05-18
Payer: COMMERCIAL

## 2022-05-18 ENCOUNTER — TELEPHONE (OUTPATIENT)
Dept: ONCOLOGY | Facility: CLINIC | Age: 72
End: 2022-05-18

## 2022-05-18 ENCOUNTER — LAB (OUTPATIENT)
Dept: LAB | Facility: CLINIC | Age: 72
End: 2022-05-18
Payer: COMMERCIAL

## 2022-05-18 ENCOUNTER — DOCUMENTATION ONLY (OUTPATIENT)
Dept: ONCOLOGY | Facility: CLINIC | Age: 72
End: 2022-05-18

## 2022-05-18 VITALS
OXYGEN SATURATION: 95 % | TEMPERATURE: 97.9 F | WEIGHT: 151.7 LBS | BODY MASS INDEX: 27.74 KG/M2 | SYSTOLIC BLOOD PRESSURE: 125 MMHG | DIASTOLIC BLOOD PRESSURE: 68 MMHG | HEART RATE: 100 BPM

## 2022-05-18 DIAGNOSIS — N30.91 HEMORRHAGIC CYSTITIS: Primary | ICD-10-CM

## 2022-05-18 DIAGNOSIS — I50.33 ACUTE ON CHRONIC DIASTOLIC HEART FAILURE (H): ICD-10-CM

## 2022-05-18 DIAGNOSIS — C90.02 MULTIPLE MYELOMA IN RELAPSE (H): ICD-10-CM

## 2022-05-18 DIAGNOSIS — E85.81 AL AMYLOIDOSIS (H): Primary | ICD-10-CM

## 2022-05-18 LAB
ANION GAP SERPL CALCULATED.3IONS-SCNC: 8 MMOL/L (ref 3–14)
BUN SERPL-MCNC: 34 MG/DL (ref 7–30)
CALCIUM SERPL-MCNC: 9.5 MG/DL (ref 8.5–10.1)
CHLORIDE BLD-SCNC: 92 MMOL/L (ref 94–109)
CO2 SERPL-SCNC: 31 MMOL/L (ref 20–32)
CREAT SERPL-MCNC: 0.99 MG/DL (ref 0.52–1.04)
GFR SERPL CREATININE-BSD FRML MDRD: 61 ML/MIN/1.73M2
GLUCOSE BLD-MCNC: 94 MG/DL (ref 70–99)
HOLD SPECIMEN: NORMAL
HOLD SPECIMEN: NORMAL
POTASSIUM BLD-SCNC: 3.5 MMOL/L (ref 3.4–5.3)
SODIUM SERPL-SCNC: 131 MMOL/L (ref 133–144)

## 2022-05-18 PROCEDURE — 80048 BASIC METABOLIC PNL TOTAL CA: CPT

## 2022-05-18 PROCEDURE — 36415 COLL VENOUS BLD VENIPUNCTURE: CPT

## 2022-05-18 PROCEDURE — 99214 OFFICE O/P EST MOD 30 MIN: CPT | Performed by: INTERNAL MEDICINE

## 2022-05-18 RX ORDER — ACYCLOVIR 400 MG/1
400 TABLET ORAL 2 TIMES DAILY
Qty: 180 TABLET | Refills: 3 | Status: ON HOLD | OUTPATIENT
Start: 2022-05-18 | End: 2023-01-01

## 2022-05-18 RX ORDER — ALLOPURINOL 300 MG/1
300 TABLET ORAL DAILY
Qty: 28 TABLET | Refills: 0 | Status: SHIPPED | OUTPATIENT
Start: 2022-05-18 | End: 2022-06-15

## 2022-05-18 RX ORDER — ONDANSETRON 8 MG/1
8 TABLET, FILM COATED ORAL EVERY 8 HOURS PRN
Qty: 30 TABLET | Refills: 2 | Status: SHIPPED | OUTPATIENT
Start: 2022-05-18

## 2022-05-18 ASSESSMENT — PAIN SCALES - GENERAL: PAINLEVEL: NO PAIN (0)

## 2022-05-18 NOTE — TELEPHONE ENCOUNTER
Prior Authorization Not Needed per Insurance    Medication: Cyclophosphamide 500mg, -pa not needed  Insurance Company: [employer] [place] - Phone [phone] Fax [phone]  Expected CoPay: $539.89    Pharmacy Filling the Rx: [facility] - [place] [address]   Pharmacy Notified: Yes  Patient Notified: Yes        PA Initiation    Medication: Cyclophosphamide 25mg  Insurance Company: [employer] [place] - Phone [phone] Fax [phone]  Pharmacy Filling the Rx: [facility] [place] [address] [facility]  Ref.# [id number]  Filling Pharmacy Phone:    Filling Pharmacy Fax:    Start Date: 5/18/2022        PA Initiation    Medication: Cyclophosphamide 50mg,pa pending  Insurance Company: [employer] [place] - Phone [phone] Fax [phone]  Pharmacy Filling the Rx: [facility] [place] [address] [facility]  Ref.# [id number]   Filling Pharmacy Phone:    Filling Pharmacy Fax:    Start Date: 5/18/2022

## 2022-05-18 NOTE — LETTER
2022         RE: Leandra Guerrero  [address] [place] [address]        Dear Colleague,    Thank you for referring your patient, Leandra Guerrero, to the [facility] [place]. Please see a copy of my visit note below.    [facility] Hematology / Oncology  Progress Note  Name: Leandra Guerrero  :  1950    MRN:  [id number]    --------------------    Assessment / Plan:  Recurrent amyloidosis/multiple myeloma:    Amy, her  and I reviewed her clinical scenario as well as what is now confirmed recurrent amyloidosis/multiple myeloma.  We obviously had a suspicion with a rising light chains.  Her marrow confirms this.  Given the excellent mileage she got out of prior therapy, we have plans to resume therapy with CyBorD adding daratumumab to her current regimen.  She has previously been on treatment and tolerated extremely well.  We will need to follow her closely for hemorrhagic cystitis due to Cytoxan.  We reviewed all the potential side effects including but not limited to alopecia, mouth sores, nausea, vomiting, diarrhea, constipation, neuropathy, infusion reactions associated with this regimen.  We reviewed the logistics of administration.  She is aware of steroid related side effects.  We will follow her myeloma/amyloid labs closely looking for evaluation of response.  Obviously we would hope that she had nice response such as last time that any long-lasting.  I have been in contact with her cardiologist Dr. Cohen as well.  Amy is in agreement and consents to proceed.    Yasmani Wang MD    --------------------    Interval History:  [place] returns for follow up of amyloidosis.  No new symptoms to report.  Entirety of time spent reviewing lab and evaluation and finalizing treatment plan.    --------------------    Physical Exam:  VS: /68 (BP Location: Left arm, Patient Position: Sitting)   Pulse 100   Temp 97.9  F (36.6   C) (Oral)   Wt 68.8 kg (151 lb 11.2 oz)   SpO2 95%   BMI 27.74 kg/m    GEN: Well appearing.    Labs / Imaging / Path:  We reviewed her trend in CBC, CMP, SPEP, FLC assay and spent considerable time reviewing her recent marrow.      Again, thank you for allowing me to participate in the care of your patient.        Sincerely,        Yasmani Wang MD

## 2022-05-18 NOTE — PROGRESS NOTES
"Oral Chemotherapy Monitoring Program    Primary Oncologist: Dr. Wang  Primary Oncology Clinic:   Cancer Diagnosis: Amyloidosis    Drug: CyBorD + Kerry  Start Date: ASAP   Expected duration of therapy: Until disease progression or unacceptable toxicity    Drug Interaction Assessment: None    Subjective/Objective:  Leandra Guerrero is a 71 year old female contacted by phone for an initial visit for oral chemotherapy education.      ORAL CHEMOTHERAPY 10/26/2016 11/1/2016 5/18/2022   Assessment Type - - Initial Work up   Diagnosis Code - - Amyloidosis   Providers - - [place]   Clinic Name/Location - - [place]   Drug Name Cytoxan (Cyclophsphamide) Cytoxan (Cyclophsphamide) Cytoxan (cyclophsphamide)   Dose - - 500 mg   Current Schedule Weekly Weekly Weekly   Cycle Details [id number] -   Start Date of Last Cycle - 10/5/2016 -   Planned next cycle start date - 11/1/2016 -   Doses missed in last 2 weeks - 0 -   Adverse Effects - (No Data) -   Home BPs - not needed -   Any new drug interactions? - No -   Is the patient currently in pain? - Assessed in last 30 days. -       Vitals:  BP:   BP Readings from Last 1 Encounters:   05/18/22 125/68     Wt Readings from Last 1 Encounters:   05/18/22 68.8 kg (151 lb 11.2 oz)     Estimated body surface area is 1.73 meters squared as calculated from the following:    Height as of 4/6/22: 1.575 m (5' 2.01\").    Weight as of an earlier encounter on 5/18/22: 68.8 kg (151 lb 11.2 oz).    Labs:  _  Result Component Current Result Ref Range   Sodium 131 (L) (5/18/2022) 133 - 144 mmol/L     _  Result Component Current Result Ref Range   Potassium 3.5 (5/18/2022) 3.4 - 5.3 mmol/L     _  Result Component Current Result Ref Range   Calcium 9.5 (5/18/2022) 8.5 - 10.1 mg/dL     No results found for Mag within last 30 days.     No results found for Phos within last 30 days.     No results found for ALBUMIN within last 30 days.     _  Result Component Current Result Ref Range   Urea " Nitrogen 34 (H) (5/18/2022) 7 - 30 mg/dL     _  Result Component Current Result Ref Range   Creatinine 0.99 (5/18/2022) 0.52 - 1.04 mg/dL       No results found for AST within last 30 days.     No results found for ALT within last 30 days.     No results found for BILITOTAL within last 30 days.       _  Result Component Current Result Ref Range   WBC Count 7.5 (5/10/2022) 4.0 - 11.0 10e3/uL     _  Result Component Current Result Ref Range   Hemoglobin 11.8 (5/10/2022) 11.7 - 15.7 g/dL     _  Result Component Current Result Ref Range   Platelet Count 277 (5/10/2022) 150 - 450 10e3/uL     No results found for ANC within last 30 days.         Assessment:  Patient is appropriate to start therapy.    Plan:  Basic chemotherapy teaching was reviewed with the patient including indication, start date of therapy, dose, administration, adverse effects, missed doses, food and drug interactions, monitoring, side effect management, office contact information, and safe handling. Written materials were provided and all questions answered.    Follow-Up:  1 week from start     Cinthia Nolasco, PharmD, BCPS, BCOP

## 2022-05-18 NOTE — PROGRESS NOTES
" WISETIVI Smithers: Chemotherapy Education Notes  RN met with patient and spouse in clinic for chemotherapy education on Darzalex Faspro, Velcade, Cytoxan and Dexamethasone.  Reviewed the following with the patient and their support person:      Treatment Goal/Regimen/Duration: rationale for adherence, specific medication names and medication delivery methods; possible chemotherapy side effects and management of side effects, including: anemia, neutropenia, thrombocytopenia, diarrhea/constipation, nausea/vomiting, taste changes, appetite changes, fatigue, myelosuppression.       General Chemotherapy Information, including: ways it is excreted from the body and cleaning and containment of vomitus or other bodily fluid, use of the bathroom, and when to call the provider.      Written Information: Patient was provided with the [employer] \"My Cancer Guidebook\" binder, printouts from [facility] on specific chemotherapy drugs,\"When to Call the Doctor,\" and \"Self-Care Tips During Cancer Treatment.\"  Also, information regarding various programs offered at [facility], and our business card with contact information given for the following: Oncology Clinic/scheduling, RN Care Coordinator, and the after-hours Nurse Advice Line.     No barriers to learning identified. Patient and spouse verbalized understanding of all written and verbal information. All questions answered patient s satisfaction.      General Orientation to the Medical Oncology department, Infusion Services department, scheduling, and usual flow of the treatment day provided.    Talked about need  For premeds with first few doses of Darzalex Faspro and the observation period after the first couple of doses. Informed patient a pharmacist would be in contact with her regarding Cytoxan and Decadron prescriptions.  While still in clinic, patient received a call from our pharmacist.  Cytoxan has a $600/month copay.  Patient states she will consider if " "this is financially possible and get back to the pharmacist if a different option is needed.    Patient states she is not excited to go through treatment again, but wants to schedule as soon as possible so she can \"just get it started.\"    Patient instructed to call with further questions or concerns.  Patient states understanding and is in agreement with this plan.        Dinora Bradford RN on 5/18/2022 at 11:19 AM    "

## 2022-05-18 NOTE — NURSING NOTE
"Oncology Rooming Note    May 18, 2022 9:52 AM   Leandra Guerrero is a 71 year old female who presents for:    Chief Complaint   Patient presents with     Oncology Clinic Visit     Initial Vitals: /68 (BP Location: Left arm, Patient Position: Sitting)   Pulse 100   Temp 97.9  F (36.6  C) (Oral)   Wt 68.8 kg (151 lb 11.2 oz)   SpO2 95%   BMI 27.74 kg/m   Estimated body mass index is 27.74 kg/m  as calculated from the following:    Height as of 4/6/22: 1.575 m (5' 2.01\").    Weight as of this encounter: 68.8 kg (151 lb 11.2 oz). Body surface area is 1.73 meters squared.  No Pain (0) Comment: Data Unavailable   No LMP recorded. Patient is postmenopausal.  Allergies reviewed: Yes  Medications reviewed: Yes    Medications: Medication refills not needed today.  Pharmacy name entered into [employer]: [id number] - [place], [place] - [facility] [address] [facility] [address] AT [facility] [address] Sepulveda LPN              "

## 2022-05-19 ENCOUNTER — TELEPHONE (OUTPATIENT)
Dept: ONCOLOGY | Facility: CLINIC | Age: 72
End: 2022-05-19
Payer: COMMERCIAL

## 2022-05-19 NOTE — TELEPHONE ENCOUNTER
Prior Authorization Approval    Authorization Effective Date:  02/17/2022  Authorization Expiration Date:  05/18/2023  Medication: Cyclophosphamide 50mg, 25mg  Approved Dose/Quantity: 4-25mg, 40-50mg  Reference #:     Insurance Company: [employer] [place] - Phone [phone] Fax [phone]  Expected CoPay: $10.93- 25mg  $31.71-50mg     CoPay Card Available: No    Foundation Assistance Needed: no grants open at this time  Which Pharmacy is filling the prescription (Not needed for infusion/clinic administered): [facility] - [place], [place] - [address]  Pharmacy Notified: Yes  Patient Notified: Yes

## 2022-05-19 NOTE — TELEPHONE ENCOUNTER
Prior Authorization Retail Medication Request    Medication/Dose: ondansetron (ZOFRAN) 8 MG tablet  ICD code (if different than what is on RX): Hemorrhagic cystitis [N30.91]   Previously Tried and Failed:   Rationale:      Insurance Name:  [facility]  Insurance ID:  [id number]    Pharmacy Information (if different than what is on RX)  Name:  [facility] #[id number] - [place], [place] - [facility] [address] [facility] AT [address]  Phone:  [phone]     complains of pain/discomfort

## 2022-05-19 NOTE — TELEPHONE ENCOUNTER
Central Prior Authorization Team   Phone: [phone]    PA Initiation    Medication: ondansetron (ZOFRAN) 8 MG tablet-PA initiated  Insurance Company: [employer] - Phone [phone] Fax [phone]  Pharmacy Filling the Rx: [facility] #[id number] - [place], [place] - [address] [facility] [address]  Filling Pharmacy Phone: [phone]  Filling Pharmacy Fax:    Start Date: 5/19/2022

## 2022-05-20 ENCOUNTER — TELEPHONE (OUTPATIENT)
Dept: ONCOLOGY | Facility: CLINIC | Age: 72
End: 2022-05-20
Payer: COMMERCIAL

## 2022-05-20 DIAGNOSIS — E85.81 AL AMYLOIDOSIS (H): ICD-10-CM

## 2022-05-20 DIAGNOSIS — N30.91 HEMORRHAGIC CYSTITIS: Primary | ICD-10-CM

## 2022-05-20 RX ORDER — MEPERIDINE HYDROCHLORIDE 25 MG/ML
25 INJECTION INTRAMUSCULAR; INTRAVENOUS; SUBCUTANEOUS EVERY 30 MIN PRN
Status: CANCELLED | OUTPATIENT
Start: 2022-07-05

## 2022-05-20 RX ORDER — HEPARIN SODIUM,PORCINE 10 UNIT/ML
5 VIAL (ML) INTRAVENOUS
Status: CANCELLED | OUTPATIENT
Start: 2022-07-05

## 2022-05-20 RX ORDER — HEPARIN SODIUM (PORCINE) LOCK FLUSH IV SOLN 100 UNIT/ML 100 UNIT/ML
5 SOLUTION INTRAVENOUS
Status: CANCELLED | OUTPATIENT
Start: 2022-07-05

## 2022-05-20 RX ORDER — DIPHENHYDRAMINE HYDROCHLORIDE 50 MG/ML
50 INJECTION INTRAMUSCULAR; INTRAVENOUS
Status: CANCELLED
Start: 2022-06-01

## 2022-05-20 RX ORDER — EPINEPHRINE 1 MG/ML
0.3 INJECTION, SOLUTION INTRAMUSCULAR; SUBCUTANEOUS EVERY 5 MIN PRN
Status: CANCELLED | OUTPATIENT
Start: 2022-07-05

## 2022-05-20 RX ORDER — METHYLPREDNISOLONE SODIUM SUCCINATE 125 MG/2ML
125 INJECTION, POWDER, LYOPHILIZED, FOR SOLUTION INTRAMUSCULAR; INTRAVENOUS
Status: CANCELLED
Start: 2022-05-24

## 2022-05-20 RX ORDER — DIPHENHYDRAMINE HCL 25 MG
50 CAPSULE ORAL
Status: CANCELLED | OUTPATIENT
Start: 2022-07-05

## 2022-05-20 RX ORDER — HEPARIN SODIUM,PORCINE 10 UNIT/ML
5 VIAL (ML) INTRAVENOUS
Status: CANCELLED | OUTPATIENT
Start: 2022-06-01

## 2022-05-20 RX ORDER — DIPHENHYDRAMINE HYDROCHLORIDE 50 MG/ML
50 INJECTION INTRAMUSCULAR; INTRAVENOUS
Status: CANCELLED
Start: 2022-06-07

## 2022-05-20 RX ORDER — DIPHENHYDRAMINE HYDROCHLORIDE 50 MG/ML
50 INJECTION INTRAMUSCULAR; INTRAVENOUS
Status: CANCELLED
Start: 2022-01-01

## 2022-05-20 RX ORDER — DIPHENHYDRAMINE HYDROCHLORIDE 50 MG/ML
50 INJECTION INTRAMUSCULAR; INTRAVENOUS
Status: CANCELLED
Start: 2022-07-05

## 2022-05-20 RX ORDER — HEPARIN SODIUM (PORCINE) LOCK FLUSH IV SOLN 100 UNIT/ML 100 UNIT/ML
5 SOLUTION INTRAVENOUS
Status: CANCELLED | OUTPATIENT
Start: 2022-05-24

## 2022-05-20 RX ORDER — HEPARIN SODIUM (PORCINE) LOCK FLUSH IV SOLN 100 UNIT/ML 100 UNIT/ML
5 SOLUTION INTRAVENOUS
Status: CANCELLED | OUTPATIENT
Start: 2022-06-01

## 2022-05-20 RX ORDER — DIPHENHYDRAMINE HCL 25 MG
50 CAPSULE ORAL
Status: CANCELLED | OUTPATIENT
Start: 2022-01-01

## 2022-05-20 RX ORDER — ALBUTEROL SULFATE 90 UG/1
1-2 AEROSOL, METERED RESPIRATORY (INHALATION)
Status: CANCELLED
Start: 2022-06-14

## 2022-05-20 RX ORDER — NALOXONE HYDROCHLORIDE 0.4 MG/ML
0.2 INJECTION, SOLUTION INTRAMUSCULAR; INTRAVENOUS; SUBCUTANEOUS
Status: CANCELLED | OUTPATIENT
Start: 2022-07-05

## 2022-05-20 RX ORDER — HEPARIN SODIUM,PORCINE 10 UNIT/ML
5 VIAL (ML) INTRAVENOUS
Status: CANCELLED | OUTPATIENT
Start: 2022-06-21

## 2022-05-20 RX ORDER — DEXAMETHASONE 4 MG/1
20 TABLET ORAL ONCE
Status: CANCELLED | OUTPATIENT
Start: 2022-06-28

## 2022-05-20 RX ORDER — EPINEPHRINE 1 MG/ML
0.3 INJECTION, SOLUTION INTRAMUSCULAR; SUBCUTANEOUS EVERY 5 MIN PRN
Status: CANCELLED | OUTPATIENT
Start: 2022-01-01

## 2022-05-20 RX ORDER — ACETAMINOPHEN 325 MG/1
650 TABLET ORAL
Status: CANCELLED | OUTPATIENT
Start: 2022-06-21

## 2022-05-20 RX ORDER — ALBUTEROL SULFATE 0.83 MG/ML
2.5 SOLUTION RESPIRATORY (INHALATION)
Status: CANCELLED | OUTPATIENT
Start: 2022-01-01

## 2022-05-20 RX ORDER — ALBUTEROL SULFATE 0.83 MG/ML
2.5 SOLUTION RESPIRATORY (INHALATION)
Status: CANCELLED | OUTPATIENT
Start: 2022-06-14

## 2022-05-20 RX ORDER — DIPHENHYDRAMINE HCL 25 MG
50 CAPSULE ORAL ONCE
Status: CANCELLED | OUTPATIENT
Start: 2022-06-07

## 2022-05-20 RX ORDER — DIPHENHYDRAMINE HCL 25 MG
50 CAPSULE ORAL ONCE
Status: CANCELLED | OUTPATIENT
Start: 2022-06-01

## 2022-05-20 RX ORDER — MONTELUKAST SODIUM 10 MG/1
10 TABLET ORAL ONCE
Status: CANCELLED | OUTPATIENT
Start: 2022-06-01

## 2022-05-20 RX ORDER — HEPARIN SODIUM,PORCINE 10 UNIT/ML
5 VIAL (ML) INTRAVENOUS
Status: CANCELLED | OUTPATIENT
Start: 2022-06-14

## 2022-05-20 RX ORDER — ACETAMINOPHEN 325 MG/1
650 TABLET ORAL ONCE
Status: CANCELLED | OUTPATIENT
Start: 2022-06-01

## 2022-05-20 RX ORDER — METHYLPREDNISOLONE SODIUM SUCCINATE 125 MG/2ML
125 INJECTION, POWDER, LYOPHILIZED, FOR SOLUTION INTRAMUSCULAR; INTRAVENOUS
Status: CANCELLED
Start: 2022-01-01

## 2022-05-20 RX ORDER — ALBUTEROL SULFATE 0.83 MG/ML
2.5 SOLUTION RESPIRATORY (INHALATION)
Status: CANCELLED | OUTPATIENT
Start: 2022-05-24

## 2022-05-20 RX ORDER — EPINEPHRINE 1 MG/ML
0.3 INJECTION, SOLUTION INTRAMUSCULAR; SUBCUTANEOUS EVERY 5 MIN PRN
Status: CANCELLED | OUTPATIENT
Start: 2022-05-24

## 2022-05-20 RX ORDER — HEPARIN SODIUM (PORCINE) LOCK FLUSH IV SOLN 100 UNIT/ML 100 UNIT/ML
5 SOLUTION INTRAVENOUS
Status: CANCELLED | OUTPATIENT
Start: 2022-06-21

## 2022-05-20 RX ORDER — DEXAMETHASONE 4 MG/1
20 TABLET ORAL ONCE
Status: CANCELLED | OUTPATIENT
Start: 2022-07-05

## 2022-05-20 RX ORDER — DIPHENHYDRAMINE HCL 25 MG
50 CAPSULE ORAL
Status: CANCELLED | OUTPATIENT
Start: 2022-06-21

## 2022-05-20 RX ORDER — NALOXONE HYDROCHLORIDE 0.4 MG/ML
0.2 INJECTION, SOLUTION INTRAMUSCULAR; INTRAVENOUS; SUBCUTANEOUS
Status: CANCELLED | OUTPATIENT
Start: 2022-06-07

## 2022-05-20 RX ORDER — ACETAMINOPHEN 325 MG/1
650 TABLET ORAL ONCE
Status: CANCELLED | OUTPATIENT
Start: 2022-06-07

## 2022-05-20 RX ORDER — EPINEPHRINE 1 MG/ML
0.3 INJECTION, SOLUTION INTRAMUSCULAR; SUBCUTANEOUS EVERY 5 MIN PRN
Status: CANCELLED | OUTPATIENT
Start: 2022-06-28

## 2022-05-20 RX ORDER — MONTELUKAST SODIUM 10 MG/1
10 TABLET ORAL ONCE
Status: CANCELLED | OUTPATIENT
Start: 2022-05-24

## 2022-05-20 RX ORDER — EPINEPHRINE 1 MG/ML
0.3 INJECTION, SOLUTION INTRAMUSCULAR; SUBCUTANEOUS EVERY 5 MIN PRN
Status: CANCELLED | OUTPATIENT
Start: 2022-06-14

## 2022-05-20 RX ORDER — ALBUTEROL SULFATE 0.83 MG/ML
2.5 SOLUTION RESPIRATORY (INHALATION)
Status: CANCELLED | OUTPATIENT
Start: 2022-06-28

## 2022-05-20 RX ORDER — HEPARIN SODIUM (PORCINE) LOCK FLUSH IV SOLN 100 UNIT/ML 100 UNIT/ML
5 SOLUTION INTRAVENOUS
Status: CANCELLED | OUTPATIENT
Start: 2022-06-28

## 2022-05-20 RX ORDER — DIPHENHYDRAMINE HYDROCHLORIDE 50 MG/ML
50 INJECTION INTRAMUSCULAR; INTRAVENOUS
Status: CANCELLED
Start: 2022-06-14

## 2022-05-20 RX ORDER — HEPARIN SODIUM,PORCINE 10 UNIT/ML
5 VIAL (ML) INTRAVENOUS
Status: CANCELLED | OUTPATIENT
Start: 2022-06-07

## 2022-05-20 RX ORDER — DIPHENHYDRAMINE HYDROCHLORIDE 50 MG/ML
50 INJECTION INTRAMUSCULAR; INTRAVENOUS
Status: CANCELLED
Start: 2022-06-28

## 2022-05-20 RX ORDER — ALBUTEROL SULFATE 0.83 MG/ML
2.5 SOLUTION RESPIRATORY (INHALATION)
Status: CANCELLED | OUTPATIENT
Start: 2022-06-07

## 2022-05-20 RX ORDER — ALBUTEROL SULFATE 90 UG/1
1-2 AEROSOL, METERED RESPIRATORY (INHALATION)
Status: CANCELLED
Start: 2022-06-28

## 2022-05-20 RX ORDER — MEPERIDINE HYDROCHLORIDE 25 MG/ML
25 INJECTION INTRAMUSCULAR; INTRAVENOUS; SUBCUTANEOUS EVERY 30 MIN PRN
Status: CANCELLED | OUTPATIENT
Start: 2022-05-24

## 2022-05-20 RX ORDER — MONTELUKAST SODIUM 10 MG/1
10 TABLET ORAL ONCE
Status: CANCELLED | OUTPATIENT
Start: 2022-06-07

## 2022-05-20 RX ORDER — METHYLPREDNISOLONE SODIUM SUCCINATE 125 MG/2ML
125 INJECTION, POWDER, LYOPHILIZED, FOR SOLUTION INTRAMUSCULAR; INTRAVENOUS
Status: CANCELLED
Start: 2022-06-07

## 2022-05-20 RX ORDER — NALOXONE HYDROCHLORIDE 0.4 MG/ML
0.2 INJECTION, SOLUTION INTRAMUSCULAR; INTRAVENOUS; SUBCUTANEOUS
Status: CANCELLED | OUTPATIENT
Start: 2022-01-01

## 2022-05-20 RX ORDER — DEXAMETHASONE 4 MG/1
20 TABLET ORAL ONCE
Status: CANCELLED | OUTPATIENT
Start: 2022-01-01

## 2022-05-20 RX ORDER — MEPERIDINE HYDROCHLORIDE 25 MG/ML
25 INJECTION INTRAMUSCULAR; INTRAVENOUS; SUBCUTANEOUS EVERY 30 MIN PRN
Status: CANCELLED | OUTPATIENT
Start: 2022-06-07

## 2022-05-20 RX ORDER — ALBUTEROL SULFATE 0.83 MG/ML
2.5 SOLUTION RESPIRATORY (INHALATION)
Status: CANCELLED | OUTPATIENT
Start: 2022-06-01

## 2022-05-20 RX ORDER — METHYLPREDNISOLONE SODIUM SUCCINATE 125 MG/2ML
125 INJECTION, POWDER, LYOPHILIZED, FOR SOLUTION INTRAMUSCULAR; INTRAVENOUS
Status: CANCELLED
Start: 2022-06-14

## 2022-05-20 RX ORDER — DEXAMETHASONE 4 MG/1
20 TABLET ORAL ONCE
Status: CANCELLED | OUTPATIENT
Start: 2022-06-14

## 2022-05-20 RX ORDER — EPINEPHRINE 1 MG/ML
0.3 INJECTION, SOLUTION INTRAMUSCULAR; SUBCUTANEOUS EVERY 5 MIN PRN
Status: CANCELLED | OUTPATIENT
Start: 2022-06-21

## 2022-05-20 RX ORDER — NALOXONE HYDROCHLORIDE 0.4 MG/ML
0.2 INJECTION, SOLUTION INTRAMUSCULAR; INTRAVENOUS; SUBCUTANEOUS
Status: CANCELLED | OUTPATIENT
Start: 2022-05-24

## 2022-05-20 RX ORDER — NALOXONE HYDROCHLORIDE 0.4 MG/ML
0.2 INJECTION, SOLUTION INTRAMUSCULAR; INTRAVENOUS; SUBCUTANEOUS
Status: CANCELLED | OUTPATIENT
Start: 2022-06-28

## 2022-05-20 RX ORDER — MEPERIDINE HYDROCHLORIDE 25 MG/ML
25 INJECTION INTRAMUSCULAR; INTRAVENOUS; SUBCUTANEOUS EVERY 30 MIN PRN
Status: CANCELLED | OUTPATIENT
Start: 2022-06-28

## 2022-05-20 RX ORDER — ALBUTEROL SULFATE 90 UG/1
1-2 AEROSOL, METERED RESPIRATORY (INHALATION)
Status: CANCELLED
Start: 2022-05-24

## 2022-05-20 RX ORDER — ALBUTEROL SULFATE 90 UG/1
1-2 AEROSOL, METERED RESPIRATORY (INHALATION)
Status: CANCELLED
Start: 2022-06-07

## 2022-05-20 RX ORDER — ACETAMINOPHEN 325 MG/1
650 TABLET ORAL
Status: CANCELLED | OUTPATIENT
Start: 2022-01-01

## 2022-05-20 RX ORDER — HEPARIN SODIUM (PORCINE) LOCK FLUSH IV SOLN 100 UNIT/ML 100 UNIT/ML
5 SOLUTION INTRAVENOUS
Status: CANCELLED | OUTPATIENT
Start: 2022-06-07

## 2022-05-20 RX ORDER — METHYLPREDNISOLONE SODIUM SUCCINATE 125 MG/2ML
125 INJECTION, POWDER, LYOPHILIZED, FOR SOLUTION INTRAMUSCULAR; INTRAVENOUS
Status: CANCELLED
Start: 2022-07-05

## 2022-05-20 RX ORDER — HEPARIN SODIUM (PORCINE) LOCK FLUSH IV SOLN 100 UNIT/ML 100 UNIT/ML
5 SOLUTION INTRAVENOUS
Status: CANCELLED | OUTPATIENT
Start: 2022-01-01

## 2022-05-20 RX ORDER — CYCLOPHOSPHAMIDE 50 MG/1
500 CAPSULE ORAL
Qty: 40 CAPSULE | Refills: 0 | Status: SHIPPED | OUTPATIENT
Start: 2022-05-24 | End: 2022-06-07 | Stop reason: ALTCHOICE

## 2022-05-20 RX ORDER — EPINEPHRINE 1 MG/ML
0.3 INJECTION, SOLUTION INTRAMUSCULAR; SUBCUTANEOUS EVERY 5 MIN PRN
Status: CANCELLED | OUTPATIENT
Start: 2022-06-01

## 2022-05-20 RX ORDER — DIPHENHYDRAMINE HYDROCHLORIDE 50 MG/ML
50 INJECTION INTRAMUSCULAR; INTRAVENOUS
Status: CANCELLED
Start: 2022-06-21

## 2022-05-20 RX ORDER — HEPARIN SODIUM,PORCINE 10 UNIT/ML
5 VIAL (ML) INTRAVENOUS
Status: CANCELLED | OUTPATIENT
Start: 2022-05-24

## 2022-05-20 RX ORDER — MEPERIDINE HYDROCHLORIDE 25 MG/ML
25 INJECTION INTRAMUSCULAR; INTRAVENOUS; SUBCUTANEOUS EVERY 30 MIN PRN
Status: CANCELLED | OUTPATIENT
Start: 2022-01-01

## 2022-05-20 RX ORDER — ALBUTEROL SULFATE 0.83 MG/ML
2.5 SOLUTION RESPIRATORY (INHALATION)
Status: CANCELLED | OUTPATIENT
Start: 2022-07-05

## 2022-05-20 RX ORDER — MEPERIDINE HYDROCHLORIDE 25 MG/ML
25 INJECTION INTRAMUSCULAR; INTRAVENOUS; SUBCUTANEOUS EVERY 30 MIN PRN
Status: CANCELLED | OUTPATIENT
Start: 2022-06-14

## 2022-05-20 RX ORDER — NALOXONE HYDROCHLORIDE 0.4 MG/ML
0.2 INJECTION, SOLUTION INTRAMUSCULAR; INTRAVENOUS; SUBCUTANEOUS
Status: CANCELLED | OUTPATIENT
Start: 2022-06-14

## 2022-05-20 RX ORDER — DEXAMETHASONE 4 MG/1
20 TABLET ORAL ONCE
Status: CANCELLED | OUTPATIENT
Start: 2022-06-07

## 2022-05-20 RX ORDER — METHYLPREDNISOLONE SODIUM SUCCINATE 125 MG/2ML
125 INJECTION, POWDER, LYOPHILIZED, FOR SOLUTION INTRAMUSCULAR; INTRAVENOUS
Status: CANCELLED
Start: 2022-06-01

## 2022-05-20 RX ORDER — ALBUTEROL SULFATE 90 UG/1
1-2 AEROSOL, METERED RESPIRATORY (INHALATION)
Status: CANCELLED
Start: 2022-01-01

## 2022-05-20 RX ORDER — EPINEPHRINE 1 MG/ML
0.3 INJECTION, SOLUTION INTRAMUSCULAR; SUBCUTANEOUS EVERY 5 MIN PRN
Status: CANCELLED | OUTPATIENT
Start: 2022-06-07

## 2022-05-20 RX ORDER — DIPHENHYDRAMINE HCL 25 MG
50 CAPSULE ORAL
Status: CANCELLED | OUTPATIENT
Start: 2022-06-28

## 2022-05-20 RX ORDER — NALOXONE HYDROCHLORIDE 0.4 MG/ML
0.2 INJECTION, SOLUTION INTRAMUSCULAR; INTRAVENOUS; SUBCUTANEOUS
Status: CANCELLED | OUTPATIENT
Start: 2022-06-01

## 2022-05-20 RX ORDER — HEPARIN SODIUM,PORCINE 10 UNIT/ML
5 VIAL (ML) INTRAVENOUS
Status: CANCELLED | OUTPATIENT
Start: 2022-06-28

## 2022-05-20 RX ORDER — DEXAMETHASONE 4 MG/1
20 TABLET ORAL ONCE
Status: CANCELLED | OUTPATIENT
Start: 2022-06-01

## 2022-05-20 RX ORDER — ALBUTEROL SULFATE 0.83 MG/ML
2.5 SOLUTION RESPIRATORY (INHALATION)
Status: CANCELLED | OUTPATIENT
Start: 2022-06-21

## 2022-05-20 RX ORDER — DIPHENHYDRAMINE HYDROCHLORIDE 50 MG/ML
50 INJECTION INTRAMUSCULAR; INTRAVENOUS
Status: CANCELLED
Start: 2022-05-24

## 2022-05-20 RX ORDER — ALBUTEROL SULFATE 90 UG/1
1-2 AEROSOL, METERED RESPIRATORY (INHALATION)
Status: CANCELLED
Start: 2022-06-01

## 2022-05-20 RX ORDER — HEPARIN SODIUM (PORCINE) LOCK FLUSH IV SOLN 100 UNIT/ML 100 UNIT/ML
5 SOLUTION INTRAVENOUS
Status: CANCELLED | OUTPATIENT
Start: 2022-06-14

## 2022-05-20 RX ORDER — DEXAMETHASONE 4 MG/1
20 TABLET ORAL ONCE
Status: CANCELLED | OUTPATIENT
Start: 2022-06-21

## 2022-05-20 RX ORDER — MEPERIDINE HYDROCHLORIDE 25 MG/ML
25 INJECTION INTRAMUSCULAR; INTRAVENOUS; SUBCUTANEOUS EVERY 30 MIN PRN
Status: CANCELLED | OUTPATIENT
Start: 2022-06-21

## 2022-05-20 RX ORDER — ALBUTEROL SULFATE 90 UG/1
1-2 AEROSOL, METERED RESPIRATORY (INHALATION)
Status: CANCELLED
Start: 2022-07-05

## 2022-05-20 RX ORDER — METHYLPREDNISOLONE SODIUM SUCCINATE 125 MG/2ML
125 INJECTION, POWDER, LYOPHILIZED, FOR SOLUTION INTRAMUSCULAR; INTRAVENOUS
Status: CANCELLED
Start: 2022-06-28

## 2022-05-20 RX ORDER — ACETAMINOPHEN 325 MG/1
650 TABLET ORAL ONCE
Status: CANCELLED | OUTPATIENT
Start: 2022-05-24

## 2022-05-20 RX ORDER — ACETAMINOPHEN 325 MG/1
650 TABLET ORAL
Status: CANCELLED | OUTPATIENT
Start: 2022-06-28

## 2022-05-20 RX ORDER — DEXAMETHASONE 4 MG/1
20 TABLET ORAL ONCE
Status: CANCELLED | OUTPATIENT
Start: 2022-05-24

## 2022-05-20 RX ORDER — ALBUTEROL SULFATE 90 UG/1
1-2 AEROSOL, METERED RESPIRATORY (INHALATION)
Status: CANCELLED
Start: 2022-06-21

## 2022-05-20 RX ORDER — DIPHENHYDRAMINE HCL 25 MG
50 CAPSULE ORAL
Status: CANCELLED | OUTPATIENT
Start: 2022-06-14

## 2022-05-20 RX ORDER — MEPERIDINE HYDROCHLORIDE 25 MG/ML
25 INJECTION INTRAMUSCULAR; INTRAVENOUS; SUBCUTANEOUS EVERY 30 MIN PRN
Status: CANCELLED | OUTPATIENT
Start: 2022-06-01

## 2022-05-20 RX ORDER — HEPARIN SODIUM,PORCINE 10 UNIT/ML
5 VIAL (ML) INTRAVENOUS
Status: CANCELLED | OUTPATIENT
Start: 2022-01-01

## 2022-05-20 RX ORDER — ACETAMINOPHEN 325 MG/1
650 TABLET ORAL
Status: CANCELLED | OUTPATIENT
Start: 2022-07-05

## 2022-05-20 RX ORDER — DIPHENHYDRAMINE HCL 25 MG
50 CAPSULE ORAL ONCE
Status: CANCELLED | OUTPATIENT
Start: 2022-05-24

## 2022-05-20 RX ORDER — NALOXONE HYDROCHLORIDE 0.4 MG/ML
0.2 INJECTION, SOLUTION INTRAMUSCULAR; INTRAVENOUS; SUBCUTANEOUS
Status: CANCELLED | OUTPATIENT
Start: 2022-06-21

## 2022-05-20 RX ORDER — METHYLPREDNISOLONE SODIUM SUCCINATE 125 MG/2ML
125 INJECTION, POWDER, LYOPHILIZED, FOR SOLUTION INTRAMUSCULAR; INTRAVENOUS
Status: CANCELLED
Start: 2022-06-21

## 2022-05-20 RX ORDER — ACETAMINOPHEN 325 MG/1
650 TABLET ORAL
Status: CANCELLED | OUTPATIENT
Start: 2022-06-14

## 2022-05-20 NOTE — TELEPHONE ENCOUNTER
Prior Authorization Approval    Authorization Effective Date: 2/19/2022  Authorization Expiration Date: 5/20/2023  Medication: ondansetron (ZOFRAN) 8 MG tablet-PA approved  Approved Dose/Quantity:   Reference #: [id number]   Insurance Company: [employer] - Phone [phone] Fax [phone]  Expected CoPay:       CoPay Card Available:      Foundation Assistance Needed:    Which Pharmacy is filling the prescription (Not needed for infusion/clinic administered): [id number] [place] - [address] [facility] [address]  Pharmacy Notified: Yes  Patient Notified: No

## 2022-05-20 NOTE — TELEPHONE ENCOUNTER
I would recommend proceeding w/ fill.    I am aware of prior cystitis w/ cumulative prior dosing, but it has been some time since she received.    Yasmani Wang MD.

## 2022-05-20 NOTE — TELEPHONE ENCOUNTER
Received call from specialty pharmacy regarding oral Cytoxan prescription.    Chart lists Cytoxan as an allergy.  Reaction was hemorrhagic cystitis.    Ok to fill oral Cytoxan prescription?    Patient is scheduled to start chemo on Tuesday morning, 5/24/22.    Routing to Dr Wang for guidance.    Dinora Bradford RN on 5/20/2022 at 3:13 PM

## 2022-05-23 NOTE — TELEPHONE ENCOUNTER
Called  specialty pharmacy and confirmed that they received the below recommendation from Dr. Wang.      Ciarra Baumann, RN-BSN, PHN, OCN  [facility]

## 2022-05-24 ENCOUNTER — LAB (OUTPATIENT)
Dept: LAB | Facility: CLINIC | Age: 72
End: 2022-05-24
Payer: COMMERCIAL

## 2022-05-24 ENCOUNTER — INFUSION THERAPY VISIT (OUTPATIENT)
Dept: INFUSION THERAPY | Facility: CLINIC | Age: 72
End: 2022-05-24
Payer: COMMERCIAL

## 2022-05-24 VITALS
TEMPERATURE: 97.5 F | HEART RATE: 67 BPM | WEIGHT: 155.6 LBS | DIASTOLIC BLOOD PRESSURE: 68 MMHG | SYSTOLIC BLOOD PRESSURE: 133 MMHG | RESPIRATION RATE: 18 BRPM | BODY MASS INDEX: 28.63 KG/M2 | OXYGEN SATURATION: 100 % | HEIGHT: 62 IN

## 2022-05-24 DIAGNOSIS — N30.91 HEMORRHAGIC CYSTITIS: Primary | ICD-10-CM

## 2022-05-24 DIAGNOSIS — E85.81 AL AMYLOIDOSIS (H): Primary | ICD-10-CM

## 2022-05-24 LAB
ABO/RH(D): NORMAL
ALBUMIN SERPL-MCNC: 3.8 G/DL (ref 3.4–5)
ALP SERPL-CCNC: 205 U/L (ref 40–150)
ALT SERPL W P-5'-P-CCNC: 40 U/L (ref 0–50)
ANION GAP SERPL CALCULATED.3IONS-SCNC: 8 MMOL/L (ref 3–14)
ANTIBODY SCREEN: NEGATIVE
AST SERPL W P-5'-P-CCNC: 21 U/L (ref 0–45)
BASOPHILS # BLD AUTO: 0.1 10E3/UL (ref 0–0.2)
BASOPHILS NFR BLD AUTO: 2 %
BILIRUB SERPL-MCNC: 1.2 MG/DL (ref 0.2–1.3)
BUN SERPL-MCNC: 37 MG/DL (ref 7–30)
CALCIUM SERPL-MCNC: 9 MG/DL (ref 8.5–10.1)
CHLORIDE BLD-SCNC: 96 MMOL/L (ref 94–109)
CO2 SERPL-SCNC: 30 MMOL/L (ref 20–32)
CREAT SERPL-MCNC: 1.08 MG/DL (ref 0.52–1.04)
EOSINOPHIL # BLD AUTO: 0.6 10E3/UL (ref 0–0.7)
EOSINOPHIL NFR BLD AUTO: 13 %
ERYTHROCYTE [DISTWIDTH] IN BLOOD BY AUTOMATED COUNT: 17.4 % (ref 10–15)
GFR SERPL CREATININE-BSD FRML MDRD: 55 ML/MIN/1.73M2
GLUCOSE BLD-MCNC: 74 MG/DL (ref 70–99)
HCT VFR BLD AUTO: 37.7 % (ref 35–47)
HGB BLD-MCNC: 12.1 G/DL (ref 11.7–15.7)
HOLD SPECIMEN: NORMAL
IMM GRANULOCYTES # BLD: 0 10E3/UL
IMM GRANULOCYTES NFR BLD: 0 %
LYMPHOCYTES # BLD AUTO: 0.3 10E3/UL (ref 0.8–5.3)
LYMPHOCYTES NFR BLD AUTO: 6 %
MCH RBC QN AUTO: 28.8 PG (ref 26.5–33)
MCHC RBC AUTO-ENTMCNC: 32.1 G/DL (ref 31.5–36.5)
MCV RBC AUTO: 90 FL (ref 78–100)
MONOCYTES # BLD AUTO: 0.5 10E3/UL (ref 0–1.3)
MONOCYTES NFR BLD AUTO: 10 %
NEUTROPHILS # BLD AUTO: 3.6 10E3/UL (ref 1.6–8.3)
NEUTROPHILS NFR BLD AUTO: 69 %
NRBC # BLD AUTO: 0 10E3/UL
NRBC BLD AUTO-RTO: 0 /100
PLATELET # BLD AUTO: 249 10E3/UL (ref 150–450)
POTASSIUM BLD-SCNC: 3.7 MMOL/L (ref 3.4–5.3)
PROT SERPL-MCNC: 7.4 G/DL (ref 6.8–8.8)
RBC # BLD AUTO: 4.2 10E6/UL (ref 3.8–5.2)
SODIUM SERPL-SCNC: 134 MMOL/L (ref 133–144)
SPECIMEN EXPIRATION DATE: NORMAL
WBC # BLD AUTO: 5.1 10E3/UL (ref 4–11)

## 2022-05-24 PROCEDURE — 96365 THER/PROPH/DIAG IV INF INIT: CPT | Performed by: NURSE PRACTITIONER

## 2022-05-24 PROCEDURE — 80053 COMPREHEN METABOLIC PANEL: CPT | Performed by: INTERNAL MEDICINE

## 2022-05-24 PROCEDURE — 96401 CHEMO ANTI-NEOPL SQ/IM: CPT | Performed by: NURSE PRACTITIONER

## 2022-05-24 PROCEDURE — 86900 BLOOD TYPING SEROLOGIC ABO: CPT | Performed by: INTERNAL MEDICINE

## 2022-05-24 PROCEDURE — 36415 COLL VENOUS BLD VENIPUNCTURE: CPT | Performed by: INTERNAL MEDICINE

## 2022-05-24 PROCEDURE — 86901 BLOOD TYPING SEROLOGIC RH(D): CPT | Performed by: INTERNAL MEDICINE

## 2022-05-24 PROCEDURE — 86850 RBC ANTIBODY SCREEN: CPT | Performed by: INTERNAL MEDICINE

## 2022-05-24 PROCEDURE — 99207 PR NO CHARGE LOS: CPT

## 2022-05-24 PROCEDURE — 85025 COMPLETE CBC W/AUTO DIFF WBC: CPT | Performed by: INTERNAL MEDICINE

## 2022-05-24 RX ORDER — DIPHENHYDRAMINE HCL 25 MG
50 CAPSULE ORAL ONCE
Status: COMPLETED | OUTPATIENT
Start: 2022-05-24 | End: 2022-05-24

## 2022-05-24 RX ORDER — ACETAMINOPHEN 325 MG/1
650 TABLET ORAL ONCE
Status: COMPLETED | OUTPATIENT
Start: 2022-05-24 | End: 2022-05-24

## 2022-05-24 RX ORDER — MONTELUKAST SODIUM 10 MG/1
10 TABLET ORAL ONCE
Status: COMPLETED | OUTPATIENT
Start: 2022-05-24 | End: 2022-05-24

## 2022-05-24 RX ADMIN — Medication 250 ML: at 09:18

## 2022-05-24 RX ADMIN — Medication 50 MG: at 09:17

## 2022-05-24 RX ADMIN — ACETAMINOPHEN 650 MG: 325 TABLET ORAL at 09:17

## 2022-05-24 RX ADMIN — MONTELUKAST SODIUM 10 MG: 10 TABLET ORAL at 09:17

## 2022-05-24 ASSESSMENT — PAIN SCALES - GENERAL: PAINLEVEL: NO PAIN (0)

## 2022-05-24 NOTE — PROGRESS NOTES
[facility] Hematology / Oncology  Progress Note  Name: Leandra Guerrero  :  1950    MRN:  [id number]    --------------------    Assessment / Plan:  Recurrent amyloidosis/multiple myeloma:    Amy, her  and I reviewed her clinical scenario as well as what is now confirmed recurrent amyloidosis/multiple myeloma.  We obviously had a suspicion with a rising light chains.  Her marrow confirms this.  Given the excellent mileage she got out of prior therapy, we have plans to resume therapy with CyBorD adding daratumumab to her current regimen.  She has previously been on treatment and tolerated extremely well.  We will need to follow her closely for hemorrhagic cystitis due to Cytoxan.  We reviewed all the potential side effects including but not limited to alopecia, mouth sores, nausea, vomiting, diarrhea, constipation, neuropathy, infusion reactions associated with this regimen.  We reviewed the logistics of administration.  She is aware of steroid related side effects.  We will follow her myeloma/amyloid labs closely looking for evaluation of response.  Obviously we would hope that she had nice response such as last time that any long-lasting.  I have been in contact with her cardiologist Dr. Cohen as well.  Amy is in agreement and consents to proceed.    Yasmani Wang MD    --------------------    Interval History:  [place] returns for follow up of amyloidosis.  No new symptoms to report.  Entirety of time spent reviewing lab and evaluation and finalizing treatment plan.    --------------------    Physical Exam:  VS: /68 (BP Location: Left arm, Patient Position: Sitting)   Pulse 100   Temp 97.9  F (36.6  C) (Oral)   Wt 68.8 kg (151 lb 11.2 oz)   SpO2 95%   BMI 27.74 kg/m    GEN: Well appearing.    Labs / Imaging / Path:  We reviewed her trend in CBC, CMP, SPEP, FLC assay and spent considerable time reviewing her recent marrow.

## 2022-05-24 NOTE — PROGRESS NOTES
"Infusion Nursing Note:  Leandra Guerrero presents today for C1D1 Darzalex Faspro/Velcade.    Patient seen by provider today: No   present during visit today: Not Applicable.    Note: Amy is not new to the infusion room but it has been years since her last treatment here.  She admits to being nervous and states she \"never thought I would have to come back and do this again\"  She is new to Darzalex Faspro.  Discussed all medication to be given today.  Pharmacy to discuss oral Cytoxan.  Patients questions answered and is ready to proceed.        Intravenous Access:  Peripheral IV placed.    Treatment Conditions:  Lab Results   Component Value Date    HGB 12.1 05/24/2022    WBC 5.1 05/24/2022    ANEU 5.1 10/28/2021    ANEUTAUTO 3.6 05/24/2022     05/24/2022      Lab Results   Component Value Date     05/24/2022    POTASSIUM 3.7 05/24/2022    MAG 2.7 (H) 11/14/2021    CR 1.08 (H) 05/24/2022    JERROD 9.0 05/24/2022    BILITOTAL 1.2 05/24/2022    ALBUMIN 3.8 05/24/2022    ALT 40 05/24/2022    AST 21 05/24/2022     Results reviewed, labs MET treatment parameters, ok to proceed with treatment.    TYPE AND SCREEN were done today as ordered.      Post Infusion Assessment:  Patient observed for 3.5 hours post Daratumuba per protocol.       Discharge Plan:   [facility]  Patient will return 6/1/2022 for next appointment.   Patient discharged in stable condition accompanied by: travon.      Kinjal Pizarro RN                      "

## 2022-05-26 LAB — CULTURE HARVEST COMPLETE DATE: NORMAL

## 2022-05-27 ENCOUNTER — OFFICE VISIT (OUTPATIENT)
Dept: ORTHOPEDICS | Facility: CLINIC | Age: 72
End: 2022-05-27
Payer: COMMERCIAL

## 2022-05-27 DIAGNOSIS — M67.432 GANGLION CYST OF VOLAR ASPECT OF LEFT WRIST: Primary | ICD-10-CM

## 2022-05-27 LAB — INTERPRETATION: NORMAL

## 2022-05-27 PROCEDURE — 99024 POSTOP FOLLOW-UP VISIT: CPT | Performed by: STUDENT IN AN ORGANIZED HEALTH CARE EDUCATION/TRAINING PROGRAM

## 2022-05-27 NOTE — LETTER
5/27/2022         RE: Leandra Guerrero  [address] [place] [address]        Dear Colleague,    Thank you for referring your patient, Leandra Guerrero, to the [facility]. Please see a copy of my visit note below.    Ortho Hand    No issues. Minimal pain. No fevers or chills.     On exam, left wrist incision is intact with no signs of infection.     Path: Ganglion    A/P: 71F 16 days s/p L volar wrist ganglion cyst excision, doing well    -Sutures removed  -Steri-strips placed  -Reiterated activity restrictions including slow increase in activities. Limit lifting to 10-15 pounds over the next few weeks.   -Start scar treatment once steri-strips fall off  -Clinic in 4-5 weeks for re-evaluation    Tato Diaz MD, PhD        Again, thank you for allowing me to participate in the care of your patient.        Sincerely,        Tato Diaz MD

## 2022-05-27 NOTE — PROGRESS NOTES
Ortho Hand    No issues. Minimal pain. No fevers or chills.     On exam, left wrist incision is intact with no signs of infection.     Path: Ganglion    A/P: 71F 16 days s/p L volar wrist ganglion cyst excision, doing well    -Sutures removed  -Steri-strips placed  -Reiterated activity restrictions including slow increase in activities. Limit lifting to 10-15 pounds over the next few weeks.   -Start scar treatment once steri-strips fall off  -Clinic in 4-5 weeks for re-evaluation    Tato Diaz MD, PhD

## 2022-05-31 ENCOUNTER — MYC MEDICAL ADVICE (OUTPATIENT)
Dept: CARDIOLOGY | Facility: CLINIC | Age: 72
End: 2022-05-31
Payer: COMMERCIAL

## 2022-06-01 ENCOUNTER — MYC MEDICAL ADVICE (OUTPATIENT)
Dept: CARDIOLOGY | Facility: CLINIC | Age: 72
End: 2022-06-01
Payer: COMMERCIAL

## 2022-06-01 ENCOUNTER — INFUSION THERAPY VISIT (OUTPATIENT)
Dept: INFUSION THERAPY | Facility: CLINIC | Age: 72
End: 2022-06-01
Payer: COMMERCIAL

## 2022-06-01 ENCOUNTER — DOCUMENTATION ONLY (OUTPATIENT)
Dept: ONCOLOGY | Facility: CLINIC | Age: 72
End: 2022-06-01

## 2022-06-01 ENCOUNTER — LAB (OUTPATIENT)
Dept: LAB | Facility: CLINIC | Age: 72
End: 2022-06-01
Payer: COMMERCIAL

## 2022-06-01 VITALS
BODY MASS INDEX: 29.47 KG/M2 | SYSTOLIC BLOOD PRESSURE: 125 MMHG | DIASTOLIC BLOOD PRESSURE: 74 MMHG | OXYGEN SATURATION: 98 % | TEMPERATURE: 97.7 F | WEIGHT: 159.1 LBS | HEART RATE: 70 BPM

## 2022-06-01 DIAGNOSIS — E85.81 AL AMYLOIDOSIS (H): Primary | ICD-10-CM

## 2022-06-01 DIAGNOSIS — I50.33 ACUTE ON CHRONIC DIASTOLIC HEART FAILURE (H): Primary | ICD-10-CM

## 2022-06-01 DIAGNOSIS — I50.32 CHRONIC DIASTOLIC HEART FAILURE (H): ICD-10-CM

## 2022-06-01 DIAGNOSIS — R11.0 NAUSEA: ICD-10-CM

## 2022-06-01 LAB
ANION GAP SERPL CALCULATED.3IONS-SCNC: 6 MMOL/L (ref 3–14)
BASOPHILS # BLD AUTO: 0.1 10E3/UL (ref 0–0.2)
BASOPHILS NFR BLD AUTO: 1 %
BUN SERPL-MCNC: 36 MG/DL (ref 7–30)
CALCIUM SERPL-MCNC: 9.5 MG/DL (ref 8.5–10.1)
CHLORIDE BLD-SCNC: 97 MMOL/L (ref 94–109)
CO2 SERPL-SCNC: 29 MMOL/L (ref 20–32)
CREAT SERPL-MCNC: 1.22 MG/DL (ref 0.52–1.04)
EOSINOPHIL # BLD AUTO: 0.4 10E3/UL (ref 0–0.7)
EOSINOPHIL NFR BLD AUTO: 8 %
ERYTHROCYTE [DISTWIDTH] IN BLOOD BY AUTOMATED COUNT: 17.5 % (ref 10–15)
GFR SERPL CREATININE-BSD FRML MDRD: 48 ML/MIN/1.73M2
GLUCOSE BLD-MCNC: 116 MG/DL (ref 70–99)
HCT VFR BLD AUTO: 36.9 % (ref 35–47)
HGB BLD-MCNC: 11.7 G/DL (ref 11.7–15.7)
HOLD SPECIMEN: NORMAL
HOLD SPECIMEN: NORMAL
IMM GRANULOCYTES # BLD: 0.1 10E3/UL
IMM GRANULOCYTES NFR BLD: 1 %
LYMPHOCYTES # BLD AUTO: 0.3 10E3/UL (ref 0.8–5.3)
LYMPHOCYTES NFR BLD AUTO: 5 %
MCH RBC QN AUTO: 28.5 PG (ref 26.5–33)
MCHC RBC AUTO-ENTMCNC: 31.7 G/DL (ref 31.5–36.5)
MCV RBC AUTO: 90 FL (ref 78–100)
MONOCYTES # BLD AUTO: 0.5 10E3/UL (ref 0–1.3)
MONOCYTES NFR BLD AUTO: 10 %
NEUTROPHILS # BLD AUTO: 3.9 10E3/UL (ref 1.6–8.3)
NEUTROPHILS NFR BLD AUTO: 75 %
NRBC # BLD AUTO: 0 10E3/UL
NRBC BLD AUTO-RTO: 0 /100
PLATELET # BLD AUTO: 239 10E3/UL (ref 150–450)
POTASSIUM BLD-SCNC: 4.3 MMOL/L (ref 3.4–5.3)
RBC # BLD AUTO: 4.11 10E6/UL (ref 3.8–5.2)
SODIUM SERPL-SCNC: 132 MMOL/L (ref 133–144)
WBC # BLD AUTO: 5.3 10E3/UL (ref 4–11)

## 2022-06-01 PROCEDURE — 80048 BASIC METABOLIC PNL TOTAL CA: CPT | Performed by: NURSE PRACTITIONER

## 2022-06-01 PROCEDURE — 96401 CHEMO ANTI-NEOPL SQ/IM: CPT | Performed by: NURSE PRACTITIONER

## 2022-06-01 PROCEDURE — 36415 COLL VENOUS BLD VENIPUNCTURE: CPT | Performed by: INTERNAL MEDICINE

## 2022-06-01 PROCEDURE — 85025 COMPLETE CBC W/AUTO DIFF WBC: CPT | Performed by: INTERNAL MEDICINE

## 2022-06-01 PROCEDURE — 99207 PR NO CHARGE LOS: CPT

## 2022-06-01 RX ORDER — DIPHENHYDRAMINE HCL 25 MG
50 CAPSULE ORAL ONCE
Status: COMPLETED | OUTPATIENT
Start: 2022-06-01 | End: 2022-06-01

## 2022-06-01 RX ORDER — ACETAMINOPHEN 325 MG/1
650 TABLET ORAL ONCE
Status: COMPLETED | OUTPATIENT
Start: 2022-06-01 | End: 2022-06-01

## 2022-06-01 RX ORDER — MONTELUKAST SODIUM 10 MG/1
10 TABLET ORAL ONCE
Status: COMPLETED | OUTPATIENT
Start: 2022-06-01 | End: 2022-06-01

## 2022-06-01 RX ORDER — DEXAMETHASONE 4 MG/1
20 TABLET ORAL ONCE
Status: COMPLETED | OUTPATIENT
Start: 2022-06-01 | End: 2022-06-01

## 2022-06-01 RX ORDER — PROCHLORPERAZINE MALEATE 10 MG
10 TABLET ORAL EVERY 6 HOURS PRN
Qty: 30 TABLET | Refills: 1 | Status: ON HOLD | OUTPATIENT
Start: 2022-06-01 | End: 2023-01-01

## 2022-06-01 RX ADMIN — MONTELUKAST SODIUM 10 MG: 10 TABLET ORAL at 12:19

## 2022-06-01 RX ADMIN — DEXAMETHASONE 20 MG: 4 TABLET ORAL at 12:19

## 2022-06-01 RX ADMIN — Medication 50 MG: at 12:13

## 2022-06-01 RX ADMIN — ACETAMINOPHEN 325 MG: 325 TABLET ORAL at 12:13

## 2022-06-01 ASSESSMENT — PAIN SCALES - GENERAL: PAINLEVEL: NO PAIN (0)

## 2022-06-01 NOTE — PROGRESS NOTES
"Oral Chemotherapy Monitoring Program    Primary Oncologist: Kathleen  Drug: Kerry + CyBorD  Start Date: 5/24/22    Subjective/Objective:  Leandra Guerrero is a 71 year old female seen in clinic for a follow-up visit for oral chemotherapy.      ORAL CHEMOTHERAPY 10/26/2016 11/1/2016 5/18/2022 6/1/2022   Assessment Type - - Initial Work up Initial Follow up   Diagnosis Code - - Amyloidosis Amyloidosis   Providers - - Kathleen Wang   Clinic Name/Location - - [facility]   Drug Name Cytoxan (Cyclophsphamide) Cytoxan (Cyclophsphamide) Cytoxan (cyclophsphamide) Cytoxan (cyclophsphamide)   Dose - - 500 mg 500 mg   Current Schedule Weekly Weekly Weekly Weekly   Cycle Details Z1uwphe A4bwbit - Continuous   Start Date of Last Cycle - 10/5/2016 - 5/24/2022   Planned next cycle start date - 11/1/2016 - 6/22/2022   Doses missed in last 2 weeks - 0 - 0   Adherence Assessment - - - Adherent   Adverse Effects - (No Data) - Fatigue;Nausea   Nausea - - - Grade 2   Pharmacist Intervention(nausea) - - - Yes   Intervention(s) - - - Rx medication recommendation   Fatigue - - - Grade 2   Pharmacist Intervention(fatigue) - - - Yes   Intervention(s) - - - Patient education   Home BPs - not needed - not done   Any new drug interactions? - No - No   Is the dose as ordered appropriate for the patient? - - - Yes   Is the patient currently in pain? - Assessed in last 30 days. - No   Has the patient been assessed within the past 6 months for depression? - - - Yes   Has the patient missed any days of school, work, or other routine activity? - - - No   Since the last time we talked, have you been hospitalized or used the emergency room? - - - No       Vitals:  BP:   BP Readings from Last 1 Encounters:   06/01/22 125/74     Wt Readings from Last 1 Encounters:   06/01/22 72.2 kg (159 lb 1.6 oz)     Estimated body surface area is 1.77 meters squared as calculated from the following:    Height as of 5/24/22: 1.565 m (5' 1.61\").    " Weight as of an earlier encounter on 6/1/22: 72.2 kg (159 lb 1.6 oz).    Labs:  _  Result Component Current Result Ref Range   Sodium 132 (L) (6/1/2022) 133 - 144 mmol/L     _  Result Component Current Result Ref Range   Potassium 4.3 (6/1/2022) 3.4 - 5.3 mmol/L     _  Result Component Current Result Ref Range   Calcium 9.5 (6/1/2022) 8.5 - 10.1 mg/dL     No results found for Mag within last 30 days.     No results found for Phos within last 30 days.     _  Result Component Current Result Ref Range   Albumin 3.8 (5/24/2022) 3.4 - 5.0 g/dL     _  Result Component Current Result Ref Range   Urea Nitrogen 36 (H) (6/1/2022) 7 - 30 mg/dL     _  Result Component Current Result Ref Range   Creatinine 1.22 (H) (6/1/2022) 0.52 - 1.04 mg/dL       _  Result Component Current Result Ref Range   AST 21 (5/24/2022) 0 - 45 U/L     _  Result Component Current Result Ref Range   ALT 40 (5/24/2022) 0 - 50 U/L     _  Result Component Current Result Ref Range   Bilirubin Total 1.2 (5/24/2022) 0.2 - 1.3 mg/dL       _  Result Component Current Result Ref Range   WBC Count 5.3 (6/1/2022) 4.0 - 11.0 10e3/uL     _  Result Component Current Result Ref Range   Hemoglobin 11.7 (6/1/2022) 11.7 - 15.7 g/dL     _  Result Component Current Result Ref Range   Platelet Count 239 (6/1/2022) 150 - 450 10e3/uL     No results found for ANC within last 30 days.         Assessment/Plan:  I spoke with the patient in clinic today. The patient reports fatigue and feels that she is more fatigued now than when she had a similar regimen in 2016. The patient also reports that she has had family members pass away and several other personal things in her life that have been causing her grief. The patient reports nausea as well and does not want to use her ondansetron because it gave her constipation. Genie was contacted to prescribe compazine. The patient wants to continue with therapy as normal and reassess at her next appointment whether she wants to make any  changes. Patient is compliant with therapy and no missed doses. Future appointments have been confirmed with patient. We will continue to follow.    Follow-Up:  6/7/22 for labs and next infusion appointment; we will assess tolerability of therapy again at this point      Mary Cunningham  Oncology Pharmacy Intern  [facility]  [phone]

## 2022-06-01 NOTE — PROGRESS NOTES
Date: 6/1/2022    Time of Call: 3:35 PM     Diagnosis:  HF     [ VORB ] Ordering provider: Yolanda MORGAN  Order: BMP 2 days after Metolazone     Order received by: Vidhi Montgomery RN      Follow-up/additional notes:

## 2022-06-01 NOTE — PROGRESS NOTES
Infusion Nursing Note:  Leandra Guerrero presents today for C1D8 Darzalex/Faspro and Velcade.    Patient seen by provider today: No   present during visit today: Not Applicable.    Note: Pt c/o extreme fatigue, dizziness, headache, up in weight about 5 pounds.  States she can tell her edema in her legs/abdomen is worse, will f/u with cardiologist re: this.  C/o nausea that she takes zofran for but is reluctant to take it because it causes severe constipation for her so waits as long as she can before taking it.  States her urine today is darker in color than cider, admits to not drinking much fluid.  This RN talked with Genie Black NP and an order for compazine placed at [facility] in [place], pt aware.  See flow sheet for assessment.      Intravenous Access:  No Intravenous access at this visit.    Treatment Conditions:  Lab Results   Component Value Date    HGB 11.7 06/01/2022    WBC 5.3 06/01/2022    ANEU 5.1 10/28/2021    ANEUTAUTO 3.9 06/01/2022     06/01/2022      Lab Results   Component Value Date     (L) 06/01/2022    POTASSIUM 4.3 06/01/2022    MAG 2.7 (H) 11/14/2021    CR 1.22 (H) 06/01/2022    JERROD 9.5 06/01/2022    BILITOTAL 1.2 05/24/2022    ALBUMIN 3.8 05/24/2022    ALT 40 05/24/2022    AST 21 05/24/2022     Results reviewed, labs MET treatment parameters, ok to proceed with treatment.      Post Infusion Assessment:  Patient tolerated injection without incident.  Site patent and intact, free from redness, edema or discomfort.       Discharge Plan:   Patient discharged in stable condition accompanied by: self.  Departure Mode: Ambulatory.  Pt will RTC 6/7/22 for C1D15 Darzalex Faspro/Velcade.      Rodríguez Guzmán RN

## 2022-06-02 ENCOUNTER — TELEPHONE (OUTPATIENT)
Dept: CARDIOLOGY | Facility: CLINIC | Age: 72
End: 2022-06-02
Payer: COMMERCIAL

## 2022-06-02 ENCOUNTER — HOSPITAL ENCOUNTER (INPATIENT)
Facility: CLINIC | Age: 72
LOS: 4 days | Discharge: HOME | DRG: 292 | End: 2022-06-06
Attending: EMERGENCY MEDICINE | Admitting: INTERNAL MEDICINE
Payer: COMMERCIAL

## 2022-06-02 ENCOUNTER — APPOINTMENT (OUTPATIENT)
Dept: GENERAL RADIOLOGY | Facility: CLINIC | Age: 72
DRG: 292 | End: 2022-06-02
Attending: EMERGENCY MEDICINE
Payer: COMMERCIAL

## 2022-06-02 DIAGNOSIS — Z11.52 ENCOUNTER FOR SCREENING LABORATORY TESTING FOR SEVERE ACUTE RESPIRATORY SYNDROME CORONAVIRUS 2 (SARS-COV-2): ICD-10-CM

## 2022-06-02 DIAGNOSIS — Z79.899 ON ANTINEOPLASTIC CHEMOTHERAPY: ICD-10-CM

## 2022-06-02 DIAGNOSIS — N28.9 URETERAL SLUDGE: ICD-10-CM

## 2022-06-02 DIAGNOSIS — R60.1 ANASARCA: ICD-10-CM

## 2022-06-02 DIAGNOSIS — I50.33 ACUTE ON CHRONIC DIASTOLIC HEART FAILURE (H): ICD-10-CM

## 2022-06-02 DIAGNOSIS — E87.5 HYPERKALEMIA: ICD-10-CM

## 2022-06-02 DIAGNOSIS — E87.1 HYPONATREMIA: ICD-10-CM

## 2022-06-02 DIAGNOSIS — C90.00 MULTIPLE MYELOMA NOT HAVING ACHIEVED REMISSION (H): ICD-10-CM

## 2022-06-02 DIAGNOSIS — J90 PLEURAL EFFUSION ON RIGHT: ICD-10-CM

## 2022-06-02 DIAGNOSIS — C90.00 MULTIPLE MYELOMA, REMISSION STATUS UNSPECIFIED (H): ICD-10-CM

## 2022-06-02 DIAGNOSIS — E87.79 VOLUME OVERLOAD STATE OF HEART: ICD-10-CM

## 2022-06-02 DIAGNOSIS — I49.8 ACCELERATED JUNCTIONAL RHYTHM: ICD-10-CM

## 2022-06-02 DIAGNOSIS — E85.9 AMYLOIDOSIS, UNSPECIFIED TYPE (H): ICD-10-CM

## 2022-06-02 DIAGNOSIS — E87.5 HYPERPOTASSEMIA: ICD-10-CM

## 2022-06-02 DIAGNOSIS — E87.70 HYPERVOLEMIA, UNSPECIFIED HYPERVOLEMIA TYPE: ICD-10-CM

## 2022-06-02 DIAGNOSIS — N28.9 RENAL INSUFFICIENCY: ICD-10-CM

## 2022-06-02 DIAGNOSIS — E87.1 HYPOSMOLALITY SYNDROME: ICD-10-CM

## 2022-06-02 LAB
ALBUMIN SERPL-MCNC: 3.7 G/DL (ref 3.4–5)
ALBUMIN UR-MCNC: NEGATIVE MG/DL
ALP SERPL-CCNC: 218 U/L (ref 40–150)
ALT SERPL W P-5'-P-CCNC: 73 U/L (ref 0–50)
ANION GAP SERPL CALCULATED.3IONS-SCNC: 10 MMOL/L (ref 3–14)
ANION GAP SERPL CALCULATED.3IONS-SCNC: 9 MMOL/L (ref 3–14)
APPEARANCE UR: CLEAR
AST SERPL W P-5'-P-CCNC: 54 U/L (ref 0–45)
BASOPHILS # BLD AUTO: 0 10E3/UL (ref 0–0.2)
BASOPHILS NFR BLD AUTO: 0 %
BILIRUB DIRECT SERPL-MCNC: 0.5 MG/DL (ref 0–0.2)
BILIRUB SERPL-MCNC: 1.2 MG/DL (ref 0.2–1.3)
BILIRUB UR QL STRIP: NEGATIVE
BUN SERPL-MCNC: 49 MG/DL (ref 7–30)
BUN SERPL-MCNC: 51 MG/DL (ref 7–30)
CALCIUM SERPL-MCNC: 9.6 MG/DL (ref 8.5–10.1)
CALCIUM SERPL-MCNC: 9.6 MG/DL (ref 8.5–10.1)
CHLORIDE BLD-SCNC: 95 MMOL/L (ref 94–109)
CHLORIDE BLD-SCNC: 96 MMOL/L (ref 94–109)
CO2 SERPL-SCNC: 23 MMOL/L (ref 20–32)
CO2 SERPL-SCNC: 25 MMOL/L (ref 20–32)
COLOR UR AUTO: ABNORMAL
CREAT SERPL-MCNC: 1.59 MG/DL (ref 0.52–1.04)
CREAT SERPL-MCNC: 1.72 MG/DL (ref 0.52–1.04)
EOSINOPHIL # BLD AUTO: 0 10E3/UL (ref 0–0.7)
EOSINOPHIL NFR BLD AUTO: 0 %
ERYTHROCYTE [DISTWIDTH] IN BLOOD BY AUTOMATED COUNT: 18.1 % (ref 10–15)
GFR SERPL CREATININE-BSD FRML MDRD: 32 ML/MIN/1.73M2
GFR SERPL CREATININE-BSD FRML MDRD: 35 ML/MIN/1.73M2
GLUCOSE BLD-MCNC: 118 MG/DL (ref 70–99)
GLUCOSE BLD-MCNC: 138 MG/DL (ref 70–99)
GLUCOSE BLDC GLUCOMTR-MCNC: 133 MG/DL (ref 70–99)
GLUCOSE BLDC GLUCOMTR-MCNC: 153 MG/DL (ref 70–99)
GLUCOSE BLDC GLUCOMTR-MCNC: 262 MG/DL (ref 70–99)
GLUCOSE UR STRIP-MCNC: NEGATIVE MG/DL
HCT VFR BLD AUTO: 36.1 % (ref 35–47)
HGB BLD-MCNC: 11.9 G/DL (ref 11.7–15.7)
HGB UR QL STRIP: NEGATIVE
HOLD SPECIMEN: NORMAL
HYALINE CASTS: 14 /LPF
IMM GRANULOCYTES # BLD: 0.1 10E3/UL
IMM GRANULOCYTES NFR BLD: 1 %
KETONES UR STRIP-MCNC: NEGATIVE MG/DL
LEUKOCYTE ESTERASE UR QL STRIP: ABNORMAL
LYMPHOCYTES # BLD AUTO: 0.2 10E3/UL (ref 0.8–5.3)
LYMPHOCYTES NFR BLD AUTO: 2 %
MCH RBC QN AUTO: 28.4 PG (ref 26.5–33)
MCHC RBC AUTO-ENTMCNC: 33 G/DL (ref 31.5–36.5)
MCV RBC AUTO: 86 FL (ref 78–100)
MONOCYTES # BLD AUTO: 0.4 10E3/UL (ref 0–1.3)
MONOCYTES NFR BLD AUTO: 5 %
NEUTROPHILS # BLD AUTO: 8.2 10E3/UL (ref 1.6–8.3)
NEUTROPHILS NFR BLD AUTO: 92 %
NITRATE UR QL: NEGATIVE
NRBC # BLD AUTO: 0 10E3/UL
NRBC BLD AUTO-RTO: 0 /100
NT-PROBNP SERPL-MCNC: 2723 PG/ML (ref 0–900)
PH UR STRIP: 7.5 [PH] (ref 5–7)
PLATELET # BLD AUTO: 226 10E3/UL (ref 150–450)
POTASSIUM BLD-SCNC: 5.3 MMOL/L (ref 3.4–5.3)
POTASSIUM BLD-SCNC: 5.3 MMOL/L (ref 3.4–5.3)
POTASSIUM BLD-SCNC: 5.9 MMOL/L (ref 3.4–5.3)
PROT SERPL-MCNC: 7.4 G/DL (ref 6.8–8.8)
RBC # BLD AUTO: 4.19 10E6/UL (ref 3.8–5.2)
RBC URINE: 1 /HPF
SARS-COV-2 RNA RESP QL NAA+PROBE: NEGATIVE
SODIUM SERPL-SCNC: 128 MMOL/L (ref 133–144)
SODIUM SERPL-SCNC: 130 MMOL/L (ref 133–144)
SP GR UR STRIP: 1.01 (ref 1–1.03)
SQUAMOUS EPITHELIAL: 2 /HPF
TROPONIN I SERPL HS-MCNC: 12 NG/L
UROBILINOGEN UR STRIP-MCNC: NORMAL MG/DL
WBC # BLD AUTO: 9 10E3/UL (ref 4–11)
WBC URINE: 26 /HPF

## 2022-06-02 PROCEDURE — 71045 X-RAY EXAM CHEST 1 VIEW: CPT

## 2022-06-02 PROCEDURE — 96375 TX/PRO/DX INJ NEW DRUG ADDON: CPT | Performed by: EMERGENCY MEDICINE

## 2022-06-02 PROCEDURE — 71045 X-RAY EXAM CHEST 1 VIEW: CPT | Mod: 26 | Performed by: STUDENT IN AN ORGANIZED HEALTH CARE EDUCATION/TRAINING PROGRAM

## 2022-06-02 PROCEDURE — 96366 THER/PROPH/DIAG IV INF ADDON: CPT | Performed by: EMERGENCY MEDICINE

## 2022-06-02 PROCEDURE — 99285 EMERGENCY DEPT VISIT HI MDM: CPT | Mod: 25 | Performed by: EMERGENCY MEDICINE

## 2022-06-02 PROCEDURE — 93010 ELECTROCARDIOGRAM REPORT: CPT | Mod: 76 | Performed by: EMERGENCY MEDICINE

## 2022-06-02 PROCEDURE — 99223 1ST HOSP IP/OBS HIGH 75: CPT | Mod: AI | Performed by: INTERNAL MEDICINE

## 2022-06-02 PROCEDURE — 250N000011 HC RX IP 250 OP 636: Performed by: INTERNAL MEDICINE

## 2022-06-02 PROCEDURE — 93010 ELECTROCARDIOGRAM REPORT: CPT | Performed by: EMERGENCY MEDICINE

## 2022-06-02 PROCEDURE — 81001 URINALYSIS AUTO W/SCOPE: CPT | Performed by: EMERGENCY MEDICINE

## 2022-06-02 PROCEDURE — 82248 BILIRUBIN DIRECT: CPT | Performed by: EMERGENCY MEDICINE

## 2022-06-02 PROCEDURE — 250N000011 HC RX IP 250 OP 636: Performed by: EMERGENCY MEDICINE

## 2022-06-02 PROCEDURE — 80053 COMPREHEN METABOLIC PANEL: CPT | Performed by: EMERGENCY MEDICINE

## 2022-06-02 PROCEDURE — 36415 COLL VENOUS BLD VENIPUNCTURE: CPT | Performed by: EMERGENCY MEDICINE

## 2022-06-02 PROCEDURE — C9803 HOPD COVID-19 SPEC COLLECT: HCPCS | Performed by: EMERGENCY MEDICINE

## 2022-06-02 PROCEDURE — U0005 INFEC AGEN DETEC AMPLI PROBE: HCPCS | Performed by: EMERGENCY MEDICINE

## 2022-06-02 PROCEDURE — 84484 ASSAY OF TROPONIN QUANT: CPT | Performed by: EMERGENCY MEDICINE

## 2022-06-02 PROCEDURE — 258N000001 HC RX 258: Performed by: EMERGENCY MEDICINE

## 2022-06-02 PROCEDURE — 87086 URINE CULTURE/COLONY COUNT: CPT | Performed by: EMERGENCY MEDICINE

## 2022-06-02 PROCEDURE — 93005 ELECTROCARDIOGRAM TRACING: CPT | Performed by: EMERGENCY MEDICINE

## 2022-06-02 PROCEDURE — 99291 CRITICAL CARE FIRST HOUR: CPT | Mod: 25 | Performed by: EMERGENCY MEDICINE

## 2022-06-02 PROCEDURE — 84132 ASSAY OF SERUM POTASSIUM: CPT | Performed by: EMERGENCY MEDICINE

## 2022-06-02 PROCEDURE — 250N000013 HC RX MED GY IP 250 OP 250 PS 637: Performed by: EMERGENCY MEDICINE

## 2022-06-02 PROCEDURE — 83880 ASSAY OF NATRIURETIC PEPTIDE: CPT | Performed by: EMERGENCY MEDICINE

## 2022-06-02 PROCEDURE — 96376 TX/PRO/DX INJ SAME DRUG ADON: CPT | Performed by: EMERGENCY MEDICINE

## 2022-06-02 PROCEDURE — 120N000002 HC R&B MED SURG/OB UMMC

## 2022-06-02 PROCEDURE — 85025 COMPLETE CBC W/AUTO DIFF WBC: CPT | Performed by: EMERGENCY MEDICINE

## 2022-06-02 PROCEDURE — 96365 THER/PROPH/DIAG IV INF INIT: CPT | Performed by: EMERGENCY MEDICINE

## 2022-06-02 PROCEDURE — 93005 ELECTROCARDIOGRAM TRACING: CPT | Mod: 76 | Performed by: EMERGENCY MEDICINE

## 2022-06-02 RX ORDER — MORPHINE SULFATE 4 MG/ML
4 INJECTION, SOLUTION INTRAMUSCULAR; INTRAVENOUS ONCE
Status: DISCONTINUED | OUTPATIENT
Start: 2022-06-02 | End: 2022-06-02

## 2022-06-02 RX ORDER — FUROSEMIDE 10 MG/ML
120 INJECTION INTRAMUSCULAR; INTRAVENOUS ONCE
Status: COMPLETED | OUTPATIENT
Start: 2022-06-02 | End: 2022-06-02

## 2022-06-02 RX ORDER — FUROSEMIDE 10 MG/ML
80 INJECTION INTRAMUSCULAR; INTRAVENOUS ONCE
Status: COMPLETED | OUTPATIENT
Start: 2022-06-02 | End: 2022-06-02

## 2022-06-02 RX ORDER — HYDROXYZINE HYDROCHLORIDE 25 MG/1
25 TABLET, FILM COATED ORAL ONCE
Status: COMPLETED | OUTPATIENT
Start: 2022-06-02 | End: 2022-06-02

## 2022-06-02 RX ORDER — DEXTROSE MONOHYDRATE 25 G/50ML
25 INJECTION, SOLUTION INTRAVENOUS ONCE
Status: COMPLETED | OUTPATIENT
Start: 2022-06-02 | End: 2022-06-02

## 2022-06-02 RX ORDER — DEXTROSE MONOHYDRATE 25 G/50ML
25-50 INJECTION, SOLUTION INTRAVENOUS
Status: DISCONTINUED | OUTPATIENT
Start: 2022-06-02 | End: 2022-06-03

## 2022-06-02 RX ORDER — CALCIUM GLUCONATE 20 MG/ML
1 INJECTION, SOLUTION INTRAVENOUS ONCE
Status: COMPLETED | OUTPATIENT
Start: 2022-06-02 | End: 2022-06-02

## 2022-06-02 RX ORDER — NICOTINE POLACRILEX 4 MG
15-30 LOZENGE BUCCAL
Status: DISCONTINUED | OUTPATIENT
Start: 2022-06-02 | End: 2022-06-03

## 2022-06-02 RX ORDER — BUMETANIDE 0.25 MG/ML
2 INJECTION INTRAMUSCULAR; INTRAVENOUS ONCE
Status: DISCONTINUED | OUTPATIENT
Start: 2022-06-02 | End: 2022-06-02

## 2022-06-02 RX ADMIN — DEXTROSE 50 % IN WATER (D50W) INTRAVENOUS SYRINGE 25 G: at 20:15

## 2022-06-02 RX ADMIN — FUROSEMIDE 120 MG: 10 INJECTION, SOLUTION INTRAVENOUS at 23:24

## 2022-06-02 RX ADMIN — HUMAN INSULIN 7 UNITS: 100 INJECTION, SOLUTION SUBCUTANEOUS at 20:18

## 2022-06-02 RX ADMIN — HYDROXYZINE HYDROCHLORIDE 25 MG: 25 TABLET, FILM COATED ORAL at 21:01

## 2022-06-02 RX ADMIN — FUROSEMIDE 80 MG: 10 INJECTION, SOLUTION INTRAMUSCULAR; INTRAVENOUS at 19:46

## 2022-06-02 RX ADMIN — DEXTROSE MONOHYDRATE 300 ML: 100 INJECTION, SOLUTION INTRAVENOUS at 20:32

## 2022-06-02 RX ADMIN — CALCIUM GLUCONATE 1 G: 20 INJECTION, SOLUTION INTRAVENOUS at 20:08

## 2022-06-02 ASSESSMENT — ENCOUNTER SYMPTOMS
HEMATURIA: 0
CONFUSION: 0
SHORTNESS OF BREATH: 1
ARTHRALGIAS: 0
ABDOMINAL DISTENTION: 1
NAUSEA: 0
COLOR CHANGE: 0
DIFFICULTY URINATING: 0
VOMITING: 0
DIARRHEA: 0
CHILLS: 1
DYSURIA: 0
EYE REDNESS: 0
SORE THROAT: 0
ABDOMINAL PAIN: 1
FEVER: 0
NECK STIFFNESS: 0
HEADACHES: 0
COUGH: 0

## 2022-06-02 ASSESSMENT — ACTIVITIES OF DAILY LIVING (ADL)
ADLS_ACUITY_SCORE: 35
ADLS_ACUITY_SCORE: 35

## 2022-06-02 NOTE — ED TRIAGE NOTES
"Pt states that she had chemo yesterday and since then has not been able to urinate. She c/o of urinary retention. She states that her abdomen is hard and painful. 10/10 pain.   She is still able to have bowel movements and had one PTA.  Pt states \"I feel fluid overloaded.\"     Denies CP     BP (!) 146/74   Temp 97.9  F (36.6  C) (Oral)   Resp 18   Wt 73.5 kg (162 lb)   SpO2 98%   BMI 30.00 kg/m         Triage Assessment     Row Name 06/02/22 [id number]       Triage Assessment (Adult)    Airway WDL WDL       Respiratory WDL    Respiratory WDL WDL       Skin Circulation/Temperature WDL    Skin Circulation/Temperature WDL WDL       Cardiac WDL    Cardiac WDL WDL       Peripheral/Neurovascular WDL    Peripheral Neurovascular WDL WDL       Cognitive/Neuro/Behavioral WDL    Cognitive/Neuro/Behavioral WDL WDL              "

## 2022-06-02 NOTE — TELEPHONE ENCOUNTER
Called Amy back, encouraged her to do as oncology recommended as a wait for a direct admit bed is longer than she should wait for.  She stated understanding.  Updated Dr Cohen.

## 2022-06-02 NOTE — TELEPHONE ENCOUNTER
[facility] Call Center    Phone Message    May a detailed message be left on voicemail: yes     Reason for Call: Other: Pt would like a call back asap as she is having a horrible reaction to chemo and her oncology told her to go to the emergency room but she would like to discuss with Dr gustafson to see shana can do a admit  , please reach out asap as she is also not going to the bathroom     Action Taken: Message routed to:  [facility] ([facility] Cardio    Travel Screening: Not Applicable

## 2022-06-02 NOTE — ED PROVIDER NOTES
[place] EMERGENCY DEPARTMENT ([facility])  6/02/22  History     Chief Complaint   Patient presents with     Referral     Urinary Retention     The history is provided by the patient.     Leandra Guerrero is a 71 year old female who has a significant medical history of recurrent amyloidosis, recurrent myeloma, history of heart failure, bilateral edema of lower extremities, and CKD stage III who presents to the Emergency Department with c/o difficulty with urination, abdominal pain, abdominal distention, and increased bilateral edema of lower extremities. Patient states that her second cycle of chemotherapy was yesterday, she states there is concerned that the chemotherapy would be hard on her, but she proceeded anyway.  Since then, her legs have become so painful that she has to keep them elevated in order to decrease pain.  The abdominal distention and urinary retention also began yesterday. Patient reports urinating every 4 hours, but urine output has been less than 50 mL.  She reports taking torsemide twice daily (75 mg morning and 75 mg at night), without missing doses.  Despite taking torsemide, her urine output is decreased and her bilateral leg edema has increased significantly since starting chemotherapy last week.  Her legs have become very painful, she reports abdominal distention and abdominal pain, she rates the pain 6 out of 10 and described as achy. Patient denies vomiting or nausea today, but reports nausea yesterday and the day before.  Reports a history of heart failure, states that her last echo was in 2021 (results are copied below).  She reports being unable to lie flat on her back, states that she has keep her self elevated because she will become short of breath, states that this symptom has been ongoing for months. Patient states that she has been taking fluids without difficulty, drinking coffee this morning and eating ice chips throughout the day and eating soup at lunch.  She  denies taking her temperature at home, but reports having hot and cold flashes.  She reports congestion, states this is due to seasonal allergies.  She denies cough.  She denies dysuria, denies hematuria.  She also denies diarrhea, stating that it is difficult to pass stools recently but she has gone 2-3 times in the last week, her last bowel movement was just before presenting to the ED. She reports last taking medication before coming to the ED at approximately 4 PM, medication was taken with food, she denies missing recent doses.  She reports an allergy to contrast dye.  Of note she reports living at home with her .    Complete Portable Echo Adult   Study Date: 10/31/2021 10:19 AM  Interpretation Summary  Global and regional left ventricular function is hyperkinetic with an EF of  65-70%. The LV cavity is small. Grade III or advanced diastolic dysfunction.  Global right ventricular function is normal. The right ventricle is normal  size.  Moderate tricuspid insufficiency is present.  The estimated PA systolic pressure is at least 41 mmHg.  IVC diameter >2.1 cm collapsing <50% with sniff suggests a high RA pressure  estimated at 15 mmHg or greater.  This study was compared with the study from 02/03/2021. The right atrial  pressure is higher.    Past Medical History  Past Medical History:   Diagnosis Date     AL amyloidosis (H)      Atrial fibrillation and flutter (H)      Cardiac amyloidosis (H)      Lymphedema      QT prolongation      Recurrent right pleural effusion 1/2/2017     SVT (supraventricular tachycardia) (H)      Past Surgical History:   Procedure Laterality Date     EXCISE GANGLION WRIST Left 5/11/2022    Procedure: LEFT VOLAR WRIST GANGLION EXCISION;  Surgeon: Tato Diaz MD;  Location: [facility] OR     acetaminophen (TYLENOL) 325 MG tablet  acyclovir (ZOVIRAX) 400 MG tablet  albuterol (PROAIR HFA/PROVENTIL HFA/VENTOLIN HFA) 108 (90 Base) MCG/ACT inhaler  allopurinol (ZYLOPRIM) 300 MG  tablet  apixaban ANTICOAGULANT (ELIQUIS ANTICOAGULANT) 2.5 MG tablet  BIOTIN PO  camphor-menthol (DERMASARRA) 0.5-0.5 % external lotion  Cholecalciferol (VITAMIN D3 PO)  clobetasol (TEMOVATE) 0.05 % external solution  cyclophosphamide (CYTOXAN) 50 MG capsule  diphenhydrAMINE (BENADRYL) 50 MG capsule  doxycycline hyclate (VIBRAMYCIN) 100 MG capsule  eplerenone (INSPRA) 25 MG tablet  methylPREDNISolone (MEDROL) 32 MG tablet  metolazone (ZAROXOLYN) 2.5 MG tablet  nitroGLYcerin (NITROSTAT) 0.4 MG sublingual tablet  ondansetron (ZOFRAN) 8 MG tablet  ondansetron (ZOFRAN-ODT) 8 MG ODT tab  potassium chloride ER (KLOR-CON M) 10 MEQ CR tablet  prochlorperazine (COMPAZINE) 10 MG tablet  sertraline (ZOLOFT) 25 MG tablet  torsemide (DEMADEX) 100 MG tablet  triamcinolone (KENALOG) 0.1 % external cream      Allergies   Allergen Reactions     Contrast Dye Shortness Of Breath     Shaking,chills and dypsnea     Cytoxan [Cyclophosphamide]      Hemorrhagic cystitis     Gabapentin      Slurred speech, weakness     Levofloxacin      Severe shaking and abdominal pain     Aaron Weed      Rash and itchyness     Amoxicillin Nausea and Vomiting and Cramps     Bumetanide Unknown     Spironolactone      Worsening hyponatremia, palpitations     Chlorhexidine Dermatitis and Rash     AARON wipes  2% chlorhexidine gluconate   Rash to everywhere wipe was applied      Family History  Family History   Problem Relation Age of Onset     Other - See Comments Sister         Amyloidosis     Social History   Social History     Tobacco Use     Smoking status: Never Smoker     Smokeless tobacco: Never Used   Substance Use Topics     Alcohol use: No     Drug use: No      Past medical history, past surgical history, medications, allergies, family history, and social history were reviewed with the patient. No additional pertinent items.     I have reviewed the Medications, Allergies, Past Medical and Surgical History, and Social History in the Epic  system.    Review of Systems   Constitutional: Positive for chills. Negative for fever.   HENT: Positive for congestion. Negative for sore throat.    Eyes: Negative for redness.   Respiratory: Positive for shortness of breath. Negative for cough.    Cardiovascular: Positive for leg swelling. Negative for chest pain.   Gastrointestinal: Positive for abdominal distention and abdominal pain. Negative for diarrhea, nausea and vomiting.   Genitourinary: Negative for difficulty urinating, dysuria and hematuria.   Musculoskeletal: Negative for arthralgias and neck stiffness.   Skin: Negative for color change.   Neurological: Negative for headaches.   Psychiatric/Behavioral: Negative for confusion.   All other systems reviewed and are negative.    A complete review of systems was performed with pertinent positives and negatives noted in the HPI, and all other systems negative.    Physical Exam   BP: (!) 146/74  Pulse: 79  Temp: 97.9  F (36.6  C)  Resp: 18  Weight: 73.5 kg (162 lb)  SpO2: 98 %      Physical Exam  Vitals and nursing note reviewed.   Constitutional:       General: She is in acute distress.      Appearance: She is well-developed. She is not diaphoretic.      Comments: Chronically ill-appearing, alert, appears distressed   HENT:      Head: Normocephalic and atraumatic.      Mouth/Throat:      Mouth: Mucous membranes are moist.      Pharynx: Oropharynx is clear.   Eyes:      Conjunctiva/sclera: Conjunctivae normal.      Pupils: Pupils are equal, round, and reactive to light.   Cardiovascular:      Rate and Rhythm: Normal rate. Rhythm irregular.      Heart sounds: Normal heart sounds.      Comments: Heart rate very irregular, varying from 50s to 100 with long pauses  Pulmonary:      Effort: Respiratory distress present.      Breath sounds: No wheezing or rales.      Comments: Slightly tachypneic, diminished breath sounds at right lung base  Abdominal:      General: There is distension.      Tenderness: There is  abdominal tenderness.      Comments: Abdomen distended, moderately firm, mildly tender to palpation globally, hypoactive bowel sounds mildly TTP globally, hypoactive bowel sounds   Musculoskeletal:      Cervical back: Normal range of motion and neck supple.      Comments: Severe B LE edema/anasarca, very TTP   Skin:     General: Skin is warm and dry.   Neurological:      Mental Status: She is alert.   Psychiatric:      Comments: Highly anxious         ED Course     At 6:32 PM the patient was seen and examined by Aster Guerrero MD in Room ED26.        Procedures             EKG#1 Interpretation:      Interpreted by Aster Guerrero MD  Time reviewed:1915   Symptoms at time of EKG: SOB   Rhythm: accelerated junctional rhythm  Rate: Normal  Axis: Right Axis Deviation  Ectopy: no ectopic foci but long pauses noted in rhythm  Conduction: Normal  ST Segments/ T Waves: No ST-T wave changes and No acute ischemic changes  Q Waves: None  Comparison to prior: accelerated junctional rhythm new    Clinical Impression: junctional rhythm           EKG#2 Interpretation:      Interpreted by Aster Guerrero MD  Time reviewed:2205   Symptoms at time of EKG: SOB   Rhythm: Normal sinus   Rate: Normal  Axis: Right Axis Deviation  Ectopy: None  Conduction: Normal  ST Segments/ T Waves: No ST-T wave changes and No acute ischemic changes  Q Waves: None  Comparison to prior: NSR has replaced junctional rhythm    Clinical Impression: sinus rhythm         The medical record was reviewed and interpreted.  Current labs reviewed and interpreted.  Previous labs reviewed and interpreted.  Current images reviewed and interpreted: CXR - R pleural effusion.     Critical care: Based upon patient's evaluation she is critically ill and does require critical care.  Approximately 60 minutes is spent in assessment, reassessment, record review, discussion with consultants, discussion with primary team, discussion with nursing staff, entering  interpretation of orders, and documentation.    Results for orders placed or performed during the hospital encounter of 06/02/22 (from the past 24 hour(s))   [place] Draw    Narrative    The following orders were created for panel order [place] Draw.  Procedure                               Abnormality         Status                     ---------                               -----------         ------                     Extra Blue Top Tube[[id number]]                              Final result               Extra Red Top Tube[[id number]]                                                          Extra Green Top (Lithium...[[id number]]                      Final result               Extra Purple Top Tube[[id number]]                            Final result                 Please view results for these tests on the individual orders.   CBC with Platelets & Differential    Narrative    The following orders were created for panel order CBC with Platelets & Differential.  Procedure                               Abnormality         Status                     ---------                               -----------         ------                     CBC with platelets and d...[[id number]]  Abnormal            Final result                 Please view results for these tests on the individual orders.   Basic metabolic panel   Result Value Ref Range    Sodium 128 (L) 133 - 144 mmol/L    Potassium 5.9 (H) 3.4 - 5.3 mmol/L    Chloride 95 94 - 109 mmol/L    Carbon Dioxide (CO2) 23 20 - 32 mmol/L    Anion Gap 10 3 - 14 mmol/L    Urea Nitrogen 49 (H) 7 - 30 mg/dL    Creatinine 1.59 (H) 0.52 - 1.04 mg/dL    Calcium 9.6 8.5 - 10.1 mg/dL    Glucose 138 (H) 70 - 99 mg/dL    GFR Estimate 35 (L) >60 mL/min/1.73m2   Hepatic function panel   Result Value Ref Range    Bilirubin Total 1.2 0.2 - 1.3 mg/dL    Bilirubin Direct 0.5 (H) 0.0 - 0.2 mg/dL    Protein Total 7.4 6.8 - 8.8 g/dL    Albumin 3.7 3.4 - 5.0 g/dL    Alkaline Phosphatase 218 (H) 40 - 150  U/L    AST 54 (H) 0 - 45 U/L    ALT 73 (H) 0 - 50 U/L   Nt probnp inpatient   Result Value Ref Range    N terminal Pro BNP Inpatient 2,723 (H) 0 - 900 pg/mL   Troponin I   Result Value Ref Range    Troponin I High Sensitivity 12 <54 ng/L   Extra Blue Top Tube   Result Value Ref Range    Hold Specimen JIC    Extra Green Top (Lithium Heparin) Tube   Result Value Ref Range    Hold Specimen     Extra Purple Top Tube   Result Value Ref Range    Hold Specimen JIC    CBC with platelets and differential   Result Value Ref Range    WBC Count 9.0 4.0 - 11.0 10e3/uL    RBC Count 4.19 3.80 - 5.20 10e6/uL    Hemoglobin 11.9 11.7 - 15.7 g/dL    Hematocrit 36.1 35.0 - 47.0 %    MCV 86 78 - 100 fL    MCH 28.4 26.5 - 33.0 pg    MCHC 33.0 31.5 - 36.5 g/dL    RDW 18.1 (H) 10.0 - 15.0 %    Platelet Count 226 150 - 450 10e3/uL    % Neutrophils 92 %    % Lymphocytes 2 %    % Monocytes 5 %    % Eosinophils 0 %    % Basophils 0 %    % Immature Granulocytes 1 %    NRBCs per 100 WBC 0 <1 /100    Absolute Neutrophils 8.2 1.6 - 8.3 10e3/uL    Absolute Lymphocytes 0.2 (L) 0.8 - 5.3 10e3/uL    Absolute Monocytes 0.4 0.0 - 1.3 10e3/uL    Absolute Eosinophils 0.0 0.0 - 0.7 10e3/uL    Absolute Basophils 0.0 0.0 - 0.2 10e3/uL    Absolute Immature Granulocytes 0.1 <=0.4 10e3/uL    Absolute NRBCs 0.0 10e3/uL   EKG 12 lead   Result Value Ref Range    Systolic Blood Pressure  mmHg    Diastolic Blood Pressure  mmHg    Ventricular Rate 70 BPM    Atrial Rate 105 BPM    MS Interval  ms    QRS Duration 84 ms     ms    QTc 455 ms    P Axis  degrees    R AXIS 242 degrees    T Axis 112 degrees    Interpretation ECG       Accelerated Junctional rhythm  Right superior axis deviation  Pulmonary disease pattern  Septal infarct , age undetermined  Abnormal ECG     XR Chest Port 1 View    Narrative    EXAM: XR Chest 1 view 6/2/2022 7:10 PM      HISTORY: SOB.    COMPARISON: CT of the chest abdomen pelvis dated 5/9/2022.     TECHNIQUE: Frontal view of the  chest.    FINDINGS: Trachea is midline. Cardiac and mediastinal silhouette is  within normal limits. Right basilar hazy opacity. Moderate right  pleural effusion. No left pleural effusion. No pneumothoraces.      Impression    IMPRESSION: Moderate right pleural effusion with associated  atelectasis.     I have personally reviewed the examination and initial interpretation  and I agree with the findings.    JOHN GONZALEZ MD         SYSTEM ID:  [id number]   Asymptomatic COVID-19 Virus (Coronavirus) by PCR Nose    Specimen: Nose; Swab   Result Value Ref Range    SARS CoV2 PCR Negative Negative, Testing sent to reference lab. Results will be returned via unsolicited result    Narrative    Testing was performed using the Xpert Xpress SARS-CoV-2 Assay on the  Cepheid Gene-Xpert Instrument Systems. Additional information about  this Emergency Use Authorization (EUA) assay can be found via the Lab  Guide. This test should be ordered for the detection of SARS-CoV-2 in  individuals who meet SARS-CoV-2 clinical and/or epidemiological  criteria. Test performance is unknown in asymptomatic patients. This  test is for in vitro diagnostic use under the FDA EUA for  laboratories certified under CLIA to perform high complexity testing.  This test has not been FDA cleared or approved. A negative result  does not rule out the presence of PCR inhibitors in the specimen or  target RNA in concentration below the limit of detection for the  assay. The possibility of a false negative should be considered if  the patient's recent exposure or clinical presentation suggests  COVID-19. This test was validated by the [facility]. This laboratory is certified under  the Clinical Laboratory Improvement Amendments of 1988 (CLIA-88) as  qualified to perform high complexity laboratory testing.     Glucose by meter   Result Value Ref Range    GLUCOSE BY METER POCT 153 (H) 70 - 99 mg/dL   Glucose by  meter   Result Value Ref Range    GLUCOSE BY METER POCT 262 (H) 70 - 99 mg/dL   EKG 12 lead   Result Value Ref Range    Systolic Blood Pressure  mmHg    Diastolic Blood Pressure  mmHg    Ventricular Rate 68 BPM    Atrial Rate 68 BPM    RI Interval 194 ms    QRS Duration 84 ms     ms    QTc 465 ms    P Axis 53 degrees    R AXIS 268 degrees    T Axis 128 degrees    Interpretation ECG       Sinus rhythm  Right superior axis deviation  Low voltage QRS  Nonspecific T wave abnormality  Abnormal ECG     Potassium   Result Value Ref Range    Potassium 5.3 3.4 - 5.3 mmol/L   Basic metabolic panel   Result Value Ref Range    Sodium 130 (L) 133 - 144 mmol/L    Potassium 5.3 3.4 - 5.3 mmol/L    Chloride 96 94 - 109 mmol/L    Carbon Dioxide (CO2) 25 20 - 32 mmol/L    Anion Gap 9 3 - 14 mmol/L    Urea Nitrogen 51 (H) 7 - 30 mg/dL    Creatinine 1.72 (H) 0.52 - 1.04 mg/dL    Calcium 9.6 8.5 - 10.1 mg/dL    Glucose 118 (H) 70 - 99 mg/dL    GFR Estimate 32 (L) >60 mL/min/1.73m2   Glucose by meter   Result Value Ref Range    GLUCOSE BY METER POCT 133 (H) 70 - 99 mg/dL   UA with Microscopic reflex to Culture    Specimen: Urine, Clean Catch   Result Value Ref Range    Color Urine Light Yellow Colorless, Straw, Light Yellow, Yellow    Appearance Urine Clear Clear    Glucose Urine Negative Negative mg/dL    Bilirubin Urine Negative Negative    Ketones Urine Negative Negative mg/dL    Specific Gravity Urine 1.008 1.003 - 1.035    Blood Urine Negative Negative    pH Urine 7.5 (H) 5.0 - 7.0    Protein Albumin Urine Negative Negative mg/dL    Urobilinogen Urine Normal Normal, 2.0 mg/dL    Nitrite Urine Negative Negative    Leukocyte Esterase Urine Moderate (A) Negative    RBC Urine 1 <=2 /HPF    WBC Urine 26 (H) <=5 /HPF    Squamous Epithelials Urine 2 (H) <=1 /HPF    Hyaline Casts Urine 14 (H) <=2 /LPF    Narrative    Urine Culture ordered based on laboratory criteria     *Note: Due to a large number of results and/or encounters for  the requested time period, some results have not been displayed. A complete set of results can be found in Results Review.     Medications   glucose gel 15-30 g (has no administration in time range)     Or   dextrose 50 % injection 25-50 mL (has no administration in time range)     Or   glucagon injection 1 mg (has no administration in time range)   acyclovir (ZOVIRAX) tablet 400 mg (has no administration in time range)   allopurinol (ZYLOPRIM) tablet 300 mg (has no administration in time range)   apixaban ANTICOAGULANT (ELIQUIS) tablet 2.5 mg (has no administration in time range)   sertraline (ZOLOFT) tablet 25 mg (has no administration in time range)   lidocaine 1 % 0.1-1 mL (has no administration in time range)   lidocaine (LMX4) cream (has no administration in time range)   sodium chloride (PF) 0.9% PF flush 3 mL (has no administration in time range)   sodium chloride (PF) 0.9% PF flush 3 mL (has no administration in time range)   melatonin tablet 1 mg (has no administration in time range)   Patient is already receiving anticoagulation with heparin, enoxaparin (LOVENOX), warfarin (COUMADIN)  or other anticoagulant medication (has no administration in time range)   senna-docusate (SENOKOT-S/PERICOLACE) 8.6-50 MG per tablet 1 tablet (has no administration in time range)     Or   senna-docusate (SENOKOT-S/PERICOLACE) 8.6-50 MG per tablet 2 tablet (has no administration in time range)   acetaminophen (TYLENOL) tablet 650 mg (has no administration in time range)     Or   acetaminophen (TYLENOL) Suppository 650 mg (has no administration in time range)   dextrose 10% BOLUS (0 mLs Intravenous Stopped 6/2/22 2052)   dextrose 50 % injection 25 g (25 g Intravenous Given 6/2/22 2015)   insulin regular 1 unit/mL injection 7 Units (7 Units Intravenous Given 6/2/22 2018)   calcium gluconate 1 g in 50 mL sodium chloride intermittent infusion (premix) (1 g Intravenous Given 6/2/22 2008)   furosemide (LASIX) injection 80 mg (80  mg Intravenous Given 6/2/22 1946)   hydrOXYzine (ATARAX) tablet 25 mg (25 mg Oral Given 6/2/22 2101)   furosemide (LASIX) injection 120 mg (120 mg Intravenous Given 6/2/22 2324)             Assessments & Plan (with Medical Decision Making)   Leandra Guerrero is a 71 year old female who presents to the emergency department today with shortness of breath, diffuse anasarca, obviously volume overloaded.  She does have a history of diastolic heart failure, however also is on chemotherapy which started last week and symptoms have gotten much worse in that time.  Cardiotoxicity from chemotherapy certainly needs to be considered.  Patient was initially complaining of difficulty urinating, bladder scan was less than 30 cc.    We did establish IV access and we did draw blood for laboratory analysis.  CBC is within normal limits.  Hepatic function panel shows minimally elevated transaminases with an AST of 54 and an ALT of 73, alkaline phosphatase is 218.  Total bilirubin is within normal limits at 1.2.  Basic metabolic panel shows hyponatremia with a sodium of 128, hyperkalemia with a potassium of 5.9, renal insufficiency with a creatinine of 1.59, up from 1.08 10 days ago, BNP elevated at 2723, troponin is within normal limits at 12, SARS-CoV-2 PCR swab is negative.  Chest x-ray shows moderate right pleural effusion associated with atelectasis.     UA shows moderate leukocyte esterase with 26 white cells, 2 squamous cells, negative nitrites.  Urine culture is in process.  Patient is asymptomatic with this, though certainly is immunosuppressed on chemotherapy.  By the time this lab resulted the primary team had already seen the patient and was managing as the patient was boarding in the ED.  Will defer antibiotic decisions to primary team.    EKG was done upon arrival and shows an accelerated junctional rhythm at a rate of 70.  The patient's telemetry reading does tend to confirm significant variation in heart rate from   potentially consistent with accelerated junctional rhythm.  Given the fact that she is mildly hyperkalemic with EKG/rhythm changes, we have placed the hyperkalemia order set and we elected to give her calcium as well as insulin/glucose.  Additionally, she received a large dose of IV Lasix given her significant volume overload and hyperkalemia.  Upon recheck of her EKG, she is now in normal sinus rhythm with a rate of 68, no sign of ectopy or ischemia.    I did speak to the cardiology staff on-call with regard to this patient.  Cardiology recommends that the patient actually be admitted to medicine/oncology with cardiology consult as necessary.  I did specifically ask cardiology about the accelerated junctional rhythm and they are not concerned about this particular arrhythmia.  We did order hydroxyzine for anxiety for her, and I have also ordered morphine for pain (patient ultimately declined).  I have spoken to the oncology moonlighter staff and we will admit the patient to the oncology service at this time.    I have reviewed the nursing notes.    I have reviewed the findings, diagnosis, plan and need for follow up with the patient.    New Prescriptions    No medications on file       Final diagnoses:   Amyloidosis, unspecified type (H)   Multiple myeloma, remission status unspecified (H)   Volume overload state of heart   Hyperkalemia   Hyponatremia   Renal insufficiency   Anasarca   Pleural effusion on right   Accelerated junctional rhythm   On antineoplastic chemotherapy       ILuis Enrique am serving as a trained medical scribe to document services personally performed by Aster Guerrero, based on the provider's statements to me.      IAster, was physically present and have reviewed and verified the accuracy of this note documented by Luis Enrique Guerra.     Aster Guerrero MD  6/2/2022   [facility] EMERGENCY DEPARTMENT     Aster Guerrero MD  06/03/22  0048

## 2022-06-03 ENCOUNTER — APPOINTMENT (OUTPATIENT)
Dept: CARDIOLOGY | Facility: CLINIC | Age: 72
DRG: 292 | End: 2022-06-03
Attending: STUDENT IN AN ORGANIZED HEALTH CARE EDUCATION/TRAINING PROGRAM
Payer: COMMERCIAL

## 2022-06-03 LAB
ALBUMIN SERPL-MCNC: 3.3 G/DL (ref 3.4–5)
ALP SERPL-CCNC: 192 U/L (ref 40–150)
ALT SERPL W P-5'-P-CCNC: 62 U/L (ref 0–50)
ANION GAP SERPL CALCULATED.3IONS-SCNC: 10 MMOL/L (ref 3–14)
ANION GAP SERPL CALCULATED.3IONS-SCNC: 11 MMOL/L (ref 3–14)
ANION GAP SERPL CALCULATED.3IONS-SCNC: 7 MMOL/L (ref 3–14)
ANION GAP SERPL CALCULATED.3IONS-SCNC: 7 MMOL/L (ref 3–14)
AST SERPL W P-5'-P-CCNC: 37 U/L (ref 0–45)
ATRIAL RATE - MUSE: 105 BPM
ATRIAL RATE - MUSE: 68 BPM
BILIRUB SERPL-MCNC: 1 MG/DL (ref 0.2–1.3)
BUN SERPL-MCNC: 49 MG/DL (ref 7–30)
BUN SERPL-MCNC: 53 MG/DL (ref 7–30)
BUN SERPL-MCNC: 54 MG/DL (ref 7–30)
BUN SERPL-MCNC: 55 MG/DL (ref 7–30)
CALCIUM SERPL-MCNC: 9 MG/DL (ref 8.5–10.1)
CALCIUM SERPL-MCNC: 9.6 MG/DL (ref 8.5–10.1)
CALCIUM SERPL-MCNC: 9.6 MG/DL (ref 8.5–10.1)
CALCIUM SERPL-MCNC: 9.9 MG/DL (ref 8.5–10.1)
CHLORIDE BLD-SCNC: 100 MMOL/L (ref 94–109)
CHLORIDE BLD-SCNC: 96 MMOL/L (ref 94–109)
CHLORIDE BLD-SCNC: 98 MMOL/L (ref 94–109)
CHLORIDE BLD-SCNC: 99 MMOL/L (ref 94–109)
CO2 SERPL-SCNC: 23 MMOL/L (ref 20–32)
CO2 SERPL-SCNC: 26 MMOL/L (ref 20–32)
CO2 SERPL-SCNC: 27 MMOL/L (ref 20–32)
CO2 SERPL-SCNC: 27 MMOL/L (ref 20–32)
CREAT SERPL-MCNC: 1.58 MG/DL (ref 0.52–1.04)
CREAT SERPL-MCNC: 1.73 MG/DL (ref 0.52–1.04)
CREAT SERPL-MCNC: 1.73 MG/DL (ref 0.52–1.04)
CREAT SERPL-MCNC: 1.79 MG/DL (ref 0.52–1.04)
DIASTOLIC BLOOD PRESSURE - MUSE: NORMAL MMHG
DIASTOLIC BLOOD PRESSURE - MUSE: NORMAL MMHG
ERYTHROCYTE [DISTWIDTH] IN BLOOD BY AUTOMATED COUNT: 18.3 % (ref 10–15)
GFR SERPL CREATININE-BSD FRML MDRD: 30 ML/MIN/1.73M2
GFR SERPL CREATININE-BSD FRML MDRD: 31 ML/MIN/1.73M2
GFR SERPL CREATININE-BSD FRML MDRD: 31 ML/MIN/1.73M2
GFR SERPL CREATININE-BSD FRML MDRD: 35 ML/MIN/1.73M2
GLUCOSE BLD-MCNC: 104 MG/DL (ref 70–99)
GLUCOSE BLD-MCNC: 122 MG/DL (ref 70–99)
GLUCOSE BLD-MCNC: 135 MG/DL (ref 70–99)
GLUCOSE BLD-MCNC: 140 MG/DL (ref 70–99)
HCT VFR BLD AUTO: 36.9 % (ref 35–47)
HGB BLD-MCNC: 11.4 G/DL (ref 11.7–15.7)
INTERPRETATION ECG - MUSE: NORMAL
INTERPRETATION ECG - MUSE: NORMAL
LVEF ECHO: NORMAL
MAGNESIUM SERPL-MCNC: 2.6 MG/DL (ref 1.6–2.3)
MCH RBC QN AUTO: 28.7 PG (ref 26.5–33)
MCHC RBC AUTO-ENTMCNC: 30.9 G/DL (ref 31.5–36.5)
MCV RBC AUTO: 93 FL (ref 78–100)
P AXIS - MUSE: 53 DEGREES
P AXIS - MUSE: NORMAL DEGREES
PHOSPHATE SERPL-MCNC: 4.8 MG/DL (ref 2.5–4.5)
PLATELET # BLD AUTO: 207 10E3/UL (ref 150–450)
POTASSIUM BLD-SCNC: 2.8 MMOL/L (ref 3.4–5.3)
POTASSIUM BLD-SCNC: 4.3 MMOL/L (ref 3.4–5.3)
POTASSIUM BLD-SCNC: 4.9 MMOL/L (ref 3.4–5.3)
POTASSIUM BLD-SCNC: 6 MMOL/L (ref 3.4–5.3)
PR INTERVAL - MUSE: 194 MS
PR INTERVAL - MUSE: NORMAL MS
PROT SERPL-MCNC: 6.7 G/DL (ref 6.8–8.8)
QRS DURATION - MUSE: 84 MS
QRS DURATION - MUSE: 84 MS
QT - MUSE: 422 MS
QT - MUSE: 438 MS
QTC - MUSE: 455 MS
QTC - MUSE: 465 MS
R AXIS - MUSE: 242 DEGREES
R AXIS - MUSE: 268 DEGREES
RBC # BLD AUTO: 3.97 10E6/UL (ref 3.8–5.2)
SODIUM SERPL-SCNC: 130 MMOL/L (ref 133–144)
SODIUM SERPL-SCNC: 132 MMOL/L (ref 133–144)
SODIUM SERPL-SCNC: 133 MMOL/L (ref 133–144)
SODIUM SERPL-SCNC: 136 MMOL/L (ref 133–144)
SYSTOLIC BLOOD PRESSURE - MUSE: NORMAL MMHG
SYSTOLIC BLOOD PRESSURE - MUSE: NORMAL MMHG
T AXIS - MUSE: 112 DEGREES
T AXIS - MUSE: 128 DEGREES
VENTRICULAR RATE- MUSE: 68 BPM
VENTRICULAR RATE- MUSE: 70 BPM
WBC # BLD AUTO: 6.4 10E3/UL (ref 4–11)

## 2022-06-03 PROCEDURE — 99223 1ST HOSP IP/OBS HIGH 75: CPT | Mod: 25 | Performed by: INTERNAL MEDICINE

## 2022-06-03 PROCEDURE — 250N000013 HC RX MED GY IP 250 OP 250 PS 637: Performed by: INTERNAL MEDICINE

## 2022-06-03 PROCEDURE — 36415 COLL VENOUS BLD VENIPUNCTURE: CPT | Performed by: INTERNAL MEDICINE

## 2022-06-03 PROCEDURE — 83735 ASSAY OF MAGNESIUM: CPT | Performed by: PHYSICIAN ASSISTANT

## 2022-06-03 PROCEDURE — 258N000003 HC RX IP 258 OP 636: Performed by: HOSPITALIST

## 2022-06-03 PROCEDURE — 82040 ASSAY OF SERUM ALBUMIN: CPT | Performed by: INTERNAL MEDICINE

## 2022-06-03 PROCEDURE — 82310 ASSAY OF CALCIUM: CPT | Performed by: INTERNAL MEDICINE

## 2022-06-03 PROCEDURE — 120N000002 HC R&B MED SURG/OB UMMC

## 2022-06-03 PROCEDURE — 80053 COMPREHEN METABOLIC PANEL: CPT | Performed by: HOSPITALIST

## 2022-06-03 PROCEDURE — 36415 COLL VENOUS BLD VENIPUNCTURE: CPT | Performed by: HOSPITALIST

## 2022-06-03 PROCEDURE — 93306 TTE W/DOPPLER COMPLETE: CPT | Mod: 26 | Performed by: INTERNAL MEDICINE

## 2022-06-03 PROCEDURE — 36415 COLL VENOUS BLD VENIPUNCTURE: CPT | Performed by: PHYSICIAN ASSISTANT

## 2022-06-03 PROCEDURE — 80048 BASIC METABOLIC PNL TOTAL CA: CPT | Performed by: PHYSICIAN ASSISTANT

## 2022-06-03 PROCEDURE — 250N000011 HC RX IP 250 OP 636: Performed by: HOSPITALIST

## 2022-06-03 PROCEDURE — 84100 ASSAY OF PHOSPHORUS: CPT | Performed by: PHYSICIAN ASSISTANT

## 2022-06-03 PROCEDURE — 93306 TTE W/DOPPLER COMPLETE: CPT

## 2022-06-03 PROCEDURE — 85027 COMPLETE CBC AUTOMATED: CPT | Performed by: INTERNAL MEDICINE

## 2022-06-03 PROCEDURE — 99223 1ST HOSP IP/OBS HIGH 75: CPT | Mod: AI | Performed by: STUDENT IN AN ORGANIZED HEALTH CARE EDUCATION/TRAINING PROGRAM

## 2022-06-03 RX ORDER — SERTRALINE HYDROCHLORIDE 25 MG/1
25 TABLET, FILM COATED ORAL DAILY
Status: DISCONTINUED | OUTPATIENT
Start: 2022-06-03 | End: 2022-06-06 | Stop reason: HOSPADM

## 2022-06-03 RX ORDER — ACYCLOVIR 400 MG/1
400 TABLET ORAL 2 TIMES DAILY
Status: DISCONTINUED | OUTPATIENT
Start: 2022-06-03 | End: 2022-06-06 | Stop reason: HOSPADM

## 2022-06-03 RX ORDER — POTASSIUM CHLORIDE 1.5 G/1.58G
40 POWDER, FOR SOLUTION ORAL ONCE
Status: COMPLETED | OUTPATIENT
Start: 2022-06-03 | End: 2022-06-03

## 2022-06-03 RX ORDER — ALLOPURINOL 300 MG/1
300 TABLET ORAL DAILY
Status: DISCONTINUED | OUTPATIENT
Start: 2022-06-03 | End: 2022-06-06 | Stop reason: HOSPADM

## 2022-06-03 RX ORDER — LIDOCAINE 40 MG/G
CREAM TOPICAL
Status: DISCONTINUED | OUTPATIENT
Start: 2022-06-03 | End: 2022-06-06 | Stop reason: HOSPADM

## 2022-06-03 RX ORDER — AMOXICILLIN 250 MG
1 CAPSULE ORAL 2 TIMES DAILY PRN
Status: DISCONTINUED | OUTPATIENT
Start: 2022-06-03 | End: 2022-06-06 | Stop reason: HOSPADM

## 2022-06-03 RX ORDER — AMOXICILLIN 250 MG
2 CAPSULE ORAL 2 TIMES DAILY PRN
Status: DISCONTINUED | OUTPATIENT
Start: 2022-06-03 | End: 2022-06-06 | Stop reason: HOSPADM

## 2022-06-03 RX ORDER — DEXTROSE MONOHYDRATE 25 G/50ML
25 INJECTION, SOLUTION INTRAVENOUS ONCE
Status: DISCONTINUED | OUTPATIENT
Start: 2022-06-03 | End: 2022-06-03

## 2022-06-03 RX ORDER — ACETAMINOPHEN 325 MG/1
650 TABLET ORAL EVERY 6 HOURS PRN
Status: DISCONTINUED | OUTPATIENT
Start: 2022-06-03 | End: 2022-06-06 | Stop reason: HOSPADM

## 2022-06-03 RX ORDER — NICOTINE POLACRILEX 4 MG
15-30 LOZENGE BUCCAL
Status: DISCONTINUED | OUTPATIENT
Start: 2022-06-03 | End: 2022-06-06 | Stop reason: HOSPADM

## 2022-06-03 RX ORDER — ACETAMINOPHEN 650 MG/1
650 SUPPOSITORY RECTAL EVERY 6 HOURS PRN
Status: DISCONTINUED | OUTPATIENT
Start: 2022-06-03 | End: 2022-06-06 | Stop reason: HOSPADM

## 2022-06-03 RX ORDER — DEXTROSE MONOHYDRATE 25 G/50ML
25-50 INJECTION, SOLUTION INTRAVENOUS
Status: DISCONTINUED | OUTPATIENT
Start: 2022-06-03 | End: 2022-06-06 | Stop reason: HOSPADM

## 2022-06-03 RX ADMIN — ACETAMINOPHEN 650 MG: 325 TABLET, FILM COATED ORAL at 16:42

## 2022-06-03 RX ADMIN — POTASSIUM CHLORIDE 40 MEQ: 1.5 POWDER, FOR SOLUTION ORAL at 20:32

## 2022-06-03 RX ADMIN — APIXABAN 2.5 MG: 2.5 TABLET, FILM COATED ORAL at 20:32

## 2022-06-03 RX ADMIN — SERTRALINE HYDROCHLORIDE 25 MG: 25 TABLET ORAL at 08:44

## 2022-06-03 RX ADMIN — FUROSEMIDE 20 MG/HR: 10 INJECTION, SOLUTION INTRAVENOUS at 21:30

## 2022-06-03 RX ADMIN — ACYCLOVIR 400 MG: 400 TABLET ORAL at 08:44

## 2022-06-03 RX ADMIN — FUROSEMIDE 10 MG/HR: 10 INJECTION, SOLUTION INTRAVENOUS at 02:23

## 2022-06-03 RX ADMIN — ACYCLOVIR 400 MG: 400 TABLET ORAL at 20:32

## 2022-06-03 RX ADMIN — FUROSEMIDE 20 MG/HR: 10 INJECTION, SOLUTION INTRAVENOUS at 09:48

## 2022-06-03 RX ADMIN — FUROSEMIDE 20 MG/HR: 10 INJECTION, SOLUTION INTRAVENOUS at 15:35

## 2022-06-03 RX ADMIN — ALLOPURINOL 300 MG: 300 TABLET ORAL at 08:44

## 2022-06-03 ASSESSMENT — ACTIVITIES OF DAILY LIVING (ADL)
ADLS_ACUITY_SCORE: 35

## 2022-06-03 NOTE — PROGRESS NOTES
[facility]    Hematology / Oncology Progress Note    Patient: Leandra Guerrero  MRN: [id number]  Date of Service (when I saw the patient): 06/04/2022  Hospital Day # 2    Assessment:   Leandra Guerrero is a 71 year old female with a history of AL amyloidosis/multiple myeloma (recently restarted on CyBorD with fermin), chronic diastolic/restrictive heart failure 2/2 amyloidosis, afib/flutter on apixaban, CKD3 admitted on 6/2 for acute on chronic heart failure exacerbation.      Today:  - Per cardio, stop lasix gtt at 8 pm tonight. Resume PTA diuretics tomorrow morning.   - Trend strict I/Os, daily weights.  - Hypokalemia still persistent. Trend BID for now. Initiated high replacement protocol. Per cardio, start KCl 60 mEq BID.   - C diff testing for diarrhea. If negative, will order imodium.  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 Cohen and CORE team extensively outpatient. Most recent TTE (10/2021) with hyperkinetic EF of 65-70% and grade III or advanced diastolic dysfunction. PTA meds include torsemide 150mg BID, metolazone PRN (takes q3 days per patient), eplerenone BID, KCL 60-80 mEq TID. Dry weight of 150 lb (162 on admission). Patient presented on 6/2 with ~1  week history of increasing fatigue, volume overload and dyspnea, although these symptoms significantly worsened 1 day PTA with increased abdominal pain, lower extremity edema and minimal UOP. She states she urinated ~50ml every 4 hours yesterday. Has been compliant iwht all PTA meds. On presentation to the ED, patient noted to have an ERIC (Cr 1.59) and hyperkalemia (K 5.9) which was shifted. BNP elevated at 2723.   - Received 80 mg lasix IV with minimal output, followed by 120 mg IV with ~200-300 ml out. She was subsequently started on a  lasix gtt which was up-titrated to lasix 20 mg/hr with improved output (~1300 ml 0700 - 1330). Now with 5.6L output  - Cards HF consulted, appreciate assistance.                 - Repeat TTE 6/3/2022 showed EF 55-60%, severe concentric wall thickening with left ventricular hypertrophy, grade II diastolic dysfunction               - Per recs, stop lasix gtt at 8 pm tonight. Resume PTA diuretics tomorrow. Start KCl 60 mEq BID and aggressive potassium replacement.  - Monitor strict I/Os.  - Trend daily weights.      # Hypokalemia  # ERIC on CKD3a   ERIC likely pre-renal 2/2 volume overload. K remains low.  - Continue diuresis as above   - Monitor K BID.  - Will follow up with cardiology in regards to potassium replacement.  - Start KCl 60 mEq BID and high K replacement. Discontinue standard dose.   - Hold PTA K supplements      HEME/ONC  # Recurrent AL amyloidosis/multiple myeloma  Follows with Dr. Wang. Initially diagnosed in 1/2016 when she present with fatigue. Due to progression of symptoms, was evaluated at [place] in late 2016 and diagnosed with stage IV lambda AL amyloidosis (involvement of GI/bone marrow, no involvement of kidney/liver). Underwent 3 cycles of CyBorD (completed 12/2016) with excellent response in serum light chains. Deemed not a transplant candidate due to cardiac involvement. Unfortunately, with worsening of free light chains 4/19/2022, repeat BMBx (5/10) obtained with evidence of recurrent/persistent plasma cell neoplasm. Patient was restarted on CyBorD + Daratumumab (C1D1 = 5/25/22). Received D8 treatment (subcutaneous daratumumab/bortezomib, dexamethasone 20 mg and PO cytoxan) on 6/3.  - Next due for treatment 6/7/22; will continue to assess/reschedule outpatient pending admission.     # Anemia   Likely secondary to malignancy, chemotherapy. Do not expect any transfusion requirements this admission.  - Monitor CBC with diff daily.  - Transfuse for hgb <7.      GI/FEN  # Abdominal pain  #  Diarrhea  Abdominal pain likely secondary fluid overload with significant improvement today. New diarrhea with diuresis this morning. Many loose, watery stools. No fevers.  - C. Diff pending stool collection. May discontinue if not having further episodes of diarrhea.  - If c diff negative, can add imodium PRN.     # Elevated AST/ALT/ALP  Likely 2/2 volume overload. History of possible cirrhosis on abd ultrasound 11/3/21, however recheck on 3/6/22 with normal echogenicity. Improving LFTs as of 6/3. Will continue to monitor   - Trend CMP with diuresis   - Can consider repeat RUQ US if not improving      # Mild hyponatremia, resolved  Likely 2/2 volume overload.   - Monitor daily     MISC  # Asymptomatic pyuria   UA with 26 WBC, moderate leukocyte esterase, 2 squamous cells. Her only urinary symptom is decreased urination which is more likely 2/2 ERIC And volume overload as above. No dysuria. No s/sx of sepsis.   - Continue to monitor UCx; NGTD.   - Monitor for any symptoms. Hold off on antibiotics for now.       # Depression  - Continue PTA sertraline       Dispo: Admitted for volume overload; discharge pending diuretic plan.   Follow-Up:   - Labs/D15 CyBorD + fermin - 6/7  - CORE clinic visit - 6/10  - Labs/D22 CyBorD + fermin - 6/15  - Follow up with Dr. Wang - 6/22      Patient was seen and plan of care was discussed with attending physician Dr. Mayur Guevara.     Russell Fenton MD   Heme/Onc/Transplant Fellow  Pgr #[phone]    Subjective:  Nursing notes reviewed and noted. Afebrile overnight. Hemodynamically stable. She did not endorse any further episodes of diarrhea. She admits to having shortness of breath at baseline which has not worsened since her presentation. Her main issue was urination despite being on diuretics but now has resolved with very good urine output.     Objective:  Physical Examination  Vital Signs:  Temp:  [97.5  F (36.4  C)-97.8  F (36.6  C)] 97.5  F (36.4  C)  Pulse:  [70-97] 91  Resp:   [16-38] 18  BP: ()/(49-65) 128/61  SpO2:  [95 %-100 %] 100 %   I/O last 3 completed shifts:  In: 1817 [P.O.:1440; I.V.:377]  Out: 5650 [Urine:5650]   Wt Readings from Last 5 Encounters:   06/02/22 73.5 kg (162 lb)   06/01/22 72.2 kg (159 lb 1.6 oz)   05/24/22 70.6 kg (155 lb 9.6 oz)   05/18/22 68.8 kg (151 lb 11.2 oz)   05/13/22 70.3 kg (155 lb)        Constitutional: Pleasant female seen laying in bed. No apparent distress, and appears stated age.  Eyes: Lids and lashes normal, sclera clear, conjunctiva normal.  ENT: Normocephalic, without obvious abnormality, atraumatic, oral pharynx with moist mucus membranes, tonsils without erythema or exudates, gums normal and good dentition.   Respiratory: No increased work of breathing, good air exchange, no crackles or wheezing. Decreased right lower lobe, otherwise CTAB.    Cardiovascular: Regular rate and rhythm, normal S1 and S2, and no murmur noted. 2+ pitting bilateral lower extremity edema with wrinkling of skin.  GI: No masses or scars. +BS. Soft. No tenderness on palpation.  Skin: No bruising or bleeding appreciated. Normal skin color, texture and turgor without redness, warmth, or swelling. No rashes, no lesions, no jaundice.  Extremities: There is no redness, warmth, or swelling of the joints. No cyanosis.  Neurologic: Awake, alert, oriented to name, place and time.    Vascular access: NA    DATA:  Labs:    CMP  Recent Labs   Lab 06/04/22  0643 06/04/22  0030 06/03/22  1818 06/03/22  0903 06/03/22  0329 06/02/22  2207 06/02/22  1847     --  136 133 132*   < > 128*   POTASSIUM 2.6* 2.8* 2.8* 4.3 4.9   < > 5.9*   CHLORIDE 98  --  99 100 98   < > 95   CO2 30  --  26 23 27   < > 23   ANIONGAP 9  --  11 10 7   < > 10   BUN 42*  --  49* 55* 54*   < > 49*   CR 1.29*  --  1.58* 1.73* 1.79*   < > 1.59*   GFRESTIMATED 45*  --  35* 31* 30*   < > 35*   JERROD 9.0  --  9.0 9.6 9.6   < > 9.6   PROTTOTAL  --   --   --  6.7*  --   --  7.4   ALBUMIN  --   --   --  3.3*   --   --  3.7   BILITOTAL  --   --   --  1.0  --   --  1.2   ALKPHOS  --   --   --  192*  --   --  218*   AST  --   --   --  37  --   --  54*   ALT  --   --   --  62*  --   --  73*    < > = values in this interval not displayed.     Recent Labs   Lab 06/04/22  0643 06/03/22  1818 06/03/22  0903 06/03/22  0329 06/03/22  0156   * 122* 104* 135* 140*   MAG 2.3  --  2.6*  --   --    PHOS 4.2  --  4.8*  --   --      CBC  Recent Labs   Lab 06/04/22  0643 06/03/22  0903 06/02/22  1847 06/01/22  1111   WBC 4.2 6.4 9.0 5.3   RBC 4.05 3.97 4.19 4.11   HGB 11.5* 11.4* 11.9 11.7   HCT 36.5 36.9 36.1 36.9   MCV 90 93 86 90   MCH 28.4 28.7 28.4 28.5   MCHC 31.5 30.9* 33.0 31.7   RDW 18.3* 18.3* 18.1* 17.5*    207 226 239

## 2022-06-03 NOTE — CONSULTS
[facility]    Cardiology Consult Note       Date of Admission:  6/2/2022  Consult Requested by: BMT  Reason for Consult: Heart failure    Assessment & Plan: HVSL   Leandra Guerrero is a 71 year old female admitted on 6/2/2022. She has medical history of AL amyloidosis/multiple myeloma with recent recurrence on chemotherapy, chronic diastolic/restrictive heart failure 2/2 amyloidosis, Afib/flutter on apixaban, and CKD3 who is admitted for heart failure exacerbation in the setting of recent chemo administration.    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 Given the timing of this exacerbation, the presumed trigger is related to her recent chemotherapy. For now, appears that she is starting to have improved urine output in response to lasix gtt.  - Repeat echo today (ordered), will follow up on results  - Agree with lasix gtt for diuresis  - Will reassess UOP in response to gtt this afternoon, consider addition of a thiazide diuretic based on response at that time     Atrial fibrillation/flutter  - Continue apixaban  - Avoiding BB in patient with amyloid      The patient was staffed with attending cardiologist, Dr. Morales.    Graciela Casey MD  Internal Medicine, PGY-3  [facility]      ______________________________________________________________________    Chief Complaint   Shortness of breath and weight gain    History is obtained from the patient and EMR    History of Present Illness   Leandra Guerrero is a 71 year old female who presented to the ED with shortness of breath, abdominal pain, and weight gain.    She recently started chemo for recurrent amyloid/MM. She had her first treatment on 5/24 and says that it made her feel pretty  weak and unwell. She had a second infusion on 6/1, but this time it really made her feel worse. She took a dose of metolazone after her chemo appointment on 6/1, but despite that had almost no urine output at home the next day. Her abdomen became increasingly full and painful. She also notes worsening of her shortness of breath beyond baseline. Additionally her weight had been going up over this time; she tells me her usual dry weight is 150 lbs, but she weighs 162 lbs this morning. She called both her cardiology and oncology clinics because of the pain and weight gain and both advised that she come into the hospital.    Last night when she was admitted she got IV lasix 80 mg, then 120 mg without much response in urine output. She was started on a lasix gtt at 10 mg/hr overnight, which was then increased to 20 mg/hr this morning. She feels that her urine output has improved somewhat. She does say that it isn't all measured because she has diarrhea as well and sometimes the urine is mixed with stool, but she did think that her last one was a decent volume. She also says that both her abdominal pain and breathing feel quite a lot better this morning compared to when she arrived.    Review of Systems   The 10 point Review of Systems is negative other than noted in the HPI.    Past Medical History    I have reviewed this patient's medical history and updated it with pertinent information if needed.   Past Medical History:   Diagnosis Date     AL amyloidosis (H)      Atrial fibrillation and flutter (H)      Cardiac amyloidosis (H)      Lymphedema      QT prolongation      Recurrent right pleural effusion 1/2/2017     SVT (supraventricular tachycardia) (H)        Past Surgical History   I have reviewed this patient's surgical history and updated it with pertinent information if needed.  Past Surgical History:   Procedure Laterality Date     EXCISE GANGLION WRIST Left 5/11/2022    Procedure: LEFT VOLAR WRIST GANGLION  EXCISION;  Surgeon: Tato Diaz MD;  Location: [facility] OR       Social History   I have reviewed this patient's social history and updated it with pertinent information if needed.  Social History     Tobacco Use     Smoking status: Never Smoker     Smokeless tobacco: Never Used   Substance Use Topics     Alcohol use: No     Drug use: No     Family History   I have reviewed this patient's family history and updated it with pertinent information if needed.   I have reviewed this patient's family history and updated it with pertinent information if needed.  Family History   Problem Relation Age of Onset     Other - See Comments Sister         Amyloidosis       Medications   (Not in a hospital admission)      Allergies   Allergies   Allergen Reactions     Contrast Dye Shortness Of Breath     Shaking,chills and dypsnea     Cytoxan [Cyclophosphamide]      Hemorrhagic cystitis     Gabapentin      Slurred speech, weakness     Levofloxacin      Severe shaking and abdominal pain     Wang Weed      Rash and itchyness     Amoxicillin Nausea and Vomiting and Cramps     Bumetanide Unknown     Spironolactone      Worsening hyponatremia, palpitations     Chlorhexidine Dermatitis and Rash     WANG wipes  2% chlorhexidine gluconate   Rash to everywhere wipe was applied        Physical Exam   Vital Signs: Temp: 97.9  F (36.6  C) Temp src: Oral BP: 114/64 Pulse: 70   Resp: 16 SpO2: 95 % O2 Device: None (Room air)    Weight: 162 lbs 0 oz    Constitutional: awake, alert, cooperative, no apparent distress  Eyes: anicteric, EOMI  ENT: oropharynx clear, MMM  Respiratory: breathing non-labored on room air, CTAB  Cardiovascular: regular rate and rhythm, normal S1 and S2, no murmur noted  GI: soft, non-tender  Skin: normal skin color, texture, turgor  Musculoskeletal: bilateral lower extremity edema present  Neurologic: alert and oriented x3, CNs II-XII grossly intact, moving all extremities equally  Neuropsychiatric: calm, normal eye  contact, affect normal      Data   Results for orders placed or performed during the hospital encounter of 06/02/22 (from the past 24 hour(s))   [facility] Draw    Narrative    The following orders were created for panel order [facility] Draw.  Procedure                               Abnormality         Status                     ---------                               -----------         ------                     Extra Blue Top Tube[[id number]]                              Final result               Extra Red Top Tube[[id number]]                                                          Extra Green Top (Lithium...[[id number]]                      Final result               Extra Purple Top Tube[[id number]]                            Final result                 Please view results for these tests on the individual orders.   CBC with Platelets & Differential    Narrative    The following orders were created for panel order CBC with Platelets & Differential.  Procedure                               Abnormality         Status                     ---------                               -----------         ------                     CBC with platelets and d...[[id number]]  Abnormal            Final result                 Please view results for these tests on the individual orders.   Basic metabolic panel   Result Value Ref Range    Sodium 128 (L) 133 - 144 mmol/L    Potassium 5.9 (H) 3.4 - 5.3 mmol/L    Chloride 95 94 - 109 mmol/L    Carbon Dioxide (CO2) 23 20 - 32 mmol/L    Anion Gap 10 3 - 14 mmol/L    Urea Nitrogen 49 (H) 7 - 30 mg/dL    Creatinine 1.59 (H) 0.52 - 1.04 mg/dL    Calcium 9.6 8.5 - 10.1 mg/dL    Glucose 138 (H) 70 - 99 mg/dL    GFR Estimate 35 (L) >60 mL/min/1.73m2   Hepatic function panel   Result Value Ref Range    Bilirubin Total 1.2 0.2 - 1.3 mg/dL    Bilirubin Direct 0.5 (H) 0.0 - 0.2 mg/dL    Protein Total 7.4 6.8 - 8.8 g/dL    Albumin 3.7 3.4 - 5.0 g/dL    Alkaline Phosphatase 218 (H) 40 - 150 U/L    AST  54 (H) 0 - 45 U/L    ALT 73 (H) 0 - 50 U/L   Nt probnp inpatient   Result Value Ref Range    N terminal Pro BNP Inpatient 2,723 (H) 0 - 900 pg/mL   Troponin I   Result Value Ref Range    Troponin I High Sensitivity 12 <54 ng/L   Extra Blue Top Tube   Result Value Ref Range    Hold Specimen JIC    Extra Green Top (Lithium Heparin) Tube   Result Value Ref Range    Hold Specimen     Extra Purple Top Tube   Result Value Ref Range    Hold Specimen JIC    CBC with platelets and differential   Result Value Ref Range    WBC Count 9.0 4.0 - 11.0 10e3/uL    RBC Count 4.19 3.80 - 5.20 10e6/uL    Hemoglobin 11.9 11.7 - 15.7 g/dL    Hematocrit 36.1 35.0 - 47.0 %    MCV 86 78 - 100 fL    MCH 28.4 26.5 - 33.0 pg    MCHC 33.0 31.5 - 36.5 g/dL    RDW 18.1 (H) 10.0 - 15.0 %    Platelet Count 226 150 - 450 10e3/uL    % Neutrophils 92 %    % Lymphocytes 2 %    % Monocytes 5 %    % Eosinophils 0 %    % Basophils 0 %    % Immature Granulocytes 1 %    NRBCs per 100 WBC 0 <1 /100    Absolute Neutrophils 8.2 1.6 - 8.3 10e3/uL    Absolute Lymphocytes 0.2 (L) 0.8 - 5.3 10e3/uL    Absolute Monocytes 0.4 0.0 - 1.3 10e3/uL    Absolute Eosinophils 0.0 0.0 - 0.7 10e3/uL    Absolute Basophils 0.0 0.0 - 0.2 10e3/uL    Absolute Immature Granulocytes 0.1 <=0.4 10e3/uL    Absolute NRBCs 0.0 10e3/uL   EKG 12 lead   Result Value Ref Range    Systolic Blood Pressure  mmHg    Diastolic Blood Pressure  mmHg    Ventricular Rate 70 BPM    Atrial Rate 105 BPM    VA Interval  ms    QRS Duration 84 ms     ms    QTc 455 ms    P Axis  degrees    R AXIS 242 degrees    T Axis 112 degrees    Interpretation ECG       Accelerated Junctional rhythm  Right superior axis deviation  Pulmonary disease pattern  Septal infarct , age undetermined  Abnormal ECG  Unconfirmed report - interpretation of this ECG is computer generated - see medical record for final interpretation  Confirmed by - EMERGENCY ROOM, PHYSICIAN ([id number]),  NADINE TOUSSAINT ([id number]) on 6/3/2022  6:39:46 AM     XR Chest Port 1 View    Narrative    EXAM: XR Chest 1 view 6/2/2022 7:10 PM      HISTORY: SOB.    COMPARISON: CT of the chest abdomen pelvis dated 5/9/2022.     TECHNIQUE: Frontal view of the chest.    FINDINGS: Trachea is midline. Cardiac and mediastinal silhouette is  within normal limits. Right basilar hazy opacity. Moderate right  pleural effusion. No left pleural effusion. No pneumothoraces.      Impression    IMPRESSION: Moderate right pleural effusion with associated  atelectasis.     I have personally reviewed the examination and initial interpretation  and I agree with the findings.    JOHN GONZALEZ MD         SYSTEM ID:  [id number]   Asymptomatic COVID-19 Virus (Coronavirus) by PCR Nose    Specimen: Nose; Swab   Result Value Ref Range    SARS CoV2 PCR Negative Negative, Testing sent to reference lab. Results will be returned via unsolicited result    Narrative    Testing was performed using the Xpert Xpress SARS-CoV-2 Assay on the  Cepheid Gene-Xpert Instrument Systems. Additional information about  this Emergency Use Authorization (EUA) assay can be found via the Lab  Guide. This test should be ordered for the detection of SARS-CoV-2 in  individuals who meet SARS-CoV-2 clinical and/or epidemiological  criteria. Test performance is unknown in asymptomatic patients. This  test is for in vitro diagnostic use under the FDA EUA for  laboratories certified under CLIA to perform high complexity testing.  This test has not been FDA cleared or approved. A negative result  does not rule out the presence of PCR inhibitors in the specimen or  target RNA in concentration below the limit of detection for the  assay. The possibility of a false negative should be considered if  the patient's recent exposure or clinical presentation suggests  COVID-19. This test was validated by the [facility]. This laboratory is certified under  the Clinical Laboratory  Improvement Amendments of 1988 (CLIA-88) as  qualified to perform high complexity laboratory testing.     Glucose by meter   Result Value Ref Range    GLUCOSE BY METER POCT 153 (H) 70 - 99 mg/dL   Glucose by meter   Result Value Ref Range    GLUCOSE BY METER POCT 262 (H) 70 - 99 mg/dL   EKG 12 lead   Result Value Ref Range    Systolic Blood Pressure  mmHg    Diastolic Blood Pressure  mmHg    Ventricular Rate 68 BPM    Atrial Rate 68 BPM    AL Interval 194 ms    QRS Duration 84 ms     ms    QTc 465 ms    P Axis 53 degrees    R AXIS 268 degrees    T Axis 128 degrees    Interpretation ECG       Sinus rhythm  Right superior axis deviation  Low voltage QRS  Nonspecific T wave abnormality  Abnormal ECG  Unconfirmed report - interpretation of this ECG is computer generated - see medical record for final interpretation  Confirmed by - EMERGENCY ROOM, PHYSICIAN ([id number]),  NADINE TOUSSAINT ([id number]) on 6/3/2022 6:40:24 AM     Potassium   Result Value Ref Range    Potassium 5.3 3.4 - 5.3 mmol/L   Basic metabolic panel   Result Value Ref Range    Sodium 130 (L) 133 - 144 mmol/L    Potassium 5.3 3.4 - 5.3 mmol/L    Chloride 96 94 - 109 mmol/L    Carbon Dioxide (CO2) 25 20 - 32 mmol/L    Anion Gap 9 3 - 14 mmol/L    Urea Nitrogen 51 (H) 7 - 30 mg/dL    Creatinine 1.72 (H) 0.52 - 1.04 mg/dL    Calcium 9.6 8.5 - 10.1 mg/dL    Glucose 118 (H) 70 - 99 mg/dL    GFR Estimate 32 (L) >60 mL/min/1.73m2   Glucose by meter   Result Value Ref Range    GLUCOSE BY METER POCT 133 (H) 70 - 99 mg/dL   UA with Microscopic reflex to Culture    Specimen: Urine, Clean Catch   Result Value Ref Range    Color Urine Light Yellow Colorless, Straw, Light Yellow, Yellow    Appearance Urine Clear Clear    Glucose Urine Negative Negative mg/dL    Bilirubin Urine Negative Negative    Ketones Urine Negative Negative mg/dL    Specific Gravity Urine 1.008 1.003 - 1.035    Blood Urine Negative Negative    pH Urine 7.5 (H) 5.0 - 7.0    Protein Albumin  Urine Negative Negative mg/dL    Urobilinogen Urine Normal Normal, 2.0 mg/dL    Nitrite Urine Negative Negative    Leukocyte Esterase Urine Moderate (A) Negative    RBC Urine 1 <=2 /HPF    WBC Urine 26 (H) <=5 /HPF    Squamous Epithelials Urine 2 (H) <=1 /HPF    Hyaline Casts Urine 14 (H) <=2 /LPF    Narrative    Urine Culture ordered based on laboratory criteria   Basic metabolic panel   Result Value Ref Range    Sodium 130 (L) 133 - 144 mmol/L    Potassium 6.0 (H) 3.4 - 5.3 mmol/L    Chloride 96 94 - 109 mmol/L    Carbon Dioxide (CO2) 27 20 - 32 mmol/L    Anion Gap 7 3 - 14 mmol/L    Urea Nitrogen 53 (H) 7 - 30 mg/dL    Creatinine 1.73 (H) 0.52 - 1.04 mg/dL    Calcium 9.9 8.5 - 10.1 mg/dL    Glucose 140 (H) 70 - 99 mg/dL    GFR Estimate 31 (L) >60 mL/min/1.73m2   Basic metabolic panel   Result Value Ref Range    Sodium 132 (L) 133 - 144 mmol/L    Potassium 4.9 3.4 - 5.3 mmol/L    Chloride 98 94 - 109 mmol/L    Carbon Dioxide (CO2) 27 20 - 32 mmol/L    Anion Gap 7 3 - 14 mmol/L    Urea Nitrogen 54 (H) 7 - 30 mg/dL    Creatinine 1.79 (H) 0.52 - 1.04 mg/dL    Calcium 9.6 8.5 - 10.1 mg/dL    Glucose 135 (H) 70 - 99 mg/dL    GFR Estimate 30 (L) >60 mL/min/1.73m2     *Note: Due to a large number of results and/or encounters for the requested time period, some results have not been displayed. A complete set of results can be found in Results Review.   I very much appreciated the opportunity to see and assess Leandra Guerrero in the hospital with CV  Resident  Dr Casey.  Patient was admitted for the note above summarizes my findings and worsening malaise in the setting ofAL amyloidosis.  Symptoms worsened after recent chemotherapy.  Today we reviewed her echocardiogram which shows left ventricular hypertrophy and diminution of left ventricular volume on preliminary reading.  Diuresis has been initiated.  I agree with the note above which summarizes my findings and recommendations.  Please do not hesitate to contact my  office if you have any questions or concerns.      Bryant Morales MD  Cardiac Arrhythmia Service  [facility]  [phone]

## 2022-06-03 NOTE — PROGRESS NOTES
[facility]  Hematology / Oncology Progress Note    Date of Service (when I saw the patient): 06/03/2022     Assessment & Plan   Leandra Guerrero is a 71 year old female with a history of AL amyloidosis/multiple myeloma (recently restarted on CyBorD with fermin), chronic diastolic/restrictive heart failure 2/2 amyloidosis, afib/flutter on apixaban, CKD3 admitted on 6/2 for acute on chronic heart failure exacerbation.      Today:  - Appreciate cards input. Continuing on lasix gtt 20 mg/hr at this time dude to improving output. They will re-assess need for further diuresis this PM. Trend strict I/Os, daily weights.  - Repeat echo.   - Lytes/Cr stabilized. Trend BID for now.   - C diff testing for diarrhea. If negative, will order imodium.  - lymphedema consult.     CARDS  # Acute on chronic diastolic heart failure   # Restrictive cardiomyopathy 2/2 AL amyloidosis   # Acute volume overload   # R-sided pleural effusion   Follows with Dr. Cohen and CORE team extensively outpatient. Most recent TTE (10/2021) with hyperkinetic EF of 65-70% and grade III or advanced diastolic dysfunction. PTA meds include torsemide 150mg BID, metolazone PRN (takes q3 days per patient), eplerenone BID, KCL 60-80 mEq TID. Dry weight of 150 lb (162 on admission). Patient presented on 6/2 with ~1  week history of increasing fatigue, volume overload and dyspnea, although these symptoms significantly worsened 1 day PTA with increased abdominal pain, lower extremity edema and minimal UOP. She states she urinated ~50ml every 4 hours yesterday. Has been compliant iw all PTA meds. On presentation to the ED, patient noted to have an ERIC (Cr 1.59) and hyperkalemia (K 5.9) which was shifted. BNP elevated at 2723.   - Received 80 mg lasix IV with minimal output, followed by 120 mg IV with ~200-300 ml out. She was subsequently started on a lasix gtt which was up-titrated to lasix 20 mg/hr with improved  output (~1300 ml 0700 - 1330).   - Cards HF consulted, appreciate assistance.     - Repeat echo in process.   - Continue lasix gtt for now; will assess need for addition of thiazide diuretic in PM.   - Monitor strict I/Os.  - Trend daily weights.      # Hyperkalemia, resolved  # ERIC on CKD3a   ERIC likely pre-renal 2/2 volume overload. K shifted in ED with improvement and stable this PM at 4.3.   - Continue diuresis as above   - Monitor K BID.  - Hold PTA K supplements      HEME/ONC  # Recurrent AL amyloidosis/multiple myeloma  Follows with Dr. Wang. Initially diagnosed in 1/2016 when she present with fatigue. Due to progression of symptoms, was evaluated at [place] in late 2016 and diagnosed with stage IV lambda AL amyloidosis (involvement of GI/bone marrow, no involvement of kidney/liver). Underwent 3 cycles of CyBorD (completed 12/2016) with excellent response in serum light chains. Deemed not a transplant candidate due to cardiac involvement. Unfortunately, with worsening of free light chains 4/19/2022, repeat BMBx (5/10) obtained with evidence of recurrent/persistent plasma cell neoplasm. Patient was restarted on CyBorD + Daratumumab (C1D1 = 5/25/22). Received D8 treatment (subcutaneous daratumumab/bortezomib, dexamethasone 20 mg and PO cytoxan) on 6/3.  - Next due for treatment 6/7/22; will continue to assess/reschedule outpatient pending admission.    # Anemia   Likely secondary to malignancy, chemotherapy. Do not expect any transfusion requirements this admission.  - Monitor CBC with diff daily.  - Transfuse for hgb <7.     GI/FEN  # Abdominal pain  # Diarrhea  Abdominal pain likely secondary fluid overload with significant improvement today. New diarrhea with diuresis this morning. Many loose, watery stools. No fevers.  - Add on c diff.  - If c diff negative, can add imodium PRN.    # Elevated AST/ALT/ALP  Likely 2/2 volume overload. History of possible cirrhosis on abd ultrasound 11/3/21, however recheck  on 3/6/22 with normal echogenicity. Improving LFTs as of 6/3.  - Trend CMP with diuresis   - Can consider repeat RUQ US if not improving     # Mild hyponatremia, resolved  Likely 2/2 volume overload.   - Monitor daily    MISC  # Asymptomatic pyuria   UA with 26 WBC, moderate leukocyte esterase, 2 squamous cells. Her only urinary symptom is decreased urination which is more likely 2/2 ERIC And volume overload as above. No dysuria. No s/sx of sepsis.   - Continue to monitor UCx; NGTD.   - Monitor for any symptoms. Hold off on antibiotics for now.       # Depression  - Continue PTA sertraline      Dispo: Admitted for volume overload; discharge pending diuretic plan.   Follow-Up:   - Labs/D15 CyBorD + fermin - 6/7  - CORE clinic visit - 6/10  - Labs/D22 CyBorD + fermin - 6/15  - Follow up with Dr. Wang - 6/22     Patient seen and discussed with attending physician, Dr. Grace.      I spent 60 minutes in the care of this patient today, which included time necessary for review of interval events, obtaining history and physical exam, ordering medications/tests/procedures as medically indicated, review of pertinent medical literature, counseling of the patient, coordination of care, and documentation time. Over 50% of time was spent counseling the patient and/or coordinating care.    Neela Holt PA-C   Hematology/Oncology   Pager: #[phone]     Interval History   Patient seen in the ED this AM. States she is feeling well, better than she has in the last few days. States she has been starting to feel better as she has been urinating more. Abdominal pain has significantly improved. She denies any chest pain this morning. Did endorse chest pressure yesterday but that has now improved. No difficulty breathing at rest. Walked me through the last week or so since restarting chemotherapy. Over the last week her dyspnea on exertion, fatigue and lower extremity edema have all been slowly getting worse, however she noted her abdominal  pain to significantly worsen yesterday. This prompted her to call both cardiology/oncology and present to the ED. She states she has been urinating more this morning and feeling better, however has also been having diarrhea.     On afternoon exam, patient states her abdominal pain is improving with increasing urine output. She continues to endorse diarrhea as well. No new or worsening symptoms, but grateful to be feeling better. Discussed our plan to continue monitoring her output and cardiology will continue to monitor for diuresis.    Physical Exam   Temp: 97.9  F (36.6  C) Temp src: Oral BP: 106/56 Pulse: 80   Resp: 16 SpO2: 99 % O2 Device: None (Room air)    Vitals:    06/02/22 1815   Weight: 73.5 kg (162 lb)     Vital Signs with Ranges  Temp:  [97.9  F (36.6  C)] 97.9  F (36.6  C)  Pulse:  [52-91] 80  Resp:  [14-30] 16  BP: (106-146)/(56-79) 106/56  SpO2:  [95 %-100 %] 99 %  I/O last 3 completed shifts:  In: -   Out: 220 [Urine:220]    Constitutional: Pleasant female seen laying in bed. No apparent distress, and appears stated age.  Eyes: Lids and lashes normal, sclera clear, conjunctiva normal.  ENT: Normocephalic, without obvious abnormality, atraumatic, oral pharynx with moist mucus membranes, tonsils without erythema or exudates, gums normal and good dentition.   Respiratory: No increased work of breathing, good air exchange, no crackles or wheezing. Decreased right lower lobe, otherwise CTAB.    Cardiovascular: Regular rate and rhythm, normal S1 and S2, and no murmur noted. 3+ pitting bilateral lower extremity edema.  GI: No masses or scars. +BS. Soft. No tenderness on palpation.  Skin: No bruising or bleeding appreciated. Normal skin color, texture and turgor without redness, warmth, or swelling. No rashes, no lesions, no jaundice.  Extremities: There is no redness, warmth, or swelling of the joints. No cyanosis.  Neurologic: Awake, alert, oriented to name, place and time.    Vascular access:  NA    Medications     furosemide (LASIX) infusion ADULT STANDARD 20 mg/hr (06/03/22 1217)     - MEDICATION INSTRUCTIONS -         acyclovir  400 mg Oral BID     allopurinol  300 mg Oral Daily     apixaban ANTICOAGULANT  2.5 mg Oral BID     methylPREDNISolone  40 mg       sertraline  25 mg Oral Daily     sodium chloride (PF)  3 mL Intracatheter Q8H       Data   Results for orders placed or performed during the hospital encounter of 06/02/22 (from the past 24 hour(s))   [facility] Draw    Narrative    The following orders were created for panel order [place] Draw.  Procedure                               Abnormality         Status                     ---------                               -----------         ------                     Extra Blue Top Tube[[id number]]                              Final result               Extra Red Top Tube[[id number]]                                                          Extra Green Top (Lithium...[[id number]]                      Final result               Extra Purple Top Tube[[id number]]                            Final result                 Please view results for these tests on the individual orders.   CBC with Platelets & Differential    Narrative    The following orders were created for panel order CBC with Platelets & Differential.  Procedure                               Abnormality         Status                     ---------                               -----------         ------                     CBC with platelets and d...[[id number]]  Abnormal            Final result                 Please view results for these tests on the individual orders.   Basic metabolic panel   Result Value Ref Range    Sodium 128 (L) 133 - 144 mmol/L    Potassium 5.9 (H) 3.4 - 5.3 mmol/L    Chloride 95 94 - 109 mmol/L    Carbon Dioxide (CO2) 23 20 - 32 mmol/L    Anion Gap 10 3 - 14 mmol/L    Urea Nitrogen 49 (H) 7 - 30 mg/dL    Creatinine 1.59 (H) 0.52 - 1.04 mg/dL    Calcium 9.6 8.5 - 10.1  mg/dL    Glucose 138 (H) 70 - 99 mg/dL    GFR Estimate 35 (L) >60 mL/min/1.73m2   Hepatic function panel   Result Value Ref Range    Bilirubin Total 1.2 0.2 - 1.3 mg/dL    Bilirubin Direct 0.5 (H) 0.0 - 0.2 mg/dL    Protein Total 7.4 6.8 - 8.8 g/dL    Albumin 3.7 3.4 - 5.0 g/dL    Alkaline Phosphatase 218 (H) 40 - 150 U/L    AST 54 (H) 0 - 45 U/L    ALT 73 (H) 0 - 50 U/L   Nt probnp inpatient   Result Value Ref Range    N terminal Pro BNP Inpatient 2,723 (H) 0 - 900 pg/mL   Troponin I   Result Value Ref Range    Troponin I High Sensitivity 12 <54 ng/L   Extra Blue Top Tube   Result Value Ref Range    Hold Specimen JIC    Extra Green Top (Lithium Heparin) Tube   Result Value Ref Range    Hold Specimen     Extra Purple Top Tube   Result Value Ref Range    Hold Specimen JIC    CBC with platelets and differential   Result Value Ref Range    WBC Count 9.0 4.0 - 11.0 10e3/uL    RBC Count 4.19 3.80 - 5.20 10e6/uL    Hemoglobin 11.9 11.7 - 15.7 g/dL    Hematocrit 36.1 35.0 - 47.0 %    MCV 86 78 - 100 fL    MCH 28.4 26.5 - 33.0 pg    MCHC 33.0 31.5 - 36.5 g/dL    RDW 18.1 (H) 10.0 - 15.0 %    Platelet Count 226 150 - 450 10e3/uL    % Neutrophils 92 %    % Lymphocytes 2 %    % Monocytes 5 %    % Eosinophils 0 %    % Basophils 0 %    % Immature Granulocytes 1 %    NRBCs per 100 WBC 0 <1 /100    Absolute Neutrophils 8.2 1.6 - 8.3 10e3/uL    Absolute Lymphocytes 0.2 (L) 0.8 - 5.3 10e3/uL    Absolute Monocytes 0.4 0.0 - 1.3 10e3/uL    Absolute Eosinophils 0.0 0.0 - 0.7 10e3/uL    Absolute Basophils 0.0 0.0 - 0.2 10e3/uL    Absolute Immature Granulocytes 0.1 <=0.4 10e3/uL    Absolute NRBCs 0.0 10e3/uL   EKG 12 lead   Result Value Ref Range    Systolic Blood Pressure  mmHg    Diastolic Blood Pressure  mmHg    Ventricular Rate 70 BPM    Atrial Rate 105 BPM    TX Interval  ms    QRS Duration 84 ms     ms    QTc 455 ms    P Axis  degrees    R AXIS 242 degrees    T Axis 112 degrees    Interpretation ECG       Accelerated  Junctional rhythm  Right superior axis deviation  Pulmonary disease pattern  Septal infarct , age undetermined  Abnormal ECG  Unconfirmed report - interpretation of this ECG is computer generated - see medical record for final interpretation  Confirmed by - EMERGENCY ROOM, PHYSICIAN ([id number]),  NADINE TOUSSAINT ([id number]) on 6/3/2022 6:39:46 AM     XR Chest Port 1 View    Narrative    EXAM: XR Chest 1 view 6/2/2022 7:10 PM      HISTORY: SOB.    COMPARISON: CT of the chest abdomen pelvis dated 5/9/2022.     TECHNIQUE: Frontal view of the chest.    FINDINGS: Trachea is midline. Cardiac and mediastinal silhouette is  within normal limits. Right basilar hazy opacity. Moderate right  pleural effusion. No left pleural effusion. No pneumothoraces.      Impression    IMPRESSION: Moderate right pleural effusion with associated  atelectasis.     I have personally reviewed the examination and initial interpretation  and I agree with the findings.    JOHN GONZALEZ MD         SYSTEM ID:  [id number]   Asymptomatic COVID-19 Virus (Coronavirus) by PCR Nose    Specimen: Nose; Swab   Result Value Ref Range    SARS CoV2 PCR Negative Negative, Testing sent to reference lab. Results will be returned via unsolicited result    Narrative    Testing was performed using the Xpert Xpress SARS-CoV-2 Assay on the  Cepheid Gene-Xpert Instrument Systems. Additional information about  this Emergency Use Authorization (EUA) assay can be found via the Lab  Guide. This test should be ordered for the detection of SARS-CoV-2 in  individuals who meet SARS-CoV-2 clinical and/or epidemiological  criteria. Test performance is unknown in asymptomatic patients. This  test is for in vitro diagnostic use under the FDA EUA for  laboratories certified under CLIA to perform high complexity testing.  This test has not been FDA cleared or approved. A negative result  does not rule out the presence of PCR inhibitors in the specimen or  target RNA in concentration  below the limit of detection for the  assay. The possibility of a false negative should be considered if  the patient's recent exposure or clinical presentation suggests  COVID-19. This test was validated by the [facility]. This laboratory is certified under  the Clinical Laboratory Improvement Amendments of 1988 (CLIA-88) as  qualified to perform high complexity laboratory testing.     Glucose by meter   Result Value Ref Range    GLUCOSE BY METER POCT 153 (H) 70 - 99 mg/dL   Glucose by meter   Result Value Ref Range    GLUCOSE BY METER POCT 262 (H) 70 - 99 mg/dL   EKG 12 lead   Result Value Ref Range    Systolic Blood Pressure  mmHg    Diastolic Blood Pressure  mmHg    Ventricular Rate 68 BPM    Atrial Rate 68 BPM    MT Interval 194 ms    QRS Duration 84 ms     ms    QTc 465 ms    P Axis 53 degrees    R AXIS 268 degrees    T Axis 128 degrees    Interpretation ECG       Sinus rhythm  Right superior axis deviation  Low voltage QRS  Nonspecific T wave abnormality  Abnormal ECG  Unconfirmed report - interpretation of this ECG is computer generated - see medical record for final interpretation  Confirmed by - EMERGENCY ROOM, PHYSICIAN ([id number]),  NADINE TOUSSAINT ([id number]) on 6/3/2022 6:40:24 AM     Potassium   Result Value Ref Range    Potassium 5.3 3.4 - 5.3 mmol/L   Basic metabolic panel   Result Value Ref Range    Sodium 130 (L) 133 - 144 mmol/L    Potassium 5.3 3.4 - 5.3 mmol/L    Chloride 96 94 - 109 mmol/L    Carbon Dioxide (CO2) 25 20 - 32 mmol/L    Anion Gap 9 3 - 14 mmol/L    Urea Nitrogen 51 (H) 7 - 30 mg/dL    Creatinine 1.72 (H) 0.52 - 1.04 mg/dL    Calcium 9.6 8.5 - 10.1 mg/dL    Glucose 118 (H) 70 - 99 mg/dL    GFR Estimate 32 (L) >60 mL/min/1.73m2   Glucose by meter   Result Value Ref Range    GLUCOSE BY METER POCT 133 (H) 70 - 99 mg/dL   UA with Microscopic reflex to Culture    Specimen: Urine, Clean Catch   Result Value Ref Range    Color Urine  Light Yellow Colorless, Straw, Light Yellow, Yellow    Appearance Urine Clear Clear    Glucose Urine Negative Negative mg/dL    Bilirubin Urine Negative Negative    Ketones Urine Negative Negative mg/dL    Specific Gravity Urine 1.008 1.003 - 1.035    Blood Urine Negative Negative    pH Urine 7.5 (H) 5.0 - 7.0    Protein Albumin Urine Negative Negative mg/dL    Urobilinogen Urine Normal Normal, 2.0 mg/dL    Nitrite Urine Negative Negative    Leukocyte Esterase Urine Moderate (A) Negative    RBC Urine 1 <=2 /HPF    WBC Urine 26 (H) <=5 /HPF    Squamous Epithelials Urine 2 (H) <=1 /HPF    Hyaline Casts Urine 14 (H) <=2 /LPF    Narrative    Urine Culture ordered based on laboratory criteria   Basic metabolic panel   Result Value Ref Range    Sodium 130 (L) 133 - 144 mmol/L    Potassium 6.0 (H) 3.4 - 5.3 mmol/L    Chloride 96 94 - 109 mmol/L    Carbon Dioxide (CO2) 27 20 - 32 mmol/L    Anion Gap 7 3 - 14 mmol/L    Urea Nitrogen 53 (H) 7 - 30 mg/dL    Creatinine 1.73 (H) 0.52 - 1.04 mg/dL    Calcium 9.9 8.5 - 10.1 mg/dL    Glucose 140 (H) 70 - 99 mg/dL    GFR Estimate 31 (L) >60 mL/min/1.73m2   Basic metabolic panel   Result Value Ref Range    Sodium 132 (L) 133 - 144 mmol/L    Potassium 4.9 3.4 - 5.3 mmol/L    Chloride 98 94 - 109 mmol/L    Carbon Dioxide (CO2) 27 20 - 32 mmol/L    Anion Gap 7 3 - 14 mmol/L    Urea Nitrogen 54 (H) 7 - 30 mg/dL    Creatinine 1.79 (H) 0.52 - 1.04 mg/dL    Calcium 9.6 8.5 - 10.1 mg/dL    Glucose 135 (H) 70 - 99 mg/dL    GFR Estimate 30 (L) >60 mL/min/1.73m2   Comprehensive metabolic panel   Result Value Ref Range    Sodium 133 133 - 144 mmol/L    Potassium 4.3 3.4 - 5.3 mmol/L    Chloride 100 94 - 109 mmol/L    Carbon Dioxide (CO2) 23 20 - 32 mmol/L    Anion Gap 10 3 - 14 mmol/L    Urea Nitrogen 55 (H) 7 - 30 mg/dL    Creatinine 1.73 (H) 0.52 - 1.04 mg/dL    Calcium 9.6 8.5 - 10.1 mg/dL    Glucose 104 (H) 70 - 99 mg/dL    Alkaline Phosphatase 192 (H) 40 - 150 U/L    AST 37 0 - 45 U/L     ALT 62 (H) 0 - 50 U/L    Protein Total 6.7 (L) 6.8 - 8.8 g/dL    Albumin 3.3 (L) 3.4 - 5.0 g/dL    Bilirubin Total 1.0 0.2 - 1.3 mg/dL    GFR Estimate 31 (L) >60 mL/min/1.73m2   CBC with platelets   Result Value Ref Range    WBC Count 6.4 4.0 - 11.0 10e3/uL    RBC Count 3.97 3.80 - 5.20 10e6/uL    Hemoglobin 11.4 (L) 11.7 - 15.7 g/dL    Hematocrit 36.9 35.0 - 47.0 %    MCV 93 78 - 100 fL    MCH 28.7 26.5 - 33.0 pg    MCHC 30.9 (L) 31.5 - 36.5 g/dL    RDW 18.3 (H) 10.0 - 15.0 %    Platelet Count 207 150 - 450 10e3/uL     *Note: Due to a large number of results and/or encounters for the requested time period, some results have not been displayed. A complete set of results can be found in Results Review.

## 2022-06-03 NOTE — PROGRESS NOTES
Asked to follow-up, on UOP response to lasix bolus, by swing shift MD.  - Discussed w RN multiple times overnight. Poor UOP- only 100cc (not recorded in Epic given this was estimated by RN as it was mixed with stool)  - After 80mg x 1 and 120mg x 1 of lasix, patient voided 200-300cc  - Started on lasix drip at 10mg/hr that was uptitrated to 20mg/hr (She needed this dose in the past hospital stay as well)  - Given volume overload and poor response to lasix, placing patient NPO and temporarily holding her Eliquis in case of consideration of temporary HD as a means to relieve volume (Discussed w RN re NPO status. Pt currently asleep, RN to call me if pt wants to discuss NPO status)  - Will discuss with AM team re-attempting Bumex prior to considering invasive measures

## 2022-06-03 NOTE — H&P
[facility]    History and Physical - Hospitalist Service       Date of Admission:  6/2/2022    Assessment & Plan      Leandra Guerrero is a 71 year old female admitted on 6/2/2022. She has a history of AL amyloidosis/multiple myeloma with recent recurrence on chemotherapy, chronic diastolic/restrictive heart failure 2/2 amyloidosis, A. Fib/flutter on apixaban, CKD3 admitted for acute on chronic heart failure exacerbation after starting chemotherapy.     Acute on Chronic Diastolic Heart Failure   Restrictive Cardiomyopathy 2/2 AL amyloidosis   Acute Volume Overload   R sided pleural effusion   She has been compliant with medications so suspect acute decompensation related to initiation of chemotherapy which includes steroids.   Most recent TTE 10/2021 with Grade III or advanced diastolic dysfunction.    Home regimen: torsemide 150mg BID, metolazone PRN (takes q3 days per patient), eplerenone BID, KCL 60-80 mEq TID   Reported dry weight: 150lbs   Usual outpatient provider is Dr. Cohen   -Cards HF consulted, appreciate assistance   -Continue IV diuresis - lasix 120mg IV. Discussed with cardiology, if output remains low, consider starting lasix drip. Of note, patient reports not tolerating bumex in the past for unclear reason.   -Monitor I&Os   -Consider repeat TTE this admission      AL Amyloidosis/Multiple Myeloma   Followed by Dr. Wang. Started cycle 1 of daratumumab/CYBORD   -Admit to heme malignancy team, if admitted to medicine team will need hematology consult     Hyperkalemia   ERIC on CKD3a   ERIC likely pre-renal 2/2 volume overload. K shifted in ED with improvement.   -Diuresis as above   -Monitor K q4h   -Hold PTA K supplements     Mild Hyponatremia   Likely 2/2 volume overload  -Monitor     Elevated AST/ALT/ALP   Likely 2/2 volume overload. History of ?cirrhosis.   -Trend CMP with diuresis   -Consider RUQ US if not improving     Asymptomatic Pyuria   UA  "with 26 WBC, moderate leukocyte esterase, 2 squamous cells. Her only urinary symptom is decreased urination which is more likely 2/2 ERIC And volume overload as above. No dysuria. No s/sx of sepsis.   -Follow-up urine culture, monitor for any symptoms. Hold off on antibiotics for now.      Depression  -Continue PTA sertraline         Diet:   2g sodium diet   DVT Prophylaxis: DOAC  Marie Catheter: Not present  Central Lines: None  Cardiac Monitoring: None  Code Status:   Full Code     Clinically Significant Risk Factors Present on Admission        # Hyperkalemia: K = 5.9 mmol/L (Ref range: 3.4 - 5.3 mmol/L) on admission, will monitor as appropriate  # Hyponatremia: Na = 128 mmol/L (Ref range: 133 - 144 mmol/L) on admission, will monitor as appropriate       # Coagulation Defect: home medication list includes an anticoagulant medication    # Obesity: Estimated body mass index is 30 kg/m  as calculated from the following:    Height as of 5/24/22: 1.565 m (5' 1.61\").    Weight as of this encounter: 73.5 kg (162 lb).      Disposition Plan   Expected Discharge:  pending   Anticipated discharge location:  Awaiting care coordination huddle  Delays:     diuresis         The patient's care was discussed with the Bedside Nurse, Patient and cardiology Consultant.    Ivana Chino DO  Hospitalist Service  [facility]  Securely message with the Vocera Web Console (learn more here)  Text page via [facility] Paging/Directory         ______________________________________________________________________    Chief Complaint   Abdominal distention, edema, dyspnea     History is obtained from the patient    History of Present Illness   Leandra Guerrero is a 71 year old female who presents with dyspnea and volume overload. She has a history of AL amyloidosis/multiple myeloma for which she received chemotherapy in 2016 and tolerated it well. Unfortunately with recurrence and started on CyBorD with " first day on 5/24 and second round on 6/1. She reports her heart failure was previously stable and well controlled until she started chemotherapy and then began feeling more dyspnea, fatigue and volume overload. She took metolazone (which she usually take every 3 days) today in addition to her torsemide 150mg BID and potassium 80 mEq TID, but reports despite this she has made minimal urine output today. She reports abdominal pain and distention, LE edema, dyspnea on exertion, and fatigue. She has 3-pillow orthopnea but this is her baseline. She reports her dry weight to be ~150-152lbs, today weights 162 lbs.   She denies HA, fevers, chills, cough, chest pain, nausea, vomiting, diarrhea or constipation, dysuria, only decreased urination.     In the ED, found to have hyperkalemia and ERIC, received calcium gluconate, insulin + glucose, and lasix 80mg IVx1. Of note, had minimal urine output after lasix.     Review of Systems    The 10 point Review of Systems is negative other than noted in the HPI or here.     Past Medical History    I have reviewed this patient's medical history and updated it with pertinent information if needed.   Past Medical History:   Diagnosis Date     AL amyloidosis (H)      Atrial fibrillation and flutter (H)      Cardiac amyloidosis (H)      Lymphedema      QT prolongation      Recurrent right pleural effusion 1/2/2017     SVT (supraventricular tachycardia) (H)        Past Surgical History   I have reviewed this patient's surgical history and updated it with pertinent information if needed.  Past Surgical History:   Procedure Laterality Date     EXCISE GANGLION WRIST Left 5/11/2022    Procedure: LEFT VOLAR WRIST GANGLION EXCISION;  Surgeon: Tato Diaz MD;  Location:  OR       Social History   I have reviewed this patient's social history and updated it with pertinent information if needed.  Social History     Tobacco Use     Smoking status: Never Smoker     Smokeless tobacco: Never  Used   Substance Use Topics     Alcohol use: No     Drug use: No       Family History   I have reviewed this patient's family history and updated it with pertinent information if needed.  Family History   Problem Relation Age of Onset     Other - See Comments Sister         Amyloidosis       Prior to Admission Medications   Prior to Admission Medications   Prescriptions Last Dose Informant Patient Reported? Taking?   BIOTIN PO  Self Yes No   Sig: Take 2 tablets by mouth daily    Patient not taking: Reported on 6/1/2022   Cholecalciferol (VITAMIN D3 PO)  Self Yes No   Sig: Take by mouth daily Dose unknown   acetaminophen (TYLENOL) 325 MG tablet  Self Yes No   Sig: Take 325 mg by mouth every 6 hours as needed for mild pain or fever    acyclovir (ZOVIRAX) 400 MG tablet   No No   Sig: Take 1 tablet (400 mg) by mouth 2 times daily Viral Prophylaxis.   albuterol (PROAIR HFA/PROVENTIL HFA/VENTOLIN HFA) 108 (90 Base) MCG/ACT inhaler  Self Yes No   Sig: Inhale 2 puffs into the lungs every 6 hours as needed for shortness of breath / dyspnea or wheezing   Patient not taking: Reported on 6/1/2022   allopurinol (ZYLOPRIM) 300 MG tablet   No No   Sig: Take 1 tablet (300 mg) by mouth daily for 28 days   apixaban ANTICOAGULANT (ELIQUIS ANTICOAGULANT) 2.5 MG tablet  Self No No   Sig: Take 1 tablet (2.5 mg) by mouth 2 times daily   camphor-menthol (DERMASARRA) 0.5-0.5 % external lotion  Self No No   Sig: Apply 1 mL topically every 6 hours as needed for skin care   clobetasol (TEMOVATE) 0.05 % external solution   No No   Sig: Apply to the scalp nightly for 3 months   cyclophosphamide (CYTOXAN) 50 MG capsule   No No   Sig: Take 10 capsules (500 mg) by mouth every 7 days Take on days 1, 8, 15, and 22.   diphenhydrAMINE (BENADRYL) 50 MG capsule   No No   Sig: Take 1 tablet 1 hour prior to CT scan contrast.   Patient not taking: Reported on 6/1/2022   doxycycline hyclate (VIBRAMYCIN) 100 MG capsule   Yes No   eplerenone (INSPRA) 25 MG  tablet   No No   Sig: Take 1 tablet (25 mg) by mouth 2 times daily   methylPREDNISolone (MEDROL) 32 MG tablet   No No   Sig: Take 1 tablet by mouth 12 hours and 1 hour prior to CT scan contrast.   metolazone (ZAROXOLYN) 2.5 MG tablet  Self No No   Sig: Take 1 tablet (2.5 mg) by mouth twice a week As needed for increased swelling and weight gain of 3 lb in one day or 5 lb in one week   nitroGLYcerin (NITROSTAT) 0.4 MG sublingual tablet   No No   Sig: For chest pain. Place 1 tablet under the tongue every 5 minutes. If symptoms persist after 2 doses call 911.   Patient not taking: Reported on 6/1/2022   ondansetron (ZOFRAN) 8 MG tablet   No No   Sig: Take 1 tablet (8 mg) by mouth every 8 hours as needed for nausea   ondansetron (ZOFRAN-ODT) 8 MG ODT tab  Self Yes No   Sig: Take 8 mg by mouth every 8 hours as needed PRN   potassium chloride ER (KLOR-CON M) 10 MEQ CR tablet   No No   Sig: Take 6 tablets (60 mEq) by mouth 3 times daily Take an extra 60 meq of Potassium the day of Metolazone and 60 meq extra the day after a Metolazone   prochlorperazine (COMPAZINE) 10 MG tablet   No No   Sig: Take 1 tablet (10 mg) by mouth every 6 hours as needed for nausea or vomiting   sertraline (ZOLOFT) 25 MG tablet   Yes No   Sig: Take 25 mg by mouth   torsemide (DEMADEX) 100 MG tablet  Self No No   Sig: Take 1.5 tablets (150 mg) by mouth every morning AND 1.5 tablets (150 mg) daily at 2 pm.   triamcinolone (KENALOG) 0.1 % external cream  Self No No   Sig: Apply a thin layer twice daily to itchy areas as needed.   Patient not taking: Reported on 6/1/2022      Facility-Administered Medications Last Administration Doses Remaining   methylPREDNISolone (DEPO-MEDROL) injection 40 mg 3/28/2022 12:23 PM         Allergies   Allergies   Allergen Reactions     Contrast Dye Shortness Of Breath     Shaking,chills and dypsnea     Cytoxan [Cyclophosphamide]      Hemorrhagic cystitis     Gabapentin      Slurred speech, weakness     Levofloxacin       Severe shaking and abdominal pain     Aaron Weed      Rash and itchyness     Amoxicillin Nausea and Vomiting and Cramps     Bumetanide Unknown     Spironolactone      Worsening hyponatremia, palpitations     Chlorhexidine Dermatitis and Rash     AARON wipes  2% chlorhexidine gluconate   Rash to everywhere wipe was applied        Physical Exam   Vital Signs: Temp: 97.9  F (36.6  C) Temp src: Oral BP: (!) 146/74     Resp: 18 SpO2: 98 %      Weight: 162 lbs 0 oz    Constitutional: awake, alert, cooperative, no apparent distress, appears fatigued  Eyes: pupils equal, round and reactive to light, extra ocular muscles intact, sclera clear, conjunctiva normal  ENT: normocepalic, without obvious abnormality  Hematologic / Lymphatic: no cervical lymphadenopathy and no supraclavicular lymphadenopathy  Respiratory: No increased work of breathing, diminished breath sounds RLL > RUL > SUZY and LLL, no crackles or wheezing   Cardiovascular:  Regular rate and rhythm, normal S1 and S2, no S3 or S4, and no murmur noted, 4+ b/l LE edema, +JVD    GI: Normal bowel sounds, soft, distended, diffusely tender to palpation without rebound or guarding   Skin: no redness, warmth, or swelling, no rashes, no lesions and no jaundice  Neurologic: Awake, alert, oriented to name, place and time.      Data   Data reviewed today: I reviewed all medications, new labs and imaging results over the last 24 hours. I personally reviewed the EKG tracing showing low voltage, NSR  and the chest x-ray image(s) showing moderate R pleural effusion .    Recent Labs   Lab 06/02/22  2210 06/02/22  2207 06/02/22  2045 06/02/22 2009 06/02/22  1847 06/01/22  1111   WBC  --   --   --   --  9.0 5.3   HGB  --   --   --   --  11.9 11.7   MCV  --   --   --   --  86 90   PLT  --   --   --   --  226 239   NA  --  130*  --   --  128* 132*   POTASSIUM  --  5.3  5.3  --   --  5.9* 4.3   CHLORIDE  --  96  --   --  95 97   CO2  --  25  --   --  23 29   BUN  --  51*  --   --  49*  36*   CR  --  1.72*  --   --  1.59* 1.22*   ANIONGAP  --  9  --   --  10 6   JERROD  --  9.6  --   --  9.6 9.5   * 118* 262*   < > 138* 116*   ALBUMIN  --   --   --   --  3.7  --    PROTTOTAL  --   --   --   --  7.4  --    BILITOTAL  --   --   --   --  1.2  --    ALKPHOS  --   --   --   --  218*  --    ALT  --   --   --   --  73*  --    AST  --   --   --   --  54*  --     < > = values in this interval not displayed.     Recent Results (from the past 24 hour(s))   XR Chest Port 1 View    Impression    RESIDENT PRELIMINARY INTERPRETATION  IMPRESSION: Moderate right pleural effusion with associated  atelectasis.

## 2022-06-04 LAB
ALBUMIN SERPL-MCNC: 3.1 G/DL (ref 3.4–5)
ALP SERPL-CCNC: 198 U/L (ref 40–150)
ALT SERPL W P-5'-P-CCNC: 54 U/L (ref 0–50)
ANION GAP SERPL CALCULATED.3IONS-SCNC: 9 MMOL/L (ref 3–14)
ANION GAP SERPL CALCULATED.3IONS-SCNC: 9 MMOL/L (ref 3–14)
AST SERPL W P-5'-P-CCNC: 32 U/L (ref 0–45)
BACTERIA UR CULT: NORMAL
BASOPHILS # BLD AUTO: 0.1 10E3/UL (ref 0–0.2)
BASOPHILS NFR BLD AUTO: 1 %
BILIRUB DIRECT SERPL-MCNC: 0.4 MG/DL (ref 0–0.2)
BILIRUB SERPL-MCNC: 1.3 MG/DL (ref 0.2–1.3)
BUN SERPL-MCNC: 34 MG/DL (ref 7–30)
BUN SERPL-MCNC: 42 MG/DL (ref 7–30)
CALCIUM SERPL-MCNC: 8.7 MG/DL (ref 8.5–10.1)
CALCIUM SERPL-MCNC: 9 MG/DL (ref 8.5–10.1)
CHLORIDE BLD-SCNC: 96 MMOL/L (ref 94–109)
CHLORIDE BLD-SCNC: 98 MMOL/L (ref 94–109)
CO2 SERPL-SCNC: 29 MMOL/L (ref 20–32)
CO2 SERPL-SCNC: 30 MMOL/L (ref 20–32)
CREAT SERPL-MCNC: 1.24 MG/DL (ref 0.52–1.04)
CREAT SERPL-MCNC: 1.29 MG/DL (ref 0.52–1.04)
EOSINOPHIL # BLD AUTO: 0.4 10E3/UL (ref 0–0.7)
EOSINOPHIL NFR BLD AUTO: 10 %
ERYTHROCYTE [DISTWIDTH] IN BLOOD BY AUTOMATED COUNT: 18.3 % (ref 10–15)
GFR SERPL CREATININE-BSD FRML MDRD: 45 ML/MIN/1.73M2
GFR SERPL CREATININE-BSD FRML MDRD: 47 ML/MIN/1.73M2
GLUCOSE BLD-MCNC: 115 MG/DL (ref 70–99)
GLUCOSE BLD-MCNC: 118 MG/DL (ref 70–99)
HCT VFR BLD AUTO: 36.5 % (ref 35–47)
HGB BLD-MCNC: 11.5 G/DL (ref 11.7–15.7)
IMM GRANULOCYTES # BLD: 0 10E3/UL
IMM GRANULOCYTES NFR BLD: 1 %
LYMPHOCYTES # BLD AUTO: 0.2 10E3/UL (ref 0.8–5.3)
LYMPHOCYTES NFR BLD AUTO: 4 %
MAGNESIUM SERPL-MCNC: 2.3 MG/DL (ref 1.6–2.3)
MCH RBC QN AUTO: 28.4 PG (ref 26.5–33)
MCHC RBC AUTO-ENTMCNC: 31.5 G/DL (ref 31.5–36.5)
MCV RBC AUTO: 90 FL (ref 78–100)
MONOCYTES # BLD AUTO: 0.3 10E3/UL (ref 0–1.3)
MONOCYTES NFR BLD AUTO: 7 %
NEUTROPHILS # BLD AUTO: 3.3 10E3/UL (ref 1.6–8.3)
NEUTROPHILS NFR BLD AUTO: 77 %
NRBC # BLD AUTO: 0 10E3/UL
NRBC BLD AUTO-RTO: 1 /100
PHOSPHATE SERPL-MCNC: 4.2 MG/DL (ref 2.5–4.5)
PLATELET # BLD AUTO: 189 10E3/UL (ref 150–450)
POTASSIUM BLD-SCNC: 2.6 MMOL/L (ref 3.4–5.3)
POTASSIUM BLD-SCNC: 2.8 MMOL/L (ref 3.4–5.3)
POTASSIUM BLD-SCNC: 2.8 MMOL/L (ref 3.4–5.3)
POTASSIUM BLD-SCNC: 3.1 MMOL/L (ref 3.4–5.3)
PROT SERPL-MCNC: 6.7 G/DL (ref 6.8–8.8)
RBC # BLD AUTO: 4.05 10E6/UL (ref 3.8–5.2)
SODIUM SERPL-SCNC: 134 MMOL/L (ref 133–144)
SODIUM SERPL-SCNC: 137 MMOL/L (ref 133–144)
WBC # BLD AUTO: 4.2 10E3/UL (ref 4–11)

## 2022-06-04 PROCEDURE — 84132 ASSAY OF SERUM POTASSIUM: CPT | Performed by: STUDENT IN AN ORGANIZED HEALTH CARE EDUCATION/TRAINING PROGRAM

## 2022-06-04 PROCEDURE — 99233 SBSQ HOSP IP/OBS HIGH 50: CPT | Performed by: STUDENT IN AN ORGANIZED HEALTH CARE EDUCATION/TRAINING PROGRAM

## 2022-06-04 PROCEDURE — 258N000003 HC RX IP 258 OP 636: Performed by: STUDENT IN AN ORGANIZED HEALTH CARE EDUCATION/TRAINING PROGRAM

## 2022-06-04 PROCEDURE — 83735 ASSAY OF MAGNESIUM: CPT | Performed by: PHYSICIAN ASSISTANT

## 2022-06-04 PROCEDURE — 250N000013 HC RX MED GY IP 250 OP 250 PS 637: Performed by: STUDENT IN AN ORGANIZED HEALTH CARE EDUCATION/TRAINING PROGRAM

## 2022-06-04 PROCEDURE — 250N000013 HC RX MED GY IP 250 OP 250 PS 637: Performed by: INTERNAL MEDICINE

## 2022-06-04 PROCEDURE — 99231 SBSQ HOSP IP/OBS SF/LOW 25: CPT | Mod: GC | Performed by: INTERNAL MEDICINE

## 2022-06-04 PROCEDURE — 258N000003 HC RX IP 258 OP 636: Performed by: HOSPITALIST

## 2022-06-04 PROCEDURE — 84100 ASSAY OF PHOSPHORUS: CPT | Performed by: PHYSICIAN ASSISTANT

## 2022-06-04 PROCEDURE — 84132 ASSAY OF SERUM POTASSIUM: CPT | Performed by: PHYSICIAN ASSISTANT

## 2022-06-04 PROCEDURE — 84132 ASSAY OF SERUM POTASSIUM: CPT | Performed by: INTERNAL MEDICINE

## 2022-06-04 PROCEDURE — 120N000002 HC R&B MED SURG/OB UMMC

## 2022-06-04 PROCEDURE — 36415 COLL VENOUS BLD VENIPUNCTURE: CPT | Performed by: INTERNAL MEDICINE

## 2022-06-04 PROCEDURE — 36415 COLL VENOUS BLD VENIPUNCTURE: CPT | Performed by: PHYSICIAN ASSISTANT

## 2022-06-04 PROCEDURE — 250N000011 HC RX IP 250 OP 636: Performed by: HOSPITALIST

## 2022-06-04 PROCEDURE — 82248 BILIRUBIN DIRECT: CPT | Performed by: STUDENT IN AN ORGANIZED HEALTH CARE EDUCATION/TRAINING PROGRAM

## 2022-06-04 PROCEDURE — 250N000011 HC RX IP 250 OP 636: Performed by: STUDENT IN AN ORGANIZED HEALTH CARE EDUCATION/TRAINING PROGRAM

## 2022-06-04 PROCEDURE — 82310 ASSAY OF CALCIUM: CPT | Performed by: PHYSICIAN ASSISTANT

## 2022-06-04 PROCEDURE — 36415 COLL VENOUS BLD VENIPUNCTURE: CPT | Performed by: STUDENT IN AN ORGANIZED HEALTH CARE EDUCATION/TRAINING PROGRAM

## 2022-06-04 PROCEDURE — 85025 COMPLETE CBC W/AUTO DIFF WBC: CPT | Performed by: PHYSICIAN ASSISTANT

## 2022-06-04 RX ORDER — POTASSIUM CHLORIDE 750 MG/1
40 TABLET, EXTENDED RELEASE ORAL ONCE
Status: COMPLETED | OUTPATIENT
Start: 2022-06-04 | End: 2022-06-04

## 2022-06-04 RX ORDER — POTASSIUM CHLORIDE 1500 MG/1
60 TABLET, EXTENDED RELEASE ORAL 2 TIMES DAILY
Status: DISCONTINUED | OUTPATIENT
Start: 2022-06-04 | End: 2022-06-06 | Stop reason: HOSPADM

## 2022-06-04 RX ORDER — EPLERENONE 25 MG/1
25 TABLET, FILM COATED ORAL 2 TIMES DAILY
Status: DISCONTINUED | OUTPATIENT
Start: 2022-06-05 | End: 2022-06-06 | Stop reason: HOSPADM

## 2022-06-04 RX ORDER — POTASSIUM CHLORIDE 750 MG/1
40 TABLET, EXTENDED RELEASE ORAL DAILY
Status: DISCONTINUED | OUTPATIENT
Start: 2022-06-04 | End: 2022-06-04

## 2022-06-04 RX ADMIN — APIXABAN 2.5 MG: 2.5 TABLET, FILM COATED ORAL at 19:52

## 2022-06-04 RX ADMIN — ACYCLOVIR 400 MG: 400 TABLET ORAL at 19:52

## 2022-06-04 RX ADMIN — FUROSEMIDE 20 MG/HR: 10 INJECTION, SOLUTION INTRAVENOUS at 12:24

## 2022-06-04 RX ADMIN — POTASSIUM CHLORIDE 40 MEQ: 750 TABLET, EXTENDED RELEASE ORAL at 10:33

## 2022-06-04 RX ADMIN — POTASSIUM CHLORIDE 40 MEQ: 750 TABLET, EXTENDED RELEASE ORAL at 02:25

## 2022-06-04 RX ADMIN — ALLOPURINOL 300 MG: 300 TABLET ORAL at 08:31

## 2022-06-04 RX ADMIN — POTASSIUM CHLORIDE 60 MEQ: 1500 TABLET, EXTENDED RELEASE ORAL at 19:52

## 2022-06-04 RX ADMIN — ACYCLOVIR 400 MG: 400 TABLET ORAL at 08:31

## 2022-06-04 RX ADMIN — FUROSEMIDE 20 MG/HR: 10 INJECTION, SOLUTION INTRAVENOUS at 17:44

## 2022-06-04 RX ADMIN — ACETAMINOPHEN 650 MG: 325 TABLET, FILM COATED ORAL at 12:34

## 2022-06-04 RX ADMIN — POTASSIUM CHLORIDE 40 MEQ: 750 TABLET, EXTENDED RELEASE ORAL at 08:31

## 2022-06-04 RX ADMIN — SERTRALINE HYDROCHLORIDE 25 MG: 25 TABLET ORAL at 08:31

## 2022-06-04 RX ADMIN — APIXABAN 2.5 MG: 2.5 TABLET, FILM COATED ORAL at 08:31

## 2022-06-04 RX ADMIN — FUROSEMIDE 20 MG/HR: 10 INJECTION, SOLUTION INTRAVENOUS at 05:55

## 2022-06-04 RX ADMIN — POTASSIUM CHLORIDE 40 MEQ: 750 TABLET, EXTENDED RELEASE ORAL at 14:56

## 2022-06-04 ASSESSMENT — ACTIVITIES OF DAILY LIVING (ADL)
ADLS_ACUITY_SCORE: 22
WALKING_OR_CLIMBING_STAIRS_DIFFICULTY: YES
WALKING_OR_CLIMBING_STAIRS: AMBULATION DIFFICULTY, REQUIRES EQUIPMENT
ADLS_ACUITY_SCORE: 22
CHANGE_IN_FUNCTIONAL_STATUS_SINCE_ONSET_OF_CURRENT_ILLNESS/INJURY: NO
ADLS_ACUITY_SCORE: 35
ADLS_ACUITY_SCORE: 35
CONCENTRATING,_REMEMBERING_OR_MAKING_DECISIONS_DIFFICULTY: NO
EQUIPMENT_CURRENTLY_USED_AT_HOME: CANE, STRAIGHT
TOILETING_ISSUES: NO
DRESSING/BATHING_DIFFICULTY: NO
ADLS_ACUITY_SCORE: 35
ADLS_ACUITY_SCORE: 35
VISION_MANAGEMENT: GLASSES
ADLS_ACUITY_SCORE: 35
ADLS_ACUITY_SCORE: 35
DOING_ERRANDS_INDEPENDENTLY_DIFFICULTY: YES
FALL_HISTORY_WITHIN_LAST_SIX_MONTHS: NO
DIFFICULTY_EATING/SWALLOWING: NO
ADLS_ACUITY_SCORE: 22
TRANSFERRING: 0-->INDEPENDENT
ADLS_ACUITY_SCORE: 35
WEAR_GLASSES_OR_BLIND: YES
ADLS_ACUITY_SCORE: 35
ADLS_ACUITY_SCORE: 22
TRANSFERRING: 0-->INDEPENDENT

## 2022-06-04 NOTE — PROGRESS NOTES
Alert ant oriented x 4. Denies pain. Bilateral LE edematous. On lasix drip at 20 ml/hr. Voiding good amounts. Has not passed stool since the shift. Up independently to the BSC. Potassium was 2.8 and was replaced, redraw at 0630.

## 2022-06-04 NOTE — PROGRESS NOTES
Up to commode per self with no difficulties. Large amount of water loose stool. Need sample sent to lab r/o cdiff. Potassium replaced. Recheck at midnight. Lower extremity edema. Lymphedema Consult ordered.

## 2022-06-04 NOTE — PROGRESS NOTES
[facility]    Cardiology Consult Note       Date of Admission:  6/2/2022  Consult Requested by: BMT  Reason for Consult: Heart failure    Assessment & Plan: HVSL   Leandra Guerrero is a 71 year old female admitted on 6/2/2022. She has medical history of AL amyloidosis/multiple myeloma with recent recurrence on chemotherapy, chronic diastolic/restrictive heart failure 2/2 amyloidosis, Afib/flutter on apixaban, and CKD3 who is admitted for heart failure exacerbation in the setting of recent chemo administration.    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 Given the timing of this exacerbation, the presumed trigger is related to her recent chemotherapy. Has had good urine output on furosemide gtt. Still with JVP elevated to 8cm. Creatine has improved. Dry weight is likely around 153lbs.   - continue lasix gtt, can stop tonight at 8pm  - restart oral diuretic in AM with home dose 150mg torsemide bid   - would monitor on oral regimen for 24hrs prior to discharge to ensure appropriate response  - will likely continue to need metolazone on prn basis  - close follow up as outpatient [facility] clinic    Atrial fibrillation/flutter  - Continue apixaban  - Avoiding BB in patient with amyloid    Hypokalemia  With ongoing aggressive diuresis will need aggressive potassium repletion  - schedule 60mg bid  - continue repletion protocol    The patient was staffed with attending cardiologist, Dr. Morales.    Zak Jara MD   Cardiology Fellow, PGY-5    ______________________________________________________________________    Interval History  Patient with good urine output on furosemide gtt. Renal function improving. Markedly hypokalemic. Patient feels much improved.     Intake/Output Summary (Last 24  hours) at 6/4/2022 1503  Last data filed at 6/4/2022 1243  Gross per 24 hour   Intake 1477 ml   Output 5250 ml   Net -3773 ml       Physical Exam  Gen: no acute distress  Neck: JVP 8cm, carotid bruit appreciated  CV: nl s1, s2 no murmur appreciated, no rubs or gallops, PMI not displaced  Lungs: CTAB, no rales, no wheezing appreciated  Abd: + BS, soft, NT/ND  Ext: Non-pitting LE edema, warm well perfused    Current Medications    acyclovir  400 mg Oral BID     allopurinol  300 mg Oral Daily     apixaban ANTICOAGULANT  2.5 mg Oral BID     [START ON 6/5/2022] eplerenone  25 mg Oral BID     methylPREDNISolone  40 mg       potassium chloride  60 mEq Oral BID     sertraline  25 mg Oral Daily     sodium chloride (PF)  3 mL Intracatheter Q8H     [START ON 6/5/2022] torsemide  150 mg Oral BID       Diagnostics  CBC  Recent Labs   Lab 06/04/22  0643 06/03/22  0903 06/02/22  1847 06/01/22  1111   WBC 4.2 6.4 9.0 5.3   RBC 4.05 3.97 4.19 4.11   HGB 11.5* 11.4* 11.9 11.7   HCT 36.5 36.9 36.1 36.9   MCV 90 93 86 90   MCH 28.4 28.7 28.4 28.5   MCHC 31.5 30.9* 33.0 31.7   RDW 18.3* 18.3* 18.1* 17.5*    207 226 239     BMP  Recent Labs   Lab 06/04/22  1252 06/04/22  0643 06/04/22  0030 06/03/22  1818 06/03/22  0903 06/03/22  0329   NA  --  137  --  136 133 132*   POTASSIUM 2.8* 2.6* 2.8* 2.8* 4.3 4.9   CHLORIDE  --  98  --  99 100 98   CO2  --  30  --  26 23 27   ANIONGAP  --  9  --  11 10 7   GLC  --  115*  --  122* 104* 135*   BUN  --  42*  --  49* 55* 54*   CR  --  1.29*  --  1.58* 1.73* 1.79*   GFRESTIMATED  --  45*  --  35* 31* 30*   JERROD  --  9.0  --  9.0 9.6 9.6   MAG  --  2.3  --   --  2.6*  --    PHOS  --  4.2  --   --  4.8*  --       INRNo lab results found in last 7 days.  Liver panel  Recent Labs   Lab 06/04/22  0643 06/03/22  0903 06/02/22  1847   PROTTOTAL 6.7* 6.7* 7.4   ALBUMIN 3.1* 3.3* 3.7   BILITOTAL 1.3 1.0 1.2   ALKPHOS 198* 192* 218*   AST 32 37 54*   ALT 54* 62* 73*     Troponin:   Lab Results    Component Value Date    TROPI <0.015 04/17/2019    TROPI <0.015 06/07/2018    TROPI <0.015 02/22/2018    TROPI <0.015 11/16/2017    TROPI <0.015 08/17/2017    TROPONIN 0.018 11/12/2021    TROPONIN <0.015 11/12/2021    TROPONIN <0.015 11/02/2021    TROPONIN <0.015 10/31/2021     I very much appreciated the opportunity to see and assess Leandra Guerrero in the hospital with CV Fellow Dr Jara. I agree with the note above which summarizes my findings and current recommendations.  Please do not hesitate to contact my office if you have any questions or concerns.      Bryant Morales MD  Cardiac Arrhythmia Service  [facility]  [phone]

## 2022-06-04 NOTE — PROGRESS NOTES
Shift:  [id number]    VS:  VSS  Pain:  Denies  Neuro:  A/Ox4  Cardiac:  WDL  Respiratory:  WDL  Diet/Appetite:  Regular diet, good appetite  GI/:  Voiding spontaneously, frequently, d/t lasix infusion.  No BM this shift.  LDAs:  PIV infusing  Skin:  Dry, flaky, some bruising, but otherwise intact.  Lower extremity edema awaiting lymphedema consult.  Activity:  Up ad philly in room.  Test/Procedures: N/A  Labs:  Potassium: 2.6, re-check 2.8 replaced per RN managed protocol.  Primary team notified, BID potassium added.    Report called to [facility] on 7C, transport requested.

## 2022-06-05 ENCOUNTER — APPOINTMENT (OUTPATIENT)
Dept: ULTRASOUND IMAGING | Facility: CLINIC | Age: 72
DRG: 292 | End: 2022-06-05
Payer: COMMERCIAL

## 2022-06-05 ENCOUNTER — APPOINTMENT (OUTPATIENT)
Dept: OCCUPATIONAL THERAPY | Facility: CLINIC | Age: 72
DRG: 292 | End: 2022-06-05
Attending: PHYSICIAN ASSISTANT
Payer: COMMERCIAL

## 2022-06-05 LAB
ALBUMIN SERPL-MCNC: 3.1 G/DL (ref 3.4–5)
ALP SERPL-CCNC: 191 U/L (ref 40–150)
ALT SERPL W P-5'-P-CCNC: 42 U/L (ref 0–50)
ANION GAP SERPL CALCULATED.3IONS-SCNC: 5 MMOL/L (ref 3–14)
ANION GAP SERPL CALCULATED.3IONS-SCNC: 8 MMOL/L (ref 3–14)
AST SERPL W P-5'-P-CCNC: 22 U/L (ref 0–45)
BASOPHILS # BLD AUTO: 0 10E3/UL (ref 0–0.2)
BASOPHILS NFR BLD AUTO: 1 %
BILIRUB DIRECT SERPL-MCNC: 0.4 MG/DL (ref 0–0.2)
BILIRUB SERPL-MCNC: 1.1 MG/DL (ref 0.2–1.3)
BUN SERPL-MCNC: 28 MG/DL (ref 7–30)
BUN SERPL-MCNC: 31 MG/DL (ref 7–30)
CALCIUM SERPL-MCNC: 8.9 MG/DL (ref 8.5–10.1)
CALCIUM SERPL-MCNC: 9.2 MG/DL (ref 8.5–10.1)
CHLORIDE BLD-SCNC: 100 MMOL/L (ref 94–109)
CHLORIDE BLD-SCNC: 98 MMOL/L (ref 94–109)
CO2 SERPL-SCNC: 28 MMOL/L (ref 20–32)
CO2 SERPL-SCNC: 30 MMOL/L (ref 20–32)
CREAT SERPL-MCNC: 1 MG/DL (ref 0.52–1.04)
CREAT SERPL-MCNC: 1.12 MG/DL (ref 0.52–1.04)
EOSINOPHIL # BLD AUTO: 0.5 10E3/UL (ref 0–0.7)
EOSINOPHIL NFR BLD AUTO: 13 %
ERYTHROCYTE [DISTWIDTH] IN BLOOD BY AUTOMATED COUNT: 18.1 % (ref 10–15)
GFR SERPL CREATININE-BSD FRML MDRD: 53 ML/MIN/1.73M2
GFR SERPL CREATININE-BSD FRML MDRD: 61 ML/MIN/1.73M2
GLUCOSE BLD-MCNC: 102 MG/DL (ref 70–99)
GLUCOSE BLD-MCNC: 96 MG/DL (ref 70–99)
HCT VFR BLD AUTO: 34.5 % (ref 35–47)
HGB BLD-MCNC: 11.3 G/DL (ref 11.7–15.7)
IMM GRANULOCYTES # BLD: 0 10E3/UL
IMM GRANULOCYTES NFR BLD: 1 %
LYMPHOCYTES # BLD AUTO: 0.2 10E3/UL (ref 0.8–5.3)
LYMPHOCYTES NFR BLD AUTO: 5 %
MAGNESIUM SERPL-MCNC: 2.3 MG/DL (ref 1.6–2.3)
MCH RBC QN AUTO: 28.8 PG (ref 26.5–33)
MCHC RBC AUTO-ENTMCNC: 32.8 G/DL (ref 31.5–36.5)
MCV RBC AUTO: 88 FL (ref 78–100)
MONOCYTES # BLD AUTO: 0.3 10E3/UL (ref 0–1.3)
MONOCYTES NFR BLD AUTO: 9 %
NEUTROPHILS # BLD AUTO: 2.7 10E3/UL (ref 1.6–8.3)
NEUTROPHILS NFR BLD AUTO: 71 %
NRBC # BLD AUTO: 0 10E3/UL
NRBC BLD AUTO-RTO: 1 /100
PHOSPHATE SERPL-MCNC: 2.6 MG/DL (ref 2.5–4.5)
PLATELET # BLD AUTO: 177 10E3/UL (ref 150–450)
POTASSIUM BLD-SCNC: 3.4 MMOL/L (ref 3.4–5.3)
POTASSIUM BLD-SCNC: 4.2 MMOL/L (ref 3.4–5.3)
POTASSIUM BLD-SCNC: 4.2 MMOL/L (ref 3.4–5.3)
PROT SERPL-MCNC: 6.4 G/DL (ref 6.8–8.8)
RBC # BLD AUTO: 3.93 10E6/UL (ref 3.8–5.2)
SODIUM SERPL-SCNC: 133 MMOL/L (ref 133–144)
SODIUM SERPL-SCNC: 136 MMOL/L (ref 133–144)
WBC # BLD AUTO: 3.8 10E3/UL (ref 4–11)

## 2022-06-05 PROCEDURE — 84100 ASSAY OF PHOSPHORUS: CPT | Performed by: PHYSICIAN ASSISTANT

## 2022-06-05 PROCEDURE — 120N000002 HC R&B MED SURG/OB UMMC

## 2022-06-05 PROCEDURE — 83735 ASSAY OF MAGNESIUM: CPT | Performed by: PHYSICIAN ASSISTANT

## 2022-06-05 PROCEDURE — 84132 ASSAY OF SERUM POTASSIUM: CPT | Performed by: STUDENT IN AN ORGANIZED HEALTH CARE EDUCATION/TRAINING PROGRAM

## 2022-06-05 PROCEDURE — 97140 MANUAL THERAPY 1/> REGIONS: CPT | Mod: GO

## 2022-06-05 PROCEDURE — 93971 EXTREMITY STUDY: CPT | Mod: LT

## 2022-06-05 PROCEDURE — 93971 EXTREMITY STUDY: CPT | Mod: 26 | Performed by: RADIOLOGY

## 2022-06-05 PROCEDURE — 250N000013 HC RX MED GY IP 250 OP 250 PS 637: Performed by: STUDENT IN AN ORGANIZED HEALTH CARE EDUCATION/TRAINING PROGRAM

## 2022-06-05 PROCEDURE — 250N000013 HC RX MED GY IP 250 OP 250 PS 637: Performed by: INTERNAL MEDICINE

## 2022-06-05 PROCEDURE — 36415 COLL VENOUS BLD VENIPUNCTURE: CPT | Performed by: PHYSICIAN ASSISTANT

## 2022-06-05 PROCEDURE — 82248 BILIRUBIN DIRECT: CPT | Performed by: STUDENT IN AN ORGANIZED HEALTH CARE EDUCATION/TRAINING PROGRAM

## 2022-06-05 PROCEDURE — 99233 SBSQ HOSP IP/OBS HIGH 50: CPT | Performed by: STUDENT IN AN ORGANIZED HEALTH CARE EDUCATION/TRAINING PROGRAM

## 2022-06-05 PROCEDURE — 85025 COMPLETE CBC W/AUTO DIFF WBC: CPT | Performed by: PHYSICIAN ASSISTANT

## 2022-06-05 PROCEDURE — 97165 OT EVAL LOW COMPLEX 30 MIN: CPT | Mod: GO

## 2022-06-05 PROCEDURE — 82310 ASSAY OF CALCIUM: CPT | Performed by: PHYSICIAN ASSISTANT

## 2022-06-05 RX ORDER — POTASSIUM CHLORIDE 1.5 G/1.58G
40 POWDER, FOR SOLUTION ORAL ONCE
Status: DISCONTINUED | OUTPATIENT
Start: 2022-06-05 | End: 2022-06-05

## 2022-06-05 RX ORDER — POTASSIUM CHLORIDE 750 MG/1
40 TABLET, EXTENDED RELEASE ORAL ONCE
Status: COMPLETED | OUTPATIENT
Start: 2022-06-05 | End: 2022-06-05

## 2022-06-05 RX ADMIN — TORSEMIDE 150 MG: 100 TABLET ORAL at 17:20

## 2022-06-05 RX ADMIN — EPLERENONE 25 MG: 25 TABLET, FILM COATED ORAL at 20:07

## 2022-06-05 RX ADMIN — SERTRALINE HYDROCHLORIDE 25 MG: 25 TABLET ORAL at 08:09

## 2022-06-05 RX ADMIN — POTASSIUM CHLORIDE 60 MEQ: 1500 TABLET, EXTENDED RELEASE ORAL at 20:07

## 2022-06-05 RX ADMIN — ACYCLOVIR 400 MG: 400 TABLET ORAL at 11:03

## 2022-06-05 RX ADMIN — POTASSIUM CHLORIDE 60 MEQ: 1500 TABLET, EXTENDED RELEASE ORAL at 08:09

## 2022-06-05 RX ADMIN — ALLOPURINOL 300 MG: 300 TABLET ORAL at 08:09

## 2022-06-05 RX ADMIN — EPLERENONE 25 MG: 25 TABLET, FILM COATED ORAL at 08:09

## 2022-06-05 RX ADMIN — TORSEMIDE 150 MG: 100 TABLET ORAL at 08:09

## 2022-06-05 RX ADMIN — APIXABAN 2.5 MG: 2.5 TABLET, FILM COATED ORAL at 08:09

## 2022-06-05 RX ADMIN — POTASSIUM CHLORIDE 40 MEQ: 750 TABLET, EXTENDED RELEASE ORAL at 11:55

## 2022-06-05 RX ADMIN — DOCUSATE SODIUM 50 MG AND SENNOSIDES 8.6 MG 2 TABLET: 8.6; 5 TABLET, FILM COATED ORAL at 20:16

## 2022-06-05 RX ADMIN — ACYCLOVIR 400 MG: 400 TABLET ORAL at 20:07

## 2022-06-05 RX ADMIN — APIXABAN 2.5 MG: 2.5 TABLET, FILM COATED ORAL at 20:07

## 2022-06-05 ASSESSMENT — ACTIVITIES OF DAILY LIVING (ADL)
ADLS_ACUITY_SCORE: 24
PREVIOUS_RESPONSIBILITIES: MEAL PREP;HOUSEKEEPING;LAUNDRY
ADLS_ACUITY_SCORE: 24

## 2022-06-05 NOTE — PLAN OF CARE
16:30-23:30    Goal Outcome Evaluation:    Plan of Care Reviewed With: patient     Overall Patient Progress: no change    Temp: 98.3  F (36.8  C) Temp src: Oral BP: 130/53 Pulse: 93   Resp: 16 SpO2: 98 % O2 Device: None (Room air)         Status: Admitted from ED at 16:30 for CHF exacerbation   History: recurrence of amyloidosis/multiple myeloma on chemo, hearth failure, Afib/flutter on apixaban    Neuro:  alert and oriented x 4  Access: PIV saline locked  Diet:  Regular diet with poor appetite ( only ate 25 % of meals for supper )  Comfort:  Denies nausea and denies pain  : Voiding and saving in a hat; Lasix drip discontinued at 21:00  GI: Last BM 6/2 per patient  Respiratory:  dyspneic with activity, lungs diminished in bases   Activity: Up with SBA, uses her own cane from home  Skin:  3 - 4 + edema on lower extremities  Labs: K 3.1, on scheduled replacement     Plan: Continue with strict I/Os. Lymphedema consult was ordered.  AM potassium.   Continue with plan of care.

## 2022-06-05 NOTE — PROVIDER NOTIFICATION
Admitted/transferred from: ED    2 RN   skin assessment completed by Suzi Davalos, RN and Sunni ARCINIEGA RN    Skin assessment finding:  -multiple scabs on legs and arms, patient said from scratching  -PIV x 1  -bruising on bilateral arms and abdomen  -4+ edema on lower legs  -no redness seen    Will continue to monitor.

## 2022-06-05 NOTE — PROGRESS NOTES
"   06/05/22 0934   Quick Adds   Quick Adds Edema   Type of Visit Initial Occupational Therapy Evaluation       Present no   Living Environment   People in Home spouse   Current Living Arrangements house   Home Accessibility no concerns   Transportation Anticipated family or friend will provide   Living Environment Comments Patient lives with spouse in a 4 story house, although all of her needs (bedroom, bathroom) are met on the main level. She still performs the cooking and cleaning on her \"good days\" and  is the primary .   Self-Care   Usual Activity Tolerance fair   Current Activity Tolerance fair   Regular Exercise No   Equipment Currently Used at Home cane, straight  (Also has a FWW and wheelchair to use PRN)   Fall history within last six months no   Activity/Exercise/Self-Care Comment She enjoys mowing the lawn and gardening when she feels good, although currently is more sedentary in her lifestyle due to medical needs.   Instrumental Activities of Daily Living (IADL)   Previous Responsibilities meal prep;housekeeping;laundry   General Information   Onset of Illness/Injury or Date of Surgery 06/03/22   Referring Physician Neela Holt, PA-C   Patient/Family Therapy Goal Statement (OT) To go home tomorrow   Additional Occupational Profile Info/Pertinent History of Current Problem \"Leandra Guerrero is a 71 year old female with a history of AL amyloidosis/multiple myeloma (recently restarted on CyBorD with fermin), chronic diastolic/restrictive heart failure 2/2 amyloidosis, afib/flutter on apixaban, CKD3 admitted on 6/2 for acute on chronic heart failure exacerbation.\"   Existing Precautions/Restrictions no known precautions/restrictions   Cognitive Status Examination   Orientation Status orientation to person, place and time   Visual Perception   Visual Impairment/Limitations corrective lenses full-time   Sensory   Sensory Quick Adds No deficits were identified   Pain " Assessment   Patient Currently in Pain No   Edema General Information   Onset of Edema 06/05/21   Affected Body Part(s) Left LE;Right LE   Edema Etiology Chemo  (CHF exacerbation)   Edema Examination/Assessment   Skin Condition Non-pitting;Venous distention;Dryness   Skin Condition Comments varicosis, dryness/flaking, pain in left lower extremity   Range of Motion Comprehensive   General Range of Motion no range of motion deficits identified   Strength Comprehensive (MMT)   General Manual Muscle Testing (MMT) Assessment   (Generalized deconditioning)   Coordination   Upper Extremity Coordination No deficits were identified   Transfers   Transfers toilet transfer   Toilet Transfer   Type (Toilet Transfer) sit-stand   [place] Level (Toilet Transfer) modified independence   Assistive Device (Toilet Transfer) cane, straight;grab bars/safety frame   Clinical Impression   Criteria for Skilled Therapeutic Interventions Met (OT) Yes, treatment indicated   OT Diagnosis Patient presented with medical edema, causing decreased independence in activities of daily living.   Edema: Patient Presentation Edema   OT Problem List-Impairments impacting ADL activity tolerance impaired;strength   Assessment of Occupational Performance 1-3 Performance Deficits   Identified Performance Deficits Home management, ambulation   Planned Therapy Interventions (OT) ADL retraining;IADL retraining;home program guidelines;progressive activity/exercise   Edema: Planned Interventions Gradient compression bandaging;Fit for compression garment;Edema exercises;Precautions to prevent infection/exacerbation;Education;Manual therapy   Clinical Decision Making Complexity (OT) low complexity   Risk & Benefits of therapy have been explained evaluation/treatment results reviewed   OT Discharge Planning   OT Discharge Recommendation (DC Rec) home with assist   OT Rationale for DC Rec Anticipate patient will discharge home with assistance from family PRN.    OT Brief overview of current status Gradient compression bandaging initiated to (R) lower extremity to assist with fluid movement and comfort. Patient is completing short distance ambulation and transfers in room with SBA and use of single end cane.   Total Evaluation Time (Minutes)   Total Evaluation Time (Minutes) 5   OT Goals   Therapy Frequency (OT) 5 times/wk   OT Predicted Duration/Target Date for Goal Attainment 06/11/22   OT Goals Aerobic Activity;Edema   OT: Perform aerobic activity with stable cardiovascular response 5 minutes;continuous activity;ambulation   OT: Edema education to increase ability to manage edema after discharge from the hospital Patient;Caregiver;Verbalize;Demonstrate;Harvel;Skin care routine;signs/symptoms of intolerance;wear schedule;limb positioning;garrnet/bandage care   OT: Management of edema bandages Patient;Caregiver;Verbalize;Demonstrate;Harvel;quick wrap;garment(s)   OT: Functional edema exercise program to reduce limb volume, increase activity tolerance and improve independence with ADL Patient;Caregiver;Verbalize;Demonstrates;Harvel;HEP

## 2022-06-05 NOTE — PLAN OF CARE
Vitals:    22 0834 22 1635 22 2226 22 0727   BP: 124/56 130/53 127/60 119/62   BP Location:  Left arm Left arm Left arm   Pulse: 89 93 87 75   Resp:  16 17   Temp:  98.3  F (36.8  C) 97.8  F (36.6  C) 96.9  F (36.1  C)   TempSrc:  Oral Oral Temporal   SpO2: 99% 98% 96% 96%   Weight:           Neuro: alert and oriented     VS:afebrile vital stable sating 96% on room air, non labor breathing,     B, K+ 3.4, replacement is done, recheck     Cardiac: HR 75    Pain/Nausea: denies any nausea, denies pain     Lines/Drains: PIV SL     Skin: bilateral lower edema, lymphedema wrap on right leg, US on left to rule out DVT      GI/: voiding adequate, audible BS,     Diet/Appetite: tolerating intake     Activity: up independently     Plan:  continue with plan of care.

## 2022-06-05 NOTE — PROGRESS NOTES
[facility]    Hematology / Oncology Progress Note    Patient: Leandra Guerrero  MRN: [id number]  Date of Service (when I saw the patient): 06/05/2022  Hospital Day # 3    Assessment:   Leandra Guerrero is a 71 year old female with a history of AL amyloidosis/multiple myeloma (recently restarted on CyBorD with fermin), chronic diastolic/restrictive heart failure 2/2 amyloidosis, afib/flutter on apixaban, CKD3 admitted on 6/2 for acute on chronic heart failure exacerbation.      Today:  - Obtain doppler ultrasound of left lower extremity to r/o DVT  - Cont on PTA home diuretics of  torsemide 150 mg BID and eplerenone 25 mg BID.   - Trend strict I/Os, daily weights.  - Hypokalemia improving. Trend BID for now. Per cardio, cont KCl 60 mEq BID. On standard replacement protocol.  - C diff testing for diarrhea. If negative, will order imodium.  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 Cohen and CORE team extensively outpatient. Most recent TTE (10/2021) with hyperkinetic EF of 65-70% and grade III or advanced diastolic dysfunction. PTA meds include torsemide 150mg BID, metolazone PRN (takes q3 days per patient), eplerenone BID, KCL 60-80 mEq TID. Dry weight of 150 lb (162 on admission). Patient presented on 6/2 with ~1  week history of increasing fatigue, volume overload and dyspnea, although these symptoms significantly worsened 1 day PTA with increased abdominal pain, lower extremity edema and minimal UOP. She states she urinated ~50ml every 4 hours yesterday. Has been compliant iwht all PTA meds. On presentation to the ED, patient noted to have an ERIC (Cr 1.59) and hyperkalemia (K 5.9) which was shifted. BNP elevated at 2723.   - Received 80 mg lasix IV with minimal output, followed by 120 mg IV with  ~200-300 ml out. She was subsequently started on a lasix gtt which was up-titrated to lasix 20 mg/hr with improved output (~1300 ml 0700 - 1330). Now with 5.6L output  - Cards HF consulted, appreciate assistance.                 - Repeat TTE 6/3/2022 showed EF 55-60%, severe concentric wall thickening with left ventricular hypertrophy, grade II diastolic dysfunction               - Per recs, discontinue lasix gtt and resume PTA diruetics of torsemide 150 mg BID and eplerenone 25 mg BID. Cont KCl 60 mEq BID and aggressive potassium replacement. Monitor UOP on home regimen for 24 hours prior to discharge.  - Monitor strict I/Os.  - Trend daily weights.      #Afib/Aflutter  - On apixaban 2.5 mg BID  - Per cardio, avoid beta blockers in patients with cardiac amyloidosis    # Hypokalemia  # ERIC on CKD3a   ERIC likely pre-renal 2/2 volume overload. K improving now.  - Continue diuresis as above   - Monitor K BID.  - Cont KCl 60 mEq BID and high K replacement. Discontinue standard dose.   - Hold PTA K supplements      HEME/ONC  # Recurrent AL amyloidosis/multiple myeloma  Follows with Dr. Wang. Initially diagnosed in 1/2016 when she present with fatigue. Due to progression of symptoms, was evaluated at [place] in late 2016 and diagnosed with stage IV lambda AL amyloidosis (involvement of GI/bone marrow, no involvement of kidney/liver). Underwent 3 cycles of CyBorD (completed 12/2016) with excellent response in serum light chains. Deemed not a transplant candidate due to cardiac involvement. Unfortunately, with worsening of free light chains 4/19/2022, repeat BMBx (5/10) obtained with evidence of recurrent/persistent plasma cell neoplasm. Patient was restarted on CyBorD + Daratumumab (C1D1 = 5/25/22). Received D8 treatment (subcutaneous daratumumab/bortezomib, dexamethasone 20 mg and PO cytoxan) on 6/3.  - Next due for treatment 6/7/22; will continue to assess/reschedule outpatient pending admission.     # Anemia   Likely  secondary to malignancy, chemotherapy. Do not expect any transfusion requirements this admission.  - Monitor CBC with diff daily.  - Transfuse for hgb <7.     #R/o DVT  Pain elicited on physical exam of left calf. Has bilateral lower extremity swelling at baseline. Will order doppler ultrasound of left leg to rule out DVT. Currently on apixaban 2.5 mg BID for history of afib/aflutter.     GI/FEN  # Abdominal pain  # Diarrhea  Abdominal pain likely secondary fluid overload with significant improvement today. New diarrhea with diuresis this morning. Many loose, watery stools. No fevers.  - C. Diff pending stool collection. May discontinue if not having further episodes of diarrhea.  - If c diff negative, can add imodium PRN.     # Elevated AST/ALT/ALP  Likely 2/2 volume overload. History of possible cirrhosis on abd ultrasound 11/3/21, however recheck on 3/6/22 with normal echogenicity. Improving LFTs as of 6/3. Will continue to monitor   - Trend CMP with diuresis   - Can consider repeat RUQ US if not improving      # Mild hyponatremia, resolved  Likely 2/2 volume overload.   - Monitor daily     MISC  # Asymptomatic pyuria   UA with 26 WBC, moderate leukocyte esterase, 2 squamous cells. Her only urinary symptom is decreased urination which is more likely 2/2 ERIC And volume overload as above. No dysuria. No s/sx of sepsis.   - Continue to monitor UCx; NGTD.   - Monitor for any symptoms. Hold off on antibiotics for now.       # Depression  - Continue PTA sertraline       Dispo: Admitted for volume overload; discharge pending diuretic plan.   Follow-Up:   - Labs/D15 CyBorD + fermin - 6/7  - CORE clinic visit - 6/10  - Labs/D22 CyBorD + fermin - 6/15  - Follow up with Dr. Wang - 6/22      Patient was seen and plan of care was discussed with attending physician Dr. Mayur Guevara.     Russell Fenton MD   Heme/Onc/Transplant Fellow  Pgr #[id number]    Subjective:  Nursing notes reviewed and noted. Afebrile overnight.  Hemodynamically stable. She still did not have any further episodes of diarrhea. Her left leg is more painful with palpation today. Prior to admission, she had lower extremity pain in bilateral legs and was evaluated previously for DVT which was negative. She has only been ambulating to the bathroom since admission. In terms of urine output, she states that her urine output has slowed down after stopping the lasix drip but she has not been drinking as much fluid so PO intake was encouraged.      Objective:  Physical Examination  Vital Signs:  Temp:  [96.9  F (36.1  C)-98.3  F (36.8  C)] 96.9  F (36.1  C)  Pulse:  [75-93] 75  Resp:  [16-17] 17  BP: (119-130)/(53-62) 119/62  SpO2:  [96 %-98 %] 96 %   I/O last 3 completed shifts:  In: 100 [P.O.:100]  Out: 3200 [Urine:3200]   Wt Readings from Last 5 Encounters:   06/02/22 73.5 kg (162 lb)   06/01/22 72.2 kg (159 lb 1.6 oz)   05/24/22 70.6 kg (155 lb 9.6 oz)   05/18/22 68.8 kg (151 lb 11.2 oz)   05/13/22 70.3 kg (155 lb)        Constitutional: Pleasant female seen laying in bed. No apparent distress, and appears stated age.  Eyes: Lids and lashes normal, sclera clear, conjunctiva normal.  ENT: Normocephalic, without obvious abnormality, atraumatic, oral pharynx with moist mucus membranes, tonsils without erythema or exudates, gums normal and good dentition.   Respiratory: No increased work of breathing, good air exchange, no crackles or wheezing. Decreased right lower lobe, otherwise CTAB.    Cardiovascular: Regular rate and rhythm, normal S1 and S2, and no murmur noted.   GI: No masses or scars. +BS. Soft. No tenderness on palpation.  Skin: No bruising or bleeding appreciated. Normal skin color, texture and turgor without redness, warmth, or swelling. No rashes, no lesions, no jaundice.  Extremities: There is no redness, warmth, or swelling of the joints. No cyanosis. 2+ pitting bilateral lower extremity edema with wrinkling of skin. Pain to palpation of left calf. No  pain in R calf.  Neurologic: Awake, alert, oriented to name, place and time.    Vascular access: NA    DATA:  Labs:    CMP  Recent Labs   Lab 06/05/22 0555 06/04/22 1756 06/04/22  1252 06/04/22 0643 06/04/22  0030 06/03/22 1818 06/03/22  0903 06/02/22  2207 06/02/22  1847    134  --  137  --  136 133   < > 128*   POTASSIUM 3.4 3.1* 2.8* 2.6*   < > 2.8* 4.3   < > 5.9*   CHLORIDE 100 96  --  98  --  99 100   < > 95   CO2 28 29  --  30  --  26 23   < > 23   ANIONGAP 8 9  --  9  --  11 10   < > 10   BUN 31* 34*  --  42*  --  49* 55*   < > 49*   CR 1.12* 1.24*  --  1.29*  --  1.58* 1.73*   < > 1.59*   GFRESTIMATED 53* 47*  --  45*  --  35* 31*   < > 35*   JERROD 9.2 8.7  --  9.0  --  9.0 9.6   < > 9.6   PROTTOTAL  --   --   --  6.7*  --   --  6.7*  --  7.4   ALBUMIN  --   --   --  3.1*  --   --  3.3*  --  3.7   BILITOTAL  --   --   --  1.3  --   --  1.0  --  1.2   ALKPHOS  --   --   --  198*  --   --  192*  --  218*   AST  --   --   --  32  --   --  37  --  54*   ALT  --   --   --  54*  --   --  62*  --  73*    < > = values in this interval not displayed.     Recent Labs   Lab 06/05/22 0555 06/04/22 1756 06/04/22  0643 06/03/22 1818 06/03/22  0903   GLC 96 118* 115* 122* 104*   MAG 2.3  --  2.3  --  2.6*   PHOS 2.6  --  4.2  --  4.8*     CBC  Recent Labs   Lab 06/05/22 0555 06/04/22  0643 06/03/22  0903 06/02/22  1847   WBC 3.8* 4.2 6.4 9.0   RBC 3.93 4.05 3.97 4.19   HGB 11.3* 11.5* 11.4* 11.9   HCT 34.5* 36.5 36.9 36.1   MCV 88 90 93 86   MCH 28.8 28.4 28.7 28.4   MCHC 32.8 31.5 30.9* 33.0   RDW 18.1* 18.3* 18.3* 18.1*    189 207 226

## 2022-06-06 ENCOUNTER — APPOINTMENT (OUTPATIENT)
Dept: OCCUPATIONAL THERAPY | Facility: CLINIC | Age: 72
DRG: 292 | End: 2022-06-06
Payer: COMMERCIAL

## 2022-06-06 VITALS
DIASTOLIC BLOOD PRESSURE: 65 MMHG | WEIGHT: 152.4 LBS | HEART RATE: 79 BPM | TEMPERATURE: 97.2 F | SYSTOLIC BLOOD PRESSURE: 125 MMHG | BODY MASS INDEX: 28.22 KG/M2 | RESPIRATION RATE: 18 BRPM | OXYGEN SATURATION: 99 %

## 2022-06-06 PROBLEM — E87.5 HYPERKALEMIA: Status: ACTIVE | Noted: 2022-06-06

## 2022-06-06 PROBLEM — E87.6 HYPOKALEMIA: Status: ACTIVE | Noted: 2021-11-12

## 2022-06-06 LAB
ANION GAP SERPL CALCULATED.3IONS-SCNC: 8 MMOL/L (ref 3–14)
BASOPHILS # BLD AUTO: 0 10E3/UL (ref 0–0.2)
BASOPHILS NFR BLD AUTO: 1 %
BUN SERPL-MCNC: 26 MG/DL (ref 7–30)
CALCIUM SERPL-MCNC: 9.3 MG/DL (ref 8.5–10.1)
CHLORIDE BLD-SCNC: 99 MMOL/L (ref 94–109)
CO2 SERPL-SCNC: 25 MMOL/L (ref 20–32)
CREAT SERPL-MCNC: 1.15 MG/DL (ref 0.52–1.04)
EOSINOPHIL # BLD AUTO: 0.7 10E3/UL (ref 0–0.7)
EOSINOPHIL NFR BLD AUTO: 13 %
ERYTHROCYTE [DISTWIDTH] IN BLOOD BY AUTOMATED COUNT: 18.1 % (ref 10–15)
GFR SERPL CREATININE-BSD FRML MDRD: 51 ML/MIN/1.73M2
GLUCOSE BLD-MCNC: 100 MG/DL (ref 70–99)
HCT VFR BLD AUTO: 37.1 % (ref 35–47)
HGB BLD-MCNC: 12 G/DL (ref 11.7–15.7)
IMM GRANULOCYTES # BLD: 0 10E3/UL
IMM GRANULOCYTES NFR BLD: 0 %
LYMPHOCYTES # BLD AUTO: 0.2 10E3/UL (ref 0.8–5.3)
LYMPHOCYTES NFR BLD AUTO: 4 %
MAGNESIUM SERPL-MCNC: 2.3 MG/DL (ref 1.6–2.3)
MCH RBC QN AUTO: 28.6 PG (ref 26.5–33)
MCHC RBC AUTO-ENTMCNC: 32.3 G/DL (ref 31.5–36.5)
MCV RBC AUTO: 89 FL (ref 78–100)
MONOCYTES # BLD AUTO: 0.3 10E3/UL (ref 0–1.3)
MONOCYTES NFR BLD AUTO: 6 %
NEUTROPHILS # BLD AUTO: 3.9 10E3/UL (ref 1.6–8.3)
NEUTROPHILS NFR BLD AUTO: 76 %
NRBC # BLD AUTO: 0.1 10E3/UL
NRBC BLD AUTO-RTO: 2 /100
PHOSPHATE SERPL-MCNC: 2.6 MG/DL (ref 2.5–4.5)
PLATELET # BLD AUTO: 179 10E3/UL (ref 150–450)
POTASSIUM BLD-SCNC: 4.4 MMOL/L (ref 3.4–5.3)
RBC # BLD AUTO: 4.19 10E6/UL (ref 3.8–5.2)
SODIUM SERPL-SCNC: 132 MMOL/L (ref 133–144)
WBC # BLD AUTO: 5.2 10E3/UL (ref 4–11)

## 2022-06-06 PROCEDURE — 250N000013 HC RX MED GY IP 250 OP 250 PS 637: Performed by: STUDENT IN AN ORGANIZED HEALTH CARE EDUCATION/TRAINING PROGRAM

## 2022-06-06 PROCEDURE — 80048 BASIC METABOLIC PNL TOTAL CA: CPT | Performed by: PHYSICIAN ASSISTANT

## 2022-06-06 PROCEDURE — 99233 SBSQ HOSP IP/OBS HIGH 50: CPT | Mod: GC | Performed by: STUDENT IN AN ORGANIZED HEALTH CARE EDUCATION/TRAINING PROGRAM

## 2022-06-06 PROCEDURE — 250N000013 HC RX MED GY IP 250 OP 250 PS 637: Performed by: INTERNAL MEDICINE

## 2022-06-06 PROCEDURE — 97140 MANUAL THERAPY 1/> REGIONS: CPT | Mod: GO | Performed by: OCCUPATIONAL THERAPIST

## 2022-06-06 PROCEDURE — 85025 COMPLETE CBC W/AUTO DIFF WBC: CPT | Performed by: PHYSICIAN ASSISTANT

## 2022-06-06 PROCEDURE — 83735 ASSAY OF MAGNESIUM: CPT | Performed by: PHYSICIAN ASSISTANT

## 2022-06-06 PROCEDURE — 84100 ASSAY OF PHOSPHORUS: CPT | Performed by: PHYSICIAN ASSISTANT

## 2022-06-06 PROCEDURE — 99239 HOSP IP/OBS DSCHRG MGMT >30: CPT | Performed by: STUDENT IN AN ORGANIZED HEALTH CARE EDUCATION/TRAINING PROGRAM

## 2022-06-06 PROCEDURE — 36415 COLL VENOUS BLD VENIPUNCTURE: CPT | Performed by: PHYSICIAN ASSISTANT

## 2022-06-06 RX ORDER — POTASSIUM CHLORIDE 750 MG/1
80 TABLET, EXTENDED RELEASE ORAL 2 TIMES DAILY
Qty: 1980 TABLET | Refills: 0 | Status: ON HOLD | COMMUNITY
Start: 2022-06-06 | End: 2023-01-01

## 2022-06-06 RX ORDER — DOXYCYCLINE 100 MG/1
100 CAPSULE ORAL DAILY
Status: ON HOLD | COMMUNITY
Start: 2022-06-06 | End: 2023-01-01

## 2022-06-06 RX ADMIN — ACETAMINOPHEN 650 MG: 325 TABLET, FILM COATED ORAL at 12:56

## 2022-06-06 RX ADMIN — POTASSIUM CHLORIDE 60 MEQ: 1500 TABLET, EXTENDED RELEASE ORAL at 08:21

## 2022-06-06 RX ADMIN — ACETAMINOPHEN 650 MG: 325 TABLET, FILM COATED ORAL at 00:30

## 2022-06-06 RX ADMIN — EPLERENONE 25 MG: 25 TABLET, FILM COATED ORAL at 08:23

## 2022-06-06 RX ADMIN — APIXABAN 2.5 MG: 2.5 TABLET, FILM COATED ORAL at 08:22

## 2022-06-06 RX ADMIN — ACYCLOVIR 400 MG: 400 TABLET ORAL at 08:22

## 2022-06-06 RX ADMIN — ALLOPURINOL 300 MG: 300 TABLET ORAL at 08:22

## 2022-06-06 RX ADMIN — TORSEMIDE 150 MG: 100 TABLET ORAL at 08:16

## 2022-06-06 RX ADMIN — SERTRALINE HYDROCHLORIDE 25 MG: 25 TABLET ORAL at 08:22

## 2022-06-06 ASSESSMENT — ACTIVITIES OF DAILY LIVING (ADL)
ADLS_ACUITY_SCORE: 22
ADLS_ACUITY_SCORE: 24
ADLS_ACUITY_SCORE: 22
ADLS_ACUITY_SCORE: 24

## 2022-06-06 NOTE — PLAN OF CARE
Assumed cares of pt [id number]. Pt is AOVSS on RA. Pain managed with Tylenol and heat packs. Pt up ad philly in room. Voiding spontaneously with adequate output. Lymphedema wrap to RLE. BLE edema. Tolerating diet, denies nausea. Pt expressed nervousness for next round of outpatient chemo, would like to be considered to have next round of chemo inpatient. Continue POC

## 2022-06-06 NOTE — PROGRESS NOTES
[facility]    Cardiology Consult Note       Date of Admission:  6/2/2022  Consult Requested by: BMT  Reason for Consult: Heart failure    Assessment & Plan: HVSL   Leandra Guerrero is a 71 year old female admitted on 6/2/2022. She has medical history of AL amyloidosis/multiple myeloma with recent recurrence on chemotherapy, chronic diastolic/restrictive heart failure 2/2 amyloidosis, Afib/flutter on apixaban, and CKD3 who is admitted for heart failure exacerbation in the setting of recent chemo administration.    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 Given the timing of this exacerbation, the presumed trigger is related to her recent chemotherapy. Has had good urine output on furosemide gtt. Still with JVP elevated to 8cm. Creatine has improved. Dry weight is likely around 153lbs. Transition to oral diuretics on 6/5/2022 and appears to be euvolemic on exam today with a weight of 152 pounds.  - Continue torsemide 150 mg twice daily on discharge  - Continue as needed metolazone 2.5 mg weight 3 to 5 pound weight gain in 1 week  - Continue 60 mEq potassium chloride twice daily  - Has follow-up scheduled with call clinic on 6/10/2022 at which time a BMP should be checked    Atrial fibrillation/flutter  - Continue apixaban  - Avoiding BB in patient with amyloid    Hypokalemia  With ongoing aggressive diuresis will need aggressive potassium repletion  - schedule 60mg bid as above  - BMP check on clinic visit scheduled for 6/10/2022    The patient was staffed with attending cardiologist, Dr. Francisca Stearns MD, PhD  Cardiology Fellow  Pager: [phone]  ______________________________________________________________________    Interval History  Patient with good urine output on  furosemide gtt. Renal function improving. Markedly hypokalemic. Patient feels much improved.     Intake/Output Summary (Last 24 hours) at 6/4/2022 1503  Last data filed at 6/4/2022 1243  Gross per 24 hour   Intake 1477 ml   Output 5250 ml   Net -3773 ml       Physical Exam  Gen: no acute distress  Neck: JVP slightly above clavicle, carotid bruit appreciated  CV: nl s1, s2 no murmur appreciated, no rubs or gallops, PMI not displaced  Lungs: CTAB, no rales, no wheezing appreciated  Abd: + BS, soft, NT/ND  Ext: Non-pitting LE edema, warm well perfused    Current Medications    acyclovir  400 mg Oral BID     allopurinol  300 mg Oral Daily     apixaban ANTICOAGULANT  2.5 mg Oral BID     eplerenone  25 mg Oral BID     potassium chloride  60 mEq Oral BID     sertraline  25 mg Oral Daily     sodium chloride (PF)  3 mL Intracatheter Q8H     torsemide  150 mg Oral BID       Diagnostics  CBC  Recent Labs   Lab 06/06/22  0555 06/05/22  0555 06/04/22  0643 06/03/22  0903   WBC 5.2 3.8* 4.2 6.4   RBC 4.19 3.93 4.05 3.97   HGB 12.0 11.3* 11.5* 11.4*   HCT 37.1 34.5* 36.5 36.9   MCV 89 88 90 93   MCH 28.6 28.8 28.4 28.7   MCHC 32.3 32.8 31.5 30.9*   RDW 18.1* 18.1* 18.3* 18.3*    177 189 207     BMP  Recent Labs   Lab 06/06/22  0555 06/05/22  1734 06/05/22  0555 06/04/22  1756 06/04/22  1252 06/04/22  0643 06/03/22  1818 06/03/22  0903   * 133 136 134  --  137   < > 133   POTASSIUM 4.4 4.2  4.2 3.4 3.1*   < > 2.6*   < > 4.3   CHLORIDE 99 98 100 96  --  98   < > 100   CO2 25 30 28 29  --  30   < > 23   ANIONGAP 8 5 8 9  --  9   < > 10   * 102* 96 118*  --  115*   < > 104*   BUN 26 28 31* 34*  --  42*   < > 55*   CR 1.15* 1.00 1.12* 1.24*  --  1.29*   < > 1.73*   GFRESTIMATED 51* 61 53* 47*  --  45*   < > 31*   JERROD 9.3 8.9 9.2 8.7  --  9.0   < > 9.6   MAG 2.3  --  2.3  --   --  2.3  --  2.6*   PHOS 2.6  --  2.6  --   --  4.2  --  4.8*    < > = values in this interval not displayed.      INRNo lab results found in  last 7 days.  Liver panel  Recent Labs   Lab 06/05/22  0555 06/04/22  0643 06/03/22  0903 06/02/22  1847   PROTTOTAL 6.4* 6.7* 6.7* 7.4   ALBUMIN 3.1* 3.1* 3.3* 3.7   BILITOTAL 1.1 1.3 1.0 1.2   ALKPHOS 191* 198* 192* 218*   AST 22 32 37 54*   ALT 42 54* 62* 73*     Troponin:   Lab Results   Component Value Date    TROPI <0.015 04/17/2019    TROPI <0.015 06/07/2018    TROPI <0.015 02/22/2018    TROPI <0.015 11/16/2017    TROPI <0.015 08/17/2017    TROPONIN 0.018 11/12/2021    TROPONIN <0.015 11/12/2021    TROPONIN <0.015 11/02/2021    TROPONIN <0.015 10/31/2021

## 2022-06-06 NOTE — CARE PLAN
Occupational Therapy Discharge Summary    Reason for therapy discharge:    Discharged to home.    Progress towards therapy goal(s). See goals on Care Plan in [facility] electronic health record for goal details.  Goals partially met.  Barriers to achieving goals:   discharge from facility.    Therapy recommendation(s):    Continue home management of BLE edema using lymphedema pumps. If BLE edema is not well-managed using current plan of care, please seek OP lymphedema therapy.

## 2022-06-06 NOTE — DISCHARGE SUMMARY
[facility]  Discharge Summary  Hematology / Oncology    Date of Admission:  6/2/2022  Date of Discharge:  06/06/2022  Discharging Provider: Kath Torres PA-C  Date of Service (when I saw the patient): 06/06/2022    Discharge Diagnoses   Active Problems:    Acute on chronic diastolic heart failure (H)    Hypokalemia    Amyloidosis, unspecified type (H)    Multiple myeloma, remission status unspecified (H)    Volume overload state of heart    Hyperkalemia    History of Present Illness   Leandra Guerrero is a 71 year old female with a history of AL (light chain) amyloidosis/multiple myeloma (recently restarted on CyBorD with fermin), chronic diastolic/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 Patient presented with weight gain, hyperkalemia, and ERIC and was noted to have an exacerbation of diastolic heart failure. She was initially unresponsive to IV Lasix at rather high doses (80 mg, followed by 120 mg) and ultimately required initiation of a Lasix gtt. He was also hyperkalemic (K 5.9) and required shifting in the ED. Cardiology was consulted and assisted with diuretic titration and management; ultimately, a plan was determined to resume patient's PTA torsemide 150 mg BID and eplerenone 25 mg BID, as well as PRN metolazone 2.5 mg q3d PRN for weight gain >3 lbs/day or >5 lbs/week. Discussed at length with cardiology on the day of discharge, who did not feel that there was anything that could or should be done on/after chemotherapy infusions for additional volume management; instead, she should continue monitoring her weights and I&Os closely and take medications as prescribed for weight changes. There is no clear link between her chemotherapy and this HF exacerbation (rather insignificant volume administration, no clear  culprit drugs), and the timing of this may have well been a coincidence. In any event, she is scheduled for close outpatient cardiology follow-up in [facility] clinic on 6/10/22 and will receive her next chemotherapy infusion on 6/7/22 as previously planned. Case was discussed at length with patient, who understands the importance of monitoring daily UOP and weights as previously recommended and complying with her diuretic medications. We reviewed strict discharge precautions, including reasons to call clinic triage or present to the ED, and she voiced understanding. On the day of discharge, patient was well-appearing, hemodynamically stable, and felt safe and comfortable with the plans for discharge and follow-up.     Summary & outpatient follow-up issues:  - Diuretic recommendations per cardiology: Continue PTA torsemide 150 mg BID, eplerenone 25 mg BID, and metolazone 2.5 mg q3d PRN for weight gain >3 lbs in 1 day or >5 lbs in 1 week. Patient is to continue weight-based diuretic management; nothing specific to be done on chemotherapy days, for instance. Scheduled for C[facility] clinic follow-up on 6/10/22.  - Per cardiology, dry weight range high 140s-low 150s. Would use weight of 153 lbs as cutoff above which to consider PRN diuresis.  - Will continue PTA KCl at 60 mEq BID on discharge. Labile potassium levels (in both directions) continue to make repletion of K+ challenging. Will defer additional adjustments to the outpatient setting.  - Discussed with Dr. Wang; will proceed with next chemotherapy (C1D15) CyBorD + daratumumab as planned on 6/7/22. Already minimal administered volume; no adjustments to be made at this time.  - Will defer initiation of PJP ppx to outpatient team; per discussions, they are planning on starting something in the next couple of months.    New discharge medications:  - None    Next follow-up:  - 6/7: Labs + infusion (C1D15 CyBorD + fermin)  - 6/10: Follow-up in [facility] clinic  - 6/15: Labs +  infusion (C1D22 CyBorD + fermin)  - 6/22: Follow-up with Dr. Wang  **requested interval oncology JOEL follow-up as well; await scheduling    Hospital Course   Leandra Guerrero was admitted on 6/2/2022.  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 Cohen and [facility] team extensively outpatient. Most recent TTE (10/2021) with hyperkinetic EF of 65-70% and grade III or advanced diastolic dysfunction. PTA meds include torsemide 150 mg BID, eplerenone 25 mg BID, and metolazone 2.5 mg q3d PRN (for weight gain of >3 lbs in 1 day or >5 lbs in one week), KCL 60-80 mEq TID. Dry weight of ~153 lb per cardiology notes; weight was 162 lbs on admission. Patient presented to the ED on 6/2 with a ~1 week history of increasing fatigue, volume overload and dyspnea, although these symptoms significantly worsened 1 day PTA with increased abdominal pain, lower extremity edema and minimal UOP (~50 ml q4h, per patient report). She reported compliance with all PTA meds. On presentation to the ED, patient was noted to have an ERIC (Cr 1.59) and hyperkalemia (K 5.9) which was shifted. BNP elevated at 2723.   - Received 80 mg Lasix IV with minimal output, followed by 120 mg IV with ~200-300 ml out. She was subsequently started on a Lasix gtt, which was up-titrated to Lasix 20 mg/hr with improved output (~1300 ml [id number]).  - Cards HF consulted, appreciate assistance.                 - Repeat TTE 6/3/2022 showed EF 55-60%, severe concentric wall thickening with left ventricular hypertrophy, grade II diastolic dysfunction               - Per cardiology recs, Lasix gtt was discontinued x6/4, and PTA diruetics of torsemide 150 mg BID and eplerenone 25 mg BID were resumed. She was monitored on her home regimen for >24 hours, also per their  recommendations, with adequate UOP, and she remained at-below her baseline (dry) weight.  - Patient educated about the importance of tracking I&Os and daily weights on discharge; she records both judiciously.  - Scheduled for close interval follow-up in [facility] clinic on 6/10/22.  - Discussed with pharmacy and patient's oncologist, Dr. Wang, the potential contribution of chemotherapy to this exacerbation. We would not anticipate any of the chemotherapeutic agents (cyclophosphamide, bortezomib, or daratumumab) to have precipitated a HF exacerbation. Furthermore, there is rather minimal volume administered with this regimen, as most of the drugs are either subQ or PO. All told, this may be more of a coincidental occurrence than anything, and we expect that patient's decompensated HF is likely more a consequence of generally tenuous cardiac function.     # History of A-fib/A-flutter  In NSR throughout admission.   - Continue PTA apixaban 2.5 mg BID  - Per cardio, avoid beta blockers in patients with cardiac amyloidosis    # Hyperkalemia, resolved  # Hypokalemia, resolved  # ERIC on CKD3a   ERIC likely pre-renal 2/2 volume overload. HyperK+ improved initially with shifting, then dropped precipitously in the setting of diuresis. PTA on KCl 60-80 mEq TID, but has been receiving 60 mEq BID more recently with K+ in the mid-4 range.  - Continue diuresis as above   - Continue KCl 60 mEq BID on discharge. Reminded patient to take an additional dose of 60 mEq on the day of/day after metolazone use.     HEME/ONC  # Recurrent AL amyloidosis/multiple myeloma  Follows with Dr. Wang. Initially diagnosed in 1/2016 when she presenedt with fatigue. Due to progression of symptoms, was evaluated at [facility] in late 2016 and diagnosed with stage IV lambda AL amyloidosis (involvement of GI/bone marrow, no involvement of kidney/liver). Underwent 3 cycles of CyBorD (completed 12/2016) with excellent response in serum light chains. Deemed not  a transplant candidate due to cardiac involvement. Unfortunately, with worsening of free light chains 4/19/2022, repeat BMBx (5/10) obtained with evidence of recurrent/persistent plasma cell neoplasm. Patient was restarted on CyBorD + Daratumumab (C1D1 = 5/25/22). Received D8 treatment (subcutaneous daratumumab/bortezomib, dexamethasone 20 mg and PO cytoxan) on 6/1/22.  - Next due for treatment (c1D15) 6/7/22; will plan to proceed with this tomorrow. C1D22 planned for 6/15.  - Requested JOEL follow-up week of 6/13; await scheduling.  - Follow-up with Dr. Wang on 6/22     # Anemia   Likely secondary to malignancy, chemotherapy. Do not expect any transfusion requirements this admission.  - Monitor CBC with diff daily.  - Transfuse for Hgb <7      GI/FEN  # Abdominal pain, improved  # Diarrhea  Abdominal pain described on admission, thought likely secondary fluid overload with significant improvement today. Diarrhea reported on 6/5, but subsequently improved. C. diff ordered but not collected.  - Monitor clinically for recurrence     # Transaminitis, resolved  Thought likely 2/2 hepatic congestion in the setting of generalized volume overload. History of possible cirrhosis described on abdomimnal ultrasound 11/3/21; however, recheck on 3/6/22 revealed normal echogenicity. Improving LFTs as of 6/3. Will continue to monitor.  - Trend CMP with diuresis   - Can consider repeat RUQ US if not improving      # Mild hyponatremia, intermittent  Initially thought likely 2/2 volume overload. With recurrence on 6/6, may represent thiazide effect (epleronone).  - Repeat BMP tomorrow, 6/7     MISC  # Asymptomatic pyuria   UA with 26 WBC, moderate leukocyte esterase, 2 squamous cells. Her only urinary symptom is decreased urination which is more likely 2/2 ERIC And volume overload as above. No dysuria. No s/sx of sepsis.   - Continue to monitor UCx; NGTD.   - Monitor for any symptoms. Hold off on antibiotics for now.       #  Depression  - Continue PTA sertraline      # ID PPx  -  mg BID  - Consider addition of PJP ppx (typically indicated for MM patients on active therapy); will defer to OP team  - No current indication for antibacterial/antifungal ppx given ANC >1000     Staffed with Dr. Grace.    Kath Torres PA-C  Hematology/Oncology  Pager: #[phone]    Code Status   Full Code    Primary Care Physician   CARLYN ALVA    Physical Exam   Vital Signs with Ranges  Temp:  [97.2  F (36.2  C)-97.8  F (36.6  C)] 97.2  F (36.2  C)  Pulse:  [79-95] 79  Resp:  [18] 18  BP: (125-132)/(65-69) 125/65  SpO2:  [97 %-99 %] 99 %  152 lbs 6.4 oz    Constitutional: Pleasant and cooperative female. Awake, alert, no apparent distress.  HEENT: NC/AT, EOMI, sclera clear, conjunctiva normal, oral pharynx with moist mucus membranes  Respiratory: No increased work of breathing, CTAB, no crackles or wheezing  Cardiovascular: RRR, normal S1 and S2, no murmur noted. Chronic 2-3+ pitting edema of the bilateral LEs. Numerous varicosities and spider veins.  GI: Abdomen is rounded but soft, non-tender, no tense distension. BS present throughout.   MSK: No joint effusions or gross deformities.  Skin: No bruising, bleeding, rashes, or lesions   Neurologic:  Answers questions appropriately. Moves all extremities spontaneously.  Psych: Calm, appropriate affect    Discharge Disposition   Discharged to home  Condition at discharge: Stable    Consultations This Hospital Stay   CARDIOLOGY HEART FAILURE (HF) IP CONSULT  LYMPHEDEMA THERAPY IP CONSULT    Discharge Orders      Reason for your hospital stay    Heart failure exacerbation, volume overload, hyperkalemia     Activity    Your activity upon discharge: activity as tolerated     When to contact your care team    Please call the [facility] at [phone] if you develop temperature above 100.4, shortness of breath, chest pain, headaches, vision changes,  bleeding, uncontrolled nausea, vomiting, diarrhea, pain, or any other signs or symptoms of concern. If you are concerned that your symptoms are life-threatening, don't hesitate to call 911 or go to the nearest Emergency Room.     Monitor and record    fluid intake and output daily    weight every day     Discharge Instructions    - Continue taking potassium 60 mEq twice daily on discharge; this dose may be adjusted further at your next cardiology appt.  - Continue diuretics as prescribed by cardiology: torsemide 150 mg daily, eplerenone 25 mg twice daily, metolazone 2.5 mg q3d PRN for weight gain >3 lbs in one day or >5 lbs in one week. Take additional 60 mEq of potassium on the day of and day after a dose of metolazone.     Follow Up and recommended labs and tests    An appointment for hospital follow up was requested for you. If it is not scheduled by the time you discharge you will be contacted with the date and time. You may call clinic to makes changes to this appointment if needed.    Already scheduled appointments are listed below.     Diet    Follow this diet upon discharge: Low salt (as recommended by your cardiology team)     Check Out Appointment Request    [place]:  - Follow-up JOEL week of 6/13/22 in person     Discharge Medications   Current Discharge Medication List      CONTINUE these medications which have CHANGED    Details   doxycycline hyclate (VIBRAMYCIN) 100 MG capsule Take 1 capsule (100 mg) by mouth daily . Hold on discharge; resume as instructed by your outpatient team.      potassium chloride ER (KLOR-CON M) 10 MEQ CR tablet Take 6 tablets (60 mEq) by mouth 2 times daily Take an extra 60 meq of Potassium the day of Metolazone and 60 meq extra the day after a Metolazone  Qty: 1980 tablet, Refills: 0    Associated Diagnoses: Acute on chronic diastolic heart failure (H)         CONTINUE these medications which have NOT CHANGED    Details   acetaminophen (TYLENOL) 325 MG tablet Take 325 mg  by mouth every 6 hours as needed for mild pain or fever     Associated Diagnoses: Amyloid heart disease (H)      acyclovir (ZOVIRAX) 400 MG tablet Take 1 tablet (400 mg) by mouth 2 times daily Viral Prophylaxis.  Qty: 180 tablet, Refills: 3    Associated Diagnoses: Hemorrhagic cystitis      allopurinol (ZYLOPRIM) 300 MG tablet Take 1 tablet (300 mg) by mouth daily for 28 days  Qty: 28 tablet, Refills: 0    Associated Diagnoses: Hemorrhagic cystitis      apixaban ANTICOAGULANT (ELIQUIS ANTICOAGULANT) 2.5 MG tablet Take 1 tablet (2.5 mg) by mouth 2 times daily  Qty: 180 tablet, Refills: 3    Associated Diagnoses: Paroxysmal atrial fibrillation (H)      camphor-menthol (DERMASARRA) 0.5-0.5 % external lotion Apply 1 mL topically every 6 hours as needed for skin care  Qty: 222 mL, Refills: 0    Associated Diagnoses: Acute on chronic diastolic heart failure (H)      Cholecalciferol (VITAMIN D3 PO) Take by mouth daily Dose unknown      cyclophosphamide (CYTOXAN) 50 MG capsule Take 10 capsules (500 mg) by mouth every 7 days Take on days 1, 8, 15, and 22.  Qty: 40 capsule, Refills: 0    Associated Diagnoses: Hemorrhagic cystitis      eplerenone (INSPRA) 25 MG tablet Take 1 tablet (25 mg) by mouth 2 times daily  Qty: 180 tablet, Refills: 3    Associated Diagnoses: Acute on chronic diastolic heart failure (H); Hypokalemia; Chronic diastolic heart failure (H)      metolazone (ZAROXOLYN) 2.5 MG tablet Take 1 tablet (2.5 mg) by mouth twice a week As needed for increased swelling and weight gain of 3 lb in one day or 5 lb in one week  Qty: 26 tablet, Refills: 3    Associated Diagnoses: Amyloid heart disease (H)      ondansetron (ZOFRAN) 8 MG tablet Take 1 tablet (8 mg) by mouth every 8 hours as needed for nausea  Qty: 30 tablet, Refills: 2    Associated Diagnoses: Hemorrhagic cystitis      sertraline (ZOLOFT) 25 MG tablet Take 25 mg by mouth daily      torsemide (DEMADEX) 100 MG tablet Take 1.5 tablets (150 mg) by mouth every  morning AND 1.5 tablets (150 mg) daily at 2 pm.  Qty: 270 tablet, Refills: 1    Associated Diagnoses: Acute on chronic diastolic heart failure (H); Amyloid heart disease (H)      triamcinolone (KENALOG) 0.1 % external cream Apply a thin layer twice daily to itchy areas as needed.  Qty: 453.6 g, Refills: 3    Associated Diagnoses: Intrinsic atopic dermatitis      nitroGLYcerin (NITROSTAT) 0.4 MG sublingual tablet For chest pain. Place 1 tablet under the tongue every 5 minutes. If symptoms persist after 2 doses call 911.  Qty: 25 tablet, Refills: 0    Associated Diagnoses: Other chest pain      prochlorperazine (COMPAZINE) 10 MG tablet Take 1 tablet (10 mg) by mouth every 6 hours as needed for nausea or vomiting  Qty: 30 tablet, Refills: 1    Associated Diagnoses: Nausea         STOP taking these medications       methylPREDNISolone (MEDROL) 32 MG tablet Comments:   Reason for Stopping:             Allergies   Allergies   Allergen Reactions     Contrast Dye Shortness Of Breath     Shaking,chills and dypsnea     Cytoxan [Cyclophosphamide]      Hemorrhagic cystitis     Gabapentin      Slurred speech, weakness     Levofloxacin      Severe shaking and abdominal pain     Aaron Weed      Rash and itchyness     Amoxicillin Nausea and Vomiting and Cramps     Bumetanide Unknown     Spironolactone      Worsening hyponatremia, palpitations     Chlorhexidine Dermatitis and Rash     AARON wipes  2% chlorhexidine gluconate   Rash to everywhere wipe was applied        Data   Most Recent 3 CBC's:  Recent Labs   Lab Test 06/06/22  0555 06/05/22  0555 06/04/22  0643   WBC 5.2 3.8* 4.2   HGB 12.0 11.3* 11.5*   MCV 89 88 90    177 189      Most Recent 3 BMP's:  Recent Labs   Lab Test 06/06/22  0555 06/05/22  1734 06/05/22  0555   * 133 136   POTASSIUM 4.4 4.2  4.2 3.4   CHLORIDE 99 98 100   CO2 25 30 28   BUN 26 28 31*   CR 1.15* 1.00 1.12*   ANIONGAP 8 5 8   JERROD 9.3 8.9 9.2   * 102* 96     Most Recent 2 LFT's:  Recent  Labs   Lab Test 06/05/22  0555 06/04/22  0643   AST 22 32   ALT 42 54*   ALKPHOS 191* 198*   BILITOTAL 1.1 1.3     Most Recent INR's and Anticoagulation Dosing History:  Anticoagulation Dose History     Recent Dosing and Labs Latest Ref Rng & Units 11/2/2021 11/3/2021 11/4/2021 11/5/2021 11/6/2021 11/7/2021 11/12/2021    [id number] 70 - 130 % - - - - - - -    INR 0.85 - 1.15 1.26(H) 1.17(H) 1.11 1.10 1.14 1.13 1.34(H)        Most Recent 3 Troponin's:  Recent Labs   Lab Test 11/12/21  2105 11/12/21  1147 11/02/21  1534 10/31/21  2133 04/17/19  0917 06/07/18  0623 02/22/18  1043   TROPI  --   --   --   --  <0.015 <0.015 <0.015   TROPONIN 0.018 <0.015 <0.015   < >  --   --   --     < > = values in this interval not displayed.     Most Recent Cholesterol Panel:  Recent Labs   Lab Test 01/14/17  0909   CHOL 143   *   HDL 24*   TRIG 79     Most Recent 6 Bacteria Isolates From Any Culture (See EPIC Reports for Culture Details):  Recent Labs   Lab Test 01/15/17  0804 01/15/17  0758 01/13/17  2145 01/13/17  2056 12/05/16  1215 12/05/16  0830   CULT No growth No growth >100,000 colonies/mL Escherichia coli* No growth  Cultured on the 1st day of incubation: Escherichia coli  Critical Value/Significant Value, preliminary result only, called to and read   back by Chiara Helm (RN) at 1119 on 1.14.17, cn  (Note)  POSITIVE for E. COLI by [facility] multiplex nucleic acid test. Final  identification and antimicrobial susceptibility testing will be  verified by standard methods.    Specimen tested with Verigene multiplex, gram-negative blood culture  nucleic acid test for the following targets: Acinetobacter sp.,  Citrobacter sp., Enterobacter sp., Proteus sp., E. coli, K.  pneumoniae/oxytoca, P. aeruginosa, and the following resistance  markers: CTXM, KPC, NDM, VIM, IMP and OXA.    Critical Value/Significant Value called to and read back by Chiara Helm RN 1.14.17 at 1322 NK.  Pharmacy notified at the same time, Lu  Boeser, Pharm.D.  * No growth >100,000 colonies/mL Beta hemolytic Streptococcus group B Beta Hemolytic   Streptococcus groups A and B are susceptible to ampicillin, penicillin,   vancomycin, and the cephalosporins.  Susceptibility testing is not routinely   done on these organisms isolated from urine.  *     Most Recent TSH, T4 and A1c Labs:  Recent Labs   Lab Test 03/29/22  1442 12/08/16  1122 09/07/16  0000   TSH 1.73   < >  --    A1C  --   --  5.8*    < > = values in this interval not displayed.     Results for orders placed or performed during the hospital encounter of 06/02/22   XR Chest Port 1 View    Narrative    EXAM: XR Chest 1 view 6/2/2022 7:10 PM      HISTORY: SOB.    COMPARISON: CT of the chest abdomen pelvis dated 5/9/2022.     TECHNIQUE: Frontal view of the chest.    FINDINGS: Trachea is midline. Cardiac and mediastinal silhouette is  within normal limits. Right basilar hazy opacity. Moderate right  pleural effusion. No left pleural effusion. No pneumothoraces.      Impression    IMPRESSION: Moderate right pleural effusion with associated  atelectasis.     I have personally reviewed the examination and initial interpretation  and I agree with the findings.    JOHN GONZALEZ MD         SYSTEM ID:  [id number]   US Lower Extremity Venous Duplex Left    Narrative    EXAMINATION: US LOWER EXTREMITY VENOUS DUPLEX LEFT  6/5/2022 2:44 PM      CLINICAL HISTORY: EXAMINATION: DOPPLER VENOUS ULTRASOUND OF THE LEFT  LOWER EXTREMITY, 6/5/2022 2:44 PM     COMPARISON: 11/5/2021    HISTORY: 69-year-old male with history of AML amyloidosis, now with  left leg pain. Evaluate for DVT.    TECHNIQUE:  Gray-scale evaluation with compression, spectral flow, and  color Doppler assessment of the deep venous system of the left leg  from groin to knee, and then at the ankle.    FINDINGS:  In the left lower extremity, the common femoral, femoral, popliteal  and posterior tibial veins demonstrate normal compressibility and  blood  flow. The right common femoral vein demonstrates normal  compressibility and blood flow.      Impression    IMPRESSION:  No evidence left lower extremity deep venous thrombosis.    I have personally reviewed the examination and initial interpretation  and I agree with the findings.    LANA CHÁVEZ MD         SYSTEM ID:  [id number]   Echo Complete     Value    LVEF  55-60%    Narrative    [id number]  [id number]  [id number]  [id number]^BARBARA^BOBBY^JAMES     [facility],[place]  Echocardiography Laboratory  [address] [place] [address]     Name: JESSICA GARCIA  MRN: [id number]  : 1950  Study Date: 2022 08:56 AM  Age: 71 yrs  Gender: Female  Patient Location: [facility]  Reason For Study: Heart Failure  Ordering Physician: BOBBY JIMENEZ  Performed By: Mulu Glaser     BSA: 1.7 m2  Height: 61 in  Weight: 162 lb  ______________________________________________________________________________  Procedure  Complete Portable Echo Adult.  ______________________________________________________________________________  Interpretation Summary  Global and regional left ventricular function is normal with an EF of 55-60%.  The LV cavity is small. Severe concentric wall thickening consistent with left  ventricular hypertrophy is present.  Grade II or moderate diastolic dysfunction.  Diastolic Doppler findings suggest left ventricular filling pressures are  increased.  Global right ventricular function is normal.  Pulmonary hypertension is present. Right ventricular systolic pressure is  35mmHg above the right atrial pressure.     This study was compared with the study from 10/202.  There has been no change.  ______________________________________________________________________________  Left Ventricle  The LV cavity is small. Global and regional left ventricular function is  normal with an EF of 55-60%. Severe concentric wall thickening consistent with  left ventricular  hypertrophy is present. Grade II or moderate diastolic  dysfunction. Diastolic Doppler findings (E/E' ratio and/or other parameters)  suggest left ventricular filling pressures are increased.     Right Ventricle  The right ventricle is normal size. Global right ventricular function is  normal.     Atria  The left atrium appears normal. Mild right atrial enlargement is present.     Mitral Valve  The mitral valve is normal. Trace mitral insufficiency is present.     Aortic Valve  Aortic valve is normal in structure and function. The aortic valve is  tricuspid. Trace aortic insufficiency is present.     Tricuspid Valve  Mild to moderate tricuspid insufficiency is present. Right ventricular  systolic pressure is 35mmHg above the right atrial pressure. Pulmonary  hypertension is present.     Pulmonic Valve  The pulmonic valve is normal.     Vessels  The aorta root is normal. IVC diameter and respiratory changes fall into an  intermediate range suggesting an RA pressure of 8 mmHg.     Pericardium  No pericardial effusion is present.     Compared to Previous Study  This study was compared with the study from 10/2021 . There has been no  change.  ______________________________________________________________________________  MMode/2D Measurements & Calculations  IVSd: 1.7 cm  LVIDd: 3.2 cm  LVIDs: 1.9 cm  LVPWd: 1.7 cm  FS: 40.3 %  LV mass(C)d: 210.8 grams  LV mass(C)dI: 122.0 grams/m2  asc Aorta Diam: 2.8 cm  LVOT diam: 2.0 cm  LVOT area: 3.2 cm2  LA Volume Index (BP): 28.2 ml/m2  RWT: 1.1     TAPSE: 1.3 cm     Doppler Measurements & Calculations  MV E max ella: 94.7 cm/sec  MV A max ella: 53.2 cm/sec  MV E/A: 1.8  MV dec slope: 844.7 cm/sec2  MV dec time: 0.11 sec  TR max ella: 297.9 cm/sec  TR max P.5 mmHg  Lateral E/e': 17.9     ______________________________________________________________________________  Report approved by: Jennifer BLANCA 2022 01:26 PM           *Note: Due to a large number of results  and/or encounters for the requested time period, some results have not been displayed. A complete set of results can be found in Results Review.

## 2022-06-06 NOTE — PLAN OF CARE
Reason for Admission: acute heart failure   History: amyloidosis/multiple myeloma, CKD  IV Access: PIV, saline locked  Incisions/Drains: na  Temp: 97.2  F (36.2  C) Temp src: Temporal BP: 125/65 Pulse: 79   Resp: 18 SpO2: 99 % O2 Device: None (Room air)    Diet: Regular  Activity: Independent  Pain Management: PRN tylenol  GI/: voiding spontaneously, +gas, -BM on shift  Noel: A&O x4  Team: heme malignancy    Shift Summary:  Pt having some pain in abdomen related to constipation she's been having, otherwise no other pain or nausea reported. Bilateral lymph wraps applied to LE. Discharged home at 1500.  Samreen Morrison, RN on 6/6/2022 at 3:41 PM

## 2022-06-06 NOTE — PROVIDER NOTIFICATION
Notified MD at 1126 AM regarding discharge planning.      Spoke with: MD, Pt to talk to chemo team tomorrow.    Orders were not obtained.    Comments: Are we discharging her today or is she able to do chemo in hospital tomorrow?     Samreen Morrison RN on 6/6/2022 at 3:29 PM

## 2022-06-07 ENCOUNTER — PATIENT OUTREACH (OUTPATIENT)
Dept: CARE COORDINATION | Facility: CLINIC | Age: 72
End: 2022-06-07

## 2022-06-07 ENCOUNTER — MYC MEDICAL ADVICE (OUTPATIENT)
Dept: CARDIOLOGY | Facility: CLINIC | Age: 72
End: 2022-06-07

## 2022-06-07 ENCOUNTER — INFUSION THERAPY VISIT (OUTPATIENT)
Dept: INFUSION THERAPY | Facility: CLINIC | Age: 72
End: 2022-06-07
Payer: COMMERCIAL

## 2022-06-07 ENCOUNTER — MYC MEDICAL ADVICE (OUTPATIENT)
Dept: ONCOLOGY | Facility: CLINIC | Age: 72
End: 2022-06-07

## 2022-06-07 ENCOUNTER — LAB (OUTPATIENT)
Dept: LAB | Facility: CLINIC | Age: 72
End: 2022-06-07
Payer: COMMERCIAL

## 2022-06-07 VITALS
BODY MASS INDEX: 28.84 KG/M2 | RESPIRATION RATE: 16 BRPM | HEART RATE: 77 BPM | SYSTOLIC BLOOD PRESSURE: 130 MMHG | TEMPERATURE: 97.6 F | OXYGEN SATURATION: 100 % | DIASTOLIC BLOOD PRESSURE: 77 MMHG | WEIGHT: 155.7 LBS

## 2022-06-07 DIAGNOSIS — I50.33 ACUTE ON CHRONIC DIASTOLIC HEART FAILURE (H): Primary | ICD-10-CM

## 2022-06-07 DIAGNOSIS — E85.81 AL AMYLOIDOSIS (H): Primary | ICD-10-CM

## 2022-06-07 DIAGNOSIS — Z71.89 OTHER SPECIFIED COUNSELING: ICD-10-CM

## 2022-06-07 DIAGNOSIS — I50.33 ACUTE ON CHRONIC DIASTOLIC HEART FAILURE (H): ICD-10-CM

## 2022-06-07 LAB
ANION GAP SERPL CALCULATED.3IONS-SCNC: 7 MMOL/L (ref 3–14)
BUN SERPL-MCNC: 26 MG/DL (ref 7–30)
CALCIUM SERPL-MCNC: 8.7 MG/DL (ref 8.5–10.1)
CHLORIDE BLD-SCNC: 95 MMOL/L (ref 94–109)
CO2 SERPL-SCNC: 28 MMOL/L (ref 20–32)
CREAT SERPL-MCNC: 1.17 MG/DL (ref 0.52–1.04)
GFR SERPL CREATININE-BSD FRML MDRD: 50 ML/MIN/1.73M2
GLUCOSE BLD-MCNC: 114 MG/DL (ref 70–99)
HOLD SPECIMEN: NORMAL
POTASSIUM BLD-SCNC: 4 MMOL/L (ref 3.4–5.3)
SODIUM SERPL-SCNC: 130 MMOL/L (ref 133–144)

## 2022-06-07 PROCEDURE — 96401 CHEMO ANTI-NEOPL SQ/IM: CPT | Performed by: NURSE PRACTITIONER

## 2022-06-07 PROCEDURE — 80048 BASIC METABOLIC PNL TOTAL CA: CPT

## 2022-06-07 PROCEDURE — 99207 PR NO CHARGE LOS: CPT

## 2022-06-07 PROCEDURE — 96413 CHEMO IV INFUSION 1 HR: CPT | Performed by: NURSE PRACTITIONER

## 2022-06-07 PROCEDURE — 36415 COLL VENOUS BLD VENIPUNCTURE: CPT

## 2022-06-07 RX ORDER — DIPHENHYDRAMINE HCL 25 MG
50 CAPSULE ORAL ONCE
Status: COMPLETED | OUTPATIENT
Start: 2022-06-07 | End: 2022-06-07

## 2022-06-07 RX ORDER — HEPARIN SODIUM,PORCINE 10 UNIT/ML
5 VIAL (ML) INTRAVENOUS
Status: CANCELLED | OUTPATIENT
Start: 2022-06-07

## 2022-06-07 RX ORDER — DEXAMETHASONE 4 MG/1
20 TABLET ORAL ONCE
Status: COMPLETED | OUTPATIENT
Start: 2022-06-07 | End: 2022-06-07

## 2022-06-07 RX ORDER — ACETAMINOPHEN 325 MG/1
650 TABLET ORAL ONCE
Status: COMPLETED | OUTPATIENT
Start: 2022-06-07 | End: 2022-06-07

## 2022-06-07 RX ORDER — HEPARIN SODIUM (PORCINE) LOCK FLUSH IV SOLN 100 UNIT/ML 100 UNIT/ML
5 SOLUTION INTRAVENOUS
Status: CANCELLED | OUTPATIENT
Start: 2022-06-07

## 2022-06-07 RX ORDER — MONTELUKAST SODIUM 10 MG/1
10 TABLET ORAL ONCE
Status: COMPLETED | OUTPATIENT
Start: 2022-06-07 | End: 2022-06-07

## 2022-06-07 RX ADMIN — DEXAMETHASONE 20 MG: 4 TABLET ORAL at 10:44

## 2022-06-07 RX ADMIN — Medication 50 MG: at 10:44

## 2022-06-07 RX ADMIN — Medication 250 ML: at 11:45

## 2022-06-07 RX ADMIN — ACETAMINOPHEN 650 MG: 325 TABLET ORAL at 10:44

## 2022-06-07 RX ADMIN — MONTELUKAST SODIUM 10 MG: 10 TABLET ORAL at 10:44

## 2022-06-07 ASSESSMENT — PAIN SCALES - GENERAL: PAINLEVEL: MILD PAIN (3)

## 2022-06-07 NOTE — PROGRESS NOTES
Clinic Care Coordination Contact  [facility]: Post-Discharge Note  SITUATION                                                      Admission:    Admission Date: 06/02/22   Reason for Admission: Active Problems:    Acute on chronic diastolic heart failure (H)    Hypokalemia    Amyloidosis, unspecified type (H)    Multiple myeloma, remission status unspecified (H)    Volume overload state of heart    Hyperkalemia  Discharge:   Discharge Date: 06/06/22  Discharge Diagnosis: Active Problems:    Acute on chronic diastolic heart failure (H)    Hypokalemia    Amyloidosis, unspecified type (H)    Multiple myeloma, remission status unspecified (H)    Volume overload state of heart    Hyperkalemia    BACKGROUND                                                      Per hospital discharge summary and inpatient provider notes:       Leandra Guerrero is a 71 year old female with a history of AL (light chain) amyloidosis/multiple myeloma (recently restarted on CyBorD with fermin), chronic diastolic/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 (Red Flag symptoms escalate to triage hotline per guidelines): Improved  Do you feel your condition is stable enough to be safe at home until your provider visit?: Yes  Does the patient have their discharge instructions? : Yes  Does the patient have questions regarding their discharge instructions? : No  Were you started on any new medications or were there changes to any of your previous medications? : Yes  Does the patient have all of their medications?: Yes  Do you have questions regarding any of your medications? : No  Do you have all of your needed medical  supplies or equipment (DME)?  (i.e. oxygen tank, CPAP, cane, etc.): Yes  Discharge follow-up appointment scheduled within 14 calendar days? : Yes  Discharge Follow Up Appointment Date: 06/13/22  Discharge Follow Up Appointment Scheduled with?: Specialty Care Provider    Post-op ([facility] CTA Only)  If the patient had a surgery or procedure, do they have any questions for a nurse?: No         PLAN                                                      Outpatient Plan:     Check Out Appointment Request  [place]:  - Follow-up JOEL week of 6/13/22 in person    Future Appointments   Date Time Provider Department Center   6/8/2022 11:30 AM LAB ONC [facility] [place]   6/8/2022 12:00 PM BAY 3 INFUSION [facility] [place]   6/10/2022  9:00 AM Ciara Araya PA-C [facility]   6/15/2022  7:00 AM LAB ONC [facility] [place]   6/15/2022  7:30 AM Genie Black APRN [facility] [place]   6/15/2022  8:00 AM BAY 1 INFUSION [facility] [place]   6/22/2022  8:45 AM LAB ONC [facility] [place]   6/22/2022  9:30 AM Yasmani Wang MD [facility] [place]   6/22/2022 10:00 AM BAY 2 INFUSION [facility] [place]   6/29/2022  8:45 AM LAB ONC [facility] [place]   6/29/2022  9:30 AM BAY 11 INFUSION [facility] [place]   7/6/2022  8:00 AM LAB ONC [facility] [place]   7/6/2022  8:30 AM BAY 7 INFUSION [facility] [place]   7/8/2022  2:50 PM Tato Diaz MD [facility]   7/13/2022  8:15 AM LAB ONC [facility] [place]   7/13/2022  9:00 AM BAY 2 INFUSION [facility] [place]   7/28/2022  8:00 AM  LAB [facility]   7/28/2022  8:30 AM Domenica Cohen MD [facility]         For any urgent concerns, please contact our 24 hour nurse triage line: [phone] ([phone])       NAOMI Sheikh  [phone]  [facility]

## 2022-06-07 NOTE — PROGRESS NOTES
Infusion Nursing Note:  Leandra Guerrero presents today for Darzalex Faspro, Velcade, Cytoxan    Patient seen by provider today: No   present during visit today: Not Applicable.    Note: The pt reports she was discharged from the hospital yesterday. Per inpatient note the provider spoke with Dr. Wang and it was okay to proceed with treatment today.     Pt presents to infusion center very deconditioned, weak, and reports SOB on exertion. She reports she is experiencing watery diarrhea that started this morning. Lower extremity edema is present but per pt this has improved since her hospitalization due to being on a Lasix gtt. She reports keeping track of her I/O's at home.    Patient also reports she is concerned about taking her PO Cyclophosphamide today. She stated she did not tolerate it well the last 2 cycles due to stomach issues and states she previously talked with out pharmacist about switching to IV chemo.    Genie Black NP updated and stated she would visited with the pt. After visiting with the pt she stated it was okay to proceed with her treatment today and switched her PO Cytoxan to IV. Treatment plan updated. Education provided to pt. Questions answered to pt satisfaction. Genie Black NP also stated she would like the pt to return for IVF and electrolyte rechecks. Appt made for tomorrow per the charge RN.     The pt tolerated IV Cytoxan without difficulty.    Intravenous Access:  Peripheral IV placed.    Treatment Conditions:  Lab Results   Component Value Date    HGB 11.8 06/07/2022    WBC 5.4 06/07/2022    ANEU 5.1 10/28/2021    ANEUTAUTO 4.5 06/07/2022     06/07/2022      Lab Results   Component Value Date     (L) 06/07/2022    POTASSIUM 4.0 06/07/2022    MAG 2.3 06/06/2022    CR 1.17 (H) 06/07/2022    JERROD 8.7 06/07/2022    BILITOTAL 1.1 06/05/2022    ALBUMIN 3.1 (L) 06/05/2022    ALT 42 06/05/2022    AST 22 06/05/2022     Results reviewed, labs MET  treatment parameters, ok to proceed with treatment.      Post Infusion Assessment:  Patient tolerated infusion without incident.  Blood return noted pre and post infusion.  Site patent and intact, free from redness, edema or discomfort.  No evidence of extravasations.  Access discontinued per protocol.       Discharge Plan:   AVS to patient via MYCHART.  Patient will return 6/7/22 for next appointment.   Patient discharged in stable condition accompanied by: self -  is   Departure Mode: Ambulatory with cane.      Karen Linder RN

## 2022-06-08 ENCOUNTER — ONCOLOGY VISIT (OUTPATIENT)
Dept: ONCOLOGY | Facility: CLINIC | Age: 72
End: 2022-06-08

## 2022-06-08 ENCOUNTER — PATIENT OUTREACH (OUTPATIENT)
Dept: CARE COORDINATION | Facility: CLINIC | Age: 72
End: 2022-06-08

## 2022-06-08 ENCOUNTER — TELEPHONE (OUTPATIENT)
Dept: ONCOLOGY | Facility: CLINIC | Age: 72
End: 2022-06-08
Payer: OTHER MISCELLANEOUS

## 2022-06-08 ENCOUNTER — TELEPHONE (OUTPATIENT)
Dept: ONCOLOGY | Facility: CLINIC | Age: 72
End: 2022-06-08

## 2022-06-08 ENCOUNTER — LAB (OUTPATIENT)
Dept: LAB | Facility: CLINIC | Age: 72
End: 2022-06-08

## 2022-06-08 ENCOUNTER — PATIENT OUTREACH (OUTPATIENT)
Dept: CARDIOLOGY | Facility: CLINIC | Age: 72
End: 2022-06-08

## 2022-06-08 ENCOUNTER — INFUSION THERAPY VISIT (OUTPATIENT)
Dept: INFUSION THERAPY | Facility: CLINIC | Age: 72
End: 2022-06-08
Payer: COMMERCIAL

## 2022-06-08 VITALS
SYSTOLIC BLOOD PRESSURE: 107 MMHG | OXYGEN SATURATION: 99 % | HEART RATE: 92 BPM | WEIGHT: 158.5 LBS | DIASTOLIC BLOOD PRESSURE: 65 MMHG | TEMPERATURE: 97.5 F | RESPIRATION RATE: 24 BRPM | BODY MASS INDEX: 29.35 KG/M2

## 2022-06-08 DIAGNOSIS — E85.9 AMYLOIDOSIS, UNSPECIFIED TYPE (H): ICD-10-CM

## 2022-06-08 DIAGNOSIS — E85.9 AMYLOIDOSIS, UNSPECIFIED TYPE (H): Primary | ICD-10-CM

## 2022-06-08 DIAGNOSIS — E85.81 AL AMYLOIDOSIS (H): Primary | ICD-10-CM

## 2022-06-08 DIAGNOSIS — I50.23 ACUTE ON CHRONIC SYSTOLIC HEART FAILURE (H): ICD-10-CM

## 2022-06-08 DIAGNOSIS — I50.33 ACUTE ON CHRONIC DIASTOLIC HEART FAILURE (H): ICD-10-CM

## 2022-06-08 DIAGNOSIS — Z71.89 GOALS OF CARE, COUNSELING/DISCUSSION: ICD-10-CM

## 2022-06-08 DIAGNOSIS — C90.00 MULTIPLE MYELOMA, REMISSION STATUS UNSPECIFIED (H): Primary | ICD-10-CM

## 2022-06-08 LAB
ANION GAP SERPL CALCULATED.3IONS-SCNC: 8 MMOL/L (ref 3–14)
BUN SERPL-MCNC: 37 MG/DL (ref 7–30)
CALCIUM SERPL-MCNC: 9.5 MG/DL (ref 8.5–10.1)
CHLORIDE BLD-SCNC: 96 MMOL/L (ref 94–109)
CO2 SERPL-SCNC: 24 MMOL/L (ref 20–32)
CREAT SERPL-MCNC: 1.68 MG/DL (ref 0.52–1.04)
GFR SERPL CREATININE-BSD FRML MDRD: 33 ML/MIN/1.73M2
GLUCOSE BLD-MCNC: 112 MG/DL (ref 70–99)
HOLD SPECIMEN: NORMAL
HOLD SPECIMEN: NORMAL
POTASSIUM BLD-SCNC: 5.3 MMOL/L (ref 3.4–5.3)
SODIUM SERPL-SCNC: 128 MMOL/L (ref 133–144)

## 2022-06-08 PROCEDURE — 80048 BASIC METABOLIC PNL TOTAL CA: CPT

## 2022-06-08 PROCEDURE — 99207 PR NO CHARGE LOS: CPT | Performed by: INTERNAL MEDICINE

## 2022-06-08 PROCEDURE — 36415 COLL VENOUS BLD VENIPUNCTURE: CPT

## 2022-06-08 PROCEDURE — 99214 OFFICE O/P EST MOD 30 MIN: CPT | Mod: 25 | Performed by: INTERNAL MEDICINE

## 2022-06-08 ASSESSMENT — PAIN SCALES - GENERAL: PAINLEVEL: MILD PAIN (3)

## 2022-06-08 NOTE — TELEPHONE ENCOUNTER
Patient was hospitalized 6/2/22-6/6/22 with acute on chronic diastolic heart failure.    Weight was 149# on 6/5/22 and 155# on 6/7/22.    Per hospital discharge summary:  Summary & outpatient follow-up issues:  - Diuretic recommendations per cardiology: Continue PTA torsemide 150 mg BID, eplerenone 25 mg BID, and metolazone 2.5 mg q3d PRN for weight gain >3 lbs in 1 day or >5 lbs in 1 week. Patient is to continue weight-based diuretic management; nothing specific to be done on chemotherapy days, for instance. Scheduled for [facility] clinic follow-up on 6/10/22.  - Per cardiology, dry weight range high 140s-low 150s. Would use weight of 153 lbs as cutoff above which to consider PRN diuresis.  - Will continue PTA KCl at 60 mEq BID on discharge. Labile potassium levels (in both directions) continue to make repletion of K+ challenging. Will defer additional adjustments to the outpatient setting.  - Discussed with Dr. Wang; will proceed with next chemotherapy (C1D15) CyBorD + daratumumab as planned on 6/7/22. Already minimal administered volume; no adjustments to be made at this time.  - Will defer initiation of PJP ppx to outpatient team; per discussions, they are planning on starting something in the next couple of months

## 2022-06-08 NOTE — TELEPHONE ENCOUNTER
Pt calling in to report symptoms of diarrhea and nausea. Pt states that the symptoms started last night (6/7). She has had 3 episodes of diarrhea since last evening until now, no blood in the stool noted. Pt has not vomited. Pt experiencing some mild lightheadedness.    Pt stated she took Zofran this morning and that has helped with her nausea. Pt notes that she is sipping on fluids, and is going to eat small amount of food on the BRAT diet to help with upset stomach this morning.    HR is 71, and Oxygen 97% per pt statement. Pt denies chest pain, shortness of breath, or fever.    Pt has lab apt and infusion (IVF poss electrolyte infusion) today at 11:30.    Pt states that she has CHF and A-fib, and is concerned about getting fluids today as she is already experiencing leg swelling since starting chemo. Writer advised that she may be slightly dehydrated and in need of electrolytes due to diarrhea.    Will route to provider for recommendations.

## 2022-06-08 NOTE — TELEPHONE ENCOUNTER
"Writer discussed case with Genie Black NP whom states patient needs labs drawn today, and possible IV fluids and/or potassium replacement.  Patient should keep infusion appointment for today.    Called patient and informed of Genie EDMONDS's recommendations.  Patient states she does not want fluids due to her weight gain and does not want IV Potassium as \"that burns like crazy.\"    Informed patient I hear what she is saying yet encouraged patient to keep lab and infusion appointment for today so she can be assessed upon arrival.    Patient verbalized understanding and agreement with this plan.    Dinora Bradford RN on 6/8/2022 at 10:30 AM    "

## 2022-06-08 NOTE — PROGRESS NOTES
Infusion Nursing Note:  Leandra Guerrero presents today for IVF and possible electrolytes.    Patient seen by provider today: No   present during visit today: Not Applicable.    Note: Pt presents to infusion today with increased weakness and SOB compared to yesterday. She notes significant SOB with minimal position changes in bed or in a chair and becomes SOB while talking. Weight up 3.3 lb since yesterday. Abd noted to be more distended and firm and pt reports increased LE edema. Amy also states she monitors her I/O's at home and has only voided 100 ml of urine since 0500 and urine is dark brown in color. Crt also increased to 1.68 and K+ 5.3 today. She reports ongoing diarrhea.     Genie Black NP updated and stated to notify the pt's cardiologist. Dr. Cohen paged and was updated. Dr. Cohen returned call and asked to be connected with Dr. Wang.     Dr. Wang spoke with the patient and decision was made to initiate hospice. A hospice consult was placed by Dr. Wang. Pt tearful and sad. Emotional support provided. escorted into infusion to be with the pt. Questions answered to patient and her husbands satisfaction. Hospice to reach out to the patient within 24 hours and pt directed to reach out to her cardiology team with questions regarding her fluid status and diuresis.    Intravenous Access:  No Intravenous access/labs at this visit.    Treatment Conditions:  Lab Results   Component Value Date     (L) 06/08/2022    POTASSIUM 5.3 06/08/2022    MAG 2.3 06/06/2022    CR 1.68 (H) 06/08/2022    JERROD 9.5 06/08/2022    BILITOTAL 1.1 06/05/2022    ALBUMIN 3.1 (L) 06/05/2022    ALT 42 06/05/2022    AST 22 06/05/2022         Post Infusion Assessment:  N/A.       Discharge Plan:   AVS to patient via [employer].  Patient will return as directed by her provider.  Patient discharged in stable condition accompanied by: self and .  Departure Mode: Wheelchair.      Karen  DILIP Linder

## 2022-06-08 NOTE — TELEPHONE ENCOUNTER
Infusion nurse states patient is lethargic and short of breath during today's visit.    Patient wants to know how soon hospice can see patient as patient is struggling with symptom management.    Informed hospice can connect with patient within 24 hours of receiving referral.    Dinora Bradford RN on 6/8/2022 at 1:54 PM

## 2022-06-08 NOTE — TELEPHONE ENCOUNTER
Writer called pt to inform her of Genie G. JOEL recommendations.    Genie Black, APRN CNP  You 25 minutes ago (9:13 AM)     SG    Highly recommend review of lytes and only ordered 500 ml IV - we will see what she needs.She needs to come in for review. Genie    Message text       Pt notified writer that she had just spoken with Dinora DOMINGO and infusion apt had been cancelled due to weight gain limitations since her most recent admission to the hospital.    Writer heard back from Eloina in scheduling who communicated with Beatriz DOMINGO on clarification. Beatriz had spoken with Genie MALDONADO and plan is to have pt come in for labs and then determine if IVF is necessary per Eloina. Care coordination to call pt per Eloina.

## 2022-06-08 NOTE — TELEPHONE ENCOUNTER
Date: 6/8/2022    Time of Call: 2:43 PM     Diagnosis:  Heart failure     [ VORB ] Ordering provider: Dr Cohen    Order: Torsemide 200 mg tonight, metolazone 5 mg tonight, potassium extra 60 mEq today if tolerated.       Order received by: Niki Fam RN       Follow-up/additional notes: LVM for Amy.  Would also like to talk to her about upcoming clinic appointment- if currently on hospice no need for clinic follow up at this time, provided direct office number to call to review.

## 2022-06-08 NOTE — LETTER
6/8/2022         RE: Leandra Guerrero  [address] [place] [address]        Dear Colleague,    Thank you for referring your patient, Leandra Guerrero, to [facility]. Please see a copy of my visit note below.    Hematology/oncology update:    Over the course of our visit, Amy and I reviewed that her cardiac status continues to remain incredibly tenuous and is increasingly difficult to keep her out of the hospital from a volume standpoint.  She is clearly volume overloaded today, gaining several pounds even since last hospitalization with bilateral lower extremity edema, anasarca and pulmonary edema.  Right now based upon her current symptoms, she would likely warrant inpatient admission for aggressive diuresis, but her kidney function is so tenuous that aggressive diuresis can also throw her into worsening kidney failure.  Preemptively, I did discuss her case with Dr. Cohen, her primary cardiologist, who has been incredibly involved in Amy's care from a heart failure standpoint and outstanding ally for Amy's overall course of care.  Amy and Dr. Cohen about discussions about goals of care.  All parties agree that her recent tenuous cardiac status is unlikely to be a manifestation of her recent chemotherapy and likely further progression of her amyloidosis.  Even with optimal response to therapy, I do suspect the gingival remain with refractory heart failure and bounce back and forth between outpatient and inpatient status due to difficulties maintaining volume status.  After discussions of goals of care, Clinton is quite clear she wants to stay at home as much as possible and does not want to move into the hospital.  I can expect that and understand that.  She then brought up the discussion of hospice on her own and we agreed to a hospice consult with the goals of care changing towards keeping her comfortable and not pursuing aggressive  systemic therapy to try to get her amyloid under better control.  Dr. Cohen's team is aware and will pursue aggressive outpatient diuresis to try to get Amy feeling better.  Hospice consult has been placed and they will hopefully see her as soon as possible.  We briefly reviewed the dying process.  Her  was also updated and supportive of the decision.  While this decision is a fairly significant departure from our prior plan, it has been sometime in the making and likely time.    Yasmani Wang MD.      Again, thank you for allowing me to participate in the care of your patient.        Sincerely,        Yasmani Wang MD

## 2022-06-08 NOTE — TELEPHONE ENCOUNTER
Called [facility].  Hospice states they received the referral and will call patient today, and ask patient to schedule a visit tomorrow.    Dinora Bradford RN on 6/8/2022 at 2:36 PM

## 2022-06-08 NOTE — PROGRESS NOTES
Social Work Intervention  [facility]    Data/Intervention:    Patient Name:  Leandra Guerrero  /Age:  1950 (71 year old)    Visit Type: in person  Referral Source: Dr. Wang  Reason for Referral:      Collaborated With:    Amy infusion RN    Psychosocial Information/Concerns:  Amy met with Dr. Wang today, decision made to initiate hospice services.     Intervention/Education/Resources Provided:  PAT went and met with Amy in the infusion center. She was tearful, noting that while she knew this was coming, she wasn't expecting it so fast. She spoke about her lucretia and belief that God is in charge and therefore she knows she will be ok, though she understandably is feeling a bit anxious. She spoke about seeing her brother die while on hospice services about a year ago and it wasn't a process she felt good about. It has her worried about what this will look like for her. PAT provided supportive counseling.       As they were talking Amy was complaining increasingly about an intense headache. She started to close her eyes, focused more on her pain. PAT offered to get nurse, nurse reviewed and checked vitals. Amy agreed it wasn't the best time for her to talk right now. PAT offered to follow up with her tomorrow, Amy agreed.     Assessment/Plan:  PAT will reach out to Amy tomorrow to follow up and offer support.     Provided patient/family with contact information and availability.    LATRICE Marroquin, [facility]  Clinical , Adult Oncology  Phone: [phone]

## 2022-06-08 NOTE — TELEPHONE ENCOUNTER
"Writer discussed case with Dr Wang, as Dr Wang consulted with the inpatient MD while patient was hospitalized this past week. Hospitalization was due to an acute exacerbation of diastolic heart failure. With this being said, patient needs to contact cardiology to manage her weight and diuresis needs.    Called patient and encouraged her to contact her cardiologist today.  Patient states she is scheduled to see her cardiologist in [place] on 6/10/22.  Informed patient she needs to contact her cardiologist today, as her weight is up 6 pounds in the past 2 days, and the \"hospital summary & outpatient follow-up issues\" states, \"Per cardiology, dry weight range high 140s-low 150s. Would use weight of 153 lbs as cutoff above which to consider PRN diuresis.\"     Patient is taking breaths in the middle of sentences.     Patient states she will inform her cardiologist today about her weight gain.      Patient also states she does not want IV fluids today due to her weight gain but does want to check her Potassium level.  Agreed to keep lab appointment at 11:30 but cancel IV fluids appointment.    Dinora Bradford RN on 6/8/2022 at 9:20 AM    "

## 2022-06-08 NOTE — TELEPHONE ENCOUNTER
Date: 6/8/2022    Time of Call: 4:56 PM     Diagnosis:  Heart failure     [ VORB ] Ordering provider: Dr Cohen    Order: Torsemide 200 mg today, metolazone 5 mg today until Hospice sees her     Order received by: Niki Fam RN       Follow-up/additional notes: Amy stated understanding and expressed her gratitude for the care given over the last several years.  Will cancel her upcoming cardiology appointments.

## 2022-06-09 ENCOUNTER — PATIENT OUTREACH (OUTPATIENT)
Dept: CARE COORDINATION | Facility: CLINIC | Age: 72
End: 2022-06-09

## 2022-06-09 NOTE — PROGRESS NOTES
Social Work Note: Telephone Call  Oncology Clinic    Data/Intervention:  Patient Name:  Leandra Guerrero  /Age:  1950 (71 year old)    Call From:  PAT  Reason for Call:  Psychosocial check in    Assessment:  SW called and left message for Ginger and her family, as discussed yesterday. PAT offered support and encouragement. PAT encouraged them to call back if they had any questions or could use any support and guidance through this process. Contact information provided.     Plan:  PAT will remain available as needed by family. She is transitioning to hospice care.     LATRICE Marroquin, [facility]  Clinical , Adult Oncology  Phone: [phone]

## 2022-06-09 NOTE — PROGRESS NOTES
Hematology/oncology update:    Over the course of our visit, Amy and I reviewed that her cardiac status continues to remain incredibly tenuous and is increasingly difficult to keep her out of the hospital from a volume standpoint.  She is clearly volume overloaded today, gaining several pounds even since last hospitalization with bilateral lower extremity edema, anasarca and pulmonary edema.  Right now based upon her current symptoms, she would likely warrant inpatient admission for aggressive diuresis, but her kidney function is so tenuous that aggressive diuresis can also throw her into worsening kidney failure.  Preemptively, I did discuss her case with Dr. Cohen, her primary cardiologist, who has been incredibly involved in Amy's care from a heart failure standpoint and outstanding ally for Amy's overall course of care.  Amy and Dr. Cohen about discussions about goals of care.  All parties agree that her recent tenuous cardiac status is unlikely to be a manifestation of her recent chemotherapy and likely further progression of her amyloidosis.  Even with optimal response to therapy, I do suspect the gingival remain with refractory heart failure and bounce back and forth between outpatient and inpatient status due to difficulties maintaining volume status.  After discussions of goals of care, Clinton is quite clear she wants to stay at home as much as possible and does not want to move into the hospital.  I can expect that and understand that.  She then brought up the discussion of hospice on her own and we agreed to a hospice consult with the goals of care changing towards keeping her comfortable and not pursuing aggressive systemic therapy to try to get her amyloid under better control.  Dr. Cohen's team is aware and will pursue aggressive outpatient diuresis to try to get Amy feeling better.  Hospice consult has been placed and they will hopefully see her as soon as possible.  We briefly  reviewed the dying process.  Her  was also updated and supportive of the decision.  While this decision is a fairly significant departure from our prior plan, it has been sometime in the making and likely time.    Yasmani Wang MD.

## 2022-06-10 ENCOUNTER — DOCUMENTATION ONLY (OUTPATIENT)
Dept: OTHER | Facility: CLINIC | Age: 72
End: 2022-06-10
Payer: OTHER MISCELLANEOUS

## 2022-06-10 ENCOUNTER — MEDICAL CORRESPONDENCE (OUTPATIENT)
Dept: HEALTH INFORMATION MANAGEMENT | Facility: CLINIC | Age: 72
End: 2022-06-10
Payer: OTHER MISCELLANEOUS

## 2022-06-14 NOTE — Clinical Note
2/2/2017      RE: Leandra Guerrero  [address] [place] [address]       Dear Colleague,    Thank you for the opportunity to participate in the care of your patient, Leandra Guerrero, at the [facility]. Please see a copy of my visit note below.    February 2, 2017    HPI:  Mrs. Guerrero is a 66 year old female with no past medical history until the Spring of 2016 when she was diagnosed with stage IV AL amyloid; she presents to clinic for follow up of cardiac amyloid.     Her cardiac and oncologic history are as follows:   Fatigue started in January 2016. She eventually had a coronary angiogram which was reportedly normal and was diagnosed with CHF and started on a bblocker and diuretics. Her symptoms progressed to the point that she was evaluated at [place] in August/September (Dr. Barron in oncology/hematology, Dr. Nettles is cardiology). She was diagnosed with metastatic stage IV AL amyloid (+GI, +bone marrow involvement, not thought to have involvement of kidney or liver):    Her work up is detailed in my past note however she has lambda AL amyloidosis and has been undergoing CyBorD chemotherapy and excellent light chain response by serum. She has been turned down at [facility] for a stem cell transplant due to her cardiac involvement.    When I first met her about 2 months ago, she had definite evidence of cardiac amyloid, although she did not have volume overload at that time, her shortness of breath was minimal and she had several reassuring cardiac features including normal filling pressures on echo and again no elevation in her neck veins. She was seen earlier in January and was grossly volume overloaded, was admitted from 1/13 to 1/25 for IV diuresis and lost ~10-12 kg of fluid, she had been discharged on a stable dose of 80 BID torsemide and has been doing well maintaining her weight as an outpatient. There was brief discussion of pleurx  on discharge given her pleural effusion, but this seems to have improved with diuresis. She is working with PT, and is able to negotiate a flight of 8 steps without dyspnea. Her only complaint is lightheadedness which has persisted since her discharge. No syncope.      PAST MEDICAL HISTORY:  - Metastatic AL amyloid   - GI (stomach and duodenal) involvement  - Cardiac amyloidosis      FAMILY HISTORY:  Family History   Problem Relation Age of Onset     Other - See Comments Sister      Amyloidosis   - Sister passed from multiple myeloma, patient was also told her sister had amyloid as well  - Mom with CAD      SOCIAL HISTORY:  Social History     Social History     Marital Status:      Spouse Name: N/A     Number of Children: N/A     Years of Education: N/A     Social History Main Topics     Smoking status: Never Smoker      Smokeless tobacco: None     Alcohol Use: No     Drug Use: No     Sexual Activity: Not Asked     Other Topics Concern     None     Social History Narrative   Lives in [place] with her , has 2 kids  Previously did office work, hasn't worked for several months now  Never smoker  Social etoh    CURRENT MEDICATIONS:  Current Outpatient Prescriptions   Medication Sig Dispense Refill     torsemide (DEMADEX) 20 MG tablet Take 3 tablets (60 mg) by mouth 2 times daily 180 tablet 3     acyclovir (ZOVIRAX) 400 MG tablet Take 1 tablet (400 mg) by mouth 2 times daily 30 tablet 3     spironolactone (ALDACTONE) 25 MG tablet Take 1 tablet (25 mg) by mouth 2 times daily 60 tablet 1     potassium chloride SA (K-DUR/KLOR-CON M) 10 MEQ CR tablet Take 40meq (4 pills) in the morning, and 20meq (2 pills) in the afternoon. 180 tablet 11     thiamine 100 MG tablet Take 1 tablet (100 mg) by mouth daily 30 tablet 3     Acetaminophen (TYLENOL PO) Take 325 mg by mouth       HYDROcodone-acetaminophen (NORCO) 5-325 MG per tablet   0     LORazepam (ATIVAN) 0.5 MG tablet TAKE ONE-HALF TO ONE TABLET BY MOUTH ONCE  "DAILY AT BEDTIME AS NEEDED  0     ondansetron (ZOFRAN-ODT) 4 MG disintegrating tablet   0     [DISCONTINUED] torsemide (DEMADEX) 20 MG tablet Take 4 tablets (80 mg) by mouth 2 times daily 240 tablet 1       ROS:   Constitutional: No fever, chills, or sweats. 35lb weight loss over 6 months  ENT: No visual disturbance, ear ache, epistaxis, sore throat.   Allergies/Immunologic: Negative.   Respiratory: No cough, hemoptysis.   Cardiovascular: As per HPI.   GI: + Nausea and severe constipation. Early satiety   : No urinary frequency, dysuria, or hematuria.   Integument: Negative.   Psychiatric: feeling down and anxious since her diagnosis  Neuro: + tingling in legs bilaterally  Endocrinology: Negative.   Musculoskeletal: Negative.    EXAM:  /67 mmHg  Pulse 83  Ht 1.6 m (5' 3\")  Wt 60.238 kg (132 lb 12.8 oz)  BMI 23.53 kg/m2  SpO2 97%  General: appears comfortable, alert and articulate  Head: normocephalic, atraumatic  Eyes: anicteric sclera, EOMI  Neck: no adenopathy  Orophyarynx: moist mucosa, no lesions, dentition intact  Heart: regular rate and rhythm. III/VI holosystolic murmur at the apex, no visible JVD  Lungs: clear bilaterally  Abdomen: soft, non-tender, bowel sounds present, no hepatosplenomegaly  Extremities: mild edema non pitting at the ankles   Neurological: normal speech and affect, no gross motor deficits    Labs:  CBC RESULTS:  Lab Results   Component Value Date    WBC 5.3 01/24/2017    RBC 4.45 01/24/2017    HGB 14.3 01/24/2017    HCT 44.0 01/24/2017    MCV 99 01/24/2017    MCH 32.1 01/24/2017    MCHC 32.5 01/24/2017    RDW 15.2* 01/24/2017     01/24/2017       CMP RESULTS:  Lab Results   Component Value Date     02/01/2017    POTASSIUM 3.7 02/01/2017    CHLORIDE 93* 02/01/2017    CO2 32 02/01/2017    ANIONGAP 10 02/01/2017    * 02/01/2017    BUN 28 02/01/2017    CR 1.10* 02/01/2017    GFRESTIMATED 50* 02/01/2017    GFRESTBLACK 60* 02/01/2017    JERROD 9.3 02/01/2017    " BILITOTAL 1.6* 02/01/2017    ALBUMIN 3.6 02/01/2017    ALKPHOS 163* 02/01/2017    ALT 24 02/01/2017    AST 27 02/01/2017        INR RESULTS:  Lab Results   Component Value Date    INR 1.30* 01/14/2017       Lab Results   Component Value Date    MAG 2.5* 01/25/2017     Lab Results   Component Value Date    NTBNPI 9009* 01/13/2017     Lab Results   Component Value Date    NTBNP 2883* 01/05/2017       Echo from today:   Left Ventricle  Biplane LVEF 55%. Global and regional left ventricular function is normal  with an EF of 55-60%. Left ventricular size is normal. Moderate to severe  concentric wall thickening consistent with left ventricular hypertrophy is  present. Consistent with amyloid cardiomyopathy. Grade III or advanced  diastolic dysfunction. Global peak LV longitudinal strain is averaged at -  12.5%. This suggests abnormal strain (normal <-18%). No regional wall motion  abnormalities are seen.     Right Ventricle  The right ventricle is normal size. Global right ventricular function is  mildly reduced.  Atria  The right atria appears normal. Mild left atrial enlargement is present.     Mitral Valve  The mitral valve is normal. Mild to moderate mitral insufficiency is present.     Aortic Valve  Aortic valve is normal in structure and function. Mild aortic insufficiency  is present.     Tricuspid Valve  The tricuspid valve is normal. Mild to moderate tricuspid insufficiency is  present. The right ventricular systolic pressure is approximated at 23.7 mmHg  plus the right atrial pressure.     Pulmonic Valve  The pulmonic valve is normal. Mild pulmonic insufficiency is present.     Vessels  The inferior vena cava is normal. The aorta root is normal. The pulmonary  artery cannot be assessed.  Pericardium  Small circumferential pericardial effusion is present without any hemodynamic  significance.     Compared to Previous Study  This study was compared with the study from 10/6/2016  .     ______________________________________________________________________________  MMode/2D Measurements & Calculations  IVSd: 1.6 cm  LVIDd: 3.6 cm  LVIDs: 2.6 cm  LVPWd: 1.4 cm  FS: 28.0 %  EDV(Teich): 56.1 ml  ESV(Teich): 25.2 ml  LV mass(C)d: 202.4 grams       Doppler Measurements & Calculations  MV E max ella: 82.3 cm/sec  MV A max ella: 40.1 cm/sec  MV E/A: 2.1  MV dec time: 0.14 sec  TR max ella: 243.4 cm/sec  TR max P.7 mmHg  Lateral E/e': 17.2  Medial E/e': 18.3     ______________________________________________________________________________    Previous cardiac monitor; no a fib, no VT, no pauses       Assessment and Plan:   Mrs. 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     Patient has cardiac amyloidosis as evidenced by her echocardiogram (severe concentric hypertrophy and biatrial enlargement) and abnormal EKG (near-low voltage, poor R wave progression, biatrial enlargement and no evidence of LVH despite very thickened LV on echo) in the setting of biopsy proven (BMB, EGD) AL amyloid. Her positive biomarkers are also suggestive of cardiac involvement; angiogram without obstructive CAD. She had been undergoing chemotherapy with CyBorD with control of her light chains.     Overall I am somewhat encouraged by how good she looks with all of the fluid removed in her and she has  been successful at keeping the weight off since her discharge on 80 mg BID of torsemide. In fact based on her exam today she appears dry. Her NYHA class is III today, Stage C, she is dry. We will skip her dose of diuretic tonight and decrease to 60 mg BID tomorrow, if her LH does not improve or she regains weight, we will resume prior dose and add midodrine for orthostasis.     We still recommend holding any/all AV thor agents given the risk of progressive AV block/conduction  disease in these patients.     We are still awaiting her serum and urine immunofixation results today to see if she could potentially benefit from more amyloid treatment. We are also going to refer her to my colleague Harjit Fischer at [facility] in [place] for another opinion.     1. Cardiac Amyloidosis with preserved EF  Stage C  NYHA Class III  ACEi/ARB None  BB None, relatively contraindicated due to risk of progressive conduction disease/AV block in these patients  Aldosterone antagonist: aldactone 25  SCD prophylaxis: not indicated  % BiV pacing: NA  Fluid status: somewhat dry  -- decrease torsemide to 60 BID tomorrow, skip dose tonight    Other:  Arrhythmia monitoring -- no arrhythmia noted on ~2 weeks of telemetry during her last hospitalization.    Jaspreet Cr  Cardiovascular fellow  [phone]    I have seen and examined the patient with the house staff on February 2, 2017 and agree with the outlined assessment and plan.      Domenica Cohen MD   of Medicine   [facility] Division of Cardiology         CC  Patient Care Team:  Magy Obrien as PCP - General (Family Practice)  Mirela Moore MD as MD (Hematology & Oncology)  Vidhi Dorsey RN as Nurse Coordinator (Cardiology)         Jump in the ocean

## 2022-06-17 LAB — CULTURE HARVEST COMPLETE DATE: NORMAL

## 2022-06-18 LAB
INTERPRETATION: NORMAL
ISCN: NORMAL
METHODS: NORMAL

## 2022-09-09 NOTE — NURSING NOTE
"Oncology Rooming Note    November 3, 2017 2:41 PM   Leandra Guerrero is a 67 year old female who presents for:    Chief Complaint   Patient presents with     Oncology Clinic Visit     follow up      Initial Vitals: /67  Pulse 76  Temp 97.8  F (36.6  C)  Resp 15  Ht 1.6 m (5' 2.99\")  Wt 63.5 kg (140 lb)  SpO2 98%  BMI 24.81 kg/m2 Estimated body mass index is 24.81 kg/(m^2) as calculated from the following:    Height as of this encounter: 1.6 m (5' 2.99\").    Weight as of this encounter: 63.5 kg (140 lb). Body surface area is 1.68 meters squared.  No Pain (0) Comment: Data Unavailable   No LMP recorded. Patient is postmenopausal.  Allergies reviewed: Yes  Medications reviewed: Yes    Medications: Medication refills not needed today.  Pharmacy name entered into [employer]:    [id number] [place], [place] - [address] [place] [address] [id number] - [place], [place] - [address] [facility] [address]    5 minutes for nursing intake (face to face time)     Silke Leblanc LPN              " Arterial Line Procedure Note    Pre-Procedure   Staff -        Anesthesiologist:  Mike Rosario MD       Performed By: anesthesiologist       Location: OR       Pre-Anesthestic Checklist: patient identified, IV checked, risks and benefits discussed, informed consent, monitors and equipment checked, pre-op evaluation and at physician/surgeon's request  Timeout:       Correct Patient: Yes        Correct Procedure: Yes        Correct Site: Yes        Correct Position: Yes   Line Placement:   This line was placed Post Induction  Procedure   Procedure: arterial line       Laterality: left       Insertion Site: radial.  Sterile Prep        Standard elements of sterile barrier followed       Skin prep: Chloraprep  Insertion/Injection        Technique: ultrasound guided        1. Ultrasound was used to evaluate the access site.       2. Artery evaluated via ultrasound for patency/adequacy.       3. Using real-time ultrasound the needle/catheter was observed entering the artery/vein.       Catheter Type/Size: 20 G, 12 cm  Narrative         Secured by: anchor securement device       Tegaderm dressing used.       Complications: None apparent,

## 2022-10-05 NOTE — TELEPHONE ENCOUNTER
Received records from [address]  [address]  [facility] [address]   [phone]  Fax [phone]    Sent to scanning

## 2023-01-01 ENCOUNTER — HOSPITAL ENCOUNTER (INPATIENT)
Facility: CLINIC | Age: 73
LOS: 1 days | Discharge: HOSPICE | DRG: 292 | End: 2023-04-12
Attending: INTERNAL MEDICINE | Admitting: INTERNAL MEDICINE
Payer: OTHER MISCELLANEOUS

## 2023-01-01 ENCOUNTER — HOSPITAL ENCOUNTER (INPATIENT)
Facility: CLINIC | Age: 73
LOS: 1 days | Discharge: HOSPICE | DRG: 291 | End: 2023-06-13
Attending: EMERGENCY MEDICINE | Admitting: HOSPITALIST
Payer: MEDICARE

## 2023-01-01 ENCOUNTER — HOSPITAL ENCOUNTER (INPATIENT)
Facility: CLINIC | Age: 73
LOS: 1 days | Discharge: HOSPICE | End: 2023-06-14
Attending: HOSPITALIST | Admitting: HOSPITALIST
Payer: MEDICARE

## 2023-01-01 ENCOUNTER — APPOINTMENT (OUTPATIENT)
Dept: GENERAL RADIOLOGY | Facility: CLINIC | Age: 73
DRG: 291 | End: 2023-01-01
Attending: EMERGENCY MEDICINE
Payer: MEDICARE

## 2023-01-01 ENCOUNTER — HEALTH MAINTENANCE LETTER (OUTPATIENT)
Age: 73
End: 2023-01-01

## 2023-01-01 ENCOUNTER — MYC MEDICAL ADVICE (OUTPATIENT)
Dept: CARDIOLOGY | Facility: CLINIC | Age: 73
End: 2023-01-01
Payer: OTHER MISCELLANEOUS

## 2023-01-01 ENCOUNTER — APPOINTMENT (OUTPATIENT)
Dept: GENERAL RADIOLOGY | Facility: CLINIC | Age: 73
DRG: 292 | End: 2023-01-01
Attending: INTERNAL MEDICINE
Payer: OTHER MISCELLANEOUS

## 2023-01-01 ENCOUNTER — HOSPITAL ENCOUNTER (INPATIENT)
Facility: CLINIC | Age: 73
LOS: 1 days | Discharge: HOME | End: 2023-04-13
Attending: INTERNAL MEDICINE | Admitting: INTERNAL MEDICINE
Payer: COMMERCIAL

## 2023-01-01 VITALS
HEART RATE: 79 BPM | TEMPERATURE: 97.7 F | SYSTOLIC BLOOD PRESSURE: 108 MMHG | DIASTOLIC BLOOD PRESSURE: 53 MMHG | OXYGEN SATURATION: 98 % | BODY MASS INDEX: 27.01 KG/M2 | WEIGHT: 152.5 LBS | RESPIRATION RATE: 16 BRPM

## 2023-01-01 VITALS
HEIGHT: 63 IN | HEART RATE: 75 BPM | RESPIRATION RATE: 14 BRPM | DIASTOLIC BLOOD PRESSURE: 55 MMHG | WEIGHT: 153.9 LBS | OXYGEN SATURATION: 97 % | SYSTOLIC BLOOD PRESSURE: 121 MMHG | BODY MASS INDEX: 27.27 KG/M2 | TEMPERATURE: 97.5 F

## 2023-01-01 VITALS
BODY MASS INDEX: 28.08 KG/M2 | RESPIRATION RATE: 16 BRPM | SYSTOLIC BLOOD PRESSURE: 124 MMHG | WEIGHT: 158.5 LBS | HEART RATE: 65 BPM | DIASTOLIC BLOOD PRESSURE: 52 MMHG | OXYGEN SATURATION: 96 % | TEMPERATURE: 97.2 F

## 2023-01-01 VITALS — HEART RATE: 64 BPM | SYSTOLIC BLOOD PRESSURE: 122 MMHG | OXYGEN SATURATION: 98 % | DIASTOLIC BLOOD PRESSURE: 50 MMHG

## 2023-01-01 DIAGNOSIS — E85.4 AMYLOID HEART DISEASE (H): ICD-10-CM

## 2023-01-01 DIAGNOSIS — E87.70 HYPERVOLEMIA, UNSPECIFIED HYPERVOLEMIA TYPE: ICD-10-CM

## 2023-01-01 DIAGNOSIS — I50.33 ACUTE ON CHRONIC DIASTOLIC HEART FAILURE (H): ICD-10-CM

## 2023-01-01 DIAGNOSIS — Z20.822 LAB TEST NEGATIVE FOR COVID-19 VIRUS: ICD-10-CM

## 2023-01-01 DIAGNOSIS — R06.02 SHORTNESS OF BREATH: ICD-10-CM

## 2023-01-01 DIAGNOSIS — I50.33 ACUTE ON CHRONIC DIASTOLIC CONGESTIVE HEART FAILURE (H): ICD-10-CM

## 2023-01-01 DIAGNOSIS — E85.81 LIGHT CHAIN (AL) AMYLOIDOSIS (H): ICD-10-CM

## 2023-01-01 DIAGNOSIS — L89.312 PRESSURE INJURY OF RIGHT BUTTOCK, STAGE 2 (H): ICD-10-CM

## 2023-01-01 DIAGNOSIS — I43 AMYLOID HEART DISEASE (H): ICD-10-CM

## 2023-01-01 DIAGNOSIS — I50.33 ACUTE ON CHRONIC DIASTOLIC CONGESTIVE HEART FAILURE (H): Primary | ICD-10-CM

## 2023-01-01 LAB
ALBUMIN SERPL BCG-MCNC: 4.5 G/DL (ref 3.5–5.2)
ALBUMIN UR-MCNC: NEGATIVE MG/DL
ALP SERPL-CCNC: 179 U/L (ref 35–104)
ALT SERPL W P-5'-P-CCNC: 14 U/L (ref 10–35)
ANION GAP SERPL CALCULATED.3IONS-SCNC: 13 MMOL/L (ref 7–15)
ANION GAP SERPL CALCULATED.3IONS-SCNC: 13 MMOL/L (ref 7–15)
ANION GAP SERPL CALCULATED.3IONS-SCNC: 14 MMOL/L (ref 7–15)
ANION GAP SERPL CALCULATED.3IONS-SCNC: 15 MMOL/L (ref 7–15)
ANION GAP SERPL CALCULATED.3IONS-SCNC: 15 MMOL/L (ref 7–15)
ANION GAP SERPL CALCULATED.3IONS-SCNC: 16 MMOL/L (ref 7–15)
ANION GAP SERPL CALCULATED.3IONS-SCNC: 16 MMOL/L (ref 7–15)
ANION GAP SERPL CALCULATED.3IONS-SCNC: 17 MMOL/L (ref 7–15)
APPEARANCE UR: CLEAR
AST SERPL W P-5'-P-CCNC: 23 U/L (ref 10–35)
ATRIAL RATE - MUSE: 80 BPM
ATRIAL RATE - MUSE: 86 BPM
ATRIAL RATE - MUSE: 87 BPM
BASE EXCESS BLDV CALC-SCNC: 12.4 MMOL/L (ref -7.7–1.9)
BASOPHILS # BLD AUTO: 0.1 10E3/UL (ref 0–0.2)
BASOPHILS NFR BLD AUTO: 1 %
BASOPHILS NFR BLD AUTO: 2 %
BILIRUB SERPL-MCNC: 1.8 MG/DL
BILIRUB UR QL STRIP: NEGATIVE
BUN SERPL-MCNC: 27.9 MG/DL (ref 8–23)
BUN SERPL-MCNC: 30.6 MG/DL (ref 8–23)
BUN SERPL-MCNC: 31 MG/DL (ref 8–23)
BUN SERPL-MCNC: 34.7 MG/DL (ref 8–23)
BUN SERPL-MCNC: 35.7 MG/DL (ref 8–23)
BUN SERPL-MCNC: 35.8 MG/DL (ref 8–23)
BUN SERPL-MCNC: 36.6 MG/DL (ref 8–23)
BUN SERPL-MCNC: 40.9 MG/DL (ref 8–23)
CALCIUM SERPL-MCNC: 10.2 MG/DL (ref 8.8–10.2)
CALCIUM SERPL-MCNC: 9.5 MG/DL (ref 8.8–10.2)
CALCIUM SERPL-MCNC: 9.6 MG/DL (ref 8.8–10.2)
CALCIUM SERPL-MCNC: 9.8 MG/DL (ref 8.8–10.2)
CALCIUM SERPL-MCNC: 9.9 MG/DL (ref 8.8–10.2)
CHLORIDE SERPL-SCNC: 85 MMOL/L (ref 98–107)
CHLORIDE SERPL-SCNC: 85 MMOL/L (ref 98–107)
CHLORIDE SERPL-SCNC: 86 MMOL/L (ref 98–107)
CHLORIDE SERPL-SCNC: 87 MMOL/L (ref 98–107)
CHLORIDE SERPL-SCNC: 87 MMOL/L (ref 98–107)
CHLORIDE SERPL-SCNC: 88 MMOL/L (ref 98–107)
CHLORIDE SERPL-SCNC: 90 MMOL/L (ref 98–107)
CHLORIDE SERPL-SCNC: 92 MMOL/L (ref 98–107)
COLOR UR AUTO: ABNORMAL
CREAT SERPL-MCNC: 1.2 MG/DL (ref 0.51–0.95)
CREAT SERPL-MCNC: 1.25 MG/DL (ref 0.51–0.95)
CREAT SERPL-MCNC: 1.27 MG/DL (ref 0.51–0.95)
CREAT SERPL-MCNC: 1.27 MG/DL (ref 0.51–0.95)
CREAT SERPL-MCNC: 1.28 MG/DL (ref 0.51–0.95)
CREAT SERPL-MCNC: 1.31 MG/DL (ref 0.51–0.95)
CREAT SERPL-MCNC: 1.36 MG/DL (ref 0.51–0.95)
CREAT SERPL-MCNC: 1.68 MG/DL (ref 0.51–0.95)
DEPRECATED HCO3 PLAS-SCNC: 28 MMOL/L (ref 22–29)
DEPRECATED HCO3 PLAS-SCNC: 29 MMOL/L (ref 22–29)
DEPRECATED HCO3 PLAS-SCNC: 29 MMOL/L (ref 22–29)
DEPRECATED HCO3 PLAS-SCNC: 31 MMOL/L (ref 22–29)
DEPRECATED HCO3 PLAS-SCNC: 32 MMOL/L (ref 22–29)
DEPRECATED HCO3 PLAS-SCNC: 32 MMOL/L (ref 22–29)
DIASTOLIC BLOOD PRESSURE - MUSE: NORMAL MMHG
EOSINOPHIL # BLD AUTO: 0.8 10E3/UL (ref 0–0.7)
EOSINOPHIL # BLD AUTO: 1 10E3/UL (ref 0–0.7)
EOSINOPHIL NFR BLD AUTO: 14 %
EOSINOPHIL NFR BLD AUTO: 16 %
EOSINOPHIL NFR BLD AUTO: 16 %
EOSINOPHIL NFR BLD AUTO: 18 %
ERYTHROCYTE [DISTWIDTH] IN BLOOD BY AUTOMATED COUNT: 13.7 % (ref 10–15)
ERYTHROCYTE [DISTWIDTH] IN BLOOD BY AUTOMATED COUNT: 15.3 % (ref 10–15)
ERYTHROCYTE [DISTWIDTH] IN BLOOD BY AUTOMATED COUNT: 15.4 % (ref 10–15)
ERYTHROCYTE [DISTWIDTH] IN BLOOD BY AUTOMATED COUNT: 15.5 % (ref 10–15)
GFR SERPL CREATININE-BSD FRML MDRD: 32 ML/MIN/1.73M2
GFR SERPL CREATININE-BSD FRML MDRD: 42 ML/MIN/1.73M2
GFR SERPL CREATININE-BSD FRML MDRD: 44 ML/MIN/1.73M2
GFR SERPL CREATININE-BSD FRML MDRD: 45 ML/MIN/1.73M2
GFR SERPL CREATININE-BSD FRML MDRD: 46 ML/MIN/1.73M2
GFR SERPL CREATININE-BSD FRML MDRD: 48 ML/MIN/1.73M2
GLUCOSE SERPL-MCNC: 100 MG/DL (ref 70–99)
GLUCOSE SERPL-MCNC: 102 MG/DL (ref 70–99)
GLUCOSE SERPL-MCNC: 103 MG/DL (ref 70–99)
GLUCOSE SERPL-MCNC: 104 MG/DL (ref 70–99)
GLUCOSE SERPL-MCNC: 125 MG/DL (ref 70–99)
GLUCOSE SERPL-MCNC: 152 MG/DL (ref 70–99)
GLUCOSE SERPL-MCNC: 91 MG/DL (ref 70–99)
GLUCOSE SERPL-MCNC: 99 MG/DL (ref 70–99)
GLUCOSE UR STRIP-MCNC: NEGATIVE MG/DL
HCO3 BLDV-SCNC: 38 MMOL/L (ref 21–28)
HCT VFR BLD AUTO: 37.2 % (ref 35–47)
HCT VFR BLD AUTO: 38.7 % (ref 35–47)
HCT VFR BLD AUTO: 39.5 % (ref 35–47)
HCT VFR BLD AUTO: 40.3 % (ref 35–47)
HGB BLD-MCNC: 12.3 G/DL (ref 11.7–15.7)
HGB BLD-MCNC: 12.7 G/DL (ref 11.7–15.7)
HGB BLD-MCNC: 12.7 G/DL (ref 11.7–15.7)
HGB BLD-MCNC: 13.5 G/DL (ref 11.7–15.7)
HGB UR QL STRIP: NEGATIVE
IMM GRANULOCYTES # BLD: 0 10E3/UL
IMM GRANULOCYTES NFR BLD: 0 %
INR PPP: 1.11 (ref 0.85–1.15)
INTERPRETATION ECG - MUSE: NORMAL
KETONES UR STRIP-MCNC: NEGATIVE MG/DL
LEUKOCYTE ESTERASE UR QL STRIP: NEGATIVE
LYMPHOCYTES # BLD AUTO: 0.1 10E3/UL (ref 0.8–5.3)
LYMPHOCYTES # BLD AUTO: 0.1 10E3/UL (ref 0.8–5.3)
LYMPHOCYTES # BLD AUTO: 0.2 10E3/UL (ref 0.8–5.3)
LYMPHOCYTES # BLD AUTO: 0.2 10E3/UL (ref 0.8–5.3)
LYMPHOCYTES NFR BLD AUTO: 3 %
LYMPHOCYTES NFR BLD AUTO: 4 %
MAGNESIUM SERPL-MCNC: 2.1 MG/DL (ref 1.7–2.3)
MAGNESIUM SERPL-MCNC: 2.2 MG/DL (ref 1.7–2.3)
MAGNESIUM SERPL-MCNC: 2.2 MG/DL (ref 1.7–2.3)
MAGNESIUM SERPL-MCNC: 2.4 MG/DL (ref 1.7–2.3)
MCH RBC QN AUTO: 31.4 PG (ref 26.5–33)
MCH RBC QN AUTO: 31.6 PG (ref 26.5–33)
MCH RBC QN AUTO: 31.8 PG (ref 26.5–33)
MCH RBC QN AUTO: 32.5 PG (ref 26.5–33)
MCHC RBC AUTO-ENTMCNC: 32.2 G/DL (ref 31.5–36.5)
MCHC RBC AUTO-ENTMCNC: 32.8 G/DL (ref 31.5–36.5)
MCHC RBC AUTO-ENTMCNC: 33.1 G/DL (ref 31.5–36.5)
MCHC RBC AUTO-ENTMCNC: 33.5 G/DL (ref 31.5–36.5)
MCV RBC AUTO: 96 FL (ref 78–100)
MCV RBC AUTO: 96 FL (ref 78–100)
MCV RBC AUTO: 97 FL (ref 78–100)
MCV RBC AUTO: 98 FL (ref 78–100)
MONOCYTES # BLD AUTO: 0.5 10E3/UL (ref 0–1.3)
MONOCYTES # BLD AUTO: 0.6 10E3/UL (ref 0–1.3)
MONOCYTES # BLD AUTO: 0.6 10E3/UL (ref 0–1.3)
MONOCYTES # BLD AUTO: 0.8 10E3/UL (ref 0–1.3)
MONOCYTES NFR BLD AUTO: 11 %
MONOCYTES NFR BLD AUTO: 11 %
MONOCYTES NFR BLD AUTO: 12 %
MONOCYTES NFR BLD AUTO: 12 %
NEUTROPHILS # BLD AUTO: 2.9 10E3/UL (ref 1.6–8.3)
NEUTROPHILS # BLD AUTO: 3.3 10E3/UL (ref 1.6–8.3)
NEUTROPHILS # BLD AUTO: 3.5 10E3/UL (ref 1.6–8.3)
NEUTROPHILS # BLD AUTO: 4.8 10E3/UL (ref 1.6–8.3)
NEUTROPHILS NFR BLD AUTO: 67 %
NEUTROPHILS NFR BLD AUTO: 68 %
NEUTROPHILS NFR BLD AUTO: 68 %
NEUTROPHILS NFR BLD AUTO: 69 %
NITRATE UR QL: NEGATIVE
NRBC # BLD AUTO: 0 10E3/UL
NRBC BLD AUTO-RTO: 0 /100
NT-PROBNP SERPL-MCNC: 1199 PG/ML (ref 0–900)
NT-PROBNP SERPL-MCNC: 1989 PG/ML (ref 0–900)
O2/TOTAL GAS SETTING VFR VENT: 0 %
P AXIS - MUSE: 56 DEGREES
P AXIS - MUSE: 86 DEGREES
P AXIS - MUSE: 89 DEGREES
PCO2 BLDV: 51 MM HG (ref 40–50)
PH BLDV: 7.48 [PH] (ref 7.32–7.43)
PH UR STRIP: 7.5 [PH] (ref 5–7)
PLATELET # BLD AUTO: 182 10E3/UL (ref 150–450)
PLATELET # BLD AUTO: 190 10E3/UL (ref 150–450)
PLATELET # BLD AUTO: 201 10E3/UL (ref 150–450)
PLATELET # BLD AUTO: 214 10E3/UL (ref 150–450)
PO2 BLDV: 31 MM HG (ref 25–47)
POTASSIUM SERPL-SCNC: 2.5 MMOL/L (ref 3.4–5.3)
POTASSIUM SERPL-SCNC: 2.8 MMOL/L (ref 3.4–5.3)
POTASSIUM SERPL-SCNC: 2.9 MMOL/L (ref 3.4–5.3)
POTASSIUM SERPL-SCNC: 3 MMOL/L (ref 3.4–5.3)
POTASSIUM SERPL-SCNC: 3 MMOL/L (ref 3.4–5.3)
POTASSIUM SERPL-SCNC: 3.3 MMOL/L (ref 3.4–5.3)
POTASSIUM SERPL-SCNC: 3.6 MMOL/L (ref 3.4–5.3)
POTASSIUM SERPL-SCNC: 3.7 MMOL/L (ref 3.4–5.3)
POTASSIUM SERPL-SCNC: 3.7 MMOL/L (ref 3.4–5.3)
POTASSIUM SERPL-SCNC: 4 MMOL/L (ref 3.4–5.3)
PR INTERVAL - MUSE: 224 MS
PR INTERVAL - MUSE: 238 MS
PR INTERVAL - MUSE: 250 MS
PROT SERPL-MCNC: 7.1 G/DL (ref 6.4–8.3)
QRS DURATION - MUSE: 80 MS
QRS DURATION - MUSE: 84 MS
QRS DURATION - MUSE: 90 MS
QT - MUSE: 368 MS
QT - MUSE: 402 MS
QT - MUSE: 406 MS
QTC - MUSE: 424 MS
QTC - MUSE: 483 MS
QTC - MUSE: 485 MS
R AXIS - MUSE: -39 DEGREES
R AXIS - MUSE: -44 DEGREES
R AXIS - MUSE: -54 DEGREES
RBC # BLD AUTO: 3.87 10E6/UL (ref 3.8–5.2)
RBC # BLD AUTO: 4.02 10E6/UL (ref 3.8–5.2)
RBC # BLD AUTO: 4.04 10E6/UL (ref 3.8–5.2)
RBC # BLD AUTO: 4.16 10E6/UL (ref 3.8–5.2)
RBC URINE: 1 /HPF
SODIUM SERPL-SCNC: 131 MMOL/L (ref 136–145)
SODIUM SERPL-SCNC: 131 MMOL/L (ref 136–145)
SODIUM SERPL-SCNC: 132 MMOL/L (ref 136–145)
SODIUM SERPL-SCNC: 132 MMOL/L (ref 136–145)
SODIUM SERPL-SCNC: 133 MMOL/L (ref 136–145)
SODIUM SERPL-SCNC: 134 MMOL/L (ref 136–145)
SODIUM SERPL-SCNC: 134 MMOL/L (ref 136–145)
SODIUM SERPL-SCNC: 135 MMOL/L (ref 136–145)
SP GR UR STRIP: 1.01 (ref 1–1.03)
SQUAMOUS EPITHELIAL: <1 /HPF
SYSTOLIC BLOOD PRESSURE - MUSE: NORMAL MMHG
T AXIS - MUSE: 107 DEGREES
T AXIS - MUSE: 17 DEGREES
T AXIS - MUSE: 224 DEGREES
TROPONIN T SERPL HS-MCNC: 48 NG/L
TROPONIN T SERPL HS-MCNC: 51 NG/L
TROPONIN T SERPL HS-MCNC: 51 NG/L
TROPONIN T SERPL HS-MCNC: 55 NG/L
TSH SERPL DL<=0.005 MIU/L-ACNC: 2.26 UIU/ML (ref 0.3–4.2)
UROBILINOGEN UR STRIP-MCNC: NORMAL MG/DL
VENTRICULAR RATE- MUSE: 80 BPM
VENTRICULAR RATE- MUSE: 86 BPM
VENTRICULAR RATE- MUSE: 87 BPM
WBC # BLD AUTO: 4.4 10E3/UL (ref 4–11)
WBC # BLD AUTO: 4.9 10E3/UL (ref 4–11)
WBC # BLD AUTO: 5.1 10E3/UL (ref 4–11)
WBC # BLD AUTO: 6.9 10E3/UL (ref 4–11)
WBC URINE: 3 /HPF

## 2023-01-01 PROCEDURE — 258N000003 HC RX IP 258 OP 636: Performed by: HOSPITALIST

## 2023-01-01 PROCEDURE — 250N000013 HC RX MED GY IP 250 OP 250 PS 637

## 2023-01-01 PROCEDURE — C9803 HOPD COVID-19 SPEC COLLECT: HCPCS | Performed by: INTERNAL MEDICINE

## 2023-01-01 PROCEDURE — 83880 ASSAY OF NATRIURETIC PEPTIDE: CPT | Performed by: INTERNAL MEDICINE

## 2023-01-01 PROCEDURE — 96374 THER/PROPH/DIAG INJ IV PUSH: CPT

## 2023-01-01 PROCEDURE — 81001 URINALYSIS AUTO W/SCOPE: CPT | Performed by: INTERNAL MEDICINE

## 2023-01-01 PROCEDURE — 250N000013 HC RX MED GY IP 250 OP 250 PS 637: Performed by: HOSPITALIST

## 2023-01-01 PROCEDURE — 83735 ASSAY OF MAGNESIUM: CPT | Performed by: STUDENT IN AN ORGANIZED HEALTH CARE EDUCATION/TRAINING PROGRAM

## 2023-01-01 PROCEDURE — 250N000009 HC RX 250: Performed by: STUDENT IN AN ORGANIZED HEALTH CARE EDUCATION/TRAINING PROGRAM

## 2023-01-01 PROCEDURE — 84132 ASSAY OF SERUM POTASSIUM: CPT | Performed by: STUDENT IN AN ORGANIZED HEALTH CARE EDUCATION/TRAINING PROGRAM

## 2023-01-01 PROCEDURE — 82803 BLOOD GASES ANY COMBINATION: CPT | Performed by: EMERGENCY MEDICINE

## 2023-01-01 PROCEDURE — 85004 AUTOMATED DIFF WBC COUNT: CPT | Performed by: EMERGENCY MEDICINE

## 2023-01-01 PROCEDURE — 83880 ASSAY OF NATRIURETIC PEPTIDE: CPT | Performed by: EMERGENCY MEDICINE

## 2023-01-01 PROCEDURE — 84484 ASSAY OF TROPONIN QUANT: CPT | Performed by: STUDENT IN AN ORGANIZED HEALTH CARE EDUCATION/TRAINING PROGRAM

## 2023-01-01 PROCEDURE — 110N000005 HC R&B HOSPICE, ACCENT

## 2023-01-01 PROCEDURE — G0463 HOSPITAL OUTPT CLINIC VISIT: HCPCS

## 2023-01-01 PROCEDURE — 71046 X-RAY EXAM CHEST 2 VIEWS: CPT

## 2023-01-01 PROCEDURE — 250N000013 HC RX MED GY IP 250 OP 250 PS 637: Performed by: INTERNAL MEDICINE

## 2023-01-01 PROCEDURE — 85610 PROTHROMBIN TIME: CPT | Performed by: INTERNAL MEDICINE

## 2023-01-01 PROCEDURE — 93308 TTE F-UP OR LMTD: CPT | Performed by: INTERNAL MEDICINE

## 2023-01-01 PROCEDURE — 82040 ASSAY OF SERUM ALBUMIN: CPT | Performed by: INTERNAL MEDICINE

## 2023-01-01 PROCEDURE — G0378 HOSPITAL OBSERVATION PER HR: HCPCS

## 2023-01-01 PROCEDURE — 93010 ELECTROCARDIOGRAM REPORT: CPT | Mod: 59 | Performed by: INTERNAL MEDICINE

## 2023-01-01 PROCEDURE — 84443 ASSAY THYROID STIM HORMONE: CPT | Performed by: INTERNAL MEDICINE

## 2023-01-01 PROCEDURE — 36415 COLL VENOUS BLD VENIPUNCTURE: CPT | Performed by: EMERGENCY MEDICINE

## 2023-01-01 PROCEDURE — 84484 ASSAY OF TROPONIN QUANT: CPT | Performed by: INTERNAL MEDICINE

## 2023-01-01 PROCEDURE — 80048 BASIC METABOLIC PNL TOTAL CA: CPT

## 2023-01-01 PROCEDURE — 93005 ELECTROCARDIOGRAM TRACING: CPT | Performed by: INTERNAL MEDICINE

## 2023-01-01 PROCEDURE — 85025 COMPLETE CBC W/AUTO DIFF WBC: CPT | Performed by: INTERNAL MEDICINE

## 2023-01-01 PROCEDURE — 250N000013 HC RX MED GY IP 250 OP 250 PS 637: Performed by: STUDENT IN AN ORGANIZED HEALTH CARE EDUCATION/TRAINING PROGRAM

## 2023-01-01 PROCEDURE — 99285 EMERGENCY DEPT VISIT HI MDM: CPT | Mod: 25 | Performed by: INTERNAL MEDICINE

## 2023-01-01 PROCEDURE — 250N000009 HC RX 250: Performed by: NURSE PRACTITIONER

## 2023-01-01 PROCEDURE — 99239 HOSP IP/OBS DSCHRG MGMT >30: CPT | Mod: GV | Performed by: HOSPITALIST

## 2023-01-01 PROCEDURE — 250N000013 HC RX MED GY IP 250 OP 250 PS 637: Performed by: NURSE PRACTITIONER

## 2023-01-01 PROCEDURE — 85025 COMPLETE CBC W/AUTO DIFF WBC: CPT | Performed by: STUDENT IN AN ORGANIZED HEALTH CARE EDUCATION/TRAINING PROGRAM

## 2023-01-01 PROCEDURE — 84484 ASSAY OF TROPONIN QUANT: CPT | Performed by: EMERGENCY MEDICINE

## 2023-01-01 PROCEDURE — 82310 ASSAY OF CALCIUM: CPT | Performed by: HOSPITALIST

## 2023-01-01 PROCEDURE — 36415 COLL VENOUS BLD VENIPUNCTURE: CPT | Performed by: HOSPITALIST

## 2023-01-01 PROCEDURE — 99239 HOSP IP/OBS DSCHRG MGMT >30: CPT

## 2023-01-01 PROCEDURE — 99232 SBSQ HOSP IP/OBS MODERATE 35: CPT

## 2023-01-01 PROCEDURE — 36415 COLL VENOUS BLD VENIPUNCTURE: CPT | Performed by: STUDENT IN AN ORGANIZED HEALTH CARE EDUCATION/TRAINING PROGRAM

## 2023-01-01 PROCEDURE — 80048 BASIC METABOLIC PNL TOTAL CA: CPT | Performed by: STUDENT IN AN ORGANIZED HEALTH CARE EDUCATION/TRAINING PROGRAM

## 2023-01-01 PROCEDURE — 36415 COLL VENOUS BLD VENIPUNCTURE: CPT | Performed by: NURSE PRACTITIONER

## 2023-01-01 PROCEDURE — 250N000011 HC RX IP 250 OP 636: Performed by: NURSE PRACTITIONER

## 2023-01-01 PROCEDURE — 80048 BASIC METABOLIC PNL TOTAL CA: CPT | Performed by: NURSE PRACTITIONER

## 2023-01-01 PROCEDURE — 36415 COLL VENOUS BLD VENIPUNCTURE: CPT | Performed by: INTERNAL MEDICINE

## 2023-01-01 PROCEDURE — 76604 US EXAM CHEST: CPT | Mod: 26 | Performed by: INTERNAL MEDICINE

## 2023-01-01 PROCEDURE — 80048 BASIC METABOLIC PNL TOTAL CA: CPT | Performed by: HOSPITALIST

## 2023-01-01 PROCEDURE — 93005 ELECTROCARDIOGRAM TRACING: CPT

## 2023-01-01 PROCEDURE — 250N000011 HC RX IP 250 OP 636: Performed by: EMERGENCY MEDICINE

## 2023-01-01 PROCEDURE — 71045 X-RAY EXAM CHEST 1 VIEW: CPT | Mod: 26 | Performed by: RADIOLOGY

## 2023-01-01 PROCEDURE — 250N000013 HC RX MED GY IP 250 OP 250 PS 637: Performed by: EMERGENCY MEDICINE

## 2023-01-01 PROCEDURE — 85004 AUTOMATED DIFF WBC COUNT: CPT | Performed by: NURSE PRACTITIONER

## 2023-01-01 PROCEDURE — 99207 PR NO BILLABLE SERVICE THIS VISIT: CPT | Mod: GV | Performed by: HOSPITALIST

## 2023-01-01 PROCEDURE — 71045 X-RAY EXAM CHEST 1 VIEW: CPT

## 2023-01-01 PROCEDURE — 250N000011 HC RX IP 250 OP 636: Performed by: HOSPITALIST

## 2023-01-01 PROCEDURE — 99222 1ST HOSP IP/OBS MODERATE 55: CPT | Performed by: HOSPITALIST

## 2023-01-01 PROCEDURE — 120N000003 HC R&B IMCU UMMC

## 2023-01-01 PROCEDURE — 83735 ASSAY OF MAGNESIUM: CPT | Performed by: NURSE PRACTITIONER

## 2023-01-01 PROCEDURE — 36415 COLL VENOUS BLD VENIPUNCTURE: CPT

## 2023-01-01 PROCEDURE — 99285 EMERGENCY DEPT VISIT HI MDM: CPT | Mod: 25

## 2023-01-01 PROCEDURE — 120N000001 HC R&B MED SURG/OB

## 2023-01-01 PROCEDURE — 84132 ASSAY OF SERUM POTASSIUM: CPT

## 2023-01-01 PROCEDURE — 250N000011 HC RX IP 250 OP 636: Performed by: INTERNAL MEDICINE

## 2023-01-01 PROCEDURE — 80048 BASIC METABOLIC PNL TOTAL CA: CPT | Performed by: EMERGENCY MEDICINE

## 2023-01-01 PROCEDURE — 85025 COMPLETE CBC W/AUTO DIFF WBC: CPT | Performed by: EMERGENCY MEDICINE

## 2023-01-01 RX ORDER — HYDROMORPHONE HYDROCHLORIDE 1 MG/ML
2 SOLUTION ORAL
Status: DISCONTINUED | OUTPATIENT
Start: 2023-01-01 | End: 2023-01-01 | Stop reason: HOSPADM

## 2023-01-01 RX ORDER — LORAZEPAM 0.5 MG/1
0.5 TABLET ORAL
COMMUNITY

## 2023-01-01 RX ORDER — FUROSEMIDE 10 MG/ML
100 INJECTION INTRAMUSCULAR; INTRAVENOUS ONCE
Status: COMPLETED | OUTPATIENT
Start: 2023-01-01 | End: 2023-01-01

## 2023-01-01 RX ORDER — POTASSIUM CHLORIDE 750 MG/1
20 TABLET, EXTENDED RELEASE ORAL ONCE
Status: CANCELLED | OUTPATIENT
Start: 2023-01-01 | End: 2023-01-01

## 2023-01-01 RX ORDER — POTASSIUM CHLORIDE 1500 MG/1
60 TABLET, EXTENDED RELEASE ORAL ONCE
Status: COMPLETED | OUTPATIENT
Start: 2023-01-01 | End: 2023-01-01

## 2023-01-01 RX ORDER — NALOXONE HYDROCHLORIDE 0.4 MG/ML
0.2 INJECTION, SOLUTION INTRAMUSCULAR; INTRAVENOUS; SUBCUTANEOUS
Status: DISCONTINUED | OUTPATIENT
Start: 2023-01-01 | End: 2023-01-01 | Stop reason: HOSPADM

## 2023-01-01 RX ORDER — HYDROCODONE BITARTRATE AND ACETAMINOPHEN 5; 325 MG/1; MG/1
1-2 TABLET ORAL EVERY 4 HOURS PRN
Status: DISCONTINUED | OUTPATIENT
Start: 2023-01-01 | End: 2023-01-01 | Stop reason: HOSPADM

## 2023-01-01 RX ORDER — ONDANSETRON 2 MG/ML
4 INJECTION INTRAMUSCULAR; INTRAVENOUS EVERY 6 HOURS PRN
Status: CANCELLED | OUTPATIENT
Start: 2023-01-01

## 2023-01-01 RX ORDER — TRIAMTERENE CAPSULES 100 MG/1
100 CAPSULE ORAL 2 TIMES DAILY
COMMUNITY

## 2023-01-01 RX ORDER — TORSEMIDE 100 MG/1
100 TABLET ORAL DAILY
Status: DISCONTINUED | OUTPATIENT
Start: 2023-01-01 | End: 2023-01-01 | Stop reason: HOSPADM

## 2023-01-01 RX ORDER — HALOPERIDOL 1 MG/1
1 TABLET ORAL EVERY 4 HOURS PRN
Status: DISCONTINUED | OUTPATIENT
Start: 2023-01-01 | End: 2023-01-01 | Stop reason: HOSPADM

## 2023-01-01 RX ORDER — LORAZEPAM 0.5 MG/1
0.5 TABLET ORAL
Status: DISCONTINUED | OUTPATIENT
Start: 2023-01-01 | End: 2023-01-01 | Stop reason: HOSPADM

## 2023-01-01 RX ORDER — ACETAMINOPHEN 325 MG/1
325 TABLET ORAL EVERY 6 HOURS PRN
Status: DISCONTINUED | OUTPATIENT
Start: 2023-01-01 | End: 2023-01-01 | Stop reason: HOSPADM

## 2023-01-01 RX ORDER — NALOXONE HYDROCHLORIDE 0.4 MG/ML
0.1 INJECTION, SOLUTION INTRAMUSCULAR; INTRAVENOUS; SUBCUTANEOUS
Status: DISCONTINUED | OUTPATIENT
Start: 2023-01-01 | End: 2023-01-01 | Stop reason: HOSPADM

## 2023-01-01 RX ORDER — FUROSEMIDE 40 MG
160 TABLET ORAL 3 TIMES DAILY
COMMUNITY

## 2023-01-01 RX ORDER — ONDANSETRON 4 MG/1
4 TABLET, ORALLY DISINTEGRATING ORAL EVERY 6 HOURS PRN
Status: DISCONTINUED | OUTPATIENT
Start: 2023-01-01 | End: 2023-01-01 | Stop reason: HOSPADM

## 2023-01-01 RX ORDER — NALOXONE HYDROCHLORIDE 0.4 MG/ML
0.2 INJECTION, SOLUTION INTRAMUSCULAR; INTRAVENOUS; SUBCUTANEOUS
Status: DISCONTINUED | OUTPATIENT
Start: 2023-01-01 | End: 2023-01-01

## 2023-01-01 RX ORDER — POTASSIUM CHLORIDE 750 MG/1
80 TABLET, EXTENDED RELEASE ORAL 2 TIMES DAILY
Qty: 1980 TABLET | Refills: 0 | Status: SHIPPED | OUTPATIENT
Start: 2023-01-01 | End: 2023-01-01

## 2023-01-01 RX ORDER — AMOXICILLIN 250 MG
1 CAPSULE ORAL 2 TIMES DAILY PRN
Status: DISCONTINUED | OUTPATIENT
Start: 2023-01-01 | End: 2023-01-01 | Stop reason: HOSPADM

## 2023-01-01 RX ORDER — NALOXONE HYDROCHLORIDE 0.4 MG/ML
0.2 INJECTION, SOLUTION INTRAMUSCULAR; INTRAVENOUS; SUBCUTANEOUS
Status: CANCELLED | OUTPATIENT
Start: 2023-01-01

## 2023-01-01 RX ORDER — LIDOCAINE 40 MG/G
CREAM TOPICAL
Status: DISCONTINUED | OUTPATIENT
Start: 2023-01-01 | End: 2023-01-01 | Stop reason: HOSPADM

## 2023-01-01 RX ORDER — FUROSEMIDE 10 MG/ML
60 INJECTION INTRAMUSCULAR; INTRAVENOUS ONCE
Status: COMPLETED | OUTPATIENT
Start: 2023-01-01 | End: 2023-01-01

## 2023-01-01 RX ORDER — BISACODYL 10 MG
10 SUPPOSITORY, RECTAL RECTAL DAILY PRN
COMMUNITY

## 2023-01-01 RX ORDER — HYDROXYZINE HYDROCHLORIDE 25 MG/1
25 TABLET, FILM COATED ORAL 4 TIMES DAILY PRN
COMMUNITY

## 2023-01-01 RX ORDER — METOLAZONE 5 MG/1
TABLET ORAL
Qty: 36 TABLET | Refills: 3 | Status: SHIPPED | OUTPATIENT
Start: 2023-01-01

## 2023-01-01 RX ORDER — HYDROCODONE BITARTRATE AND ACETAMINOPHEN 5; 325 MG/1; MG/1
1-2 TABLET ORAL EVERY 4 HOURS PRN
COMMUNITY

## 2023-01-01 RX ORDER — TORSEMIDE 100 MG/1
100 TABLET ORAL
Qty: 180 TABLET | Refills: 3 | Status: SHIPPED | OUTPATIENT
Start: 2023-01-01 | End: 2023-01-01

## 2023-01-01 RX ORDER — FEXOFENADINE HCL 180 MG/1
180 TABLET ORAL DAILY
Status: DISCONTINUED | OUTPATIENT
Start: 2023-01-01 | End: 2023-01-01 | Stop reason: HOSPADM

## 2023-01-01 RX ORDER — METOLAZONE 5 MG/1
5 TABLET ORAL ONCE
Status: COMPLETED | OUTPATIENT
Start: 2023-01-01 | End: 2023-01-01

## 2023-01-01 RX ORDER — METOLAZONE 5 MG/1
5 TABLET ORAL
Status: DISCONTINUED | OUTPATIENT
Start: 2023-01-01 | End: 2023-01-01 | Stop reason: HOSPADM

## 2023-01-01 RX ORDER — HYDROXYZINE HYDROCHLORIDE 25 MG/1
25 TABLET, FILM COATED ORAL 4 TIMES DAILY PRN
Status: DISCONTINUED | OUTPATIENT
Start: 2023-01-01 | End: 2023-01-01 | Stop reason: HOSPADM

## 2023-01-01 RX ORDER — NALOXONE HYDROCHLORIDE 0.4 MG/ML
0.4 INJECTION, SOLUTION INTRAMUSCULAR; INTRAVENOUS; SUBCUTANEOUS
Status: CANCELLED | OUTPATIENT
Start: 2023-01-01

## 2023-01-01 RX ORDER — FUROSEMIDE 10 MG/ML
40 INJECTION INTRAMUSCULAR; INTRAVENOUS
Status: DISCONTINUED | OUTPATIENT
Start: 2023-01-01 | End: 2023-01-01

## 2023-01-01 RX ORDER — POTASSIUM CHLORIDE 1500 MG/1
80 TABLET, EXTENDED RELEASE ORAL 2 TIMES DAILY
Status: DISCONTINUED | OUTPATIENT
Start: 2023-01-01 | End: 2023-01-01 | Stop reason: HOSPADM

## 2023-01-01 RX ORDER — AMOXICILLIN 250 MG
1 CAPSULE ORAL DAILY PRN
Status: DISCONTINUED | OUTPATIENT
Start: 2023-01-01 | End: 2023-01-01

## 2023-01-01 RX ORDER — POTASSIUM CHLORIDE 750 MG/1
40 TABLET, EXTENDED RELEASE ORAL ONCE
Status: COMPLETED | OUTPATIENT
Start: 2023-01-01 | End: 2023-01-01

## 2023-01-01 RX ORDER — HYOSCYAMINE SULFATE 0.125 MG
0.12 TABLET ORAL EVERY 4 HOURS PRN
COMMUNITY

## 2023-01-01 RX ORDER — POTASSIUM CHLORIDE 1500 MG/1
40 TABLET, EXTENDED RELEASE ORAL ONCE
Status: COMPLETED | OUTPATIENT
Start: 2023-01-01 | End: 2023-01-01

## 2023-01-01 RX ORDER — FEXOFENADINE HCL 180 MG/1
180 TABLET ORAL DAILY
Status: CANCELLED | OUTPATIENT
Start: 2023-01-01

## 2023-01-01 RX ORDER — HYDROCODONE BITARTRATE AND ACETAMINOPHEN 5; 325 MG/1; MG/1
1-2 TABLET ORAL EVERY 4 HOURS PRN
Status: DISCONTINUED | OUTPATIENT
Start: 2023-01-01 | End: 2023-01-01

## 2023-01-01 RX ORDER — POTASSIUM CHLORIDE 750 MG/1
20 TABLET, EXTENDED RELEASE ORAL ONCE
Status: COMPLETED | OUTPATIENT
Start: 2023-01-01 | End: 2023-01-01

## 2023-01-01 RX ORDER — ASPIRIN 81 MG/1
324 TABLET, CHEWABLE ORAL ONCE
Status: COMPLETED | OUTPATIENT
Start: 2023-01-01 | End: 2023-01-01

## 2023-01-01 RX ORDER — ONDANSETRON 4 MG/1
4 TABLET, ORALLY DISINTEGRATING ORAL EVERY 6 HOURS PRN
Status: CANCELLED | OUTPATIENT
Start: 2023-01-01

## 2023-01-01 RX ORDER — METOPROLOL SUCCINATE 25 MG/1
25 TABLET, EXTENDED RELEASE ORAL DAILY PRN
Status: ON HOLD | COMMUNITY
End: 2023-01-01

## 2023-01-01 RX ORDER — ACETAMINOPHEN 325 MG/1
325 TABLET ORAL EVERY 4 HOURS PRN
Status: CANCELLED | OUTPATIENT
Start: 2023-01-01

## 2023-01-01 RX ORDER — ONDANSETRON 2 MG/ML
4 INJECTION INTRAMUSCULAR; INTRAVENOUS EVERY 6 HOURS PRN
Status: DISCONTINUED | OUTPATIENT
Start: 2023-01-01 | End: 2023-01-01 | Stop reason: HOSPADM

## 2023-01-01 RX ORDER — POTASSIUM CHLORIDE 1500 MG/1
80 TABLET, EXTENDED RELEASE ORAL ONCE
Status: COMPLETED | OUTPATIENT
Start: 2023-01-01 | End: 2023-01-01

## 2023-01-01 RX ORDER — PROCHLORPERAZINE MALEATE 10 MG
10 TABLET ORAL EVERY 6 HOURS PRN
COMMUNITY

## 2023-01-01 RX ORDER — HALOPERIDOL 1 MG/1
1 TABLET ORAL EVERY 4 HOURS PRN
Status: CANCELLED | OUTPATIENT
Start: 2023-01-01

## 2023-01-01 RX ORDER — HALOPERIDOL 1 MG/1
1 TABLET ORAL EVERY 4 HOURS PRN
COMMUNITY

## 2023-01-01 RX ORDER — NALOXONE HYDROCHLORIDE 0.4 MG/ML
0.4 INJECTION, SOLUTION INTRAMUSCULAR; INTRAVENOUS; SUBCUTANEOUS
Status: DISCONTINUED | OUTPATIENT
Start: 2023-01-01 | End: 2023-01-01 | Stop reason: HOSPADM

## 2023-01-01 RX ORDER — AMOXICILLIN 250 MG
2 CAPSULE ORAL 2 TIMES DAILY PRN
Status: DISCONTINUED | OUTPATIENT
Start: 2023-01-01 | End: 2023-01-01 | Stop reason: HOSPADM

## 2023-01-01 RX ORDER — BISACODYL 10 MG
10 SUPPOSITORY, RECTAL RECTAL DAILY PRN
Status: CANCELLED | OUTPATIENT
Start: 2023-01-01

## 2023-01-01 RX ORDER — FEXOFENADINE HCL 180 MG/1
180 TABLET ORAL DAILY
COMMUNITY

## 2023-01-01 RX ORDER — HEPARIN SODIUM 5000 [USP'U]/.5ML
5000 INJECTION, SOLUTION INTRAVENOUS; SUBCUTANEOUS EVERY 12 HOURS
Status: DISCONTINUED | OUTPATIENT
Start: 2023-01-01 | End: 2023-01-01

## 2023-01-01 RX ORDER — AMOXICILLIN 250 MG
1 CAPSULE ORAL DAILY PRN
COMMUNITY

## 2023-01-01 RX ORDER — EPLERENONE 25 MG/1
25 TABLET, FILM COATED ORAL 2 TIMES DAILY
Status: DISCONTINUED | OUTPATIENT
Start: 2023-01-01 | End: 2023-01-01 | Stop reason: HOSPADM

## 2023-01-01 RX ORDER — POTASSIUM CHLORIDE 750 MG/1
40 TABLET, EXTENDED RELEASE ORAL ONCE
Status: CANCELLED | OUTPATIENT
Start: 2023-01-01 | End: 2023-01-01

## 2023-01-01 RX ORDER — LIDOCAINE 40 MG/G
CREAM TOPICAL
Status: CANCELLED | OUTPATIENT
Start: 2023-01-01

## 2023-01-01 RX ORDER — HYDROCODONE BITARTRATE AND ACETAMINOPHEN 5; 325 MG/1; MG/1
1-2 TABLET ORAL EVERY 4 HOURS PRN
Status: CANCELLED | OUTPATIENT
Start: 2023-01-01

## 2023-01-01 RX ORDER — BISACODYL 10 MG
10 SUPPOSITORY, RECTAL RECTAL DAILY PRN
Status: DISCONTINUED | OUTPATIENT
Start: 2023-01-01 | End: 2023-01-01 | Stop reason: HOSPADM

## 2023-01-01 RX ORDER — POTASSIUM CHLORIDE 1500 MG/1
80 TABLET, EXTENDED RELEASE ORAL 3 TIMES DAILY
Status: DISCONTINUED | OUTPATIENT
Start: 2023-01-01 | End: 2023-01-01 | Stop reason: HOSPADM

## 2023-01-01 RX ORDER — POTASSIUM CHLORIDE 1500 MG/1
40 TABLET, EXTENDED RELEASE ORAL
Status: DISCONTINUED | OUTPATIENT
Start: 2023-01-01 | End: 2023-01-01 | Stop reason: HOSPADM

## 2023-01-01 RX ORDER — METOLAZONE 2.5 MG/1
2.5 TABLET ORAL DAILY PRN
COMMUNITY

## 2023-01-01 RX ORDER — POTASSIUM CHLORIDE 750 MG/1
40 TABLET, EXTENDED RELEASE ORAL
COMMUNITY

## 2023-01-01 RX ORDER — POTASSIUM CHLORIDE 750 MG/1
80 CAPSULE, EXTENDED RELEASE ORAL 3 TIMES DAILY
COMMUNITY

## 2023-01-01 RX ORDER — POTASSIUM CHLORIDE 750 MG/1
40 TABLET, EXTENDED RELEASE ORAL 2 TIMES DAILY
Status: DISCONTINUED | OUTPATIENT
Start: 2023-01-01 | End: 2023-01-01

## 2023-01-01 RX ORDER — AMOXICILLIN 250 MG
1 CAPSULE ORAL 2 TIMES DAILY PRN
Status: CANCELLED | OUTPATIENT
Start: 2023-01-01

## 2023-01-01 RX ORDER — NALOXONE HYDROCHLORIDE 0.4 MG/ML
0.1 INJECTION, SOLUTION INTRAMUSCULAR; INTRAVENOUS; SUBCUTANEOUS
Status: CANCELLED | OUTPATIENT
Start: 2023-01-01

## 2023-01-01 RX ORDER — ACETAMINOPHEN 325 MG/1
325 TABLET ORAL EVERY 4 HOURS PRN
Status: DISCONTINUED | OUTPATIENT
Start: 2023-01-01 | End: 2023-01-01 | Stop reason: HOSPADM

## 2023-01-01 RX ORDER — FUROSEMIDE 40 MG
160 TABLET ORAL 3 TIMES DAILY
Status: DISCONTINUED | OUTPATIENT
Start: 2023-01-01 | End: 2023-01-01 | Stop reason: HOSPADM

## 2023-01-01 RX ORDER — EPLERENONE 25 MG/1
25 TABLET, FILM COATED ORAL 2 TIMES DAILY
Status: CANCELLED | OUTPATIENT
Start: 2023-01-01

## 2023-01-01 RX ORDER — HYDROXYZINE HYDROCHLORIDE 25 MG/1
25 TABLET, FILM COATED ORAL 4 TIMES DAILY PRN
Status: CANCELLED | OUTPATIENT
Start: 2023-01-01

## 2023-01-01 RX ORDER — ACETAMINOPHEN 325 MG/1
325 TABLET ORAL EVERY 6 HOURS PRN
Status: CANCELLED | OUTPATIENT
Start: 2023-01-01

## 2023-01-01 RX ORDER — HYDROMORPHONE HYDROCHLORIDE 1 MG/ML
2 SOLUTION ORAL
COMMUNITY

## 2023-01-01 RX ORDER — POTASSIUM CHLORIDE 1500 MG/1
80 TABLET, EXTENDED RELEASE ORAL 2 TIMES DAILY
Status: CANCELLED | OUTPATIENT
Start: 2023-01-01

## 2023-01-01 RX ORDER — BUMETANIDE 0.25 MG/ML
2 INJECTION INTRAMUSCULAR; INTRAVENOUS ONCE
Status: COMPLETED | OUTPATIENT
Start: 2023-01-01 | End: 2023-01-01

## 2023-01-01 RX ORDER — AMOXICILLIN 250 MG
2 CAPSULE ORAL 2 TIMES DAILY PRN
Status: CANCELLED | OUTPATIENT
Start: 2023-01-01

## 2023-01-01 RX ORDER — NALOXONE HYDROCHLORIDE 0.4 MG/ML
0.4 INJECTION, SOLUTION INTRAMUSCULAR; INTRAVENOUS; SUBCUTANEOUS
Status: DISCONTINUED | OUTPATIENT
Start: 2023-01-01 | End: 2023-01-01

## 2023-01-01 RX ORDER — TORSEMIDE 100 MG/1
100 TABLET ORAL DAILY
COMMUNITY

## 2023-01-01 RX ADMIN — POTASSIUM CHLORIDE 60 MEQ: 1500 TABLET, EXTENDED RELEASE ORAL at 20:09

## 2023-01-01 RX ADMIN — FUROSEMIDE 100 MG: 10 INJECTION, SOLUTION INTRAMUSCULAR; INTRAVENOUS at 16:09

## 2023-01-01 RX ADMIN — EPLERENONE 25 MG: 25 TABLET, FILM COATED ORAL at 08:59

## 2023-01-01 RX ADMIN — ACETAMINOPHEN 325 MG: 325 TABLET ORAL at 09:03

## 2023-01-01 RX ADMIN — FUROSEMIDE 20 MG/HR: 10 INJECTION, SOLUTION INTRAMUSCULAR; INTRAVENOUS at 01:00

## 2023-01-01 RX ADMIN — FEXOFENADINE HCL 180 MG: 180 TABLET ORAL at 08:17

## 2023-01-01 RX ADMIN — POTASSIUM CHLORIDE 80 MEQ: 1500 TABLET, EXTENDED RELEASE ORAL at 14:04

## 2023-01-01 RX ADMIN — FUROSEMIDE 160 MG: 40 TABLET ORAL at 08:11

## 2023-01-01 RX ADMIN — EPLERENONE 25 MG: 25 TABLET ORAL at 08:10

## 2023-01-01 RX ADMIN — HYDROMORPHONE HYDROCHLORIDE 2 MG: 1 SOLUTION ORAL at 13:28

## 2023-01-01 RX ADMIN — FEXOFENADINE HCL 180 MG: 180 TABLET ORAL at 08:10

## 2023-01-01 RX ADMIN — METOLAZONE 5 MG: 5 TABLET ORAL at 17:59

## 2023-01-01 RX ADMIN — HYDROMORPHONE HYDROCHLORIDE 2 MG: 1 SOLUTION ORAL at 10:52

## 2023-01-01 RX ADMIN — EPLERENONE 25 MG: 25 TABLET ORAL at 20:13

## 2023-01-01 RX ADMIN — POTASSIUM CHLORIDE 80 MEQ: 1500 TABLET, EXTENDED RELEASE ORAL at 22:33

## 2023-01-01 RX ADMIN — POTASSIUM CHLORIDE 80 MEQ: 1500 TABLET, EXTENDED RELEASE ORAL at 20:13

## 2023-01-01 RX ADMIN — POTASSIUM CHLORIDE 80 MEQ: 1500 TABLET, EXTENDED RELEASE ORAL at 08:11

## 2023-01-01 RX ADMIN — FUROSEMIDE 5 MG/HR: 10 INJECTION, SOLUTION INTRAMUSCULAR; INTRAVENOUS at 20:32

## 2023-01-01 RX ADMIN — FUROSEMIDE 20 MG/HR: 10 INJECTION, SOLUTION INTRAMUSCULAR; INTRAVENOUS at 18:52

## 2023-01-01 RX ADMIN — POTASSIUM CHLORIDE 60 MEQ: 1500 TABLET, EXTENDED RELEASE ORAL at 01:34

## 2023-01-01 RX ADMIN — POTASSIUM CHLORIDE 40 MEQ: 1500 TABLET, EXTENDED RELEASE ORAL at 08:15

## 2023-01-01 RX ADMIN — FUROSEMIDE 60 MG: 10 INJECTION, SOLUTION INTRAMUSCULAR; INTRAVENOUS at 15:29

## 2023-01-01 RX ADMIN — ASPIRIN 81 MG CHEWABLE TABLET 324 MG: 81 TABLET CHEWABLE at 14:28

## 2023-01-01 RX ADMIN — POTASSIUM CHLORIDE 80 MEQ: 1500 TABLET, EXTENDED RELEASE ORAL at 15:01

## 2023-01-01 RX ADMIN — EPLERENONE 25 MG: 25 TABLET ORAL at 20:40

## 2023-01-01 RX ADMIN — POTASSIUM CHLORIDE 40 MEQ: 1500 TABLET, EXTENDED RELEASE ORAL at 09:28

## 2023-01-01 RX ADMIN — FUROSEMIDE 100 MG: 10 INJECTION, SOLUTION INTRAVENOUS at 17:33

## 2023-01-01 RX ADMIN — POTASSIUM CHLORIDE 40 MEQ: 750 TABLET, EXTENDED RELEASE ORAL at 08:40

## 2023-01-01 RX ADMIN — POTASSIUM CHLORIDE 80 MEQ: 1500 TABLET, EXTENDED RELEASE ORAL at 08:59

## 2023-01-01 RX ADMIN — ACETAMINOPHEN 325 MG: 325 TABLET ORAL at 08:10

## 2023-01-01 RX ADMIN — TORSEMIDE 100 MG: 100 TABLET ORAL at 08:10

## 2023-01-01 RX ADMIN — ACETAMINOPHEN 325 MG: 325 TABLET ORAL at 09:41

## 2023-01-01 RX ADMIN — POTASSIUM CHLORIDE 20 MEQ: 750 TABLET, EXTENDED RELEASE ORAL at 09:44

## 2023-01-01 RX ADMIN — LORAZEPAM 0.5 MG: 0.5 TABLET ORAL at 15:20

## 2023-01-01 RX ADMIN — POTASSIUM CHLORIDE 40 MEQ: 750 TABLET, EXTENDED RELEASE ORAL at 07:52

## 2023-01-01 RX ADMIN — POTASSIUM CHLORIDE 40 MEQ: 750 TABLET, EXTENDED RELEASE ORAL at 03:10

## 2023-01-01 RX ADMIN — FUROSEMIDE 20 MG/HR: 10 INJECTION, SOLUTION INTRAMUSCULAR; INTRAVENOUS at 12:20

## 2023-01-01 RX ADMIN — EPLERENONE 25 MG: 25 TABLET, FILM COATED ORAL at 20:15

## 2023-01-01 RX ADMIN — EPLERENONE 25 MG: 25 TABLET, FILM COATED ORAL at 10:15

## 2023-01-01 RX ADMIN — ONDANSETRON 4 MG: 2 INJECTION INTRAMUSCULAR; INTRAVENOUS at 01:03

## 2023-01-01 RX ADMIN — EPLERENONE 25 MG: 25 TABLET ORAL at 08:17

## 2023-01-01 RX ADMIN — METOLAZONE 5 MG: 5 TABLET ORAL at 08:10

## 2023-01-01 RX ADMIN — POTASSIUM CHLORIDE 80 MEQ: 1500 TABLET, EXTENDED RELEASE ORAL at 15:33

## 2023-01-01 RX ADMIN — ACETAMINOPHEN 325 MG: 325 TABLET ORAL at 12:17

## 2023-01-01 RX ADMIN — FUROSEMIDE 160 MG: 40 TABLET ORAL at 15:00

## 2023-01-01 RX ADMIN — ACETAMINOPHEN 325 MG: 325 TABLET ORAL at 20:13

## 2023-01-01 RX ADMIN — Medication 200 MG: at 08:10

## 2023-01-01 ASSESSMENT — ACTIVITIES OF DAILY LIVING (ADL)
ADLS_ACUITY_SCORE: 35
ADLS_ACUITY_SCORE: 26
ADLS_ACUITY_SCORE: 35
ADLS_ACUITY_SCORE: 22
TOILETING_ISSUES: NO
FALL_HISTORY_WITHIN_LAST_SIX_MONTHS: NO
ADLS_ACUITY_SCORE: 22
DIFFICULTY_EATING/SWALLOWING: NO
ADLS_ACUITY_SCORE: 26
TOILETING_ISSUES: NO
DIFFICULTY_EATING/SWALLOWING: NO
ADLS_ACUITY_SCORE: 22
VISION_MANAGEMENT: GLASSES
ADLS_ACUITY_SCORE: 35
ADLS_ACUITY_SCORE: 22
FALL_HISTORY_WITHIN_LAST_SIX_MONTHS: NO
ADLS_ACUITY_SCORE: 22
DOING_ERRANDS_INDEPENDENTLY_DIFFICULTY: YES
TRANSFERRING: 0-->INDEPENDENT
ADLS_ACUITY_SCORE: 22
VISION_MANAGEMENT: GLASSES
DIFFICULTY_EATING/SWALLOWING: NO
TRANSFERRING: 0-->ASSISTANCE NEEDED (DEVELOPMENTALLY APPROPRIATE)
ADLS_ACUITY_SCORE: 26
ADLS_ACUITY_SCORE: 24
TRANSFERRING: 0-->INDEPENDENT
FALL_HISTORY_WITHIN_LAST_SIX_MONTHS: NO
ADLS_ACUITY_SCORE: 22
ADLS_ACUITY_SCORE: 22
DRESSING/BATHING_DIFFICULTY: NO
ADLS_ACUITY_SCORE: 35
ADLS_ACUITY_SCORE: 24
ADLS_ACUITY_SCORE: 22
ADLS_ACUITY_SCORE: 37
ADLS_ACUITY_SCORE: 37
ADLS_ACUITY_SCORE: 35
CHANGE_IN_FUNCTIONAL_STATUS_SINCE_ONSET_OF_CURRENT_ILLNESS/INJURY: NO
TRANSFERRING: 1-->ASSISTANCE (EQUIPMENT/PERSON) NEEDED
TRANSFERRING: 0-->INDEPENDENT
ADLS_ACUITY_SCORE: 22
DRESSING/BATHING_DIFFICULTY: NO
ADLS_ACUITY_SCORE: 30
ADLS_ACUITY_SCORE: 26
ADLS_ACUITY_SCORE: 26
WALKING_OR_CLIMBING_STAIRS: AMBULATION DIFFICULTY, REQUIRES EQUIPMENT
ADLS_ACUITY_SCORE: 26
TRANSFERRING: 0-->INDEPENDENT
DOING_ERRANDS_INDEPENDENTLY_DIFFICULTY: YES
WEAR_GLASSES_OR_BLIND: YES
ADLS_ACUITY_SCORE: 35
VISION_MANAGEMENT: GLASSES
ADLS_ACUITY_SCORE: 33
EQUIPMENT_CURRENTLY_USED_AT_HOME: CANE, STRAIGHT
ADLS_ACUITY_SCORE: 24
ADLS_ACUITY_SCORE: 30
ADLS_ACUITY_SCORE: 22
CONCENTRATING,_REMEMBERING_OR_MAKING_DECISIONS_DIFFICULTY: NO
ADLS_ACUITY_SCORE: 26
ADLS_ACUITY_SCORE: 26
ADLS_ACUITY_SCORE: 22
ADLS_ACUITY_SCORE: 35
ADLS_ACUITY_SCORE: 26
ADLS_ACUITY_SCORE: 35
CHANGE_IN_FUNCTIONAL_STATUS_SINCE_ONSET_OF_CURRENT_ILLNESS/INJURY: NO
WEAR_GLASSES_OR_BLIND: YES
EQUIPMENT_CURRENTLY_USED_AT_HOME: CANE, STRAIGHT
DOING_ERRANDS_INDEPENDENTLY_DIFFICULTY: YES
ADLS_ACUITY_SCORE: 22
WALKING_OR_CLIMBING_STAIRS_DIFFICULTY: YES
ADLS_ACUITY_SCORE: 26
WALKING_OR_CLIMBING_STAIRS: AMBULATION DIFFICULTY, REQUIRES EQUIPMENT
CONCENTRATING,_REMEMBERING_OR_MAKING_DECISIONS_DIFFICULTY: NO
TOILETING_ISSUES: NO
WALKING_OR_CLIMBING_STAIRS_DIFFICULTY: YES
CHANGE_IN_FUNCTIONAL_STATUS_SINCE_ONSET_OF_CURRENT_ILLNESS/INJURY: NO
EQUIPMENT_CURRENTLY_USED_AT_HOME: CANE, STRAIGHT
ADLS_ACUITY_SCORE: 22
CONCENTRATING,_REMEMBERING_OR_MAKING_DECISIONS_DIFFICULTY: NO
ADLS_ACUITY_SCORE: 22
ADLS_ACUITY_SCORE: 35
ADLS_ACUITY_SCORE: 26
DRESSING/BATHING_DIFFICULTY: NO
ADLS_ACUITY_SCORE: 30
ADLS_ACUITY_SCORE: 26
WALKING_OR_CLIMBING_STAIRS_DIFFICULTY: YES
ADLS_ACUITY_SCORE: 24
WALKING_OR_CLIMBING_STAIRS: AMBULATION DIFFICULTY, REQUIRES EQUIPMENT
ADLS_ACUITY_SCORE: 35
ADLS_ACUITY_SCORE: 22
ADLS_ACUITY_SCORE: 35
ADLS_ACUITY_SCORE: 30
WEAR_GLASSES_OR_BLIND: YES

## 2023-03-09 NOTE — CONFIDENTIAL NOTE
"Discussed with Dr Cohen and called Amy.     Reviewed with Amy that given how much diuretic she is currently on (4-8 metolazone daily and Torsemide 200 mg BID), that there are not any outpatient options left.  The options would be an inpatient admission for diuresis and possible dialysis after work up.  Amy actually seemed very interested in this therapy, would probably benefit from more education if she decided to come off of hospice.      She shared that she's actually very miserable but doesn't want to \"just get medications that knock me out of my head.\"   She acknowledged that \"eventually that this isn't going to work\" (referring to the fluid management) and that hospice were not to save your life and to make her comfortable.  She mentioned that hospice is possibly thinking of an inpatient admission as well for diuresis.     Hospice RN is coming tomorrow and Amy plans to discuss next steps and goals of care with her.  She would like to discuss with her  and daughter as well.    "

## 2023-03-27 NOTE — TELEPHONE ENCOUNTER
Called Amy to review. Sent mychart on 3/9 with similar symptoms and opted to stay on hospice at that time.     No answer, will try back at another time.     Vidhi Montgomery RN

## 2023-03-27 NOTE — TELEPHONE ENCOUNTER
"Called Amy to talk through things.   Struggling with fluid overload, despite being on large dose of daily Metolazone, twice daily Diuril.   Her \"absolute dream\" would be to go into the hospital, get diuresed, and come back home. She does not want to do dialysis.   She reports she talked to hospice and they can take her off hospice to be admitted and then she can go back on.   I told her I would review with our team and get back to her.     "

## 2023-03-28 NOTE — CONFIDENTIAL NOTE
Reviewed with Dr Cohen and left message for Amy- it is always Amy's choice to go to the ER for diuresis if she desires, would need to work out details with hospice.  Offered video appointment with Dr Cohen as well.

## 2023-04-11 PROBLEM — I50.33 ACUTE ON CHRONIC DIASTOLIC CONGESTIVE HEART FAILURE (H): Status: ACTIVE | Noted: 2023-01-01

## 2023-04-11 PROBLEM — E85.81 LIGHT CHAIN (AL) AMYLOIDOSIS (H): Status: ACTIVE | Noted: 2023-01-01

## 2023-04-11 NOTE — H&P
[facility]    Cardiology History and Physical - Cards 1         Date of Admission:  4/11/2023    Assessment & Plan: S    Leandra Guerrero is a 72 year old female admitted on 4/11/2023. 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 She is being admitted for acute decompensated heart failure.     # Cardiac AL Amyloidosis  # Acute on chronic diastolic heart failure  # Tropinemia  Pt presenting with progressive fatigue and dyspnea with volume overload and 20 lbs weight gain in last 1-2 months. Acutely worsened with increased salt intake over Easter weekend. Of note, pt is on home hospice, failed outpatient PO diuretic adjustments. Troponin mildly elevated 55 -> 51, EKG with sinus rhythm without new ischemic changes. Likely type II MI from CHF exacerbation, no concern for ACS.  - Hospice paused while pt hospitalized for IV diuresis, plan to discharge back to home hospice when more euvolemic.   - Pharmacy med rec to clarify pt's home meds on hospice  - Volume status: hypervolemic  - EDW: unknown, pt thinks 150 lbs admitted at 161 lbs  - Diuresis: S/p furosemide 100 mg and metolazone 5 mg in ED. Started lasix gtt at 20 ml/hr, monitor response  - Lasix gtt held 4/12 0100 for K 3.0, recheck in AM  - Hold PTA PO diuresis (pt unsure of what she's currently taking)  - Tele  - K >4, Mg >2. On protocol  - I/Os, daily weights    # Paroxysmal afib/flutter  Sinus rhythm on admit  - Pharmacy med rec, appears anticoagulation was stopped 7/2022 after fall?  - Not on rate/rhythm control?    # CKD3  Cr 1.7 on admit, unclear recent baseline (was 1.2-1.7 on last checks 6/2022). Cardiorenal injury possible.  - Trend       Diet: Fluid restriction 2000 ML FLUID  2 Gram Sodium Diet  DVT Prophylaxis: Clarify home DOAC  Marie Catheter: Not  "present  Cardiac Monitoring: ACTIVE order. Indication: Acute decompensated heart failure (48 hours)  Code Status: No CPR- Do NOT Intubate        Clinically Significant Risk Factors Present on Admission       # Hypokalemia: Lowest K = 3 mmol/L in last 2 days, will replace as needed         # Drug Induced Coagulation Defect: home medication list includes an anticoagulant medication    # Overweight: Estimated body mass index is 28.4 kg/m  as calculated from the following:    Height as of this encounter: 1.6 m (5' 3\").    Weight as of this encounter: 72.7 kg (160 lb 4.8 oz).       Disposition Plan   Expected discharge: 2 - 3 days, recommended to prior living arrangement once fluid volume status optimized on oral medication.    Entered: Lela Méndez MD 04/12/2023, 1:02 AM   The patient's care was discussed with the Patient.    To be formally staffed in AM    Lela Méndez MD   Internal Medicine PGY-3  [facility]    ______________________________________________________________________    Chief Complaint   Fatigue, dyspnea    History is obtained from the patient    History of Present Illness   Leandra Guerrero is a 72 year old female who has a history of AL (light chain) amyloidosis/multiple myeloma, chronic diastolic/restrictive heart failure due to cardiac amyloidosis on hospice, paroxysmal Afib/flutter, and CKD stage III. She presented with increased dyspnea, LE edema, and weight gain.    Pt is on home hospice for cardiac amyloidosis. Per pt, she has had 1-2 months of progressive weight gain, increased dyspnea, fatigue, painful LE edema, and orthopnea. This became quite a bit worse after family visited for Easter weekend and did the cooking. Per pt, home hospice has  Been trying to manage this by adjusting outpatient diuresis but she wasn't making progress so she and family decided to pause hospice to get inpatient IV diuresis before discharging home back on " hospice. Per pt, lowest weight a couple of months ago was 150 lbs and she was up to 176 lbs at home yesterday. Has chronic nausea. No chest pain, palpitations, lightheadedness.    ED course:  - Vitals: afebrile, HR 90s, BP  130s/70s, room air  - Labs: K 3.3, Mg >2, Cr 1.68, troponin 55 -> 51, EKG with NSR with 1st degree AV block, no acute ST/T changes.  - Imaging: CXR with stable moderate R pleural effusion and compressive atelecatsis, no airspace opacities  - Interventions: IV lasix 100 mg and metolazone 5 mg, 60 meq K, lasix 20 mg/hr gtt started      Past Medical History    Past Medical History:   Diagnosis Date     AL amyloidosis (H)      Atrial fibrillation and flutter (H)      Cardiac amyloidosis (H)      Lymphedema      QT prolongation      Recurrent right pleural effusion 1/2/2017     SVT (supraventricular tachycardia) (H)        Past Surgical History   Past Surgical History:   Procedure Laterality Date     EXCISE GANGLION WRIST Left 5/11/2022    Procedure: LEFT VOLAR WRIST GANGLION EXCISION;  Surgeon: Tato Diaz MD;  Location: [facility] OR       Prior to Admission Medications   Prior to Admission Medications   Prescriptions Last Dose Informant Patient Reported? Taking?   Cholecalciferol (VITAMIN D3 PO)  Self Yes No   Sig: Take by mouth daily Dose unknown   acetaminophen (TYLENOL) 325 MG tablet  Self Yes No   Sig: Take 325 mg by mouth every 6 hours as needed for mild pain or fever    acyclovir (ZOVIRAX) 400 MG tablet  Self No No   Sig: Take 1 tablet (400 mg) by mouth 2 times daily Viral Prophylaxis.   apixaban ANTICOAGULANT (ELIQUIS ANTICOAGULANT) 2.5 MG tablet  Self No No   Sig: Take 1 tablet (2.5 mg) by mouth 2 times daily   camphor-menthol (DERMASARRA) 0.5-0.5 % external lotion  Self No No   Sig: Apply 1 mL topically every 6 hours as needed for skin care   doxycycline hyclate (VIBRAMYCIN) 100 MG capsule   Yes No   Sig: Take 1 capsule (100 mg) by mouth daily . Hold on discharge; resume as instructed by  your outpatient team.   eplerenone (INSPRA) 25 MG tablet  Self No No   Sig: Take 1 tablet (25 mg) by mouth 2 times daily   metolazone (ZAROXOLYN) 2.5 MG tablet  Self No No   Sig: Take 1 tablet (2.5 mg) by mouth twice a week As needed for increased swelling and weight gain of 3 lb in one day or 5 lb in one week   nitroGLYcerin (NITROSTAT) 0.4 MG sublingual tablet  Self No No   Sig: For chest pain. Place 1 tablet under the tongue every 5 minutes. If symptoms persist after 2 doses call 911.   ondansetron (ZOFRAN) 8 MG tablet  Self No No   Sig: Take 1 tablet (8 mg) by mouth every 8 hours as needed for nausea   potassium chloride ER (KLOR-CON M) 10 MEQ CR tablet   Yes No   Sig: Take 6 tablets (60 mEq) by mouth 2 times daily Take an extra 60 meq of Potassium the day of Metolazone and 60 meq extra the day after a Metolazone   prochlorperazine (COMPAZINE) 10 MG tablet  Self No No   Sig: Take 1 tablet (10 mg) by mouth every 6 hours as needed for nausea or vomiting   Patient not taking: Reported on 6/7/2022   sertraline (ZOLOFT) 25 MG tablet  Self Yes No   Sig: Take 25 mg by mouth daily   torsemide (DEMADEX) 100 MG tablet  Self No No   Sig: Take 1.5 tablets (150 mg) by mouth every morning AND 1.5 tablets (150 mg) daily at 2 pm.   triamcinolone (KENALOG) 0.1 % external cream  Self No No   Sig: Apply a thin layer twice daily to itchy areas as needed.      Facility-Administered Medications: None        Review of Systems    The 10 point Review of Systems is negative other than noted in the HPI or here.     Social History   I have reviewed this patient's social history and updated it with pertinent information if needed.  Social History     Tobacco Use     Smoking status: Never     Smokeless tobacco: Never   Substance Use Topics     Alcohol use: No     Drug use: No        Physical Exam   Vital Signs: Temp: 97.9  F (36.6  C) Temp src: Oral BP: 136/64 Pulse: 97   Resp: 16 SpO2: 96 % O2 Device: None (Room air)    Weight: 160 lbs 4.8  oz    General Appearance: Chronically ill female in NAD sitting up in bed  Respiratory: Non-labored respirations on room air, diminished bibasilar breath sounds R>L, no wheezes or crackles  Cardiovascular: RRR, no murmurs, JVD at least to jaw angle  GI: Soft, non-tender, non-distended  Ext: WWP extremities, 4+ LE edema, lymphedema and chronic venous stasis changes  Skin: No rashes on exposed skin  Neuro: awake and alert, PIZARRO spontaneously    Medical Decision Making       Please see A&P for additional details of medical decision making.      Data   ------------------------- PAST 24 HR DATA REVIEWED -----------------------------------------------    I have personally reviewed the following data over the past 24 hrs:    4.4  \   12.3   / 201     131 (L) 86 (L) 40.9 (H) /  103 (H)   3.0 (L) 28 1.68 (H) \       ALT: 14 AST: 23 AP: 179 (H) TBILI: 1.8 (H)   ALB: 4.5 TOT PROTEIN: 7.1 LIPASE: N/A       Trop: 51 (H) BNP: 1,989 (H)       TSH: 2.26 T4: N/A A1C: N/A       INR:  1.11 PTT:  N/A   D-dimer:  N/A Fibrinogen:  N/A       Imaging results reviewed over the past 24 hrs:   Recent Results (from the past 24 hour(s))   XR Chest Port 1 View    Narrative    Examination: XR CHEST PORT 1 VIEW 4/11/2023 4:08 PM    Indication: cp sob    Comparison: X-ray 6/2/2022    Findings:  AP portable chest, 55 degrees. Trachea is midline. Aortic  atherosclerotic calcification. Cardiac silhouette is within normal  limits though partially obscured by similar moderate pleural effusion  with associated compressive and scattered atelectasis. No significant  left pleural effusion. No appreciable pneumothorax. No acute osseous  abnormality.      Impression    Impression:   Similar moderate right pleural effusion with associated compressive  atelectasis. No interval/new airspace opacities.    I have personally reviewed the examination and initial interpretation  and I agree with the findings.    BINDU QUINONES MD         SYSTEM ID:  [id number]   [facility]  ECHO LIMITED    Impression    Limited Bedside Cardiac Ultrasound, performed and interpreted by me.   Indication: Chest Pain and Shortness of Breath.  Parasternal long axis, parasternal short axis and apical 4 chamber views were acquired.   Image quality was satisfactory.    Findings:    Global left ventricular function appears intact.  Chambers do not appear dilated.  There is no evidence of free fluid within the pericardium.    IMPRESSION: Grossly normal left ventricular function and chamber size.  No pericardial effusion.    [facility] Procedure Note      Limited Bedside ED Ultrasound of Thorax:    PROCEDURE: PERFORMED BY: Dr. Reyna Weeks MD, MD  INDICATIONS/SYMPTOM:  Chest pain and shortness of breath  PROBE: High frequency linear probe  BODY LOCATION: Chest  FINDINGS:  Images of both lung hemithoracies taken in 2D in multiple rib spaces        Right side:  Lung sliding artifact  Present     Comet tail artifacts  Present   Left side:  Lung sliding artifact  Present     Comet tail artifacts  Present   Hemothorax: Right side Absent     Left side Absent   Pleural effusion: Right side Absent      Left side Absent    INTERPRETATION: The exam was normal. There was no free fluid identified in the chest cavity. No evidence of pneumothorax, hemothorax or pleural effusion. But, predominant diffuse B lines noted.  IMAGE DOCUMENTATION: Images were archived to PACs system.   .

## 2023-04-11 NOTE — ED TRIAGE NOTES
Patient complain of chest pain endorse by SOB. History of amyloidosis. Is being seen by hospice .   Dr Tariq Mary Acute care [phone]     Paula Swenson [phone]    Triage Assessment     Row Name 04/11/23 1447       Triage Assessment (Adult)    Airway WDL WDL        Respiratory WDL    Respiratory WDL cough;rhythm/pattern;X;all     Rhythm/Pattern, Respiratory shortness of breath    Cough Frequency frequent       Skin Circulation/Temperature WDL    Skin Circulation/Temperature WDL X        Cardiac WDL    Cardiac WDL X       Peripheral/Neurovascular WDL    Peripheral Neurovascular WDL WDL       Cognitive/Neuro/Behavioral WDL    Cognitive/Neuro/Behavioral WDL WDL

## 2023-04-11 NOTE — CONSULTS
[facility]    Consult Note - [facility]    ______________________________________________________________________    [facility]/7 Contact Number: [phone]    - Providers: Please contact [facility] with changes in orders or clinical plan of care   - Nursing: Please contact [facility] with significant changes in patient condition       Hospice Diagnosis: HF    Indication for Inpatient Hospice: manage dyspnea/fluid volume overload with IV diuretics      Plan of Care Discussed with the Following:   - Hospice Provider: Dr. Viktor Mary  [phone]      NOTE:    Met Patient and spouse in ED triage room.     Patient is [facility] hospice patient and transferred to [facility] for GIP level of care as patient is not responding to PO diuretics and has 20+ weight gain.  Spouse is wanting to escalate care. Care and hospice level of change was discussed with hospice attending Dr. Viktor Mary.     This hospitalization is related to hospice dx for symptom management that cannot be accomplished in home setting.     Please reach out to Dr. Mary to discuss GIP hospice plan.        Thank you for your partnership and the compassionate care you are providing to this patient and family.    Please reach out to cell listed below between 10 am-6 pm with any questions or concerns.    MADDI Wheeler, RN  Clinical Nurse Liaison I [facility] I [facility]  Cell: [phone]  [facility]  Office: [phone]  Email [email or web address]    [email or web address]

## 2023-04-11 NOTE — ED PROVIDER NOTES
"    [place] EMERGENCY DEPARTMENT ([facility])    4/11/23       ED PROVIDER NOTE    History     Chief Complaint   Patient presents with     Shortness of Breath     The history is provided by the patient and medical records.     Leandra Guerrero is a 72 year old female with past medical history of AL (light chain) amyloidosis/multiple myeloma, chronic diastolic/restrictive heart failure due to cardiac amyloidosis, paroxysmal Afib/flutter on apixaban, SVT, QT prolongation, HTN, pleural effusion, and CKD stage III who presents to the ED for chest pain and shortness of breath.  The patient endorses that symptoms have been ongoing as a result of her chronic conditions but increased acutely the last several days.  She reports some cough but no fever.  She says her bilateral legs are significantly more swollen and painful than usual.  She feels like \"everything is swollen\".  She reports a change in her Diuril but denies other medication changes.    Past Medical History  Past Medical History:   Diagnosis Date     AL amyloidosis (H)      Atrial fibrillation and flutter (H)      Cardiac amyloidosis (H)      Lymphedema      QT prolongation      Recurrent right pleural effusion 1/2/2017     SVT (supraventricular tachycardia) (H)      Past Surgical History:   Procedure Laterality Date     EXCISE GANGLION WRIST Left 5/11/2022    Procedure: LEFT VOLAR WRIST GANGLION EXCISION;  Surgeon: Tato Diaz MD;  Location: [facility] OR     acetaminophen (TYLENOL) 325 MG tablet  acyclovir (ZOVIRAX) 400 MG tablet  apixaban ANTICOAGULANT (ELIQUIS ANTICOAGULANT) 2.5 MG tablet  camphor-menthol (DERMASARRA) 0.5-0.5 % external lotion  Cholecalciferol (VITAMIN D3 PO)  doxycycline hyclate (VIBRAMYCIN) 100 MG capsule  eplerenone (INSPRA) 25 MG tablet  metolazone (ZAROXOLYN) 2.5 MG tablet  nitroGLYcerin (NITROSTAT) 0.4 MG sublingual tablet  ondansetron (ZOFRAN) 8 MG tablet  potassium chloride ER (KLOR-CON M) 10 MEQ CR " "tablet  prochlorperazine (COMPAZINE) 10 MG tablet  sertraline (ZOLOFT) 25 MG tablet  torsemide (DEMADEX) 100 MG tablet  triamcinolone (KENALOG) 0.1 % external cream      Allergies   Allergen Reactions     Contrast Dye Shortness Of Breath     Shaking,chills and dypsnea     Cytoxan [Cyclophosphamide]      Hemorrhagic cystitis     Gabapentin      Slurred speech, weakness     Levofloxacin      Severe shaking and abdominal pain     Aaron Weed      Rash and itchyness     Amoxicillin Nausea and Vomiting and Cramps     Bumetanide Unknown     Spironolactone      Worsening hyponatremia, palpitations     Chlorhexidine Dermatitis and Rash     AARON wipes  2% chlorhexidine gluconate   Rash to everywhere wipe was applied      Family History  Family History   Problem Relation Age of Onset     Other - See Comments Sister         Amyloidosis     Social History   Social History     Tobacco Use     Smoking status: Never     Smokeless tobacco: Never   Substance Use Topics     Alcohol use: No     Drug use: No      Past medical history, past surgical history, medications, allergies, family history, and social history were reviewed with the patient. No additional pertinent items.      A medically appropriate review of systems was performed with pertinent positives and negatives noted in the HPI, and all other systems negative.    Physical Exam   BP: (!) 150/68  Pulse: 86  Temp: 98.2  F (36.8  C)  Resp: 18  Height: 160 cm (5' 3\")  Weight: 79.8 kg (176 lb)  SpO2: 96 %  Physical Exam  Constitutional:       General: She is not in acute distress.     Appearance: She is not diaphoretic.   HENT:      Head: Atraumatic.      Mouth/Throat:      Pharynx: No oropharyngeal exudate.   Eyes:      General: No scleral icterus.     Pupils: Pupils are equal, round, and reactive to light.   Cardiovascular:      Rate and Rhythm: Normal rate and regular rhythm.      Heart sounds: No murmur heard.     No friction rub. Gallop present.   Pulmonary:      Effort: No " respiratory distress.      Breath sounds: No stridor. Rales present. No wheezing or rhonchi.   Chest:      Chest wall: No tenderness.   Abdominal:      General: Abdomen is flat. Bowel sounds are normal. There is no distension.      Palpations: Abdomen is soft. There is no mass.      Tenderness: There is no abdominal tenderness. There is no right CVA tenderness, left CVA tenderness, guarding or rebound.      Hernia: No hernia is present.   Musculoskeletal:         General: Swelling present. No tenderness.      Cervical back: Neck supple.      Right lower leg: Edema present.      Left lower leg: Edema present.   Skin:     General: Skin is warm.      Findings: No rash.   Neurological:      General: No focal deficit present.      Cranial Nerves: No cranial nerve deficit.           ED Course, Procedures, & Data     3:13 PM  The patient was seen and examined by Reyna Weeks MD in Room ED10.     Procedures  Results for orders placed during the hospital encounter of 04/11/23    POC US ECHO LIMITED    Impression  Limited Bedside Cardiac Ultrasound, performed and interpreted by me.  Indication: Chest Pain and Shortness of Breath.  Parasternal long axis, parasternal short axis and apical 4 chamber views were acquired.  Image quality was satisfactory.    Findings:  Global left ventricular function appears intact.  Chambers do not appear dilated.  There is no evidence of free fluid within the pericardium.    IMPRESSION: Grossly normal left ventricular function and chamber size.  No pericardial effusion.    [facility] Procedure Note    Limited Bedside ED Ultrasound of Thorax:    PROCEDURE: PERFORMED BY: Dr. Reyna Weeks MD, MD  INDICATIONS/SYMPTOM:  Chest pain and shortness of breath  PROBE: High frequency linear probe  BODY LOCATION: Chest  FINDINGS:  Images of both lung hemithoracies taken in 2D in multiple rib spaces    Right side:  Lung sliding artifact  Present  Comet tail artifacts  Present  Left side:  Lung  sliding artifact  Present  Comet tail artifacts  Present  Hemothorax: Right side Absent  Left side Absent  Pleural effusion: Right side Absent  Left side Absent    INTERPRETATION: The exam was normal. There was no free fluid identified in the chest cavity. No evidence of pneumothorax, hemothorax or pleural effusion. But, predominant diffuse B lines noted.  IMAGE DOCUMENTATION: Images were archived to PACs system.  .       ED Course Selections:        EKG Interpretation:      Interpreted by Reyna Weeks MD, MD  Time reviewed: on arrival  Symptoms at time of EKG: cp sob   Rhythm: 1 degree AV block  Rate: Normal  Axis: Left Axis Deviation  Ectopy: none  Conduction: normal  ST Segments/ T Waves: No ST-T wave changes  Q Waves: none  Comparison to prior: Unchanged from 06/2022    Clinical Impression: no acute changes                           Results for orders placed or performed during the hospital encounter of 04/11/23   XR Chest Port 1 View     Status: None    Narrative    Examination: XR CHEST PORT 1 VIEW 4/11/2023 4:08 PM    Indication: cp sob    Comparison: X-ray 6/2/2022    Findings:  AP portable chest, 55 degrees. Trachea is midline. Aortic  atherosclerotic calcification. Cardiac silhouette is within normal  limits though partially obscured by similar moderate pleural effusion  with associated compressive and scattered atelectasis. No significant  left pleural effusion. No appreciable pneumothorax. No acute osseous  abnormality.      Impression    Impression:   Similar moderate right pleural effusion with associated compressive  atelectasis. No interval/new airspace opacities.    I have personally reviewed the examination and initial interpretation  and I agree with the findings.    BINDU QUINONES MD         SYSTEM ID:  [id number]   POC US ECHO LIMITED     Status: None    Impression    Limited Bedside Cardiac Ultrasound, performed and interpreted by me.   Indication: Chest Pain and Shortness of  Breath.  Parasternal long axis, parasternal short axis and apical 4 chamber views were acquired.   Image quality was satisfactory.    Findings:    Global left ventricular function appears intact.  Chambers do not appear dilated.  There is no evidence of free fluid within the pericardium.    IMPRESSION: Grossly normal left ventricular function and chamber size.  No pericardial effusion.    [facility] Procedure Note      Limited Bedside ED Ultrasound of Thorax:    PROCEDURE: PERFORMED BY: Dr. Reyna Weeks MD, MD  INDICATIONS/SYMPTOM:  Chest pain and shortness of breath  PROBE: High frequency linear probe  BODY LOCATION: Chest  FINDINGS:  Images of both lung hemithoracies taken in 2D in multiple rib spaces        Right side:  Lung sliding artifact  Present     Comet tail artifacts  Present   Left side:  Lung sliding artifact  Present     Comet tail artifacts  Present   Hemothorax: Right side Absent     Left side Absent   Pleural effusion: Right side Absent      Left side Absent    INTERPRETATION: The exam was normal. There was no free fluid identified in the chest cavity. No evidence of pneumothorax, hemothorax or pleural effusion. But, predominant diffuse B lines noted.  IMAGE DOCUMENTATION: Images were archived to PACs system.   .    INR     Status: Normal   Result Value Ref Range    INR 1.11 0.85 - 1.15   Comprehensive metabolic panel     Status: Abnormal   Result Value Ref Range    Sodium 131 (L) 136 - 145 mmol/L    Potassium 3.3 (L) 3.4 - 5.3 mmol/L    Chloride 86 (L) 98 - 107 mmol/L    Carbon Dioxide (CO2) 28 22 - 29 mmol/L    Anion Gap 17 (H) 7 - 15 mmol/L    Urea Nitrogen 40.9 (H) 8.0 - 23.0 mg/dL    Creatinine 1.68 (H) 0.51 - 0.95 mg/dL    Calcium 9.8 8.8 - 10.2 mg/dL    Glucose 103 (H) 70 - 99 mg/dL    Alkaline Phosphatase 179 (H) 35 - 104 U/L    AST 23 10 - 35 U/L    ALT 14 10 - 35 U/L    Protein Total 7.1 6.4 - 8.3 g/dL    Albumin 4.5 3.5 - 5.2 g/dL    Bilirubin Total 1.8 (H) <=1.2 mg/dL    GFR  Estimate 32 (L) >60 mL/min/1.73m2   Troponin T, High Sensitivity     Status: Abnormal   Result Value Ref Range    Troponin T, High Sensitivity 55 (H) <=14 ng/L   TSH with free T4 reflex     Status: Normal   Result Value Ref Range    TSH 2.26 0.30 - 4.20 uIU/mL   Nt probnp inpatient (BNP)     Status: Abnormal   Result Value Ref Range    N terminal Pro BNP Inpatient 1,989 (H) 0 - 900 pg/mL   UA with Microscopic reflex to Culture     Status: Abnormal    Specimen: Urine, Clean Catch   Result Value Ref Range    Color Urine Straw Colorless, Straw, Light Yellow, Yellow    Appearance Urine Clear Clear    Glucose Urine Negative Negative mg/dL    Bilirubin Urine Negative Negative    Ketones Urine Negative Negative mg/dL    Specific Gravity Urine 1.006 1.003 - 1.035    Blood Urine Negative Negative    pH Urine 7.5 (H) 5.0 - 7.0    Protein Albumin Urine Negative Negative mg/dL    Urobilinogen Urine Normal Normal, 2.0 mg/dL    Nitrite Urine Negative Negative    Leukocyte Esterase Urine Negative Negative    RBC Urine 1 <=2 /HPF    WBC Urine 3 <=5 /HPF    Squamous Epithelials Urine <1 <=1 /HPF    Narrative    Urine Culture not indicated   CBC with platelets and differential     Status: Abnormal   Result Value Ref Range    WBC Count 4.4 4.0 - 11.0 10e3/uL    RBC Count 3.87 3.80 - 5.20 10e6/uL    Hemoglobin 12.3 11.7 - 15.7 g/dL    Hematocrit 37.2 35.0 - 47.0 %    MCV 96 78 - 100 fL    MCH 31.8 26.5 - 33.0 pg    MCHC 33.1 31.5 - 36.5 g/dL    RDW 15.5 (H) 10.0 - 15.0 %    Platelet Count 201 150 - 450 10e3/uL    % Neutrophils 67 %    % Lymphocytes 3 %    % Monocytes 11 %    % Eosinophils 18 %    % Basophils 1 %    % Immature Granulocytes 0 %    NRBCs per 100 WBC 0 <1 /100    Absolute Neutrophils 2.9 1.6 - 8.3 10e3/uL    Absolute Lymphocytes 0.1 (L) 0.8 - 5.3 10e3/uL    Absolute Monocytes 0.5 0.0 - 1.3 10e3/uL    Absolute Eosinophils 0.8 (H) 0.0 - 0.7 10e3/uL    Absolute Basophils 0.1 0.0 - 0.2 10e3/uL    Absolute Immature Granulocytes  0.0 <=0.4 10e3/uL    Absolute NRBCs 0.0 10e3/uL   Magnesium     Status: Abnormal   Result Value Ref Range    Magnesium 2.4 (H) 1.7 - 2.3 mg/dL   Troponin T, High Sensitivity     Status: Abnormal   Result Value Ref Range    Troponin T, High Sensitivity 51 (H) <=14 ng/L   EKG 12 lead     Status: None   Result Value Ref Range    Systolic Blood Pressure  mmHg    Diastolic Blood Pressure  mmHg    Ventricular Rate 87 BPM    Atrial Rate 87 BPM    IN Interval 250 ms    QRS Duration 80 ms     ms    QTc 483 ms    P Axis 86 degrees    R AXIS -44 degrees    T Axis 17 degrees    Interpretation ECG       Sinus rhythm with 1st degree A-V block  Left axis deviation  Pulmonary disease pattern  Septal infarct , age undetermined  Abnormal ECG  When compared with ECG of 02-JUN-2022 22:02,  IN interval has increased  Non-specific change in ST segment in Anterior leads  T wave inversion now evident in Inferior leads  Unconfirmed report - interpretation of this ECG is computer generated - see medical record for final interpretation  Confirmed by - EMERGENCY ROOM, PHYSICIAN ([id number]),  RINA MCDONALD ([id number]) on 4/11/2023 8:31:16 PM     EKG 12-lead, tracing only     Status: None   Result Value Ref Range    Systolic Blood Pressure  mmHg    Diastolic Blood Pressure  mmHg    Ventricular Rate 86 BPM    Atrial Rate 86 BPM    IN Interval 224 ms    QRS Duration 84 ms     ms    QTc 485 ms    P Axis 89 degrees    R AXIS -39 degrees    T Axis 107 degrees    Interpretation ECG       Sinus rhythm with 1st degree A-V block  Left axis deviation  Pulmonary disease pattern  Septal infarct , age undetermined  ST & T wave abnormality, consider inferolateral ischemia  Abnormal ECG  When compared with ECG of 02-JUN-2022 22:02,  IN interval has increased  ST now depressed in Anterolateral leads  Inverted T waves have replaced nonspecific T wave abnormality in Inferior leads  Nonspecific T wave abnormality now evident in Anterior  leads  Unconfirmed report - interpretation of this ECG is computer generated - see medical record for final interpretation  Confirmed by - EMERGENCY ROOM, PHYSICIAN ([id number]),  RINA MCDONALD ([id number]) on 4/11/2023 8:31:21 PM     CBC with platelets differential     Status: Abnormal    Narrative    The following orders were created for panel order CBC with platelets differential.  Procedure                               Abnormality         Status                     ---------                               -----------         ------                     CBC with platelets and d...[[id number]]  Abnormal            Final result                 Please view results for these tests on the individual orders.     Medications   furosemide (LASIX) 500 mg/50mL infusion ADULT MAX CONC (20 mg/hr Intravenous $New Bag 4/11/23 1852)   heparin ANTICOAGULANT injection 5,000 Units (has no administration in time range)   ACE Inhibitor/ARB/ARNI not indicated according to guidelines (has no administration in time range)   Reason beta blocker not prescribed (has no administration in time range)   metolazone (ZAROXOLYN) tablet 5 mg (5 mg Oral $Given 4/11/23 1759)   bumetanide (BUMEX) injection 2 mg (2 mg Intravenous Not Given 4/11/23 1715)   furosemide (LASIX) injection 100 mg (100 mg Intravenous $Given 4/11/23 1733)   potassium chloride ER (KLOR-CON M) CR tablet 60 mEq (60 mEq Oral $Given 4/11/23 2009)     Labs Ordered and Resulted from Time of ED Arrival to Time of ED Departure   COMPREHENSIVE METABOLIC PANEL - Abnormal       Result Value    Sodium 131 (*)     Potassium 3.3 (*)     Chloride 86 (*)     Carbon Dioxide (CO2) 28      Anion Gap 17 (*)     Urea Nitrogen 40.9 (*)     Creatinine 1.68 (*)     Calcium 9.8      Glucose 103 (*)     Alkaline Phosphatase 179 (*)     AST 23      ALT 14      Protein Total 7.1      Albumin 4.5      Bilirubin Total 1.8 (*)     GFR Estimate 32 (*)    TROPONIN T, HIGH SENSITIVITY - Abnormal    Troponin T,  High Sensitivity 55 (*)    NT PROBNP INPATIENT - Abnormal    N terminal Pro BNP Inpatient 1,989 (*)    ROUTINE UA WITH MICROSCOPIC REFLEX TO CULTURE - Abnormal    Color Urine Straw      Appearance Urine Clear      Glucose Urine Negative      Bilirubin Urine Negative      Ketones Urine Negative      Specific Gravity Urine 1.006      Blood Urine Negative      pH Urine 7.5 (*)     Protein Albumin Urine Negative      Urobilinogen Urine Normal      Nitrite Urine Negative      Leukocyte Esterase Urine Negative      RBC Urine 1      WBC Urine 3      Squamous Epithelials Urine <1     CBC WITH PLATELETS AND DIFFERENTIAL - Abnormal    WBC Count 4.4      RBC Count 3.87      Hemoglobin 12.3      Hematocrit 37.2      MCV 96      MCH 31.8      MCHC 33.1      RDW 15.5 (*)     Platelet Count 201      % Neutrophils 67      % Lymphocytes 3      % Monocytes 11      % Eosinophils 18      % Basophils 1      % Immature Granulocytes 0      NRBCs per 100 WBC 0      Absolute Neutrophils 2.9      Absolute Lymphocytes 0.1 (*)     Absolute Monocytes 0.5      Absolute Eosinophils 0.8 (*)     Absolute Basophils 0.1      Absolute Immature Granulocytes 0.0      Absolute NRBCs 0.0     MAGNESIUM - Abnormal    Magnesium 2.4 (*)    TROPONIN T, HIGH SENSITIVITY - Abnormal    Troponin T, High Sensitivity 51 (*)    INR - Normal    INR 1.11     TSH WITH FREE T4 REFLEX - Normal    TSH 2.26       POC US ECHO LIMITED   Final Result   Limited Bedside Cardiac Ultrasound, performed and interpreted by me.    Indication: Chest Pain and Shortness of Breath.   Parasternal long axis, parasternal short axis and apical 4 chamber views were acquired.    Image quality was satisfactory.      Findings:     Global left ventricular function appears intact.   Chambers do not appear dilated.   There is no evidence of free fluid within the pericardium.      IMPRESSION: Grossly normal left ventricular function and chamber size.  No pericardial effusion.      [facility]  Procedure Note        Limited Bedside ED Ultrasound of Thorax:      PROCEDURE: PERFORMED BY: Dr. Reyna Weeks MD, MD   INDICATIONS/SYMPTOM:  Chest pain and shortness of breath   PROBE: High frequency linear probe   BODY LOCATION: Chest   FINDINGS:   Images of both lung hemithoracies taken in 2D in multiple rib spaces          Right side:  Lung sliding artifact  Present      Comet tail artifacts  Present    Left side:  Lung sliding artifact  Present      Comet tail artifacts  Present    Hemothorax: Right side Absent      Left side Absent    Pleural effusion: Right side Absent       Left side Absent      INTERPRETATION: The exam was normal. There was no free fluid identified in the chest cavity. No evidence of pneumothorax, hemothorax or pleural effusion. But, predominant diffuse B lines noted.   IMAGE DOCUMENTATION: Images were archived to PACs system.    .       XR Chest Port 1 View   Final Result   Impression:    Similar moderate right pleural effusion with associated compressive   atelectasis. No interval/new airspace opacities.      I have personally reviewed the examination and initial interpretation   and I agree with the findings.      BINDU QUINONES MD            SYSTEM ID:  [id number]             Critical care was not performed.     Medical Decision Making  The patient's presentation was of moderate complexity (a chronic illness mild to moderate exacerbation, progression, or side effect of treatment).    The patient's evaluation involved:  ordering and/or review of 3+ test(s) in this encounter (see separate area of note for details)  review of 3+ test result(s) ordered prior to this encounter (see separate area of note for details)    The patient's management necessitated high risk (a decision regarding hospitalization).      Assessment & Plan    Acute on chronic diastolic CHF in the pt with cardiac amyloidosis, light chain myeloma on treatment, weight almost 15 pounds up from 6/2022 with B lines on POCUS  lung and more elevated BNP, given metolazone 5 then lasix 100 IV as she declined bumex which apparenly cause her to itch, not hypoxic not tachypneic, D/W Cards 1-admit to tele.    I have reviewed the nursing notes. I have reviewed the findings, diagnosis, plan and need for follow up with the patient.    New Prescriptions    No medications on file       Final diagnoses:   Acute on chronic diastolic congestive heart failure (H)   Light chain (AL) amyloidosis (H)     I, Mulu Conde, am serving as a trained medical scribe to document services personally performed by Reyna Weeks MD based on the provider's statements to me on April 11, 2023.  This document has been checked and approved by the attending provider.    I, Reyna Weeks MD, was physically present and have reviewed and verified the accuracy of this note documented by Mulu Conde, medical scribe.      Reyna Weeks MD  [facility] EMERGENCY DEPARTMENT  4/11/2023     Reyna Weeks MD  04/11/23 [id number]

## 2023-04-12 PROBLEM — Z51.5 HOSPICE CARE: Status: ACTIVE | Noted: 2023-01-01

## 2023-04-12 NOTE — PROGRESS NOTES
[facility]   Cardiology   Progress Note     ASSESSMENT/PLAN:  Leandra Guerrero is a 72 year old female admitted on 4/11/2023. 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 She is being admitted for acute decompensated heart failure.     Interval History: No acute events overnight. Patient hemodynamically stable on room air. Net negative 1.6L yesterday and down 7 lb. Potassium critically low at 2.8 so will add scheduled potassium and replace per protocol; recheck at 10AM. Endorses that she is already starting to feel better in regards to breathing and respiratory effort.     Changes Today:  - Start K-dur 80mEq BID  - Repeat K+ at 10AM; if normal resume Lasix gtt  - Discontinue telemetry per patient request   - Resume PTA Eplerenone 25mg BID     # Cardiac AL Amyloidosis  # Acute on Chronic Diastolic Heart Failure  # Elevated Troponin 2/2 Hypervolemia   Pt presenting with progressive fatigue and dyspnea with volume overload and 20 lbs weight gain in last 1-2 months. Acutely worsened with increased salt intake over Easter weekend. Of note, pt is on home hospice, failed outpatient PO diuretic adjustments. Troponin mildly elevated 55 -> 51, EKG with sinus rhythm without new ischemic changes. No concern for ACS. Hospice paused while pt hospitalized for IV diuresis, plan to discharge back to home hospice when more euvolemic. EDW ~ 150 lb; admission weight 160 lb.   - Volume Status: Hypervolemic; s/p Metolazone 5mg, IV Bumx 2mg, and IV Lasix 100mg in ED. Continue Lasix gtt once potassium level normalizes  - Start Potassium 80mEq BID; this is PTA dose  - Resume PTA Eplerenone 25mg BID  - Discontinue telemetry per patient request   - Keep K+ > 4.0 Mg > 2.0; protocols ordered  - Low Na Diet / 2L Fluid restriction   - Strict I&O's / Daily weights       # Paroxysmal Atrial Fibrillation/Flutter  Sinus rhythm on admit.   - Anticoagulation: Was on Eliquis 2.5mg BID but this was stopped 7/2022 after fall  - Rate Control: None; current rates in the 80's     # Chronic Kidney Disease Stage 3  Cr 1.7 on admit, unclear recent baseline (was 1.2-1.7 on last checks 6/2022). Cardiorenal injury possible.  - Daily BMP  - Avoid nephrotic agents   - Creatinine 1.20 (1.68), BUN 34.7 (40.9)    FEN: 2g Na / 2L Fluid Restriction  Code status: Full  Prophylaxis:  Ambulation  Isolation: None  Disposition: 3-5 days pending diuresis    Patient seen and discussed with Dr. West, who agrees with above plan.    Eula SAEZ, CNP  [facility] Cardiology Team    Physical Exam:  Temp:  [97.9  F (36.6  C)-99.6  F (37.6  C)] 97.9  F (36.6  C)  Pulse:  [] 81  Resp:  [16-18] 16  BP: (124-158)/(54-86) 138/71  SpO2:  [94 %-97 %] 97 %    I/O:   Intake/Output Summary (Last 24 hours) at 4/12/2023 0738  Last data filed at 4/12/2023 0600  Gross per 24 hour   Intake 600 ml   Output 3700 ml   Net -3100 ml       Wt:   Wt Readings from Last 5 Encounters:   04/12/23 69.8 kg (153 lb 14.4 oz)   06/08/22 71.9 kg (158 lb 8 oz)   06/07/22 70.6 kg (155 lb 11.2 oz)   06/06/22 69.1 kg (152 lb 6.4 oz)   06/01/22 72.2 kg (159 lb 1.6 oz)       General: NAD  HEENT:  PERRLA, EOMI.   Neck: JVD half way up neck.   CV: RRR. No murmur appreciated. No rubs or gallops. Peripheral radial pulse intact.  Resp: No increased work of breathing or use of accessory muscles, breathing comfortably on room air.  Lung sounds clear throughout/bilaterally  Abdomen:  Normal active bowel sounds.  Abdomen is soft. No distension, non-tender to palpation.    Extremities: Warm. Capillary refill less than 3 sec. 3/4 radial pulses bilaterally.  3/4 pedal pulses bilaterally. No pre-tibial edema. No cyanosis or clubbing.  Skin:  Warm and dry. No erythema, rashes, ulceration or diaphoresis.  Neuro: Alert and oriented x3.       Medications:    heparin ANTICOAGULANT  5,000 Units Subcutaneous Q12H     potassium chloride  20 mEq Oral Once     potassium chloride  40 mEq Oral BID     potassium chloride  40 mEq Oral Once       - MEDICATION INSTRUCTIONS -       [Held by provider] furosemide 20 mg/hr (04/11/23 2242)     BETA BLOCKER NOT PRESCRIBED         Labs:   CMP  Recent Labs   Lab 04/12/23  0548 04/11/23  2309 04/11/23  1532   *  --  131*   POTASSIUM 2.8* 3.0* 3.3*   CHLORIDE 85*  --  86*   CO2 32*  --  28   ANIONGAP 15  --  17*   *  --  103*   BUN 34.7*  --  40.9*   CR 1.20*  --  1.68*   GFRESTIMATED 48*  --  32*   JERROD 9.8  --  9.8   MAG 2.2 2.2 2.4*   PROTTOTAL  --   --  7.1   ALBUMIN  --   --  4.5   BILITOTAL  --   --  1.8*   ALKPHOS  --   --  179*   AST  --   --  23   ALT  --   --  14     CBC  Recent Labs   Lab 04/12/23  0548 04/11/23  1532   WBC 4.9 4.4   RBC 4.02 3.87   HGB 12.7 12.3   HCT 38.7 37.2   MCV 96 96   MCH 31.6 31.8   MCHC 32.8 33.1   RDW 15.4* 15.5*    201     INR  Recent Labs   Lab 04/11/23  1532   INR 1.11     Arterial Blood GasNo lab results found in last 7 days.    Diagnostics:  4/11/23 ECG:    PTA 6/3/22 Echo:  Global and regional left ventricular function is normal with an EF of 55-60%.  The LV cavity is small. Severe concentric wall thickening consistent with left  ventricular hypertrophy is present.  Grade II or moderate diastolic dysfunction.  Diastolic Doppler findings suggest left ventricular filling pressures are  increased.  Global right ventricular function is normal.  Pulmonary hypertension is present. Right ventricular systolic pressure is  35mmHg above the right atrial pressure.     This study was compared with the study from 10/202.  There has been no change.    Recent Results (from the past 24 hour(s))   XR Chest Port 1 View    Narrative    Examination: XR CHEST PORT 1 VIEW 4/11/2023 4:08 PM    Indication: cp sob    Comparison: X-ray 6/2/2022    Findings:  AP portable chest, 55  degrees. Trachea is midline. Aortic  atherosclerotic calcification. Cardiac silhouette is within normal  limits though partially obscured by similar moderate pleural effusion  with associated compressive and scattered atelectasis. No significant  left pleural effusion. No appreciable pneumothorax. No acute osseous  abnormality.      Impression    Impression:   Similar moderate right pleural effusion with associated compressive  atelectasis. No interval/new airspace opacities.    I have personally reviewed the examination and initial interpretation  and I agree with the findings.    BINDU QUINONES MD         SYSTEM ID:  [id number]   POC US ECHO LIMITED    Impression    Limited Bedside Cardiac Ultrasound, performed and interpreted by me.   Indication: Chest Pain and Shortness of Breath.  Parasternal long axis, parasternal short axis and apical 4 chamber views were acquired.   Image quality was satisfactory.    Findings:    Global left ventricular function appears intact.  Chambers do not appear dilated.  There is no evidence of free fluid within the pericardium.    IMPRESSION: Grossly normal left ventricular function and chamber size.  No pericardial effusion.    [facility] Procedure Note      Limited Bedside ED Ultrasound of Thorax:    PROCEDURE: PERFORMED BY: Dr. Reyna Weeks MD, MD  INDICATIONS/SYMPTOM:  Chest pain and shortness of breath  PROBE: High frequency linear probe  BODY LOCATION: Chest  FINDINGS:  Images of both lung hemithoracies taken in 2D in multiple rib spaces        Right side:  Lung sliding artifact  Present     Comet tail artifacts  Present   Left side:  Lung sliding artifact  Present     Comet tail artifacts  Present   Hemothorax: Right side Absent     Left side Absent   Pleural effusion: Right side Absent      Left side Absent    INTERPRETATION: The exam was normal. There was no free fluid identified in the chest cavity. No evidence of pneumothorax, hemothorax or pleural effusion. But,  predominant diffuse B lines noted.  IMAGE DOCUMENTATION: Images were archived to PACs system.   .        Medical Decision Making       35 MINUTES SPENT BY ME on the date of service doing chart review, history, exam, documentation & further activities per the note.        SE Tolentino CNP on 4/12/2023 at 9:33 AM

## 2023-04-12 NOTE — CONSULTS
"[facility]    Progress Note - [facility]    ______________________________________________________________________    [facility] 24/7 Contact Number: [phone]    - Providers: Please contact [facility] with changes in orders or clinical plan of care   - Nursing: Please contact [facility] with significant changes in patient condition  ______________________________________________________________________        Plan of Care Discussed with the Following:   - Nurse: Obie/Dash  - Hospitalist/Rounding Provider: Eula Lee    - Patient's Family/Preferred Contact: Spouse, Rich  - Hospice Provider: Dr.Duried Mary    [facility] Eligibility:  Volume overload/dyspnea      Pertinent assessment information: Patient is sitting up in bed eating lunch, reporting she is feeling \"much better today.\"  Records indicate down 7#, states she feels the difference.   Denies pain/dyspnea at time of assessment.  Feels her mobility has improved since admission.    Discussed [facility] POC with Dr. Viktor Mary, plan for short term hospital stay is to improve symptoms, not necessarily return hospice patient to dry weight.     Please reach out to  hospice with any questions.  [phone].        Paula Swenson RN  [facility]  Contact information available via [facility] Paging/Directory     "

## 2023-04-12 NOTE — PHARMACY-ADMISSION MEDICATION HISTORY
Admission medication history completed at [facility]. Please see Pharmacy - Admission Medication History note from 4/12/23 by Baljeet Kraft [facility].    Joe Vickers, RupertD, BCPS

## 2023-04-12 NOTE — PHARMACY-ADMISSION MEDICATION HISTORY
Admission Medication History Completed by Pharmacy    See [facility] Admission Navigator for allergy information, preferred outpatient pharmacy, prior to admission medications and immunization status.     Medication History Sources:     [facility] (Spoke to Kasie - office )    Patient    Changes made to PTA medication list (reason):    Added: Haloperidol, Magnesium Oxide, Metoprolol ER, Lorazepam, Senna Plus, Norco     Deleted: Acyclovir, Apixaban, Vit D3, Doxycycline, Zoloft    Changed: Eplerenone 25 mg twice weekly --> 25 mg BID, Potassium Chloride 60 meq BID --> 80 meq BID    Additional Information:    Patient can't tolerate Morphine, get severe headaches with Morphine use.    Prior to Admission medications    Medication Sig Last Dose Taking? Auth Provider Long Term End Date   haloperidol (HALDOL) 1 MG tablet Take 1 mg by mouth every 4 hours as needed for other (Nausea)  Yes Unknown, Entered By History Yes    HYDROcodone-acetaminophen (NORCO) 5-325 MG tablet Take 1-2 tablets by mouth every 4 hours as needed for pain  Yes Unknown, Entered By History     LORazepam (ATIVAN) 0.5 MG tablet Take 0.5 mg by mouth as needed for anxiety  Yes Unknown, Entered By History     Magnesium Oxide 250 MG TABS Take 1 tablet by mouth daily  Yes Unknown, Entered By History     metoprolol succinate ER (TOPROL XL) 25 MG 24 hr tablet Take 25 mg by mouth daily as needed (Increased heart rate)  Yes Unknown, Entered By History Yes    senna-docusate (SENOKOT-S/PERICOLACE) 8.6-50 MG tablet Take 1 tablet by mouth daily as needed for constipation  Yes Unknown, Entered By History     acetaminophen (TYLENOL) 325 MG tablet Take 325 mg by mouth every 6 hours as needed for mild pain or fever    Reported, Patient     eplerenone (INSPRA) 25 MG tablet Take 1 tablet (25 mg) by mouth 2 times daily  Patient taking differently: Take 50 mg by mouth 2 times daily   Domenica Cohen MD Yes    metolazone (ZAROXOLYN) 2.5 MG tablet Take 1  tablet (2.5 mg) by mouth twice a week As needed for increased swelling and weight gain of 3 lb in one day or 5 lb in one week  Patient taking differently: Take 10 mg by mouth 2 times daily Increase dose until weight is < 154 lbs   Hallie Izquierdo PA-C Yes    ondansetron (ZOFRAN) 8 MG tablet Take 1 tablet (8 mg) by mouth every 8 hours as needed for nausea   Yasmani Wang MD     potassium chloride ER (KLOR-CON M) 10 MEQ CR tablet Take 80 mEq by mouth 2 times daily   Kath Torres PA-C     torsemide (DEMADEX) 100 MG tablet Take 1.5 tablets (150 mg) by mouth every morning AND 1.5 tablets (150 mg) daily at 2 pm.  Patient taking differently: Takes 150 mg by mouth in the morning  mg at 2 pm.  Increase dose to 200 mg BID if weight > 153 lbs   Domenica Cohen MD Yes        Date completed: 04/12/23    Medication history completed by: Baljeet Kraft [facility]

## 2023-04-12 NOTE — PROGRESS NOTES
Pt arrived to unit at 2215, is in no distress, jolly and in good spirits. Able to transfer self to bed independently. WNL vitals, on RA with saturations at 98%. Skin visualized per protocol by writer and Jen Avila (RN) and found to be intact. Distended belly, and edema noted BLE. Was able to immediately ambulate to bathroom and voided. Continues to be on Lasix drip and prefers using commode (ordered). Able to make needs known and oriented to Rm and call light is within reach. Will continue to monitor for changes and address as needed.    0630: Lasix drip stopped due to low potassium level (3.0). Replaced with 60 mg + 40 mg. Per MD will determine restart after am levels. Large urine outputs overnight. Pt states feeling better with no sign of SOB or respiratory distress. Would like Telemetry and oxygen monitoring off. Was able to maintain Sats at 100% all night. Will share report with day RN.

## 2023-04-12 NOTE — PROGRESS NOTES
[id number]  Pt is AOX4,  SOB on xertion sating at RA, SBA, B/L leg edema not responding to p.o diuretics and wt gain. Replaced K, to recheck, on tele, under Hospice care, refused on purewick, assisted on bedside commode calling appropriately. On fluid rest 2L, vitally stable

## 2023-04-13 NOTE — PROGRESS NOTES
Writer called spouse Oscar and left a voicemail indicating that Amy is being discharged today. Plan was for Oscar to provide transport. Writer will continue to follow up to ensure Amy is discharged. Please call  with any questions or concerns.    MARY Cifuentes

## 2023-04-13 NOTE — DISCHARGE SUMMARY
[address] [address]  p: [phone]    Discharge Summary: Cardiology Service    Leandra Guerrero MRN# [id number]   YOB: 1950 Age: 72 year old       Admission Date: 4/12/2023  Discharge Date: 04/13/23    Discharge Diagnoses:  1. Cardiac AL Amyloidosis  2. Acute on Chronic Diastolic Heart Failure  3. Elevated Troponin 2/2 Hypervolemia  4. Paroxsymal Atrial Fibrillation  5. Chronic Kidney Disease Stage 3  6. Hypokalemia    Brief HPI:  Leandra Guerrero is a 72 year old female admitted on 4/11/2023. 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 She was admitted as inpatient hospice for acute decompensated heart failure. She was diuresed with Lasix gtt and PO metolazone and lost about 10 pounds. Shew as discharged back to home hospice.     Hospital Course by Diagnosis:  # Cardiac AL Amyloidosis  # Acute on Chronic Diastolic Heart Failure  # Elevated Troponin 2/2 Hypervolemia   Pt presenting with progressive fatigue and dyspnea with volume overload and 20 lbs weight gain in last 1-2 months. Acutely worsened with increased salt intake over Easter weekend. Of note, pt is on home hospice, failed outpatient PO diuretic adjustments. Troponin mildly elevated 55 -> 51, EKG with sinus rhythm without new ischemic changes. No concern for ACS. Hospice paused while pt hospitalized for IV diuresis, plan to discharge back to home hospice when more euvolemic. EDW ~ 150 lb; admission weight 160 lb.   - Volume Status: Euvolemic; Continue Torsemide 200mg BID and Metolazone 5mg M-W-F; can be up titrated per Hospice  - Continue Potassium 80mEq BID; take addition 40mEq M-W-F with metolazone.   - Continue PTA Eplerenone 25mg BID  - No follow up indicated per goals of care with hospice      # Paroxysmal Atrial  Fibrillation/Flutter  Sinus rhythm on admit.   - Anticoagulation: Was on Eliquis 2.5mg BID but this was stopped 7/2022 after fall  - Rate Control: None; current rates in the 80's     # Chronic Kidney Disease Stage 3  Cr 1.7 on admit, unclear recent baseline (was 1.2-1.7 on last checks 6/2022). Cardiorenal injury possible.  - No follow up indicated per goals of care on hospice    # Hypokalemia  - Continue k-dur 80mEq BID, addition 40 mEq on days with metolazone  - Given 80mEq x 1 prior to discharge  - Labs per hospice goals of care    Pertinent Procedures:  - None     Consults:  - GIP Hospice    Medication Changes:  - RESUME Eplerenone 25mg BID  - INCREASE Torsemide to 200mg BID   - INCREASE Metolazone to 5mg M-W-F   - CONTINUE K-dur 80mEq BID; add addition 40mEq on Metolazone days (MWF)    Discharge medications:   Current Discharge Medication List      CONTINUE these medications which have CHANGED    Details   metolazone (ZAROXOLYN) 5 MG tablet Take one tablet by mouth on Monday, Wednesday Friday. Take with addition 40mEq of Potassium.  Qty: 36 tablet, Refills: 3    Associated Diagnoses: Acute on chronic diastolic congestive heart failure (H)      potassium chloride ER (KLOR-CON M) 10 MEQ CR tablet Take 8 tablets (80 mEq) by mouth 2 times daily  Qty: 1980 tablet, Refills: 0    Comments: Take an addition 40 mEq (2 tabs) on Monday, Wednesday, Friday w/ Metolazone 5mg  Associated Diagnoses: Acute on chronic diastolic heart failure (H)      torsemide (DEMADEX) 100 MG tablet Take 1 tablet (100 mg) by mouth 2 times daily  Qty: 180 tablet, Refills: 3    Associated Diagnoses: Acute on chronic diastolic congestive heart failure (H)         CONTINUE these medications which have NOT CHANGED    Details   acetaminophen (TYLENOL) 325 MG tablet Take 325 mg by mouth every 6 hours as needed for mild pain or fever     Associated Diagnoses: Amyloid heart disease (H)      eplerenone (INSPRA) 25 MG tablet Take 1 tablet (25 mg) by mouth  2 times daily  Qty: 180 tablet, Refills: 3    Associated Diagnoses: Acute on chronic diastolic heart failure (H); Hypokalemia; Chronic diastolic heart failure (H)      haloperidol (HALDOL) 1 MG tablet Take 1 mg by mouth every 4 hours as needed for other (Nausea)      HYDROcodone-acetaminophen (NORCO) 5-325 MG tablet Take 1-2 tablets by mouth every 4 hours as needed for pain      LORazepam (ATIVAN) 0.5 MG tablet Take 0.5 mg by mouth as needed for anxiety      Magnesium Oxide 250 MG TABS Take 1 tablet by mouth daily      ondansetron (ZOFRAN) 8 MG tablet Take 1 tablet (8 mg) by mouth every 8 hours as needed for nausea  Qty: 30 tablet, Refills: 2    Associated Diagnoses: Hemorrhagic cystitis      senna-docusate (SENOKOT-S/PERICOLACE) 8.6-50 MG tablet Take 1 tablet by mouth daily as needed for constipation         STOP taking these medications       metoprolol succinate ER (TOPROL XL) 25 MG 24 hr tablet Comments:   Reason for Stopping:               Follow-up:  - Home hospice     Labs or imaging requiring follow-up after discharge:  - Not indicated with goals of care    Code status:  - DNR/DNI    Condition on discharge  Temp:  [97.5  F (36.4  C)-97.8  F (36.6  C)] 97.8  F (36.6  C)  Pulse:  [69-83] 81  Resp:  [14-16] 16  BP: (113-138)/(46-66) 138/64     General: Alert, interactive, NAD  Eyes: sclera anicteric, EOMI  Neck: JVP at clavicle, carotid 2+ bilaterally  Cardiovascular: regular rate and rhythm, normal S1 and S2, no murmurs, gallops, or rubs  Resp: clear to auscultation bilaterally, no rales, wheezes, or rhonchi  GI: Soft, nontender, nondistended. +BS.  No HSM or masses, no rebound or guarding.  Extremities: No edema, no cyanosis or clubbing, dorsalis pedis and posterior tibialis pulses 2+ bilaterally  Skin: Warm and dry, no jaundice or rash  Neuro: CN 2-12 intact, moves all extremities equally  Psych: Alert & oriented x 3    Imaging with results:    PTA 6/3/22 Echo:  Global and regional left ventricular function  is normal with an EF of 55-60%.  The LV cavity is small. Severe concentric wall thickening consistent with left  ventricular hypertrophy is present.  Grade II or moderate diastolic dysfunction.  Diastolic Doppler findings suggest left ventricular filling pressures are  increased.  Global right ventricular function is normal.  Pulmonary hypertension is present. Right ventricular systolic pressure is  35mmHg above the right atrial pressure.     This study was compared with the study from 10/202.  There has been no change.    Patient Care Team:  Magy Obrien MD as PCP - General (Family Practice)  Domenica Cohen MD as MD (Cardiology)  Niki Fam, RN as Specialty Care Coordinator (Cardiology)  Ciara Araya PA-C as Physician Assistant (Physician Assistant)  Vidhi Montgomery, RN as Specialty Care Coordinator (Cardiology)  Ciara Araya PA-C as Assigned Heart and Vascular Provider  Bryant Mckeon MD as Assigned Musculoskeletal Provider  Tato Diaz MD as Assigned Surgical Provider  Yasmani Wang MD as Assigned Cancer Care Provider    Time Spent on this Encounter   I, SE Tolentino CNP, personally saw the patient today and spent greater than 30 minutes discharging this patient.     Patient discussed with staff cardiologist, Dr. West, who agrees with the above documentation and plan. Documentation represents joint decision making.     SE Tolentino CNP on 4/13/2023 at 10:36 AM  [facility] Cardiology

## 2023-04-13 NOTE — PROGRESS NOTES
Patient alert and oriented. Discharge instructions given. Verbalized understanding.  IV removed. No bleeding.  80meq of potassium chloride given for potassium of 2.9. Patient says she will check labs tomorrow out patient. All patient belongings taken with her. Left on a wheelchair accompanied by . No signs of distress on departure.

## 2023-04-13 NOTE — PROGRESS NOTES
Patient alert and oriented. Status changed to inpatient hospice. Patient on lasix drip at 20mg/hr. Good urine output. Vitals stable. Denies pain.

## 2023-04-13 NOTE — PROGRESS NOTES
Hospice pt, no acute changes overnight, continues to be on lasix drip, voiding well refer to I&O. Given Zofran one time for nausea with good effect. Educated on need to reduce fluid intake while on Lasix. Will continue to monitor for changes and address as needed.

## 2023-04-18 NOTE — DISCHARGE SUMMARY
Admission Date: 4/12/2023  Discharge Date: 04/13/23     Discharge Diagnoses:  1. Cardiac AL Amyloidosis  2. Acute on Chronic Diastolic Heart Failure  3. Elevated Troponin 2/2 Hypervolemia  4. Paroxsymal Atrial Fibrillation  5. Chronic Kidney Disease Stage 3  6. Hypokalemia     Brief HPI:  Leandra Guerrero is a 72 year old female admitted on 4/11/2023. 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 She was admitted as inpatient hospice for acute decompensated heart failure. She was diuresed with Lasix gtt and PO metolazone and lost about 10 pounds. Shew as discharged back to home hospice.      Hospital Course by Diagnosis:  # Cardiac AL Amyloidosis  # Acute on Chronic Diastolic Heart Failure  # Elevated Troponin 2/2 Hypervolemia   Pt presenting with progressive fatigue and dyspnea with volume overload and 20 lbs weight gain in last 1-2 months. Acutely worsened with increased salt intake over Easter weekend. Of note, pt is on home hospice, failed outpatient PO diuretic adjustments. Troponin mildly elevated 55 -> 51, EKG with sinus rhythm without new ischemic changes. No concern for ACS. Hospice paused while pt hospitalized for IV diuresis, plan to discharge back to home hospice when more euvolemic. EDW ~ 150 lb; admission weight 160 lb.   - Volume Status: Euvolemic; Continue Torsemide 200mg BID and Metolazone 5mg M-W-F; can be up titrated per Hospice  - Continue Potassium 80mEq BID; take addition 40mEq M-W-F with metolazone.   - Continue PTA Eplerenone 25mg BID  - No follow up indicated per goals of care with hospice      # Paroxysmal Atrial Fibrillation/Flutter  Sinus rhythm on admit.   - Anticoagulation: Was on Eliquis 2.5mg BID but this was stopped 7/2022 after fall  - Rate Control: None; current rates in the 80's     # Chronic Kidney Disease Stage 3  Cr 1.7 on admit,  unclear recent baseline (was 1.2-1.7 on last checks 6/2022). Cardiorenal injury possible.  - No follow up indicated per goals of care on hospice     # Hypokalemia  - Continue k-dur 80mEq BID, addition 40 mEq on days with metolazone  - Given 80mEq x 1 prior to discharge  - Labs per hospice goals of care     Pertinent Procedures:  - None      Consults:  - [facility]IP Hospice     Medication Changes:  - RESUME Eplerenone 25mg BID  - INCREASE Torsemide to 200mg BID   - INCREASE Metolazone to 5mg M-W-F   - CONTINUE K-dur 80mEq BID; add addition 40mEq on Metolazone days (MWF)     Discharge medications:         Current Discharge Medication List             CONTINUE these medications which have CHANGED     Details   metolazone (ZAROXOLYN) 5 MG tablet Take one tablet by mouth on Monday, Wednesday Friday. Take with addition 40mEq of Potassium.  Qty: 36 tablet, Refills: 3     Associated Diagnoses: Acute on chronic diastolic congestive heart failure (H)       potassium chloride ER (KLOR-CON M) 10 MEQ CR tablet Take 8 tablets (80 mEq) by mouth 2 times daily  Qty: 1980 tablet, Refills: 0     Comments: Take an addition 40 mEq (2 tabs) on Monday, Wednesday, Friday w/ Metolazone 5mg  Associated Diagnoses: Acute on chronic diastolic heart failure (H)       torsemide (DEMADEX) 100 MG tablet Take 1 tablet (100 mg) by mouth 2 times daily  Qty: 180 tablet, Refills: 3     Associated Diagnoses: Acute on chronic diastolic congestive heart failure (H)                 CONTINUE these medications which have NOT CHANGED     Details   acetaminophen (TYLENOL) 325 MG tablet Take 325 mg by mouth every 6 hours as needed for mild pain or fever      Associated Diagnoses: Amyloid heart disease (H)       eplerenone (INSPRA) 25 MG tablet Take 1 tablet (25 mg) by mouth 2 times daily  Qty: 180 tablet, Refills: 3     Associated Diagnoses: Acute on chronic diastolic heart failure (H); Hypokalemia; Chronic diastolic heart failure (H)       haloperidol (HALDOL) 1 MG  tablet Take 1 mg by mouth every 4 hours as needed for other (Nausea)       HYDROcodone-acetaminophen (NORCO) 5-325 MG tablet Take 1-2 tablets by mouth every 4 hours as needed for pain       LORazepam (ATIVAN) 0.5 MG tablet Take 0.5 mg by mouth as needed for anxiety       Magnesium Oxide 250 MG TABS Take 1 tablet by mouth daily       ondansetron (ZOFRAN) 8 MG tablet Take 1 tablet (8 mg) by mouth every 8 hours as needed for nausea  Qty: 30 tablet, Refills: 2     Associated Diagnoses: Hemorrhagic cystitis       senna-docusate (SENOKOT-S/PERICOLACE) 8.6-50 MG tablet Take 1 tablet by mouth daily as needed for constipation                STOP taking these medications         metoprolol succinate ER (TOPROL XL) 25 MG 24 hr tablet Comments:   Reason for Stopping:                    Follow-up:  - Home hospice      Labs or imaging requiring follow-up after discharge:  - Not indicated with goals of care     Code status:  - DNR/DNI     Condition on discharge  Temp:  [97.5  F (36.4  C)-97.8  F (36.6  C)] 97.8  F (36.6  C)  Pulse:  [69-83] 81  Resp:  [14-16] 16  BP: (113-138)/(46-66) 138/64      General: Alert, interactive, NAD  Eyes: sclera anicteric, EOMI  Neck: JVP at clavicle, carotid 2+ bilaterally  Cardiovascular: regular rate and rhythm, normal S1 and S2, no murmurs, gallops, or rubs  Resp: clear to auscultation bilaterally, no rales, wheezes, or rhonchi  GI: Soft, nontender, nondistended. +BS.  No HSM or masses, no rebound or guarding.  Extremities: No edema, no cyanosis or clubbing, dorsalis pedis and posterior tibialis pulses 2+ bilaterally  Skin: Warm and dry, no jaundice or rash  Neuro: CN 2-12 intact, moves all extremities equally  Psych: Alert & oriented x 3     Imaging with results:    PTA 6/3/22 Echo:  Global and regional left ventricular function is normal with an EF of 55-60%.  The LV cavity is small. Severe concentric wall thickening consistent with left  ventricular hypertrophy is present.  Grade II or  moderate diastolic dysfunction.  Diastolic Doppler findings suggest left ventricular filling pressures are  increased.  Global right ventricular function is normal.  Pulmonary hypertension is present. Right ventricular systolic pressure is  35mmHg above the right atrial pressure.     This study was compared with the study from 10/202.  There has been no change.     Patient Care Team:  Magy Obrien MD as PCP - General (Family Practice)  Domenica Cohen MD as MD (Cardiology)  Niki Fam, RN as Specialty Care Coordinator (Cardiology)  Ciara Araya PA-C as Physician Assistant (Physician Assistant)  Vidhi Montgomery, RN as Specialty Care Coordinator (Cardiology)  Ciara Araya PA-C as Assigned Heart and Vascular Provider  Bryant Mckeon MD as Assigned Musculoskeletal Provider  Tato Diaz MD as Assigned Surgical Provider  Yasmani Wang MD as Assigned Cancer Care Provider

## 2023-04-27 NOTE — PROGRESS NOTES
01/14/17 0900   Quick Adds   Type of Visit Initial Occupational Therapy Evaluation   Living Environment   Lives With spouse   Living Arrangements house   Home Accessibility other (see comments)  (multiple stairs within home)   Living Environment Comment Pt resides with her  in a multi story home;   Self-Care   Usual Activity Tolerance moderate   Current Activity Tolerance poor   Regular Exercise no   Equipment Currently Used at Home none   Functional Level Prior   Ambulation 0-->independent   Transferring 0-->independent   Toileting 0-->independent   Bathing 0-->independent   Dressing 0-->independent   Eating 0-->independent   Communication 0-->understands/communicates without difficulty   Swallowing 0-->swallows foods/liquids without difficulty   Cognition 0 - no cognition issues reported   Number of times patient has fallen within last six months 1   Which of the above functional risks had a recent onset or change? none   Prior Functional Level Comment Prior to admission Pt was independent with all ADLs and functional mobility. Pt completed cooking and light cleaning. Spouse assisted with running errands/shopping and heavy IADLs.    General Information   Onset of Illness/Injury or Date of Surgery - Date 01/13/17   Referring Physician Mary Casey MD   Patient/Family Goals Statement To return home   Additional Occupational Profile Info/Pertinent History of Current Problem Pt was previously independent with ADLs and functional mobility. Pt enjoys baking and cooking tasks.  She reports significant fatigue and weakness. Pt admitted with right-sided pleural effusion, failed outpatient diuresis. She resides with her  who works outside of the home as a .  Pt is concerned about her ability to care for herself due to weakness.     General Observations Bilateral LE edema   Cognitive Status Examination   Orientation orientation to person, place and time   Level of Consciousness alert   Able to  [FreeTextEntry1] : 60 yo RHF w/ h/o DLD, positional vertigo currently p/w recurrent transient brief visual scintillations which were much less over the last year. \par \par Recommendations: \par - No additional intervention from neurology point of view currently \par - F/U 1 y \par  Follow Commands WNL/WFL   Personal Safety (Cognitive) WNL/WFL   Memory intact   Attention No deficits were identified   Visual Perception   Visual Perception No deficits were identified   Sensory Examination   Sensory Quick Adds No deficits were identified   Pain Assessment   Patient Currently in Pain No   Integumentary/Edema   Integumentary/Edema Comments significant LE edema; lymphedema consult   Posture   Posture not impaired   Range of Motion (ROM)   ROM Quick Adds No deficits were identified   Strength   Strength Comments generalized weakness   Coordination   Upper Extremity Coordination No deficits were identified   Mobility   Bed Mobility Comments Mod I   Toilet Transfer   Toilet Transfer Comments SBA   Grooming   Level of [place]: Grooming stand-by assist   Instrumental Activities of Daily Living (IADL)   Previous Responsibilities meal prep   IADL Comments laundry   Activities of Daily Living Analysis   Impairments Contributing to Impaired Activities of Daily Living balance impaired;strength decreased   ADL Comments poor activity tolerance, fatigue   General Therapy Interventions   Planned Therapy Interventions ADL retraining;IADL retraining;strengthening;transfer training;progressive activity/exercise   Clinical Impression   Criteria for Skilled Therapeutic Interventions Met yes, treatment indicated   OT Diagnosis functional status decline   Influenced by the following impairments weakness, poor activity tolerance, balance deficits,   Assessment of Occupational Performance 5 or more Performance Deficits   Identified Performance Deficits decreased ability to complete bathing, dressing, toileting, meal prep, light cleaning, household management.    Clinical Decision Making (Complexity) Low complexity   Therapy Frequency daily   Predicted Duration of Therapy Intervention (days/wks) 7   Anticipated Equipment Needs at Discharge shower chair   Anticipated Discharge Disposition Home with Home Therapy   Risks  "and Benefits of Treatment have been explained. Yes   [facility] AM-PAC TM \"6 Clicks\"   2016, Trustees of [facility], under license to [employer].  All rights reserved.   6 Clicks Short Forms Daily Activity Inpatient Short Form   [facility] AM-PAC  \"6 Clicks\" Daily Activity Inpatient Short Form   1. Putting on and taking off regular lower body clothing? 1 - Total   2. Bathing (including washing, rinsing, drying)? 2 - A Lot   3. Toileting, which includes using toilet, bedpan or urinal? 3 - A Little   4. Putting on and taking off regular upper body clothing? 4 - None   5. Taking care of personal grooming such as brushing teeth? 4 - None   6. Eating meals? 4 - None   Daily Activity Raw Score (Score out of 24.Lower scores equate to lower levels of function) 18   Total Evaluation Time   Total Evaluation Time (Minutes) 7   Evaluation by Viktoria Michel OTR/L  "

## 2023-06-12 PROBLEM — E87.70 HYPERVOLEMIA, UNSPECIFIED HYPERVOLEMIA TYPE: Status: ACTIVE | Noted: 2023-01-01

## 2023-06-12 NOTE — H&P
[facility]    History and Physical - Hospitalist Service       Date of Admission:  6/12/2023    Assessment & Plan      Leandra Guerrero is a 72 year old female admitted on 6/12/2023.     Acute on chronic diastolic congestive heart failure  History of cardiac amyloid  Active hospice enrollement  EKG wtihout any ischemic changes  Patient is in hospice and wants to continue to be in hospice, will admit to inpatient  She does not want aggressive cares other  Plan  - admit to inpatient  - lasix infusion (home dose is 160 mg tid of lasix, torsemide 100 mg, and chlorthalidone)  - telemetry  - echocardiogram deferred given hospice status. This was discussed with the patient, whose major goal is for diuresis Discussed with patient, who confirms she received home hospice through [facility]  - will consult [facility] hospice while inpatient  - daily weight strict ins and outs  - daily bmp with electrolyte monitoring  - resume other meds when verified by pharmacy    # Paroxysmal Atrial Fibrillation/Flutter  Sinus rhythm on admit.   - Anticoagulation: Was on Eliquis 2.5mg BID but this was stopped 7/2022 after fall  - Rate Control: None; current rates in the 80's      Chronic Kidney Disease Stage 3  Cr 1.3 on admission  Plan  - repeat bmp daily while inpatient     Hypokalemia  - monitor with daily bmp     Diet:   cardia  DVT Prophylaxis: Pneumatic Compression Devices  Marie Catheter: Not present  Lines: None     Cardiac Monitoring: None  Code Status:   DNR/DNI    Clinically Significant Risk Factors Present on Admission           # Hypercalcemia: Highest Ca = 10.2 mg/dL in last 2 days, will monitor as appropriate        # Hypertension: Noted on problem list               Disposition Plan      Expected Discharge Date: 06/13/2023                  Aguila Stanton DO  Hospitalist Service  [facility]  Securely message with [employer] (more info)  Text page via [employer] Paging/Directory      ______________________________________________________________________    Chief Complaint   Swelling    History is obtained from the patient    History of Present Illness   Leandra Guerrero is a 72 year old female with past medical history of cardiac amyloidosis, CHF, current hospice care plan who was sent into the emergency department from her hospice care providers for diuresis.    The patient reports she is currently on hospice and understands that she has a life limiting illness with her cardiac amyloidosis.  She is currently on hospice with plans to resume hospice on discharge.  Her goal is to get home.  However she has noted increasing lower extremity edema.  In the past she had been hospitalized in April for volume removal.    We discussed further work-up including echocardiogram serial troponins and right now the patient declined she would like to focus entirely on symptom management of her extensive edema.  She feels like she has gained about 20 pounds.      Past Medical History    Past Medical History:   Diagnosis Date     AL amyloidosis (H)      Atrial fibrillation and flutter (H)      Cardiac amyloidosis (H)      Lymphedema      QT prolongation      Recurrent right pleural effusion 1/2/2017     SVT (supraventricular tachycardia) (H)        Past Surgical History   Past Surgical History:   Procedure Laterality Date     EXCISE GANGLION WRIST Left 5/11/2022    Procedure: LEFT VOLAR WRIST GANGLION EXCISION;  Surgeon: Tato Diaz MD;  Location:  OR     IR THORACENTESIS  12/21/2016     IR THORACENTESIS  8/25/2016       Prior to Admission Medications   Prior to Admission Medications   Prescriptions Last Dose Informant Patient Reported? Taking?   HYDROcodone-acetaminophen (NORCO) 5-325 MG tablet  Nursing Home Yes No   Sig: Take 1-2 tablets by mouth every 4 hours as needed for pain   LORazepam (ATIVAN) 0.5 MG tablet  Nursing Home Yes No   Sig: Take 0.5 mg by mouth as needed for anxiety   Magnesium  Oxide 250 MG TABS  Nursing Home Yes No   Sig: Take 1 tablet by mouth daily   acetaminophen (TYLENOL) 325 MG tablet  Self Yes No   Sig: Take 325 mg by mouth every 6 hours as needed for mild pain or fever    eplerenone (INSPRA) 25 MG tablet  Nursing Home, Self No No   Sig: Take 1 tablet (25 mg) by mouth 2 times daily   haloperidol (HALDOL) 1 MG tablet  Nursing Home Yes No   Sig: Take 1 mg by mouth every 4 hours as needed for other (Nausea)   metolazone (ZAROXOLYN) 5 MG tablet   No No   Sig: Take one tablet by mouth on Monday, Wednesday Friday. Take with addition 40mEq of Potassium.   ondansetron (ZOFRAN) 8 MG tablet  Self No No   Sig: Take 1 tablet (8 mg) by mouth every 8 hours as needed for nausea   potassium chloride ER (KLOR-CON M) 10 MEQ CR tablet   No No   Sig: Take 8 tablets (80 mEq) by mouth 2 times daily   senna-docusate (SENOKOT-S/PERICOLACE) 8.6-50 MG tablet  Nursing Home Yes No   Sig: Take 1 tablet by mouth daily as needed for constipation   torsemide (DEMADEX) 100 MG tablet   No No   Sig: Take 1 tablet (100 mg) by mouth 2 times daily      Facility-Administered Medications: None        Review of Systems    The 10 point Review of Systems is negative other than noted in the HPI or here.     Social History   I have reviewed this patient's social history and updated it with pertinent information if needed.  Social History     Tobacco Use     Smoking status: Never     Smokeless tobacco: Never   Substance Use Topics     Alcohol use: No     Drug use: No       Family History   I have reviewed this patient's family history and updated it with pertinent information if needed.  Family History   Problem Relation Age of Onset     Other - See Comments Sister         Amyloidosis       Allergies   Allergies   Allergen Reactions     Contrast Dye Shortness Of Breath     Shaking,chills and dypsnea     Cytoxan [Cyclophosphamide]      Hemorrhagic cystitis     Gabapentin      Slurred speech, weakness     Levofloxacin      Severe  shaking and abdominal pain     Aaron Weed      Rash and itchyness     Amoxicillin Nausea and Vomiting and Cramps     Bumetanide Unknown     Spironolactone      Worsening hyponatremia, palpitations     Chlorhexidine Dermatitis and Rash     AARON wipes. 2% chlorhexidine gluconate. Rash to everywhere wipe was applied         Physical Exam   Vital Signs: Temp: 97.9  F (36.6  C) Temp src: Oral BP: 134/64 Pulse: 76   Resp: 16 SpO2: 99 %      Weight: 0 lbs 0 oz    General: Pt seen on [facility], cooperative, and alert to conversation  Eyes: Tracking well, clear conjunctiva BL  ENT: MMM, midline trachea.   Lungs: No tachypnea, no accessory muscle use. No respiratory distress.   CV: Rate as above. Large amount of lymphedema in the bilateral lower leg.  Abd: Soft, non tender, no guarding, no rebound. Non distended  MSK: no joint effusion.  No evidence of trauma  Skin: No rash  Neuro: Clear speech and no facial droop.  Psych: Not RIS, no e/o AH/VH    Medical Decision Making       60 MINUTES SPENT BY ME on the date of service doing chart review, history, exam, documentation & further activities per the note.      Data     I have personally reviewed the following data over the past 24 hrs:    5.1  \   13.5   / 182     134 (L) 87 (L) 27.9 (H) /  91   3.7 31 (H) 1.28 (H) \       Trop: 48 (H) BNP: 1,199 (H)       Imaging results reviewed over the past 24 hrs:   Recent Results (from the past 24 hour(s))   XR Chest 2 Views    Narrative    XR CHEST 2 VIEWS   6/12/2023 1:45 PM     HISTORY: Shortness of breath    COMPARISON: Chest radiograph 4/11/2023, CT 5/9/2022      Impression    IMPRESSION: Stable cardiomediastinal silhouette. Moderate right  pleural effusion with associated compressive atelectasis, not  significantly changed in volume from prior exam. Left lung is clear.  No pneumothorax. Bones are unchanged.    FARAZ SELLERS MD         SYSTEM ID:  [id number]

## 2023-06-12 NOTE — ED NOTES
[facility]  ED Nurse Handoff Report    ED Chief complaint: Headache and Shortness of Breath      ED Diagnosis:   Final diagnoses:   None       Code Status: DNR / DNI    Allergies:   Allergies   Allergen Reactions     Contrast Dye Shortness Of Breath     Shaking,chills and dypsnea     Cytoxan [Cyclophosphamide]      Hemorrhagic cystitis     Gabapentin      Slurred speech, weakness     Levofloxacin      Severe shaking and abdominal pain     Aaron Weed      Rash and itchyness     Amoxicillin Nausea and Vomiting and Cramps     Bumetanide Unknown     Spironolactone      Worsening hyponatremia, palpitations     Chlorhexidine Dermatitis and Rash     AARON wipes  2% chlorhexidine gluconate   Rash to everywhere wipe was applied        Patient Story: Pt presents the ED with increased SOB and bilateral leg swelling.  Focused Assessment:  Pt presents with SOB and bilateral leg swelling and chest pressure.  Vital signs unremarkable.  Pt alert and oriented x 4.  Pt given 324 of ASA and 60 mg IV lasix.  BNP 1199, serial trops 51and and 48.  Pt able to ambulate without O2 desaturation.  Plans for admission.          Treatments and/or interventions provided: See above  Patient's response to treatments and/or interventions: See above    To be done/followed up on inpatient unit:  NA    Does this patient have any cognitive concerns?: Npne    Activity level - Baseline/Home:  Independent  Activity Level - Current:   Stand with Assist    Patient's Preferred language: English   Needed?: No    Isolation: None  Infection: Not Applicable  Patient tested for COVID 19 prior to admission: NO  Bariatric?: No    Vital Signs:   Vitals:    06/12/23 1235 06/12/23 1525   BP: (!) 146/60 134/64   Pulse: 80 76   Resp: 16    Temp: 97.9  F (36.6  C)    TempSrc: Oral    SpO2: 98% 99%       Cardiac Rhythm:     Was the PSS-3 completed:   Yes  What interventions are required if any?               Family Comments: None  OBS  brochure/video discussed/provided to patient/family: N/A              Name of person given brochure if not patient: NA              Relationship to patient: NA    For the majority of the shift this patient's behavior was Green.   Behavioral interventions performed were NA.    ED NURSE PHONE NUMBER: *[phone]

## 2023-06-12 NOTE — PROGRESS NOTES
RECEIVING UNIT ED HANDOFF REVIEW    ED Nurse Handoff Report was reviewed by: Soco Palma, RN on June 12, 2023 at 5:47 PM

## 2023-06-12 NOTE — ED NOTES
Pt informed she was coming in under observation status and provided with observation informational sheet.

## 2023-06-12 NOTE — ED TRIAGE NOTES
Pt sts se was sent in to be diuresed, has extra swelling to legs and sob     Triage Assessment     Row Name 06/12/23 1236       Triage Assessment (Adult)    Airway WDL WDL       Respiratory WDL    Respiratory WDL WDL       Skin Circulation/Temperature WDL    Skin Circulation/Temperature WDL WDL       Cardiac WDL    Cardiac WDL WDL       Peripheral/Neurovascular WDL    Peripheral Neurovascular WDL WDL       Cognitive/Neuro/Behavioral WDL    Cognitive/Neuro/Behavioral WDL WDL

## 2023-06-12 NOTE — ED PROVIDER NOTES
History     Chief Complaint:  Headache and Shortness of Breath       The history is provided by the patient and the spouse.      Leandra Guerrero is a 72 year old female with a history of A-fib, hypertension, and shortness of breath who presents with shortness of breath and leg swelling. The patient was sent here to be diuresed. She has been having a fluid overload problem for months now. She also has leg swelling, shortness of breath, headache and abdominal pain. She has had shortness of breath for about four days. She has not coughed up anything but states she has chest pressure. Her headache started around 1415 today. She took an Asprin earlier which made her abdominal pain worse. She took her normal medication today. She has not increased her intake of water or changed anything in her lifestyle.       Independent Historian:   Patient provides history with additional comments from her     Review of External Notes: I reviewed the patients discharge note from 4/12/23. I also reviewed the patients ED note from 04/11/23.      Allergies:  Contrast Dye  Cytoxan [Cyclophosphamide]  Gabapentin  Levofloxacin  Aaron [place]  Amoxicillin  Bumetanide  Spironolactone  Chlorhexidine     Medications:    acetaminophen (TYLENOL) 325 MG tablet  eplerenone (INSPRA) 25 MG tablet  haloperidol (HALDOL) 1 MG tablet  HYDROcodone-acetaminophen (NORCO) 5-325 MG tablet  LORazepam (ATIVAN) 0.5 MG tablet  Magnesium Oxide 250 MG TABS  metolazone (ZAROXOLYN) 5 MG tablet  ondansetron (ZOFRAN) 8 MG tablet  potassium chloride ER (KLOR-CON M) 10 MEQ CR tablet  senna-docusate (SENOKOT-S/PERICOLACE) 8.6-50 MG tablet  torsemide (DEMADEX) 100 MG tablet        Past Medical History:    Past Medical History:   Diagnosis Date     AL amyloidosis (H)      Atrial fibrillation and flutter (H)      Cardiac amyloidosis (H)      Lymphedema      QT prolongation      Recurrent right pleural effusion 1/2/2017     SVT (supraventricular tachycardia) (H)         Past Surgical History:    Past Surgical History:   Procedure Laterality Date     EXCISE GANGLION WRIST Left 5/11/2022    Procedure: LEFT VOLAR WRIST GANGLION EXCISION;  Surgeon: Tato Diaz MD;  Location: [facility] OR     IR THORACENTESIS  12/21/2016     IR THORACENTESIS  8/25/2016        Family History:    family history includes Other - See Comments in her sister.    Social History:   reports that she has never smoked. She has never used smokeless tobacco. She reports that she does not drink alcohol and does not use drugs.  PCP: Magy Obrien     Physical Exam     Patient Vitals for the past 24 hrs:   BP Temp Temp src Pulse Resp SpO2   06/12/23 1525 134/64 -- -- 76 -- 99 %   06/12/23 1235 (!) 146/60 97.9  F (36.6  C) Oral 80 16 98 %        Physical Exam  Vitals: reviewed by me  General: Pt seen on [facility], pleasant, cooperative, and alert to conversation  Eyes: Tracking well, clear conjunctiva BL  ENT: MMM, midline trachea.   Lungs: No tachypnea, no accessory muscle use. No respiratory distress.   CV: Rate as above  Abd: Soft, non tender, no guarding, no rebound. Non distended  MSK: no joint effusion.  No evidence of trauma  Skin: No rash  Neuro: Clear speech and no facial droop.  Psych: Not RIS, no e/o AH/VH      Emergency Department Course     ECG results from 06/12/23   EKG 12-lead, tracing only     Value    Systolic Blood Pressure     Diastolic Blood Pressure     Ventricular Rate 80    Atrial Rate 80    AL Interval 238    QRS Duration 90        QTc 424    P Axis 56    R AXIS -54    T Axis 224    Interpretation ECG      Sinus rhythm with 1st degree A-V block  Low voltage QRS  Left anterior fascicular block  Cannot rule out Anteroseptal infarct (cited on or before 11-APR-2023)  ST & T wave abnormality, consider inferolateral ischemia  Abnormal ECG  When compared with ECG of 11-APR-2023 14:59,  Questionable change in initial forces of Anterior leads  T wave inversion more evident in  Lateral leads  QT has shortened       *Note: Due to a large number of results and/or encounters for the requested time period, some results have not been displayed. A complete set of results can be found in Results Review.         Imaging:  XR Chest 2 Views   Final Result   IMPRESSION: Stable cardiomediastinal silhouette. Moderate right   pleural effusion with associated compressive atelectasis, not   significantly changed in volume from prior exam. Left lung is clear.   No pneumothorax. Bones are unchanged.      FARAZ SELLERS MD            SYSTEM ID:  [id number]         Report per radiology    Laboratory:  Labs Ordered and Resulted from Time of ED Arrival to Time of ED Departure   BASIC METABOLIC PANEL - Abnormal       Result Value    Sodium 134 (*)     Potassium 3.7      Chloride 87 (*)     Carbon Dioxide (CO2) 31 (*)     Anion Gap 16 (*)     Urea Nitrogen 27.9 (*)     Creatinine 1.28 (*)     Calcium 10.2      Glucose 91      GFR Estimate 45 (*)    TROPONIN T, HIGH SENSITIVITY - Abnormal    Troponin T, High Sensitivity 51 (*)    NT PROBNP INPATIENT - Abnormal    N terminal Pro BNP Inpatient 1,199 (*)    CBC WITH PLATELETS AND DIFFERENTIAL - Abnormal    WBC Count 5.1      RBC Count 4.16      Hemoglobin 13.5      Hematocrit 40.3      MCV 97      MCH 32.5      MCHC 33.5      RDW 13.7      Platelet Count 182      % Neutrophils 68      % Lymphocytes 4      % Monocytes 11      % Eosinophils 16      % Basophils 1      % Immature Granulocytes 0      NRBCs per 100 WBC 0      Absolute Neutrophils 3.5      Absolute Lymphocytes 0.2 (*)     Absolute Monocytes 0.6      Absolute Eosinophils 0.8 (*)     Absolute Basophils 0.1      Absolute Immature Granulocytes 0.0      Absolute NRBCs 0.0     TROPONIN T, HIGH SENSITIVITY - Abnormal    Troponin T, High Sensitivity 48 (*)    BLOOD GAS VENOUS - Abnormal    pH Venous 7.48 (*)     pCO2 Venous 51 (*)     pO2 Venous 31      Bicarbonate Venous 38 (*)     Base Excess/Deficit (+/-) 12.4  (*)     FIO2 0          Emergency Department Course & Assessments:     Interventions:  Medications   aspirin (ASA) chewable tablet 324 mg (324 mg Oral $Given 6/12/23 1428)   furosemide (LASIX) injection 60 mg (60 mg Intravenous $Given 6/12/23 1529)          Independent Interpretation (X-rays, CTs, rhythm strip):  I independently reviewed the patients chest x-ray and I do not see any evidence of a pneumothorax    Consultations/Discussion of Management or Tests:  ED Course as of 06/12/23 1649   Mon Jun 12, 2023   1610 I rechecked with the patient and discussed on if they feel good enough to get discharged or if they want to be admitted     Social Determinants of Health affecting care:   None      Disposition:  The patient was admitted under the care of Dr. Stanton    Impression & Plan      Medical Decision Making:  This is a pleasant 72-year-old female who presents emergency room with appears to be fluid overload.  She states that she is still taking her medications, but still feels similar to or even possibly worse than she did at the beginning of April, several months ago when she was admitted for similar symptoms.  Her BNP is elevated although it is somewhat low for her, and I do hear some crackles on her x-ray.  She does have significant edema symmetrically in bilateral lower extremities, and for this reason I have given her 1 dose of IV Lasix here and I will bring her in for observation and she tells me that she is too weak to function at home.   echoes this sentiment and I have spoken to the hospitalist team who is generously agreed to admit the patient.  This may simply be progression of her cardiac amyloid, but again does not appear to be stable at this point to go home and do her normal activities, therefore we will plan for admission as above.  No indication to give antibiotics, no evidence of ACS, will monitor very carefully.  Also will note no tachycardia, pleurisy or hypoxia to evidence of  pulmonary embolism at this time.    Diagnosis:    ICD-10-CM    1. Hypervolemia, unspecified hypervolemia type  E87.70            Discharge Medications:  New Prescriptions    No medications on file          6/12/2023   Khai Pneg MD  Scribe Disclosure:  I, JYOTI, am serving as a scribe at 2:27 PM on 6/12/2023 to document services personally performed by Khai Peng MD based on my observations and the provider's statements to me.       Khai Peng MD  06/12/23 1738

## 2023-06-12 NOTE — PHARMACY-ADMISSION MEDICATION HISTORY
Pharmacist Admission Medication History    Admission medication history is complete. The information provided in this note is only as accurate as the sources available at the time of the update.    Medication reconciliation/reorder completed by provider prior to medication history? No    Information Source(s): Family member and Hospice nurse via phone    Pertinent Information: Patient's spouse read home med list over the phone. I confirmed a few items over the phone with hospice nurse Renetta [phone] (pt is on 2 loop diuretics- furosemide and torsemide. Lorazepam is q1h prn with an unknown max dose)    Changes made to PTA medication list:    Added: hydroxyzine, bisacodyl, compazine, lasix, levsin, sarna, diuril, dilaudid, allegra, triamterene    Deleted: None    Changed: lorazepam, metolazone, KCl, Torsemide    Medication Affordability:     Allergies reviewed with patient and updates made in EHR: no    Medication History Completed By: Ondina Langley RPH 6/12/2023 5:32 PM    Prior to Admission medications    Medication Sig Last Dose Taking? Auth Provider Long Term End Date   acetaminophen (TYLENOL) 325 MG tablet Take 325 mg by mouth every 6 hours as needed for mild pain or fever  Unknown Yes Reported, Patient     bisacodyl (DULCOLAX) 10 MG suppository Place 10 mg rectally daily as needed for constipation Unknown Yes Unknown, Entered By History     camphor-menthol (DERMASARRA) 0.5-0.5 % external lotion Apply topically every 6 hours as needed for skin care Unknown Yes Unknown, Entered By History     chlorothiazide (DIURIL) 500 MG tablet Take 1,000 mg by mouth 2 times daily 6/12/2023 at am Yes Unknown, Entered By History No    eplerenone (INSPRA) 25 MG tablet Take 1 tablet (25 mg) by mouth 2 times daily 6/12/2023 at am Yes Domenica Cohen MD Yes    fexofenadine (ALLEGRA) 180 MG tablet Take 180 mg by mouth daily Unknown Yes Unknown, Entered By History     furosemide (LASIX) 40 MG tablet Take 160 mg by mouth 3  times daily 6/12/2023 at am Yes Unknown, Entered By History No    haloperidol (HALDOL) 1 MG tablet Take 1 mg by mouth every 4 hours as needed for other (Nausea) Unknown Yes Unknown, Entered By History Yes    HYDROcodone-acetaminophen (NORCO) 5-325 MG tablet Take 1-2 tablets by mouth every 4 hours as needed for pain Unknown Yes Unknown, Entered By History     HYDROmorphone, HIGH CONC, (DILAUDID) 10 mg/mL LIQD oral Place 2 mg under the tongue every 2 hours as needed for moderate to severe pain Unknown Yes Unknown, Entered By History     hydrOXYzine (ATARAX) 25 MG tablet Take 25 mg by mouth 4 times daily as needed for itching or anxiety Unknown Yes Unknown, Entered By History     hyoscyamine (LEVSIN) 0.125 MG tablet Take 0.125 mg by mouth every 4 hours as needed for cramping Unknown Yes Unknown, Entered By History     LORazepam (ATIVAN) 0.5 MG tablet Take 0.5 mg by mouth every hour as needed for anxiety Unknown Yes Unknown, Entered By History     Magnesium Oxide 250 MG TABS Take 1 tablet by mouth daily Unknown Yes Unknown, Entered By History     metolazone (ZAROXOLYN) 2.5 MG tablet Take 2.5 mg by mouth daily as needed Unknown Yes Unknown, Entered By History Yes    metolazone (ZAROXOLYN) 5 MG tablet Take one tablet by mouth on Monday, Wednesday Friday. Take with addition 40mEq of Potassium. 6/12/2023 at am Yes Eula Lee, APRN CNP Yes    ondansetron (ZOFRAN) 8 MG tablet Take 1 tablet (8 mg) by mouth every 8 hours as needed for nausea Unknown Yes Yasmani Wang MD     potassium chloride ER (K-TAB/KLOR-CON) 10 MEQ CR tablet Take 40 mEq by mouth three times a week Take on Mondays, Wednesdays, and Fridays with Metolazone 6/12/2023 at am Yes Unknown, Entered By History     potassium chloride ER (MICRO-K) 10 MEQ CR capsule Take 80 mEq by mouth 3 times daily 6/12/2023 at am Yes Unknown, Entered By History     prochlorperazine (COMPAZINE) 10 MG tablet Take 10 mg by mouth every 6 hours as needed for nausea or  vomiting Unknown Yes Unknown, Entered By History     senna-docusate (SENOKOT-S/PERICOLACE) 8.6-50 MG tablet Take 1 tablet by mouth daily as needed for constipation Unknown Yes Unknown, Entered By History     torsemide (DEMADEX) 100 MG tablet Take 100 mg by mouth daily 6/12/2023 at am Yes Unknown, Entered By History No    triamterene (DYRENIUM) 100 MG capsule Take 100 mg by mouth 2 times daily NEW at not started yet  Unknown, Entered By History Yes

## 2023-06-13 NOTE — CONSULTS
[facility] Nurse Inpatient Assessment     Consulted for: right buttock       Patient History (according to provider note(s):      Leandra Guerrero is a 72 year old female admitted on 6/12/2023 with sob and swelling.      Acute on chronic diastolic congestive heart failure  History of cardiac amyloid  Active hospice enrollement    Assessment:      Pressure Injury Location: right buttock     Last photo: 6/13  Wound type: Pressure Injury     Pressure Injury Stage: 3, present on admission   Wound history/plan of care:   Unknown patient not a good historian, likely too ill to take care of herself and diarrhea     Wound base: 100 % granulation tissue,      Palpation of the wound bed: normal      Drainage: none     Description of drainage: none     Measurements (length x width x depth, in cm) 0.6  x 0.7  x  0.1 cm      Tunneling N/A     Undermining N/A  Periwound skin: Ecchymosis, Irritant dermatitis, Scar tissue and chronic tissue injury       Color: pink and purple      Temperature: normal   Odor: none  Pain: tension to hands, feet and body and when moving due to chronic pain, unbearable  Pain intervention prior to dressing change: slow and gentle cares   Treatment goal: Heal   STATUS: initial assessment  Supplies ordered: ordered VASHE and supplies stored on unit    My PI Risk Assessment     Sensory Perception: 4 - No impairment     Moisture: 2 - Very moist      Activity: 1 - Bedfast      Mobility: 2 - Very limited     Nutrition: 2 - Probably inadequate      Friction/Shear: 1 - Problem     TOTAL: 12     Treatment Plan:     Right buttock pressure injury - every 3 days and prn roll off or soilage  Cleanse wound and periwound skin with vashe #[id number]  Apply mepilex border 4x4   HOB <45 degrees ideally 30 degrees unless eating/drinking  Elevate BLE making sure heels are off the pillows and not touching the bed   Orders: Written    RECOMMEND PRIMARY TEAM ORDER: None, at this time  Education  provided: importance of repositioning, plan of care and Off-loading pressure  Discussed plan of care with: Patient and Nurse  WO nurse follow-up plan: weekly  Notify WOC if wound(s) deteriorate.  Nursing to notify the Provider(s) and re-consult the WO Nurse if new skin concern.    DATA:     Current support surface: Standard  Standard gel/foam mattress (IsoFlex, Atmos air, etc)  Containment of urine/stool: Incontinence Protocol  BMI: There is no height or weight on file to calculate BMI.   Active diet order: Orders Placed This Encounter      Combination Diet Low Saturated Fat Na <2400mg Diet     Output: No intake/output data recorded.     Labs: Recent Labs   Lab 06/12/23  1239   HGB 13.5   WBC 5.1     Pressure injury risk assessment:                          IVONNE Whittington RN CWOCN  Pager no longer is use, please contact through [employer] group: [facility] Nurse  Dept. Office Number: [phone]

## 2023-06-13 NOTE — H&P
[facility]    History and Physical - Hospitalist Service       Date of Admission:  6/13/2023    Assessment & Plan      Leandra Guerrero is a 72 year old female admitted on 6/12/2023 with sob and swelling.      Acute on chronic diastolic congestive heart failure  History of cardiac amyloid  Active hospice enrollement  EKG wtihout any ischemic changes  Patient is in hospice and wants to continue to be in hospice, will admit to inpatient  She does not want aggressive cares other  Plan  - lasix infusion initially (home dose is 160 mg tid of lasix, torsemide 100 mg, and chlorthalidone)  - telemetry  - echocardiogram deferred given hospice status. This was discussed with the patient, whose major goal is for diuresis Discussed with patient, who confirms she received home hospice through [facility]  - daily weight strict ins and outs  - daily bmp with electrolyte monitoring  - [facility] hospice following, will discharge/readmit under inpatient hospice  - 6/13 - SOB, edema with some improvement. Discussed with patient - will stop IV lasix drip, give 1x lasix 100 mg iv later today, and plan to resume home regimen 6/14 - pending clinical course, BMP update, and hospice team input. I would have to question how much this regimen is working/helping vs causing other side effects, but for now will keep in place.    Hypokalemia  - worsened with lasix infusion, not surprisingly  - 40meq Kcl PO bid and order banana if she likes them  - recheck BMP midday, restart pta regimen     Paroxysmal Atrial Fibrillation/Flutter  Sinus rhythm on admit.   - Anticoagulation: Was on Eliquis 2.5mg BID but this was stopped 7/2022 after fall  - Rate Control: None; current rates in the 80's      Chronic Kidney Disease Stage 3  Cr 1.3 on admission  Plan  - repeat bmp, stable 6/13  - as above, will stop IV lasix drip, give 1x lasix 100 mg iv later today, and plan to resume home regimen 6/14 - pending clinical course, BMP update, and  hospice team input               Diet: Combination Diet Low Saturated Fat Na <2400mg Diet, No Caffeine Diet    DVT Prophylaxis: comfort care  Marie Catheter: Not present  Lines: None     Cardiac Monitoring: None  Code Status: No CPR- Do NOT Intubate      Clinically Significant Risk Factors Present on Admission        # Hypokalemia: Lowest K = 2.5 mmol/L in last 2 days, will replace as needed    # Hypercalcemia: Highest Ca = 10.2 mg/dL in last 2 days, will monitor as appropriate        # Hypertension: Noted on problem list               Disposition Plan      Expected Discharge Date: 06/15/2023                  Naresh Hernandez MD  Hospitalist Service  [facility]  Securely message with [employer] (more info)  Text page via [facility] Paging/Directory     ______________________________________________________________________    Chief Complaint   Swelling / Fluid overload    History is obtained from the patient    History of Present Illness   Per admitting provider 6/12:    Leandra Guerrero is a 72 year old female with past medical history of cardiac amyloidosis, CHF, current hospice care plan who was sent into the emergency department from her hospice care providers for diuresis.     The patient reports she is currently on hospice and understands that she has a life limiting illness with her cardiac amyloidosis.  She is currently on hospice with plans to resume hospice on discharge.  Her goal is to get home.  However she has noted increasing lower extremity edema.  In the past she had been hospitalized in April for volume removal.     We discussed further work-up including echocardiogram serial troponins and right now the patient declined she would like to focus entirely on symptom management of her extensive edema.  She feels like she has gained about 20 pounds.         Past Medical History    Past Medical History:   Diagnosis Date     AL amyloidosis (H)      Atrial fibrillation and flutter (H)       Cardiac amyloidosis (H)      Lymphedema      QT prolongation      Recurrent right pleural effusion 1/2/2017     SVT (supraventricular tachycardia) (H)        Past Surgical History   Past Surgical History:   Procedure Laterality Date     EXCISE GANGLION WRIST Left 5/11/2022    Procedure: LEFT VOLAR WRIST GANGLION EXCISION;  Surgeon: Tato Diaz MD;  Location:  OR     IR THORACENTESIS  12/21/2016     IR THORACENTESIS  8/25/2016       Prior to Admission Medications   Prior to Admission Medications   Prescriptions Last Dose Informant Patient Reported? Taking?   HYDROcodone-acetaminophen (NORCO) 5-325 MG tablet  Nursing Home Yes No   Sig: Take 1-2 tablets by mouth every 4 hours as needed for pain   HYDROmorphone, HIGH CONC, (DILAUDID) 10 mg/mL LIQD oral   Yes No   Sig: Place 2 mg under the tongue every 2 hours as needed for moderate to severe pain   LORazepam (ATIVAN) 0.5 MG tablet  Nursing Home Yes No   Sig: Take 0.5 mg by mouth every hour as needed for anxiety   Magnesium Oxide 250 MG TABS  Nursing Home Yes No   Sig: Take 1 tablet by mouth daily   acetaminophen (TYLENOL) 325 MG tablet  Self Yes No   Sig: Take 325 mg by mouth every 6 hours as needed for mild pain or fever    bisacodyl (DULCOLAX) 10 MG suppository   Yes No   Sig: Place 10 mg rectally daily as needed for constipation   camphor-menthol (DERMASARRA) 0.5-0.5 % external lotion   Yes No   Sig: Apply topically every 6 hours as needed for skin care   chlorothiazide (DIURIL) 500 MG tablet   Yes No   Sig: Take 1,000 mg by mouth 2 times daily   eplerenone (INSPRA) 25 MG tablet  Nursing Home, Self No No   Sig: Take 1 tablet (25 mg) by mouth 2 times daily   fexofenadine (ALLEGRA) 180 MG tablet   Yes No   Sig: Take 180 mg by mouth daily   furosemide (LASIX) 40 MG tablet   Yes No   Sig: Take 160 mg by mouth 3 times daily   haloperidol (HALDOL) 1 MG tablet  Nursing Home Yes No   Sig: Take 1 mg by mouth every 4 hours as needed for other (Nausea)    hydrOXYzine (ATARAX) 25 MG tablet   Yes No   Sig: Take 25 mg by mouth 4 times daily as needed for itching or anxiety   hyoscyamine (LEVSIN) 0.125 MG tablet   Yes No   Sig: Take 0.125 mg by mouth every 4 hours as needed for cramping   metolazone (ZAROXOLYN) 2.5 MG tablet   Yes No   Sig: Take 2.5 mg by mouth daily as needed   metolazone (ZAROXOLYN) 5 MG tablet   No No   Sig: Take one tablet by mouth on Monday, Wednesday Friday. Take with addition 40mEq of Potassium.   ondansetron (ZOFRAN) 8 MG tablet  Self No No   Sig: Take 1 tablet (8 mg) by mouth every 8 hours as needed for nausea   potassium chloride ER (K-TAB/KLOR-CON) 10 MEQ CR tablet   Yes No   Sig: Take 40 mEq by mouth three times a week Take on Mondays, Wednesdays, and Fridays with Metolazone   potassium chloride ER (MICRO-K) 10 MEQ CR capsule   Yes No   Sig: Take 80 mEq by mouth 3 times daily   prochlorperazine (COMPAZINE) 10 MG tablet   Yes No   Sig: Take 10 mg by mouth every 6 hours as needed for nausea or vomiting   senna-docusate (SENOKOT-S/PERICOLACE) 8.6-50 MG tablet  Nursing Home Yes No   Sig: Take 1 tablet by mouth daily as needed for constipation   torsemide (DEMADEX) 100 MG tablet   Yes No   Sig: Take 100 mg by mouth daily   triamterene (DYRENIUM) 100 MG capsule   Yes No   Sig: Take 100 mg by mouth 2 times daily      Facility-Administered Medications: None           Physical Exam   Vital Signs:                  HR 70s, RR 16   Weight: 0 lbs 0 oz    Gen: NAD, very pleasant  HEENT: EOMI, MMM  Resp: no focal crackles,  no wheezes, no increased work of resp  CV: S1S2 heard, reg rhythm, reg rate  Abdo: soft, nontender, nondistended, bowel sounds present  Ext: bilateral edema noted  Neuro: aa, conversing, moving ext, CN grossly intact, no facial asymmetry      Medical Decision Making       51 MINUTES SPENT BY ME on the date of service doing chart review, history, exam, documentation & further activities per the note.      Data     I have personally  reviewed the following data over the past 24 hrs:    5.1  \   13.5   / 182     135 (L) 90 (L) 30.6 (H) /  104 (H)   2.5 (LL) 32 (H) 1.27 (H) \       Trop: 48 (H) BNP: 1,199 (H)       Imaging results reviewed over the past 24 hrs:   Recent Results (from the past 24 hour(s))   XR Chest 2 Views    Narrative    XR CHEST 2 VIEWS   6/12/2023 1:45 PM     HISTORY: Shortness of breath    COMPARISON: Chest radiograph 4/11/2023, CT 5/9/2022      Impression    IMPRESSION: Stable cardiomediastinal silhouette. Moderate right  pleural effusion with associated compressive atelectasis, not  significantly changed in volume from prior exam. Left lung is clear.  No pneumothorax. Bones are unchanged.    FARAZ SELLERS MD         SYSTEM ID:  [id number]

## 2023-06-13 NOTE — PHARMACY-ADMISSION MEDICATION HISTORY
Admission medication history completed at [facility]. Please see Pharmacy - Admission Medication History note from 6/12/2023.

## 2023-06-13 NOTE — PROGRESS NOTES
Patient settled into room. IV lasix requested from pharmacy. Patient informed that she will be transferring to another floor given her inpt status/hospice. Patient vitally stable, denies pain, tele in place.

## 2023-06-13 NOTE — PLAN OF CARE
Goal Outcome Evaluation:         Shift Note:   2200 - 0730    Pt transferred from short-stay @ 2200. Pt is on home hospice, only wants fluid overload symptoms managed. A&Ox4, pleasant and cooperative. VSS on RA. Tele - NSR. Denied pain this shift. BLE edema 3+. PIV infusing lasix @ 5mg/hr. Up SBA with cane to BSC, continent. Wound to R buttocks, barrier cream applied and note left for MD to decide on WOC consult. Tolerating cardiac diet. GIP hospice consult in place.

## 2023-06-14 NOTE — PROGRESS NOTES
[facility]    Progress Note - [facility]    ______________________________________________________________________    [facility] 24/7 Contact Number: [phone]    - [facility]iders: [facility] with changes in orders or clinical plan of care   - Nursing: Please contact [facility] with significant changes in patient condition  ______________________________________________________________________        Plan of Care Discussed with the Following:   - Nurse: Jessie   - Hospitalist/Rounding Provider: Dr. Hernandez  - Virginia's Family/Preferred Contact: Rojelio Moore   Hospice Provider: Dr. Mary     Summary of Visit (includes assessment, medications and any new orders):     Patient will discharge back to her home with her  today, 6/24.  Hospice will provide tuck in visit once patient is home to ensure smooth transition.         Hallie Koch RN

## 2023-06-14 NOTE — PLAN OF CARE
Pt discharged to home with  providing transportation.  Pt going home with hospice continuing to manage her care.  Reviewed AVS with patient and she expressed understanding.  Pt left floor via wheel chair with NA.

## 2023-06-14 NOTE — DISCHARGE SUMMARY
[facility]  Hospitalist Discharge Summary      Date of Admission:  6/13/2023  Date of Discharge:  6/14/2023  Discharging Provider: Naresh Hernandez MD  Discharge Service: Hospitalist Service    Discharge Diagnoses   Acute on chronic diastolic congestive heart failure  History of cardiac amyloid  Active hospice enrollment  Hypokalemia  CKD III  Paroxysmal a fib/flutter    Clinically Significant Risk Factors          Follow-ups Needed After Discharge   Follow-up Appointments     Follow-up and recommended labs and tests       Continue close monitoring with Hospice team             Unresulted Labs Ordered in the Past 30 Days of this Admission     No orders found for last 31 day(s).      These results will be followed up by NA    Discharge Disposition   Discharged to home with hospice continued  Condition at discharge: stable but guarded in general    Hospital Course   Leandra Guerrero is a 72 year old female admitted on 6/12/2023 with sob and swelling.      Acute on chronic diastolic congestive heart failure  History of cardiac amyloid  Active hospice enrollment  EKG wtihout any ischemic changes  Patient is in hospice and wants to continue to be in hospice, will admit to inpatient  She does not want aggressive cares other  Plan  - lasix infusion initially (home dose is 160 mg tid of lasix, torsemide 100 mg, and chlorthalidone)  - telemetry  - echocardiogram deferred given hospice status. This was discussed with the patient, whose major goal is for diuresis Discussed with patient, who confirms she received home hospice through [facility]  - daily weight strict ins and outs  - daily bmp with electrolyte monitoring  - [facility] hospice following, will discharge/readmit under inpatient hospice  - 6/13 - SOB, edema with some improvement. Discussed with patient - will stop IV lasix drip, give 1x lasix 100 mg iv later today, and plan to resume home regimen 6/14 - pending clinical course, BMP update, and  hospice team input. I would have to question how much this regimen is working/helping vs causing other side effects, but for now will keep in place.  - 6/14 - patient reports improvement with breathing and feels stable. She reports using home O2 at times. Still with leg edema which has improved somewhat - she will continue using her home compression devices which provide some relief.  Discussed with hospice team who relayed with her home hospice doctor - no changes planned with diuretics or other meds - they will be watching closely and adjust as needed. I encouraged patient to relay needs to her hospice team.     Hypokalemia - improved  - worsened with lasix infusion, not surprisingly  - 40meq Kcl PO bid and order banana if she likes them  - BMP showed K WNL prior to discharge  - continue supplementation at discretion of hospice team     Paroxysmal Atrial Fibrillation/Flutter  Sinus rhythm on admit.   - Anticoagulation: Was on Eliquis 2.5mg BID but this was stopped 7/2022 after fall  - Rate Control: None; current rates in the 80's      Chronic Kidney Disease Stage 3  Cr 1.3 on admission  Plan  - repeat bmp, stable 6/13 and slightly higher at 1.4 on 6/14  - initially on IV lasix drip, given 1x lasix 100 mg iv 6/13 afternoon  - as above, resume PTA diuretics - hospice team will review regimen and work with patient to determine if/what labs are needed, etc       Consultations This Hospital Stay   [facility]IP INPATIENT HOSPICE ADULT CONSULT  WOUND OSTOMY CONTINENCE NURSE  IP CONSULT  PHARMACY IP CONSULT    Code Status   No CPR- Do NOT Intubate    Time Spent on this Encounter   I, Naresh Hernandez MD, personally saw the patient today and spent greater than 30 minutes discharging this patient.       Naresh Hernandez MD  [place] [id number] [facility]  [address] [place] [address] [address]  [facility] [address]  Phone: [phone]  ______________________________________________________________________    Physical Exam   Vital  Signs:     BP: 122/50 Pulse: 64     SpO2: 98 % O2 Device: Nasal cannula Oxygen Delivery: 2 LPM  Weight: 0 lbs 0 oz    Gen: NAD, very pleasant  HEENT: EOMI, MMM  Resp: no focal crackles,  no wheezes, no increased work of resp  CV: S1S2 heard, reg rhythm, reg rate  Abdo: soft, nontender, nondistended, bowel sounds present  Ext: BLE edema, slightly improved, not erythematous, normal temp  Neuro: aa, conversant, moving ext, CN grossly intact, no facial asymmetry         Primary Care Physician   CARLYN ALVA    Discharge Orders      Reason for your hospital stay    Shortness of breath, fluid overload     Follow-up and recommended labs and tests     Continue close monitoring with Hospice team     Activity    Your activity upon discharge: activity as tolerated     Discharge Instructions    Continue medications as below.  Hospice will be following closely and adjust your medications as needed.     Diet    Follow this diet upon discharge: Orders Placed This Encounter      Combination Diet Low Saturated Fat Na <2400mg Diet       Significant Results and Procedures   Most Recent 3 CBC's:Recent Labs   Lab Test 06/12/23  1239 04/13/23  1103 04/12/23  0548   WBC 5.1 6.9 4.9   HGB 13.5 12.7 12.7   MCV 97 98 96    214 190     Most Recent 3 BMP's:Recent Labs   Lab Test 06/14/23  0655 06/13/23  1242 06/13/23  0725   * 132* 135*   POTASSIUM 3.6 3.0* 2.5*   CHLORIDE 92* 87* 90*   CO2 29 31* 32*   BUN 36.6* 31.0* 30.6*   CR 1.36* 1.25* 1.27*   ANIONGAP 13 14 13   JERROD 9.6 9.5 9.9   GLC 99 152* 104*   ,   Results for orders placed or performed during the hospital encounter of 06/12/23   XR Chest 2 Views    Narrative    XR CHEST 2 VIEWS   6/12/2023 1:45 PM     HISTORY: Shortness of breath    COMPARISON: Chest radiograph 4/11/2023, CT 5/9/2022      Impression    IMPRESSION: Stable cardiomediastinal silhouette. Moderate right  pleural effusion with associated compressive atelectasis, not  significantly changed in volume from  prior exam. Left lung is clear.  No pneumothorax. Bones are unchanged.    FARAZ SELLERS MD         SYSTEM ID:  [id number]     *Note: Due to a large number of results and/or encounters for the requested time period, some results have not been displayed. A complete set of results can be found in Results Review.       Discharge Medications   Current Discharge Medication List      CONTINUE these medications which have NOT CHANGED    Details   acetaminophen (TYLENOL) 325 MG tablet Take 325 mg by mouth every 6 hours as needed for mild pain or fever     Associated Diagnoses: Amyloid heart disease (H)      bisacodyl (DULCOLAX) 10 MG suppository Place 10 mg rectally daily as needed for constipation      camphor-menthol (DERMASARRA) 0.5-0.5 % external lotion Apply topically every 6 hours as needed for skin care      chlorothiazide (DIURIL) 500 MG tablet Take 1,000 mg by mouth 2 times daily      eplerenone (INSPRA) 25 MG tablet Take 1 tablet (25 mg) by mouth 2 times daily  Qty: 180 tablet, Refills: 3    Associated Diagnoses: Acute on chronic diastolic heart failure (H); Hypokalemia; Chronic diastolic heart failure (H)      fexofenadine (ALLEGRA) 180 MG tablet Take 180 mg by mouth daily      furosemide (LASIX) 40 MG tablet Take 160 mg by mouth 3 times daily      haloperidol (HALDOL) 1 MG tablet Take 1 mg by mouth every 4 hours as needed for other (Nausea)      HYDROcodone-acetaminophen (NORCO) 5-325 MG tablet Take 1-2 tablets by mouth every 4 hours as needed for pain      HYDROmorphone, HIGH CONC, (DILAUDID) 10 mg/mL LIQD oral Place 2 mg under the tongue every 2 hours as needed for moderate to severe pain      hydrOXYzine (ATARAX) 25 MG tablet Take 25 mg by mouth 4 times daily as needed for itching or anxiety      hyoscyamine (LEVSIN) 0.125 MG tablet Take 0.125 mg by mouth every 4 hours as needed for cramping      LORazepam (ATIVAN) 0.5 MG tablet Take 0.5 mg by mouth every hour as needed for anxiety      Magnesium Oxide 250 MG  TABS Take 1 tablet by mouth daily      !! metolazone (ZAROXOLYN) 2.5 MG tablet Take 2.5 mg by mouth daily as needed      !! metolazone (ZAROXOLYN) 5 MG tablet Take one tablet by mouth on Monday, Wednesday Friday. Take with addition 40mEq of Potassium.  Qty: 36 tablet, Refills: 3    Associated Diagnoses: Acute on chronic diastolic congestive heart failure (H)      ondansetron (ZOFRAN) 8 MG tablet Take 1 tablet (8 mg) by mouth every 8 hours as needed for nausea  Qty: 30 tablet, Refills: 2    Associated Diagnoses: Hemorrhagic cystitis      potassium chloride ER (K-TAB/KLOR-CON) 10 MEQ CR tablet Take 40 mEq by mouth three times a week Take on Mondays, Wednesdays, and Fridays with Metolazone      potassium chloride ER (MICRO-K) 10 MEQ CR capsule Take 80 mEq by mouth 3 times daily      prochlorperazine (COMPAZINE) 10 MG tablet Take 10 mg by mouth every 6 hours as needed for nausea or vomiting      senna-docusate (SENOKOT-S/PERICOLACE) 8.6-50 MG tablet Take 1 tablet by mouth daily as needed for constipation      torsemide (DEMADEX) 100 MG tablet Take 100 mg by mouth daily      triamterene (DYRENIUM) 100 MG capsule Take 100 mg by mouth 2 times daily       !! - Potential duplicate medications found. Please discuss with provider.        Allergies   Allergies   Allergen Reactions     Contrast Dye Shortness Of Breath     Shaking,chills and dypsnea     Cytoxan [Cyclophosphamide]      Hemorrhagic cystitis     Gabapentin      Slurred speech, weakness     Levofloxacin      Severe shaking and abdominal pain     Aaron Weed      Rash and itchyness     Amoxicillin Nausea and Vomiting and Cramps     Bumetanide Unknown     Spironolactone      Worsening hyponatremia, palpitations     Chlorhexidine Dermatitis and Rash     AARON wipes. 2% chlorhexidine gluconate. Rash to everywhere wipe was applied

## 2023-06-14 NOTE — PLAN OF CARE
VS and full assessment deferred d/t comfort cares. Pt A/O x4. C/o headache, PRN tylenol given. Denies N/V, SOB. Has +3-+4 edema to BLE, pt report of tenderness in BLE. Up SBA in room with Walker, ambulated to bathroom, voiding adequately.PI on right buttocks and abrasion on L Upper Arm, foam dressings in place. On cardiac diet. Discharge pending.

## 2023-06-14 NOTE — PROGRESS NOTES
Shift Note [id number]    On [facility] hospice, vital signs and full assessment deferred. A/Ox4. C/o headache and lower extremity pain throughout shift- PRN Tylenol and dilaudid given with fair relief of symptoms. Pt also c/o of chest pain, SOB, intermittent nausea this afternoon that woke her from a nap, PRN ativan given and started pt on 2L O2 nasal canula, had adequate relief. Has bilateral 3+ edema to lower extremities, painful when positioning- elevated on pillows. Continuous Lasix IV was discontinued and lasix injection was ordered- will transition to oral lasix. Potassium came back critically low 2.5 this morning, replaced with oral tablet and banana, recheck came back at 3.0. Started on scheduled oral potassium replacement TID. PIV in R AC was leaking and removed, paged float resource nurse, new PIV was placed in L lower forearm-SL. Discharge pending patient plan.

## 2024-01-25 NOTE — TELEPHONE ENCOUNTER
Received call from patient wondering if it would be ok to take her Acyclovir for her cold symptoms. Advised patient if it was not prescribed for her cold, writer would not advise taking it to manage her cold symptoms. States she has a cough and feels like it is moving into her chest.  Discussed some otc medications such as Delsym and Mucinex but encouraged her to talk to a pharmacist to check for drug interactions before taking.  Encouraged to call PCP if she does not improve in the next few days. Also patient states she was told AL amyloidosis is not hereditary but she knows a few people in her family with the same diagnosis and her children are concerned. Writer instructed patient a message would be sent to our genetic team and will call her back with their advice. Patient expressed understanding and denies further questions.  Janine Steve  RN, BSN, OCN    Called patient. Per Dr Moore genetic testing is probably not indicated but she could discuss with Dr Cohen. Patient expressed understanding and denies further questions  Janine Steve RN, BSN, OCN     straight cane

## 2024-05-13 NOTE — PROGRESS NOTES
HPI:   Ms. 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 [facility] for [facility] up.     She notes mild PELAEZ and persistent LE edema, but feels these are improving. She was able to walk up from her car today without needing rest. She denies fever, chills, lightheadedness, dizziness, chest pain, palpitations, SOB, PND, nausea, vomiting, diarrhea, hematochezia, melena, or LE edema. His weight remains stable at 128 lbs She continues to follow a low sodium diet.      PAST MEDICAL HISTORY:  Past Medical History:   Diagnosis Date     AL amyloidosis (H)      Cardiac amyloidosis (H)      Lymphedema      Recurrent right pleural effusion 1/2/2017       FAMILY HISTORY:  Family History   Problem Relation Age of Onset     Other - See Comments Sister      Amyloidosis       SOCIAL HISTORY:  Social History     Social History     Marital status:      Spouse name: N/A     Number of children: N/A     Years of education: N/A     Social History Main Topics     Smoking status: Never Smoker     Smokeless tobacco: Not on file     Alcohol use No     Drug use: No     Sexual activity: Not on file     Other Topics Concern     Not on file     Social History Narrative       CURRENT MEDICATIONS:  Outpatient Medications Prior to Visit   Medication Sig Dispense Refill     torsemide (DEMADEX) 20 MG tablet Take 3 tablets (60 mg) by mouth 2 times daily 180 tablet 3     spironolactone (ALDACTONE) 25 MG tablet Take 1 tablet (25 mg) by mouth 2 times daily 180 tablet 3     thiamine 100 MG tablet Take 1 tablet (100 mg) by mouth daily 60 tablet 3     acyclovir (ZOVIRAX) 400 MG tablet Take 1 tablet (400 mg) by mouth 2 times daily 60 tablet 3     potassium chloride SA (K-DUR/KLOR-CON M) 10 MEQ CR tablet Take 2 tablets (20 mEq) by mouth 2 times daily Do not fill until  \"Have you been to the ER, urgent care clinic since your last visit?  Hospitalized since your last visit?\"    DISCHARGED 4/30 NSTEMI    “Have you seen or consulted any other health care providers outside of [facility] since your last visit?”    NO            Click Here for Release of Records Request     "requested 120 tablet 11     Acetaminophen (TYLENOL PO) Take 325 mg by mouth       HYDROcodone-acetaminophen (NORCO) 5-325 MG per tablet Reported on 3/24/2017  0     LORazepam (ATIVAN) 0.5 MG tablet Reported on 3/24/2017  0     ondansetron (ZOFRAN-ODT) 4 MG disintegrating tablet   0     No facility-administered medications prior to visit.        ROS:   CONSTITUTIONAL: Denies fever, chills, fatigue, or weight fluctuations.   HEENT: Denies headache, vision changes, and changes in speech.   CV: Refer to HPI.   PULMONARY: Refer to HPI.   GI:Denies nausea, vomiting, diarrhea, and abdominal pain. Bowel movements are regular.   :Denies urinary alterations, dysuria, urinary frequency, hematuria, and abnormal drainage.   EXT: Complains of LE edema.   SKIN:Denies abnormal rashes or lesions.   MUSCULOSKELETAL:Denies upper or lower extremity weakness and pain.   NEUROLOGIC:Denies lightheadedness, dizziness, seizures, or upper or lower extremity paresthesia.     EXAM:  Ht 1.6 m (5' 3\")  Wt 58.3 kg (128 lb 9.6 oz)  BMI 22.78 kg/m2  GENERAL: Appears alert and oriented times three.   HEENT: Eye symmetrical and free of discharge bilaterally. Mucous membranes moist and without lesions.  NECK: Supple and without lymphadenopathy. JVD absent.   CV: RRR, S1S2 present without murmur, rub, or gallop.   RESPIRATORY: Respirations regular, even, and unlabored. Faint crackles to left base. Diminished right base.   GI: Soft and non distended with normoactive bowel sounds present in all quadrants. No tenderness, rebound, guarding. No organomegaly.   EXTREMITIES: +2-3 bilateral LE edema. 2+ bilateral pedal pulses.   NEUROLOGIC: Alert and orientated x 3. CN II-XII grossly intact. No focal deficits.   MUSCULOSKELETAL: No joint swelling or tenderness.   SKIN: No jaundice. No rashes or lesions.     Labs:  CBC RESULTS:  Lab Results   Component Value Date    WBC 4.5 03/16/2017    RBC 4.79 03/16/2017    HGB 15.2 03/16/2017    HCT 45.7 03/16/2017    " MCV 95 03/16/2017    MCH 31.7 03/16/2017    MCHC 33.3 03/16/2017    RDW 13.6 03/16/2017     03/16/2017       CMP RESULTS:  Lab Results   Component Value Date     04/10/2017    POTASSIUM 4.1 04/10/2017    CHLORIDE 96 04/10/2017    CO2 34 (H) 04/10/2017    ANIONGAP 6 04/10/2017    GLC 98 04/10/2017    BUN 20 04/10/2017    CR 1.18 (H) 04/10/2017    GFRESTIMATED 46 (L) 04/10/2017    GFRESTBLACK 56 (L) 04/10/2017    JERROD 9.3 04/10/2017    BILITOTAL 1.6 (H) 03/16/2017    ALBUMIN 3.6 03/16/2017    ALKPHOS 202 (H) 03/16/2017    ALT 17 03/16/2017    AST 17 03/16/2017        INR RESULTS:  Lab Results   Component Value Date    INR 1.30 (H) 01/14/2017       Lab Results   Component Value Date    MAG 2.5 (H) 01/25/2017     Lab Results   Component Value Date    NTBNPI 9009 (H) 01/13/2017     Lab Results   Component Value Date    NTBNP 2631 (H) 04/10/2017     Echocardiogram 1/5/17:  Interpretation Summary  Global and regional left ventricular function is normal with moderate  concentric LVH and biplane LVEF 55%.  Moderate to severe concentric wall thickening consistent with left  ventricular hypertrophy is present.  Grade III or advanced diastolic dysfunction is present.  Global peak LV longitudinal strain is abnormal when averaged at -12.5%  (normal <-18%).  Global right ventricular function is mildly reduced.  The inferior vena cava is normal.  Small circumferential pericardial effusion is present without any hemodynamic  Significance.    Assessment and Plan:   Ms. 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 [facility] for [facility] follow up.     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 Not currently a candidate for bone marrow transplant at [facility]. Currently undergoing CyBorD per oncology. She is  planning to undergo evaluation at [facility] in 1 week.   ACE/ARB: None  BB: None  Aldosterone antagonist: Aldactone 25 mg po daily   SCD prophylaxis: Not indicated.   Fluid status: Euvolemic.   Stage C, NYHA Class II    Follow up pending evaluation at [facility].     Di Sanchez  4/17/2017          ALEJANDRO FORD

## 2025-02-03 NOTE — PLAN OF CARE
Pt alert and oriented x4, VSS on RA. Up independently to restroom or bedside commode. No c/o pain, nausea, or cramping overnight. Slept comfortably throughout the night. Calls appropriately and able to make needs known. Continue to monitor.    25

## (undated) DEVICE — ESU GROUND PAD ADULT W/CORD E7507

## (undated) DEVICE — SU VICRYL 3-0 TIE 54" J614H

## (undated) DEVICE — SOL NACL 0.9% IRRIG 1000ML BOTTLE 07138-09

## (undated) DEVICE — SU ETHILON 3-0 PS-1 18" 1663H

## (undated) DEVICE — SU MONOCRYL 4-0 PS-2 27" UND Y426H

## (undated) DEVICE — PACK HAND WRIST SOP15HWFSP

## (undated) DEVICE — SOL WATER IRRIG 1000ML BOTTLE 07139-09

## (undated) DEVICE — LINEN GOWN XLG 5407

## (undated) DEVICE — SU ETHILON 4-0 PC-3 18" 1864G

## (undated) DEVICE — SYR 10ML FINGER CONTROL W/O NDL 309695

## (undated) DEVICE — LINEN TOWEL PACK X6 WHITE 5487

## (undated) DEVICE — PREP CHLORAPREP 26ML TINTED ORANGE  260815

## (undated) DEVICE — PAD CHUX UNDERPAD 30X30"

## (undated) DEVICE — TAPE DURAPORE 3" SILK 1538-3

## (undated) DEVICE — SU MONOCRYL 3-0 PS-2 27" Y427H

## (undated) DEVICE — Device

## (undated) DEVICE — GLOVE PROTEXIS W/NEU-THERA 7.5  2D73TE75

## (undated) DEVICE — DRSG TELFA 3X8" 1238

## (undated) DEVICE — LINEN TOWEL PACK X5 5464

## (undated) DEVICE — SOL NACL 0.9% IRRIG 1000ML BOTTLE 2F7124

## (undated) DEVICE — ESU CORD BIPOLAR GREEN 10-4000

## (undated) DEVICE — DRAIN PLEURAL CATH KIT PLEURX RESERVOIR 50-7500B

## (undated) DEVICE — PREP CHLORHEXIDINE 4% 4OZ (HIBICLENS) 57504

## (undated) DEVICE — DRAPE SHEET MED 44X70" 9355

## (undated) DEVICE — APPLICATOR COTTON TIP 6"X2 STERILE LF 6012

## (undated) DEVICE — STRAP ARMBOARD IV POSITIONING 1.5X32" VELCRO 31142980

## (undated) DEVICE — ENDO BITE BLOCK ADULT OMNI-BLOC

## (undated) DEVICE — DRSG TEGADERM 4X10" 1627

## (undated) DEVICE — DRSG XEROFORM 1X8"

## (undated) DEVICE — DRAPE STERI TOWEL SM 1000

## (undated) DEVICE — DRAPE LAP W/ARMBOARD 29410

## (undated) DEVICE — SU VICRYL 2-0 TIE 54" J615H

## (undated) DEVICE — GOWN XLG DISP 9545

## (undated) DEVICE — SU MONOCRYL 4-0 PS-2 18" UND Y496G

## (undated) RX ORDER — LIDOCAINE HYDROCHLORIDE 10 MG/ML
INJECTION, SOLUTION EPIDURAL; INFILTRATION; INTRACAUDAL; PERINEURAL
Status: DISPENSED
Start: 2021-10-30